# Patient Record
Sex: FEMALE | Race: OTHER | Employment: OTHER | URBAN - METROPOLITAN AREA
[De-identification: names, ages, dates, MRNs, and addresses within clinical notes are randomized per-mention and may not be internally consistent; named-entity substitution may affect disease eponyms.]

---

## 2018-02-23 PROBLEM — R19.00 PELVIC MASS: Status: ACTIVE | Noted: 2018-02-23

## 2018-02-23 PROBLEM — N93.9 ABNORMAL UTERINE BLEEDING: Status: ACTIVE | Noted: 2018-02-23

## 2018-03-05 PROBLEM — R19.00 PELVIC MASS IN FEMALE: Status: ACTIVE | Noted: 2018-03-05

## 2020-01-08 ENCOUNTER — APPOINTMENT (OUTPATIENT)
Dept: GENERAL RADIOLOGY | Age: 45
End: 2020-01-08
Payer: COMMERCIAL

## 2020-01-08 ENCOUNTER — HOSPITAL ENCOUNTER (EMERGENCY)
Age: 45
Discharge: HOME | End: 2020-01-08
Payer: COMMERCIAL

## 2020-01-08 VITALS
SYSTOLIC BLOOD PRESSURE: 135 MMHG | BODY MASS INDEX: 34.16 KG/M2 | RESPIRATION RATE: 16 BRPM | TEMPERATURE: 98.2 F | WEIGHT: 174 LBS | DIASTOLIC BLOOD PRESSURE: 75 MMHG | HEIGHT: 60 IN | OXYGEN SATURATION: 97 % | HEART RATE: 78 BPM

## 2020-01-08 PROCEDURE — 99283 EMERGENCY DEPT VISIT LOW MDM: CPT

## 2020-01-08 PROCEDURE — 73030 X-RAY EXAM OF SHOULDER: CPT

## 2020-01-08 PROCEDURE — 96372 THER/PROPH/DIAG INJ SC/IM: CPT

## 2020-01-08 PROCEDURE — 6360000002 HC RX W HCPCS: Performed by: PHYSICIAN ASSISTANT

## 2020-01-08 RX ORDER — IBUPROFEN 600 MG/1
600 TABLET ORAL EVERY 6 HOURS PRN
Qty: 60 TABLET | Refills: 0 | Status: SHIPPED | OUTPATIENT
Start: 2020-01-08 | End: 2020-02-06

## 2020-01-08 RX ORDER — HYDROCODONE BITARTRATE AND ACETAMINOPHEN 5; 325 MG/1; MG/1
1 TABLET ORAL EVERY 6 HOURS PRN
Qty: 12 TABLET | Refills: 0 | Status: SHIPPED | OUTPATIENT
Start: 2020-01-08 | End: 2020-01-11

## 2020-01-08 RX ORDER — KETOROLAC TROMETHAMINE 30 MG/ML
60 INJECTION, SOLUTION INTRAMUSCULAR; INTRAVENOUS ONCE
Status: COMPLETED | OUTPATIENT
Start: 2020-01-08 | End: 2020-01-08

## 2020-01-08 RX ORDER — DEXAMETHASONE SODIUM PHOSPHATE 10 MG/ML
10 INJECTION INTRAMUSCULAR; INTRAVENOUS ONCE
Status: COMPLETED | OUTPATIENT
Start: 2020-01-08 | End: 2020-01-08

## 2020-01-08 RX ADMIN — DEXAMETHASONE SODIUM PHOSPHATE 10 MG: 10 INJECTION INTRAMUSCULAR; INTRAVENOUS at 16:38

## 2020-01-08 RX ADMIN — KETOROLAC TROMETHAMINE 60 MG: 30 INJECTION, SOLUTION INTRAMUSCULAR at 16:38

## 2020-01-08 ASSESSMENT — PAIN SCALES - GENERAL
PAINLEVEL_OUTOF10: 10
PAINLEVEL_OUTOF10: 10

## 2020-01-08 ASSESSMENT — PAIN DESCRIPTION - PAIN TYPE: TYPE: ACUTE PAIN

## 2020-01-08 ASSESSMENT — PAIN DESCRIPTION - LOCATION: LOCATION: ARM

## 2020-01-08 ASSESSMENT — PAIN DESCRIPTION - ORIENTATION: ORIENTATION: RIGHT

## 2020-01-08 NOTE — ED NOTES
Name: Stephen Herbert  : 1975  MRN: [id number]    Date: 2020    Benefits of immediately proceeding with Radiology exam outweigh the risks and therefore the following is being waived:      [x] Pregnancy test    [] Protocol for Iodine allergy    [] MRI questionnaire    [] BUN/Creatinine        KIRSTEN Perry PA-C  [id number]

## 2020-01-10 ENCOUNTER — TELEPHONE (OUTPATIENT)
Dept: ADMINISTRATIVE | Age: 45
End: 2020-01-10

## 2020-02-05 ENCOUNTER — OFFICE VISIT (OUTPATIENT)
Dept: PHYSICAL MEDICINE AND REHAB | Age: 45
End: 2020-02-05
Payer: COMMERCIAL

## 2020-02-05 ENCOUNTER — HOSPITAL ENCOUNTER (EMERGENCY)
Age: 45
Discharge: HOME | End: 2020-02-06
Payer: COMMERCIAL

## 2020-02-05 ENCOUNTER — APPOINTMENT (OUTPATIENT)
Dept: CT IMAGING | Age: 45
End: 2020-02-05
Payer: COMMERCIAL

## 2020-02-05 VITALS
OXYGEN SATURATION: 98 % | SYSTOLIC BLOOD PRESSURE: 163 MMHG | BODY MASS INDEX: 33.18 KG/M2 | HEART RATE: 71 BPM | HEIGHT: 60 IN | WEIGHT: 169 LBS | DIASTOLIC BLOOD PRESSURE: 99 MMHG

## 2020-02-05 LAB
ANION GAP SERPL CALCULATED.3IONS-SCNC: 9 MMOL/L (ref 7–16)
BASOPHILS ABSOLUTE: 0.05 E9/L (ref 0–0.2)
BASOPHILS RELATIVE PERCENT: 0.4 % (ref 0–2)
BUN BLDV-MCNC: 11 MG/DL (ref 6–20)
CALCIUM SERPL-MCNC: 9.3 MG/DL (ref 8.6–10.2)
CHLORIDE BLD-SCNC: 98 MMOL/L (ref 98–107)
CO2: 27 MMOL/L (ref 22–29)
CREAT SERPL-MCNC: 0.8 MG/DL (ref 0.5–1)
EOSINOPHILS ABSOLUTE: 0.18 E9/L (ref 0.05–0.5)
EOSINOPHILS RELATIVE PERCENT: 1.5 % (ref 0–6)
GFR AFRICAN AMERICAN: >60
GFR NON-AFRICAN AMERICAN: >60 ML/MIN/1.73
GLUCOSE BLD-MCNC: 97 MG/DL (ref 74–99)
HCT VFR BLD CALC: 46.1 % (ref 34–48)
HEMOGLOBIN: 15.2 G/DL (ref 11.5–15.5)
IMMATURE GRANULOCYTES #: 0.08 E9/L
IMMATURE GRANULOCYTES %: 0.7 % (ref 0–5)
LYMPHOCYTES ABSOLUTE: 3 E9/L (ref 1.5–4)
LYMPHOCYTES RELATIVE PERCENT: 25.8 % (ref 20–42)
MCH RBC QN AUTO: 31.1 PG (ref 26–35)
MCHC RBC AUTO-ENTMCNC: 33 % (ref 32–34.5)
MCV RBC AUTO: 94.3 FL (ref 80–99.9)
MONOCYTES ABSOLUTE: 0.96 E9/L (ref 0.1–0.95)
MONOCYTES RELATIVE PERCENT: 8.2 % (ref 2–12)
NEUTROPHILS ABSOLUTE: 7.37 E9/L (ref 1.8–7.3)
NEUTROPHILS RELATIVE PERCENT: 63.4 % (ref 43–80)
PDW BLD-RTO: 12.8 FL (ref 11.5–15)
PLATELET # BLD: 203 E9/L (ref 130–450)
PMV BLD AUTO: 10 FL (ref 7–12)
POTASSIUM SERPL-SCNC: 4.2 MMOL/L (ref 3.5–5)
RBC # BLD: 4.89 E12/L (ref 3.5–5.5)
SODIUM BLD-SCNC: 134 MMOL/L (ref 132–146)
TROPONIN: <0.01 NG/ML (ref 0–0.03)
WBC # BLD: 11.6 E9/L (ref 4.5–11.5)

## 2020-02-05 PROCEDURE — 84484 ASSAY OF TROPONIN QUANT: CPT

## 2020-02-05 PROCEDURE — 20611 DRAIN/INJ JOINT/BURSA W/US: CPT | Performed by: PHYSICAL MEDICINE & REHABILITATION

## 2020-02-05 PROCEDURE — 93005 ELECTROCARDIOGRAM TRACING: CPT | Performed by: EMERGENCY MEDICINE

## 2020-02-05 PROCEDURE — 85025 COMPLETE CBC W/AUTO DIFF WBC: CPT

## 2020-02-05 PROCEDURE — 99285 EMERGENCY DEPT VISIT HI MDM: CPT

## 2020-02-05 PROCEDURE — 80048 BASIC METABOLIC PNL TOTAL CA: CPT

## 2020-02-05 PROCEDURE — 70450 CT HEAD/BRAIN W/O DYE: CPT

## 2020-02-05 PROCEDURE — 99203 OFFICE O/P NEW LOW 30 MIN: CPT | Performed by: PHYSICAL MEDICINE & REHABILITATION

## 2020-02-05 PROCEDURE — 6370000000 HC RX 637 (ALT 250 FOR IP): Performed by: NURSE PRACTITIONER

## 2020-02-05 RX ORDER — TRIAMCINOLONE ACETONIDE 40 MG/ML
20 INJECTION, SUSPENSION INTRA-ARTICULAR; INTRAMUSCULAR ONCE
Status: COMPLETED | OUTPATIENT
Start: 2020-02-05 | End: 2020-02-05

## 2020-02-05 RX ORDER — MELOXICAM 7.5 MG/1
7.5 TABLET ORAL DAILY
Qty: 30 TABLET | Refills: 0 | Status: SHIPPED
Start: 2020-02-05 | End: 2020-02-26 | Stop reason: ALTCHOICE

## 2020-02-05 RX ORDER — IBUPROFEN 800 MG/1
800 TABLET ORAL ONCE
Status: COMPLETED | OUTPATIENT
Start: 2020-02-05 | End: 2020-02-05

## 2020-02-05 RX ORDER — LIDOCAINE HYDROCHLORIDE 10 MG/ML
2.5 INJECTION, SOLUTION INFILTRATION; PERINEURAL ONCE
Status: COMPLETED | OUTPATIENT
Start: 2020-02-05 | End: 2020-02-05

## 2020-02-05 RX ADMIN — IBUPROFEN 800 MG: 800 TABLET, FILM COATED ORAL at 23:27

## 2020-02-05 RX ADMIN — LIDOCAINE HYDROCHLORIDE 2.5 ML: 10 INJECTION, SOLUTION INFILTRATION; PERINEURAL at 10:07

## 2020-02-05 RX ADMIN — TRIAMCINOLONE ACETONIDE 20 MG: 40 INJECTION, SUSPENSION INTRA-ARTICULAR; INTRAMUSCULAR at 10:07

## 2020-02-05 ASSESSMENT — PAIN SCALES - GENERAL: PAINLEVEL_OUTOF10: 0

## 2020-02-05 NOTE — PROGRESS NOTES
Allergies    Current Outpatient Medications   Medication Sig Dispense Refill    meloxicam (MOBIC) 7.5 MG tablet Take 1 tablet by mouth daily 30 tablet 0    ibuprofen (IBU) 600 MG tablet Take 1 tablet by mouth every 6 hours as needed for Pain 60 tablet 0    ranitidine (ZANTAC) 150 MG tablet Take 150 mg by mouth daily as needed for Heartburn      ferrous sulfate (KIT-BRUCE) 325 (65 Fe) MG tablet Take 1 tablet by mouth daily (Patient not taking: Reported on 2/5/2020) 30 tablet 3     No current facility-administered medications for this visit. Past Medical History:   Diagnosis Date    Cyst of ovary        Past Surgical History:   Procedure Laterality Date    BACK SURGERY      CHOLECYSTECTOMY      CYST REMOVAL      HYSTERECTOMY  03/05/2018    Robotic hysterectomy, bilateral salpingectomy, right oophorectomy DR. ARIANA RAYMOND [facility]        Family History   Problem Relation Age of Onset    Colon Cancer Neg Hx     Uterine Cancer Neg Hx     Ovarian Cancer Neg Hx     Breast Cancer Neg Hx        Social History     Tobacco Use    Smoking status: Current Every Day Smoker     Packs/day: 0.50     Years: 25.00     Pack years: 12.50     Types: Cigarettes    Smokeless tobacco: Never Used   Substance Use Topics    Alcohol use: No    Drug use: No        Functional Status: The patient is able to ambulate and perform activities of daily living without the use of an assistive device. ROS:    Constitutional: Denies fevers, chills, night sweats, unintentional weight loss     Respiratory: Denies SOB or cough     Cardiovascular: Denies CP, palpitations, edema      Neurologic: See HPI.     MSK: See HPI. Psychiatric: Denies sleep disturbance, anxiety, depression    Physical Exam:   Blood pressure (!) 163/99, pulse 71, height 5' (1.524 m), weight 169 lb (76.7 kg), last menstrual period 01/05/2018, SpO2 98 %.    PAIN: 0-10/10  GEN APPEARANCE: Pleasant, well developed, well nourished in no acute distress; Alert and Oriented; body habitus: Obese  PSYCH: Normal mood and affect   SKIN: No lesions grossly visible on right shoulder    MSK:    Shoulder Exam:    Inspection   Shoulders are symmetric without muscle atrophy. AC joints are without deformity    Palpation R side: There are no bony deformities noted to the clavicle, acromion, scapula, humerus, or sternum  There is tenderness at the [facility] joint  There is no tenderness at the Sternoclavicular joint  There is concordant tenderness to the bicipital tendon  There is no tenderness to the greater tuberosity  There is no levator scapula tenderness   There is no periscapular tenderness     AROM OF SHOULDER: RIGHT   LEFT  Flexion:     180   180  Abduction:    170   170  IR:      90   90  ER:     90   90    PROM of SHOULDER:  Full    Neurological Exam:  Strength:   R  L  Deltoid   5  5  Int rot   5 5  Ext rot   5 5  Biceps   5  5  Triceps  5  5  Wrist Ext  5  5  Finger Abd  5  5    Reflexes:   R  L  Biceps   (2) NT  Triceps  (2) NT  BR   (2) NT  Baer  (-) (-)    PROVOCATIVE TESTS R side:    Neer: neg  Radford: neg  Drop arm test: neg  Empty Can: neg  Apley's Scarf: +  Speed's: +  Yergason's: neg  Obriens's: neg    Impression:   Paul Swartz is a 38 y.o. female who presents with right shoulder pain secondary to acute biceps tendinitis. 1. Acute pain of right shoulder    2. Biceps tendinitis of right upper extremity        Recommendations:  1. Discussion: I have discussed the natural history of the above diagnoses with her in detail, with more than 1/2 of the visit spent counseling her on the pathophysiology and treatment options. Discussed various conservative treatment options with patient today including therapy, home exercise program, injections. Patient interested in ultrasound-guided steroid injection today. Risks and benefits discussed, patient provided written and verbal consent to proceed. Biceps tendinitis specific home exercise program provided. 2. Activity Modification: Patient to continue being as physically active as possible while avoiding complete inactivity. No current restrictions placed. 3. Medications: Mobic 7.5 mg daily with food x30 days. 4. Referrals: No surgical referral needed at this point. 5. Images: No additional imaging indicated at this time. 6. Labs: None today. 7. DME: None today. 8. Injection: Ultrasound-guided bicipital tendon sheath steroid injection, as documented below. 9. Follow-up: 1 week. At that time we will review progress with above interventions. The patient was educated about the diagnosis, prognosis, indications, risks and benefits of treatment. An opportunity to ask questions was given to the patient and questions were answered. The patient agreed to proceed with the recommended treatment as described above.      Musculoskeletal Ultrasound Performed at Today's Visit:  Purpose:  US Guided Intervention  Laterality:  right  Structure:  biceps tendon sheath injection  Settings / Approach:  Portable iQ Butterfly ultrasound probe  Interventions:     · Universal protocol documentation / Pre-Procedure Checklist:   ¨ ID verified by two sources (select any two from list): MRN and Name     ¨ Site: right biceps tendon sheath  ¨ Procedure: biceps tendon sheath injection using Kenalog   ¨ Site(s) marked: yes     ¨ Position: seated  ¨ Consent: available     ¨ History and Physical in chart  ¨ Other relevant documentation: available     ¨ Relevant images: available     ¨ Implants: not applicable     ¨ Heat source: No  · Antibiotic: No    ¨ Special Equipment: Musculoskeletal Ultrasound Guidance with image / CINE recording   ¨ Blood products matched: not applicable     ¨ Surgical/Procedure pause: yes     ¨ Other pre-procedural information: given     ¨ Patient concurs: yes      Team present and concurs:  Sid Gage DO; Joes Aparicio MA  · Procedure Note   ¨ Medications used:   0.5ml of 40mg/ml Kenalog and 0.5ml of 1% lidocaine without epinephrine along with 2 mL 1% lidocaine to anesthetize the skin. Description of procedure:   After having explained the potential risks  (patient informed of risk of tendon tear, pain, local bleeding, infection, hyperglycemia, hypertension, lack of benefit, cartilage decay) and benefits of the right bicep tendon sheath injection, and after reviewing the patient's medication and at least two pertinent identifiers, the patient gave verbal consent to proceed. Written consent was also completed and will be scanned into the chart. A preprocedural time-out was performed. The patient was then positioned and prepped in the usual sterile fashion with chloroprep, and target was identified with palpation and musculoskeletal ultrasound using settings described above. Using a 25 gauge, 1.5inch needle, 2cc of 1% Lidocaine was injected beneath the skin, and a wheal was raised. Subsequently, at this site,  a 25 gauge, 1.5inch needle was directed into the Bicep tendon sheath utilizing real-time ultrasound guidance. After non-bloody aspiration to confirm extravascular needle tip placement, the injection was completed under real-time ultrasound guidance, with 20 mg of Kenalog mixed with 0.5 cc of 1% Lidocaine. There was negligible blood loss and no complications. An adhesive bandage was placed over the injection site. The patient tolerated the procedure well, and remained stable throughout. Immediately after the procedure, patient noted improvement in pain. The patient left in baseline health status. Preprocedural pain 10/10  Post procedural pain 5/10    Patient was advised to watch for any signs of infection in the area of the injection (redness, streaky appearance of skin, etcetera) and call the office immediately for treatment if those symptoms develop. Patient is to follow-up with me next week to review progress after injection.   Instructed to not soak area in liquid (bath, pool, hot tub,

## 2020-02-06 VITALS
RESPIRATION RATE: 16 BRPM | HEIGHT: 60 IN | HEART RATE: 63 BPM | SYSTOLIC BLOOD PRESSURE: 150 MMHG | WEIGHT: 169 LBS | TEMPERATURE: 98.3 F | BODY MASS INDEX: 33.18 KG/M2 | DIASTOLIC BLOOD PRESSURE: 81 MMHG | OXYGEN SATURATION: 97 %

## 2020-02-06 LAB
EKG ATRIAL RATE: 72 BPM
EKG P AXIS: 57 DEGREES
EKG P-R INTERVAL: 144 MS
EKG Q-T INTERVAL: 368 MS
EKG QRS DURATION: 80 MS
EKG QTC CALCULATION (BAZETT): 402 MS
EKG R AXIS: 69 DEGREES
EKG T AXIS: 54 DEGREES
EKG VENTRICULAR RATE: 72 BPM

## 2020-02-06 PROCEDURE — 93010 ELECTROCARDIOGRAM REPORT: CPT | Performed by: INTERNAL MEDICINE

## 2020-02-06 RX ORDER — IBUPROFEN 800 MG/1
800 TABLET ORAL EVERY 6 HOURS PRN
Qty: 21 TABLET | Refills: 0 | Status: SHIPPED | OUTPATIENT
Start: 2020-02-06 | End: 2020-02-26 | Stop reason: ALTCHOICE

## 2020-02-06 NOTE — ED NOTES
Assumed care of patient. Pt lying in bed in no apparent distress. Mother at bedside.      Sondra Mccrary RN  02/05/20 9932

## 2020-02-11 ENCOUNTER — HOSPITAL ENCOUNTER (OUTPATIENT)
Age: 45
Discharge: HOME | End: 2020-02-13
Payer: COMMERCIAL

## 2020-02-11 ENCOUNTER — OFFICE VISIT (OUTPATIENT)
Dept: FAMILY MEDICINE CLINIC | Age: 45
End: 2020-02-11
Payer: COMMERCIAL

## 2020-02-11 VITALS
SYSTOLIC BLOOD PRESSURE: 140 MMHG | WEIGHT: 174.4 LBS | OXYGEN SATURATION: 99 % | HEART RATE: 81 BPM | DIASTOLIC BLOOD PRESSURE: 90 MMHG | BODY MASS INDEX: 32.93 KG/M2 | TEMPERATURE: 97.3 F | RESPIRATION RATE: 18 BRPM | HEIGHT: 61 IN

## 2020-02-11 LAB
ALBUMIN SERPL-MCNC: 4.7 G/DL (ref 3.5–5.2)
ALP BLD-CCNC: 72 U/L (ref 35–104)
ALT SERPL-CCNC: 18 U/L (ref 0–32)
ANION GAP SERPL CALCULATED.3IONS-SCNC: 14 MMOL/L (ref 7–16)
AST SERPL-CCNC: 15 U/L (ref 0–31)
BASOPHILS ABSOLUTE: 0.04 E9/L (ref 0–0.2)
BASOPHILS RELATIVE PERCENT: 0.4 % (ref 0–2)
BILIRUB SERPL-MCNC: 0.4 MG/DL (ref 0–1.2)
BUN BLDV-MCNC: 16 MG/DL (ref 6–20)
CALCIUM SERPL-MCNC: 9.1 MG/DL (ref 8.6–10.2)
CHLORIDE BLD-SCNC: 104 MMOL/L (ref 98–107)
CHOLESTEROL, TOTAL: 141 MG/DL (ref 0–199)
CO2: 20 MMOL/L (ref 22–29)
CREAT SERPL-MCNC: 0.7 MG/DL (ref 0.5–1)
EOSINOPHILS ABSOLUTE: 0.08 E9/L (ref 0.05–0.5)
EOSINOPHILS RELATIVE PERCENT: 0.8 % (ref 0–6)
GFR AFRICAN AMERICAN: >60
GFR NON-AFRICAN AMERICAN: >60 ML/MIN/1.73
GLUCOSE BLD-MCNC: 100 MG/DL (ref 74–99)
HCT VFR BLD CALC: 46.3 % (ref 34–48)
HDLC SERPL-MCNC: 43 MG/DL
HEMOGLOBIN: 14.4 G/DL (ref 11.5–15.5)
IMMATURE GRANULOCYTES #: 0.08 E9/L
IMMATURE GRANULOCYTES %: 0.8 % (ref 0–5)
LDL CHOLESTEROL CALCULATED: 87 MG/DL (ref 0–99)
LYMPHOCYTES ABSOLUTE: 2.25 E9/L (ref 1.5–4)
LYMPHOCYTES RELATIVE PERCENT: 21.8 % (ref 20–42)
MAGNESIUM: 2.2 MG/DL (ref 1.6–2.6)
MCH RBC QN AUTO: 30.3 PG (ref 26–35)
MCHC RBC AUTO-ENTMCNC: 31.1 % (ref 32–34.5)
MCV RBC AUTO: 97.3 FL (ref 80–99.9)
MONOCYTES ABSOLUTE: 0.69 E9/L (ref 0.1–0.95)
MONOCYTES RELATIVE PERCENT: 6.7 % (ref 2–12)
NEUTROPHILS ABSOLUTE: 7.2 E9/L (ref 1.8–7.3)
NEUTROPHILS RELATIVE PERCENT: 69.5 % (ref 43–80)
PDW BLD-RTO: 12.7 FL (ref 11.5–15)
PLATELET # BLD: 198 E9/L (ref 130–450)
PMV BLD AUTO: 10.8 FL (ref 7–12)
POTASSIUM SERPL-SCNC: 4.7 MMOL/L (ref 3.5–5)
RBC # BLD: 4.76 E12/L (ref 3.5–5.5)
SODIUM BLD-SCNC: 138 MMOL/L (ref 132–146)
T4 FREE: 1.12 NG/DL (ref 0.93–1.7)
TOTAL PROTEIN: 7.7 G/DL (ref 6.4–8.3)
TRIGL SERPL-MCNC: 57 MG/DL (ref 0–149)
TSH SERPL DL<=0.05 MIU/L-ACNC: 1.75 UIU/ML (ref 0.27–4.2)
VLDLC SERPL CALC-MCNC: 11 MG/DL
WBC # BLD: 10.3 E9/L (ref 4.5–11.5)

## 2020-02-11 PROCEDURE — 85025 COMPLETE CBC W/AUTO DIFF WBC: CPT

## 2020-02-11 PROCEDURE — G8427 DOCREV CUR MEDS BY ELIG CLIN: HCPCS | Performed by: PHYSICIAN ASSISTANT

## 2020-02-11 PROCEDURE — 83735 ASSAY OF MAGNESIUM: CPT

## 2020-02-11 PROCEDURE — 4004F PT TOBACCO SCREEN RCVD TLK: CPT | Performed by: PHYSICIAN ASSISTANT

## 2020-02-11 PROCEDURE — 84439 ASSAY OF FREE THYROXINE: CPT

## 2020-02-11 PROCEDURE — 80053 COMPREHEN METABOLIC PANEL: CPT

## 2020-02-11 PROCEDURE — 99204 OFFICE O/P NEW MOD 45 MIN: CPT | Performed by: PHYSICIAN ASSISTANT

## 2020-02-11 PROCEDURE — G8484 FLU IMMUNIZE NO ADMIN: HCPCS | Performed by: PHYSICIAN ASSISTANT

## 2020-02-11 PROCEDURE — G8417 CALC BMI ABV UP PARAM F/U: HCPCS | Performed by: PHYSICIAN ASSISTANT

## 2020-02-11 PROCEDURE — 80061 LIPID PANEL: CPT

## 2020-02-11 PROCEDURE — 84443 ASSAY THYROID STIM HORMONE: CPT

## 2020-02-11 RX ORDER — MEDICAL SUPPLY, MISCELLANEOUS
1 EACH MISCELLANEOUS 2 TIMES DAILY
Qty: 1 EACH | Refills: 0 | Status: SHIPPED | OUTPATIENT
Start: 2020-02-11 | End: 2020-02-26 | Stop reason: ALTCHOICE

## 2020-02-11 ASSESSMENT — PATIENT HEALTH QUESTIONNAIRE - PHQ9
1. LITTLE INTEREST OR PLEASURE IN DOING THINGS: 2
SUM OF ALL RESPONSES TO PHQ QUESTIONS 1-9: 2
2. FEELING DOWN, DEPRESSED OR HOPELESS: 0
SUM OF ALL RESPONSES TO PHQ QUESTIONS 1-9: 2
SUM OF ALL RESPONSES TO PHQ9 QUESTIONS 1 & 2: 2

## 2020-02-11 NOTE — PROGRESS NOTES
weakness  Neurological: negative for - bowel and bladder control changes, dizziness, or headaches   Dermatological: negative for - dry skin, rash, hair or nail symptoms    Physical Exam:   Vitals:    02/11/20 0951 02/11/20 1002   BP: (!) 148/94 (!) 140/90   Site: Left Upper Arm Left Upper Arm   Position: Sitting Sitting   Cuff Size: Large Adult Large Adult   Pulse: 71 81   Resp: 18    Temp: 97.3 °F (36.3 °C)    TempSrc: Temporal    SpO2: 99%    Weight: 174 lb 6.4 oz (79.1 kg)    Height: 5' 1\" (1.549 m)      Physical Exam  Constitutional:       General: She is not in acute distress. Appearance: She is well-developed. HENT:      Head: Normocephalic and atraumatic. Right Ear: External ear normal.      Left Ear: External ear normal.      Nose: Nose normal.   Eyes:      General: No scleral icterus. Conjunctiva/sclera: Conjunctivae normal.      Pupils: Pupils are equal, round, and reactive to light. Neck:      Musculoskeletal: Normal range of motion and neck supple. Thyroid: No thyromegaly. Vascular: No carotid bruit. Cardiovascular:      Rate and Rhythm: Normal rate and regular rhythm. Heart sounds: Normal heart sounds. No murmur. Pulmonary:      Effort: Pulmonary effort is normal. No accessory muscle usage or respiratory distress. Breath sounds: Normal breath sounds. No wheezing. Abdominal:      General: Bowel sounds are normal.      Palpations: Abdomen is soft. Musculoskeletal: Normal range of motion. Right lower leg: No edema. Left lower leg: No edema. Skin:     General: Skin is warm and dry. Findings: No rash. Neurological:      Mental Status: She is alert and oriented to person, place, and time. Motor: No weakness. Deep Tendon Reflexes: Reflexes are normal and symmetric.    Psychiatric:         Speech: Speech normal.         Behavior: Behavior normal.           Assessment/Plan:     Salina Garcia was seen today for establish care, fatigue, dizziness,

## 2020-02-12 ENCOUNTER — OFFICE VISIT (OUTPATIENT)
Dept: PHYSICAL MEDICINE AND REHAB | Age: 45
End: 2020-02-12
Payer: COMMERCIAL

## 2020-02-12 VITALS
OXYGEN SATURATION: 99 % | HEIGHT: 61 IN | WEIGHT: 174 LBS | BODY MASS INDEX: 32.85 KG/M2 | SYSTOLIC BLOOD PRESSURE: 148 MMHG | DIASTOLIC BLOOD PRESSURE: 88 MMHG | HEART RATE: 65 BPM

## 2020-02-12 PROCEDURE — G8417 CALC BMI ABV UP PARAM F/U: HCPCS | Performed by: PHYSICAL MEDICINE & REHABILITATION

## 2020-02-12 PROCEDURE — 99213 OFFICE O/P EST LOW 20 MIN: CPT | Performed by: PHYSICAL MEDICINE & REHABILITATION

## 2020-02-12 PROCEDURE — G8484 FLU IMMUNIZE NO ADMIN: HCPCS | Performed by: PHYSICAL MEDICINE & REHABILITATION

## 2020-02-12 PROCEDURE — G8427 DOCREV CUR MEDS BY ELIG CLIN: HCPCS | Performed by: PHYSICAL MEDICINE & REHABILITATION

## 2020-02-12 PROCEDURE — 4004F PT TOBACCO SCREEN RCVD TLK: CPT | Performed by: PHYSICAL MEDICINE & REHABILITATION

## 2020-02-12 NOTE — PROGRESS NOTES
Sid [facility] PM[facility]R at [facility] [place], [address]  Phone: [phone]  Fax: [phone]      Follow Up Evaluation for Yaneth Thompson  : 1975  MRN: [id number]  PCP: DANISH Arias  REF: No ref. provider found  Date of visit: 20  Last Visit: 19    As you know, this is a 38 y.o. female with no pertinent past medical history. Chief Complaint   Patient presents with    Shoulder Pain     right shoulder - injections helped        HPI:   Presents today in follow-up regarding right shoulder pain s/p bicipital groove steroid injection under ultrasound guidance. Patient states pain is significantly improved following injection. Pain is currently 0/10. Over the last week, she did experience some minor pain while working hard at her occupation and [employer]. Patient states that she had no skin issues to report following injection. She also states she has no interactions with medications or injections. With that said, there is documentation in the chart that she went to the ED for headache the day of injection and she has since followed up with her PCP. No other issues or complaints today. She is pleased with the results of the injection. Diagnostic Studies:  - XR right shoulder from [facility] dated 2020 (reviewed personally on 2020) demonstrates:   Impression       Multilevel degenerative changes at the acromioclavicular joint.    Otherwise, unremarkable radiographs of the right shoulder.        Current Outpatient Medications   Medication Sig Dispense Refill    Blood Pressure Monitoring (B-D ASSURE BPM/AUTO ARM CUFF) MISC 1 Device by Does not apply route 2 times daily Check BP daily, keep log  Dx: fluctuating BP 1 each 0    ibuprofen (ADVIL;MOTRIN) 800 MG tablet Take 1 tablet by mouth every 6 hours as needed for Pain 21 tablet 0    meloxicam (MOBIC) 7.5 MG tablet Take 1 tablet by mouth daily 30 tablet 0     No spent counseling her on the pathophysiology and treatment options. Patient to continue home exercise program for biceps tendinitis. Patient to continue following up with PCP regarding hypertension and headaches. 2. Activity Modification: Patient to continue being as physically active as possible while avoiding complete inactivity. No current restrictions placed. 3. Medications: Reviewed medications and no changes today. 4. Referrals: No surgical referral needed at this point. 5. Images: No additional imaging of right shoulder indicated at this time. 6. Labs: None today. 7. DME: None today. 8. Injection: None indicated today. Can consider ultrasound-guided right AC joint steroid injection at a later date depending on progress. 9. Follow-up: As needed    The patient was educated about the diagnosis, prognosis, indications, risks and benefits of treatment. An opportunity to ask questions was given to the patient and questions were answered. The patient agreed to proceed with the recommended treatment as described above. Sincerely,     Sid Campbell., DO  Physical Medicine and Rehabilitation     Please note that the above documentation was prepared using voice recognition software. Every attempt was made to ensure accuracy but there may be spelling, grammatical, and contextual errors.

## 2020-02-22 ENCOUNTER — HOSPITAL ENCOUNTER (OUTPATIENT)
Dept: MRI IMAGING | Age: 45
Discharge: HOME | End: 2020-02-24
Payer: COMMERCIAL

## 2020-02-22 ENCOUNTER — HOSPITAL ENCOUNTER (OUTPATIENT)
Dept: ULTRASOUND IMAGING | Age: 45
Discharge: HOME | End: 2020-02-24
Payer: COMMERCIAL

## 2020-02-22 PROCEDURE — 6360000004 HC RX CONTRAST MEDICATION: Performed by: RADIOLOGY

## 2020-02-22 PROCEDURE — 70553 MRI BRAIN STEM W/O & W/DYE: CPT

## 2020-02-22 PROCEDURE — 93880 EXTRACRANIAL BILAT STUDY: CPT

## 2020-02-22 PROCEDURE — A9579 GAD-BASE MR CONTRAST NOS,1ML: HCPCS | Performed by: RADIOLOGY

## 2020-02-22 RX ADMIN — GADOTERIDOL 16 ML: 279.3 INJECTION, SOLUTION INTRAVENOUS at 08:12

## 2020-02-25 ENCOUNTER — OFFICE VISIT (OUTPATIENT)
Dept: FAMILY MEDICINE CLINIC | Age: 45
End: 2020-02-25
Payer: COMMERCIAL

## 2020-02-25 VITALS
BODY MASS INDEX: 32.1 KG/M2 | HEART RATE: 82 BPM | WEIGHT: 170 LBS | OXYGEN SATURATION: 97 % | DIASTOLIC BLOOD PRESSURE: 78 MMHG | HEIGHT: 61 IN | TEMPERATURE: 98.1 F | SYSTOLIC BLOOD PRESSURE: 145 MMHG | RESPIRATION RATE: 18 BRPM

## 2020-02-25 PROCEDURE — G8427 DOCREV CUR MEDS BY ELIG CLIN: HCPCS | Performed by: PHYSICIAN ASSISTANT

## 2020-02-25 PROCEDURE — G8484 FLU IMMUNIZE NO ADMIN: HCPCS | Performed by: PHYSICIAN ASSISTANT

## 2020-02-25 PROCEDURE — G8417 CALC BMI ABV UP PARAM F/U: HCPCS | Performed by: PHYSICIAN ASSISTANT

## 2020-02-25 PROCEDURE — 99213 OFFICE O/P EST LOW 20 MIN: CPT | Performed by: PHYSICIAN ASSISTANT

## 2020-02-25 PROCEDURE — 4004F PT TOBACCO SCREEN RCVD TLK: CPT | Performed by: PHYSICIAN ASSISTANT

## 2020-02-25 RX ORDER — HYDROCHLOROTHIAZIDE 25 MG/1
25 TABLET ORAL EVERY MORNING
Qty: 30 TABLET | Refills: 1 | Status: SHIPPED
Start: 2020-02-25 | End: 2020-03-02

## 2020-02-26 ENCOUNTER — HOSPITAL ENCOUNTER (OUTPATIENT)
Age: 45
Setting detail: OBSERVATION
Discharge: HOME | End: 2020-02-28
Attending: EMERGENCY MEDICINE | Admitting: INTERNAL MEDICINE
Payer: COMMERCIAL

## 2020-02-26 PROBLEM — R53.1 GENERAL WEAKNESS: Status: ACTIVE | Noted: 2020-02-26

## 2020-02-26 LAB
ALBUMIN SERPL-MCNC: 4.8 G/DL (ref 3.5–5.2)
ALP BLD-CCNC: 78 U/L (ref 35–104)
ALT SERPL-CCNC: 15 U/L (ref 0–32)
ANION GAP SERPL CALCULATED.3IONS-SCNC: 14 MMOL/L (ref 7–16)
AST SERPL-CCNC: 15 U/L (ref 0–31)
BACTERIA: ABNORMAL /HPF
BASOPHILS ABSOLUTE: 0.04 E9/L (ref 0–0.2)
BASOPHILS RELATIVE PERCENT: 0.4 % (ref 0–2)
BILIRUB SERPL-MCNC: 0.5 MG/DL (ref 0–1.2)
BILIRUBIN URINE: NEGATIVE
BLOOD, URINE: ABNORMAL
BUN BLDV-MCNC: 8 MG/DL (ref 6–20)
CALCIUM SERPL-MCNC: 9.6 MG/DL (ref 8.6–10.2)
CHLORIDE BLD-SCNC: 103 MMOL/L (ref 98–107)
CLARITY: CLEAR
CO2: 23 MMOL/L (ref 22–29)
COLOR: YELLOW
CREAT SERPL-MCNC: 0.6 MG/DL (ref 0.5–1)
EKG ATRIAL RATE: 73 BPM
EKG P AXIS: 59 DEGREES
EKG P-R INTERVAL: 146 MS
EKG Q-T INTERVAL: 370 MS
EKG QRS DURATION: 84 MS
EKG QTC CALCULATION (BAZETT): 407 MS
EKG R AXIS: 71 DEGREES
EKG T AXIS: 47 DEGREES
EKG VENTRICULAR RATE: 73 BPM
EOSINOPHILS ABSOLUTE: 0.03 E9/L (ref 0.05–0.5)
EOSINOPHILS RELATIVE PERCENT: 0.3 % (ref 0–6)
EPITHELIAL CELLS, UA: ABNORMAL /HPF
GFR AFRICAN AMERICAN: >60
GFR NON-AFRICAN AMERICAN: >60 ML/MIN/1.73
GLUCOSE BLD-MCNC: 118 MG/DL (ref 74–99)
GLUCOSE URINE: NEGATIVE MG/DL
HCT VFR BLD CALC: 49.4 % (ref 34–48)
HEMOGLOBIN: 16.1 G/DL (ref 11.5–15.5)
IMMATURE GRANULOCYTES #: 0.06 E9/L
IMMATURE GRANULOCYTES %: 0.5 % (ref 0–5)
KETONES, URINE: NEGATIVE MG/DL
LEUKOCYTE ESTERASE, URINE: ABNORMAL
LYMPHOCYTES ABSOLUTE: 2.41 E9/L (ref 1.5–4)
LYMPHOCYTES RELATIVE PERCENT: 21.5 % (ref 20–42)
MCH RBC QN AUTO: 30.4 PG (ref 26–35)
MCHC RBC AUTO-ENTMCNC: 32.6 % (ref 32–34.5)
MCV RBC AUTO: 93.4 FL (ref 80–99.9)
MONOCYTES ABSOLUTE: 0.54 E9/L (ref 0.1–0.95)
MONOCYTES RELATIVE PERCENT: 4.8 % (ref 2–12)
NEUTROPHILS ABSOLUTE: 8.12 E9/L (ref 1.8–7.3)
NEUTROPHILS RELATIVE PERCENT: 72.5 % (ref 43–80)
NITRITE, URINE: NEGATIVE
PDW BLD-RTO: 12.5 FL (ref 11.5–15)
PH UA: 5.5 (ref 5–9)
PLATELET # BLD: 180 E9/L (ref 130–450)
PMV BLD AUTO: 10.8 FL (ref 7–12)
POTASSIUM SERPL-SCNC: 3.9 MMOL/L (ref 3.5–5)
PROTEIN UA: NEGATIVE MG/DL
RBC # BLD: 5.29 E12/L (ref 3.5–5.5)
RBC UA: ABNORMAL /HPF (ref 0–2)
SODIUM BLD-SCNC: 140 MMOL/L (ref 132–146)
SPECIFIC GRAVITY UA: 1.02 (ref 1–1.03)
TOTAL PROTEIN: 7.9 G/DL (ref 6.4–8.3)
TRICHOMONAS: PRESENT /HPF
TROPONIN: <0.01 NG/ML (ref 0–0.03)
UROBILINOGEN, URINE: 0.2 E.U./DL
WBC # BLD: 11.2 E9/L (ref 4.5–11.5)
WBC UA: ABNORMAL /HPF (ref 0–5)

## 2020-02-26 PROCEDURE — 36415 COLL VENOUS BLD VENIPUNCTURE: CPT

## 2020-02-26 PROCEDURE — 6360000002 HC RX W HCPCS: Performed by: INTERNAL MEDICINE

## 2020-02-26 PROCEDURE — 93010 ELECTROCARDIOGRAM REPORT: CPT | Performed by: INTERNAL MEDICINE

## 2020-02-26 PROCEDURE — G0378 HOSPITAL OBSERVATION PER HR: HCPCS

## 2020-02-26 PROCEDURE — 81001 URINALYSIS AUTO W/SCOPE: CPT

## 2020-02-26 PROCEDURE — 99285 EMERGENCY DEPT VISIT HI MDM: CPT

## 2020-02-26 PROCEDURE — 96374 THER/PROPH/DIAG INJ IV PUSH: CPT

## 2020-02-26 PROCEDURE — 80053 COMPREHEN METABOLIC PANEL: CPT

## 2020-02-26 PROCEDURE — 85025 COMPLETE CBC W/AUTO DIFF WBC: CPT

## 2020-02-26 PROCEDURE — 6370000000 HC RX 637 (ALT 250 FOR IP): Performed by: INTERNAL MEDICINE

## 2020-02-26 PROCEDURE — 84484 ASSAY OF TROPONIN QUANT: CPT

## 2020-02-26 PROCEDURE — 96372 THER/PROPH/DIAG INJ SC/IM: CPT

## 2020-02-26 PROCEDURE — 6370000000 HC RX 637 (ALT 250 FOR IP): Performed by: NURSE PRACTITIONER

## 2020-02-26 PROCEDURE — 2580000003 HC RX 258: Performed by: INTERNAL MEDICINE

## 2020-02-26 PROCEDURE — 93005 ELECTROCARDIOGRAM TRACING: CPT | Performed by: NURSE PRACTITIONER

## 2020-02-26 RX ORDER — ACETAMINOPHEN 650 MG/1
650 SUPPOSITORY RECTAL EVERY 6 HOURS PRN
Status: DISCONTINUED | OUTPATIENT
Start: 2020-02-26 | End: 2020-02-28 | Stop reason: HOSPADM

## 2020-02-26 RX ORDER — ONDANSETRON 2 MG/ML
4 INJECTION INTRAMUSCULAR; INTRAVENOUS EVERY 6 HOURS PRN
Status: DISCONTINUED | OUTPATIENT
Start: 2020-02-26 | End: 2020-02-28 | Stop reason: HOSPADM

## 2020-02-26 RX ORDER — HYDROCHLOROTHIAZIDE 25 MG/1
25 TABLET ORAL EVERY MORNING
Status: DISCONTINUED | OUTPATIENT
Start: 2020-02-27 | End: 2020-02-28 | Stop reason: HOSPADM

## 2020-02-26 RX ORDER — POLYETHYLENE GLYCOL 3350 17 G/17G
17 POWDER, FOR SOLUTION ORAL DAILY PRN
Status: DISCONTINUED | OUTPATIENT
Start: 2020-02-26 | End: 2020-02-28 | Stop reason: HOSPADM

## 2020-02-26 RX ORDER — KETOROLAC TROMETHAMINE 30 MG/ML
30 INJECTION, SOLUTION INTRAMUSCULAR; INTRAVENOUS EVERY 6 HOURS PRN
Status: DISCONTINUED | OUTPATIENT
Start: 2020-02-26 | End: 2020-02-28 | Stop reason: HOSPADM

## 2020-02-26 RX ORDER — PROMETHAZINE HYDROCHLORIDE 25 MG/1
12.5 TABLET ORAL EVERY 6 HOURS PRN
Status: DISCONTINUED | OUTPATIENT
Start: 2020-02-26 | End: 2020-02-28 | Stop reason: HOSPADM

## 2020-02-26 RX ORDER — NICOTINE 21 MG/24HR
1 PATCH, TRANSDERMAL 24 HOURS TRANSDERMAL DAILY
Status: DISCONTINUED | OUTPATIENT
Start: 2020-02-26 | End: 2020-02-28 | Stop reason: HOSPADM

## 2020-02-26 RX ORDER — ACETAMINOPHEN 500 MG
1000 TABLET ORAL ONCE
Status: COMPLETED | OUTPATIENT
Start: 2020-02-26 | End: 2020-02-26

## 2020-02-26 RX ORDER — SODIUM CHLORIDE 0.9 % (FLUSH) 0.9 %
10 SYRINGE (ML) INJECTION PRN
Status: DISCONTINUED | OUTPATIENT
Start: 2020-02-26 | End: 2020-02-28 | Stop reason: HOSPADM

## 2020-02-26 RX ORDER — ACETAMINOPHEN 325 MG/1
650 TABLET ORAL EVERY 6 HOURS PRN
Status: DISCONTINUED | OUTPATIENT
Start: 2020-02-26 | End: 2020-02-28 | Stop reason: HOSPADM

## 2020-02-26 RX ORDER — MELOXICAM 7.5 MG/1
7.5 TABLET ORAL DAILY PRN
COMMUNITY
End: 2020-07-09 | Stop reason: ALTCHOICE

## 2020-02-26 RX ORDER — SODIUM CHLORIDE 0.9 % (FLUSH) 0.9 %
10 SYRINGE (ML) INJECTION EVERY 12 HOURS SCHEDULED
Status: DISCONTINUED | OUTPATIENT
Start: 2020-02-26 | End: 2020-02-28 | Stop reason: HOSPADM

## 2020-02-26 RX ADMIN — ACETAMINOPHEN 650 MG: 325 TABLET ORAL at 17:51

## 2020-02-26 RX ADMIN — Medication 10 ML: at 21:29

## 2020-02-26 RX ADMIN — ENOXAPARIN SODIUM 40 MG: 40 INJECTION SUBCUTANEOUS at 17:50

## 2020-02-26 RX ADMIN — ACETAMINOPHEN 1000 MG: 500 TABLET ORAL at 15:53

## 2020-02-26 RX ADMIN — KETOROLAC TROMETHAMINE 30 MG: 30 INJECTION, SOLUTION INTRAMUSCULAR; INTRAVENOUS at 18:26

## 2020-02-26 RX ADMIN — Medication 10 ML: at 20:34

## 2020-02-26 ASSESSMENT — PAIN DESCRIPTION - DESCRIPTORS: DESCRIPTORS: HEADACHE;DISCOMFORT

## 2020-02-26 ASSESSMENT — PAIN DESCRIPTION - PAIN TYPE: TYPE: ACUTE PAIN

## 2020-02-26 ASSESSMENT — PAIN DESCRIPTION - LOCATION
LOCATION: HEAD
LOCATION: HEAD

## 2020-02-26 ASSESSMENT — PAIN DESCRIPTION - ORIENTATION: ORIENTATION: LEFT

## 2020-02-26 ASSESSMENT — PAIN DESCRIPTION - FREQUENCY: FREQUENCY: CONTINUOUS

## 2020-02-26 ASSESSMENT — PAIN SCALES - GENERAL
PAINLEVEL_OUTOF10: 9
PAINLEVEL_OUTOF10: 8
PAINLEVEL_OUTOF10: 9
PAINLEVEL_OUTOF10: 9
PAINLEVEL_OUTOF10: 10
PAINLEVEL_OUTOF10: 9

## 2020-02-26 ASSESSMENT — PAIN DESCRIPTION - ONSET: ONSET: ON-GOING

## 2020-02-26 NOTE — LETTER
[facility]  [address] [facility]  Phone: [phone]         February 28, 2020     Patient: Any Santo   YOB: 1975   Date of Visit: 2/26/2020       To Whom It May Concern:    Lucia Larry was seen and treated in our facility from 2/26/2020 - 2/28/2020.       Sincerely,            Halima Hayward RN

## 2020-02-26 NOTE — ED PROVIDER NOTES
MPV 10.8 7.0 - 12.0 fL    Neutrophils % 72.5 43.0 - 80.0 %    Immature Granulocytes % 0.5 0.0 - 5.0 %    Lymphocytes % 21.5 20.0 - 42.0 %    Monocytes % 4.8 2.0 - 12.0 %    Eosinophils % 0.3 0.0 - 6.0 %    Basophils % 0.4 0.0 - 2.0 %    Neutrophils Absolute 8.12 (H) 1.80 - 7.30 E9/L    Immature Granulocytes # 0.06 E9/L    Lymphocytes Absolute 2.41 1.50 - 4.00 E9/L    Monocytes Absolute 0.54 0.10 - 0.95 E9/L    Eosinophils Absolute 0.03 (L) 0.05 - 0.50 E9/L    Basophils Absolute 0.04 0.00 - 0.20 E9/L   Comprehensive metabolic panel   Result Value Ref Range    Sodium 140 132 - 146 mmol/L    Potassium 3.9 3.5 - 5.0 mmol/L    Chloride 103 98 - 107 mmol/L    CO2 23 22 - 29 mmol/L    Anion Gap 14 7 - 16 mmol/L    Glucose 118 (H) 74 - 99 mg/dL    BUN 8 6 - 20 mg/dL    CREATININE 0.6 0.5 - 1.0 mg/dL    GFR Non-African American >60 >=60 mL/min/1.73    GFR African American >60     Calcium 9.6 8.6 - 10.2 mg/dL    Total Protein 7.9 6.4 - 8.3 g/dL    Alb 4.8 3.5 - 5.2 g/dL    Total Bilirubin 0.5 0.0 - 1.2 mg/dL    Alkaline Phosphatase 78 35 - 104 U/L    ALT 15 0 - 32 U/L    AST 15 0 - 31 U/L   Troponin   Result Value Ref Range    Troponin <0.01 0.00 - 0.03 ng/mL   Urinalysis   Result Value Ref Range    Color, UA Yellow Straw/Yellow    Clarity, UA Clear Clear    Glucose, Ur Negative Negative mg/dL    Bilirubin Urine Negative Negative    Ketones, Urine Negative Negative mg/dL    Specific Gravity, UA 1.025 1.005 - 1.030    Blood, Urine SMALL (A) Negative    pH, UA 5.5 5.0 - 9.0    Protein, UA Negative Negative mg/dL    Urobilinogen, Urine 0.2 <2.0 E.U./dL    Nitrite, Urine Negative Negative    Leukocyte Esterase, Urine SMALL (A) Negative   Microscopic Urinalysis   Result Value Ref Range    WBC, UA 1-3 0 - 5 /HPF    RBC, UA 2-5 0 - 2 /HPF    Epi Cells MANY /HPF    Bacteria, UA MODERATE (A) None Seen /HPF    Trichomonas, UA Present (A) None Seen /HPF       RADIOLOGY:  Interpreted by Radiologist.  No orders to display agreeable with the plan.       --------------------------------- IMPRESSION AND DISPOSITION ---------------------------------    IMPRESSION  1. Paresthesia and pain of left extremity    2. Nonintractable headache, unspecified chronicity pattern, unspecified headache type        DISPOSITION  Disposition: Admit to med/surg floor  Patient condition is stable        NOTE: This report was transcribed using voice recognition software.  Every effort was made to ensure accuracy; however, inadvertent computerized transcription errors may be present          MARIXA Coughlin - ADIS  02/26/20 2711

## 2020-02-27 PROBLEM — R51.9 HEADACHE: Status: ACTIVE | Noted: 2020-02-27

## 2020-02-27 PROBLEM — R90.89 MRI OF BRAIN ABNORMAL: Status: ACTIVE | Noted: 2020-02-27

## 2020-02-27 PROBLEM — R20.2 PARESTHESIAS: Status: ACTIVE | Noted: 2020-02-27

## 2020-02-27 LAB
ANION GAP SERPL CALCULATED.3IONS-SCNC: 14 MMOL/L (ref 7–16)
BASOPHILS ABSOLUTE: 0.04 E9/L (ref 0–0.2)
BASOPHILS RELATIVE PERCENT: 0.5 % (ref 0–2)
BUN BLDV-MCNC: 21 MG/DL (ref 6–20)
CALCIUM SERPL-MCNC: 9.5 MG/DL (ref 8.6–10.2)
CHLORIDE BLD-SCNC: 101 MMOL/L (ref 98–107)
CO2: 23 MMOL/L (ref 22–29)
CREAT SERPL-MCNC: 0.8 MG/DL (ref 0.5–1)
EOSINOPHILS ABSOLUTE: 0.16 E9/L (ref 0.05–0.5)
EOSINOPHILS RELATIVE PERCENT: 2.2 % (ref 0–6)
GFR AFRICAN AMERICAN: >60
GFR NON-AFRICAN AMERICAN: >60 ML/MIN/1.73
GLUCOSE BLD-MCNC: 110 MG/DL (ref 74–99)
HCT VFR BLD CALC: 48.6 % (ref 34–48)
HEMOGLOBIN: 15.9 G/DL (ref 11.5–15.5)
IMMATURE GRANULOCYTES #: 0.03 E9/L
IMMATURE GRANULOCYTES %: 0.4 % (ref 0–5)
LYMPHOCYTES ABSOLUTE: 2.38 E9/L (ref 1.5–4)
LYMPHOCYTES RELATIVE PERCENT: 32.3 % (ref 20–42)
MCH RBC QN AUTO: 30.5 PG (ref 26–35)
MCHC RBC AUTO-ENTMCNC: 32.7 % (ref 32–34.5)
MCV RBC AUTO: 93.3 FL (ref 80–99.9)
MONOCYTES ABSOLUTE: 0.61 E9/L (ref 0.1–0.95)
MONOCYTES RELATIVE PERCENT: 8.3 % (ref 2–12)
NEUTROPHILS ABSOLUTE: 4.14 E9/L (ref 1.8–7.3)
NEUTROPHILS RELATIVE PERCENT: 56.3 % (ref 43–80)
PDW BLD-RTO: 12.6 FL (ref 11.5–15)
PLATELET # BLD: 195 E9/L (ref 130–450)
PMV BLD AUTO: 10 FL (ref 7–12)
POTASSIUM REFLEX MAGNESIUM: 4.2 MMOL/L (ref 3.5–5)
RBC # BLD: 5.21 E12/L (ref 3.5–5.5)
SODIUM BLD-SCNC: 138 MMOL/L (ref 132–146)
WBC # BLD: 7.4 E9/L (ref 4.5–11.5)

## 2020-02-27 PROCEDURE — 36415 COLL VENOUS BLD VENIPUNCTURE: CPT

## 2020-02-27 PROCEDURE — 2580000003 HC RX 258: Performed by: INTERNAL MEDICINE

## 2020-02-27 PROCEDURE — 80048 BASIC METABOLIC PNL TOTAL CA: CPT

## 2020-02-27 PROCEDURE — 6370000000 HC RX 637 (ALT 250 FOR IP): Performed by: INTERNAL MEDICINE

## 2020-02-27 PROCEDURE — 96376 TX/PRO/DX INJ SAME DRUG ADON: CPT

## 2020-02-27 PROCEDURE — 2580000003 HC RX 258: Performed by: PSYCHIATRY & NEUROLOGY

## 2020-02-27 PROCEDURE — 87529 HSV DNA AMP PROBE: CPT

## 2020-02-27 PROCEDURE — G0378 HOSPITAL OBSERVATION PER HR: HCPCS

## 2020-02-27 PROCEDURE — 96372 THER/PROPH/DIAG INJ SC/IM: CPT

## 2020-02-27 PROCEDURE — 85025 COMPLETE CBC W/AUTO DIFF WBC: CPT

## 2020-02-27 PROCEDURE — 6360000002 HC RX W HCPCS: Performed by: INTERNAL MEDICINE

## 2020-02-27 RX ORDER — SODIUM CHLORIDE 0.9 % (FLUSH) 0.9 %
10 SYRINGE (ML) INJECTION EVERY 12 HOURS SCHEDULED
Status: DISCONTINUED | OUTPATIENT
Start: 2020-02-27 | End: 2020-02-28 | Stop reason: HOSPADM

## 2020-02-27 RX ORDER — SODIUM CHLORIDE 0.9 % (FLUSH) 0.9 %
10 SYRINGE (ML) INJECTION PRN
Status: DISCONTINUED | OUTPATIENT
Start: 2020-02-27 | End: 2020-02-28 | Stop reason: HOSPADM

## 2020-02-27 RX ADMIN — KETOROLAC TROMETHAMINE 30 MG: 30 INJECTION, SOLUTION INTRAMUSCULAR; INTRAVENOUS at 21:16

## 2020-02-27 RX ADMIN — KETOROLAC TROMETHAMINE 30 MG: 30 INJECTION, SOLUTION INTRAMUSCULAR; INTRAVENOUS at 02:55

## 2020-02-27 RX ADMIN — Medication 10 ML: at 09:15

## 2020-02-27 RX ADMIN — ENOXAPARIN SODIUM 40 MG: 40 INJECTION SUBCUTANEOUS at 09:08

## 2020-02-27 RX ADMIN — SODIUM CHLORIDE, PRESERVATIVE FREE 10 ML: 5 INJECTION INTRAVENOUS at 21:16

## 2020-02-27 RX ADMIN — KETOROLAC TROMETHAMINE 30 MG: 30 INJECTION, SOLUTION INTRAMUSCULAR; INTRAVENOUS at 14:56

## 2020-02-27 RX ADMIN — HYDROCHLOROTHIAZIDE 25 MG: 25 TABLET ORAL at 09:09

## 2020-02-27 RX ADMIN — ACETAMINOPHEN 650 MG: 325 TABLET ORAL at 12:33

## 2020-02-27 RX ADMIN — KETOROLAC TROMETHAMINE 30 MG: 30 INJECTION, SOLUTION INTRAMUSCULAR; INTRAVENOUS at 09:09

## 2020-02-27 RX ADMIN — Medication 10 ML: at 21:18

## 2020-02-27 ASSESSMENT — PAIN SCALES - GENERAL
PAINLEVEL_OUTOF10: 6
PAINLEVEL_OUTOF10: 8
PAINLEVEL_OUTOF10: 8
PAINLEVEL_OUTOF10: 6
PAINLEVEL_OUTOF10: 8
PAINLEVEL_OUTOF10: 6
PAINLEVEL_OUTOF10: 8

## 2020-02-27 ASSESSMENT — PAIN DESCRIPTION - DESCRIPTORS: DESCRIPTORS: HEADACHE

## 2020-02-27 ASSESSMENT — PAIN DESCRIPTION - LOCATION: LOCATION: HEAD

## 2020-02-27 ASSESSMENT — PAIN DESCRIPTION - PAIN TYPE: TYPE: ACUTE PAIN

## 2020-02-27 NOTE — H&P
[address] [facility]  History and Physical      CHIEF COMPLAINT: Headache      HISTORY OF PRESENT ILLNESS:      The patient is a 38 y.o. female patient of  Miguel PENG who presents with daily headache for weeks. .  Patient has had a weeks of constant left foot numbness. She also notes headache which she states is been present for the last few weeks. She has been seen by her primary care physician who did order outpatient imaging including an MRI of the head and bilateral carotid ultrasounds. Saw PCP 2 days ago and was told the MRI was positive and follow-up with neurology on March 12. She states her symptoms are progressively worsening over the last few weeks. Patient denies any focal weakness, difficulty speech,or coordination problems. Specifically denies any vision changes. Reports intermittent episodes of dizziness. No exacerbation relief. Past Medical History:    Past Medical History:   Diagnosis Date    Cyst of ovary     Headache        Past Surgical History:    Past Surgical History:   Procedure Laterality Date    BACK SURGERY      CHOLECYSTECTOMY      CYST REMOVAL      HYSTERECTOMY  03/05/2018    Robotic hysterectomy, bilateral [facility]        Medications Prior to Admission:    Medications Prior to Admission: meloxicam (MOBIC) 7.5 MG tablet, Take 7.5 mg by mouth daily as needed for Pain  hydroCHLOROthiazide (HYDRODIURIL) 25 MG tablet, Take 1 tablet by mouth every morning    Allergies:    Patient has no known allergies. Social History:    reports that she has been smoking cigarettes. She has a 12.50 pack-year smoking history. She has never used smokeless tobacco. She reports current alcohol use. She reports that she does not use drugs. Family History:   family history includes Mult Sclerosis in her mother.     REVIEW OF SYSTEMS:  As above in the HPI, otherwise negative    PHYSICAL EXAM:    Vitals:  /70   Pulse 66   Temp 97.4 °F (36.3 °C) (Temporal)   Resp 16   Ht 5' 1\" (1.549 m)   Wt 170 lb (77.1 kg)   LMP 01/05/2018   SpO2 98%   BMI 32.12 kg/m²     General:  Awake, alert, oriented X 3. Well developed, well nourished, well groomed. No apparent distress. HEENT:  Normocephalic, atraumatic. Pupils equal, round, reactive to light. No scleral icterus. No conjunctival injection. Normal lips, teeth, and gums. No nasal discharge. Neck:  Supple  Heart:  RRR, no murmurs, gallops, rubs  Lungs:  CTA bilaterally, bilat symmetrical expansion, no wheeze, rales, or rhonchi  Abdomen:   Bowel sounds present, soft, nontender, no masses, no organomegaly, no peritoneal signs  Extremities:  No clubbing, cyanosis, or edema  Skin:  Warm and dry, no open lesions or rash  Neuro:  Cranial nerves 2-12 intact, no focal deficits  Breast: deferred  Rectal: deferred  Genitalia:  deferred    LABS:    CBC with Differential:    Lab Results   Component Value Date    WBC 7.4 02/27/2020    RBC 5.21 02/27/2020    HGB 15.9 02/27/2020    HCT 48.6 02/27/2020     02/27/2020    MCV 93.3 02/27/2020    MCH 30.5 02/27/2020    MCHC 32.7 02/27/2020    RDW 12.6 02/27/2020    LYMPHOPCT 32.3 02/27/2020    MONOPCT 8.3 02/27/2020    BASOPCT 0.5 02/27/2020    MONOSABS 0.61 02/27/2020    LYMPHSABS 2.38 02/27/2020    EOSABS 0.16 02/27/2020    BASOSABS 0.04 02/27/2020     CMP:    Lab Results   Component Value Date     02/27/2020    K 4.2 02/27/2020     02/27/2020    CO2 23 02/27/2020    BUN 21 02/27/2020    CREATININE 0.8 02/27/2020    GFRAA >60 02/27/2020    LABGLOM >60 02/27/2020    GLUCOSE 110 02/27/2020    PROT 7.9 02/26/2020    LABALBU 4.8 02/26/2020    CALCIUM 9.5 02/27/2020    BILITOT 0.5 02/26/2020    ALKPHOS 78 02/26/2020    AST 15 02/26/2020    ALT 15 02/26/2020     Magnesium:    Lab Results   Component Value Date    MG 2.2 02/11/2020     Phosphorus:  No results found for: PHOS  PT/INR:  No results found for: PROTIME, INR  Last 3 Troponin:    Lab Results   Component Value Date    TROPONINI <0.01 02/26/2020    TROPONINI <0.01 02/05/2020     U/A:    Lab Results   Component Value Date    COLORU Yellow 02/26/2020    PROTEINU Negative 02/26/2020    PHUR 5.5 02/26/2020    WBCUA 1-3 02/26/2020    RBCUA 2-5 02/26/2020    TRICHOMONAS Present 02/26/2020    BACTERIA MODERATE 02/26/2020    CLARITYU Clear 02/26/2020    SPECGRAV 1.025 02/26/2020    LEUKOCYTESUR SMALL 02/26/2020    UROBILINOGEN 0.2 02/26/2020    BILIRUBINUR Negative 02/26/2020    BLOODU SMALL 02/26/2020    GLUCOSEU Negative 02/26/2020     ABG:  No results found for: PH, PCO2, PO2, HCO3, BE, THGB, TCO2, O2SAT  HgBA1c:  No results found for: LABA1C  FLP:    Lab Results   Component Value Date    TRIG 57 02/11/2020    HDL 43 02/11/2020    LDLCALC 87 02/11/2020    LABVLDL 11 02/11/2020     TSH:    Lab Results   Component Value Date    TSH 1.750 02/11/2020       No orders to display       ASSESSMENT:      Active Problems:    General weakness    MRI of brain abnormal    Paresthesias    Headache  Resolved Problems:    * No resolved hospital problems.  *      PLAN:    Admit to medical  Symptomatic treatment  Neurology consult  Medications for other co morbidities cont as appropriate w dosage adjustments as necessary   PT/OT  DVT PPx  DC planning      Electronically signed by Johnson Vogel MD on 2/27/2020 at 9:52 AM

## 2020-02-27 NOTE — CONSULTS
Any Santo is a 38 y.o.   CC:lef sided weakness    Neurology was consulted for headache, paresthesias and abnormal MRI  History of Present Illness: The patient presented on 2/26 with headache, dizziness, left leg parasthesias  that started few weeks ago. Her symptoms have been worsening over the past few weeks. She was found to have paresthesia of the left extremity in the ED. She had previously presented in the ED for headache and palpitations on 2/05. CT head in the ED showed microishcemic changes. She saw her PCP on 2/11 for dizziness and fatigue with frequent headaches They ordered MRI then. MRI showed white matter lesions within the bilateral frontal and parietal lobes. US carotid artery shows no hemodynamically significant stenosis in the bilateral internal carotid arteries. She was sheduled to see neurology on March 12. She states that her mother has MS and she was diagnosed when she was in her 20. 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 She notes that she had bilateral tubes placed in her ears as a child due to chronic ear infections. She recently started meloxicam at the end of January for right shoulder pain. Past Medical History:     Past Medical History:   Diagnosis Date    Cyst of ovary     Headache        Past Surgical History:     Past Surgical History:   Procedure Laterality Date    BACK SURGERY      CHOLECYSTECTOMY      CYST REMOVAL      HYSTERECTOMY  03/05/2018    Robotic hysterectomy, bilateral salpingectomy, right oophorectomy DR. ARIANA LUGO        Allergies:     Patient has no known allergies. Medications:     Prior to Admission medications    Medication Sig Start Date End Date Taking?  Authorizing Provider   meloxicam (MOBIC) 7.5 MG tablet Take 7.5 mg by mouth daily as needed for Pain   Yes Historical Provider, MD   hydroCHLOROthiazide (HYDRODIURIL) 25 MG tablet Take 1 tablet by mouth every morning 2/25/20  Yes Duane Rai DANISH Dupont       Social History:     Denies ETOH use  Does smoke and rolls her own cigeretts    Review of Systems:     No chest pain or palpitations  No SOB  Lightheadedness associated with dizziness  No falls, tripping or stumbling  No incontinence of bowels or bladder  No itching or bruising appreciated  Left leg numbness and paresthesia    ROS is otherwise negative     Family History:     Family History   Problem Relation Age of Onset    Mult Sclerosis Mother     Colon Cancer Neg Hx     Uterine Cancer Neg Hx     Ovarian Cancer Neg Hx     Breast Cancer Neg Hx                 Objective:     /70   Pulse 66   Temp 97.4 °F (36.3 °C) (Temporal)   Resp 16   Ht 5' 1\" (1.549 m)   Wt 170 lb (77.1 kg)   LMP 01/05/2018   SpO2 98%   BMI 32.12 kg/m²     General appearance: alert, appears stated age, cooperative and no distress  Head: normocephalic, without obvious abnormality, atraumatic  Eyes: conjunctivae/corneas clear.  .  Neck: no adenopathy, no carotid bruit, supple, symmetrical, trachea midline and thyroid not enlarged, symmetric, no tenderness/mass/nodules  Lungs: clear to auscultation bilaterally  Heart: regular rate and rhythm, S1, S2 normal, no murmur, click, rub or gallop  Extremities: normal, atraumatic, no cyanosis or edema  Pulses: 2+ and symmetric  Skin: color, texture, turgor normal---no rashes or lesions    Mental Status: alert, oriented, thought content appropriate    Appropriate attention/concentration  Intact fundus of knowledge  Memories intact    Speech: no dysarthria  Language: no aphasias    Cranial Nerves:  I: smell    II: visual acuity     II: visual fields Full    II: pupils RADHA   III,VII: ptosis None   III,IV,VI: extraocular muscles  EOMI with nystagmus to the right side   V: mastication Normal   V: facial light touch sensation  Normal   V,VII: corneal reflex  Did not assess   VII: facial muscle function - upper  Normal   VII: facial muscle function - lower Normal   VIII: hearing Normal   IX: soft palate elevation  Normal   IX,X: gag reflex Did not assess   XI: trapezius strength  5/5   XI: sternocleidomastoid strength 5/5   XI: neck extension strength  5/5   XII: tongue strength  Normal     Motor:  Right   5/5              Left   5/5               Right Bicep  5/5           Left Bicep  5/5              Right Triceps   5/5       Left Triceps  5/5          Right Deltoid  5/5     Left Deltoid  5/5                     Right Quadriceps  5/5          Left Quadriceps    5/5           Right Gastrocnemius    5/5    Left Gastrocnemius   5/5  Right Ant Tibialis   5/5  Left Ant Tibialis   5/5   5/5 throughout  Normal bulk and tone   No drift  No abnormal movements    Sensory:  Normal sensation on face, arms and legs bilaterally    Coordination:   Normal finger to nose test bilaterally      DTR:   Right Brachioradialis reflex 2+  Left Brachioradialis reflex 2+  Right Biceps reflex 2+  Left Biceps reflex 2+  Right Triceps reflex 2+  Left Triceps reflex 2+  Right Quadriceps reflex 2+  Left Quadriceps reflex 2+  Right Achilles reflex 2+  Left Achilles reflex 2+    No Babinskis    Cross adducter reflex on the right side    Laboratory/Radiology:     CBC with Differential:    Lab Results   Component Value Date    WBC 7.4 02/27/2020    RBC 5.21 02/27/2020    HGB 15.9 02/27/2020    HCT 48.6 02/27/2020     02/27/2020    MCV 93.3 02/27/2020    MCH 30.5 02/27/2020    MCHC 32.7 02/27/2020    RDW 12.6 02/27/2020    LYMPHOPCT 32.3 02/27/2020    MONOPCT 8.3 02/27/2020    BASOPCT 0.5 02/27/2020    MONOSABS 0.61 02/27/2020    LYMPHSABS 2.38 02/27/2020    EOSABS 0.16 02/27/2020    BASOSABS 0.04 02/27/2020     CMP:    Lab Results   Component Value Date     02/27/2020    K 4.2 02/27/2020     02/27/2020    CO2 23 02/27/2020    BUN 21 02/27/2020    CREATININE 0.8 02/27/2020    GFRAA >60 02/27/2020    LABGLOM >60 02/27/2020    GLUCOSE 110 02/27/2020    PROT 7.9 02/26/2020    LABALBU 4.8 02/26/2020 CALCIUM 9.5 02/27/2020    BILITOT 0.5 02/26/2020    ALKPHOS 78 02/26/2020    AST 15 02/26/2020    ALT 15 02/26/2020       I independently reviewed the labs and imaging studies today    Assessment:   1. Headache and left leg paresthesia 2/2 multiple sclerosis vs infectious or metabolic cause  2. Dizziness 2/2 Orthostatic hypotension vs Multiple sclerosis  3. Hx HTN          Plan:   1. Patient presents with nonspecific findings of paresthesias and headaches. With family history and nonspecific MRI findings will investigate multiple sclerosis as a cause. Will order MRI of the cervical spine and lumbar puncture for Igg and oligoclonal bands.  Will check for HIV      Electronically signed by Bekah Mckeon DO on 2/27/2020 at 9:20 AM  9:20 AM  2/27/2020  Case discussed: Dr. Colleen Cervantes

## 2020-02-27 NOTE — CARE COORDINATION
Patient is here under observation for Paresthesia and pain of left extremity and Nonintractable headache, unspecified chronicity pattern, unspecified headache type. Neurology has been consulted. Await input and plan.   Gloria Smallwood RN CM

## 2020-02-27 NOTE — PROGRESS NOTES
[facility]  [address] [facility], [address] [place] [address] N   [address]  1975 2/27/20      HPI:  Patient presents for mri of the brain results. Imaging showed multiple non enhancing white matter lesions within the bilat frontal and parietal lobes that appear atypical for chronic microangiographic change. We discussed the differential diagnosis. Her mother has MS. She also has daily headache. We were attributing this to her uncontrolled bp. She was never diagnosed with migraine in the past. She is using Nsaids. Discussed carotid u/s. Past Medical History:   Diagnosis Date    Cyst of ovary     Headache         Past Surgical History:   Procedure Laterality Date    BACK SURGERY      CHOLECYSTECTOMY      CYST REMOVAL      HYSTERECTOMY  03/05/2018    Robotic hysterectomy, bilateral salpingectomy, right oophorectomy DR. ARIANA RAYMOND [facility]        No current facility-administered medications for this visit. No current outpatient medications on file.      Facility-Administered Medications Ordered in Other Visits   Medication Dose Route Frequency Provider Last Rate Last Dose    hydroCHLOROthiazide (HYDRODIURIL) tablet 25 mg  25 mg Oral QAM Serina Bronson MD   25 mg at 02/27/20 3100    sodium chloride flush 0.9 % injection 10 mL  10 mL Intravenous 2 times per day Serina Bronson MD   10 mL at 02/27/20 0915    sodium chloride flush 0.9 % injection 10 mL  10 mL Intravenous PRN Serina Bronson MD        acetaminophen (TYLENOL) tablet 650 mg  650 mg Oral Q6H PRN Serina Bronson MD   650 mg at 02/26/20 1751    acetaminophen (TYLENOL) suppository 650 mg  650 mg Rectal Q6H PRN Serina Bronson MD        polyethylene glycol (GLYCOLAX) packet 17 g  17 g Oral Daily PRN Serina Bronson MD        promethazine (PHENERGAN) tablet 12.5 mg  12.5 mg Oral Q6H PRN Serina Bronson MD        ondansetron [facility]) injection 4 mg  4 mg Intravenous Q6H PRN Serina Bronson MD        enoxaparin (LOVENOX) Social History Narrative    Not on file       Review of Systems :  Constitutional: negative for - chills, fever, weight loss, or fatigue  Psychological: negative for - anxiety, depression or suicidal ideation  HEENT: negative for - vision changes, nasal congestion, ear pain or pharyngitis   Respiratory: negative for -  Chest heaviness, cough, shortness of breath, or pleuritic pain  Cardiovascular: negative for - diaphoresis, chest pain, palpitations, or edema   Gastrointestinal: negative for -abdominal pain, change in bowel habits, constipation, diarrhea, or nausea/vomiting  Genito-Urinary: negative for - dysuria, frequency, or nocturia  Musculoskeletal: negative for - gait disturbance, joint pain, muscle pain or weakness  Neurological: negative for - bowel and bladder control changes, dizziness, +headaches   Dermatological: negative for - dry skin, rash, hair or nail symptoms    Physical Exam:   Vitals:    02/25/20 0944   BP: (!) 145/78   Site: Left Upper Arm   Position: Sitting   Cuff Size: Large Adult   Pulse: 82   Resp: 18   Temp: 98.1 °F (36.7 °C)   TempSrc: Oral   SpO2: 97%   Weight: 170 lb (77.1 kg)   Height: 5' 1\" (1.549 m)     Physical Exam  Constitutional:       General: She is not in acute distress. Appearance: She is well-developed. HENT:      Head: Normocephalic and atraumatic. Right Ear: External ear normal.      Left Ear: External ear normal.      Nose: Nose normal.   Eyes:      General: No scleral icterus. Conjunctiva/sclera: Conjunctivae normal.      Pupils: Pupils are equal, round, and reactive to light. Neck:      Musculoskeletal: Normal range of motion and neck supple. Thyroid: No thyromegaly. Cardiovascular:      Rate and Rhythm: Normal rate and regular rhythm. Heart sounds: Normal heart sounds. No murmur. Pulmonary:      Effort: Pulmonary effort is normal. No accessory muscle usage or respiratory distress. Breath sounds: Normal breath sounds. No wheezing.

## 2020-02-28 ENCOUNTER — APPOINTMENT (OUTPATIENT)
Dept: GENERAL RADIOLOGY | Age: 45
End: 2020-02-28
Payer: COMMERCIAL

## 2020-02-28 ENCOUNTER — APPOINTMENT (OUTPATIENT)
Dept: MRI IMAGING | Age: 45
End: 2020-02-28
Payer: COMMERCIAL

## 2020-02-28 VITALS
HEIGHT: 61 IN | BODY MASS INDEX: 32.1 KG/M2 | WEIGHT: 170 LBS | SYSTOLIC BLOOD PRESSURE: 130 MMHG | TEMPERATURE: 97.5 F | DIASTOLIC BLOOD PRESSURE: 73 MMHG | RESPIRATION RATE: 18 BRPM | HEART RATE: 76 BPM | OXYGEN SATURATION: 95 %

## 2020-02-28 PROBLEM — R90.82 WHITE MATTER ABNORMALITY ON MRI OF BRAIN: Status: ACTIVE | Noted: 2020-02-27

## 2020-02-28 LAB
APPEARANCE CSF: CLEAR
APPEARANCE CSF: CLEAR
COLOR CSF: COLORLESS
COLOR CSF: COLORLESS
GLUCOSE, CSF: 76 MG/DL (ref 40–70)
HIV-1 AND HIV-2 ANTIBODIES: NORMAL
INR BLD: 1
MONOCYTE, CSF: 100 % (ref 10–70)
MONOCYTE, CSF: 100 % (ref 10–70)
NEUTROPHILS, CSF: 0 % (ref 0–10)
NEUTROPHILS, CSF: 0 % (ref 0–10)
PROTEIN CSF: 26 MG/DL (ref 15–40)
PROTHROMBIN TIME: 10.7 SEC (ref 9.3–12.4)
RBC CSF: <2000 /UL
RBC CSF: <2000 /UL
TUBE NUMBER CSF: ABNORMAL
TUBE NUMBER CSF: ABNORMAL
WBC CSF: 3 /UL (ref 0–2)
WBC CSF: 3 /UL (ref 0–2)

## 2020-02-28 PROCEDURE — 82042 OTHER SOURCE ALBUMIN QUAN EA: CPT

## 2020-02-28 PROCEDURE — 6360000002 HC RX W HCPCS: Performed by: INTERNAL MEDICINE

## 2020-02-28 PROCEDURE — 86593 SYPHILIS TEST NON-TREP QUANT: CPT

## 2020-02-28 PROCEDURE — 82945 GLUCOSE OTHER FLUID: CPT

## 2020-02-28 PROCEDURE — A9576 INJ PROHANCE MULTIPACK: HCPCS | Performed by: RADIOLOGY

## 2020-02-28 PROCEDURE — 87205 SMEAR GRAM STAIN: CPT

## 2020-02-28 PROCEDURE — 99226 PR SBSQ OBSERVATION CARE/DAY 35 MINUTES: CPT | Performed by: NURSE PRACTITIONER

## 2020-02-28 PROCEDURE — 96376 TX/PRO/DX INJ SAME DRUG ADON: CPT

## 2020-02-28 PROCEDURE — G0378 HOSPITAL OBSERVATION PER HR: HCPCS

## 2020-02-28 PROCEDURE — 72156 MRI NECK SPINE W/O & W/DYE: CPT

## 2020-02-28 PROCEDURE — 83916 OLIGOCLONAL BANDS: CPT

## 2020-02-28 PROCEDURE — 86703 HIV-1/HIV-2 1 RESULT ANTBDY: CPT

## 2020-02-28 PROCEDURE — 6360000004 HC RX CONTRAST MEDICATION: Performed by: RADIOLOGY

## 2020-02-28 PROCEDURE — 83873 ASSAY OF CSF PROTEIN: CPT

## 2020-02-28 PROCEDURE — 6370000000 HC RX 637 (ALT 250 FOR IP): Performed by: INTERNAL MEDICINE

## 2020-02-28 PROCEDURE — 62328 DX LMBR SPI PNXR W/FLUOR/CT: CPT

## 2020-02-28 PROCEDURE — 88108 CYTOPATH CONCENTRATE TECH: CPT

## 2020-02-28 PROCEDURE — 2580000003 HC RX 258: Performed by: INTERNAL MEDICINE

## 2020-02-28 PROCEDURE — 87070 CULTURE OTHR SPECIMN AEROBIC: CPT

## 2020-02-28 PROCEDURE — 82784 ASSAY IGA/IGD/IGG/IGM EACH: CPT

## 2020-02-28 PROCEDURE — 86592 SYPHILIS TEST NON-TREP QUAL: CPT

## 2020-02-28 PROCEDURE — 82040 ASSAY OF SERUM ALBUMIN: CPT

## 2020-02-28 PROCEDURE — 85610 PROTHROMBIN TIME: CPT

## 2020-02-28 PROCEDURE — 2580000003 HC RX 258: Performed by: PSYCHIATRY & NEUROLOGY

## 2020-02-28 PROCEDURE — 36415 COLL VENOUS BLD VENIPUNCTURE: CPT

## 2020-02-28 PROCEDURE — 84157 ASSAY OF PROTEIN OTHER: CPT

## 2020-02-28 PROCEDURE — 89051 BODY FLUID CELL COUNT: CPT

## 2020-02-28 RX ORDER — NICOTINE 21-14-7MG
KIT TRANSDERMAL
Qty: 1 KIT | Refills: 0 | Status: SHIPPED | OUTPATIENT
Start: 2020-02-28 | End: 2020-09-18

## 2020-02-28 RX ADMIN — KETOROLAC TROMETHAMINE 30 MG: 30 INJECTION, SOLUTION INTRAMUSCULAR; INTRAVENOUS at 14:57

## 2020-02-28 RX ADMIN — SODIUM CHLORIDE, PRESERVATIVE FREE 10 ML: 5 INJECTION INTRAVENOUS at 08:57

## 2020-02-28 RX ADMIN — SODIUM CHLORIDE, PRESERVATIVE FREE 10 ML: 5 INJECTION INTRAVENOUS at 14:57

## 2020-02-28 RX ADMIN — Medication 10 ML: at 08:57

## 2020-02-28 RX ADMIN — HYDROCHLOROTHIAZIDE 25 MG: 25 TABLET ORAL at 09:02

## 2020-02-28 RX ADMIN — KETOROLAC TROMETHAMINE 30 MG: 30 INJECTION, SOLUTION INTRAMUSCULAR; INTRAVENOUS at 08:56

## 2020-02-28 RX ADMIN — ACETAMINOPHEN 650 MG: 325 TABLET ORAL at 14:34

## 2020-02-28 RX ADMIN — GADOTERIDOL 16 ML: 279.3 INJECTION, SOLUTION INTRAVENOUS at 07:48

## 2020-02-28 ASSESSMENT — PAIN SCALES - GENERAL
PAINLEVEL_OUTOF10: 6
PAINLEVEL_OUTOF10: 0
PAINLEVEL_OUTOF10: 0
PAINLEVEL_OUTOF10: 7
PAINLEVEL_OUTOF10: 10

## 2020-02-28 NOTE — PROGRESS NOTES
Sofie Hanley is a 38 y.o. right handed female     Neurology is following for abnormal MRI and headache    PMH: Headache, ovarian cyst, smoking    The patient presented with headache and paresthesias in her left foot beginning a few weeks prior. MRI of the brain showed white matter hyperintensities in her right medial frontal cortex and left parietal juxtacortical region, concerning for demyelinating process. Of note, the patient's mother has multiple sclerosis. MRI of the cervical spine was normal. Gross LP was unrevealing with oligoclonal bands pending. She has a minor post LP headache, but no further paresthesias in her left lower extremity. No previous history of clumsiness, vision changes, gait abnormalities, weakness, falls, or other abnormal neurologic symptoms. She does have a previous hx of migraines. There is no family present and she is medically stable.     No chest pain or palpitations  No SOB  No vertigo, lightheadedness or loss of consciousness  No falls, tripping or stumbling  No incontinence of bowels or bladder  No itching or bruising appreciated    ROS otherwise negative     Current Facility-Administered Medications   Medication Dose Route Frequency Provider Last Rate Last Dose    sodium chloride flush 0.9 % injection 10 mL  10 mL Intravenous 2 times per day Marco Renner MD   10 mL at 02/28/20 0857    sodium chloride flush 0.9 % injection 10 mL  10 mL Intravenous PRN Marco Renner MD        hydroCHLOROthiazide (HYDRODIURIL) tablet 25 mg  25 mg Oral QAM Serina Bronson MD   25 mg at 02/28/20 0902    sodium chloride flush 0.9 % injection 10 mL  10 mL Intravenous 2 times per day Serina Bronson MD   10 mL at 02/28/20 0857    sodium chloride flush 0.9 % injection 10 mL  10 mL Intravenous PRN Serina Bronson MD        acetaminophen (TYLENOL) tablet 650 mg  650 mg Oral Q6H PRN Serina Bronson MD   650 mg at 02/27/20 1233    acetaminophen (TYLENOL) suppository 650 mg  650 mg Rectal sternocleidomastoid strength 5/5   XI: neck extension strength  5/5   XII: tongue strength  Normal     Motor:  5/5 throughout  Normal bulk and tone  No drift or abnormal movements    Sensory:  LT and PP symmetric in all limbs  Vibration normal in all limbs    Coordination:   FTN, FFM, and HS symmetric throughout    Gait:  Normal    DTR:   2+ throughout    No Babinskis  No Baer's    No pathological reflexes    Laboratory/Radiology:     CBC with Differential:    Lab Results   Component Value Date    WBC 7.4 02/27/2020    RBC 5.21 02/27/2020    HGB 15.9 02/27/2020    HCT 48.6 02/27/2020     02/27/2020    MCV 93.3 02/27/2020    MCH 30.5 02/27/2020    MCHC 32.7 02/27/2020    RDW 12.6 02/27/2020    LYMPHOPCT 32.3 02/27/2020    MONOPCT 8.3 02/27/2020    BASOPCT 0.5 02/27/2020    MONOSABS 0.61 02/27/2020    LYMPHSABS 2.38 02/27/2020    EOSABS 0.16 02/27/2020    BASOSABS 0.04 02/27/2020     CMP:    Lab Results   Component Value Date     02/27/2020    K 4.2 02/27/2020     02/27/2020    CO2 23 02/27/2020    BUN 21 02/27/2020    CREATININE 0.8 02/27/2020    GFRAA >60 02/27/2020    LABGLOM >60 02/27/2020    GLUCOSE 110 02/27/2020    PROT 7.9 02/26/2020    LABALBU 4.8 02/26/2020    CALCIUM 9.5 02/27/2020    BILITOT 0.5 02/26/2020    ALKPHOS 78 02/26/2020    AST 15 02/26/2020    ALT 15 02/26/2020      Ref.  Range 2/28/2020 10:35 2/28/2020 10:35   Appearance, CSF Latest Ref Range: Clear  Clear Clear   Glucose, CSF Latest Ref Range: 40 - 70 mg/dL 76 (H)    OLIGOCLONAL BANDING Unknown Rpt    Protein, CSF Latest Ref Range: 15 - 40 mg/dL 26    RBC, CSF Latest Units: /uL <2000 <2000   WBC, CSF Latest Ref Range: 0 - 2 /uL 3 (H) 3 (H)   Neutrophils, CSF Latest Ref Range: 0 - 10 % 0 0   Monocytes, CSF Latest Ref Range: 10 - 70 % 100 (H) 100 (H)   Color, CSF Unknown Colorless Colorless   Tube Number + CELL CT + DIFF-CSF Unknown Tube 1 Tube 3     HIV neg    MRI brain WWO: 2 areas of increased signal noted right medial

## 2020-02-28 NOTE — CARE COORDINATION
Discharge order noted. Patient had a Fluoroscopic-guided lumbar puncture today. Plan from urology is, Follow-up LP results as OP and Consider VEPs/SSEPs as OP. Met with patient in room to explain role and discuss transition of care. She said she has no needs for discharge and her mother will be transporting her home today.   Martha Pisano RN CM

## 2020-02-28 NOTE — BRIEF OP NOTE
physician's orders. The stylet was reinserted and the needle was removed. There were no complications. A total of approximately 10 to 11 mL of clear cerebrospinal fluid was removed.     Electronically signed by Shawn Ross   DD: 2/28/20  10:24 AM

## 2020-03-01 LAB
MYELIN BASIC PROTEIN, CSF: 1.35 NG/ML (ref 0–5.5)
VDRL CSF SCREEN: NON REACTIVE

## 2020-03-02 ENCOUNTER — OFFICE VISIT (OUTPATIENT)
Dept: FAMILY MEDICINE CLINIC | Age: 45
End: 2020-03-02
Payer: COMMERCIAL

## 2020-03-02 VITALS
TEMPERATURE: 97.9 F | OXYGEN SATURATION: 95 % | WEIGHT: 168 LBS | HEART RATE: 97 BPM | BODY MASS INDEX: 32.98 KG/M2 | SYSTOLIC BLOOD PRESSURE: 129 MMHG | DIASTOLIC BLOOD PRESSURE: 92 MMHG | RESPIRATION RATE: 16 BRPM | HEIGHT: 60 IN

## 2020-03-02 LAB
ALBUMIN CSF: 13 MG/DL (ref 0–35)
ALBUMIN INDEX: 3.5 RATIO (ref 0–9)
ALBUMIN, SERUM BY NEPHELOMETRY: 3680 MG/DL (ref 3500–5200)
HERPES SIMPLEX VIRUS BY PCR: NOT DETECTED
HSV SOURCE: NORMAL
IGG CSF: 1.9 MG/DL (ref 0–6)
IGG INDEX: 0.62 RATIO (ref 0.28–0.66)
IGG SYNTHESIS RATE CSF: <0 MG/D
IGG/ALBUMIN CSF: 0.15 RATIO (ref 0.09–0.25)
IGG: 862 MG/DL (ref 768–1632)
MULTIPLE SCLEROSIS PANEL: ABNORMAL
OLIGOCLONAL BANDS CSF: POSITIVE
OLIGOCLONAL BANDS NUMBER: 5 BANDS (ref 0–1)

## 2020-03-02 PROCEDURE — G8484 FLU IMMUNIZE NO ADMIN: HCPCS | Performed by: PHYSICIAN ASSISTANT

## 2020-03-02 PROCEDURE — 99214 OFFICE O/P EST MOD 30 MIN: CPT | Performed by: PHYSICIAN ASSISTANT

## 2020-03-02 PROCEDURE — 4004F PT TOBACCO SCREEN RCVD TLK: CPT | Performed by: PHYSICIAN ASSISTANT

## 2020-03-02 PROCEDURE — G8417 CALC BMI ABV UP PARAM F/U: HCPCS | Performed by: PHYSICIAN ASSISTANT

## 2020-03-02 PROCEDURE — G8427 DOCREV CUR MEDS BY ELIG CLIN: HCPCS | Performed by: PHYSICIAN ASSISTANT

## 2020-03-02 RX ORDER — IBUPROFEN 800 MG/1
800 TABLET ORAL EVERY 6 HOURS PRN
Qty: 21 TABLET | Refills: 0 | Status: SHIPPED
Start: 2020-03-02 | End: 2020-03-23

## 2020-03-02 RX ORDER — LISINOPRIL AND HYDROCHLOROTHIAZIDE 12.5; 1 MG/1; MG/1
1 TABLET ORAL DAILY
Qty: 30 TABLET | Refills: 1 | Status: SHIPPED
Start: 2020-03-02 | End: 2020-05-06

## 2020-03-03 LAB
CSF CULTURE: NORMAL
GRAM STAIN RESULT: NORMAL

## 2020-03-03 NOTE — PROGRESS NOTES
[facility]  [address] [facility], [address] [place] [address] N   [address]  1975  3/3/20      HPI:  Patient presents for hospital follow up. She was admitted from 2/26-2/28/20 after presenting to the ED for headaches and paresthesias. She was monitoring her bp, which improved, but ha did not change. She saw neurology who reviewed our MRI and completes a lumbar puncture and infectious workup. Oligoclonal bands were pending on discharge, but do appear to be positive. She has a f/u with neurology next week. She continues to have dizziness. Senora Pummel are stable and nsaids do seem to help somewhat. She tells me that migraines were ruled out in the hospital.  She has no new complaints. Past Medical History:   Diagnosis Date    Cyst of ovary     Headache         Past Surgical History:   Procedure Laterality Date    BACK SURGERY      CHOLECYSTECTOMY      CYST REMOVAL      HYSTERECTOMY  03/05/2018    Robotic hysterectomy, bilateral salpingectomy, right oophorectomy DR. ARIANA MONIQUE [facility]        Current Outpatient Medications   Medication Sig Dispense Refill    lisinopril-hydroCHLOROthiazide (PRINZIDE;ZESTORETIC) 10-12.5 MG per tablet Take 1 tablet by mouth daily 30 tablet 1    ibuprofen (ADVIL;MOTRIN) 800 MG tablet Take 1 tablet by mouth every 6 hours as needed for Pain 21 tablet 0    Nicotine 21-14-7 MG/24HR KIT 21 mg patch applied daily for 2 weeks, then 14 mg patch applied daily for 2 weeks, then 7 mg patch daily for 2 weeks 1 kit 0    meloxicam (MOBIC) 7.5 MG tablet Take 7.5 mg by mouth daily as needed for Pain       No current facility-administered medications for this visit.         No Known Allergies    Family History   Problem Relation Age of Onset    Mult Sclerosis Mother     Colon Cancer Neg Hx     Uterine Cancer Neg Hx     Ovarian Cancer Neg Hx     Breast Cancer Neg Hx        Social History     Socioeconomic History    Marital status: Single     Spouse name: Not dysuria, frequency, or nocturia  Musculoskeletal: negative for - gait disturbance, joint pain, muscle pain or weakness  Neurological: negative for - bowel and bladder control changes, +dizziness+headaches   Dermatological: negative for - dry skin, rash, hair or nail symptoms    Physical Exam:   Vitals:    03/02/20 0932 03/02/20 0938 03/02/20 0940 03/02/20 0941   BP: (!) 140/90 (!) 145/82 (!) 138/94 (!) 129/92   Position:  Supine Sitting Standing   Pulse: 108 93 97    Resp: 16      Temp: 97.9 °F (36.6 °C)      TempSrc: Oral      SpO2: 95%      Weight: 168 lb (76.2 kg)      Height: 5' (1.524 m)        Physical Exam  Constitutional:       General: She is not in acute distress. Appearance: She is well-developed. HENT:      Head: Normocephalic and atraumatic. Right Ear: External ear normal.      Left Ear: External ear normal.      Nose: Nose normal.   Eyes:      General: No scleral icterus. Conjunctiva/sclera: Conjunctivae normal.      Pupils: Pupils are equal, round, and reactive to light. Neck:      Musculoskeletal: Normal range of motion and neck supple. Thyroid: No thyromegaly. Cardiovascular:      Rate and Rhythm: Normal rate and regular rhythm. Heart sounds: Normal heart sounds. No murmur. Pulmonary:      Effort: Pulmonary effort is normal. No accessory muscle usage or respiratory distress. Breath sounds: Normal breath sounds. No wheezing. Musculoskeletal: Normal range of motion. Skin:     General: Skin is warm and dry. Findings: No rash. Neurological:      Mental Status: She is alert and oriented to person, place, and time. Deep Tendon Reflexes: Reflexes are normal and symmetric. Psychiatric:         Speech: Speech normal.         Behavior: Behavior normal.           Assessment/Plan:     Fan Valerio was seen today for hypertension, headache and dizziness.     Diagnoses and all orders for this visit:    Hospital discharge follow-up    Essential hypertension  - lisinopril-hydroCHLOROthiazide (PRINZIDE;ZESTORETIC) 10-12.5 MG per tablet; Take 1 tablet by mouth daily    Dizzy    Chronic nonintractable headache, unspecified headache type  -     ibuprofen (ADVIL;MOTRIN) 800 MG tablet; Take 1 tablet by mouth every 6 hours as needed for Pain    CSF oligoclonal bands        Return in about 4 days (around 3/6/2020). all hospital records reviewed including consults, imaging and labs. Did discuss that MS is a possibility and that Neurology would investigate that further with her. She has an apt next week. Will try to further control her bp and dizziness. Ha are stable with nsaid.      DANISH Lombardo

## 2020-03-06 ENCOUNTER — OFFICE VISIT (OUTPATIENT)
Dept: FAMILY MEDICINE CLINIC | Age: 45
End: 2020-03-06
Payer: COMMERCIAL

## 2020-03-06 VITALS
OXYGEN SATURATION: 97 % | SYSTOLIC BLOOD PRESSURE: 132 MMHG | RESPIRATION RATE: 16 BRPM | WEIGHT: 171.2 LBS | BODY MASS INDEX: 33.61 KG/M2 | HEIGHT: 60 IN | DIASTOLIC BLOOD PRESSURE: 80 MMHG | TEMPERATURE: 98.4 F | HEART RATE: 92 BPM

## 2020-03-06 PROCEDURE — G8484 FLU IMMUNIZE NO ADMIN: HCPCS | Performed by: PHYSICIAN ASSISTANT

## 2020-03-06 PROCEDURE — 4004F PT TOBACCO SCREEN RCVD TLK: CPT | Performed by: PHYSICIAN ASSISTANT

## 2020-03-06 PROCEDURE — G8417 CALC BMI ABV UP PARAM F/U: HCPCS | Performed by: PHYSICIAN ASSISTANT

## 2020-03-06 PROCEDURE — G8427 DOCREV CUR MEDS BY ELIG CLIN: HCPCS | Performed by: PHYSICIAN ASSISTANT

## 2020-03-06 PROCEDURE — 99213 OFFICE O/P EST LOW 20 MIN: CPT | Performed by: PHYSICIAN ASSISTANT

## 2020-03-06 RX ORDER — NICOTINE 21 MG/24HR
PATCH, TRANSDERMAL 24 HOURS TRANSDERMAL
COMMUNITY
Start: 2020-03-02 | End: 2020-07-20

## 2020-03-06 NOTE — PROGRESS NOTES
[place]  [address] [facility], [address] [place] [address] N   [address]  1975  3/6/20      HPI:  Patient presents for f/u for elevated BP. The zestoretic has controlled her BP, without s/e. Her ha did not improve. She continues to have dull pain almost constantly with periods of stabbing pain. She has her apt with Neurology next week. No other complaints. Past Medical History:   Diagnosis Date    Cyst of ovary     Headache         Past Surgical History:   Procedure Laterality Date    BACK SURGERY      CHOLECYSTECTOMY      CYST REMOVAL      HYSTERECTOMY  03/05/2018    Robotic hysterectomy, bilateral salpingectomy, right oophorectomy DR. ARIANA MONIQUE [facility]        Current Outpatient Medications   Medication Sig Dispense Refill    nicotine (NICODERM CQ) 14 MG/24HR       lisinopril-hydroCHLOROthiazide (PRINZIDE;ZESTORETIC) 10-12.5 MG per tablet Take 1 tablet by mouth daily 30 tablet 1    ibuprofen (ADVIL;MOTRIN) 800 MG tablet Take 1 tablet by mouth every 6 hours as needed for Pain 21 tablet 0    Nicotine 21-14-7 MG/24HR KIT 21 mg patch applied daily for 2 weeks, then 14 mg patch applied daily for 2 weeks, then 7 mg patch daily for 2 weeks 1 kit 0    meloxicam (MOBIC) 7.5 MG tablet Take 7.5 mg by mouth daily as needed for Pain       No current facility-administered medications for this visit.         No Known Allergies    Family History   Problem Relation Age of Onset    Mult Sclerosis Mother     Colon Cancer Neg Hx     Uterine Cancer Neg Hx     Ovarian Cancer Neg Hx     Breast Cancer Neg Hx        Social History     Socioeconomic History    Marital status: Single     Spouse name: Not on file    Number of children: Not on file    Years of education: Not on file    Highest education level: Not on file   Occupational History    Not on file   Social Needs    Financial resource strain: Not on file    Food insecurity:     Worry: Not on file     Inability: Not on file    Transportation needs:     Medical: Not on file     Non-medical: Not on file   Tobacco Use    Smoking status: Current Every Day Smoker     Packs/day: 0.50     Years: 25.00     Pack years: 12.50     Types: Cigarettes    Smokeless tobacco: Never Used   Substance and Sexual Activity    Alcohol use: Yes     Comment: socially    Drug use: No    Sexual activity: Yes     Partners: Male   Lifestyle    Physical activity:     Days per week: Not on file     Minutes per session: Not on file    Stress: Not on file   Relationships    Social connections:     Talks on phone: Not on file     Gets together: Not on file     Attends Jehovah's witness service: Not on file     Active member of club or organization: Not on file     Attends meetings of clubs or organizations: Not on file     Relationship status: Not on file    Intimate partner violence:     Fear of current or ex partner: Not on file     Emotionally abused: Not on file     Physically abused: Not on file     Forced sexual activity: Not on file   Other Topics Concern    Not on file   Social History Narrative    Not on file       Review of Systems :  Constitutional: negative for - chills, fever, weight loss, or fatigue  Psychological: negative for - anxiety, depression or suicidal ideation  HEENT: negative for - vision changes, nasal congestion, ear pain or pharyngitis   Respiratory: negative for -  Chest heaviness, cough, shortness of breath, or pleuritic pain  Cardiovascular: negative for - diaphoresis, chest pain, palpitations, or edema   Gastrointestinal: negative for -abdominal pain, change in bowel habits, constipation, diarrhea, or nausea/vomiting  Genito-Urinary: negative for - dysuria, frequency, or nocturia  Musculoskeletal: negative for - gait disturbance, joint pain, muscle pain or weakness  Neurological: negative for - bowel and bladder control changes, dizziness, + headaches   Dermatological: negative for - dry skin, rash, hair or nail symptoms    Physical Exam:   Vitals:    03/06/20 0853 03/06/20 0859   BP: (!) 138/90 132/80   Pulse: 92    Resp: 16    Temp: 98.4 °F (36.9 °C)    TempSrc: Oral    SpO2: 97%    Weight: 171 lb 3.2 oz (77.7 kg)    Height: 5' (1.524 m)      Physical Exam  Constitutional:       General: She is not in acute distress. Appearance: She is well-developed. HENT:      Head: Normocephalic and atraumatic. Right Ear: External ear normal.      Left Ear: External ear normal.      Nose: Nose normal.   Eyes:      General: No scleral icterus. Conjunctiva/sclera: Conjunctivae normal.      Pupils: Pupils are equal, round, and reactive to light. Neck:      Musculoskeletal: Normal range of motion and neck supple. Thyroid: No thyromegaly. Cardiovascular:      Rate and Rhythm: Normal rate and regular rhythm. Heart sounds: Normal heart sounds. No murmur. Pulmonary:      Effort: Pulmonary effort is normal. No accessory muscle usage or respiratory distress. Breath sounds: Normal breath sounds. No wheezing. Musculoskeletal: Normal range of motion. Skin:     General: Skin is warm and dry. Findings: No rash. Neurological:      Mental Status: She is alert and oriented to person, place, and time. Deep Tendon Reflexes: Reflexes are normal and symmetric. Psychiatric:         Speech: Speech normal.         Behavior: Behavior normal.           Assessment/Plan:     Lori Glynn was seen today for hypertension. Diagnoses and all orders for this visit:    Essential hypertension    Chronic nonintractable headache, unspecified headache type        Return in about 3 months (around 6/6/2020) for htn. will await Neurology apt and plan.      DANISH Ball

## 2020-03-12 ENCOUNTER — OFFICE VISIT (OUTPATIENT)
Dept: NEUROLOGY | Age: 45
End: 2020-03-12
Payer: COMMERCIAL

## 2020-03-12 VITALS
DIASTOLIC BLOOD PRESSURE: 87 MMHG | HEIGHT: 60 IN | HEART RATE: 77 BPM | WEIGHT: 170 LBS | BODY MASS INDEX: 33.38 KG/M2 | TEMPERATURE: 98.2 F | OXYGEN SATURATION: 96 % | SYSTOLIC BLOOD PRESSURE: 130 MMHG

## 2020-03-12 PROCEDURE — 99245 OFF/OP CONSLTJ NEW/EST HI 55: CPT | Performed by: PSYCHIATRY & NEUROLOGY

## 2020-03-12 PROCEDURE — G8484 FLU IMMUNIZE NO ADMIN: HCPCS | Performed by: PSYCHIATRY & NEUROLOGY

## 2020-03-12 PROCEDURE — G8427 DOCREV CUR MEDS BY ELIG CLIN: HCPCS | Performed by: PSYCHIATRY & NEUROLOGY

## 2020-03-12 PROCEDURE — G8417 CALC BMI ABV UP PARAM F/U: HCPCS | Performed by: PSYCHIATRY & NEUROLOGY

## 2020-03-12 RX ORDER — BACLOFEN 10 MG/1
10 TABLET ORAL 2 TIMES DAILY
Qty: 60 TABLET | Refills: 5 | Status: SHIPPED | OUTPATIENT
Start: 2020-03-12 | End: 2020-07-08 | Stop reason: SDUPTHER

## 2020-03-23 RX ORDER — IBUPROFEN 800 MG/1
800 TABLET ORAL EVERY 6 HOURS PRN
Qty: 21 TABLET | Refills: 0 | Status: SHIPPED | OUTPATIENT
Start: 2020-03-23 | End: 2020-07-09 | Stop reason: ALTCHOICE

## 2020-03-25 RX ORDER — MELOXICAM 7.5 MG/1
TABLET ORAL
Qty: 30 TABLET | OUTPATIENT
Start: 2020-03-25

## 2020-04-23 RX ORDER — HYDROCHLOROTHIAZIDE 25 MG/1
25 TABLET ORAL EVERY MORNING
Qty: 30 TABLET | Refills: 1 | OUTPATIENT
Start: 2020-04-23

## 2020-04-23 NOTE — TELEPHONE ENCOUNTER
This medication was discontinued on 3/2/2020 and patient was placed on the combination drug which also includes the hydrochlorothiazide

## 2020-04-27 ENCOUNTER — TELEPHONE (OUTPATIENT)
Dept: PHYSICAL MEDICINE AND REHAB | Age: 45
End: 2020-04-27

## 2020-05-04 ENCOUNTER — TELEPHONE (OUTPATIENT)
Dept: PHYSICAL MEDICINE AND REHAB | Age: 45
End: 2020-05-04

## 2020-05-04 RX ORDER — ACETAMINOPHEN 500 MG
1000 TABLET ORAL 3 TIMES DAILY PRN
Qty: 180 TABLET | Refills: 2 | Status: SHIPPED | OUTPATIENT
Start: 2020-05-04 | End: 2020-10-01 | Stop reason: ALTCHOICE

## 2020-05-06 RX ORDER — LISINOPRIL AND HYDROCHLOROTHIAZIDE 12.5; 1 MG/1; MG/1
1 TABLET ORAL DAILY
Qty: 30 TABLET | Refills: 1 | Status: SHIPPED | OUTPATIENT
Start: 2020-05-06 | End: 2020-07-08 | Stop reason: SDUPTHER

## 2020-05-12 ENCOUNTER — HOSPITAL ENCOUNTER (OUTPATIENT)
Dept: AUDIOLOGY | Age: 45
Discharge: HOME | End: 2020-05-12
Payer: COMMERCIAL

## 2020-05-12 ENCOUNTER — OFFICE VISIT (OUTPATIENT)
Dept: FAMILY MEDICINE CLINIC | Age: 45
End: 2020-05-12
Payer: COMMERCIAL

## 2020-05-12 VITALS
RESPIRATION RATE: 18 BRPM | BODY MASS INDEX: 37.5 KG/M2 | HEIGHT: 60 IN | DIASTOLIC BLOOD PRESSURE: 78 MMHG | TEMPERATURE: 98.4 F | OXYGEN SATURATION: 97 % | SYSTOLIC BLOOD PRESSURE: 124 MMHG | HEART RATE: 96 BPM | WEIGHT: 191 LBS

## 2020-05-12 PROCEDURE — 4004F PT TOBACCO SCREEN RCVD TLK: CPT | Performed by: NURSE PRACTITIONER

## 2020-05-12 PROCEDURE — 99213 OFFICE O/P EST LOW 20 MIN: CPT | Performed by: NURSE PRACTITIONER

## 2020-05-12 PROCEDURE — 95938 SOMATOSENSORY TESTING: CPT | Performed by: AUDIOLOGIST

## 2020-05-12 PROCEDURE — G8427 DOCREV CUR MEDS BY ELIG CLIN: HCPCS | Performed by: NURSE PRACTITIONER

## 2020-05-12 PROCEDURE — G8417 CALC BMI ABV UP PARAM F/U: HCPCS | Performed by: NURSE PRACTITIONER

## 2020-05-12 PROCEDURE — 95930 VISUAL EP TEST CNS W/I&R: CPT | Performed by: AUDIOLOGIST

## 2020-05-12 RX ORDER — KETOROLAC TROMETHAMINE 30 MG/ML
30 INJECTION, SOLUTION INTRAMUSCULAR; INTRAVENOUS ONCE
Status: COMPLETED | OUTPATIENT
Start: 2020-05-12 | End: 2020-05-12

## 2020-05-12 RX ORDER — TRIAMCINOLONE ACETONIDE 40 MG/ML
40 INJECTION, SUSPENSION INTRA-ARTICULAR; INTRAMUSCULAR ONCE
Status: COMPLETED | OUTPATIENT
Start: 2020-05-12 | End: 2020-05-12

## 2020-05-12 RX ADMIN — KETOROLAC TROMETHAMINE 30 MG: 30 INJECTION, SOLUTION INTRAMUSCULAR; INTRAVENOUS at 15:41

## 2020-05-12 RX ADMIN — TRIAMCINOLONE ACETONIDE 40 MG: 40 INJECTION, SUSPENSION INTRA-ARTICULAR; INTRAMUSCULAR at 15:42

## 2020-05-12 NOTE — PROGRESS NOTES
SOMATOSENSORY EVOKED RESPONSE OF THE MEDIAN NERVE    This patient was referred for a Somatosensory Evoked Response test of the median nerves by Dr Magdalena Perry with diagnosis of M.S.        DESCRIPTION:    The SERM1 test was conducted using an electrode montage consisting of Cz, Erb's Point, CVII and the contralateral somatosensory cortex. To elicit the response, a mild pulsed current was administered to the median nerve of each arm. Waveform morphology was good. The absolute latencies, interwave latencies and amplitudes were recorded. IMPRESSION:    Somatosensory Evoked Responses of the right and left median nerves were within normal limits. Emmie Gil M.D., Interpreting Physician

## 2020-05-12 NOTE — PROGRESS NOTES
Section------------------------------------  Impression:  1. Brown's neuroma of left foot        Dipsoition:  Disposition: Discharge to home    New Prescriptions    No medications on file       Pt was advised to follow up with PCP for evaluation and continued care.    Administrations This Visit     ketorolac (TORADOL) injection 30 mg     Admin Date  05/12/2020 Action  Given Dose  30 mg Route  Intravenous Administered By  Romeo Sparks MA          triamcinolone acetonide (KENALOG-40) injection 40 mg     Admin Date  05/12/2020 Action  Given Dose  40 mg Route  Intramuscular Administered By  Romeo Sparks MA

## 2020-05-18 ENCOUNTER — OFFICE VISIT (OUTPATIENT)
Dept: PHYSICAL MEDICINE AND REHAB | Age: 45
End: 2020-05-18
Payer: COMMERCIAL

## 2020-05-18 VITALS
DIASTOLIC BLOOD PRESSURE: 78 MMHG | OXYGEN SATURATION: 98 % | BODY MASS INDEX: 37.5 KG/M2 | WEIGHT: 191 LBS | HEIGHT: 60 IN | SYSTOLIC BLOOD PRESSURE: 124 MMHG | TEMPERATURE: 98.4 F | HEART RATE: 69 BPM

## 2020-05-18 PROCEDURE — 76942 ECHO GUIDE FOR BIOPSY: CPT | Performed by: PHYSICAL MEDICINE & REHABILITATION

## 2020-05-18 PROCEDURE — 99214 OFFICE O/P EST MOD 30 MIN: CPT | Performed by: PHYSICAL MEDICINE & REHABILITATION

## 2020-05-18 PROCEDURE — G8427 DOCREV CUR MEDS BY ELIG CLIN: HCPCS | Performed by: PHYSICAL MEDICINE & REHABILITATION

## 2020-05-18 PROCEDURE — 4004F PT TOBACCO SCREEN RCVD TLK: CPT | Performed by: PHYSICAL MEDICINE & REHABILITATION

## 2020-05-18 PROCEDURE — 20550 NJX 1 TENDON SHEATH/LIGAMENT: CPT | Performed by: PHYSICAL MEDICINE & REHABILITATION

## 2020-05-18 PROCEDURE — G8417 CALC BMI ABV UP PARAM F/U: HCPCS | Performed by: PHYSICAL MEDICINE & REHABILITATION

## 2020-05-18 RX ORDER — LIDOCAINE HYDROCHLORIDE 10 MG/ML
3.5 INJECTION, SOLUTION INFILTRATION; PERINEURAL ONCE
Status: COMPLETED | OUTPATIENT
Start: 2020-05-18 | End: 2020-05-18

## 2020-05-18 RX ORDER — TRIAMCINOLONE ACETONIDE 40 MG/ML
20 INJECTION, SUSPENSION INTRA-ARTICULAR; INTRAMUSCULAR ONCE
Status: COMPLETED | OUTPATIENT
Start: 2020-05-18 | End: 2020-05-18

## 2020-05-18 RX ADMIN — LIDOCAINE HYDROCHLORIDE 3.5 ML: 10 INJECTION, SOLUTION INFILTRATION; PERINEURAL at 10:42

## 2020-05-18 RX ADMIN — TRIAMCINOLONE ACETONIDE 20 MG: 40 INJECTION, SUSPENSION INTRA-ARTICULAR; INTRAMUSCULAR at 10:42

## 2020-05-18 NOTE — PROGRESS NOTES
radiographs of the right shoulder.        No Known Allergies    Current Outpatient Medications   Medication Sig Dispense Refill    lisinopril-hydroCHLOROthiazide (PRINZIDE;ZESTORETIC) 10-12.5 MG per tablet TAKE 1 TABLET BY MOUTH DAILY 30 tablet 1    acetaminophen (TYLENOL) 500 MG tablet Take 2 tablets by mouth 3 times daily as needed for Pain 180 tablet 2    baclofen (LIORESAL) 10 MG tablet Take 1 tablet by mouth 2 times daily 60 tablet 5    ibuprofen (ADVIL;MOTRIN) 800 MG tablet TAKE 1 TABLET BY MOUTH EVERY 6 HOURS AS NEEDED FOR PAIN (Patient not taking: Reported on 5/18/2020) 21 tablet 0    nicotine (NICODERM CQ) 14 MG/24HR       Nicotine 21-14-7 MG/24HR KIT 21 mg patch applied daily for 2 weeks, then 14 mg patch applied daily for 2 weeks, then 7 mg patch daily for 2 weeks (Patient not taking: Reported on 5/12/2020) 1 kit 0    meloxicam (MOBIC) 7.5 MG tablet Take 7.5 mg by mouth daily as needed for Pain       No current facility-administered medications for this visit. Past Medical History:   Diagnosis Date    Cyst of ovary     Headache        Past Surgical History:   Procedure Laterality Date    BACK SURGERY      CHOLECYSTECTOMY      CYST REMOVAL      HYSTERECTOMY  03/05/2018    Robotic hysterectomy, bilateral salpingectomy, right oophorectomy DR. ARIANA RAYMOND [facility]        Social History     Tobacco Use    Smoking status: Current Every Day Smoker     Packs/day: 0.50     Years: 25.00     Pack years: 12.50     Types: Cigarettes    Smokeless tobacco: Never Used   Substance Use Topics    Alcohol use: Yes     Comment: socially    Drug use: No        Functional Status: The patient is able to ambulate and perform activities of daily living without the use of an assistive device. Occupation: The patient is currently employed in dry cleaning. ROS:    Constitutional: Denies fevers, chills, night sweats, unintentional weight loss     Neurologic: See HPI.     MSK: See HPI. appearance of skin, etcetera) and call the office immediately for treatment if those symptoms develop. Patient is to follow-up with me in 6 weeks to review progress after injection. Instructed to not soak area in liquid (bath, pool, hot tub, etc.) for 2 days - showers are OK. Patient is to continue basic ADLs today. No restrictions after today. Pre-injection:      Needle view:      Sincerely,     Sid Aldana., DO  Physical Medicine and Rehabilitation     Please note that the above documentation was prepared using voice recognition software. Every attempt was made to ensure accuracy but there may be spelling, grammatical, and contextual errors.

## 2020-05-21 ENCOUNTER — TELEPHONE (OUTPATIENT)
Dept: NEUROLOGY | Age: 45
End: 2020-05-21

## 2020-05-22 ENCOUNTER — HOSPITAL ENCOUNTER (OUTPATIENT)
Age: 45
Discharge: HOME | End: 2020-05-22
Payer: COMMERCIAL

## 2020-05-22 LAB — SEDIMENTATION RATE, ERYTHROCYTE: 5 MM/HR (ref 0–20)

## 2020-05-22 PROCEDURE — 85651 RBC SED RATE NONAUTOMATED: CPT

## 2020-05-22 PROCEDURE — 36415 COLL VENOUS BLD VENIPUNCTURE: CPT

## 2020-06-01 ENCOUNTER — EVALUATION (OUTPATIENT)
Dept: PHYSICAL THERAPY | Age: 45
End: 2020-06-01
Payer: COMMERCIAL

## 2020-06-01 PROCEDURE — 97162 PT EVAL MOD COMPLEX 30 MIN: CPT | Performed by: PHYSICAL THERAPIST

## 2020-06-01 PROCEDURE — 97110 THERAPEUTIC EXERCISES: CPT | Performed by: PHYSICAL THERAPIST

## 2020-06-01 NOTE — PROGRESS NOTES
[address] [facility]   Phone: [phone]   Fax: [phone]      Physical Therapy Daily Treatment Note    Date: 2020  Patient Name: Glenna Wright  : 1975   MRN: [id number]  DOInjury: 2020  DOSx: NA   Referring Provider: Mika Linares DO  [address] [facility], [address] [facility]     Medical Diagnosis:     M25.511 (ICD-10-CM) - Acute pain of right shoulder   M75.21 (ICD-10-CM) - Biceps tendinitis of right upper extremity       Outcome Measure: QuickDASH 55% limitation2    S: see eval  O: Pt given written HEP  Time 135-215     Visit  Repeat outcome measure at mid point and end.     Pain 4/10     Strength Right Shoulder: Flexion 4/5,  Abduction 4/5, ER 4/5, IR 4/5    Right Elbow:Flexion 4+/5,  Extension 4+/5     Left Shoulder: Flexion 4+/5,  Abduction 4+/5, ER 4+/5, IR 4+/5   Left Elbow:Flexion 4/5,  Extension 4/5     Palpation Tender to palpation AC joint, proximal bicep tendon      ROM Right Shoulder:  AROM: 145° Forward elevation,  140° abduction, 90° ER,  IR to 60  PROM: 170° Forward elevation, 170° abduction,  90° ER,  80° IR  Apley's IR: low thoracic     Left Shoulder:  AROM: 160° Forward elevation, 160° abduction,  90° ER,  IR to 90  PROM: 180° Forward elevation, 180° abduction,  90° ER , 90° IR  Apley's IR: high thoracic     Modalities            Manual                  Stretch      Table slides      Wall Flexion      Wall ER stretch      Towel IR stretch      IR reaching behind back      Exercise      Shrugs AROM      Pendulum Ex      UBE      Pulleys - flex      Pulleys-IR      Supine wand chest press      Supine wand flex      Supine wand ER/IR      Supine flexion      S-lying ABD      S-lying ER      Standing wand flex      Standing flexion      Standing ABD            ROWS: H Functional activities  To aid in ROM and strength needed for reaching , lifting ,pushing and pulling at home/work    ROWS: M 2x10 3s holds \" c description and set up TE   ROWS: H x10 3s holds \" c description and set up TE   ER  \"    IR  \"                A:  Tolerated well. Above added to written HEP.   P: Continue with rehab plan  Claudette Manual, PT DPT, PT [id number]    Treatment Charges: Mins Units   Initial Evaluation 30 1   Re-Evaluation     Ther Exercise         TE 10 1   Manual Therapy     MT     Ther Activities        TA     Gait Training          GT     Neuro Re-education NR     Modalities     Non-Billable Service Time     Other     Total Time/Units 40 2

## 2020-06-01 NOTE — PROGRESS NOTES
[address] and Rehabilitation   Phone: [phone]   Fax: [phone]           Date:  2020   Patient: Miguel Salinas  : 1975  MRN: [id number]  Referring Provider: Fracisco Quezada DO  [address] [facility], [address] [facility]     Medical Diagnosis:     M25.511 (ICD-10-CM) - Acute pain of right shoulder   M75.21 (ICD-10-CM) - Biceps tendinitis of right upper extremity        SUBJECTIVE:     Onset date: 2020    Onset[de-identified] Insidious onset    Mechanism of Injury / History: Pt reports that she has had shoulder pain since 2020 of insidious nature. 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 She reports pain and limitation c reaching, lifting, pulling/pushing, and laying on opposite shoulder incr her pain. She states that she received a steroid injection 20 and states it took the edge off the pain. She reports rest helps reduce pain. She reports that she is R handed. Previous PT: none    Medical Management for Current Problem: injections    Chief complaint: pain, decreased motion, decreased mobility, weakness, inability to use leg, limited ability to lift/carry/handle material, limited ability to complete ADLs and IADLs and limited ability to complete home/outdoor chores/tasks    Behavior: condition is getting worse    Pain: waxing and waning  Current: 4/10     Best: 2/10     Worst:6/10    Aggravated by: reaching overhead, reaching out, lifting/carrying/material handling, reaching across body,     Relieved by: rest    Symptom Type/Quality: sharp, aching  Location[de-identified] anterior , AC joint       Imaging results: No results found.     Past Medical History:  Past Medical History:   Diagnosis Date    Cyst of ovary     Headache      Past Surgical History:   Procedure Laterality Date    BACK SURGERY      CHOLECYSTECTOMY      CYST REMOVAL      HYSTERECTOMY  2018    Robotic hysterectomy, bilateral salpingectomy, right oophorectomy DR. ARIANA RAYMOND [facility]        Medications:   Current Outpatient Medications   Medication Sig Dispense Refill    lisinopril-hydroCHLOROthiazide (PRINZIDE;ZESTORETIC) 10-12.5 MG per tablet TAKE 1 TABLET BY MOUTH DAILY 30 tablet 1    acetaminophen (TYLENOL) 500 MG tablet Take 2 tablets by mouth 3 times daily as needed for Pain 180 tablet 2    ibuprofen (ADVIL;MOTRIN) 800 MG tablet TAKE 1 TABLET BY MOUTH EVERY 6 HOURS AS NEEDED FOR PAIN (Patient not taking: Reported on 5/18/2020) 21 tablet 0    baclofen (LIORESAL) 10 MG tablet Take 1 tablet by mouth 2 times daily 60 tablet 5    nicotine (NICODERM CQ) 14 MG/24HR       Nicotine 21-14-7 MG/24HR KIT 21 mg patch applied daily for 2 weeks, then 14 mg patch applied daily for 2 weeks, then 7 mg patch daily for 2 weeks (Patient not taking: Reported on 5/12/2020) 1 kit 0    meloxicam (MOBIC) 7.5 MG tablet Take 7.5 mg by mouth daily as needed for Pain       No current facility-administered medications for this visit. Occupation: . Physical demands include: lifting, carrying, pushing, pulling medium weighted items, lifting, carrying, pushing, pulling light weighted items, walking, standing, manipulating office tools. Status: full time.     Exercise regimen: none    Hobbies: cooking, cleaning    Patient Goals: pain relief, return to prior activity, full use of arm, get back to normal    Precautions/Contraindications: none    OBJECTIVE:     Observations: normal orthopedic exam, well nourished female, normal affect    Inspection: normal orthopedic exam.                   Palpation: Tender to palpation AC joint, proximal bicep tendon     Joint/Motion:  Right Shoulder:  AROM: 145° Forward elevation,  140° abduction, 90° ER,  IR to 60  PROM: 170° Forward elevation, 170° abduction,  90° ER,  80° IR  Apley's IR: low thoracic    Left Shoulder:  AROM: 160° Forward elevation, 160° abduction,  90° ER,  IR to 90  PROM: 180° Forward elevation, 180° abduction,  90° ER , 90° IR  Apley's IR: high thoracic    Strength:  Right Shoulder: Flexion 4/5,  Abduction 4/5, ER 4/5, IR 4/5    Right Elbow:Flexion 4+/5,  Extension 4+/5    Left Shoulder: Flexion 4+/5,  Abduction 4+/5, ER 4+/5, IR 4+/5   Left Elbow:Flexion 4/5,  Extension 4/5    Special Tests/Functional Screens:    [x] Eranest []+ / [x] -  [x] Thurman's [x]+ / [] -   [x] AC Sheer [x]+ / [] -    [x] GH drawer []+ / [x] -    [] Bicep Load []+ / [] -   [] Crank []+ / [] -  [x] Clunk []+ / [x] -  [] Mansi Dario []+ / [] -   [] Raven Latus []+ / [] -  [] Neer's []+ / [] -      [x] Speed's []+ / [x] -   [] Aubrey's []+ / [] -    [] Sulcus Sign []+ / [] -   [x] Apprehension []+ / [x] -   [x] Bicep Load II [x]+ / [] -   [] Elbow Valgus []+ / [] -     [] Elbow Varus []+ / [] -   [] Joce's relocation []+ / [] -   [x] Empty Can []+ / [x] -  [] Drop arm []+ / [] -  [] ER lag []+ / [] -  [x] Painful Arc [x]+ / [] -  [] German Palma []+ / [] -  [x] Belly Press/ lift off[x]+ / [] - (pain)  [x] active adduction: [x]+ / [] -       Special Tests:  AC joint degeneration and probable bicep tendonitis. ASSESSMENT     Outcome Measure:   QuickDASH (Disorders of the Arm, Shoulder, and Hand) 55% disability    Problems:    Pain reported 2-6/10   ROM decreased   Strength decreased   Decreased functional ability with work, ADLs, use of right upper extremity, reaching, lifting, carrying, driving    Reason for Skilled Care: Pt reports to outpt PT services c reports of R anterior shoulder pain consistent c bicep tendonitis and AC joint degeneration. Labrum and RTC appear to be in tact. She reports significant limitation c work and ADL activity d/t shoulder pain. She requires skilled care to improve global UE strength, stability, ROM, and overall fxn mobility needed for work, rec, and ADL activity.     [x] There are no barriers affecting plan of care or recovery    [] Barriers to this patient's plan of care or recovery Neuromuscular Re-Education , 45844 Therapeutic Activities , 86500 Manual Therapy , 03209 Group Therapy , 53313 Vasopneumatic Device ,  Electrical Stimulation      Suggested Professional Referral: [x] No  [] Yes:     Patient Education:  [x] Plans/Goals, Risks/Benefits discussed  [x] Home exercise program  Method of Education: [x] Verbal  [x] Demo  [x] Written  Comprehension of Education:  [x] Verbalizes understanding. [x] Demonstrates understanding. [] Needs Review. [] Demonstrates/verbalizes understanding of HEP/Ed previously given. Frequency:  1-2 days per week for 4-6 weeks    Patient understands diagnosis/prognosis and consents to treatment, plan and goals: [x] Yes    [] No     Thank you for the opportunity to work with your patient. If you have questions or comments, please contact me at numbers listed above. Electronically signed by: Gregoria Martino, PT PT , DPT [id number]    Medicare Patients Only     Please sign Physician's Certification and return to: [address] [place] [address] E  [facility] [address]  Dept: [phone]  Dept Fax: [phone] Certification / Comments     Frequency/Duration 1-2 days per week for 4-6 weeks. Certification period from 6/1/2020  to 7/17/20. I have reviewed the Plan of Care established for skilled therapy services and certify that the services are required and that they will be provided while the patient is under my care.     Physician's Comments/Revisions:               Physician's Printed Name:                                           [de-identified] Signature:                                                               Date:

## 2020-06-04 ENCOUNTER — TREATMENT (OUTPATIENT)
Dept: PHYSICAL THERAPY | Age: 45
End: 2020-06-04
Payer: COMMERCIAL

## 2020-06-04 PROCEDURE — 97110 THERAPEUTIC EXERCISES: CPT

## 2020-06-04 PROCEDURE — 97035 APP MDLTY 1+ULTRASOUND EA 15: CPT

## 2020-06-04 NOTE — PROGRESS NOTES
holds   TE               A:  Tolerated well. Performed US to proximal bicep this session c relief noted. She was progressed c bicep stretch that was added to I HEP. Was able to add in light strengthening c moderate fatigue and minor discomfort. Will continue c skilled PT services in order to reduce pain, improve ROM and return to normal daily ADL activity's without limitations.      P: Continue with rehab plan  Jordyn Boo PTA     Treatment Charges: Mins Units   Initial Evaluation     Re-Evaluation     Ther Exercise         TE 32 2   Manual Therapy     MT     Ther Activities        TA     Gait Training          GT     Neuro Re-education NR     Modalities 10 1   Non-Billable Service Time     Other     Total Time/Units 42 3

## 2020-06-11 ENCOUNTER — TREATMENT (OUTPATIENT)
Dept: PHYSICAL THERAPY | Age: 45
End: 2020-06-11
Payer: COMMERCIAL

## 2020-06-11 PROCEDURE — 97110 THERAPEUTIC EXERCISES: CPT

## 2020-06-11 PROCEDURE — 97530 THERAPEUTIC ACTIVITIES: CPT

## 2020-06-16 ENCOUNTER — TREATMENT (OUTPATIENT)
Dept: PHYSICAL THERAPY | Age: 45
End: 2020-06-16
Payer: COMMERCIAL

## 2020-06-16 PROCEDURE — 97530 THERAPEUTIC ACTIVITIES: CPT

## 2020-06-16 PROCEDURE — 97110 THERAPEUTIC EXERCISES: CPT

## 2020-06-18 ENCOUNTER — TREATMENT (OUTPATIENT)
Dept: PHYSICAL THERAPY | Age: 45
End: 2020-06-18
Payer: COMMERCIAL

## 2020-06-18 PROCEDURE — 97110 THERAPEUTIC EXERCISES: CPT

## 2020-06-18 PROCEDURE — 97530 THERAPEUTIC ACTIVITIES: CPT

## 2020-06-18 NOTE — PROGRESS NOTES
,pushing and pulling at home/work    ROWS: M 2 x 10 3s holds GTB TA   Shoulder Ext  2 x 10 3s holds GTB TA   ER 2 x 10 3s holds  TA   IR 2 x 10 3s holds   TA               A:  Tolerated well. Patient reports soreness upon arrival this day d/t over working herself at work yesterday. Patient continues to demonstrate progress objectively but subjectively states that she is not improving. Reports significant fatigue post treatment session this day. Continue to progress patient towards all rehab goals.      P: Continue with rehab plan  Jordyn Boo PTA     Treatment Charges: Mins Units   Initial Evaluation     Re-Evaluation     Ther Exercise         TE 25 2   Manual Therapy     MT     Ther Activities        TA 15 1   Gait Training          GT     Neuro Re-education NR     Modalities     Non-Billable Service Time     Other     Total Time/Units 40 3

## 2020-06-23 ENCOUNTER — TREATMENT (OUTPATIENT)
Dept: PHYSICAL THERAPY | Age: 45
End: 2020-06-23
Payer: COMMERCIAL

## 2020-06-23 PROCEDURE — 97035 APP MDLTY 1+ULTRASOUND EA 15: CPT

## 2020-06-23 PROCEDURE — 97110 THERAPEUTIC EXERCISES: CPT

## 2020-06-23 PROCEDURE — 97530 THERAPEUTIC ACTIVITIES: CPT

## 2020-06-25 ENCOUNTER — TREATMENT (OUTPATIENT)
Dept: PHYSICAL THERAPY | Age: 45
End: 2020-06-25
Payer: COMMERCIAL

## 2020-06-25 PROCEDURE — 97164 PT RE-EVAL EST PLAN CARE: CPT | Performed by: PHYSICAL THERAPIST

## 2020-06-25 PROCEDURE — 97530 THERAPEUTIC ACTIVITIES: CPT | Performed by: PHYSICAL THERAPIST

## 2020-06-25 PROCEDURE — 97110 THERAPEUTIC EXERCISES: CPT | Performed by: PHYSICAL THERAPIST

## 2020-06-25 NOTE — PROGRESS NOTES
[address] [facility]   Phone: [phone]   Fax: [phone]    Re-evaluation    Date:  2020   Patient: Shannan Boast                    : 1975                        MRN: [id number]  Referring Provider: Steff FridayDO  [address] [facility], [address] [facility]                             Medical Diagnosis:      M25.511 (ICD-10-CM) - Acute pain of right shoulder   M75.21 (ICD-10-CM) - Biceps tendinitis of right upper extremity         ATTENDANCE:  Patient has attended 6 of 8 scheduled treatments from 20  to 20. TREATMENTS RECEIVED:  Therapeutic activity, Therapeutic exercise, neuromuscular reeducation, HEP    INITIAL STATUS:  Observations: normal orthopedic exam, well nourished female, normal affect     Inspection: normal orthopedic exam.                                                      Palpation: Tender to palpation AC joint, proximal bicep tendon      Joint/Motion:  Right Shoulder:  AROM: 145° Forward elevation,  140° abduction, 90° ER,  IR to 60  PROM: 170° Forward elevation, 170° abduction,  90° ER,  80° IR  Apley's IR: low thoracic     Left Shoulder:  AROM: 160° Forward elevation, 160° abduction,  90° ER,  IR to 90  PROM: 180° Forward elevation, 180° abduction,  90° ER , 90° IR  Apley's IR: high thoracic     Strength:  Right Shoulder: Flexion 4/5,  Abduction 4/5, ER 4/5, IR 4/5    Right Elbow:Flexion 4+/5,  Extension 4+/5     Left Shoulder: Flexion 4+/5,  Abduction 4+/5, ER 4+/5, IR 4+/5   Left Elbow:Flexion 4/5,  Extension 4/5     Special Tests/Functional Screens:    [x]? Radford-Damien []?+ / [x]? -  [x]? Phelps's [x]?+ / []? -   [x]? AC Sheer [x]?+ / []? -    [x]? [address] drawer []?+ / [x]? -    []? Bicep Load []?+ / []? -   []? Crank []?+ / []? -  [x]? Clunk []?+ / [x]? -  []? Ulices Mutters []?+ / []? -   []? Polly Menjivar []?+ / []? -  []? Neer's []?+ / []? -      [x]? Speed's []?+ / [x]? -   []? Aubrey's []?+ / []? -    []? Sulcus Sign []?+ / []? -   [x]?  Apprehension -  []? Drop arm []?+ / []? -  []? ER lag []?+ / []? -  [x]? Painful Arc [x]?+ / []? -  []? Bear Hug []?+ / []? -  [x]? Belly Press/ lift off[x]? + / []? - (pain)  [x]? active adduction: []?+ / [x]? -                Special Tests:             continued AC joint OA and biceps tendonitis. OUTCOME MEASURE: QuickDASH 23%    GOALS:    Short Term goals (2 weeks) (goals met)  · Decrease reported pain to 3/10  · Increase ROM to 150° Forward elevation, 150° abduction,  90° ER,  IR to 70  · Increase Strength to 4+/5 globally   · Able to perform/complete the following functions/tasks: able to reach [place] 5x c good mechanics and minor limitation, able to press 5 lbs from chest to ceiling c minor pain and limitation, able to reach to mid thoracic spine s limitation or pain  · QuickDASH (Disorders of the Arm, Shoulder, and Hand) 35% disability     Long Term goals (4 weeks) (goals partially met)  · Decrease reported pain to 1/10  · Increase ROM to 160° Forward elevation, 160° abduction,  90° ER,  IR to 90 (partially met)  · Increase Strength to 5-/5 globally   · Able to perform/complete the following functions/tasks: able to reach OH 10x c good mechanics and s pain/limitation, able to press 10 lbs from chest to ceiling 10x s pain or limitation, able to reach to high thoracic spine s limitation or pain (partially met)  · QuickDASH 20% disability (not met)  · Independent with Home Exercise Programs    COMMENTS AND RECOMMENDATIONS:   Pt has made considerable objective gains throughout PT POC but continues to report pain and limitation c work activity. She states that she has less pain overall, but continues to feel limited c reaching and lifting activity. She has made considerable improvements globally in terms of strength, ROM, and special testing, showing less involvement in special testing.   She was educated on these improvements and was agreeable to 2 additional weeks of PT services to determine if more subjective improvements

## 2020-06-25 NOTE — PROGRESS NOTES
strength needed for reaching , lifting ,pushing and pulling at home/work    ROWS: M 3 x 10 3s holds GTB TA   Shoulder Ext  2 x 10 3s holds GTB TA    TA    TA               A:  Tolerated fair. She was reevaluated today and found to have made significant objective improvement, but continues to report pain and limitation. She will continue c PT services of 2x weekly for additional 2 weeks to determine if further gains can be made. She was progressed today c light bicep ecc activity and light fxn mobility to reduce overall limitation. Will continue to progress as tolerated and will refer back to referring provider if no subjective improvement has been made in 2 weeks.     P: Continue with rehab plan  Unknown Joan PT DPT [id number]    Treatment Charges: Mins Units   Initial Evaluation     Re-Evaluation 15 1   Ther Exercise         TE 15 1   Manual Therapy     MT     Ther Activities        TA 10 1   Gait Training          GT     Neuro Re-education NR     Modalities     Non-Billable Service Time     Other     Total Time/Units 40 3

## 2020-06-26 ENCOUNTER — APPOINTMENT (OUTPATIENT)
Dept: CT IMAGING | Age: 45
End: 2020-06-26
Payer: COMMERCIAL

## 2020-06-26 ENCOUNTER — HOSPITAL ENCOUNTER (EMERGENCY)
Age: 45
Discharge: HOME | End: 2020-06-26
Attending: EMERGENCY MEDICINE
Payer: COMMERCIAL

## 2020-06-26 ENCOUNTER — APPOINTMENT (OUTPATIENT)
Dept: GENERAL RADIOLOGY | Age: 45
End: 2020-06-26
Payer: COMMERCIAL

## 2020-06-26 VITALS
WEIGHT: 180 LBS | DIASTOLIC BLOOD PRESSURE: 69 MMHG | BODY MASS INDEX: 35.34 KG/M2 | RESPIRATION RATE: 18 BRPM | SYSTOLIC BLOOD PRESSURE: 108 MMHG | HEIGHT: 60 IN | HEART RATE: 68 BPM | TEMPERATURE: 98.1 F | OXYGEN SATURATION: 98 %

## 2020-06-26 PROCEDURE — 99283 EMERGENCY DEPT VISIT LOW MDM: CPT

## 2020-06-26 PROCEDURE — 70490 CT SOFT TISSUE NECK W/O DYE: CPT

## 2020-06-26 PROCEDURE — 71045 X-RAY EXAM CHEST 1 VIEW: CPT

## 2020-06-26 RX ORDER — PREDNISONE 10 MG/1
20 TABLET ORAL DAILY
Qty: 6 TABLET | Refills: 0 | Status: SHIPPED | OUTPATIENT
Start: 2020-06-26 | End: 2020-06-29

## 2020-06-26 RX ORDER — HYDROXYZINE PAMOATE 50 MG/1
50 CAPSULE ORAL 2 TIMES DAILY PRN
Qty: 6 CAPSULE | Refills: 0 | Status: SHIPPED | OUTPATIENT
Start: 2020-06-26 | End: 2020-07-09 | Stop reason: ALTCHOICE

## 2020-06-26 NOTE — ED PROVIDER NOTES
and vital signs as below have been reviewed. /69   Pulse 68   Temp 98.1 °F (36.7 °C) (Oral)   Resp 18   Ht 5' (1.524 m)   Wt 180 lb (81.6 kg)   LMP 01/05/2018   SpO2 98%   BMI 35.15 kg/m²   Oxygen Saturation Interpretation: Normal      ---------------------------------------------------PHYSICAL EXAM--------------------------------------    There is no stridor there is no drooling there is no pooling of secretions in the posterior oropharynx  Constitutional/General: Alert and oriented x3, well appearing, non toxic in NAD  Head: NC/AT  Eyes: PERRL, EOMI  Mouth: Oropharynx clear, handling secretions, no trismus; there is no swelling of the neck and the trachea is midline  Neck: Supple, full ROM, no meningeal signs  Pulmonary: Lungs clear to auscultation bilaterally, no wheezes, rales, or rhonchi. Not in respiratory distress  Cardiovascular:  Regular rate and rhythm, no murmurs, gallops, or rubs. 2+ distal pulses  Abdomen: Soft, non tender, non distended,   Extremities: Moves all extremities x 4. Warm and well perfused  Skin: warm and dry without rash  Neurologic: GCS 15,  Psych: Normal Affect      ------------------------------ ED COURSE/MEDICAL DECISION MAKING----------------------  Medications - No data to display      Medical Decision Making:    Chest x-ray was negative. CT soft tissue neck revealed no airway obstruction, radiology did note some secretions. Patient was reexamined she is in no distress and not having any problems at this time. Patient will be discharged home with a few days of steroids and antihistamines, Vistaril. She is advised if symptoms continue to follow-up with ear nose and throat for possible fiberoptic laryngoscopy    Counseling: The emergency provider has spoken with the patient and discussed todays results, in addition to providing specific details for the plan of care and counseling regarding the diagnosis and prognosis.   Questions are answered at this time and they

## 2020-06-30 ENCOUNTER — TREATMENT (OUTPATIENT)
Dept: PHYSICAL THERAPY | Age: 45
End: 2020-06-30
Payer: COMMERCIAL

## 2020-06-30 PROCEDURE — 97110 THERAPEUTIC EXERCISES: CPT

## 2020-06-30 PROCEDURE — 97530 THERAPEUTIC ACTIVITIES: CPT

## 2020-06-30 NOTE — PROGRESS NOTES
[address] [facility] [address] [facility]   Phone: [phone]   Fax: [phone]      Physical Therapy Daily Treatment Note    Date: 2020  Patient Name: Ulises Sotomayor  : 1975   MRN: [id number]  DOInjury: 2020  DOSx: NA   Referring Provider: Asuncion Fajardo DO  [address] [facility], [address] [facility]     Medical Diagnosis:     M25.511 (ICD-10-CM) - Acute pain of right shoulder   M75.21 (ICD-10-CM) - Biceps tendinitis of right upper extremity       Outcome Measure: QuickDASH 23% limitation    S:  Patient reports to therapy late this session. States she had difficulty c OH motion at work this day. O: Pt given written HEP  Time 248-564     Visit   Add 4 visits  Repeat outcome measure at mid point and end.     Pain 0/10- Without Movement   10/10 With OH motion     Strength Right Shoulder: Flexion 4/5,  Abduction 4/5, ER 4/5, IR 4/5    Right Elbow:Flexion 4+/5,  Extension 4+/5     Left Shoulder: Flexion 4+/5,  Abduction 4+/5, ER 4+/5, IR 4+/5   Left Elbow:Flexion 4/5,  Extension 4/5     Palpation Tender to palpation AC joint, proximal bicep tendon      ROM Right Shoulder:  AROM: 145° Forward elevation,  140° abduction, 90° ER,  IR to 60  PROM: 170° Forward elevation, 170° abduction,  90° ER,  80° IR  Apley's IR: low thoracic     Left Shoulder:  AROM: 160° Forward elevation, 160° abduction,  90° ER,  IR to 90  PROM: 180° Forward elevation, 180° abduction,  90° ER , 90° IR  Apley's IR: high thoracic     Modalities       US         Stretch      Bicep Stretch   2 x 10s x 10  holds  TE   IR Stretch 2 x 10 x 10s  holds    TE         Exercise      UBE 5 mins   TE         2# TE   2# TE   2# TE   Bicep Curls   -eccentric 3 x 10  2lb TE   2# TA   Wall ABC's 3x Thru  TA   1.5# TA   Wall Ball Push Ups  3 x 10   TA   2# TE   OTB TE         Bicep Eccentric OH to 90* Add as able                         Functional activities  To aid in ROM and strength needed for reaching , lifting ,pushing and pulling at home/work    ROWS: M 3 x 10 3s holds GTB TA   Shoulder Ext  2 x 10 3s holds GTB TA    TA    TA               A:  Tolerated fair. Continued c ecc bicep activity c moderate fatigue reported. Attempted to perform OH Bicep ecc activity but patient reports difficulty d/t discomfort. Moderate fatigue reported post treatment session. Will continue to progress as patient tolerates.      P: Continue with rehab plan  Mary Baum, YVAN [id number]    Treatment Charges: Mins Units   Initial Evaluation     Re-Evaluation     Ther Exercise         TE 15 1   Manual Therapy     MT     Ther Activities        TA 22 1   Gait Training          GT     Neuro Re-education NR     Modalities     Non-Billable Service Time     Other     Total Time/Units 37 2

## 2020-07-02 ENCOUNTER — OFFICE VISIT (OUTPATIENT)
Dept: ENT CLINIC | Age: 45
End: 2020-07-02
Payer: COMMERCIAL

## 2020-07-02 VITALS — BODY MASS INDEX: 35.91 KG/M2 | HEIGHT: 61 IN | WEIGHT: 190.2 LBS

## 2020-07-02 DIAGNOSIS — G47.33 OBSTRUCTIVE SLEEP APNEA (ADULT) (PEDIATRIC): Primary | ICD-10-CM

## 2020-07-02 PROCEDURE — 31575 DIAGNOSTIC LARYNGOSCOPY: CPT | Performed by: OTOLARYNGOLOGY

## 2020-07-02 PROCEDURE — 99204 OFFICE O/P NEW MOD 45 MIN: CPT | Performed by: OTOLARYNGOLOGY

## 2020-07-02 RX ORDER — OMEPRAZOLE 20 MG/1
20 CAPSULE, DELAYED RELEASE ORAL
Qty: 30 CAPSULE | Refills: 1 | Status: SHIPPED
Start: 2020-07-02 | End: 2020-09-23 | Stop reason: SDUPTHER

## 2020-07-02 NOTE — LETTER
Dear Dr Migdalia Flores, PA-C     We had the pleasure of seeing Melia Baer, 1975 here    on 7/2/2020  Please see below for review of care and plans. Chief complaint- No diagnosis found. History of Present Illness-  Pt with last Friday to ED for feeling of neck tightness and throat closing. Never happened before. 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 Good water, some caffeine. Had steroids in ed and helped a bit but not much better. Also with trouble getting food down at times in ititiating a swallow. Review of Systems- No drainage, discharge, or headache. Complete 10 system ROS completed and negative except as noted above. Physical Examination-   Vital Signs-Ht 5' 1\" (1.549 m)   Wt 190 lb 3.2 oz (86.3 kg)   LMP 01/05/2018   BMI 35.94 kg/m²     Ears- Tympanic membranes clear bilaterally. No middle ear effusion. No pre or post auricular tenderness. Nose- Nasal mucosa clear and dry. No significant septal deviation or inferior turbinate hypertrophy. Oral Cavity/Oropharynx- Floor of mouth and tongue are soft and nontender. No posterior pharyngeal erythema. + gag reflex  Neck- Soft and nontender. No masses, lesions, lymphadenopathy, or thyroid nodules appreciated. Cranial Nerve- Cranial nerves II to XII intact. Extraocular muscles intact. No gross motor visual deficits. No spontaneous nystagmus. Face- No facial skin tenderness to palpation. Heart- No cyanosis, regular  Lungs- No stridor, no intercostal accessory muscle use  General- The patient is in no acute distress. A&O x3    Scope  Procedure note- after pt verbally consented, was sprayed nasally with 1:1 neosynephrine and xylocaine. Scope was passed and found nasal cavity with no lesion. np clear, tonsil wnl, tongue mobile and no masses, vocal cords mobile bilaterally with full ab and ad duction. Hypopharynx clear, open and no masses.  enttbrefluxexam Pt with hyperemia and hypervascularity of post-cricoid mucosa that was waterfalling into posterior glottic area. Voice   g- 1.5, r- 1.5, a- 1.5, b- 1.5, s- 2   Reserve 60  Glide average  Projection good  Voice however has moments of strain that include breathiness, raspiness, and aphonia. Medical Decision Making and Treatment Plan.  enttbrefluxtreat   Plan at this time was to treat his reflux   1- ppi-    2- Increase hydration   3- decrease caffeine/chocolate   4- diet modification   5- quit smoking- 10 minutes spent on smoking cessation and counseling  6- speech therapy  7- pt being worked up for a brain issue- maybe MS  8- pt with voice abnormality- multiple etiologies at this time- MS, spasmodic , dysphonia, anxiety, etc  9- fu 6 wks or sooner is sxs worsen    Thank you for the opportunity to take part in the care of this very pleasant patient, Ariel Matilda  Sincerely,            Ryan Calderon.  Rocio Matos M.D., Ph.D., [facility]  Department of Otolaryngology-Head and Neck Surgery

## 2020-07-02 NOTE — PROGRESS NOTES
Dear Dr Ajith Mackey, KIRSTEN     We had the pleasure of seeing Errol Arana, 1975 here    on 7/2/2020  Please see below for review of care and plans. Chief complaint- No diagnosis found. History of Present Illness-  Pt with last Friday to ED for feeling of neck tightness and throat closing. Never happened before. Works in [employer] [place] with chemicals for 3 yrs and + smoker , trying to quit. Good water, some caffeine. Had steroids in ed and helped a bit but not much better. Also with trouble getting food down at times in ititiating a swallow. Review of Systems- No drainage, discharge, or headache. Complete 10 system ROS completed and negative except as noted above. Physical Examination-   Vital Signs-Ht 5' 1\" (1.549 m)   Wt 190 lb 3.2 oz (86.3 kg)   LMP 01/05/2018   BMI 35.94 kg/m²     Ears- Tympanic membranes clear bilaterally. No middle ear effusion. No pre or post auricular tenderness. Nose- Nasal mucosa clear and dry. No significant septal deviation or inferior turbinate hypertrophy. Oral Cavity/Oropharynx- Floor of mouth and tongue are soft and nontender. No posterior pharyngeal erythema. + gag reflex  Neck- Soft and nontender. No masses, lesions, lymphadenopathy, or thyroid nodules appreciated. Cranial Nerve- Cranial nerves II to XII intact. Extraocular muscles intact. No gross motor visual deficits. No spontaneous nystagmus. Face- No facial skin tenderness to palpation. Heart- No cyanosis, regular  Lungs- No stridor, no intercostal accessory muscle use  General- The patient is in no acute distress. A&O x3    Scope  Procedure note- after pt verbally consented, was sprayed nasally with 1:1 neosynephrine and xylocaine. Scope was passed and found nasal cavity with no lesion. np clear, tonsil wnl, tongue mobile and no masses, vocal cords mobile bilaterally with full ab and ad duction. Hypopharynx clear, open and no masses.  enttbrefluxexam   Pt with hyperemia and hypervascularity of post-cricoid mucosa that was waterfalling into posterior glottic area. Voice   g- 1.5, r- 1.5, a- 1.5, b- 1.5, s- 2   Reserve 60  Glide average  Projection good  Voice however has moments of strain that include breathiness, raspiness, and aphonia. Medical Decision Making and Treatment Plan.  enttbrefluxtreat   Plan at this time was to treat his reflux   1- ppi-    2- Increase hydration   3- decrease caffeine/chocolate   4- diet modification   5- quit smoking- 10 minutes spent on smoking cessation and counseling  6- speech therapy  7- pt being worked up for a brain issue- maybe MS  8- pt with voice abnormality- multiple etiologies at this time- MS, spasmodic , dysphonia, anxiety, etc  9- fu 6 wks or sooner is sxs worsen    Thank you for the opportunity to take part in the care of this very pleasant patient, Lorena Kwong  Sincerely,            Storm Worthy.  Shahram Pérez M.D., Ph.D., [facility]  Department of Otolaryngology-Head and Neck Surgery

## 2020-07-06 ENCOUNTER — OFFICE VISIT (OUTPATIENT)
Dept: PHYSICAL MEDICINE AND REHAB | Age: 45
End: 2020-07-06
Payer: COMMERCIAL

## 2020-07-06 VITALS
TEMPERATURE: 98 F | SYSTOLIC BLOOD PRESSURE: 116 MMHG | BODY MASS INDEX: 35.87 KG/M2 | HEART RATE: 83 BPM | WEIGHT: 190 LBS | OXYGEN SATURATION: 97 % | HEIGHT: 61 IN | DIASTOLIC BLOOD PRESSURE: 74 MMHG

## 2020-07-06 PROCEDURE — 4004F PT TOBACCO SCREEN RCVD TLK: CPT | Performed by: PHYSICAL MEDICINE & REHABILITATION

## 2020-07-06 PROCEDURE — 99214 OFFICE O/P EST MOD 30 MIN: CPT | Performed by: PHYSICAL MEDICINE & REHABILITATION

## 2020-07-06 PROCEDURE — G8417 CALC BMI ABV UP PARAM F/U: HCPCS | Performed by: PHYSICAL MEDICINE & REHABILITATION

## 2020-07-06 PROCEDURE — G8427 DOCREV CUR MEDS BY ELIG CLIN: HCPCS | Performed by: PHYSICAL MEDICINE & REHABILITATION

## 2020-07-06 NOTE — PROGRESS NOTES
[facility] Physical Medicine and Rehabilitation  [address]  Phone: [phone]  Fax: [phone]    Follow Up Evaluation for Amanda aCmpos  : 1975  MRN: [id number]  PCP: Edvin Bishop PA-C  REF: No ref. provider found  Date of visit: 20  Last Visit: 20    As you know, this is a 38 y.o. female with pertinent past medical history of no known past medical history but currently undergoing work-up for chronic microvascular disease and MS. Chief Complaint   Patient presents with    Shoulder Pain     right shoulder pain, she is PT it is helping with somethings but not with the motion she uses at work to lift and reach a certain way. HPI:   Patient presents today in follow-up regarding her now chronic anterior right shoulder pain. Recall, at last visit, an ultrasound-guided bicipital tendon sheath steroid injection was performed. Patient states this injection provided significant pain relief for about 2 months; however, her pain has since returned. Patient states physical therapy has been helpful in all ranges of motion in the right shoulder. 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 Currently, pain is 0/10 because she is not doing any bothersome activities. She cannot avoid these bothersome activities at her job. There is some radiation into the biceps muscle but none otherwise into the right upper extremity. There is no numbness or tingling. She is tried oral NSAIDs, PT, steroid injections. She is not tried topical medications. There is degenerative changes of the right acromioclavicular joint; however, her symptoms do not correlate with this region. She does experience achy pain at the [facility] joint after extended periods of upper extremity activities but this is not the pain she is presenting with.     Diagnostic Studies:  - XR right shoulder from [facility] dated 1/8/2020 (reviewed personally 7/6/2020) demonstrates:   Impression       Multilevel degenerative changes at the acromioclavicular joint. Otherwise, unremarkable radiographs of the right shoulder.          No Known Allergies    Current Outpatient Medications   Medication Sig Dispense Refill    diclofenac sodium (VOLTAREN) 1 % GEL Apply 2 g topically 4 times daily 2 Tube 1    omeprazole (PRILOSEC) 20 MG delayed release capsule Take 1 capsule by mouth every morning (before breakfast) 30 capsule 1    lisinopril-hydroCHLOROthiazide (PRINZIDE;ZESTORETIC) 10-12.5 MG per tablet TAKE 1 TABLET BY MOUTH DAILY 30 tablet 1    acetaminophen (TYLENOL) 500 MG tablet Take 2 tablets by mouth 3 times daily as needed for Pain 180 tablet 2    baclofen (LIORESAL) 10 MG tablet Take 1 tablet by mouth 2 times daily 60 tablet 5    hydrOXYzine (VISTARIL) 50 MG capsule Take 1 capsule by mouth 2 times daily as needed (If patient has the sensation that her throat is closing off or otherwise feels anxious) (Patient not taking: Reported on 7/2/2020) 6 capsule 0    ibuprofen (ADVIL;MOTRIN) 800 MG tablet TAKE 1 TABLET BY MOUTH EVERY 6 HOURS AS NEEDED FOR PAIN (Patient not taking: Reported on 5/18/2020) 21 tablet 0    nicotine (NICODERM CQ) 14 MG/24HR       Nicotine 21-14-7 MG/24HR KIT 21 mg patch applied daily for 2 weeks, then 14 mg patch applied daily for 2 weeks, then 7 mg patch daily for 2 weeks (Patient not taking: Reported on 5/12/2020) 1 kit 0    meloxicam (MOBIC) 7.5 MG tablet Take 7.5 mg by mouth daily as needed for Pain       No current facility-administered medications for this visit.         Past Medical History:   Diagnosis Date    Cyst of ovary     Headache     Migraine        Past Surgical History:   Procedure Laterality Date    BACK SURGERY      CHOLECYSTECTOMY      CYST REMOVAL      HYSTERECTOMY  03/05/2018    Robotic hysterectomy, bilateral salpingectomy, right oophorectomy DR. ARIANA MONIQUE [facility]        Social History     Tobacco Use    Smoking status: Current Every Day Smoker     Packs/day: 0.50     Years: 25.00     Pack years: 12.50     Types: Cigarettes    Smokeless tobacco: Never Used   Substance Use Topics    Alcohol use: Yes     Comment: socially    Drug use: No        Functional Status: The patient is able to ambulate and perform activities of daily living without the use of an assistive device. Occupation: The patient is currently employed in dry cleaning. ROS:    Constitutional: Denies fevers, chills, night sweats, unintentional weight loss     Neurologic: See HPI.     MSK: See HPI. Psychiatric: Denies sleep disturbance, anxiety, depression    Physical Exam:   Blood pressure 116/74, pulse 83, temperature 98 °F (36.7 °C), temperature source Oral, height 5' 1\" (1.549 m), weight 190 lb (86.2 kg), last menstrual period 01/05/2018, SpO2 97 %. PAIN: 0/10 currently  GEN APPEARANCE: Pleasant, well developed, well nourished in no acute distress; Alert and Oriented; body habitus is obese  PSYCH: Normal mood and affect   SKIN: No lesions grossly visible on anterior right shoulder  MSK: Inspection of shoulders are symmetrical without muscular atrophy. AC joints are symmetrical.  There are no bone deformity of bilateral shoulders. There is mild (not concordant) tenderness to the right AC joint. There is concordant pain to the right biceps tendon. Active range of motion is full. Strength of bilateral upper extremities is full, 5/5. DTRs at biceps, brachioradialis, and triceps tendon is 2+ in bilateral upper extremities. Neer and Rhiannon Boning is negative. Drop arm test is negative. Empty can is negative. Apley's scarf is negative. Delphina Skiff is negative. Impression:   Wendy Mendieta is a 38 y.o. female who presents with anterior right shoulder pain secondary to right bicipital tendinopathy with failure of conservative treatment options.      1. Biceps tendinitis of right upper extremity    2. Chronic right shoulder pain        Recommendations:  1. Discussion: I have discussed the natural history of the above diagnoses with her in detail, with more than 1/2 of the visit spent counseling her on the pathophysiology and treatment options. Discussed failure of conservative treatment options including oral medications, physical therapy, steroid injection to the biceps tendon sheath. At this point, we will need further advanced imaging of the right shoulder and likely referral to orthopedic hand. 2. Activity Modification: Patient to continue being as physically active as possible while avoiding complete inactivity. No current restrictions placed. 3. Medications: Continue gentle analgesics. We will add diclofenac gel to be massaged into the painful area. 4. Referrals: No surgical referral needed at this point. Will likely need orthopedic hand referral for input. 5. Images: MRI of right shoulder without contrast.  Patient has failed conservative treatment options to this point. 6. Labs: None today. 7. DME: None today. 8. Injection: None indicated today. Considered right AC joint steroid injection but symptoms do not correlate with this region. 9. Follow-up: Will call with MRI results. At that time we will review progress with above interventions. The patient was educated about the diagnosis, prognosis, indications, risks and benefits of treatment. An opportunity to ask questions was given to the patient and questions were answered. The patient agreed to proceed with the recommended treatment as described above. Sincerely,     Sid Faust., DO  Physical Medicine and Rehabilitation     Please note that the above documentation was prepared using voice recognition software. Every attempt was made to ensure accuracy but there may be spelling, grammatical, and contextual errors.

## 2020-07-07 ENCOUNTER — VIRTUAL VISIT (OUTPATIENT)
Dept: FAMILY MEDICINE CLINIC | Age: 45
End: 2020-07-07
Payer: COMMERCIAL

## 2020-07-07 ENCOUNTER — TELEPHONE (OUTPATIENT)
Dept: NEUROLOGY | Age: 45
End: 2020-07-07

## 2020-07-07 PROCEDURE — G8427 DOCREV CUR MEDS BY ELIG CLIN: HCPCS | Performed by: PHYSICIAN ASSISTANT

## 2020-07-07 PROCEDURE — 99214 OFFICE O/P EST MOD 30 MIN: CPT | Performed by: PHYSICIAN ASSISTANT

## 2020-07-07 NOTE — TELEPHONE ENCOUNTER
Patient called in today. She said that she was to the ER dept recently with breathing issues. She was referred to ENT and they sent her to therapy. They told her that her brain is stopping her vocal cords from functioning. She works at [employer] said very hot and hard to breath. She is ok when home. Has appt with you 7/20/20. She was offered one sooner in [place] [address].

## 2020-07-07 NOTE — PROGRESS NOTES
TeleMedicine Patient Consent    This visit was performed as a virtual video visit using a synchronous, two-way, audio-video telehealth technology platform. Patient identification was verified at the start of the visit, including the patient's telephone number and physical location. I discussed with the patient the nature of our telehealth visits, that:     1. Due to the nature of an audio- video modality, the only components of a physical exam that could be done are the elements supported by direct observation. 2. I would evaluate the patient and recommend diagnostics and treatments based on my assessment. 3. If it was felt that the patient should be evaluated in clinic or an emergency room setting, then they would be directed there. 4. Our sessions are not being recorded and that personal health information is protected. 5. Our team would provide follow up care in person if/when the patient needs it. Patient does agree to proceed with telemedicine consultation. Patient's location: [place] address [place]. Is there anyone else present for this visit: No  This visit was completed virtually using tribr. me    Physician Location:    [facility]    Time spent: Greater than Not billed by time    2020    TELEHEALTH EVALUATION -- Audio or Visual (During SNNDI-31 public health emergency)    HPI:    Jillian Sims (:  1975) has requested an audio/video evaluation for the following concern(s): pt presents for hospital follow up. She presented on  with laryngospasm. She was having hoarseness and difficulty speaking. She felt that her throat was closing and she was having difficulty breathing. Her headaches were unchanged. She was seen by ENT, who has her in speech therapy now. She was wondering if an inhaler would help her. She is due to see Neurology on . Unfortunately, testing was delayed bc of the covid 19 pandemic.  She is on baclofen, but believes her \"whole body spasms\" are worsening. I reviewed the note from March by Dr Arcadio Cesar. She continues to smoke, but is down to 5-6 cigarettes per day. ROS Unless otherwise specified  Review of Systems - General ROS: negative for - chills, fatigue, fever, night sweats, sleep disturbance, weight gain or weight loss  Psychological ROS: negative for - anxiety, behavioral disorder, depression, hallucinations, irritability, memory difficulties, mood swings, sleep disturbances or suicidal ideation  ENT ROS: negative for - epistaxis, headaches, hearing change, nasal congestion, nasal discharge, nasal polyps, sinus pain, tinnitus, vertigo or visual changes  Hematological and Lymphatic ROS: negative for - bleeding problems, blood clots, fatigue or swollen lymph nodes  Respiratory ROS: negative for - cough, orthopnea, shortness of breath, sputum changes, tachypnea or wheezing  Cardiovascular ROS: negative for - chest pain, dyspnea on exertion, irregular heartbeat, loss of consciousness, palpitations, paroxysmal nocturnal dyspnea or rapid heart rate  Gastrointestinal ROS: negative for - abdominal pain, blood in stools, change in bowel habits, constipation, diarrhea, gas/bloating, heartburn or nausea/vomiting  Musculoskeletal ROS: negative for - joint pain, joint stiffness, joint swelling or muscle, back pain, bowel or bladder incontinence  Neurological ROS: negative for - behavioral changes, confusion, dizziness, headaches, memory loss, numbness/tingling, seizures or speech problems, weakness  Dermatological ROS: negative for - dry skin, mole changes, nail changes, pruritus, rash or skin lesion changes    Prior to Visit Medications    Medication Sig Taking?  Authorizing Provider   diclofenac sodium (VOLTAREN) 1 % GEL Apply 2 g topically 4 times daily  Sid Gage DO   omeprazole (PRILOSEC) 20 MG delayed release capsule Take 1 capsule by mouth every morning (before breakfast)  Isac Hollins MD   hydrOXYzine (VISTARIL) 50 MG capsule Take 1 capsule by mouth 2 times daily as needed (If patient has the sensation that her throat is closing off or otherwise feels anxious)  Patient not taking: Reported on 7/2/2020  Dewey Campbell MD   lisinopril-hydroCHLOROthiazide (PRINZIDE;ZESTORETIC) 10-12.5 MG per tablet TAKE 1 TABLET BY MOUTH DAILY  Harry Daniels PA-C   acetaminophen (TYLENOL) 500 MG tablet Take 2 tablets by mouth 3 times daily as needed for Pain  Sid Gage DO   ibuprofen (ADVIL;MOTRIN) 800 MG tablet TAKE 1 TABLET BY MOUTH EVERY 6 HOURS AS NEEDED FOR PAIN  Patient not taking: Reported on 5/18/2020  Harry Daniels PA-C   baclofen (LIORESAL) 10 MG tablet Take 1 tablet by mouth 2 times daily  Dhaval Mahajan MD   nicotine (Brena Rast) 14 MG/24HR   Historical Provider, MD   Nicotine 21-14-7 MG/24HR KIT 21 mg patch applied daily for 2 weeks, then 14 mg patch applied daily for 2 weeks, then 7 mg patch daily for 2 weeks  Patient not taking: Reported on 5/12/2020  Nettie Cordero MD   meloxicam (MOBIC) 7.5 MG tablet Take 7.5 mg by mouth daily as needed for Pain  Historical Provider, MD       Social History     Tobacco Use    Smoking status: Current Every Day Smoker     Packs/day: 0.50     Years: 25.00     Pack years: 12.50     Types: Cigarettes    Smokeless tobacco: Never Used   Substance Use Topics    Alcohol use: Yes     Comment: socially    Drug use: No        No Known Allergies,   Past Medical History:   Diagnosis Date    Cyst of ovary     Headache     Migraine    ,   Past Surgical History:   Procedure Laterality Date    BACK SURGERY      CHOLECYSTECTOMY      CYST REMOVAL      HYSTERECTOMY  03/05/2018    Robotic hysterectomy, bilateral salpingectomy, right oophorectomy   [facility]    ,   Social History     Tobacco Use    Smoking status: Current Every Day Smoker     Packs/day: 0.50     Years: 25.00     Pack years: 12.50     Types: Cigarettes    Smokeless tobacco: Never Used   Substance Use Topics    Alcohol use: Yes     Comment: socially    Drug use: No   ,   Family History   Problem Relation Age of Onset    Mult Sclerosis Mother     Colon Cancer Neg Hx     Uterine Cancer Neg Hx     Ovarian Cancer Neg Hx     Breast Cancer Neg Hx    ,   There is no immunization history on file for this patient.,   Health Maintenance   Topic Date Due    Pneumococcal 0-64 years Vaccine (1 of 1 - PPSV23) 08/06/1980    DTaP/Tdap/Td vaccine (1 - Tdap) 08/06/1993    Diabetes screen  08/06/2014    Flu vaccine (1) 09/01/2020    Potassium monitoring  02/27/2021    Creatinine monitoring  02/27/2021    Cervical cancer screen  02/15/2023    Lipid screen  02/11/2025    HIV screen  Completed    Hepatitis A vaccine  Aged Out    Hepatitis B vaccine  Aged Out    Hib vaccine  Aged Out    Meningococcal (ACWY) vaccine  Aged Out       PHYSICAL EXAMINATION:  [ INSTRUCTIONS:  \"[x]\" Indicates a positive item  \"[]\" Indicates a negative item  -- DELETE ALL ITEMS NOT EXAMINED]  Vital Signs: (As obtained by patient/caregiver or practitioner observation)    Blood pressure-  Heart rate-    Respiratory rate-    Temperature-  Pulse oximetry-     Constitutional: [x] Appears well-developed and well-nourished [] No apparent distress      [] Abnormal-   Mental status  [x] Alert and awake  [x] Oriented to person/place/time [x]Able to follow commands      Eyes:  EOM    [x]  Normal  [] Abnormal-  Sclera  [x]  Normal  [] Abnormal -         Discharge [x]  None visible  [] Abnormal -    HENT:   [x] Normocephalic, atraumatic.   [] Abnormal   [x] Mouth/Throat: Mucous membranes are moist.     External Ears [x] Normal  [] Abnormal-     Neck: [x] No visualized mass     Pulmonary/Chest: [x] Respiratory effort normal.  [x] No visualized signs of difficulty breathing or respiratory distress        [] Abnormal-      Musculoskeletal:   [] Normal gait with no signs of ataxia         [x] Normal range of motion of neck        [] Abnormal- Neurological:        [x] No Facial Asymmetry (Cranial nerve 7 motor function) (limited exam to video visit)          [] No gaze palsy        [] Abnormal-         Skin:        [x] No significant exanthematous lesions or discoloration noted on facial skin         [] Abnormal-            Psychiatric:       [x] Normal Affect [x] No Hallucinations        [] Abnormal-       Other pertinent observable physical exam findings-     Due to this being a TeleHealth encounter, evaluation of the following organ systems is limited: Vitals/Constitutional/EENT/Resp/CV/GI//MS/Neuro/Skin/Heme-Lymph-Imm. ASSESSMENT/PLAN:  1. Laryngospasm      2. Smoker  -strongly advised to quit  Discussed methods of smoking cessation including Nicoderm patch, gum, and inhaler, Zyban and Chantix. All side effects explained. Pt chose nothing at this time. 3. White matter abnormality of brain, likely MS    f/u with neurology on 7/20    4. Hospital follow up   - records reviewed    Continue baclofen, labs and sleep study scheduled. Following with ENT. Speech therapy to be started. An  electronic signature was used to authenticate this note.    --Fariha Ramirez PA-C on 7/7/2020 at 11:57 }    Pursuant to the emergency declaration under the [facility] waiver authority and the [employer] and [employer] Act, this Virtual  Visit was conducted, with patient's consent, to reduce the patient's risk of exposure to COVID-19 and provide continuity of care for an established patient. Services were provided through a video synchronous discussion virtually to substitute for in-person clinic visit.

## 2020-07-07 NOTE — PROGRESS NOTES
Marichuy Arreaga was read the following message We want to confirm that, for purposes of billing, this is a virtual visit with your provider for which we will submit a claim for reimbursement with your insurance company. You will be responsible for any copays, coinsurance amounts or other amounts not covered by your insurance company. If you do not accept this, unfortunately we will not be able to schedule a virtual visit with the provider. Do you accept?  Tomas Ruiz responded YES

## 2020-07-09 RX ORDER — BACLOFEN 10 MG/1
10 TABLET ORAL 2 TIMES DAILY
Qty: 60 TABLET | Refills: 5 | Status: SHIPPED
Start: 2020-07-09 | End: 2020-08-04 | Stop reason: SDUPTHER

## 2020-07-09 NOTE — PROGRESS NOTES
if you are to spend the night in the hospital.     PARKING INSTRUCTIONS:   [x] Arrival Time:_1130____________  · [] Parking lot '\"I\"  is located on [facility] (the corner of [facility] and [facility]). To enter, press the button and the gate will lift. A free token will be provided to exit the lot. One car per patient is allowed to park in this lot. All other cars are to park on [address] [place] [address] either in the parking garage or the handicap lot. [] To reach the [facility] lobby from [address] [place] [address], upon entering the hospital, take elevator B to the 3rd floor. EDUCATION INSTRUCTIONS:      [] Knee or hip replacement booklet & exercise pamphlets given. [] Edwin 77 placed in chart. [] Pre-admission Testing educational folder given  [] Incentive Spirometry,coughing & deep breathing exercises reviewed. []Medication information sheet(s)   []Fluoroscopy-Xray used in surgery reviewed with patient. Educational pamphlet placed in chart. []Pain: Post-op pain is normal and to be expected. You will be asked to rate your pain from 0-10(a zero is not acceptable-education is needed). Your post-op pain goal is:  [] Ask your nurse for your pain medication. [] Joint camp offered. [] Joint replacement booklets given. [] Other:___________________________    MEDICATION INSTRUCTIONS:   [x]Bring a complete list of your medications, please write the last time you took the medicine, give this list to the nurse. [x] Take the following medications the morning of surgery with 1-2 ounces of water: SEE MED SHEET  [x] Stop herbal supplements and vitamins 5 days before your surgery. [] DO NOT take any diabetic medicine the morning of surgery. Follow instructions for insulin the day before surgery. [] If you are diabetic and your blood sugar is low or you feel symptomatic, you may drink 1-2 ounces of apple juice or take a glucose tablet.   The morning of your procedure, you may call the pre-op area if you have concerns about your blood sugar [phone]. [] Use your inhalers the morning of surgery. Bring your emergency inhaler with you day of surgery. [] Follow physician instructions regarding any blood thinners you may be taking. WHAT TO EXPECT:  [x] The day of surgery you will be greeted and checked in by the [facility] [employer] [facility].  In addition, you will be registered in the [facility] by a Patient Access Representative. Please bring your photo ID and insurance card. A nurse will greet you in accordance to the time you are needed in the pre-op area to prepare you for surgery. Please do not be discouraged if you are not greeted in the order you arrive as there are many variables that are involved in patient preparation. Your patience is greatly appreciated as you wait for your nurse. Please bring in items such as: books, magazines, newspapers, electronics, or any other items  to occupy your time in the waiting area. []  Delays may occur with surgery and staff will make a sincere effort to keep you informed of delays. If any delays occur with your procedure, we apologize ahead of time for your inconvenience as we recognize the value of your time.

## 2020-07-09 NOTE — PROGRESS NOTES
Patient agreed to covid test 7/13 at the [address] [place] [address]. Instructed to self quarantine till day of surgery.

## 2020-07-13 ENCOUNTER — OFFICE VISIT (OUTPATIENT)
Dept: FAMILY MEDICINE CLINIC | Age: 45
End: 2020-07-13
Payer: COMMERCIAL

## 2020-07-13 ENCOUNTER — HOSPITAL ENCOUNTER (OUTPATIENT)
Age: 45
Discharge: HOME | End: 2020-07-15
Payer: COMMERCIAL

## 2020-07-13 VITALS
HEART RATE: 84 BPM | WEIGHT: 195 LBS | BODY MASS INDEX: 36.82 KG/M2 | TEMPERATURE: 97.8 F | HEIGHT: 61 IN | SYSTOLIC BLOOD PRESSURE: 126 MMHG | RESPIRATION RATE: 18 BRPM | DIASTOLIC BLOOD PRESSURE: 74 MMHG | OXYGEN SATURATION: 98 %

## 2020-07-13 PROBLEM — N93.9 ABNORMAL UTERINE BLEEDING: Status: RESOLVED | Noted: 2018-02-23 | Resolved: 2020-07-13

## 2020-07-13 PROBLEM — R19.00 PELVIC MASS IN FEMALE: Status: RESOLVED | Noted: 2018-03-05 | Resolved: 2020-07-13

## 2020-07-13 PROBLEM — R19.00 PELVIC MASS: Status: RESOLVED | Noted: 2018-02-23 | Resolved: 2020-07-13

## 2020-07-13 PROCEDURE — 99214 OFFICE O/P EST MOD 30 MIN: CPT | Performed by: PHYSICIAN ASSISTANT

## 2020-07-13 PROCEDURE — 93000 ELECTROCARDIOGRAM COMPLETE: CPT | Performed by: PHYSICIAN ASSISTANT

## 2020-07-13 PROCEDURE — U0003 INFECTIOUS AGENT DETECTION BY NUCLEIC ACID (DNA OR RNA); SEVERE ACUTE RESPIRATORY SYNDROME CORONAVIRUS 2 (SARS-COV-2) (CORONAVIRUS DISEASE [COVID-19]), AMPLIFIED PROBE TECHNIQUE, MAKING USE OF HIGH THROUGHPUT TECHNOLOGIES AS DESCRIBED BY CMS-2020-01-R: HCPCS

## 2020-07-13 PROCEDURE — G8417 CALC BMI ABV UP PARAM F/U: HCPCS | Performed by: PHYSICIAN ASSISTANT

## 2020-07-13 PROCEDURE — 1036F TOBACCO NON-USER: CPT | Performed by: PHYSICIAN ASSISTANT

## 2020-07-13 PROCEDURE — G8427 DOCREV CUR MEDS BY ELIG CLIN: HCPCS | Performed by: PHYSICIAN ASSISTANT

## 2020-07-13 NOTE — PROGRESS NOTES
Chief Complaint   Patient presents with    Other     surgery on esophageous 7/17/2020       HPI:  Patient is here for pre-operative clearance for direct Laryngoscopy on 7/17/2020 with Dr Dorrine Dance. Patient complains of laryngospasm that is worsening. Her speech therapy is on hold until the procedure. She and her daughter are frustrated with being unable to have testing completed and see providers bc of the Covid pandemic. She is upset that \"all other specialists think I have MS except Neurology. \" she considers seeking a second opinion. She does not have dyspnea on exertion. She is only sob with spasms. No cp or pressure. No dizziness or palpitations. No history of obstructive heart disease. No previous cardiac workup including echo or stress test. She continues to smoke, but is cutting down. She is scheduled for a sleep study again, but it is not until aug. Reviewed labs from Feb and May 2020. Patient's past medical, surgical, social and/or family history reviewed, updated in chart, and are non-contributory (unless otherwise stated). Medications and allergies also reviewed and updated in chart.     Review of Systems:  Constitutional:  No fever, no fatigue, no chills, no headaches, no weight change  Dermatology:  No rash, no mole, no dry or sensitive skin  ENT:  No cough, no sore throat, no sinus pain, no runny nose, no ear pain  Cardiology:  No chest pain, no palpitations, no leg edema, no shortness of breath, no PND  Gastroenterology:  No dysphagia, no abdominal pain, no nausea, no vomiting, no constipation, no diarrhea, no heartburn  Musculoskeletal:  No joint pain, no leg cramps, no back pain, + muscle aches  Respiratory:  + shortness of breath, no orthopnea, no wheezing, no MEADE, no hemoptysis  Urology:  No blood in the urine, no urinary frequency, no urinary incontinence, no urinary urgency, no nocturia, no dysuria    Vitals:    07/13/20 1547   BP: 126/74   Site: Right Upper Arm   Position: Sitting   Cuff Size: Large Adult   Pulse: 84   Resp: 18   Temp: 97.8 °F (36.6 °C)   SpO2: 98%   Weight: 195 lb (88.5 kg)   Height: 5' 1\" (1.549 m)       Physical Exam  Constitutional:       General: She is not in acute distress. Appearance: She is well-developed. HENT:      Head: Normocephalic and atraumatic. Right Ear: External ear normal.      Left Ear: External ear normal.      Nose: Nose normal.   Eyes:      General: No scleral icterus. Conjunctiva/sclera: Conjunctivae normal.      Pupils: Pupils are equal, round, and reactive to light. Neck:      Musculoskeletal: Normal range of motion and neck supple. Thyroid: No thyromegaly. Cardiovascular:      Rate and Rhythm: Normal rate and regular rhythm. Heart sounds: Normal heart sounds. No murmur. Pulmonary:      Effort: Pulmonary effort is normal. No accessory muscle usage or respiratory distress. Breath sounds: Normal breath sounds. No wheezing. Abdominal:      General: Bowel sounds are normal. There is no distension. Palpations: Abdomen is soft. There is no hepatomegaly or splenomegaly. Tenderness: There is no abdominal tenderness. Musculoskeletal: Normal range of motion. Right lower leg: No edema. Left lower leg: No edema. Skin:     General: Skin is warm and dry. Findings: No rash. Neurological:      Mental Status: She is alert and oriented to person, place, and time. Deep Tendon Reflexes: Reflexes are normal and symmetric. Psychiatric:         Speech: Speech normal.         Behavior: Behavior normal.         Assessment/Plan:      Hipolito Seip was seen today for other. Diagnoses and all orders for this visit:    Pre-op testing  -     EKG 12 Lead; Future  -     EKG 12 Lead    White matter abnormality on MRI of brain    Laryngospasm    Smoker  Discussed methods of smoking cessation including Nicoderm patch, gum, and inhaler, Zyban and Chantix. All side effects explained.  Pt chose quitting on her own.     BMI 36.0-36.9,adult  Discussed reducing fatty foods, sugar intake, pop and juice consumption. Eliminate fast food. Increase fruits and vegetables. Limit red meat to two times per week. Encourage 5-6 small meals per day. Avoid high calorie snacks. pre-operative clearance for direct Laryngoscopy on 7/17/2020 with Dr Lyndsay Mcknight. Patient has no high risk clinical predictors such as obstructive heart disease, rhythm other than sinus on ekg or anginal sx. Will discuss with Dr Irish Laurent. As above. Call or go to ED immediately if symptoms worsen or persist.  F/u 2 weeks, or sooner if necessary. Educational materials and/or home exercises printed for patient's review and were included in patient instructions on his/her After Visit Summary and given to patient at the end of visit. Counseled regarding above diagnosis, including possible risks and complications,  especially if left uncontrolled. Counseled regarding the possible side effects, risks, benefits and alternatives to treatment; patient and/or guardian verbalizes understanding, agrees, feels comfortable with and wishes to proceed with above treatment plan. Advised patient to call with any new medication issues, and read all Rx info from pharmacy to assure aware of all possible risks and side effects of medication before taking. Reviewed age and gender appropriate health screening exams and vaccinations. Advised patient regarding importance of keeping up with recommended health maintenance and to schedule as soon as possible if overdue, as this is important in assessing for undiagnosed pathology, especially cancer, as well as protecting against potentially harmful/life threatening disease. Patient and/or guardian verbalizes understanding and agrees with above counseling, assessment and plan. All questions answered.     Ramirez Carbajal PA-C  7/13/2020    I have personally reviewed and updated the chief complaint, HPI, Past Medical, Family and Social History, as well as the above Review of Systems.

## 2020-07-13 NOTE — PROGRESS NOTES
Chief Complaint   Patient presents with    Other     surgery on esophageous 7/17/2020       HPI:  Patient is here for medical clearance patient is having surgery this Friday 7/17/2020 surgery is being done by Dr Anika Thompson . Patient has no complains. Patient's past medical, surgical, social and/or family history reviewed, updated in chart, and are non-contributory (unless otherwise stated). Medications and allergies also reviewed and updated in chart.     Review of Systems:  Constitutional:  No fever, no fatigue, no chills, no headaches, no weight change  Dermatology:  No rash, no mole, no dry or sensitive skin  ENT:  No cough, no sore throat, no sinus pain, no runny nose, no ear pain  Cardiology:  No chest pain, no palpitations, no leg edema, no shortness of breath, no PND  Gastroenterology:  No dysphagia, no abdominal pain, no nausea, no vomiting, no constipation, no diarrhea, no heartburn  Musculoskeletal:  No joint pain, no leg cramps, no back pain, no muscle aches  Respiratory:  No shortness of breath, no orthopnea, no wheezing, no MEADE, no hemoptysis  Urology:  No blood in the urine, no urinary frequency, no urinary incontinence, no urinary urgency, no nocturia, no dysuria

## 2020-07-14 ENCOUNTER — TREATMENT (OUTPATIENT)
Dept: PHYSICAL THERAPY | Age: 45
End: 2020-07-14

## 2020-07-14 RX ORDER — LISINOPRIL AND HYDROCHLOROTHIAZIDE 12.5; 1 MG/1; MG/1
1 TABLET ORAL DAILY
Qty: 30 TABLET | Refills: 1 | Status: SHIPPED
Start: 2020-07-14 | End: 2020-09-23

## 2020-07-14 NOTE — PROGRESS NOTES
Reviewed note from [facility]. Patient is undergoing work up for Luite Ryan 86 which has been pushed back due to Aldair Daly. She  Needs to have surgery as scheduled. She is low risk. EKG showed sinus rhythm.

## 2020-07-15 LAB
SARS-COV-2: NOT DETECTED
SOURCE: NORMAL

## 2020-07-16 ENCOUNTER — HOSPITAL ENCOUNTER (OUTPATIENT)
Dept: MRI IMAGING | Age: 45
Discharge: HOME | End: 2020-07-18
Payer: COMMERCIAL

## 2020-07-16 ENCOUNTER — ANESTHESIA EVENT (OUTPATIENT)
Dept: OPERATING ROOM | Age: 45
End: 2020-07-16
Payer: COMMERCIAL

## 2020-07-16 ENCOUNTER — TELEPHONE (OUTPATIENT)
Dept: SPEECH THERAPY | Age: 45
End: 2020-07-16

## 2020-07-16 PROCEDURE — 73221 MRI JOINT UPR EXTREM W/O DYE: CPT

## 2020-07-16 NOTE — PROGRESS NOTES
[address] [facility]   Phone: [phone]   Fax: [phone]    Discharge Summary        Date:  7/15/2020   Patient: Kendra Garay                    : 1975                        MRN: [id number]  Referring 1940 Phillip Khan DO  [address] [address] [facility]  Medical Diagnosis:      M25.511 (ICD-10-CM) - Acute pain of right shoulder   M75.21 (ICD-10-CM) - Biceps tendinitis of right upper extremity         REASON FOR DISCHARGE: Pt called and stated that she would be d/cing from PT services d/t having surgery for unrelated condition. She will be taken off of caseload at this time. She will call if further services are warranted post operation. PATIENT EDUCATION/INSTRUCTIONS: continue HEP when cleared to do so by surgeon    RECOMMENDATIONS: call if further services are warranted. Thank you for the opportunity to work with your patient. If you have questions or comments, please contact me at numbers listed above.       Armando Capellan 94 , DPT PT [id number]

## 2020-07-16 NOTE — TELEPHONE ENCOUNTER
Spoke with patient to schedule voice evaluation. Patient reports that the referral was also sent to an outside clinic ([facility]) and she is scheduled to see them for therapy. Will cancel the order for [facility] speech therapy. Abril Thomas.  Soham Hollins MA/FRANCISCA-SLP  [phone]

## 2020-07-16 NOTE — H&P
History of Present Illness-  Pt with last Friday to ED for feeling of neck tightness and throat closing. Never happened before. 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 Good water, some caffeine. Had steroids in ed and helped a bit but not much better. Also with trouble getting food down at times in ititiating a swallow. Review of Systems- No drainage, discharge, or headache. Complete 10 system ROS completed and negative except as noted above. Physical Examination-   Vital Signs-Ht 5' 1\" (1.549 m)   Wt 190 lb 3.2 oz (86.3 kg)   LMP 01/05/2018   BMI 35.94 kg/m²     Ears- Tympanic membranes clear bilaterally.  No middle ear effusion.  No pre or post auricular tenderness. Nose- Nasal mucosa clear and dry.  No significant septal deviation or inferior turbinate hypertrophy. Oral Cavity/Oropharynx- Floor of mouth and tongue are soft and nontender.  No posterior pharyngeal erythema. + gag reflex  Neck- Soft and nontender.  No masses, lesions, lymphadenopathy, or thyroid nodules appreciated. Cranial Nerve- Cranial nerves II to XII intact.  Extraocular muscles intact.  No gross motor visual deficits.  No spontaneous nystagmus.    Face- No facial skin tenderness to palpation. Heart- No cyanosis, regular  Lungs- No stridor, no intercostal accessory muscle use  General- The patient is in no acute distress. A&O x3     Scope  Procedure note- after pt verbally consented, was sprayed nasally with 1:1 neosynephrine and xylocaine. Scope was passed and found nasal cavity with no lesion. np clear, tonsil wnl, tongue mobile and no masses, vocal cords mobile bilaterally with full ab and ad duction. Hypopharynx clear, open and no masses.  enttbrefluxexam   Pt with hyperemia and hypervascularity of post-cricoid mucosa that was waterfalling into posterior glottic area.         Voice   g- 1.5, r- 1.5, a- 1.5, b- 1.5, s- 2   Reserve 60  Glide average  Projection good  Voice however has moments of strain that include breathiness, raspiness, and aphonia.        Medical Decision Making and Treatment Plan.  enttbrefluxtreat   Plan at this time was to treat his reflux   1- ppi-    2- Increase hydration   3- decrease caffeine/chocolate   4- diet modification   5- quit smoking- 10 minutes spent on smoking cessation and counseling  6- speech therapy  7- pt being worked up for a brain issue- maybe MS  8- pt with voice abnormality- multiple etiologies at this time- MS, spasmodic , dysphonia, anxiety, etc  9- fu 6 wks or sooner is sxs worsen    Thank you for the opportunity to take part in the care of this very pleasant patient, Christiane Martinez  Sincerely,             Tatianna Sandoval.  Alcides Ballard M.D., Ph.D., [facility]  Department of Otolaryngology-Head and Neck Surgery

## 2020-07-17 ENCOUNTER — ANESTHESIA (OUTPATIENT)
Dept: OPERATING ROOM | Age: 45
End: 2020-07-17
Payer: COMMERCIAL

## 2020-07-17 ENCOUNTER — HOSPITAL ENCOUNTER (OUTPATIENT)
Age: 45
Setting detail: OUTPATIENT SURGERY
Discharge: HOME HEALTH | End: 2020-07-17
Attending: OTOLARYNGOLOGY | Admitting: OTOLARYNGOLOGY
Payer: COMMERCIAL

## 2020-07-17 VITALS
OXYGEN SATURATION: 99 % | HEART RATE: 68 BPM | BODY MASS INDEX: 36.06 KG/M2 | SYSTOLIC BLOOD PRESSURE: 127 MMHG | HEIGHT: 61 IN | RESPIRATION RATE: 16 BRPM | DIASTOLIC BLOOD PRESSURE: 78 MMHG | TEMPERATURE: 97 F | WEIGHT: 191 LBS

## 2020-07-17 VITALS — SYSTOLIC BLOOD PRESSURE: 111 MMHG | TEMPERATURE: 97.5 F | DIASTOLIC BLOOD PRESSURE: 82 MMHG | OXYGEN SATURATION: 97 %

## 2020-07-17 PROBLEM — G89.18 POST-OP PAIN: Status: ACTIVE | Noted: 2020-07-17

## 2020-07-17 LAB
ANION GAP SERPL CALCULATED.3IONS-SCNC: 9 MMOL/L (ref 7–16)
BUN BLDV-MCNC: 7 MG/DL (ref 6–20)
CALCIUM SERPL-MCNC: 8.9 MG/DL (ref 8.6–10.2)
CHLORIDE BLD-SCNC: 104 MMOL/L (ref 98–107)
CO2: 27 MMOL/L (ref 22–29)
CREAT SERPL-MCNC: 0.6 MG/DL (ref 0.5–1)
GFR AFRICAN AMERICAN: >60
GFR NON-AFRICAN AMERICAN: >60 ML/MIN/1.73
GLUCOSE BLD-MCNC: 101 MG/DL (ref 74–99)
POTASSIUM SERPL-SCNC: 4.8 MMOL/L (ref 3.5–5)
SODIUM BLD-SCNC: 140 MMOL/L (ref 132–146)

## 2020-07-17 PROCEDURE — 7100000001 HC PACU RECOVERY - ADDTL 15 MIN: Performed by: OTOLARYNGOLOGY

## 2020-07-17 PROCEDURE — 6360000002 HC RX W HCPCS

## 2020-07-17 PROCEDURE — 6360000002 HC RX W HCPCS: Performed by: ANESTHESIOLOGY

## 2020-07-17 PROCEDURE — 6370000000 HC RX 637 (ALT 250 FOR IP): Performed by: STUDENT IN AN ORGANIZED HEALTH CARE EDUCATION/TRAINING PROGRAM

## 2020-07-17 PROCEDURE — 2580000003 HC RX 258: Performed by: STUDENT IN AN ORGANIZED HEALTH CARE EDUCATION/TRAINING PROGRAM

## 2020-07-17 PROCEDURE — 6370000000 HC RX 637 (ALT 250 FOR IP): Performed by: OTOLARYNGOLOGY

## 2020-07-17 PROCEDURE — 2500000003 HC RX 250 WO HCPCS

## 2020-07-17 PROCEDURE — 3700000001 HC ADD 15 MINUTES (ANESTHESIA): Performed by: OTOLARYNGOLOGY

## 2020-07-17 PROCEDURE — 7100000011 HC PHASE II RECOVERY - ADDTL 15 MIN: Performed by: OTOLARYNGOLOGY

## 2020-07-17 PROCEDURE — 88305 TISSUE EXAM BY PATHOLOGIST: CPT

## 2020-07-17 PROCEDURE — 3600000003 HC SURGERY LEVEL 3 BASE: Performed by: OTOLARYNGOLOGY

## 2020-07-17 PROCEDURE — 3700000000 HC ANESTHESIA ATTENDED CARE: Performed by: OTOLARYNGOLOGY

## 2020-07-17 PROCEDURE — 31420 EPIGLOTTIDECTOMY: CPT | Performed by: OTOLARYNGOLOGY

## 2020-07-17 PROCEDURE — 2720000010 HC SURG SUPPLY STERILE: Performed by: OTOLARYNGOLOGY

## 2020-07-17 PROCEDURE — 80048 BASIC METABOLIC PNL TOTAL CA: CPT

## 2020-07-17 PROCEDURE — 2580000003 HC RX 258

## 2020-07-17 PROCEDURE — 6360000002 HC RX W HCPCS: Performed by: STUDENT IN AN ORGANIZED HEALTH CARE EDUCATION/TRAINING PROGRAM

## 2020-07-17 PROCEDURE — 2709999900 HC NON-CHARGEABLE SUPPLY: Performed by: OTOLARYNGOLOGY

## 2020-07-17 PROCEDURE — 36415 COLL VENOUS BLD VENIPUNCTURE: CPT

## 2020-07-17 PROCEDURE — 3600000013 HC SURGERY LEVEL 3 ADDTL 15MIN: Performed by: OTOLARYNGOLOGY

## 2020-07-17 PROCEDURE — 7100000010 HC PHASE II RECOVERY - FIRST 15 MIN: Performed by: OTOLARYNGOLOGY

## 2020-07-17 PROCEDURE — 7100000000 HC PACU RECOVERY - FIRST 15 MIN: Performed by: OTOLARYNGOLOGY

## 2020-07-17 RX ORDER — LABETALOL HYDROCHLORIDE 5 MG/ML
INJECTION, SOLUTION INTRAVENOUS PRN
Status: DISCONTINUED | OUTPATIENT
Start: 2020-07-17 | End: 2020-07-17 | Stop reason: SDUPTHER

## 2020-07-17 RX ORDER — ONDANSETRON 2 MG/ML
INJECTION INTRAMUSCULAR; INTRAVENOUS PRN
Status: DISCONTINUED | OUTPATIENT
Start: 2020-07-17 | End: 2020-07-17 | Stop reason: SDUPTHER

## 2020-07-17 RX ORDER — NEOSTIGMINE METHYLSULFATE 1 MG/ML
INJECTION, SOLUTION INTRAVENOUS PRN
Status: DISCONTINUED | OUTPATIENT
Start: 2020-07-17 | End: 2020-07-17 | Stop reason: SDUPTHER

## 2020-07-17 RX ORDER — SUCCINYLCHOLINE/SOD CL,ISO/PF 200MG/10ML
SYRINGE (ML) INTRAVENOUS PRN
Status: DISCONTINUED | OUTPATIENT
Start: 2020-07-17 | End: 2020-07-17 | Stop reason: SDUPTHER

## 2020-07-17 RX ORDER — SODIUM CHLORIDE 0.9 % (FLUSH) 0.9 %
10 SYRINGE (ML) INJECTION EVERY 12 HOURS SCHEDULED
Status: DISCONTINUED | OUTPATIENT
Start: 2020-07-17 | End: 2020-07-17 | Stop reason: HOSPADM

## 2020-07-17 RX ORDER — PROPOFOL 10 MG/ML
INJECTION, EMULSION INTRAVENOUS PRN
Status: DISCONTINUED | OUTPATIENT
Start: 2020-07-17 | End: 2020-07-17 | Stop reason: SDUPTHER

## 2020-07-17 RX ORDER — SODIUM CHLORIDE 0.9 % (FLUSH) 0.9 %
10 SYRINGE (ML) INJECTION PRN
Status: DISCONTINUED | OUTPATIENT
Start: 2020-07-17 | End: 2020-07-17 | Stop reason: HOSPADM

## 2020-07-17 RX ORDER — SODIUM CHLORIDE 9 MG/ML
INJECTION, SOLUTION INTRAVENOUS CONTINUOUS PRN
Status: DISCONTINUED | OUTPATIENT
Start: 2020-07-17 | End: 2020-07-17 | Stop reason: SDUPTHER

## 2020-07-17 RX ORDER — GLYCOPYRROLATE 1 MG/5 ML
SYRINGE (ML) INTRAVENOUS PRN
Status: DISCONTINUED | OUTPATIENT
Start: 2020-07-17 | End: 2020-07-17 | Stop reason: SDUPTHER

## 2020-07-17 RX ORDER — DEXAMETHASONE SODIUM PHOSPHATE 10 MG/ML
INJECTION INTRAMUSCULAR; INTRAVENOUS PRN
Status: DISCONTINUED | OUTPATIENT
Start: 2020-07-17 | End: 2020-07-17 | Stop reason: SDUPTHER

## 2020-07-17 RX ORDER — ROCURONIUM BROMIDE 10 MG/ML
INJECTION, SOLUTION INTRAVENOUS PRN
Status: DISCONTINUED | OUTPATIENT
Start: 2020-07-17 | End: 2020-07-17 | Stop reason: SDUPTHER

## 2020-07-17 RX ORDER — MEPERIDINE HYDROCHLORIDE 25 MG/ML
12.5 INJECTION INTRAMUSCULAR; INTRAVENOUS; SUBCUTANEOUS EVERY 5 MIN PRN
Status: DISCONTINUED | OUTPATIENT
Start: 2020-07-17 | End: 2020-07-17 | Stop reason: HOSPADM

## 2020-07-17 RX ORDER — FENTANYL CITRATE 50 UG/ML
INJECTION, SOLUTION INTRAMUSCULAR; INTRAVENOUS PRN
Status: DISCONTINUED | OUTPATIENT
Start: 2020-07-17 | End: 2020-07-17 | Stop reason: SDUPTHER

## 2020-07-17 RX ORDER — LIDOCAINE HYDROCHLORIDE 20 MG/ML
INJECTION, SOLUTION INTRAVENOUS PRN
Status: DISCONTINUED | OUTPATIENT
Start: 2020-07-17 | End: 2020-07-17 | Stop reason: SDUPTHER

## 2020-07-17 RX ORDER — MIDAZOLAM HYDROCHLORIDE 1 MG/ML
INJECTION INTRAMUSCULAR; INTRAVENOUS PRN
Status: DISCONTINUED | OUTPATIENT
Start: 2020-07-17 | End: 2020-07-17 | Stop reason: SDUPTHER

## 2020-07-17 RX ADMIN — Medication 3 MG: at 13:43

## 2020-07-17 RX ADMIN — LABETALOL HYDROCHLORIDE 10 MG: 5 INJECTION INTRAVENOUS at 12:36

## 2020-07-17 RX ADMIN — FENTANYL CITRATE 50 MCG: 50 INJECTION, SOLUTION INTRAMUSCULAR; INTRAVENOUS at 12:56

## 2020-07-17 RX ADMIN — SODIUM CHLORIDE: 9 INJECTION, SOLUTION INTRAVENOUS at 12:23

## 2020-07-17 RX ADMIN — HYDROMORPHONE HYDROCHLORIDE 0.5 MG: 1 INJECTION, SOLUTION INTRAMUSCULAR; INTRAVENOUS; SUBCUTANEOUS at 14:45

## 2020-07-17 RX ADMIN — ROCURONIUM BROMIDE 10 MG: 10 INJECTION, SOLUTION INTRAVENOUS at 12:30

## 2020-07-17 RX ADMIN — MIDAZOLAM 2 MG: 1 INJECTION INTRAMUSCULAR; INTRAVENOUS at 12:23

## 2020-07-17 RX ADMIN — LABETALOL HYDROCHLORIDE 10 MG: 5 INJECTION INTRAVENOUS at 12:46

## 2020-07-17 RX ADMIN — PROPOFOL 150 MG: 10 INJECTION, EMULSION INTRAVENOUS at 12:30

## 2020-07-17 RX ADMIN — HYDROMORPHONE HYDROCHLORIDE 0.5 MG: 1 INJECTION, SOLUTION INTRAMUSCULAR; INTRAVENOUS; SUBCUTANEOUS at 14:40

## 2020-07-17 RX ADMIN — AMPICILLIN SODIUM AND SULBACTAM SODIUM 3 G: 2; 1 INJECTION, POWDER, FOR SOLUTION INTRAMUSCULAR; INTRAVENOUS at 12:23

## 2020-07-17 RX ADMIN — ONDANSETRON HYDROCHLORIDE 4 MG: 2 INJECTION, SOLUTION INTRAMUSCULAR; INTRAVENOUS at 13:31

## 2020-07-17 RX ADMIN — FENTANYL CITRATE 50 MCG: 50 INJECTION, SOLUTION INTRAMUSCULAR; INTRAVENOUS at 12:36

## 2020-07-17 RX ADMIN — Medication 0.4 MG: at 12:27

## 2020-07-17 RX ADMIN — FENTANYL CITRATE 50 MCG: 50 INJECTION, SOLUTION INTRAMUSCULAR; INTRAVENOUS at 13:16

## 2020-07-17 RX ADMIN — Medication 0.6 MG: at 13:43

## 2020-07-17 RX ADMIN — ROCURONIUM BROMIDE 20 MG: 10 INJECTION, SOLUTION INTRAVENOUS at 12:37

## 2020-07-17 RX ADMIN — DEXAMETHASONE SODIUM PHOSPHATE 10 MG: 10 INJECTION INTRAMUSCULAR; INTRAVENOUS at 12:30

## 2020-07-17 RX ADMIN — HYDROCODONE BITARTRATE AND ACETAMINOPHEN 15 ML: 7.5; 325 SOLUTION ORAL at 16:07

## 2020-07-17 RX ADMIN — LIDOCAINE HYDROCHLORIDE 60 MG: 20 INJECTION, SOLUTION INTRAVENOUS at 12:30

## 2020-07-17 RX ADMIN — LABETALOL HYDROCHLORIDE 5 MG: 5 INJECTION INTRAVENOUS at 13:17

## 2020-07-17 RX ADMIN — FENTANYL CITRATE 100 MCG: 50 INJECTION, SOLUTION INTRAMUSCULAR; INTRAVENOUS at 12:30

## 2020-07-17 RX ADMIN — Medication 140 MG: at 12:30

## 2020-07-17 ASSESSMENT — PULMONARY FUNCTION TESTS
PIF_VALUE: 35
PIF_VALUE: 35
PIF_VALUE: 2
PIF_VALUE: 3
PIF_VALUE: 35
PIF_VALUE: 35
PIF_VALUE: 0
PIF_VALUE: 35
PIF_VALUE: 35
PIF_VALUE: 33
PIF_VALUE: 35
PIF_VALUE: 35
PIF_VALUE: 3
PIF_VALUE: 35
PIF_VALUE: 33
PIF_VALUE: 33
PIF_VALUE: 35
PIF_VALUE: 31
PIF_VALUE: 35
PIF_VALUE: 3
PIF_VALUE: 35
PIF_VALUE: 35
PIF_VALUE: 33
PIF_VALUE: 31
PIF_VALUE: 35
PIF_VALUE: 32
PIF_VALUE: 35
PIF_VALUE: 33
PIF_VALUE: 35
PIF_VALUE: 35
PIF_VALUE: 41
PIF_VALUE: 35
PIF_VALUE: 3
PIF_VALUE: 2
PIF_VALUE: 33
PIF_VALUE: 35
PIF_VALUE: 35
PIF_VALUE: 34
PIF_VALUE: 35
PIF_VALUE: 35
PIF_VALUE: 19
PIF_VALUE: 33
PIF_VALUE: 35
PIF_VALUE: 2
PIF_VALUE: 33
PIF_VALUE: 35
PIF_VALUE: 10
PIF_VALUE: 17
PIF_VALUE: 3
PIF_VALUE: 35
PIF_VALUE: 35
PIF_VALUE: 21
PIF_VALUE: 35
PIF_VALUE: 33
PIF_VALUE: 35
PIF_VALUE: 33
PIF_VALUE: 15
PIF_VALUE: 35
PIF_VALUE: 41
PIF_VALUE: 35
PIF_VALUE: 35
PIF_VALUE: 20
PIF_VALUE: 21
PIF_VALUE: 35
PIF_VALUE: 35
PIF_VALUE: 1
PIF_VALUE: 35
PIF_VALUE: 35
PIF_VALUE: 1
PIF_VALUE: 32
PIF_VALUE: 33
PIF_VALUE: 14
PIF_VALUE: 35
PIF_VALUE: 32
PIF_VALUE: 35
PIF_VALUE: 35
PIF_VALUE: 1
PIF_VALUE: 33
PIF_VALUE: 33
PIF_VALUE: 35

## 2020-07-17 ASSESSMENT — PAIN SCALES - GENERAL
PAINLEVEL_OUTOF10: 7
PAINLEVEL_OUTOF10: 7
PAINLEVEL_OUTOF10: 4
PAINLEVEL_OUTOF10: 10
PAINLEVEL_OUTOF10: 4
PAINLEVEL_OUTOF10: 0

## 2020-07-17 ASSESSMENT — PAIN DESCRIPTION - DESCRIPTORS
DESCRIPTORS: ACHING;BURNING;DISCOMFORT;SORE
DESCRIPTORS: SORE
DESCRIPTORS: ACHING;BURNING;CONSTANT

## 2020-07-17 ASSESSMENT — PAIN DESCRIPTION - LOCATION
LOCATION: THROAT

## 2020-07-17 ASSESSMENT — PAIN DESCRIPTION - PAIN TYPE
TYPE: SURGICAL PAIN

## 2020-07-17 ASSESSMENT — PAIN DESCRIPTION - PROGRESSION: CLINICAL_PROGRESSION: GRADUALLY WORSENING

## 2020-07-17 ASSESSMENT — PAIN - FUNCTIONAL ASSESSMENT: PAIN_FUNCTIONAL_ASSESSMENT: 0-10

## 2020-07-17 NOTE — H&P
Kelli Brewer was seen and re-examined preoperatively today, July 17, 2020. There was no substantial change in her physical and medical status. Patient is fit for the proposed surgical procedure. All questions were appropriately addressed and had no further questions regarding the risks, benefits, and alternatives of the procedure. Kelli Brewer and family wished to proceed.     Faisal Castillo DO  Resident Physician  [facility])  Otolaryngology Residency  7/17/2020  11:54 AM

## 2020-07-17 NOTE — PROGRESS NOTES
COVID testing completed on: 7/13/2020  Results of the test: negative  The patient verbally confirms that they did adhere to the self-quarantine guidelines. No signs or symptoms expressed or observed.

## 2020-07-17 NOTE — ANESTHESIA PRE PROCEDURE
Nicotine 21-14-7 MG/24HR KIT 21 mg patch applied daily for 2 weeks, then 14 mg patch applied daily for 2 weeks, then 7 mg patch daily for 2 weeks (Patient not taking: Reported on 2020) 1 kit 0    lisinopril-hydroCHLOROthiazide (PRINZIDE;ZESTORETIC) 10-12.5 MG per tablet Take 1 tablet by mouth daily 30 tablet 1    nicotine (NICODERM CQ) 14 MG/24HR          Allergies:  No Known Allergies    Problem List:    Patient Active Problem List   Diagnosis Code    General weakness R53.1    White matter abnormality on MRI of brain R90.82    Paresthesias R20.2    Headache R51       Past Medical History:        Diagnosis Date    Cyst of ovary     Headache     Hypertension     Migraine        Past Surgical History:        Procedure Laterality Date    BACK SURGERY      CHOLECYSTECTOMY      CYST REMOVAL      HYSTERECTOMY  2018    Robotic hysterectomy, bilateral salpingectomy, right oophorectomy  [id number] [facility]        Social History:    Social History     Tobacco Use    Smoking status: Former Smoker     Packs/day: 0.50     Years: 25.00     Pack years: 12.50     Types: Cigarettes     Last attempt to quit: 7/3/2020     Years since quittin.0    Smokeless tobacco: Never Used   Substance Use Topics    Alcohol use: Yes     Comment: socially                                Counseling given: Not Answered      Vital Signs (Current): There were no vitals filed for this visit.                                            BP Readings from Last 3 Encounters:   20 126/74   20 116/74   20 108/69       NPO Status:                                                                                 BMI:   Wt Readings from Last 3 Encounters:   20 195 lb (88.5 kg)   20 190 lb (86.2 kg)   20 190 lb 3.2 oz (86.3 kg)     There is no height or weight on file to calculate BMI.    CBC:   Lab Results   Component Value Date    WBC 7.4 2020    RBC 5.21 2020    HGB 15.9 2020 intravenous. BIS    Anesthetic plan and risks discussed with patient. Plan discussed with CRNA.     Attending anesthesiologist reviewed and agrees with Iván Burnett MD   7/17/2020

## 2020-07-20 ENCOUNTER — APPOINTMENT (OUTPATIENT)
Dept: GENERAL RADIOLOGY | Age: 45
End: 2020-07-20
Payer: COMMERCIAL

## 2020-07-20 ENCOUNTER — OFFICE VISIT (OUTPATIENT)
Dept: NEUROLOGY | Age: 45
End: 2020-07-20
Payer: COMMERCIAL

## 2020-07-20 ENCOUNTER — HOSPITAL ENCOUNTER (EMERGENCY)
Age: 45
Discharge: HOME | End: 2020-07-21
Attending: EMERGENCY MEDICINE
Payer: COMMERCIAL

## 2020-07-20 VITALS
DIASTOLIC BLOOD PRESSURE: 81 MMHG | HEART RATE: 79 BPM | TEMPERATURE: 98.6 F | SYSTOLIC BLOOD PRESSURE: 134 MMHG | HEIGHT: 61 IN | WEIGHT: 190 LBS | OXYGEN SATURATION: 97 % | BODY MASS INDEX: 35.87 KG/M2

## 2020-07-20 PROBLEM — J38.3 VOCAL CORD DYSFUNCTION: Status: ACTIVE | Noted: 2020-07-20

## 2020-07-20 LAB
ALBUMIN SERPL-MCNC: 4.2 G/DL (ref 3.5–5.2)
ALP BLD-CCNC: 79 U/L (ref 35–104)
ALT SERPL-CCNC: 120 U/L (ref 0–32)
ANION GAP SERPL CALCULATED.3IONS-SCNC: 9 MMOL/L (ref 7–16)
AST SERPL-CCNC: 44 U/L (ref 0–31)
BASOPHILS ABSOLUTE: 0.04 E9/L (ref 0–0.2)
BASOPHILS RELATIVE PERCENT: 0.4 % (ref 0–2)
BILIRUB SERPL-MCNC: 0.4 MG/DL (ref 0–1.2)
BUN BLDV-MCNC: 17 MG/DL (ref 6–20)
CALCIUM SERPL-MCNC: 9.1 MG/DL (ref 8.6–10.2)
CHLORIDE BLD-SCNC: 97 MMOL/L (ref 98–107)
CO2: 30 MMOL/L (ref 22–29)
CREAT SERPL-MCNC: 0.7 MG/DL (ref 0.5–1)
EOSINOPHILS ABSOLUTE: 0.32 E9/L (ref 0.05–0.5)
EOSINOPHILS RELATIVE PERCENT: 2.8 % (ref 0–6)
GFR AFRICAN AMERICAN: >60
GFR NON-AFRICAN AMERICAN: >60 ML/MIN/1.73
GLUCOSE BLD-MCNC: 92 MG/DL (ref 74–99)
HCT VFR BLD CALC: 41.2 % (ref 34–48)
HEMOGLOBIN: 13.7 G/DL (ref 11.5–15.5)
IMMATURE GRANULOCYTES #: 0.15 E9/L
IMMATURE GRANULOCYTES %: 1.3 % (ref 0–5)
LYMPHOCYTES ABSOLUTE: 3.16 E9/L (ref 1.5–4)
LYMPHOCYTES RELATIVE PERCENT: 27.9 % (ref 20–42)
MCH RBC QN AUTO: 31.9 PG (ref 26–35)
MCHC RBC AUTO-ENTMCNC: 33.3 % (ref 32–34.5)
MCV RBC AUTO: 96 FL (ref 80–99.9)
MONOCYTES ABSOLUTE: 0.92 E9/L (ref 0.1–0.95)
MONOCYTES RELATIVE PERCENT: 8.1 % (ref 2–12)
NEUTROPHILS ABSOLUTE: 6.75 E9/L (ref 1.8–7.3)
NEUTROPHILS RELATIVE PERCENT: 59.5 % (ref 43–80)
PDW BLD-RTO: 12.7 FL (ref 11.5–15)
PLATELET # BLD: 205 E9/L (ref 130–450)
PMV BLD AUTO: 9.7 FL (ref 7–12)
POTASSIUM REFLEX MAGNESIUM: 4.6 MMOL/L (ref 3.5–5)
PRO-BNP: 214 PG/ML (ref 0–125)
RBC # BLD: 4.29 E12/L (ref 3.5–5.5)
SODIUM BLD-SCNC: 136 MMOL/L (ref 132–146)
TOTAL PROTEIN: 7.1 G/DL (ref 6.4–8.3)
TROPONIN: <0.01 NG/ML (ref 0–0.03)
WBC # BLD: 11.3 E9/L (ref 4.5–11.5)

## 2020-07-20 PROCEDURE — G8417 CALC BMI ABV UP PARAM F/U: HCPCS | Performed by: NURSE PRACTITIONER

## 2020-07-20 PROCEDURE — 93005 ELECTROCARDIOGRAM TRACING: CPT | Performed by: EMERGENCY MEDICINE

## 2020-07-20 PROCEDURE — 1036F TOBACCO NON-USER: CPT | Performed by: NURSE PRACTITIONER

## 2020-07-20 PROCEDURE — 80053 COMPREHEN METABOLIC PANEL: CPT

## 2020-07-20 PROCEDURE — 83880 ASSAY OF NATRIURETIC PEPTIDE: CPT

## 2020-07-20 PROCEDURE — 99214 OFFICE O/P EST MOD 30 MIN: CPT | Performed by: NURSE PRACTITIONER

## 2020-07-20 PROCEDURE — 71045 X-RAY EXAM CHEST 1 VIEW: CPT

## 2020-07-20 PROCEDURE — 99284 EMERGENCY DEPT VISIT MOD MDM: CPT

## 2020-07-20 PROCEDURE — 84484 ASSAY OF TROPONIN QUANT: CPT

## 2020-07-20 PROCEDURE — 85025 COMPLETE CBC W/AUTO DIFF WBC: CPT

## 2020-07-20 PROCEDURE — G8427 DOCREV CUR MEDS BY ELIG CLIN: HCPCS | Performed by: NURSE PRACTITIONER

## 2020-07-20 ASSESSMENT — PAIN DESCRIPTION - PROGRESSION: CLINICAL_PROGRESSION: NOT CHANGED

## 2020-07-20 ASSESSMENT — PAIN DESCRIPTION - FREQUENCY: FREQUENCY: CONTINUOUS

## 2020-07-20 ASSESSMENT — PAIN SCALES - GENERAL: PAINLEVEL_OUTOF10: 7

## 2020-07-20 ASSESSMENT — PAIN DESCRIPTION - ORIENTATION: ORIENTATION: RIGHT;LEFT

## 2020-07-20 ASSESSMENT — PAIN DESCRIPTION - LOCATION: LOCATION: LEG

## 2020-07-20 ASSESSMENT — PAIN DESCRIPTION - DESCRIPTORS: DESCRIPTORS: ACHING;CONSTANT;DISCOMFORT

## 2020-07-20 ASSESSMENT — PAIN DESCRIPTION - PAIN TYPE: TYPE: ACUTE PAIN

## 2020-07-20 ASSESSMENT — PAIN DESCRIPTION - ONSET: ONSET: ON-GOING

## 2020-07-20 NOTE — ANESTHESIA POSTPROCEDURE EVALUATION
Department of Anesthesiology  Postprocedure Note    Patient: Palmira Gan  MRN: [id number]  YOB: 1975  Date of evaluation: 7/20/2020  Time:  8:58 AM     Procedure Summary     Date:  07/17/20 Room / Location:  [facility] / [facility]    Anesthesia Start:  2530 Anesthesia Stop:  4711    Procedure:  DIRECT LARYNGOSCOPY--OMNI GUIDE LASER (N/A Throat) Diagnosis:  (VOCAL CORD POLYP)    Surgeon: Kevin Cox MD Responsible Provider:  Tico Perez MD    Anesthesia Type:  general ASA Status:  2          Anesthesia Type: general    Daniel Phase I: Daniel Score: 10    Daniel Phase II: Daniel Score: 10    Last vitals: Reviewed and per EMR flowsheets.        Anesthesia Post Evaluation    Patient location during evaluation: PACU  Patient participation: complete - patient participated  Level of consciousness: awake and alert  Airway patency: patent  Nausea & Vomiting: no nausea and no vomiting  Complications: no  Cardiovascular status: blood pressure returned to baseline  Respiratory status: acceptable  Hydration status: euvolemic

## 2020-07-20 NOTE — PROGRESS NOTES
[facility]. Shannon Calixto M.D., F.A.C.P. Iglesia Worley, OUMAR, APRN, CNS  Gretchen ChahalWagner Higgins, MSN, APRN-FNP-C  Kem Bryson MSN, APRN, FNP-C  Abdulkadir STOVALL PA-C  [address] MSN, APRN, FNP-C  [address] [place], [address] Ariel Ardon  Phone: [phone]  Fax: [phone]       Jessica Ansari is a 38 y.o. right handed woman    We are following her for abnormal MRI--possible MS    She presents with her daughter and both are good historians. MRI of the brain and C-spine in February of this year showed two distinct white matter lesions and a few smaller ones. Oligoclonal bands were positive, and LP but IgG index was normal. She was evaluated by Dr. Clair Doll in March and he did not feel that her white matter lesions were multiple sclerosis, rather suspected small vessel disease. She had evoked potentials in May which were also normal.      Since then she feels she has \"deteriorated\" and just underwent surgery for redundant vocal cord tissue and dysphagia. She was told that her vocal cord issues were due to to a \"problem with my brain\" she also feels weakness and neck pain--with occasional sharp jabs into her left trapezius and shoulder. She also feels spasms all over her body. No falls, clumsiness, weakness, or other paresthesias. No vision changes. She is angry, tearful, and frustrated and wants a diagnosis. Positive history of MS in her mother. No chest pain or palpitations  No SOB  No vertigo, lightheadedness or loss of consciousness  No falls, tripping or stumbling  No incontinence of bowels or bladder  No itching or bruising appreciated  No language issues    ROS otherwise negative     Current Outpatient Medications   Medication Sig Dispense Refill    HYDROcodone-acetaminophen 7.5-325 MG per 15ML solution Take 15 mLs by mouth 4 times daily as needed for Pain for up to 7 days.  420 mL 0    lisinopril-hydroCHLOROthiazide (PRINZIDE;ZESTORETIC) 10-12.5 MG per tablet Take 1 tablet by mouth daily 30 tablet 1    baclofen (LIORESAL) 10 MG tablet Take 1 tablet by mouth 2 times daily 60 tablet 5    diclofenac sodium (VOLTAREN) 1 % GEL Apply 2 g topically 4 times daily 2 Tube 1    omeprazole (PRILOSEC) 20 MG delayed release capsule Take 1 capsule by mouth every morning (before breakfast) 30 capsule 1    acetaminophen (TYLENOL) 500 MG tablet Take 2 tablets by mouth 3 times daily as needed for Pain 180 tablet 2    Nicotine 21-14-7 MG/24HR KIT 21 mg patch applied daily for 2 weeks, then 14 mg patch applied daily for 2 weeks, then 7 mg patch daily for 2 weeks 1 kit 0     No current facility-administered medications for this visit. Objective:     /81 (Site: Right Upper Arm)   Pulse 79   Temp 98.6 °F (37 °C)   Ht 5' 1\" (1.549 m)   Wt 190 lb (86.2 kg)   LMP 01/05/2018   SpO2 97%   BMI 35.90 kg/m²     General appearance: alert, appears stated age, cooperative and no distress; overweight  Head: normocephalic, without obvious abnormality, atraumatic--poor dentition  Eyes: conjunctivae/corneas clear.   Neck: no carotid bruit; full range of motion without cervicalgia  Lungs: clear to auscultation bilaterally  Heart: regular rate and rhythm  Extremities: slight dependant normal, atraumatic, no cyanosis or edema  Pulses: 2+ and symmetric  Skin: color, texture, turgor normal---no rashes or lesions      Mental Status: Alert, oriented x4    Appropriate attention/concentration  Intact fundus of knowledge  Intact memories    Speech: Vocal hoarseness  Language: no aphasias    Cranial Nerves:  I: smell NA   II: visual acuity  NA   II: visual fields Full to confrontation   II: pupils RADHA    No Berto or red desat   III,VII: ptosis None   III,IV,VI: extraocular muscles  Full ROM   V: mastication Normal   V: facial light touch sensation  Normal    V,VII: corneal reflex     VII: facial muscle function - upper  Normal   VII: facial muscle function - lower Normal   VIII: hearing Normal   IX: soft palate elevation  Normal   IX,X: gag reflex    XI: trapezius strength  5/5   XI: sternocleidomastoid strength 5/5   XI: neck extension strength  5/5   XII: tongue strength  Normal     Motor:  5/5 throughout  Normal bulk and tone  No drift or abnormal movements    Sensory:  LT and PP symmetric in all limbs  Vibration mildly impaired at both ankles    Coordination:   FTN, FFM, JERO, FNFand HS symmetric throughout    Gait:  Normal  Walks well on toes, heels, and tandem    DTR:   2+ throughout    No Babinskis  No Baer's    No pathological reflexes    Laboratory/Radiology:     Lab Results   Component Value Date     07/17/2020    K 4.8 07/17/2020     07/17/2020    CO2 27 07/17/2020    BUN 7 07/17/2020    CREATININE 0.6 07/17/2020    GLUCOSE 101 (H) 07/17/2020    CALCIUM 8.9 07/17/2020    LABGLOM >60 07/17/2020    GFRAA >60 07/17/2020      Ref.  Range 2/28/2020 10:35 2/28/2020 10:35   Albumin Index Latest Ref Range: 0.0 - 9.0 ratio 3.5    Albumin, CSF Latest Ref Range: 0 - 35 mg/dL 13    Appearance, CSF Latest Ref Range: Clear  Clear Clear   CSF Culture Unknown Growth not present    Glucose, CSF Latest Ref Range: 40 - 70 mg/dL 76 (H)    IgG index Latest Ref Range: 0.28 - 0.66 ratio 0.62    IgG Synthesis Rate, CSF Latest Ref Range: <=8.0 mg/d <0.0    IgG, CSF Latest Ref Range: 0.0 - 6.0 mg/dL 1.9    IgG/Albumin CSF Latest Ref Range: 0.09 - 0.25 ratio 0.15    Myelin Basic Protein, CSF Latest Ref Range: 0.00 - 5.50 ng/mL 1.35    OLIGOCLONAL BANDING Unknown Rpt (A)    Oligoclonal Bands Number Latest Ref Range: 0 - 1 Bands 5 (H)    Oligoclonal Bands, CSF Latest Ref Range: Negative  Positive (A)    Protein, CSF Latest Ref Range: 15 - 40 mg/dL 26    VDRL, CSF Screen Latest Ref Range: Non Reactive  Non Reactive    RBC, CSF Latest Units: /uL <2000 <2000   WBC, CSF Latest Ref Range: 0 - 2 /uL 3 (H) 3 (H)   Neutrophils, CSF Latest Ref Range: 0 - 10 % 0 0 Monocytes, CSF Latest Ref Range: 10 - 70 % 100 (H) 100 (H)   Color, CSF Unknown Colorless Colorless   Tube Number + CELL CT + DIFF-CSF Unknown Tube 1 Tube 3     MRI brain [facility] COGNITIVE DISORDERS Feb 2020: 2 areas of increased signal noted right medial frontal cortex and left parietal juxtacortical white matter regions with no contrast enhancements    MRI cervical spine: No demyelinating lesions    VEP's and SSEPs May 2020: normal    All labs and images personally reviewed today    Assessment:     Intracranial white matter lesions in a pt with vascular risk factors, migraines, and positive family history of MS. Work-up thus far not entirely convincing for demyelinating disease, though with current vocal cord dysfunction this is still possible--though none of these lesions are located in her brainstem or cervical spine. The left parietal lesion is larger than typical for chronic migraine and low-grade glioma must still remain on the differential, as these lesions do not always enhance. It is still possible that these lesions are from her chronic migraines atop small vascular disease from history of smoking and HTN. Aside from her postop vocal hoarseness her neurologic exam is unremarkable. We will repeat intracranial imaging to evaluate for progression or new lesions.     Plan:     Repeat MRI brain and cervical spine [facility] this week    Consider opinion at [facility] pending the above    Follow up TBD based on imaging    Oneal YADAV  7/20/2020

## 2020-07-21 ENCOUNTER — APPOINTMENT (OUTPATIENT)
Dept: ULTRASOUND IMAGING | Age: 45
End: 2020-07-21
Payer: COMMERCIAL

## 2020-07-21 ENCOUNTER — TELEPHONE (OUTPATIENT)
Dept: FAMILY MEDICINE CLINIC | Age: 45
End: 2020-07-21

## 2020-07-21 VITALS
WEIGHT: 190 LBS | RESPIRATION RATE: 16 BRPM | SYSTOLIC BLOOD PRESSURE: 154 MMHG | TEMPERATURE: 97.1 F | HEIGHT: 61 IN | BODY MASS INDEX: 35.87 KG/M2 | DIASTOLIC BLOOD PRESSURE: 81 MMHG | OXYGEN SATURATION: 98 % | HEART RATE: 71 BPM

## 2020-07-21 PROCEDURE — 93970 EXTREMITY STUDY: CPT

## 2020-07-21 RX ORDER — FUROSEMIDE 20 MG/1
20 TABLET ORAL DAILY
Qty: 3 TABLET | Refills: 0 | Status: SHIPPED | OUTPATIENT
Start: 2020-07-21 | End: 2020-10-01 | Stop reason: ALTCHOICE

## 2020-07-21 NOTE — TELEPHONE ENCOUNTER
Patient states that Lyla New was to talk to Dr. Niall Dudley about providing a referral for a neurologist in [facility] while she is out on vacation. Shamar Reardon states she has been seeing a neurologist with [facility] and having tests done but no diagnosis yet, it's been 5 months.

## 2020-07-21 NOTE — ED PROVIDER NOTES
HPI:  7/20/20,   Time: 10:42 PM EDT       Agnes Crabtree is a 38 y.o. female presenting to the ED for lower extremity swelling, beginning 1 day ago. The complaint has been persistent, mild in severity, and worsened by nothing. The patient states that starting earlier today she is noticed that her lower extremities have been swollen. She states she has been feeling a tingling sensation in her legs and feet since she noticed the swelling. She states that she did have a laryngoscope and a vocal cord mass removed 3 days ago with no complications. She states she has been on hydrocodone since then and was wondering if this could be causing her lower extremity swelling. Due to the patient's lower extremity swelling and tingling sensation she came to the ED for further evaluation. Review of Systems:   Pertinent positives and negatives are stated within HPI, all other systems reviewed and are negative.          --------------------------------------------- PAST HISTORY ---------------------------------------------  Past Medical History:  has a past medical history of Cyst of ovary, Headache, Hypertension, and Migraine. Past Surgical History:  has a past surgical history that includes back surgery; Cholecystectomy; cyst removal; Hysterectomy (03/05/2018); and laryngoscopy (N/A, 7/17/2020). Social History:  reports that she quit smoking about 2 weeks ago. Her smoking use included cigarettes. She has a 12.50 pack-year smoking history. She has never used smokeless tobacco. She reports current alcohol use. She reports that she does not use drugs. Family History: family history includes Mult Sclerosis in her mother. The patients home medications have been reviewed. Allergies: Patient has no known allergies.         ---------------------------------------------------PHYSICAL EXAM--------------------------------------    Constitutional/General: Alert and oriented x3, well appearing, non toxic in NAD  Head: Normocephalic and atraumatic  Eyes: PERRL, EOMI, conjunctive normal, sclera non icteric  Mouth: Oropharynx clear, handling secretions, no trismus, no asymmetry of the posterior oropharynx or uvular edema. Hoarse voice  Neck: Supple, full ROM, non tender to palpation in the midline, no stridor, no crepitus, no meningeal signs  Respiratory: Lungs clear to auscultation bilaterally, no wheezes, rales, or rhonchi. Not in respiratory distress  Cardiovascular:  Regular rate. Regular rhythm. No murmurs, gallops, or rubs. 2+ distal pulses  GI:  Abdomen Soft, Non tender, Non distended. +BS. No organomegaly, no palpable masses,  No rebound, guarding, or rigidity. Musculoskeletal: Moves all extremities x 4. Warm and well perfused, no clubbing, cyanosis. Bilateral lower extremity edema capillary refill <3 seconds  Integument: skin warm and dry. No rashes. Neurologic: GCS 15, no focal deficits, symmetric strength 5/5 in the upper and lower extremities bilaterally. Sensation is intact to bilateral lower extremities. Two-point discrimination intact. Psychiatric: Normal Affect    -------------------------------------------------- RESULTS -------------------------------------------------  I have personally reviewed all laboratory and imaging results for this patient. Results are listed below.      LABS:  Results for orders placed or performed during the hospital encounter of 07/20/20   CBC Auto Differential   Result Value Ref Range    WBC 11.3 4.5 - 11.5 E9/L    RBC 4.29 3.50 - 5.50 E12/L    Hemoglobin 13.7 11.5 - 15.5 g/dL    Hematocrit 41.2 34.0 - 48.0 %    MCV 96.0 80.0 - 99.9 fL    MCH 31.9 26.0 - 35.0 pg    MCHC 33.3 32.0 - 34.5 %    RDW 12.7 11.5 - 15.0 fL    Platelets 107 293 - 930 E9/L    MPV 9.7 7.0 - 12.0 fL    Neutrophils % 59.5 43.0 - 80.0 %    Immature Granulocytes % 1.3 0.0 - 5.0 %    Lymphocytes % 27.9 20.0 - 42.0 %    Monocytes % 8.1 2.0 - 12.0 %    Eosinophils % 2.8 0.0 - 6.0 %    Basophils % 0.4 0.0 - 2.0 % Neutrophils Absolute 6.75 1.80 - 7.30 E9/L    Immature Granulocytes # 0.15 E9/L    Lymphocytes Absolute 3.16 1.50 - 4.00 E9/L    Monocytes Absolute 0.92 0.10 - 0.95 E9/L    Eosinophils Absolute 0.32 0.05 - 0.50 E9/L    Basophils Absolute 0.04 0.00 - 0.20 E9/L   Comprehensive Metabolic Panel w/ Reflex to MG   Result Value Ref Range    Sodium 136 132 - 146 mmol/L    Potassium reflex Magnesium 4.6 3.5 - 5.0 mmol/L    Chloride 97 (L) 98 - 107 mmol/L    CO2 30 (H) 22 - 29 mmol/L    Anion Gap 9 7 - 16 mmol/L    Glucose 92 74 - 99 mg/dL    BUN 17 6 - 20 mg/dL    CREATININE 0.7 0.5 - 1.0 mg/dL    GFR Non-African American >60 >=60 mL/min/1.73    GFR African American >60     Calcium 9.1 8.6 - 10.2 mg/dL    Total Protein 7.1 6.4 - 8.3 g/dL    Alb 4.2 3.5 - 5.2 g/dL    Total Bilirubin 0.4 0.0 - 1.2 mg/dL    Alkaline Phosphatase 79 35 - 104 U/L     (H) 0 - 32 U/L    AST 44 (H) 0 - 31 U/L   Troponin   Result Value Ref Range    Troponin <0.01 0.00 - 0.03 ng/mL   Brain Natriuretic Peptide   Result Value Ref Range    Pro- (H) 0 - 125 pg/mL   EKG 12 Lead   Result Value Ref Range    Ventricular Rate 69 BPM    Atrial Rate 69 BPM    P-R Interval 176 ms    QRS Duration 82 ms    Q-T Interval 376 ms    QTc Calculation (Bazett) 402 ms    P Axis 66 degrees    R Axis 53 degrees    T Axis 37 degrees       RADIOLOGY:  Interpreted by Radiologist.  US DUP LOWER EXTREMITIES BILATERAL VENOUS   Final Result   Patent deep venous system both lower extremities, no   evidence for DVT. XR CHEST PORTABLE   Final Result   No acute cardiopulmonary process. EKG: This EKG is signed and interpreted by the EP. EKG shows normal sinus rhythm at 69 bpm.  Normal axis. Normal QRS. No ST elevations or depressions. No STEMI.    ------------------------- NURSING NOTES AND VITALS REVIEWED ---------------------------   The nursing notes within the ED encounter and vital signs as below have been reviewed by myself.   BP (!) 154/81 Pulse 71   Temp 97.1 °F (36.2 °C) (Oral)   Resp 16   Ht 5' 1\" (1.549 m)   Wt 190 lb (86.2 kg)   LMP 01/05/2018   SpO2 98%   BMI 35.90 kg/m²   Oxygen Saturation Interpretation: Normal    The patients available past medical records and past encounters were reviewed. ------------------------------ ED COURSE/MEDICAL DECISION MAKING----------------------  Medications - No data to display      ED COURSE:  ED Course as of Jul 21 0103   Mon Jul 20, 2020   2303 Comprehensive Metabolic Panel w/ Reflex to [facility] [CB]      ED Course User Index  [CB] Delma Cheng DO       Medical Decision Making: This is a 58-year-old female presented to the ED for lower extremity swelling. Patient underwent laboratory work-up which revealed a normal CBC. Normal chemistry except for mild elevations in ALT and AST which are nonspecific. Troponin negative. BNP nonspecific at 214. EKG showed no ischemic findings. No DVT on ultrasound. Chest x-ray unremarkable. Patient be treated conservatively with close PCP follow-up. Patient made aware of abnormal labs. She will follow-up with her PCP for these. Patient be placed on Lasix for 3 days. Return precautions given. Patient agrees with plan. I, Dr. Alex Bangura, am the primary provider for this encounter    This patient's ED course included: a personal history and physicial examination, re-evaluation prior to disposition and multiple bedside re-evaluations    This patient has remained hemodynamically stable during their ED course. Re-Evaluations:             Re-evaluation. Patients symptoms are improving      Counseling: The emergency provider has spoken with the patient and discussed todays results, in addition to providing specific details for the plan of care and counseling regarding the diagnosis and prognosis.   Questions are answered at this time and they are agreeable with the plan.       --------------------------------- IMPRESSION AND DISPOSITION ---------------------------------    IMPRESSION  1. Leg swelling    2. Paresthesia        DISPOSITION  Disposition: Discharge to home  Patient condition is stable    NOTE: This report was transcribed using voice recognition software.  Every effort was made to ensure accuracy; however, inadvertent computerized transcription errors may be present        Jia Daley DO  07/21/20 0104

## 2020-07-22 ENCOUNTER — TELEPHONE (OUTPATIENT)
Dept: NEUROLOGY | Age: 45
End: 2020-07-22

## 2020-07-22 ENCOUNTER — CARE COORDINATION (OUTPATIENT)
Dept: CARE COORDINATION | Age: 45
End: 2020-07-22

## 2020-07-22 LAB
EKG ATRIAL RATE: 69 BPM
EKG P AXIS: 66 DEGREES
EKG P-R INTERVAL: 176 MS
EKG Q-T INTERVAL: 376 MS
EKG QRS DURATION: 82 MS
EKG QTC CALCULATION (BAZETT): 402 MS
EKG R AXIS: 53 DEGREES
EKG T AXIS: 37 DEGREES
EKG VENTRICULAR RATE: 69 BPM

## 2020-07-22 PROCEDURE — 93010 ELECTROCARDIOGRAM REPORT: CPT | Performed by: INTERNAL MEDICINE

## 2020-07-22 NOTE — CARE COORDINATION
Patient contacted regarding recent discharge and COVID-19 risk. DX:  Leg Swelling   Discussed COVID-19 related testing which was not done at this time. Test results were N/A. Patient informed of results, if available? N/A     Care Transition Nurse/ Ambulatory Care Manager contacted the patient by telephone to perform post discharge assessment. Verified name and  with patient as identifiers. Patient has following risk factors of: HTN. CTN/ACM reviewed discharge instructions, medical action plan and red flags related to discharge diagnosis. Reviewed and educated them on any new and changed medications related to discharge diagnosis. Advised obtaining a 90-day supply of all daily and as-needed medications. Patient reports swelling in lower extremities is resolving. Patient reports she is taking Lasix 20 mg that was prescribed at discharge. Patient agrees to call today to schedule f/u appointment with Eddie Jolly. Bel Hidalgo PA-C. Education provided regarding infection prevention, and signs and symptoms of COVID-19 and when to seek medical attention with patient who verbalized understanding. Discussed exposure protocols and quarantine from [address] you at higher risk for severe illness  and given an opportunity for questions and concerns. The patient agrees to contact the COVID-19 hotline [phone] or PCP office for questions related to their healthcare. CTN/ACM provided contact information for future reference. From CDC: Are you at higher risk for severe illness?  Wash your hands often.  Avoid close contact (6 feet, which is about two arm lengths) with people who are sick.  Put distance between yourself and other people if COVID-19 is spreading in your community.  Clean and disinfect frequently touched surfaces.  Avoid all cruise travel and non-essential air travel.    Call your healthcare professional if you have concerns about COVID-19 and your underlying condition

## 2020-07-22 NOTE — TELEPHONE ENCOUNTER
Authorization approved for MRI brain, Scott Cox #[id number]. MRI cervical denied and provider aware. Pt scheduled for MRI brain at [address] [facility] on 7/24/20 at 7pm but is to arrive at 6:30pm. Pt to enter through the main entrance, bring a photo ID, insurance card, list of current medications, not to wear any jewelry and avoid clothing with metal fragments (like yoga pants), buttons or zippers. Pt verbalized understanding of all instructions and that MRI cervical was denied. Pt to also speak with Dr. Sharon Gonzalez and 7/23 appt to make sure she can have MRI brain after recent throat surgery.   Electronically signed by Bobbi Yoon on 7/22/20 at 1:52 PM EDT

## 2020-07-23 ENCOUNTER — OFFICE VISIT (OUTPATIENT)
Dept: ENT CLINIC | Age: 45
End: 2020-07-23
Payer: COMMERCIAL

## 2020-07-23 VITALS
BODY MASS INDEX: 36.44 KG/M2 | SYSTOLIC BLOOD PRESSURE: 135 MMHG | WEIGHT: 193 LBS | HEART RATE: 80 BPM | HEIGHT: 61 IN | DIASTOLIC BLOOD PRESSURE: 88 MMHG

## 2020-07-23 PROCEDURE — 99024 POSTOP FOLLOW-UP VISIT: CPT | Performed by: OTOLARYNGOLOGY

## 2020-07-23 PROCEDURE — 31575 DIAGNOSTIC LARYNGOSCOPY: CPT | Performed by: OTOLARYNGOLOGY

## 2020-07-23 NOTE — PROGRESS NOTES
Dear Dr Jin Hwang PA-C     We had the pleasure of seeing German Gabriel, 1975 here    on 7/23/2020  Please see below for review of care and plans. Chief complaint- No diagnosis found. History of Present Illness-  Pt with last Friday to ED for feeling of neck tightness and throat closing. Never happened before. 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 Good water, some caffeine. Had steroids in ed and helped a bit but not much better. Also with trouble getting food down at times in ititiating a swallow. 7/23/20 pt breathing much better after supraglottoplasty and removal of redundant tissue. pain still there but only taking tylenol. Review of Systems- No drainage, discharge, or headache. Complete 10 system ROS completed and negative except as noted above. Physical Examination-   Vital Signs-/88   Pulse 80   Ht 5' 1\" (1.549 m)   Wt 193 lb (87.5 kg)   LMP 01/05/2018   BMI 36.47 kg/m²     Ears- Tympanic membranes clear bilaterally. No middle ear effusion. No pre or post auricular tenderness. Nose- Nasal mucosa clear and dry. No significant septal deviation or inferior turbinate hypertrophy. Oral Cavity/Oropharynx- Floor of mouth and tongue are soft and nontender. No posterior pharyngeal erythema. + gag reflex  Neck- Soft and nontender. No masses, lesions, lymphadenopathy, or thyroid nodules appreciated. Cranial Nerve- Cranial nerves II to XII intact. Extraocular muscles intact. No gross motor visual deficits. No spontaneous nystagmus. Face- No facial skin tenderness to palpation. Heart- No cyanosis, regular  Lungs- No stridor, no intercostal accessory muscle use  General- The patient is in no acute distress. A&O x3    Scope  Procedure note- after pt verbally consented, was sprayed nasally with 1:1 neosynephrine and xylocaine. Scope was passed and found nasal cavity with no lesion.  np clear, tonsil wnl, tongue mobile and no masses, vocal cords mobile bilaterally with full ab and ad duction. Hypopharynx clear, open and no masses. enttbrefluxexam   Pt with hyperemia and hypervascularity of post-cricoid mucosa that was waterfalling into posterior glottic area. Well healed area with patent airway and no obstruction, tight epiglottis, swallowing well with no aspiration or pooling of secretions. Voice   g- 1.5, r- 1.5, a- 1.5, b- 1.5, s- 2   Reserve 60  Glide average  Projection good  Voice however has moments of strain that include breathiness, raspiness, and aphonia. Medical Decision Making and Treatment Plan.  enttbrefluxtreat   Plan at this time was to treat his reflux   1- ppi-    2- Increase hydration   3- decrease caffeine/chocolate   4- diet modification   5- quit smoking-   6- speech therapy- continue  7- pt being worked up for a brain issue- maybe MS  8- pt with voice abnormality- multiple etiologies at this time- MS, spasmodic , dysphonia, anxiety, etc  9-s/p supraglottoplasty, doing well, fu 2 mo for eval and scope    Thank you for the opportunity to take part in the care of this very pleasant patient, [address]  Sincerely,            Allison Arreola.  Santa Schmitt M.D., Ph.D., [facility]  Department of Otolaryngology-Head and Neck Surgery

## 2020-07-30 ENCOUNTER — HOSPITAL ENCOUNTER (OUTPATIENT)
Dept: MRI IMAGING | Age: 45
Discharge: HOME | End: 2020-08-01
Payer: COMMERCIAL

## 2020-07-30 PROCEDURE — A9579 GAD-BASE MR CONTRAST NOS,1ML: HCPCS | Performed by: RADIOLOGY

## 2020-07-30 PROCEDURE — 6360000004 HC RX CONTRAST MEDICATION: Performed by: RADIOLOGY

## 2020-07-30 PROCEDURE — 70553 MRI BRAIN STEM W/O & W/DYE: CPT

## 2020-07-30 RX ADMIN — GADOTERIDOL 18 ML: 279.3 INJECTION, SOLUTION INTRAVENOUS at 10:21

## 2020-08-03 ENCOUNTER — TELEPHONE (OUTPATIENT)
Dept: NEUROLOGY | Age: 45
End: 2020-08-03

## 2020-08-03 NOTE — TELEPHONE ENCOUNTER
----- Message from MARIXA Romero CNP sent at 7/31/2020  1:47 PM EDT -----  Regarding: MRI brain  MRI brain reviewed and no obvious new lesions. I reviewed these scans with Dr. Tyrone Calderon, who felt that MS was the most likely diagnoses, so I would like to start her on DMT. We can discuss options via phone next week if she is agreeable, however, if she would like to wait for second [facility] opinion, that is understandable. With this new information, we should re-try to get the repeat MRI of the cervical spine that insurance denied, since MS is suspected.  Let me know what she wants to do.    ----- Message -----  From: Manjeet Sheets Incoming Radiant Results From [facility]  Sent: 7/30/2020   9:01 PM EDT  To: MARIXA Romero CNP

## 2020-08-03 NOTE — TELEPHONE ENCOUNTER
MA informed pt of MRI brain results and recommendations per Ansley Magallanes. Pt understood results and dx and is agreeable with plan. Pt does not feel second opinion at [facility] is necessary at this time. Phone visit scheduled for 8/4 to discuss DMT options. MRI cervical can be reordered at that time. Forwarding to Ansley Magallanes for informational purposes.   Electronically signed by Alex Tavarez on 8/3/20 at 10:29 AM EDT

## 2020-08-04 ENCOUNTER — VIRTUAL VISIT (OUTPATIENT)
Dept: NEUROLOGY | Age: 45
End: 2020-08-04
Payer: COMMERCIAL

## 2020-08-04 PROBLEM — R90.82 WHITE MATTER ABNORMALITY ON MRI OF BRAIN: Status: RESOLVED | Noted: 2020-02-27 | Resolved: 2020-08-04

## 2020-08-04 PROBLEM — R20.2 PARESTHESIAS: Status: RESOLVED | Noted: 2020-02-27 | Resolved: 2020-08-04

## 2020-08-04 PROBLEM — G35 MULTIPLE SCLEROSIS (HCC): Status: ACTIVE | Noted: 2020-08-04

## 2020-08-04 PROBLEM — R53.1 GENERAL WEAKNESS: Status: RESOLVED | Noted: 2020-02-26 | Resolved: 2020-08-04

## 2020-08-04 PROCEDURE — 99442 PR PHYS/QHP TELEPHONE EVALUATION 11-20 MIN: CPT | Performed by: NURSE PRACTITIONER

## 2020-08-04 RX ORDER — BACLOFEN 10 MG/1
20 TABLET ORAL 2 TIMES DAILY
Qty: 120 TABLET | Refills: 5 | Status: SHIPPED
Start: 2020-08-04 | End: 2020-08-25 | Stop reason: DRUGHIGH

## 2020-08-04 NOTE — PATIENT INSTRUCTIONS
Patient Education        ocrelizumab  Pronunciation:  OK re NIKO cavazos  Santosh Arriola  What is the most important information I should know about ocrelizumab? Ocrelizumab may cause unpleasant side effects while the medicine is injected, or up to 24 hours later. Tell your caregivers if you have unpleasant side effects such as dizziness, nausea, skin rash, chest tightness, or trouble breathing within 24 hours after your injection. Ocrelizumab affects your immune system. You may get infections more easily, even serious or fatal infections. Call your doctor if you have a fever, chills, cough, mouth sores, skin sores or blisters, itching, tingling, burning pain, or problems with speech, thought, vision, or muscle movement. If you've ever had hepatitis B, it may become active or get worse while you are using or after you stop using ocrelizumab. You may need frequent liver function tests for several months. What is ocrelizumab? Ocrelizumab is used to treat multiple sclerosis (relapsing or progressive forms) in adults. Ocrelizumab may also be used for purposes not listed in this medication guide. What should I discuss with my healthcare provider before receiving ocrelizumab? You should not be treated with ocrelizumab if you are allergic to it, or if you have:  · active infection with hepatitis B. Your doctor may perform tests to make sure you do not have hepatitis B or other infections. You should not receive any \"live\" or \"live-attenuated\" vaccine within the 4 weeks before you start treatment with ocrelizumab. If you need a \"non-live\" vaccine, you should receive it at least 2 weeks before you start treatment with ocrelizumab. Also tell your doctor if:  · you have any type of active infection;  · you are a carrier of hepatitis B; or  · you have ever used medicine that can weaken your immune system. Using ocrelizumab may increase your risk of developing certain types of cancer, such as breast cancer.  Ask your doctor about your specific risk. It is not known whether this medicine will harm an unborn baby. Tell your doctor if you are pregnant or plan to become pregnant. Use effective birth control to prevent pregnancy while you are using this medicine and for at least 6 months after your last dose. If you are pregnant,  you will need to tell your baby's doctor if you used ocrelizumab during pregnancy, especially before the baby receives any childhood vaccines. It may not be safe to breastfeed while using this medicine. Ask your doctor about any risk. Senthil [place] is not approved for use by anyone younger than 25years old. How is ocrelizumab given? Ocrelizumab is given as an infusion into a vein. A healthcare provider will give you this injection. Your first dose of ocrelizumab will be split into 2 separate infusions given 2 weeks apart. The following doses will be given once every 6 months. This medicine must be given slowly, and the infusion can take from 2.5 to 3.5 hours to complete. You may be given other medications to help prevent serious side effects of ocrelizumab. You will be watched closely for at least 1 hour after receiving ocrelizumab, to make sure you do not have an allergic reaction to the medication. Ocrelizumab affects your immune system. You may get infections more easily, even serious or fatal infections. Your doctor will need to examine you on a regular basis. If you've ever had hepatitis B, this virus may become active or get worse during treatment with ocrelizumab or in the months after you stop using this medicine. You may need frequent liver function tests while using this medicine and for several months after your last dose. What happens if I miss a dose? Call your doctor for instructions if you miss an appointment for your ocrelizumab injection. What happens if I overdose? Seek emergency medical attention or call the Poison Help line at [phone].   What should I avoid while receiving ocrelizumab? Do not receive a \"live\" vaccine while using ocrelizumab or within 4 weeks before you start using this medicine. You could develop a serious infection. Live vaccines include measles, mumps, rubella (MMR), polio, rotavirus, typhoid, yellow fever, varicella (chickenpox), zoster (shingles), and nasal flu (influenza) vaccine. Ask your doctor before getting a yearly flu shot while you are being treated with ocrelizumab. What are the possible side effects of ocrelizumab? Get emergency medical help if you have signs of an allergic reaction: hives; difficult breathing; swelling of your face, lips, tongue, or throat. Some side effects may occur during the injection or up to 24 hours later. Tell your caregiver right away if you feel dizzy, sleepy, nauseated, light-headed, feverish, sweaty, itchy, or have a red skin rash, headache, fast heartbeats, chest tightness, trouble breathing, or swelling and irritation in your throat. Call your doctor at once if you have:  · fever, chills, cough with yellow or green mucus;  · stabbing chest pain, wheezing, feeling short of breath;  · skin warmth, redness, itching, or swelling;  · skin sores, blisters, pus, or oozing;  · cold sores or fever blisters on or around your lips;  · nerve pain (tingling, burning pain, \"pins and needles\" feeling);  · mood or behavior changes, confusion, memory problems;  · weakness on one side of your body; or  · problems with speech, vision, or muscle movement. Your ocrelizumab treatments may be delayed or permanently discontinued if you have certain side effects. Common side effects may include:  · skin infections;  · reactions to an injection; or  · cold symptoms such as stuffy nose, sneezing, sore throat. This is not a complete list of side effects and others may occur. Call your doctor for medical advice about side effects. You may report side effects to FDA at 1-046-FDA-7188. What other drugs will affect ocrelizumab?   Tell your doctor about all other medicines you have recently used to treat multiple sclerosis. Other drugs may affect ocrelizumab, including prescription and over-the-counter medicines, vitamins, and herbal products. Tell your doctor about all your current medicines and any medicine you start or stop using. Where can I get more information? Your pharmacist can provide more information about ocrelizumab. Remember, keep this and all other medicines out of the reach of children, never share your medicines with others, and use this medication only for the indication prescribed. Every effort has been made to ensure that the information provided by [address] is accurate, up-to-date, and complete, but no guarantee is made to that effect. Drug information contained herein may be time sensitive. [facility] information has been compiled for use by healthcare practitioners and consumers in the [facility] and therefore [facility] does not warrant that uses outside of the [facility] are appropriate, unless specifically indicated otherwise. [facility]'s drug information does not endorse drugs, diagnose patients or recommend therapy. [facility]'s drug information is an informational resource designed to assist licensed healthcare practitioners in caring for their patients and/or to serve consumers viewing this service as a supplement to, and not a substitute for, the expertise, skill, knowledge and judgment of healthcare practitioners. The absence of a warning for a given drug or drug combination in no way should be construed to indicate that the drug or drug combination is safe, effective or appropriate for any given patient. [facility] does not assume any responsibility for any aspect of healthcare administered with the aid of information [facility] provides.  The information contained herein is not intended to cover all possible uses, directions, precautions, warnings, drug interactions, allergic reactions, or adverse Depression can be treated with medicine and counseling. Diet and exercise  · Eat a balanced diet. · If you have problems swallowing, change how and what you eat:  ? Try thick drinks, such as milk shakes. They are easier to swallow than other fluids. ? Do not eat foods that crumble easily. These can cause choking. ? Use a  to prepare food. Soft foods need less chewing. ? Eat small meals often so that you do not get tired from eating larger meals. · Get exercise on most days. Work with your doctor to set up a program of walking, swimming, or other exercise that you are able to do. A physical therapist can teach you exercises if you cannot walk but can move your limbs and trunk. Or you can do exercises to help with coordination and balance. You can help improve muscle stiffness by doing exercises while lying in certain positions. When should you call for help? Call your doctor now or seek immediate medical care if:  · You have a change in symptoms. · You fall or have another injury. · You have symptoms of a urinary infection. For example:  ? You have blood or pus in your urine. ? You have pain in your back just below your rib cage. This is called flank pain. ? You have a fever, chills, or body aches. ? It hurts to urinate. ? You have groin or belly pain. Watch closely for changes in your health, and be sure to contact your doctor if:  · You want more information about MS or medicines. · You have questions about alternative treatments. Do not use any other treatments without talking to your doctor first.  Where can you learn more? Go to [email or web address].[email or web address] org and sign in to your [employer] account. Enter L460 in the [facility] box to learn more about \"Multiple Sclerosis (MS): Care Instructions. \"     If you do not have an account, please click on the \"Sign Up Now\" link.   Current as of: November 20, 2019               Content Version: 12.5  © 0195-2827 Healthwise,

## 2020-08-04 NOTE — PROGRESS NOTES
Gerry Barnes was read the following message We want to confirm that, for purposes of billing, this is a virtual visit with your provider for which we will submit a claim for reimbursement with your insurance company. You will be responsible for any copays, coinsurance amounts or other amounts not covered by your insurance company. If you do not accept this, unfortunately we will not be able to schedule a virtual visit with the provider. Do you accept? Adele Stubbs responded Yes .

## 2020-08-04 NOTE — PROGRESS NOTES
[address] [facility]. Gianna King M.D., F.A.C.P. [place], DNP, APRN, CNS  Connie Rich. Eleni Aguialr, MSN, APRN-FNP-C  Jose Peña MSN, APRN, FNP-C  Chantel STOVALL, PA-C  [address] MSN, APRN, FNP-C  [address], [facility]  [facility], [address] [address]  Phone: [phone]  Fax: [phone]             Jenae Cross is a 38 y.o. female evaluated via telephone on 8/4/2020. The patient and/or health care decision maker is aware that that he/she may receive a bill for this telephone service, depending on his/her insurance coverage, and has provided verbal consent to proceed: Yes    I affirm this is a Patient Initiated Episode with an Established Patient who has not had a related appointment within my department in the past 7 days or scheduled within the next 24 hours. The patient's identity was verified at the start of the call. Others involved in call: no one    Total Time: minutes: 11-20 minutes    Note: not billable if this call serves to triage the patient into an appointment for the relevant concern    HPI:     We are following her for abnormal MRI--possible MS    Repeat MRI of the brain with gadolinium showed the previously seen large white matter lesions and a few scattered other ones. No brainstem lesions. Insurance would not approve MRI of the cervical spine. MRI was reviewed with Dr. Isabel Vera who felt that MS was the most likely etiology.   + Oligoclonal banding; IgG index normal   Evoked potentials normal   Positive family history MS in mother    Voice is much better s/p redundant vocal cord tissue resection. No dysphagia and she will be attending ST. Baclofen 10 mg twice daily is somewhat helpful for her tension headaches and spasms, but she does not feel like it helps completely and she has issues at work with dysesthesias and muscle spasms.   She has been having more frequent headaches and neck pain and she wonders if the chemicals at work are causing them. No falls, clumsiness, weakness, or other paresthesias. No vision changes. No chest pain or palpitations  No SOB  No vertigo, lightheadedness or loss of consciousness  No falls, tripping or stumbling  No incontinence of bowels or bladder  No itching or bruising   No language issues    ROS otherwise negative      Medications:     Prior to Admission medications    Medication Sig Start Date End Date Taking? Authorizing Provider   furosemide (LASIX) 20 MG tablet Take 1 tablet by mouth daily for 3 days 7/21/20 7/24/20  Sumaya Kern DO   lisinopril-hydroCHLOROthiazide (PRINZIDE;ZESTORETIC) 10-12.5 MG per tablet Take 1 tablet by mouth daily 7/14/20   Amy Arteaga PA-C   baclofen (LIORESAL) 10 MG tablet Take 1 tablet by mouth 2 times daily 7/9/20   Ye Laughlin MD   diclofenac sodium (VOLTAREN) 1 % GEL Apply 2 g topically 4 times daily 7/6/20   Sid Gage DO   omeprazole (PRILOSEC) 20 MG delayed release capsule Take 1 capsule by mouth every morning (before breakfast) 7/2/20   Isac Hollins MD   acetaminophen (TYLENOL) 500 MG tablet Take 2 tablets by mouth 3 times daily as needed for Pain 5/4/20 8/2/20  Sid Gage DO   Nicotine 21-14-7 MG/24HR KIT 21 mg patch applied daily for 2 weeks, then 14 mg patch applied daily for 2 weeks, then 7 mg patch daily for 2 weeks 2/28/20   Nunu Green MD     Objective:     Mental Status Exam: sounds alert, oriented x4; thought processes appropriate    Speech sounds: Greatly improved since prior    Breathing Effort sounds normal    Laboratory/Radiology:     Repeat MRI of the brain with gadolinium July 2020 personal review[de-identified] Large white matter lesion in left parietal white matter area as well as several other white matter lesions are unchanged from February imaging and no evidence of active lesions.   Report:  No significant interval change as of 2/22/2020.    2. Few white matter lesions in the cerebrum, which the dominant one in

## 2020-08-10 ENCOUNTER — OFFICE VISIT (OUTPATIENT)
Dept: PHYSICAL MEDICINE AND REHAB | Age: 45
End: 2020-08-10
Payer: COMMERCIAL

## 2020-08-10 ENCOUNTER — PATIENT MESSAGE (OUTPATIENT)
Dept: NEUROLOGY | Age: 45
End: 2020-08-10

## 2020-08-10 ENCOUNTER — HOSPITAL ENCOUNTER (OUTPATIENT)
Age: 45
Discharge: HOME | End: 2020-08-12
Payer: COMMERCIAL

## 2020-08-10 VITALS
OXYGEN SATURATION: 98 % | BODY MASS INDEX: 36.44 KG/M2 | HEIGHT: 61 IN | HEART RATE: 70 BPM | TEMPERATURE: 97.9 F | WEIGHT: 193 LBS | DIASTOLIC BLOOD PRESSURE: 80 MMHG | SYSTOLIC BLOOD PRESSURE: 138 MMHG

## 2020-08-10 PROCEDURE — G8427 DOCREV CUR MEDS BY ELIG CLIN: HCPCS | Performed by: PHYSICAL MEDICINE & REHABILITATION

## 2020-08-10 PROCEDURE — U0003 INFECTIOUS AGENT DETECTION BY NUCLEIC ACID (DNA OR RNA); SEVERE ACUTE RESPIRATORY SYNDROME CORONAVIRUS 2 (SARS-COV-2) (CORONAVIRUS DISEASE [COVID-19]), AMPLIFIED PROBE TECHNIQUE, MAKING USE OF HIGH THROUGHPUT TECHNOLOGIES AS DESCRIBED BY CMS-2020-01-R: HCPCS

## 2020-08-10 PROCEDURE — 99213 OFFICE O/P EST LOW 20 MIN: CPT | Performed by: PHYSICAL MEDICINE & REHABILITATION

## 2020-08-10 PROCEDURE — G8417 CALC BMI ABV UP PARAM F/U: HCPCS | Performed by: PHYSICAL MEDICINE & REHABILITATION

## 2020-08-10 PROCEDURE — 1036F TOBACCO NON-USER: CPT | Performed by: PHYSICAL MEDICINE & REHABILITATION

## 2020-08-10 NOTE — PROGRESS NOTES
[facility]  [address] [facility], [address] [facility]  Phone: [phone]  Fax: [phone]      Follow Up Evaluation for Mich Buck  : 1975  MRN: [id number]  PCP: Uziel Rhodes PA-C  REF: No ref. provider found  Date of visit: 8/10/20  Last Visit: 20    As you know, this is a 54 y.o. female with pertinent past medical history of no known past medical history but recently diagnosed with MS    Chief Complaint   Patient presents with    Shoulder Pain     Right shoulder pain  - was only when she lifted her arm now she is having pain all the time and loss of ROM       HPI:   Patient presents today in follow-up regarding right shoulder pain. Recall, she is failed numerous conservative treatment options including meds (oral and topical), PT, HEP, SAB steroid injection, and bicipital tendon sheath steroid injection. She continues to experience pain. Today her pain level is a 3/10 and intermittent. The pain is alleviated by rest and aggravated by forward and overhead activities. She does have a painful arc. There is occasional radiation within the biceps muscle but no radicular radiation of pain. She has no weakness of her hands. No new N/T, weakness. No new B/B changes. The prior workup has included: Xray, MRI. Diagnostic Studies:  - MRI right shoulder without contrast from [facility] dated 2020 (reviewed personally on 8/10/2020) demonstrates:   Findings: The supraspinatus tendon demonstrates mild signal increase suspicious    for mild tendinitis. The subscapularis tendon appears to be intact    There appears to be a cyst in the subacromial bursa. This measures    approximately 1.5 cm.     The glenoid labrum appears to be intact    The biceps tendon appears to be intact         The humeral ligaments appears to be intact         Signal within the bone marrow of the proximal humerus appears to be    intact    Signal within the bone marrow of the distal clavicle appears to be    abnormal, there are findings to suggest degenerative changes    Signal within the bone marrow of the glenoid appears to be intact        Current Outpatient Medications   Medication Sig Dispense Refill    baclofen (LIORESAL) 10 MG tablet Take 2 tablets by mouth 2 times daily 120 tablet 5    lisinopril-hydroCHLOROthiazide (PRINZIDE;ZESTORETIC) 10-12.5 MG per tablet Take 1 tablet by mouth daily 30 tablet 1    diclofenac sodium (VOLTAREN) 1 % GEL Apply 2 g topically 4 times daily 2 Tube 1    omeprazole (PRILOSEC) 20 MG delayed release capsule Take 1 capsule by mouth every morning (before breakfast) 30 capsule 1    acetaminophen (TYLENOL) 500 MG tablet Take 2 tablets by mouth 3 times daily as needed for Pain 180 tablet 2    furosemide (LASIX) 20 MG tablet Take 1 tablet by mouth daily for 3 days (Patient not taking: Reported on 8/10/2020) 3 tablet 0    Nicotine 21-14-7 MG/24HR KIT 21 mg patch applied daily for 2 weeks, then 14 mg patch applied daily for 2 weeks, then 7 mg patch daily for 2 weeks (Patient not taking: Reported on 8/10/2020) 1 kit 0     No current facility-administered medications for this visit. Past Medical History:   Diagnosis Date    Cyst of ovary     Headache     Hypertension     Migraine        Past Surgical History:   Procedure Laterality Date    BACK SURGERY      CHOLECYSTECTOMY      CYST REMOVAL      HYSTERECTOMY  03/05/2018    Robotic hysterectomy, bilateral salpingectomy, right oophorectomy DR. [facility] RAYMOND [facility]     LARYNGOSCOPY N/A 7/17/2020    DIRECT LARYNGOSCOPY--OMNI GUIDE LASER performed by Carlos Eduardo Mijares MD at [facility] OR       Functional Status: The patient is able to ambulate and perform activities of daily living without the use of an assistive device.          ROS:    Constitutional: Denies fevers, chills, night sweats, unintentional weight loss     Physical Exam:   Blood pressure 138/80, pulse 70, temperature 97.9 °F (36.6 °C), temperature source Infrared, height 5' 1\" (1.549 m), weight 193 lb (87.5 kg), last menstrual period 01/05/2018, SpO2 98 %. PAIN: 3/10  GEN APPEARANCE: Pleasant, well developed, well nourished in no acute distress; Alert and Oriented; body habitus is obese  PSYCH: Normal mood and affect   SKIN: No lesions grossly visible on bilateral shoulders    MSK:    Shoulder Exam:    Inspection   Shoulders are symmetric without muscle atrophy. AC joints are without deformity    Palpation right side: There are no bony deformities noted to the clavicle, acromion, scapula, humerus, or sternum  There is minimal tenderness at the [facility] joint (not concordant)  There is no tenderness at the Sternoclavicular joint  There is concordant tenderness to the bicipital tendon  There is no tenderness to the greater tuberosity  There is no levator scapula tenderness   There is no periscapular tenderness     AROM OF SHOULDER: RIGHT   LEFT  Flexion:     90   180  Abduction:    100   170  IR:      90   90  ER:     90   90    PROM of SHOULDER:  Full in all planes above    Neurological Exam:  Strength:   R  L  Deltoid   5  5  Biceps   5  5  Triceps  5  5  Wrist Ext  5  5  Finger Abd  5  5    Sensory:  intact LT in BUEs    Reflexes:   R  L  Biceps   (2) (2)  Triceps  (2) (2)  BR   (2) (2)  Baer  (-) (-)    PROVOCATIVE TESTS right side:    Neer: +  Radford: +  Painful arc: +  Drop arm test: neg  Speed's: neg    Spurling's Maneuver was negative  Bakody Test was negative    Impression:   Johnny Holden is a 54 y.o. female who presents with chronic right shoulder pain secondary to biceps tendinopathy and rotator cuff tendinopathy. She failed conservative treatment options. 1. Chronic right shoulder pain    2. Biceps tendinitis of right upper extremity        Recommendations:  1.  Discussion: I have discussed the natural history of the above diagnoses with her in detail, with more than 1/2 of the visit spent

## 2020-08-10 NOTE — LETTER
[facility]  [address] [facility] [address]  Phone: [phone]  Fax: [phone]    MARIXA Burns CNP        August 11, 2020      To Whom it May Concern,    Radha Barker is under care in my office for newly diagnosed multiple sclerosis. She needs to be off work until further notice due to exacerbation of symptoms. She may be able to return to work after her first infusion of MS medication, once it is scheduled, but this will need to be determined at that time.     If you have any questions please do not hesitate to call      Sincerely,        MARIXA Burns CNP

## 2020-08-10 NOTE — TELEPHONE ENCOUNTER
From: Adrian Hamlin  To: MARIXA Bundy - CNP  Sent: 8/10/2020 2:37 PM EDT  Subject: Non-Urgent Medical Question    I had a question regarding my medical I was told that chemicals can progress ms symptoms and I work at a . [address] using and smelling hard chemicals all day I noticed when I work my symptoms are non stop since I was out of work due to my throat surgery my symptoms are still there but not as bad if it's possible would I be able to take off work for medical reasons at least till I get on MS medication

## 2020-08-11 ENCOUNTER — PATIENT MESSAGE (OUTPATIENT)
Dept: NEUROLOGY | Age: 45
End: 2020-08-11

## 2020-08-11 LAB
SARS-COV-2: NOT DETECTED
SOURCE: NORMAL

## 2020-08-11 NOTE — TELEPHONE ENCOUNTER
From: Adrian Hamlin  To: MARIXA Bundy CNP  Sent: 8/11/2020 11:52 AM EDT  Subject: Non-Urgent Medical Question    [id number] Jenna Randolph thank you and I just received my forms and test I will get this done ASAP       ----- Message -----   From:YOLI Goodson   Sent:8/11/2020 11:51 AM EDT   To:Kendra Garay   Subject:RE: Non-Urgent Medical Question    HiMariola. I just faxed the letter to your employer and received a confirmation that it went through. Renee Rincon MA      ----- Message -----   From:Kendra Garay   Sent:8/11/2020 8:35 AM EDT   To:MARIXA Morrissey - CNP   Subject:RE: Non-Urgent Medical Question    [id number] Jenna Randolph thank you so much would you be able to fax the letter to my job [phone] that I will be off work until further notice I appreciate you       ----- Message -----   MARIXA Harris CNP   Sent:8/11/2020 8:27 AM EDT   To:Kendra Garay   Subject:RE: Non-Urgent Medical Question    Abhay Quezada,    I'm fine with you being off until after your infusion. We need to get the specific dates when you begin Ocrevus and I can write you a letter to be off until after the first infusion.  Any other leave or FMLA papers you need can be filled out by your PCP    Thanks  Sandra Pineda      ----- Message -----   From:Kendra Garay   Sent:8/10/2020 2:37 PM EDT   To:MARIXA Morrissey - CNP   Subject:Non-Urgent Medical Question    I had a question regarding my medical I was told that chemicals can progress ms symptoms and I work at a [employer] [address] using and smelling hard chemicals all day I noticed when I work my symptoms are non stop since I was out of work due to my throat surgery my symptoms are still there but not as bad if it's possible would I be able to take off work for medical reasons at least till I get on MS medication

## 2020-08-13 ENCOUNTER — HOSPITAL ENCOUNTER (OUTPATIENT)
Age: 45
Discharge: HOME | End: 2020-08-13
Payer: COMMERCIAL

## 2020-08-13 LAB — VITAMIN D 25-HYDROXY: 27 NG/ML (ref 30–100)

## 2020-08-13 PROCEDURE — 80074 ACUTE HEPATITIS PANEL: CPT

## 2020-08-13 PROCEDURE — 36415 COLL VENOUS BLD VENIPUNCTURE: CPT

## 2020-08-13 PROCEDURE — 87798 DETECT AGENT NOS DNA AMP: CPT

## 2020-08-13 PROCEDURE — 82306 VITAMIN D 25 HYDROXY: CPT

## 2020-08-14 ENCOUNTER — TELEPHONE (OUTPATIENT)
Dept: NEUROLOGY | Age: 45
End: 2020-08-14

## 2020-08-14 ENCOUNTER — HOSPITAL ENCOUNTER (OUTPATIENT)
Dept: SLEEP CENTER | Age: 45
Discharge: HOME | End: 2020-08-14
Payer: COMMERCIAL

## 2020-08-14 LAB
HAV IGM SER IA-ACNC: NORMAL
HEPATITIS B CORE IGM ANTIBODY: NORMAL
HEPATITIS B SURFACE ANTIGEN INTERPRETATION: NORMAL
HEPATITIS C ANTIBODY INTERPRETATION: NORMAL

## 2020-08-14 PROCEDURE — 95811 POLYSOM 6/>YRS CPAP 4/> PARM: CPT

## 2020-08-14 RX ORDER — ERGOCALCIFEROL 1.25 MG/1
50000 CAPSULE ORAL WEEKLY
Qty: 12 CAPSULE | Refills: 3 | Status: SHIPPED | OUTPATIENT
Start: 2020-08-14 | End: 2020-10-02 | Stop reason: SDUPTHER

## 2020-08-14 NOTE — TELEPHONE ENCOUNTER
MA informed pt of vitamin D results and recommendations per Albino Rios and pt understood.   Electronically signed by Celso Jackson on 8/14/20 at 8:52 AM EDT

## 2020-08-14 NOTE — TELEPHONE ENCOUNTER
MRI cervical denial overturned. Approval valid from 8/6/20-9/5/20, auth #[id number]. Pt scheduled for MRI cervical at [address] [place] [address] on 8/17 at 6:30pm but is to arrive at 6pm. Pt to enter through the main entrance, bring a photo ID, insurance card, list of current medications, not to wear any jewelry and avoid clothing with metal fragments (like yoga pants), buttons or zippers. Pt verbalized understanding of all instructions.   Electronically signed by Esteban Adan on 8/14/20 at 12:15 PM EDT

## 2020-08-15 VITALS
HEIGHT: 60 IN | OXYGEN SATURATION: 98 % | HEART RATE: 63 BPM | BODY MASS INDEX: 36.91 KG/M2 | TEMPERATURE: 97.2 F | SYSTOLIC BLOOD PRESSURE: 139 MMHG | WEIGHT: 188 LBS | DIASTOLIC BLOOD PRESSURE: 100 MMHG

## 2020-08-15 ASSESSMENT — SLEEP AND FATIGUE QUESTIONNAIRES
HOW LIKELY ARE YOU TO NOD OFF OR FALL ASLEEP WHILE SITTING QUIETLY AFTER LUNCH WITHOUT ALCOHOL: 0
HOW LIKELY ARE YOU TO NOD OFF OR FALL ASLEEP WHILE SITTING INACTIVE IN A PUBLIC PLACE: 0
HOW LIKELY ARE YOU TO NOD OFF OR FALL ASLEEP WHEN YOU ARE A PASSENGER IN A CAR FOR AN HOUR WITHOUT A BREAK: 1
HOW LIKELY ARE YOU TO NOD OFF OR FALL ASLEEP WHILE LYING DOWN TO REST IN THE AFTERNOON WHEN CIRCUMSTANCES PERMIT: 0
HOW LIKELY ARE YOU TO NOD OFF OR FALL ASLEEP WHILE SITTING AND READING: 0
HOW LIKELY ARE YOU TO NOD OFF OR FALL ASLEEP WHILE WATCHING TV: 2
ESS TOTAL SCORE: 3
HOW LIKELY ARE YOU TO NOD OFF OR FALL ASLEEP IN A CAR, WHILE STOPPED FOR A FEW MINUTES IN TRAFFIC: 0
HOW LIKELY ARE YOU TO NOD OFF OR FALL ASLEEP WHILE SITTING AND TALKING TO SOMEONE: 0

## 2020-08-17 ENCOUNTER — HOSPITAL ENCOUNTER (OUTPATIENT)
Dept: MRI IMAGING | Age: 45
Discharge: HOME | End: 2020-08-19
Payer: COMMERCIAL

## 2020-08-17 PROCEDURE — A9579 GAD-BASE MR CONTRAST NOS,1ML: HCPCS | Performed by: RADIOLOGY

## 2020-08-17 PROCEDURE — 72156 MRI NECK SPINE W/O & W/DYE: CPT

## 2020-08-17 PROCEDURE — 6360000004 HC RX CONTRAST MEDICATION: Performed by: RADIOLOGY

## 2020-08-17 RX ADMIN — GADOTERIDOL 17 ML: 279.3 INJECTION, SOLUTION INTRAVENOUS at 19:24

## 2020-08-17 NOTE — PROGRESS NOTES
[id number] [address]                               SLEEP STUDY REPORT    PATIENT NAME: Meg Hidalgo                   :        1975  MED REC NO:   [id number]                            ROOM:  ACCOUNT NO:   [de-identified]                           ADMIT DATE: 2020  PROVIDER:     Vanessa Boyer MD    DATE OF STUDY:  2020    NAME OF STUDY:  Polysomnography. Patient of Dr. Kailee Sorensen. INDICATION FOR POLYSOMNOGRAPHY:  Snoring, morning headaches, trouble  with memory/concentration, bruxism, frequent waking to urinate. CURRENT MEDICATIONS:  Baclofen, lisinopril, diclofenac, omeprazole. INTERPRETATION:    SLEEP ARCHITECTURE:  During the diagnostic portion of the study, this  patient had a total time in bed of 188 minutes. Total sleep time was  142 minutes. Sleep efficiency was 75%. Sleep latency was 42 minutes. REM latency was not observed during this portion of the study. SLEEP STAGING:  The patient was awake 25% of the time in bed. Stage N1  was 5% and N2 was 86% of the total sleep time. Slow wave sleep was 9%  of the sleep time and stage REM sleep was absent. RESPIRATION SUMMARY:    APNEA:  There was 1 apneic event, which was obstructive, occurred during  non-REM stage sleep and lasted 14 seconds. HYPOPNEA:  There were 88 hypopneic events, all occurred during non-REM  stage sleep and maximum duration was 66 seconds. APNEA/HYPOPNEA INDEX:  The apnea/hypopnea index was 38. Of the 89  respiratory events, all occurred in the supine position. TITRATION OF POSITIVE AIRWAY PRESSURE:  At the midpoint of the study, it  was noted that the apnea/hypopnea index was in the severe range and  therefore, CPAP was initiated at 4 cm water pressure and gradually  increased to 12. At that level, the patient slept for 8 minutes in REM  stage sleep and 64 minutes in non-REM stage sleep.   The apnea/hypopnea  index was 4. AROUSAL ANALYSIS:  There were 18 arousals/awakenings, the sleep  disruption index is normal.    LIMB MOVEMENT SUMMARY:  There were 2 limb movements, the limb movement  index is normal.    OXYGEN SATURATION:  Average oxygen saturation while awake was 96%. Lowest saturation was 87%. The patient spent 1% of the time with  saturation less than 90%. HEART RATE SUMMARY:  Average heart rate while awake was 68 beats per  minute. Maximum heart rate during sleep was 91 beats per minute and  minimum heart rate was 53 beats per minute. There were no ectopic  beats. MISCELLANEOUS:  [place] Sleepiness Scale score is 3/24. Snoring was  moderate. This was graded as 4 on a 1 to 10 scale. There was no  apparent bruxism. IMPRESSION:  1. Severe obstructive sleep apnea. 2.  Optimal titration with CPAP. 3.  Moderate snoring, eliminated with CPAP. 4.  Good oxygen saturation. 5.  No cardiac dysrhythmia. DISCUSSION:  Prior to the titration of CPAP, this patient had an  apnea/hypopnea index of 38. Normal is less than 5 and mild is 5 to 15. Greater than 30 is considered severe, leaving this patient in the severe  category. Along with this, there was moderate snoring, but good oxygen  saturation and no cardiac dysrhythmia. Based on the results of this  study, treatment is indicated. PLAN:  1. CPAP at 12 cm water pressure. 2.  ResMed AirFit F30 full facemask, size small, with heated  humidification. 3.  The patient to be seen in 8 to 12 weeks.         Valarie Davila MD  Diplomat of Sleep Medicine    D: 08/17/2020 17:58:57       T: 08/17/2020 18:02:15     [email or web address]  Job#: [id number]     Doc#: [id number]    CC:

## 2020-08-18 ENCOUNTER — TELEPHONE (OUTPATIENT)
Dept: NEUROLOGY | Age: 45
End: 2020-08-18

## 2020-08-18 RX ORDER — SODIUM CHLORIDE 9 MG/ML
INJECTION, SOLUTION INTRAVENOUS CONTINUOUS
Status: CANCELLED | OUTPATIENT
Start: 2020-08-18

## 2020-08-18 RX ORDER — DIPHENHYDRAMINE HYDROCHLORIDE 50 MG/ML
50 INJECTION INTRAMUSCULAR; INTRAVENOUS ONCE
Status: CANCELLED | OUTPATIENT
Start: 2020-08-18

## 2020-08-18 RX ORDER — METHYLPREDNISOLONE SODIUM SUCCINATE 125 MG/2ML
100 INJECTION, POWDER, LYOPHILIZED, FOR SOLUTION INTRAMUSCULAR; INTRAVENOUS ONCE
Status: CANCELLED | OUTPATIENT
Start: 2020-08-18

## 2020-08-18 RX ORDER — EPINEPHRINE 1 MG/ML
0.3 INJECTION, SOLUTION, CONCENTRATE INTRAVENOUS PRN
Status: CANCELLED | OUTPATIENT
Start: 2020-08-18

## 2020-08-18 RX ORDER — ACETAMINOPHEN 325 MG/1
650 TABLET ORAL ONCE
Status: CANCELLED | OUTPATIENT
Start: 2020-08-18

## 2020-08-18 RX ORDER — METHYLPREDNISOLONE SODIUM SUCCINATE 125 MG/2ML
50 INJECTION, POWDER, LYOPHILIZED, FOR SOLUTION INTRAMUSCULAR; INTRAVENOUS ONCE
Status: CANCELLED
Start: 2020-08-18

## 2020-08-18 RX ORDER — DIPHENHYDRAMINE HYDROCHLORIDE 50 MG/ML
25 INJECTION INTRAMUSCULAR; INTRAVENOUS ONCE
Status: CANCELLED | OUTPATIENT
Start: 2020-08-18

## 2020-08-18 RX ORDER — DIPHENHYDRAMINE HYDROCHLORIDE 50 MG/ML
25 INJECTION INTRAMUSCULAR; INTRAVENOUS ONCE
Status: CANCELLED
Start: 2020-08-18

## 2020-08-18 RX ORDER — METHYLPREDNISOLONE SODIUM SUCCINATE 125 MG/2ML
125 INJECTION, POWDER, LYOPHILIZED, FOR SOLUTION INTRAMUSCULAR; INTRAVENOUS ONCE
Status: CANCELLED | OUTPATIENT
Start: 2020-08-18

## 2020-08-18 RX ORDER — SODIUM CHLORIDE 0.9 % (FLUSH) 0.9 %
10 SYRINGE (ML) INJECTION PRN
Status: CANCELLED | OUTPATIENT
Start: 2020-08-18

## 2020-08-18 NOTE — TELEPHONE ENCOUNTER
MA informed pt of MRI cervical results and recommendations per Jenniffer Kinsey and pt understood. Pt would like to know results of her sleep study last week. Forwarding to Jenniffer Kinsey for advisement.   Electronically signed by Emeterio Olivares on 8/18/20 at 2:30 PM EDT

## 2020-08-18 NOTE — TELEPHONE ENCOUNTER
----- Message from MARIXA Zepeda CNP sent at 8/18/2020  8:50 AM EDT -----  Regarding: MRI cervical spine  MRI cervical spine shows no evidence of MS or new developments since Feb. We will proceed with auth for Pascual Tristan for her brain lesions  ----- Message -----  From: Manjeet Sheets Incoming Radiant Results From [facility]  Sent: 8/17/2020   8:27 PM EDT  To: MARIXA Zepeda CNP

## 2020-08-18 NOTE — TELEPHONE ENCOUNTER
According to Dr. Mendel Patron report, she has severe obstructive sleep apnea and will need a mask.   She should call his office for follow-up and additional recommendations

## 2020-08-18 NOTE — TELEPHONE ENCOUNTER
MA informed pt of Geronimo's recommendations and pt understood.   Electronically signed by Marianela Martinez on 8/18/20 at 3:40 PM EDT

## 2020-08-19 LAB
Lab: NORMAL
REPORT: NORMAL
THIS TEST SENT TO: NORMAL

## 2020-08-25 ENCOUNTER — PATIENT MESSAGE (OUTPATIENT)
Dept: NEUROLOGY | Age: 45
End: 2020-08-25

## 2020-08-25 RX ORDER — BACLOFEN 10 MG/1
20 TABLET ORAL 3 TIMES DAILY
Qty: 120 TABLET | Refills: 5 | Status: SHIPPED
Start: 2020-08-25 | End: 2020-10-02 | Stop reason: SDUPTHER

## 2020-08-25 NOTE — TELEPHONE ENCOUNTER
From: Bert Lion  To: MARIXA Voss - CNP  Sent: 8/25/2020 10:56 AM EDT  Subject: Non-Urgent Medical Question    Good morning sorry to bother you yesterday early evening and today I started with non stop pains running in my head having non stop spasms up under my rib cage and my legs and today both legs keep tightening and cramping my chin bone pops out and my feet and toes keep curling up on me then goes away after a couple minute I can walk again I also been having bad spasms and kinds cramping in my throat again it's continuance and for a few seconds I go to take a breath and I can't or it's to hard I don't know if it due to the spasms in my throat what should I do just lay and rest

## 2020-09-04 ENCOUNTER — HOSPITAL ENCOUNTER (OUTPATIENT)
Dept: INFUSION THERAPY | Age: 45
Setting detail: INFUSION SERIES
Discharge: HOME | End: 2020-09-04
Payer: COMMERCIAL

## 2020-09-04 VITALS
TEMPERATURE: 98 F | DIASTOLIC BLOOD PRESSURE: 76 MMHG | HEART RATE: 79 BPM | OXYGEN SATURATION: 99 % | RESPIRATION RATE: 18 BRPM | SYSTOLIC BLOOD PRESSURE: 128 MMHG

## 2020-09-04 DIAGNOSIS — G35 MULTIPLE SCLEROSIS (HCC): Primary | ICD-10-CM

## 2020-09-04 PROCEDURE — 2580000003 HC RX 258: Performed by: NURSE PRACTITIONER

## 2020-09-04 PROCEDURE — 96375 TX/PRO/DX INJ NEW DRUG ADDON: CPT

## 2020-09-04 PROCEDURE — 96366 THER/PROPH/DIAG IV INF ADDON: CPT

## 2020-09-04 PROCEDURE — 6370000000 HC RX 637 (ALT 250 FOR IP): Performed by: NURSE PRACTITIONER

## 2020-09-04 PROCEDURE — 96365 THER/PROPH/DIAG IV INF INIT: CPT

## 2020-09-04 PROCEDURE — 2580000003 HC RX 258

## 2020-09-04 PROCEDURE — 6360000002 HC RX W HCPCS: Performed by: NURSE PRACTITIONER

## 2020-09-04 RX ORDER — SODIUM CHLORIDE 9 MG/ML
INJECTION, SOLUTION INTRAVENOUS CONTINUOUS
Status: CANCELLED | OUTPATIENT
Start: 2020-09-18

## 2020-09-04 RX ORDER — SODIUM CHLORIDE 0.9 % (FLUSH) 0.9 %
10 SYRINGE (ML) INJECTION PRN
Status: DISCONTINUED | OUTPATIENT
Start: 2020-09-04 | End: 2020-09-05 | Stop reason: HOSPADM

## 2020-09-04 RX ORDER — EPINEPHRINE 1 MG/ML
0.3 INJECTION, SOLUTION, CONCENTRATE INTRAVENOUS PRN
Status: CANCELLED | OUTPATIENT
Start: 2020-09-18

## 2020-09-04 RX ORDER — DIPHENHYDRAMINE HYDROCHLORIDE 50 MG/ML
50 INJECTION INTRAMUSCULAR; INTRAVENOUS ONCE
Status: CANCELLED | OUTPATIENT
Start: 2020-09-18

## 2020-09-04 RX ORDER — METHYLPREDNISOLONE SODIUM SUCCINATE 125 MG/2ML
125 INJECTION, POWDER, LYOPHILIZED, FOR SOLUTION INTRAMUSCULAR; INTRAVENOUS ONCE
Status: CANCELLED | OUTPATIENT
Start: 2020-09-18

## 2020-09-04 RX ORDER — ACETAMINOPHEN 325 MG/1
650 TABLET ORAL ONCE
Status: CANCELLED | OUTPATIENT
Start: 2020-09-18

## 2020-09-04 RX ORDER — SODIUM CHLORIDE 0.9 % (FLUSH) 0.9 %
SYRINGE (ML) INJECTION
Status: COMPLETED
Start: 2020-09-04 | End: 2020-09-04

## 2020-09-04 RX ORDER — DIPHENHYDRAMINE HYDROCHLORIDE 50 MG/ML
25 INJECTION INTRAMUSCULAR; INTRAVENOUS ONCE
Status: CANCELLED | OUTPATIENT
Start: 2020-09-18

## 2020-09-04 RX ORDER — METHYLPREDNISOLONE SODIUM SUCCINATE 125 MG/2ML
100 INJECTION, POWDER, LYOPHILIZED, FOR SOLUTION INTRAMUSCULAR; INTRAVENOUS ONCE
Status: CANCELLED | OUTPATIENT
Start: 2020-09-18

## 2020-09-04 RX ORDER — SODIUM CHLORIDE 0.9 % (FLUSH) 0.9 %
10 SYRINGE (ML) INJECTION PRN
Status: CANCELLED | OUTPATIENT
Start: 2020-09-18

## 2020-09-04 RX ORDER — SODIUM CHLORIDE 9 MG/ML
INJECTION, SOLUTION INTRAVENOUS CONTINUOUS
Status: ACTIVE | OUTPATIENT
Start: 2020-09-04 | End: 2020-09-04

## 2020-09-04 RX ORDER — METHYLPREDNISOLONE SODIUM SUCCINATE 125 MG/2ML
100 INJECTION, POWDER, LYOPHILIZED, FOR SOLUTION INTRAMUSCULAR; INTRAVENOUS ONCE
Status: COMPLETED | OUTPATIENT
Start: 2020-09-04 | End: 2020-09-04

## 2020-09-04 RX ORDER — DIPHENHYDRAMINE HYDROCHLORIDE 50 MG/ML
25 INJECTION INTRAMUSCULAR; INTRAVENOUS ONCE
Status: COMPLETED | OUTPATIENT
Start: 2020-09-04 | End: 2020-09-04

## 2020-09-04 RX ORDER — DIPHENHYDRAMINE HYDROCHLORIDE 50 MG/ML
25 INJECTION INTRAMUSCULAR; INTRAVENOUS ONCE
Status: CANCELLED
Start: 2020-09-18

## 2020-09-04 RX ORDER — ACETAMINOPHEN 325 MG/1
650 TABLET ORAL ONCE
Status: COMPLETED | OUTPATIENT
Start: 2020-09-04 | End: 2020-09-04

## 2020-09-04 RX ORDER — METHYLPREDNISOLONE SODIUM SUCCINATE 125 MG/2ML
50 INJECTION, POWDER, LYOPHILIZED, FOR SOLUTION INTRAMUSCULAR; INTRAVENOUS ONCE
Status: CANCELLED
Start: 2020-09-18

## 2020-09-04 RX ADMIN — ACETAMINOPHEN 650 MG: 325 TABLET ORAL at 08:37

## 2020-09-04 RX ADMIN — METHYLPREDNISOLONE SODIUM SUCCINATE 100 MG: 125 INJECTION, POWDER, FOR SOLUTION INTRAMUSCULAR; INTRAVENOUS at 08:35

## 2020-09-04 RX ADMIN — OCRELIZUMAB 300 MG: 300 INJECTION INTRAVENOUS at 09:14

## 2020-09-04 RX ADMIN — DIPHENHYDRAMINE HYDROCHLORIDE 25 MG: 50 INJECTION, SOLUTION INTRAMUSCULAR; INTRAVENOUS at 08:38

## 2020-09-04 RX ADMIN — Medication 10 ML: at 08:30

## 2020-09-04 RX ADMIN — SODIUM CHLORIDE, PRESERVATIVE FREE 10 ML: 5 INJECTION INTRAVENOUS at 08:30

## 2020-09-04 RX ADMIN — SODIUM CHLORIDE: 9 INJECTION, SOLUTION INTRAVENOUS at 08:31

## 2020-09-04 ASSESSMENT — PAIN SCALES - GENERAL: PAINLEVEL_OUTOF10: 0

## 2020-09-18 ENCOUNTER — TELEPHONE (OUTPATIENT)
Dept: ORTHOPEDIC SURGERY | Age: 45
End: 2020-09-18

## 2020-09-18 ENCOUNTER — HOSPITAL ENCOUNTER (OUTPATIENT)
Dept: INFUSION THERAPY | Age: 45
Setting detail: INFUSION SERIES
Discharge: HOME | End: 2020-09-18
Payer: COMMERCIAL

## 2020-09-18 VITALS
HEART RATE: 80 BPM | TEMPERATURE: 97.4 F | DIASTOLIC BLOOD PRESSURE: 66 MMHG | RESPIRATION RATE: 18 BRPM | SYSTOLIC BLOOD PRESSURE: 107 MMHG

## 2020-09-18 DIAGNOSIS — G35 MULTIPLE SCLEROSIS (HCC): Primary | ICD-10-CM

## 2020-09-18 PROCEDURE — 6370000000 HC RX 637 (ALT 250 FOR IP): Performed by: NURSE PRACTITIONER

## 2020-09-18 PROCEDURE — 2580000003 HC RX 258

## 2020-09-18 PROCEDURE — 96374 THER/PROPH/DIAG INJ IV PUSH: CPT

## 2020-09-18 PROCEDURE — 96365 THER/PROPH/DIAG IV INF INIT: CPT

## 2020-09-18 PROCEDURE — 6360000002 HC RX W HCPCS: Performed by: NURSE PRACTITIONER

## 2020-09-18 PROCEDURE — 2580000003 HC RX 258: Performed by: NURSE PRACTITIONER

## 2020-09-18 PROCEDURE — 96375 TX/PRO/DX INJ NEW DRUG ADDON: CPT

## 2020-09-18 PROCEDURE — 96366 THER/PROPH/DIAG IV INF ADDON: CPT

## 2020-09-18 RX ORDER — SODIUM CHLORIDE 0.9 % (FLUSH) 0.9 %
10 SYRINGE (ML) INJECTION PRN
Status: DISCONTINUED | OUTPATIENT
Start: 2020-09-18 | End: 2020-09-19 | Stop reason: HOSPADM

## 2020-09-18 RX ORDER — METHYLPREDNISOLONE SODIUM SUCCINATE 125 MG/2ML
125 INJECTION, POWDER, LYOPHILIZED, FOR SOLUTION INTRAMUSCULAR; INTRAVENOUS ONCE
Status: CANCELLED | OUTPATIENT
Start: 2021-03-05

## 2020-09-18 RX ORDER — METHYLPREDNISOLONE SODIUM SUCCINATE 125 MG/2ML
100 INJECTION, POWDER, LYOPHILIZED, FOR SOLUTION INTRAMUSCULAR; INTRAVENOUS ONCE
Status: COMPLETED | OUTPATIENT
Start: 2020-09-18 | End: 2020-09-18

## 2020-09-18 RX ORDER — DIPHENHYDRAMINE HYDROCHLORIDE 50 MG/ML
50 INJECTION INTRAMUSCULAR; INTRAVENOUS ONCE
Status: CANCELLED | OUTPATIENT
Start: 2021-03-05

## 2020-09-18 RX ORDER — SODIUM CHLORIDE 0.9 % (FLUSH) 0.9 %
10 SYRINGE (ML) INJECTION PRN
Status: CANCELLED | OUTPATIENT
Start: 2021-03-05

## 2020-09-18 RX ORDER — METHYLPREDNISOLONE SODIUM SUCCINATE 125 MG/2ML
100 INJECTION, POWDER, LYOPHILIZED, FOR SOLUTION INTRAMUSCULAR; INTRAVENOUS ONCE
Status: CANCELLED | OUTPATIENT
Start: 2021-03-05

## 2020-09-18 RX ORDER — METHYLPREDNISOLONE SODIUM SUCCINATE 125 MG/2ML
50 INJECTION, POWDER, LYOPHILIZED, FOR SOLUTION INTRAMUSCULAR; INTRAVENOUS ONCE
Status: CANCELLED
Start: 2021-03-05

## 2020-09-18 RX ORDER — SODIUM CHLORIDE 0.9 % (FLUSH) 0.9 %
SYRINGE (ML) INJECTION
Status: COMPLETED
Start: 2020-09-18 | End: 2020-09-18

## 2020-09-18 RX ORDER — DIPHENHYDRAMINE HYDROCHLORIDE 50 MG/ML
25 INJECTION INTRAMUSCULAR; INTRAVENOUS ONCE
Status: CANCELLED | OUTPATIENT
Start: 2021-03-05

## 2020-09-18 RX ORDER — ACETAMINOPHEN 325 MG/1
650 TABLET ORAL ONCE
Status: CANCELLED | OUTPATIENT
Start: 2021-03-05

## 2020-09-18 RX ORDER — DIPHENHYDRAMINE HYDROCHLORIDE 50 MG/ML
25 INJECTION INTRAMUSCULAR; INTRAVENOUS ONCE
Status: COMPLETED | OUTPATIENT
Start: 2020-09-18 | End: 2020-09-18

## 2020-09-18 RX ORDER — EPINEPHRINE 1 MG/ML
0.3 INJECTION, SOLUTION, CONCENTRATE INTRAVENOUS PRN
Status: CANCELLED | OUTPATIENT
Start: 2021-03-05

## 2020-09-18 RX ORDER — SODIUM CHLORIDE 9 MG/ML
INJECTION, SOLUTION INTRAVENOUS CONTINUOUS
Status: ACTIVE | OUTPATIENT
Start: 2020-09-18 | End: 2020-09-18

## 2020-09-18 RX ORDER — SODIUM CHLORIDE 9 MG/ML
INJECTION, SOLUTION INTRAVENOUS CONTINUOUS
Status: CANCELLED | OUTPATIENT
Start: 2021-03-05

## 2020-09-18 RX ORDER — DIPHENHYDRAMINE HYDROCHLORIDE 50 MG/ML
25 INJECTION INTRAMUSCULAR; INTRAVENOUS ONCE
Status: CANCELLED
Start: 2021-03-05

## 2020-09-18 RX ORDER — ACETAMINOPHEN 325 MG/1
650 TABLET ORAL ONCE
Status: COMPLETED | OUTPATIENT
Start: 2020-09-18 | End: 2020-09-18

## 2020-09-18 RX ADMIN — SODIUM CHLORIDE, PRESERVATIVE FREE 10 ML: 5 INJECTION INTRAVENOUS at 08:36

## 2020-09-18 RX ADMIN — SODIUM CHLORIDE: 9 INJECTION, SOLUTION INTRAVENOUS at 08:30

## 2020-09-18 RX ADMIN — METHYLPREDNISOLONE SODIUM SUCCINATE 100 MG: 125 INJECTION, POWDER, FOR SOLUTION INTRAMUSCULAR; INTRAVENOUS at 08:35

## 2020-09-18 RX ADMIN — DIPHENHYDRAMINE HYDROCHLORIDE 25 MG: 50 INJECTION, SOLUTION INTRAMUSCULAR; INTRAVENOUS at 08:35

## 2020-09-18 RX ADMIN — SODIUM CHLORIDE, PRESERVATIVE FREE 10 ML: 5 INJECTION INTRAVENOUS at 08:39

## 2020-09-18 RX ADMIN — OCRELIZUMAB 300 MG: 300 INJECTION INTRAVENOUS at 09:00

## 2020-09-18 RX ADMIN — ACETAMINOPHEN 650 MG: 325 TABLET, FILM COATED ORAL at 08:35

## 2020-09-18 ASSESSMENT — PAIN SCALES - GENERAL: PAINLEVEL_OUTOF10: 0

## 2020-09-22 ENCOUNTER — TELEPHONE (OUTPATIENT)
Dept: ENT CLINIC | Age: 45
End: 2020-09-22

## 2020-09-22 RX ORDER — OMEPRAZOLE 20 MG/1
20 CAPSULE, DELAYED RELEASE ORAL
Qty: 30 CAPSULE | Refills: 1 | Status: CANCELLED | OUTPATIENT
Start: 2020-09-22

## 2020-09-23 RX ORDER — LISINOPRIL AND HYDROCHLOROTHIAZIDE 12.5; 1 MG/1; MG/1
TABLET ORAL
Qty: 30 TABLET | Refills: 1 | Status: SHIPPED
Start: 2020-09-23 | End: 2020-10-02 | Stop reason: SDUPTHER

## 2020-09-23 RX ORDER — OMEPRAZOLE 20 MG/1
20 CAPSULE, DELAYED RELEASE ORAL
Qty: 30 CAPSULE | Refills: 3 | Status: SHIPPED
Start: 2020-09-23 | End: 2021-07-01 | Stop reason: ALTCHOICE

## 2020-09-23 NOTE — TELEPHONE ENCOUNTER
Last Appointment:  7/13/2020  Future Appointments   Date Time Provider Sandra Birch   9/24/2020 11:00 AM Jennifer Berger MD [facility]   10/1/2020 11:00 AM Tawanda Whyte MD [place] [facility]   10/8/2020  2:00 PM Cortney Elliott MD [facility]   3/4/2021  8:00 AM [facility] CHAIR [facility] [place]

## 2020-09-24 ENCOUNTER — OFFICE VISIT (OUTPATIENT)
Dept: ORTHOPEDIC SURGERY | Age: 45
End: 2020-09-24
Payer: COMMERCIAL

## 2020-09-24 VITALS — WEIGHT: 190 LBS | BODY MASS INDEX: 37.3 KG/M2 | TEMPERATURE: 96.4 F | RESPIRATION RATE: 18 BRPM | HEIGHT: 60 IN

## 2020-09-24 PROCEDURE — G8417 CALC BMI ABV UP PARAM F/U: HCPCS | Performed by: ORTHOPAEDIC SURGERY

## 2020-09-24 PROCEDURE — 1036F TOBACCO NON-USER: CPT | Performed by: ORTHOPAEDIC SURGERY

## 2020-09-24 PROCEDURE — 99204 OFFICE O/P NEW MOD 45 MIN: CPT | Performed by: ORTHOPAEDIC SURGERY

## 2020-09-24 PROCEDURE — G8427 DOCREV CUR MEDS BY ELIG CLIN: HCPCS | Performed by: ORTHOPAEDIC SURGERY

## 2020-09-24 NOTE — PROGRESS NOTES
Department of Orthopedic Surgery  History and Physical      CHIEF COMPLAINT:  Right shoulder pain    HISTORY OF PRESENT ILLNESS:                The patient is a RHD 54 y.o. female who presents with right shoulder pain. Patient states she started to have right shoulder pain at beginning of the year. She followed up with an orthopedic who provided her a cortisone injection to her right shoulder in February and May. Patient states this provided her relief for about 2 months. She works at a dry . She states any overhead activity bothers her pain. Her pain is mainly located in the anterior aspect of her shoulder. She also has completed a course of physical therapy with no relief of her symptoms. She denies any injury. Patient does have a history of MS. Past Medical History:        Diagnosis Date    Cyst of ovary     Headache     Hypertension     Migraine      Past Surgical History:        Procedure Laterality Date    BACK SURGERY      CHOLECYSTECTOMY      CYST REMOVAL      HYSTERECTOMY  03/05/2018    Robotic hysterectomy, bilateral salpingectomy, right oophorectomy DR. [facility]D [facility]     LARYNGOSCOPY N/A 7/17/2020    DIRECT LARYNGOSCOPY--OMNI GUIDE LASER performed by Salas Baez MD at [address] [place] [address]     Current Medications:   No current facility-administered medications for this visit. Allergies:  Patient has no known allergies. Social History:   TOBACCO:   reports that she quit smoking about 2 months ago. Her smoking use included cigarettes. She has a 12.50 pack-year smoking history. She has never used smokeless tobacco.  ETOH:   reports current alcohol use. DRUGS:   reports no history of drug use.   ACTIVITIES OF DAILY LIVING:    OCCUPATION:    Family History:       Problem Relation Age of Onset    Mult Sclerosis Mother     Colon Cancer Neg Hx     Uterine Cancer Neg Hx     Ovarian Cancer Neg Hx     Breast Cancer Neg Hx        REVIEW OF SYSTEMS:  CONSTITUTIONAL:  negative  EYES: negative  HEENT:  negative  RESPIRATORY:  negative  CARDIOVASCULAR:  negative  GASTROINTESTINAL:  negative  GENITOURINARY:  negative  INTEGUMENT/BREAST:  negative  HEMATOLOGIC/LYMPHATIC:  negative  ALLERGIC/IMMUNOLOGIC:  negative  ENDOCRINE:  negative  MUSCULOSKELETAL:  pain  NEUROLOGICAL:  negative  BEHAVIOR/PSYCH:  negative    PHYSICAL EXAM:    VITALS:  Temp 96.4 °F (35.8 °C) (Infrared)   Resp 18   Ht 5' (1.524 m)   Wt 190 lb (86.2 kg)   LMP 01/05/2018   BMI 37.11 kg/m²   CONSTITUTIONAL:  awake, alert, cooperative, no apparent distress, and appears stated age  EYES:  Lids and lashes normal, pupils equal, round and reactive to light, extra ocular muscles intact, sclera clear, conjunctiva normal  ENT:  Normocephalic, without obvious abnormality, atraumatic, sinuses nontender on palpation, external ears without lesions, oral pharynx with moist mucus membranes, tonsils without erythema or exudates, gums normal and good dentition. NECK:  Supple, symmetrical, trachea midline, no adenopathy, thyroid symmetric, not enlarged and no tenderness, skin normal  LUNGS:  CTA  CARDIOVASCULAR:  2+ radial pulses, extremities warm and well perfused  ABDOMEN:  obese, NTTP  CHEST:  Atraumatic   GENITAL/URINARY:  deferred  NEUROLOGIC:  Awake, alert, oriented to name, place and time. Cranial nerves II-XII are grossly intact. Motor is 5 out of 5 bilaterally. Sensory is intact.  gait is normal.  MUSCULOSKELETAL:    Right upper extremity: + tenderness over the biceps tendon. - tenderness over the anteriolateral aspect of the shoulder. + impingement. Active  degrees, abduction 90 degrees, ER 60 degrees, IR to lateral buttocks. 5/5 strength of the supraspinatus, subscapularis, teres minor. 4/5 strength of the infraspinatus. Good range of motion the elbow wrist and fingers. Median, ulnar, radial nerves intact light touch. Brisk capillary refill.     DATA:    CBC:   Lab Results   Component Value Date    WBC 11.3 07/20/2020 decompression, possible rotator cuff repair, possible biceps tenotomy versus tenodesis. Postoperative course explained to the patient. Patient like to proceed. All questions answered. I explained the risks, benefits, alternatives and complications of surgery with the patient including but not limited to the risks of infection, possible damage to nerves, vessels, or tendons, stiffness, loss of range of motion, scar sensitivity, wound healing complications, worsening symptoms, possible need for therapy, as well as the possible need further surgery and unanticipated complications. The patient voiced understanding and all questions were answered. The patient elected to proceed with surgical intervention. I have seen and evaluated the patient and agree with the above assessment and plan on today's visit. I have performed the key components of the history and physical examination with significant findings of right shoulder tendinitis. Patient has had a prolonged course of therapy and injections without improvement in her symptomatology. Review of the MRI is consistent with tendinosis and impingement syndrome. Plan for diagnostic shoulder arthroscopy with debridement and repairs as needed including decompression and rotator cuff and biceps. I concur with the findings and plan as documented.     Kitty Ryan MD  9/24/2020

## 2020-10-01 ENCOUNTER — OFFICE VISIT (OUTPATIENT)
Dept: ENT CLINIC | Age: 45
End: 2020-10-01
Payer: COMMERCIAL

## 2020-10-01 VITALS — TEMPERATURE: 97.8 F | BODY MASS INDEX: 40.95 KG/M2 | HEIGHT: 60 IN | WEIGHT: 208.6 LBS

## 2020-10-01 PROCEDURE — 99204 OFFICE O/P NEW MOD 45 MIN: CPT | Performed by: OTOLARYNGOLOGY

## 2020-10-01 PROCEDURE — 31575 DIAGNOSTIC LARYNGOSCOPY: CPT | Performed by: OTOLARYNGOLOGY

## 2020-10-01 RX ORDER — NORTRIPTYLINE HYDROCHLORIDE 10 MG/1
10 CAPSULE ORAL NIGHTLY
COMMUNITY
Start: 2020-09-29 | End: 2021-03-16 | Stop reason: SDUPTHER

## 2020-10-01 RX ORDER — OCRELIZUMAB 300 MG/10ML
300 INJECTION INTRAVENOUS ONCE
COMMUNITY
End: 2021-07-19 | Stop reason: SDUPTHER

## 2020-10-01 NOTE — LETTER
Dear Dr Calixto Mena PA-C     We had the pleasure of seeing Carolina Sports, 1975 here    on 10/1/2020  Please see below for review of care and plans. Chief complaint- No diagnosis found. History of Present Illness-  Pt with last Friday to ED for feeling of neck tightness and throat closing. Never happened before. 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 Good water, some caffeine. Had steroids in ed and helped a bit but not much better. Also with trouble getting food down at times in ititiating a swallow. 7/23/20 pt breathing much better after supraglottoplasty and removal of redundant tissue. pain still there but only taking tylenol. 10/1/20 pt continues to breathe well. Still smoking. Still with sensation of throat closing off at times, especially when she swallows. Still hoarse. Review of Systems- No drainage, discharge, or headache. Complete 10 system ROS completed and negative except as noted above. Physical Examination-   Vital Signs-Temp 97.8 °F (36.6 °C)   Ht 5' (1.524 m)   Wt 208 lb 9.6 oz (94.6 kg)   LMP 01/05/2018   BMI 40.74 kg/m²     Ears- Tympanic membranes clear bilaterally. No middle ear effusion. No pre or post auricular tenderness. Nose- Nasal mucosa clear and dry. No significant septal deviation or inferior turbinate hypertrophy. Oral Cavity/Oropharynx- Floor of mouth and tongue are soft and nontender. No posterior pharyngeal erythema. + gag reflex  Neck- Soft and nontender. No masses, lesions, lymphadenopathy, or thyroid nodules appreciated. Cranial Nerve- Cranial nerves II to XII intact. Extraocular muscles intact. No gross motor visual deficits. No spontaneous nystagmus. Face- No facial skin tenderness to palpation. Heart- No cyanosis, regular  Lungs- No stridor, no intercostal accessory muscle use  General- The patient is in no acute distress.  A&O x3    Scope able to be improve, consider, bot reduction which may allow epiglottis to move more anteriorly and thereby not get trapped by the posterio rpharyngeal wall. 7- pt being worked up for a brain issue- maybe MS  8- pt with voice abnormality- multiple etiologies at this time- MS, spasmodic , dysphonia, anxiety, etc- improved, continue speech, don't think she needs or would benefit with botox at this time  9-s/p supraglottoplasty, doing well, fu 3-4 mo for eval and scope of smokers polyp  10. Had sleep study with + response to cpap    Thank you for the opportunity to take part in the care of this very pleasant patient, Mann Nash  Sincerely,            Shanta Smith.  Keaton Rice M.D., Ph.D., formerly [facility]  Department of Otolaryngology-Head and Neck Surgery

## 2020-10-01 NOTE — PROGRESS NOTES
Dear Dr Derek Yadav PA-C     We had the pleasure of seeing Ana Espino, 1975 here    on 10/1/2020  Please see below for review of care and plans. Chief complaint- No diagnosis found. History of Present Illness-  Pt with last Friday to ED for feeling of neck tightness and throat closing. Never happened before. 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 Good water, some caffeine. Had steroids in ed and helped a bit but not much better. Also with trouble getting food down at times in ititiating a swallow. 7/23/20 pt breathing much better after supraglottoplasty and removal of redundant tissue. pain still there but only taking tylenol. 10/1/20 pt continues to breathe well. Still smoking. Still with sensation of throat closing off at times, especially when she swallows. Still hoarse. Review of Systems- No drainage, discharge, or headache. Complete 10 system ROS completed and negative except as noted above. Physical Examination-   Vital Signs-Temp 97.8 °F (36.6 °C)   Ht 5' (1.524 m)   Wt 208 lb 9.6 oz (94.6 kg)   LMP 01/05/2018   BMI 40.74 kg/m²     Ears- Tympanic membranes clear bilaterally. No middle ear effusion. No pre or post auricular tenderness. Nose- Nasal mucosa clear and dry. No significant septal deviation or inferior turbinate hypertrophy. Oral Cavity/Oropharynx- Floor of mouth and tongue are soft and nontender. No posterior pharyngeal erythema. + gag reflex  Neck- Soft and nontender. No masses, lesions, lymphadenopathy, or thyroid nodules appreciated. Cranial Nerve- Cranial nerves II to XII intact. Extraocular muscles intact. No gross motor visual deficits. No spontaneous nystagmus. Face- No facial skin tenderness to palpation. Heart- No cyanosis, regular  Lungs- No stridor, no intercostal accessory muscle use  General- The patient is in no acute distress.  A&O x3    Scope  Procedure note- after pt verbally consented, was sprayed nasally with 1:1 neosynephrine and xylocaine. Scope was passed and found nasal cavity with no lesion. np clear, tonsil wnl, tongue mobile and no masses, vocal cords mobile bilaterally with full ab and ad duction. Hypopharynx clear, open and no masses. enttbrefluxexam   Pt with hyperemia and hypervascularity of post-cricoid mucosa that was waterfalling into posterior glottic area. Well healed area with patent airway and no obstruction, tight epiglottis, swallowing well with no aspiration or pooling of secretions. Noted today while swallowing that epiglottis is getting caught/pinched by the posterior pharyngeal wall after a swallow and preventing the epiglottis from reverting back to the normal position and thereby the glottis is being covered by the epiglottis. The protuberant tongue base is pushing the epiglottis posterior and causing it to get trapped by the posterio pharyngeal wall. This I feel is the reason she feels tight and has times of her throat being closed off because the epiglottis is still hovering over the glottic aperture. We had her swallow with a chin tuck and head up with a chin tuck seeming to offer some benefit as the epiglottis seemed to not get trapped. Pt with hyperemia and hypervascularity of post-cricoid mucosa that was waterfalling into posterior glottic area. Voice   g- 1.5, r- 1.5, a- 1.5, b- 1.5, s- 2   Reserve 60  Glide average  Projection good  Voice however has moments of strain that include breathiness, raspiness, and aphonia. Medical Decision Making and Treatment Plan.  enttbrefluxtreat   Plan at this time was to treat his reflux   1- ppi-  start  2- Increase hydration   3- decrease caffeine/chocolate   4- diet modification   5- quit smoking- smoking cessation  6- speech therapy- continue- work with speech to eval head positions to see if that helps with epiglottis returning to normal position.  If not able to be improve, consider, bot reduction which may allow epiglottis to move more anteriorly and thereby not get trapped by the posterio rpharyngeal wall. 7- pt being worked up for a brain issue- maybe MS  8- pt with voice abnormality- multiple etiologies at this time- MS, spasmodic , dysphonia, anxiety, etc- improved, continue speech, don't think she needs or would benefit with botox at this time  9-s/p supraglottoplasty, doing well, fu 3-4 mo for eval and scope of smokers polyp  10. Had sleep study with + response to cpap    Thank you for the opportunity to take part in the care of this very pleasant patient, Minus Jubilee  Sincerely,            Dick Du.  Lacie Gonzales M.D., Ph.D., [facility]  Department of Otolaryngology-Head and Neck Surgery

## 2020-10-02 ENCOUNTER — TELEPHONE (OUTPATIENT)
Dept: ENT CLINIC | Age: 45
End: 2020-10-02

## 2020-10-02 RX ORDER — BACLOFEN 10 MG/1
20 TABLET ORAL 3 TIMES DAILY
Qty: 120 TABLET | Refills: 5 | Status: SHIPPED
Start: 2020-10-02 | End: 2020-10-07 | Stop reason: SDUPTHER

## 2020-10-02 RX ORDER — ERGOCALCIFEROL 1.25 MG/1
50000 CAPSULE ORAL WEEKLY
Qty: 12 CAPSULE | Refills: 3 | Status: SHIPPED
Start: 2020-10-02 | End: 2020-12-21 | Stop reason: SDUPTHER

## 2020-10-02 RX ORDER — LISINOPRIL AND HYDROCHLOROTHIAZIDE 12.5; 1 MG/1; MG/1
TABLET ORAL
Qty: 30 TABLET | Refills: 3 | Status: SHIPPED
Start: 2020-10-02 | End: 2021-02-09

## 2020-10-02 NOTE — TELEPHONE ENCOUNTER
Jocelynn from [employer] called for a refill on omeprazole 20mg please send refill to fax number [phone].  Phone number here is [phone]

## 2020-10-05 ENCOUNTER — VIRTUAL VISIT (OUTPATIENT)
Dept: FAMILY MEDICINE CLINIC | Age: 45
End: 2020-10-05
Payer: COMMERCIAL

## 2020-10-05 ENCOUNTER — TELEPHONE (OUTPATIENT)
Dept: PHYSICAL MEDICINE AND REHAB | Age: 45
End: 2020-10-05

## 2020-10-05 PROBLEM — E66.01 MORBIDLY OBESE (HCC): Status: ACTIVE | Noted: 2020-10-05

## 2020-10-05 PROCEDURE — 99213 OFFICE O/P EST LOW 20 MIN: CPT | Performed by: PHYSICIAN ASSISTANT

## 2020-10-05 PROCEDURE — G8427 DOCREV CUR MEDS BY ELIG CLIN: HCPCS | Performed by: PHYSICIAN ASSISTANT

## 2020-10-05 NOTE — PROGRESS NOTES
Irina Bear was read the following message We want to confirm that, for purposes of billing, this is a virtual visit with your provider for which we will submit a claim for reimbursement with your insurance company. You will be responsible for any copays, coinsurance amounts or other amounts not covered by your insurance company. If you do not accept this, unfortunately we will not be able to schedule a virtual visit with the provider. Do you accept?  Emma Sagastume responded YES

## 2020-10-05 NOTE — PROGRESS NOTES
TeleMedicine Patient Consent    This visit was performed as a virtual video visit using a synchronous, two-way, audio-video telehealth technology platform. Patient identification was verified at the start of the visit, including the patient's telephone number and physical location. I discussed with the patient the nature of our telehealth visits, that:     1. Due to the nature of an audio- video modality, the only components of a physical exam that could be done are the elements supported by direct observation. 2. I would evaluate the patient and recommend diagnostics and treatments based on my assessment. 3. If it was felt that the patient should be evaluated in clinic or an emergency room setting, then they would be directed there. 4. Our sessions are not being recorded and that personal health information is protected. 5. Our team would provide follow up care in person if/when the patient needs it. Patient does agree to proceed with telemedicine consultation. Patient's location: home address in [place]. Is there anyone else present for this visit: No  This visit was completed virtually using HapBoo. me    Physician Location:    [facility] [address]    Time spent: Greater than Not billed by time    10/5/2020    TELEHEALTH EVALUATION -- Audio or Visual (During GVKHN-26 public health emergency)    HPI:    Meliza Villafana (:  1975) has requested an audio/video evaluation for the following concern(s): patients daughter was concerned that she Shereen Valadez gets out of bed. \" she lives alone and her daughter helps her as much as possible, but states that she needs help with ADLs. She continues to have difficulty swallowing. She has weakness and spasm in her legs.  She has an apt in [facility] to discuss tx of MS.         ROS Unless otherwise specified  Review of Systems - General ROS: negative for - chills, fatigue, fever, night sweats, sleep disturbance, weight gain or weight loss  Psychological ROS: negative for - anxiety, behavioral disorder, depression, hallucinations, irritability, memory difficulties, mood swings, sleep disturbances or suicidal ideation  ENT ROS: negative for - epistaxis, headaches, hearing change, nasal congestion, nasal discharge, nasal polyps, sinus pain, tinnitus, vertigo or visual changes  Hematological and Lymphatic ROS: negative for - bleeding problems, blood clots, fatigue or swollen lymph nodes  Respiratory ROS: negative for - cough, orthopnea, shortness of breath, sputum changes, tachypnea or wheezing  Cardiovascular ROS: negative for - chest pain, dyspnea on exertion, irregular heartbeat, loss of consciousness, palpitations, paroxysmal nocturnal dyspnea or rapid heart rate  Gastrointestinal ROS: negative for - abdominal pain, blood in stools, change in bowel habits, constipation, diarrhea, gas/bloating, heartburn or nausea/vomiting  Neurological ROS: negative for - behavioral changes, confusion, dizziness, +headaches, memory loss, numbness/tingling, seizures or speech problems, +weakness  Dermatological ROS: negative for - dry skin, mole changes, nail changes, pruritus, rash or skin lesion changes    Prior to Visit Medications    Medication Sig Taking?  Authorizing Provider   lisinopril-hydroCHLOROthiazide (PRINZIDE;ZESTORETIC) 10-12.5 MG per tablet take 1 tablet by mouth once daily Yes Hamilton Loco PA-C   baclofen (LIORESAL) 10 MG tablet Take 2 tablets by mouth 3 times daily Yes MARIXA Mendoza CNP   vitamin D (ERGOCALCIFEROL) 1.25 MG (45395 UT) CAPS capsule Take 1 capsule by mouth once a week Yes MARIXA Mendoza CNP   nortriptyline (PAMELOR) 10 MG capsule Take 10 mg by mouth nightly Yes Historical Provider, MD   ocrelizumab (OCREVUS) 300 MG/10ML SOLN injection Infuse 300 mg intravenously once 2 weeks every 6 months Yes Historical Provider, MD   omeprazole (PRILOSEC) 20 MG delayed release capsule Take 1 capsule by mouth every morning (before breakfast) Yes Martha Caruso MD   diclofenac sodium (VOLTAREN) 1 % GEL apply 2 grams to affected area four times a day Yes Sid Gage,        Social History     Tobacco Use    Smoking status: Current Every Day Smoker     Packs/day: 0.50     Years: 25.00     Pack years: 12.50     Types: Cigarettes     Last attempt to quit: 7/3/2020     Years since quittin.2    Smokeless tobacco: Never Used   Substance Use Topics    Alcohol use: Yes     Comment: socially    Drug use: No        No Known Allergies,   Past Medical History:   Diagnosis Date    Cyst of ovary     Headache     Hypertension     Migraine     Morbidly obese ([facility] [address] 10/5/2020   ,   Past Surgical History:   Procedure Laterality Date    BACK SURGERY      CHOLECYSTECTOMY      CYST REMOVAL      HYSTERECTOMY  2018    Robotic hysterectomy, bilateral salpingectomy, right oophorectomy   [facility]     LARYNGOSCOPY N/A 2020    DIRECT LARYNGOSCOPY--OMNI GUIDE LASER performed by Martha Caruso MD at [address] [place] [address]   ,   Social History     Tobacco Use    Smoking status: Current Every Day Smoker     Packs/day: 0.50     Years: 25.00     Pack years: 12.50     Types: Cigarettes     Last attempt to quit: 7/3/2020     Years since quittin.2    Smokeless tobacco: Never Used   Substance Use Topics    Alcohol use: Yes     Comment: socially    Drug use: No   ,   Family History   Problem Relation Age of Onset    Mult Sclerosis Mother     Colon Cancer Neg Hx     Uterine Cancer Neg Hx     Ovarian Cancer Neg Hx     Breast Cancer Neg Hx    ,   There is no immunization history on file for this patient.,   Health Maintenance   Topic Date Due    Pneumococcal 0-64 years Vaccine (1 of 1 - PPSV23) 1980    DTaP/Tdap/Td vaccine (1 - Tdap) 1993    Flu vaccine (1) 2020    Potassium monitoring  2021    Creatinine monitoring  2021    Cervical cancer screen  02/15/2023    Lipid screen  2025    visit:    Multiple sclerosis [facility])  -     [address] [address]    Morbidly obese [facility])        Return in about 3 months (around 1/5/2021). An  electronic signature was used to authenticate this note.    --Connie Small PA-C on 10/5/2020 at 4:20 }    Pursuant to the emergency declaration under the [address] [place] [address], [facility] waiver authority and the [employer] and [employer] Act, this Virtual  Visit was conducted, with patient's consent, to reduce the patient's risk of exposure to COVID-19 and provide continuity of care for an established patient. Services were provided through a video synchronous discussion virtually to substitute for in-person clinic visit.

## 2020-10-07 ENCOUNTER — TELEPHONE (OUTPATIENT)
Dept: FAMILY MEDICINE CLINIC | Age: 45
End: 2020-10-07

## 2020-10-07 RX ORDER — BACLOFEN 10 MG/1
20 TABLET ORAL 3 TIMES DAILY
Qty: 180 TABLET | Refills: 5 | Status: SHIPPED | OUTPATIENT
Start: 2020-10-07 | End: 2021-04-05

## 2020-10-07 NOTE — TELEPHONE ENCOUNTER
[facility] called to let us know that they have no availability can you please put in a new referral to another agency.

## 2020-10-07 NOTE — PROGRESS NOTES
[facility]. Ruben Flynn M.D., F.A.C.P. Kayy Barron, OUMAR, APRN, CNS  Phil Ferrer. Aracelis Travis, MSN, APRN-FNP-C  Sierra Mina MSN, APRN, FNP-C  Joan STOVALL, KIRSTEN Ardon MSN, APRN, FNP-C  [address], [facility]  [place], [address] Ariel Ardon  Phone: [phone]  Fax: [phone]           TeleMedicine Video Visit    This visit was performed as a virtual video visit using a synchronous, two-way, audio-video telehealth technology platform. Patient identification was verified at the start of the visit, including the patient's telephone number and physical location. I discussed with the patient the nature of our telehealth visits, that:     1. Due to the nature of an audio- video modality, the only components of a physical exam that could be done are the elements supported by direct observation. 2. I would evaluate the patient and recommend diagnostics and treatments based on my assessment. 3. If it was felt that the patient should be evaluated in clinic or an emergency room setting, then they would be directed there. 4. Our sessions are not being recorded and that personal health information is protected. 5. Our team would provide follow up care in person if/when the patient needs it. Patient does agree to proceed with telemedicine consultation. Patient's location: home address in [facility]  Physician  location other address in [facility]: [facility] office. other people involved in call: no one    Time spent: Greater than Not billed by time    This visit was completed virtually using My Chart/Haiku/Vitaly    HPI:     We are following her MS    She is interviewed via video chat and remains an excellent historian. Repeat MRI of the brain in July 2020 showed unchanged large white matter lesions with no brainstem involvement. Evidence of cervical lesions. She had her first Ocrevus infusion and tolerated it very well.   She continues to have some symptoms but states that her muscle spasms, dysesthesias are much better since her infusions. She continues to have vocal cord dysfunction, and will follow-up with ENT for possible repeat intervention in the spring. No dysphagia. No falls, clumsiness, weakness, or vision changes. Baclofen 20 mg 3 times daily is helpful for her spasms. Headaches have lessened    She is now supplemented with 50,000 units vitamin D due to low level. No other new medical issues. Mood, sleep, and memory are stable. No chest pain or palpitations  No SOB  No vertigo, lightheadedness or loss of consciousness  No falls, tripping or stumbling  No incontinence of bowels or bladder  No itching or bruising   No language issues    ROS otherwise negative      Medications:     Prior to Admission medications    Medication Sig Start Date End Date Taking?  Authorizing Provider   baclofen (LIORESAL) 10 MG tablet Take 2 tablets by mouth 3 times daily 10/7/20 4/5/21  MARIXA Kennedy CNP   lisinopril-hydroCHLOROthiazide (PRINZIDE;ZESTORETIC) 10-12.5 MG per tablet take 1 tablet by mouth once daily 10/2/20   Ashton Essex, PA-C   vitamin D (ERGOCALCIFEROL) 1.25 MG (56689 UT) CAPS capsule Take 1 capsule by mouth once a week 10/2/20   MARIXA Kennedy CNP   nortriptyline (PAMELOR) 10 MG capsule Take 10 mg by mouth nightly 9/29/20   Historical Provider, MD   ocrelizumab (OCREVUS) 300 MG/10ML SOLN injection Infuse 300 mg intravenously once 2 weeks every 6 months    Historical Provider, MD   omeprazole (PRILOSEC) 20 MG delayed release capsule Take 1 capsule by mouth every morning (before breakfast) 9/23/20   Rosemary Vuong MD   diclofenac sodium (VOLTAREN) 1 % GEL apply 2 grams to affected area four times a day 9/4/20   Sid Gage DO     Objective:     General appearance: alert, appears stated age, cooperative and no distress  Head: appears normal  Eyes: appears normal  Neck: full active ROM  Breathing effort appears normal and nonlabored  Extremities: UEs appears normal and without cyanosis or edema  Skin: no obvious lesions on face or UEs    Mental Status: alert, oriented, thought content appropriate    Appropriate attention/concentration  Intact fundus of knowledge  Memories intact    Speech: no dysarthria  Language: no aphasias    Cranial Nerves:  I: smell    II: visual acuity     II: visual fields    II: pupils Appear symmetric   III,VII: ptosis None   III,IV,VI: extraocular muscles  EOMI without nystagmus    V: mastication    V: facial light touch sensation     V,VII: corneal reflex     VII: facial muscle function - upper  Normal   VII: facial muscle function - lower Normal   VIII: hearing Normal   IX: soft palate elevation     IX,X: gag reflex    XI: trapezius strength     XI: sternocleidomastoid strength    XI: neck extension strength     XII: tongue strength  Normal     Motor: Movements appear symmetric  Normal bulk   No drift  No abnormal movements    Coordination:   FN, FFM normal    Laboratory/Radiology:     JCV Aug 2020: neg    Vit D Aug 2020: 27    Hep panel neg    MRI of the brain with gadolinium July 2020 personal review: Large white matter lesion in left parietal white matter area as well as several other white matter lesions are unchanged from February imaging and no evidence of active lesions. MRI cervical spine WWO:  August 2020--no demyelinating lesions    All labs and images personally reviewed today    Assessment:     Multiple sclerosis--with moderate burden of periventricular and juxtacortical white matter lesions and no cervical involvement. Her limited video neuro exam is unchanged and EDSS remains 1.0. Vocal cord dysfunction could indicate occult brainstem or high cervical involvement, though this has not yet been proven. She is now on Ocrevus for disease modifying therapy. Spasticity, pain, and headaches are controlled on antispasmodic therapy.     Plan:     Continue Ocrevus--repeat MRIs after next infusion    Continue vitamin D 50,000 units weekly--check level next visit    Continue baclofen 20 mg 3 times daily    Follow-up with ENT    Return to office in March 2021, after next Ocrevus infusion    MARIXA Oakley--CNP  4:24 PM  10/7/2020

## 2020-10-08 ENCOUNTER — TELEMEDICINE (OUTPATIENT)
Dept: NEUROLOGY | Age: 45
End: 2020-10-08
Payer: COMMERCIAL

## 2020-10-08 PROBLEM — R51.9 HEADACHE: Status: RESOLVED | Noted: 2020-02-27 | Resolved: 2020-10-08

## 2020-10-08 PROBLEM — G89.18 POST-OP PAIN: Status: RESOLVED | Noted: 2020-07-17 | Resolved: 2020-10-08

## 2020-10-08 PROCEDURE — G8427 DOCREV CUR MEDS BY ELIG CLIN: HCPCS | Performed by: NURSE PRACTITIONER

## 2020-10-08 PROCEDURE — 99214 OFFICE O/P EST MOD 30 MIN: CPT | Performed by: NURSE PRACTITIONER

## 2020-10-08 NOTE — PROGRESS NOTES
Sushant Alvarado was read the following message We want to confirm that, for purposes of billing, this is a virtual visit with your provider for which we will submit a claim for reimbursement with your insurance company. You will be responsible for any copays, coinsurance amounts or other amounts not covered by your insurance company. If you do not accept this, unfortunately we will not be able to schedule a virtual visit with the provider. Do you accept? Vikki Mccloud responded Yes .

## 2020-10-08 NOTE — PATIENT INSTRUCTIONS
Patient Education        Multiple Sclerosis (MS): Care Instructions  Your Care Instructions  Multiple sclerosis, also called MS, is a disease that can affect the brain, spinal cord, and nerves to the eyes. MS can cause problems with muscle control and strength, vision, balance, feeling, and thinking. Whatever your symptoms are, taking medicine correctly and following your doctor's advice for home care can help you maintain your quality of life. Follow-up care is a key part of your treatment and safety. Be sure to make and go to all appointments, and call your doctor if you are having problems. It's also a good idea to know your test results and keep a list of the medicines you take. How can you care for yourself at home? General care  · Take your medicines exactly as prescribed. Call your doctor if you think you are having a problem with your medicine. · Use a cane, walker, or scooter if your doctor suggests it. · Keep doing your normal activities as much as you can. · If you have problems urinating, press or tap your bladder area to help start urine flow. If you have trouble controlling your urine, plan your fluid intake and activities so that a toilet will be available when you need it. · Spend time with family and friends. Join a support group for people with MS if you want extra help. · Depression is common with this condition. Tell your doctor if you have trouble sleeping, are eating too much or are not hungry, or feel sad or tearful all the time. Depression can be treated with medicine and counseling. Diet and exercise  · Eat a balanced diet. · If you have problems swallowing, change how and what you eat:  ? Try thick drinks, such as milk shakes. They are easier to swallow than other fluids. ? Do not eat foods that crumble easily. These can cause choking. ? Use a  to prepare food. Soft foods need less chewing.   ? Eat small meals often so that you do not get tired from eating larger meals.  · Get exercise on most days. Work with your doctor to set up a program of walking, swimming, or other exercise that you are able to do. A physical therapist can teach you exercises if you cannot walk but can move your limbs and trunk. Or you can do exercises to help with coordination and balance. You can help improve muscle stiffness by doing exercises while lying in certain positions. When should you call for help? Call your doctor now or seek immediate medical care if:    · You have a change in symptoms.     · You fall or have another injury.     · You have symptoms of a urinary infection. For example:  ? You have blood or pus in your urine. ? You have pain in your back just below your rib cage. This is called flank pain. ? You have a fever, chills, or body aches. ? It hurts to urinate. ? You have groin or belly pain. Watch closely for changes in your health, and be sure to contact your doctor if:    · You want more information about MS or medicines.     · You have questions about alternative treatments. Do not use any other treatments without talking to your doctor first.   Where can you learn more? Go to [email or web address]-[email or web address] org and sign in to your [email or web address] account. Enter O911 in the [place] [facility] box to learn more about \"Multiple Sclerosis (MS): Care Instructions. \"     If you do not have an account, please click on the \"Sign Up Now\" link. Current as of: November 20, 2019               Content Version: 12.6  © 6705-4966 [employer]. Care instructions adapted under license by [facility] If you have questions about a medical condition or this instruction, always ask your healthcare professional. [address] [address] any warranty or liability for your use of this information.

## 2020-10-23 ENCOUNTER — HOSPITAL ENCOUNTER (OUTPATIENT)
Dept: GENERAL RADIOLOGY | Age: 45
Discharge: HOME | End: 2020-10-25
Payer: COMMERCIAL

## 2020-10-23 ENCOUNTER — HOSPITAL ENCOUNTER (OUTPATIENT)
Age: 45
Discharge: HOME | End: 2020-10-25
Payer: COMMERCIAL

## 2020-10-23 PROCEDURE — 73630 X-RAY EXAM OF FOOT: CPT

## 2020-11-05 ENCOUNTER — PREP FOR PROCEDURE (OUTPATIENT)
Dept: ORTHOPEDIC SURGERY | Age: 45
End: 2020-11-05

## 2020-11-05 RX ORDER — SODIUM CHLORIDE 0.9 % (FLUSH) 0.9 %
10 SYRINGE (ML) INJECTION EVERY 12 HOURS SCHEDULED
Status: CANCELLED | OUTPATIENT
Start: 2020-11-05

## 2020-11-05 RX ORDER — SODIUM CHLORIDE 0.9 % (FLUSH) 0.9 %
10 SYRINGE (ML) INJECTION PRN
Status: CANCELLED | OUTPATIENT
Start: 2020-11-05

## 2020-11-05 RX ORDER — SODIUM CHLORIDE 9 MG/ML
INJECTION, SOLUTION INTRAVENOUS CONTINUOUS
Status: CANCELLED | OUTPATIENT
Start: 2020-11-05

## 2020-11-06 ENCOUNTER — HOSPITAL ENCOUNTER (OUTPATIENT)
Age: 45
Discharge: HOME | End: 2020-11-08
Payer: COMMERCIAL

## 2020-11-06 PROCEDURE — U0003 INFECTIOUS AGENT DETECTION BY NUCLEIC ACID (DNA OR RNA); SEVERE ACUTE RESPIRATORY SYNDROME CORONAVIRUS 2 (SARS-COV-2) (CORONAVIRUS DISEASE [COVID-19]), AMPLIFIED PROBE TECHNIQUE, MAKING USE OF HIGH THROUGHPUT TECHNOLOGIES AS DESCRIBED BY CMS-2020-01-R: HCPCS

## 2020-11-08 LAB
SARS-COV-2: NOT DETECTED
SOURCE: NORMAL

## 2020-11-09 NOTE — PROGRESS NOTES
Have you been tested for COVID  Yes     preop test completed 11/06/2020     Have you been told you were positive for COVID No  Have you had any known exposure to someone that is positive for COVID No  Do you have a cough                   No              Do you have shortness of breath No                 Do you have a sore throat            No                Are you having chills                    No                Are you having muscle aches. No                    Please come to the hospital wearing a mask and have your significant other wear a mask as well. Both of you should check your temperature before leaving to come here,  if it is 100 or higher please call [phone] for instruction.

## 2020-11-09 NOTE — PROGRESS NOTES
[facility] PRE-ADMISSION TESTING INSTRUCTIONS    The Preadmission Testing patient is instructed accordingly using the following criteria (check applicable):    ARRIVAL INSTRUCTIONS:  [x] Parking the day of Surgery is located in the Main Entrance lot. Upon entering the door, make an immediate right to the surgery reception desk    [x] Bring photo ID and insurance card    [] Bring in a copy of Living will or Durable Power of  papers. [x] Please be sure to arrange for responsible adult to provide transportation to and from the hospital    [x] Please arrange for responsible adult to be with you for the 24 hour period post procedure due to having anesthesia      GENERAL INSTRUCTIONS:    [x] Nothing by mouth after midnight, including gum, candy, mints or water    [x] You may brush your teeth, but do not swallow any water    [x] Take medications as instructed with 1-2 oz of water    [x] Stop herbal supplements and vitamins 5 days prior to procedure    [x] Follow preop dosing of blood thinners per physician instructions    [] Take 1/2 dose of evening insulin, but no insulin after midnight    [] No oral diabetic medications after midnight    [] If diabetic and have low blood sugar or feel symptomatic, take 1-2oz apple juice only    [] Bring inhalers day of surgery    [] Bring C-PAP/ Bi-Pap day of surgery    [] Bring urine specimen day of surgery    [x] Shower or bath with soap, lather and rinse well, AM of Surgery, no lotion, powders or creams to surgical site    [] Follow bowel prep as instructed per surgeon    [x] No tobacco products within 24 hours of surgery     [x] No alcohol or illegal drug use within 24 hours of surgery.     [x] Jewelry, body piercing's, eyeglasses, contact lenses and dentures are not permitted into surgery (bring cases)      [x] Please do not wear any nail polish, make up or hair products on the day of surgery    [x] You can expect a call the business day prior to procedure to notify you if your arrival time changes    [x] If you receive a survey after surgery we would greatly appreciate your comments    [] Parent/guardian of a minor must accompany their child and remain on the premises  the entire time they are under our care     [] Pediatric patients may bring favorite toy, blanket or comfort item with them    [] A caregiver or family member must remain with the patient during their stay if they are mentally handicapped, have dementia, disoriented or unable to use a call light or would be a safety concern if left unattended    [x] Please notify surgeon if you develop any illness between now and time of surgery (cold, cough, sore throat, fever, nausea, vomiting) or any signs of infections  including skin, wounds, and dental.    [x]  The Outpatient Pharmacy is available to fill your prescription here on your day of surgery, ask your preop nurse for details    [] Other instructions    EDUCATIONAL MATERIALS PROVIDED:    [] PAT Preoperative Education Packet/Booklet     [] Medication List    [] Transfusion bracelet applied with instructions    [] Shower with soap, lather and rinse well, and use CHG wipes provided the evening before surgery as instructed    [] Incentive spirometer with instructions

## 2020-11-10 ENCOUNTER — ANESTHESIA EVENT (OUTPATIENT)
Dept: OPERATING ROOM | Age: 45
End: 2020-11-10
Payer: COMMERCIAL

## 2020-11-11 ENCOUNTER — HOSPITAL ENCOUNTER (OUTPATIENT)
Age: 45
Setting detail: OUTPATIENT SURGERY
Discharge: HOME | End: 2020-11-11
Attending: ORTHOPAEDIC SURGERY | Admitting: ORTHOPAEDIC SURGERY
Payer: COMMERCIAL

## 2020-11-11 ENCOUNTER — ANESTHESIA (OUTPATIENT)
Dept: OPERATING ROOM | Age: 45
End: 2020-11-11
Payer: COMMERCIAL

## 2020-11-11 VITALS
BODY MASS INDEX: 39.27 KG/M2 | OXYGEN SATURATION: 98 % | DIASTOLIC BLOOD PRESSURE: 77 MMHG | RESPIRATION RATE: 18 BRPM | WEIGHT: 208 LBS | TEMPERATURE: 97.1 F | HEART RATE: 77 BPM | SYSTOLIC BLOOD PRESSURE: 128 MMHG | HEIGHT: 61 IN

## 2020-11-11 VITALS
RESPIRATION RATE: 3 BRPM | SYSTOLIC BLOOD PRESSURE: 100 MMHG | OXYGEN SATURATION: 100 % | TEMPERATURE: 96.3 F | DIASTOLIC BLOOD PRESSURE: 82 MMHG

## 2020-11-11 LAB
ANION GAP SERPL CALCULATED.3IONS-SCNC: 10 MMOL/L (ref 7–16)
BUN BLDV-MCNC: 7 MG/DL (ref 6–20)
CALCIUM SERPL-MCNC: 8.9 MG/DL (ref 8.6–10.2)
CHLORIDE BLD-SCNC: 102 MMOL/L (ref 98–107)
CO2: 26 MMOL/L (ref 22–29)
CREAT SERPL-MCNC: 0.7 MG/DL (ref 0.5–1)
GFR AFRICAN AMERICAN: >60
GFR NON-AFRICAN AMERICAN: >60 ML/MIN/1.73
GLUCOSE BLD-MCNC: 105 MG/DL (ref 74–99)
POTASSIUM SERPL-SCNC: 3.9 MMOL/L (ref 3.5–5)
SODIUM BLD-SCNC: 138 MMOL/L (ref 132–146)

## 2020-11-11 PROCEDURE — L3650 SO 8 ABD RESTRAINT PRE OTS: HCPCS | Performed by: ORTHOPAEDIC SURGERY

## 2020-11-11 PROCEDURE — 6360000002 HC RX W HCPCS

## 2020-11-11 PROCEDURE — 3600000014 HC SURGERY LEVEL 4 ADDTL 15MIN: Performed by: ORTHOPAEDIC SURGERY

## 2020-11-11 PROCEDURE — 29826 SHO ARTHRS SRG DECOMPRESSION: CPT | Performed by: ORTHOPAEDIC SURGERY

## 2020-11-11 PROCEDURE — 6360000002 HC RX W HCPCS: Performed by: ANESTHESIOLOGY

## 2020-11-11 PROCEDURE — 7100000000 HC PACU RECOVERY - FIRST 15 MIN: Performed by: ORTHOPAEDIC SURGERY

## 2020-11-11 PROCEDURE — C1713 ANCHOR/SCREW BN/BN,TIS/BN: HCPCS | Performed by: ORTHOPAEDIC SURGERY

## 2020-11-11 PROCEDURE — 36415 COLL VENOUS BLD VENIPUNCTURE: CPT

## 2020-11-11 PROCEDURE — 6360000002 HC RX W HCPCS: Performed by: PHYSICIAN ASSISTANT

## 2020-11-11 PROCEDURE — 2720000010 HC SURG SUPPLY STERILE: Performed by: ORTHOPAEDIC SURGERY

## 2020-11-11 PROCEDURE — 3700000000 HC ANESTHESIA ATTENDED CARE: Performed by: ORTHOPAEDIC SURGERY

## 2020-11-11 PROCEDURE — 3700000001 HC ADD 15 MINUTES (ANESTHESIA): Performed by: ORTHOPAEDIC SURGERY

## 2020-11-11 PROCEDURE — 2500000003 HC RX 250 WO HCPCS

## 2020-11-11 PROCEDURE — 29827 SHO ARTHRS SRG RT8TR CUF RPR: CPT | Performed by: ORTHOPAEDIC SURGERY

## 2020-11-11 PROCEDURE — 2500000003 HC RX 250 WO HCPCS: Performed by: ORTHOPAEDIC SURGERY

## 2020-11-11 PROCEDURE — 2709999900 HC NON-CHARGEABLE SUPPLY: Performed by: ORTHOPAEDIC SURGERY

## 2020-11-11 PROCEDURE — 3600000004 HC SURGERY LEVEL 4 BASE: Performed by: ORTHOPAEDIC SURGERY

## 2020-11-11 PROCEDURE — 80048 BASIC METABOLIC PNL TOTAL CA: CPT

## 2020-11-11 PROCEDURE — 64418 NJX AA&/STRD SPRSCAP NRV: CPT

## 2020-11-11 PROCEDURE — 7100000011 HC PHASE II RECOVERY - ADDTL 15 MIN: Performed by: ORTHOPAEDIC SURGERY

## 2020-11-11 PROCEDURE — 7100000001 HC PACU RECOVERY - ADDTL 15 MIN: Performed by: ORTHOPAEDIC SURGERY

## 2020-11-11 PROCEDURE — 2580000003 HC RX 258

## 2020-11-11 PROCEDURE — 2500000003 HC RX 250 WO HCPCS: Performed by: ANESTHESIOLOGY

## 2020-11-11 PROCEDURE — 29828 SHO ARTHRS SRG BICP TENODSIS: CPT | Performed by: ORTHOPAEDIC SURGERY

## 2020-11-11 PROCEDURE — 7100000010 HC PHASE II RECOVERY - FIRST 15 MIN: Performed by: ORTHOPAEDIC SURGERY

## 2020-11-11 PROCEDURE — 29823 SHO ARTHRS SRG XTNSV DBRDMT: CPT | Performed by: ORTHOPAEDIC SURGERY

## 2020-11-11 PROCEDURE — 76942 ECHO GUIDE FOR BIOPSY: CPT | Performed by: ANESTHESIOLOGY

## 2020-11-11 PROCEDURE — 6360000002 HC RX W HCPCS: Performed by: ORTHOPAEDIC SURGERY

## 2020-11-11 DEVICE — ANCHOR SUTURE 4.75X19.1 MM TIG TAPE LOOP WHT BLK PUSHLOCK: Type: IMPLANTABLE DEVICE | Site: SHOULDER | Status: FUNCTIONAL

## 2020-11-11 DEVICE — ANCHOR SUT L19.1MM DIA4.75MM BIOCOMPOSITE FULL THRD: Type: IMPLANTABLE DEVICE | Site: SHOULDER | Status: FUNCTIONAL

## 2020-11-11 RX ORDER — LIDOCAINE HYDROCHLORIDE 20 MG/ML
INJECTION, SOLUTION EPIDURAL; INFILTRATION; INTRACAUDAL; PERINEURAL PRN
Status: DISCONTINUED | OUTPATIENT
Start: 2020-11-11 | End: 2020-11-11 | Stop reason: SDUPTHER

## 2020-11-11 RX ORDER — SODIUM CHLORIDE 9 MG/ML
INJECTION, SOLUTION INTRAVENOUS CONTINUOUS
Status: DISCONTINUED | OUTPATIENT
Start: 2020-11-11 | End: 2020-11-11 | Stop reason: HOSPADM

## 2020-11-11 RX ORDER — MIDAZOLAM HYDROCHLORIDE 1 MG/ML
INJECTION INTRAMUSCULAR; INTRAVENOUS PRN
Status: DISCONTINUED | OUTPATIENT
Start: 2020-11-11 | End: 2020-11-11 | Stop reason: SDUPTHER

## 2020-11-11 RX ORDER — SODIUM CHLORIDE 0.9 % (FLUSH) 0.9 %
10 SYRINGE (ML) INJECTION PRN
Status: DISCONTINUED | OUTPATIENT
Start: 2020-11-11 | End: 2020-11-11 | Stop reason: HOSPADM

## 2020-11-11 RX ORDER — FENTANYL CITRATE 50 UG/ML
INJECTION, SOLUTION INTRAMUSCULAR; INTRAVENOUS PRN
Status: DISCONTINUED | OUTPATIENT
Start: 2020-11-11 | End: 2020-11-11 | Stop reason: SDUPTHER

## 2020-11-11 RX ORDER — OXYCODONE HYDROCHLORIDE AND ACETAMINOPHEN 5; 325 MG/1; MG/1
1 TABLET ORAL PRN
Status: DISCONTINUED | OUTPATIENT
Start: 2020-11-11 | End: 2020-11-11 | Stop reason: HOSPADM

## 2020-11-11 RX ORDER — ONDANSETRON 2 MG/ML
INJECTION INTRAMUSCULAR; INTRAVENOUS PRN
Status: DISCONTINUED | OUTPATIENT
Start: 2020-11-11 | End: 2020-11-11 | Stop reason: SDUPTHER

## 2020-11-11 RX ORDER — SODIUM CHLORIDE 0.9 % (FLUSH) 0.9 %
10 SYRINGE (ML) INJECTION EVERY 12 HOURS SCHEDULED
Status: DISCONTINUED | OUTPATIENT
Start: 2020-11-11 | End: 2020-11-11 | Stop reason: HOSPADM

## 2020-11-11 RX ORDER — DEXAMETHASONE SODIUM PHOSPHATE 10 MG/ML
4 INJECTION, SOLUTION INTRAMUSCULAR; INTRAVENOUS ONCE
Status: COMPLETED | OUTPATIENT
Start: 2020-11-11 | End: 2020-11-11

## 2020-11-11 RX ORDER — FENTANYL CITRATE 50 UG/ML
25 INJECTION, SOLUTION INTRAMUSCULAR; INTRAVENOUS EVERY 5 MIN PRN
Status: DISCONTINUED | OUTPATIENT
Start: 2020-11-11 | End: 2020-11-11 | Stop reason: HOSPADM

## 2020-11-11 RX ORDER — ROPIVACAINE HYDROCHLORIDE 5 MG/ML
INJECTION, SOLUTION EPIDURAL; INFILTRATION; PERINEURAL
Status: COMPLETED | OUTPATIENT
Start: 2020-11-11 | End: 2020-11-11

## 2020-11-11 RX ORDER — GLYCOPYRROLATE 1 MG/5 ML
SYRINGE (ML) INTRAVENOUS PRN
Status: DISCONTINUED | OUTPATIENT
Start: 2020-11-11 | End: 2020-11-11 | Stop reason: SDUPTHER

## 2020-11-11 RX ORDER — MEPERIDINE HYDROCHLORIDE 25 MG/ML
12.5 INJECTION INTRAMUSCULAR; INTRAVENOUS; SUBCUTANEOUS EVERY 10 MIN PRN
Status: DISCONTINUED | OUTPATIENT
Start: 2020-11-11 | End: 2020-11-11 | Stop reason: HOSPADM

## 2020-11-11 RX ORDER — DIPHENHYDRAMINE HYDROCHLORIDE 50 MG/ML
12.5 INJECTION INTRAMUSCULAR; INTRAVENOUS
Status: DISCONTINUED | OUTPATIENT
Start: 2020-11-11 | End: 2020-11-11 | Stop reason: HOSPADM

## 2020-11-11 RX ORDER — ROPIVACAINE HYDROCHLORIDE 5 MG/ML
30 INJECTION, SOLUTION EPIDURAL; INFILTRATION; PERINEURAL
Status: COMPLETED | OUTPATIENT
Start: 2020-11-11 | End: 2020-11-11

## 2020-11-11 RX ORDER — EPINEPHRINE 1 MG/ML
INJECTION, SOLUTION, CONCENTRATE INTRAVENOUS PRN
Status: DISCONTINUED | OUTPATIENT
Start: 2020-11-11 | End: 2020-11-11 | Stop reason: ALTCHOICE

## 2020-11-11 RX ORDER — PROPOFOL 10 MG/ML
INJECTION, EMULSION INTRAVENOUS PRN
Status: DISCONTINUED | OUTPATIENT
Start: 2020-11-11 | End: 2020-11-11 | Stop reason: SDUPTHER

## 2020-11-11 RX ORDER — ROCURONIUM BROMIDE 10 MG/ML
INJECTION, SOLUTION INTRAVENOUS PRN
Status: DISCONTINUED | OUTPATIENT
Start: 2020-11-11 | End: 2020-11-11 | Stop reason: SDUPTHER

## 2020-11-11 RX ORDER — MIDAZOLAM HYDROCHLORIDE 1 MG/ML
0.5 INJECTION INTRAMUSCULAR; INTRAVENOUS PRN
Status: DISCONTINUED | OUTPATIENT
Start: 2020-11-11 | End: 2020-11-11 | Stop reason: HOSPADM

## 2020-11-11 RX ORDER — DEXAMETHASONE SODIUM PHOSPHATE 4 MG/ML
INJECTION, SOLUTION INTRA-ARTICULAR; INTRALESIONAL; INTRAMUSCULAR; INTRAVENOUS; SOFT TISSUE PRN
Status: DISCONTINUED | OUTPATIENT
Start: 2020-11-11 | End: 2020-11-11 | Stop reason: SDUPTHER

## 2020-11-11 RX ORDER — OXYCODONE HYDROCHLORIDE AND ACETAMINOPHEN 5; 325 MG/1; MG/1
1 TABLET ORAL EVERY 6 HOURS PRN
Qty: 28 TABLET | Refills: 0 | Status: SHIPPED | OUTPATIENT
Start: 2020-11-11 | End: 2020-11-16 | Stop reason: SDUPTHER

## 2020-11-11 RX ORDER — SODIUM CHLORIDE 9 MG/ML
INJECTION, SOLUTION INTRAVENOUS CONTINUOUS PRN
Status: DISCONTINUED | OUTPATIENT
Start: 2020-11-11 | End: 2020-11-11 | Stop reason: SDUPTHER

## 2020-11-11 RX ORDER — NEOSTIGMINE METHYLSULFATE 1 MG/ML
INJECTION, SOLUTION INTRAVENOUS PRN
Status: DISCONTINUED | OUTPATIENT
Start: 2020-11-11 | End: 2020-11-11 | Stop reason: SDUPTHER

## 2020-11-11 RX ORDER — BUPIVACAINE HYDROCHLORIDE AND EPINEPHRINE 5; 5 MG/ML; UG/ML
INJECTION, SOLUTION EPIDURAL; INTRACAUDAL; PERINEURAL PRN
Status: DISCONTINUED | OUTPATIENT
Start: 2020-11-11 | End: 2020-11-11 | Stop reason: ALTCHOICE

## 2020-11-11 RX ORDER — LIDOCAINE HYDROCHLORIDE 10 MG/ML
10 INJECTION, SOLUTION INFILTRATION; PERINEURAL
Status: COMPLETED | OUTPATIENT
Start: 2020-11-11 | End: 2020-11-11

## 2020-11-11 RX ORDER — PROMETHAZINE HYDROCHLORIDE 25 MG/ML
6.25 INJECTION, SOLUTION INTRAMUSCULAR; INTRAVENOUS PRN
Status: DISCONTINUED | OUTPATIENT
Start: 2020-11-11 | End: 2020-11-11 | Stop reason: HOSPADM

## 2020-11-11 RX ORDER — FENTANYL CITRATE 50 UG/ML
25 INJECTION, SOLUTION INTRAMUSCULAR; INTRAVENOUS PRN
Status: DISCONTINUED | OUTPATIENT
Start: 2020-11-11 | End: 2020-11-11 | Stop reason: HOSPADM

## 2020-11-11 RX ADMIN — PROPOFOL 200 MG: 10 INJECTION, EMULSION INTRAVENOUS at 07:54

## 2020-11-11 RX ADMIN — MEPERIDINE HYDROCHLORIDE 12.5 MG: 25 INJECTION, SOLUTION INTRAMUSCULAR; INTRAVENOUS; SUBCUTANEOUS at 10:07

## 2020-11-11 RX ADMIN — MIDAZOLAM 1 MG: 1 INJECTION INTRAMUSCULAR; INTRAVENOUS at 07:45

## 2020-11-11 RX ADMIN — FENTANYL CITRATE 50 MCG: 50 INJECTION, SOLUTION INTRAMUSCULAR; INTRAVENOUS at 07:58

## 2020-11-11 RX ADMIN — SODIUM CHLORIDE: 9 INJECTION, SOLUTION INTRAVENOUS at 07:45

## 2020-11-11 RX ADMIN — Medication 0.1 MG: at 07:56

## 2020-11-11 RX ADMIN — FENTANYL CITRATE 50 MCG: 50 INJECTION, SOLUTION INTRAMUSCULAR; INTRAVENOUS at 07:35

## 2020-11-11 RX ADMIN — DEXAMETHASONE SODIUM PHOSPHATE 4 MG: 10 INJECTION, SOLUTION INTRAMUSCULAR; INTRAVENOUS at 07:36

## 2020-11-11 RX ADMIN — SODIUM CHLORIDE: 9 INJECTION, SOLUTION INTRAVENOUS at 08:53

## 2020-11-11 RX ADMIN — DEXAMETHASONE SODIUM PHOSPHATE 10 MG: 4 INJECTION, SOLUTION INTRAMUSCULAR; INTRAVENOUS at 07:58

## 2020-11-11 RX ADMIN — ROCURONIUM BROMIDE 40 MG: 10 INJECTION, SOLUTION INTRAVENOUS at 07:54

## 2020-11-11 RX ADMIN — ONDANSETRON 4 MG: 2 INJECTION INTRAMUSCULAR; INTRAVENOUS at 09:30

## 2020-11-11 RX ADMIN — LIDOCAINE HYDROCHLORIDE 10 ML: 10 INJECTION, SOLUTION INFILTRATION; PERINEURAL at 07:35

## 2020-11-11 RX ADMIN — MIDAZOLAM HYDROCHLORIDE 1 MG: 1 INJECTION, SOLUTION INTRAMUSCULAR; INTRAVENOUS at 07:35

## 2020-11-11 RX ADMIN — ROPIVACAINE HYDROCHLORIDE 30 ML: 5 INJECTION, SOLUTION EPIDURAL; INFILTRATION; PERINEURAL at 07:36

## 2020-11-11 RX ADMIN — ROPIVACAINE HYDROCHLORIDE 30 ML: 5 INJECTION, SOLUTION EPIDURAL; INFILTRATION; PERINEURAL at 07:43

## 2020-11-11 RX ADMIN — LIDOCAINE HYDROCHLORIDE 60 MG: 20 INJECTION, SOLUTION EPIDURAL; INFILTRATION; INTRACAUDAL; PERINEURAL at 07:54

## 2020-11-11 RX ADMIN — Medication 3 MG: at 09:32

## 2020-11-11 RX ADMIN — Medication 2 G: at 07:48

## 2020-11-11 RX ADMIN — Medication 0.6 MG: at 09:32

## 2020-11-11 RX ADMIN — FENTANYL CITRATE 50 MCG: 50 INJECTION, SOLUTION INTRAMUSCULAR; INTRAVENOUS at 07:54

## 2020-11-11 ASSESSMENT — PULMONARY FUNCTION TESTS
PIF_VALUE: 25
PIF_VALUE: 28
PIF_VALUE: 23
PIF_VALUE: 25
PIF_VALUE: 23
PIF_VALUE: 29
PIF_VALUE: 4
PIF_VALUE: 23
PIF_VALUE: 2
PIF_VALUE: 27
PIF_VALUE: 23
PIF_VALUE: 7
PIF_VALUE: 7
PIF_VALUE: 26
PIF_VALUE: 25
PIF_VALUE: 24
PIF_VALUE: 23
PIF_VALUE: 23
PIF_VALUE: 7
PIF_VALUE: 23
PIF_VALUE: 24
PIF_VALUE: 23
PIF_VALUE: 1
PIF_VALUE: 23
PIF_VALUE: 1
PIF_VALUE: 27
PIF_VALUE: 24
PIF_VALUE: 23
PIF_VALUE: 24
PIF_VALUE: 24
PIF_VALUE: 44
PIF_VALUE: 23
PIF_VALUE: 24
PIF_VALUE: 25
PIF_VALUE: 24
PIF_VALUE: 23
PIF_VALUE: 24
PIF_VALUE: 23
PIF_VALUE: 23
PIF_VALUE: 24
PIF_VALUE: 27
PIF_VALUE: 27
PIF_VALUE: 24
PIF_VALUE: 2
PIF_VALUE: 6
PIF_VALUE: 23
PIF_VALUE: 23
PIF_VALUE: 25
PIF_VALUE: 24
PIF_VALUE: 1
PIF_VALUE: 1
PIF_VALUE: 25
PIF_VALUE: 21
PIF_VALUE: 24
PIF_VALUE: 27
PIF_VALUE: 23
PIF_VALUE: 22
PIF_VALUE: 23
PIF_VALUE: 3
PIF_VALUE: 23
PIF_VALUE: 4
PIF_VALUE: 23
PIF_VALUE: 4
PIF_VALUE: 24
PIF_VALUE: 22
PIF_VALUE: 23
PIF_VALUE: 0
PIF_VALUE: 23
PIF_VALUE: 23
PIF_VALUE: 0
PIF_VALUE: 7
PIF_VALUE: 19
PIF_VALUE: 24
PIF_VALUE: 1
PIF_VALUE: 27
PIF_VALUE: 24
PIF_VALUE: 23
PIF_VALUE: 0
PIF_VALUE: 23
PIF_VALUE: 23
PIF_VALUE: 24
PIF_VALUE: 23
PIF_VALUE: 24
PIF_VALUE: 23
PIF_VALUE: 24
PIF_VALUE: 23
PIF_VALUE: 21
PIF_VALUE: 22
PIF_VALUE: 23
PIF_VALUE: 22
PIF_VALUE: 27
PIF_VALUE: 24
PIF_VALUE: 23
PIF_VALUE: 2
PIF_VALUE: 25
PIF_VALUE: 23
PIF_VALUE: 23
PIF_VALUE: 27
PIF_VALUE: 24
PIF_VALUE: 24
PIF_VALUE: 23
PIF_VALUE: 24
PIF_VALUE: 24
PIF_VALUE: 1

## 2020-11-11 ASSESSMENT — PAIN DESCRIPTION - LOCATION: LOCATION: SHOULDER

## 2020-11-11 ASSESSMENT — PAIN DESCRIPTION - PAIN TYPE
TYPE: SURGICAL PAIN
TYPE: SURGICAL PAIN

## 2020-11-11 ASSESSMENT — PAIN SCALES - GENERAL
PAINLEVEL_OUTOF10: 0

## 2020-11-11 ASSESSMENT — PAIN DESCRIPTION - ORIENTATION: ORIENTATION: RIGHT

## 2020-11-11 ASSESSMENT — PAIN - FUNCTIONAL ASSESSMENT: PAIN_FUNCTIONAL_ASSESSMENT: 0-10

## 2020-11-11 ASSESSMENT — LIFESTYLE VARIABLES: SMOKING_STATUS: 1

## 2020-11-11 NOTE — ANESTHESIA PROCEDURE NOTES
Peripheral Block    Patient location during procedure: procedure area  Start time: 11/11/2020 7:25 AM  End time: 11/11/2020 7:38 AM  Staffing  Anesthesiologist: Ghazal Muhammad MD  Performed: anesthesiologist   Preanesthetic Checklist  Completed: patient identified, site marked, surgical consent, pre-op evaluation, timeout performed, IV checked, risks and benefits discussed, monitors and equipment checked, anesthesia consent given, oxygen available and patient being monitored  Peripheral Block  Patient position: supine  Prep: ChloraPrep  Patient monitoring: cardiac monitor, continuous pulse ox, frequent blood pressure checks and IV access  Block type: Suprascapular  Laterality: right  Injection technique: single-shot  Procedures: ultrasound guided and nerve stimulator  Local infiltration: lidocaine  Infiltration strength: 1 %  Dose: 3 mL  Provider prep: mask and sterile gloves  Local infiltration: lidocaine  Needle  Needle type: combined needle/nerve stimulator   Needle gauge: 22 G  Needle length: 5 cm  Needle localization: ultrasound guidance and nerve stimulator  Assessment  Injection assessment: negative aspiration for heme, no paresthesia on injection and local visualized surrounding nerve on ultrasound  Paresthesia pain: none  Slow fractionated injection: yes  Hemodynamics: stable  Additional Notes  test  Medications Administered  Ropivacaine (NAROPIN) injection 0.5%, 30 mL  Reason for block: post-op pain management and at surgeon's request

## 2020-11-11 NOTE — BRIEF OP NOTE
Brief Postoperative Note      Patient: Burnis All  YOB: 1975  MRN: [id number]    Date of Procedure: 11/11/2020    Pre-Op Diagnosis: RIGHT SHOULDER TENDONITIS    Post-Op Diagnosis: Same       Procedure(s):  RIGHT SHOULDER DIAGNOSTIC ARTHROSCOPY WITH DECOMPRESSION ROTATOR CUFF REPAIR  BICEPS TENODESIS    Surgeon(s):  Karene Lundborg, MD    Assistant:  Physician Assistant: DANISH Matsers  Resident: Chepe Stockton DO    Anesthesia: General    Estimated Blood Loss (mL): Minimal    Complications: None    Specimens:   * No specimens in log *    Implants:  Implant Name Type Inv. Item Serial No.  Lot No. LRB No. Used Action   ANCHOR SUT L19.1MM DIA4. 75MM BIOCOMPOSITE FULL THRD  ANCHOR SUT L19.1MM DIA4. 75MM BIOCOMPOSITE FULL THRD  [employer] [id number] Right 1 Implanted   SUTURE ANCHOR, BIOCOMPOSITE SWIVELOCK, DOUBLE LOADED [id number]    [employer]WD [id number] Right 2 Implanted   ANCHOR SUTURE 4.75X19.1 MM TIG TAPE LOOP WHT BLK PUSHLOCK  ANCHOR SUTURE 4.75X19.1 MM TIG TAPE LOOP WHT BLK PUSHLOCK  [employer]WD [id number] Right 1 Implanted         Drains: * No LDAs found *    Findings: see op note    Electronically signed by DANISH Masters on 11/11/2020 at 9:48 AM

## 2020-11-11 NOTE — OP NOTE
Operative Note      Patient: Lynnette Soler  YOB: 1975  MRN: [id number]    Date of Procedure: 11/11/2020    Pre-Op Diagnosis: RIGHT SHOULDER TENDONITIS    Post-Op Diagnosis: Same       Procedure(s):  RIGHT SHOULDER DIAGNOSTIC ARTHROSCOPY WITH DECOMPRESSION ROTATOR CUFF REPAIR  BICEPS TENODESIS   Right rotator cuff repair    Surgeon(s):  Pankaj Franco MD    Assistant:   Physician Assistant: DANISH Wheat  Resident: Chepe Stockton DO    Anesthesia: General    Estimated Blood Loss (mL): Minimal    Complications: None    Specimens:   * No specimens in log *    Implants:  Implant Name Type Inv. Item Serial No.  Lot No. LRB No. Used Action   ANCHOR SUT L19.1MM DIA4. 75MM BIOCOMPOSITE FULL THRD  ANCHOR SUT L19.1MM DIA4. 75MM BIOCOMPOSITE FULL THRD  [employer]WD [id number] Right 1 Implanted   SUTURE ANCHOR, BIOCOMPOSITE SWIVELOCK, DOUBLE LOADED [id number]    [employer] [id number] Right 2 Implanted   ANCHOR SUTURE 4.75X19.1 MM TIG TAPE LOOP WHT BLK PUSHLOCK  ANCHOR SUTURE 4.75X19.1 MM TIG TAPE LOOP WHT BLK PUSHLOCK  [employer] [id number] Right 1 Implanted         Drains: * No LDAs found *    Findings: see details    Detailed Description of Procedure:   1/22/2019      PREOPERATIVE DIAGNOSES:  1. Right shoulder impingement syndrome. 2.   Rightshoulder biceps tendinitis. 3.   Right shoulder rotator cuff tear     POSTOPERATIVE DIAGNOSES:  1. Right shoulder impingement syndrome. 2.  Right shoulder biceps tendinitis. 3.  Right shoulder rotator cuff tear     PROCEDURES PERFORMED:  1. Right shoulder arthroscopy with debridement of glenohumeral joint  synovitis and degenerative labral tearing. 2.   Right arthroscopic biceps tenodesis  3. Right shoulder arthroscopic subacromial decompression. 4.   Right shoulder arthroscopic rotator cuff using the [employer] SpeedBridge  System.       ANESTHESIA:  1.   General.  2.  Local anesthetic by surgeon consisting approximately 10 mL of 0.25%  Marcaine with epinephrine.     ASSISTANTS:  1. Zoë Leach, physician assistant certified. She was present throughout  the procedure. Her assistance was used for preoperative positioning,  intraoperative retraction, closure, and dressing application. Her  assistance expedited the case and decreased the surgical time. 2.  Dr Kandi Rushing, orthopedic surgery resident.     FINDINGS:  1. Shoulder arthroscopy revealed synovitis of the anterior aspect of the  shoulder joint with degenerative labral tearing and biceps tendinitis. 2.  There was 80% undersurface tear to the rotator cuff,  which measured 2.5cm. This was completed and repaired using the [address]:  None.     DISPOSITION:  The patient was stable throughout the procedure.     OPERATIVE INDICATIONS:  The patient presents  with persistent recalcitrant left shoulder pain. After failing  conservative management, they wished to proceed with surgical intervention. Risks, benefits, alternatives, and complications were fully outlined and  explained to her including, but not limited to the risk of infection,  damage to nerves, vessels or tendons, failure to improve symptoms or  worsening symptoms, shoulder stiffness, need for compliance, postoperative  recommendations and skilled therapy, possible need for further surgery. they understood and wished to proceed.     SURGERY IN DETAIL:  The patient was identified in the holding area. The  Right shoulder was identified as the surgical site. The areas of maximal  tenderness were outlined, and rotator cuff weakness was confirmed. With  this achieved, she was seen by Anesthesia, taken to the operating room,  placed supine on the table, and underwent general anesthesia per the  Anesthesia Department. She was then placed on well-padded right side down,  left side up lateral decubitus position.   All bony prominences were well  padded, and the left shoulder was prepared and draped in the standard  sterile fashion. Arm was placed in approximately 10 to 15 pounds of  traction. Posterior portal was localized with a spinal needle, nick  incision, and insertion of a standard arthroscopic trocar. Glenohumeral  joint was inspected. There was synovitis, particularly at the anterior  aspect of the shoulder joint involving the anterior labrum from anterior  inferior to superior as well as degenerative changes along the long head of  the biceps tendon at its insertion in the superior aspect of the glenoid. An anterior portal was then localized with a spinal needle, nick incision,  disposable cannula inserted. Arthroscopic shaver was then used to debride  the glenohumeral joint synovitis along the anterior capsule from anterior  inferior to superior and from anterior to posterior. The biceps was tagged with a fiberwire suture and  tenotomy was completed with a tenotomy scissors followed by debridement of  the labrum from anterior to posterior to stable margins. The anterior  surface of the rotator cuff was then inspected. There was an articular-sided partial tear present. The tear thickness was estimated at 80% using a spinal needle to probe the tear. This was then debrided on the undersurface gently with a arthroscopic shaver and a 2-0 PDS suture was used to tag the tear. The scope was then placed in the subacromial space. Large amount of subacromial synovitis was present. The  lateral portal was localized with spinal needle, nick incision, and  insertion of a disposable trocar. Arthroscopic shaver was then inserted to  complete a subacromial bursectomy. The rotator cuff was inspected at the tagged location with the PDS suture. Gentle debridement was performed with arthroscopic shaver to identify the tear which was completed easily and debrided to stable margins. The extent of the tear was estimated at 2.5 cm in diameter and was amenable to repair.       Arthroscopic ArthroCare wand was then inserted and used to expose the  subacromial space further and a subacromial decompression completed with a  4.0 barrel bur from anterolateral to anteromedial and from anteroposterior,  removing approximately 4 mm of bone, decompressing the space nicely.     Arthroscopic rotator cuff repair was then achieved using the [employer]  SpeedBridge System. A medial anterior anchor was first inserted. This was performed using a small nick incision localized with a spinal needle. A punch was used. Prior to anchor insertion the previously tagged biceps was identified anteriorly and mobilized out of its bicipital groove. The suture for the biceps was then placed in the anterior medial anchor and used to tenodesis the biceps into the anterior medial anchor. The anchor was well seated. Suture tape was retrieved and cut. A scorpion was then used to pass the fiber tapes into the anterior medial cuff. Similarly the posterior anchor was placed in the humeral head at the articular margin in the posterior aspect of the tear using a spinal needle to localize followed by punch an anchor. Suture tapes were cut and retrieved laterally. Scorpion was used to pass the posterior sutures in the posterior medial cuff. The rotator cuff repair was then completed with 2 lateral swivel locks retrieving the suture tapes laterally. One from each anchor and inserted anterior laterally. Similarly the remaining suture tapes were retrieved and inserted posterior laterally using the swivel lock anchors. During anchor insertion good tension was achieved reapproximating the rotator cuff down nicely. The shoulder was brought through range of motion and found to have a  stable rotator cuff repair. The shoulder was then evacuated off fluid. Portal sites  were then closed. Local anesthetic infiltrated. Bulky sterile dressing  and shoulder immobilizer applied.   The patient tolerated the procedure well  and was

## 2020-11-11 NOTE — H&P
Department of Orthopedic Surgery  History and Physical        CHIEF COMPLAINT:  Right shoulder pain     HISTORY OF PRESENT ILLNESS:                 The patient is a RHD 54 y.o. female who presents with right shoulder pain. Patient states she started to have right shoulder pain at beginning of the year. She followed up with an orthopedic who provided her a cortisone injection to her right shoulder in February and May. Patient states this provided her relief for about 2 months. She works at a dry . She states any overhead activity bothers her pain. Her pain is mainly located in the anterior aspect of her shoulder. She also has completed a course of physical therapy with no relief of her symptoms. She denies any injury. Patient does have a history of MS.     Past Medical History:    Past Medical History             Diagnosis Date    Cyst of ovary      Headache      Hypertension      Migraine          Past Surgical History:    Past Surgical History             Procedure Laterality Date    BACK SURGERY        CHOLECYSTECTOMY        CYST REMOVAL        HYSTERECTOMY   03/05/2018     Robotic hysterectomy, bilateral salpingectomy, right oophorectomy  [id number] [facility] [place] 7/17/2020     DIRECT LARYNGOSCOPY--OMNI GUIDE LASER performed by Vikki Ramirez MD at [address] [place] [address]        Current Medications:   Current Hospital Medications   No current facility-administered medications for this visit. Allergies:  Patient has no known allergies.     Social History:   TOBACCO:   reports that she quit smoking about 2 months ago. Her smoking use included cigarettes. She has a 12.50 pack-year smoking history. She has never used smokeless tobacco.  ETOH:   reports current alcohol use. DRUGS:   reports no history of drug use.   ACTIVITIES OF DAILY LIVING:    OCCUPATION:    Family History:   Family History             Problem Relation Age of Onset    Mult Sclerosis Mother      Colon Cancer Neg Hx      Uterine Cancer Neg Hx      Ovarian Cancer Neg Hx      Breast Cancer Neg Hx             REVIEW OF SYSTEMS:  CONSTITUTIONAL:  negative  EYES:  negative  HEENT:  negative  RESPIRATORY:  negative  CARDIOVASCULAR:  negative  GASTROINTESTINAL:  negative  GENITOURINARY:  negative  INTEGUMENT/BREAST:  negative  HEMATOLOGIC/LYMPHATIC:  negative  ALLERGIC/IMMUNOLOGIC:  negative  ENDOCRINE:  negative  MUSCULOSKELETAL:  pain  NEUROLOGICAL:  negative  BEHAVIOR/PSYCH:  negative     PHYSICAL EXAM:    VITALS:  Temp 96.4 °F (35.8 °C) (Infrared)   Resp 18   Ht 5' (1.524 m)   Wt 190 lb (86.2 kg)   LMP 01/05/2018   BMI 37.11 kg/m²   CONSTITUTIONAL:  awake, alert, cooperative, no apparent distress, and appears stated age  EYES:  Lids and lashes normal, pupils equal, round and reactive to light, extra ocular muscles intact, sclera clear, conjunctiva normal  ENT:  Normocephalic, without obvious abnormality, atraumatic, sinuses nontender on palpation, external ears without lesions, oral pharynx with moist mucus membranes, tonsils without erythema or exudates, gums normal and good dentition. NECK:  Supple, symmetrical, trachea midline, no adenopathy, thyroid symmetric, not enlarged and no tenderness, skin normal  LUNGS:  CTA  CARDIOVASCULAR:  2+ radial pulses, extremities warm and well perfused  ABDOMEN:  obese, NTTP  CHEST:  Atraumatic   GENITAL/URINARY:  deferred  NEUROLOGIC:  Awake, alert, oriented to name, place and time. Cranial nerves II-XII are grossly intact. Motor is 5 out of 5 bilaterally. Sensory is intact.  gait is normal.  MUSCULOSKELETAL:     Right upper extremity: + tenderness over the biceps tendon. - tenderness over the anteriolateral aspect of the shoulder. + impingement. Active  degrees, abduction 90 degrees, ER 60 degrees, IR to lateral buttocks. 5/5 strength of the supraspinatus, subscapularis, teres minor. 4/5 strength of the infraspinatus. Good range of motion the elbow wrist and fingers. Median, ulnar, radial nerves intact light touch. Brisk capillary refill.     DATA:    CBC:         Lab Results   Component Value Date     WBC 11.3 07/20/2020     RBC 4.29 07/20/2020     HGB 13.7 07/20/2020     HCT 41.2 07/20/2020     MCV 96.0 07/20/2020     MCH 31.9 07/20/2020     MCHC 33.3 07/20/2020     RDW 12.7 07/20/2020      07/20/2020     MPV 9.7 07/20/2020      PT/INR:          Lab Results   Component Value Date     PROTIME 10.7 02/28/2020     INR 1.0 02/28/2020         Radiology Review: MRI of the right shoulder was reviewed and coronal, sagittal, axial planes. This was dated July 16 of 2020. Findings: The supraspinatus tendon demonstrates mild signal increase suspicious    for mild tendinitis. The subscapularis tendon appears to be intact    There appears to be a cyst in the subacromial bursa. This measures    approximately 1.5 cm. The glenoid labrum appears to be intact    The biceps tendon appears to be intact         The humeral ligaments appears to be intact         Signal within the bone marrow of the proximal humerus appears to be    intact    Signal within the bone marrow of the distal clavicle appears to be    abnormal, there are findings to suggest degenerative changes    Signal within the bone marrow of the glenoid appears to be intact            Impression    Findings compatible with degenerative changes. Probable tendinitis of the supraspinatus tendon, no convincing tear is    identified       X-rays of the right shoulder were obtained today in the office and reviewed with patient. 3 views: AP, scapular Y view, axial demonstrate no acute fractures or dislocations. Impression: No acute fracture dislocations        IMPRESSION:  · Right shoulder biceps tendinitis     PLAN:  Discussed findings with patient. Discussed conservative and surgical management with the patient. Patient is failed conservative management with cortisone injections and therapy.   Briefly discussed surgical options in the form of a right shoulder diagnostic arthroscopy. Ultimately the patient decided she would like to proceed with a right shoulder diagnostic arthroscopy with decompression, possible rotator cuff repair, possible biceps tenotomy versus tenodesis. Postoperative course explained to the patient. Patient like to proceed. All questions answered.     I explained the risks, benefits, alternatives and complications of surgery with the patient including but not limited to the risks of infection, possible damage to nerves, vessels, or tendons, stiffness, loss of range of motion, scar sensitivity, wound healing complications, worsening symptoms, possible need for therapy, as well as the possible need further surgery and unanticipated complications. The patient voiced understanding and all questions were answered. The patient elected to proceed with surgical intervention.      I have seen and evaluated the patient and agree with the above assessment and plan on today's visit. I have performed the key components of the history and physical examination with significant findings of right shoulder tendinitis. Patient has had a prolonged course of therapy and injections without improvement in her symptomatology. Review of the MRI is consistent with tendinosis and impingement syndrome. Plan for diagnostic shoulder arthroscopy with debridement and repairs as needed including decompression and rotator cuff and biceps. I concur with the findings and plan as documented. The patient was counseled at length about the risks of shital Covid-19 during their perioperative period and any recovery window from their procedure. The patient was made aware that shital Covid-19  may worsen their prognosis for recovering from their procedure  and lend to a higher morbidity and/or mortality risk. All material risks, benefits, and reasonable alternatives including postponing the procedure were discussed.  The

## 2020-11-11 NOTE — PROGRESS NOTES
Discharge instructions and post anesthesia instructions given to pt and daughter Tristan Reagan. Verbalized agreement. Pt stated daughter will drive her home and stay with her until boyfriend comes home and he will stay with her.

## 2020-11-12 NOTE — PROGRESS NOTES
CLINICAL PHARMACY NOTE: MEDS TO [address] Select Patient?: Yes  Total # of Prescriptions Filled: 1   The following medications were delivered to the patient:  · Percocet 5/325 mg     Total # of Interventions Completed: 2  Time Spent (min): 45    Additional Documentation:

## 2020-11-14 NOTE — ANESTHESIA POSTPROCEDURE EVALUATION
Department of Anesthesiology  Postprocedure Note    Patient: Vanessa Martinez  MRN: [id number]  YOB: 1975  Date of evaluation: 11/13/2020  Time:  9:43 PM     Procedure Summary     Date:  11/11/20 Room / Location:  [address]    Anesthesia Start:  0745 Anesthesia Stop:  8927    Procedures:       RIGHT SHOULDER DIAGNOSTIC ARTHROSCOPY WITH DECOMPRESSION ROTATOR CUFF REPAIR (Right Shoulder)      BICEPS TENODESIS (Right Arm Upper) Diagnosis:  (RIGHT SHOULDER TENDONITIS)    Surgeon:  Gabi Marvin MD Responsible Provider:  Tosha Fuentes MD    Anesthesia Type:  general ASA Status:  3          Anesthesia Type: general    Daniel Phase I: Daniel Score: 8    Daniel Phase II: Daniel Score: 10    Last vitals: Reviewed and per EMR flowsheets.        Anesthesia Post Evaluation    Patient location during evaluation: PACU  Patient participation: complete - patient participated  Level of consciousness: awake and alert  Airway patency: patent  Nausea & Vomiting: no vomiting and no nausea  Complications: no  Cardiovascular status: blood pressure returned to baseline  Respiratory status: acceptable  Hydration status: euvolemic

## 2020-11-16 RX ORDER — OXYCODONE HYDROCHLORIDE AND ACETAMINOPHEN 5; 325 MG/1; MG/1
1 TABLET ORAL EVERY 6 HOURS PRN
Qty: 28 TABLET | Refills: 0 | Status: SHIPPED
Start: 2020-11-16 | End: 2020-11-19 | Stop reason: CLARIF

## 2020-11-19 ENCOUNTER — OFFICE VISIT (OUTPATIENT)
Dept: ORTHOPEDIC SURGERY | Age: 45
End: 2020-11-19

## 2020-11-19 VITALS — HEIGHT: 61 IN | RESPIRATION RATE: 18 BRPM | WEIGHT: 208 LBS | BODY MASS INDEX: 39.27 KG/M2 | TEMPERATURE: 97.8 F

## 2020-11-19 PROCEDURE — 99024 POSTOP FOLLOW-UP VISIT: CPT | Performed by: ORTHOPAEDIC SURGERY

## 2020-11-19 RX ORDER — OXYCODONE HYDROCHLORIDE AND ACETAMINOPHEN 5; 325 MG/1; MG/1
1 TABLET ORAL EVERY 6 HOURS PRN
Qty: 28 TABLET | Refills: 0 | Status: SHIPPED | OUTPATIENT
Start: 2020-11-19 | End: 2020-11-26

## 2020-11-19 NOTE — PROGRESS NOTES
8 days out status post right shoulder arthroscopy with decompression biceps tenodesis with rotator cuff repair. Overall she reports her shoulder is doing well. Physical exam: Shoulder incisions healing nicely. No signs of infection. Remains neurovascular intact. Assessment: 8 days postop right shoulder arthroscopy decompression biceps tenodesis with repair of the rotator cuff doing well    Plan    Patient is educated on some simple home exercises including pendulum exercises and local wound care. She will follow-up in 3 to 4 weeks prior to beginning formal therapy. Informed consent was obtained for continued opioid treatment. Patient agrees to continue the current treatment and agrees the benefits of opioid treatment ( decreased pain, improved function) continue to outweigh the potential risks ( confusion, change in thinking ability, depression, dry mouth, nausea, constipation, vomiting, impaired coordination/balance, sleepiness, damage liver or kidneys, opioid-induced hyperalgesia, physical dependence, addiction, withdrawal, respiratory depression and even death).

## 2020-11-29 ENCOUNTER — PATIENT MESSAGE (OUTPATIENT)
Dept: NEUROLOGY | Age: 45
End: 2020-11-29

## 2020-11-30 RX ORDER — GABAPENTIN 100 MG/1
300 CAPSULE ORAL 2 TIMES DAILY
Qty: 270 CAPSULE | Refills: 1 | Status: SHIPPED
Start: 2020-11-30 | End: 2020-12-24

## 2020-12-07 RX ORDER — HYDROCODONE BITARTRATE AND ACETAMINOPHEN 5; 325 MG/1; MG/1
1 TABLET ORAL EVERY 6 HOURS PRN
Qty: 28 TABLET | Refills: 0 | Status: SHIPPED
Start: 2020-12-07 | End: 2020-12-21 | Stop reason: SDUPTHER

## 2020-12-07 NOTE — TELEPHONE ENCOUNTER
Patient requesting refill, OARRS complete. She is 4 weeks out right shoulder arthroscopic rotator cuff repair. She was last seen on 11/19. Patients next scheduled appointment is 12/21. Percocet 5 mg every 6 hours was given on 11/19.

## 2020-12-19 ENCOUNTER — PATIENT MESSAGE (OUTPATIENT)
Dept: NEUROLOGY | Age: 45
End: 2020-12-19

## 2020-12-21 ENCOUNTER — OFFICE VISIT (OUTPATIENT)
Dept: ORTHOPEDIC SURGERY | Age: 45
End: 2020-12-21

## 2020-12-21 VITALS — WEIGHT: 208 LBS | TEMPERATURE: 97.7 F | BODY MASS INDEX: 39.27 KG/M2 | HEIGHT: 61 IN

## 2020-12-21 PROCEDURE — 99024 POSTOP FOLLOW-UP VISIT: CPT | Performed by: ORTHOPAEDIC SURGERY

## 2020-12-21 RX ORDER — HYDROCODONE BITARTRATE AND ACETAMINOPHEN 5; 325 MG/1; MG/1
1 TABLET ORAL EVERY 6 HOURS PRN
Qty: 28 TABLET | Refills: 0 | Status: SHIPPED
Start: 2020-12-21 | End: 2021-01-12 | Stop reason: SDUPTHER

## 2020-12-21 RX ORDER — IBUPROFEN 600 MG/1
600 TABLET ORAL 4 TIMES DAILY PRN
Qty: 360 TABLET | Refills: 1 | Status: SHIPPED
Start: 2020-12-21 | End: 2021-04-27 | Stop reason: ALTCHOICE

## 2020-12-21 NOTE — PROGRESS NOTES
6 weeks out status post right shoulder arthroscopy with decompression biceps tenodesis with rotator cuff repair. Overall she reports her shoulder is doing well. Physical exam: Shoulder incisions healing nicely. No signs of infection. Good ROM to elbow, wrist, and fingers. Stiffness with passive ROM to the shoulder. Remains neurovascular intact. Assessment: 6 weeks postop right shoulder arthroscopy decompression biceps tenodesis with repair of the rotator cuff doing well    Plan    Patient referred to therapy. Okay for passive and active ROM exercises. Okay to d/c pillow on sling. Follow up in 1 month before advancing in therapy. I have seen and evaluated the patient and agree with the above assessment and plan on today's visit. I have performed the key components of the history and physical examination with significant findings of doing well postop. I concur with the findings and plan as documented.     Chantale Jimenez MD  12/21/2020

## 2020-12-21 NOTE — TELEPHONE ENCOUNTER
From: Frank Craig  To: MARIXA Cortes - CNP  Sent: 12/19/2020 8:22 PM EST  Subject: Non-Urgent Medical Question    I just wanted to let you know that the gabapentin is not working my thighs are still numb and burning hurts to touch or wear pants they are still very painful and lately I been having trouble walking my feet are very painful and painful to walk and take steps they hurt so bad and I noticed the spasms in my legs are getting bad

## 2020-12-24 RX ORDER — GABAPENTIN 300 MG/1
300 CAPSULE ORAL 3 TIMES DAILY
Qty: 90 CAPSULE | Refills: 2 | Status: SHIPPED
Start: 2020-12-24 | End: 2021-03-10

## 2021-01-06 DIAGNOSIS — R13.10 DYSPHAGIA, UNSPECIFIED TYPE: Primary | ICD-10-CM

## 2021-01-06 DIAGNOSIS — J38.5 LARYNGOSPASM: ICD-10-CM

## 2021-01-12 DIAGNOSIS — G89.18 POST-OPERATIVE PAIN: ICD-10-CM

## 2021-01-12 RX ORDER — HYDROCODONE BITARTRATE AND ACETAMINOPHEN 5; 325 MG/1; MG/1
1 TABLET ORAL EVERY 6 HOURS PRN
Qty: 28 TABLET | Refills: 0 | Status: SHIPPED | OUTPATIENT
Start: 2021-01-12 | End: 2021-01-19

## 2021-01-12 NOTE — TELEPHONE ENCOUNTER
Patient is requesting a medication refill, OARRS complete. She is 9 weeks out from right shoulder arthroscopy with biceps tenodesis and arthroscopic rotator cuff repair.  She was last seen 12/21/ her next appointment is scheduled 2/1/21

## 2021-01-15 ENCOUNTER — HOSPITAL ENCOUNTER (OUTPATIENT)
Dept: GENERAL RADIOLOGY | Age: 46
Discharge: HOME | End: 2021-01-17
Payer: COMMERCIAL

## 2021-01-15 DIAGNOSIS — R13.10 DYSPHAGIA, UNSPECIFIED TYPE: ICD-10-CM

## 2021-01-15 DIAGNOSIS — J38.5 LARYNGOSPASM: ICD-10-CM

## 2021-01-15 PROCEDURE — 92526 ORAL FUNCTION THERAPY: CPT | Performed by: SPEECH-LANGUAGE PATHOLOGIST

## 2021-01-15 PROCEDURE — A4641 RADIOPHARM DX AGENT NOC: HCPCS | Performed by: RADIOLOGY

## 2021-01-15 PROCEDURE — 2500000003 HC RX 250 WO HCPCS: Performed by: RADIOLOGY

## 2021-01-15 PROCEDURE — 92611 MOTION FLUOROSCOPY/SWALLOW: CPT | Performed by: SPEECH-LANGUAGE PATHOLOGIST

## 2021-01-15 PROCEDURE — 74230 X-RAY XM SWLNG FUNCJ C+: CPT

## 2021-01-15 PROCEDURE — 6360000004 HC RX CONTRAST MEDICATION: Performed by: RADIOLOGY

## 2021-01-15 RX ADMIN — BARIUM SULFATE 120 ML: 400 SUSPENSION ORAL at 12:14

## 2021-01-15 RX ADMIN — BARIUM SULFATE 70 G: 0.81 POWDER, FOR SUSPENSION ORAL at 12:14

## 2021-01-15 RX ADMIN — BARIUM SULFATE 1 TABLET: 700 TABLET ORAL at 12:13

## 2021-01-15 RX ADMIN — BARIUM SULFATE 10 ML: 400 PASTE ORAL at 12:13

## 2021-01-15 NOTE — PROGRESS NOTES
SPEECH/LANGUAGE PATHOLOGY  VIDEOFLUOROSCOPIC STUDY OF SWALLOWING (MBS)      PATIENT NAME:  Sofia Abdi      :  1975      TODAY'S DATE:  1/15/2021   ROOM:  Room/bed info not found    SUMMARY OF EVALUATION    Chart reviewed and swallowing hx discussed with pt. Pt with recent diagnosis of Multiple Sclerosis. Also per chart pt had supraglottoplasty on 20. Pt currently receiving Speech Therapy for swallowing. DYSPHAGIA DIAGNOSIS:  Oropharyngeal swallow was normal at time of evaluation    Pt was educated on compensatory strategies for safe swallow to use as needed due to reported symptoms. DIET RECOMMENDATIONS:  Regular consistency solids with thin liquids as tolerated with use of compensatory strategies as trained. Pt to choose soft items when she notices oral muscle fatigue during mastication as reported.       FEEDING RECOMMENDATIONS:       Assistance level:  No assistance needed      Compensatory strategies recommended to be used as needed: Double swallow, Small bites/sips and Alternate solids and liquids    THERAPY RECOMMENDATIONS:      Therapy at the discretion of facility/treating Speech Pathologist      Recommend repeat video swallow eval if dysphagia symptoms worsen               PROCEDURE     Consistencies Administered During the Evaluation   Liquids: thin liquid and nectar thick liquid   Solids:  pureed foods and solid foods, barium tablet      Method of Intake:   cup, straw, spoon  Self fed, Fed by clinician      Position:   Seated, upright, Lateral plane    Current Respiratory Status   room air                RESULTS     ORAL STAGE       The oral stage of swallowing was within functional limits        PHARYNGEAL STAGE           ONSET TIME     Onset time of the pharyngeal swallow was adequate       PHARYNGEAL RESIDUALS          Vallecula/Pharyngeal Wall           No significant residuals were noted in the vallecula      Pyriform Sinuses      No significant residuals were noted in the pyriform sinuses    LARYNGEAL PENETRATION     Laryngeal penetration was not present during this evaluation                 ASPIRATION    Aspiration was not present during this evaluation         COMPENSATORY STRATEGIES       Compensatory strategies were not attempted      STRUCTURAL/FUNCTIONAL ANOMALIES       No structural/functional anomalies were noted      CERVICAL ESOPHAGEAL STAGE :        The cervical esophagus appeared adequate                            The Speech Language Pathologist (SLP) completed education with the patient regarding results of evaluation. INTERVENTION/EDUCATION    Pt educated on above results and plan of care with visuals. Pt trained on compensatory strategies for safe swallow due to reported symptoms with good outcome. Pt encouraged to engage in question/answer session. All questions answered and pt verbalized understanding of above. CPT code:  83060  dysphagia study, 67107 dysphagia therapy 15 mins        [x]The admitting diagnosis and active problem list, as listed below have been reviewed prior to initiation of this evaluation. ADMITTING DIAGNOSIS: Dysphagia, unspecified type [R13.10]  Laryngospasm [J38.5]     ACTIVE PROBLEM LIST:   Patient Active Problem List   Diagnosis    Vocal cord dysfunction    Multiple sclerosis ([address]    Morbidly obese ([address]       Fani ALEXANDER. CCC/SLP [id number]  Speech-Language Pathologist

## 2021-02-02 DIAGNOSIS — G89.18 POST-OPERATIVE PAIN: Primary | ICD-10-CM

## 2021-02-02 RX ORDER — ACETAMINOPHEN AND CODEINE PHOSPHATE 300; 30 MG/1; MG/1
1 TABLET ORAL EVERY 4 HOURS PRN
Qty: 42 TABLET | Refills: 0 | Status: SHIPPED | OUTPATIENT
Start: 2021-02-02 | End: 2021-02-09

## 2021-02-02 NOTE — TELEPHONE ENCOUNTER
Patient is requesting a medication refill, OARRS complete. Patient is 3 months out from right shoulder arthroscopic RCR with biceps tenodesis. Patient was last seen on 12/21 and patients next appointment is 2/15. Patient was last given norco 5 mg every 6 hours on 1/12.

## 2021-02-15 ENCOUNTER — OFFICE VISIT (OUTPATIENT)
Dept: ORTHOPEDIC SURGERY | Age: 46
End: 2021-02-15
Payer: COMMERCIAL

## 2021-02-15 VITALS — BODY MASS INDEX: 37.73 KG/M2 | TEMPERATURE: 97.1 F | RESPIRATION RATE: 18 BRPM | HEIGHT: 62 IN | WEIGHT: 205 LBS

## 2021-02-15 DIAGNOSIS — M25.511 RIGHT SHOULDER PAIN, UNSPECIFIED CHRONICITY: Primary | ICD-10-CM

## 2021-02-15 PROCEDURE — G8417 CALC BMI ABV UP PARAM F/U: HCPCS | Performed by: ORTHOPAEDIC SURGERY

## 2021-02-15 PROCEDURE — G8427 DOCREV CUR MEDS BY ELIG CLIN: HCPCS | Performed by: ORTHOPAEDIC SURGERY

## 2021-02-15 PROCEDURE — 4004F PT TOBACCO SCREEN RCVD TLK: CPT | Performed by: ORTHOPAEDIC SURGERY

## 2021-02-15 PROCEDURE — G8484 FLU IMMUNIZE NO ADMIN: HCPCS | Performed by: ORTHOPAEDIC SURGERY

## 2021-02-15 PROCEDURE — 99212 OFFICE O/P EST SF 10 MIN: CPT | Performed by: ORTHOPAEDIC SURGERY

## 2021-02-15 RX ORDER — ACETAMINOPHEN AND CODEINE PHOSPHATE 300; 30 MG/1; MG/1
1 TABLET ORAL EVERY 4 HOURS PRN
Qty: 42 TABLET | Refills: 0 | Status: SHIPPED | OUTPATIENT
Start: 2021-02-15 | End: 2021-02-22

## 2021-02-15 NOTE — PROGRESS NOTES
Chief Complaint   Patient presents with    Post-Op Check     13.5 weeks out, RIGHT SHOULDER DIAGNOSTIC ARTHROSCOPY WITH DECOMPRESSION ROTATOR CUFF REPAIR         Whitley Coughlin is a 54y.o. year old  who presents Just under 3 months out status post right shoulder arthroscopy with decompression biceps tenodesis with rotator cuff repair. Overall she reports her shoulder is doing well. Has been in therapy since her last visit and has progressed nicely. She does state that today it hurts worse than normal.  Otherwise she is doing well. Past Medical History:   Diagnosis Date    Acid reflux disease     Cyst of ovary     Headache     Hypertension     Migraine     Morbidly obese ([address] 10/5/2020    Multiple sclerosis [address]     denies limitations at present 11/2020    VEENA on CPAP     severe VEENA per pt    Right shoulder pain 11/2020     Past Surgical History:   Procedure Laterality Date    BACK SURGERY      BICEPS TENDON REPAIR Right 11/11/2020    BICEPS TENODESIS performed by Verito Patiño MD at [address] [facility]  03/05/2018    Robotic hysterectomy, bilateral salpingectomy, right oophorectomy  [id number] [facility] N/A 7/17/2020    DIRECT LARYNGOSCOPY--OMNI GUIDE LASER performed by Leobardo Barroso MD at [address] ARTHROSCOPY Right 11/11/2020    RIGHT SHOULDER DIAGNOSTIC ARTHROSCOPY WITH DECOMPRESSION ROTATOR CUFF REPAIR performed by Verito Patiño MD at [address]       Current Outpatient Medications:     acetaminophen-codeine (TYLENOL/CODEINE #3) 300-30 MG per tablet, Take 1 tablet by mouth every 4 hours as needed for Pain for up to 7 days. Intended supply: 7 days.  Take lowest dose possible to manage pain, Disp: 42 tablet, Rfl: 0    lisinopril-hydroCHLOROthiazide (PRINZIDE;ZESTORETIC) 10-12.5 MG per tablet, TAKE (1) TABLET BY MOUTH ONCE DAILY, Disp: 30 tablet, Rfl: 0   gabapentin (NEURONTIN) 300 MG capsule, Take 1 capsule by mouth 3 times daily for 90 days. , Disp: 90 capsule, Rfl: 2    vitamin D (CHOLECALCIFEROL) 63367 UNIT CAPS, Take 50,000 Units by mouth once a week, Disp: , Rfl:     CPAP Machine MISC, Heated tube and chin strap. Dispense as written.  LIFETIME SUPPLIES., Disp: , Rfl:     ibuprofen (ADVIL;MOTRIN) 600 MG tablet, Take 1 tablet by mouth 4 times daily as needed for Pain, Disp: 360 tablet, Rfl: 1    baclofen (LIORESAL) 10 MG tablet, Take 2 tablets by mouth 3 times daily, Disp: 180 tablet, Rfl: 5    nortriptyline (PAMELOR) 10 MG capsule, Take 10 mg by mouth nightly headaches, Disp: , Rfl:     ocrelizumab (OCREVUS) 300 MG/10ML SOLN injection, Infuse 300 mg intravenously once 2 weeks every 6 months, Disp: , Rfl:     omeprazole (PRILOSEC) 20 MG delayed release capsule, Take 1 capsule by mouth every morning (before breakfast), Disp: 30 capsule, Rfl: 3    diclofenac sodium (VOLTAREN) 1 % GEL, apply 2 grams to affected area four times a day, Disp: 200 g, Rfl: 0  No Known Allergies  Social History     Socioeconomic History    Marital status: Single     Spouse name: Not on file    Number of children: Not on file    Years of education: Not on file    Highest education level: Not on file   Occupational History    Not on file   Social Needs    Financial resource strain: Not on file    Food insecurity     Worry: Not on file     Inability: Not on file    Transportation needs     Medical: Not on file     Non-medical: Not on file   Tobacco Use    Smoking status: Current Every Day Smoker     Packs/day: 0.50     Years: 25.00     Pack years: 12.50     Types: Cigarettes    Smokeless tobacco: Never Used   Substance and Sexual Activity    Alcohol use: Yes     Comment: socially    Drug use: No    Sexual activity: Yes     Partners: Male   Lifestyle    Physical activity     Days per week: Not on file     Minutes per session: Not on file    Stress: Not on file Relationships    Social connections     Talks on phone: Not on file     Gets together: Not on file     Attends Buddhist service: Not on file     Active member of club or organization: Not on file     Attends meetings of clubs or organizations: Not on file     Relationship status: Not on file    Intimate partner violence     Fear of current or ex partner: Not on file     Emotionally abused: Not on file     Physically abused: Not on file     Forced sexual activity: Not on file   Other Topics Concern    Not on file   Social History Narrative    Not on file     Family History   Problem Relation Age of Onset    Mult Sclerosis Mother     Colon Cancer Neg Hx     Uterine Cancer Neg Hx     Ovarian Cancer Neg Hx     Breast Cancer Neg Hx        Skin: (-) rash,(-) psoriasis,(-) eczema, (-)skin cancer. Musculoskeletal: Pain right shoulder  Neurologic: (-) epilepsy, (-)seizures,(-) brain tumor,(-) TIA, (-)stroke, (-)headaches, (-)Parkinson disease,(-) memory loss, (-) LOC. Cardiovascular: (-) Chest pain, (-) swelling in legs/feet, (-) SOB, (-) cramping in legs/feet with walking. SUBJECTIVE:      Constitutional:    The patient is alert and oriented x 3, appears to be stated age and in no distress. Physical exam: Shoulder incisions healing nicely. No signs of infection. Good ROM to elbow, wrist, and fingers. Stiffness with passive ROM to the shoulder. Remains neurovascular intact. Roughly 90 degrees of abduction and 150 degrees flexion actively, internal and external rotation to 15 degrees. Radial, ulnar, median and axillary nerve sensation intact light touch. Motor testing 5/5 grossly to the upper extremity. 2+ radial pulse. Otherwise neurovascular intact.     Assessment: 3 months postop right shoulder arthroscopy decompression biceps tenodesis with repair of the rotator cuff doing well  Multiple sclerosis  Hypertension  Obesity    Plan Patient to continue therapy as able. More prescription for Tylenol 3 today in office. She will continue strengthening for another 6 weeks in therapy. Provide her with a letter allowing her to return her would like to a using her left upper extremity and on her leg. .  We will have her follow-up as needed. Continue strengthening. I have seen and evaluated the patient and agree with the above assessment and plan on today's visit. I have performed the key components of the history and physical examination with significant findings of 3 months postop right rotator cuff repair doing well. Advance in therapy as tolerated. . I concur with the findings and plan as documented.     Jai Deal MD  2/15/2021

## 2021-02-18 ENCOUNTER — OFFICE VISIT (OUTPATIENT)
Dept: ENT CLINIC | Age: 46
End: 2021-02-18
Payer: COMMERCIAL

## 2021-02-18 VITALS — HEIGHT: 62 IN | WEIGHT: 218 LBS | RESPIRATION RATE: 20 BRPM | BODY MASS INDEX: 40.12 KG/M2

## 2021-02-18 DIAGNOSIS — R49.0 HOARSENESS: ICD-10-CM

## 2021-02-18 DIAGNOSIS — F17.200 SMOKER: ICD-10-CM

## 2021-02-18 DIAGNOSIS — R13.10 DYSPHAGIA, UNSPECIFIED TYPE: Primary | ICD-10-CM

## 2021-02-18 DIAGNOSIS — K21.9 GASTROESOPHAGEAL REFLUX DISEASE, UNSPECIFIED WHETHER ESOPHAGITIS PRESENT: ICD-10-CM

## 2021-02-18 DIAGNOSIS — J38.3 VOCAL CORD DYSFUNCTION: ICD-10-CM

## 2021-02-18 DIAGNOSIS — J38.1 REINKE'S EDEMA OF VOCAL FOLDS: ICD-10-CM

## 2021-02-18 DIAGNOSIS — J38.5 LARYNGOSPASM: ICD-10-CM

## 2021-02-18 DIAGNOSIS — K21.9 LARYNGOPHARYNGEAL REFLUX (LPR): ICD-10-CM

## 2021-02-18 PROCEDURE — 4004F PT TOBACCO SCREEN RCVD TLK: CPT | Performed by: OTOLARYNGOLOGY

## 2021-02-18 PROCEDURE — 99214 OFFICE O/P EST MOD 30 MIN: CPT | Performed by: OTOLARYNGOLOGY

## 2021-02-18 PROCEDURE — G8484 FLU IMMUNIZE NO ADMIN: HCPCS | Performed by: OTOLARYNGOLOGY

## 2021-02-18 PROCEDURE — G8417 CALC BMI ABV UP PARAM F/U: HCPCS | Performed by: OTOLARYNGOLOGY

## 2021-02-18 PROCEDURE — G8427 DOCREV CUR MEDS BY ELIG CLIN: HCPCS | Performed by: OTOLARYNGOLOGY

## 2021-02-18 PROCEDURE — 31575 DIAGNOSTIC LARYNGOSCOPY: CPT | Performed by: OTOLARYNGOLOGY

## 2021-02-18 NOTE — PROGRESS NOTES
Dear Dr Reese Barba PA-C     We had the pleasure of seeing Ravi Horton, 1975 here    on 2/25/2021  Please see below for review of care and plans. Chief complaint-     ICD-10-CM    1. Dysphagia, unspecified type  R13.10 Joel Ahn MD, Gastroenterology, [facility] ([facility])   2. Smoker  F17.200    3. Hoarseness  R49.0    4. 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 Laryngopharyngeal reflux (LPR)  K21.9 Joel Ahn MD, Gastroenterology, [facility] ([facility])   6. Laryngospasm  J38.5    7. Gastroesophageal reflux disease, unspecified whether esophagitis present  K21.9 Joel Ahn MD, Gastroenterology, [facility] ([facility])   8. Vocal cord dysfunction  J38.3          History of Present Illness-  Pt with last Friday to ED for feeling of neck tightness and throat closing. Never happened before. 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 Good water, some caffeine. Had steroids in ed and helped a bit but not much better. Also with trouble getting food down at times in ititiating a swallow. 7/23/20 pt breathing much better after supraglottoplasty and removal of redundant tissue. pain still there but only taking tylenol. 10/1/20 pt continues to breathe well. Still smoking. Still with sensation of throat closing off at times, especially when she swallows. Still hoarse. 2/18/2021: still notes some hoarseness and dysphagia, both improving but slowly. Describes hoarseness as intermittent and feeling of needing to cough. States she can swallow but transit of bolus is slow. Denies weight loss  Continues to smoke    Review of Systems- No drainage, discharge, or headache. Complete 10 system ROS completed and negative except as noted above. Physical Examination-   Vital Signs-Resp 20   Ht 5' 2\" (1.575 m)   Wt 218 lb (98.9 kg)   LMP 01/05/2018   BMI 39.87 kg/m²     Ears- Tympanic membranes clear bilaterally. No middle ear effusion. No pre or post auricular tenderness.   Nose- Nasal mucosa clear and dry. No significant septal deviation or inferior turbinate hypertrophy. Oral Cavity/Oropharynx- Floor of mouth and tongue are soft and nontender. No posterior pharyngeal erythema. + gag reflex  Neck- Soft and nontender. No masses, lesions, lymphadenopathy, or thyroid nodules appreciated. Cranial Nerve- Cranial nerves II to XII intact. Extraocular muscles intact. No gross motor visual deficits. No spontaneous nystagmus. Face- No facial skin tenderness to palpation. Heart- No cyanosis, regular  Lungs- No stridor, no intercostal accessory muscle use  General- The patient is in no acute distress. A&O x3    Scope  Procedure note- after pt verbally consented, was sprayed nasally with 1:1 neosynephrine and xylocaine. Scope was passed and found nasal cavity with no lesion. np clear, tonsil wnl, tongue mobile and no masses, vocal cords mobile bilaterally with full ab and ad duction. Hypopharynx clear, open and no masses. enttbrefluxexam   Pt with hyperemia and hypervascularity of post-cricoid mucosa that was waterfalling into posterior glottic area. Well healed area with patent airway and no obstruction, tight epiglottis, swallowing well with no aspiration or pooling of secretions. Noted today while swallowing that epiglottis is getting caught/pinched by the posterior pharyngeal wall after a swallow and preventing the epiglottis from reverting back to the normal position and thereby the glottis is being covered by the epiglottis. The protuberant tongue base is pushing the epiglottis posterior and causing it to get trapped by the posterio pharyngeal wall. This I feel is the reason she feels tight and has times of her throat being closed off because the epiglottis is still hovering over the glottic aperture.   We had her swallow with a chin tuck and head up with a chin tuck seeming to offer some benefit as the epiglottis seemed to not get trapped- improved   2/18/2021: Pt with hyperemia and hypervascularity of post-cricoid mucosa, consistent with irritation. Epiglottis mobile, does not touch pharyngeal wall , mild reinkes edema, no evidence of ball-valving, thick mucosal stranding. Voice   g- 1.5, r- 1.5, a- 1.5, b- 1.5, s- 2   Reserve 60  Glide average  Projection good  Voice however has moments of strain that include breathiness, raspiness, and aphonia- improved     2/18/21: g-0 r-0 a-0 b-0 s-0      Medical Decision Making and Treatment Plan.  [email or web address]   Plan at this time was to treat his reflux   1- ppi-  Continue and will refer to GI for further evaluation   2- Increase hydration, discussed in depth   3- decrease caffeine/chocolate   4- diet modification   5- quit smoking- smoking cessation, discussed in depth  6- speech therapy- continue- work with speech to eval head positions to see if that helps with epiglottis returning to normal position. If not able to be improve, consider, bot reduction which may allow epiglottis to move more anteriorly and thereby not get trapped by the posterio rpharyngeal wall. - Patient is improving since last visit, continue with speech therapy, ppi and smoking cessation at this time  7- pt being worked up for a brain issue- maybe MS  8- pt with voice abnormality- multiple etiologies at this time- MS, spasmodic , dysphonia, anxiety, etc- improved, continue speech, don't think she needs or would benefit with botox at this time  9 -s/p supraglottoplasty, doing well  10. Had sleep study with + response to cpap  11- discussed nasal saline irrigation   12- follow up in 3-4 months with scope    13. Quit smoking enforced    Thank you for the opportunity to take part in the care of this very pleasant patient, Justin Bowman  Sincerely,            Rae Lopez.  Jose R Us M.D., Ph.D., [facility]  Department of Otolaryngology-Head and Neck Surgery

## 2021-02-19 ENCOUNTER — PROCEDURE VISIT (OUTPATIENT)
Dept: PHYSICAL MEDICINE AND REHAB | Age: 46
End: 2021-02-19

## 2021-02-19 VITALS
WEIGHT: 220 LBS | BODY MASS INDEX: 40.48 KG/M2 | HEART RATE: 89 BPM | HEIGHT: 62 IN | SYSTOLIC BLOOD PRESSURE: 124 MMHG | DIASTOLIC BLOOD PRESSURE: 75 MMHG

## 2021-02-19 DIAGNOSIS — Z02.71 DISABILITY EXAMINATION: Primary | ICD-10-CM

## 2021-02-19 PROCEDURE — MISCBDD BUREAU OF DISABILITY DETERMINATION: Performed by: PHYSICAL MEDICINE & REHABILITATION

## 2021-02-19 NOTE — PROGRESS NOTES
Katya Retana D.O. [facility]   [address] [facility] [address]  Phone: [phone]  Fax: [phone]      2021     Carmella Díaz  :  1975    Carmella Díaz was seen in my office today for a Disability Determination Examination. The history was obtained from the claimant who was felt to be reliable. The claimant was identified by photo identification. I explained to the claimant that this examination was for evaluation purposes only and that no physician-patient relationship exists. The claimant expressed understanding and agreement. A release of information was signed and placed in the chart. I advised the claimant not to cause bodily harm or significant pain with any of the requested activities    Carmella Díaz is a right hand dominant woman who reports to be disabled due to pain in her bilateral feet. She states she was diagnosed with multiple sclerosis in 2020 by Veena Licona. She is treated with Ocrevus. The onset of the disabling condition was with a sudden onset after no injury about 2 months ago. The work up has included: MRI brain, spine, lumbar puncture. Symptoms have been getting worse since onset. Currently, the pain is located in the bilateral feet and does radiate to the knees. The pain is described as burning and rated as Pain Score:   9. The pain is constant and occurs daily. The pain is worse with clothing touching her and walking and better with rest.  Associated symptoms include paresthesias in the bilateral anterior thighs. Prior treatment: Effective medications have included gabapentin. Ineffective medications have included none.      Records Reviewed   Progress Notes:  Paola Alvarez MD; 20, 20, Kurtis Jeffery MD; 10-01-20    Treatment YES/NO Effective/Ineffective   Therapy No    Surgery No    Injection No    Electric stimulation No    Massage No    Ultrasound No    Heat Yes chills, fatigue, fever, malaise, night sweats, weight gain,  weight loss. Psychological: anxiety, depression, suicidal ideation, sleep disturbances, behavioral disorder, difficulty concentrating, disorientation, hallucinations, mood swings, obsessive thoughts, physical abuse,  sexual abuse. Ophthalmic: blurry vision, decreased vision, double vision, loss of vision, photophobia, use of corrective device. Ear Nose Throat: hearing loss, tinnitus, phonophobia, sensitivity to smells, vertigo, or vocal changes. Allergy/Immunology: seasonal allergies, watery eyes, itchy eyes, frequent infections. Hematological and Lymphatic: bleeding problems, blood clots, bruising,  yellowing of the skin, swollen lymph nodes. Endocrine:  polydypsia, polyuria, temperature intolerance. Respiratory: cough, shortness of breath, wheezing. Cardiovascular: syncope, chest pain, dyspnea on exertion, edema, irregular heartbeat,  palpitations. Gastrointestinal: abdominal pain, constipation, diarrhea,  decreased appetite, heartburn, hematemesis, melena, nausea, vomiting, stool incontinence, abnormal swallowing. Genito-Urinary: dysuria, hematuria, incontinence, frequency, urgency. Musculoskeletal: joint pain, stiffness, swelling, muscle pain, muscle  tenderness. Neurological: confusion, memory loss, dizziness, gait disturbance, headaches, impaired coordination, decreased balance, numbness/tingling, seizures, speech problems, tremors,weakness. Dermatological:  hair changes, nail changes, pruritus, rash. PHYSICAL EXAMINATION: Blood pressure 124/75, pulse 89, height 5' 2\" (1.575 m), weight 220 lb (99.8 kg), last menstrual period 01/05/2018. The claimant was cooperative with the examination. Please see the neuro-musculoskeletal data sheet for more details of the physical examination. General: No apparent distress. Appears of average intellect. Behavior was appropriate. Able to relate. Personal appearance was well groomed.  Psychosocial: Mood and affect were appropriate. HEENT: Snellen eye chart 20/30 left, 20/50 on right. No palpable cervical lymph nodes. Anicteric. No conjunctival injection. Mucosa moist and pink. Cardiovascular: Regular Rate and Rhythm. No peripheral edema. Peripheral pulses 2+ at dorsalis pedis and radial arteries. Pulmonary: Unlabored and Regular Abdomen: Soft, non-tender. No abdominal bruit or pulsatile mass. Skin: Normal turgor without wound or rash. Musculoskeletal: Spurling negative bilaterally. Straight leg raise negative bilaterally. Otherwise, there was no crepitus, pain with ROM maneuvers, warmth, edema, effusion, erythema, tenderness to palpation, synovial thickening,  deformity including contracture, bony enlargement,varus/valgus deformity, ulnar deviation or joint instability or ligamentous laxity. Neurologic:  Awake, alert, and oriented to person place and time. Speech is fluent. Hearing is intact for conversation. Sensation was diminished bilateral anterior thigh to light touch, otherwise, intact for temperature, light touch, vibration, proprioception. Coordination was intact in the upper extremities for finger to nose and in the lower extremities for heel to shin. Rapid alternating movements were intact in the upper and lower extremities. Muscle tendon reflexes were 2+ and symmetric at the biceps, triceps, brachioradialis, right patella and 1+ left quadriceps and absent bilateral achilles. Romberg was negative. Heel and toe walking were intact. Gait was antalgic. There was a visible limp. It is safe for the claimant to walk without a hand-held assistive device. The claimant is able to walk both short and long distances on level and on uneven surfaces. Functional status: The claimant was able to sit/stand/walk without an assistive device independently. Lacy Bansal  was able to dress independently and reach overhead. The claimant was able to write, shake hands and perform fine motor movements. Impression: This is a 54y.o. year old claimant with   1. Multiple sclerosis  2. Neuropathic pain  3. Obesity  4. Hypertension  5. History of lumbar surgery reports fully recovered  6. History of right rotator cuff repair reports fully recovered    There are significant medically determinable impairments. The claimant can hear, speak, remember, understand, concentrate, interact, and adapt without limitation. The claimaint can lift/carry up to 25 pounds at the waist level. Vester Sharps can handle objects without limitation. The claimant can sit for a maximum of 120  minute intervals for a maximum of 8 hours per day;  can stand for a maximum of 15 minute intervals for a total of 2 hours per day; can be in the upright position either standing or walking for a total of 2 hours a day. Vester Sharps can walk independently household distances without assistive device. The claimant is a falls risk and should avoid unprotected heights. Vester Sharps  shoud avoid climbing stairs, ramps, stooping, kneeling, crouching, crawling, prolonged sitting/standing/walking. The claimant should have the ability to sit or stand at will. If awarded benefits the claimant would be able to manage them. The claimant does report psychiatric conditions as a cause of disability. A psychiatric consultative examination is  recommended to further evaluate. The above analysis is based upon the available information at the time of this examination including the history given by the claimant,  the medical records and tests reviewed and the physical findings. It is assumed that the material provided is correct. Any medical recommendations offered are provided as guidance and not as medical orders. I have not provided care for this claimaint. I have seen the claimaint one time on 2/19/2021 , for the purpose of a disability determination.   The opinions expressed are based solely on the information provided to me and on

## 2021-02-22 RX ORDER — GABAPENTIN 100 MG/1
CAPSULE ORAL
Qty: 270 CAPSULE | Refills: 1 | OUTPATIENT
Start: 2021-02-22

## 2021-03-01 DIAGNOSIS — K21.9 LARYNGOPHARYNGEAL REFLUX (LPR): ICD-10-CM

## 2021-03-01 DIAGNOSIS — R13.10 DYSPHAGIA, UNSPECIFIED TYPE: Primary | ICD-10-CM

## 2021-03-04 ENCOUNTER — HOSPITAL ENCOUNTER (OUTPATIENT)
Dept: INFUSION THERAPY | Age: 46
Setting detail: INFUSION SERIES
Discharge: HOME | End: 2021-03-04
Payer: COMMERCIAL

## 2021-03-04 VITALS
DIASTOLIC BLOOD PRESSURE: 62 MMHG | HEART RATE: 74 BPM | TEMPERATURE: 97.2 F | SYSTOLIC BLOOD PRESSURE: 131 MMHG | RESPIRATION RATE: 16 BRPM

## 2021-03-04 DIAGNOSIS — G35 MULTIPLE SCLEROSIS (HCC): Primary | ICD-10-CM

## 2021-03-04 PROCEDURE — 2580000003 HC RX 258: Performed by: NURSE PRACTITIONER

## 2021-03-04 PROCEDURE — 6360000002 HC RX W HCPCS: Performed by: NURSE PRACTITIONER

## 2021-03-04 PROCEDURE — 96375 TX/PRO/DX INJ NEW DRUG ADDON: CPT

## 2021-03-04 PROCEDURE — 6370000000 HC RX 637 (ALT 250 FOR IP): Performed by: NURSE PRACTITIONER

## 2021-03-04 PROCEDURE — 96365 THER/PROPH/DIAG IV INF INIT: CPT

## 2021-03-04 PROCEDURE — 96366 THER/PROPH/DIAG IV INF ADDON: CPT

## 2021-03-04 RX ORDER — SODIUM CHLORIDE 0.9 % (FLUSH) 0.9 %
10 SYRINGE (ML) INJECTION PRN
Status: DISCONTINUED | OUTPATIENT
Start: 2021-03-04 | End: 2021-03-05 | Stop reason: HOSPADM

## 2021-03-04 RX ORDER — METHYLPREDNISOLONE SODIUM SUCCINATE 125 MG/2ML
50 INJECTION, POWDER, LYOPHILIZED, FOR SOLUTION INTRAMUSCULAR; INTRAVENOUS ONCE
Status: CANCELLED
Start: 2021-03-05 | End: 2021-03-05

## 2021-03-04 RX ORDER — METHYLPREDNISOLONE SODIUM SUCCINATE 125 MG/2ML
100 INJECTION, POWDER, LYOPHILIZED, FOR SOLUTION INTRAMUSCULAR; INTRAVENOUS ONCE
Status: COMPLETED | OUTPATIENT
Start: 2021-03-04 | End: 2021-03-04

## 2021-03-04 RX ORDER — METHYLPREDNISOLONE SODIUM SUCCINATE 125 MG/2ML
100 INJECTION, POWDER, LYOPHILIZED, FOR SOLUTION INTRAMUSCULAR; INTRAVENOUS ONCE
Status: CANCELLED | OUTPATIENT
Start: 2021-03-05

## 2021-03-04 RX ORDER — DIPHENHYDRAMINE HYDROCHLORIDE 50 MG/ML
25 INJECTION INTRAMUSCULAR; INTRAVENOUS ONCE
Status: CANCELLED
Start: 2021-03-05 | End: 2021-03-05

## 2021-03-04 RX ORDER — DIPHENHYDRAMINE HYDROCHLORIDE 50 MG/ML
25 INJECTION INTRAMUSCULAR; INTRAVENOUS ONCE
Status: COMPLETED | OUTPATIENT
Start: 2021-03-04 | End: 2021-03-04

## 2021-03-04 RX ORDER — SODIUM CHLORIDE 9 MG/ML
INJECTION, SOLUTION INTRAVENOUS CONTINUOUS
Status: ACTIVE | OUTPATIENT
Start: 2021-03-04 | End: 2021-03-04

## 2021-03-04 RX ORDER — ACETAMINOPHEN 325 MG/1
650 TABLET ORAL ONCE
Status: COMPLETED | OUTPATIENT
Start: 2021-03-04 | End: 2021-03-04

## 2021-03-04 RX ORDER — SODIUM CHLORIDE 0.9 % (FLUSH) 0.9 %
10 SYRINGE (ML) INJECTION PRN
Status: CANCELLED | OUTPATIENT
Start: 2021-03-05

## 2021-03-04 RX ORDER — SODIUM CHLORIDE 9 MG/ML
INJECTION, SOLUTION INTRAVENOUS CONTINUOUS
Status: CANCELLED | OUTPATIENT
Start: 2021-03-05

## 2021-03-04 RX ORDER — DIPHENHYDRAMINE HYDROCHLORIDE 50 MG/ML
25 INJECTION INTRAMUSCULAR; INTRAVENOUS ONCE
Status: CANCELLED | OUTPATIENT
Start: 2021-03-05

## 2021-03-04 RX ORDER — ACETAMINOPHEN 325 MG/1
650 TABLET ORAL ONCE
Status: CANCELLED | OUTPATIENT
Start: 2021-03-05

## 2021-03-04 RX ORDER — EPINEPHRINE 1 MG/ML
0.3 INJECTION, SOLUTION, CONCENTRATE INTRAVENOUS PRN
Status: CANCELLED | OUTPATIENT
Start: 2021-03-05

## 2021-03-04 RX ORDER — METHYLPREDNISOLONE SODIUM SUCCINATE 125 MG/2ML
125 INJECTION, POWDER, LYOPHILIZED, FOR SOLUTION INTRAMUSCULAR; INTRAVENOUS ONCE
Status: CANCELLED | OUTPATIENT
Start: 2021-03-05 | End: 2021-03-05

## 2021-03-04 RX ORDER — DIPHENHYDRAMINE HYDROCHLORIDE 50 MG/ML
50 INJECTION INTRAMUSCULAR; INTRAVENOUS ONCE
Status: CANCELLED | OUTPATIENT
Start: 2021-03-05 | End: 2021-03-05

## 2021-03-04 RX ADMIN — DIPHENHYDRAMINE HYDROCHLORIDE 25 MG: 50 INJECTION, SOLUTION INTRAMUSCULAR; INTRAVENOUS at 08:25

## 2021-03-04 RX ADMIN — SODIUM CHLORIDE, PRESERVATIVE FREE 10 ML: 5 INJECTION INTRAVENOUS at 08:25

## 2021-03-04 RX ADMIN — SODIUM CHLORIDE: 9 INJECTION, SOLUTION INTRAVENOUS at 08:30

## 2021-03-04 RX ADMIN — METHYLPREDNISOLONE SODIUM SUCCINATE 100 MG: 125 INJECTION, POWDER, FOR SOLUTION INTRAMUSCULAR; INTRAVENOUS at 08:22

## 2021-03-04 RX ADMIN — SODIUM CHLORIDE, PRESERVATIVE FREE 10 ML: 5 INJECTION INTRAVENOUS at 08:20

## 2021-03-04 RX ADMIN — OCRELIZUMAB 600 MG: 300 INJECTION INTRAVENOUS at 09:14

## 2021-03-04 RX ADMIN — ACETAMINOPHEN 650 MG: 325 TABLET ORAL at 08:22

## 2021-03-04 ASSESSMENT — PAIN DESCRIPTION - DESCRIPTORS: DESCRIPTORS: ACHING;CONSTANT;BURNING

## 2021-03-04 ASSESSMENT — PAIN DESCRIPTION - ORIENTATION: ORIENTATION: RIGHT

## 2021-03-04 ASSESSMENT — PAIN DESCRIPTION - PAIN TYPE: TYPE: ACUTE PAIN

## 2021-03-04 ASSESSMENT — PAIN DESCRIPTION - PROGRESSION: CLINICAL_PROGRESSION: NOT CHANGED

## 2021-03-04 ASSESSMENT — PAIN - FUNCTIONAL ASSESSMENT: PAIN_FUNCTIONAL_ASSESSMENT: PREVENTS OR INTERFERES SOME ACTIVE ACTIVITIES AND ADLS

## 2021-03-04 ASSESSMENT — PAIN SCALES - GENERAL
PAINLEVEL_OUTOF10: 7
PAINLEVEL_OUTOF10: 7

## 2021-03-04 ASSESSMENT — PAIN DESCRIPTION - FREQUENCY: FREQUENCY: INTERMITTENT

## 2021-03-04 ASSESSMENT — PAIN DESCRIPTION - ONSET: ONSET: ON-GOING

## 2021-03-04 ASSESSMENT — PAIN DESCRIPTION - LOCATION: LOCATION: SHOULDER

## 2021-03-10 RX ORDER — GABAPENTIN 300 MG/1
CAPSULE ORAL
Qty: 90 CAPSULE | Refills: 5 | Status: SHIPPED
Start: 2021-03-10 | End: 2021-09-13

## 2021-03-16 RX ORDER — NORTRIPTYLINE HYDROCHLORIDE 10 MG/1
10 CAPSULE ORAL NIGHTLY
Qty: 30 CAPSULE | Refills: 3 | Status: SHIPPED
Start: 2021-03-16 | End: 2021-07-02

## 2021-03-29 ENCOUNTER — PATIENT MESSAGE (OUTPATIENT)
Dept: NEUROLOGY | Age: 46
End: 2021-03-29

## 2021-03-31 NOTE — TELEPHONE ENCOUNTER
From: Genesis Lopez  Sent: 3/31/2021 4:45 AM EDT  To: [facility] Clinical Staff  Subject: RE: Non-Urgent Medical Question    [id number] Jenna Randolph thank you my next infusion is the beginning of September so I'll wait and schedule an appointment in August for my vaccination     ----- Message -----  From: Shawn Montauk: 3/30/21, 7:58 AM  To: Genesis Lopez  Subject: RE: Non-Urgent Medical Question    Mandy Esqueda. Our providers are recommending multiple sclerosis patients get the vaccine but it must be 4 weeks before your next Ocrevus infusion.     Atif Gregorio      ----- Message -----   From:Kendra Garay   Sent:3/29/2021 4:55 PM EDT   To:MARIXA Gutiérrez CNP   Subject:Non-Urgent Medical Question    Good evening I was wondering if I am on ocrevus infusion is it safe for me to get a Covid-19 vaccination before I sign up for it

## 2021-04-05 ENCOUNTER — HOSPITAL ENCOUNTER (OUTPATIENT)
Dept: PSYCHIATRY | Age: 46
Discharge: HOME | End: 2021-04-05
Payer: COMMERCIAL

## 2021-04-05 NOTE — FLOWSHEET NOTE
Assessment complete. Client provided verbal consent to participate in the tobacco program via telehealth.      Electronically signed by Tawnya Nava on 4/5/2021 at 1:23 PM

## 2021-04-12 ENCOUNTER — HOSPITAL ENCOUNTER (OUTPATIENT)
Dept: PSYCHIATRY | Age: 46
Discharge: HOME | End: 2021-04-12
Payer: COMMERCIAL

## 2021-04-12 PROCEDURE — 99412 PREVENTIVE COUNSELING GROUP: CPT | Performed by: COUNSELOR

## 2021-04-19 NOTE — PROGRESS NOTES
[address] [facility]   Progress Note - 5 Week Program     Client Name: Shaunna Packer    Treatment Site:   [x][facility]      [] [facility]                      CO Level:  ppm    Length of Service: 60 minutes       Session Type:  [] In Person [x] Virtually - Provider Location [x][facility]      [] [facility]                    Patient Location    [x]Patient's Home    [] Other:       Session Provided: [] Individual   [x]Group             Client/Staff Ratio: 2/1                                Clients target quit date: 2021       Last date of tobacco use: 21    Client's actual quit date:       [x]  Client still smoking     Pharmacotherapy provided this session:  []  NRT: Patch  []21mg . / []14mg.  / []7mg. []  NRT:  Gum []2mg. / []4mg.   x (  )boxes  [x]  NRT: Lozenge [x]2mg.  / []4mg.  x ( 1 ) tubes      [x]  Varenicline [x]0.5 mg.  [] 1 mg. Wks. ( 1,2 )  []  Bupropion    Wks.  (  )  []  Other                                                                Treatment Objectives addressed during the session:       [] Coping Skils [] Secondhand Smoke Risks   [] Relapse Prevention [x] Promote Self Efficacy   [x] Addiction [] Enhance Self-Image   [] Stress Management [] Use Self Affirmation   [] Health and Wellness [] Problem Solving Techniques   []  [] Conflict Resolution/Anger Management      Clients Response to counseling:  [x] Active participant                        [] Passive listener        [] No behavior change, still participating                   [] Inappropriate:   [] Implemented other strategy/behavior changes:   [x] Discussed Quit Plan that will be implemented  [] Re-set Quit Date:     Clients Response to Pharmacotherapy:  [] Decreased cravings  [] Decrease in tobacco use:   [] Maintaining abstinence  [] No change experienced by client  [x]        Risk/Benefit Meds education provided to client  []        Adverse reaction:   []       Other:     Plan of Action:  [] Maintain abstinence  [] Continue treatment;     [] Change pharmacotherapy:   [] Attend Nicotine Anonymous meeting   [x]        Assignments/Homework: complete addiction section of crash course. []        Triggers / Concerns to be addressed:   [] Graduated / received aftercare plan    Comments: Note late. Didn't have enough time to complete note after group. JOSE MANUEL: Signature, Date, Time: Electronically signed by Parker Gill on 4/19/2021 at 4:21 PM  This note is for service date 4/12/2021. CO Level:  No reading.

## 2021-05-04 ENCOUNTER — CARE COORDINATION (OUTPATIENT)
Dept: CARE COORDINATION | Age: 46
End: 2021-05-04

## 2021-05-04 SDOH — SOCIAL STABILITY: SOCIAL NETWORK: HOW OFTEN DO YOU ATTEND CHURCH OR RELIGIOUS SERVICES?: NEVER

## 2021-05-04 SDOH — SOCIAL STABILITY: SOCIAL NETWORK: HOW OFTEN DO YOU ATTENT MEETINGS OF THE CLUB OR ORGANIZATION YOU BELONG TO?: NEVER

## 2021-05-04 SDOH — SOCIAL STABILITY: SOCIAL NETWORK: IN A TYPICAL WEEK, HOW MANY TIMES DO YOU TALK ON THE PHONE WITH FAMILY, FRIENDS, OR NEIGHBORS?: THREE TIMES A WEEK

## 2021-05-04 SDOH — ECONOMIC STABILITY: TRANSPORTATION INSECURITY
IN THE PAST 12 MONTHS, HAS THE LACK OF TRANSPORTATION KEPT YOU FROM MEDICAL APPOINTMENTS OR FROM GETTING MEDICATIONS?: NO

## 2021-05-04 SDOH — HEALTH STABILITY: PHYSICAL HEALTH: ON AVERAGE, HOW MANY DAYS PER WEEK DO YOU ENGAGE IN MODERATE TO STRENUOUS EXERCISE (LIKE A BRISK WALK)?: 3 DAYS

## 2021-05-04 SDOH — ECONOMIC STABILITY: FOOD INSECURITY: WITHIN THE PAST 12 MONTHS, THE FOOD YOU BOUGHT JUST DIDN'T LAST AND YOU DIDN'T HAVE MONEY TO GET MORE.: NEVER TRUE

## 2021-05-04 SDOH — ECONOMIC STABILITY: FOOD INSECURITY: WITHIN THE PAST 12 MONTHS, YOU WORRIED THAT YOUR FOOD WOULD RUN OUT BEFORE YOU GOT MONEY TO BUY MORE.: SOMETIMES TRUE

## 2021-05-04 SDOH — SOCIAL STABILITY: SOCIAL NETWORK: HOW OFTEN DO YOU GET TOGETHER WITH FRIENDS OR RELATIVES?: TWICE A WEEK

## 2021-05-04 SDOH — HEALTH STABILITY: MENTAL HEALTH
STRESS IS WHEN SOMEONE FEELS TENSE, NERVOUS, ANXIOUS, OR CAN'T SLEEP AT NIGHT BECAUSE THEIR MIND IS TROUBLED. HOW STRESSED ARE YOU?: NOT AT ALL

## 2021-05-04 SDOH — HEALTH STABILITY: MENTAL HEALTH: HOW MANY STANDARD DRINKS CONTAINING ALCOHOL DO YOU HAVE ON A TYPICAL DAY?: 1 OR 2

## 2021-05-04 SDOH — HEALTH STABILITY: MENTAL HEALTH: HOW OFTEN DO YOU HAVE A DRINK CONTAINING ALCOHOL?: MONTHLY OR LESS

## 2021-05-04 NOTE — LETTER
5/4/2021    [address] [place] [id number]    Dear Enrique Suggs,    I enjoyed speaking with you and wanted to send some additional information. Josiane Cevallos PA-C and I will work together to ensure your needs are met and help you achieve your health goals. We are committed to walk with you on this journey and look forward to working with you. Please feel free to contact me with any questions or concerns. I am available by phone. You can reach me at [phone].     In good health,     Yenifer Levi RN

## 2021-05-04 NOTE — CARE COORDINATION
Ambulatory Care Coordination Note  5/4/2021  CM Risk Score: 4  Charlson 10 Year Mortality Risk Score: 2%     ACC: Lion Wright, RN    Summary Note:   [facility] contacted patient to educate her on the Care Coordination program and to offer enrollment. Patient did agree to enroll. Medications and the enrollment process was completed on this outreach. Patient lives with her boyfriend in a 2 story home with 5 steps to enter. Restroom is on the main floor. There are 12 stairs to the bedroom and 10 stairs to the basement where the laundry room is located. Patient is independent with ADLS. She utilizes no assistive devices at this time for ambulation. Patient does require assistance for transportation (she does use her insurance benefit for transportation when needed). Patient's PCP is Mely Mack. AC did contact pre-service with the patient on the line to assist in scheduling an appointment with PCP. Patient agreed on the scheduled date and time of the appointment. Healthcare decision makers were reviewed and all information is current. Patient is active on My Chart and she is aware how to use this resource. GENERAL - MS  -Patient states she was diagnosed with MS in August, 2020.   -Patient is attending PT 3x/week at [facility].   -Patient does receive her OCREVU infusion at the [facility]. SMOKING  -Patient states she smokes approximately 1/2 pack of cigarettes per day. She does state she is ready to stop.   -Patient is going to discuss a prescription for Chantix at her scheduled PCP appointment. VEENA  -Patient does follow with Dr. Jolanda Libman.   -Patient currently does not use her C-PAP machine. She is scheduled to perform an at home sleep study. The results of the sleep study will determine if patient needs to continue to wear her C-PAP machine. PLAN  -Continue Care Coordination  -F/U on status of obtaining Chantix for smoking cessation.    -F/U on progress in PT.   -Monitor status of VEENA and use or non-use of C-PAP machine. Ambulatory Care Coordination Assessment    Care Coordination Protocol  Program Enrollment: Complex Care  Referral from Primary Care Provider: No  Week 1 - Initial Assessment     Do you have all of your prescriptions and are they filled?: Yes  Are you able to afford your medications?: Yes  How often do you have trouble taking your medications the way you have been told to take them?: I always take them as prescribed. Do you have Home O2 Therapy?: No      Ability to seek help/take action for Emergent Urgent situations i.e. fire, crime, inclement weather or health crisis. : Independent  Ability to ambulate to restroom: Independent  Ability handle personal hygeine needs (bathing/dressing/grooming): Independent  Ability to manage Medications: Independent  Ability to prepare Food Preparation: Independent  Ability to maintain home (clean home, laundry): Independent  Ability to drive and/or has transportation: Dependent  Ability to do shopping: Needs Assistance  Ability to manage finances: Independent  Is patient able to live independently?: Yes     Current Housing: Private Residence        Per the Fall Risk Screening, did the patient have 2 or more falls or 1 fall with injury in the past year?: No     Frequent urination at night?: No  Do you use rails/bars?: No  Do you have a non-slip tub mat?: Yes     Are you experiencing loss of meaning?: No  Are you experiencing loss of hope and peace?: No     Suggested Interventions and Community Resources                  Prior to Admission medications    Medication Sig Start Date End Date Taking?  Authorizing Provider   baclofen (LIORESAL) 10 MG tablet Take 20 mg by mouth 3 times daily  4/21/21  Yes Historical Provider, MD   famotidine (PEPCID) 40 MG tablet take 1 tablet by mouth every evening 4/6/21  Yes Historical Provider, MD   nortriptyline (PAMELOR) 10 MG capsule Take 1 capsule by mouth nightly headaches 3/16/21  Yes Nivia Ryan PA-C gabapentin (NEURONTIN) 300 MG capsule TAKE 1 CAPSULE BY MOUTH THREE TIMES DAILY 3/10/21 6/8/21 Yes DANISH Piedra   lisinopril-hydroCHLOROthiazide (PRINZIDE;ZESTORETIC) 10-12.5 MG per tablet TAKE 1 TABLET BY MOUTH ONCE DAILY 3/10/21  Yes Yarely Dallas DO   vitamin D (CHOLECALCIFEROL) 21766 UNIT CAPS Take 50,000 Units by mouth once a week 9/29/20  Yes Historical Provider, MD   ocrelizumab (OCREVUS) 300 MG/10ML SOLN injection Infuse 300 mg intravenously once 2 weeks every 6 months   Yes Historical Provider, MD   omeprazole (PRILOSEC) 20 MG delayed release capsule Take 1 capsule by mouth every morning (before breakfast) 9/23/20  Yes Bashir Jaramillo MD   diclofenac sodium (VOLTAREN) 1 % GEL apply 2 grams to affected area four times a day 9/4/20  Yes Sid Gage,    CPAP Machine MISC Heated tube and chin strap. Dispense as written. LIFETIME SUPPLIES.  11/6/20   Historical Provider, MD       Future Appointments   Date Time Provider Sandra Birch   5/10/2021  4:00 PM SCHEDULE, [facility] TOBACCO GROUP [facility] TOB TRT [facility]   5/11/2021  9:45 AM KIRSTEN Abrams [facility]   5/12/2021 11:00 AM SEB SLEEP LAB BEDROOM 5 [facility] SLEEP [facility]   6/22/2021  1:00 PM Bashir Jaramillo MD [facility]   9/2/2021  8:00 AM SEY INFUSION SVCS CHAIR 1 [facility] INF SER Breonna Fill

## 2021-05-10 ENCOUNTER — CARE COORDINATION (OUTPATIENT)
Dept: CARE COORDINATION | Age: 46
End: 2021-05-10

## 2021-05-10 NOTE — CARE COORDINATION
contacted patient for CC follow up to remind of PCP appointment tomorrow, to  sleep study supplies and check to see of PT is going. Patient confirmed she plans to attend her PCP appointment tomorrow 5/11/21 and has transportation set up to take her. Patient states she already spoke to her transportation for 5/12/21 and she plans to  her sleep study supplies on her way home from PT. Patient believes PT is going good and feels like it is helping. Continues to go to PT 3x/week. Patient states she forgot to tell DANIEL Bowling last week during their conversation about her gastroenterologist that is outside of [facility] and not on [employer] [address]. Patient sees Dr. Krystal Conteh at the [facility] location. Patient wanted to make sure DANIEL was aware that she has Palafox's Esophagus and had a procedure done at the end of March (3/30/21). Her next follow up appointment with Dr. Noe Dominguez is at the end of May (did not get exact day).

## 2021-05-11 ENCOUNTER — OFFICE VISIT (OUTPATIENT)
Dept: FAMILY MEDICINE CLINIC | Age: 46
End: 2021-05-11
Payer: COMMERCIAL

## 2021-05-11 VITALS
HEIGHT: 61 IN | BODY MASS INDEX: 41.95 KG/M2 | TEMPERATURE: 96.2 F | HEART RATE: 95 BPM | SYSTOLIC BLOOD PRESSURE: 112 MMHG | WEIGHT: 222.2 LBS | RESPIRATION RATE: 16 BRPM | OXYGEN SATURATION: 97 % | DIASTOLIC BLOOD PRESSURE: 82 MMHG

## 2021-05-11 DIAGNOSIS — I10 ESSENTIAL HYPERTENSION: ICD-10-CM

## 2021-05-11 DIAGNOSIS — F17.200 SMOKER: ICD-10-CM

## 2021-05-11 DIAGNOSIS — K22.719 BARRETT'S ESOPHAGUS WITH DYSPLASIA: ICD-10-CM

## 2021-05-11 DIAGNOSIS — G47.33 OSA (OBSTRUCTIVE SLEEP APNEA): ICD-10-CM

## 2021-05-11 DIAGNOSIS — E55.9 VITAMIN D DEFICIENCY: ICD-10-CM

## 2021-05-11 DIAGNOSIS — G35 MULTIPLE SCLEROSIS (HCC): Primary | ICD-10-CM

## 2021-05-11 DIAGNOSIS — R73.9 HYPERGLYCEMIA: ICD-10-CM

## 2021-05-11 DIAGNOSIS — E66.01 MORBID OBESITY (HCC): ICD-10-CM

## 2021-05-11 LAB
ALBUMIN SERPL-MCNC: 4.5 G/DL (ref 3.5–5.2)
ALP BLD-CCNC: 106 U/L (ref 35–104)
ALT SERPL-CCNC: 64 U/L (ref 0–32)
ANION GAP SERPL CALCULATED.3IONS-SCNC: 20 MMOL/L (ref 7–16)
AST SERPL-CCNC: 30 U/L (ref 0–31)
BASOPHILS ABSOLUTE: 0.06 E9/L (ref 0–0.2)
BASOPHILS RELATIVE PERCENT: 0.5 % (ref 0–2)
BILIRUB SERPL-MCNC: 0.5 MG/DL (ref 0–1.2)
BUN BLDV-MCNC: 6 MG/DL (ref 6–20)
CALCIUM SERPL-MCNC: 9.7 MG/DL (ref 8.6–10.2)
CHLORIDE BLD-SCNC: 100 MMOL/L (ref 98–107)
CHOLESTEROL, TOTAL: 225 MG/DL (ref 0–199)
CO2: 22 MMOL/L (ref 22–29)
CREAT SERPL-MCNC: 0.8 MG/DL (ref 0.5–1)
EOSINOPHILS ABSOLUTE: 0.26 E9/L (ref 0.05–0.5)
EOSINOPHILS RELATIVE PERCENT: 2.3 % (ref 0–6)
GFR AFRICAN AMERICAN: >60
GFR NON-AFRICAN AMERICAN: >60 ML/MIN/1.73
GLUCOSE BLD-MCNC: 82 MG/DL (ref 74–99)
HBA1C MFR BLD: 5.4 % (ref 4–5.6)
HCT VFR BLD CALC: 50.3 % (ref 34–48)
HDLC SERPL-MCNC: 44 MG/DL
HEMOGLOBIN: 16 G/DL (ref 11.5–15.5)
IMMATURE GRANULOCYTES #: 0.19 E9/L
IMMATURE GRANULOCYTES %: 1.7 % (ref 0–5)
LDL CHOLESTEROL CALCULATED: 144 MG/DL (ref 0–99)
LYMPHOCYTES ABSOLUTE: 2.55 E9/L (ref 1.5–4)
LYMPHOCYTES RELATIVE PERCENT: 22.6 % (ref 20–42)
MCH RBC QN AUTO: 30.6 PG (ref 26–35)
MCHC RBC AUTO-ENTMCNC: 31.8 % (ref 32–34.5)
MCV RBC AUTO: 96.2 FL (ref 80–99.9)
MONOCYTES ABSOLUTE: 1 E9/L (ref 0.1–0.95)
MONOCYTES RELATIVE PERCENT: 8.8 % (ref 2–12)
NEUTROPHILS ABSOLUTE: 7.24 E9/L (ref 1.8–7.3)
NEUTROPHILS RELATIVE PERCENT: 64.1 % (ref 43–80)
PDW BLD-RTO: 12.7 FL (ref 11.5–15)
PLATELET # BLD: 282 E9/L (ref 130–450)
PMV BLD AUTO: 10.3 FL (ref 7–12)
POTASSIUM SERPL-SCNC: 4.8 MMOL/L (ref 3.5–5)
RBC # BLD: 5.23 E12/L (ref 3.5–5.5)
SODIUM BLD-SCNC: 142 MMOL/L (ref 132–146)
TOTAL PROTEIN: 8.4 G/DL (ref 6.4–8.3)
TRIGL SERPL-MCNC: 184 MG/DL (ref 0–149)
VITAMIN D 25-HYDROXY: 38 NG/ML (ref 30–100)
VLDLC SERPL CALC-MCNC: 37 MG/DL
WBC # BLD: 11.3 E9/L (ref 4.5–11.5)

## 2021-05-11 PROCEDURE — 99214 OFFICE O/P EST MOD 30 MIN: CPT | Performed by: PHYSICIAN ASSISTANT

## 2021-05-11 PROCEDURE — G8427 DOCREV CUR MEDS BY ELIG CLIN: HCPCS | Performed by: PHYSICIAN ASSISTANT

## 2021-05-11 PROCEDURE — G8417 CALC BMI ABV UP PARAM F/U: HCPCS | Performed by: PHYSICIAN ASSISTANT

## 2021-05-11 PROCEDURE — 4004F PT TOBACCO SCREEN RCVD TLK: CPT | Performed by: PHYSICIAN ASSISTANT

## 2021-05-11 RX ORDER — VARENICLINE TARTRATE
KIT
Qty: 1 BOX | Refills: 0 | Status: SHIPPED
Start: 2021-05-11 | End: 2021-08-13

## 2021-05-11 ASSESSMENT — PATIENT HEALTH QUESTIONNAIRE - PHQ9
SUM OF ALL RESPONSES TO PHQ9 QUESTIONS 1 & 2: 0
SUM OF ALL RESPONSES TO PHQ QUESTIONS 1-9: 0

## 2021-05-11 NOTE — PROGRESS NOTES
Hypertension:  Patient is here for follow up chronic hypertension. This is  generally controlled on current medication regimen. Takes meds as directed and tolerates them well. Most recent labs reviewed with patient and are not remarkable. No symptoms from htn standpoint per ROS. Patient is  compliant with lifestyle modifications. Patient does smoke. Comorbid conditions include obesity. Would like Chantix to quit smoking. Since started Ocrevus, sx have calmed down. Still having spasms. Having new sleep study since 2 throat procedures. Patient's past medical, surgical, social and/or family history reviewed, updated in chart, and are non-contributory (unless otherwise stated). Medications and allergies also reviewed and updated in chart. Review of Systems:  Constitutional:  No fever, no fatigue, no chills, no headaches, no weight change  Dermatology:  No rash, no mole, no dry or sensitive skin  ENT:  No cough, no sore throat, no sinus pain, no runny nose, no ear pain  Cardiology:  No chest pain, no palpitations, no leg edema, no shortness of breath, no PND  Gastroenterology:  No dysphagia, no abdominal pain, no nausea, no vomiting, no constipation, no diarrhea, no heartburn  Musculoskeletal:  No joint pain, no leg cramps, no back pain, + muscle aches  Respiratory:  No shortness of breath, no orthopnea, no wheezing, no MEADE, no hemoptysis  Urology:  No blood in the urine, no urinary frequency, no urinary incontinence, no urinary urgency, no nocturia, no dysuria    Vitals:    05/11/21 0935   BP: 112/82   Pulse: 95   Resp: 16   Temp: 96.2 °F (35.7 °C)   SpO2: 97%   Weight: 222 lb 3.2 oz (100.8 kg)   Height: 5' 1\" (1.549 m)       Physical Exam  Constitutional:       General: She is not in acute distress. Appearance: She is well-developed. She is obese. HENT:      Head: Normocephalic and atraumatic.       Right Ear: External ear normal.      Left Ear: External ear normal.      Nose: Nose normal.   Eyes:      General: No scleral icterus. Conjunctiva/sclera: Conjunctivae normal.      Pupils: Pupils are equal, round, and reactive to light. Neck:      Musculoskeletal: Normal range of motion and neck supple. Thyroid: No thyromegaly. Cardiovascular:      Rate and Rhythm: Normal rate and regular rhythm. Heart sounds: Normal heart sounds. No murmur. Pulmonary:      Effort: Pulmonary effort is normal. No accessory muscle usage or respiratory distress. Breath sounds: Normal breath sounds. No wheezing. Musculoskeletal: Normal range of motion. Skin:     General: Skin is warm and dry. Findings: No rash. Neurological:      Mental Status: She is alert and oriented to person, place, and time. Deep Tendon Reflexes: Reflexes are normal and symmetric. Psychiatric:         Speech: Speech normal.         Behavior: Behavior normal.         Assessment/Plan:      Kanika Potts was seen today for hypertension and nicotine dependence. Diagnoses and all orders for this visit:    Multiple sclerosis ([facility] [address]  -doing better on ocrevus  -neurology note reviewed    Palafox's esophagus with dysplasia    VEENA (obstructive sleep apnea)  -new sleep study ordered    Morbid obesity ([facility] [address]    Smoker  -     varenicline (CHANTIX STARTING MONTH URSULA) 0.5 MG X 11 & 1 MG X 42 tablet; Take by mouth. Vitamin D deficiency  -     Vitamin D 25 Hydroxy; Future    Hyperglycemia  -     HEMOGLOBIN A1C; Future    Essential hypertension  -     COMPREHENSIVE METABOLIC PANEL; Future  -     CBC Auto Differential; Future  -     LIPID PANEL; Future      As above. Call or go to ED immediately if symptoms worsen or persist.  Return in about 6 months (around 11/11/2021). , or sooner if necessary. Educational materials and/or home exercises printed for patient's review and were included in patient instructions on his/her After Visit Summary and given to patient at the end of visit.       Counseled regarding above diagnosis, including possible risks and complications,  especially if left uncontrolled. Counseled regarding the possible side effects, risks, benefits and alternatives to treatment; patient and/or guardian verbalizes understanding, agrees, feels comfortable with and wishes to proceed with above treatment plan. Advised patient to call with any new medication issues, and read all Rx info from pharmacy to assure aware of all possible risks and side effects of medication before taking. Reviewed age and gender appropriate health screening exams and vaccinations. Advised patient regarding importance of keeping up with recommended health maintenance and to schedule as soon as possible if overdue, as this is important in assessing for undiagnosed pathology, especially cancer, as well as protecting against potentially harmful/life threatening disease. Patient and/or guardian verbalizes understanding and agrees with above counseling, assessment and plan. All questions answered. Dana Lala PA-C  5/11/2021    I have personally reviewed and updated the chief complaint, HPI, Past Medical, Family and Social History, as well as the above Review of Systems.

## 2021-05-12 ENCOUNTER — HOSPITAL ENCOUNTER (OUTPATIENT)
Dept: SLEEP CENTER | Age: 46
Discharge: HOME | End: 2021-05-12
Payer: COMMERCIAL

## 2021-05-12 DIAGNOSIS — G47.33 OSA (OBSTRUCTIVE SLEEP APNEA): ICD-10-CM

## 2021-05-12 DIAGNOSIS — E66.01 MORBIDLY OBESE (HCC): ICD-10-CM

## 2021-05-12 DIAGNOSIS — G47.30 SLEEP APNEA, UNSPECIFIED TYPE: ICD-10-CM

## 2021-05-12 PROCEDURE — G0399 HOME SLEEP TEST/TYPE 3 PORTA: HCPCS

## 2021-05-16 NOTE — PROGRESS NOTES
[address] [address]                               SLEEP STUDY REPORT    PATIENT NAME: Randall Calvin                   :        1975  MED REC NO:   [id number]                            ROOM:  ACCOUNT NO:   [de-identified]                           ADMIT DATE: 2021  PROVIDER:     Eva Artis MD    DATE OF STUDY:  2021    REFERRING PROVIDER:  Eva Artis M.D.    STUDY PERFORMED:  Sleep study. INDICATION FOR STUDY:  Witnessed apnea, snore, restless sleep, trouble  with memory/concentration, bruxism, and frequent waking to urinate. CURRENT MEDICATIONS:  Baclofen, famotidine, nortriptyline, gabapentin,  lisinopril/hydrochlorothiazide, vitamin D, Ocrevus, omeprazole, and  diclofenac. INTERPRETATION:  This sleep study was performed as a home sleep apnea  test.  An [employer] NightOne monitoring device was utilized for the study. Total recording time was 460 minutes. RESPIRATION SUMMARY:  APNEA:  There were 16 apneic events, which were all obstructive and  occurred in the supine position. Maximum duration was 16 seconds. HYPOPNEA:  There were 54 hypopneic events, 52 occurred in the supine  position and maximum duration was 27 seconds. RESPIRATORY EVENT INDEX:  The respiratory event index was 10. OXYGEN SATURATION:  Average oxygen saturation was 93%. Lowest  saturation was 80%. The patient spent 1% of the time with saturation  less than 90%. HEART RATE SUMMARY:  Average heart rate was 81 beats per minute during  sleep. Maximum heart rate during sleep was 107 beats per minute and  minimum heart rate during sleep was 66 beats per minute. SNORE SUMMARY:  There were 20 snore events. Snoring was minimal.    MISCELLANEOUS:  [place] Sleepiness Scale score is 4/24. BMI is 41.98  pounds per inch squared. Neck circumference is 15 inches. IMPRESSION:  1. Mild obstructive sleep apnea.   2. Good oxygen saturation. 3.  Minimal snoring. PLAN:  1. No treatment at this time. 2.  The patient to be seen to discuss the results of study  and determine if further evaluation/treatment is indicated.         Jessica Villareal MD  Diplomat of Sleep Medicine    D: 05/15/2021 14:05:52       T: 05/15/2021 14:16:57     [id number]  Job#: [id number]     Doc#: [id number]    CC:

## 2021-05-19 ENCOUNTER — CARE COORDINATION (OUTPATIENT)
Dept: CARE COORDINATION | Age: 46
End: 2021-05-19

## 2021-05-19 NOTE — CARE COORDINATION
Voice message left on patient's phone with contact information requesting a return call so that ACM can follow up on her status. PLAN  -If no return call, continue to attempt to contact patient.

## 2021-05-28 ENCOUNTER — CARE COORDINATION (OUTPATIENT)
Dept: CARE COORDINATION | Age: 46
End: 2021-05-28

## 2021-05-28 NOTE — CARE COORDINATION
contacted patient for CC follow up on Smoking cessation, RD, follow up on appts. Patient states she received a call from Dr. Kenney Pall office in regards to the sleep study and no additional follow up is needed. Patient was told that from the results of the sleep study it seems both procedures she had done have worked and no further follow up need to be done. Patient states she is still smoking the same amount but did  her prescription for Chantix. Patient is ready to stop smoking and  plans to to start taking Chantix in next few days.  offered patient dietician again and patient declined. Reminded patient that her PCP wants to see her around November and asked if she would like assistance schedeling and patient accepted.  will schedule appointment for patient and follow up with details. Patient went to follow up appointment with Dr. Sue Manning (GI) who ordered patient to have an endoscopy done on 6/22/21.

## 2021-06-11 ENCOUNTER — CARE COORDINATION (OUTPATIENT)
Dept: CARE COORDINATION | Age: 46
End: 2021-06-11

## 2021-06-11 NOTE — CARE COORDINATION
Ambulatory Care Coordination Note  6/11/2021  CM Risk Score: 4  Charlson 10 Year Mortality Risk Score: 2%     ACC: Mary Anne Johnston RN    Summary Note:   ACM contacted patient to follow up on her status. Patient states she is doing well. Patient states she was released from [facility] outpatient therapy d/t insurance would no longer cover. She states she continues with pain in her feet, however, she does continue to attempt to remain active. Patient is aware that she is scheduled for an endoscopy on 06/22/21 and she will follow up with Dr. Eliza Uriarte on 07/01/21. Patient does plan on attending this appointment. Patient is aware that she is scheduled for her MS infusion  On 09/02/21. Patient states she is to obtain the COVID vaccine approximately one month before getting her infusion per neurology recommendation. Patient does not have a f/u scheduled with PCP at this time. She will wait until after her scope to schedule this appointment. Patient is aware that [facility] can assist with scheduling if needs. SMOKING  -Patient is currently using Chantix to assist with smoking cessation. She states she is down to approximately 3 cigarettes per day. ACM praised patient for her progress and encouraged her to continue until she stops completely Patient states she and her daughter are stopping together, and they support each other. Advance Directive  Patient has NOT completed an advance directive  and Discussed the importance of establishing and updating an advance directive. Patient has questions at this time  Discussed with: Patient   Patient accepted a referral to the ACP specialist.   [facility] reviewed patient's healthcare decision makers and information is current. [facility] verified patient's email: [email or web address]    PLAN  -Continue Care Coordination  -F/U to determine status of smoking cessation  -F/U to review/discuss results of endoscopy. -Assist with scheduling PCP appointment and COVID vaccine if requested.    -F/U to medication. Barriers: overwhelmed by complexity of regimen and stress  Plan for overcoming my barriers: Care Coordination, ACM to discuss/review patient's triggers and provide education on benefits of smoking cessation. Confidence: 7/10  Anticipated Goal Completion Date: 08/04/21            Prior to Admission medications    Medication Sig Start Date End Date Taking? Authorizing Provider   varenicline (CHANTIX STARTING MONTH PAK) 0.5 MG X 11 & 1 MG X 42 tablet Take by mouth. 5/11/21   Barrie Villarreal PA-C   baclofen (LIORESAL) 10 MG tablet Take 20 mg by mouth 3 times daily  4/21/21   Historical Provider, MD   famotidine (PEPCID) 40 MG tablet take 1 tablet by mouth every evening 4/6/21   Historical Provider, MD   nortriptyline (PAMELOR) 10 MG capsule Take 1 capsule by mouth nightly headaches 3/16/21   Barrie Villarreal PA-C   gabapentin (NEURONTIN) 300 MG capsule TAKE 1 CAPSULE BY MOUTH THREE TIMES DAILY 3/10/21 6/8/21  DANISH Hayes   lisinopril-hydroCHLOROthiazide (PRINZIDE;ZESTORETIC) 10-12.5 MG per tablet TAKE 1 TABLET BY MOUTH ONCE DAILY 3/10/21   Dyan Olivares DO   vitamin D (CHOLECALCIFEROL) 63118 UNIT CAPS Take 50,000 Units by mouth once a week 9/29/20   Historical Provider, MD   CPAP Machine MISC Heated tube and chin strap. Dispense as written. LIFETIME SUPPLIES.  11/6/20   Historical Provider, MD   ocrelizumab (OCREVUS) 300 MG/10ML SOLN injection Infuse 300 mg intravenously once 2 weeks every 6 months    Historical Provider, MD   omeprazole (PRILOSEC) 20 MG delayed release capsule Take 1 capsule by mouth every morning (before breakfast) 9/23/20   Aleja Luke MD   diclofenac sodium (VOLTAREN) 1 % GEL apply 2 grams to affected area four times a day 9/4/20   Jaime Ryan DO       Future Appointments   Date Time Provider Sandra Birch   7/1/2021 10:40 AM Aleja Luke MD [facility]   9/2/2021  8:00 AM [facility] CHAIR 1 [facility] SER Cory Gary

## 2021-06-14 ENCOUNTER — TELEPHONE (OUTPATIENT)
Dept: CARE COORDINATION | Age: 46
End: 2021-06-14

## 2021-06-14 NOTE — TELEPHONE ENCOUNTER
This ACP Specialist received [facility]  Referral was for AD's and continued conversation on goals of care. ACP Specialist spoke with  [facility] and she is aware and agreeable to this referral.        ACP Specialist reviewed HCD fields and ensured it was up to date in [facility] ACP Specialist reviewed AD's with [facility], information sent out via e-mail to [facility].   This ACP Specialist will follow up with [facility] on June 23rd at 12 noon to complete AD's

## 2021-06-23 ENCOUNTER — TELEPHONE (OUTPATIENT)
Dept: CARE COORDINATION | Age: 46
End: 2021-06-23

## 2021-06-23 NOTE — ACP (ADVANCE CARE PLANNING)
Advance Care Planning     Advance Care Planning Clinical Specialist  Conversation Note      Date of ACP Conversation: 6/23/2021    Conversation Conducted with: Patient with Decision Making Capacity    ACP Clinical Specialist: Sammy Cross, IDRIS, CASSANDRAW    Healthcare Decision Maker:     Current Designated Healthcare Decision Maker:     Primary Decision MakerLenka Quintero - [phone]      Care Preferences    Hospitalization: \"If your health worsens and it becomes clear that your chance of recovery is unlikely, what would your preference be regarding hospitalization? \"    Choice:  [] The patient wants hospitalization. [x] The patient prefers comfort-focused treatment without hospitalization. Krystal Nash prefers comfort care at home AFTER a physician has seen her and declares chance of recovery is unlikely    Ventilation: \"If you were in your present state of health and suddenly became very ill and were unable to breathe on your own, what would your preference be about the use of a ventilator (breathing machine) if it were available to you? \"      If the patient would desire the use of ventilator (breathing machine), answer \"yes\". If not, \"no\": yes    \"If your health worsens and it becomes clear that your chance of recovery is unlikely, what would your preference be about the use of a ventilator (breathing machine) if it were available to you? \"     Would the patient desire the use of ventilator (breathing machine)?: No      Resuscitation  \"CPR works best to restart the heart when there is a sudden event, like a heart attack, in someone who is otherwise healthy. Unfortunately, CPR does not typically restart the heart for people who have serious health conditions or who are very sick. \"    \"In the event your heart stopped as a result of an underlying serious health condition, would you want attempts to be made to restart your heart (answer \"yes\" for attempt to resuscitate) or would you prefer a natural death (answer \"no\" for do not attempt to resuscitate)? \" yes       [x] Yes   [] No   Educated Patient / Danielle Ka regarding differences between Advance Directives and portable DNR orders. Length of ACP Conversation in minutes:  [de-identified]    Conversation Outcomes:  [x] ACP discussion completed  [] Existing advance directive reviewed with patient; no changes to patient's previously recorded wishes  [x] New Advance Directive completed  [] Portable Do Not Rescitate prepared for Provider review and signature  [] POLST/POST/MOLST/MOST prepared for Provider review and signature      Follow-up plan:    [] Schedule follow-up conversation to continue planning  [x] Referred individual to Provider for additional questions/concerns   [x] Advised patient/agent/surrogate to review completed ACP document and update if needed with changes in condition, patient preferences or care setting    [x] This note routed to one or more involved healthcare providers    This ACP Specialist spoke with Brown Nissen today and reviewed ACP documents and all above information. Time was spent answering and explaining terms and definitions for the AD's. Processed current medical history and wishes for continued goals of care. Brown Nissen has selected to remain a FULL code at this time. She has selected a primary agent for both her POA and LW. Both of those documents were reviewed and completed today using docusign. [facility] field is up to date. The completed AD's have been routed to Brent Mcgee [place] ACP lead coordinator for upload into the medical record/Epic. A copy of this note has been routed to the PCP for update as well.

## 2021-07-01 ENCOUNTER — OFFICE VISIT (OUTPATIENT)
Dept: ENT CLINIC | Age: 46
End: 2021-07-01
Payer: COMMERCIAL

## 2021-07-01 VITALS
SYSTOLIC BLOOD PRESSURE: 166 MMHG | BODY MASS INDEX: 41.72 KG/M2 | HEART RATE: 72 BPM | DIASTOLIC BLOOD PRESSURE: 91 MMHG | HEIGHT: 61 IN | WEIGHT: 221 LBS

## 2021-07-01 DIAGNOSIS — K22.719 BARRETT'S ESOPHAGUS WITH DYSPLASIA: ICD-10-CM

## 2021-07-01 DIAGNOSIS — J38.3 VOCAL CORD DYSFUNCTION: ICD-10-CM

## 2021-07-01 DIAGNOSIS — R13.10 DYSPHAGIA, UNSPECIFIED TYPE: ICD-10-CM

## 2021-07-01 DIAGNOSIS — R49.0 HOARSENESS: Primary | ICD-10-CM

## 2021-07-01 PROCEDURE — 31575 DIAGNOSTIC LARYNGOSCOPY: CPT | Performed by: OTOLARYNGOLOGY

## 2021-07-01 PROCEDURE — 99214 OFFICE O/P EST MOD 30 MIN: CPT | Performed by: OTOLARYNGOLOGY

## 2021-07-01 PROCEDURE — G8417 CALC BMI ABV UP PARAM F/U: HCPCS | Performed by: OTOLARYNGOLOGY

## 2021-07-01 PROCEDURE — G8427 DOCREV CUR MEDS BY ELIG CLIN: HCPCS | Performed by: OTOLARYNGOLOGY

## 2021-07-01 PROCEDURE — 4004F PT TOBACCO SCREEN RCVD TLK: CPT | Performed by: OTOLARYNGOLOGY

## 2021-07-01 NOTE — PROGRESS NOTES
Dear Dr Nahun Albarran, PAGaloC     We had the pleasure of seeing Marla Underwood, 1975 here    on 7/1/2021  Please see below for review of care and plans. Chief complaint-     ICD-10-CM    1. Hoarseness  R49.0    2. Vocal cord dysfunction  J38.3    3. Palafox's esophagus with dysplasia  K22.719    4. Dysphagia, unspecified type  R13.10          History of Present Illness-  Pt with last Friday to ED for feeling of neck tightness and throat closing. Never happened before. 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 Good water, some caffeine. Had steroids in ed and helped a bit but not much better. Also with trouble getting food down at times in ititiating a swallow. 7/23/20 pt breathing much better after supraglottoplasty and removal of redundant tissue. pain still there but only taking tylenol. 10/1/20 pt continues to breathe well. Still smoking. Still with sensation of throat closing off at times, especially when she swallows. Still hoarse. 2/18/2021: still notes some hoarseness and dysphagia, both improving but slowly. Describes hoarseness as intermittent and feeling of needing to cough. States she can swallow but transit of bolus is slow. Denies weight loss  Continues to smoke    7/1/21: Still notes intermittent hoarseness, but denies dysphagia at this time. Had esophageal dilation in April and has been able to swallow better since that time. She continues to smoke has not started using Chantix. Voice much improved, no choking or air hunger    Review of Systems- No drainage, discharge, or headache. Complete 10 system ROS completed and negative except as noted above. Physical Examination-   Vital Signs-BP (!) 166/91 (Site: Right Upper Arm, Position: Sitting, Cuff Size: Large Adult)   Pulse 72   Ht 5' 1\" (1.549 m)   Wt 221 lb (100.2 kg)   LMP 01/05/2018   BMI 41.76 kg/m²     Ears- Tympanic membranes clear bilaterally. No middle ear effusion.   No pre or post auricular tenderness. Nose- Nasal mucosa clear and dry. No significant septal deviation or inferior turbinate hypertrophy. Oral Cavity/Oropharynx- Floor of mouth and tongue are soft and nontender. No posterior pharyngeal erythema. + gag reflex  Neck- Soft and nontender. No masses, lesions, lymphadenopathy, or thyroid nodules appreciated. Cranial Nerve- Cranial nerves II to XII intact. Extraocular muscles intact. No gross motor visual deficits. No spontaneous nystagmus. Face- No facial skin tenderness to palpation. Heart- No cyanosis, regular  Lungs- No stridor, no intercostal accessory muscle use  General- The patient is in no acute distress. A&O x3    Scope  Procedure note- after pt verbally consented, was sprayed nasally with 1:1 neosynephrine and xylocaine. Scope was passed and found nasal cavity with no lesion. np clear, tonsil wnl, tongue mobile and no masses, vocal cords mobile bilaterally with full ab and ad duction. Hypopharynx clear, open and no masses. enttbrefluxexam   Pt with hyperemia and hypervascularity of post-cricoid mucosa that was waterfalling into posterior glottic area. Well healed area with patent airway and no obstruction, tight epiglottis, swallowing well with no aspiration or pooling of secretions. Previously noted that while swallowing that epiglottis is getting caught/pinched by the posterior pharyngeal wall after a swallow and preventing the epiglottis from reverting back to the normal position and thereby the glottis is being covered by the epiglottis and is no longer present on exam. The protuberant tongue base is pushing the epiglottis posterior and causing it to get trapped by the posterio pharyngeal wall is still there but the epiglottis has healed well and no longer getting trapped. This I feel is the reason she feels tight and has times of her throat being closed off because the epiglottis is still hovering over the glottic aperture.   We had her swallow with a

## 2021-07-01 NOTE — LETTER
Dear Dr Keerthi Grant, KIRSTEN      We had the pleasure of seeing Deshaun Pa, 1975 here    on 7/1/2021  Please see below for review of care and plans.      Chief complaint-       ICD-10-CM     1. Hoarseness  R49.0     2. Vocal cord dysfunction  J38.3     3. Palafox's esophagus with dysplasia  K22.719     4. Dysphagia, unspecified type  R13.10              History of Present Illness-  Pt with last Friday to ED for feeling of neck tightness and throat closing. Never happened before. 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 Good water, some caffeine. Had steroids in ed and helped a bit but not much better. Also with trouble getting food down at times in ititiating a swallow.     7/23/20 pt breathing much better after supraglottoplasty and removal of redundant tissue. pain still there but only taking tylenol.     10/1/20 pt continues to breathe well. Still smoking. Still with sensation of throat closing off at times, especially when she swallows. Still hoarse.     2/18/2021: still notes some hoarseness and dysphagia, both improving but slowly. Describes hoarseness as intermittent and feeling of needing to cough. States she can swallow but transit of bolus is slow. Denies weight loss  Continues to smoke     7/1/21: Still notes intermittent hoarseness, but denies dysphagia at this time. Had esophageal dilation in April and has been able to swallow better since that time. She continues to smoke has not started using Chantix. Voice much improved, no choking or air hunger    Review of Systems- No drainage, discharge, or headache. Complete 10 system ROS completed and negative except as noted above.      Physical Examination-   Vital Signs-BP (!) 166/91 (Site: Right Upper Arm, Position: Sitting, Cuff Size: Large Adult)   Pulse 72   Ht 5' 1\" (1.549 m)   Wt 221 lb (100.2 kg)   LMP 01/05/2018   BMI 41.76 kg/m²     Ears- Tympanic membranes clear bilaterally.  No middle ear effusion.  No pre or post auricular tenderness. Nose- Nasal mucosa clear and dry.  No significant septal deviation or inferior turbinate hypertrophy. Oral Cavity/Oropharynx- Floor of mouth and tongue are soft and nontender.  No posterior pharyngeal erythema. + gag reflex  Neck- Soft and nontender.  No masses, lesions, lymphadenopathy, or thyroid nodules appreciated. Cranial Nerve- Cranial nerves II to XII intact.  Extraocular muscles intact.  No gross motor visual deficits.  No spontaneous nystagmus.    Face- No facial skin tenderness to palpation. Heart- No cyanosis, regular  Lungs- No stridor, no intercostal accessory muscle use  General- The patient is in no acute distress. A&O x3     Scope  Procedure note- after pt verbally consented, was sprayed nasally with 1:1 neosynephrine and xylocaine. Scope was passed and found nasal cavity with no lesion. np clear, tonsil wnl, tongue mobile and no masses, vocal cords mobile bilaterally with full ab and ad duction. Hypopharynx clear, open and no masses. enttbrefluxexam   Pt with hyperemia and hypervascularity of post-cricoid mucosa that was waterfalling into posterior glottic area. Well healed area with patent airway and no obstruction, tight epiglottis, swallowing well with no aspiration or pooling of secretions. Previously noted that while swallowing that epiglottis is getting caught/pinched by the posterior pharyngeal wall after a swallow and preventing the epiglottis from reverting back to the normal position and thereby the glottis is being covered by the epiglottis and is no longer present on exam. The protuberant tongue base is pushing the epiglottis posterior and causing it to get trapped by the posterio pharyngeal wall is still there but the epiglottis has healed well and no longer getting trapped. This I feel is the reason she feels tight and has times of her throat being closed off because the epiglottis is still hovering over the glottic aperture.   We had

## 2021-07-02 RX ORDER — NORTRIPTYLINE HYDROCHLORIDE 10 MG/1
CAPSULE ORAL
Qty: 30 CAPSULE | Refills: 3 | Status: SHIPPED
Start: 2021-07-02 | End: 2021-11-05

## 2021-07-02 RX ORDER — BACLOFEN 10 MG/1
TABLET ORAL
Qty: 180 TABLET | Refills: 5 | Status: SHIPPED
Start: 2021-07-02 | End: 2021-08-13 | Stop reason: SDUPTHER

## 2021-07-06 ENCOUNTER — CARE COORDINATION (OUTPATIENT)
Dept: CARE COORDINATION | Age: 46
End: 2021-07-06

## 2021-07-06 NOTE — CARE COORDINATION
Ambulatory Care Coordination Note  7/6/2021  CM Risk Score: 0  Charlson 10 Year Mortality Risk Score: 2%     ACC: Kem Gibbs RN    Summary Note:   [facility] contacted patient to follow up on her status. ACP documentation has been completed and is in patient's chart. Patient states she did complete a colonoscopy recently. She states Dr. Lety Faust will call her if he needs to discuss biopsy results from  polyps that were removed. She does have a follow up appointment in August, 2021. Patient has reviewed her lab work from 05/11/21 in My Chart and did note some abnormalities on her Lipid Panel. [facility] offered to schedule an appointment with Pierre Newell to review these results. Patient denied need for assistance, stating she can make this call. [facility] did provide the number for [facility] to call to schedule her COVID Vaccine. Patient states she needs to schedule one month before her next scheduled infusion (09/02/21). Patient states she continues to smoke approximately 3 cigarettes per day. She states she does try to stay busy to keep her mind off of smoking. Patient states she did not start Chantix. PLAN  -Continue Care Coordination  -F/U to review smoking cessation. -F/U to determine if patient did schedule appointment with PCP to review recent lab results. Care Coordination Interventions    Program Enrollment: Complex Care  Referral from Primary Care Provider: No  Suggested Interventions and Community Resources  Medi Set or Pill Pack: Completed (Comment: 05/04/21: Patient receives routine medications from [facility])  Physical Therapy: Completed (Comment: 05/04/21: Patient attend [facility] on an outpatient basis; 06/11/231: Completed beginning of June)  Registered Dietician: Declined  Smoking Cessation:  In Process (Comment: 05/04/21: Patient to discuss Chantix with PCP)  Social Work: Declined  Transportation Support: Completed         Goals Addressed                 This Visit's Progress     Conditions and 50,000 Units by mouth once a week 9/29/20   Historical Provider, MD   CPAP Machine MISC Heated tube and chin strap. Dispense as written. LIFETIME SUPPLIES.  11/6/20   Historical Provider, MD   ocrelizumab (OCREVUS) 300 MG/10ML SOLN injection Infuse 300 mg intravenously once 2 weeks every 6 months    Historical Provider, MD   diclofenac sodium (VOLTAREN) 1 % GEL apply 2 grams to affected area four times a day 9/4/20   Lupe Pham, DO       Future Appointments   Date Time Provider Sandra Birch   9/2/2021  8:00 AM [facility] CHAIR 1 Y[facility] INF SER [employer]

## 2021-07-06 NOTE — TELEPHONE ENCOUNTER
Forwarding to provider as MARIXA Worthington, is out of office.   Electronically signed by Keke Merchant on 3/10/21 at 1:40 PM EST Home. No skilled PT needed./yes

## 2021-07-19 ENCOUNTER — TELEPHONE (OUTPATIENT)
Dept: NEUROLOGY | Age: 46
End: 2021-07-19

## 2021-07-19 RX ORDER — SODIUM CHLORIDE 9 MG/ML
INJECTION, SOLUTION INTRAVENOUS CONTINUOUS
Status: CANCELLED | OUTPATIENT
Start: 2021-08-15

## 2021-07-19 RX ORDER — OCRELIZUMAB 300 MG/10ML
600 INJECTION INTRAVENOUS
Qty: 20 ML | Refills: 1 | Status: SHIPPED | OUTPATIENT
Start: 2021-07-19 | End: 2022-08-10 | Stop reason: SDUPTHER

## 2021-07-19 NOTE — TELEPHONE ENCOUNTER
Per Carli Peterson at [facility], Kendra's Ocrevus orders  21 she needs new orders sent over so they can get them authorized. Vikki Mccloud is scheduled for  21. Please advise.     Electronically signed by Joyce Myers MA on 2021 at 11:55 AM

## 2021-07-20 RX ORDER — METHYLPREDNISOLONE SODIUM SUCCINATE 125 MG/2ML
50 INJECTION, POWDER, LYOPHILIZED, FOR SOLUTION INTRAMUSCULAR; INTRAVENOUS ONCE
Status: CANCELLED
Start: 2021-08-15 | End: 2021-08-15

## 2021-07-20 RX ORDER — ACETAMINOPHEN 325 MG/1
650 TABLET ORAL ONCE
Status: CANCELLED | OUTPATIENT
Start: 2021-08-15

## 2021-07-20 RX ORDER — DIPHENHYDRAMINE HYDROCHLORIDE 50 MG/ML
25 INJECTION INTRAMUSCULAR; INTRAVENOUS ONCE
Status: CANCELLED | OUTPATIENT
Start: 2021-08-15

## 2021-07-20 RX ORDER — METHYLPREDNISOLONE SODIUM SUCCINATE 125 MG/2ML
100 INJECTION, POWDER, LYOPHILIZED, FOR SOLUTION INTRAMUSCULAR; INTRAVENOUS ONCE
Status: CANCELLED | OUTPATIENT
Start: 2021-08-15

## 2021-07-20 RX ORDER — SODIUM CHLORIDE 9 MG/ML
INJECTION, SOLUTION INTRAVENOUS CONTINUOUS
Status: CANCELLED | OUTPATIENT
Start: 2021-08-15

## 2021-07-20 RX ORDER — DIPHENHYDRAMINE HYDROCHLORIDE 50 MG/ML
25 INJECTION INTRAMUSCULAR; INTRAVENOUS ONCE
Status: CANCELLED
Start: 2021-08-15 | End: 2021-08-15

## 2021-07-27 ENCOUNTER — CARE COORDINATION (OUTPATIENT)
Dept: CARE COORDINATION | Age: 46
End: 2021-07-27

## 2021-07-27 NOTE — CARE COORDINATION
Ambulatory Care Coordination Note  7/27/2021  CM Risk Score: 0  Charlson 10 Year Mortality Risk Score: 2%     ACC: Cecilia Berkowitz RN    Summary Note:   [facility] contacted patient to follow up on her status. Patient states she will schedule a COVID Vaccine on 08/03/21, d/t her next infusion is scheduled for 09/02/21 and it was recommended that she receive the vaccine approximately one month before her infusion. Patient does have an appointment with [facility] on 07/28/21 to discuss eligibility to receive an air conditioner d/t difficulty breathing r/t her medical status. Patient states [facility] is asking for a letter from her PCP verifying her breathing status. [facility] did assist patient with scheduling an appointment with PCP (Per Hamilton Strange with pre-service). Appointment is after HEAP appointment. Patient states she will inform [facility] representative that she does have this appointment scheduled. Patient states she is only smoking an average of 2 cigarettes per day. She is aware of the detrimental effects smoking has on her overall health status and is in the process of stopping all together. [facility] provided encouragement and acknowledged her success in decreasing her daily intake of cigarettes. Patient states that she has had increased muscle spasms and two episodes of confusion in the past week. Patient states these symptoms usually occur when she close to receiving an infusion. [facility] did remind patient of her appointment with Keke Calderon on 08/13/21. Patient does plan on attending this appointment. Patient also states that she feels her reflux is getting worse. She states she does have an appointment with Dr. Maura Zamora the end of August. ACM suggested that she attempt to secure an earlier appointment d/t increasing symptoms. Patient agreed to this recommendation. PLAN  -Continue Care Coordination  -F/U on status of smoking cessation.   -F/U on status of appointment with HEAP and determination of eligibility of receiving an air conditioner. -F/U to determine if patient did secure an earlier appointment with GI provider, Dr. Gwen Silverman. Care Coordination Interventions    Program Enrollment: Complex Care  Referral from Primary Care Provider: No  Suggested Interventions and Community Resources  Medi Set or Pill Pack: Completed (Comment: 05/04/21: Patient receives routine medications from [facility])  Physical Therapy: Completed (Comment: 05/04/21: Patient attend [facility] on an outpatient basis; 06/11/231: Completed beginning of June)  Registered Dietician: Declined  Smoking Cessation: In Process (Comment: 05/04/21: Patient to discuss Chantix with PCP)  Social Work: Declined  Transportation Support: Completed         Goals Addressed                 This Visit's Progress     Conditions and Symptoms   On track     I will schedule office visits, as directed by my provider. I will keep my appointment or reschedule if I have to cancel. I will notify my provider of any symptoms that indicate a worsening of my condition. Barriers: overwhelmed by complexity of regimen and time constraints  Plan for overcoming my barriers: Care Coordination, ACM to remind patient of scheduled appointments (date and time)  Confidence: 8/10  Anticipated Goal Completion Date: 08/04/21, 08/27/21         Wellness Goal   On track     Patient Self-Management Goal for Health Maintenance  Goal: I will chose a goal related to tobacco cessation:    I will think about my triggers for smoking,    I will think about reasons why I should quit smoking, and    I will try a nicotine replacement product or medication. Barriers: overwhelmed by complexity of regimen and stress  Plan for overcoming my barriers: Care Coordination, ACM to discuss/review patient's triggers and provide education on benefits of smoking cessation.    Confidence: 7/10  Anticipated Goal Completion Date: 08/04/21, 08/27/21            Prior to Admission medications    Medication Sig Start Date End Date Taking? Authorizing Provider   ocrelizumab (OCREVUS) 300 MG/10ML SOLN injection Infuse 20 mLs intravenously every 6 months 7/19/21   MARIXA Mayorga CNP   baclofen (LIORESAL) 10 MG tablet TAKE 2 TABLETS BY MOUTH THREE TIMES A DAY 7/2/21   MARIXA Mayorga CNP   nortriptyline (PAMELOR) 10 MG capsule TAKE 1 CAPSULE BY MOUTH ONCE NIGHTLY FOR HEADACHES 7/2/21   Gilmar Carlos PA-C   varenicline (CHANTIX STARTING MONTH PAK) 0.5 MG X 11 & 1 MG X 42 tablet Take by mouth. 5/11/21   Gilmar Carlos PA-C   famotidine (PEPCID) 40 MG tablet take 1 tablet by mouth every evening 4/6/21   Historical Provider, MD   gabapentin (NEURONTIN) 300 MG capsule TAKE 1 CAPSULE BY MOUTH THREE TIMES DAILY 3/10/21 6/8/21  DANISH Summers   lisinopril-hydroCHLOROthiazide (PRINZIDE;ZESTORETIC) 10-12.5 MG per tablet TAKE 1 TABLET BY MOUTH ONCE DAILY 3/10/21   Fredis Foss DO   vitamin D (CHOLECALCIFEROL) 92090 UNIT CAPS Take 50,000 Units by mouth once a week 9/29/20   Historical Provider, MD   CPAP Machine MISC Heated tube and chin strap. Dispense as written. LIFETIME SUPPLIES.  11/6/20   Historical Provider, MD   diclofenac sodium (VOLTAREN) 1 % GEL apply 2 grams to affected area four times a day 9/4/20   Ginny Reese, DO       Future Appointments   Date Time Provider Sandra Birch   8/13/2021 11:00 AM MARIXA Mayorga CNP BDM Neuro [facility]   9/2/2021  8:00 AM [facility] CHAIR 1 SE[facility] INF SER Claudean Kindler

## 2021-08-12 ENCOUNTER — CARE COORDINATION (OUTPATIENT)
Dept: CARE COORDINATION | Age: 46
End: 2021-08-12

## 2021-08-12 NOTE — PROGRESS NOTES
Bernardo. Mei Shepherd M.D., F.A.C.P. Rivas Guzman, OUMAR, APRN, CNS  Connor Pérez. Kehinde Shaw, MSN, APRN-FNP-C  Emma Miranda MSN, APRN, FNP-C  DANISH Loza-HAKEEM  [address] MSN, APRN, FNP-C  [address], [address]  [place], [address] [address]  Phone: [phone]  Fax: [phone]         Isabel Langston is a 51 y.o. right handed woman    We are following her MS    She alone and remains a good historian    Last MRIs in July/August 2020 showed unchanged, large white matter lesions with no brainstem or cervical lesions. She continues on Ocrevus--- with next infusion in September--- and feels better after her treatments. She denies any new weakness, but continues to feel numbness in both thighs, as well as cramping around her torso and in her calves. Gabapentin has been helpful for her overall pains, but she still feels the numbness. Baclofen 20 mg 3 times daily has been helpful. She also describes crawling, creeping, gnawing sensations in both legs when she is sitting around watching TV. These are bothersome to her. No bladder issues, falls, vision changes, clumsiness, walking issues. Vitamin D level improved in May with supplementation but not yet at goal.    She continues to follow with ENT for her Palafox's esophagus and vocal cord dysfunction, stating her voice goes \"in and out\". Unfortunately, she was recently diagnosed with colon cancer and will likely have to undergo surgery. Decisions about chemo and radiation are pending. There were also nodules found on her lungs. She is trying to quit smoking and is down to one cigarette/day. She is not exercising. She is due for her second Covid vaccination on 8/31.     Medications:     Current Outpatient Medications   Medication Sig Dispense Refill    dexlansoprazole (DEXILANT) 60 MG CPDR delayed release capsule Take 60 mg by mouth daily      ocrelizumab (OCREVUS) 300 MG/10ML SOLN injection Infuse 20 mLs intravenously every 6 months 20 mL 1    baclofen (LIORESAL) 10 MG tablet TAKE 2 TABLETS BY MOUTH THREE TIMES A  tablet 5    nortriptyline (PAMELOR) 10 MG capsule TAKE 1 CAPSULE BY MOUTH ONCE NIGHTLY FOR HEADACHES 30 capsule 3    famotidine (PEPCID) 40 MG tablet take 1 tablet by mouth every evening      gabapentin (NEURONTIN) 300 MG capsule TAKE 1 CAPSULE BY MOUTH THREE TIMES DAILY 90 capsule 5    lisinopril-hydroCHLOROthiazide (PRINZIDE;ZESTORETIC) 10-12.5 MG per tablet TAKE 1 TABLET BY MOUTH ONCE DAILY 30 tablet 5    vitamin D (CHOLECALCIFEROL) 19004 UNIT CAPS Take 50,000 Units by mouth once a week      CPAP Machine MISC Heated tube and chin strap. Dispense as written. LIFETIME SUPPLIES. No current facility-administered medications for this visit.      Objective:     /89 (Site: Right Upper Arm)   Pulse 82   Temp 98 °F (36.7 °C)   Ht 5' 1\" (1.549 m)   Wt 220 lb (99.8 kg)   LMP 01/05/2018   SpO2 97%   BMI 41.57 kg/m²     General appearance: alert, appears stated age, cooperative and no distress  Head: Normocephalic, atraumatic, poor dentition  Eyes: Sclera clear  Neck: full ROM  Lungs: Clear throughout  Heart: Regular rate and rhythm  Extremities: no cyanosis or edema  Pulses: 2+ throughout  Skin: no obvious lesions on face or UEs    Mental Status: alert, oriented x4    Appropriate attention/concentration  Intact fundus of knowledge  Memories intact    Speech: no dysarthria  Language: no aphasias    Cranial Nerves:  I: smell    II: visual acuity     II: visual fields Full   II: pupils ERRLA    No cecilia or red desat   III,VII: ptosis None   III,IV,VI: extraocular muscles  EOMI without nystagmus    V: mastication normal   V: facial light touch sensation     V,VII: corneal reflex     VII: facial muscle function - upper  Normal   VII: facial muscle function - lower Normal   VIII: hearing Normal   IX: soft palate elevation     IX,X: gag reflex    XI: trapezius strength  5/5   XI: sternocleidomastoid strength 5/5   XI: neck extension strength  5/5   XII: tongue strength  Normal     Motor:  5/5 throughout  Overweight  bulk and normal tone  No drift or abnormal movements    Sensory:  LT decreased L arm  PP decreased L thigh    Coordination:   FN, FFM, JERO, HS normal    Gait:  Normal    DTR:  2+ throughout  No Baer's    Laboratory/Radiology:     Lab Results   Component Value Date     05/11/2021    K 4.8 05/11/2021     05/11/2021    CO2 22 05/11/2021    BUN 6 05/11/2021    CREATININE 0.8 05/11/2021    GLUCOSE 82 05/11/2021    CALCIUM 9.7 05/11/2021    PROT 8.4 (H) 05/11/2021    LABALBU 4.5 05/11/2021    BILITOT 0.5 05/11/2021    ALKPHOS 106 (H) 05/11/2021    AST 30 05/11/2021    ALT 64 (H) 05/11/2021    LABGLOM >60 05/11/2021    GFRAA >60 05/11/2021       Lab Results   Component Value Date    WBC 11.3 05/11/2021    HGB 16.0 (H) 05/11/2021    HCT 50.3 (H) 05/11/2021    MCV 96.2 05/11/2021     05/11/2021      Ref. Range 5/11/2021 12:00   Cholesterol, Total Latest Ref Range: 0 - 199 mg/dL 225 (H)   HDL Cholesterol Latest Ref Range: >40 mg/dL 44   LDL Calculated Latest Ref Range: 0 - 99 mg/dL 144 (H)   Triglycerides Latest Ref Range: 0 - 149 mg/dL 184 (H)   VLDL Cholesterol Calculated Latest Units: mg/dL 37   Albumin Latest Ref Range: 3.5 - 5.2 g/dL 4.5   Alk Phos Latest Ref Range: 35 - 104 U/L 106 (H)   ALT Latest Ref Range: 0 - 32 U/L 64 (H)   AST Latest Ref Range: 0 - 31 U/L 30   Bilirubin Latest Ref Range: 0.0 - 1.2 mg/dL 0.5   Total Protein Latest Ref Range: 6.4 - 8.3 g/dL 8.4 (H)   Glucose Latest Ref Range: 74 - 99 mg/dL 82   Hemoglobin A1C Latest Ref Range: 4.0 - 5.6 % 5.4     Vit D March 2021: 38    MRI of the brain with gadolinium July 2020 personal review: Large white matter lesion in left parietal white matter area as well as several other white matter lesions are unchanged from February imaging and no evidence of active lesions.     MRI cervical spine WWO:  August

## 2021-08-12 NOTE — CARE COORDINATION
Ambulatory Care Coordination Note  8/12/2021  CM Risk Score: 0  Charlson 10 Year Mortality Risk Score: 2%     ACC: Toyin Savage RN    Summary Note:   AC contacted patient to follow up on her status. Patient states she cancelled PCP appointment d/t she did not qualify for an air conditioner d/t the utility bills are not in her name, therefore, she did not require a letter from PCP. Patient states she received her first COVID vaccine on 08/03/21 and she will receive her second vaccine on 08/31/21. Patient is aware she has an appointment with neurology on 08/13/21, and her infusion is scheduled for 09/02/21. She does plan on attending both of these appointments. Patient states she needs to have her esophagus \"stretched\" again. Procedure is scheduled for 08/17/21 per Dr. Tigist Bonilla. SMOKING  -Patient states she is aware she needs to stop smoking all together. She states she smoked 1/2 cigarette on Tuesday, 1 1/2 on Wednesday and 1/2 so far today.   -Patient states she is wearing the patch. She states she is not taking Chantix d/t s/e and potentially can cause cancer. -[facility] did provide number for [facility] Smoking Cessation program. AC strongly encouraged patient to contact this resource for assistance to completely stop smoking.   -Patient is aware of the detrimental effects smoking can have on her overall health. She knows smoking can exacerbate symptoms of her chronic disease conditions. GENERAL - NEW ONSET COLON CANCER  -Patient became established with the [facility] d/t new onset of colon cancer found per colonoscopy on 06/22/21.   -Patient has an appointment for surgical consult with Dr. Venecia Waterman on 08/19/21.   -Patient does have a good attitude regarding her medical status. [facility] educated patient on resources available to her (Palliative Care, counseling) to assist her with management of her disease processes.  Patient states she will follow up with her providers and if she needs referrals for triggers and provide education on benefits of smoking cessation. Confidence: 7/10  Anticipated Goal Completion Date: 08/04/21, 08/27/21, 09/12/21            Prior to Admission medications    Medication Sig Start Date End Date Taking? Authorizing Provider   ocrelizumab (OCREVUS) 300 MG/10ML SOLN injection Infuse 20 mLs intravenously every 6 months 7/19/21   MARIXA Peace CNP   baclofen (LIORESAL) 10 MG tablet TAKE 2 TABLETS BY MOUTH THREE TIMES A DAY 7/2/21   MARIXA Peace CNP   nortriptyline (PAMELOR) 10 MG capsule TAKE 1 CAPSULE BY MOUTH ONCE NIGHTLY FOR HEADACHES 7/2/21   Madelyn Valiente PA-C   varenicline (CHANTIX STARTING MONTH PAK) 0.5 MG X 11 & 1 MG X 42 tablet Take by mouth. 5/11/21   Madelyn Valiente PA-C   famotidine (PEPCID) 40 MG tablet take 1 tablet by mouth every evening 4/6/21   Historical Provider, MD   gabapentin (NEURONTIN) 300 MG capsule TAKE 1 CAPSULE BY MOUTH THREE TIMES DAILY 3/10/21 6/8/21  DANISH Alarcon   lisinopril-hydroCHLOROthiazide (PRINZIDE;ZESTORETIC) 10-12.5 MG per tablet TAKE 1 TABLET BY MOUTH ONCE DAILY 3/10/21   Ambar Hoyt,    vitamin D (CHOLECALCIFEROL) 47634 UNIT CAPS Take 50,000 Units by mouth once a week 9/29/20   Historical Provider, MD   CPAP Machine MISC Heated tube and chin strap. Dispense as written. LIFETIME SUPPLIES.  11/6/20   Historical Provider, MD   diclofenac sodium (VOLTAREN) 1 % GEL apply 2 grams to affected area four times a day 9/4/20   Lissette Mariano, DO       Future Appointments   Date Time Provider Sandra Birch   8/13/2021 11:00 AM MARIXA Peace CNP BDBO Neuro [facility]   8/19/2021  3:00 PM MD CANDIS Tomlin GEN SURG [facility]   9/2/2021  8:00 AM [facility] CHAIR 1 SEYZ INF SER [facility]      and   General Assessment    Do you have any symptoms that are causing concern?: Yes  Progression since Onset: Unchanged  Reported Symptoms: Other

## 2021-08-13 ENCOUNTER — OFFICE VISIT (OUTPATIENT)
Dept: NEUROLOGY | Age: 46
End: 2021-08-13
Payer: COMMERCIAL

## 2021-08-13 VITALS
HEART RATE: 82 BPM | SYSTOLIC BLOOD PRESSURE: 124 MMHG | WEIGHT: 220 LBS | BODY MASS INDEX: 41.54 KG/M2 | TEMPERATURE: 98 F | DIASTOLIC BLOOD PRESSURE: 89 MMHG | HEIGHT: 61 IN | OXYGEN SATURATION: 97 %

## 2021-08-13 DIAGNOSIS — G25.81 SECONDARY RESTLESS LEGS SYNDROME: ICD-10-CM

## 2021-08-13 DIAGNOSIS — G35 MULTIPLE SCLEROSIS (HCC): Primary | ICD-10-CM

## 2021-08-13 PROCEDURE — 99214 OFFICE O/P EST MOD 30 MIN: CPT | Performed by: NURSE PRACTITIONER

## 2021-08-13 PROCEDURE — 4004F PT TOBACCO SCREEN RCVD TLK: CPT | Performed by: NURSE PRACTITIONER

## 2021-08-13 PROCEDURE — G8417 CALC BMI ABV UP PARAM F/U: HCPCS | Performed by: NURSE PRACTITIONER

## 2021-08-13 PROCEDURE — G8427 DOCREV CUR MEDS BY ELIG CLIN: HCPCS | Performed by: NURSE PRACTITIONER

## 2021-08-13 RX ORDER — ROPINIROLE 0.5 MG/1
0.5 TABLET, FILM COATED ORAL 2 TIMES DAILY PRN
Qty: 60 TABLET | Refills: 11 | Status: SHIPPED
Start: 2021-08-13 | End: 2022-06-28

## 2021-08-13 RX ORDER — DEXLANSOPRAZOLE 60 MG/1
60 CAPSULE, DELAYED RELEASE ORAL DAILY
Status: ON HOLD | COMMUNITY
End: 2022-04-04 | Stop reason: HOSPADM

## 2021-08-13 RX ORDER — BACLOFEN 20 MG/1
20 TABLET ORAL 4 TIMES DAILY PRN
Qty: 360 TABLET | Refills: 3 | Status: SHIPPED
Start: 2021-08-13 | End: 2022-06-28 | Stop reason: SDUPTHER

## 2021-08-13 NOTE — PATIENT INSTRUCTIONS
Patient Education        ropinirole (oral)  Pronunciation:  armani LÓPEZ i role  Brand:  Requip, Requip XL  What is the most important information I should know about ropinirole? Follow all directions on your medicine label and package. Tell each of your healthcare providers about all your medical conditions, allergies, and all medicines you use. What is ropinirole? Ropinirole has some of the same effects as a chemical called dopamine, which occurs naturally in your body. Low levels of dopamine in the brain are associated with Parkinson's disease. Ropinirole is used to treat symptoms of Parkinson's disease (stiffness, tremors, muscle spasms, and poor muscle control). Ropinirole is also used to treat restless legs syndrome (RLS). Only immediate-release ropinirole (Requip) is approved to treat either Parkinson symptoms or RLS. Extended-release ropinirole (Requip XL) is approved only to treat Parkinson symptoms. Parkinson's and RLS are two separate disorders. Having one of these conditions will not cause you to have the other condition. Ropinirole may also be used for purposes not listed in this medication guide. What should I discuss with my healthcare provider before taking ropinirole? You should not use ropinirole if you are allergic to it. To make sure ropinirole is safe for you, tell your doctor if you have:  · high or low blood pressure;  · kidney disease (or if you are on dialysis);  · heart disease, heart rhythm problems;  · a sleep disorder such as narcolepsy, or other conditions that may cause daytime sleepiness; or  · if you smoke. People with Parkinson's disease may have a higher risk of skin cancer (melanoma). Talk to your doctor about this risk and what skin symptoms to watch for. It is not known whether this medicine will harm an unborn baby. Tell your doctor if you are pregnant or plan to become pregnant.   It is not known whether ropinirole passes into breast milk or if it could affect the nursing baby. Ropinirole may slow breast milk production. Tell your doctor if you are breast-feeding. Ropinirole is not approved for use by anyone younger than 25years old. How should I take ropinirole? Follow all directions on your prescription label. Your doctor may occasionally change your dose. Do not take this medicine in larger or smaller amounts or for longer than recommended. If you are taking immediate-release ropinirole (Requip) you should not take extended-release ropinirole (Requip XL) at the same time. The dose and timing of ropinirole in treating Parkinson's disease is different from the dose and timing in treating RLS. Follow the directions on your prescription label. Ask your pharmacist if you have any questions about the kind of ropinirole you receive at the pharmacy. Ropinirole can be taken with or without food. Take the medicine at the same time each day. Do not crush, chew, or break an extended-release tablet (Requip XL). Swallow it whole. Call your doctor if you see part of the ropinirole tablet in your stool. This is a sign that your body may not have absorbed all of the medicine. If you are taking this medicine for RLS, tell your doctor if your symptoms get worse, if they occur in the morning or earlier than usual in the evening, or if you feel restless symptoms in your hands or arms. It may take up to several weeks before your symptoms improve. Keep using the medication as directed and tell your doctor if your symptoms do not improve. Do not stop using ropinirole suddenly, or you could have unpleasant withdrawal symptoms. Follow your doctor's instructions about tapering your dose. Store at room temperature away from moisture, heat, and light. Keep the bottle tightly closed when not in use. What happens if I miss a dose? Take the missed dose as soon as you remember. Skip the missed dose if it is almost time for your next scheduled dose.  Do not  take extra medicine to make up the missed dose. What happens if I overdose? Seek emergency medical attention or call the Poison Help line at 1-886.822.3548. What should I avoid while taking ropinirole? Avoid getting up too fast from a sitting or lying position, or you may feel dizzy. Get up slowly and steady yourself to prevent a fall. Dizziness may impair your thinking or reactions. Avoid driving or operating machinery until you know how this medicine will affect you. Drinking alcohol can increase certain side effects of ropinirole. What are the possible side effects of ropinirole? Get emergency medical help if you have signs of an allergic reaction: hives; difficulty breathing; swelling of your face, lips, tongue, or throat. Some people taking ropinirole have fallen asleep during normal daytime activities such as working, talking, eating, or driving. Tell your doctor if you have any problems with daytime sleepiness or drowsiness. You may have increased sexual urges, unusual urges to swift, or other intense urges while taking this medicine. Talk with your doctor if this occurs. Call your doctor at once if you have:  · extreme drowsiness, falling asleep suddenly (even after feeling alert);  · worsening or no improvement in your symptoms;  · a light-headed feeling, like you might pass out;  · unusual changes in mood or behavior;  · tremors, twitching uncontrollable muscle movements; or  · hallucinations (seeing or hearing things that are not real). Side effects such as confusion or hallucinations may be more likely in older adults. Common side effects may include:  · drowsiness, dizziness, weakness;  · headache, confusion, hallucinations;  · increased blood pressure (severe headache, pounding in your neck or ears, nosebleed, irregular heartbeats);  · nausea, vomiting, upset stomach, constipation;  · flu symptoms (fever, chills, body aches);  · sudden muscle movements;  · increased sweating; or  · swelling in your legs or feet.   This is not a complete list of side effects and others may occur. Call your doctor for medical advice about side effects. You may report side effects to FDA at 2-716-UIO-2069. What other drugs will affect ropinirole? Taking ropinirole with other drugs that make you sleepy can worsen this effect. Ask your doctor before taking a sleeping pill, narcotic medication, muscle relaxer, or medicine for anxiety, depression, or seizures. Other drugs may interact with ropinirole, including prescription and over-the-counter medicines, vitamins, and herbal products. Tell each of your health care providers about all medicines you use now and any medicine you start or stop using. Where can I get more information? Your pharmacist can provide more information about ropinirole. Remember, keep this and all other medicines out of the reach of children, never share your medicines with others, and use this medication only for the indication prescribed. Every effort has been made to ensure that the information provided by [address] is accurate, up-to-date, and complete, but no guarantee is made to that effect. Drug information contained herein may be time sensitive. [employer] information has been compiled for use by healthcare practitioners and consumers in the [place] and therefore [employer] does not warrant that uses outside of the [place] are appropriate, unless specifically indicated otherwise. [facility]'s drug information does not endorse drugs, diagnose patients or recommend therapy. [facility] drug information is an informational resource designed to assist licensed healthcare practitioners in caring for their patients and/or to serve consumers viewing this service as a supplement to, and not a substitute for, the expertise, skill, knowledge and judgment of healthcare practitioners.  The absence of a warning for a given drug or drug combination in no way should be construed to indicate that the drug or drug combination is safe, effective or appropriate for any given patient. [facility] does not assume any responsibility for any aspect of healthcare administered with the aid of information [facility] provides. The information contained herein is not intended to cover all possible uses, directions, precautions, warnings, drug interactions, allergic reactions, or adverse effects. If you have questions about the drugs you are taking, check with your doctor, nurse or pharmacist.  Copyright 0549-6989 [address]: 14.01. Revision date: 3/27/2017. Care instructions adapted under license by [facility] ([facility]). If you have questions about a medical condition or this instruction, always ask your healthcare professional. [place] [address] any warranty or liability for your use of this information. Patient Education        Multiple Sclerosis (MS): Care Instructions  Your Care Instructions  Multiple sclerosis, also called MS, is a disease that can affect the brain, spinal cord, and nerves to the eyes. MS can cause problems with muscle control and strength, vision, balance, feeling, and thinking. Whatever your symptoms are, taking medicine correctly and following your doctor's advice for home care can help you maintain your quality of life. Follow-up care is a key part of your treatment and safety. Be sure to make and go to all appointments, and call your doctor if you are having problems. It's also a good idea to know your test results and keep a list of the medicines you take. How can you care for yourself at home? General care  · Take your medicines exactly as prescribed. Call your doctor if you think you are having a problem with your medicine. · Use a cane, walker, or scooter if your doctor suggests it. · Keep doing your normal activities as much as you can. · If you have problems urinating, press or tap your bladder area to help start urine flow.  If you have trouble controlling your urine, plan your fluid intake and activities so that a toilet will be available when you need it. · Spend time with family and friends. Join a support group for people with MS if you want extra help. · Depression is common with this condition. Tell your doctor if you have trouble sleeping, are eating too much or are not hungry, or feel sad or tearful all the time. Depression can be treated with medicine and counseling. Diet and exercise  · Eat a balanced diet. · If you have problems swallowing, change how and what you eat:  ? Try thick drinks, such as milk shakes. They are easier to swallow than other fluids. ? Do not eat foods that crumble easily. These can cause choking. ? Use a  to prepare food. Soft foods need less chewing. ? Eat small meals often so that you do not get tired from eating larger meals. · Get exercise on most days. Work with your doctor to set up a program of walking, swimming, or other exercise that you are able to do. A physical therapist can teach you exercises if you cannot walk but can move your limbs and trunk. Or you can do exercises to help with coordination and balance. You can help improve muscle stiffness by doing exercises while lying in certain positions. When should you call for help? Call your doctor now or seek immediate medical care if:    · You have a change in symptoms.     · You fall or have another injury.     · You have symptoms of a urinary infection. For example:  ? You have blood or pus in your urine. ? You have pain in your back just below your rib cage. This is called flank pain. ? You have a fever, chills, or body aches. ? It hurts to urinate. ? You have groin or belly pain. Watch closely for changes in your health, and be sure to contact your doctor if:    · You want more information about MS or medicines.     · You have questions about alternative treatments.  Do not use any other treatments without talking to your doctor first.   Where can you learn more?  Go to [email or web address] and sign in to your [employer] account. Enter T055 in the [facility] box to learn more about \"Multiple Sclerosis (MS): Care Instructions. \"     If you do not have an account, please click on the \"Sign Up Now\" link. Current as of: August 4, 2020               Content Version: 12.9  © 2006-2021 [facility]. Care instructions adapted under license by [facility] ([facility]). If you have questions about a medical condition or this instruction, always ask your healthcare professional. [address] any warranty or liability for your use of this information.

## 2021-08-18 ENCOUNTER — HOSPITAL ENCOUNTER (OUTPATIENT)
Age: 46
Discharge: HOME | End: 2021-08-18
Payer: COMMERCIAL

## 2021-08-18 DIAGNOSIS — G35 MULTIPLE SCLEROSIS (HCC): ICD-10-CM

## 2021-08-18 LAB — VITAMIN D 25-HYDROXY: 32 NG/ML (ref 30–100)

## 2021-08-18 PROCEDURE — 36415 COLL VENOUS BLD VENIPUNCTURE: CPT

## 2021-08-18 PROCEDURE — 82306 VITAMIN D 25 HYDROXY: CPT

## 2021-08-19 ENCOUNTER — OFFICE VISIT (OUTPATIENT)
Dept: SURGERY | Age: 46
End: 2021-08-19
Payer: COMMERCIAL

## 2021-08-19 ENCOUNTER — TELEPHONE (OUTPATIENT)
Dept: SURGERY | Age: 46
End: 2021-08-19

## 2021-08-19 VITALS
OXYGEN SATURATION: 98 % | RESPIRATION RATE: 18 BRPM | SYSTOLIC BLOOD PRESSURE: 123 MMHG | HEIGHT: 61 IN | DIASTOLIC BLOOD PRESSURE: 82 MMHG | BODY MASS INDEX: 39.65 KG/M2 | WEIGHT: 210 LBS | HEART RATE: 75 BPM | TEMPERATURE: 97.6 F

## 2021-08-19 DIAGNOSIS — C18.6 MALIGNANT NEOPLASM OF DESCENDING COLON (HCC): Primary | ICD-10-CM

## 2021-08-19 PROCEDURE — G8417 CALC BMI ABV UP PARAM F/U: HCPCS | Performed by: SURGERY

## 2021-08-19 PROCEDURE — 99204 OFFICE O/P NEW MOD 45 MIN: CPT | Performed by: SURGERY

## 2021-08-19 PROCEDURE — G8427 DOCREV CUR MEDS BY ELIG CLIN: HCPCS | Performed by: SURGERY

## 2021-08-19 PROCEDURE — 4004F PT TOBACCO SCREEN RCVD TLK: CPT | Performed by: SURGERY

## 2021-08-19 RX ORDER — ERYTHROMYCIN 500 MG/1
500 TABLET, COATED ORAL SEE ADMIN INSTRUCTIONS
Qty: 3 TABLET | Refills: 0 | Status: SHIPPED
Start: 2021-08-19 | End: 2021-08-27 | Stop reason: SDUPTHER

## 2021-08-19 RX ORDER — NEOMYCIN SULFATE 500 MG/1
1000 TABLET ORAL SEE ADMIN INSTRUCTIONS
Qty: 6 TABLET | Refills: 0 | Status: SHIPPED | OUTPATIENT
Start: 2021-08-19 | End: 2021-08-20

## 2021-08-19 NOTE — PROGRESS NOTES
General Surgery History and Physical    Patient's Name/Date of Birth: Ricki Moreno / 1975    Date: August 19, 2021     Surgeon: Pillo Humphries M.D.    PCP: Bernie Cueto PA-C     Chief Complaint: colon cancer    HPI:   Ricki Moreno is a 51 y.o. female who presents for evaluation of colon cancer that was found on recent colonoscopy and is located at descending colon. Does not have other symptoms. Past Medical History:   Diagnosis Date    Acid reflux disease     Cyst of ovary     Fracture of nasal bone     Headache     Hearing loss     Hypertension     Migraine     Morbidly obese ([address] 10/5/2020    Multiple sclerosis ([facility]     denies limitations at present 11/2020    VEENA on CPAP     severe VEENA per pt    Right shoulder pain 11/2020    Tinnitus     TMJ dysfunction        Past Surgical History:   Procedure Laterality Date    APPENDECTOMY      BACK SURGERY      BICEPS TENDON REPAIR Right 11/11/2020    BICEPS TENODESIS performed by Gucci Hinkle MD at [address] [place] [address]  03/05/2018    Robotic hysterectomy, bilateral salpingectomy, right oophorectomy DR. ARIANA MONIQUE [facility]     HYSTERECTOMY, TOTAL ABDOMINAL      LARYNGOSCOPY N/A 7/17/2020    DIRECT LARYNGOSCOPY--OMNI GUIDE LASER performed by Warden Garry MD at [facility] ARTHROSCOPY Right 11/11/2020    RIGHT SHOULDER DIAGNOSTIC ARTHROSCOPY WITH DECOMPRESSION ROTATOR CUFF REPAIR performed by Gucci Hinkle MD at [place] [address]         Current Outpatient Medications   Medication Sig Dispense Refill    dexlansoprazole (DEXILANT) 60 MG CPDR delayed release capsule Take 60 mg by mouth daily      vitamin D (CHOLECALCIFEROL) 71265 UNIT CAPS Take 1 capsule by mouth once a week 12 capsule 3    baclofen (LIORESAL) 20 MG tablet Take 1 tablet by mouth 4 times daily as needed (muscle spasms; pain) 360 tablet 3    rOPINIRole (REQUIP) 0.5 MG tablet Take 1 tablet by mouth 2 times daily as needed (restless legs) 60 tablet 11    ocrelizumab (OCREVUS) 300 MG/10ML SOLN injection Infuse 20 mLs intravenously every 6 months 20 mL 1    nortriptyline (PAMELOR) 10 MG capsule TAKE 1 CAPSULE BY MOUTH ONCE NIGHTLY FOR HEADACHES 30 capsule 3    famotidine (PEPCID) 40 MG tablet take 1 tablet by mouth every evening      lisinopril-hydroCHLOROthiazide (PRINZIDE;ZESTORETIC) 10-12.5 MG per tablet TAKE 1 TABLET BY MOUTH ONCE DAILY 30 tablet 5    CPAP Machine MISC Heated tube and chin strap. Dispense as written. LIFETIME SUPPLIES.  gabapentin (NEURONTIN) 300 MG capsule TAKE 1 CAPSULE BY MOUTH THREE TIMES DAILY 90 capsule 5     No current facility-administered medications for this visit. No Known Allergies    The patient has a family history that is negative for severe cardiovascular or respiratory issues, negative for reaction to anesthesia. Social History     Socioeconomic History    Marital status: Single     Spouse name: Not on file    Number of children: 3    Years of education: 6    Highest education level: 11th grade   Occupational History    Not on file   Tobacco Use    Smoking status: Current Every Day Smoker     Packs/day: 0.50     Years: 25.00     Pack years: 12.50     Types: Cigarettes    Smokeless tobacco: Never Used    Tobacco comment: Ready to stop, requesting prescription for Chantix   Vaping Use    Vaping Use: Never used   Substance and Sexual Activity    Alcohol use:  Yes     Alcohol/week: 1.0 standard drinks     Types: 1 Glasses of wine per week     Comment: socially    Drug use: No    Sexual activity: Yes     Partners: Male   Other Topics Concern    Not on file   Social History Narrative    Uses [employer] for transportation    [facility]     Social Determinants of Health     Financial Resource Strain: Medium Risk    Difficulty of Paying Living Expenses: Somewhat hard   Food Upper Arm, Position: Sitting, Cuff Size: Large Adult)   Pulse 75   Temp 97.6 °F (36.4 °C) (Temporal)   Resp 18   Ht 5' 1\" (1.549 m)   Wt 210 lb (95.3 kg)   LMP 01/05/2018   SpO2 98%   BMI 39.68 kg/m²   General appearance:  NAD  Pyscho/social: negative for tremors and hallucinations  Head: NCAT, PERRLA, EOMI, red conjunctiva  Neck: supple, no masses  Lungs: CTAB, equal chest rise bilateral  Heart: Reg rate  Abdomen: soft, nontender, nondistended  Skin; no lesions  Gu: no cva tenderness  Extremities: extremities normal, atraumatic, no cyanosis or edema      Radiology:  CT abdomen/pelvis: no mets, small lung nodule    Assessment:  51 y.o. female with colon cancer at descending colon    Plan: Will plan for lap resection possible open possible ostomy  Discussed the risk, benefits and alternatives of surgery including wound infections, bleeding, scar and hernia formation and the risks of general anesthetic including MI, CVA, sudden death or reactions to anesthetic medications. The patient understands the risks and alternatives and the possibility of converting to an open procedure. All questions were answered to the patient's satisfaction and they freely signed the consent.       Dario Coelho MD  3:49 PM  8/19/2021

## 2021-08-19 NOTE — TELEPHONE ENCOUNTER
Prior Authorization Form:      DEMOGRAPHICS:                     Patient Name:  Antonette Joyner  Patient :  1975            Insurance:  Payor: [id number]  [address] / Plan: [facility] [address] / Product Type: *No Product type* /   Insurance ID Number:    Payor/Plan Subscr  Sex Relation Sub.  Ins. ID Effective Group Num   1. JAROD VELÁZQUEZ* PAVEL MARISCAL 1975 Female Self [id number]                                    [address]         DIAGNOSIS & PROCEDURE:                       Procedure/Operation: lap poss open left hemicolectomy poss ostomy           CPT Code: 57161    Diagnosis:  Colon cancer    ICD10 Code: C18.6    Location:  [address] [place] [address]    Surgeon:  Diane Terrell INFORMATION:                          Date: 2021    Time:               Anesthesia:                                                         Status:  Inpatient        Special Comments:           Electronically signed by Jesus Singh MA on 2021 at 3:54 PM

## 2021-08-21 ENCOUNTER — PREP FOR PROCEDURE (OUTPATIENT)
Dept: SURGERY | Age: 46
End: 2021-08-21

## 2021-08-21 RX ORDER — SODIUM CHLORIDE 9 MG/ML
25 INJECTION, SOLUTION INTRAVENOUS PRN
Status: CANCELLED | OUTPATIENT
Start: 2021-08-21

## 2021-08-21 RX ORDER — SODIUM CHLORIDE 0.9 % (FLUSH) 0.9 %
10 SYRINGE (ML) INJECTION PRN
Status: CANCELLED | OUTPATIENT
Start: 2021-08-21

## 2021-08-21 RX ORDER — SODIUM CHLORIDE 0.9 % (FLUSH) 0.9 %
10 SYRINGE (ML) INJECTION EVERY 12 HOURS SCHEDULED
Status: CANCELLED | OUTPATIENT
Start: 2021-08-21

## 2021-08-21 RX ORDER — SODIUM CHLORIDE, SODIUM LACTATE, POTASSIUM CHLORIDE, CALCIUM CHLORIDE 600; 310; 30; 20 MG/100ML; MG/100ML; MG/100ML; MG/100ML
INJECTION, SOLUTION INTRAVENOUS CONTINUOUS
Status: CANCELLED | OUTPATIENT
Start: 2021-08-21

## 2021-08-23 ENCOUNTER — TELEPHONE (OUTPATIENT)
Dept: NEUROLOGY | Age: 46
End: 2021-08-23

## 2021-08-23 NOTE — TELEPHONE ENCOUNTER
Sreekanth Herring had approved MRI brain and Cervical spine with dates 8-13-21 - 9-12-21. Cynthia Johnston was scheduled for 9-14-21 and unable to have scans done any earlier. I called insurance to get an extension. Per Homar Santos I had to discontinue original approval and start a new prior auth for both the MRI of brain and MRI of cervical spine. I will contact Cynthia Johnston with response from Homar Santos.       Electronically signed by Senthil Francisco MA on 8/23/2021 at 1:26 PM

## 2021-08-25 ENCOUNTER — HOSPITAL ENCOUNTER (OUTPATIENT)
Age: 46
Discharge: HOME | End: 2021-08-27
Payer: COMMERCIAL

## 2021-08-25 ENCOUNTER — HOSPITAL ENCOUNTER (OUTPATIENT)
Dept: PREADMISSION TESTING | Age: 46
Discharge: HOME | End: 2021-08-25
Payer: COMMERCIAL

## 2021-08-25 VITALS
SYSTOLIC BLOOD PRESSURE: 153 MMHG | HEART RATE: 82 BPM | HEIGHT: 61 IN | OXYGEN SATURATION: 98 % | BODY MASS INDEX: 40.78 KG/M2 | WEIGHT: 216 LBS | RESPIRATION RATE: 16 BRPM | TEMPERATURE: 97 F | DIASTOLIC BLOOD PRESSURE: 80 MMHG

## 2021-08-25 DIAGNOSIS — Z01.818 PREOP TESTING: Primary | ICD-10-CM

## 2021-08-25 LAB
ANION GAP SERPL CALCULATED.3IONS-SCNC: 8 MMOL/L (ref 7–16)
BUN BLDV-MCNC: 8 MG/DL (ref 6–20)
CALCIUM SERPL-MCNC: 9.7 MG/DL (ref 8.6–10.2)
CHLORIDE BLD-SCNC: 100 MMOL/L (ref 98–107)
CO2: 28 MMOL/L (ref 22–29)
CREAT SERPL-MCNC: 0.8 MG/DL (ref 0.5–1)
EKG ATRIAL RATE: 76 BPM
EKG P AXIS: 46 DEGREES
EKG P-R INTERVAL: 146 MS
EKG Q-T INTERVAL: 380 MS
EKG QRS DURATION: 72 MS
EKG QTC CALCULATION (BAZETT): 427 MS
EKG R AXIS: 52 DEGREES
EKG T AXIS: 49 DEGREES
EKG VENTRICULAR RATE: 76 BPM
GFR AFRICAN AMERICAN: >60
GFR NON-AFRICAN AMERICAN: >60 ML/MIN/1.73
GLUCOSE BLD-MCNC: 109 MG/DL (ref 74–99)
HCT VFR BLD CALC: 46.8 % (ref 34–48)
HEMOGLOBIN: 15.8 G/DL (ref 11.5–15.5)
MCH RBC QN AUTO: 31.3 PG (ref 26–35)
MCHC RBC AUTO-ENTMCNC: 33.8 % (ref 32–34.5)
MCV RBC AUTO: 92.9 FL (ref 80–99.9)
PDW BLD-RTO: 12.7 FL (ref 11.5–15)
PLATELET # BLD: 250 E9/L (ref 130–450)
PMV BLD AUTO: 10.1 FL (ref 7–12)
POTASSIUM REFLEX MAGNESIUM: 4.9 MMOL/L (ref 3.5–5)
RBC # BLD: 5.04 E12/L (ref 3.5–5.5)
SODIUM BLD-SCNC: 136 MMOL/L (ref 132–146)
WBC # BLD: 10.2 E9/L (ref 4.5–11.5)

## 2021-08-25 PROCEDURE — U0003 INFECTIOUS AGENT DETECTION BY NUCLEIC ACID (DNA OR RNA); SEVERE ACUTE RESPIRATORY SYNDROME CORONAVIRUS 2 (SARS-COV-2) (CORONAVIRUS DISEASE [COVID-19]), AMPLIFIED PROBE TECHNIQUE, MAKING USE OF HIGH THROUGHPUT TECHNOLOGIES AS DESCRIBED BY CMS-2020-01-R: HCPCS

## 2021-08-25 PROCEDURE — 93005 ELECTROCARDIOGRAM TRACING: CPT | Performed by: ANESTHESIOLOGY

## 2021-08-25 PROCEDURE — 80048 BASIC METABOLIC PNL TOTAL CA: CPT

## 2021-08-25 PROCEDURE — 85027 COMPLETE CBC AUTOMATED: CPT

## 2021-08-25 PROCEDURE — 36415 COLL VENOUS BLD VENIPUNCTURE: CPT

## 2021-08-25 PROCEDURE — U0005 INFEC AGEN DETEC AMPLI PROBE: HCPCS

## 2021-08-25 PROCEDURE — 87081 CULTURE SCREEN ONLY: CPT

## 2021-08-25 NOTE — PROGRESS NOTES
makeup) or nail polish on your fingers or toes. 11. DO NOT wear any jewelry or piercings on day of surgery. All body piercing jewelry must be removed. 12. Shower the night before surgery with ___Antibacterial soap /ELISE WIPES__x______        13. If you have a Living Will and Durable Power of  for Healthcare, please bring in a copy. 15. Notify your Surgeon if you develop any illness between now and surgery time, cough, cold, fever, sore throat, nausea, vomiting, etc.  Please notify your surgeon if you experience dizziness, shortness of breath or blurred vision between now & the time of your surgery. 15. If you have ___dentures, they will be removed before going to the OR; we will provide you a container. If you wear ___contact lenses or ___glasses, they will be removed; please bring a case for them. 16. To provide excellent care visitors will be limited to 1 in the room at any given time. 17. Sleep apnea patients need to know CPAP SETTINGS                                                                                          18. During flu season no children under the age of 15 are permitted in the hospital for the safety of all patients. 19. Other                  Please call AMBULATORY CARE if you have any further questions.    [facility]     [address]

## 2021-08-26 DIAGNOSIS — Z01.818 PREOP TESTING: ICD-10-CM

## 2021-08-27 ENCOUNTER — CARE COORDINATION (OUTPATIENT)
Dept: CARE COORDINATION | Age: 46
End: 2021-08-27

## 2021-08-27 ENCOUNTER — TELEPHONE (OUTPATIENT)
Dept: NEUROLOGY | Age: 46
End: 2021-08-27

## 2021-08-27 LAB
MRSA CULTURE ONLY: NORMAL
SARS-COV-2: NOT DETECTED
SOURCE: NORMAL

## 2021-08-27 RX ORDER — ERYTHROMYCIN 500 MG/1
500 TABLET, COATED ORAL SEE ADMIN INSTRUCTIONS
Qty: 3 TABLET | Refills: 0 | Status: ON HOLD | OUTPATIENT
Start: 2021-08-27 | End: 2021-11-01 | Stop reason: HOSPADM

## 2021-08-27 NOTE — CARE COORDINATION
Ambulatory Care Coordination Note  8/27/2021  CM Risk Score: 0  Charlson 10 Year Mortality Risk Score: 2%     ACC: Toyin Savage RN    Summary Note:   ACM contacted patient to follow up on her status and discuss upcoming surgery. Patient states she is nervous about the outcome of her upcoming colon surgery. ACM listened to patient's concerns and provided support and encouragement. Patient states she will be inpatient in the hospital for at least 5-7 days. ACM discussed date patient would like a follow up call and patient stated ACM can call her on 09/09/21. Patient states if she needs assistance in scheduling any follow up appointments, she will ask for assistance on this follow up call. Patient states she did have her esophagus dilated by Dr. Tigist Bonilla on 08/17/21. She states her throat continues to feel \"tight\". She does have a follow up with this provider in October. Patient will receive her infusion for her MS on 08/30/21 d/t her surgery date of 08/31/21. Patient is also aware that she will not be able to receive her 2nd COVID vaccine until at least 2 weeks after the infusion. Patient states she is down to one cigarette per day. She is hoping she will stop all together d/t her upcoming hospital stay and the inability to smoke while in the hospital.     PLAN  -Continue Care Coordination  -F/U with patient on requested follow up date to discuss status of surgery and treatment plan. -F/U on status of smoking. Care Coordination Interventions    Program Enrollment: Complex Care  Referral from Primary Care Provider: No  Suggested Interventions and Community Resources  Medi Set or Pill Pack: Completed (Comment: 05/04/21: Patient receives routine medications from [facility])  Physical Therapy: Completed (Comment: 05/04/21: Patient attend [facility] on an outpatient basis; 06/11/231: Completed beginning of June)  Registered Dietician: Declined  Smoking Cessation:  In Process (Comment: 05/04/21: tablet Take 1 tablet by mouth 4 times daily as needed (muscle spasms; pain) 8/13/21   Ambreen Filter, APRN - CNP   rOPINIRole (REQUIP) 0.5 MG tablet Take 1 tablet by mouth 2 times daily as needed (restless legs) 8/13/21   [place] Filter, APRN - CNP   ocrelizumab (OCREVUS) 300 MG/10ML SOLN injection Infuse 20 mLs intravenously every 6 months 7/19/21   [place] Filter, APRN - CNP   nortriptyline (PAMELOR) 10 MG capsule TAKE 1 CAPSULE BY MOUTH ONCE NIGHTLY FOR HEADACHES 7/2/21   Clara Arana PA-C   famotidine (PEPCID) 40 MG tablet take 1 tablet by mouth every evening 4/6/21   Historical Provider, MD   gabapentin (NEURONTIN) 300 MG capsule TAKE 1 CAPSULE BY MOUTH THREE TIMES DAILY 3/10/21 8/13/21  DANISH Garcia   lisinopril-hydroCHLOROthiazide (PRINZIDE;ZESTORETIC) 10-12.5 MG per tablet TAKE 1 TABLET BY MOUTH ONCE DAILY 3/10/21   Lurdes Jon,    CPAP Machine MISC Heated tube and chin strap. Dispense as written. LIFETIME SUPPLIES.  11/6/20   Historical Provider, MD       Future Appointments   Date Time Provider Sandra Birch   8/30/2021  8:00 AM [facility] CHAIR 1 [facility] INF SER [facility] Tigist   9/14/2021 12:00 PM [facility] MRI RM 1 SE[facility]Z MRI [facility] Radiolo   9/14/2021  1:00 PM SE MRI RM 1 SEYZ MRI [facility] Radiolo   9/16/2021  2:30 PM Balbir Ascencio MD [facility] SURG [facility]   2/2/2022 10:00 AM [place] Filter, APRN - CNP [facility] Neuro [facility]      and   General Assessment    Do you have any symptoms that are causing concern?: Yes  Progression since Onset: Unchanged  Reported Symptoms: Other (Comment: 08/27/21: Patient will be having surgery for Colon CA)

## 2021-08-27 NOTE — TELEPHONE ENCOUNTER
Received a call from Fahad Ley at [facility]. Johana Cee has an appointment on Monday for Ocrevus infusion. The order for pre-meds states to give oral Benadryl and they administer Benadryl by IV. I called Zoe Mccarthy and asked if it would be ok to change to order from oral to IV and she gave permission to change the order. I called Fahad Ley back and informed her of the change.   Electronically signed by Yaakov Lindsay MA on 8/27/2021 at 3:02 PM

## 2021-08-30 ENCOUNTER — HOSPITAL ENCOUNTER (OUTPATIENT)
Dept: INFUSION THERAPY | Age: 46
Setting detail: INFUSION SERIES
Discharge: HOME | End: 2021-08-30
Payer: COMMERCIAL

## 2021-08-30 ENCOUNTER — ANESTHESIA EVENT (OUTPATIENT)
Dept: OPERATING ROOM | Age: 46
DRG: 231 | End: 2021-08-30
Payer: COMMERCIAL

## 2021-08-30 VITALS
OXYGEN SATURATION: 99 % | HEART RATE: 63 BPM | TEMPERATURE: 97.8 F | SYSTOLIC BLOOD PRESSURE: 123 MMHG | DIASTOLIC BLOOD PRESSURE: 79 MMHG | RESPIRATION RATE: 18 BRPM

## 2021-08-30 DIAGNOSIS — G35 MULTIPLE SCLEROSIS (HCC): Primary | ICD-10-CM

## 2021-08-30 PROCEDURE — 6370000000 HC RX 637 (ALT 250 FOR IP): Performed by: NURSE PRACTITIONER

## 2021-08-30 PROCEDURE — 96375 TX/PRO/DX INJ NEW DRUG ADDON: CPT

## 2021-08-30 PROCEDURE — 6360000002 HC RX W HCPCS: Performed by: NURSE PRACTITIONER

## 2021-08-30 PROCEDURE — 2580000003 HC RX 258: Performed by: NURSE PRACTITIONER

## 2021-08-30 PROCEDURE — 96365 THER/PROPH/DIAG IV INF INIT: CPT

## 2021-08-30 PROCEDURE — 96366 THER/PROPH/DIAG IV INF ADDON: CPT

## 2021-08-30 RX ORDER — SODIUM CHLORIDE 0.9 % (FLUSH) 0.9 %
10 SYRINGE (ML) INJECTION PRN
Status: CANCELLED | OUTPATIENT
Start: 2022-01-31

## 2021-08-30 RX ORDER — DIPHENHYDRAMINE HYDROCHLORIDE 50 MG/ML
25 INJECTION INTRAMUSCULAR; INTRAVENOUS ONCE
Status: CANCELLED
Start: 2022-01-31 | End: 2022-01-31

## 2021-08-30 RX ORDER — DIPHENHYDRAMINE HYDROCHLORIDE 50 MG/ML
50 INJECTION INTRAMUSCULAR; INTRAVENOUS ONCE
Status: CANCELLED | OUTPATIENT
Start: 2022-01-31 | End: 2022-01-31

## 2021-08-30 RX ORDER — DIPHENHYDRAMINE HYDROCHLORIDE 50 MG/ML
25 INJECTION INTRAMUSCULAR; INTRAVENOUS ONCE
Status: CANCELLED | OUTPATIENT
Start: 2022-01-31

## 2021-08-30 RX ORDER — SODIUM CHLORIDE 9 MG/ML
INJECTION, SOLUTION INTRAVENOUS CONTINUOUS
Status: CANCELLED | OUTPATIENT
Start: 2022-01-31

## 2021-08-30 RX ORDER — SODIUM CHLORIDE 9 MG/ML
INJECTION, SOLUTION INTRAVENOUS CONTINUOUS
Status: ACTIVE | OUTPATIENT
Start: 2021-08-30 | End: 2021-08-30

## 2021-08-30 RX ORDER — ACETAMINOPHEN 325 MG/1
650 TABLET ORAL ONCE
Status: COMPLETED | OUTPATIENT
Start: 2021-08-30 | End: 2021-08-30

## 2021-08-30 RX ORDER — ACETAMINOPHEN 325 MG/1
650 TABLET ORAL ONCE
Status: CANCELLED | OUTPATIENT
Start: 2022-01-31

## 2021-08-30 RX ORDER — SODIUM CHLORIDE 0.9 % (FLUSH) 0.9 %
10 SYRINGE (ML) INJECTION PRN
Status: DISCONTINUED | OUTPATIENT
Start: 2021-08-30 | End: 2021-08-31 | Stop reason: HOSPADM

## 2021-08-30 RX ORDER — EPINEPHRINE 1 MG/ML
0.3 INJECTION, SOLUTION, CONCENTRATE INTRAVENOUS PRN
Status: CANCELLED | OUTPATIENT
Start: 2022-01-31

## 2021-08-30 RX ORDER — METHYLPREDNISOLONE SODIUM SUCCINATE 125 MG/2ML
100 INJECTION, POWDER, LYOPHILIZED, FOR SOLUTION INTRAMUSCULAR; INTRAVENOUS ONCE
Status: COMPLETED | OUTPATIENT
Start: 2021-08-30 | End: 2021-08-30

## 2021-08-30 RX ORDER — METHYLPREDNISOLONE SODIUM SUCCINATE 125 MG/2ML
125 INJECTION, POWDER, LYOPHILIZED, FOR SOLUTION INTRAMUSCULAR; INTRAVENOUS ONCE
Status: CANCELLED | OUTPATIENT
Start: 2022-01-31 | End: 2022-01-31

## 2021-08-30 RX ORDER — METHYLPREDNISOLONE SODIUM SUCCINATE 125 MG/2ML
100 INJECTION, POWDER, LYOPHILIZED, FOR SOLUTION INTRAMUSCULAR; INTRAVENOUS ONCE
Status: CANCELLED | OUTPATIENT
Start: 2022-01-31

## 2021-08-30 RX ORDER — METHYLPREDNISOLONE SODIUM SUCCINATE 125 MG/2ML
50 INJECTION, POWDER, LYOPHILIZED, FOR SOLUTION INTRAMUSCULAR; INTRAVENOUS ONCE
Status: CANCELLED
Start: 2022-01-31 | End: 2022-01-31

## 2021-08-30 RX ORDER — DIPHENHYDRAMINE HYDROCHLORIDE 50 MG/ML
25 INJECTION INTRAMUSCULAR; INTRAVENOUS ONCE
Status: COMPLETED | OUTPATIENT
Start: 2021-08-30 | End: 2021-08-30

## 2021-08-30 RX ADMIN — SODIUM CHLORIDE, PRESERVATIVE FREE 10 ML: 5 INJECTION INTRAVENOUS at 08:18

## 2021-08-30 RX ADMIN — SODIUM CHLORIDE, PRESERVATIVE FREE 10 ML: 5 INJECTION INTRAVENOUS at 08:17

## 2021-08-30 RX ADMIN — METHYLPREDNISOLONE SODIUM SUCCINATE 100 MG: 125 INJECTION, POWDER, FOR SOLUTION INTRAMUSCULAR; INTRAVENOUS at 08:16

## 2021-08-30 RX ADMIN — ACETAMINOPHEN 650 MG: 325 TABLET ORAL at 08:08

## 2021-08-30 RX ADMIN — OCRELIZUMAB 600 MG: 300 INJECTION INTRAVENOUS at 09:08

## 2021-08-30 RX ADMIN — SODIUM CHLORIDE, PRESERVATIVE FREE 10 ML: 5 INJECTION INTRAVENOUS at 08:16

## 2021-08-30 RX ADMIN — SODIUM CHLORIDE: 9 INJECTION, SOLUTION INTRAVENOUS at 08:17

## 2021-08-30 RX ADMIN — DIPHENHYDRAMINE HYDROCHLORIDE 25 MG: 50 INJECTION, SOLUTION INTRAMUSCULAR; INTRAVENOUS at 08:16

## 2021-08-30 ASSESSMENT — PAIN SCALES - GENERAL: PAINLEVEL_OUTOF10: 0

## 2021-08-30 NOTE — PROGRESS NOTES
notified that patient is receiving Fritzi Catarino today and is having surgery tomorrow. Surgeon aware and neurology aware and good to proceed today with infusion.

## 2021-08-30 NOTE — PROGRESS NOTES
Infusion center called patient scheduled for ocrevus infusion today day before surgery questioning if should be given dr Keyona Parikh okd if ok with dr Maikel atkins served dr Maikel Cruz in surgery infusion notified it would be awhile to get back to them they had spoken to neurology who said to proceed infusion takes 6-7 hours also wilbert served dr Maikel Cruz with this  Infusion is going to call dr Maikel Cruz office back to inform them   Dr Maikel Cruz texted back ok for infusion infusion center notified

## 2021-08-31 ENCOUNTER — HOSPITAL ENCOUNTER (INPATIENT)
Age: 46
LOS: 4 days | Discharge: HOME | DRG: 231 | End: 2021-09-04
Attending: SURGERY | Admitting: SURGERY
Payer: COMMERCIAL

## 2021-08-31 ENCOUNTER — ANESTHESIA (OUTPATIENT)
Dept: OPERATING ROOM | Age: 46
DRG: 231 | End: 2021-08-31
Payer: COMMERCIAL

## 2021-08-31 VITALS
SYSTOLIC BLOOD PRESSURE: 133 MMHG | DIASTOLIC BLOOD PRESSURE: 84 MMHG | RESPIRATION RATE: 2 BRPM | OXYGEN SATURATION: 100 % | TEMPERATURE: 98.6 F

## 2021-08-31 DIAGNOSIS — G89.18 POST-OP PAIN: ICD-10-CM

## 2021-08-31 DIAGNOSIS — Z01.818 PREOP TESTING: Primary | ICD-10-CM

## 2021-08-31 PROBLEM — Z90.49 S/P COLECTOMY: Status: ACTIVE | Noted: 2021-08-31

## 2021-08-31 PROCEDURE — 7100000000 HC PACU RECOVERY - FIRST 15 MIN: Performed by: SURGERY

## 2021-08-31 PROCEDURE — 6360000002 HC RX W HCPCS

## 2021-08-31 PROCEDURE — 2500000003 HC RX 250 WO HCPCS: Performed by: SURGERY

## 2021-08-31 PROCEDURE — 88309 TISSUE EXAM BY PATHOLOGIST: CPT

## 2021-08-31 PROCEDURE — 2500000003 HC RX 250 WO HCPCS: Performed by: NURSE ANESTHETIST, CERTIFIED REGISTERED

## 2021-08-31 PROCEDURE — 2720000010 HC SURG SUPPLY STERILE: Performed by: SURGERY

## 2021-08-31 PROCEDURE — 6360000002 HC RX W HCPCS: Performed by: ANESTHESIOLOGY

## 2021-08-31 PROCEDURE — 2500000003 HC RX 250 WO HCPCS

## 2021-08-31 PROCEDURE — 2709999900 HC NON-CHARGEABLE SUPPLY: Performed by: SURGERY

## 2021-08-31 PROCEDURE — 2580000003 HC RX 258

## 2021-08-31 PROCEDURE — 3600000014 HC SURGERY LEVEL 4 ADDTL 15MIN: Performed by: SURGERY

## 2021-08-31 PROCEDURE — 44207 L COLECTOMY/COLOPROCTOSTOMY: CPT | Performed by: SURGERY

## 2021-08-31 PROCEDURE — 6370000000 HC RX 637 (ALT 250 FOR IP): Performed by: SURGERY

## 2021-08-31 PROCEDURE — 0DTM4ZZ RESECTION OF DESCENDING COLON, PERCUTANEOUS ENDOSCOPIC APPROACH: ICD-10-PCS | Performed by: SURGERY

## 2021-08-31 PROCEDURE — 99024 POSTOP FOLLOW-UP VISIT: CPT | Performed by: SURGERY

## 2021-08-31 PROCEDURE — 6360000002 HC RX W HCPCS: Performed by: NURSE ANESTHETIST, CERTIFIED REGISTERED

## 2021-08-31 PROCEDURE — 6370000000 HC RX 637 (ALT 250 FOR IP): Performed by: INTERNAL MEDICINE

## 2021-08-31 PROCEDURE — 44213 LAP MOBIL SPLENIC FL ADD-ON: CPT | Performed by: SURGERY

## 2021-08-31 PROCEDURE — 3600000004 HC SURGERY LEVEL 4 BASE: Performed by: SURGERY

## 2021-08-31 PROCEDURE — 7100000001 HC PACU RECOVERY - ADDTL 15 MIN: Performed by: SURGERY

## 2021-08-31 PROCEDURE — 2580000003 HC RX 258: Performed by: SURGERY

## 2021-08-31 PROCEDURE — 6360000002 HC RX W HCPCS: Performed by: SURGERY

## 2021-08-31 PROCEDURE — 1200000000 HC SEMI PRIVATE

## 2021-08-31 PROCEDURE — 3700000000 HC ANESTHESIA ATTENDED CARE: Performed by: SURGERY

## 2021-08-31 PROCEDURE — 44310 ILEOSTOMY/JEJUNOSTOMY: CPT | Performed by: SURGERY

## 2021-08-31 PROCEDURE — 3700000001 HC ADD 15 MINUTES (ANESTHESIA): Performed by: SURGERY

## 2021-08-31 RX ORDER — SODIUM CHLORIDE, SODIUM LACTATE, POTASSIUM CHLORIDE, CALCIUM CHLORIDE 600; 310; 30; 20 MG/100ML; MG/100ML; MG/100ML; MG/100ML
INJECTION, SOLUTION INTRAVENOUS CONTINUOUS
Status: DISCONTINUED | OUTPATIENT
Start: 2021-08-31 | End: 2021-08-31

## 2021-08-31 RX ORDER — LABETALOL HYDROCHLORIDE 5 MG/ML
INJECTION, SOLUTION INTRAVENOUS
Status: COMPLETED
Start: 2021-08-31 | End: 2021-08-31

## 2021-08-31 RX ORDER — ONDANSETRON 2 MG/ML
INJECTION INTRAMUSCULAR; INTRAVENOUS PRN
Status: DISCONTINUED | OUTPATIENT
Start: 2021-08-31 | End: 2021-08-31 | Stop reason: SDUPTHER

## 2021-08-31 RX ORDER — CIPROFLOXACIN 2 MG/ML
400 INJECTION, SOLUTION INTRAVENOUS EVERY 12 HOURS
Status: COMPLETED | OUTPATIENT
Start: 2021-09-01 | End: 2021-09-01

## 2021-08-31 RX ORDER — LABETALOL HYDROCHLORIDE 5 MG/ML
INJECTION, SOLUTION INTRAVENOUS PRN
Status: DISCONTINUED | OUTPATIENT
Start: 2021-08-31 | End: 2021-08-31 | Stop reason: SDUPTHER

## 2021-08-31 RX ORDER — OXYCODONE HYDROCHLORIDE 5 MG/1
5 TABLET ORAL EVERY 4 HOURS PRN
Status: DISCONTINUED | OUTPATIENT
Start: 2021-08-31 | End: 2021-09-04 | Stop reason: HOSPADM

## 2021-08-31 RX ORDER — FENTANYL CITRATE 50 UG/ML
INJECTION, SOLUTION INTRAMUSCULAR; INTRAVENOUS PRN
Status: DISCONTINUED | OUTPATIENT
Start: 2021-08-31 | End: 2021-08-31 | Stop reason: SDUPTHER

## 2021-08-31 RX ORDER — PANTOPRAZOLE SODIUM 40 MG/1
40 TABLET, DELAYED RELEASE ORAL
Status: DISCONTINUED | OUTPATIENT
Start: 2021-09-01 | End: 2021-09-04 | Stop reason: HOSPADM

## 2021-08-31 RX ORDER — BUPIVACAINE HYDROCHLORIDE AND EPINEPHRINE 2.5; 5 MG/ML; UG/ML
INJECTION, SOLUTION EPIDURAL; INFILTRATION; INTRACAUDAL; PERINEURAL PRN
Status: DISCONTINUED | OUTPATIENT
Start: 2021-08-31 | End: 2021-08-31 | Stop reason: HOSPADM

## 2021-08-31 RX ORDER — SODIUM CHLORIDE, SODIUM LACTATE, POTASSIUM CHLORIDE, CALCIUM CHLORIDE 600; 310; 30; 20 MG/100ML; MG/100ML; MG/100ML; MG/100ML
INJECTION, SOLUTION INTRAVENOUS CONTINUOUS
Status: DISCONTINUED | OUTPATIENT
Start: 2021-08-31 | End: 2021-09-03

## 2021-08-31 RX ORDER — BACLOFEN 10 MG/1
10 TABLET ORAL 4 TIMES DAILY PRN
Status: DISCONTINUED | OUTPATIENT
Start: 2021-08-31 | End: 2021-09-04 | Stop reason: HOSPADM

## 2021-08-31 RX ORDER — SODIUM CHLORIDE 0.9 % (FLUSH) 0.9 %
10 SYRINGE (ML) INJECTION PRN
Status: DISCONTINUED | OUTPATIENT
Start: 2021-08-31 | End: 2021-08-31 | Stop reason: HOSPADM

## 2021-08-31 RX ORDER — SODIUM CHLORIDE 0.9 % (FLUSH) 0.9 %
10 SYRINGE (ML) INJECTION EVERY 12 HOURS SCHEDULED
Status: DISCONTINUED | OUTPATIENT
Start: 2021-08-31 | End: 2021-08-31 | Stop reason: HOSPADM

## 2021-08-31 RX ORDER — PROMETHAZINE HYDROCHLORIDE 25 MG/ML
12.5 INJECTION, SOLUTION INTRAMUSCULAR; INTRAVENOUS
Status: DISCONTINUED | OUTPATIENT
Start: 2021-08-31 | End: 2021-08-31 | Stop reason: HOSPADM

## 2021-08-31 RX ORDER — SODIUM CHLORIDE, SODIUM LACTATE, POTASSIUM CHLORIDE, CALCIUM CHLORIDE 600; 310; 30; 20 MG/100ML; MG/100ML; MG/100ML; MG/100ML
INJECTION, SOLUTION INTRAVENOUS CONTINUOUS PRN
Status: DISCONTINUED | OUTPATIENT
Start: 2021-08-31 | End: 2021-08-31 | Stop reason: SDUPTHER

## 2021-08-31 RX ORDER — ONDANSETRON 4 MG/1
4 TABLET, ORALLY DISINTEGRATING ORAL EVERY 8 HOURS PRN
Status: DISCONTINUED | OUTPATIENT
Start: 2021-08-31 | End: 2021-09-04 | Stop reason: HOSPADM

## 2021-08-31 RX ORDER — OXYCODONE HYDROCHLORIDE 5 MG/1
10 TABLET ORAL EVERY 4 HOURS PRN
Status: DISCONTINUED | OUTPATIENT
Start: 2021-08-31 | End: 2021-09-04 | Stop reason: HOSPADM

## 2021-08-31 RX ORDER — HYDROCODONE BITARTRATE AND ACETAMINOPHEN 5; 325 MG/1; MG/1
2 TABLET ORAL PRN
Status: DISCONTINUED | OUTPATIENT
Start: 2021-08-31 | End: 2021-08-31 | Stop reason: HOSPADM

## 2021-08-31 RX ORDER — FENTANYL CITRATE 50 UG/ML
INJECTION, SOLUTION INTRAMUSCULAR; INTRAVENOUS
Status: COMPLETED
Start: 2021-08-31 | End: 2021-08-31

## 2021-08-31 RX ORDER — MEPERIDINE HYDROCHLORIDE 25 MG/ML
25 INJECTION INTRAMUSCULAR; INTRAVENOUS; SUBCUTANEOUS ONCE
Status: COMPLETED | OUTPATIENT
Start: 2021-08-31 | End: 2021-08-31

## 2021-08-31 RX ORDER — GABAPENTIN 300 MG/1
300 CAPSULE ORAL 3 TIMES DAILY
Status: DISCONTINUED | OUTPATIENT
Start: 2021-08-31 | End: 2021-09-04 | Stop reason: HOSPADM

## 2021-08-31 RX ORDER — MEPERIDINE HYDROCHLORIDE 25 MG/ML
INJECTION INTRAMUSCULAR; INTRAVENOUS; SUBCUTANEOUS
Status: DISPENSED
Start: 2021-08-31 | End: 2021-09-01

## 2021-08-31 RX ORDER — SODIUM CHLORIDE 0.9 % (FLUSH) 0.9 %
5-40 SYRINGE (ML) INJECTION PRN
Status: DISCONTINUED | OUTPATIENT
Start: 2021-08-31 | End: 2021-09-04 | Stop reason: HOSPADM

## 2021-08-31 RX ORDER — GLYCOPYRROLATE 1 MG/5 ML
SYRINGE (ML) INTRAVENOUS PRN
Status: DISCONTINUED | OUTPATIENT
Start: 2021-08-31 | End: 2021-08-31 | Stop reason: SDUPTHER

## 2021-08-31 RX ORDER — CIPROFLOXACIN 2 MG/ML
400 INJECTION, SOLUTION INTRAVENOUS ONCE
Status: COMPLETED | OUTPATIENT
Start: 2021-08-31 | End: 2021-08-31

## 2021-08-31 RX ORDER — MEPERIDINE HYDROCHLORIDE 25 MG/ML
INJECTION INTRAMUSCULAR; INTRAVENOUS; SUBCUTANEOUS
Status: COMPLETED
Start: 2021-08-31 | End: 2021-08-31

## 2021-08-31 RX ORDER — PROPOFOL 10 MG/ML
INJECTION, EMULSION INTRAVENOUS PRN
Status: DISCONTINUED | OUTPATIENT
Start: 2021-08-31 | End: 2021-08-31 | Stop reason: SDUPTHER

## 2021-08-31 RX ORDER — LABETALOL HYDROCHLORIDE 5 MG/ML
5 INJECTION, SOLUTION INTRAVENOUS EVERY 5 MIN PRN
Status: DISCONTINUED | OUTPATIENT
Start: 2021-08-31 | End: 2021-09-04 | Stop reason: HOSPADM

## 2021-08-31 RX ORDER — NEOSTIGMINE METHYLSULFATE 1 MG/ML
INJECTION, SOLUTION INTRAVENOUS PRN
Status: DISCONTINUED | OUTPATIENT
Start: 2021-08-31 | End: 2021-08-31 | Stop reason: SDUPTHER

## 2021-08-31 RX ORDER — SODIUM CHLORIDE 9 MG/ML
25 INJECTION, SOLUTION INTRAVENOUS PRN
Status: DISCONTINUED | OUTPATIENT
Start: 2021-08-31 | End: 2021-09-04 | Stop reason: HOSPADM

## 2021-08-31 RX ORDER — HYDROCODONE BITARTRATE AND ACETAMINOPHEN 5; 325 MG/1; MG/1
1 TABLET ORAL PRN
Status: DISCONTINUED | OUTPATIENT
Start: 2021-08-31 | End: 2021-08-31 | Stop reason: HOSPADM

## 2021-08-31 RX ORDER — MEPERIDINE HYDROCHLORIDE 25 MG/ML
25 INJECTION INTRAMUSCULAR; INTRAVENOUS; SUBCUTANEOUS EVERY 5 MIN PRN
Status: DISCONTINUED | OUTPATIENT
Start: 2021-08-31 | End: 2021-08-31 | Stop reason: HOSPADM

## 2021-08-31 RX ORDER — MORPHINE SULFATE 4 MG/ML
4 INJECTION, SOLUTION INTRAMUSCULAR; INTRAVENOUS
Status: DISCONTINUED | OUTPATIENT
Start: 2021-08-31 | End: 2021-09-03

## 2021-08-31 RX ORDER — FENTANYL CITRATE 50 UG/ML
25 INJECTION, SOLUTION INTRAMUSCULAR; INTRAVENOUS EVERY 5 MIN PRN
Status: DISCONTINUED | OUTPATIENT
Start: 2021-08-31 | End: 2021-08-31 | Stop reason: HOSPADM

## 2021-08-31 RX ORDER — SODIUM CHLORIDE 0.9 % (FLUSH) 0.9 %
5-40 SYRINGE (ML) INJECTION EVERY 12 HOURS SCHEDULED
Status: DISCONTINUED | OUTPATIENT
Start: 2021-08-31 | End: 2021-09-04 | Stop reason: HOSPADM

## 2021-08-31 RX ORDER — ROCURONIUM BROMIDE 10 MG/ML
INJECTION, SOLUTION INTRAVENOUS PRN
Status: DISCONTINUED | OUTPATIENT
Start: 2021-08-31 | End: 2021-08-31 | Stop reason: SDUPTHER

## 2021-08-31 RX ORDER — MIDAZOLAM HYDROCHLORIDE 1 MG/ML
INJECTION INTRAMUSCULAR; INTRAVENOUS PRN
Status: DISCONTINUED | OUTPATIENT
Start: 2021-08-31 | End: 2021-08-31 | Stop reason: SDUPTHER

## 2021-08-31 RX ORDER — LIDOCAINE HYDROCHLORIDE 20 MG/ML
INJECTION, SOLUTION INTRAVENOUS PRN
Status: DISCONTINUED | OUTPATIENT
Start: 2021-08-31 | End: 2021-08-31 | Stop reason: SDUPTHER

## 2021-08-31 RX ORDER — ROPINIROLE 0.5 MG/1
0.5 TABLET, FILM COATED ORAL 2 TIMES DAILY PRN
Status: DISCONTINUED | OUTPATIENT
Start: 2021-08-31 | End: 2021-09-04 | Stop reason: HOSPADM

## 2021-08-31 RX ORDER — MORPHINE SULFATE 2 MG/ML
2 INJECTION, SOLUTION INTRAMUSCULAR; INTRAVENOUS
Status: DISCONTINUED | OUTPATIENT
Start: 2021-08-31 | End: 2021-09-03

## 2021-08-31 RX ORDER — ONDANSETRON 2 MG/ML
4 INJECTION INTRAMUSCULAR; INTRAVENOUS EVERY 6 HOURS PRN
Status: DISCONTINUED | OUTPATIENT
Start: 2021-08-31 | End: 2021-09-04 | Stop reason: HOSPADM

## 2021-08-31 RX ORDER — SODIUM CHLORIDE 9 MG/ML
25 INJECTION, SOLUTION INTRAVENOUS PRN
Status: DISCONTINUED | OUTPATIENT
Start: 2021-08-31 | End: 2021-08-31 | Stop reason: HOSPADM

## 2021-08-31 RX ORDER — ONDANSETRON 2 MG/ML
4 INJECTION INTRAMUSCULAR; INTRAVENOUS
Status: DISCONTINUED | OUTPATIENT
Start: 2021-08-31 | End: 2021-08-31 | Stop reason: HOSPADM

## 2021-08-31 RX ORDER — FENTANYL CITRATE 50 UG/ML
INJECTION, SOLUTION INTRAMUSCULAR; INTRAVENOUS
Status: DISPENSED
Start: 2021-08-31 | End: 2021-09-01

## 2021-08-31 RX ORDER — FENTANYL CITRATE 50 UG/ML
50 INJECTION, SOLUTION INTRAMUSCULAR; INTRAVENOUS EVERY 5 MIN PRN
Status: COMPLETED | OUTPATIENT
Start: 2021-08-31 | End: 2021-08-31

## 2021-08-31 RX ORDER — DEXAMETHASONE SODIUM PHOSPHATE 10 MG/ML
INJECTION, SOLUTION INTRAMUSCULAR; INTRAVENOUS PRN
Status: DISCONTINUED | OUTPATIENT
Start: 2021-08-31 | End: 2021-08-31 | Stop reason: SDUPTHER

## 2021-08-31 RX ADMIN — FENTANYL CITRATE 50 MCG: 50 INJECTION, SOLUTION INTRAMUSCULAR; INTRAVENOUS at 16:20

## 2021-08-31 RX ADMIN — FENTANYL CITRATE 50 MCG: 50 INJECTION, SOLUTION INTRAMUSCULAR; INTRAVENOUS at 16:15

## 2021-08-31 RX ADMIN — MIDAZOLAM 2 MG: 1 INJECTION INTRAMUSCULAR; INTRAVENOUS at 12:26

## 2021-08-31 RX ADMIN — LIDOCAINE HYDROCHLORIDE 100 MG: 20 INJECTION, SOLUTION INTRAVENOUS at 12:36

## 2021-08-31 RX ADMIN — FENTANYL CITRATE 50 MCG: 50 INJECTION, SOLUTION INTRAMUSCULAR; INTRAVENOUS at 14:50

## 2021-08-31 RX ADMIN — HYDROMORPHONE HYDROCHLORIDE 0.5 MG: 1 INJECTION, SOLUTION INTRAMUSCULAR; INTRAVENOUS; SUBCUTANEOUS at 15:40

## 2021-08-31 RX ADMIN — OXYCODONE 10 MG: 5 TABLET ORAL at 22:25

## 2021-08-31 RX ADMIN — FENTANYL CITRATE 50 MCG: 50 INJECTION, SOLUTION INTRAMUSCULAR; INTRAVENOUS at 12:53

## 2021-08-31 RX ADMIN — LABETALOL HYDROCHLORIDE 5 MG: 5 INJECTION INTRAVENOUS at 15:50

## 2021-08-31 RX ADMIN — FENTANYL CITRATE 50 MCG: 50 INJECTION, SOLUTION INTRAMUSCULAR; INTRAVENOUS at 13:20

## 2021-08-31 RX ADMIN — CIPROFLOXACIN 400 MG: 2 INJECTION INTRAVENOUS at 12:37

## 2021-08-31 RX ADMIN — FENTANYL CITRATE 50 MCG: 50 INJECTION, SOLUTION INTRAMUSCULAR; INTRAVENOUS at 14:55

## 2021-08-31 RX ADMIN — METRONIDAZOLE 500 MG: 500 INJECTION, SOLUTION INTRAVENOUS at 12:32

## 2021-08-31 RX ADMIN — HYDROMORPHONE HYDROCHLORIDE 0.5 MG: 1 INJECTION, SOLUTION INTRAMUSCULAR; INTRAVENOUS; SUBCUTANEOUS at 15:35

## 2021-08-31 RX ADMIN — GABAPENTIN 300 MG: 300 CAPSULE ORAL at 19:52

## 2021-08-31 RX ADMIN — LABETALOL HYDROCHLORIDE 5 MG: 5 INJECTION, SOLUTION INTRAVENOUS at 15:50

## 2021-08-31 RX ADMIN — Medication 10 ML: at 19:57

## 2021-08-31 RX ADMIN — LABETALOL HYDROCHLORIDE 5 MG: 5 INJECTION INTRAVENOUS at 13:02

## 2021-08-31 RX ADMIN — Medication 0.6 MG: at 14:27

## 2021-08-31 RX ADMIN — Medication 3 MG: at 14:27

## 2021-08-31 RX ADMIN — ROCURONIUM BROMIDE 10 MG: 10 SOLUTION INTRAVENOUS at 13:43

## 2021-08-31 RX ADMIN — ROCURONIUM BROMIDE 40 MG: 10 SOLUTION INTRAVENOUS at 12:36

## 2021-08-31 RX ADMIN — PROPOFOL 200 MG: 10 INJECTION, EMULSION INTRAVENOUS at 12:36

## 2021-08-31 RX ADMIN — SODIUM CHLORIDE, POTASSIUM CHLORIDE, SODIUM LACTATE AND CALCIUM CHLORIDE: 600; 310; 30; 20 INJECTION, SOLUTION INTRAVENOUS at 13:18

## 2021-08-31 RX ADMIN — FENTANYL CITRATE 50 MCG: 50 INJECTION, SOLUTION INTRAMUSCULAR; INTRAVENOUS at 12:26

## 2021-08-31 RX ADMIN — SODIUM CHLORIDE, POTASSIUM CHLORIDE, SODIUM LACTATE AND CALCIUM CHLORIDE: 600; 310; 30; 20 INJECTION, SOLUTION INTRAVENOUS at 18:10

## 2021-08-31 RX ADMIN — HYDROMORPHONE HYDROCHLORIDE 0.5 MG: 1 INJECTION, SOLUTION INTRAMUSCULAR; INTRAVENOUS; SUBCUTANEOUS at 15:00

## 2021-08-31 RX ADMIN — FENTANYL CITRATE 50 MCG: 50 INJECTION, SOLUTION INTRAMUSCULAR; INTRAVENOUS at 13:18

## 2021-08-31 RX ADMIN — MEPERIDINE HYDROCHLORIDE 25 MG: 25 INJECTION INTRAMUSCULAR; INTRAVENOUS; SUBCUTANEOUS at 15:06

## 2021-08-31 RX ADMIN — HYDROMORPHONE HYDROCHLORIDE 0.5 MG: 1 INJECTION, SOLUTION INTRAMUSCULAR; INTRAVENOUS; SUBCUTANEOUS at 15:45

## 2021-08-31 RX ADMIN — FENTANYL CITRATE 50 MCG: 50 INJECTION, SOLUTION INTRAMUSCULAR; INTRAVENOUS at 12:47

## 2021-08-31 RX ADMIN — FENTANYL CITRATE 100 MCG: 50 INJECTION, SOLUTION INTRAMUSCULAR; INTRAVENOUS at 12:36

## 2021-08-31 RX ADMIN — SODIUM CHLORIDE, POTASSIUM CHLORIDE, SODIUM LACTATE AND CALCIUM CHLORIDE: 600; 310; 30; 20 INJECTION, SOLUTION INTRAVENOUS at 10:00

## 2021-08-31 RX ADMIN — DEXAMETHASONE SODIUM PHOSPHATE 10 MG: 10 INJECTION, SOLUTION INTRAMUSCULAR; INTRAVENOUS at 12:52

## 2021-08-31 RX ADMIN — MEPERIDINE HYDROCHLORIDE 25 MG: 25 INJECTION INTRAMUSCULAR; INTRAVENOUS; SUBCUTANEOUS at 16:50

## 2021-08-31 RX ADMIN — METRONIDAZOLE 500 MG: 500 INJECTION, SOLUTION INTRAVENOUS at 19:53

## 2021-08-31 RX ADMIN — MORPHINE SULFATE 4 MG: 4 INJECTION, SOLUTION INTRAMUSCULAR; INTRAVENOUS at 19:50

## 2021-08-31 RX ADMIN — ONDANSETRON 4 MG: 2 INJECTION INTRAMUSCULAR; INTRAVENOUS at 12:52

## 2021-08-31 RX ADMIN — SODIUM CHLORIDE, POTASSIUM CHLORIDE, SODIUM LACTATE AND CALCIUM CHLORIDE: 600; 310; 30; 20 INJECTION, SOLUTION INTRAVENOUS at 12:33

## 2021-08-31 ASSESSMENT — PULMONARY FUNCTION TESTS
PIF_VALUE: 29
PIF_VALUE: 29
PIF_VALUE: 36
PIF_VALUE: 3
PIF_VALUE: 35
PIF_VALUE: 27
PIF_VALUE: 3
PIF_VALUE: 35
PIF_VALUE: 29
PIF_VALUE: 28
PIF_VALUE: 36
PIF_VALUE: 30
PIF_VALUE: 32
PIF_VALUE: 35
PIF_VALUE: 36
PIF_VALUE: 35
PIF_VALUE: 35
PIF_VALUE: 36
PIF_VALUE: 28
PIF_VALUE: 26
PIF_VALUE: 27
PIF_VALUE: 30
PIF_VALUE: 30
PIF_VALUE: 35
PIF_VALUE: 26
PIF_VALUE: 35
PIF_VALUE: 35
PIF_VALUE: 26
PIF_VALUE: 29
PIF_VALUE: 33
PIF_VALUE: 5
PIF_VALUE: 36
PIF_VALUE: 36
PIF_VALUE: 35
PIF_VALUE: 35
PIF_VALUE: 33
PIF_VALUE: 35
PIF_VALUE: 30
PIF_VALUE: 26
PIF_VALUE: 27
PIF_VALUE: 28
PIF_VALUE: 28
PIF_VALUE: 2
PIF_VALUE: 27
PIF_VALUE: 31
PIF_VALUE: 30
PIF_VALUE: 35
PIF_VALUE: 35
PIF_VALUE: 26
PIF_VALUE: 36
PIF_VALUE: 1
PIF_VALUE: 33
PIF_VALUE: 35
PIF_VALUE: 24
PIF_VALUE: 35
PIF_VALUE: 29
PIF_VALUE: 34
PIF_VALUE: 35
PIF_VALUE: 36
PIF_VALUE: 30
PIF_VALUE: 36
PIF_VALUE: 30
PIF_VALUE: 29
PIF_VALUE: 36
PIF_VALUE: 35
PIF_VALUE: 35
PIF_VALUE: 36
PIF_VALUE: 30
PIF_VALUE: 29
PIF_VALUE: 25
PIF_VALUE: 36
PIF_VALUE: 26
PIF_VALUE: 1
PIF_VALUE: 27
PIF_VALUE: 35
PIF_VALUE: 29
PIF_VALUE: 35
PIF_VALUE: 35
PIF_VALUE: 4
PIF_VALUE: 27
PIF_VALUE: 29
PIF_VALUE: 24
PIF_VALUE: 35
PIF_VALUE: 28
PIF_VALUE: 28
PIF_VALUE: 29
PIF_VALUE: 36
PIF_VALUE: 28
PIF_VALUE: 30
PIF_VALUE: 0
PIF_VALUE: 0
PIF_VALUE: 25
PIF_VALUE: 29
PIF_VALUE: 29
PIF_VALUE: 27
PIF_VALUE: 36
PIF_VALUE: 35
PIF_VALUE: 18
PIF_VALUE: 26
PIF_VALUE: 36
PIF_VALUE: 35
PIF_VALUE: 35
PIF_VALUE: 30
PIF_VALUE: 0
PIF_VALUE: 36
PIF_VALUE: 32
PIF_VALUE: 28
PIF_VALUE: 36
PIF_VALUE: 28
PIF_VALUE: 32
PIF_VALUE: 29
PIF_VALUE: 30
PIF_VALUE: 35
PIF_VALUE: 26
PIF_VALUE: 33
PIF_VALUE: 42

## 2021-08-31 ASSESSMENT — PAIN SCALES - GENERAL
PAINLEVEL_OUTOF10: 6
PAINLEVEL_OUTOF10: 10
PAINLEVEL_OUTOF10: 6
PAINLEVEL_OUTOF10: 9
PAINLEVEL_OUTOF10: 8
PAINLEVEL_OUTOF10: 10
PAINLEVEL_OUTOF10: 9
PAINLEVEL_OUTOF10: 0
PAINLEVEL_OUTOF10: 10
PAINLEVEL_OUTOF10: 9
PAINLEVEL_OUTOF10: 4
PAINLEVEL_OUTOF10: 8
PAINLEVEL_OUTOF10: 8
PAINLEVEL_OUTOF10: 9
PAINLEVEL_OUTOF10: 4
PAINLEVEL_OUTOF10: 10
PAINLEVEL_OUTOF10: 5

## 2021-08-31 ASSESSMENT — PAIN DESCRIPTION - DESCRIPTORS: DESCRIPTORS: CONSTANT;DISCOMFORT;SORE;TENDER

## 2021-08-31 ASSESSMENT — PAIN DESCRIPTION - FREQUENCY: FREQUENCY: CONTINUOUS

## 2021-08-31 ASSESSMENT — PAIN DESCRIPTION - ORIENTATION: ORIENTATION: MID;RIGHT

## 2021-08-31 ASSESSMENT — PAIN DESCRIPTION - ONSET: ONSET: ON-GOING

## 2021-08-31 ASSESSMENT — PAIN - FUNCTIONAL ASSESSMENT: PAIN_FUNCTIONAL_ASSESSMENT: PREVENTS OR INTERFERES SOME ACTIVE ACTIVITIES AND ADLS

## 2021-08-31 ASSESSMENT — PAIN DESCRIPTION - PROGRESSION: CLINICAL_PROGRESSION: NOT CHANGED

## 2021-08-31 ASSESSMENT — PAIN DESCRIPTION - PAIN TYPE: TYPE: SURGICAL PAIN

## 2021-08-31 ASSESSMENT — PAIN DESCRIPTION - LOCATION: LOCATION: ABDOMEN

## 2021-08-31 ASSESSMENT — LIFESTYLE VARIABLES: SMOKING_STATUS: 1

## 2021-08-31 NOTE — CONSULTS
Department of Internal Medicine  Internal Medicine Consultation Note    Primary Care Physician: Ariana Wright PA-C   Admitting Physician:  Saima Bauman MD  Admission date and time: 8/31/2021  9:35 AM    Room:  [address] [place] [address]  Admitting diagnosis: S/P colectomy [Z90.49]  Date of Service: 8/31/2021    Patient Name: Meet Doyle  MRN: [id number]    Date of Service: 8/31/2021     Reason for consultation: Medical management    History of present illness:    Patient is a 79-year-old female who is status post laparoscopic descending colon resection with end-to-end stapled anastomosis and diverting loop ileostomy. Patient tolerated procedure well. Patient is stable. Abdominal pain is controlled. Patient denies any lightheadedness or dizziness. Denies any chest pain or shortness of breath. Denies any fever or chills. PAST MEDICAL Hx:  Past Medical History:   Diagnosis Date    Acid reflux disease     Cyst of ovary     Fracture of nasal bone     Headache     Hearing loss     Hypertension     Migraine     Morbidly obese ([address] 10/5/2020    Multiple sclerosis ([address]     denies limitations at present 11/2020    VEENA on CPAP     severe VEENA per pt    Right shoulder pain 11/2020    Tinnitus     TMJ dysfunction        PAST SURGICAL Hx:   Past Surgical History:   Procedure Laterality Date    APPENDECTOMY      BACK SURGERY      BICEPS TENDON REPAIR Right 11/11/2020    BICEPS TENODESIS performed by Leana Espinal MD at [address] [place] [address]  03/05/2018    Robotic hysterectomy, bilateral salpingectomy, right oophorectomy DR. ARIANA MONIQUE [facility]     HYSTERECTOMY, TOTAL ABDOMINAL      LARYNGOSCOPY N/A 7/17/2020    DIRECT LARYNGOSCOPY--OMNI GUIDE LASER performed by Dilcia Peterson MD at [facility] 60 ARTHROSCOPY Right 11/11/2020    RIGHT SHOULDER DIAGNOSTIC ARTHROSCOPY WITH DECOMPRESSION ROTATOR CUFF REPAIR performed by Jenniffer Quintero MD at [address] [place] [address] Hx:  Family History   Problem Relation Age of Onset    Mult Sclerosis Mother     Colon Cancer Neg Hx     Uterine Cancer Neg Hx     Ovarian Cancer Neg Hx     Breast Cancer Neg Hx        HOME MEDICATIONS:  Prior to Admission medications    Medication Sig Start Date End Date Taking? Authorizing Provider   dexlansoprazole (DEXILANT) 60 MG CPDR delayed release capsule Take 60 mg by mouth daily   Yes Historical Provider, MD   erythromycin base (E-MYCIN) 500 MG tablet Take 1 tablet by mouth See Admin Instructions for 3 doses Take at 1300, 1400 and midnight the day before surgery 8/27/21   Mars Martinez MD   vitamin D (CHOLECALCIFEROL) 43556 UNIT CAPS Take 1 capsule by mouth once a week 8/13/21   MARIXA Wynn CNP   baclofen (LIORESAL) 20 MG tablet Take 1 tablet by mouth 4 times daily as needed (muscle spasms; pain) 8/13/21   MARIXA Wynn CNP   rOPINIRole (REQUIP) 0.5 MG tablet Take 1 tablet by mouth 2 times daily as needed (restless legs) 8/13/21   MARIXA Wynn CNP   ocrelizumab (OCREVUS) 300 MG/10ML SOLN injection Infuse 20 mLs intravenously every 6 months 7/19/21   MARIXA Wynn CNP   nortriptyline (PAMELOR) 10 MG capsule TAKE 1 CAPSULE BY MOUTH ONCE NIGHTLY FOR HEADACHES 7/2/21   Jessica Hickey PA-C   famotidine (PEPCID) 40 MG tablet take 1 tablet by mouth every evening 4/6/21   Historical Provider, MD   gabapentin (NEURONTIN) 300 MG capsule TAKE 1 CAPSULE BY MOUTH THREE TIMES DAILY 3/10/21 8/13/21  DANISH Rodríguez   lisinopril-hydroCHLOROthiazide (PRINZIDE;ZESTORETIC) 10-12.5 MG per tablet TAKE 1 TABLET BY MOUTH ONCE DAILY 3/10/21   Inocencio Levi DO   CPAP Machine MISC Heated tube and chin strap. Dispense as written. LIFETIME SUPPLIES. 11/6/20   Historical Provider, MD       ALLERGIES:  Patient has no known allergies.     SOCIAL Hx:  Social History     Socioeconomic History    Marital status: Single     Spouse name: Not on file    Number of children: 3    Years of education: 6    Highest education level: 11th grade   Occupational History    Not on file   Tobacco Use    Smoking status: Current Every Day Smoker     Packs/day: 0.50     Years: 25.00     Pack years: 12.50     Types: Cigarettes    Smokeless tobacco: Never Used    Tobacco comment: Ready to stop, requesting prescription for Chantix   Vaping Use    Vaping Use: Never used   Substance and Sexual Activity    Alcohol use: Yes     Alcohol/week: 1.0 standard drinks     Types: 1 Glasses of wine per week     Comment: socially    Drug use: No    Sexual activity: Yes     Partners: Male   Other Topics Concern    Not on file   Social History Narrative    Uses [employer] for transportation    [facility]     Social Determinants of Health     Financial Resource Strain: Medium Risk    Difficulty of Paying Living Expenses: Somewhat hard   Food Insecurity: Food Insecurity Present    Worried About Running Out of Food in the Last Year: Sometimes true    [place] of Food in the Last Year: Never true   Transportation Needs: No Transportation Needs    Lack of Transportation (Medical): No    Lack of Transportation (Non-Medical): No   Physical Activity: Sufficiently Active    Days of Exercise per Week: 3 days    Minutes of Exercise per Session: 60 min   Stress: No Stress Concern Present    Feeling of Stress : Not at all   Social Connections: Socially Isolated    Frequency of Communication with Friends and Family:  Three times a week    Frequency of Social Gatherings with Friends and Family: Twice a week    Attends Rastafarian Services: Never    Active Member of Clubs or Organizations: No    Attends Club or Organization Meetings: Never    Marital Status: Never    Intimate Partner Violence:     Fear of Current or Ex-Partner:     Emotionally Abused:     Physically Abused:     Sexually Abused:        ROS: Positive in bold  General:   Denies chills, fatigue, fever, malaise, night sweats or weight loss    Psychological:   Denies anxiety, disorientation or hallucinations    ENT:    Denies epistaxis, headaches, vertigo or visual changes    Cardiovascular:   Denies any chest pain, irregular heartbeats, or palpitations. No paroxysmal nocturnal dyspnea. Respiratory:   Denies shortness of breath, coughing, sputum production, hemoptysis, or wheezing. No orthopnea. Gastrointestinal:   Denies nausea, vomiting, diarrhea, or constipation. Denies any abdominal pain. Denies change in bowel habits or stools. Genito-Urinary:    Denies any urgency, frequency, hematuria. Voiding without difficulty. Musculoskeletal:   Denies joint pain, joint stiffness, joint swelling or muscle pain    Neurology:    Denies any headache or focal neurological deficits. No weakness or paresthesia. Derm:    Denies any rashes, ulcers, or excoriations. Denies bruising. Extremities:   Denies any lower extremity swelling or edema. PHYSICAL EXAM: Abnormal findings noted  VITALS:  Vitals:    08/31/21 1739   BP: (!) 112/58   Pulse: 65   Resp: 16   Temp: 97.4 °F (36.3 °C)   SpO2: 97%         CONSTITUTIONAL:    Awake, alert, cooperative, no apparent distress, and appears stated age    EYES:     EOMI, sclera clear without icterus, conjunctiva normal    ENT:    Normocephalic, atraumatic,  External ears without lesions. NECK:    Supple, symmetrical, trachea midline,  no JVD    HEMATOLOGIC/LYMPHATICS:    No cervical lymphadenopathy and no supraclavicular lymphadenopathy    LUNGS:    Symmetric.  No increased work of breathing, good air exchange, clear to auscultation bilaterally, no wheezes, rhonchi, or rales,     CARDIOVASCULAR:    Normal apical impulse, regular rate and rhythm, normal S1 and S2, no S3 or S4, and no murmur noted    ABDOMEN:    Patient has abdominal tenderness  Ileostomy in place    MUSCULOSKELETAL:    There is no redness, warmth, or swelling of the joints. NEUROLOGIC:    Awake, alert, oriented to name, place and time. SKIN:    No bruising or bleeding. No redness, warmth, or swelling    EXTREMITIES:    Peripheral pulses present. No edema, cyanosis, or swelling. LINES/CATHETERS     LABORATORY DATA:  CBC with Differential:    Lab Results   Component Value Date    WBC 10.2 08/25/2021    RBC 5.04 08/25/2021    HGB 15.8 08/25/2021    HCT 46.8 08/25/2021     08/25/2021    MCV 92.9 08/25/2021    MCH 31.3 08/25/2021    MCHC 33.8 08/25/2021    RDW 12.7 08/25/2021    LYMPHOPCT 22.6 05/11/2021    MONOPCT 8.8 05/11/2021    BASOPCT 0.5 05/11/2021    MONOSABS 1.00 05/11/2021    LYMPHSABS 2.55 05/11/2021    EOSABS 0.26 05/11/2021    BASOSABS 0.06 05/11/2021     CMP:    Lab Results   Component Value Date     08/25/2021    K 4.9 08/25/2021     08/25/2021    CO2 28 08/25/2021    BUN 8 08/25/2021    CREATININE 0.8 08/25/2021    GFRAA >60 08/25/2021    LABGLOM >60 08/25/2021    GLUCOSE 109 08/25/2021    PROT 8.4 05/11/2021    LABALBU 4.5 05/11/2021    CALCIUM 9.7 08/25/2021    BILITOT 0.5 05/11/2021    ALKPHOS 106 05/11/2021    AST 30 05/11/2021    ALT 64 05/11/2021       ASSESSMENT/PLAN:  1. Colon cancer of descending colon status post laparoscopic descending colon resection with end-to-end stapled anastomosis and diverting loop ileostomy  2. Multiple sclerosis  3. Hypertension  4. Prior diagnosis of obstructive sleep apnea without current use of CPAP  5. GERD  6. History of migraine headache  7. Morbid obesity  8. Current tobacco abuse    Patient is status post surgical intervention. Further pain management, diet, activity per general surgery. Home medications reordered as needed. Routine blood work ordered. Patient follows with [facility] for colon cancer.     Verito Andres  5:58 PM  8/31/2021    Electronically signed by Henrry Elliott DO on 8/31/21 at 5:58 PM EDT

## 2021-08-31 NOTE — ANESTHESIA PRE PROCEDURE
Department of Anesthesiology  Preprocedure Note       Name:  Vanessa Martinez   Age:  51 y.o.  :  1975                                          MRN:  [id number]         Date:  2021      Surgeon: Elvia Garrido):  Chace Davila MD    Procedure: Procedure(s):  LAPAROSCOPIC, POSSIBLE OPEN, LEFT HEMICOLECTOMY POSSIBLE OSTOMY    Medications prior to admission:   Prior to Admission medications    Medication Sig Start Date End Date Taking? Authorizing Provider   dexlansoprazole (DEXILANT) 60 MG CPDR delayed release capsule Take 60 mg by mouth daily   Yes Historical Provider, MD   erythromycin base (E-MYCIN) 500 MG tablet Take 1 tablet by mouth See Admin Instructions for 3 doses Take at 1300, 1400 and midnight the day before surgery 21   Chace Davila MD   vitamin D (CHOLECALCIFEROL) 95240 UNIT CAPS Take 1 capsule by mouth once a week 21   MARIXA Burkett CNP   baclofen (LIORESAL) 20 MG tablet Take 1 tablet by mouth 4 times daily as needed (muscle spasms; pain) 21   MARIXA Burkett CNP   rOPINIRole (REQUIP) 0.5 MG tablet Take 1 tablet by mouth 2 times daily as needed (restless legs) 21   MARIXA Burkett CNP   ocrelizumab (OCREVUS) 300 MG/10ML SOLN injection Infuse 20 mLs intravenously every 6 months 21   MARIXA Burkett CNP   nortriptyline (PAMELOR) 10 MG capsule TAKE 1 CAPSULE BY MOUTH ONCE NIGHTLY FOR HEADACHES 21   Antonino Torres PA-C   famotidine (PEPCID) 40 MG tablet take 1 tablet by mouth every evening 21   Historical Provider, MD   gabapentin (NEURONTIN) 300 MG capsule TAKE 1 CAPSULE BY MOUTH THREE TIMES DAILY 3/10/21 8/13/21  DANISH Ng   lisinopril-hydroCHLOROthiazide (PRINZIDE;ZESTORETIC) 10-12.5 MG per tablet TAKE 1 TABLET BY MOUTH ONCE DAILY 3/10/21   Cade Gee DO   CPAP Machine MISC Heated tube and chin strap. Dispense as written. LIFETIME SUPPLIES.  20   Historical Provider, MD       Current medications:    Current Facility-Administered Medications   Medication Dose Route Frequency Provider Last Rate Last Admin    0.9 % sodium chloride infusion  25 mL IntraVENous PRN Chace Davila MD        lactated ringers infusion   IntraVENous Continuous Chace Davila MD 75 mL/hr at 08/31/21 1000 New Bag at 08/31/21 1000    sodium chloride flush 0.9 % injection 10 mL  10 mL IntraVENous 2 times per day Chace Davila MD        sodium chloride flush 0.9 % injection 10 mL  10 mL IntraVENous PRN Chace Davila MD           Allergies:  No Known Allergies    Problem List:    Patient Active Problem List   Diagnosis Code    Vocal cord dysfunction J38.3    Multiple sclerosis ([address] [place] Morbidly obese ([address] E66.01    Palafox's esophagus with dysplasia K22.719    Secondary restless legs syndrome 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       Past Medical History:        Diagnosis Date    Acid reflux disease     Cyst of ovary     Fracture of nasal bone     Headache     Hearing loss     Hypertension     Migraine     Morbidly obese ([address] 10/5/2020    Multiple sclerosis ([address]     denies limitations at present 11/2020    VEENA on CPAP     severe VEENA per pt    Right shoulder pain 11/2020    Tinnitus     TMJ dysfunction        Past Surgical History:        Procedure Laterality Date    APPENDECTOMY      BACK SURGERY      BICEPS TENDON REPAIR Right 11/11/2020    BICEPS TENODESIS performed by Gabi Marvin MD at [address] [place] [address]  03/05/2018    Robotic hysterectomy, bilateral salpingectomy, right oophorectomy DR. ARIANA MONIQUE [facility]     HYSTERECTOMY, TOTAL ABDOMINAL      LARYNGOSCOPY N/A 7/17/2020    DIRECT LARYNGOSCOPY--OMNI GUIDE LASER performed by Vikki Ramirez MD at [place] [address] ARTHROSCOPY Right 11/11/2020    RIGHT SHOULDER DIAGNOSTIC ARTHROSCOPY WITH DECOMPRESSION ROTATOR CUFF REPAIR performed by Gabi Marvin MD at [facility] TONSILLECTOMY         Social History:    Social History     Tobacco Use    Smoking status: Current Every Day Smoker     Packs/day: 0.50     Years: 25.00     Pack years: 12.50     Types: Cigarettes    Smokeless tobacco: Never Used    Tobacco comment: Ready to stop, requesting prescription for Chantix   Substance Use Topics    Alcohol use: Yes     Alcohol/week: 1.0 standard drinks     Types: 1 Glasses of wine per week     Comment: socially                                Ready to quit: Not Answered  Counseling given: Not Answered  Comment: Ready to stop, requesting prescription for Chantix      Vital Signs (Current):   Vitals:    08/31/21 0942   BP: 128/87   Pulse: 72   Resp: 16   Temp: 97.5 °F (36.4 °C)   TempSrc: Infrared   SpO2: 98%   Weight: 216 lb (98 kg)   Height: 5' 1\" (1.549 m)                                              BP Readings from Last 3 Encounters:   08/31/21 128/87   08/30/21 123/79   08/25/21 (!) 153/80       NPO Status: Time of last liquid consumption: 2300                        Time of last solid consumption: 1646                        Date of last liquid consumption: 08/30/21                        Date of last solid food consumption: 08/28/21    BMI:   Wt Readings from Last 3 Encounters:   08/31/21 216 lb (98 kg)   08/25/21 216 lb (98 kg)   08/19/21 210 lb (95.3 kg)     Body mass index is 40.81 kg/m².     CBC:   Lab Results   Component Value Date    WBC 10.2 08/25/2021    RBC 5.04 08/25/2021    HGB 15.8 08/25/2021    HCT 46.8 08/25/2021    MCV 92.9 08/25/2021    RDW 12.7 08/25/2021     08/25/2021       CMP:   Lab Results   Component Value Date     08/25/2021    K 4.9 08/25/2021     08/25/2021    CO2 28 08/25/2021    BUN 8 08/25/2021    CREATININE 0.8 08/25/2021    GFRAA >60 08/25/2021    LABGLOM >60 08/25/2021    GLUCOSE 109 08/25/2021    PROT 8.4 05/11/2021    CALCIUM 9.7 08/25/2021    BILITOT 0.5 05/11/2021    ALKPHOS 106 05/11/2021    AST 30 05/11/2021    ALT 64 05/11/2021       POC Tests: No results for input(s): POCGLU, POCNA, POCK, POCCL, POCBUN, POCHEMO, POCHCT in the last 72 hours. Coags:   Lab Results   Component Value Date    PROTIME 10.7 02/28/2020    INR 1.0 02/28/2020       HCG (If Applicable):   Lab Results   Component Value Date    PREGTESTUR neg 02/04/2018        ABGs: No results found for: PHART, PO2ART, DBO1TFR, DPK9UXY, BEART, F7YSZAOO     Type & Screen (If Applicable):  Lab Results   Component Value Date    LABABO A 03/05/2018       Drug/Infectious Status (If Applicable):  No results found for: HIV, HEPCAB    COVID-19 Screening (If Applicable):   Lab Results   Component Value Date    COVID19 Not Detected 08/25/2021           Anesthesia Evaluation  Patient summary reviewed  Airway: Mallampati: III  TM distance: >3 FB   Neck ROM: full  Mouth opening: > = 3 FB Dental:          Pulmonary: breath sounds clear to auscultation  (+) sleep apnea: on noncompliant,  current smoker          Patient did not smoke on day of surgery. Cardiovascular:    (+) hypertension:,         Rhythm: regular  Rate: normal           Beta Blocker:  Not on Beta Blocker         Neuro/Psych:   (+) neuromuscular disease: multiple sclerosis, headaches:,             GI/Hepatic/Renal:   (+) GERD: poorly controlled, morbid obesity          Endo/Other:    (+) malignancy/cancer. Abdominal:   (+) obese,     Abdomen: soft. Vascular: Other Findings:           Anesthesia Plan      general     ASA 3       Induction: intravenous. Anesthetic plan and risks discussed with patient. Plan discussed with CRNA.     Attending anesthesiologist reviewed and agrees with Preprocedure content              Tavo Allred MD   8/31/2021

## 2021-08-31 NOTE — H&P
General Surgery History and Physical     Patient's Name/Date of Birth: Alberto Lind / 1975     Date: 8/31/2021       Surgeon: Dixie De Jesus M.D.     PCP: Melly Narayan PA-C     Chief Complaint: colon cancer     HPI:   Alberto Lind is a 51 y.o. female who presents for evaluation of colon cancer that was found on recent colonoscopy and is located at descending colon. Does not have other symptoms.         Past Medical History        Past Medical History:   Diagnosis Date    Acid reflux disease      Cyst of ovary      Fracture of nasal bone      Headache      Hearing loss      Hypertension      Migraine      Morbidly obese ([address] 10/5/2020    Multiple sclerosis [address] [address]       denies limitations at present 11/2020    VEENA on CPAP       severe VEENA per pt    Right shoulder pain 11/2020    Tinnitus      TMJ dysfunction              Past Surgical History         Past Surgical History:   Procedure Laterality Date    APPENDECTOMY        BACK SURGERY        BICEPS TENDON REPAIR Right 11/11/2020     BICEPS TENODESIS performed by Joy Georges MD at [address] [place] [address] CYST REMOVAL        ESOPHAGUS SURGERY        HYSTERECTOMY   03/05/2018     Robotic hysterectomy, bilateral salpingectomy, right oophorectomy DR. ARIANA MONIQUE [facility]     HYSTERECTOMY, TOTAL ABDOMINAL        LARYNGOSCOPY N/A 7/17/2020     DIRECT LARYNGOSCOPY--OMNI GUIDE LASER performed by Soco Leach MD at [address] [facility] ARTHROSCOPY Right 11/11/2020     RIGHT SHOULDER DIAGNOSTIC ARTHROSCOPY WITH DECOMPRESSION ROTATOR CUFF REPAIR performed by Joy Georges MD at [place] [address]                Current Facility-Administered Medications          Current Outpatient Medications   Medication Sig Dispense Refill    dexlansoprazole (DEXILANT) 60 MG CPDR delayed release capsule Take 60 mg by mouth daily        vitamin D (CHOLECALCIFEROL) 55586 UNIT CAPS Take 1 capsule by mouth once a week 12 capsule 3    baclofen (LIORESAL) 20 MG tablet Take 1 tablet by mouth 4 times daily as needed (muscle spasms; pain) 360 tablet 3    rOPINIRole (REQUIP) 0.5 MG tablet Take 1 tablet by mouth 2 times daily as needed (restless legs) 60 tablet 11    ocrelizumab (OCREVUS) 300 MG/10ML SOLN injection Infuse 20 mLs intravenously every 6 months 20 mL 1    nortriptyline (PAMELOR) 10 MG capsule TAKE 1 CAPSULE BY MOUTH ONCE NIGHTLY FOR HEADACHES 30 capsule 3    famotidine (PEPCID) 40 MG tablet take 1 tablet by mouth every evening        lisinopril-hydroCHLOROthiazide (PRINZIDE;ZESTORETIC) 10-12.5 MG per tablet TAKE 1 TABLET BY MOUTH ONCE DAILY 30 tablet 5    CPAP Machine MISC Heated tube and chin strap. Dispense as written. LIFETIME SUPPLIES.        gabapentin (NEURONTIN) 300 MG capsule TAKE 1 CAPSULE BY MOUTH THREE TIMES DAILY 90 capsule 5      No current facility-administered medications for this visit.            No Known Allergies     The patient has a family history that is negative for severe cardiovascular or respiratory issues, negative for reaction to anesthesia.     Social History               Socioeconomic History    Marital status: Single       Spouse name: Not on file    Number of children: 3    Years of education: 6    Highest education level: 11th grade   Occupational History    Not on file   Tobacco Use    Smoking status: Current Every Day Smoker       Packs/day: 0.50       Years: 25.00       Pack years: 12.50       Types: Cigarettes    Smokeless tobacco: Never Used    Tobacco comment: Ready to stop, requesting prescription for Chantix   Vaping Use    Vaping Use: Never used   Substance and Sexual Activity    Alcohol use:  Yes       Alcohol/week: 1.0 standard drinks       Types: 1 Glasses of wine per week       Comment: socially    Drug use: No    Sexual activity: Yes       Partners: Male   Other Topics Concern    Not on file   Social History Narrative     Uses [employer] for transportation     Receives food stamps      Social Determinants of Health          Financial Resource Strain: Medium Risk    Difficulty of Paying Living Expenses: Somewhat hard   Food Insecurity: Food Insecurity Present    Worried About Running Out of Food in the Last Year: Sometimes true    [place] of Food in the Last Year: Never true   Transportation Needs: No Transportation Needs    Lack of Transportation (Medical): No    Lack of Transportation (Non-Medical): No   Physical Activity: Sufficiently Active    Days of Exercise per Week: 3 days    Minutes of Exercise per Session: 60 min   Stress: No Stress Concern Present    Feeling of Stress : Not at all   Social Connections: Socially Isolated    Frequency of Communication with Friends and Family:  Three times a week    Frequency of Social Gatherings with Friends and Family: Twice a week    Attends Judaism Services: Never    Active Member of Clubs or Organizations: No    Attends Club or Organization Meetings: Never    Marital Status: Never    Intimate Partner Violence:     Fear of Current or Ex-Partner:     Emotionally Abused:     Physically Abused:     Sexually Abused:                   Review of Systems  Review of Systems -  General ROS: negative for - chills, fatigue or malaise  ENT ROS: negative for - hearing change, nasal congestion or nasal discharge  Allergy and Immunology ROS: negative for - hives, itchy/watery eyes or nasal congestion  Hematological and Lymphatic ROS: negative for - blood clots, blood transfusions, bruising or fatigue  Endocrine ROS: negative for - malaise/lethargy, mood swings, palpitations or polydipsia/polyuria  Respiratory ROS: negative for - sputum changes, stridor, tachypnea or wheezing  Cardiovascular ROS: negative for - irregular heartbeat, loss of consciousness, murmur or orthopnea  Gastrointestinal ROS: negative for - constipation, diarrhea, gas/bloating, heartburn or hematemesis  Genito-Urinary ROS: negative for -  genital discharge, genital ulcers or hematuria  Musculoskeletal ROS: negative for - gait disturbance, muscle pain or muscular weakness     Physical exam:   /87   Pulse 72   Temp 97.5 °F (36.4 °C) (Infrared)   Resp 16   Ht 5' 1\" (1.549 m)   Wt 216 lb (98 kg)   LMP 01/05/2018   SpO2 98%   BMI 40.81 kg/m²      General appearance:  NAD  Pyscho/social: negative for tremors and hallucinations  Head: NCAT, PERRLA, EOMI, red conjunctiva  Neck: supple, no masses  Lungs: CTAB, equal chest rise bilateral  Heart: Reg rate  Abdomen: soft, nontender, nondistended  Skin; no lesions  Gu: no cva tenderness  Extremities: extremities normal, atraumatic, no cyanosis or edema        Radiology:  CT abdomen/pelvis: no mets, small lung nodule     Assessment:  51 y.o. female with colon cancer at descending colon     Plan: Will plan for lap resection possible open possible ostomy  Discussed the risk, benefits and alternatives of surgery including wound infections, bleeding, scar and hernia formation and the risks of general anesthetic including MI, CVA, sudden death or reactions to anesthetic medications. The patient understands the risks and alternatives and the possibility of converting to an open procedure.  All questions were answered to the patient's satisfaction and they freely signed the consent.        Satya Alston MD

## 2021-08-31 NOTE — PROGRESS NOTES
Mediated frequently for pain with no relief IV Left ACF intact denies pain no blood return Forearm appears firm Dr. Brant Fagan notified IV restarted Left hand with good blood return an infusing well. Medicated again with some relief. Medicated for elevated BP with improvement 1700 Moderate amount sang.  Drainage noted from right side of colostomy Reinforced and tegaderm appleid 1718 Report called to RN 3rd floor Family updated

## 2021-08-31 NOTE — ANESTHESIA POSTPROCEDURE EVALUATION
Department of Anesthesiology  Postprocedure Note    Patient: Marla Underwood  MRN: [id number]  YOB: 1975  Date of evaluation: 8/31/2021  Time:  3:17 PM     Procedure Summary     Date: 08/31/21 Room / Location: [address] [place] [address] / [address] [place] [address]    Anesthesia Start: 1226 Anesthesia Stop: 1440    Procedure: LAPAROSCOPIC LEFT HEMICOLECTOMY WITH LOOP OSTOMY (Left Abdomen) Diagnosis: (COLON CANCER)    Surgeons: Sarah Joy MD Responsible Provider: Chemo Gomez MD    Anesthesia Type: general ASA Status: 3          Anesthesia Type: general    Daniel Phase I: Daniel Score: 10    Daniel Phase II:      Last vitals: Reviewed and per EMR flowsheets.        Anesthesia Post Evaluation    Patient location during evaluation: PACU  Patient participation: complete - patient participated  Level of consciousness: awake  Pain score: 3  Airway patency: patent  Nausea & Vomiting: no nausea  Complications: no  Cardiovascular status: blood pressure returned to baseline  Respiratory status: acceptable  Hydration status: euvolemic

## 2021-08-31 NOTE — OP NOTE
DATE OF PROCEDURE: 8/31/2021    SURGEON: LATOSHA Shipman Pulse: LATOSHA bates PREOPERATIVE DIAGNOSIS: colon cancer, descending colon    POSTOPERATIVE DIAGNOSIS: same    OPERATION: Laparoscopic descending colon resection with end-to-end stapled  Anastomosis. mobilization of splenic flexure  Diverting loop ileostomy    ANESTHESIA: General.    ESTIMATED BLOOD LOSS: 100 mL. COMPLICATIONS: None. FLUIDS: Crystalloid. DISPOSITION: She will be admitted to the floor. SPECIMENS: colon and donuts from anastomosis. INDICATIONS: This is a 26-year-old female who presented in with the  aforementioned diagnosis. I explained the risks, benefits, potential  outcomes, and alternative treatment to the aforementioned procedure and she  agreed to proceed, understanding those risks and potential outcomes. DESCRIPTION OF PROCEDURE: The patient was brought in the operative suite,  was placed under general anesthesia, and was given preoperative antibiotics. She had bilateral PCDs placed. She was then placed in the lithotomy position  and prepped and draped. Once this was done, a 5-mm incision was made supraumbilically after the local  anesthetic was infiltrated into the abdominal wall. A Veress needle was  passed into the peritoneum. CO2 was then used to insufflate the abdomen with  pressure to 15 mmHg. At this time, 2 additional trocars were placed, one  5-mm trocar and one 10-mm trocar in the right lateral abdomen. We then used  these to dissect free the sigmoid colon from the left pelvic wall. It was  densely adhered there. The left colon was mobilized all the way up to and around the  splenic flexure and the entire rectum was mobilized down to the peritonea  reflection. An [place] [address] stapler was taken across the  midrectum above the peritoneal reflection and the mesorectum.  Once this was  completed, the mesocolon was taken up, dividing the MARCO ANTONIO with the LigaSure  device to an area past the area of disease. At this time, Willena Romberg was  placed after an incision was made right lower quadrant. GelPOINT was introduced into the abdomen. The specimen was delivered out through it. A pursestring device  was placed around the proximal colon on the healthy area. Once this was  done, curved Amador scissors were used to cut the colon at that area. Electrocautery was used to gain hemostasis. The area was opened, dilated,  and then the 29 EEA anvil was introduced into this and the pursestring device  was tied down around it. Once this was completed, the  GelPOINT was placed back on after the specimen had been delivered and the  anvil and proximal colon were dropped back into the abdomen. Once this was  completed, the EEA stapler was introduced into the rectal stump after being  dilated. It was opened and the anvil was attached and the stapler was  properly fired. Two good donuts were obtained. Colonoscopic examination of  the anastomosis showed no problems, no stricture, a healthy anastomosis but there was some tension. It  was placed underwater intraperitoneally and there was no air leak  appreciated. The color looked dark pink so we decided to do a loop ileostomy to divert and allow healing. this was done by counting 30 cm from ileocecal valve and making an aperture in the right upper quadrant and maturing the small bowel to the skin with vicryl, digital exam showed patency of bomb limbs through fascia. in the Once this was completed, the irrigant was suctioned clean. Once this was completed, all trocars were removed and the GelPOINT was  removed and the pneumoperitoneum was evacuated. The fascia defect at the  GelPOINT incision was closed with interrupted figure-of-eight #1 PDS  sutures and the skin was approximated with 3-0 Vicryl and 4-0 Monocryl  sutures and then Dermabond was placed. The patient tolerated the procedure  well and was woken up in stable condition and taken to PACU.

## 2021-09-01 LAB
ALBUMIN SERPL-MCNC: 3.6 G/DL (ref 3.5–5.2)
ALP BLD-CCNC: 72 U/L (ref 35–104)
ALT SERPL-CCNC: 34 U/L (ref 0–32)
ANION GAP SERPL CALCULATED.3IONS-SCNC: 8 MMOL/L (ref 7–16)
AST SERPL-CCNC: 16 U/L (ref 0–31)
BILIRUB SERPL-MCNC: 0.4 MG/DL (ref 0–1.2)
BUN BLDV-MCNC: 6 MG/DL (ref 6–20)
CALCIUM SERPL-MCNC: 8.9 MG/DL (ref 8.6–10.2)
CHLORIDE BLD-SCNC: 101 MMOL/L (ref 98–107)
CO2: 26 MMOL/L (ref 22–29)
CREAT SERPL-MCNC: 0.7 MG/DL (ref 0.5–1)
GFR AFRICAN AMERICAN: >60
GFR NON-AFRICAN AMERICAN: >60 ML/MIN/1.73
GLUCOSE BLD-MCNC: 138 MG/DL (ref 74–99)
HCT VFR BLD CALC: 39.3 % (ref 34–48)
HEMOGLOBIN: 13 G/DL (ref 11.5–15.5)
MAGNESIUM: 2.2 MG/DL (ref 1.6–2.6)
MCH RBC QN AUTO: 31.1 PG (ref 26–35)
MCHC RBC AUTO-ENTMCNC: 33.1 % (ref 32–34.5)
MCV RBC AUTO: 94 FL (ref 80–99.9)
METER GLUCOSE: 129 MG/DL (ref 74–99)
PDW BLD-RTO: 13 FL (ref 11.5–15)
PHOSPHORUS: 3.2 MG/DL (ref 2.5–4.5)
PLATELET # BLD: 242 E9/L (ref 130–450)
PMV BLD AUTO: 10.2 FL (ref 7–12)
POTASSIUM REFLEX MAGNESIUM: 4.6 MMOL/L (ref 3.5–5)
RBC # BLD: 4.18 E12/L (ref 3.5–5.5)
SODIUM BLD-SCNC: 135 MMOL/L (ref 132–146)
T4 FREE: 1.17 NG/DL (ref 0.93–1.7)
TOTAL PROTEIN: 6.8 G/DL (ref 6.4–8.3)
TSH SERPL DL<=0.05 MIU/L-ACNC: 0.29 UIU/ML (ref 0.27–4.2)
WBC # BLD: 22.5 E9/L (ref 4.5–11.5)

## 2021-09-01 PROCEDURE — 6360000002 HC RX W HCPCS: Performed by: SURGERY

## 2021-09-01 PROCEDURE — 85027 COMPLETE CBC AUTOMATED: CPT

## 2021-09-01 PROCEDURE — 84443 ASSAY THYROID STIM HORMONE: CPT

## 2021-09-01 PROCEDURE — 2500000003 HC RX 250 WO HCPCS: Performed by: SURGERY

## 2021-09-01 PROCEDURE — 84439 ASSAY OF FREE THYROXINE: CPT

## 2021-09-01 PROCEDURE — 82962 GLUCOSE BLOOD TEST: CPT

## 2021-09-01 PROCEDURE — 1200000000 HC SEMI PRIVATE

## 2021-09-01 PROCEDURE — 6370000000 HC RX 637 (ALT 250 FOR IP): Performed by: SURGERY

## 2021-09-01 PROCEDURE — 6360000002 HC RX W HCPCS: Performed by: STUDENT IN AN ORGANIZED HEALTH CARE EDUCATION/TRAINING PROGRAM

## 2021-09-01 PROCEDURE — 6370000000 HC RX 637 (ALT 250 FOR IP): Performed by: INTERNAL MEDICINE

## 2021-09-01 PROCEDURE — 80053 COMPREHEN METABOLIC PANEL: CPT

## 2021-09-01 PROCEDURE — 2580000003 HC RX 258: Performed by: SURGERY

## 2021-09-01 PROCEDURE — 84100 ASSAY OF PHOSPHORUS: CPT

## 2021-09-01 PROCEDURE — 36415 COLL VENOUS BLD VENIPUNCTURE: CPT

## 2021-09-01 PROCEDURE — 83735 ASSAY OF MAGNESIUM: CPT

## 2021-09-01 RX ORDER — IBUPROFEN 800 MG/1
800 TABLET ORAL EVERY 6 HOURS PRN
Qty: 20 TABLET | Refills: 0 | Status: SHIPPED | OUTPATIENT
Start: 2021-09-01 | End: 2022-01-24

## 2021-09-01 RX ORDER — LISINOPRIL 10 MG/1
10 TABLET ORAL DAILY
Status: DISCONTINUED | OUTPATIENT
Start: 2021-09-01 | End: 2021-09-04 | Stop reason: HOSPADM

## 2021-09-01 RX ORDER — HYDROCHLOROTHIAZIDE 12.5 MG/1
12.5 TABLET ORAL DAILY
Status: DISCONTINUED | OUTPATIENT
Start: 2021-09-01 | End: 2021-09-04 | Stop reason: HOSPADM

## 2021-09-01 RX ORDER — NORTRIPTYLINE HYDROCHLORIDE 10 MG/1
10 CAPSULE ORAL NIGHTLY
Status: DISCONTINUED | OUTPATIENT
Start: 2021-09-01 | End: 2021-09-04 | Stop reason: HOSPADM

## 2021-09-01 RX ORDER — KETOROLAC TROMETHAMINE 30 MG/ML
30 INJECTION, SOLUTION INTRAMUSCULAR; INTRAVENOUS EVERY 6 HOURS
Status: DISCONTINUED | OUTPATIENT
Start: 2021-09-01 | End: 2021-09-04 | Stop reason: HOSPADM

## 2021-09-01 RX ORDER — LISINOPRIL AND HYDROCHLOROTHIAZIDE 12.5; 1 MG/1; MG/1
1 TABLET ORAL DAILY
Status: DISCONTINUED | OUTPATIENT
Start: 2021-09-01 | End: 2021-09-01 | Stop reason: CLARIF

## 2021-09-01 RX ORDER — TRAMADOL HYDROCHLORIDE 50 MG/1
50 TABLET ORAL EVERY 4 HOURS PRN
Qty: 18 TABLET | Refills: 0 | Status: SHIPPED | OUTPATIENT
Start: 2021-09-01 | End: 2021-09-04

## 2021-09-01 RX ADMIN — GABAPENTIN 300 MG: 300 CAPSULE ORAL at 20:40

## 2021-09-01 RX ADMIN — OXYCODONE 10 MG: 5 TABLET ORAL at 03:06

## 2021-09-01 RX ADMIN — CIPROFLOXACIN 400 MG: 2 INJECTION, SOLUTION INTRAVENOUS at 00:35

## 2021-09-01 RX ADMIN — GABAPENTIN 300 MG: 300 CAPSULE ORAL at 09:30

## 2021-09-01 RX ADMIN — PANTOPRAZOLE SODIUM 40 MG: 40 TABLET, DELAYED RELEASE ORAL at 06:01

## 2021-09-01 RX ADMIN — OXYCODONE 10 MG: 5 TABLET ORAL at 16:07

## 2021-09-01 RX ADMIN — METRONIDAZOLE 500 MG: 500 INJECTION, SOLUTION INTRAVENOUS at 06:00

## 2021-09-01 RX ADMIN — KETOROLAC TROMETHAMINE 30 MG: 30 INJECTION, SOLUTION INTRAMUSCULAR; INTRAVENOUS at 20:40

## 2021-09-01 RX ADMIN — SODIUM CHLORIDE, POTASSIUM CHLORIDE, SODIUM LACTATE AND CALCIUM CHLORIDE: 600; 310; 30; 20 INJECTION, SOLUTION INTRAVENOUS at 09:20

## 2021-09-01 RX ADMIN — Medication 10 ML: at 20:51

## 2021-09-01 RX ADMIN — HYDROCHLOROTHIAZIDE 12.5 MG: 12.5 TABLET ORAL at 09:30

## 2021-09-01 RX ADMIN — MORPHINE SULFATE 4 MG: 4 INJECTION, SOLUTION INTRAMUSCULAR; INTRAVENOUS at 00:27

## 2021-09-01 RX ADMIN — SODIUM CHLORIDE, POTASSIUM CHLORIDE, SODIUM LACTATE AND CALCIUM CHLORIDE: 600; 310; 30; 20 INJECTION, SOLUTION INTRAVENOUS at 17:39

## 2021-09-01 RX ADMIN — ENOXAPARIN SODIUM 40 MG: 40 INJECTION SUBCUTANEOUS at 09:25

## 2021-09-01 RX ADMIN — KETOROLAC TROMETHAMINE 30 MG: 30 INJECTION, SOLUTION INTRAMUSCULAR; INTRAVENOUS at 09:21

## 2021-09-01 RX ADMIN — LISINOPRIL 10 MG: 10 TABLET ORAL at 09:25

## 2021-09-01 RX ADMIN — KETOROLAC TROMETHAMINE 30 MG: 30 INJECTION, SOLUTION INTRAMUSCULAR; INTRAVENOUS at 14:05

## 2021-09-01 RX ADMIN — GABAPENTIN 300 MG: 300 CAPSULE ORAL at 14:05

## 2021-09-01 RX ADMIN — MORPHINE SULFATE 4 MG: 4 INJECTION, SOLUTION INTRAMUSCULAR; INTRAVENOUS at 05:59

## 2021-09-01 ASSESSMENT — PAIN DESCRIPTION - FREQUENCY: FREQUENCY: INTERMITTENT

## 2021-09-01 ASSESSMENT — PAIN SCALES - GENERAL
PAINLEVEL_OUTOF10: 5
PAINLEVEL_OUTOF10: 4
PAINLEVEL_OUTOF10: 6
PAINLEVEL_OUTOF10: 8
PAINLEVEL_OUTOF10: 5
PAINLEVEL_OUTOF10: 9
PAINLEVEL_OUTOF10: 4
PAINLEVEL_OUTOF10: 6
PAINLEVEL_OUTOF10: 6
PAINLEVEL_OUTOF10: 9
PAINLEVEL_OUTOF10: 5
PAINLEVEL_OUTOF10: 5
PAINLEVEL_OUTOF10: 7

## 2021-09-01 ASSESSMENT — PAIN DESCRIPTION - PAIN TYPE: TYPE: ACUTE PAIN;SURGICAL PAIN

## 2021-09-01 ASSESSMENT — PAIN DESCRIPTION - PROGRESSION: CLINICAL_PROGRESSION: GRADUALLY IMPROVING

## 2021-09-01 ASSESSMENT — PAIN DESCRIPTION - ORIENTATION: ORIENTATION: RIGHT;UPPER

## 2021-09-01 ASSESSMENT — PAIN DESCRIPTION - ONSET: ONSET: GRADUAL

## 2021-09-01 ASSESSMENT — PAIN DESCRIPTION - DESCRIPTORS: DESCRIPTORS: ACHING;DISCOMFORT;SORE

## 2021-09-01 ASSESSMENT — PAIN DESCRIPTION - LOCATION: LOCATION: ABDOMEN

## 2021-09-01 ASSESSMENT — PAIN - FUNCTIONAL ASSESSMENT: PAIN_FUNCTIONAL_ASSESSMENT: ACTIVITIES ARE NOT PREVENTED

## 2021-09-01 NOTE — PROGRESS NOTES
GENERAL SURGERY  DAILY PROGRESS NOTE  9/1/2021    No chief complaint on file. Subjective:  Having pain that's somewhat controlled with pain medications. Have not ambulated yet, as she is nervous. She denies any nausea or vomiting and had some liquids yesterday. Objective:  /86   Pulse 75   Temp 98.5 °F (36.9 °C) (Oral)   Resp 16   Ht 5' 1\" (1.549 m)   Wt 216 lb (98 kg)   LMP 01/05/2018   SpO2 93%   BMI 40.81 kg/m²     GENERAL:  Laying in bed, awake, alert, cooperative, no apparent distress  HEAD: Normocephalic, atraumatic  EYES: No sclera icterus, pupils equal  LUNGS:  No increased work of breathing on RA  CARDIOVASCULAR:  RR  ABDOMEN:  Soft, appropriately tender, non-distended, incisions c/d/i covered with glue, RUQ ileostomy with 70 cc/24 hours  EXTREMITIES: No edema or swelling  SKIN: Warm and dry    Assessment/Plan:  51 y.o. female s/p descending colon resection with End to end stapled loop anastomosis and diverting loop ileostomy     Continue fulls today  Ambulate  IVF  Pain and nausea control-add Toradol  Remove anaya  Stoma nurse to assess  SCDs/ Lovenox  Had long discussion with patient about rationalization for loop ileostomy and for the reversal timing depending on the pathology results and resultant need for chemotherapy. Also discussed pathology takes 5-7 business days for final pathology. She expressed understanding of all the above.        Electronically signed by Marisel Alcantara MD on 9/1/2021 at 7:08 AM      As above, if tolerates diet ok for d/c later today

## 2021-09-01 NOTE — PLAN OF CARE
Problem: Pain:  Goal: Pain level will decrease  9/1/2021 1205 by Zully Ghosh RN  Outcome: Met This Shift     Problem: Pain:  Goal: Control of acute pain  9/1/2021 1205 by Zully Ghosh RN  Outcome: Met This Shift     Problem: Pain:  Goal: Control of chronic pain  9/1/2021 1205 by Zully Ghosh RN  Outcome: Met This Shift     Problem: Falls - Risk of:  Goal: Will remain free from falls  9/1/2021 1205 by Zully Ghosh RN  Outcome: Met This Shift     Problem: Falls - Risk of:  Goal: Absence of physical injury  9/1/2021 1205 by Zully Ghosh RN  Outcome: Met This Shift     Problem:  Bowel/Gastric:  Goal: Control of bowel function will improve  Outcome: Met This Shift

## 2021-09-01 NOTE — CARE COORDINATION
CM note: met with patient for transition of care planning. Pt states that she would like home care, does not have a preference of provider. [facility] is unable to see before Tuesday of next week. [place] unable to accept d/t staffing. Liaison for [facility] is checking to see if they accept insurance provider. Will need [facility] orders. Boyfriend will be caregiver and is to learn with ostomy nurse tomorrow.

## 2021-09-01 NOTE — PROGRESS NOTES
ET Nurse  Admit Date: 8/31/2021  9:35 AM    Reason for consult:  New ileostomy    Significant history:    Past Medical History:   Diagnosis Date    Acid reflux disease     Cyst of ovary     Fracture of nasal bone     Headache     Hearing loss     Hypertension     Migraine     Morbidly obese ([address] 10/5/2020    Multiple sclerosis ([facility]     denies limitations at present 11/2020    VEENA on CPAP     severe VEENA per pt    Right shoulder pain 11/2020    Tinnitus     TMJ dysfunction        Stoma assessment:  Stoma good red color functioning for liquid stool  Peristomal skin is clera    Plan:    Lesson today in ileostomy care  Pt very receptiv  Pt wants home care  Has a boyfriend that can assist her    Lidia Grover RN 9/1/2021 3:51 PM

## 2021-09-01 NOTE — CARE COORDINATION
CM note; [facility] is not in network with Medicaid plans only. Referral given to [facility] as they are the only home care provider that accepts patient's insurance, however they are unable to start service until 9/7.

## 2021-09-01 NOTE — PROGRESS NOTES
Department of Internal Medicine  Internal Medicine Progress Note    Primary Care Physician: Alia Espinosa PA-C   Admitting Physician:  Kaylen Mcgarry MD  Admission date and time: 8/31/2021  9:35 AM    Room:  [address] [place], [place] [address]-  Admitting diagnosis: S/P colectomy [Z90.49]  Date of Service: 8/31/2021    Patient Name: Alexander Galloway  MRN: [id number]    Date of Service: 9/1/2021     Reason for consultation: Medical management    History of present illness:    Patient is a 69-year-old female who is status post laparoscopic descending colon resection with end-to-end stapled anastomosis and diverting loop ileostomy. Patient tolerated procedure well. Patient is stable. Abdominal pain is controlled. Patient denies any lightheadedness or dizziness. Denies any chest pain or shortness of breath. Denies any fever or chills. 9/1/2021  Patient seen examined on general medical/surgical floor. Patient has expected postop discomfort. Patient denies any problem chest pain, nausea/vomiting, dizziness or unusual shortness of breath. BUN/creatinine 6/0.7, fasting blood sugar 138, WBCs 22.5 hemoglobin 13. Temperature 98.5 heart rate 75 blood pressure 138/86. O2 sat 93% room air at rest.  We will repeat the CBC and check glucoscans twice a day.     PAST MEDICAL Hx:  Past Medical History:   Diagnosis Date    Acid reflux disease     Cyst of ovary     Fracture of nasal bone     Headache     Hearing loss     Hypertension     Migraine     Morbidly obese ([address] 10/5/2020    Multiple sclerosis ([address]     denies limitations at present 11/2020    VEENA on CPAP     severe VEENA per pt    Right shoulder pain 11/2020    Tinnitus     TMJ dysfunction        PAST SURGICAL Hx:   Past Surgical History:   Procedure Laterality Date    APPENDECTOMY      BACK SURGERY      BICEPS TENDON REPAIR Right 11/11/2020    BICEPS TENODESIS performed by Devonte Phillips MD at [place] [address] Left 8/31/2021    LAPAROSCOPIC LEFT HEMICOLECTOMY WITH LOOP OSTOMY performed by Noa Cao MD at [facility]  03/05/2018    Robotic hysterectomy, bilateral salpingectomy, right oophorectomy DR. ARIANA RAYMOND [facility]     HYSTERECTOMY, TOTAL ABDOMINAL      LARYNGOSCOPY N/A 7/17/2020    DIRECT LARYNGOSCOPY--OMNI GUIDE LASER performed by Westley Barba MD at [address] ARTHROSCOPY Right 11/11/2020    RIGHT SHOULDER DIAGNOSTIC ARTHROSCOPY WITH DECOMPRESSION ROTATOR CUFF REPAIR performed by Richie Sutherland MD at [facility] Hx:  Family History   Problem Relation Age of Onset    Mult Sclerosis Mother     Colon Cancer Neg Hx     Uterine Cancer Neg Hx     Ovarian Cancer Neg Hx     Breast Cancer Neg Hx        HOME MEDICATIONS:  Prior to Admission medications    Medication Sig Start Date End Date Taking? Authorizing Provider   traMADol (ULTRAM) 50 MG tablet Take 1 tablet by mouth every 4 hours as needed for Pain for up to 3 days. Intended supply: 3 days.  Take lowest dose possible to manage pain 9/1/21 9/4/21 Yes Noa Cao MD   ibuprofen (ADVIL;MOTRIN) 800 MG tablet Take 1 tablet by mouth every 6 hours as needed for Pain 9/1/21  Yes Noa Cao MD   dexlansoprazole (DEXILANT) 60 MG CPDR delayed release capsule Take 60 mg by mouth daily   Yes Historical Provider, MD   erythromycin base (E-MYCIN) 500 MG tablet Take 1 tablet by mouth See Admin Instructions for 3 doses Take at 1300, 1400 and midnight the day before surgery 8/27/21   Noa Cao MD   vitamin D (CHOLECALCIFEROL) 75304 UNIT CAPS Take 1 capsule by mouth once a week 8/13/21   MARIXA Garza CNP   baclofen (LIORESAL) 20 MG tablet Take 1 tablet by mouth 4 times daily as needed (muscle spasms; pain) 8/13/21   MARIXA Garza CNP   rOPINIRole (REQUIP) 0.5 MG tablet Take 1 tablet by mouth 2 times daily as needed (restless legs) 8/13/21 MARIXA Kessler CNP   ocrelizumab (OCREVUS) 300 MG/10ML SOLN injection Infuse 20 mLs intravenously every 6 months 7/19/21   MARIXA Kessler CNP   nortriptyline (PAMELOR) 10 MG capsule TAKE 1 CAPSULE BY MOUTH ONCE NIGHTLY FOR HEADACHES 7/2/21   Phill Rivera PA-C   famotidine (PEPCID) 40 MG tablet take 1 tablet by mouth every evening 4/6/21   Historical Provider, MD   gabapentin (NEURONTIN) 300 MG capsule TAKE 1 CAPSULE BY MOUTH THREE TIMES DAILY 3/10/21 8/13/21  DANISH Covarrubias   lisinopril-hydroCHLOROthiazide (PRINZIDE;ZESTORETIC) 10-12.5 MG per tablet TAKE 1 TABLET BY MOUTH ONCE DAILY 3/10/21   Rafael Guillen DO   CPAP Machine MISC Heated tube and chin strap. Dispense as written. LIFETIME SUPPLIES. 11/6/20   Historical Provider, MD       ALLERGIES:  Patient has no known allergies. SOCIAL Hx:  Social History     Socioeconomic History    Marital status: Single     Spouse name: Not on file    Number of children: 3    Years of education: 6    Highest education level: 11th grade   Occupational History    Not on file   Tobacco Use    Smoking status: Current Every Day Smoker     Packs/day: 0.50     Years: 25.00     Pack years: 12.50     Types: Cigarettes    Smokeless tobacco: Never Used    Tobacco comment: Ready to stop, requesting prescription for Chantix   Vaping Use    Vaping Use: Never used   Substance and Sexual Activity    Alcohol use:  Yes     Alcohol/week: 1.0 standard drinks     Types: 1 Glasses of wine per week     Comment: socially    Drug use: No    Sexual activity: Yes     Partners: Male   Other Topics Concern    Not on file   Social History Narrative    Uses [employer] insurance for transportation    [employer]     Social Determinants of Health     Financial Resource Strain: Medium Risk    Difficulty of Paying Living Expenses: Somewhat hard   Food Insecurity: Food Insecurity Present    Worried About Running Out of Food in the Last Year: Sometimes true   [employer] of Food in the Last Year: Never true   Transportation Needs: No Transportation Needs    Lack of Transportation (Medical): No    Lack of Transportation (Non-Medical): No   Physical Activity: Sufficiently Active    Days of Exercise per Week: 3 days    Minutes of Exercise per Session: 60 min   Stress: No Stress Concern Present    Feeling of Stress : Not at all   Social Connections: Socially Isolated    Frequency of Communication with Friends and Family: Three times a week    Frequency of Social Gatherings with Friends and Family: Twice a week    Attends Nondenominational Services: Never    Active Member of Clubs or Organizations: No    Attends Club or Organization Meetings: Never    Marital Status: Never    Intimate Partner Violence:     Fear of Current or Ex-Partner:     Emotionally Abused:     Physically Abused:     Sexually Abused:        ROS: Positive in bold  General:   Denies chills, fatigue, fever, malaise, night sweats or weight loss    Psychological:   Denies anxiety, disorientation or hallucinations    ENT:    Denies epistaxis, headaches, vertigo or visual changes    Cardiovascular:   Denies any chest pain, irregular heartbeats, or palpitations. No paroxysmal nocturnal dyspnea. Respiratory:   Denies shortness of breath, coughing, sputum production, hemoptysis, or wheezing. No orthopnea. Gastrointestinal:   Denies nausea, vomiting, diarrhea, or constipation. Denies any abdominal pain. Denies change in bowel habits or stools. Genito-Urinary:    Denies any urgency, frequency, hematuria. Voiding without difficulty. Musculoskeletal:   Denies joint pain, joint stiffness, joint swelling or muscle pain    Neurology:    Denies any headache or focal neurological deficits. No weakness or paresthesia. Derm:    Denies any rashes, ulcers, or excoriations. Denies bruising. Extremities:   Denies any lower extremity swelling or edema.       PHYSICAL EXAM: Abnormal findings noted  VITALS:  Vitals:    09/01/21 0600   BP: 138/86   Pulse: 75   Resp: 16   Temp: 98.5 °F (36.9 °C)   SpO2: 93%         CONSTITUTIONAL:    Awake, alert, cooperative, no apparent distress, and appears stated age    EYES:     EOMI, sclera clear without icterus, conjunctiva normal    ENT:    Normocephalic, atraumatic,  External ears without lesions. NECK:    Supple, symmetrical, trachea midline,  no JVD    HEMATOLOGIC/LYMPHATICS:    No cervical lymphadenopathy and no supraclavicular lymphadenopathy    LUNGS:    Symmetric. No increased work of breathing, good air exchange, clear to auscultation bilaterally, no wheezes, rhonchi, or rales,     CARDIOVASCULAR:    Normal apical impulse, regular rate and rhythm, normal S1 and S2, no S3 or S4, and no murmur noted    ABDOMEN:    Patient has abdominal tenderness  Ileostomy in place    MUSCULOSKELETAL:    There is no redness, warmth, or swelling of the joints. NEUROLOGIC:    Awake, alert, oriented to name, place and time. SKIN:    No bruising or bleeding. No redness, warmth, or swelling    EXTREMITIES:    Peripheral pulses present. No edema, cyanosis, or swelling.     LINES/CATHETERS     LABORATORY DATA:  CBC with Differential:    Lab Results   Component Value Date    WBC 22.5 09/01/2021    RBC 4.18 09/01/2021    HGB 13.0 09/01/2021    HCT 39.3 09/01/2021     09/01/2021    MCV 94.0 09/01/2021    MCH 31.1 09/01/2021    MCHC 33.1 09/01/2021    RDW 13.0 09/01/2021    LYMPHOPCT 22.6 05/11/2021    MONOPCT 8.8 05/11/2021    BASOPCT 0.5 05/11/2021    MONOSABS 1.00 05/11/2021    LYMPHSABS 2.55 05/11/2021    EOSABS 0.26 05/11/2021    BASOSABS 0.06 05/11/2021     CMP:    Lab Results   Component Value Date     09/01/2021    K 4.6 09/01/2021     09/01/2021    CO2 26 09/01/2021    BUN 6 09/01/2021    CREATININE 0.7 09/01/2021    GFRAA >60 09/01/2021    LABGLOM >60 09/01/2021    GLUCOSE 138 09/01/2021    PROT 6.8 09/01/2021    LABALBU 3.6 09/01/2021 CALCIUM 8.9 09/01/2021    BILITOT 0.4 09/01/2021    ALKPHOS 72 09/01/2021    AST 16 09/01/2021    ALT 34 09/01/2021       ASSESSMENT/PLAN:  1. Colon cancer of descending colon status post laparoscopic descending colon resection with end-to-end stapled anastomosis and diverting loop ileostomy  2. Multiple sclerosis  3. Hypertension  4. Prior diagnosis of obstructive sleep apnea without current use of CPAP  5. GERD  6. History of migraine headache  7. Morbid obesity  8. Current tobacco abuse  9. Hyperglycemia fasting blood sugar 138  10. Leukocytosis-probably reactive    Patient is status post surgical intervention. Further pain management, diet, activity per general surgery. Home medications reordered as needed. Routine blood work ordered. Patient follows with [facility] for colon cancer. Home meds reviewed  Monitor heart rate, blood pressure, O2 saturation  CBC in a.m.   Blood sugars twice daily    Aurelia Britt DO  9:10 AM  9/1/2021

## 2021-09-02 LAB
BASOPHILS ABSOLUTE: 0.02 E9/L (ref 0–0.2)
BASOPHILS RELATIVE PERCENT: 0.2 % (ref 0–2)
EOSINOPHILS ABSOLUTE: 0.06 E9/L (ref 0.05–0.5)
EOSINOPHILS RELATIVE PERCENT: 0.5 % (ref 0–6)
HCT VFR BLD CALC: 35 % (ref 34–48)
HEMOGLOBIN: 11.4 G/DL (ref 11.5–15.5)
IMMATURE GRANULOCYTES #: 0.1 E9/L
IMMATURE GRANULOCYTES %: 0.9 % (ref 0–5)
LYMPHOCYTES ABSOLUTE: 1.86 E9/L (ref 1.5–4)
LYMPHOCYTES RELATIVE PERCENT: 16.3 % (ref 20–42)
MCH RBC QN AUTO: 31.1 PG (ref 26–35)
MCHC RBC AUTO-ENTMCNC: 32.6 % (ref 32–34.5)
MCV RBC AUTO: 95.4 FL (ref 80–99.9)
METER GLUCOSE: 116 MG/DL (ref 74–99)
METER GLUCOSE: 122 MG/DL (ref 74–99)
MONOCYTES ABSOLUTE: 1.12 E9/L (ref 0.1–0.95)
MONOCYTES RELATIVE PERCENT: 9.8 % (ref 2–12)
NEUTROPHILS ABSOLUTE: 8.25 E9/L (ref 1.8–7.3)
NEUTROPHILS RELATIVE PERCENT: 72.3 % (ref 43–80)
PDW BLD-RTO: 12.9 FL (ref 11.5–15)
PLATELET # BLD: 184 E9/L (ref 130–450)
PMV BLD AUTO: 10.5 FL (ref 7–12)
RBC # BLD: 3.67 E12/L (ref 3.5–5.5)
WBC # BLD: 11.4 E9/L (ref 4.5–11.5)

## 2021-09-02 PROCEDURE — 6370000000 HC RX 637 (ALT 250 FOR IP): Performed by: INTERNAL MEDICINE

## 2021-09-02 PROCEDURE — 6360000002 HC RX W HCPCS: Performed by: SURGERY

## 2021-09-02 PROCEDURE — 99024 POSTOP FOLLOW-UP VISIT: CPT | Performed by: SURGERY

## 2021-09-02 PROCEDURE — 2580000003 HC RX 258: Performed by: SURGERY

## 2021-09-02 PROCEDURE — 6370000000 HC RX 637 (ALT 250 FOR IP): Performed by: SURGERY

## 2021-09-02 PROCEDURE — 6360000002 HC RX W HCPCS: Performed by: STUDENT IN AN ORGANIZED HEALTH CARE EDUCATION/TRAINING PROGRAM

## 2021-09-02 PROCEDURE — 1200000000 HC SEMI PRIVATE

## 2021-09-02 PROCEDURE — 85025 COMPLETE CBC W/AUTO DIFF WBC: CPT

## 2021-09-02 PROCEDURE — 82962 GLUCOSE BLOOD TEST: CPT

## 2021-09-02 PROCEDURE — 36415 COLL VENOUS BLD VENIPUNCTURE: CPT

## 2021-09-02 RX ADMIN — GABAPENTIN 300 MG: 300 CAPSULE ORAL at 09:39

## 2021-09-02 RX ADMIN — KETOROLAC TROMETHAMINE 30 MG: 30 INJECTION, SOLUTION INTRAMUSCULAR; INTRAVENOUS at 22:34

## 2021-09-02 RX ADMIN — SODIUM CHLORIDE, POTASSIUM CHLORIDE, SODIUM LACTATE AND CALCIUM CHLORIDE: 600; 310; 30; 20 INJECTION, SOLUTION INTRAVENOUS at 22:40

## 2021-09-02 RX ADMIN — OXYCODONE 10 MG: 5 TABLET ORAL at 22:35

## 2021-09-02 RX ADMIN — KETOROLAC TROMETHAMINE 30 MG: 30 INJECTION, SOLUTION INTRAMUSCULAR; INTRAVENOUS at 09:30

## 2021-09-02 RX ADMIN — KETOROLAC TROMETHAMINE 30 MG: 30 INJECTION, SOLUTION INTRAMUSCULAR; INTRAVENOUS at 02:55

## 2021-09-02 RX ADMIN — OXYCODONE 10 MG: 5 TABLET ORAL at 00:03

## 2021-09-02 RX ADMIN — ENOXAPARIN SODIUM 40 MG: 40 INJECTION SUBCUTANEOUS at 09:38

## 2021-09-02 RX ADMIN — SODIUM CHLORIDE, POTASSIUM CHLORIDE, SODIUM LACTATE AND CALCIUM CHLORIDE: 600; 310; 30; 20 INJECTION, SOLUTION INTRAVENOUS at 05:28

## 2021-09-02 RX ADMIN — OXYCODONE 10 MG: 5 TABLET ORAL at 13:05

## 2021-09-02 RX ADMIN — OXYCODONE 10 MG: 5 TABLET ORAL at 05:31

## 2021-09-02 RX ADMIN — HYDROCHLOROTHIAZIDE 12.5 MG: 12.5 TABLET ORAL at 09:43

## 2021-09-02 RX ADMIN — ONDANSETRON 4 MG: 2 INJECTION INTRAMUSCULAR; INTRAVENOUS at 14:55

## 2021-09-02 RX ADMIN — GABAPENTIN 300 MG: 300 CAPSULE ORAL at 15:37

## 2021-09-02 RX ADMIN — KETOROLAC TROMETHAMINE 30 MG: 30 INJECTION, SOLUTION INTRAMUSCULAR; INTRAVENOUS at 15:37

## 2021-09-02 RX ADMIN — PANTOPRAZOLE SODIUM 40 MG: 40 TABLET, DELAYED RELEASE ORAL at 06:07

## 2021-09-02 RX ADMIN — GABAPENTIN 300 MG: 300 CAPSULE ORAL at 22:35

## 2021-09-02 RX ADMIN — NORTRIPTYLINE HYDROCHLORIDE 10 MG: 10 CAPSULE ORAL at 22:34

## 2021-09-02 ASSESSMENT — PAIN DESCRIPTION - PAIN TYPE
TYPE: ACUTE PAIN

## 2021-09-02 ASSESSMENT — PAIN SCALES - GENERAL
PAINLEVEL_OUTOF10: 8
PAINLEVEL_OUTOF10: 7
PAINLEVEL_OUTOF10: 5
PAINLEVEL_OUTOF10: 9
PAINLEVEL_OUTOF10: 5
PAINLEVEL_OUTOF10: 8
PAINLEVEL_OUTOF10: 9
PAINLEVEL_OUTOF10: 7
PAINLEVEL_OUTOF10: 9
PAINLEVEL_OUTOF10: 6

## 2021-09-02 ASSESSMENT — PAIN DESCRIPTION - DESCRIPTORS
DESCRIPTORS: CRAMPING;SHARP
DESCRIPTORS: ACHING;CRAMPING;DISCOMFORT
DESCRIPTORS: ACHING;CRAMPING;DISCOMFORT

## 2021-09-02 ASSESSMENT — PAIN DESCRIPTION - LOCATION
LOCATION: ABDOMEN

## 2021-09-02 ASSESSMENT — PAIN DESCRIPTION - ONSET
ONSET: GRADUAL
ONSET: GRADUAL

## 2021-09-02 ASSESSMENT — PAIN DESCRIPTION - FREQUENCY
FREQUENCY: INTERMITTENT
FREQUENCY: CONTINUOUS
FREQUENCY: CONTINUOUS

## 2021-09-02 ASSESSMENT — PAIN - FUNCTIONAL ASSESSMENT: PAIN_FUNCTIONAL_ASSESSMENT: PREVENTS OR INTERFERES SOME ACTIVE ACTIVITIES AND ADLS

## 2021-09-02 ASSESSMENT — PAIN DESCRIPTION - PROGRESSION: CLINICAL_PROGRESSION: GRADUALLY IMPROVING

## 2021-09-02 NOTE — PROGRESS NOTES
Department of Internal Medicine  Internal Medicine Progress Note    Primary Care Physician: Ban Esposito PA-C   Admitting Physician:  Frances Valentine MD  Admission date and time: 8/31/2021  9:35 AM    Room:  [address] [place] [address]  Admitting diagnosis: S/P colectomy [Z90.49]  Date of Service: 8/31/2021    Patient Name: Asa Cunningham  MRN: [id number]    Date of Service: 9/2/2021     Reason for consultation: Medical management    History of present illness:    Patient is a 19-year-old female who is status post laparoscopic descending colon resection with end-to-end stapled anastomosis and diverting loop ileostomy. Patient tolerated procedure well. Patient is stable. Abdominal pain is controlled. Patient denies any lightheadedness or dizziness. Denies any chest pain or shortness of breath. Denies any fever or chills. 9/1/2021  Patient seen examined on general medical/surgical floor. Patient has expected postop discomfort. Patient denies any problem chest pain, nausea/vomiting, dizziness or unusual shortness of breath. BUN/creatinine 6/0.7, fasting blood sugar 138, WBCs 22.5 hemoglobin 13. Temperature 98.5 heart rate 75 blood pressure 138/86. O2 sat 93% room air at rest.  We will repeat the CBC and check glucoscans twice a day. 9/2/2021  Patient seen examined on general medical surgical floor. Patient has expected postop discomfort. Patient does have some mild intermittent lightheadedness when ambulating. Patient denies any problem with chest pain, nausea/vomiting or unusual shortness of breath. Patient had a random blood sugar 129 yesterday and fasting 116. WBC is 11.4 with hemoglobin 11.4. Temperature 97.8 with heart rate 85, blood pressure ranges 98//56. O2 sat 95% room air at rest.  Urine output is good. We will hold her lisinopril/hydrochlorothiazide with low blood pressure and patient lightheaded with ambulating.   Patient apparently is having some problems with her ostomy is getting a good seal and wound nurse is following up with her. PAST MEDICAL Hx:  Past Medical History:   Diagnosis Date    Acid reflux disease     Cyst of ovary     Fracture of nasal bone     Headache     Hearing loss     Hypertension     Migraine     Morbidly obese ([address] 10/5/2020    Multiple sclerosis ([facility]     denies limitations at present 11/2020    VEENA on CPAP     severe VEENA per pt    Right shoulder pain 11/2020    Tinnitus     TMJ dysfunction        PAST SURGICAL Hx:   Past Surgical History:   Procedure Laterality Date    APPENDECTOMY      BACK SURGERY      BICEPS TENDON REPAIR Right 11/11/2020    BICEPS TENODESIS performed by Alyssa Ramirez MD at [place] [address] Left 8/31/2021    LAPAROSCOPIC LEFT HEMICOLECTOMY WITH LOOP OSTOMY performed by Remigio Cruz MD at [address] [place] [address]  03/05/2018    Robotic hysterectomy, bilateral salpingectomy, right oophorectomy DR. ARIANA MONIQUE [facility]     HYSTERECTOMY, TOTAL ABDOMINAL      LARYNGOSCOPY N/A 7/17/2020    DIRECT LARYNGOSCOPY--OMNI GUIDE LASER performed by Spike Awad MD at [facility] ARTHROSCOPY Right 11/11/2020    RIGHT SHOULDER DIAGNOSTIC ARTHROSCOPY WITH DECOMPRESSION ROTATOR CUFF REPAIR performed by Alyssa Ramirez MD at [address] [place] [address] Hx:  Family History   Problem Relation Age of Onset    Mult Sclerosis Mother     Colon Cancer Neg Hx     Uterine Cancer Neg Hx     Ovarian Cancer Neg Hx     Breast Cancer Neg Hx        HOME MEDICATIONS:  Prior to Admission medications    Medication Sig Start Date End Date Taking? Authorizing Provider   traMADol (ULTRAM) 50 MG tablet Take 1 tablet by mouth every 4 hours as needed for Pain for up to 3 days. Intended supply: 3 days.  Take lowest dose possible to manage pain 9/1/21 9/4/21 Yes Remigio Cruz MD   ibuprofen (ADVIL;MOTRIN) 800 MG tablet Take 1 tablet by mouth every 6 hours as needed for Pain 9/1/21  Yes Ernesto Portillo MD   dexlansoprazole (DEXILANT) 60 MG CPDR delayed release capsule Take 60 mg by mouth daily   Yes Historical Provider, MD   erythromycin base (E-MYCIN) 500 MG tablet Take 1 tablet by mouth See Admin Instructions for 3 doses Take at 1300, 1400 and midnight the day before surgery 8/27/21   Ernesto Portillo MD   vitamin D (CHOLECALCIFEROL) 06124 UNIT CAPS Take 1 capsule by mouth once a week 8/13/21   Aubery MangXOCHILT mahanN - CNP   baclofen (LIORESAL) 20 MG tablet Take 1 tablet by mouth 4 times daily as needed (muscle spasms; pain) 8/13/21   Aubery MangleMARIXA - CNP   rOPINIRole (REQUIP) 0.5 MG tablet Take 1 tablet by mouth 2 times daily as needed (restless legs) 8/13/21   Aubery Mangle APRN - CNP   ocrelizumab (OCREVUS) 300 MG/10ML SOLN injection Infuse 20 mLs intravenously every 6 months 7/19/21   Aubery MangleMARIXA - CNP   nortriptyline (PAMELOR) 10 MG capsule TAKE 1 CAPSULE BY MOUTH ONCE NIGHTLY FOR HEADACHES 7/2/21   India Echeverria PA-C   famotidine (PEPCID) 40 MG tablet take 1 tablet by mouth every evening 4/6/21   Historical Provider, MD   gabapentin (NEURONTIN) 300 MG capsule TAKE 1 CAPSULE BY MOUTH THREE TIMES DAILY 3/10/21 8/13/21  DANISH Jefferson   lisinopril-hydroCHLOROthiazide (PRINZIDE;ZESTORETIC) 10-12.5 MG per tablet TAKE 1 TABLET BY MOUTH ONCE DAILY 3/10/21   Irina Fleix DO   CPAP Machine MISC Heated tube and chin strap. Dispense as written. LIFETIME SUPPLIES. 11/6/20   Historical Provider, MD       ALLERGIES:  Patient has no known allergies.     SOCIAL Hx:  Social History     Socioeconomic History    Marital status: Single     Spouse name: Not on file    Number of children: 3    Years of education: 6    Highest education level: 11th grade   Occupational History    Not on file   Tobacco Use    Smoking status: Current Every Day Smoker     Packs/day: 0.50     Years: 25.00     Pack years: 12.50     Types: Cigarettes    Smokeless tobacco: Never Used    Tobacco comment: Ready to stop, requesting prescription for Chantix   Vaping Use    Vaping Use: Never used   Substance and Sexual Activity    Alcohol use: Yes     Alcohol/week: 1.0 standard drinks     Types: 1 Glasses of wine per week     Comment: socially    Drug use: No    Sexual activity: Yes     Partners: Male   Other Topics Concern    Not on file   Social History Narrative    Uses [employer] insurance for transportation    [employer]     Social Determinants of Health     Financial Resource Strain: Medium Risk    Difficulty of Paying Living Expenses: Somewhat hard   Food Insecurity: Food Insecurity Present    Worried About Running Out of Food in the Last Year: Sometimes true    [address] [facility] [address] of Food in the Last Year: Never true   Transportation Needs: No Transportation Needs    Lack of Transportation (Medical): No    Lack of Transportation (Non-Medical): No   Physical Activity: Sufficiently Active    Days of Exercise per Week: 3 days    Minutes of Exercise per Session: 60 min   Stress: No Stress Concern Present    Feeling of Stress : Not at all   Social Connections: Socially Isolated    Frequency of Communication with Friends and Family: Three times a week    Frequency of Social Gatherings with Friends and Family: Twice a week    Attends Syn[employer] Services: Never    Active Member of Clubs or Organizations: No    Attends Club or Organization Meetings: Never    Marital Status: Never    Intimate Partner Violence:     Fear of Current or Ex-Partner:     Emotionally Abused:     Physically Abused:     Sexually Abused:        ROS: Positive in bold  General:   Denies chills, fatigue, fever, malaise, night sweats or weight loss    Psychological:   Denies anxiety, disorientation or hallucinations    ENT:    Denies epistaxis, headaches, vertigo or visual changes    Cardiovascular:   Denies any chest pain, irregular heartbeats, or palpitations.  No paroxysmal nocturnal dyspnea. Respiratory:   Denies shortness of breath, coughing, sputum production, hemoptysis, or wheezing. No orthopnea. Gastrointestinal:   Denies nausea, vomiting, diarrhea, or constipation. Denies any abdominal pain. Denies change in bowel habits or stools. Genito-Urinary:    Denies any urgency, frequency, hematuria. Voiding without difficulty. Musculoskeletal:   Denies joint pain, joint stiffness, joint swelling or muscle pain    Neurology:    Denies any headache or focal neurological deficits. No weakness or paresthesia. Derm:    Denies any rashes, ulcers, or excoriations. Denies bruising. Extremities:   Denies any lower extremity swelling or edema. PHYSICAL EXAM: Abnormal findings noted  VITALS:  Vitals:    09/02/21 0930   BP: (!) 104/56   Pulse: 85   Resp: 16   Temp: 97.8 °F (36.6 °C)   SpO2:          CONSTITUTIONAL:    Awake, alert, cooperative, no apparent distress, and appears stated age    EYES:     EOMI, sclera clear without icterus, conjunctiva normal    ENT:    Normocephalic, atraumatic,  External ears without lesions. NECK:    Supple, symmetrical, trachea midline,  no JVD    HEMATOLOGIC/LYMPHATICS:    No cervical lymphadenopathy and no supraclavicular lymphadenopathy    LUNGS:    Symmetric. No increased work of breathing, good air exchange, clear to auscultation bilaterally, no wheezes, rhonchi, or rales,     CARDIOVASCULAR:    Normal apical impulse, regular rate and rhythm, normal S1 and S2, no S3 or S4, and no murmur noted    ABDOMEN:    Patient has abdominal tenderness  Ileostomy in place    MUSCULOSKELETAL:    There is no redness, warmth, or swelling of the joints. NEUROLOGIC:    Awake, alert, oriented to name, place and time. SKIN:    No bruising or bleeding. No redness, warmth, or swelling    EXTREMITIES:    Peripheral pulses present. No edema, cyanosis, or swelling.     LINES/CATHETERS     LABORATORY DATA:  CBC with Differential:    Lab Results   Component Value Date    WBC 11.4 09/02/2021    RBC 3.67 09/02/2021    HGB 11.4 09/02/2021    HCT 35.0 09/02/2021     09/02/2021    MCV 95.4 09/02/2021    MCH 31.1 09/02/2021    MCHC 32.6 09/02/2021    RDW 12.9 09/02/2021    LYMPHOPCT 16.3 09/02/2021    MONOPCT 9.8 09/02/2021    BASOPCT 0.2 09/02/2021    MONOSABS 1.12 09/02/2021    LYMPHSABS 1.86 09/02/2021    EOSABS 0.06 09/02/2021    BASOSABS 0.02 09/02/2021     CMP:    Lab Results   Component Value Date     09/01/2021    K 4.6 09/01/2021     09/01/2021    CO2 26 09/01/2021    BUN 6 09/01/2021    CREATININE 0.7 09/01/2021    GFRAA >60 09/01/2021    LABGLOM >60 09/01/2021    GLUCOSE 138 09/01/2021    PROT 6.8 09/01/2021    LABALBU 3.6 09/01/2021    CALCIUM 8.9 09/01/2021    BILITOT 0.4 09/01/2021    ALKPHOS 72 09/01/2021    AST 16 09/01/2021    ALT 34 09/01/2021       ASSESSMENT/PLAN:  1. Colon cancer of descending colon status post laparoscopic descending colon resection with end-to-end stapled anastomosis and diverting loop ileostomy  2. Multiple sclerosis  3. Hypertension  4. Prior diagnosis of obstructive sleep apnea without current use of CPAP  5. GERD  6. History of migraine headache  7. Morbid obesity  8. Current tobacco abuse  9. Hyperglycemia fasting blood sugar 138  10. Leukocytosis-probably reactive    Patient is status post surgical intervention. Further pain management, diet, activity per general surgery. Home medications reordered as needed. Routine blood work ordered. Patient follows with [facility] for colon cancer. Monitor heart rate, blood pressure, O2 saturation  Patient blood pressure low or low normal and will hold lisinopril, hydrochlorothiazide at this time.     Discharge home when okay with general surgery  Continue home medications patient told to hold her lisinopril, hydrochlorothiazide until seen by primary care physician  We will continue to monitor blood pressure and labs while patient is in the hospitals.    Asia Lozoya DO  10:13 AM  9/2/2021

## 2021-09-02 NOTE — PLAN OF CARE
Problem: Pain:  Goal: Pain level will decrease  Outcome: Met This Shift  Note: Pain is less than 3 after being medicated      Problem: Pain:  Goal: Control of acute pain  Outcome: Met This Shift     Problem: Pain:  Goal: Control of chronic pain  Outcome: Met This Shift     Problem: Falls - Risk of:  Goal: Will remain free from falls  Outcome: Met This Shift  Note: Patient remained free of falls for entirety of shift      Problem: Falls - Risk of:  Goal: Absence of physical injury  Outcome: Met This Shift     Problem:  Bowel/Gastric:  Goal: Control of bowel function will improve  Outcome: Met This Shift  Note: Patient's control of their bowel function improved over the course of the shift

## 2021-09-02 NOTE — CARE COORDINATION
Cm note: [facility] unable to start service until next Tuesday. CM manager has requested additional support from [facility] but staffing is not available to service prior to this date. Checking with [facility] out of [address]. [facility] does not [employer] nor do they take [place] only, [address] does not have staffing availability until late next week. [facility] does not accept [employer], [place] does not have staffing availability. Cm note: Spoke with patient re: the issues we are having securing home health care. She is aware that [facility] is unable to see until Tues 9/7 and that  continues to investigate any home health care agencies that are in network with her insurance provider. At this time patient does not feel comfortable \"at all\" to perform ostomy care. She states that when the bag is changed the nurses have to do it so fast d/t oozing stool that she doesn't understand what they are doing. Nursing and ostomy nurse to continue working with patient on care. Per surgery note, \"ok for home when home health set up or patient comfortable doing changes herself\".

## 2021-09-02 NOTE — PROGRESS NOTES
ET Nurse  Admit Date: 8/31/2021  9:35 AM    Reason for consult:  New ileostomy    Significant history:    Past Medical History:   Diagnosis Date    Acid reflux disease     Cyst of ovary     Fracture of nasal bone     Headache     Hearing loss     Hypertension     Migraine     Morbidly obese ([address] 10/5/2020    Multiple sclerosis [address] [address]     denies limitations at present 11/2020    VEENA on CPAP     severe VEENA per pt    Right shoulder pain 11/2020    Tinnitus     TMJ dysfunction        Stoma assessment:  Stoma good red color functionin for stool  Peristomal ski is sl red      Plan:    inst re ostomy care  inst re emptying  inst re follow up    Regina Sutton RN 9/2/2021 2:44 PM

## 2021-09-03 LAB — METER GLUCOSE: 108 MG/DL (ref 74–99)

## 2021-09-03 PROCEDURE — 1200000000 HC SEMI PRIVATE

## 2021-09-03 PROCEDURE — 6360000002 HC RX W HCPCS: Performed by: SURGERY

## 2021-09-03 PROCEDURE — 6370000000 HC RX 637 (ALT 250 FOR IP): Performed by: SURGERY

## 2021-09-03 PROCEDURE — 6370000000 HC RX 637 (ALT 250 FOR IP): Performed by: INTERNAL MEDICINE

## 2021-09-03 PROCEDURE — 6360000002 HC RX W HCPCS: Performed by: STUDENT IN AN ORGANIZED HEALTH CARE EDUCATION/TRAINING PROGRAM

## 2021-09-03 PROCEDURE — 2580000003 HC RX 258: Performed by: SURGERY

## 2021-09-03 PROCEDURE — 82962 GLUCOSE BLOOD TEST: CPT

## 2021-09-03 RX ADMIN — KETOROLAC TROMETHAMINE 30 MG: 30 INJECTION, SOLUTION INTRAMUSCULAR; INTRAVENOUS at 21:14

## 2021-09-03 RX ADMIN — GABAPENTIN 300 MG: 300 CAPSULE ORAL at 15:37

## 2021-09-03 RX ADMIN — KETOROLAC TROMETHAMINE 30 MG: 30 INJECTION, SOLUTION INTRAMUSCULAR; INTRAVENOUS at 15:37

## 2021-09-03 RX ADMIN — GABAPENTIN 300 MG: 300 CAPSULE ORAL at 08:15

## 2021-09-03 RX ADMIN — KETOROLAC TROMETHAMINE 30 MG: 30 INJECTION, SOLUTION INTRAMUSCULAR; INTRAVENOUS at 10:52

## 2021-09-03 RX ADMIN — OXYCODONE 10 MG: 5 TABLET ORAL at 08:15

## 2021-09-03 RX ADMIN — NORTRIPTYLINE HYDROCHLORIDE 10 MG: 10 CAPSULE ORAL at 21:14

## 2021-09-03 RX ADMIN — KETOROLAC TROMETHAMINE 30 MG: 30 INJECTION, SOLUTION INTRAMUSCULAR; INTRAVENOUS at 04:31

## 2021-09-03 RX ADMIN — ENOXAPARIN SODIUM 40 MG: 40 INJECTION SUBCUTANEOUS at 08:15

## 2021-09-03 RX ADMIN — PANTOPRAZOLE SODIUM 40 MG: 40 TABLET, DELAYED RELEASE ORAL at 05:43

## 2021-09-03 RX ADMIN — GABAPENTIN 300 MG: 300 CAPSULE ORAL at 21:14

## 2021-09-03 RX ADMIN — Medication 10 ML: at 21:17

## 2021-09-03 RX ADMIN — OXYCODONE 10 MG: 5 TABLET ORAL at 22:36

## 2021-09-03 RX ADMIN — ONDANSETRON 4 MG: 2 INJECTION INTRAMUSCULAR; INTRAVENOUS at 18:41

## 2021-09-03 RX ADMIN — HYDROCHLOROTHIAZIDE 12.5 MG: 12.5 TABLET ORAL at 08:15

## 2021-09-03 ASSESSMENT — PAIN DESCRIPTION - LOCATION
LOCATION: ABDOMEN

## 2021-09-03 ASSESSMENT — PAIN SCALES - GENERAL
PAINLEVEL_OUTOF10: 6
PAINLEVEL_OUTOF10: 6
PAINLEVEL_OUTOF10: 5
PAINLEVEL_OUTOF10: 6
PAINLEVEL_OUTOF10: 7
PAINLEVEL_OUTOF10: 4
PAINLEVEL_OUTOF10: 7

## 2021-09-03 ASSESSMENT — PAIN DESCRIPTION - DESCRIPTORS
DESCRIPTORS: ACHING;DISCOMFORT;TIGHTNESS
DESCRIPTORS: ACHING;TIGHTNESS
DESCRIPTORS: ACHING;DISCOMFORT;TIGHTNESS

## 2021-09-03 ASSESSMENT — PAIN - FUNCTIONAL ASSESSMENT
PAIN_FUNCTIONAL_ASSESSMENT: ACTIVITIES ARE NOT PREVENTED

## 2021-09-03 ASSESSMENT — PAIN DESCRIPTION - FREQUENCY
FREQUENCY: CONTINUOUS

## 2021-09-03 ASSESSMENT — PAIN DESCRIPTION - ONSET
ONSET: GRADUAL

## 2021-09-03 ASSESSMENT — PAIN DESCRIPTION - PAIN TYPE
TYPE: ACUTE PAIN

## 2021-09-03 ASSESSMENT — PAIN DESCRIPTION - ORIENTATION
ORIENTATION: LEFT
ORIENTATION: LEFT
ORIENTATION: LEFT;OUTER

## 2021-09-03 ASSESSMENT — PAIN DESCRIPTION - PROGRESSION
CLINICAL_PROGRESSION: NOT CHANGED

## 2021-09-03 NOTE — PROGRESS NOTES
Department of Internal Medicine  Internal Medicine Progress Note    Primary Care Physician: Lazarus Gleason, PA-C   Admitting Physician:  Ernesto Portillo MD  Admission date and time: 8/31/2021  9:35 AM    Room:  [id number]-  Admitting diagnosis: S/P colectomy [Z90.49]  Date of Service: 8/31/2021    Patient Name: Linda Higgins  MRN: [id number]    Date of Service: 9/3/2021     Reason for consultation: Medical management    History of present illness:    Patient is a 57-year-old female who is status post laparoscopic descending colon resection with end-to-end stapled anastomosis and diverting loop ileostomy. Patient tolerated procedure well. Patient is stable. Abdominal pain is controlled. Patient denies any lightheadedness or dizziness. Denies any chest pain or shortness of breath. Denies any fever or chills. 9/1/2021  Patient seen examined on general medical/surgical floor. Patient has expected postop discomfort. Patient denies any problem chest pain, nausea/vomiting, dizziness or unusual shortness of breath. BUN/creatinine 6/0.7, fasting blood sugar 138, WBCs 22.5 hemoglobin 13. Temperature 98.5 heart rate 75 blood pressure 138/86. O2 sat 93% room air at rest.  We will repeat the CBC and check glucoscans twice a day. 9/2/2021  Patient seen examined on general medical surgical floor. Patient has expected postop discomfort. Patient does have some mild intermittent lightheadedness when ambulating. Patient denies any problem with chest pain, nausea/vomiting or unusual shortness of breath. Patient had a random blood sugar 129 yesterday and fasting 116. WBC is 11.4 with hemoglobin 11.4. Temperature 97.8 with heart rate 85, blood pressure ranges 98//56. O2 sat 95% room air at rest.  Urine output is good. We will hold her lisinopril/hydrochlorothiazide with low blood pressure and patient lightheaded with ambulating.   Patient apparently is having some problems with her ostomy is getting a good seal and wound nurse is following up with her. 9/3/2021  Patient seen examined on medical/surgical floor. Patient denies any problem with chest pain, dizziness, nausea or vomiting or unusual shortness of breath. Patient has expected postop discomfort. Patient also states that she still having trouble with her ostomy which is  Leaking. Temperatures ranging 97.8-nine 9.7. Heart rate currently 93 with blood pressure ranges 116//74. .  O2 sat 94% room air at rest.  Urine output is fairly good. PAST MEDICAL Hx:  Past Medical History:   Diagnosis Date    Acid reflux disease     Cyst of ovary     Fracture of nasal bone     Headache     Hearing loss     Hypertension     Migraine     Morbidly obese ([address] 10/5/2020    Multiple sclerosis ([facility] [address]     denies limitations at present 11/2020    VEENA on CPAP     severe VEENA per pt    Right shoulder pain 11/2020    Tinnitus     TMJ dysfunction        PAST SURGICAL Hx:   Past Surgical History:   Procedure Laterality Date    APPENDECTOMY      BACK SURGERY      BICEPS TENDON REPAIR Right 11/11/2020    BICEPS TENODESIS performed by Anna Robles MD at [place] [id number] Left 8/31/2021    LAPAROSCOPIC LEFT HEMICOLECTOMY WITH LOOP OSTOMY performed by Song Taylor MD at [address]  03/05/2018    Robotic hysterectomy, bilateral salpingectomy, right oophorectomy DR. ARIANA MONIQUE [facility]     HYSTERECTOMY, TOTAL ABDOMINAL      LARYNGOSCOPY N/A 7/17/2020    DIRECT LARYNGOSCOPY--OMNI GUIDE LASER performed by Manolo Gutierrez MD at [facility] ARTHROSCOPY Right 11/11/2020    RIGHT SHOULDER DIAGNOSTIC ARTHROSCOPY WITH DECOMPRESSION ROTATOR CUFF REPAIR performed by Anna Robles MD at [address] [place] [address] Hx:  Family History   Problem Relation Age of Onset    Mult Sclerosis Mother     Colon Cancer Neg Hx     Uterine Cancer Neg Hx     Ovarian Cancer Neg Hx     Breast Cancer Neg Hx        HOME MEDICATIONS:  Prior to Admission medications    Medication Sig Start Date End Date Taking? Authorizing Provider   traMADol (ULTRAM) 50 MG tablet Take 1 tablet by mouth every 4 hours as needed for Pain for up to 3 days. Intended supply: 3 days. Take lowest dose possible to manage pain 9/1/21 9/4/21 Yes Zain Bauman MD   ibuprofen (ADVIL;MOTRIN) 800 MG tablet Take 1 tablet by mouth every 6 hours as needed for Pain 9/1/21  Yes Zain Bauman MD   dexlansoprazole (DEXILANT) 60 MG CPDR delayed release capsule Take 60 mg by mouth daily   Yes Historical Provider, MD   erythromycin base (E-MYCIN) 500 MG tablet Take 1 tablet by mouth See Admin Instructions for 3 doses Take at 1300, 1400 and midnight the day before surgery 8/27/21   Zain Bauman MD   vitamin D (CHOLECALCIFEROL) 19408 UNIT CAPS Take 1 capsule by mouth once a week 8/13/21   MARIXA Santana - CNP   baclofen (LIORESAL) 20 MG tablet Take 1 tablet by mouth 4 times daily as needed (muscle spasms; pain) 8/13/21   MARIXA Santana - CNP   rOPINIRole (REQUIP) 0.5 MG tablet Take 1 tablet by mouth 2 times daily as needed (restless legs) 8/13/21   MARIXA Santana - CNP   ocrelizumab (OCREVUS) 300 MG/10ML SOLN injection Infuse 20 mLs intravenously every 6 months 7/19/21   MARIXA Santana CNP   nortriptyline (PAMELOR) 10 MG capsule TAKE 1 CAPSULE BY MOUTH ONCE NIGHTLY FOR HEADACHES 7/2/21   Nesha Grimaldo PA-C   famotidine (PEPCID) 40 MG tablet take 1 tablet by mouth every evening 4/6/21   Historical Provider, MD   gabapentin (NEURONTIN) 300 MG capsule TAKE 1 CAPSULE BY MOUTH THREE TIMES DAILY 3/10/21 8/13/21  DANISH Ching   lisinopril-hydroCHLOROthiazide (PRINZIDE;ZESTORETIC) 10-12.5 MG per tablet TAKE 1 TABLET BY MOUTH ONCE DAILY 3/10/21   Shelbie Jackson DO   CPAP Machine MISC Heated tube and chin strap. Dispense as written. LIFETIME SUPPLIES.  11/6/20 Historical Provider, MD       ALLERGIES:  Patient has no known allergies. SOCIAL Hx:  Social History     Socioeconomic History    Marital status: Single     Spouse name: Not on file    Number of children: 3    Years of education: 6    Highest education level: 11th grade   Occupational History    Not on file   Tobacco Use    Smoking status: Current Every Day Smoker     Packs/day: 0.50     Years: 25.00     Pack years: 12.50     Types: Cigarettes    Smokeless tobacco: Never Used    Tobacco comment: Ready to stop, requesting prescription for Chantix   Vaping Use    Vaping Use: Never used   Substance and Sexual Activity    Alcohol use: Yes     Alcohol/week: 1.0 standard drinks     Types: 1 Glasses of wine per week     Comment: socially    Drug use: No    Sexual activity: Yes     Partners: Male   Other Topics Concern    Not on file   Social History Narrative    Uses [employer] for transportation    [employer]     Social Determinants of Health     Financial Resource Strain: Medium Risk    Difficulty of Paying Living Expenses: Somewhat hard   Food Insecurity: Food Insecurity Present    Worried About Running Out of Food in the Last Year: Sometimes true    [place] of Food in the Last Year: Never true   Transportation Needs: No Transportation Needs    Lack of Transportation (Medical): No    Lack of Transportation (Non-Medical): No   Physical Activity: Sufficiently Active    Days of Exercise per Week: 3 days    Minutes of Exercise per Session: 60 min   Stress: No Stress Concern Present    Feeling of Stress : Not at all   Social Connections: Socially Isolated    Frequency of Communication with Friends and Family:  Three times a week    Frequency of Social Gatherings with Friends and Family: Twice a week    Attends Jehovah's witness Services: Never    Active Member of Clubs or Organizations: No    Attends Club or Organization Meetings: Never    Marital Status: Never    Intimate Partner Violence:     Fear of Current or Ex-Partner:     Emotionally Abused:     Physically Abused:     Sexually Abused:        ROS: Positive in bold  General:   Denies chills, fatigue, fever, malaise, night sweats or weight loss    Psychological:   Denies anxiety, disorientation or hallucinations    ENT:    Denies epistaxis, headaches, vertigo or visual changes    Cardiovascular:   Denies any chest pain, irregular heartbeats, or palpitations. No paroxysmal nocturnal dyspnea. Respiratory:   Denies shortness of breath, coughing, sputum production, hemoptysis, or wheezing. No orthopnea. Gastrointestinal:   Denies nausea, vomiting, diarrhea, or constipation. Denies any abdominal pain. Denies change in bowel habits or stools. Genito-Urinary:    Denies any urgency, frequency, hematuria. Voiding without difficulty. Musculoskeletal:   Denies joint pain, joint stiffness, joint swelling or muscle pain    Neurology:    Denies any headache or focal neurological deficits. No weakness or paresthesia. Derm:    Denies any rashes, ulcers, or excoriations. Denies bruising. Extremities:   Denies any lower extremity swelling or edema. PHYSICAL EXAM: Abnormal findings noted  VITALS:  Vitals:    09/03/21 0500   BP: (!) 116/56   Pulse: 93   Resp: 16   Temp: 99.7 °F (37.6 °C)   SpO2: 94%         CONSTITUTIONAL:    Awake, alert, cooperative, no apparent distress, and appears stated age    EYES:     EOMI, sclera clear without icterus, conjunctiva normal    ENT:    Normocephalic, atraumatic,  External ears without lesions. NECK:    Supple, symmetrical, trachea midline,  no JVD    HEMATOLOGIC/LYMPHATICS:    No cervical lymphadenopathy and no supraclavicular lymphadenopathy    LUNGS:    Symmetric.  No increased work of breathing, good air exchange, clear to auscultation bilaterally, no wheezes, rhonchi, or rales,     CARDIOVASCULAR:    Normal apical impulse, regular rate and rhythm, normal S1 and S2, no S3 or S4, and no murmur noted    ABDOMEN:    Patient has abdominal tenderness  Ileostomy in place    MUSCULOSKELETAL:    There is no redness, warmth, or swelling of the joints. NEUROLOGIC:    Awake, alert, oriented to name, place and time. SKIN:    No bruising or bleeding. No redness, warmth, or swelling    EXTREMITIES:    Peripheral pulses present. No edema, cyanosis, or swelling. LINES/CATHETERS     LABORATORY DATA:  CBC with Differential:    Lab Results   Component Value Date    WBC 11.4 09/02/2021    RBC 3.67 09/02/2021    HGB 11.4 09/02/2021    HCT 35.0 09/02/2021     09/02/2021    MCV 95.4 09/02/2021    MCH 31.1 09/02/2021    MCHC 32.6 09/02/2021    RDW 12.9 09/02/2021    LYMPHOPCT 16.3 09/02/2021    MONOPCT 9.8 09/02/2021    BASOPCT 0.2 09/02/2021    MONOSABS 1.12 09/02/2021    LYMPHSABS 1.86 09/02/2021    EOSABS 0.06 09/02/2021    BASOSABS 0.02 09/02/2021     CMP:    Lab Results   Component Value Date     09/01/2021    K 4.6 09/01/2021     09/01/2021    CO2 26 09/01/2021    BUN 6 09/01/2021    CREATININE 0.7 09/01/2021    GFRAA >60 09/01/2021    LABGLOM >60 09/01/2021    GLUCOSE 138 09/01/2021    PROT 6.8 09/01/2021    LABALBU 3.6 09/01/2021    CALCIUM 8.9 09/01/2021    BILITOT 0.4 09/01/2021    ALKPHOS 72 09/01/2021    AST 16 09/01/2021    ALT 34 09/01/2021       ASSESSMENT/PLAN:  1. Colon cancer of descending colon status post laparoscopic descending colon resection with end-to-end stapled anastomosis and diverting loop ileostomy  2. Multiple sclerosis  3. Hypertension  4. Prior diagnosis of obstructive sleep apnea without current use of CPAP  5. GERD  6. History of migraine headache  7. Morbid obesity  8. Current tobacco abuse  9. Hyperglycemia fasting blood sugar 138  10. Leukocytosis-probably reactive    Patient is status post surgical intervention. Further pain management, diet, activity per general surgery. Home medications reordered as needed.   Routine blood work ordered. Patient follows with [facility] for colon cancer. Monitor heart rate, blood pressure, O2 saturation  Patient blood pressure low or low normal and will hold lisinopril, hydrochlorothiazide at this time.     Discharge home when okay with general surgery  Continue home medications patient told to hold her lisinopril, hydrochlorothiazide until seen by primary care physician  We will continue to monitor blood pressure and labs while patient is in the hospital.    Meliza Sheets DO  8:30 AM  9/3/2021

## 2021-09-03 NOTE — PROGRESS NOTES
GENERAL SURGERY  DAILY PROGRESS NOTE  9/3/2021    No chief complaint on file. Subjective:  Doing well having some pressure but improving. Tolerating diet. Ready to go home but wants to feel comfortable with ostomy changes prior to doing that as home health care is unable to come out to her house until 9/7. Objective:  BP (!) 116/56   Pulse 93   Temp 99.7 °F (37.6 °C) (Oral)   Resp 16   Ht 5' 1\" (1.549 m)   Wt 216 lb (98 kg)   LMP 01/05/2018   SpO2 94%   BMI 40.81 kg/m²     GENERAL:  Laying in bed, awake, alert, cooperative, no apparent distress  HEAD: Normocephalic, atraumatic  EYES: No sclera icterus, pupils equal  LUNGS:  No increased work of breathing on RA  CARDIOVASCULAR:  RR  ABDOMEN:  Soft, appropriately tender, non-distended, incisions c/d/i covered with glue, RUQ ileostomy pink with 800 cc/24 hours  EXTREMITIES: No edema or swelling  SKIN: Warm and dry    Assessment/Plan:  51 y.o. female s/p descending colon resection with End to end stapled loop anastomosis and diverting loop ileostomy     Continue Diet  Ambulate  Stop IVF  Pain and nausea control  Remove anaya  Stoma nurse for teaching  SCDs/ Lovenox  Monitor ostomy output keep less than 1000 to 1500 mL/day  Okay for discharge from general surgery once patient is comfortable with ostomy changes. Home health care unable to come to her house until 9/7/2021.       Electronically signed by Carolyn Morrison MD on 9/3/2021 at 9:55 AM   no changes

## 2021-09-03 NOTE — PROGRESS NOTES
ET Nurse  Admit Date: 8/31/2021  9:35 AM    Reason for consult:  New ileostomy    Significant history:    Past Medical History:   Diagnosis Date    Acid reflux disease     Cyst of ovary     Fracture of nasal bone     Headache     Hearing loss     Hypertension     Migraine     Morbidly obese ([address] 10/5/2020    Multiple sclerosis ([facility] [address]     denies limitations at present 11/2020    VEENA on CPAP     severe VEENA per pt    Right shoulder pain 11/2020    Tinnitus     TMJ dysfunction        Stoma assessment:  Stoma good red color functioning for soft liquid stool  Peristomal skin is sl red right of stoma  When she sits up stoma lies in a creas  Stoma is flush with the skin    Pouch was leaking today again    Treated sore skin with ostomy powder and skin protector      Plan:     Today changed equipment  Applied coloplast 2 piece system sensura reema convex  Used a filler piece on each side of stoma  Spoke with her daughter Yina Sanabria [phone]  She is not available for a lesson today  She is off work tomorrow   She can come in tomorrow for a lesson with the staff  I inst the pt today  She is very receptive    John Witt RN 9/3/2021 10:43 AM

## 2021-09-03 NOTE — PROGRESS NOTES
CLINICAL PHARMACY NOTE: MEDS TO BEDS    Total # of Prescriptions Filled: 2   The following medications were delivered to the patient:  · Tramadol 50 mg  · Ibuprofen 800 mg    Additional Documentation:

## 2021-09-03 NOTE — CARE COORDINATION
CM note: spoke with ostomy nurse, states patient's daughter coming tomorrow for lessons. Pt is still leaking stool. [facility] home care has been arranged but unable to start care until Tues 9/7. This is the only home care provider in network with patient's insurance that can accept, all others have no staffing availability.   NEED [facility] orders

## 2021-09-04 VITALS
SYSTOLIC BLOOD PRESSURE: 145 MMHG | TEMPERATURE: 99 F | WEIGHT: 216 LBS | HEART RATE: 111 BPM | RESPIRATION RATE: 20 BRPM | BODY MASS INDEX: 40.78 KG/M2 | OXYGEN SATURATION: 97 % | DIASTOLIC BLOOD PRESSURE: 67 MMHG | HEIGHT: 61 IN

## 2021-09-04 LAB — METER GLUCOSE: 118 MG/DL (ref 74–99)

## 2021-09-04 PROCEDURE — 6370000000 HC RX 637 (ALT 250 FOR IP): Performed by: SURGERY

## 2021-09-04 PROCEDURE — 6370000000 HC RX 637 (ALT 250 FOR IP): Performed by: INTERNAL MEDICINE

## 2021-09-04 PROCEDURE — 82962 GLUCOSE BLOOD TEST: CPT

## 2021-09-04 PROCEDURE — 6360000002 HC RX W HCPCS: Performed by: STUDENT IN AN ORGANIZED HEALTH CARE EDUCATION/TRAINING PROGRAM

## 2021-09-04 PROCEDURE — 99024 POSTOP FOLLOW-UP VISIT: CPT | Performed by: SURGERY

## 2021-09-04 PROCEDURE — 2580000003 HC RX 258: Performed by: SURGERY

## 2021-09-04 PROCEDURE — 6360000002 HC RX W HCPCS: Performed by: SURGERY

## 2021-09-04 RX ORDER — IBUPROFEN 800 MG/1
800 TABLET ORAL EVERY 6 HOURS PRN
Qty: 20 TABLET | Refills: 0 | Status: ON HOLD | OUTPATIENT
Start: 2021-09-04 | End: 2021-11-01 | Stop reason: HOSPADM

## 2021-09-04 RX ORDER — TRAMADOL HYDROCHLORIDE 50 MG/1
50 TABLET ORAL EVERY 6 HOURS PRN
Qty: 12 TABLET | Refills: 0 | Status: ON HOLD | OUTPATIENT
Start: 2021-09-04 | End: 2021-09-10 | Stop reason: HOSPADM

## 2021-09-04 RX ADMIN — KETOROLAC TROMETHAMINE 30 MG: 30 INJECTION, SOLUTION INTRAMUSCULAR; INTRAVENOUS at 03:00

## 2021-09-04 RX ADMIN — ONDANSETRON 4 MG: 2 INJECTION INTRAMUSCULAR; INTRAVENOUS at 16:43

## 2021-09-04 RX ADMIN — KETOROLAC TROMETHAMINE 30 MG: 30 INJECTION, SOLUTION INTRAMUSCULAR; INTRAVENOUS at 09:29

## 2021-09-04 RX ADMIN — HYDROCHLOROTHIAZIDE 12.5 MG: 12.5 TABLET ORAL at 09:24

## 2021-09-04 RX ADMIN — SODIUM CHLORIDE, PRESERVATIVE FREE 10 ML: 5 INJECTION INTRAVENOUS at 16:43

## 2021-09-04 RX ADMIN — OXYCODONE 10 MG: 5 TABLET ORAL at 14:29

## 2021-09-04 RX ADMIN — ENOXAPARIN SODIUM 40 MG: 40 INJECTION SUBCUTANEOUS at 09:23

## 2021-09-04 RX ADMIN — ONDANSETRON 4 MG: 2 INJECTION INTRAMUSCULAR; INTRAVENOUS at 06:30

## 2021-09-04 RX ADMIN — OXYCODONE 10 MG: 5 TABLET ORAL at 06:30

## 2021-09-04 RX ADMIN — GABAPENTIN 300 MG: 300 CAPSULE ORAL at 14:27

## 2021-09-04 RX ADMIN — GABAPENTIN 300 MG: 300 CAPSULE ORAL at 09:23

## 2021-09-04 RX ADMIN — PANTOPRAZOLE SODIUM 40 MG: 40 TABLET, DELAYED RELEASE ORAL at 06:30

## 2021-09-04 ASSESSMENT — PAIN DESCRIPTION - DESCRIPTORS
DESCRIPTORS: ACHING;DISCOMFORT;SORE;TIGHTNESS
DESCRIPTORS: ACHING;DISCOMFORT;TIGHTNESS
DESCRIPTORS: ACHING;DISCOMFORT;SORE;TENDER;PRESSURE
DESCRIPTORS: ACHING;DISCOMFORT;TIGHTNESS

## 2021-09-04 ASSESSMENT — PAIN DESCRIPTION - FREQUENCY
FREQUENCY: CONTINUOUS

## 2021-09-04 ASSESSMENT — PAIN SCALES - GENERAL
PAINLEVEL_OUTOF10: 6
PAINLEVEL_OUTOF10: 3
PAINLEVEL_OUTOF10: 7
PAINLEVEL_OUTOF10: 5
PAINLEVEL_OUTOF10: 3
PAINLEVEL_OUTOF10: 5
PAINLEVEL_OUTOF10: 5

## 2021-09-04 ASSESSMENT — PAIN DESCRIPTION - ORIENTATION
ORIENTATION: LEFT
ORIENTATION: LEFT

## 2021-09-04 ASSESSMENT — PAIN DESCRIPTION - ONSET
ONSET: ON-GOING

## 2021-09-04 ASSESSMENT — PAIN DESCRIPTION - LOCATION
LOCATION: ABDOMEN

## 2021-09-04 ASSESSMENT — PAIN - FUNCTIONAL ASSESSMENT
PAIN_FUNCTIONAL_ASSESSMENT: ACTIVITIES ARE NOT PREVENTED

## 2021-09-04 ASSESSMENT — PAIN DESCRIPTION - PROGRESSION
CLINICAL_PROGRESSION: NOT CHANGED

## 2021-09-04 ASSESSMENT — PAIN DESCRIPTION - PAIN TYPE
TYPE: ACUTE PAIN;SURGICAL PAIN
TYPE: ACUTE PAIN
TYPE: ACUTE PAIN;SURGICAL PAIN
TYPE: ACUTE PAIN

## 2021-09-04 NOTE — PLAN OF CARE
Problem: Pain:  Goal: Pain level will decrease  Description: Pain level will decrease  9/4/2021 1008 by Tanja Rodriguez RN  Outcome: Met This Shift     Problem: Pain:  Goal: Control of acute pain  Description: Control of acute pain  9/4/2021 1008 by Tanja Rodriguez RN  Outcome: Met This Shift     Problem: Falls - Risk of:  Goal: Will remain free from falls  Description: Will remain free from falls  9/4/2021 1008 by Tanja Rodriguez RN  Outcome: Met This Shift     Problem: Falls - Risk of:  Goal: Absence of physical injury  Description: Absence of physical injury  9/4/2021 1008 by Tanja Rodriguez RN  Outcome: Met This Shift     Problem: Bowel/Gastric:  Goal: Control of bowel function will improve  Description: Control of bowel function will improve  9/4/2021 1008 by Tanja Rodriguez RN  Outcome: Met This Shift     Problem: SAFETY  Goal: Free from accidental physical injury  Outcome: Met This Shift     Problem: DAILY CARE  Goal: Daily care needs are met  Outcome: Met This Shift     Problem: PAIN  Goal: Patient's pain/discomfort is manageable  Outcome: Met This Shift     Problem: SKIN INTEGRITY  Goal: Skin integrity is maintained or improved  Outcome: Met This Shift     Problem: KNOWLEDGE DEFICIT  Goal: Patient/S.O. demonstrates understanding of disease process, treatment plan, medications, and discharge instructions.   Outcome: Met This Shift

## 2021-09-04 NOTE — SIGNIFICANT EVENT
Education provided to patient and daughter regarding how to change coloplast system. 2 piece coloplast system applied with glue around inner rim. Filler piece placed on each side of stoma. Patient and daughter comfortable with changing coloplast system.

## 2021-09-04 NOTE — PLAN OF CARE
Problem: Pain:  Goal: Pain level will decrease  Description: Pain level will decrease  Outcome: Met This Shift  Goal: Control of acute pain  Description: Control of acute pain  Outcome: Met This Shift  Goal: Control of chronic pain  Description: Control of chronic pain  Outcome: Met This Shift     Problem: Falls - Risk of:  Goal: Will remain free from falls  Description: Will remain free from falls  Outcome: Met This Shift  Goal: Absence of physical injury  Description: Absence of physical injury  Outcome: Met This Shift     Problem:  Bowel/Gastric:  Goal: Control of bowel function will improve  Description: Control of bowel function will improve  Outcome: Met This Shift

## 2021-09-04 NOTE — PROGRESS NOTES
Department of Internal Medicine  Internal Medicine Progress Note    Primary Care Physician: Uziel Sampson PA-C   Admitting Physician:  Katrina Mena MD  Admission date and time: 8/31/2021  9:35 AM    Room:  [facility]-  Admitting diagnosis: S/P colectomy [Z90.49]  Date of Service: 8/31/2021    Patient Name: Flex Nelson  MRN: [id number]    Date of Service: 9/4/2021     Reason for consultation: Medical management    History of present illness:    Patient is a 50-year-old female who is status post laparoscopic descending colon resection with end-to-end stapled anastomosis and diverting loop ileostomy. Patient tolerated procedure well. Patient is stable. Abdominal pain is controlled. Patient denies any lightheadedness or dizziness. Denies any chest pain or shortness of breath. Denies any fever or chills. 9/1/2021  Patient seen examined on general medical/surgical floor. Patient has expected postop discomfort. Patient denies any problem chest pain, nausea/vomiting, dizziness or unusual shortness of breath. BUN/creatinine 6/0.7, fasting blood sugar 138, WBCs 22.5 hemoglobin 13. Temperature 98.5 heart rate 75 blood pressure 138/86. O2 sat 93% room air at rest.  We will repeat the CBC and check glucoscans twice a day. 9/2/2021  Patient seen examined on general medical surgical floor. Patient has expected postop discomfort. Patient does have some mild intermittent lightheadedness when ambulating. Patient denies any problem with chest pain, nausea/vomiting or unusual shortness of breath. Patient had a random blood sugar 129 yesterday and fasting 116. WBC is 11.4 with hemoglobin 11.4. Temperature 97.8 with heart rate 85, blood pressure ranges 98//56. O2 sat 95% room air at rest.  Urine output is good. We will hold her lisinopril/hydrochlorothiazide with low blood pressure and patient lightheaded with ambulating.   Patient apparently is having some problems with her ostomy is getting a good seal and wound nurse is following up with her. 9/3/2021  Patient seen examined on medical/surgical floor. Patient denies any problem with chest pain, dizziness, nausea or vomiting or unusual shortness of breath. Patient has expected postop discomfort. Patient also states that she still having trouble with her ostomy which is  Leaking. Temperatures ranging 97.8-nine 9.7. Heart rate currently 93 with blood pressure ranges 116//74. .  O2 sat 94% room air at rest.  Urine output is fairly good. 9/4/2021  Patient seen and examined on medical surgical floor. Patient apparently still having trouble with her ostomy bag but she is doing better. She is waiting for her sister to come in today and help her. Home health care agency cannot come out and see her until Tuesday. Temperature is 90.5 with heart rate in 90 and blood pressure 109/72. O2 sat 91-97% on room air at rest.  Urine output is adequate. Patient states that if she is able to manage the ostomy bag with assistance of her sister today she is comfortable with going home and waiting till Tuesday for home health to assist her.       PAST MEDICAL Hx:  Past Medical History:   Diagnosis Date    Acid reflux disease     Cyst of ovary     Fracture of nasal bone     Headache     Hearing loss     Hypertension     Migraine     Morbidly obese ([address] 10/5/2020    Multiple sclerosis ([address]     denies limitations at present 11/2020    VEENA on CPAP     severe VEENA per pt    Right shoulder pain 11/2020    Tinnitus     TMJ dysfunction        PAST SURGICAL Hx:   Past Surgical History:   Procedure Laterality Date    APPENDECTOMY      BACK SURGERY      BICEPS TENDON REPAIR Right 11/11/2020    BICEPS TENODESIS performed by Virginia Kuhn MD at [place] [address] Left 8/31/2021    LAPAROSCOPIC LEFT HEMICOLECTOMY WITH LOOP OSTOMY performed by La Rizvi MD at [address] [place] [address] 03/05/2018    Robotic hysterectomy, bilateral salpingectomy, right oophorectomy DR. ARIANA MONIQUE [facility]     HYSTERECTOMY, TOTAL ABDOMINAL      LARYNGOSCOPY N/A 7/17/2020    DIRECT LARYNGOSCOPY--OMNI GUIDE LASER performed by Jim Baez MD at [address] ARTHROSCOPY Right 11/11/2020    RIGHT SHOULDER DIAGNOSTIC ARTHROSCOPY WITH DECOMPRESSION ROTATOR CUFF REPAIR performed by Dante Ball MD at [address] [place] [address] Hx:  Family History   Problem Relation Age of Onset    Mult Sclerosis Mother     Colon Cancer Neg Hx     Uterine Cancer Neg Hx     Ovarian Cancer Neg Hx     Breast Cancer Neg Hx        HOME MEDICATIONS:  Prior to Admission medications    Medication Sig Start Date End Date Taking? Authorizing Provider   traMADol (ULTRAM) 50 MG tablet Take 1 tablet by mouth every 4 hours as needed for Pain for up to 3 days. Intended supply: 3 days.  Take lowest dose possible to manage pain 9/1/21 9/4/21 Yes Willam Tom MD   ibuprofen (ADVIL;MOTRIN) 800 MG tablet Take 1 tablet by mouth every 6 hours as needed for Pain 9/1/21  Yes Willam Tom MD   dexlansoprazole (DEXILANT) 60 MG CPDR delayed release capsule Take 60 mg by mouth daily   Yes Historical Provider, MD   erythromycin base (E-MYCIN) 500 MG tablet Take 1 tablet by mouth See Admin Instructions for 3 doses Take at 1300, 1400 and midnight the day before surgery 8/27/21   Willam Tom MD   vitamin D (CHOLECALCIFEROL) 01979 UNIT CAPS Take 1 capsule by mouth once a week 8/13/21   Mitcheal Bullion, APRN - CNP   baclofen (LIORESAL) 20 MG tablet Take 1 tablet by mouth 4 times daily as needed (muscle spasms; pain) 8/13/21   Mitcheal Bullion, APRN - CNP   rOPINIRole (REQUIP) 0.5 MG tablet Take 1 tablet by mouth 2 times daily as needed (restless legs) 8/13/21   Mitcheal Bullion, APRN - CNP   ocrelizumab (OCREVUS) 300 MG/10ML SOLN injection Infuse 20 mLs intravenously every 6 months 7/19/21   Lester Salvador Transportation (Non-Medical): No   Physical Activity: Sufficiently Active    Days of Exercise per Week: 3 days    Minutes of Exercise per Session: 60 min   Stress: No Stress Concern Present    Feeling of Stress : Not at all   Social Connections: Socially Isolated    Frequency of Communication with Friends and Family: Three times a week    Frequency of Social Gatherings with Friends and Family: Twice a week    Attends Congregational Services: Never    Active Member of Clubs or Organizations: No    Attends Club or Organization Meetings: Never    Marital Status: Never    Intimate Partner Violence:     Fear of Current or Ex-Partner:     Emotionally Abused:     Physically Abused:     Sexually Abused:        ROS: Positive in bold  General:   Denies chills, fatigue, fever, malaise, night sweats or weight loss    Psychological:   Denies anxiety, disorientation or hallucinations    ENT:    Denies epistaxis, headaches, vertigo or visual changes    Cardiovascular:   Denies any chest pain, irregular heartbeats, or palpitations. No paroxysmal nocturnal dyspnea. Respiratory:   Denies shortness of breath, coughing, sputum production, hemoptysis, or wheezing. No orthopnea. Gastrointestinal:   Denies nausea, vomiting, diarrhea, or constipation. Denies any abdominal pain. Denies change in bowel habits or stools. Genito-Urinary:    Denies any urgency, frequency, hematuria. Voiding without difficulty. Musculoskeletal:   Denies joint pain, joint stiffness, joint swelling or muscle pain    Neurology:    Denies any headache or focal neurological deficits. No weakness or paresthesia. Derm:    Denies any rashes, ulcers, or excoriations. Denies bruising. Extremities:   Denies any lower extremity swelling or edema.       PHYSICAL EXAM: Abnormal findings noted  VITALS:  Vitals:    09/04/21 0700   BP:    Pulse:    Resp:    Temp:    SpO2: 91%         CONSTITUTIONAL:    Awake, alert, cooperative, no apparent distress, and appears stated age    EYES:     EOMI, sclera clear without icterus, conjunctiva normal    ENT:    Normocephalic, atraumatic,  External ears without lesions. NECK:    Supple, symmetrical, trachea midline,  no JVD    HEMATOLOGIC/LYMPHATICS:    No cervical lymphadenopathy and no supraclavicular lymphadenopathy    LUNGS:    Symmetric. No increased work of breathing, good air exchange, clear to auscultation bilaterally, no wheezes, rhonchi, or rales,     CARDIOVASCULAR:    Normal apical impulse, regular rate and rhythm, normal S1 and S2, no S3 or S4, and no murmur noted    ABDOMEN:    Patient has abdominal tenderness  Ileostomy in place    MUSCULOSKELETAL:    There is no redness, warmth, or swelling of the joints. NEUROLOGIC:    Awake, alert, oriented to name, place and time. SKIN:    No bruising or bleeding. No redness, warmth, or swelling    EXTREMITIES:    Peripheral pulses present. No edema, cyanosis, or swelling. LINES/CATHETERS     LABORATORY DATA:  CBC with Differential:    Lab Results   Component Value Date    WBC 11.4 09/02/2021    RBC 3.67 09/02/2021    HGB 11.4 09/02/2021    HCT 35.0 09/02/2021     09/02/2021    MCV 95.4 09/02/2021    MCH 31.1 09/02/2021    MCHC 32.6 09/02/2021    RDW 12.9 09/02/2021    LYMPHOPCT 16.3 09/02/2021    MONOPCT 9.8 09/02/2021    BASOPCT 0.2 09/02/2021    MONOSABS 1.12 09/02/2021    LYMPHSABS 1.86 09/02/2021    EOSABS 0.06 09/02/2021    BASOSABS 0.02 09/02/2021     CMP:    Lab Results   Component Value Date     09/01/2021    K 4.6 09/01/2021     09/01/2021    CO2 26 09/01/2021    BUN 6 09/01/2021    CREATININE 0.7 09/01/2021    GFRAA >60 09/01/2021    LABGLOM >60 09/01/2021    GLUCOSE 138 09/01/2021    PROT 6.8 09/01/2021    LABALBU 3.6 09/01/2021    CALCIUM 8.9 09/01/2021    BILITOT 0.4 09/01/2021    ALKPHOS 72 09/01/2021    AST 16 09/01/2021    ALT 34 09/01/2021       ASSESSMENT/PLAN:  1.  Colon cancer of descending colon status post laparoscopic descending colon resection with end-to-end stapled anastomosis and diverting loop ileostomy  2. Multiple sclerosis  3. Hypertension  4. Prior diagnosis of obstructive sleep apnea without current use of CPAP  5. GERD  6. History of migraine headache  7. Morbid obesity  8. Current tobacco abuse  9. Hyperglycemia fasting blood sugar 138  10. Leukocytosis-probably reactive    Patient is status post surgical intervention. Further pain management, diet, activity per general surgery. Home medications reordered as needed. Routine blood work ordered. Patient [facility] with [facility] for [facility] cancer. Monitor heart rate, blood pressure, O2 saturation  Patient blood pressure low or low normal and will hold lisinopril, hydrochlorothiazide at this time.     Discharge home when okay with general surgery  Continue home medications patient told to hold her lisinopril, hydrochlorothiazide until seen by primary care physician  We will continue to monitor blood pressure and labs while patient is in the hospital.    Perla Gonzalez DO  11:05 AM  9/4/2021

## 2021-09-05 ENCOUNTER — APPOINTMENT (OUTPATIENT)
Dept: CT IMAGING | Age: 46
DRG: 231 | End: 2021-09-05
Payer: COMMERCIAL

## 2021-09-05 ENCOUNTER — HOSPITAL ENCOUNTER (INPATIENT)
Age: 46
LOS: 5 days | Discharge: HOME | DRG: 231 | End: 2021-09-10
Attending: STUDENT IN AN ORGANIZED HEALTH CARE EDUCATION/TRAINING PROGRAM | Admitting: SURGERY
Payer: COMMERCIAL

## 2021-09-05 DIAGNOSIS — K56.7 ILEUS, POSTOPERATIVE (HCC): Primary | ICD-10-CM

## 2021-09-05 DIAGNOSIS — K91.89 ILEUS, POSTOPERATIVE (HCC): Primary | ICD-10-CM

## 2021-09-05 DIAGNOSIS — G89.18 POST-OP PAIN: ICD-10-CM

## 2021-09-05 DIAGNOSIS — R11.2 NON-INTRACTABLE VOMITING WITH NAUSEA, UNSPECIFIED VOMITING TYPE: ICD-10-CM

## 2021-09-05 LAB
ALBUMIN SERPL-MCNC: 3.4 G/DL (ref 3.5–5.2)
ALP BLD-CCNC: 126 U/L (ref 35–104)
ALT SERPL-CCNC: 33 U/L (ref 0–32)
ANION GAP SERPL CALCULATED.3IONS-SCNC: 10 MMOL/L (ref 7–16)
AST SERPL-CCNC: 19 U/L (ref 0–31)
BACTERIA: ABNORMAL /HPF
BASOPHILS ABSOLUTE: 0.07 E9/L (ref 0–0.2)
BASOPHILS RELATIVE PERCENT: 0.5 % (ref 0–2)
BILIRUB SERPL-MCNC: 1.2 MG/DL (ref 0–1.2)
BILIRUBIN URINE: ABNORMAL
BLOOD, URINE: ABNORMAL
BUN BLDV-MCNC: 8 MG/DL (ref 6–20)
CALCIUM SERPL-MCNC: 9.7 MG/DL (ref 8.6–10.2)
CHLORIDE BLD-SCNC: 98 MMOL/L (ref 98–107)
CLARITY: CLEAR
CO2: 28 MMOL/L (ref 22–29)
COLOR: YELLOW
CREAT SERPL-MCNC: 0.8 MG/DL (ref 0.5–1)
EOSINOPHILS ABSOLUTE: 0.16 E9/L (ref 0.05–0.5)
EOSINOPHILS RELATIVE PERCENT: 1.1 % (ref 0–6)
EPITHELIAL CELLS, UA: ABNORMAL /HPF
GFR AFRICAN AMERICAN: >60
GFR NON-AFRICAN AMERICAN: >60 ML/MIN/1.73
GLUCOSE BLD-MCNC: 131 MG/DL (ref 74–99)
GLUCOSE URINE: NEGATIVE MG/DL
HCT VFR BLD CALC: 42.6 % (ref 34–48)
HEMOGLOBIN: 14.1 G/DL (ref 11.5–15.5)
IMMATURE GRANULOCYTES #: 0.35 E9/L
IMMATURE GRANULOCYTES %: 2.3 % (ref 0–5)
KETONES, URINE: ABNORMAL MG/DL
LACTIC ACID: 1.2 MMOL/L (ref 0.5–2.2)
LEUKOCYTE ESTERASE, URINE: ABNORMAL
LIPASE: 10 U/L (ref 13–60)
LYMPHOCYTES ABSOLUTE: 1.26 E9/L (ref 1.5–4)
LYMPHOCYTES RELATIVE PERCENT: 8.4 % (ref 20–42)
MCH RBC QN AUTO: 30.7 PG (ref 26–35)
MCHC RBC AUTO-ENTMCNC: 33.1 % (ref 32–34.5)
MCV RBC AUTO: 92.8 FL (ref 80–99.9)
MONOCYTES ABSOLUTE: 1.33 E9/L (ref 0.1–0.95)
MONOCYTES RELATIVE PERCENT: 8.9 % (ref 2–12)
NEUTROPHILS ABSOLUTE: 11.76 E9/L (ref 1.8–7.3)
NEUTROPHILS RELATIVE PERCENT: 78.8 % (ref 43–80)
NITRITE, URINE: NEGATIVE
PDW BLD-RTO: 12.9 FL (ref 11.5–15)
PH UA: 6.5 (ref 5–9)
PLATELET # BLD: 282 E9/L (ref 130–450)
PMV BLD AUTO: 10.2 FL (ref 7–12)
POTASSIUM REFLEX MAGNESIUM: 5.4 MMOL/L (ref 3.5–5)
PROTEIN UA: ABNORMAL MG/DL
RBC # BLD: 4.59 E12/L (ref 3.5–5.5)
RBC UA: ABNORMAL /HPF (ref 0–2)
SODIUM BLD-SCNC: 136 MMOL/L (ref 132–146)
SPECIFIC GRAVITY UA: 1.01 (ref 1–1.03)
TOTAL PROTEIN: 7.8 G/DL (ref 6.4–8.3)
UROBILINOGEN, URINE: 0.2 E.U./DL
WBC # BLD: 14.9 E9/L (ref 4.5–11.5)
WBC UA: ABNORMAL /HPF (ref 0–5)

## 2021-09-05 PROCEDURE — 1200000000 HC SEMI PRIVATE

## 2021-09-05 PROCEDURE — 81001 URINALYSIS AUTO W/SCOPE: CPT

## 2021-09-05 PROCEDURE — 80053 COMPREHEN METABOLIC PANEL: CPT

## 2021-09-05 PROCEDURE — 6360000004 HC RX CONTRAST MEDICATION: Performed by: RADIOLOGY

## 2021-09-05 PROCEDURE — 2580000003 HC RX 258: Performed by: STUDENT IN AN ORGANIZED HEALTH CARE EDUCATION/TRAINING PROGRAM

## 2021-09-05 PROCEDURE — 85025 COMPLETE CBC W/AUTO DIFF WBC: CPT

## 2021-09-05 PROCEDURE — 96374 THER/PROPH/DIAG INJ IV PUSH: CPT

## 2021-09-05 PROCEDURE — 74177 CT ABD & PELVIS W/CONTRAST: CPT

## 2021-09-05 PROCEDURE — 6370000000 HC RX 637 (ALT 250 FOR IP): Performed by: PHYSICIAN ASSISTANT

## 2021-09-05 PROCEDURE — 96375 TX/PRO/DX INJ NEW DRUG ADDON: CPT

## 2021-09-05 PROCEDURE — 83605 ASSAY OF LACTIC ACID: CPT

## 2021-09-05 PROCEDURE — 36415 COLL VENOUS BLD VENIPUNCTURE: CPT

## 2021-09-05 PROCEDURE — 99283 EMERGENCY DEPT VISIT LOW MDM: CPT

## 2021-09-05 PROCEDURE — 6360000002 HC RX W HCPCS: Performed by: STUDENT IN AN ORGANIZED HEALTH CARE EDUCATION/TRAINING PROGRAM

## 2021-09-05 PROCEDURE — 83690 ASSAY OF LIPASE: CPT

## 2021-09-05 RX ORDER — 0.9 % SODIUM CHLORIDE 0.9 %
1000 INTRAVENOUS SOLUTION INTRAVENOUS ONCE
Status: COMPLETED | OUTPATIENT
Start: 2021-09-05 | End: 2021-09-06

## 2021-09-05 RX ORDER — SODIUM CHLORIDE 0.9 % (FLUSH) 0.9 %
5-40 SYRINGE (ML) INJECTION PRN
Status: DISCONTINUED | OUTPATIENT
Start: 2021-09-05 | End: 2021-09-10 | Stop reason: HOSPADM

## 2021-09-05 RX ORDER — MORPHINE SULFATE 2 MG/ML
2 INJECTION, SOLUTION INTRAMUSCULAR; INTRAVENOUS
Status: DISCONTINUED | OUTPATIENT
Start: 2021-09-05 | End: 2021-09-09

## 2021-09-05 RX ORDER — ONDANSETRON 4 MG/1
4 TABLET, ORALLY DISINTEGRATING ORAL ONCE
Status: COMPLETED | OUTPATIENT
Start: 2021-09-05 | End: 2021-09-05

## 2021-09-05 RX ORDER — ONDANSETRON 2 MG/ML
4 INJECTION INTRAMUSCULAR; INTRAVENOUS EVERY 6 HOURS PRN
Status: DISCONTINUED | OUTPATIENT
Start: 2021-09-05 | End: 2021-09-10 | Stop reason: HOSPADM

## 2021-09-05 RX ORDER — SODIUM CHLORIDE 9 MG/ML
25 INJECTION, SOLUTION INTRAVENOUS PRN
Status: DISCONTINUED | OUTPATIENT
Start: 2021-09-05 | End: 2021-09-10 | Stop reason: HOSPADM

## 2021-09-05 RX ORDER — ONDANSETRON 4 MG/1
4 TABLET, ORALLY DISINTEGRATING ORAL EVERY 8 HOURS PRN
Status: DISCONTINUED | OUTPATIENT
Start: 2021-09-05 | End: 2021-09-10 | Stop reason: HOSPADM

## 2021-09-05 RX ORDER — MORPHINE SULFATE 4 MG/ML
4 INJECTION, SOLUTION INTRAMUSCULAR; INTRAVENOUS
Status: DISCONTINUED | OUTPATIENT
Start: 2021-09-05 | End: 2021-09-09

## 2021-09-05 RX ORDER — SODIUM CHLORIDE 0.9 % (FLUSH) 0.9 %
5-40 SYRINGE (ML) INJECTION EVERY 12 HOURS SCHEDULED
Status: DISCONTINUED | OUTPATIENT
Start: 2021-09-05 | End: 2021-09-10 | Stop reason: HOSPADM

## 2021-09-05 RX ORDER — HYDROMORPHONE HYDROCHLORIDE 1 MG/ML
0.5 INJECTION, SOLUTION INTRAMUSCULAR; INTRAVENOUS; SUBCUTANEOUS ONCE
Status: COMPLETED | OUTPATIENT
Start: 2021-09-05 | End: 2021-09-05

## 2021-09-05 RX ORDER — FENTANYL CITRATE 50 UG/ML
50 INJECTION, SOLUTION INTRAMUSCULAR; INTRAVENOUS ONCE
Status: COMPLETED | OUTPATIENT
Start: 2021-09-05 | End: 2021-09-05

## 2021-09-05 RX ORDER — SODIUM CHLORIDE, SODIUM LACTATE, POTASSIUM CHLORIDE, CALCIUM CHLORIDE 600; 310; 30; 20 MG/100ML; MG/100ML; MG/100ML; MG/100ML
INJECTION, SOLUTION INTRAVENOUS CONTINUOUS
Status: DISCONTINUED | OUTPATIENT
Start: 2021-09-05 | End: 2021-09-09

## 2021-09-05 RX ORDER — ONDANSETRON 2 MG/ML
4 INJECTION INTRAMUSCULAR; INTRAVENOUS ONCE
Status: COMPLETED | OUTPATIENT
Start: 2021-09-05 | End: 2021-09-05

## 2021-09-05 RX ADMIN — ONDANSETRON 4 MG: 2 INJECTION INTRAMUSCULAR; INTRAVENOUS at 20:28

## 2021-09-05 RX ADMIN — IOPAMIDOL 75 ML: 755 INJECTION, SOLUTION INTRAVENOUS at 19:56

## 2021-09-05 RX ADMIN — HYDROMORPHONE HYDROCHLORIDE 0.5 MG: 1 INJECTION, SOLUTION INTRAMUSCULAR; INTRAVENOUS; SUBCUTANEOUS at 22:27

## 2021-09-05 RX ADMIN — FENTANYL CITRATE 50 MCG: 0.05 INJECTION, SOLUTION INTRAMUSCULAR; INTRAVENOUS at 21:03

## 2021-09-05 RX ADMIN — ONDANSETRON 4 MG: 4 TABLET, ORALLY DISINTEGRATING ORAL at 18:54

## 2021-09-05 RX ADMIN — SODIUM CHLORIDE 1000 ML: 9 INJECTION, SOLUTION INTRAVENOUS at 22:31

## 2021-09-05 ASSESSMENT — ENCOUNTER SYMPTOMS
WHEEZING: 0
SINUS PRESSURE: 0
EYE REDNESS: 0
DIARRHEA: 0
NAUSEA: 1
ABDOMINAL DISTENTION: 0
BACK PAIN: 0
SORE THROAT: 0
SHORTNESS OF BREATH: 0
COUGH: 0
VOMITING: 1
COLOR CHANGE: 0
EYE DISCHARGE: 0
EYE PAIN: 0

## 2021-09-05 ASSESSMENT — PAIN SCALES - GENERAL: PAINLEVEL_OUTOF10: 10

## 2021-09-05 NOTE — ED TRIAGE NOTES
FIRST PROVIDER CONTACT ASSESSMENT NOTE      Department of Emergency Medicine   Admit Date: No admission date for patient encounter. Chief Complaint: Emesis (Emesis. Colon CA. Ileostomy placed tue. Sent in by Sayda Humphries. )      History of Present Illness:    Bill Lora is a 51 y.o. female who presents to the ED for nausea and vomiting. Patient is 4 days post ileostomy. Just discharged yesterday. States she could not even eat dinner here last night. Anytime she tries to eat or drink anything at home she vomits it right back up. Multiple episodes of vomiting. Cannot even keep pain medication down. Does have liquid stool in the ileostomy. Hospital Course:  Had uncomplicated lap sigmoid resection with diverting loop ileostomy and uncomplicated post operative course and was discharged tolerating a general diet, having good bowel function, ambulating independently and with adequate analgesia.        -----------------END OF FIRST PROVIDER CONTACT ASSESSMENT NOTE--------------  Electronically signed by DANISH Bergman   DD: 9/5/21

## 2021-09-05 NOTE — DISCHARGE SUMMARY
Physician Discharge Summary     Lety Stephens  [id number]    Admit date: 8/31/2021    Discharge date and time: 9/4/2021  5:38 PM     Admitting Physician: Willam Tom MD     Admission Diagnoses: S/P colectomy [Z90.49]    Discharge diagnosis: same    Condition at discharge: stable     Liset Matter   Home Medication Instructions [id number]    Printed on:09/04/21 2128   Medication Information                      baclofen (LIORESAL) 20 MG tablet  Take 1 tablet by mouth 4 times daily as needed (muscle spasms; pain)             CPAP Machine MISC  Heated tube and chin strap. Dispense as written. LIFETIME SUPPLIES. dexlansoprazole (DEXILANT) 60 MG CPDR delayed release capsule  Take 60 mg by mouth daily             erythromycin base (E-MYCIN) 500 MG tablet  Take 1 tablet by mouth See Admin Instructions for 3 doses Take at 1300, 1400 and midnight the day before surgery             famotidine (PEPCID) 40 MG tablet  take 1 tablet by mouth every evening             gabapentin (NEURONTIN) 300 MG capsule  TAKE 1 CAPSULE BY MOUTH THREE TIMES DAILY             ibuprofen (ADVIL;MOTRIN) 800 MG tablet  Take 1 tablet by mouth every 6 hours as needed for Pain             ibuprofen (ADVIL;MOTRIN) 800 MG tablet  Take 1 tablet by mouth every 6 hours as needed for Pain             lisinopril-hydroCHLOROthiazide (PRINZIDE;ZESTORETIC) 10-12.5 MG per tablet  TAKE 1 TABLET BY MOUTH ONCE DAILY             nortriptyline (PAMELOR) 10 MG capsule  TAKE 1 CAPSULE BY MOUTH ONCE NIGHTLY FOR HEADACHES             ocrelizumab (OCREVUS) 300 MG/10ML SOLN injection  Infuse 20 mLs intravenously every 6 months             rOPINIRole (REQUIP) 0.5 MG tablet  Take 1 tablet by mouth 2 times daily as needed (restless legs)             traMADol (ULTRAM) 50 MG tablet  Take 1 tablet by mouth every 4 hours as needed for Pain for up to 3 days. Intended supply: 3 days.  Take lowest dose possible to manage pain             traMADol (ULTRAM) 50 MG tablet  Take 1 tablet by mouth every 6 hours as needed for Pain for up to 3 days. Intended supply: 3 days. Take lowest dose possible to manage pain             vitamin D (CHOLECALCIFEROL) 87324 UNIT CAPS  Take 1 capsule by mouth once a week                   Hospital Course: Had uncomplicated lap sigmoid resection with diverting loop ileostomy and uncomplicated post operative course and was discharged tolerating a general diet, having good bowel function, ambulating independently and with adequate analgesia. Discharge Exam: BP (!) 145/67   Pulse 111   Temp 99 °F (37.2 °C) (Oral)   Resp 20   Ht 5' 1\" (1.549 m)   Wt 216 lb (98 kg)   LMP 01/05/2018   SpO2 97%   BMI 40.81 kg/m²     General appearance: alert, appears stated age and cooperative  Head: Normocephalic, without obvious abnormality, atraumatic  Neck: no adenopathy, no carotid bruit, no JVD, supple, symmetrical, trachea midline and thyroid not enlarged, symmetric, no tenderness/mass/nodules  Lungs: clear to auscultation bilaterally  Heart: regular rate and rhythm, S1, S2 normal, no murmur, click, rub or gallop  Abdomen: soft, non-tender; bowel sounds normal; no masses,  no organomegaly and incisions clean and dry  Extremities: extremities normal, atraumatic, no cyanosis or edema      Disposition: home    Patient Instructions: Activity: activity as tolerated  Diet: regular diet  Wound Care: keep wound clean and dry    Follow-up with Dr. Theodore Blanchard in 2 weeks.     Signed:  Zain Bauman MD  9/4/2021  9:28 PM

## 2021-09-06 ENCOUNTER — ANESTHESIA EVENT (OUTPATIENT)
Dept: OPERATING ROOM | Age: 46
DRG: 231 | End: 2021-09-06
Payer: COMMERCIAL

## 2021-09-06 ENCOUNTER — ANESTHESIA (OUTPATIENT)
Dept: OPERATING ROOM | Age: 46
DRG: 231 | End: 2021-09-06
Payer: COMMERCIAL

## 2021-09-06 VITALS
RESPIRATION RATE: 1 BRPM | DIASTOLIC BLOOD PRESSURE: 76 MMHG | TEMPERATURE: 97.3 F | SYSTOLIC BLOOD PRESSURE: 109 MMHG | OXYGEN SATURATION: 100 %

## 2021-09-06 LAB
ALBUMIN SERPL-MCNC: 2.8 G/DL (ref 3.5–5.2)
ALP BLD-CCNC: 117 U/L (ref 35–104)
ALT SERPL-CCNC: 27 U/L (ref 0–32)
ANION GAP SERPL CALCULATED.3IONS-SCNC: 10 MMOL/L (ref 7–16)
AST SERPL-CCNC: 15 U/L (ref 0–31)
BILIRUB SERPL-MCNC: 0.7 MG/DL (ref 0–1.2)
BUN BLDV-MCNC: 9 MG/DL (ref 6–20)
CALCIUM SERPL-MCNC: 8.9 MG/DL (ref 8.6–10.2)
CHLORIDE BLD-SCNC: 103 MMOL/L (ref 98–107)
CO2: 29 MMOL/L (ref 22–29)
CREAT SERPL-MCNC: 0.8 MG/DL (ref 0.5–1)
GFR AFRICAN AMERICAN: >60
GFR NON-AFRICAN AMERICAN: >60 ML/MIN/1.73
GLUCOSE BLD-MCNC: 112 MG/DL (ref 74–99)
HCT VFR BLD CALC: 39 % (ref 34–48)
HEMOGLOBIN: 12.4 G/DL (ref 11.5–15.5)
MCH RBC QN AUTO: 30.2 PG (ref 26–35)
MCHC RBC AUTO-ENTMCNC: 31.8 % (ref 32–34.5)
MCV RBC AUTO: 95.1 FL (ref 80–99.9)
METER GLUCOSE: 126 MG/DL (ref 74–99)
METER GLUCOSE: 227 MG/DL (ref 74–99)
PDW BLD-RTO: 13.2 FL (ref 11.5–15)
PLATELET # BLD: 262 E9/L (ref 130–450)
PMV BLD AUTO: 10 FL (ref 7–12)
POTASSIUM REFLEX MAGNESIUM: 5 MMOL/L (ref 3.5–5)
RBC # BLD: 4.1 E12/L (ref 3.5–5.5)
SODIUM BLD-SCNC: 142 MMOL/L (ref 132–146)
TOTAL PROTEIN: 6.6 G/DL (ref 6.4–8.3)
WBC # BLD: 13.4 E9/L (ref 4.5–11.5)

## 2021-09-06 PROCEDURE — 6360000002 HC RX W HCPCS: Performed by: SURGERY

## 2021-09-06 PROCEDURE — 99223 1ST HOSP IP/OBS HIGH 75: CPT | Performed by: SURGERY

## 2021-09-06 PROCEDURE — 2500000003 HC RX 250 WO HCPCS: Performed by: SURGERY

## 2021-09-06 PROCEDURE — 2500000003 HC RX 250 WO HCPCS

## 2021-09-06 PROCEDURE — 2580000003 HC RX 258

## 2021-09-06 PROCEDURE — 2709999900 HC NON-CHARGEABLE SUPPLY: Performed by: SURGERY

## 2021-09-06 PROCEDURE — 6370000000 HC RX 637 (ALT 250 FOR IP): Performed by: SURGERY

## 2021-09-06 PROCEDURE — 6360000002 HC RX W HCPCS

## 2021-09-06 PROCEDURE — 2580000003 HC RX 258: Performed by: NURSE ANESTHETIST, CERTIFIED REGISTERED

## 2021-09-06 PROCEDURE — 2580000003 HC RX 258: Performed by: SURGERY

## 2021-09-06 PROCEDURE — 80053 COMPREHEN METABOLIC PANEL: CPT

## 2021-09-06 PROCEDURE — C9113 INJ PANTOPRAZOLE SODIUM, VIA: HCPCS | Performed by: SURGERY

## 2021-09-06 PROCEDURE — 7100000001 HC PACU RECOVERY - ADDTL 15 MIN: Performed by: SURGERY

## 2021-09-06 PROCEDURE — 1200000000 HC SEMI PRIVATE

## 2021-09-06 PROCEDURE — 7100000000 HC PACU RECOVERY - FIRST 15 MIN: Performed by: SURGERY

## 2021-09-06 PROCEDURE — 85027 COMPLETE CBC AUTOMATED: CPT

## 2021-09-06 PROCEDURE — 6360000002 HC RX W HCPCS: Performed by: NURSE ANESTHETIST, CERTIFIED REGISTERED

## 2021-09-06 PROCEDURE — 3700000000 HC ANESTHESIA ATTENDED CARE: Performed by: SURGERY

## 2021-09-06 PROCEDURE — 82962 GLUCOSE BLOOD TEST: CPT

## 2021-09-06 PROCEDURE — 49320 DIAG LAPARO SEPARATE PROC: CPT | Performed by: SURGERY

## 2021-09-06 PROCEDURE — 2720000010 HC SURG SUPPLY STERILE: Performed by: SURGERY

## 2021-09-06 PROCEDURE — 3700000001 HC ADD 15 MINUTES (ANESTHESIA): Performed by: SURGERY

## 2021-09-06 PROCEDURE — 3600000004 HC SURGERY LEVEL 4 BASE: Performed by: SURGERY

## 2021-09-06 PROCEDURE — 3600000014 HC SURGERY LEVEL 4 ADDTL 15MIN: Performed by: SURGERY

## 2021-09-06 PROCEDURE — 2500000003 HC RX 250 WO HCPCS: Performed by: NURSE ANESTHETIST, CERTIFIED REGISTERED

## 2021-09-06 PROCEDURE — 6360000002 HC RX W HCPCS: Performed by: ANESTHESIOLOGY

## 2021-09-06 PROCEDURE — 2700000000 HC OXYGEN THERAPY PER DAY

## 2021-09-06 RX ORDER — MORPHINE SULFATE 10 MG/ML
INJECTION, SOLUTION INTRAMUSCULAR; INTRAVENOUS PRN
Status: DISCONTINUED | OUTPATIENT
Start: 2021-09-06 | End: 2021-09-06 | Stop reason: SDUPTHER

## 2021-09-06 RX ORDER — KETOROLAC TROMETHAMINE 30 MG/ML
INJECTION, SOLUTION INTRAMUSCULAR; INTRAVENOUS PRN
Status: DISCONTINUED | OUTPATIENT
Start: 2021-09-06 | End: 2021-09-06 | Stop reason: SDUPTHER

## 2021-09-06 RX ORDER — MIDAZOLAM HYDROCHLORIDE 1 MG/ML
INJECTION INTRAMUSCULAR; INTRAVENOUS PRN
Status: DISCONTINUED | OUTPATIENT
Start: 2021-09-06 | End: 2021-09-06 | Stop reason: SDUPTHER

## 2021-09-06 RX ORDER — FENTANYL CITRATE 50 UG/ML
INJECTION, SOLUTION INTRAMUSCULAR; INTRAVENOUS PRN
Status: DISCONTINUED | OUTPATIENT
Start: 2021-09-06 | End: 2021-09-06 | Stop reason: SDUPTHER

## 2021-09-06 RX ORDER — METOCLOPRAMIDE HYDROCHLORIDE 5 MG/ML
10 INJECTION INTRAMUSCULAR; INTRAVENOUS ONCE
Status: COMPLETED | OUTPATIENT
Start: 2021-09-06 | End: 2021-09-06

## 2021-09-06 RX ORDER — OXYCODONE HYDROCHLORIDE 5 MG/1
5 TABLET ORAL EVERY 4 HOURS PRN
Status: DISCONTINUED | OUTPATIENT
Start: 2021-09-06 | End: 2021-09-10 | Stop reason: HOSPADM

## 2021-09-06 RX ORDER — LIDOCAINE HYDROCHLORIDE 20 MG/ML
INJECTION, SOLUTION INTRAVENOUS PRN
Status: DISCONTINUED | OUTPATIENT
Start: 2021-09-06 | End: 2021-09-06 | Stop reason: SDUPTHER

## 2021-09-06 RX ORDER — KETAMINE HYDROCHLORIDE 50 MG/ML
INJECTION, SOLUTION, CONCENTRATE INTRAMUSCULAR; INTRAVENOUS PRN
Status: DISCONTINUED | OUTPATIENT
Start: 2021-09-06 | End: 2021-09-06 | Stop reason: SDUPTHER

## 2021-09-06 RX ORDER — BUPIVACAINE HYDROCHLORIDE AND EPINEPHRINE 2.5; 5 MG/ML; UG/ML
INJECTION, SOLUTION EPIDURAL; INFILTRATION; INTRACAUDAL; PERINEURAL PRN
Status: DISCONTINUED | OUTPATIENT
Start: 2021-09-06 | End: 2021-09-06 | Stop reason: ALTCHOICE

## 2021-09-06 RX ORDER — LABETALOL HYDROCHLORIDE 5 MG/ML
INJECTION, SOLUTION INTRAVENOUS PRN
Status: DISCONTINUED | OUTPATIENT
Start: 2021-09-06 | End: 2021-09-06 | Stop reason: SDUPTHER

## 2021-09-06 RX ORDER — NEOSTIGMINE METHYLSULFATE 1 MG/ML
INJECTION, SOLUTION INTRAVENOUS PRN
Status: DISCONTINUED | OUTPATIENT
Start: 2021-09-06 | End: 2021-09-06 | Stop reason: SDUPTHER

## 2021-09-06 RX ORDER — ONDANSETRON 2 MG/ML
INJECTION INTRAMUSCULAR; INTRAVENOUS PRN
Status: DISCONTINUED | OUTPATIENT
Start: 2021-09-06 | End: 2021-09-06 | Stop reason: SDUPTHER

## 2021-09-06 RX ORDER — PROPOFOL 10 MG/ML
INJECTION, EMULSION INTRAVENOUS PRN
Status: DISCONTINUED | OUTPATIENT
Start: 2021-09-06 | End: 2021-09-06 | Stop reason: SDUPTHER

## 2021-09-06 RX ORDER — BACLOFEN 10 MG/1
20 TABLET ORAL 4 TIMES DAILY PRN
Status: DISCONTINUED | OUTPATIENT
Start: 2021-09-06 | End: 2021-09-10 | Stop reason: HOSPADM

## 2021-09-06 RX ORDER — SODIUM CHLORIDE 9 MG/ML
INJECTION, SOLUTION INTRAVENOUS CONTINUOUS PRN
Status: DISCONTINUED | OUTPATIENT
Start: 2021-09-06 | End: 2021-09-06 | Stop reason: SDUPTHER

## 2021-09-06 RX ORDER — METOCLOPRAMIDE HYDROCHLORIDE 5 MG/ML
INJECTION INTRAMUSCULAR; INTRAVENOUS
Status: COMPLETED
Start: 2021-09-06 | End: 2021-09-06

## 2021-09-06 RX ORDER — FENTANYL CITRATE 50 UG/ML
25 INJECTION, SOLUTION INTRAMUSCULAR; INTRAVENOUS EVERY 5 MIN PRN
Status: DISCONTINUED | OUTPATIENT
Start: 2021-09-06 | End: 2021-09-06 | Stop reason: HOSPADM

## 2021-09-06 RX ORDER — CEFTRIAXONE 1 G/1
INJECTION, POWDER, FOR SOLUTION INTRAMUSCULAR; INTRAVENOUS
Status: DISPENSED
Start: 2021-09-06 | End: 2021-09-06

## 2021-09-06 RX ORDER — OXYCODONE HYDROCHLORIDE 5 MG/1
10 TABLET ORAL EVERY 4 HOURS PRN
Status: DISCONTINUED | OUTPATIENT
Start: 2021-09-06 | End: 2021-09-10 | Stop reason: HOSPADM

## 2021-09-06 RX ORDER — ROCURONIUM BROMIDE 10 MG/ML
INJECTION, SOLUTION INTRAVENOUS PRN
Status: DISCONTINUED | OUTPATIENT
Start: 2021-09-06 | End: 2021-09-06 | Stop reason: SDUPTHER

## 2021-09-06 RX ORDER — WATER 1000 ML/1000ML
INJECTION, SOLUTION INTRAVENOUS
Status: DISPENSED
Start: 2021-09-06 | End: 2021-09-06

## 2021-09-06 RX ORDER — HYDROCODONE BITARTRATE AND ACETAMINOPHEN 5; 325 MG/1; MG/1
1 TABLET ORAL
Status: DISCONTINUED | OUTPATIENT
Start: 2021-09-06 | End: 2021-09-06 | Stop reason: HOSPADM

## 2021-09-06 RX ORDER — SODIUM CHLORIDE, SODIUM LACTATE, POTASSIUM CHLORIDE, CALCIUM CHLORIDE 600; 310; 30; 20 MG/100ML; MG/100ML; MG/100ML; MG/100ML
INJECTION, SOLUTION INTRAVENOUS
Status: COMPLETED
Start: 2021-09-06 | End: 2021-09-06

## 2021-09-06 RX ORDER — MEPERIDINE HYDROCHLORIDE 25 MG/ML
12.5 INJECTION INTRAMUSCULAR; INTRAVENOUS; SUBCUTANEOUS EVERY 5 MIN PRN
Status: DISCONTINUED | OUTPATIENT
Start: 2021-09-06 | End: 2021-09-06 | Stop reason: HOSPADM

## 2021-09-06 RX ORDER — PANTOPRAZOLE SODIUM 40 MG/10ML
40 INJECTION, POWDER, LYOPHILIZED, FOR SOLUTION INTRAVENOUS DAILY
Status: DISCONTINUED | OUTPATIENT
Start: 2021-09-06 | End: 2021-09-07

## 2021-09-06 RX ORDER — GLYCOPYRROLATE 1 MG/5 ML
SYRINGE (ML) INTRAVENOUS PRN
Status: DISCONTINUED | OUTPATIENT
Start: 2021-09-06 | End: 2021-09-06 | Stop reason: SDUPTHER

## 2021-09-06 RX ORDER — DIPHENHYDRAMINE HYDROCHLORIDE 50 MG/ML
12.5 INJECTION INTRAMUSCULAR; INTRAVENOUS
Status: DISCONTINUED | OUTPATIENT
Start: 2021-09-06 | End: 2021-09-06 | Stop reason: HOSPADM

## 2021-09-06 RX ORDER — DEXAMETHASONE SODIUM PHOSPHATE 10 MG/ML
INJECTION, SOLUTION INTRAMUSCULAR; INTRAVENOUS PRN
Status: DISCONTINUED | OUTPATIENT
Start: 2021-09-06 | End: 2021-09-06 | Stop reason: SDUPTHER

## 2021-09-06 RX ORDER — PROCHLORPERAZINE EDISYLATE 5 MG/ML
5 INJECTION INTRAMUSCULAR; INTRAVENOUS
Status: DISCONTINUED | OUTPATIENT
Start: 2021-09-06 | End: 2021-09-06 | Stop reason: HOSPADM

## 2021-09-06 RX ADMIN — HYDROMORPHONE HYDROCHLORIDE 0.5 MG: 1 INJECTION, SOLUTION INTRAMUSCULAR; INTRAVENOUS; SUBCUTANEOUS at 10:48

## 2021-09-06 RX ADMIN — FENTANYL CITRATE 100 MCG: 50 INJECTION, SOLUTION INTRAMUSCULAR; INTRAVENOUS at 09:34

## 2021-09-06 RX ADMIN — OXYCODONE 10 MG: 5 TABLET ORAL at 19:18

## 2021-09-06 RX ADMIN — MIDAZOLAM 2 MG: 1 INJECTION INTRAMUSCULAR; INTRAVENOUS at 09:28

## 2021-09-06 RX ADMIN — METRONIDAZOLE 500 MG: 500 INJECTION, SOLUTION INTRAVENOUS at 07:16

## 2021-09-06 RX ADMIN — LIDOCAINE HYDROCHLORIDE 100 MG: 20 INJECTION, SOLUTION INTRAVENOUS at 09:34

## 2021-09-06 RX ADMIN — ROCURONIUM BROMIDE 40 MG: 10 SOLUTION INTRAVENOUS at 09:34

## 2021-09-06 RX ADMIN — FENTANYL CITRATE 100 MCG: 50 INJECTION, SOLUTION INTRAMUSCULAR; INTRAVENOUS at 09:44

## 2021-09-06 RX ADMIN — MORPHINE SULFATE 4 MG: 10 INJECTION INTRAMUSCULAR; INTRAVENOUS; SUBCUTANEOUS at 10:09

## 2021-09-06 RX ADMIN — MORPHINE SULFATE 4 MG: 4 INJECTION, SOLUTION INTRAMUSCULAR; INTRAVENOUS at 01:23

## 2021-09-06 RX ADMIN — METOCLOPRAMIDE HYDROCHLORIDE 10 MG: 5 INJECTION INTRAMUSCULAR; INTRAVENOUS at 09:20

## 2021-09-06 RX ADMIN — SODIUM CHLORIDE, POTASSIUM CHLORIDE, SODIUM LACTATE AND CALCIUM CHLORIDE: 600; 310; 30; 20 INJECTION, SOLUTION INTRAVENOUS at 20:49

## 2021-09-06 RX ADMIN — FAMOTIDINE 20 MG: 10 INJECTION, SOLUTION INTRAVENOUS at 09:20

## 2021-09-06 RX ADMIN — ONDANSETRON 4 MG: 2 INJECTION INTRAMUSCULAR; INTRAVENOUS at 07:15

## 2021-09-06 RX ADMIN — MORPHINE SULFATE 6 MG: 10 INJECTION INTRAMUSCULAR; INTRAVENOUS; SUBCUTANEOUS at 10:16

## 2021-09-06 RX ADMIN — CEFTRIAXONE SODIUM 1000 MG: 1 INJECTION, POWDER, FOR SOLUTION INTRAMUSCULAR; INTRAVENOUS at 09:38

## 2021-09-06 RX ADMIN — SODIUM CHLORIDE, POTASSIUM CHLORIDE, SODIUM LACTATE AND CALCIUM CHLORIDE: 600; 310; 30; 20 INJECTION, SOLUTION INTRAVENOUS at 01:24

## 2021-09-06 RX ADMIN — KETAMINE HYDROCHLORIDE 20 MG: 50 INJECTION INTRAMUSCULAR; INTRAVENOUS at 09:34

## 2021-09-06 RX ADMIN — Medication 0.6 MG: at 09:55

## 2021-09-06 RX ADMIN — SODIUM CHLORIDE: 9 INJECTION, SOLUTION INTRAVENOUS at 09:28

## 2021-09-06 RX ADMIN — DEXAMETHASONE SODIUM PHOSPHATE 10 MG: 10 INJECTION, SOLUTION INTRAMUSCULAR; INTRAVENOUS at 09:44

## 2021-09-06 RX ADMIN — METRONIDAZOLE 500 MG: 500 INJECTION, SOLUTION INTRAVENOUS at 15:29

## 2021-09-06 RX ADMIN — MORPHINE SULFATE 4 MG: 4 INJECTION, SOLUTION INTRAMUSCULAR; INTRAVENOUS at 07:15

## 2021-09-06 RX ADMIN — KETOROLAC TROMETHAMINE 30 MG: 30 INJECTION, SOLUTION INTRAMUSCULAR at 10:08

## 2021-09-06 RX ADMIN — METRONIDAZOLE 500 MG: 500 INJECTION, SOLUTION INTRAVENOUS at 01:33

## 2021-09-06 RX ADMIN — ONDANSETRON 4 MG: 2 INJECTION INTRAMUSCULAR; INTRAVENOUS at 09:54

## 2021-09-06 RX ADMIN — METOCLOPRAMIDE 10 MG: 5 INJECTION, SOLUTION INTRAMUSCULAR; INTRAVENOUS at 09:20

## 2021-09-06 RX ADMIN — HYDROMORPHONE HYDROCHLORIDE 0.5 MG: 1 INJECTION, SOLUTION INTRAMUSCULAR; INTRAVENOUS; SUBCUTANEOUS at 10:43

## 2021-09-06 RX ADMIN — PROPOFOL 130 MG: 10 INJECTION, EMULSION INTRAVENOUS at 09:34

## 2021-09-06 RX ADMIN — LABETALOL HYDROCHLORIDE 5 MG: 5 INJECTION INTRAVENOUS at 09:50

## 2021-09-06 RX ADMIN — PANTOPRAZOLE SODIUM 40 MG: 40 INJECTION, POWDER, FOR SOLUTION INTRAVENOUS at 20:49

## 2021-09-06 RX ADMIN — Medication 3 MG: at 09:55

## 2021-09-06 RX ADMIN — SODIUM CHLORIDE: 9 INJECTION, SOLUTION INTRAVENOUS at 09:59

## 2021-09-06 RX ADMIN — MORPHINE SULFATE 4 MG: 4 INJECTION, SOLUTION INTRAMUSCULAR; INTRAVENOUS at 20:49

## 2021-09-06 RX ADMIN — SODIUM CHLORIDE, POTASSIUM CHLORIDE, SODIUM LACTATE AND CALCIUM CHLORIDE: 600; 310; 30; 20 INJECTION, SOLUTION INTRAVENOUS at 12:05

## 2021-09-06 ASSESSMENT — PULMONARY FUNCTION TESTS
PIF_VALUE: 3
PIF_VALUE: 35
PIF_VALUE: 3
PIF_VALUE: 6
PIF_VALUE: 0
PIF_VALUE: 30
PIF_VALUE: 3
PIF_VALUE: 4
PIF_VALUE: 4
PIF_VALUE: 2
PIF_VALUE: 35
PIF_VALUE: 30
PIF_VALUE: 35
PIF_VALUE: 0
PIF_VALUE: 32
PIF_VALUE: 3
PIF_VALUE: 3
PIF_VALUE: 35
PIF_VALUE: 1
PIF_VALUE: 28
PIF_VALUE: 14
PIF_VALUE: 2
PIF_VALUE: 25
PIF_VALUE: 1
PIF_VALUE: 4
PIF_VALUE: 35
PIF_VALUE: 0
PIF_VALUE: 4
PIF_VALUE: 34
PIF_VALUE: 3
PIF_VALUE: 26
PIF_VALUE: 35
PIF_VALUE: 28
PIF_VALUE: 36
PIF_VALUE: 30
PIF_VALUE: 31
PIF_VALUE: 4
PIF_VALUE: 27
PIF_VALUE: 3
PIF_VALUE: 35
PIF_VALUE: 1
PIF_VALUE: 30
PIF_VALUE: 2
PIF_VALUE: 22
PIF_VALUE: 2
PIF_VALUE: 30
PIF_VALUE: 0
PIF_VALUE: 1

## 2021-09-06 ASSESSMENT — PAIN SCALES - GENERAL
PAINLEVEL_OUTOF10: 0
PAINLEVEL_OUTOF10: 9
PAINLEVEL_OUTOF10: 0
PAINLEVEL_OUTOF10: 8
PAINLEVEL_OUTOF10: 9
PAINLEVEL_OUTOF10: 10
PAINLEVEL_OUTOF10: 8

## 2021-09-06 ASSESSMENT — PAIN DESCRIPTION - ORIENTATION: ORIENTATION: RIGHT;LEFT;LOWER

## 2021-09-06 ASSESSMENT — PAIN DESCRIPTION - PAIN TYPE
TYPE: SURGICAL PAIN
TYPE: ACUTE PAIN;SURGICAL PAIN

## 2021-09-06 ASSESSMENT — PAIN DESCRIPTION - FREQUENCY: FREQUENCY: CONTINUOUS

## 2021-09-06 ASSESSMENT — PAIN DESCRIPTION - PROGRESSION: CLINICAL_PROGRESSION: OTHER (COMMENT)

## 2021-09-06 ASSESSMENT — PAIN DESCRIPTION - DESCRIPTORS: DESCRIPTORS: THROBBING;DISCOMFORT

## 2021-09-06 ASSESSMENT — LIFESTYLE VARIABLES: SMOKING_STATUS: 1

## 2021-09-06 ASSESSMENT — PAIN - FUNCTIONAL ASSESSMENT: PAIN_FUNCTIONAL_ASSESSMENT: ACTIVITIES ARE NOT PREVENTED

## 2021-09-06 ASSESSMENT — PAIN DESCRIPTION - LOCATION
LOCATION: ABDOMEN
LOCATION: ABDOMEN

## 2021-09-06 NOTE — OP NOTE
[address]                                OPERATIVE REPORT    PATIENT NAME: Charles Rizzo                   :        1975  MED REC NO:   [id number]                            ROOM:  ACCOUNT NO:   [de-identified]                           ADMIT DATE: 2021  PROVIDER:     Sony Sotomayor MD    DATE OF PROCEDURE:  2021    PREOPERATIVE DIAGNOSIS:  Ischemic bowel, possible anastomotic leak with  bowel obstruction. POSTOPERATIVE DIAGNOSIS:  Normal intra-abdominal postoperative findings  with no complications or issues. PROCEDURE:  Diagnostic laparoscopy with washout of postoperative fluids  and drain placement. SURGEON:  Sony Sotomayor MD    ASSISTANT:  None. ANESTHESIA:  General.    COMPLICATIONS:  None. FLUIDS:  Crystalloid. BLOOD LOSS:  Minimal.    DISPOSITION:  To be admitted to the floor for routine postoperative  care. INDICATION:  This is a 48-year-old female 1 week out from laparoscopic  descending colon resection with diverting loop ileostomy who presented  with increased abdominal pain and some air and fluid around her  anastomosis, which made me think that she was having an anastomotic leak  in the pelvis. She also came in with a white count and was tachycardic  with early sepsis. I explained the risks, benefits, potential outcomes,  alternate treatment to the aforementioned procedure and she agreed to  proceed understanding those risks and potential outcomes. OPERATIVE PROCEDURE:  The patient was brought to the operative suite,  placed under general anesthesia, was given preoperative antibiotics  within 30 minutes of incision, and was then prepped and draped in normal  sterile condition. Once this was done, local anesthetic was infiltrated  into the left upper quadrant of the abdomen. A 5-mm incision was made. The Veress needle was passed in the peritoneum. CO2 was used to  insufflate the abdomen pressure 15 mmHg. At this time, the Veress  needle was removed and 5 mm trocar was put in its place. Additional two  5-mm trocars were placed, one in the midline and one in the left lower  quadrant. We were then visualize inside the abdomen and take down some  of the very loose adhesions from the previous surgery. There was some  serosanguineous fluid consistent with postoperative state in the  abdomen. This was suctioned out. We then dissected as best as we could  around the anastomosis and the pelvis exposing the anterior portion of  the staple line without any signs of succus, stool or abnormal  postoperative changes. The anastomosis looked viable without any signs  of hole or issue with it. It was decided at this time to place a  19-[place] Darryl drain down around the anastomosis and in the pelvis and  up the left gutter, so this was done through one of the 5-mm trocar  sites. Once this done, all trocars were removed. Pneumoperitoneum was  evacuated and the trocar sites were closed with 4-0 Monocryl and  surgical glue. The drain was matured to the abdominal wall with 2-0  nylon suture and the patient was then woken up in stable condition.         Dina Spencer MD    D: 09/06/2021 10:01:47       T: 09/06/2021 10:04:17     [email or web address]  Job#: [id number]     Doc#: [id number]    CC:

## 2021-09-06 NOTE — CONSULTS
dysfunction        PAST SURGICAL Hx:   Past Surgical History:   Procedure Laterality Date    APPENDECTOMY      BACK SURGERY      BICEPS TENDON REPAIR Right 11/11/2020    BICEPS TENODESIS performed by Yara Sutherland MD at [place] [address] Left 8/31/2021    LAPAROSCOPIC LEFT HEMICOLECTOMY WITH LOOP OSTOMY performed by Edward Leal MD at [facility]  03/05/2018    Robotic hysterectomy, bilateral salpingectomy, right oophorectomy DR. ARIANA MONIQUE [facility]     HYSTERECTOMY, TOTAL ABDOMINAL      LARYNGOSCOPY N/A 7/17/2020    DIRECT LARYNGOSCOPY--OMNI GUIDE LASER performed by Consuelo Craft MD at [facility] ARTHROSCOPY Right 11/11/2020    RIGHT SHOULDER DIAGNOSTIC ARTHROSCOPY WITH DECOMPRESSION ROTATOR CUFF REPAIR performed by Yara Sutherland MD at [address] [place] [address] Hx:  Family History   Problem Relation Age of Onset    Mult Sclerosis Mother     Colon Cancer Neg Hx     Uterine Cancer Neg Hx     Ovarian Cancer Neg Hx     Breast Cancer Neg Hx        HOME MEDICATIONS:  Prior to Admission medications    Medication Sig Start Date End Date Taking? Authorizing Provider   traMADol (ULTRAM) 50 MG tablet Take 1 tablet by mouth every 6 hours as needed for Pain for up to 3 days. Intended supply: 3 days.  Take lowest dose possible to manage pain 9/4/21 9/7/21  Edward Leal MD   ibuprofen (ADVIL;MOTRIN) 800 MG tablet Take 1 tablet by mouth every 6 hours as needed for Pain 9/4/21   Edward Leal MD   ibuprofen (ADVIL;MOTRIN) 800 MG tablet Take 1 tablet by mouth every 6 hours as needed for Pain 9/1/21   Edward Leal MD   erythromycin base (E-MYCIN) 500 MG tablet Take 1 tablet by mouth See Admin Instructions for 3 doses Take at 1300, 1400 and midnight the day before surgery 8/27/21   Edward Leal MD   dexlansoprazole (DEXILANT) 60 MG CPDR delayed release capsule Take 60 mg by mouth daily    Historical Provider, MD   vitamin D (CHOLECALCIFEROL) 64786 UNIT CAPS Take 1 capsule by mouth once a week 8/13/21   MARIXA Davila CNP   baclofen (LIORESAL) 20 MG tablet Take 1 tablet by mouth 4 times daily as needed (muscle spasms; pain) 8/13/21   MARIXA Davila CNP   rOPINIRole (REQUIP) 0.5 MG tablet Take 1 tablet by mouth 2 times daily as needed (restless legs) 8/13/21   MARIXA Davila CNP   ocrelizumab (OCREVUS) 300 MG/10ML SOLN injection Infuse 20 mLs intravenously every 6 months 7/19/21   MARIXA Davila CNP   nortriptyline (PAMELOR) 10 MG capsule TAKE 1 CAPSULE BY MOUTH ONCE NIGHTLY FOR HEADACHES 7/2/21   Fred Ross PA-C   famotidine (PEPCID) 40 MG tablet take 1 tablet by mouth every evening 4/6/21   Historical Provider, MD   gabapentin (NEURONTIN) 300 MG capsule TAKE 1 CAPSULE BY MOUTH THREE TIMES DAILY 3/10/21 8/13/21  DANISH Whalen   lisinopril-hydroCHLOROthiazide (PRINZIDE;ZESTORETIC) 10-12.5 MG per tablet TAKE 1 TABLET BY MOUTH ONCE DAILY 3/10/21   Pawel Hays DO   CPAP Machine MISC Heated tube and chin strap. Dispense as written. LIFETIME SUPPLIES. 11/6/20   Historical Provider, MD       ALLERGIES:  Patient has no known allergies. SOCIAL Hx:  Social History     Socioeconomic History    Marital status: Single     Spouse name: Not on file    Number of children: 3    Years of education: 6    Highest education level: 11th grade   Occupational History    Not on file   Tobacco Use    Smoking status: Current Every Day Smoker     Packs/day: 0.50     Years: 25.00     Pack years: 12.50     Types: Cigarettes    Smokeless tobacco: Never Used    Tobacco comment: Ready to stop, requesting prescription for Chantix   Vaping Use    Vaping Use: Never used   Substance and Sexual Activity    Alcohol use:  Yes     Alcohol/week: 1.0 standard drinks     Types: 1 Glasses of wine per week     Comment: socially    Drug use: No    Sexual activity: Yes     Partners: Male   Other Topics Concern    Not on file   Social History Narrative    Uses [employer] for transportation    [facility]     Social Determinants of Health     Financial Resource Strain: Medium Risk    Difficulty of Paying Living Expenses: Somewhat hard   Food Insecurity: Food Insecurity Present    Worried About Running Out of Food in the Last Year: Sometimes true    [place] of Food in the Last Year: Never true   Transportation Needs: No Transportation Needs    Lack of Transportation (Medical): No    Lack of Transportation (Non-Medical): No   Physical Activity: Sufficiently Active    Days of Exercise per Week: 3 days    Minutes of Exercise per Session: 60 min   Stress: No Stress Concern Present    Feeling of Stress : Not at all   Social Connections: Socially Isolated    Frequency of Communication with Friends and Family: Three times a week    Frequency of Social Gatherings with Friends and Family: Twice a week    Attends Religion Services: Never    Active Member of Clubs or Organizations: No    Attends Club or Organization Meetings: Never    Marital Status: Never    Intimate Partner Violence:     Fear of Current or Ex-Partner:     Emotionally Abused:     Physically Abused:     Sexually Abused:        ROS: Positive in bold  General:   Denies chills, fatigue, fever, malaise, night sweats or weight loss    Psychological:   Denies anxiety, disorientation or hallucinations    ENT:    Denies epistaxis, headaches, vertigo or visual changes    Cardiovascular:   Denies any chest pain, irregular heartbeats, or palpitations. No paroxysmal nocturnal dyspnea. Respiratory:   Denies shortness of breath, coughing, sputum production, hemoptysis, or wheezing. No orthopnea. Gastrointestinal:   + nausea, vomiting, no diarrhea, or constipation. Denies any abdominal pain. Denies change in bowel habits or stools.       Genito-Urinary:    Denies any urgency, frequency, hematuria. Voiding without difficulty. Musculoskeletal:   Denies joint pain, joint stiffness, joint swelling or muscle pain    Neurology:    Denies any headache or focal neurological deficits. No weakness or paresthesia. Derm:    Denies any rashes, ulcers, or excoriations. Denies bruising. Extremities:   Denies any lower extremity swelling or edema. PHYSICAL EXAM: Abnormal findings noted  VITALS:  Vitals:    09/06/21 0851   BP: 129/84   Pulse: 91   Resp: 18   Temp: 99.5 °F (37.5 °C)   SpO2: 95%         CONSTITUTIONAL:    Awake, alert, cooperative, moderate abdominal distress, and appears stated age    EYES:     EOMI, sclera clear without icterus, conjunctiva normal    ENT:    Normocephalic, atraumatic,  External ears without lesions. NECK:    Supple, symmetrical, trachea midline,  no JVD    HEMATOLOGIC/LYMPHATICS:    No cervical lymphadenopathy and no supraclavicular lymphadenopathy    LUNGS:    Symmetric. No increased work of breathing, good air exchange, clear to auscultation bilaterally, no wheezes, rhonchi, or rales,     CARDIOVASCULAR:    Normal apical impulse, regular rate and rhythm, normal S1 and S2, no S3 or S4, and no murmur noted    ABDOMEN:    Patient has abdominal tenderness, + abdominal distention,  Ileostomy in place    MUSCULOSKELETAL:    There is no redness, warmth, or swelling of the joints. NEUROLOGIC:    Awake, alert, oriented to name, place and time. SKIN:    No bruising or bleeding. No redness, warmth, or swelling    EXTREMITIES:    Peripheral pulses present. No edema, cyanosis, or swelling.     LINES/CATHETERS     LABORATORY DATA:  CBC with Differential:    Lab Results   Component Value Date    WBC 13.4 09/06/2021    RBC 4.10 09/06/2021    HGB 12.4 09/06/2021    HCT 39.0 09/06/2021     09/06/2021    MCV 95.1 09/06/2021    MCH 30.2 09/06/2021    MCHC 31.8 09/06/2021    RDW 13.2 09/06/2021    LYMPHOPCT 8.4 09/05/2021    MONOPCT 8.9 09/05/2021    BASOPCT 0.5 09/05/2021    MONOSABS 1.33 09/05/2021    LYMPHSABS 1.26 09/05/2021    EOSABS 0.16 09/05/2021    BASOSABS 0.07 09/05/2021     CMP:    Lab Results   Component Value Date     09/06/2021    K 5.0 09/06/2021     09/06/2021    CO2 29 09/06/2021    BUN 9 09/06/2021    CREATININE 0.8 09/06/2021    GFRAA >60 09/06/2021    LABGLOM >60 09/06/2021    GLUCOSE 112 09/06/2021    PROT 6.6 09/06/2021    LABALBU 2.8 09/06/2021    CALCIUM 8.9 09/06/2021    BILITOT 0.7 09/06/2021    ALKPHOS 117 09/06/2021    AST 15 09/06/2021    ALT 27 09/06/2021       ASSESSMENT/PLAN:  1. Colon cancer of descending colon status post laparoscopic descending colon resection with end-to-end stapled anastomosis and diverting loop ileostomy 8/31/2021  2. Probable anastomotic leak  3. Multiple sclerosis  4. Hypertension  5. Prior diagnosis of obstructive sleep apnea without current use of CPAP  6. GERD  7. History of migraine headache  8. Morbid obesity  9. Current tobacco abuse     Plan:  Home medications reviewed   Monitor heart rate, blood pressure, O2 saturation  CPAP at bedtime-patient's family to: Settings  Protonix 40 milligrams IV daily  Routine labs in a.m.   Pain meds per general surgery  Diet per general surgery  Lovenox 40 mg subcu daily  Hold lisinopril, hydrochlorothiazide until blood pressure improves  IV Rocephin/Flagyl      Seema Boss DO  9:32 AM  9/6/2021

## 2021-09-06 NOTE — ANESTHESIA PRE PROCEDURE
Department of Anesthesiology  Preprocedure Note       Name:  Rosy Mobley   Age:  51 y.o.  :  1975                                          MRN:  [id number]         Date:  2021      Surgeon: Yanci Davis):  Mary Jo Lopez MD    Procedure: Procedure(s):  DIAGNOSTIC LAPAROSCOPY POSSIBLE BOWEL RESECTION POSSILBE OSTOMY    Medications prior to admission:   Prior to Admission medications    Medication Sig Start Date End Date Taking? Authorizing Provider   traMADol (ULTRAM) 50 MG tablet Take 1 tablet by mouth every 6 hours as needed for Pain for up to 3 days. Intended supply: 3 days.  Take lowest dose possible to manage pain 21  Mary Jo Lopez MD   ibuprofen (ADVIL;MOTRIN) 800 MG tablet Take 1 tablet by mouth every 6 hours as needed for Pain 21   Mary Jo Lopez MD   ibuprofen (ADVIL;MOTRIN) 800 MG tablet Take 1 tablet by mouth every 6 hours as needed for Pain 21   Mary Jo Lopez MD   erythromycin base (E-MYCIN) 500 MG tablet Take 1 tablet by mouth See Admin Instructions for 3 doses Take at 1300, 1400 and midnight the day before surgery 21   Mary Jo Lopez MD   dexlansoprazole (DEXILANT) 60 MG CPDR delayed release capsule Take 60 mg by mouth daily    Historical Provider, MD   vitamin D (CHOLECALCIFEROL) 33078 UNIT CAPS Take 1 capsule by mouth once a week 21   MARIXA Segura CNP   baclofen (LIORESAL) 20 MG tablet Take 1 tablet by mouth 4 times daily as needed (muscle spasms; pain) 21   MARIXA Segura CNP   rOPINIRole (REQUIP) 0.5 MG tablet Take 1 tablet by mouth 2 times daily as needed (restless legs) 21   MARIXA Segura CNP   ocrelizumab (OCREVUS) 300 MG/10ML SOLN injection Infuse 20 mLs intravenously every 6 months 21   MARIXA Segura CNP   nortriptyline (PAMELOR) 10 MG capsule TAKE 1 CAPSULE BY MOUTH ONCE NIGHTLY FOR HEADACHES 21   Chetan Cherry PA-C   famotidine (PEPCID) 40 MG tablet take 1 tablet by mouth every evening 4/6/21   Historical Provider, MD   gabapentin (NEURONTIN) 300 MG capsule TAKE 1 CAPSULE BY MOUTH THREE TIMES DAILY 3/10/21 8/13/21  DANISH Cortez   lisinopril-hydroCHLOROthiazide (PRINZIDE;ZESTORETIC) 10-12.5 MG per tablet TAKE 1 TABLET BY MOUTH ONCE DAILY 3/10/21   [place], DO   CPAP Machine MISC Heated tube and chin strap. Dispense as written. LIFETIME SUPPLIES. 11/6/20   Historical Provider, MD       Current medications:    No current facility-administered medications for this visit. Current Outpatient Medications   Medication Sig Dispense Refill    traMADol (ULTRAM) 50 MG tablet Take 1 tablet by mouth every 6 hours as needed for Pain for up to 3 days. Intended supply: 3 days.  Take lowest dose possible to manage pain 12 tablet 0    ibuprofen (ADVIL;MOTRIN) 800 MG tablet Take 1 tablet by mouth every 6 hours as needed for Pain 20 tablet 0    ibuprofen (ADVIL;MOTRIN) 800 MG tablet Take 1 tablet by mouth every 6 hours as needed for Pain 20 tablet 0    erythromycin base (E-MYCIN) 500 MG tablet Take 1 tablet by mouth See Admin Instructions for 3 doses Take at 1300, 1400 and midnight the day before surgery 3 tablet 0    dexlansoprazole (DEXILANT) 60 MG CPDR delayed release capsule Take 60 mg by mouth daily      vitamin D (CHOLECALCIFEROL) 69871 UNIT CAPS Take 1 capsule by mouth once a week 12 capsule 3    baclofen (LIORESAL) 20 MG tablet Take 1 tablet by mouth 4 times daily as needed (muscle spasms; pain) 360 tablet 3    rOPINIRole (REQUIP) 0.5 MG tablet Take 1 tablet by mouth 2 times daily as needed (restless legs) 60 tablet 11    ocrelizumab (OCREVUS) 300 MG/10ML SOLN injection Infuse 20 mLs intravenously every 6 months 20 mL 1    nortriptyline (PAMELOR) 10 MG capsule TAKE 1 CAPSULE BY MOUTH ONCE NIGHTLY FOR HEADACHES 30 capsule 3    famotidine (PEPCID) 40 MG tablet take 1 tablet by mouth every evening      gabapentin (NEURONTIN) 300 MG capsule TAKE 1 CAPSULE BY MOUTH THREE TIMES DAILY 90 capsule 5    lisinopril-hydroCHLOROthiazide (PRINZIDE;ZESTORETIC) 10-12.5 MG per tablet TAKE 1 TABLET BY MOUTH ONCE DAILY 30 tablet 5    CPAP Machine MISC Heated tube and chin strap. Dispense as written. LIFETIME SUPPLIES.        Facility-Administered Medications Ordered in Other Visits   Medication Dose Route Frequency Provider Last Rate Last Admin    sodium chloride flush 0.9 % injection 5-40 mL  5-40 mL IntraVENous 2 times per day Parul Napoles MD        sodium chloride flush 0.9 % injection 5-40 mL  5-40 mL IntraVENous PRN Parul Napoles MD        0.9 % sodium chloride infusion  25 mL IntraVENous PRN Parul Napoles MD        ondansetron (ZOFRAN-ODT) disintegrating tablet 4 mg  4 mg Oral Q8H PRN Parul Napoles MD        Or    ondansetron [facility]) injection 4 mg  4 mg IntraVENous Q6H PRN Parul Napoles MD   4 mg at 09/06/21 0715    enoxaparin (LOVENOX) injection 40 mg  40 mg SubCUTAneous Daily Parul Napoles MD        lactated ringers infusion   IntraVENous Continuous Parul Napoles  mL/hr at 09/06/21 0124 New Bag at 09/06/21 0124    morphine (PF) injection 2 mg  2 mg IntraVENous Q2H PRN Parul Napoles MD        Or    morphine injection 4 mg  4 mg IntraVENous Q2H PRN Parul Napoles MD   4 mg at 09/06/21 0715    metronidazole (FLAGYL) 500 mg in NaCl 100 mL IVPB premix  500 mg IntraVENous Q8H Parul Napoles  mL/hr at 09/06/21 0716 500 mg at 09/06/21 0716    cefTRIAXone (ROCEPHIN) 1,000 mg in sodium chloride (PF) 10 mL IV syringe  1,000 mg IntraVENous Daily Parul Napoles MD           Allergies:  No Known Allergies    Problem List:    Patient Active Problem List   Diagnosis Code    Vocal cord dysfunction J38.3    Multiple sclerosis ([facility] G35    Morbidly obese ([facility] E66.01    Palafox's esophagus with dysplasia K22.719    Secondary restless legs syndrome G25.81    S/P colectomy Z90.49    Malignant neoplasm of descending colon ([facility] C18.6    Ileus, postoperative ([facility] K91.89, K56.7       Past Medical History:        Diagnosis Date    Acid reflux disease     Cyst of ovary     Fracture of nasal bone     Headache     Hearing loss     Hypertension     Migraine     Morbidly obese ([facility] 10/5/2020    Multiple sclerosis ([facility]     denies limitations at present 11/2020    VEENA on CPAP     severe VEENA per pt    Right shoulder pain 11/2020    Tinnitus     TMJ dysfunction        Past Surgical History:        Procedure Laterality Date    APPENDECTOMY      BACK SURGERY      BICEPS TENDON REPAIR Right 11/11/2020    BICEPS TENODESIS performed by Corinne Hoyt MD at [place] [address] Left 8/31/2021    LAPAROSCOPIC LEFT HEMICOLECTOMY WITH LOOP OSTOMY performed by Parul Napoles MD at [address] [place] [address]  03/05/2018    Robotic hysterectomy, bilateral salpingectomy, right oophorectomy DR. ARIANA MONIQUE [facility]     HYSTERECTOMY, TOTAL ABDOMINAL      LARYNGOSCOPY N/A 7/17/2020    DIRECT LARYNGOSCOPY--OMNI GUIDE LASER performed by Radha Childress MD at [facility] 60 ARTHROSCOPY Right 11/11/2020    RIGHT SHOULDER DIAGNOSTIC ARTHROSCOPY WITH DECOMPRESSION ROTATOR CUFF REPAIR performed by Corinne Hoyt MD at [facility]         Social History:    Social History     Tobacco Use    Smoking status: Current Every Day Smoker     Packs/day: 0.50     Years: 25.00     Pack years: 12.50     Types: Cigarettes    Smokeless tobacco: Never Used    Tobacco comment: Ready to stop, requesting prescription for Chantix   Substance Use Topics    Alcohol use: Yes     Alcohol/week: 1.0 standard drinks     Types: 1 Glasses of wine per week     Comment: socially                                Ready to quit: Not Answered  Counseling given: Not Answered  Comment: Ready to stop, requesting prescription for Chantix      Vital Signs (Current):    There were no vitals filed for this visit.                                           BP Readings from Last 3 Encounters:   09/06/21 112/79   09/04/21 (!) 145/67   08/31/21 133/84       NPO Status:                                                                                 BMI:   Wt Readings from Last 3 Encounters:   08/31/21 216 lb (98 kg)   08/25/21 216 lb (98 kg)   08/19/21 210 lb (95.3 kg)     There is no height or weight on file to calculate BMI.    CBC:   Lab Results   Component Value Date    WBC 13.4 09/06/2021    RBC 4.10 09/06/2021    HGB 12.4 09/06/2021    HCT 39.0 09/06/2021    MCV 95.1 09/06/2021    RDW 13.2 09/06/2021     09/06/2021       CMP:   Lab Results   Component Value Date     09/06/2021    K 5.0 09/06/2021     09/06/2021    CO2 29 09/06/2021    BUN 9 09/06/2021    CREATININE 0.8 09/06/2021    GFRAA >60 09/06/2021    LABGLOM >60 09/06/2021    GLUCOSE 112 09/06/2021    PROT 6.6 09/06/2021    CALCIUM 8.9 09/06/2021    BILITOT 0.7 09/06/2021    ALKPHOS 117 09/06/2021    AST 15 09/06/2021    ALT 27 09/06/2021       POC Tests: No results for input(s): POCGLU, POCNA, POCK, POCCL, POCBUN, POCHEMO, POCHCT in the last 72 hours.     Coags:   Lab Results   Component Value Date    PROTIME 10.7 02/28/2020    INR 1.0 02/28/2020       HCG (If Applicable):   Lab Results   Component Value Date    PREGTESTUR neg 02/04/2018        ABGs: No results found for: PHART, PO2ART, XDJ9IHC, TGF9HJS, BEART, B8MXZFEA     Type & Screen (If Applicable):  Lab Results   Component Value Date    LABABO A 03/05/2018       Drug/Infectious Status (If Applicable):  No results found for: HIV, HEPCAB    COVID-19 Screening (If Applicable):   Lab Results   Component Value Date    COVID19 Not Detected 08/25/2021     Impression   Dilatation of small bowel segments in the left lower quadrant and left side   of the pelvis consistent with small bowel obstruction with transition point   likely in the left side of the pelvis.  Thickening of segments of small bowel   in the mid pelvis consistent with enteritis.       Small locules of free intraperitoneal air in the upper and mid abdomen,   likely postsurgical.  Larger locules of free intraperitoneal air adjacent to   anastomotic surgical clips in the mid lower pelvis which may be postsurgical.   Infection cannot be excluded. .       Moderate amount of fluid in the pelvis and extending into the pelvic   cul-de-sac with mild peripheral enhancement, best seen in the pelvic   cul-de-sac which may manifest infection and developing abscess.  Clinical   correlation recommended.               Anesthesia Evaluation  Patient summary reviewed and Nursing notes reviewed no history of anesthetic complications:   Airway: Mallampati: II  TM distance: >3 FB   Neck ROM: full  Mouth opening: > = 3 FB Dental:          Pulmonary: breath sounds clear to auscultation  (+) sleep apnea: on noncompliant,  current smoker          Patient did not smoke on day of surgery. ROS comment: Vocal cord dysfunction   Cardiovascular:  Exercise tolerance: good (>4 METS),   (+) hypertension: mild, murmur (Grade I),       ECG reviewed  Rhythm: regular  Rate: normal           Beta Blocker:  Not on Beta Blocker      ROS comment: Normal sinus rhythm  Normal ECG  No previous ECGs available     Neuro/Psych:   (+) neuromuscular disease (Pt. states MS in remission. Denies specific symptoms.): multiple sclerosis, headaches: migraine headaches,              ROS comment: Hearing loss with tinnitus    TMJ dysfunction GI/Hepatic/Renal:   (+) GERD: poorly controlled, morbid obesity (BMI 40.8 kg/m2)         ROS comment: Palafox's with dysplasia. Endo/Other:    (+) malignancy/cancer (Malignant neoplasm of the descending colon). Pt had no PAT visit        ROS comment: RLS Abdominal:   (+) obese,     Abdomen: tender. Vascular: negative vascular ROS.          Other Findings:             Anesthesia Plan      general     ASA 3 - emergent     (Note copied from 8/31/21 laparoscopic hemicolectomy with the appropriate addendums/additions  Modified RSI with HOB at 30 degrees RT  Ketamine  PONV prophylaxis  Advised that having surgery and anesthesia can be associated with exacerbation of MS symptoms - pt. accepts risks and chooses to proceed.)  Induction: intravenous. MIPS: Postoperative opioids intended and Prophylactic antiemetics administered. Anesthetic plan and risks discussed with patient. Plan discussed with CRNA.               Antonieta Delatorre DO   9/6/2021

## 2021-09-06 NOTE — ED PROVIDER NOTES
[address]      Pt Name: Chandrika Berry  MRN: [id number]  [place] 1975  Date of evaluation: 9/5/2021      CHIEF COMPLAINT       Chief Complaint   Patient presents with    Emesis     Emesis. Colon CA. Ileostomy placed tue. Sent in by Ame Fall. HPI  Chandrika Berry is a 51 y.o. female with a past medical history of colon cancer, had a descending colectomy, presents with complaints of nausea, vomiting for the last 24 hours. Patient was discharged yesterday from the hospital.  Patient states that she is has left lower quadrant abdominal pain that is moderate in severity, that is not progressively worsening. No alleviating or exacerbating factors. Has not taken any medications or measures alleviate her symptoms. States that she is attempted to take antiemetic medication at home but has not been able to eat or drink anything since discharge from the hospital yesterday. Admits to fever and chills at home. Denies chest pain, shortness of breath, dysuria, hematuria, diarrhea, constipation, new rashes or sores. Except as noted above the remainder of the review of systems was reviewed and negative. Review of Systems   Constitutional: Negative for chills and fever. HENT: Negative for ear pain, sinus pressure and sore throat. Eyes: Negative for pain, discharge and redness. Respiratory: Negative for cough, shortness of breath and wheezing. Cardiovascular: Negative for chest pain and leg swelling. Gastrointestinal: Positive for nausea and vomiting. Negative for abdominal distention and diarrhea. Genitourinary: Negative for dysuria and frequency. Musculoskeletal: Negative for arthralgias and back pain. Skin: Negative for color change, pallor, rash and wound. Neurological: Negative for weakness and headaches. Hematological: Negative for adenopathy. Psychiatric/Behavioral: Negative for agitation. All other systems reviewed and are negative. Physical Exam  Vitals and nursing note reviewed. Constitutional:       General: She is not in acute distress. Appearance: Normal appearance. She is well-developed. She is obese. She is not ill-appearing or diaphoretic. HENT:      Head: Normocephalic and atraumatic. Right Ear: External ear normal.      Left Ear: External ear normal.   Eyes:      Extraocular Movements: Extraocular movements intact. Pupils: Pupils are equal, round, and reactive to light. Cardiovascular:      Rate and Rhythm: Normal rate and regular rhythm. Pulmonary:      Effort: Pulmonary effort is normal. No respiratory distress. Breath sounds: Normal breath sounds. No wheezing or rales. Abdominal:      General: Bowel sounds are normal.      Palpations: Abdomen is soft. Tenderness: There is no abdominal tenderness. There is no guarding or rebound. Comments: Left lower quadrant abdominal tenderness palpation. No rebound or guarding. Diminished bowel sounds. Patient has a ileostomy in the right upper quadrant. Has watery stool output. Not tympanic. Musculoskeletal:         General: Normal range of motion. Cervical back: Normal range of motion. Right lower leg: No edema. Left lower leg: No edema. Skin:     General: Skin is warm and dry. Capillary Refill: Capillary refill takes less than 2 seconds. Neurological:      General: No focal deficit present. Mental Status: She is alert and oriented to person, place, and time. Cranial Nerves: No cranial nerve deficit.       Coordination: Coordination normal.   Psychiatric:         Mood and Affect: Mood normal.          Procedures     MDM  Number of Diagnoses or Management Options  Diagnosis management comments: 41-year-old female with medical history of colon cancer, recently discharged from the hospital yesterday with ileostomy after colonic resection presents with complaints of nausea, vomiting, and abdominal pain for 1 day. Vitals significant for tachycardia with a heart rate of 103. On physical exam patient is in no acute distress, speaking full sentences, alert and oriented x3. She has mild tenderness palpation of left lower quadrant her abdomen. Is not hepatic. No rebound or guarding. Minimal bowel sounds. Patient's colostomy has watery, loose stool. No gas. Diagnostic labs and imaging interpreted and reviewed. Hypokalemia, small bowel obstruction on imaging versus enteritis. Patient given analgesic medication, antiemetics, and the department with moderately for symptoms. Patient is not actively vomiting at this time. NG tube not placed. Spoke with general surgery will admit patient for SBO. ED Course as of Sep 05 2221   Val Hernandez Sep 05, 2021   2012 ATTENDING PROVIDER ATTESTATION:     I have personally performed and/or participated in the history, exam, medical decision making, and procedures and agree with all pertinent clinical information unless otherwise noted. I have also reviewed and agree with the past medical, family and social history unless otherwise noted. I have discussed this patient in detail with the resident, and provided the instruction and education regarding the patient. My findings/plan:  51year old female with recent diagnosis of colon CA with colectomy 5 days prior to arrival.  She notes she was just discharged yesterday. Since being discharged home she is unable to keep anything down. Currently feels nauseated, has not had anything to eat or drink since discharge. Patient notes some fatigue. Notes generalized abdominal discomfort. Surgical incision appears well. Abdomen soft, generalized tenderness, bruising to left flank. Lungs clear to auscultation throughout, heart regular rate and rhythm. [BB]   2208 Spoke with Dr. Torrey Smith who will admit patient for small bowel obstruction.     [JV]      ED Course User Index  [BB] Lamar Solano Yajaira Sharma MD           --------------------------------------------- PAST HISTORY ---------------------------------------------  Past Medical History:  has a past medical history of Acid reflux disease, Cyst of ovary, Fracture of nasal bone, Headache, Hearing loss, Hypertension, Migraine, Morbidly obese (HCC), Multiple sclerosis ([facility] [address] VEENA on CPAP, Right shoulder pain, Tinnitus, and TMJ dysfunction. Past Surgical History:  has a past surgical history that includes back surgery; Cholecystectomy; cyst removal; Hysterectomy (03/05/2018); laryngoscopy (N/A, 7/17/2020); Shoulder arthroscopy (Right, 11/11/2020); Biceps tendon repair (Right, 11/11/2020); Appendectomy; Esophagus surgery; partial hysterectomy (cervix not removed); Hysterectomy, total abdominal; Tonsillectomy; and colectomy (Left, 8/31/2021). Social History:  reports that she has been smoking cigarettes. She has a 12.50 pack-year smoking history. She has never used smokeless tobacco. She reports current alcohol use of about 1.0 standard drinks of alcohol per week. She reports that she does not use drugs. Family History: family history includes Mult Sclerosis in her mother. The patients home medications have been reviewed. Allergies: Patient has no known allergies.     -------------------------------------------------- RESULTS -------------------------------------------------    LABS:  Results for orders placed or performed during the hospital encounter of 09/05/21   CBC Auto Differential   Result Value Ref Range    WBC 14.9 (H) 4.5 - 11.5 E9/L    RBC 4.59 3.50 - 5.50 E12/L    Hemoglobin 14.1 11.5 - 15.5 g/dL    Hematocrit 42.6 34.0 - 48.0 %    MCV 92.8 80.0 - 99.9 fL    MCH 30.7 26.0 - 35.0 pg    MCHC 33.1 32.0 - 34.5 %    RDW 12.9 11.5 - 15.0 fL    Platelets 438 562 - 140 E9/L    MPV 10.2 7.0 - 12.0 fL    Neutrophils % 78.8 43.0 - 80.0 %    Immature Granulocytes % 2.3 0.0 - 5.0 %    Lymphocytes % 8.4 (L) 20.0 - 42.0 %    Monocytes % 8.9 2.0 - 12.0 %    Eosinophils % 1.1 0.0 - 6.0 %    Basophils % 0.5 0.0 - 2.0 %    Neutrophils Absolute 11.76 (H) 1.80 - 7.30 E9/L    Immature Granulocytes # 0.35 E9/L    Lymphocytes Absolute 1.26 (L) 1.50 - 4.00 E9/L    Monocytes Absolute 1.33 (H) 0.10 - 0.95 E9/L    Eosinophils Absolute 0.16 0.05 - 0.50 E9/L    Basophils Absolute 0.07 0.00 - 0.20 E9/L   Comprehensive Metabolic Panel w/ Reflex to MG   Result Value Ref Range    Sodium 136 132 - 146 mmol/L    Potassium reflex Magnesium 5.4 (H) 3.5 - 5.0 mmol/L    Chloride 98 98 - 107 mmol/L    CO2 28 22 - 29 mmol/L    Anion Gap 10 7 - 16 mmol/L    Glucose 131 (H) 74 - 99 mg/dL    BUN 8 6 - 20 mg/dL    CREATININE 0.8 0.5 - 1.0 mg/dL    GFR Non-African American >60 >=60 mL/min/1.73    GFR African American >60     Calcium 9.7 8.6 - 10.2 mg/dL    Total Protein 7.8 6.4 - 8.3 g/dL    Albumin 3.4 (L) 3.5 - 5.2 g/dL    Total Bilirubin 1.2 0.0 - 1.2 mg/dL    Alkaline Phosphatase 126 (H) 35 - 104 U/L    ALT 33 (H) 0 - 32 U/L    AST 19 0 - 31 U/L   Lipase   Result Value Ref Range    Lipase 10 (L) 13 - 60 U/L   Urinalysis, reflex to microscopic   Result Value Ref Range    Color, UA Yellow Straw/Yellow    Clarity, UA Clear Clear    Glucose, Ur Negative Negative mg/dL    Bilirubin Urine SMALL (A) Negative    Ketones, Urine TRACE (A) Negative mg/dL    Specific Gravity, UA 1.015 1.005 - 1.030    Blood, Urine SMALL (A) Negative    pH, UA 6.5 5.0 - 9.0    Protein, UA TRACE Negative mg/dL    Urobilinogen, Urine 0.2 <2.0 E.U./dL    Nitrite, Urine Negative Negative    Leukocyte Esterase, Urine TRACE (A) Negative   Lactic Acid, Plasma   Result Value Ref Range    Lactic Acid 1.2 0.5 - 2.2 mmol/L   Microscopic Urinalysis   Result Value Ref Range    WBC, UA 1-3 0 - 5 /HPF    RBC, UA 1-3 0 - 2 /HPF    Epithelial Cells, UA MODERATE /HPF    Bacteria, UA MODERATE (A) None Seen /HPF       RADIOLOGY:  CT ABDOMEN PELVIS W IV CONTRAST Additional Contrast? None   Final Result Dilatation of small bowel segments in the left lower quadrant and left side   of the pelvis consistent with small bowel obstruction with transition point   likely in the left side of the pelvis. Thickening of segments of small bowel   in the mid pelvis consistent with enteritis. Small locules of free intraperitoneal air in the upper and mid abdomen,   likely postsurgical.  Larger locules of free intraperitoneal air adjacent to   anastomotic surgical clips in the mid lower pelvis which may be postsurgical.   Infection cannot be excluded. .      Moderate amount of fluid in the pelvis and extending into the pelvic   cul-de-sac with mild peripheral enhancement, best seen in the pelvic   cul-de-sac which may manifest infection and developing abscess. Clinical   correlation recommended. EKG:  This EKG is signed and interpreted by me.          ------------------------- NURSING NOTES AND VITALS REVIEWED ---------------------------  Date / Time Roomed:  9/5/2021  7:25 PM  ED Bed Assignment:  [id number]    The nursing notes within the ED encounter and vital signs as below have been reviewed. Patient Vitals for the past 24 hrs:   BP Temp Pulse Resp SpO2   09/05/21 2152 127/66 -- 90 18 96 %   09/05/21 2146 -- -- 80 -- --   09/05/21 1845 126/71 -- 103 20 97 %   09/05/21 1610 -- 97.9 °F (36.6 °C) 109 22 98 %       Oxygen Saturation Interpretation: Normal    ------------------------------------------ PROGRESS NOTES ------------------------------------------    Counseling:  I have spoken with the patient and discussed todays results, in addition to providing specific details for the plan of care and counseling regarding the diagnosis and prognosis. Their questions are answered at this time and they are agreeable with the plan of admission.    --------------------------------- ADDITIONAL PROVIDER NOTES ---------------------------------  Consultations:  Spoke with Dr. Kaila Garcia. Discussed case.   They will admit the patient. This patient's ED course included: a personal history and physicial examination, re-evaluation prior to disposition, multiple bedside re-evaluations, IV medications, cardiac monitoring and continuous pulse oximetry    This patient has remained hemodynamically stable during their ED course. Diagnosis:  1. Ileus, postoperative ([address]    2. Non-intractable vomiting with nausea, unspecified vomiting type        Disposition:  Patient's disposition: Admit to med/surg floor  Patient's condition is fair.         Kristina Garcia MD  Resident  09/05/21 1008

## 2021-09-06 NOTE — ANESTHESIA POSTPROCEDURE EVALUATION
Department of Anesthesiology  Postprocedure Note    Patient: Irina Bear  MRN: [id number]  YOB: 1975  Date of evaluation: 9/6/2021  Time:  10:49 AM     Procedure Summary     Date: 09/06/21 Room / Location: [address] [place] [address] [facility]    Anesthesia Start: 1199 Anesthesia Stop: 6256    Procedure: DIAGNOSTIC LAPAROSCOPY POSSIBLE BOWEL RESECTION POSSILBE OSTOMY (N/A ) Diagnosis: (POSSIBLE BOWEL OBSTRUCTION)    Surgeons: Genia Villanueva MD Responsible Provider: Tiara Guallpa DO    Anesthesia Type: general ASA Status: 3 - Emergent          Anesthesia Type: general    Daniel Phase I: Daniel Score: 8    Daniel Phase II:      Last vitals: Reviewed and per EMR flowsheets.        Anesthesia Post Evaluation    Patient location during evaluation: bedside  Patient participation: complete - patient participated  Level of consciousness: awake  Pain score: 4  Airway patency: patent  Nausea & Vomiting: no vomiting and no nausea  Complications: no  Cardiovascular status: hemodynamically stable  Respiratory status: acceptable  Hydration status: stable

## 2021-09-06 NOTE — ED NOTES
Bed: 16  Expected date:   Expected time:   Means of arrival:   Comments:  Pt in [facility] [address], RN  09/06/21 0021

## 2021-09-06 NOTE — ED NOTES
Report called and given to [facility] DR MARY PHAM.      Rita Pepper RN  09/06/21 1467

## 2021-09-06 NOTE — PLAN OF CARE
Problem: Pain:  Goal: Pain level will decrease  Outcome: Met This Shift     Problem: Pain:  Goal: Control of acute pain  Outcome: Met This Shift     Problem: Pain:  Goal: Control of chronic pain  Outcome: Met This Shift     Problem: Falls - Risk of:  Goal: Will remain free from falls  Outcome: Met This Shift     Problem: Falls - Risk of:  Goal: Absence of physical injury  Outcome: Met This Shift     Problem: Sensory:  Goal: General experience of comfort will improve  Outcome: Met This Shift     Problem: Urinary Elimination:  Goal: Complications related to the disease process, condition or treatment will be avoided or minimized  Outcome: Met This Shift     Problem: Fluid Volume:  Goal: Ability to achieve a balanced intake and output will improve  Outcome: Met This Shift     Problem:  Bowel/Gastric:  Goal: Ability to achieve a regular elimination pattern will improve  Outcome: Ongoing     Problem: Urinary Elimination:  Goal: Signs and symptoms of infection will decrease  Outcome: Not Met This Shift     Problem: Physical Regulation:  Goal: Ability to maintain clinical measurements within normal limits will improve  Outcome: Not Met This Shift     Problem: Physical Regulation:  Goal: Will show no signs and symptoms of electrolyte imbalance  Outcome: Not Met This Shift

## 2021-09-06 NOTE — H&P
General Surgery History and Physical    Patient's Name/Date of Birth: Zuleyma Yanez / 1975    Date: September 6, 2021     Surgeon: Lesli Villalobos M.D.    PCP: Hans Halsted, PA-C     Chief Complaint: abdominal pain    HPI:   Zuleyma Yanez is a 51 y.o. female who presents for evaluation of abdominal pain and nausea that has been increasing largely each day since Friday after having a lap colectomy with diverting loop ileostomy 8/31, she came to ED for this reason and CT showed a possible leak at anastomotic site and she is very uncomfortable. Timing is constant, radiation to left pelvis, alleviated by nothing and started friday and severity is 2-9/10      Past Medical History:   Diagnosis Date    Acid reflux disease     Cyst of ovary     Fracture of nasal bone     Headache     Hearing loss     Hypertension     Migraine     Morbidly obese ([address] 10/5/2020    Multiple sclerosis ([address]     denies limitations at present 11/2020    VEENA on CPAP     severe VEENA per pt    Right shoulder pain 11/2020    Tinnitus     TMJ dysfunction        Past Surgical History:   Procedure Laterality Date    APPENDECTOMY      BACK SURGERY      BICEPS TENDON REPAIR Right 11/11/2020    BICEPS TENODESIS performed by Radha Talbot MD at [place] [id number] Left 8/31/2021    LAPAROSCOPIC LEFT HEMICOLECTOMY WITH LOOP OSTOMY performed by Tommy Carvajal MD at [address] [place] [address]  03/05/2018    Robotic hysterectomy, bilateral salpingectomy, right oophorectomy DR. ARIANA MONIQUE [facility]     HYSTERECTOMY, TOTAL ABDOMINAL      LARYNGOSCOPY N/A 7/17/2020    DIRECT LARYNGOSCOPY--OMNI GUIDE LASER performed by Jahaira Aguilar MD at [facility] ARTHROSCOPY Right 11/11/2020    RIGHT SHOULDER DIAGNOSTIC ARTHROSCOPY WITH DECOMPRESSION ROTATOR CUFF REPAIR performed by Radha Talbot MD at [place] [address] Current Facility-Administered Medications   Medication Dose Route Frequency Provider Last Rate Last Admin    sodium chloride flush 0.9 % injection 5-40 mL  5-40 mL IntraVENous 2 times per day Jovanni Nguyen MD        sodium chloride flush 0.9 % injection 5-40 mL  5-40 mL IntraVENous PRN Jovanni Nguyen MD        0.9 % sodium chloride infusion  25 mL IntraVENous PRN Jovanni Nguyen MD        ondansetron (ZOFRAN-ODT) disintegrating tablet 4 mg  4 mg Oral Q8H PRN Jovanni Nguyen MD        Or    ondansetron [facility]) injection 4 mg  4 mg IntraVENous Q6H PRN Jovanni Nguyen MD   4 mg at 09/06/21 0715    enoxaparin (LOVENOX) injection 40 mg  40 mg SubCUTAneous Daily Jovanni Nguyen MD        lactated ringers infusion   IntraVENous Continuous Jovanni Nguyen  mL/hr at 09/06/21 0124 New Bag at 09/06/21 0124    morphine (PF) injection 2 mg  2 mg IntraVENous Q2H PRN Jovanni Nguyen MD        Or    morphine injection 4 mg  4 mg IntraVENous Q2H PRN Jovanni Nguyen MD   4 mg at 09/06/21 0715    metronidazole (FLAGYL) 500 mg in NaCl 100 mL IVPB premix  500 mg IntraVENous Kodi Mora MD   Stopped at 09/06/21 9964    cefTRIAXone (ROCEPHIN) 1,000 mg in sodium chloride (PF) 10 mL IV syringe  1,000 mg IntraVENous Daily Jovanni Nguyen MD           No Known Allergies    The patient has a family history that is negative for severe cardiovascular or respiratory issues, negative for reaction to anesthesia.     Social History     Socioeconomic History    Marital status: Single     Spouse name: Not on file    Number of children: 3    Years of education: 6    Highest education level: 11th grade   Occupational History    Not on file   Tobacco Use    Smoking status: Current Every Day Smoker     Packs/day: 0.50     Years: 25.00     Pack years: 12.50     Types: Cigarettes    Smokeless tobacco: Never Used    Tobacco comment: Ready to stop, requesting prescription for Chantix   Vaping Use    Vaping Use: Never used   Substance and Sexual Activity    Alcohol use: Yes     Alcohol/week: 1.0 standard drinks     Types: 1 Glasses of wine per week     Comment: socially    Drug use: No    Sexual activity: Yes     Partners: Male   Other Topics Concern    Not on file   Social History Narrative    Uses [employer] for transportation    [facility]     Social Determinants of Health     Financial Resource Strain: Medium Risk    Difficulty of Paying Living Expenses: Somewhat hard   Food Insecurity: Food Insecurity Present    Worried About Running Out of Food in the Last Year: Sometimes true    [place] of Food in the Last Year: Never true   Transportation Needs: No Transportation Needs    Lack of Transportation (Medical): No    Lack of Transportation (Non-Medical): No   Physical Activity: Sufficiently Active    Days of Exercise per Week: 3 days    Minutes of Exercise per Session: 60 min   Stress: No Stress Concern Present    Feeling of Stress : Not at all   Social Connections: Socially Isolated    Frequency of Communication with Friends and Family:  Three times a week    Frequency of Social Gatherings with Friends and Family: Twice a week    Attends Shinto Services: Never    Active Member of Clubs or Organizations: No    Attends Club or Organization Meetings: Never    Marital Status: Never    Intimate Partner Violence:     Fear of Current or Ex-Partner:     Emotionally Abused:     Physically Abused:     Sexually Abused:            Review of Systems  Review of Systems -  General ROS: negative for - chills, fatigue or malaise  ENT ROS: negative for - hearing change, nasal congestion or nasal discharge  Allergy and Immunology ROS: negative for - hives, itchy/watery eyes or nasal congestion  Hematological and Lymphatic ROS: negative for - blood clots, blood transfusions, bruising or fatigue  Endocrine ROS: negative for - malaise/lethargy, mood swings, palpitations or polydipsia/polyuria  Respiratory ROS: negative for - sputum changes, stridor, tachypnea or wheezing  Cardiovascular ROS: negative for - irregular heartbeat, loss of consciousness, murmur or orthopnea  Gastrointestinal ROS: negative for - constipation, diarrhea, gas/bloating, heartburn or hematemesis  Genito-Urinary ROS: negative for -  genital discharge, genital ulcers or hematuria  Musculoskeletal ROS: negative for - gait disturbance, muscle pain or muscular weakness    Physical exam:   /84   Pulse 91   Temp 99.5 °F (37.5 °C) (Oral)   Resp 18   Ht 5' 2\" (1.575 m)   Wt 215 lb (97.5 kg)   LMP 01/05/2018   SpO2 95%   BMI 39.32 kg/m²   General appearance:  NAD  Head: NCAT, PERRLA, EOMI, red conjunctiva  Neck: supple, no masses  Lungs: CTAB, equal chest rise bilateral  Heart: Reg rate  Abdomen: soft, moderately tender, nondistended  Skin; no lesions  Gu: no cva tenderness  Extremities: extremities normal, atraumatic, no cyanosis or edema      Radiology:  CT abdomen/pelvis:   Dilatation of small bowel segments in the left lower quadrant and left side   of the pelvis consistent with small bowel obstruction with transition point   likely in the left side of the pelvis.  Thickening of segments of small bowel   in the mid pelvis consistent with enteritis.       Small locules of free intraperitoneal air in the upper and mid abdomen,   likely postsurgical.  Larger locules of free intraperitoneal air adjacent to   anastomotic surgical clips in the mid lower pelvis which may be postsurgical.   Infection cannot be excluded. .       Moderate amount of fluid in the pelvis and extending into the pelvic   cul-de-sac with mild peripheral enhancement, best seen in the pelvic   cul-de-sac which may manifest infection and developing abscess.  Clinical   correlation recommended. Assessment:  51 y.o. female with likely anastomotic leak     Plan:   Will take to OR for diagnostic lap possible open, possible bowel resection possible ostomy  Discussed the risk, benefits and alternatives of surgery including wound infections, bleeding, scar and hernia formation and the risks of general anesthetic including MI, CVA, sudden death or reactions to anesthetic medications. The patient understands the risks and alternatives and the possibility of converting to an open procedure. All questions were answered to the patient's satisfaction and they freely signed the consent.       Qamar Zuñiga MD  9:02 AM  9/6/2021

## 2021-09-07 LAB
ANION GAP SERPL CALCULATED.3IONS-SCNC: 10 MMOL/L (ref 7–16)
BASOPHILS ABSOLUTE: 0.05 E9/L (ref 0–0.2)
BASOPHILS RELATIVE PERCENT: 0.4 % (ref 0–2)
BUN BLDV-MCNC: 6 MG/DL (ref 6–20)
CALCIUM SERPL-MCNC: 8.8 MG/DL (ref 8.6–10.2)
CHLORIDE BLD-SCNC: 103 MMOL/L (ref 98–107)
CO2: 27 MMOL/L (ref 22–29)
CREAT SERPL-MCNC: 0.7 MG/DL (ref 0.5–1)
EOSINOPHILS ABSOLUTE: 0.08 E9/L (ref 0.05–0.5)
EOSINOPHILS RELATIVE PERCENT: 0.6 % (ref 0–6)
GFR AFRICAN AMERICAN: >60
GFR NON-AFRICAN AMERICAN: >60 ML/MIN/1.73
GLUCOSE BLD-MCNC: 126 MG/DL (ref 74–99)
HCT VFR BLD CALC: 36.3 % (ref 34–48)
HEMOGLOBIN: 11.4 G/DL (ref 11.5–15.5)
IMMATURE GRANULOCYTES #: 0.45 E9/L
IMMATURE GRANULOCYTES %: 3.3 % (ref 0–5)
LYMPHOCYTES ABSOLUTE: 2.42 E9/L (ref 1.5–4)
LYMPHOCYTES RELATIVE PERCENT: 17.8 % (ref 20–42)
MCH RBC QN AUTO: 30.3 PG (ref 26–35)
MCHC RBC AUTO-ENTMCNC: 31.4 % (ref 32–34.5)
MCV RBC AUTO: 96.5 FL (ref 80–99.9)
MONOCYTES ABSOLUTE: 1.17 E9/L (ref 0.1–0.95)
MONOCYTES RELATIVE PERCENT: 8.6 % (ref 2–12)
NEUTROPHILS ABSOLUTE: 9.43 E9/L (ref 1.8–7.3)
NEUTROPHILS RELATIVE PERCENT: 69.3 % (ref 43–80)
PDW BLD-RTO: 13.3 FL (ref 11.5–15)
PLATELET # BLD: 279 E9/L (ref 130–450)
PMV BLD AUTO: 10.2 FL (ref 7–12)
POTASSIUM SERPL-SCNC: 3.9 MMOL/L (ref 3.5–5)
RBC # BLD: 3.76 E12/L (ref 3.5–5.5)
SODIUM BLD-SCNC: 140 MMOL/L (ref 132–146)
WBC # BLD: 13.6 E9/L (ref 4.5–11.5)

## 2021-09-07 PROCEDURE — 6370000000 HC RX 637 (ALT 250 FOR IP): Performed by: SURGERY

## 2021-09-07 PROCEDURE — 2500000003 HC RX 250 WO HCPCS: Performed by: SURGERY

## 2021-09-07 PROCEDURE — 6360000002 HC RX W HCPCS: Performed by: SURGERY

## 2021-09-07 PROCEDURE — 6370000000 HC RX 637 (ALT 250 FOR IP): Performed by: STUDENT IN AN ORGANIZED HEALTH CARE EDUCATION/TRAINING PROGRAM

## 2021-09-07 PROCEDURE — 85025 COMPLETE CBC W/AUTO DIFF WBC: CPT

## 2021-09-07 PROCEDURE — 1200000000 HC SEMI PRIVATE

## 2021-09-07 PROCEDURE — 80048 BASIC METABOLIC PNL TOTAL CA: CPT

## 2021-09-07 PROCEDURE — 2580000003 HC RX 258: Performed by: SURGERY

## 2021-09-07 PROCEDURE — 36415 COLL VENOUS BLD VENIPUNCTURE: CPT

## 2021-09-07 RX ORDER — PANTOPRAZOLE SODIUM 40 MG/1
40 TABLET, DELAYED RELEASE ORAL
Status: DISCONTINUED | OUTPATIENT
Start: 2021-09-07 | End: 2021-09-10 | Stop reason: HOSPADM

## 2021-09-07 RX ORDER — SCOLOPAMINE TRANSDERMAL SYSTEM 1 MG/1
1 PATCH, EXTENDED RELEASE TRANSDERMAL
Status: DISCONTINUED | OUTPATIENT
Start: 2021-09-07 | End: 2021-09-10 | Stop reason: HOSPADM

## 2021-09-07 RX ADMIN — MORPHINE SULFATE 4 MG: 4 INJECTION, SOLUTION INTRAMUSCULAR; INTRAVENOUS at 10:39

## 2021-09-07 RX ADMIN — METRONIDAZOLE 500 MG: 500 INJECTION, SOLUTION INTRAVENOUS at 23:33

## 2021-09-07 RX ADMIN — PANTOPRAZOLE SODIUM 40 MG: 40 TABLET, DELAYED RELEASE ORAL at 10:38

## 2021-09-07 RX ADMIN — MORPHINE SULFATE 2 MG: 2 INJECTION, SOLUTION INTRAMUSCULAR; INTRAVENOUS at 02:54

## 2021-09-07 RX ADMIN — CEFTRIAXONE SODIUM 1000 MG: 1 INJECTION, POWDER, FOR SOLUTION INTRAMUSCULAR; INTRAVENOUS at 10:27

## 2021-09-07 RX ADMIN — METRONIDAZOLE 500 MG: 500 INJECTION, SOLUTION INTRAVENOUS at 15:49

## 2021-09-07 RX ADMIN — METRONIDAZOLE 500 MG: 500 INJECTION, SOLUTION INTRAVENOUS at 00:25

## 2021-09-07 RX ADMIN — ONDANSETRON 4 MG: 2 INJECTION INTRAMUSCULAR; INTRAVENOUS at 08:05

## 2021-09-07 RX ADMIN — OXYCODONE 10 MG: 5 TABLET ORAL at 23:34

## 2021-09-07 RX ADMIN — OXYCODONE 10 MG: 5 TABLET ORAL at 15:45

## 2021-09-07 RX ADMIN — SODIUM CHLORIDE, POTASSIUM CHLORIDE, SODIUM LACTATE AND CALCIUM CHLORIDE: 600; 310; 30; 20 INJECTION, SOLUTION INTRAVENOUS at 05:19

## 2021-09-07 RX ADMIN — METRONIDAZOLE 500 MG: 500 INJECTION, SOLUTION INTRAVENOUS at 07:51

## 2021-09-07 RX ADMIN — PANTOPRAZOLE SODIUM 40 MG: 40 TABLET, DELAYED RELEASE ORAL at 15:54

## 2021-09-07 RX ADMIN — OXYCODONE 10 MG: 5 TABLET ORAL at 00:25

## 2021-09-07 RX ADMIN — MORPHINE SULFATE 4 MG: 4 INJECTION, SOLUTION INTRAMUSCULAR; INTRAVENOUS at 14:09

## 2021-09-07 RX ADMIN — ENOXAPARIN SODIUM 40 MG: 40 INJECTION SUBCUTANEOUS at 10:17

## 2021-09-07 RX ADMIN — SODIUM CHLORIDE, POTASSIUM CHLORIDE, SODIUM LACTATE AND CALCIUM CHLORIDE: 600; 310; 30; 20 INJECTION, SOLUTION INTRAVENOUS at 19:55

## 2021-09-07 RX ADMIN — MORPHINE SULFATE 4 MG: 4 INJECTION, SOLUTION INTRAMUSCULAR; INTRAVENOUS at 19:54

## 2021-09-07 ASSESSMENT — PAIN DESCRIPTION - DESCRIPTORS
DESCRIPTORS: CRAMPING;PRESSURE
DESCRIPTORS: ACHING;DISCOMFORT;DULL

## 2021-09-07 ASSESSMENT — PAIN - FUNCTIONAL ASSESSMENT
PAIN_FUNCTIONAL_ASSESSMENT: PREVENTS OR INTERFERES SOME ACTIVE ACTIVITIES AND ADLS
PAIN_FUNCTIONAL_ASSESSMENT: PREVENTS OR INTERFERES SOME ACTIVE ACTIVITIES AND ADLS

## 2021-09-07 ASSESSMENT — PAIN DESCRIPTION - PAIN TYPE
TYPE: SURGICAL PAIN
TYPE: SURGICAL PAIN

## 2021-09-07 ASSESSMENT — PAIN DESCRIPTION - ORIENTATION
ORIENTATION: RIGHT;LEFT
ORIENTATION: RIGHT;LEFT

## 2021-09-07 ASSESSMENT — PAIN DESCRIPTION - ONSET
ONSET: ON-GOING
ONSET: ON-GOING

## 2021-09-07 ASSESSMENT — PAIN DESCRIPTION - PROGRESSION
CLINICAL_PROGRESSION: GRADUALLY WORSENING
CLINICAL_PROGRESSION: GRADUALLY WORSENING

## 2021-09-07 ASSESSMENT — PAIN SCALES - GENERAL
PAINLEVEL_OUTOF10: 8
PAINLEVEL_OUTOF10: 5
PAINLEVEL_OUTOF10: 7
PAINLEVEL_OUTOF10: 2
PAINLEVEL_OUTOF10: 10
PAINLEVEL_OUTOF10: 7
PAINLEVEL_OUTOF10: 9

## 2021-09-07 ASSESSMENT — PAIN DESCRIPTION - LOCATION
LOCATION: ABDOMEN
LOCATION: ABDOMEN

## 2021-09-07 ASSESSMENT — PAIN DESCRIPTION - FREQUENCY
FREQUENCY: CONTINUOUS
FREQUENCY: CONTINUOUS

## 2021-09-07 NOTE — PROGRESS NOTES
Department of Internal Medicine  Internal Medicine Note Note    Primary Care Physician: Chung Hart PA-C   Admitting Physician:  Jovanni Nguyen MD  Admission date and time: 9/5/2021  7:25 PM    Room:  0321/0321-01  Admitting diagnosis: Ileus, postoperative ([facility] [address] [K91.89, K56.7]  Non-intractable vomiting with nausea, unspecified vomiting type [R11.2]      Patient Name: Saritha Bronson  MRN: [id number]    Date of Service: 9/7/2021     Reason for consultation: Medical management    History of present illness:    Patient was discharged 9/5 by general surgery with patient having history colon cancer descending colon status post laparoscopic descending colon resection with end-to-end stapled anastomosis and diverting loop ileostomy. Patient started having more nausea vomiting abdominal pain after discharge. Patient came back to ED with temperature 97.9 with heart rate in 90 and blood pressure 127/68. O2 sat was 96% on room air at rest.  CT the abdomen and pelvis showed dilation of small bowel loops in the left lower quadrant consistent with small bowel obstruction with large locules of free intraperitoneal air adjacent to anastomotic surgical clips in the mid lower pelvis along with moderate amount of fluid in the pelvis. WBC is 14.9 with hemoglobin 14.1.  BUN/creatinine is 8/0.8 with potassium 5.4. Urinalysis showed trace leuks esterase and moderate bacteria. Patient is seen postop. Patient had a benign diagnostic laparoscopy with washout of postoperative fluids and drain placement. Patient with postop discomfort. 9/7/2021  Patient seen examined on medical surgical floor. Patient still complains of nausea and decreased appetite. Patient has expected postop discomfort. Patient denies any chest pain or unusual shortness of breath. General surgery note reviewed. Patient had random blood sugar yesterday afternoon at 227. Temperature 98.1 with heart rate 82 blood pressure 109/66.   O2 sat 95% on room air at rest.  Diet was increased today to solids. PAST MEDICAL Hx:  Past Medical History:   Diagnosis Date    Acid reflux disease     Cyst of ovary     Fracture of nasal bone     Headache     Hearing loss     Hypertension     Migraine     Morbidly obese ([address] 10/5/2020    Multiple sclerosis ([facility] [address]     denies limitations at present 2020    VEENA on CPAP     severe VEENA per pt    Right shoulder pain 2020    Tinnitus     TMJ dysfunction        PAST SURGICAL Hx:   Past Surgical History:   Procedure Laterality Date    APPENDECTOMY      BACK SURGERY      BICEPS TENDON REPAIR Right 2020    BICEPS TENODESIS performed by Patricia Choi MD at [place] [address] Left 2021    LAPAROSCOPIC LEFT HEMICOLECTOMY WITH LOOP OSTOMY performed by Monda Apgar, MD at [address] 2021    DIAGNOSTIC LAPAROSCOPY POSSIBLE BOWEL RESECTION POSSILBE OSTOMY performed by Monda Apgar, MD at [address] [facility]  2018    Robotic hysterectomy, bilateral salpingectomy, right oophorectomy DR. ARIANA MONIQUE [facility]     HYSTERECTOMY, TOTAL ABDOMINAL      LARYNGOSCOPY N/A 2020    DIRECT LARYNGOSCOPY--OMNI GUIDE LASER performed by Swapnil Tony MD at [facility] ARTHROSCOPY Right 2020    RIGHT SHOULDER DIAGNOSTIC ARTHROSCOPY WITH DECOMPRESSION ROTATOR CUFF REPAIR performed by Patricia Choi MD at [address] [place] [address] Hx:  Family History   Problem Relation Age of Onset    Mult Sclerosis Mother     Colon Cancer Neg Hx     Uterine Cancer Neg Hx     Ovarian Cancer Neg Hx     Breast Cancer Neg Hx        HOME MEDICATIONS:  Prior to Admission medications    Medication Sig Start Date End Date Taking? Authorizing Provider   traMADol (ULTRAM) 50 MG tablet Take 1 tablet by mouth every 6 hours as needed for Pain for up to 3 days. Intended supply: 3 days.  Take lowest dose possible to manage pain 9/4/21 9/7/21  Tommy Carvajal MD   ibuprofen (ADVIL;MOTRIN) 800 MG tablet Take 1 tablet by mouth every 6 hours as needed for Pain 9/4/21   Tommy Carvajal MD   ibuprofen (ADVIL;MOTRIN) 800 MG tablet Take 1 tablet by mouth every 6 hours as needed for Pain 9/1/21   Tommy Carvajal MD   erythromycin base (E-MYCIN) 500 MG tablet Take 1 tablet by mouth See Admin Instructions for 3 doses Take at 1300, 1400 and midnight the day before surgery 8/27/21   Tommy Carvajal MD   dexlansoprazole (DEXILANT) 60 MG CPDR delayed release capsule Take 60 mg by mouth daily    Historical Provider, MD   vitamin D (CHOLECALCIFEROL) 49490 UNIT CAPS Take 1 capsule by mouth once a week 8/13/21   MARIXA Ortiz CNP   baclofen (LIORESAL) 20 MG tablet Take 1 tablet by mouth 4 times daily as needed (muscle spasms; pain) 8/13/21   MARIXA Ortiz CNP   rOPINIRole (REQUIP) 0.5 MG tablet Take 1 tablet by mouth 2 times daily as needed (restless legs) 8/13/21   MARIXA Ortiz CNP   ocrelizumab (OCREVUS) 300 MG/10ML SOLN injection Infuse 20 mLs intravenously every 6 months 7/19/21   MARIXA Ortiz CNP   nortriptyline (PAMELOR) 10 MG capsule TAKE 1 CAPSULE BY MOUTH ONCE NIGHTLY FOR HEADACHES 7/2/21   Maxine Santos PA-C   famotidine (PEPCID) 40 MG tablet take 1 tablet by mouth every evening 4/6/21   Historical Provider, MD   gabapentin (NEURONTIN) 300 MG capsule TAKE 1 CAPSULE BY MOUTH THREE TIMES DAILY 3/10/21 8/13/21  DANISH Martinez   lisinopril-hydroCHLOROthiazide (PRINZIDE;ZESTORETIC) 10-12.5 MG per tablet TAKE 1 TABLET BY MOUTH ONCE DAILY 3/10/21   Riky Gonzalez DO   CPAP Machine MISC Heated tube and chin strap. Dispense as written. LIFETIME SUPPLIES. 11/6/20   Historical Provider, MD       ALLERGIES:  Patient has no known allergies.     SOCIAL Hx:  Social History     Socioeconomic History    Marital status: Single     Spouse name: Not on file    Number of children: 3  Years of education: 6    Highest education level: 11th grade   Occupational History    Not on file   Tobacco Use    Smoking status: Current Every Day Smoker     Packs/day: 0.50     Years: 25.00     Pack years: 12.50     Types: Cigarettes    Smokeless tobacco: Never Used    Tobacco comment: Ready to stop, requesting prescription for Chantix   Vaping Use    Vaping Use: Never used   Substance and Sexual Activity    Alcohol use: Yes     Alcohol/week: 1.0 standard drinks     Types: 1 Glasses of wine per week     Comment: socially    Drug use: No    Sexual activity: Yes     Partners: Male   Other Topics Concern    Not on file   Social History Narrative    Uses [employer] for transportation    [facility]     Social Determinants of Health     Financial Resource Strain: Medium Risk    Difficulty of Paying Living Expenses: Somewhat hard   Food Insecurity: Food Insecurity Present    Worried About Running Out of Food in the Last Year: Sometimes true    [address] [place] [address] Place of Food in the Last Year: Never true   Transportation Needs: No Transportation Needs    Lack of Transportation (Medical): No    Lack of Transportation (Non-Medical): No   Physical Activity: Sufficiently Active    Days of Exercise per Week: 3 days    Minutes of Exercise per Session: 60 min   Stress: No Stress Concern Present    Feeling of Stress : Not at all   Social Connections: Socially Isolated    Frequency of Communication with Friends and Family:  Three times a week    Frequency of Social Gatherings with Friends and Family: Twice a week    Attends Jehov[employer]'s witness Services: Never    Active Member of Clubs or Organizations: No    Attends Club or Organization Meetings: Never    Marital Status: Never    Intimate Partner Violence:     Fear of Current or Ex-Partner:     Emotionally Abused:     Physically Abused:     Sexually Abused:        ROS: Positive in bold  General:   Denies chills, fatigue, fever, malaise, night sweats or weight loss    Psychological:   Denies anxiety, disorientation or hallucinations    ENT:    Denies epistaxis, headaches, vertigo or visual changes    Cardiovascular:   Denies any chest pain, irregular heartbeats, or palpitations. No paroxysmal nocturnal dyspnea. Respiratory:   Denies shortness of breath, coughing, sputum production, hemoptysis, or wheezing. No orthopnea. Gastrointestinal:   + nausea, vomiting, no diarrhea, or constipation. Denies any abdominal pain. Denies change in bowel habits or stools. Genito-Urinary:    Denies any urgency, frequency, hematuria. Voiding without difficulty. Musculoskeletal:   Denies joint pain, joint stiffness, joint swelling or muscle pain    Neurology:    Denies any headache or focal neurological deficits. No weakness or paresthesia. Derm:    Denies any rashes, ulcers, or excoriations. Denies bruising. Extremities:   Denies any lower extremity swelling or edema. PHYSICAL EXAM: Abnormal findings noted  VITALS:  Vitals:    09/07/21 1015   BP: 109/66   Pulse: 82   Resp: 16   Temp:    SpO2: 95%         CONSTITUTIONAL:    Awake, alert, cooperative, moderate abdominal distress, and appears stated age    EYES:     EOMI, sclera clear without icterus, conjunctiva normal    ENT:    Normocephalic, atraumatic,  External ears without lesions. NECK:    Supple, symmetrical, trachea midline,  no JVD    HEMATOLOGIC/LYMPHATICS:    No cervical lymphadenopathy and no supraclavicular lymphadenopathy    LUNGS:    Symmetric. No increased work of breathing, good air exchange, clear to auscultation bilaterally, no wheezes, rhonchi, or rales,     CARDIOVASCULAR:    Normal apical impulse, regular rate and rhythm, normal S1 and S2, no S3 or S4, and no murmur noted    ABDOMEN:    Patient has abdominal tenderness, + abdominal distention,  Ileostomy in place    MUSCULOSKELETAL:    There is no redness, warmth, or swelling of the joints.       NEUROLOGIC:    Awake, alert, oriented to name, place and time. SKIN:    No bruising or bleeding. No redness, warmth, or swelling    EXTREMITIES:    Peripheral pulses present. No edema, cyanosis, or swelling. LINES/CATHETERS     LABORATORY DATA:  CBC with Differential:    Lab Results   Component Value Date    WBC 13.4 09/06/2021    RBC 4.10 09/06/2021    HGB 12.4 09/06/2021    HCT 39.0 09/06/2021     09/06/2021    MCV 95.1 09/06/2021    MCH 30.2 09/06/2021    MCHC 31.8 09/06/2021    RDW 13.2 09/06/2021    LYMPHOPCT 8.4 09/05/2021    MONOPCT 8.9 09/05/2021    BASOPCT 0.5 09/05/2021    MONOSABS 1.33 09/05/2021    LYMPHSABS 1.26 09/05/2021    EOSABS 0.16 09/05/2021    BASOSABS 0.07 09/05/2021     CMP:    Lab Results   Component Value Date     09/06/2021    K 5.0 09/06/2021     09/06/2021    CO2 29 09/06/2021    BUN 9 09/06/2021    CREATININE 0.8 09/06/2021    GFRAA >60 09/06/2021    LABGLOM >60 09/06/2021    GLUCOSE 112 09/06/2021    PROT 6.6 09/06/2021    LABALBU 2.8 09/06/2021    CALCIUM 8.9 09/06/2021    BILITOT 0.7 09/06/2021    ALKPHOS 117 09/06/2021    AST 15 09/06/2021    ALT 27 09/06/2021       ASSESSMENT/PLAN:  1. Colon cancer of descending colon status post laparoscopic descending colon resection with end-to-end stapled anastomosis and diverting loop ileostomy 8/31/2021  2. Probable anastomotic leak  3. Multiple sclerosis  4. Hypertension  5. Prior diagnosis of obstructive sleep apnea without current use of CPAP  6. GERD  7. History of migraine headache  8. Morbid obesity  9. Current tobacco abuse     Plan:  Home medications reviewed   Monitor heart rate, blood pressure, O2 saturation  CPAP at bedtime-patient's family to: Settings  Protonix 40 milligrams IV daily  Routine labs in a.m.   Pain meds per general surgery  Diet per general surgery  Lovenox 40 mg subcu daily  Hold lisinopril, hydrochlorothiazide until blood pressure improves  IV Rocephin/Flagyl    Discharge home when okay with general surgery  KARMA, CBC      Madison Loera DO  12:10 PM  9/7/2021

## 2021-09-07 NOTE — HOME CARE
[facility] following for DC plans. Patient was pending admission to home care prior to this hospitalization. Will need new [facility] orders if applicable.  Nory Dailey LPN [facility]

## 2021-09-07 NOTE — PROGRESS NOTES
GENERAL SURGERY  DAILY PROGRESS NOTE  9/7/2021    Chief Complaint   Patient presents with    Emesis     Emesis. Colon CA. Ileostomy placed tue. Sent in by [facility]. Subjective:  Pain is controlled this morning, no vomiting. Her nausea is improved but states it is constantly there but she is able to tolerate PO and is hungry. She has not had any flatus or bowel function.     Objective:  /69   Pulse 67   Temp 98.1 °F (36.7 °C) (Oral)   Resp 16   Ht 5' 2\" (1.575 m)   Wt 215 lb (97.5 kg)   LMP 01/05/2018   SpO2 97%   BMI 39.32 kg/m²     GENERAL:  Laying in bed, awake, alert, cooperative, no apparent distress  HEAD: Normocephalic, atraumatic  EYES: No sclera icterus, pupils equal  LUNGS:  No increased work of breathing  CARDIOVASCULAR:  RR  ABDOMEN:  Soft, appropriately tender, non-distended, incisions c/d/i covered with glue, RUQ ileostomy pink with 970 cc/24 hours, LLQ JAIMEE with 130 cc/24 hrs and serosanguinous output   EXTREMITIES: No edema or swelling  SKIN: Warm and dry    Assessment/Plan:  51 y.o. female with abdominal pain nausea and vomiting s/p lap colectomy with diverting loop ileostomy concern for anastomotic leak, however normal diagnostic lap with drain placement 9/6     Continue full liquids  Cont IVF  Cont Abx  Montior ileostomy output   Nausea control will add Scopolamine     Electronically signed by Rosales Caraballo MD on 9/7/2021 at 6:35 AM      As above

## 2021-09-07 NOTE — PLAN OF CARE
Problem: Pain:  Goal: Pain level will decrease  9/7/2021 1153 by Thompson Hopper RN  Outcome: Met This Shift  Note: Pain is less than 3 after being medicated     Problem: Pain:  Goal: Control of acute pain  Outcome: Met This Shift     Problem: Pain:  Goal: Control of chronic pain  Outcome: Met This Shift     Problem: Falls - Risk of:  Goal: Will remain free from falls  Outcome: Met This Shift  Note: No falls     Problem: Falls - Risk of:  Goal: Absence of physical injury  Outcome: Met This Shift     Problem: Sensory:  Goal: General experience of comfort will improve  Outcome: Met This Shift     Problem: Urinary Elimination:  Goal: Signs and symptoms of infection will decrease  Outcome: Met This Shift     Problem: Urinary Elimination:  Goal: Ability to reestablish a normal urinary elimination pattern will improve - after catheter removal  Outcome: Met This Shift     Problem: Urinary Elimination:  Goal: Ability to reestablish a normal urinary elimination pattern will improve - after catheter removal  Outcome: Met This Shift     Problem: Urinary Elimination:  Goal: Complications related to the disease process, condition or treatment will be avoided or minimized  Outcome: Met This Shift     Problem:  Bowel/Gastric:  Goal: Ability to achieve a regular elimination pattern will improve  Outcome: Met This Shift     Problem: Fluid Volume:  Goal: Ability to achieve a balanced intake and output will improve  Outcome: Met This Shift     Problem: Fluid Volume:  Goal: Ability to achieve a balanced intake and output will improve  Outcome: Met This Shift     Problem: Physical Regulation:  Goal: Ability to maintain clinical measurements within normal limits will improve  Outcome: Met This Shift     Problem: Physical Regulation:  Goal: Will show no signs and symptoms of electrolyte imbalance  Outcome: Met This Shift

## 2021-09-07 NOTE — CARE COORDINATION
CM note: Pt was recently discharged from hospital s/p ileostomy. [facility] was arranged to start care today, however patient presented back to hospital after experiencing, nausea, vomiting and abdominal pain at home. Pt underwent diag lap and had a drain placed. [facility] aware of patient's admission and is following. Will NEED NEW [facility] orders as she is not currently under their services. Plan is to return home when medically stable.

## 2021-09-08 LAB
ANION GAP SERPL CALCULATED.3IONS-SCNC: 8 MMOL/L (ref 7–16)
BASOPHILS ABSOLUTE: 0.07 E9/L (ref 0–0.2)
BASOPHILS RELATIVE PERCENT: 0.7 % (ref 0–2)
BUN BLDV-MCNC: 6 MG/DL (ref 6–20)
CALCIUM SERPL-MCNC: 8.2 MG/DL (ref 8.6–10.2)
CHLORIDE BLD-SCNC: 104 MMOL/L (ref 98–107)
CO2: 25 MMOL/L (ref 22–29)
CREAT SERPL-MCNC: 0.8 MG/DL (ref 0.5–1)
EOSINOPHILS ABSOLUTE: 0.18 E9/L (ref 0.05–0.5)
EOSINOPHILS RELATIVE PERCENT: 1.9 % (ref 0–6)
GFR AFRICAN AMERICAN: >60
GFR NON-AFRICAN AMERICAN: >60 ML/MIN/1.73
GLUCOSE BLD-MCNC: 98 MG/DL (ref 74–99)
HCT VFR BLD CALC: 33.2 % (ref 34–48)
HEMOGLOBIN: 10.8 G/DL (ref 11.5–15.5)
IMMATURE GRANULOCYTES #: 0.43 E9/L
IMMATURE GRANULOCYTES %: 4.5 % (ref 0–5)
LYMPHOCYTES ABSOLUTE: 2.56 E9/L (ref 1.5–4)
LYMPHOCYTES RELATIVE PERCENT: 26.9 % (ref 20–42)
MCH RBC QN AUTO: 30.5 PG (ref 26–35)
MCHC RBC AUTO-ENTMCNC: 32.5 % (ref 32–34.5)
MCV RBC AUTO: 93.8 FL (ref 80–99.9)
MONOCYTES ABSOLUTE: 0.91 E9/L (ref 0.1–0.95)
MONOCYTES RELATIVE PERCENT: 9.6 % (ref 2–12)
NEUTROPHILS ABSOLUTE: 5.36 E9/L (ref 1.8–7.3)
NEUTROPHILS RELATIVE PERCENT: 56.4 % (ref 43–80)
PDW BLD-RTO: 13.4 FL (ref 11.5–15)
PLATELET # BLD: 270 E9/L (ref 130–450)
PMV BLD AUTO: 10.2 FL (ref 7–12)
POTASSIUM SERPL-SCNC: 4 MMOL/L (ref 3.5–5)
RBC # BLD: 3.54 E12/L (ref 3.5–5.5)
SODIUM BLD-SCNC: 137 MMOL/L (ref 132–146)
WBC # BLD: 9.5 E9/L (ref 4.5–11.5)

## 2021-09-08 PROCEDURE — 36415 COLL VENOUS BLD VENIPUNCTURE: CPT

## 2021-09-08 PROCEDURE — 6360000002 HC RX W HCPCS: Performed by: SURGERY

## 2021-09-08 PROCEDURE — 85025 COMPLETE CBC W/AUTO DIFF WBC: CPT

## 2021-09-08 PROCEDURE — 6370000000 HC RX 637 (ALT 250 FOR IP): Performed by: SURGERY

## 2021-09-08 PROCEDURE — 2500000003 HC RX 250 WO HCPCS: Performed by: SURGERY

## 2021-09-08 PROCEDURE — 80048 BASIC METABOLIC PNL TOTAL CA: CPT

## 2021-09-08 PROCEDURE — 2580000003 HC RX 258: Performed by: SURGERY

## 2021-09-08 PROCEDURE — 1200000000 HC SEMI PRIVATE

## 2021-09-08 RX ADMIN — MORPHINE SULFATE 4 MG: 4 INJECTION, SOLUTION INTRAMUSCULAR; INTRAVENOUS at 22:03

## 2021-09-08 RX ADMIN — CEFTRIAXONE SODIUM 1000 MG: 1 INJECTION, POWDER, FOR SOLUTION INTRAMUSCULAR; INTRAVENOUS at 09:23

## 2021-09-08 RX ADMIN — PANTOPRAZOLE SODIUM 40 MG: 40 TABLET, DELAYED RELEASE ORAL at 06:11

## 2021-09-08 RX ADMIN — ONDANSETRON 4 MG: 2 INJECTION INTRAMUSCULAR; INTRAVENOUS at 22:03

## 2021-09-08 RX ADMIN — PANTOPRAZOLE SODIUM 40 MG: 40 TABLET, DELAYED RELEASE ORAL at 16:36

## 2021-09-08 RX ADMIN — ONDANSETRON 4 MG: 2 INJECTION INTRAMUSCULAR; INTRAVENOUS at 06:06

## 2021-09-08 RX ADMIN — METRONIDAZOLE 500 MG: 500 INJECTION, SOLUTION INTRAVENOUS at 09:23

## 2021-09-08 RX ADMIN — METRONIDAZOLE 500 MG: 500 INJECTION, SOLUTION INTRAVENOUS at 16:36

## 2021-09-08 RX ADMIN — ENOXAPARIN SODIUM 40 MG: 40 INJECTION SUBCUTANEOUS at 09:23

## 2021-09-08 RX ADMIN — MORPHINE SULFATE 4 MG: 4 INJECTION, SOLUTION INTRAMUSCULAR; INTRAVENOUS at 09:28

## 2021-09-08 ASSESSMENT — PAIN SCALES - GENERAL
PAINLEVEL_OUTOF10: 2
PAINLEVEL_OUTOF10: 7
PAINLEVEL_OUTOF10: 10

## 2021-09-08 ASSESSMENT — PAIN DESCRIPTION - PROGRESSION: CLINICAL_PROGRESSION: NOT CHANGED

## 2021-09-08 ASSESSMENT — PAIN DESCRIPTION - DESCRIPTORS: DESCRIPTORS: ACHING;DISCOMFORT

## 2021-09-08 ASSESSMENT — PAIN DESCRIPTION - LOCATION: LOCATION: ABDOMEN

## 2021-09-08 ASSESSMENT — PAIN DESCRIPTION - ONSET: ONSET: GRADUAL

## 2021-09-08 ASSESSMENT — PAIN DESCRIPTION - PAIN TYPE: TYPE: SURGICAL PAIN

## 2021-09-08 ASSESSMENT — PAIN - FUNCTIONAL ASSESSMENT: PAIN_FUNCTIONAL_ASSESSMENT: ACTIVITIES ARE NOT PREVENTED

## 2021-09-08 ASSESSMENT — PAIN DESCRIPTION - FREQUENCY: FREQUENCY: INTERMITTENT

## 2021-09-08 NOTE — PROGRESS NOTES
Department of Internal Medicine  Internal Medicine Note Note    Primary Care Physician: Roel Rudolph PA-C   Admitting Physician:  Audie Vidal MD  Admission date and time: 9/5/2021  7:25 PM    Room:  [facility]-  Admitting diagnosis: Ileus, postoperative ([address] [K91.89, K56.7]  Non-intractable vomiting with nausea, unspecified vomiting type [R11.2]      Patient Name: Carlos Moeller  MRN: [id number]    Date of Service: 9/8/2021     Reason for consultation: Medical management    History of present illness:    Patient was discharged 9/5 by general surgery with patient having history colon cancer descending colon status post laparoscopic descending colon resection with end-to-end stapled anastomosis and diverting loop ileostomy. Patient started having more nausea vomiting abdominal pain after discharge. Patient came back to ED with temperature 97.9 with heart rate in 90 and blood pressure 127/68. O2 sat was 96% on room air at rest.  CT the abdomen and pelvis showed dilation of small bowel loops in the left lower quadrant consistent with small bowel obstruction with large locules of free intraperitoneal air adjacent to anastomotic surgical clips in the mid lower pelvis along with moderate amount of fluid in the pelvis. WBC is 14.9 with hemoglobin 14.1.  BUN/creatinine is 8/0.8 with potassium 5.4. Urinalysis showed trace leuks esterase and moderate bacteria. Patient is seen postop. Patient had a benign diagnostic laparoscopy with washout of postoperative fluids and drain placement. Patient with postop discomfort. 9/7/2021  Patient seen examined on medical surgical floor. Patient still complains of nausea and decreased appetite. Patient has expected postop discomfort. Patient denies any chest pain or unusual shortness of breath. General surgery note reviewed. Patient had random blood sugar yesterday afternoon at 227. Temperature 98.1 with heart rate 82 blood pressure 109/66.   O2 sat 95% on room air at rest.  Diet was increased today to solids. 9/8/2021  Patient seen examined on medical surgical floor. Patient has expected postop discomfort. Patient has some mild nausea off and on still. Patient has poor appetite. Patient denies any problem with any chest pain, dizziness or unusual shortness of breath. BUN/creatinine 6/0.8 with WBC 9.5 hemoglobin 10.8. Temperature is 90.8 with heart rate 74 and blood pressure 117/77. O2 sat 95% on room air at rest.  Patient states she is ambulating in the room with minimal problems. Patient diet was increased but patient has poor appetite. PAST MEDICAL Hx:  Past Medical History:   Diagnosis Date    Acid reflux disease     Cyst of ovary     Fracture of nasal bone     Headache     Hearing loss     Hypertension     Migraine     Morbidly obese ([address] 10/5/2020    Multiple sclerosis ([address]     denies limitations at present 11/2020    VEENA on CPAP     severe VEENA per pt    Right shoulder pain 11/2020    Tinnitus     TMJ dysfunction        PAST SURGICAL Hx:   Past Surgical History:   Procedure Laterality Date    APPENDECTOMY      BACK SURGERY      BICEPS TENDON REPAIR Right 11/11/2020    BICEPS TENODESIS performed by Sury Salguero MD at [place] [id number] Left 8/31/2021    LAPAROSCOPIC LEFT HEMICOLECTOMY WITH LOOP OSTOMY performed by Shaheed Antunez MD at [address] [facility] 9/6/2021    DIAGNOSTIC LAPAROSCOPY POSSIBLE BOWEL RESECTION POSSILBE OSTOMY performed by Shaheed Antunez MD at [address]  03/05/2018    Robotic hysterectomy, bilateral salpingectomy, right oophorectomy DR. ARIANA MONIQUE [facility]     HYSTERECTOMY, TOTAL ABDOMINAL      LARYNGOSCOPY N/A 7/17/2020    DIRECT LARYNGOSCOPY--OMNI GUIDE LASER performed by Soco Villegas MD at [facility] ARTHROSCOPY Right 11/11/2020    RIGHT SHOULDER DIAGNOSTIC ARTHROSCOPY WITH DECOMPRESSION ROTATOR CUFF REPAIR performed by Luna Reyes MD at [address] [place] [address] Hx:  Family History   Problem Relation Age of Onset    Mult Sclerosis Mother     Colon Cancer Neg Hx     Uterine Cancer Neg Hx     Ovarian Cancer Neg Hx     Breast Cancer Neg Hx        HOME MEDICATIONS:  Prior to Admission medications    Medication Sig Start Date End Date Taking?  Authorizing Provider   ibuprofen (ADVIL;MOTRIN) 800 MG tablet Take 1 tablet by mouth every 6 hours as needed for Pain 9/4/21   Qamar Zuñiga MD   ibuprofen (ADVIL;MOTRIN) 800 MG tablet Take 1 tablet by mouth every 6 hours as needed for Pain 9/1/21   Qamar Zuñiga MD   erythromycin base (E-MYCIN) 500 MG tablet Take 1 tablet by mouth See Admin Instructions for 3 doses Take at 1300, 1400 and midnight the day before surgery 8/27/21   Qamar Zuñiga MD   dexlansoprazole (DEXILANT) 60 MG CPDR delayed release capsule Take 60 mg by mouth daily    Historical Provider, MD   vitamin D (CHOLECALCIFEROL) 55364 UNIT CAPS Take 1 capsule by mouth once a week 8/13/21   MARIXA Cazares CNP   baclofen (LIORESAL) 20 MG tablet Take 1 tablet by mouth 4 times daily as needed (muscle spasms; pain) 8/13/21   MARIXA Cazares CNP   rOPINIRole (REQUIP) 0.5 MG tablet Take 1 tablet by mouth 2 times daily as needed (restless legs) 8/13/21   MARIXA Cazares CNP   ocrelizumab (OCREVUS) 300 MG/10ML SOLN injection Infuse 20 mLs intravenously every 6 months 7/19/21   MARIXA Cazares CNP   nortriptyline (PAMELOR) 10 MG capsule TAKE 1 CAPSULE BY MOUTH ONCE NIGHTLY FOR HEADACHES 7/2/21   Inell KIRSTEN Kat   famotidine (PEPCID) 40 MG tablet take 1 tablet by mouth every evening 4/6/21   Historical Provider, MD   gabapentin (NEURONTIN) 300 MG capsule TAKE 1 CAPSULE BY MOUTH THREE TIMES DAILY 3/10/21 8/13/21  DANISH Strong   lisinopril-hydroCHLOROthiazide (PRINZIDE;ZESTORETIC) 10-12.5 MG per tablet TAKE 1 TABLET BY MOUTH ONCE DAILY 3/10/21   Liz Kingston, DO   CPAP Machine MISC Heated tube and chin strap. Dispense as written. LIFETIME SUPPLIES. 11/6/20   Historical Provider, MD       ALLERGIES:  Patient has no known allergies. SOCIAL Hx:  Social History     Socioeconomic History    Marital status: Single     Spouse name: Not on file    Number of children: 3    Years of education: 6    Highest education level: 11th grade   Occupational History    Not on file   Tobacco Use    Smoking status: Current Every Day Smoker     Packs/day: 0.50     Years: 25.00     Pack years: 12.50     Types: Cigarettes    Smokeless tobacco: Never Used    Tobacco comment: Ready to stop, requesting prescription for Chantix   Vaping Use    Vaping Use: Never used   Substance and Sexual Activity    Alcohol use: Yes     Alcohol/week: 1.0 standard drinks     Types: 1 Glasses of wine per week     Comment: socially    Drug use: No    Sexual activity: Yes     Partners: Male   Other Topics Concern    Not on file   Social History Narrative    Uses [employer] for transportation    [employer]     Social Determinants of Health     Financial Resource Strain: Medium Risk    Difficulty of Paying Living Expenses: Somewhat hard   Food Insecurity: Food Insecurity Present    Worried About Running Out of Food in the Last Year: Sometimes true    [place] of Food in the Last Year: Never true   Transportation Needs: No Transportation Needs    Lack of Transportation (Medical): No    Lack of Transportation (Non-Medical): No   Physical Activity: Sufficiently Active    Days of Exercise per Week: 3 days    Minutes of Exercise per Session: 60 min   Stress: No Stress Concern Present    Feeling of Stress : Not at all   Social Connections: Socially Isolated    Frequency of Communication with Friends and Family:  Three times a week    Frequency of Social Gatherings with Friends and Family: Twice a week    Attends Protestant Services: Never    Active Member of Clubs or Organizations: No    Attends Club or Organization Meetings: Never    Marital Status: Never    Intimate Partner Violence:     Fear of Current or Ex-Partner:     Emotionally Abused:     Physically Abused:     Sexually Abused:        ROS: Positive in bold  General:   Denies chills, fatigue, fever, malaise, night sweats or weight loss    Psychological:   Denies anxiety, disorientation or hallucinations    ENT:    Denies epistaxis, headaches, vertigo or visual changes    Cardiovascular:   Denies any chest pain, irregular heartbeats, or palpitations. No paroxysmal nocturnal dyspnea. Respiratory:   Denies shortness of breath, coughing, sputum production, hemoptysis, or wheezing. No orthopnea. Gastrointestinal:   + nausea, vomiting, no diarrhea, or constipation. Denies any abdominal pain. Denies change in bowel habits or stools. Genito-Urinary:    Denies any urgency, frequency, hematuria. Voiding without difficulty. Musculoskeletal:   Denies joint pain, joint stiffness, joint swelling or muscle pain    Neurology:    Denies any headache or focal neurological deficits. No weakness or paresthesia. Derm:    Denies any rashes, ulcers, or excoriations. Denies bruising. Extremities:   Denies any lower extremity swelling or edema. PHYSICAL EXAM: Abnormal findings noted  VITALS:  Vitals:    09/08/21 0515   BP: 117/77   Pulse: 74   Resp: 16   Temp: 98.8 °F (37.1 °C)   SpO2: 95%         CONSTITUTIONAL:    Awake, alert, cooperative, moderate abdominal distress, and appears stated age    EYES:     EOMI, sclera clear without icterus, conjunctiva normal    ENT:    Normocephalic, atraumatic,  External ears without lesions. NECK:    Supple, symmetrical, trachea midline,  no JVD    HEMATOLOGIC/LYMPHATICS:    No cervical lymphadenopathy and no supraclavicular lymphadenopathy    LUNGS:    Symmetric.  No increased work of breathing, good air exchange, clear to auscultation bilaterally, no wheezes, rhonchi, or rales,     CARDIOVASCULAR:    Normal apical impulse, regular rate and rhythm, normal S1 and S2, no S3 or S4, and no murmur noted    ABDOMEN:    Patient has abdominal tenderness, + abdominal distention,  Ileostomy in place    MUSCULOSKELETAL:    There is no redness, warmth, or swelling of the joints. NEUROLOGIC:    Awake, alert, oriented to name, place and time. SKIN:    No bruising or bleeding. No redness, warmth, or swelling    EXTREMITIES:    Peripheral pulses present. No edema, cyanosis, or swelling. LINES/CATHETERS     LABORATORY DATA:  CBC with Differential:    Lab Results   Component Value Date    WBC 9.5 09/08/2021    RBC 3.54 09/08/2021    HGB 10.8 09/08/2021    HCT 33.2 09/08/2021     09/08/2021    MCV 93.8 09/08/2021    MCH 30.5 09/08/2021    MCHC 32.5 09/08/2021    RDW 13.4 09/08/2021    LYMPHOPCT 26.9 09/08/2021    MONOPCT 9.6 09/08/2021    BASOPCT 0.7 09/08/2021    MONOSABS 0.91 09/08/2021    LYMPHSABS 2.56 09/08/2021    EOSABS 0.18 09/08/2021    BASOSABS 0.07 09/08/2021     CMP:    Lab Results   Component Value Date     09/08/2021    K 4.0 09/08/2021    K 5.0 09/06/2021     09/08/2021    CO2 25 09/08/2021    BUN 6 09/08/2021    CREATININE 0.8 09/08/2021    GFRAA >60 09/08/2021    LABGLOM >60 09/08/2021    GLUCOSE 98 09/08/2021    PROT 6.6 09/06/2021    LABALBU 2.8 09/06/2021    CALCIUM 8.2 09/08/2021    BILITOT 0.7 09/06/2021    ALKPHOS 117 09/06/2021    AST 15 09/06/2021    ALT 27 09/06/2021       ASSESSMENT/PLAN:  1. Colon cancer of descending colon status post laparoscopic descending colon resection with end-to-end stapled anastomosis and diverting loop ileostomy 8/31/2021  2. Probable anastomotic leak  3. Multiple sclerosis  4. Hypertension  5. Prior diagnosis of obstructive sleep apnea without current use of CPAP  6. GERD  7. History of migraine headache  8. Morbid obesity  9.  Current tobacco abuse     Plan:  Home medications reviewed   Monitor heart rate, blood pressure, O2 saturation  CPAP at bedtime-patient's family to: Settings  Protonix 40 milligrams IV daily  Routine labs in a.m.   Pain meds per general surgery  Diet per general surgery  Lovenox 40 mg subcu daily  Hold lisinopril, hydrochlorothiazide until blood pressure improves  IV Rocephin/Flagyl    Discharge home when okay with general surgery      Manning Harada, DO  8:20 AM  9/8/2021

## 2021-09-08 NOTE — PROGRESS NOTES
GENERAL SURGERY  DAILY PROGRESS NOTE  9/8/2021    Chief Complaint   Patient presents with    Emesis     Emesis. Colon CA. Ileostomy placed tue. Sent in by Baron Burgos. Subjective:  C.o crampy abdominal pain. Having stool output from ostomy. Nausea a bit improved and was able to eat some yesterday.       Objective:  /77   Pulse 74   Temp 98.8 °F (37.1 °C) (Oral)   Resp 16   Ht 5' 2\" (1.575 m)   Wt 215 lb (97.5 kg)   LMP 01/05/2018   SpO2 95%   BMI 39.32 kg/m²     GENERAL:  Laying in bed, awake, alert, cooperative, no apparent distress  HEAD: Normocephalic, atraumatic  EYES: No sclera icterus, pupils equal  LUNGS:  No increased work of breathing  CARDIOVASCULAR:  RR  ABDOMEN:  Soft, appropriately tender, mildly distended, incisions c/d/i covered with glue, RUQ ileostomy pink with soft brown stool, LLQ JAIMEE with 50 cc/24 hrs and serous output    EXTREMITIES: No edema or swelling  SKIN: Warm and dry    Assessment/Plan:  51 y.o. female with abdominal pain nausea and vomiting s/p lap colectomy with diverting loop ileostomy concern for anastomotic leak, however normal diagnostic lap with drain placement 9/6     Diet as tolerated  Cont IVF  Cont Abx  Montior ileostomy output   Nausea improved with scopolamine patch     Electronically signed by Osbaldo Colin DO on 9/8/2021 at 8:19 AM    As above

## 2021-09-09 PROCEDURE — 6370000000 HC RX 637 (ALT 250 FOR IP): Performed by: SURGERY

## 2021-09-09 PROCEDURE — 2500000003 HC RX 250 WO HCPCS: Performed by: SURGERY

## 2021-09-09 PROCEDURE — 6370000000 HC RX 637 (ALT 250 FOR IP): Performed by: STUDENT IN AN ORGANIZED HEALTH CARE EDUCATION/TRAINING PROGRAM

## 2021-09-09 PROCEDURE — 6360000002 HC RX W HCPCS: Performed by: SURGERY

## 2021-09-09 PROCEDURE — 2580000003 HC RX 258: Performed by: SURGERY

## 2021-09-09 PROCEDURE — 6370000000 HC RX 637 (ALT 250 FOR IP): Performed by: INTERNAL MEDICINE

## 2021-09-09 PROCEDURE — 1200000000 HC SEMI PRIVATE

## 2021-09-09 PROCEDURE — 99024 POSTOP FOLLOW-UP VISIT: CPT | Performed by: SURGERY

## 2021-09-09 RX ORDER — PROMETHAZINE HYDROCHLORIDE 25 MG/1
12.5 TABLET ORAL EVERY 6 HOURS PRN
Status: DISCONTINUED | OUTPATIENT
Start: 2021-09-09 | End: 2021-09-10 | Stop reason: HOSPADM

## 2021-09-09 RX ORDER — LISINOPRIL 10 MG/1
10 TABLET ORAL DAILY
Status: DISCONTINUED | OUTPATIENT
Start: 2021-09-09 | End: 2021-09-10 | Stop reason: HOSPADM

## 2021-09-09 RX ADMIN — PROMETHAZINE HYDROCHLORIDE 12.5 MG: 25 TABLET ORAL at 15:39

## 2021-09-09 RX ADMIN — ENOXAPARIN SODIUM 40 MG: 40 INJECTION SUBCUTANEOUS at 08:06

## 2021-09-09 RX ADMIN — LISINOPRIL 10 MG: 10 TABLET ORAL at 10:07

## 2021-09-09 RX ADMIN — PANTOPRAZOLE SODIUM 40 MG: 40 TABLET, DELAYED RELEASE ORAL at 15:40

## 2021-09-09 RX ADMIN — OXYCODONE 10 MG: 5 TABLET ORAL at 15:40

## 2021-09-09 RX ADMIN — Medication 10 ML: at 22:18

## 2021-09-09 RX ADMIN — OXYCODONE 5 MG: 5 TABLET ORAL at 08:06

## 2021-09-09 RX ADMIN — PROMETHAZINE HYDROCHLORIDE 12.5 MG: 25 TABLET ORAL at 08:06

## 2021-09-09 RX ADMIN — ONDANSETRON 4 MG: 2 INJECTION INTRAMUSCULAR; INTRAVENOUS at 05:47

## 2021-09-09 RX ADMIN — PANTOPRAZOLE SODIUM 40 MG: 40 TABLET, DELAYED RELEASE ORAL at 05:50

## 2021-09-09 RX ADMIN — METRONIDAZOLE 500 MG: 500 INJECTION, SOLUTION INTRAVENOUS at 00:46

## 2021-09-09 RX ADMIN — SODIUM CHLORIDE, POTASSIUM CHLORIDE, SODIUM LACTATE AND CALCIUM CHLORIDE: 600; 310; 30; 20 INJECTION, SOLUTION INTRAVENOUS at 00:47

## 2021-09-09 ASSESSMENT — PAIN DESCRIPTION - DESCRIPTORS: DESCRIPTORS: ACHING;DISCOMFORT

## 2021-09-09 ASSESSMENT — PAIN DESCRIPTION - FREQUENCY: FREQUENCY: INTERMITTENT

## 2021-09-09 ASSESSMENT — PAIN - FUNCTIONAL ASSESSMENT: PAIN_FUNCTIONAL_ASSESSMENT: ACTIVITIES ARE NOT PREVENTED

## 2021-09-09 ASSESSMENT — PAIN SCALES - GENERAL
PAINLEVEL_OUTOF10: 5
PAINLEVEL_OUTOF10: 6
PAINLEVEL_OUTOF10: 7

## 2021-09-09 ASSESSMENT — PAIN DESCRIPTION - PROGRESSION: CLINICAL_PROGRESSION: NOT CHANGED

## 2021-09-09 ASSESSMENT — PAIN DESCRIPTION - ONSET: ONSET: GRADUAL

## 2021-09-09 ASSESSMENT — PAIN DESCRIPTION - ORIENTATION: ORIENTATION: RIGHT;LEFT

## 2021-09-09 ASSESSMENT — PAIN DESCRIPTION - PAIN TYPE: TYPE: SURGICAL PAIN

## 2021-09-09 ASSESSMENT — PAIN DESCRIPTION - LOCATION: LOCATION: ABDOMEN

## 2021-09-09 NOTE — PROGRESS NOTES
GENERAL SURGERY  DAILY PROGRESS NOTE  9/9/2021    Chief Complaint   Patient presents with    Emesis     Emesis. Colon CA. Ileostomy placed tue. Sent in by Angelika Easton. Subjective:  Feeling a bit better, still with intermittent nausea. Able to eat some. crampy pain improved. Objective:  BP (!) 146/73   Pulse 72   Temp 98.3 °F (36.8 °C) (Oral)   Resp 16   Ht 5' 2\" (1.575 m)   Wt 215 lb (97.5 kg)   LMP 01/05/2018   SpO2 95%   BMI 39.32 kg/m²     GENERAL:  Laying in bed, awake, alert, cooperative, no apparent distress  HEAD: Normocephalic, atraumatic  EYES: No sclera icterus, pupils equal  LUNGS:  No increased work of breathing  CARDIOVASCULAR:  RR  ABDOMEN:  Soft, appropriately tender, mildly distended, incisions c/d/i covered with glue, RUQ ileostomy pink with soft brown stool, LLQ JAIMEE with 50 cc/24 hrs and serous output    EXTREMITIES: No edema or swelling  SKIN: Warm and dry    Assessment/Plan:  51 y.o. female with abdominal pain nausea and vomiting s/p lap colectomy with diverting loop ileostomy concern for anastomotic leak, however normal diagnostic lap with drain placement 9/6     Diet as tolerated  Stop IV antibiotics, may be contributing to nausea  Stop IVF  Stop IV narcotics  Add phenergan  Hopefully home soon.     Electronically signed by Jerome West DO on 9/9/2021 at 1:12 PM

## 2021-09-09 NOTE — PLAN OF CARE
Problem: Pain:  Goal: Pain level will decrease  Description: Pain level will decrease  Outcome: Met This Shift  Goal: Control of acute pain  Description: Control of acute pain  Outcome: Met This Shift  Goal: Control of chronic pain  Description: Control of chronic pain  Outcome: Met This Shift     Problem: Falls - Risk of:  Goal: Will remain free from falls  Description: Will remain free from falls  Outcome: Met This Shift  Goal: Absence of physical injury  Description: Absence of physical injury  Outcome: Met This Shift     Problem: Sensory:  Goal: General experience of comfort will improve  Description: General experience of comfort will improve  Outcome: Met This Shift     Problem: Urinary Elimination:  Goal: Signs and symptoms of infection will decrease  Description: Signs and symptoms of infection will decrease  Outcome: Met This Shift  Goal: Ability to reestablish a normal urinary elimination pattern will improve - after catheter removal  Description: Ability to reestablish a normal urinary elimination pattern will improve  Outcome: Met This Shift  Goal: Complications related to the disease process, condition or treatment will be avoided or minimized  Description: Complications related to the disease process, condition or treatment will be avoided or minimized  Outcome: Met This Shift     Problem:  Bowel/Gastric:  Goal: Ability to achieve a regular elimination pattern will improve  Description: Ability to achieve a regular elimination pattern will improve  Outcome: Met This Shift     Problem: Fluid Volume:  Goal: Ability to achieve a balanced intake and output will improve  Description: Ability to achieve a balanced intake and output will improve  Outcome: Met This Shift     Problem: Physical Regulation:  Goal: Ability to maintain clinical measurements within normal limits will improve  Description: Ability to maintain clinical measurements within normal limits will improve  Outcome: Met This Shift  Goal: Will show no signs and symptoms of electrolyte imbalance  Description: Will show no signs and symptoms of electrolyte imbalance  Outcome: Met This Shift

## 2021-09-09 NOTE — PROGRESS NOTES
Department of Internal Medicine  Internal Medicine Note Note    Primary Care Physician: Alia Espinosa PA-C   Admitting Physician:  Kaylen Mcgarry MD  Admission date and time: 9/5/2021  7:25 PM    Room:  [facility]0333-  Admitting diagnosis: Ileus, postoperative ([facility] [address] [K91.89, K56.7]  Non-intractable vomiting with nausea, unspecified vomiting type [R11.2]      Patient Name: Alexander Galloway  MRN: [id number]    Date of Service: 9/9/2021     Reason for consultation: Medical management    History of present illness:    Patient was discharged 9/5 by general surgery with patient having history colon cancer descending colon status post laparoscopic descending colon resection with end-to-end stapled anastomosis and diverting loop ileostomy. Patient started having more nausea vomiting abdominal pain after discharge. Patient came back to ED with temperature 97.9 with heart rate in 90 and blood pressure 127/68. O2 sat was 96% on room air at rest.  CT the abdomen and pelvis showed dilation of small bowel loops in the left lower quadrant consistent with small bowel obstruction with large locules of free intraperitoneal air adjacent to anastomotic surgical clips in the mid lower pelvis along with moderate amount of fluid in the pelvis. WBC is 14.9 with hemoglobin 14.1.  BUN/creatinine is 8/0.8 with potassium 5.4. Urinalysis showed trace leuks esterase and moderate bacteria. Patient is seen postop. Patient had a benign diagnostic laparoscopy with washout of postoperative fluids and drain placement. Patient with postop discomfort. 9/7/2021  Patient seen examined on medical surgical floor. Patient still complains of nausea and decreased appetite. Patient has expected postop discomfort. Patient denies any chest pain or unusual shortness of breath. General surgery note reviewed. Patient had random blood sugar yesterday afternoon at 227. Temperature 98.1 with heart rate 82 blood pressure 109/66.   O2 sat 95% on room air at rest.  Diet was increased today to solids. 9/8/2021  Patient seen examined on medical surgical floor. Patient has expected postop discomfort. Patient has some mild nausea off and on still. Patient has poor appetite. Patient denies any problem with any chest pain, dizziness or unusual shortness of breath. BUN/creatinine 6/0.8 with WBC 9.5 hemoglobin 10.8. Temperature is 90.8 with heart rate 74 and blood pressure 117/77. O2 sat 95% on room air at rest.  Patient states she is ambulating in the room with minimal problems. Patient diet was increased but patient has poor appetite. 9/9/2021  Patient seen examined on medical surgical floor. Patient still complains of nausea but no emesis. Patient's abdominal pain is improving. Patient denies any chest pain, dizziness or unusual shortness of breath. Temperature is 98.3 with heart rate of 72 blood pressure 146/73. O2 sat 95% room air at rest.  Urine output is fairly good. General surgery note reviewed. Patient was switched to oral Phenergan for the nausea.       PAST MEDICAL Hx:  Past Medical History:   Diagnosis Date    Acid reflux disease     Cyst of ovary     Fracture of nasal bone     Headache     Hearing loss     Hypertension     Migraine     Morbidly obese ([address] 10/5/2020    Multiple sclerosis ([address]     denies limitations at present 11/2020    VEENA on CPAP     severe VEENA per pt    Right shoulder pain 11/2020    Tinnitus     TMJ dysfunction        PAST SURGICAL Hx:   Past Surgical History:   Procedure Laterality Date    APPENDECTOMY      BACK SURGERY      BICEPS TENDON REPAIR Right 11/11/2020    BICEPS TENODESIS performed by Nallely Hernandez MD at [facility] Left 8/31/2021    LAPAROSCOPIC LEFT HEMICOLECTOMY WITH LOOP OSTOMY performed by Dimitri Ivory MD at [address] 9/6/2021    DIAGNOSTIC LAPAROSCOPY POSSIBLE BOWEL RESECTION POSSILBE OSTOMY performed by Dimitri Ivory MD at [address]  CYST REMOVAL      ESOPHAGUS SURGERY      HYSTERECTOMY  03/05/2018    Robotic hysterectomy, bilateral salpingectomy, right oophorectomy DR. ARIANA MONIQUE [facility]     HYSTERECTOMY, TOTAL ABDOMINAL      LARYNGOSCOPY N/A 7/17/2020    DIRECT LARYNGOSCOPY--OMNI GUIDE LASER performed by Vergil Fothergill, MD at [address] ARTHROSCOPY Right 11/11/2020    RIGHT SHOULDER DIAGNOSTIC ARTHROSCOPY WITH DECOMPRESSION ROTATOR CUFF REPAIR performed by Coretta Paget, MD at [address] [place] [address] Hx:  Family History   Problem Relation Age of Onset    Mult Sclerosis Mother     Colon Cancer Neg Hx     Uterine Cancer Neg Hx     Ovarian Cancer Neg Hx     Breast Cancer Neg Hx        HOME MEDICATIONS:  Prior to Admission medications    Medication Sig Start Date End Date Taking?  Authorizing Provider   ibuprofen (ADVIL;MOTRIN) 800 MG tablet Take 1 tablet by mouth every 6 hours as needed for Pain 9/4/21   Lluvia Grijalva MD   ibuprofen (ADVIL;MOTRIN) 800 MG tablet Take 1 tablet by mouth every 6 hours as needed for Pain 9/1/21   Lluvia Grijalva MD   erythromycin base (E-MYCIN) 500 MG tablet Take 1 tablet by mouth See Admin Instructions for 3 doses Take at 1300, 1400 and midnight the day before surgery 8/27/21   Lluvia Grijalva MD   dexlansoprazole (DEXILANT) 60 MG CPDR delayed release capsule Take 60 mg by mouth daily    Historical Provider, MD   vitamin D (CHOLECALCIFEROL) 15168 UNIT CAPS Take 1 capsule by mouth once a week 8/13/21   MARIXA William CNP   baclofen (LIORESAL) 20 MG tablet Take 1 tablet by mouth 4 times daily as needed (muscle spasms; pain) 8/13/21   MARIXA William CNP   rOPINIRole (REQUIP) 0.5 MG tablet Take 1 tablet by mouth 2 times daily as needed (restless legs) 8/13/21   MARIXA William CNP   ocrelizumab (OCREVUS) 300 MG/10ML SOLN injection Infuse 20 mLs intravenously every 6 months 7/19/21   MARIXA William CNP nortriptyline (PAMELOR) 10 MG capsule TAKE 1 CAPSULE BY MOUTH ONCE NIGHTLY FOR HEADACHES 7/2/21   Laverne Wall PA-C   famotidine (PEPCID) 40 MG tablet take 1 tablet by mouth every evening 4/6/21   Historical Provider, MD   gabapentin (NEURONTIN) 300 MG capsule TAKE 1 CAPSULE BY MOUTH THREE TIMES DAILY 3/10/21 8/13/21  DANISH Aggarwal   lisinopril-hydroCHLOROthiazide (PRINZIDE;ZESTORETIC) 10-12.5 MG per tablet TAKE 1 TABLET BY MOUTH ONCE DAILY 3/10/21   Aura Salines, DO   CPAP Machine MISC Heated tube and chin strap. Dispense as written. LIFETIME SUPPLIES. 11/6/20   Historical Provider, MD       ALLERGIES:  Patient has no known allergies. SOCIAL Hx:  Social History     Socioeconomic History    Marital status: Single     Spouse name: Not on file    Number of children: 3    Years of education: 6    Highest education level: 11th grade   Occupational History    Not on file   Tobacco Use    Smoking status: Current Every Day Smoker     Packs/day: 0.50     Years: 25.00     Pack years: 12.50     Types: Cigarettes    Smokeless tobacco: Never Used    Tobacco comment: Ready to stop, requesting prescription for Chantix   Vaping Use    Vaping Use: Never used   Substance and Sexual Activity    Alcohol use: Yes     Alcohol/week: 1.0 standard drinks     Types: 1 Glasses of wine per week     Comment: socially    Drug use: No    Sexual activity: Yes     Partners: Male   Other Topics Concern    Not on file   Social History Narrative    Uses [employer] insurance for transportation    [employer]     Social Determinants of Health     Financial Resource Strain: Medium Risk    Difficulty of Paying Living Expenses: Somewhat hard   Food Insecurity: Food Insecurity Present    Worried About Running Out of Food in the Last Year: Sometimes true    [place] of Food in the Last Year: Never true   Transportation Needs: No Transportation Needs    Lack of Transportation (Medical):  No    Lack of Transportation (Non-Medical): No   Physical Activity: Sufficiently Active    Days of Exercise per Week: 3 days    Minutes of Exercise per Session: 60 min   Stress: No Stress Concern Present    Feeling of Stress : Not at all   Social Connections: Socially Isolated    Frequency of Communication with Friends and Family: Three times a week    Frequency of Social Gatherings with Friends and Family: Twice a week    Attends [employer] Services: Never    Active Member of Clubs or Organizations: No    Attends Club or Organization Meetings: Never    Marital Status: Never    Intimate Partner Violence:     Fear of Current or Ex-Partner:     Emotionally Abused:     Physically Abused:     Sexually Abused:        ROS: Positive in bold  General:   Denies chills, fatigue, fever, malaise, night sweats or weight loss    Psychological:   Denies anxiety, disorientation or hallucinations    ENT:    Denies epistaxis, headaches, vertigo or visual changes    Cardiovascular:   Denies any chest pain, irregular heartbeats, or palpitations. No paroxysmal nocturnal dyspnea. Respiratory:   Denies shortness of breath, coughing, sputum production, hemoptysis, or wheezing. No orthopnea. Gastrointestinal:   + nausea, vomiting, no diarrhea, or constipation. Denies any abdominal pain. Denies change in bowel habits or stools. Genito-Urinary:    Denies any urgency, frequency, hematuria. Voiding without difficulty. Musculoskeletal:   Denies joint pain, joint stiffness, joint swelling or muscle pain    Neurology:    Denies any headache or focal neurological deficits. No weakness or paresthesia. Derm:    Denies any rashes, ulcers, or excoriations. Denies bruising. Extremities:   Denies any lower extremity swelling or edema.       PHYSICAL EXAM: Abnormal findings noted  VITALS:  Vitals:    09/09/21 0514   BP: (!) 146/73   Pulse: 72   Resp: 16   Temp: 98.3 °F (36.8 °C)   SpO2: 95%         CONSTITUTIONAL:    Awake, alert, cooperative, moderate abdominal distress, and appears stated age    EYES:     EOMI, sclera clear without icterus, conjunctiva normal    ENT:    Normocephalic, atraumatic,  External ears without lesions. NECK:    Supple, symmetrical, trachea midline,  no JVD    HEMATOLOGIC/LYMPHATICS:    No cervical lymphadenopathy and no supraclavicular lymphadenopathy    LUNGS:    Symmetric. No increased work of breathing, good air exchange, clear to auscultation bilaterally, no wheezes, rhonchi, or rales,     CARDIOVASCULAR:    Normal apical impulse, regular rate and rhythm, normal S1 and S2, no S3 or S4, and no murmur noted    ABDOMEN:    Patient has abdominal tenderness, + abdominal distention,  Ileostomy in place    MUSCULOSKELETAL:    There is no redness, warmth, or swelling of the joints. NEUROLOGIC:    Awake, alert, oriented to name, place and time. SKIN:    No bruising or bleeding. No redness, warmth, or swelling    EXTREMITIES:    Peripheral pulses present. No edema, cyanosis, or swelling.     LINES/CATHETERS     LABORATORY DATA:  CBC with Differential:    Lab Results   Component Value Date    WBC 9.5 09/08/2021    RBC 3.54 09/08/2021    HGB 10.8 09/08/2021    HCT 33.2 09/08/2021     09/08/2021    MCV 93.8 09/08/2021    MCH 30.5 09/08/2021    MCHC 32.5 09/08/2021    RDW 13.4 09/08/2021    LYMPHOPCT 26.9 09/08/2021    MONOPCT 9.6 09/08/2021    BASOPCT 0.7 09/08/2021    MONOSABS 0.91 09/08/2021    LYMPHSABS 2.56 09/08/2021    EOSABS 0.18 09/08/2021    BASOSABS 0.07 09/08/2021     CMP:    Lab Results   Component Value Date     09/08/2021    K 4.0 09/08/2021    K 5.0 09/06/2021     09/08/2021    CO2 25 09/08/2021    BUN 6 09/08/2021    CREATININE 0.8 09/08/2021    GFRAA >60 09/08/2021    LABGLOM >60 09/08/2021    GLUCOSE 98 09/08/2021    PROT 6.6 09/06/2021    LABALBU 2.8 09/06/2021    CALCIUM 8.2 09/08/2021    BILITOT 0.7 09/06/2021    ALKPHOS 117 09/06/2021    AST 15 09/06/2021    ALT 27 09/06/2021       ASSESSMENT/PLAN:  1. Colon cancer of descending colon status post laparoscopic descending colon resection with end-to-end stapled anastomosis and diverting loop ileostomy 8/31/2021  2. Probable anastomotic leak  3. Multiple sclerosis  4. Hypertension  5. Prior diagnosis of obstructive sleep apnea without current use of CPAP  6. GERD  7. History of migraine headache  8. Morbid obesity  9. Current tobacco abuse     Plan:  Home medications reviewed   Monitor heart rate, blood pressure, O2 saturation  CPAP at bedtime-patient's family to: Settings  Protonix 40 milligrams IV daily  Routine labs in a.m. Pain meds per general surgery  Diet per general surgery  Lovenox 40 mg subcu daily  IV Rocephin/Flagyl-stopped by general surgery    Restart lisinopril 10 mg daily with blood pressure improving  CBC, BMP in a.m.      Discharge home when okay with general surgery      Ulysses Bars, DO  9:52 AM  9/9/2021

## 2021-09-10 VITALS
TEMPERATURE: 99.6 F | DIASTOLIC BLOOD PRESSURE: 84 MMHG | HEIGHT: 62 IN | BODY MASS INDEX: 39.56 KG/M2 | OXYGEN SATURATION: 96 % | SYSTOLIC BLOOD PRESSURE: 120 MMHG | RESPIRATION RATE: 16 BRPM | WEIGHT: 215 LBS | HEART RATE: 74 BPM

## 2021-09-10 DIAGNOSIS — I10 ESSENTIAL HYPERTENSION: ICD-10-CM

## 2021-09-10 PROCEDURE — 6370000000 HC RX 637 (ALT 250 FOR IP): Performed by: INTERNAL MEDICINE

## 2021-09-10 PROCEDURE — 6360000002 HC RX W HCPCS: Performed by: SURGERY

## 2021-09-10 PROCEDURE — 6370000000 HC RX 637 (ALT 250 FOR IP): Performed by: SURGERY

## 2021-09-10 PROCEDURE — 6370000000 HC RX 637 (ALT 250 FOR IP): Performed by: STUDENT IN AN ORGANIZED HEALTH CARE EDUCATION/TRAINING PROGRAM

## 2021-09-10 RX ORDER — LISINOPRIL AND HYDROCHLOROTHIAZIDE 12.5; 1 MG/1; MG/1
TABLET ORAL
Qty: 30 TABLET | Refills: 5 | Status: ON HOLD | OUTPATIENT
Start: 2021-09-10 | End: 2021-11-01 | Stop reason: HOSPADM

## 2021-09-10 RX ORDER — SCOLOPAMINE TRANSDERMAL SYSTEM 1 MG/1
1 PATCH, EXTENDED RELEASE TRANSDERMAL
Qty: 10 PATCH | Refills: 0 | Status: ON HOLD | OUTPATIENT
Start: 2021-09-10 | End: 2021-11-01 | Stop reason: HOSPADM

## 2021-09-10 RX ORDER — PROMETHAZINE HYDROCHLORIDE 12.5 MG/1
12.5 TABLET ORAL EVERY 6 HOURS PRN
Qty: 28 TABLET | Refills: 0 | Status: SHIPPED | OUTPATIENT
Start: 2021-09-10 | End: 2021-09-17

## 2021-09-10 RX ADMIN — LISINOPRIL 10 MG: 10 TABLET ORAL at 09:49

## 2021-09-10 RX ADMIN — PROMETHAZINE HYDROCHLORIDE 12.5 MG: 25 TABLET ORAL at 07:03

## 2021-09-10 RX ADMIN — ENOXAPARIN SODIUM 40 MG: 40 INJECTION SUBCUTANEOUS at 09:50

## 2021-09-10 RX ADMIN — PANTOPRAZOLE SODIUM 40 MG: 40 TABLET, DELAYED RELEASE ORAL at 06:27

## 2021-09-10 ASSESSMENT — PAIN SCALES - GENERAL
PAINLEVEL_OUTOF10: 5
PAINLEVEL_OUTOF10: 0

## 2021-09-10 ASSESSMENT — PAIN DESCRIPTION - PROGRESSION: CLINICAL_PROGRESSION: NOT CHANGED

## 2021-09-10 ASSESSMENT — PAIN DESCRIPTION - PAIN TYPE: TYPE: SURGICAL PAIN

## 2021-09-10 ASSESSMENT — PAIN DESCRIPTION - FREQUENCY: FREQUENCY: INTERMITTENT

## 2021-09-10 ASSESSMENT — PAIN DESCRIPTION - DESCRIPTORS: DESCRIPTORS: ACHING;DISCOMFORT

## 2021-09-10 ASSESSMENT — PAIN DESCRIPTION - ORIENTATION: ORIENTATION: RIGHT;LEFT

## 2021-09-10 ASSESSMENT — PAIN DESCRIPTION - ONSET: ONSET: GRADUAL

## 2021-09-10 ASSESSMENT — PAIN - FUNCTIONAL ASSESSMENT: PAIN_FUNCTIONAL_ASSESSMENT: ACTIVITIES ARE NOT PREVENTED

## 2021-09-10 ASSESSMENT — PAIN DESCRIPTION - LOCATION: LOCATION: ABDOMEN

## 2021-09-10 NOTE — DISCHARGE SUMMARY
Physician Discharge Summary     Patient ID:  Ryley Knowles  [id number]  34 y.o.  1975    Admit date: 9/5/2021    Discharge date and time: No discharge date for patient encounter. Admitting Physician: Noa Cao MD     Admission Diagnoses: Ileus, postoperative ([address] [K91.89, K56.7]  Non-intractable vomiting with nausea, unspecified vomiting type [R11.2]    Discharge Diagnoses: Active Problems:    Ileus, postoperative (HCC)    Pelvic abscess in female  Resolved Problems:    * No resolved hospital problems. *      Admission Condition: stable    Discharged Condition: stable      Hospital Course:  Ryley Knowles is a 51 y.o. female who presented with abdominal pain and nausea several days after lap colectomy w diverting loop ileostomy on 8/31. CT showed possible anastomotic leak. She was taken for Dx lap, washout and drain placement and treated with antibiotics. She progressed slowly and continued to have nausea that slowly improved. The patient's course was otherwise uneventful. She was tolerating a regular diet with no nausea or vomiting, and was in a suitable condition for discharge to home in stable condition. Consults:   IP CONSULT TO GENERAL SURGERY  IP CONSULT TO HOSPITALIST    Significant Diagnostic Studies:   CT ABDOMEN PELVIS W IV CONTRAST Additional Contrast? None    Result Date: 9/5/2021  EXAMINATION: CT OF THE ABDOMEN AND PELVIS WITH CONTRAST 9/5/2021 6:46 pm TECHNIQUE: CT of the abdomen and pelvis was performed with the administration of intravenous contrast. Multiplanar reformatted images are provided for review. Dose modulation, iterative reconstruction, and/or weight based adjustment of the mA/kV was utilized to reduce the radiation dose to as low as reasonably achievable.  COMPARISON: January 2018 HISTORY: ORDERING SYSTEM PROVIDED HISTORY: post op vomiting TECHNOLOGIST PROVIDED HISTORY: Additional Contrast?->None Reason for exam:->post op vomiting Decision Support Exception - unselect if not a suspected or confirmed emergency medical condition->Emergency Medical Condition (MA) FINDINGS: Lower Chest: There are atelectatic changes in the left lung base with no evidence of airspace consolidation or pleural effusions. Organs: There is mild diffuse fatty infiltration of the liver. The spleen, adrenals, pancreas are within normal limits. Status post cholecystectomy. Small right renal cysts, no obstructive uropathy. GI/Bowel: Right mid abdominal ileostomy. . Dilatation of small bowel segments in the left lower quadrant and left side of the pelvis consistent with small bowel obstruction with transition point likely in the left side of the pelvis. Small locules of free intraperitoneal air in the upper and mid abdomen likely postsurgical. Locules of free intraperitoneal air adjacent to surgical clips in the mid lower pelvis, which may be postsurgical. Thickening of adjacent segments of small bowel consistent with enteritis. Pelvis: Moderate amount of fluid in the pelvis and extending into the pelvic cul-de-sac. There is mild peripheral enhancement of the fluid best seen in the pelvic cul-de-sac which may manifest infection and abscess formation. Clinical correlation. Peritoneum/Retroperitoneum: No abdominal aortic aneurysm. Nonspecific retroperitoneal nodes. Bones/Soft Tissues: No acute bony pathology. Dilatation of small bowel segments in the left lower quadrant and left side of the pelvis consistent with small bowel obstruction with transition point likely in the left side of the pelvis. Thickening of segments of small bowel in the mid pelvis consistent with enteritis. Small locules of free intraperitoneal air in the upper and mid abdomen, likely postsurgical.  Larger locules of free intraperitoneal air adjacent to anastomotic surgical clips in the mid lower pelvis which may be postsurgical. Infection cannot be excluded. . Moderate amount of fluid in the pelvis and extending into the pelvic cul-de-sac with mild peripheral enhancement, best seen in the pelvic cul-de-sac which may manifest infection and developing abscess. Clinical correlation recommended. Discharge Exam:  /73   Pulse 67   Temp 98.6 °F (37 °C) (Oral)   Resp 16   Ht 5' 2\" (1.575 m)   Wt 215 lb (97.5 kg)   LMP 01/05/2018   SpO2 96%   BMI 39.32 kg/m²      GENERAL:  Laying in bed, awake, alert, cooperative, no apparent distress  HEAD: Normocephalic, atraumatic  EYES: No sclera icterus, pupils equal  LUNGS:  No increased work of breathing  CARDIOVASCULAR:  RR  ABDOMEN:  Soft, minimally tender, incisions c/d/i covered with glue, RUQ ileostomy pink with soft brown stool, LLQ JAIMEE with 7 cc/24 hrs and serous output    EXTREMITIES: No edema or swelling  SKIN: Warm and dry    Disposition: home    In process/preliminary results:  Outstanding Order Results     No orders found from 8/7/2021 to 9/6/2021.           Patient Instructions:   Current Discharge Medication List      START taking these medications    Details   scopolamine (TRANSDERM-SCOP) transdermal patch Place 1 patch onto the skin every 72 hours  Qty: 10 patch, Refills: 0      promethazine (PHENERGAN) 12.5 MG tablet Take 1 tablet by mouth every 6 hours as needed for Nausea  Qty: 28 tablet, Refills: 0         CONTINUE these medications which have NOT CHANGED    Details   !! ibuprofen (ADVIL;MOTRIN) 800 MG tablet Take 1 tablet by mouth every 6 hours as needed for Pain  Qty: 20 tablet, Refills: 0      !! ibuprofen (ADVIL;MOTRIN) 800 MG tablet Take 1 tablet by mouth every 6 hours as needed for Pain  Qty: 20 tablet, Refills: 0      erythromycin base (E-MYCIN) 500 MG tablet Take 1 tablet by mouth See Admin Instructions for 3 doses Take at 1300, 1400 and midnight the day before surgery  Qty: 3 tablet, Refills: 0      dexlansoprazole (DEXILANT) 60 MG CPDR delayed release capsule Take 60 mg by mouth daily      vitamin D (CHOLECALCIFEROL) 64471 UNIT CAPS Take 1 capsule by mouth once a week  Qty: 12 capsule, Refills: 3      baclofen (LIORESAL) 20 MG tablet Take 1 tablet by mouth 4 times daily as needed (muscle spasms; pain)  Qty: 360 tablet, Refills: 3      rOPINIRole (REQUIP) 0.5 MG tablet Take 1 tablet by mouth 2 times daily as needed (restless legs)  Qty: 60 tablet, Refills: 11      ocrelizumab (OCREVUS) 300 MG/10ML SOLN injection Infuse 20 mLs intravenously every 6 months  Qty: 20 mL, Refills: 1      nortriptyline (PAMELOR) 10 MG capsule TAKE 1 CAPSULE BY MOUTH ONCE NIGHTLY FOR HEADACHES  Qty: 30 capsule, Refills: 3      famotidine (PEPCID) 40 MG tablet take 1 tablet by mouth every evening      gabapentin (NEURONTIN) 300 MG capsule TAKE 1 CAPSULE BY MOUTH THREE TIMES DAILY  Qty: 90 capsule, Refills: 5      lisinopril-hydroCHLOROthiazide (PRINZIDE;ZESTORETIC) 10-12.5 MG per tablet TAKE 1 TABLET BY MOUTH ONCE DAILY  Qty: 30 tablet, Refills: 5    Associated Diagnoses: Essential hypertension      CPAP Machine MISC Heated tube and chin strap. Dispense as written. LIFETIME SUPPLIES. !! - Potential duplicate medications found. Please discuss with provider. STOP taking these medications       traMADol (ULTRAM) 50 MG tablet Comments:   Reason for Stopping:                    Follow up:   [address] [place] [address], [address] [place] [address] [phone]    In 1 week  For follow up       Signed:  Joann Valero DO  9/10/2021  7:10 AM

## 2021-09-10 NOTE — CARE COORDINATION
Cm note: home care orders written, [facility] home care liaison aware of discharge and they will start care tomorrow.

## 2021-09-10 NOTE — PROGRESS NOTES
Department of Internal Medicine  Internal Medicine Note Note    Primary Care Physician: Lane Leal PA-C   Admitting Physician:  Alda Bonilla MD  Admission date and time: 9/5/2021  7:25 PM    Room:  [address] [place], [place] [address]  Admitting diagnosis: Ileus, postoperative ([facility] [K91.89, K56.7]  Non-intractable vomiting with nausea, unspecified vomiting type [R11.2]      Patient Name: Eloy Cruz  MRN: [id number]    Date of Service: 9/10/2021     Reason for consultation: Medical management    History of present illness:    Patient was discharged 9/5 by general surgery with patient having history colon cancer descending colon status post laparoscopic descending colon resection with end-to-end stapled anastomosis and diverting loop ileostomy. Patient started having more nausea vomiting abdominal pain after discharge. Patient came back to ED with temperature 97.9 with heart rate in 90 and blood pressure 127/68. O2 sat was 96% on room air at rest.  CT the abdomen and pelvis showed dilation of small bowel loops in the left lower quadrant consistent with small bowel obstruction with large locules of free intraperitoneal air adjacent to anastomotic surgical clips in the mid lower pelvis along with moderate amount of fluid in the pelvis. WBC is 14.9 with hemoglobin 14.1.  BUN/creatinine is 8/0.8 with potassium 5.4. Urinalysis showed trace leuks esterase and moderate bacteria. Patient is seen postop. Patient had a benign diagnostic laparoscopy with washout of postoperative fluids and drain placement. Patient with postop discomfort. 9/7/2021  Patient seen examined on medical surgical floor. Patient still complains of nausea and decreased appetite. Patient has expected postop discomfort. Patient denies any chest pain or unusual shortness of breath. General surgery note reviewed. Patient had random blood sugar yesterday afternoon at 227. Temperature 98.1 with heart rate 82 blood pressure 109/66.   O2 sat 95% on room air at rest.  Diet was increased today to solids. 9/8/2021  Patient seen examined on medical surgical floor. Patient has expected postop discomfort. Patient has some mild nausea off and on still. Patient has poor appetite. Patient denies any problem with any chest pain, dizziness or unusual shortness of breath. BUN/creatinine 6/0.8 with WBC 9.5 hemoglobin 10.8. Temperature is 90.8 with heart rate 74 and blood pressure 117/77. O2 sat 95% on room air at rest.  Patient states she is ambulating in the room with minimal problems. Patient diet was increased but patient has poor appetite. 9/9/2021  Patient seen examined on medical surgical floor. Patient still complains of nausea but no emesis. Patient's abdominal pain is improving. Patient denies any chest pain, dizziness or unusual shortness of breath. Temperature is 98.3 with heart rate of 72 blood pressure 146/73. O2 sat 95% room air at rest.  Urine output is fairly good. General surgery note reviewed. Patient was switched to oral Phenergan for the nausea. 9/10/2021  Patient seen examined on medical surgical floor. Patient feels much better today. Patient nausea is fairly well controlled. Patient has expected postop discomfort. She denies any chest pain or unusual shortness of breath. Temperature yesterday afternoon was 98.6 and this morning 99.6. Heart rate 74 blood pressure 120/84 with O2 saturation 96% on room air. . Patient will be discharged home today. Patient follow-up with primary care physician in 1 week or as directed and with general surgery.       PAST MEDICAL Hx:  Past Medical History:   Diagnosis Date    Acid reflux disease     Cyst of ovary     Fracture of nasal bone     Headache     Hearing loss     Hypertension     Migraine     Morbidly obese ([address] 10/5/2020    Multiple sclerosis ([address]     denies limitations at present 11/2020    VEENA on CPAP     severe VENEA per pt    Right shoulder pain 11/2020    Tinnitus     TMJ tablet by mouth See Admin Instructions for 3 doses Take at 1300, 1400 and midnight the day before surgery 8/27/21   Song Taylor MD   dexlansoprazole (DEXILANT) 60 MG CPDR delayed release capsule Take 60 mg by mouth daily    Historical Provider, MD   vitamin D (CHOLECALCIFEROL) 54604 UNIT CAPS Take 1 capsule by mouth once a week 8/13/21   MARIXA Hendrix CNP   baclofen (LIORESAL) 20 MG tablet Take 1 tablet by mouth 4 times daily as needed (muscle spasms; pain) 8/13/21   MARIXA Hendrix CNP   rOPINIRole (REQUIP) 0.5 MG tablet Take 1 tablet by mouth 2 times daily as needed (restless legs) 8/13/21   MARIXA Hendrix CNP   ocrelizumab (OCREVUS) 300 MG/10ML SOLN injection Infuse 20 mLs intravenously every 6 months 7/19/21   AMRIXA Hendrix CNP   nortriptyline (PAMELOR) 10 MG capsule TAKE 1 CAPSULE BY MOUTH ONCE NIGHTLY FOR HEADACHES 7/2/21   Victoriano Abreu PA-C   famotidine (PEPCID) 40 MG tablet take 1 tablet by mouth every evening 4/6/21   Historical Provider, MD   gabapentin (NEURONTIN) 300 MG capsule TAKE 1 CAPSULE BY MOUTH THREE TIMES DAILY 3/10/21 8/13/21  DANISH Crook   lisinopril-hydroCHLOROthiazide (PRINZIDE;ZESTORETIC) 10-12.5 MG per tablet TAKE 1 TABLET BY MOUTH ONCE DAILY 3/10/21   Rashawn Treadwell DO   CPAP Machine MISC Heated tube and chin strap. Dispense as written. LIFETIME SUPPLIES. 11/6/20   Historical Provider, MD       ALLERGIES:  Patient has no known allergies.     SOCIAL Hx:  Social History     Socioeconomic History    Marital status: Single     Spouse name: Not on file    Number of children: 3    Years of education: 6    Highest education level: 11th grade   Occupational History    Not on file   Tobacco Use    Smoking status: Current Every Day Smoker     Packs/day: 0.50     Years: 25.00     Pack years: 12.50     Types: Cigarettes    Smokeless tobacco: Never Used    Tobacco comment: Ready to stop, requesting prescription for Chantix   Vaping Use    Vaping Use: Never used   Substance and Sexual Activity    Alcohol use: Yes     Alcohol/week: 1.0 standard drinks     Types: 1 Glasses of wine per week     Comment: socially    Drug use: No    Sexual activity: Yes     Partners: Male   Other Topics Concern    Not on file   Social History Narrative    Uses [employer] for transportation    [facility]     Social Determinants of Health     Financial Resource Strain: Medium Risk    Difficulty of Paying Living Expenses: Somewhat hard   Food Insecurity: Food Insecurity Present    Worried About Running Out of Food in the Last Year: Sometimes true    [place] of Food in the Last Year: Never true   Transportation Needs: No Transportation Needs    Lack of Transportation (Medical): No    Lack of Transportation (Non-Medical): No   Physical Activity: Sufficiently Active    Days of Exercise per Week: 3 days    Minutes of Exercise per Session: 60 min   Stress: No Stress Concern Present    Feeling of Stress : Not at all   Social Connections: Socially Isolated    Frequency of Communication with Friends and Family: Three times a week    Frequency of Social Gatherings with Friends and Family: Twice a week    Attends Mu-ism Services: Never    Active Member of Clubs or Organizations: No    Attends Club or Organization Meetings: Never    Marital Status: Never    Intimate Partner Violence:     Fear of Current or Ex-Partner:     Emotionally Abused:     Physically Abused:     Sexually Abused:        ROS: Positive in bold  General:   Denies chills, fatigue, fever, malaise, night sweats or weight loss    Psychological:   Denies anxiety, disorientation or hallucinations    ENT:    Denies epistaxis, headaches, vertigo or visual changes    Cardiovascular:   Denies any chest pain, irregular heartbeats, or palpitations. No paroxysmal nocturnal dyspnea. Respiratory:   Denies shortness of breath, coughing, sputum production, hemoptysis, or wheezing.   No orthopnea. Gastrointestinal:   + nausea, vomiting, no diarrhea, or constipation. Denies any abdominal pain. Denies change in bowel habits or stools. Genito-Urinary:    Denies any urgency, frequency, hematuria. Voiding without difficulty. Musculoskeletal:   Denies joint pain, joint stiffness, joint swelling or muscle pain    Neurology:    Denies any headache or focal neurological deficits. No weakness or paresthesia. Derm:    Denies any rashes, ulcers, or excoriations. Denies bruising. Extremities:   Denies any lower extremity swelling or edema. PHYSICAL EXAM: Abnormal findings noted  VITALS:  Vitals:    09/10/21 0701   BP: 120/84   Pulse: 74   Resp: 16   Temp: 99.6 °F (37.6 °C)   SpO2: 96%         CONSTITUTIONAL:    Awake, alert, cooperative, moderate abdominal distress, and appears stated age    EYES:     EOMI, sclera clear without icterus, conjunctiva normal    ENT:    Normocephalic, atraumatic,  External ears without lesions. NECK:    Supple, symmetrical, trachea midline,  no JVD    HEMATOLOGIC/LYMPHATICS:    No cervical lymphadenopathy and no supraclavicular lymphadenopathy    LUNGS:    Symmetric. No increased work of breathing, good air exchange, clear to auscultation bilaterally, no wheezes, rhonchi, or rales,     CARDIOVASCULAR:    Normal apical impulse, regular rate and rhythm, normal S1 and S2, no S3 or S4, and no murmur noted    ABDOMEN:    Patient has abdominal tenderness, + abdominal distention,  Ileostomy in place    MUSCULOSKELETAL:    There is no redness, warmth, or swelling of the joints. NEUROLOGIC:    Awake, alert, oriented to name, place and time. SKIN:    No bruising or bleeding. No redness, warmth, or swelling    EXTREMITIES:    Peripheral pulses present. No edema, cyanosis, or swelling.     LINES/CATHETERS     LABORATORY DATA:  CBC with Differential:    Lab Results   Component Value Date    WBC 9.5 09/08/2021    RBC 3.54 09/08/2021    HGB 10.8 09/08/2021    HCT 33.2 09/08/2021     09/08/2021    MCV 93.8 09/08/2021    MCH 30.5 09/08/2021    MCHC 32.5 09/08/2021    RDW 13.4 09/08/2021    LYMPHOPCT 26.9 09/08/2021    MONOPCT 9.6 09/08/2021    BASOPCT 0.7 09/08/2021    MONOSABS 0.91 09/08/2021    LYMPHSABS 2.56 09/08/2021    EOSABS 0.18 09/08/2021    BASOSABS 0.07 09/08/2021     CMP:    Lab Results   Component Value Date     09/08/2021    K 4.0 09/08/2021    K 5.0 09/06/2021     09/08/2021    CO2 25 09/08/2021    BUN 6 09/08/2021    CREATININE 0.8 09/08/2021    GFRAA >60 09/08/2021    LABGLOM >60 09/08/2021    GLUCOSE 98 09/08/2021    PROT 6.6 09/06/2021    LABALBU 2.8 09/06/2021    CALCIUM 8.2 09/08/2021    BILITOT 0.7 09/06/2021    ALKPHOS 117 09/06/2021    AST 15 09/06/2021    ALT 27 09/06/2021       ASSESSMENT/PLAN:  1. Colon cancer of descending colon status post laparoscopic descending colon resection with end-to-end stapled anastomosis and diverting loop ileostomy 8/31/2021  2. Probable anastomotic leak  3. Multiple sclerosis  4. Hypertension  5. Prior diagnosis of obstructive sleep apnea without current use of CPAP  6. GERD  7. History of migraine headache  8. Morbid obesity  9. Current tobacco abuse     Plan:  Home medications reviewed   Monitor heart rate, blood pressure, O2 saturation  CPAP at bedtime-patient's family to: Settings  Protonix 40 milligrams IV daily  Routine labs in a.m.   Pain meds per general surgery  Diet per general surgery  Lovenox 40 mg subcu daily  IV Rocephin/Flagyl-stopped by general surgery      Discharge home when okay with general surgery  Follow-up primary care physician in 1 week or as directed  Resume home meds      Aurelia Britt DO  7:23 AM  9/10/2021

## 2021-09-10 NOTE — PLAN OF CARE
Problem: Pain:  Goal: Pain level will decrease  Description: Pain level will decrease  9/10/2021 1541 by Iam Maurer RN  Outcome: Met This Shift  9/10/2021 0336 by Bart Leahy RN  Outcome: Met This Shift  Goal: Control of acute pain  Description: Control of acute pain  9/10/2021 1541 by Iam Maurer RN  Outcome: Met This Shift  9/10/2021 0336 by Bart Leahy RN  Outcome: Met This Shift  Goal: Control of chronic pain  Description: Control of chronic pain  9/10/2021 0336 by Bart Leahy RN  Outcome: Met This Shift     Problem: Falls - Risk of:  Goal: Will remain free from falls  Description: Will remain free from falls  9/10/2021 1541 by Iam Maurer RN  Outcome: Met This Shift  9/10/2021 0336 by Bart Leahy RN  Outcome: Met This Shift  Goal: Absence of physical injury  Description: Absence of physical injury  9/10/2021 1541 by Iam Maurer RN  Outcome: Met This Shift  9/10/2021 0336 by Bart Leahy RN  Outcome: Met This Shift     Problem: Sensory:  Goal: General experience of comfort will improve  Description: General experience of comfort will improve  9/10/2021 1541 by Iam Maurer RN  Outcome: Met This Shift  9/10/2021 0336 by Bart Leahy RN  Outcome: Met This Shift

## 2021-09-10 NOTE — PROGRESS NOTES
GENERAL SURGERY  DAILY PROGRESS NOTE  9/10/2021    Chief Complaint   Patient presents with    Emesis     Emesis. Colon CA. Ileostomy placed tue. Sent in by Wm Mckoy. Subjective:  Nausea better controlled. Was able to eat more for dinner last night. Objective:  /73   Pulse 67   Temp 98.6 °F (37 °C) (Oral)   Resp 16   Ht 5' 2\" (1.575 m)   Wt 215 lb (97.5 kg)   LMP 01/05/2018   SpO2 96%   BMI 39.32 kg/m²     GENERAL:  Laying in bed, awake, alert, cooperative, no apparent distress  HEAD: Normocephalic, atraumatic  EYES: No sclera icterus, pupils equal  LUNGS:  No increased work of breathing  CARDIOVASCULAR:  RR  ABDOMEN:  Soft, minimally tender, incisions c/d/i covered with glue, RUQ ileostomy pink with soft brown stool, LLQ JAIMEE with 7 cc/24 hrs and serous output    EXTREMITIES: No edema or swelling  SKIN: Warm and dry    Assessment/Plan:  51 y.o. female with abdominal pain nausea and vomiting s/p lap colectomy with diverting loop ileostomy concern for anastomotic leak, however normal diagnostic lap with drain placement 9/6     Diet as tolerated  Nausea improved, continue phenergan, and scopolamine patch  Discussed pathology results with patient  Home today, keep JAIMEE drain until follows up outpatient.     Electronically signed by Von Shah DO on 9/10/2021 at 6:50 AM

## 2021-09-13 ENCOUNTER — CARE COORDINATION (OUTPATIENT)
Dept: CARE COORDINATION | Age: 46
End: 2021-09-13

## 2021-09-13 NOTE — CARE COORDINATION
Ambulatory Care Coordination Note  9/13/2021  CM Risk Score: 0  Charlson 10 Year Mortality Risk Score: 10%     ACC: Wright Favre, RN    Summary Note:   [facility] contacted patient to follow up on her status after her hospital discharge. Medications were reviewed on this outreach. Patient is not taking her blood pressure medication d/t she states she was told her blood pressure was low during her hospitalization and was advised to follow up with PCP for further recommendations. Patient states she will call PCP to schedule this appointment. She does not require this [facility]'s assistance in making this call. [facility] did remind patient that she does have an MRI scheduled on 09/14/21. Patient was appreciative of the reminder d/t she did not remember that she has this appointment. GENERAL  -Patient did have surgery d/t new finding of colon cancer. Patient did need to have an ileostomy. Patient was discharged from the hospital on 09/04/21 and was r/a on 09/05/21 d/t increased abdominal pain and n/v. Patient did undergo a diagnostic lap (normal results) and had a drain placement on 09/06/21.   -Patient is active with [facility] for nursing. Patient states she is able to empty her bag, however, she requires ongoing education on how to change the bag.  -Patient is aware she has a follow up appointment with Dr. Nathaly Russo on 09/16/21. SMOKING  -Patient states she has not smoked since 08/30/21. She states she has had no triggers at this time. She admits that she is not sure if she will start smoking again if she is triggered. PLAN  -Continue Care Coordination  -F/U to ensure patient is comfortable with new ileostomy care. -F/U to determine if patient did schedule F/U appointment with PCP d/t change in blood pressure. Care Coordination Interventions    Program Enrollment: Complex Care  Referral from Primary Care Provider: No  Suggested Interventions and [address] [place] [address] Hwy:  In Process (Comment: 09/13/21: Patient currently active w/[facility])  Medi Set or Pill Pack: Completed (Comment: 05/04/21: Patient receives routine medications from [address] [place] [address])  Physical Therapy: Completed (Comment: 05/04/21: Patient attend [facility] on an outpatient basis; 06/11/231: Completed beginning of June)  Registered Dietician: Declined  Smoking Cessation: In Process (Comment: 05/04/21: Patient to discuss Chantix with PCP)  Social Work: Declined  Transportation Support: Completed  Other Services or Interventions: Patient active with the [employer] d/t new DX: Colon Cancer         Goals Addressed                 This Visit's Progress     Conditions and Symptoms   On track     I will schedule office visits, as directed by my provider. I will keep my appointment or reschedule if I have to cancel. I will notify my provider of any symptoms that indicate a worsening of my condition. Barriers: overwhelmed by complexity of regimen and time constraints  Plan for overcoming my barriers: Care Coordination, ACM to remind patient of scheduled appointments (date and time)  Confidence: 8/10  Anticipated Goal Completion Date: 08/04/21, 08/27/21, 09/12/21, 10/12/21         Wellness Goal   On track     Patient Self-Management Goal for Health Maintenance  Goal: I will chose a goal related to tobacco cessation:    I will think about my triggers for smoking,    I will think about reasons why I should quit smoking, and    I will try a nicotine replacement product or medication. Barriers: overwhelmed by complexity of regimen and stress  Plan for overcoming my barriers: Care Coordination, ACM to discuss/review patient's triggers and provide education on benefits of smoking cessation. Confidence: 7/10  Anticipated Goal Completion Date: 08/04/21, 08/27/21, 09/12/21, 10/12/21            Prior to Admission medications    Medication Sig Start Date End Date Taking?  Authorizing Provider   gabapentin (NEURONTIN) 300 MG capsule TAKE 1 CAPSULE BY MOUTH THREE TIMES DAILY 9/13/21 12/12/21 Yes MARIXA Thornton CNP   scopolamine (TRANSDERM-SCOP) transdermal patch Place 1 patch onto the skin every 72 hours 9/10/21  Yes Rita Ervin DO   promethazine (PHENERGAN) 12.5 MG tablet Take 1 tablet by mouth every 6 hours as needed for Nausea 9/10/21 9/17/21 Yes Rita Ervin DO   ibuprofen (ADVIL;MOTRIN) 800 MG tablet Take 1 tablet by mouth every 6 hours as needed for Pain 9/4/21  Yes Gregory Storey MD   dexlansoprazole (DEXILANT) 60 MG CPDR delayed release capsule Take 60 mg by mouth daily   Yes Historical Provider, MD   vitamin D (CHOLECALCIFEROL) 09650 UNIT CAPS Take 1 capsule by mouth once a week 8/13/21  Yes MARIXA Thornton CNP   baclofen (LIORESAL) 20 MG tablet Take 1 tablet by mouth 4 times daily as needed (muscle spasms; pain) 8/13/21  Yes MARIXA Thornton CNP   rOPINIRole (REQUIP) 0.5 MG tablet Take 1 tablet by mouth 2 times daily as needed (restless legs) 8/13/21  Yes MARIXA Thornton CNP   ocrelizumab (OCREVUS) 300 MG/10ML SOLN injection Infuse 20 mLs intravenously every 6 months 7/19/21  Yes MARIXA Thornton CNP   nortriptyline (PAMELOR) 10 MG capsule TAKE 1 CAPSULE BY MOUTH ONCE NIGHTLY FOR HEADACHES 7/2/21  Yes Chandrika Gonzalez PA-C   famotidine (PEPCID) 40 MG tablet take 1 tablet by mouth every evening 4/6/21  Yes Historical Provider, MD   lisinopril-hydroCHLOROthiazide (PRINZIDE;ZESTORETIC) 10-12.5 MG per tablet TAKE 1 TABLET BY MOUTH ONCE DAILY  Patient not taking: Reported on 9/13/2021 9/10/21   Chandrika Gonzalez PA-C   ibuprofen (ADVIL;MOTRIN) 800 MG tablet Take 1 tablet by mouth every 6 hours as needed for Pain 9/1/21   Gregory Storey MD   erythromycin base (E-MYCIN) 500 MG tablet Take 1 tablet by mouth See Admin Instructions for 3 doses Take at 1300, 1400 and midnight the day before surgery  Patient not taking: Reported on 9/13/2021 8/27/21   Gregory Storey MD   CPAP Machine MISC Heated tube and chin strap. Dispense as written. LIFETIME SUPPLIES.   Patient not taking: Reported on 9/13/2021 11/6/20   Historical Provider, MD       Future Appointments   Date Time Provider Sandra Birch   9/14/2021 12:00 PM [facility] MRI RM 1 SEYZ [facility]   9/14/2021  1:00 PM [facility] RM 1 SEYZ [facility]   9/16/2021  2:30 PM Alhaji Baker MD [facility] GEN SURG [facility]   2/2/2022 10:00 AM MARIXA Tenorio - CNP [facility]   2/28/2022  8:00 AM [facility] CHAIR 1 SEYZ INF SER Farheen Stuart

## 2021-09-16 ENCOUNTER — OFFICE VISIT (OUTPATIENT)
Dept: SURGERY | Age: 46
End: 2021-09-16

## 2021-09-16 ENCOUNTER — TELEPHONE (OUTPATIENT)
Dept: SURGERY | Age: 46
End: 2021-09-16

## 2021-09-16 VITALS
HEIGHT: 62 IN | DIASTOLIC BLOOD PRESSURE: 74 MMHG | WEIGHT: 215 LBS | BODY MASS INDEX: 39.56 KG/M2 | SYSTOLIC BLOOD PRESSURE: 111 MMHG | HEART RATE: 81 BPM | OXYGEN SATURATION: 96 % | TEMPERATURE: 97.3 F

## 2021-09-16 DIAGNOSIS — Z90.49 S/P COLECTOMY: Primary | ICD-10-CM

## 2021-09-16 PROCEDURE — 99024 POSTOP FOLLOW-UP VISIT: CPT | Performed by: SURGERY

## 2021-09-16 NOTE — TELEPHONE ENCOUNTER
Prior Authorization Form:      DEMOGRAPHICS:                     Patient Name:  Parvez Ziegler  Patient :  1975            Insurance:  Payor: [facility]  [address] / Plan: [facility] [address] / Product Type: *No Product type* /   Insurance ID Number:    Payor/Plan Subscr  Sex Relation Sub.  Ins. ID Effective Group Num   1. JAROD VELÁZQUEZ* PAVEL MARISCAL 1975 Female Self [id number] 20                                    [address]         DIAGNOSIS & PROCEDURE:                       Procedure/Operation: Laparoscopic possible open ileostomy reversal           CPT Code: 46556    Diagnosis:  Ileostomy in place    ICD10 Code: Z93.2    Location:  [address] [place] [address]    Surgeon:  Maribel Smith    [facility] INFORMATION:                          Date: 10.12.2021    Time: TBD              Anesthesia:  General                                                       Status:  Observation        Special Comments:         Electronically signed by Elzbieta Chowdhury on 2021 at 3:15 PM

## 2021-09-16 NOTE — PROGRESS NOTES
Surgery Progress Note            Chief complaint:   Patient Active Problem List   Diagnosis    Vocal cord dysfunction    Multiple sclerosis ([address]    Morbidly obese ([address]    Palafox's esophagus with dysplasia    Secondary restless legs syndrome    S/P colectomy    Malignant neoplasm of descending colon ([address]    Ileus, postoperative ([address]    Pelvic abscess in female       S: doing well    O:   Vitals:    09/16/21 1324   BP: 111/74   Pulse: 81   Temp: 97.3 °F (36.3 °C)   SpO2: 96%     No intake or output data in the 24 hours ending 09/16/21 1346        Labs:  No results for input(s): WBC, HGB, HCT in the last 72 hours. Invalid input(s): PLR  Lab Results   Component Value Date    CREATININE 0.8 09/08/2021    BUN 6 09/08/2021     09/08/2021    K 4.0 09/08/2021     09/08/2021    CO2 25 09/08/2021     No results for input(s): LIPASE, AMYLASE in the last 72 hours.     Physical exam:   /74 (Site: Right Upper Arm, Position: Sitting, Cuff Size: Medium Adult)   Pulse 81   Temp 97.3 °F (36.3 °C) (Temporal)   Ht 5' 2\" (1.575 m)   Wt 215 lb (97.5 kg)   LMP 01/05/2018   SpO2 96%   BMI 39.32 kg/m²   General appearance: NAD  Head: NCAT  Neck: supple, no masses  Lungs: equal chest rise bilateral  Heart: S1S2 present  Abdomen: soft, nontender, nondistended  Skin; no new lesions, incisions clean and intact  Gu: no cva tenderness  Extremities: extremities normal, atraumatic, no cyanosis or edema    A:  Post op lap colectomy with ileostomy     P: follow up for barium enema at [address] then ileostomy reversal POW 6    Ulysses Silva, MD, MD  9/16/2021

## 2021-09-16 NOTE — TELEPHONE ENCOUNTER
Per the order of Dr. Kelsy Sheridan, patient has been scheduled for laparoscopic possible open ileostomy reversal on 10.12.2021. Patient provided with procedure information during office visit and scheduled for post op follow up appointment. Patient instructed to please contact our office with any questions. Patient will also need to have a barium enema done prior to surgery. Patient scheduled for October 4, 2021. Patient provided with instructions for testing. Surgery scheduling form faxed to [facility] surgery scheduling and fax confirmation received. Dr. Tierney Signs to enter orders.     Electronically signed by Hosea Jain on 9/16/21 at 3:15 PM EDT

## 2021-09-20 ENCOUNTER — PREP FOR PROCEDURE (OUTPATIENT)
Dept: SURGERY | Age: 46
End: 2021-09-20

## 2021-09-20 RX ORDER — SODIUM CHLORIDE 0.9 % (FLUSH) 0.9 %
10 SYRINGE (ML) INJECTION PRN
Status: CANCELLED | OUTPATIENT
Start: 2021-09-20

## 2021-09-20 RX ORDER — SODIUM CHLORIDE, SODIUM LACTATE, POTASSIUM CHLORIDE, CALCIUM CHLORIDE 600; 310; 30; 20 MG/100ML; MG/100ML; MG/100ML; MG/100ML
INJECTION, SOLUTION INTRAVENOUS CONTINUOUS
Status: CANCELLED | OUTPATIENT
Start: 2021-09-20

## 2021-09-20 RX ORDER — SODIUM CHLORIDE 9 MG/ML
25 INJECTION, SOLUTION INTRAVENOUS PRN
Status: CANCELLED | OUTPATIENT
Start: 2021-09-20

## 2021-09-20 RX ORDER — SODIUM CHLORIDE 0.9 % (FLUSH) 0.9 %
10 SYRINGE (ML) INJECTION EVERY 12 HOURS SCHEDULED
Status: CANCELLED | OUTPATIENT
Start: 2021-09-20

## 2021-09-27 ENCOUNTER — CARE COORDINATION (OUTPATIENT)
Dept: CARE COORDINATION | Age: 46
End: 2021-09-27

## 2021-10-05 ENCOUNTER — CARE COORDINATION (OUTPATIENT)
Dept: CARE COORDINATION | Age: 46
End: 2021-10-05

## 2021-10-05 NOTE — CARE COORDINATION
Ambulatory Care Coordination Note  10/5/2021  CM Risk Score: 4  Charlson 10 Year Mortality Risk Score: 10%     ACC: Sid Villalobos, RN    Summary Note:   ACM contacted patient to follow up on her status. Patient verified that she has totally stopped smoking. This goal has been marked as met. Patient is pleased that she does not have to have chemo therapy. Patient has a follow up scheduled with gastro (Dr. Jonatan Biggs) on 10/06/21. She states she is having difficulty eating d/t nausea. She does have a hiatal hernia. She has lost approximately 15 pounds since her recent surgery. She is going to discuss possible recommendation for hernia surgery at this appointment. Patient also has Barretts Esophagus. Patient is aware of and plans on attending all scheduled appointments. She states she will schedule an appointment with PCP after her next surgery (ileostony reversal) scheduled for 10/12/21. GENERAL  -Patient continues with [facility]) for education on bag changes.  -Patient states she is able to change the bag, She does state the site around the bag is red and sore to touch. She states the home health nurse is aware of this issue. -ACM educated patient to contact home health if any open areas develop or drainage is noted from the area. Patient states understanding.   -Patient is scheduled to have a barium enema on 10/07/21, and a reversal on 10/12/21 if all goes well with the enema. Patient would like ACM to continue to follow her until after the reversal on 10/12/21. Patient states she will be in the hospital for a few day. ACM and patient determined a date when ACM can follow up on her status. PLAN  -Continue Care Coordination   -F/U on status on date agreed on by patient. -F/U on status of redness around ileostomy site. -F/U to determine if gastro feels hernia surgery would be appropriate. -F/U to review status of weight loss.            Care Coordination Interventions    Program Enrollment: Complex Care  Referral from Primary Care Provider: No  Suggested Interventions and [address]: In Process (Comment: 09/13/21: Patient currently active w/[facility] [address] [facility])  Medi Set or Pill Pack: Completed (Comment: 05/04/21: Patient receives routine medications from [facility])  Physical Therapy: Completed (Comment: 05/04/21: Patient attend [facility] on an outpatient basis; 06/11/231: Completed beginning of June)  Registered Dietician: Declined  Smoking Cessation: Completed (Comment: 05/04/21: Patient to discuss Chantix with PCP; 10/05/21 Patient has stopped smoking)  Social Work: Declined  Transportation Support: Completed  Other Services or Interventions: Patient active with the [employer] d/t new DX: Colon Cancer         Goals Addressed                 This Visit's Progress     Conditions and Symptoms        I will schedule office visits, as directed by my provider. I will keep my appointment or reschedule if I have to cancel. I will notify my provider of any symptoms that indicate a worsening of my condition. Barriers: overwhelmed by complexity of regimen and time constraints  Plan for overcoming my barriers: Care Coordination, ACM to remind patient of scheduled appointments (date and time)  Confidence: 8/10  Anticipated Goal Completion Date: 08/04/21, 08/27/21, 09/12/21, 10/12/21, 11/12/21         COMPLETED: Wellness Goal   On track     Patient Self-Management Goal for Health Maintenance  Goal: I will chose a goal related to tobacco cessation:    I will think about my triggers for smoking,    I will think about reasons why I should quit smoking, and    I will try a nicotine replacement product or medication. Barriers: overwhelmed by complexity of regimen and stress  Plan for overcoming my barriers: Care Coordination, ACM to discuss/review patient's triggers and provide education on benefits of smoking cessation.    Confidence: 7/10  Anticipated Goal Completion Date: 08/04/21, 08/27/21, 09/12/21, 10/12/21            Prior to Admission medications    Medication Sig Start Date End Date Taking? Authorizing Provider   gabapentin (NEURONTIN) 300 MG capsule TAKE 1 CAPSULE BY MOUTH THREE TIMES DAILY 9/13/21 12/12/21  MARIXA Brown CNP   scopolamine (TRANSDERM-SCOP) transdermal patch Place 1 patch onto the skin every 72 hours 9/10/21   Nathaly Flynn DO   lisinopril-hydroCHLOROthiazide (PRINZIDE;ZESTORETIC) 10-12.5 MG per tablet TAKE 1 TABLET BY MOUTH ONCE DAILY 9/10/21   Jaison Lau PA-C   ibuprofen (ADVIL;MOTRIN) 800 MG tablet Take 1 tablet by mouth every 6 hours as needed for Pain 9/4/21   Jaqui Monte MD   ibuprofen (ADVIL;MOTRIN) 800 MG tablet Take 1 tablet by mouth every 6 hours as needed for Pain 9/1/21   Jaqui Monte MD   erythromycin base (E-MYCIN) 500 MG tablet Take 1 tablet by mouth See Admin Instructions for 3 doses Take at 1300, 1400 and midnight the day before surgery 8/27/21   Jaqui Monte MD   dexlansoprazole (DEXILANT) 60 MG CPDR delayed release capsule Take 60 mg by mouth daily    Historical Provider, MD   vitamin D (CHOLECALCIFEROL) 26068 UNIT CAPS Take 1 capsule by mouth once a week 8/13/21   MARIXA Brown CNP   baclofen (LIORESAL) 20 MG tablet Take 1 tablet by mouth 4 times daily as needed (muscle spasms; pain) 8/13/21   MARIXA Brown CNP   rOPINIRole (REQUIP) 0.5 MG tablet Take 1 tablet by mouth 2 times daily as needed (restless legs) 8/13/21   MARIXA Brown CNP   ocrelizumab (OCREVUS) 300 MG/10ML SOLN injection Infuse 20 mLs intravenously every 6 months 7/19/21   MARIXA Brown CNP   nortriptyline (PAMELOR) 10 MG capsule TAKE 1 CAPSULE BY MOUTH ONCE NIGHTLY FOR HEADACHES 7/2/21   Jaison Lau PA-C   famotidine (PEPCID) 40 MG tablet take 1 tablet by mouth every evening 4/6/21   Historical Provider, MD   CPAP Machine MISC Heated tube and chin strap. Dispense as written.  LIFETIME

## 2021-10-07 ENCOUNTER — HOSPITAL ENCOUNTER (OUTPATIENT)
Dept: PREADMISSION TESTING | Age: 46
Discharge: HOME | End: 2021-10-07
Payer: COMMERCIAL

## 2021-10-07 ENCOUNTER — HOSPITAL ENCOUNTER (OUTPATIENT)
Dept: GENERAL RADIOLOGY | Age: 46
Discharge: HOME | End: 2021-10-09
Payer: COMMERCIAL

## 2021-10-07 VITALS
RESPIRATION RATE: 16 BRPM | TEMPERATURE: 97.4 F | BODY MASS INDEX: 36.47 KG/M2 | OXYGEN SATURATION: 97 % | HEART RATE: 81 BPM | HEIGHT: 62 IN | WEIGHT: 198.2 LBS | SYSTOLIC BLOOD PRESSURE: 132 MMHG | DIASTOLIC BLOOD PRESSURE: 74 MMHG

## 2021-10-07 DIAGNOSIS — Z01.818 PRE-OP TESTING: Primary | ICD-10-CM

## 2021-10-07 DIAGNOSIS — Z90.49 S/P COLECTOMY: ICD-10-CM

## 2021-10-07 LAB
ANION GAP SERPL CALCULATED.3IONS-SCNC: 10 MMOL/L (ref 7–16)
BUN BLDV-MCNC: 8 MG/DL (ref 6–20)
CALCIUM SERPL-MCNC: 9.7 MG/DL (ref 8.6–10.2)
CHLORIDE BLD-SCNC: 103 MMOL/L (ref 98–107)
CO2: 27 MMOL/L (ref 22–29)
CREAT SERPL-MCNC: 0.8 MG/DL (ref 0.5–1)
GFR AFRICAN AMERICAN: >60
GFR NON-AFRICAN AMERICAN: >60 ML/MIN/1.73
GLUCOSE BLD-MCNC: 98 MG/DL (ref 74–99)
HCT VFR BLD CALC: 43.9 % (ref 34–48)
HEMOGLOBIN: 14.2 G/DL (ref 11.5–15.5)
MCH RBC QN AUTO: 30.7 PG (ref 26–35)
MCHC RBC AUTO-ENTMCNC: 32.3 % (ref 32–34.5)
MCV RBC AUTO: 94.8 FL (ref 80–99.9)
PDW BLD-RTO: 13.4 FL (ref 11.5–15)
PLATELET # BLD: 262 E9/L (ref 130–450)
PMV BLD AUTO: 10.4 FL (ref 7–12)
POTASSIUM REFLEX MAGNESIUM: 4.5 MMOL/L (ref 3.5–5)
RBC # BLD: 4.63 E12/L (ref 3.5–5.5)
SODIUM BLD-SCNC: 140 MMOL/L (ref 132–146)
WBC # BLD: 8.3 E9/L (ref 4.5–11.5)

## 2021-10-07 PROCEDURE — A4641 RADIOPHARM DX AGENT NOC: HCPCS | Performed by: RADIOLOGY

## 2021-10-07 PROCEDURE — 74270 X-RAY XM COLON 1CNTRST STD: CPT

## 2021-10-07 PROCEDURE — 6360000004 HC RX CONTRAST MEDICATION: Performed by: RADIOLOGY

## 2021-10-07 PROCEDURE — 36415 COLL VENOUS BLD VENIPUNCTURE: CPT

## 2021-10-07 PROCEDURE — 80048 BASIC METABOLIC PNL TOTAL CA: CPT

## 2021-10-07 PROCEDURE — 85027 COMPLETE CBC AUTOMATED: CPT

## 2021-10-07 PROCEDURE — 87081 CULTURE SCREEN ONLY: CPT

## 2021-10-07 RX ADMIN — BARIUM SULFATE 1000 ML: 1.05 SUSPENSION ORAL; RECTAL at 12:33

## 2021-10-07 ASSESSMENT — PAIN SCALES - GENERAL: PAINLEVEL_OUTOF10: 4

## 2021-10-07 ASSESSMENT — PAIN DESCRIPTION - PAIN TYPE: TYPE: ACUTE PAIN

## 2021-10-07 ASSESSMENT — PAIN DESCRIPTION - LOCATION: LOCATION: ABDOMEN

## 2021-10-07 NOTE — PROGRESS NOTES
[facility]                                                                                                                    PRE OP INSTRUCTIONS FOR  Dorie Aviles        Date: 10/7/2021    Date of surgery: 10/12/21   Arrival Time: Hospital will call you between 5pm and 7pm the evening before with your final arrival time for surgery    1. Do not eat or drink anything after midnight prior to surgery. This includes no water, chewing gum, mints or ice chips. 2. Take the following medications with a small sip of water on the morning of Surgery: gabapentin if needed baclofen and scopolomine patch. 3. Diabetics may take evening dose of insulin but none after midnight. If you feel symptomatic or low blood sugar morning of surgery drink 1-2 ounces of apple juice only. 4. Aspirin, Ibuprofen, Advil, Naproxen, Vitamin E and other Anti-inflammatory products should be stopped  before surgery  as directed by your physician. Take Tylenol only unless instructed otherwise by your surgeon. 5. Check with your Doctor regarding stopping Plavix, Coumadin, Lovenox, Eliquis, Effient, or other blood thinners. 6. Do not smoke,use illicit drugs and do not drink any alcoholic beverages 24 hours prior to surgery. 7. You may brush your teeth the morning of surgery. DO NOT SWALLOW WATER    8. You MUST make arrangements for a responsible adult to take you home after your surgery. You will not be allowed to leave alone or drive yourself home. It is strongly suggested someone stay with you the first 24 hrs. Your surgery will be cancelled if you do not have a ride home. 9. PEDIATRIC PATIENTS ONLY:  A parent/legal guardian must accompany a child scheduled for surgery and plan to stay at the hospital until the child is discharged. Please do not bring other children with you.     10. Please wear simple, loose fitting clothing to the hospital.  Do not bring valuables (money, credit cards, checkbooks, etc.) Do not wear any makeup (including no eye makeup) or nail polish on your fingers or toes. 11. DO NOT wear any jewelry or piercings on day of surgery. All body piercing jewelry must be removed. 12. Shower the night before surgery with _x__Antibacterial soap /ELISE WIPES_____x___    13. TOTAL JOINT REPLACEMENT/HYSTERECTOMY PATIENTS ONLY---Remember to bring Blood Bank bracelet to the hospital on the day of surgery. 14. If you have a Living Will and Durable Power of  for Healthcare, please bring in a copy. 15. If appropriate bring crutches, inspirex, WALKER, CANE etc... 12. Notify your Surgeon if you develop any illness between now and surgery time, cough, cold, fever, sore throat, nausea, vomiting, etc.  Please notify your surgeon if you experience dizziness, shortness of breath or blurred vision between now & the time of your surgery. 17. If you have ___dentures, they will be removed before going to the OR; we will provide you a container. If you wear ___contact lenses or ___glasses, they will be removed; please bring a case for them. 18. To provide excellent care visitors will be limited to 1 in the room at any given time. 19. Please bring picture ID and insurance card. 20. Sleep apnea patients need to bring CPAP AND SETTINGS to hospital on day of surgery. 21. During flu season no children under the age of 15 are permitted in the hospital for the safety of all patients. 22. Other please check in at the information desk/main lobby. Please wear a mask. Please call AMBULATORY CARE if you have any further questions.    [facility]     [address]

## 2021-10-08 ENCOUNTER — HOSPITAL ENCOUNTER (OUTPATIENT)
Age: 46
Discharge: HOME | End: 2021-10-10
Payer: COMMERCIAL

## 2021-10-08 ENCOUNTER — PREP FOR PROCEDURE (OUTPATIENT)
Dept: SURGERY | Age: 46
End: 2021-10-08

## 2021-10-08 DIAGNOSIS — Z01.818 PREOP TESTING: ICD-10-CM

## 2021-10-08 PROCEDURE — U0005 INFEC AGEN DETEC AMPLI PROBE: HCPCS

## 2021-10-08 PROCEDURE — U0003 INFECTIOUS AGENT DETECTION BY NUCLEIC ACID (DNA OR RNA); SEVERE ACUTE RESPIRATORY SYNDROME CORONAVIRUS 2 (SARS-COV-2) (CORONAVIRUS DISEASE [COVID-19]), AMPLIFIED PROBE TECHNIQUE, MAKING USE OF HIGH THROUGHPUT TECHNOLOGIES AS DESCRIBED BY CMS-2020-01-R: HCPCS

## 2021-10-09 LAB
MRSA CULTURE ONLY: NORMAL
SARS-COV-2, PCR: NOT DETECTED

## 2021-10-12 ENCOUNTER — ANESTHESIA EVENT (OUTPATIENT)
Dept: ENDOSCOPY | Age: 46
End: 2021-10-12
Payer: COMMERCIAL

## 2021-10-12 ENCOUNTER — ANESTHESIA (OUTPATIENT)
Dept: ENDOSCOPY | Age: 46
End: 2021-10-12
Payer: COMMERCIAL

## 2021-10-12 ENCOUNTER — HOSPITAL ENCOUNTER (OUTPATIENT)
Age: 46
Setting detail: OUTPATIENT SURGERY
Discharge: HOME | End: 2021-10-12
Attending: SURGERY | Admitting: SURGERY
Payer: COMMERCIAL

## 2021-10-12 VITALS
HEART RATE: 60 BPM | OXYGEN SATURATION: 98 % | TEMPERATURE: 97 F | SYSTOLIC BLOOD PRESSURE: 124 MMHG | RESPIRATION RATE: 18 BRPM | DIASTOLIC BLOOD PRESSURE: 76 MMHG

## 2021-10-12 VITALS — OXYGEN SATURATION: 99 % | SYSTOLIC BLOOD PRESSURE: 113 MMHG | DIASTOLIC BLOOD PRESSURE: 70 MMHG

## 2021-10-12 DIAGNOSIS — Z01.818 PREOP TESTING: Primary | ICD-10-CM

## 2021-10-12 PROCEDURE — 2709999900 HC NON-CHARGEABLE SUPPLY: Performed by: SURGERY

## 2021-10-12 PROCEDURE — 7100000011 HC PHASE II RECOVERY - ADDTL 15 MIN: Performed by: SURGERY

## 2021-10-12 PROCEDURE — 6360000002 HC RX W HCPCS: Performed by: NURSE ANESTHETIST, CERTIFIED REGISTERED

## 2021-10-12 PROCEDURE — 45330 DIAGNOSTIC SIGMOIDOSCOPY: CPT | Performed by: SURGERY

## 2021-10-12 PROCEDURE — 7100000010 HC PHASE II RECOVERY - FIRST 15 MIN: Performed by: SURGERY

## 2021-10-12 PROCEDURE — 3700000001 HC ADD 15 MINUTES (ANESTHESIA): Performed by: SURGERY

## 2021-10-12 PROCEDURE — 2580000003 HC RX 258: Performed by: SURGERY

## 2021-10-12 PROCEDURE — 2580000003 HC RX 258: Performed by: NURSE ANESTHETIST, CERTIFIED REGISTERED

## 2021-10-12 PROCEDURE — 3609156700 HC PROCTOSIGMOIDOSCOPY DX: Performed by: SURGERY

## 2021-10-12 PROCEDURE — 99024 POSTOP FOLLOW-UP VISIT: CPT | Performed by: SURGERY

## 2021-10-12 PROCEDURE — 3700000000 HC ANESTHESIA ATTENDED CARE: Performed by: SURGERY

## 2021-10-12 RX ORDER — PROPOFOL 10 MG/ML
INJECTION, EMULSION INTRAVENOUS PRN
Status: DISCONTINUED | OUTPATIENT
Start: 2021-10-12 | End: 2021-10-12 | Stop reason: SDUPTHER

## 2021-10-12 RX ORDER — SODIUM CHLORIDE, SODIUM LACTATE, POTASSIUM CHLORIDE, CALCIUM CHLORIDE 600; 310; 30; 20 MG/100ML; MG/100ML; MG/100ML; MG/100ML
INJECTION, SOLUTION INTRAVENOUS CONTINUOUS
Status: DISCONTINUED | OUTPATIENT
Start: 2021-10-12 | End: 2021-10-12 | Stop reason: HOSPADM

## 2021-10-12 RX ORDER — SODIUM CHLORIDE 0.9 % (FLUSH) 0.9 %
10 SYRINGE (ML) INJECTION EVERY 12 HOURS SCHEDULED
Status: DISCONTINUED | OUTPATIENT
Start: 2021-10-12 | End: 2021-10-12 | Stop reason: HOSPADM

## 2021-10-12 RX ORDER — LIDOCAINE HYDROCHLORIDE 20 MG/ML
INJECTION, SOLUTION INTRAVENOUS PRN
Status: DISCONTINUED | OUTPATIENT
Start: 2021-10-12 | End: 2021-10-12 | Stop reason: SDUPTHER

## 2021-10-12 RX ORDER — SODIUM CHLORIDE, SODIUM LACTATE, POTASSIUM CHLORIDE, CALCIUM CHLORIDE 600; 310; 30; 20 MG/100ML; MG/100ML; MG/100ML; MG/100ML
INJECTION, SOLUTION INTRAVENOUS CONTINUOUS PRN
Status: DISCONTINUED | OUTPATIENT
Start: 2021-10-12 | End: 2021-10-12 | Stop reason: SDUPTHER

## 2021-10-12 RX ORDER — SODIUM CHLORIDE 0.9 % (FLUSH) 0.9 %
10 SYRINGE (ML) INJECTION PRN
Status: DISCONTINUED | OUTPATIENT
Start: 2021-10-12 | End: 2021-10-12 | Stop reason: HOSPADM

## 2021-10-12 RX ORDER — SODIUM CHLORIDE 9 MG/ML
25 INJECTION, SOLUTION INTRAVENOUS PRN
Status: DISCONTINUED | OUTPATIENT
Start: 2021-10-12 | End: 2021-10-12 | Stop reason: HOSPADM

## 2021-10-12 RX ADMIN — SODIUM CHLORIDE, POTASSIUM CHLORIDE, SODIUM LACTATE AND CALCIUM CHLORIDE: 600; 310; 30; 20 INJECTION, SOLUTION INTRAVENOUS at 07:30

## 2021-10-12 RX ADMIN — SODIUM CHLORIDE, POTASSIUM CHLORIDE, SODIUM LACTATE AND CALCIUM CHLORIDE: 600; 310; 30; 20 INJECTION, SOLUTION INTRAVENOUS at 08:08

## 2021-10-12 RX ADMIN — LIDOCAINE HYDROCHLORIDE 20 MG: 20 INJECTION, SOLUTION INTRAVENOUS at 08:13

## 2021-10-12 RX ADMIN — PROPOFOL 300 MG: 10 INJECTION, EMULSION INTRAVENOUS at 08:13

## 2021-10-12 ASSESSMENT — PAIN - FUNCTIONAL ASSESSMENT
PAIN_FUNCTIONAL_ASSESSMENT: 0-10
PAIN_FUNCTIONAL_ASSESSMENT: ACTIVITIES ARE NOT PREVENTED

## 2021-10-12 ASSESSMENT — PAIN DESCRIPTION - DESCRIPTORS: DESCRIPTORS: DISCOMFORT;TENDER

## 2021-10-12 ASSESSMENT — LIFESTYLE VARIABLES: SMOKING_STATUS: 1

## 2021-10-12 ASSESSMENT — PAIN SCALES - GENERAL: PAINLEVEL_OUTOF10: 0

## 2021-10-12 NOTE — H&P
General Surgery History and Physical    Patient's Name/Date of Birth: Herbert Chahal / 1975    Date: October 12, 2021     Surgeon: Maurizio Cavazos M.D.    PCP: Monalisa Lee PA-C     Chief Complaint: abnormal test    HPI:   Herbert Chahal is a 51 y.o. female who presents for evaluation of abnormal barium enema. Past Medical History:   Diagnosis Date    Acid reflux disease     Cyst of ovary     Fracture of nasal bone     Headache     Hearing loss     Hypertension     Migraine     Morbidly obese ([address] 10/5/2020    Multiple sclerosis ([address]     denies limitations at present 11/2020    VEENA on CPAP     severe VEENA per pt    Right shoulder pain 11/2020    Tinnitus     TMJ dysfunction        Past Surgical History:   Procedure Laterality Date    APPENDECTOMY      BACK SURGERY      BICEPS TENDON REPAIR Right 11/11/2020    BICEPS TENODESIS performed by Sneha Singleton MD at [place] [id number] Left 8/31/2021    LAPAROSCOPIC LEFT HEMICOLECTOMY WITH LOOP OSTOMY performed by Yesika Mccormick MD at [address] [place] [address] 9/6/2021    DIAGNOSTIC LAPAROSCOPY POSSIBLE BOWEL RESECTION POSSILBE OSTOMY performed by Yesika Mccormick MD at [address] [place] [address]  03/05/2018    Robotic hysterectomy, bilateral salpingectomy, right oophorectomy DR. ARIANA MONIQUE [facility]     HYSTERECTOMY, TOTAL ABDOMINAL      LARYNGOSCOPY N/A 7/17/2020    DIRECT LARYNGOSCOPY--OMNI GUIDE LASER performed by Jabier Rodriguez MD at [place] [address] ARTHROSCOPY Right 11/11/2020    RIGHT SHOULDER DIAGNOSTIC ARTHROSCOPY WITH DECOMPRESSION ROTATOR CUFF REPAIR [facility]         Current Facility-Administered Medications   Medication Dose Route Frequency Provider Last Rate Last Admin    0.9 % sodium chloride infusion  25 mL IntraVENous PRN Yesika Mccormick MD        lactated ringers infusion   IntraVENous Continuous Jennifer Decker MD        sodium chloride flush 0.9 % injection 10 mL  10 mL IntraVENous 2 times per day Jennifer Decker MD        sodium chloride flush 0.9 % injection 10 mL  10 mL IntraVENous PRN Jennifer Decker MD           No Known Allergies    The patient has a family history that is negative for severe cardiovascular or respiratory issues, negative for reaction to anesthesia. Social History     Socioeconomic History    Marital status: Single     Spouse name: Not on file    Number of children: 3    Years of education: 6    Highest education level: 11th grade   Occupational History    Not on file   Tobacco Use    Smoking status: Current Every Day Smoker     Packs/day: 0.50     Years: 25.00     Pack years: 12.50     Types: Cigarettes    Smokeless tobacco: Never Used    Tobacco comment: Ready to stop, requesting prescription for Chantix   Vaping Use    Vaping Use: Never used   Substance and Sexual Activity    Alcohol use: Yes     Alcohol/week: 1.0 standard drinks     Types: 1 Glasses of wine per week     Comment: socially    Drug use: No    Sexual activity: Yes     Partners: Male   Other Topics Concern    Not on file   Social History Narrative    Uses "[employer] for transportation    [employer]     Social Determinants of Health     Financial Resource Strain: Medium Risk    Difficulty of Paying Living Expenses: Somewhat hard   Food Insecurity: Food Insecurity Present    Worried About Running Out of Food in the Last Year: Sometimes true    [place] of Food in the Last Year: Never true   Transportation Needs: No Transportation Needs    Lack of Transportation (Medical): No    Lack of Transportation (Non-Medical):  No   Physical Activity: Sufficiently Active    Days of Exercise per Week: 3 days    Minutes of Exercise per Session: 60 min   Stress: No Stress Concern Present    Feeling of Stress : Not at all   Social Connections: Socially AM  10/12/2021

## 2021-10-12 NOTE — ANESTHESIA PRE PROCEDURE
Department of Anesthesiology  Preprocedure Note       Name:  Param Aguirre   Age:  51 y.o.  :  1975                                          MRN:  [id number]         Date:  10/12/2021      Surgeon: Jessie Mohs):  Amarilis Saavedra MD    Procedure: Procedure(s):  ANAL PROCTO SIGMOIDOSCOPY FLEXIBLE    Medications prior to admission:   Prior to Admission medications    Medication Sig Start Date End Date Taking?  Authorizing Provider   gabapentin (NEURONTIN) 300 MG capsule TAKE 1 CAPSULE BY MOUTH THREE TIMES DAILY 21  Carry MARIXA Horton CNP   scopolamine (TRANSDERM-SCOP) transdermal patch Place 1 patch onto the skin every 72 hours 9/10/21   Nathaly E Herrera Belling, DO   lisinopril-hydroCHLOROthiazide (PRINZIDE;ZESTORETIC) 10-12.5 MG per tablet TAKE 1 TABLET BY MOUTH ONCE DAILY 9/10/21   Katy Reyes PA-C   ibuprofen (ADVIL;MOTRIN) 800 MG tablet Take 1 tablet by mouth every 6 hours as needed for Pain 21   Amarilis Saavedra MD   ibuprofen (ADVIL;MOTRIN) 800 MG tablet Take 1 tablet by mouth every 6 hours as needed for Pain 21   Amarilis Saavedra MD   erythromycin base (E-MYCIN) 500 MG tablet Take 1 tablet by mouth See Admin Instructions for 3 doses Take at 1300, 1400 and midnight the day before surgery 21   Amarilis Saavedra MD   dexlansoprazole (DEXILANT) 60 MG CPDR delayed release capsule Take 60 mg by mouth daily    Historical Provider, MD   vitamin D (CHOLECALCIFEROL) 82601 UNIT CAPS Take 1 capsule by mouth once a week 21   MARIXA Abrams CNP   baclofen (LIORESAL) 20 MG tablet Take 1 tablet by mouth 4 times daily as needed (muscle spasms; pain) 21   MARIXA Abrams CNP   rOPINIRole (REQUIP) 0.5 MG tablet Take 1 tablet by mouth 2 times daily as needed (restless legs) 21   MARIXA Abrams CNP   ocrelizumab (OCREVUS) 300 MG/10ML SOLN injection Infuse 20 mLs intravenously every 6 months 21   Carry MARIXA Horton CNP   nortriptyline (PAMELOR) 10 MG capsule TAKE 1 CAPSULE BY MOUTH ONCE NIGHTLY FOR HEADACHES 7/2/21   Katheryn Berrios PA-C   famotidine (PEPCID) 40 MG tablet take 1 tablet by mouth every evening 4/6/21   Historical Provider, MD   CPAP Machine MISC Heated tube and chin strap. Dispense as written. LIFETIME SUPPLIES. 11/6/20   Historical Provider, MD       Current medications:    No current facility-administered medications for this visit. No current outpatient medications on file.      Facility-Administered Medications Ordered in Other Visits   Medication Dose Route Frequency Provider Last Rate Last Admin    0.9 % sodium chloride infusion  25 mL IntraVENous PRN Alexx Sagastume MD        lactated ringers infusion   IntraVENous Continuous Alexx Sagastume MD 75 mL/hr at 10/12/21 0730 New Bag at 10/12/21 0730    sodium chloride flush 0.9 % injection 10 mL  10 mL IntraVENous 2 times per day Alexx Sagastume MD        sodium chloride flush 0.9 % injection 10 mL  10 mL IntraVENous PRN Alexx Sagastume MD           Allergies:  No Known Allergies    Problem List:    Patient Active Problem List   Diagnosis Code    Vocal cord dysfunction J38.3    Multiple sclerosis ([address] Riya Harrison Morbidly obese ([address] E66.01    Palafox's esophagus with dysplasia K22.719    Secondary restless legs syndrome G25.81    S/P colectomy Z90.49    Malignant neoplasm of descending colon ([address] C18.6    Ileus, postoperative ([address] K91.89, K56.7    Pelvic abscess in female N73.9       Past Medical History:        Diagnosis Date    Acid reflux disease     Cyst of ovary     Fracture of nasal bone     Headache     Hearing loss     Hypertension     Migraine     Morbidly obese ([address] 10/5/2020    Multiple sclerosis ([address]     denies limitations at present 11/2020    VEENA on CPAP     severe VEENA per pt    Right shoulder pain 11/2020    Tinnitus     TMJ dysfunction        Past Surgical History:        Procedure Laterality Date    APPENDECTOMY      BACK SURGERY      BICEPS TENDON REPAIR Right 11/11/2020    BICEPS TENODESIS performed by Shae Soliman MD at [place] [address] Left 8/31/2021    LAPAROSCOPIC LEFT HEMICOLECTOMY WITH LOOP OSTOMY performed by Cornelius Moran MD at [address] [place] [address],[address] N/A 9/6/2021    DIAGNOSTIC LAPAROSCOPY POSSIBLE BOWEL RESECTION POSSILBE OSTOMY performed by Cornelius Moran MD at [facility]  03/05/2018    Robotic hysterectomy, bilateral salpingectomy, right oophorectomy DR. ARIANA MONIQUE [facility]     HYSTERECTOMY, TOTAL ABDOMINAL      LARYNGOSCOPY N/A 7/17/2020    DIRECT LARYNGOSCOPY--OMNI GUIDE LASER performed by Fariha Bonilla MD at [address] ARTHROSCOPY Right 11/11/2020    RIGHT SHOULDER DIAGNOSTIC ARTHROSCOPY WITH DECOMPRESSION ROTATOR CUFF REPAIR performed by Shae Soliman MD at [facility]         Social History:    Social History     Tobacco Use    Smoking status: Current Every Day Smoker     Packs/day: 0.50     Years: 25.00     Pack years: 12.50     Types: Cigarettes    Smokeless tobacco: Never Used    Tobacco comment: Ready to stop, requesting prescription for Chantix   Substance Use Topics    Alcohol use: Yes     Alcohol/week: 1.0 standard drinks     Types: 1 Glasses of wine per week     Comment: socially                                Ready to quit: Not Answered  Counseling given: Not Answered  Comment: Ready to stop, requesting prescription for Chantix      Vital Signs (Current): There were no vitals filed for this visit.                                            BP Readings from Last 3 Encounters:   10/12/21 127/74   10/07/21 132/74   09/16/21 111/74       NPO Status:                                                                                 BMI:   Wt Readings from Last 3 Encounters:   10/07/21 198 lb 3.2 oz (89.9 kg)   09/16/21 215 lb (97.5 kg)   09/06/21 215 lb (97.5 kg)     There is no height or weight on file to calculate BMI.    CBC:   Lab Results   Component Value Date    WBC 8.3 10/07/2021    RBC 4.63 10/07/2021    HGB 14.2 10/07/2021    HCT 43.9 10/07/2021    MCV 94.8 10/07/2021    RDW 13.4 10/07/2021     10/07/2021       CMP:   Lab Results   Component Value Date     10/07/2021    K 4.5 10/07/2021     10/07/2021    CO2 27 10/07/2021    BUN 8 10/07/2021    CREATININE 0.8 10/07/2021    GFRAA >60 10/07/2021    LABGLOM >60 10/07/2021    GLUCOSE 98 10/07/2021    PROT 6.6 09/06/2021    CALCIUM 9.7 10/07/2021    BILITOT 0.7 09/06/2021    ALKPHOS 117 09/06/2021    AST 15 09/06/2021    ALT 27 09/06/2021       POC Tests: No results for input(s): POCGLU, POCNA, POCK, POCCL, POCBUN, POCHEMO, POCHCT in the last 72 hours. Coags:   Lab Results   Component Value Date    PROTIME 10.7 02/28/2020    INR 1.0 02/28/2020       HCG (If Applicable):   Lab Results   Component Value Date    PREGTESTUR neg 02/04/2018        ABGs: No results found for: PHART, PO2ART, DUZ5FXT, ANR7MYG, BEART, M9VIBDZS     Type & Screen (If Applicable):  Lab Results   Component Value Date    LABABO A 03/05/2018       Drug/Infectious Status (If Applicable):  No results found for: HIV, HEPCAB    COVID-19 Screening (If Applicable):   Lab Results   Component Value Date    COVID19 Not Detected 10/07/2021     Impression   Dilatation of small bowel segments in the left lower quadrant and left side   of the pelvis consistent with small bowel obstruction with transition point   likely in the left side of the pelvis.  Thickening of segments of small bowel   in the mid pelvis consistent with enteritis.       Small locules of free intraperitoneal air in the upper and mid abdomen,   likely postsurgical.  Larger locules of free intraperitoneal air adjacent to   anastomotic surgical clips in the mid lower pelvis which may be postsurgical.   Infection cannot be excluded. .       Moderate amount of fluid in the pelvis and extending into the pelvic   cul-de-sac with mild peripheral enhancement, best seen in the pelvic   cul-de-sac which may manifest infection and developing abscess.  Clinical   correlation recommended.               Anesthesia Evaluation  Patient summary reviewed and Nursing notes reviewed no history of anesthetic complications:   Airway: Mallampati: II  TM distance: >3 FB   Neck ROM: full  Mouth opening: > = 3 FB Dental:          Pulmonary: breath sounds clear to auscultation  (+) sleep apnea: on noncompliant,  current smoker          Patient did not smoke on day of surgery. ROS comment: Vocal cord dysfunction   Cardiovascular:  Exercise tolerance: good (>4 METS),   (+) hypertension: mild, murmur (Grade I),       ECG reviewed  Rhythm: regular  Rate: normal           Beta Blocker:  Not on Beta Blocker      ROS comment: Normal sinus rhythm  Normal ECG  No previous ECGs available     Neuro/Psych:   (+) neuromuscular disease (Pt. states MS in remission. Denies specific symptoms.): multiple sclerosis, headaches: migraine headaches,              ROS comment: Hearing loss with tinnitus    TMJ dysfunction    Restless legs syndrome GI/Hepatic/Renal:   (+) GERD: poorly controlled, morbid obesity (BMI 40.8 kg/m2)         ROS comment: Palafox's with dysplasia. Endo/Other:    (+) malignancy/cancer (Malignant neoplasm of the descending colon). Pt had no PAT visit        ROS comment: RLS Abdominal:   (+) obese,     Abdomen: tender. Vascular: negative vascular ROS. Other Findings:               Anesthesia Plan      MAC     ASA 3       Induction: intravenous. Anesthetic plan and risks discussed with patient.                       Catherine Cardenas MD   10/12/2021

## 2021-10-12 NOTE — OP NOTE
[address]                                OPERATIVE REPORT    PATIENT NAME: Mychal Welch                   :        1975  MED REC NO:   [id number]                            ROOM:  ACCOUNT NO:   [de-identified]                           ADMIT DATE: 10/12/2021  PROVIDER:     Cornelius Moran MD    DATE OF PROCEDURE:  10/12/2021    PREOPERATIVE DIAGNOSIS:  Ileostomy in place with sigmoid stricture. POSTOPERATIVE DIAGNOSIS:  Ileostomy in place with sigmoid stricture at  20 cm. PROCEDURE:  Anal proctosigmoidoscopy, flexible. SURGEON:  Cornelius Moran MD    ASSISTANT:  None. ANESTHESIA:  LMAC. COMPLICATIONS:  None. FLUIDS:  Crystalloid. BLOOD LOSS:  Minimal.    DISPOSITION:  To be discharged home. SPECIMENS:  None. INDICATION:  This is a 54-year-old female with the aforementioned  diagnosis. I explained the risks, benefits, potential outcomes,  alternative treatment to the aforementioned procedure and she agreed to  proceed understanding those risks and potential outcomes. OPERATIVE PROCEDURE:  The patient was brought to the operative suite,  was then placed under [facility] anesthesia, and then a lubricated endoscope  was placed through the anus into the rectum which appeared normal and  then up to approximately 20 cm where there was an obvious staple line  and stricture of the colon. At that point, there appeared to be good  viable tissue, but it did appear tight, I could not pass the endoscope  through this, although it did appear to be a 1 cm opening with a long  tunnel at that way. Once we could not pass, it was decided that we  would terminate the procedure. The endoscope was then removed and the  patient was woken up.         Toyin Summers MD    D: 10/12/2021 9:25:07       T: 10/12/2021 9:27:18     [email or web address]  Job#: [id number]     Doc#: [id number]    CC:

## 2021-10-12 NOTE — ANESTHESIA POSTPROCEDURE EVALUATION
Department of Anesthesiology  Postprocedure Note    Patient: Enrico Vasquez  MRN: [id number]  YOB: 1975  Date of evaluation: 10/12/2021  Time:  8:57 AM     Procedure Summary     Date: 10/12/21 Room / Location: [address] [place] [address] 01 / [address] [place] [address]    Anesthesia Start: 2841 Anesthesia Stop: 8171    Procedure: ANAL PROCTO SIGMOIDOSCOPY FLEXIBLE (N/A ) Diagnosis: (ILEOSTOMY IN PLACE)    Surgeons: Rena Terrell MD Responsible Provider: Brodie Garza MD    Anesthesia Type: MAC ASA Status: 3          Anesthesia Type: MAC    Daniel Phase I: Daniel Score: 10    Daniel Phase II: Daniel Score: 10    Last vitals: Reviewed and per EMR flowsheets.        Anesthesia Post Evaluation    Patient location during evaluation: PACU  Patient participation: complete - patient participated  Level of consciousness: awake and alert  Airway patency: patent  Nausea & Vomiting: no nausea and no vomiting  Complications: no  Cardiovascular status: hemodynamically stable  Respiratory status: acceptable  Hydration status: euvolemic

## 2021-10-12 NOTE — OP NOTE
Operative Note      Patient: Kristi Muir  YOB: 1975  MRN: [id number]    Date of Procedure: 10/12/2021    Pre-Op Diagnosis: ILEOSTOMY IN PLACE    Post-Op Diagnosis: sigmoid stricture at 20cm       Procedure(s):  ANAL PROCTO SIGMOIDOSCOPY FLEXIBLE    Surgeon(s):  Walker Patel MD    Assistant:   Surgical Assistant: Barry Stevens RN    Anesthesia: Monitor Anesthesia Care    Estimated Blood Loss (mL): Minimal    Complications: None    Specimens:   * No specimens in log *    Implants:  * No implants in log *      Drains:   Closed/Suction Drain LLQ (Active)       Colostomy RUQ Loop (Active)       Findings: as dictated    Detailed Description of Procedure:   [id number]    Electronically signed by Walker Patel MD on 10/12/2021 at 9:23 AM

## 2021-10-14 ENCOUNTER — CARE COORDINATION (OUTPATIENT)
Dept: CARE COORDINATION | Age: 46
End: 2021-10-14

## 2021-10-14 ENCOUNTER — VIRTUAL VISIT (OUTPATIENT)
Dept: SURGERY | Age: 46
End: 2021-10-14

## 2021-10-14 DIAGNOSIS — K56.699 SIGMOID STRICTURE (HCC): Primary | ICD-10-CM

## 2021-10-14 PROCEDURE — 99024 POSTOP FOLLOW-UP VISIT: CPT | Performed by: SURGERY

## 2021-10-14 NOTE — PROGRESS NOTES
Surgery Progress Note            Chief complaint:   Patient Active Problem List   Diagnosis    Vocal cord dysfunction    Multiple sclerosis ([address]    Morbidly obese ([address]    Palafox's esophagus with dysplasia    Secondary restless legs syndrome    S/P colectomy    Malignant neoplasm of descending colon ([address]    Ileus, postoperative ([address]    Pelvic abscess in female    Abnormal barium enema       S: doing well    O: There were no vitals filed for this visit. No intake or output data in the 24 hours ending 10/14/21 0831        Labs:  No results for input(s): WBC, HGB, HCT in the last 72 hours. Invalid input(s): PLR  Lab Results   Component Value Date    CREATININE 0.8 10/07/2021    BUN 8 10/07/2021     10/07/2021    K 4.5 10/07/2021     10/07/2021    CO2 27 10/07/2021     No results for input(s): LIPASE, AMYLASE in the last 72 hours. Physical exam:   Not done due to phone visit    A:  Post op sigmoidoscopy showing stricture    P: follow up in three months to discuss revisional surgery    Hector Arreola MD, MD  10/14/2021        Consent:  He and/or health care decision maker is aware that that he may receive a bill for this telephone service, depending on his insurance coverage, and has provided verbal consent to proceed: Yes      Documentation:  I communicated with the patient and/or health care decision maker about results. Details of this discussion including any medical advice provided: plan for future surgery      I affirm his is a Patient Initiated Episode with a Patient who has not had a related appointment within my department in the past 7 days or scheduled within the next 24 hours.         Patient's location: home address  Physician  location office address in [facility]  Other people involved in call none          Total Time: minutes: <5 minutes (not billable)

## 2021-10-14 NOTE — CARE COORDINATION
Ambulatory Care Coordination Note  10/14/2021  CM Risk Score: 4  Charlson 10 Year Mortality Risk Score: 10%     ACC: Bridget Garcia RN    Summary Note:   Patient contacted this AC to inform ACM that she was not able to complete ileostomy reversal as planned. She states Dr. Jose Brown will re-assess in approximately 3 months to determine status at that time. Patient is aware of and plans to attend all of her scheduled appointments. ACM did recommend that patient schedule a f/u appointment with PCP to review her recent surgery on ongoing treatment plans. Patient agrees to schedule an appointment and does not require this ACM's assistance in scheduling this appointment. Patient states the area around the ileostomy continues to be sore. There are no s/s of infection. She states that d/t she is able to change her bag herself, home health signed off today. Patient states she is going to contact facility where she received her initial COVID vaccine to determine if she needs to start over with 2 vaccines or if she can complete the 2nd dose. ACM and patient discussed graduation from Care Coordination. Patient agrees that she is able to manage her healthcare needs without further intervention from this ACM. She does have ACM's contact information and will call if she feels she needs additional assistance in the future. PLAN  -Inform PCP patient has met her goals and feels ready to graduate from Care Coordination.          Care Coordination Interventions    Program Enrollment: Complex Care  Referral from Primary Care Provider: No  Suggested Interventions and [address] [place] [address] Hwy: Completed (Comment: 09/13/21: Patient currently active [facility]10/14/21: Home Health signed off 10/14/21)  Medi Set or Pill Pack: Completed (Comment: 05/04/21: Patient receives routine medications from [address] [place] [address])  Physical Therapy: Completed (Comment: 05/04/21: Patient attend [facility] on an outpatient basis; 06/11/231: Completed beginning of June)  Registered Dietician: Declined  Smoking Cessation: Completed (Comment: 05/04/21: Patient to discuss Chantix with PCP; 10/05/21 Patient has stopped smoking)  Social Work: Declined  Transportation Support: Completed  Other Services or Interventions: Patient active with the [employer] d/t new DX: Colon Cancer         Goals Addressed                 This Visit's Progress     COMPLETED: Conditions and Symptoms        I will schedule office visits, as directed by my provider. I will keep my appointment or reschedule if I have to cancel. I will notify my provider of any symptoms that indicate a worsening of my condition. Barriers: overwhelmed by complexity of regimen and time constraints  Plan for overcoming my barriers: Care Coordination, ACM to remind patient of scheduled appointments (date and time)  Confidence: 8/10  Anticipated Goal Completion Date: 08/04/21, 08/27/21, 09/12/21, 10/12/21, 11/12/21              Prior to Admission medications    Medication Sig Start Date End Date Taking?  Authorizing Provider   gabapentin (NEURONTIN) 300 MG capsule TAKE 1 CAPSULE BY MOUTH THREE TIMES DAILY 9/13/21 12/12/21  MARIXA Barriga - CNP   scopolamine (TRANSDERM-SCOP) transdermal patch Place 1 patch onto the skin every 72 hours 9/10/21   Nathaly Bueno,    lisinopril-hydroCHLOROthiazide (PRINZIDE;ZESTORETIC) 10-12.5 MG per tablet TAKE 1 TABLET BY MOUTH ONCE DAILY 9/10/21   Belgica Weiss PA-C   ibuprofen (ADVIL;MOTRIN) 800 MG tablet Take 1 tablet by mouth every 6 hours as needed for Pain 9/4/21   Abiel Davenport MD   ibuprofen (ADVIL;MOTRIN) 800 MG tablet Take 1 tablet by mouth every 6 hours as needed for Pain 9/1/21   Abiel Davenport MD   erythromycin base (E-MYCIN) 500 MG tablet Take 1 tablet by mouth See Admin Instructions for 3 doses Take at 1300, 1400 and midnight the day before surgery 8/27/21   Abiel Davenport MD   dexlansoprazole ([address] [place] [address]) 60 MG CPDR delayed release capsule Take 60 mg by mouth daily    Historical Provider, MD   vitamin D (CHOLECALCIFEROL) 63258 UNIT CAPS Take 1 capsule by mouth once a week 8/13/21   Jennell Aase, APRN - CNP   baclofen (LIORESAL) 20 MG tablet Take 1 tablet by mouth 4 times daily as needed (muscle spasms; pain) 8/13/21   Jennell Aase, APRN - CNP   rOPINIRole (REQUIP) 0.5 MG tablet Take 1 tablet by mouth 2 times daily as needed (restless legs) 8/13/21   Jennell Aase, APRN - CNP   ocrelizumab (OCREVUS) 300 MG/10ML SOLN injection Infuse 20 mLs intravenously every 6 months 7/19/21   Jennell Aase, APRN - CNP   nortriptyline (PAMELOR) 10 MG capsule TAKE 1 CAPSULE BY MOUTH ONCE NIGHTLY FOR HEADACHES 7/2/21   Halle Noonan PA-C   famotidine (PEPCID) 40 MG tablet take 1 tablet by mouth every evening 4/6/21   Historical Provider, MD   CPAP Machine MISC Heated tube and chin strap. Dispense as written. LIFETIME SUPPLIES.  11/6/20   Historical Provider, MD       Future Appointments   Date Time Provider Sandra Birch   1/20/2022  2:00 PM Katia White MD [facility] GEN SURG [facility]   2/2/2022 10:00 AM Jennell Aase, APRN - CNP [facility] Neuro [facility]   2/28/2022  8:00 AM [facility] CHAIR [facility] Floresita Barboza

## 2021-10-30 ENCOUNTER — HOSPITAL ENCOUNTER (INPATIENT)
Age: 46
LOS: 2 days | Discharge: HOME | DRG: 137 | End: 2021-11-01
Attending: EMERGENCY MEDICINE | Admitting: INTERNAL MEDICINE
Payer: COMMERCIAL

## 2021-10-30 ENCOUNTER — APPOINTMENT (OUTPATIENT)
Dept: CT IMAGING | Age: 46
DRG: 137 | End: 2021-10-30
Payer: COMMERCIAL

## 2021-10-30 ENCOUNTER — APPOINTMENT (OUTPATIENT)
Dept: GENERAL RADIOLOGY | Age: 46
DRG: 137 | End: 2021-10-30
Payer: COMMERCIAL

## 2021-10-30 DIAGNOSIS — U07.1 ACUTE RESPIRATORY DISEASE DUE TO COVID-19 VIRUS: Primary | ICD-10-CM

## 2021-10-30 DIAGNOSIS — R42 DIZZINESS: ICD-10-CM

## 2021-10-30 DIAGNOSIS — J06.9 ACUTE RESPIRATORY DISEASE DUE TO COVID-19 VIRUS: Primary | ICD-10-CM

## 2021-10-30 LAB
ALBUMIN SERPL-MCNC: 4.6 G/DL (ref 3.5–5.2)
ALP BLD-CCNC: 186 U/L (ref 35–104)
ALT SERPL-CCNC: 132 U/L (ref 0–32)
ANION GAP SERPL CALCULATED.3IONS-SCNC: 14 MMOL/L (ref 7–16)
AST SERPL-CCNC: 85 U/L (ref 0–31)
BASOPHILS ABSOLUTE: 0.03 E9/L (ref 0–0.2)
BASOPHILS RELATIVE PERCENT: 0.8 % (ref 0–2)
BILIRUB SERPL-MCNC: 0.6 MG/DL (ref 0–1.2)
BILIRUBIN DIRECT: 0.3 MG/DL (ref 0–0.3)
BILIRUBIN, INDIRECT: 0.3 MG/DL (ref 0–1)
BUN BLDV-MCNC: 10 MG/DL (ref 6–20)
C-REACTIVE PROTEIN: 1.3 MG/DL (ref 0–0.4)
C-REACTIVE PROTEIN: 1.5 MG/DL (ref 0–0.4)
CALCIUM SERPL-MCNC: 9.6 MG/DL (ref 8.6–10.2)
CHLORIDE BLD-SCNC: 98 MMOL/L (ref 98–107)
CO2: 24 MMOL/L (ref 22–29)
CREAT SERPL-MCNC: 0.8 MG/DL (ref 0.5–1)
D DIMER: 1811 NG/ML DDU
D DIMER: 900 NG/ML DDU
EKG ATRIAL RATE: 82 BPM
EKG P AXIS: 59 DEGREES
EKG P-R INTERVAL: 138 MS
EKG Q-T INTERVAL: 362 MS
EKG QRS DURATION: 78 MS
EKG QTC CALCULATION (BAZETT): 422 MS
EKG R AXIS: 53 DEGREES
EKG T AXIS: 54 DEGREES
EKG VENTRICULAR RATE: 82 BPM
EOSINOPHILS ABSOLUTE: 0.03 E9/L (ref 0.05–0.5)
EOSINOPHILS RELATIVE PERCENT: 0.8 % (ref 0–6)
FERRITIN: 5779 NG/ML
FIBRINOGEN: 476 MG/DL (ref 225–540)
GFR AFRICAN AMERICAN: >60
GFR NON-AFRICAN AMERICAN: >60 ML/MIN/1.73
GLUCOSE BLD-MCNC: 111 MG/DL (ref 74–99)
HCT VFR BLD CALC: 46.5 % (ref 34–48)
HEMOGLOBIN: 15.5 G/DL (ref 11.5–15.5)
IMMATURE GRANULOCYTES #: 0.03 E9/L
IMMATURE GRANULOCYTES %: 0.8 % (ref 0–5)
LACTATE DEHYDROGENASE: 283 U/L (ref 135–214)
LACTIC ACID, SEPSIS: 1.2 MMOL/L (ref 0.5–1.9)
LYMPHOCYTES ABSOLUTE: 1.31 E9/L (ref 1.5–4)
LYMPHOCYTES RELATIVE PERCENT: 36.5 % (ref 20–42)
MCH RBC QN AUTO: 30.6 PG (ref 26–35)
MCHC RBC AUTO-ENTMCNC: 33.3 % (ref 32–34.5)
MCV RBC AUTO: 91.7 FL (ref 80–99.9)
MONOCYTES ABSOLUTE: 0.51 E9/L (ref 0.1–0.95)
MONOCYTES RELATIVE PERCENT: 14.2 % (ref 2–12)
NEUTROPHILS ABSOLUTE: 1.68 E9/L (ref 1.8–7.3)
NEUTROPHILS RELATIVE PERCENT: 46.9 % (ref 43–80)
PDW BLD-RTO: 13.2 FL (ref 11.5–15)
PLATELET # BLD: 161 E9/L (ref 130–450)
PMV BLD AUTO: 10.5 FL (ref 7–12)
POTASSIUM REFLEX MAGNESIUM: 4.5 MMOL/L (ref 3.5–5)
PRO-BNP: 18 PG/ML (ref 0–125)
PROCALCITONIN: 0.08 NG/ML (ref 0–0.08)
PROCALCITONIN: 0.09 NG/ML (ref 0–0.08)
RBC # BLD: 5.07 E12/L (ref 3.5–5.5)
SARS-COV-2, NAAT: DETECTED
SEDIMENTATION RATE, ERYTHROCYTE: 3 MM/HR (ref 0–20)
SODIUM BLD-SCNC: 136 MMOL/L (ref 132–146)
TOTAL PROTEIN: 8 G/DL (ref 6.4–8.3)
TROPONIN, HIGH SENSITIVITY: <6 NG/L (ref 0–9)
WBC # BLD: 3.6 E9/L (ref 4.5–11.5)

## 2021-10-30 PROCEDURE — 6370000000 HC RX 637 (ALT 250 FOR IP): Performed by: INTERNAL MEDICINE

## 2021-10-30 PROCEDURE — XW0DXM6 INTRODUCTION OF BARICITINIB INTO MOUTH AND PHARYNX, EXTERNAL APPROACH, NEW TECHNOLOGY GROUP 6: ICD-10-PCS | Performed by: FAMILY MEDICINE

## 2021-10-30 PROCEDURE — 86140 C-REACTIVE PROTEIN: CPT

## 2021-10-30 PROCEDURE — 36415 COLL VENOUS BLD VENIPUNCTURE: CPT

## 2021-10-30 PROCEDURE — 96374 THER/PROPH/DIAG INJ IV PUSH: CPT

## 2021-10-30 PROCEDURE — 93005 ELECTROCARDIOGRAM TRACING: CPT | Performed by: EMERGENCY MEDICINE

## 2021-10-30 PROCEDURE — 85384 FIBRINOGEN ACTIVITY: CPT

## 2021-10-30 PROCEDURE — 2700000000 HC OXYGEN THERAPY PER DAY

## 2021-10-30 PROCEDURE — 85378 FIBRIN DEGRADE SEMIQUANT: CPT

## 2021-10-30 PROCEDURE — 83615 LACTATE (LD) (LDH) ENZYME: CPT

## 2021-10-30 PROCEDURE — 71275 CT ANGIOGRAPHY CHEST: CPT

## 2021-10-30 PROCEDURE — 84145 PROCALCITONIN (PCT): CPT

## 2021-10-30 PROCEDURE — 85651 RBC SED RATE NONAUTOMATED: CPT

## 2021-10-30 PROCEDURE — 83880 ASSAY OF NATRIURETIC PEPTIDE: CPT

## 2021-10-30 PROCEDURE — 6360000002 HC RX W HCPCS: Performed by: EMERGENCY MEDICINE

## 2021-10-30 PROCEDURE — 99284 EMERGENCY DEPT VISIT MOD MDM: CPT

## 2021-10-30 PROCEDURE — 82728 ASSAY OF FERRITIN: CPT

## 2021-10-30 PROCEDURE — 99222 1ST HOSP IP/OBS MODERATE 55: CPT | Performed by: INTERNAL MEDICINE

## 2021-10-30 PROCEDURE — 85025 COMPLETE CBC W/AUTO DIFF WBC: CPT

## 2021-10-30 PROCEDURE — 2580000003 HC RX 258: Performed by: EMERGENCY MEDICINE

## 2021-10-30 PROCEDURE — 71045 X-RAY EXAM CHEST 1 VIEW: CPT

## 2021-10-30 PROCEDURE — 84484 ASSAY OF TROPONIN QUANT: CPT

## 2021-10-30 PROCEDURE — 87635 SARS-COV-2 COVID-19 AMP PRB: CPT

## 2021-10-30 PROCEDURE — 2580000003 HC RX 258: Performed by: INTERNAL MEDICINE

## 2021-10-30 PROCEDURE — 6360000002 HC RX W HCPCS: Performed by: INTERNAL MEDICINE

## 2021-10-30 PROCEDURE — 6360000004 HC RX CONTRAST MEDICATION: Performed by: RADIOLOGY

## 2021-10-30 PROCEDURE — 2060000000 HC ICU INTERMEDIATE R&B

## 2021-10-30 PROCEDURE — 80048 BASIC METABOLIC PNL TOTAL CA: CPT

## 2021-10-30 PROCEDURE — 80076 HEPATIC FUNCTION PANEL: CPT

## 2021-10-30 PROCEDURE — 83605 ASSAY OF LACTIC ACID: CPT

## 2021-10-30 PROCEDURE — 93010 ELECTROCARDIOGRAM REPORT: CPT | Performed by: INTERNAL MEDICINE

## 2021-10-30 RX ORDER — ONDANSETRON 4 MG/1
4 TABLET, ORALLY DISINTEGRATING ORAL EVERY 8 HOURS PRN
Status: DISCONTINUED | OUTPATIENT
Start: 2021-10-30 | End: 2021-11-01 | Stop reason: HOSPADM

## 2021-10-30 RX ORDER — 0.9 % SODIUM CHLORIDE 0.9 %
1000 INTRAVENOUS SOLUTION INTRAVENOUS ONCE
Status: COMPLETED | OUTPATIENT
Start: 2021-10-30 | End: 2021-10-30

## 2021-10-30 RX ORDER — BACLOFEN 10 MG/1
20 TABLET ORAL 4 TIMES DAILY PRN
Status: DISCONTINUED | OUTPATIENT
Start: 2021-10-30 | End: 2021-11-01 | Stop reason: HOSPADM

## 2021-10-30 RX ORDER — HYDROCHLOROTHIAZIDE 12.5 MG/1
12.5 TABLET ORAL DAILY
Status: DISCONTINUED | OUTPATIENT
Start: 2021-10-30 | End: 2021-11-01 | Stop reason: HOSPADM

## 2021-10-30 RX ORDER — ONDANSETRON 2 MG/ML
4 INJECTION INTRAMUSCULAR; INTRAVENOUS EVERY 6 HOURS PRN
Status: DISCONTINUED | OUTPATIENT
Start: 2021-10-30 | End: 2021-11-01 | Stop reason: HOSPADM

## 2021-10-30 RX ORDER — LISINOPRIL 10 MG/1
10 TABLET ORAL DAILY
Status: DISCONTINUED | OUTPATIENT
Start: 2021-10-30 | End: 2021-11-01 | Stop reason: HOSPADM

## 2021-10-30 RX ORDER — DEXAMETHASONE SODIUM PHOSPHATE 10 MG/ML
6 INJECTION INTRAMUSCULAR; INTRAVENOUS EVERY 24 HOURS
Status: DISCONTINUED | OUTPATIENT
Start: 2021-10-31 | End: 2021-11-01 | Stop reason: HOSPADM

## 2021-10-30 RX ORDER — ACETAMINOPHEN 650 MG/1
650 SUPPOSITORY RECTAL EVERY 6 HOURS PRN
Status: DISCONTINUED | OUTPATIENT
Start: 2021-10-30 | End: 2021-11-01 | Stop reason: HOSPADM

## 2021-10-30 RX ORDER — LISINOPRIL AND HYDROCHLOROTHIAZIDE 12.5; 1 MG/1; MG/1
1 TABLET ORAL DAILY
Status: DISCONTINUED | OUTPATIENT
Start: 2021-10-30 | End: 2021-10-30 | Stop reason: CLARIF

## 2021-10-30 RX ORDER — ACETAMINOPHEN 325 MG/1
650 TABLET ORAL EVERY 6 HOURS PRN
Status: DISCONTINUED | OUTPATIENT
Start: 2021-10-30 | End: 2021-11-01 | Stop reason: HOSPADM

## 2021-10-30 RX ORDER — SODIUM CHLORIDE 0.9 % (FLUSH) 0.9 %
10 SYRINGE (ML) INJECTION PRN
Status: DISCONTINUED | OUTPATIENT
Start: 2021-10-30 | End: 2021-11-01 | Stop reason: HOSPADM

## 2021-10-30 RX ORDER — IBUPROFEN 800 MG/1
800 TABLET ORAL EVERY 6 HOURS PRN
Status: DISCONTINUED | OUTPATIENT
Start: 2021-10-30 | End: 2021-11-01 | Stop reason: HOSPADM

## 2021-10-30 RX ORDER — SODIUM CHLORIDE 9 MG/ML
25 INJECTION, SOLUTION INTRAVENOUS PRN
Status: DISCONTINUED | OUTPATIENT
Start: 2021-10-30 | End: 2021-11-01 | Stop reason: HOSPADM

## 2021-10-30 RX ORDER — PANTOPRAZOLE SODIUM 40 MG/1
40 TABLET, DELAYED RELEASE ORAL
Status: DISCONTINUED | OUTPATIENT
Start: 2021-10-30 | End: 2021-11-01 | Stop reason: HOSPADM

## 2021-10-30 RX ORDER — VITAMIN B COMPLEX
2000 TABLET ORAL DAILY
Status: DISCONTINUED | OUTPATIENT
Start: 2021-10-30 | End: 2021-11-01 | Stop reason: HOSPADM

## 2021-10-30 RX ORDER — GABAPENTIN 300 MG/1
300 CAPSULE ORAL 3 TIMES DAILY
Status: DISCONTINUED | OUTPATIENT
Start: 2021-10-30 | End: 2021-11-01 | Stop reason: HOSPADM

## 2021-10-30 RX ORDER — ROPINIROLE 0.5 MG/1
0.5 TABLET, FILM COATED ORAL 2 TIMES DAILY PRN
Status: DISCONTINUED | OUTPATIENT
Start: 2021-10-30 | End: 2021-11-01 | Stop reason: HOSPADM

## 2021-10-30 RX ORDER — POLYETHYLENE GLYCOL 3350 17 G/17G
17 POWDER, FOR SOLUTION ORAL DAILY PRN
Status: DISCONTINUED | OUTPATIENT
Start: 2021-10-30 | End: 2021-11-01 | Stop reason: HOSPADM

## 2021-10-30 RX ORDER — SODIUM CHLORIDE 0.9 % (FLUSH) 0.9 %
5-40 SYRINGE (ML) INJECTION PRN
Status: DISCONTINUED | OUTPATIENT
Start: 2021-10-30 | End: 2021-11-01 | Stop reason: HOSPADM

## 2021-10-30 RX ORDER — ASCORBIC ACID 500 MG
500 TABLET ORAL DAILY
Status: DISCONTINUED | OUTPATIENT
Start: 2021-10-30 | End: 2021-11-01 | Stop reason: HOSPADM

## 2021-10-30 RX ORDER — ZINC SULFATE 50(220)MG
50 CAPSULE ORAL DAILY
Status: DISCONTINUED | OUTPATIENT
Start: 2021-10-30 | End: 2021-11-01 | Stop reason: HOSPADM

## 2021-10-30 RX ORDER — DEXAMETHASONE SODIUM PHOSPHATE 10 MG/ML
10 INJECTION INTRAMUSCULAR; INTRAVENOUS ONCE
Status: COMPLETED | OUTPATIENT
Start: 2021-10-30 | End: 2021-10-30

## 2021-10-30 RX ORDER — SODIUM CHLORIDE 0.9 % (FLUSH) 0.9 %
5-40 SYRINGE (ML) INJECTION EVERY 12 HOURS SCHEDULED
Status: DISCONTINUED | OUTPATIENT
Start: 2021-10-30 | End: 2021-11-01 | Stop reason: HOSPADM

## 2021-10-30 RX ADMIN — SODIUM CHLORIDE, PRESERVATIVE FREE 10 ML: 5 INJECTION INTRAVENOUS at 12:30

## 2021-10-30 RX ADMIN — DEXAMETHASONE SODIUM PHOSPHATE 10 MG: 10 INJECTION INTRAMUSCULAR; INTRAVENOUS at 12:30

## 2021-10-30 RX ADMIN — ENOXAPARIN SODIUM 40 MG: 40 INJECTION SUBCUTANEOUS at 21:21

## 2021-10-30 RX ADMIN — Medication 2000 UNITS: at 18:18

## 2021-10-30 RX ADMIN — IBUPROFEN 800 MG: 800 TABLET, FILM COATED ORAL at 21:22

## 2021-10-30 RX ADMIN — IOPAMIDOL 75 ML: 755 INJECTION, SOLUTION INTRAVENOUS at 14:50

## 2021-10-30 RX ADMIN — PANTOPRAZOLE SODIUM 40 MG: 40 TABLET, DELAYED RELEASE ORAL at 18:20

## 2021-10-30 RX ADMIN — OXYCODONE HYDROCHLORIDE AND ACETAMINOPHEN 500 MG: 500 TABLET ORAL at 18:20

## 2021-10-30 RX ADMIN — BARICITINIB 4 MG: 2 TABLET, FILM COATED ORAL at 21:46

## 2021-10-30 RX ADMIN — ZINC SULFATE 220 MG (50 MG) CAPSULE 50 MG: CAPSULE at 18:20

## 2021-10-30 RX ADMIN — GABAPENTIN 300 MG: 300 CAPSULE ORAL at 21:21

## 2021-10-30 RX ADMIN — SODIUM CHLORIDE 1000 ML: 9 INJECTION, SOLUTION INTRAVENOUS at 12:29

## 2021-10-30 RX ADMIN — Medication 10 ML: at 22:06

## 2021-10-30 ASSESSMENT — ENCOUNTER SYMPTOMS
SINUS PRESSURE: 0
EYE REDNESS: 0
VOMITING: 0
DIARRHEA: 0
EYE DISCHARGE: 0
BLOOD IN STOOL: 0
SORE THROAT: 0
SHORTNESS OF BREATH: 0
WHEEZING: 0
BACK PAIN: 0
EYE PAIN: 0
NAUSEA: 0
ABDOMINAL DISTENTION: 0
COUGH: 0

## 2021-10-30 ASSESSMENT — PAIN DESCRIPTION - DESCRIPTORS: DESCRIPTORS: ACHING

## 2021-10-30 ASSESSMENT — PAIN SCALES - GENERAL
PAINLEVEL_OUTOF10: 4
PAINLEVEL_OUTOF10: 0
PAINLEVEL_OUTOF10: 0

## 2021-10-30 ASSESSMENT — PAIN DESCRIPTION - ORIENTATION: ORIENTATION: LEFT;RIGHT

## 2021-10-30 ASSESSMENT — PAIN DESCRIPTION - LOCATION: LOCATION: LEG

## 2021-10-30 ASSESSMENT — PAIN DESCRIPTION - PAIN TYPE: TYPE: CHRONIC PAIN

## 2021-10-30 NOTE — ED NOTES
Faxed SBAR to [phone]. Confirmed receipt of report with Sreedhar Mack. ED to transport to floor.      Feroz Lorenzana RN  10/30/21 9251

## 2021-10-30 NOTE — H&P
[facility] History and Physical      CHIEF COMPLAINT: Shortness of breath    History of Present Illness:    Patient is 43-year-old female past medical history si VEENA not on gnificant for colon cancer s/p hemicolectomy as of 8/21 with ileostomy in place, vocal cord malfunction, s/p supraglottoplasty,, VEENA not on CPAP, multiple sclerosis. Patient presented to the ED today with complaints of worsening shortness of breath, dizziness, dry cough that has been going on for the past 4 days, symptoms started as fever of T-max 100, chills as well as dry cough. She got tested at [facility] immediately after her symptoms started and her test came back positive yesterday. She denied any abdominal pain, chest pain, nausea, vomiting, increased ileostomy output, any urinary symptoms.    -In the ED, patient was hemodynamically stable  -Labs were significant for transaminitis, leukopenia. Rapid COVID-19 test reported positive. -CTA chest reported no evidence of PE but showed mild bilateral groundglass opacities.     Informant(s) for H&P: Patient    REVIEW OF SYSTEMS:  A comprehensive review of systems was negative except for: what is in the HPI      PMH:  Past Medical History:   Diagnosis Date    Acid reflux disease     Cyst of ovary     Fracture of nasal bone     Headache     Hearing loss     Hypertension     Migraine     Morbidly obese ([address] 10/5/2020    Multiple sclerosis ([address]     denies limitations at present 11/2020    VEENA on CPAP     severe VEENA per pt    Right shoulder pain 11/2020    Tinnitus     TMJ dysfunction        Surgical History:  Past Surgical History:   Procedure Laterality Date    APPENDECTOMY      BACK SURGERY      BICEPS TENDON REPAIR Right 11/11/2020    BICEPS TENODESIS performed by Sneha Singleton MD at [place] [address] Left 8/31/2021    LAPAROSCOPIC LEFT HEMICOLECTOMY WITH LOOP OSTOMY performed by Yesika Mccormick MD at [address] [place] [address] COLECTOMY N/A 9/6/2021    DIAGNOSTIC LAPAROSCOPY POSSIBLE BOWEL RESECTION POSSILBE OSTOMY performed by Delmy Oliver MD at [address] [facility]  03/05/2018    Robotic hysterectomy, bilateral salpingectomy, right oophorectomy DR. ARIANA SAUL [facility]     HYSTERECTOMY, TOTAL ABDOMINAL      LARYNGOSCOPY N/A 7/17/2020    DIRECT LARYNGOSCOPY--OMNI GUIDE LASER performed by Jake Randle MD at [facility] PARTIAL HYSTERECTOMY      PROCTOSIGMOIDOSCOPY N/A 10/12/2021    ANAL PROCTO SIGMOIDOSCOPY FLEXIBLE performed by Delmy Oliver MD at [facility] ARTHROSCOPY Right 11/11/2020    RIGHT SHOULDER DIAGNOSTIC ARTHROSCOPY WITH DECOMPRESSION ROTATOR CUFF REPAIR performed by Berna Bauman MD at [facility]         Medications Prior to Admission:    Prior to Admission medications    Medication Sig Start Date End Date Taking?  Authorizing Provider   gabapentin (NEURONTIN) 300 MG capsule TAKE 1 CAPSULE BY MOUTH THREE TIMES DAILY 9/13/21 12/12/21  MARIXA Sandhu - CNP   scopolamine (TRANSDERM-SCOP) transdermal patch Place 1 patch onto the skin every 72 hours 9/10/21   Nathaly Bueno,    lisinopril-hydroCHLOROthiazide (PRINZIDE;ZESTORETIC) 10-12.5 MG per tablet TAKE 1 TABLET BY MOUTH ONCE DAILY 9/10/21   Radha Pate PA-C   ibuprofen (ADVIL;MOTRIN) 800 MG tablet Take 1 tablet by mouth every 6 hours as needed for Pain 9/4/21   Delmy Oliver MD   ibuprofen (ADVIL;MOTRIN) 800 MG tablet Take 1 tablet by mouth every 6 hours as needed for Pain 9/1/21   Delmy Oliver MD   erythromycin base (E-MYCIN) 500 MG tablet Take 1 tablet by mouth See Admin Instructions for 3 doses Take at 1300, 1400 and midnight the day before surgery 8/27/21   Delmy Oliver MD   dexlansoprazole (DEXILANT) 60 MG CPDR delayed release capsule Take 60 mg by mouth daily    Historical Provider, MD   vitamin D (CHOLECALCIFEROL) 94883 UNIT CAPS Take 1 capsule by mouth once a week 8/13/21   MARIXA Sullivan CNP   baclofen (LIORESAL) 20 MG tablet Take 1 tablet by mouth 4 times daily as needed (muscle spasms; pain) 8/13/21   MARIXA Sullivan CNP   rOPINIRole (REQUIP) 0.5 MG tablet Take 1 tablet by mouth 2 times daily as needed (restless legs) 8/13/21   MARIXA Sullivan CNP   ocrelizumab (OCREVUS) 300 MG/10ML SOLN injection Infuse 20 mLs intravenously every 6 months 7/19/21   MARIXA Sullivan CNP   nortriptyline (PAMELOR) 10 MG capsule TAKE 1 CAPSULE BY MOUTH ONCE NIGHTLY FOR HEADACHES 7/2/21   Dafne Rhodes PA-C   famotidine (PEPCID) 40 MG tablet take 1 tablet by mouth every evening 4/6/21   Historical Provider, MD   CPAP Machine MISC Heated tube and chin strap. Dispense as written. LIFETIME SUPPLIES. 11/6/20   Historical Provider, MD       Allergies:    Patient has no known allergies. Social History:    reports that she has been smoking cigarettes. She has a 12.50 pack-year smoking history. She has never used smokeless tobacco. She reports current alcohol use of about 1.0 standard drinks of alcohol per week. She reports that she does not use drugs. Family History:   family history includes Mult Sclerosis in her mother.      PHYSICAL EXAM:  Vitals:  /78   Pulse 65   Temp 97.3 °F (36.3 °C) (Oral)   Resp 18   Ht 5' 1\" (1.549 m)   Wt 184 lb 9.6 oz (83.7 kg)   LMP 01/05/2018   SpO2 100%   BMI 34.88 kg/m²     General Appearance: alert and oriented to person, place and time and in no acute distress  Skin: warm and dry  Head: normocephalic and atraumatic  Eyes: pupils equal, round, and reactive to light, extraocular eye movements intact, conjunctivae normal  Neck: neck supple and non tender without mass   Pulmonary/Chest: clear to auscultation bilaterally- no wheezes, rales or rhonchi, normal air movement, no respiratory distress  Cardiovascular: normal rate, normal S1 and S2 and no carotid bruits  Abdomen: soft, non-tender, non-distended, normal bowel sounds, no masses or organomegaly. Ileostomy bag in place. Extremities: no cyanosis, no clubbing and no edema  Neurologic: no cranial nerve deficit and speech normal        LABS:  Recent Labs     10/30/21  1217      K 4.5   CL 98   CO2 24   BUN 10   CREATININE 0.8   GLUCOSE 111*   CALCIUM 9.6       Recent Labs     10/30/21  1217   WBC 3.6*   RBC 5.07   HGB 15.5   HCT 46.5   MCV 91.7   MCH 30.6   MCHC 33.3   RDW 13.2      MPV 10.5       No results for input(s): POCGLU in the last 72 hours. CBC with Differential:    Lab Results   Component Value Date    WBC 3.6 10/30/2021    RBC 5.07 10/30/2021    HGB 15.5 10/30/2021    HCT 46.5 10/30/2021     10/30/2021    MCV 91.7 10/30/2021    MCH 30.6 10/30/2021    MCHC 33.3 10/30/2021    RDW 13.2 10/30/2021    LYMPHOPCT 36.5 10/30/2021    MONOPCT 14.2 10/30/2021    BASOPCT 0.8 10/30/2021    MONOSABS 0.51 10/30/2021    LYMPHSABS 1.31 10/30/2021    EOSABS 0.03 10/30/2021    BASOSABS 0.03 10/30/2021     CMP:    Lab Results   Component Value Date     10/30/2021    K 4.5 10/30/2021    CL 98 10/30/2021    CO2 24 10/30/2021    BUN 10 10/30/2021    CREATININE 0.8 10/30/2021    GFRAA >60 10/30/2021    LABGLOM >60 10/30/2021    GLUCOSE 111 10/30/2021    PROT 8.0 10/30/2021    LABALBU 4.6 10/30/2021    CALCIUM 9.6 10/30/2021    BILITOT 0.6 10/30/2021    ALKPHOS 186 10/30/2021    AST 85 10/30/2021     10/30/2021       Radiology:   CTA PULMONARY W CONTRAST   Final Result   No CT evidence of pulmonary embolism. Mild ground-glass opacities in the   right lung compatible with history of COVID-19 pneumonia. XR CHEST PORTABLE   Final Result   No acute process. ASSESSMENT:      Active Problems:    Acute respiratory disease due to COVID-19 virus  Resolved Problems:    * No resolved hospital problems.  *    Assessment:  -Acute hypoxic respiratory failure, 2/2 COVID-19 pneumonia (SPO2 reported in the 80s)  -COVID-19 pneumonia  -Transaminitis, in the setting of the above  -Hx colon cancer, s/p hemicolectomy with no need of chemotherapy  -Hx of vocal cord malfunction, s/p supraglottoplasty  -Hx VEENA-not on CPAP  -Hx Palafox's esophagus    PLAN:  -Start on Decadron daily, supportive care and vitamin cocktail  -Pharmacy consult for baricitinib  -Wean oxygen as able for SPO2 above 88% and will need ambulatory pulse ox prior to discharge  -Monitor inflammatory markers, CRP. Start on Lovenox twice daily  -Watch respiratory status given the prior history of vocal cord malfunction.  -On Protonix      Code Status: Full  DVT prophylaxis: Lovenox twice daily      NOTE: This report was transcribed using voice recognition software. Every effort was made to ensure accuracy; however, inadvertent computerized transcription errors may be present.   Electronically signed by William Scott MD on 10/30/2021 at 5:27 PM

## 2021-10-30 NOTE — ED NOTES
Name: Jerome Ma  : 1975  MRN: [id number]    Date: 10/30/2021    Benefits of immediately proceeding with Radiology exam outweigh the risks and therefore the following is being waived:      [x] Pregnancy test    [] Protocol for Iodine allergy    [] MRI questionnaire    [x] BUN/Creatinine        Alexandrialuis Giron,              [facility],   10/30/21 [address] [facility],   10/30/21 6554

## 2021-10-30 NOTE — ED PROVIDER NOTES
48-year-old female history of colon cancer that resulted in ileostomy bag formation history of previous throat widening surgeries presents to the emergency department with upper respiratory infection symptoms and was recently diagnosed with Covid. Nursing reporting that patient was 89% on room air at desk. Patient states she also felt some dizziness this morning. She states no fevers no nausea vomiting diarrhea or abdominal pain or leg swelling. The history is provided by the patient. Dizziness  Quality:  Lightheadedness  Severity:  Mild  Onset quality:  Gradual  Duration:  3 days  Timing:  Intermittent  Progression:  Waxing and waning  Chronicity:  New  Relieved by:  Nothing  Worsened by:  Nothing  Ineffective treatments:  None tried  Associated symptoms: no blood in stool, no chest pain, no diarrhea, no headaches, no nausea, no shortness of breath, no vomiting and no weakness         Review of Systems   Constitutional: Negative for chills and fever. HENT: Negative for ear pain, sinus pressure and sore throat. Eyes: Negative for pain, discharge and redness. Respiratory: Negative for cough, shortness of breath and wheezing. Cardiovascular: Negative for chest pain. Gastrointestinal: Negative for abdominal distention, blood in stool, diarrhea, nausea and vomiting. Genitourinary: Negative for dysuria and frequency. Musculoskeletal: Negative for arthralgias and back pain. Skin: Negative for rash and wound. Neurological: Positive for dizziness. Negative for weakness and headaches. Hematological: Negative for adenopathy. All other systems reviewed and are negative. Physical Exam  Constitutional:       Appearance: Normal appearance. She is obese. HENT:      Head: Normocephalic and atraumatic. Mouth/Throat:      Mouth: Mucous membranes are moist.   Eyes:      Extraocular Movements: Extraocular movements intact. Pupils: Pupils are equal, round, and reactive to light. Cardiovascular:      Rate and Rhythm: Normal rate and regular rhythm. Pulses: Normal pulses. Heart sounds: Normal heart sounds. Pulmonary:      Effort: Pulmonary effort is normal.      Breath sounds: Normal breath sounds. Abdominal:      General: Abdomen is flat. Bowel sounds are normal. There is no distension. Palpations: Abdomen is soft. Tenderness: There is no abdominal tenderness. Musculoskeletal:         General: Normal range of motion. Right lower leg: No edema. Left lower leg: No edema. Skin:     General: Skin is warm. Capillary Refill: Capillary refill takes less than 2 seconds. Neurological:      General: No focal deficit present. Mental Status: She is alert and oriented to person, place, and time. Procedures     MDM  Number of Diagnoses or Management Options  Diagnosis management comments: Patient seen and examined. Labs and imaging were ordered. Patient hypoxic at  at 89%. Also noted to be Covid positive. Labs and imaging were ordered. Work-up revealed no other clear concerning findings including a CTA revealed no PE. Given her hypoxia with COVID-19 the patient was admitted to the hospital for further evaluation. Steroids were given here in the emergency department and patient was admitted to the hospitalist service.            --------------------------------------------- PAST HISTORY ---------------------------------------------  Past Medical History:  has a past medical history of Acid reflux disease, Cyst of ovary, Fracture of nasal bone, Headache, Hearing loss, Hypertension, Migraine, Morbidly obese (HCC), Multiple sclerosis ([facility] [address] VEENA on CPAP, Right shoulder pain, Tinnitus, and TMJ dysfunction. Past Surgical History:  has a past surgical history that includes back surgery; Cholecystectomy; cyst removal; Hysterectomy (03/05/2018); laryngoscopy (N/A, 7/17/2020); Shoulder arthroscopy (Right, 11/11/2020);  Biceps tendon repair (Right, 11/11/2020); Appendectomy; Esophagus surgery; partial hysterectomy (cervix not removed); Hysterectomy, total abdominal; Tonsillectomy; colectomy (Left, 8/31/2021); colectomy (N/A, 9/6/2021); and proctosigmoidoscopy (N/A, 10/12/2021). Social History:  reports that she has been smoking cigarettes. She has a 12.50 pack-year smoking history. She has never used smokeless tobacco. She reports current alcohol use of about 1.0 standard drinks of alcohol per week. She reports that she does not use drugs. Family History: family history includes Mult Sclerosis in her mother. The patients home medications have been reviewed. Allergies: Patient has no known allergies.     -------------------------------------------------- RESULTS -------------------------------------------------    LABS:  Results for orders placed or performed during the hospital encounter of 10/30/21   COVID-19, Rapid    Specimen: Nasopharyngeal Swab   Result Value Ref Range    SARS-CoV-2, NAAT DETECTED (A) Not Detected   CBC Auto Differential   Result Value Ref Range    WBC 3.6 (L) 4.5 - 11.5 E9/L    RBC 5.07 3.50 - 5.50 E12/L    Hemoglobin 15.5 11.5 - 15.5 g/dL    Hematocrit 46.5 34.0 - 48.0 %    MCV 91.7 80.0 - 99.9 fL    MCH 30.6 26.0 - 35.0 pg    MCHC 33.3 32.0 - 34.5 %    RDW 13.2 11.5 - 15.0 fL    Platelets 494 066 - 328 E9/L    MPV 10.5 7.0 - 12.0 fL    Neutrophils % 46.9 43.0 - 80.0 %    Immature Granulocytes % 0.8 0.0 - 5.0 %    Lymphocytes % 36.5 20.0 - 42.0 %    Monocytes % 14.2 (H) 2.0 - 12.0 %    Eosinophils % 0.8 0.0 - 6.0 %    Basophils % 0.8 0.0 - 2.0 %    Neutrophils Absolute 1.68 (L) 1.80 - 7.30 E9/L    Immature Granulocytes # 0.03 E9/L    Lymphocytes Absolute 1.31 (L) 1.50 - 4.00 E9/L    Monocytes Absolute 0.51 0.10 - 0.95 E9/L    Eosinophils Absolute 0.03 (L) 0.05 - 0.50 E9/L    Basophils Absolute 0.03 0.00 - 0.20 Q3/B   Basic Metabolic Panel w/ Reflex to MG   Result Value Ref Range    Sodium 136 132 - 146 mmol/L Potassium reflex Magnesium 4.5 3.5 - 5.0 mmol/L    Chloride 98 98 - 107 mmol/L    CO2 24 22 - 29 mmol/L    Anion Gap 14 7 - 16 mmol/L    Glucose 111 (H) 74 - 99 mg/dL    BUN 10 6 - 20 mg/dL    CREATININE 0.8 0.5 - 1.0 mg/dL    GFR Non-African American >60 >=60 mL/min/1.73    GFR African American >60     Calcium 9.6 8.6 - 10.2 mg/dL   Hepatic Function Panel   Result Value Ref Range    Total Protein 8.0 6.4 - 8.3 g/dL    Albumin 4.6 3.5 - 5.2 g/dL    Alkaline Phosphatase 186 (H) 35 - 104 U/L     (H) 0 - 32 U/L    AST 85 (H) 0 - 31 U/L    Total Bilirubin 0.6 0.0 - 1.2 mg/dL    Bilirubin, Direct 0.3 0.0 - 0.3 mg/dL    Bilirubin, Indirect 0.3 0.0 - 1.0 mg/dL   Troponin   Result Value Ref Range    Troponin, High Sensitivity <6 0 - 9 ng/L   Brain Natriuretic Peptide   Result Value Ref Range    Pro-BNP 18 0 - 125 pg/mL   D-Dimer, Quantitative   Result Value Ref Range    D-Dimer, Quant 1811 ng/mL DDU   Sedimentation Rate   Result Value Ref Range    Sed Rate 3 0 - 20 mm/Hr   C-Reactive Protein   Result Value Ref Range    CRP 1.5 (H) 0.0 - 0.4 mg/dL   Lactate, Sepsis   Result Value Ref Range    Lactic Acid, Sepsis 1.2 0.5 - 1.9 mmol/L   FERRITIN   Result Value Ref Range    Ferritin 5,779 ng/mL   Procalcitonin   Result Value Ref Range    Procalcitonin 0.09 (H) 0.00 - 0.08 ng/mL   Fibrinogen   Result Value Ref Range    Fibrinogen 476 225 - 540 mg/dL   Procalcitonin   Result Value Ref Range    Procalcitonin 0.08 0.00 - 0.08 ng/mL   Lactate Dehydrogenase   Result Value Ref Range     (H) 135 - 214 U/L   C-Reactive Protein   Result Value Ref Range    CRP 1.3 (H) 0.0 - 0.4 mg/dL   Ferritin   Result Value Ref Range    Ferritin 4,232 ng/mL   D-Dimer, Quantitative   Result Value Ref Range    D-Dimer, Quant 900 ng/mL DDU   CBC Auto Differential   Result Value Ref Range    WBC 2.9 (L) 4.5 - 11.5 E9/L    RBC 4.49 3.50 - 5.50 E12/L    Hemoglobin 13.7 11.5 - 15.5 g/dL    Hematocrit 41.2 34.0 - 48.0 %    MCV 91.8 80.0 - 99.9 fL    MCH 30.5 26.0 - 35.0 pg    MCHC 33.3 32.0 - 34.5 %    RDW 13.1 11.5 - 15.0 fL    Platelets 077 139 - 035 E9/L    MPV 10.7 7.0 - 12.0 fL   EKG 12 Lead   Result Value Ref Range    Ventricular Rate 82 BPM    Atrial Rate 82 BPM    P-R Interval 138 ms    QRS Duration 78 ms    Q-T Interval 362 ms    QTc Calculation (Bazett) 422 ms    P Axis 59 degrees    R Axis 53 degrees    T Axis 54 degrees       RADIOLOGY:  FL BARIUM ENEMA    Result Date: 10/7/2021  EXAMINATION: SINGLE CONTRAST BARIUM ENEMA  10/7/2021 TECHNIQUE: Barium enema was performed with thin barium contrast. FLUOROSCOPY DOSE AND TYPE OR TIME AND EXPOSURES: Images: 8 Fluoroscopic time: 2 minutes Total dose: 138.14 mGy COMPARISON: CT abdomen and pelvis 09/05/2021 the or the a cm Sandy on the list may be is a U or is a wooden node to the wall of the UB and measure the designed median is not seen mild list tumor is oral HISTORY: ORDERING SYSTEM PROVIDED HISTORY: S/P colectomy FINDINGS: The colon was filled with barium under fluoroscopic visualization. There is filling of the entire colon including barium filling of the appendix. Prior left colectomy and with colorectal surgical anastomosis. The surgical anastomosis level shows persistent smooth narrowing extending over an approximately 4 cm length. The narrowing may reflect residual soft tissue edema or possibly post inflammatory scar and stricture. No mucosal irregularity is seen to suggest residual recurrent tumor. Separate from the anastomotic site narrowing, the anastomosis level shows barium filling of a tubular blind-ending structure measuring approximately 4 cm in length and currently not clear if this is postsurgical related to and this side surgical anastomosis versus a focal contained leak. No evidence of free contrast spillage. Separate from the surgical anastomosis, the colon is normal in caliber and shows no suspicious or fixed filling defect.      1.  The colorectal surgical anastomosis shows persistent smooth narrowing over an approximately 4 cm length. Details above. 2.  The surgical anastomosis, in addition, shows a blind-ending tubular structure and currently not clear if this is related to end to side surgical anastomosis versus a focal contained anastomotic leak.     ------------------------- NURSING NOTES AND VITALS REVIEWED ---------------------------  Date / Time Roomed:  10/30/2021 11:49 AM  ED Bed Assignment:  [id number]    The nursing notes within the ED encounter and vital signs as below have been reviewed. Patient Vitals for the past 24 hrs:   BP Temp Temp src Pulse Resp SpO2 Height Weight   10/31/21 0445 110/72 98.2 °F (36.8 °C) Oral 60 18 98 % -- --   10/30/21 2300 106/77 98.1 °F (36.7 °C) Oral 63 18 100 % -- --   10/30/21 2115 111/76 98.2 °F (36.8 °C) Oral 74 18 98 % -- --   10/30/21 1721 -- -- -- -- -- 98 % -- --   10/30/21 1716 127/78 97.3 °F (36.3 °C) Oral 65 18 100 % 5' 1\" (1.549 m) 184 lb 9.6 oz (83.7 kg)   10/30/21 1637 (!) 103/56 98.6 °F (37 °C) -- 67 16 95 % -- --   10/30/21 1511 (!) 97/49 -- -- 76 16 99 % -- --   10/30/21 1203 -- -- -- -- -- 94 % -- --   10/30/21 1202 113/70 -- -- 81 14 (!) 89 % -- --   10/30/21 1150 (!) 153/78 98.3 °F (36.8 °C) Oral 90 14 99 % 5' 1\" (1.549 m) 200 lb (90.7 kg)       Oxygen Saturation Interpretation: Improved after treatment    ------------------------------------------ PROGRESS NOTES ------------------------------------------  Counseling:  I have spoken with the patient and discussed todays results, in addition to providing specific details for the plan of care and counseling regarding the diagnosis and prognosis.   Their questions are answered at this time and they are agreeable with the plan of admission.    --------------------------------- ADDITIONAL PROVIDER NOTES ---------------------------------  This patient's ED course included: a personal history and physicial examination, re-evaluation prior to disposition, multiple bedside re-evaluations, IV medications, cardiac monitoring and continuous pulse oximetry    This patient has remained hemodynamically stable during their ED course. Diagnosis:  1. Acute respiratory disease due to COVID-19 virus    2. Dizziness        Disposition:  Patient's disposition: Admit to telemetry  Patient's condition is fair.          Judith Randolph DO  10/31/21 [id number]

## 2021-10-31 LAB
ALBUMIN SERPL-MCNC: 4.1 G/DL (ref 3.5–5.2)
ALP BLD-CCNC: 164 U/L (ref 35–104)
ALT SERPL-CCNC: 113 U/L (ref 0–32)
ANION GAP SERPL CALCULATED.3IONS-SCNC: 13 MMOL/L (ref 7–16)
AST SERPL-CCNC: 54 U/L (ref 0–31)
ATYPICAL LYMPHOCYTE RELATIVE PERCENT: 2 % (ref 0–4)
BASOPHILS ABSOLUTE: 0 E9/L (ref 0–0.2)
BASOPHILS RELATIVE PERCENT: 0 % (ref 0–2)
BILIRUB SERPL-MCNC: 0.3 MG/DL (ref 0–1.2)
BUN BLDV-MCNC: 17 MG/DL (ref 6–20)
C-REACTIVE PROTEIN: 0.9 MG/DL (ref 0–0.4)
CALCIUM SERPL-MCNC: 8.7 MG/DL (ref 8.6–10.2)
CHLORIDE BLD-SCNC: 102 MMOL/L (ref 98–107)
CO2: 18 MMOL/L (ref 22–29)
CREAT SERPL-MCNC: 0.7 MG/DL (ref 0.5–1)
D DIMER: 1238 NG/ML DDU
EOSINOPHILS ABSOLUTE: 0 E9/L (ref 0.05–0.5)
EOSINOPHILS RELATIVE PERCENT: 0 % (ref 0–6)
FERRITIN: 4232 NG/ML
GFR AFRICAN AMERICAN: >60
GFR NON-AFRICAN AMERICAN: >60 ML/MIN/1.73
GLUCOSE BLD-MCNC: 128 MG/DL (ref 74–99)
HCT VFR BLD CALC: 41.2 % (ref 34–48)
HEMOGLOBIN: 13.7 G/DL (ref 11.5–15.5)
LYMPHOCYTES ABSOLUTE: 1.86 E9/L (ref 1.5–4)
LYMPHOCYTES RELATIVE PERCENT: 62 % (ref 20–42)
MCH RBC QN AUTO: 30.5 PG (ref 26–35)
MCHC RBC AUTO-ENTMCNC: 33.3 % (ref 32–34.5)
MCV RBC AUTO: 91.8 FL (ref 80–99.9)
MONOCYTES ABSOLUTE: 0.49 E9/L (ref 0.1–0.95)
MONOCYTES RELATIVE PERCENT: 17 % (ref 2–12)
NEUTROPHILS ABSOLUTE: 0.55 E9/L (ref 1.8–7.3)
NEUTROPHILS RELATIVE PERCENT: 19 % (ref 43–80)
PDW BLD-RTO: 13.1 FL (ref 11.5–15)
PLATELET # BLD: 169 E9/L (ref 130–450)
PMV BLD AUTO: 10.7 FL (ref 7–12)
POTASSIUM REFLEX MAGNESIUM: 4.2 MMOL/L (ref 3.5–5)
RBC # BLD: 4.49 E12/L (ref 3.5–5.5)
RBC # BLD: NORMAL 10*6/UL
SODIUM BLD-SCNC: 133 MMOL/L (ref 132–146)
TOTAL PROTEIN: 7.1 G/DL (ref 6.4–8.3)
WBC # BLD: 2.9 E9/L (ref 4.5–11.5)

## 2021-10-31 PROCEDURE — 85025 COMPLETE CBC W/AUTO DIFF WBC: CPT

## 2021-10-31 PROCEDURE — 2060000000 HC ICU INTERMEDIATE R&B

## 2021-10-31 PROCEDURE — 2580000003 HC RX 258: Performed by: INTERNAL MEDICINE

## 2021-10-31 PROCEDURE — 99232 SBSQ HOSP IP/OBS MODERATE 35: CPT | Performed by: STUDENT IN AN ORGANIZED HEALTH CARE EDUCATION/TRAINING PROGRAM

## 2021-10-31 PROCEDURE — 86140 C-REACTIVE PROTEIN: CPT

## 2021-10-31 PROCEDURE — 6370000000 HC RX 637 (ALT 250 FOR IP): Performed by: INTERNAL MEDICINE

## 2021-10-31 PROCEDURE — 85378 FIBRIN DEGRADE SEMIQUANT: CPT

## 2021-10-31 PROCEDURE — 36415 COLL VENOUS BLD VENIPUNCTURE: CPT

## 2021-10-31 PROCEDURE — 6360000002 HC RX W HCPCS: Performed by: INTERNAL MEDICINE

## 2021-10-31 PROCEDURE — 80053 COMPREHEN METABOLIC PANEL: CPT

## 2021-10-31 RX ADMIN — PANTOPRAZOLE SODIUM 40 MG: 40 TABLET, DELAYED RELEASE ORAL at 05:44

## 2021-10-31 RX ADMIN — GABAPENTIN 300 MG: 300 CAPSULE ORAL at 14:10

## 2021-10-31 RX ADMIN — ENOXAPARIN SODIUM 40 MG: 40 INJECTION SUBCUTANEOUS at 09:16

## 2021-10-31 RX ADMIN — BARICITINIB 4 MG: 2 TABLET, FILM COATED ORAL at 18:11

## 2021-10-31 RX ADMIN — GABAPENTIN 300 MG: 300 CAPSULE ORAL at 09:16

## 2021-10-31 RX ADMIN — ZINC SULFATE 220 MG (50 MG) CAPSULE 50 MG: CAPSULE at 09:16

## 2021-10-31 RX ADMIN — Medication 10 ML: at 09:16

## 2021-10-31 RX ADMIN — OXYCODONE HYDROCHLORIDE AND ACETAMINOPHEN 500 MG: 500 TABLET ORAL at 09:15

## 2021-10-31 RX ADMIN — GABAPENTIN 300 MG: 300 CAPSULE ORAL at 20:28

## 2021-10-31 RX ADMIN — Medication 10 ML: at 20:28

## 2021-10-31 RX ADMIN — ENOXAPARIN SODIUM 40 MG: 40 INJECTION SUBCUTANEOUS at 20:28

## 2021-10-31 RX ADMIN — DEXAMETHASONE SODIUM PHOSPHATE 6 MG: 10 INJECTION INTRAMUSCULAR; INTRAVENOUS at 09:16

## 2021-10-31 RX ADMIN — Medication 2000 UNITS: at 09:16

## 2021-10-31 RX ADMIN — IBUPROFEN 800 MG: 800 TABLET, FILM COATED ORAL at 14:13

## 2021-10-31 ASSESSMENT — PAIN DESCRIPTION - LOCATION: LOCATION: ABDOMEN

## 2021-10-31 ASSESSMENT — PAIN DESCRIPTION - PAIN TYPE: TYPE: ACUTE PAIN

## 2021-10-31 ASSESSMENT — PAIN DESCRIPTION - DESCRIPTORS: DESCRIPTORS: BURNING

## 2021-10-31 ASSESSMENT — PAIN SCALES - GENERAL
PAINLEVEL_OUTOF10: 8
PAINLEVEL_OUTOF10: 0
PAINLEVEL_OUTOF10: 0

## 2021-10-31 NOTE — PROGRESS NOTES
[facility] Progress Note    Admitting Date and Time: 10/30/2021 11:49 AM  Admit Dx: Acute respiratory disease due to COVID-19 virus [U07.1, J06.9]    Subjective:  Patient is being followed for Acute respiratory disease due to COVID-19 virus [U07.1, J06.9]   Pt feels better, on room air. Per RN: Weaned down to room air. ROS: denies fever, chills, cp, sob, n/v, HA unless stated above.       pantoprazole  40 mg Oral QAM AC    gabapentin  300 mg Oral TID    sodium chloride flush  5-40 mL IntraVENous 2 times per day    enoxaparin  40 mg SubCUTAneous BID    Vitamin D  2,000 Units Oral Daily    dexamethasone  6 mg IntraVENous Q24H    zinc sulfate  50 mg Oral Daily    ascorbic acid  500 mg Oral Daily    [Held by provider] lisinopril  10 mg Oral Daily    And    [Held by provider] hydroCHLOROthiazide  12.5 mg Oral Daily    baricitinib  4 mg Oral Daily     sodium chloride flush, 10 mL, PRN  ibuprofen, 800 mg, Q6H PRN  baclofen, 20 mg, 4x Daily PRN  rOPINIRole, 0.5 mg, BID PRN  sodium chloride flush, 5-40 mL, PRN  sodium chloride, 25 mL, PRN  ondansetron, 4 mg, Q8H PRN   Or  ondansetron, 4 mg, Q6H PRN  polyethylene glycol, 17 g, Daily PRN  acetaminophen, 650 mg, Q6H PRN   Or  acetaminophen, 650 mg, Q6H PRN         Objective:    /72   Pulse 60   Temp 98.2 °F (36.8 °C) (Oral)   Resp 18   Ht 5' 1\" (1.549 m)   Wt 184 lb 9.6 oz (83.7 kg)   LMP 01/05/2018   SpO2 98%   BMI 34.88 kg/m²      General Appearance: alert and oriented to person, place and time and in no acute distress  Skin: warm and dry  Head: normocephalic and atraumatic  Eyes: pupils equal, round, and reactive to light, extraocular eye movements intact, conjunctivae normal  Neck: neck supple and non tender without mass   Pulmonary/Chest: clear to auscultation bilaterally- no wheezes, rales or rhonchi, normal air movement, no respiratory distress  Cardiovascular: normal rate, normal S1 and S2 and no carotid bruits  Abdomen: soft, non-tender, non-distended, normal bowel sounds, no masses or organomegaly  Extremities: no cyanosis, no clubbing and no edema  Neurologic: no cranial nerve deficit and speech normal        Recent Labs     10/30/21  1217 10/31/21  0535    133   K 4.5 4.2   CL 98 102   CO2 24 18*   BUN 10 17   CREATININE 0.8 0.7   GLUCOSE 111* 128*   CALCIUM 9.6 8.7       Recent Labs     10/30/21  1217 10/31/21  0535   WBC 3.6* 2.9*   RBC 5.07 4.49   HGB 15.5 13.7   HCT 46.5 41.2   MCV 91.7 91.8   MCH 30.6 30.5   MCHC 33.3 33.3   RDW 13.2 13.1    169   MPV 10.5 10.7       Micro:  No components found for: [facility])    Radiology:   XR CHEST PORTABLE    Result Date: 10/30/2021  EXAMINATION: ONE XRAY VIEW OF THE CHEST 10/30/2021 12:15 pm COMPARISON: None. HISTORY: ORDERING SYSTEM PROVIDED HISTORY: eval for pneumonia TECHNOLOGIST PROVIDED HISTORY: Reason for exam:->eval for pneumonia FINDINGS: The lungs are without acute focal process. There is no effusion or pneumothorax. The cardiomediastinal silhouette is without acute process. The osseous structures are without acute process. No acute process. CTA PULMONARY W CONTRAST    Result Date: 10/30/2021  EXAMINATION: CTA OF THE CHEST 10/30/2021 2:31 pm TECHNIQUE: CTA of the chest was performed after the administration of intravenous contrast.  Multiplanar reformatted images are provided for review. MIP images are provided for review. Dose modulation, iterative reconstruction, and/or weight based adjustment of the mA/kV was utilized to reduce the radiation dose to as low as reasonably achievable. COMPARISON: None. HISTORY: ORDERING SYSTEM PROVIDED HISTORY: eval for PE, COVID+ TECHNOLOGIST PROVIDED HISTORY: Reason for exam:->eval for PE, COVID+ Decision Support Exception - unselect if not a suspected or confirmed emergency medical condition->Emergency Medical Condition (MA) FINDINGS: Pulmonary Arteries: Pulmonary arteries are adequately opacified for evaluation.   No evidence of intraluminal filling defect to suggest pulmonary embolism. Main pulmonary artery is normal in caliber. Mediastinum: No evidence of mediastinal lymphadenopathy. The heart and pericardium demonstrate no acute abnormality. There is no acute abnormality of the thoracic aorta. Lungs/pleura: Patchy ground-glass opacities are noted in the peripheral right upper lobe. Minimal opacity in the right lower lobe. Areas of mosaic attenuation in the lower lobes may be the result of small airway disease. No pleural fluid. Upper Abdomen: Limited images of the upper abdomen are unremarkable. Soft Tissues/Bones: No acute bone or soft tissue abnormality. No CT evidence of pulmonary embolism. Mild ground-glass opacities in the right lung compatible with history of COVID-19 pneumonia. Assessment:    Active Problems:    Acute respiratory disease due to COVID-19 virus  Resolved Problems:    * No resolved hospital problems. *      Plan:  1. Acute hypoxic respiratory failure, 2/2 COVID-19 pneumonia (SPO2 reported in the 80s on admission)  - p.o. Decadron, pharmacy consulted for baricitinib day2 , CTA suggestive of COVID pneumonia. Procalcitonin 0.08 trend inflammatory markers  2. Hypertension - currently soft blood pressure, holding lisinopril and hydrochlorothiazide  3. Transaminitis, in the setting of the above  4. Hx colon cancer, s/p hemicolectomy with no need of chemotherapy  5. Hx of vocal cord malfunction, s/p supraglottoplasty  6. Hx VEENA - not on CPAP  7. Hx Palafox's esophagus  8. GERD - On Protonix        Code Status: Full  DVT prophylaxis: Lovenox twice daily  Disposition - patient on room air, D-dimer up trended from 900-1238, will monitor another 24 hours to see the trend of D-dimer, if patient desaturates might need Doppler lower extremities ordered repeat CTA. NOTE: This report was transcribed using voice recognition software.  Every effort was made to ensure accuracy; however, inadvertent computerized transcription errors may be present.   Electronically signed by Inez Sorensen MD on 10/31/2021 at 8:34 AM

## 2021-11-01 VITALS
SYSTOLIC BLOOD PRESSURE: 111 MMHG | BODY MASS INDEX: 38.34 KG/M2 | HEIGHT: 61 IN | OXYGEN SATURATION: 97 % | TEMPERATURE: 97.8 F | DIASTOLIC BLOOD PRESSURE: 69 MMHG | WEIGHT: 203.1 LBS | RESPIRATION RATE: 18 BRPM | HEART RATE: 60 BPM

## 2021-11-01 LAB
ANION GAP SERPL CALCULATED.3IONS-SCNC: 12 MMOL/L (ref 7–16)
BUN BLDV-MCNC: 14 MG/DL (ref 6–20)
C-REACTIVE PROTEIN: 0.5 MG/DL (ref 0–0.4)
CALCIUM SERPL-MCNC: 9 MG/DL (ref 8.6–10.2)
CHLORIDE BLD-SCNC: 102 MMOL/L (ref 98–107)
CO2: 20 MMOL/L (ref 22–29)
CREAT SERPL-MCNC: 0.6 MG/DL (ref 0.5–1)
D DIMER: 246 NG/ML DDU
GFR AFRICAN AMERICAN: >60
GFR NON-AFRICAN AMERICAN: >60 ML/MIN/1.73
GLUCOSE BLD-MCNC: 106 MG/DL (ref 74–99)
HCT VFR BLD CALC: 40.7 % (ref 34–48)
HEMOGLOBIN: 13.4 G/DL (ref 11.5–15.5)
MCH RBC QN AUTO: 30.5 PG (ref 26–35)
MCHC RBC AUTO-ENTMCNC: 32.9 % (ref 32–34.5)
MCV RBC AUTO: 92.7 FL (ref 80–99.9)
PDW BLD-RTO: 12.9 FL (ref 11.5–15)
PLATELET # BLD: 172 E9/L (ref 130–450)
PMV BLD AUTO: 10.6 FL (ref 7–12)
POTASSIUM SERPL-SCNC: 4.3 MMOL/L (ref 3.5–5)
PROCALCITONIN: 0.04 NG/ML (ref 0–0.08)
RBC # BLD: 4.39 E12/L (ref 3.5–5.5)
SODIUM BLD-SCNC: 134 MMOL/L (ref 132–146)
WBC # BLD: 4.8 E9/L (ref 4.5–11.5)

## 2021-11-01 PROCEDURE — 36415 COLL VENOUS BLD VENIPUNCTURE: CPT

## 2021-11-01 PROCEDURE — 2580000003 HC RX 258: Performed by: INTERNAL MEDICINE

## 2021-11-01 PROCEDURE — 85027 COMPLETE CBC AUTOMATED: CPT

## 2021-11-01 PROCEDURE — 85378 FIBRIN DEGRADE SEMIQUANT: CPT

## 2021-11-01 PROCEDURE — 80048 BASIC METABOLIC PNL TOTAL CA: CPT

## 2021-11-01 PROCEDURE — 86140 C-REACTIVE PROTEIN: CPT

## 2021-11-01 PROCEDURE — 99239 HOSP IP/OBS DSCHRG MGMT >30: CPT | Performed by: FAMILY MEDICINE

## 2021-11-01 PROCEDURE — 84145 PROCALCITONIN (PCT): CPT

## 2021-11-01 PROCEDURE — 6360000002 HC RX W HCPCS: Performed by: INTERNAL MEDICINE

## 2021-11-01 PROCEDURE — 6370000000 HC RX 637 (ALT 250 FOR IP): Performed by: INTERNAL MEDICINE

## 2021-11-01 RX ORDER — ZINC SULFATE 50(220)MG
50 CAPSULE ORAL DAILY
Qty: 30 CAPSULE | Refills: 0 | COMMUNITY
Start: 2021-11-01 | End: 2022-01-24

## 2021-11-01 RX ORDER — CHOLECALCIFEROL (VITAMIN D3) 50 MCG
2000 TABLET ORAL DAILY
Qty: 60 TABLET | Refills: 0 | COMMUNITY
Start: 2021-11-01 | End: 2021-11-30 | Stop reason: DRUGHIGH

## 2021-11-01 RX ORDER — ASCORBIC ACID 500 MG
500 TABLET ORAL DAILY
Qty: 30 TABLET | Refills: 0 | COMMUNITY
Start: 2021-11-01 | End: 2022-01-24

## 2021-11-01 RX ORDER — DEXAMETHASONE 6 MG/1
TABLET ORAL
Qty: 9 TABLET | Refills: 0 | Status: SHIPPED | OUTPATIENT
Start: 2021-11-01 | End: 2021-11-10

## 2021-11-01 RX ADMIN — ONDANSETRON 4 MG: 2 INJECTION INTRAMUSCULAR; INTRAVENOUS at 00:47

## 2021-11-01 RX ADMIN — Medication 10 ML: at 11:43

## 2021-11-01 RX ADMIN — ENOXAPARIN SODIUM 40 MG: 40 INJECTION SUBCUTANEOUS at 11:51

## 2021-11-01 RX ADMIN — OXYCODONE HYDROCHLORIDE AND ACETAMINOPHEN 500 MG: 500 TABLET ORAL at 11:42

## 2021-11-01 RX ADMIN — GABAPENTIN 300 MG: 300 CAPSULE ORAL at 11:42

## 2021-11-01 RX ADMIN — ZINC SULFATE 220 MG (50 MG) CAPSULE 50 MG: CAPSULE at 11:42

## 2021-11-01 RX ADMIN — DEXAMETHASONE SODIUM PHOSPHATE 6 MG: 10 INJECTION INTRAMUSCULAR; INTRAVENOUS at 11:43

## 2021-11-01 RX ADMIN — PANTOPRAZOLE SODIUM 40 MG: 40 TABLET, DELAYED RELEASE ORAL at 05:35

## 2021-11-01 RX ADMIN — Medication 2000 UNITS: at 11:42

## 2021-11-01 ASSESSMENT — PAIN SCALES - GENERAL
PAINLEVEL_OUTOF10: 0
PAINLEVEL_OUTOF10: 0

## 2021-11-01 NOTE — DISCHARGE SUMMARY
[facility] Physician Discharge Summary       No follow-up provider specified. Activity level: As tolerated     Dispo:  Home      Condition on discharge: Stable     Patient ID:  Herbert Chahal  [id number]  59 y.o.  1975    Admit date: 10/30/2021    Discharge date and time:  11/1/2021  11:27 AM    Admission Diagnoses: Active Problems:    Acute respiratory disease due to COVID-19 virus  Resolved Problems:    * No resolved hospital problems. *      Discharge Diagnoses: Active Problems:    Acute respiratory disease due to COVID-19 virus  Resolved Problems:    * No resolved hospital problems. *      Consults:  IP CONSULT TO PHARMACY    Procedures:   n/a    Hospital Course:   Patient Herbert Chahal is a 51 y.o. presented with Acute respiratory disease due to COVID-19 virus [U07.1, J06.9]. Patient tested positive on 10/27. She worsened and was admitted to the hospital on 10/30. She was given Decadron and Baricitinib. She had improvement of her Oxygen needs. She required no oxygen on discharge. She will taper off the Decadron. She is to continue to take Vitamin D, Vitamin C and Zinc.  Eligible for COVID 19 2nd Moderna shot in 90 days. Is due to have surgery at the end of January 2022. Defer to her PCP if should get before then. Continue to quarantine until 11/10 provided symptoms gone. Valentino Rosser out one month of Vitamin C, D and Zinc.  Maintain good handwashing, safe distance of 6+ feet and frequent handwashing. F/U with PCP after 11/10 when quarantine done.       Discharge Exam:    General Appearance: alert and oriented to person, place and time and in no acute distress  Skin: warm and dry  Head: normocephalic and atraumatic  Eyes: pupils equal, round, and reactive to light, extraocular eye movements intact, conjunctivae normal  Neck: neck supple and non tender without mass   Pulmonary/Chest: clear to auscultation bilaterally- no wheezes, rales or rhonchi, normal air movement, no respiratory distress  Cardiovascular: normal rate, normal S1 and S2 and no carotid bruits  Abdomen: soft, non-tender, non-distended, normal bowel sounds, no masses or organomegaly  Extremities: no cyanosis, no clubbing and no edema  Neurologic: no cranial nerve deficit and speech normal    I/O last 3 completed shifts: In: 120 [P.O.:120]  Out: -   No intake/output data recorded. LABS:  Recent Labs     10/30/21  1217 10/31/21  0535 11/01/21  0315    133 134   K 4.5 4.2 4.3   CL 98 102 102   CO2 24 18* 20*   BUN 10 17 14   CREATININE 0.8 0.7 0.6   GLUCOSE 111* 128* 106*   CALCIUM 9.6 8.7 9.0       Recent Labs     10/30/21  1217 10/31/21  0535 11/01/21  0315   WBC 3.6* 2.9* 4.8   RBC 5.07 4.49 4.39   HGB 15.5 13.7 13.4   HCT 46.5 41.2 40.7   MCV 91.7 91.8 92.7   MCH 30.6 30.5 30.5   MCHC 33.3 33.3 32.9   RDW 13.2 13.1 12.9    169 172   MPV 10.5 10.7 10.6     Imaging:  XR CHEST PORTABLE    Result Date: 10/30/2021  EXAMINATION: ONE XRAY VIEW OF THE CHEST 10/30/2021 12:15 pm COMPARISON: None. HISTORY: ORDERING SYSTEM PROVIDED HISTORY: eval for pneumonia TECHNOLOGIST PROVIDED HISTORY: Reason for exam:->eval for pneumonia FINDINGS: The lungs are without acute focal process. There is no effusion or pneumothorax. The cardiomediastinal silhouette is without acute process. The osseous structures are without acute process. No acute process. CTA PULMONARY W CONTRAST    Result Date: 10/30/2021  EXAMINATION: CTA OF THE CHEST 10/30/2021 2:31 pm TECHNIQUE: CTA of the chest was performed after the administration of intravenous contrast.  Multiplanar reformatted images are provided for review. MIP images are provided for review. Dose modulation, iterative reconstruction, and/or weight based adjustment of the mA/kV was utilized to reduce the radiation dose to as low as reasonably achievable. COMPARISON: None.  HISTORY: ORDERING SYSTEM PROVIDED HISTORY: eval for PE, COVID+ TECHNOLOGIST PROVIDED HISTORY: Reason for exam:->eval for PE, COVID+ Decision Support Exception - unselect if not a suspected or confirmed emergency medical condition->Emergency Medical Condition (MA) FINDINGS: Pulmonary Arteries: Pulmonary arteries are adequately opacified for evaluation. No evidence of intraluminal filling defect to suggest pulmonary embolism. Main pulmonary artery is normal in caliber. Mediastinum: No evidence of mediastinal lymphadenopathy. The heart and pericardium demonstrate no acute abnormality. There is no acute abnormality of the thoracic aorta. Lungs/pleura: Patchy ground-glass opacities are noted in the peripheral right upper lobe. Minimal opacity in the right lower lobe. Areas of mosaic attenuation in the lower lobes may be the result of small airway disease. No pleural fluid. Upper Abdomen: Limited images of the upper abdomen are unremarkable. Soft Tissues/Bones: No acute bone or soft tissue abnormality. No CT evidence of pulmonary embolism. Mild ground-glass opacities in the right lung compatible with history of COVID-19 pneumonia. Patient Instructions:      Medication List      START taking these medications    ascorbic acid 500 MG tablet  Commonly known as: VITAMIN C  Take 1 tablet by mouth daily     dexamethasone 6 MG tablet  Commonly known as: DECADRON  Take 1 tablet by mouth daily (with breakfast) for 3 days, THEN 0.5 tablets daily (with breakfast) for 3 days, THEN 0.5 tablets every other day for 3 days.   Start taking on: November 1, 2021     vitamin D 50 MCG (2000 UT) Tabs tablet  Commonly known as: CHOLECALCIFEROL  Take 1 tablet by mouth daily  Replaces: vitamin D 02115 UNIT Caps     zinc sulfate 220 (50 Zn) MG capsule  Commonly known as: ZINCATE  Take 1 capsule by mouth daily        CHANGE how you take these medications    ibuprofen 800 MG tablet  Commonly known as: ADVIL;MOTRIN  Take 1 tablet by mouth every 6 hours as needed for Pain  What changed: Another medication with the same name was removed. Continue taking this medication, and follow the directions you see here.         CONTINUE taking these medications    baclofen 20 MG tablet  Commonly known as: LIORESAL  Take 1 tablet by mouth 4 times daily as needed (muscle spasms; pain)     CPAP Machine Misc     Dexilant 60 MG Cpdr delayed release capsule  Generic drug: dexlansoprazole     gabapentin 300 MG capsule  Commonly known as: NEURONTIN  TAKE 1 CAPSULE BY MOUTH THREE TIMES DAILY     nortriptyline 10 MG capsule  Commonly known as: PAMELOR  TAKE 1 CAPSULE BY MOUTH ONCE NIGHTLY FOR HEADACHES     Ocrevus 300 MG/10ML Soln injection  Generic drug: ocrelizumab  Infuse 20 mLs intravenously every 6 months     rOPINIRole 0.5 MG tablet  Commonly known as: Requip  Take 1 tablet by mouth 2 times daily as needed (restless legs)        STOP taking these medications    erythromycin base 500 MG tablet  Commonly known as: E-MYCIN     famotidine 40 MG tablet  Commonly known as: PEPCID     lisinopril-hydroCHLOROthiazide 10-12.5 MG per tablet  Commonly known as: PRINZIDE;ZESTORETIC     scopolamine transdermal patch  Commonly known as: TRANSDERM-SCOP     vitamin D 34008 UNIT Caps  Commonly known as: CHOLECALCIFEROL  Replaced by: vitamin D 50 MCG (2000 UT) Tabs tablet           Where to Get Your Medications      These medications were sent to [address] [place] [address] [phone]  [facility]    Phone: [phone]   · dexamethasone 6 MG tablet     You can get these medications from any pharmacy    You don't need a prescription for these medications  · ascorbic acid 500 MG tablet  · vitamin D 50 MCG (2000 UT) Tabs tablet  · zinc sulfate 220 (50 Zn) MG capsule           Note that more than 35 minutes was spent in preparing discharge papers, discussing discharge with patient, medication review, etc.    Signed:  Electronically signed by Mike Cole MD on 11/1/2021 at 11:27 AM

## 2021-11-01 NOTE — CARE COORDINATION
11/1/2021 Social Work Discharge Planning:COVID POS 10/30. SUDARSHAN dicussed discharge planning with Pt. Pt and her boyfriend reside together. Pt is on room air, no DME and independent at home. Anticipating no needs at discharge. Electronically signed by ROBIN Granda on 11/1/2021 at 11:07 AM

## 2021-11-01 NOTE — PROGRESS NOTES
CLINICAL PHARMACY NOTE: MEDS TO BEDS    Total # of Prescriptions Filled: 1   The following medications were delivered to the patient:  · Dexamethasone 6 mg       Additional Documentation:

## 2021-11-02 ENCOUNTER — CARE COORDINATION (OUTPATIENT)
Dept: CASE MANAGEMENT | Age: 46
End: 2021-11-02

## 2021-11-02 DIAGNOSIS — J06.9 ACUTE RESPIRATORY DISEASE DUE TO COVID-19 VIRUS: Primary | ICD-10-CM

## 2021-11-02 DIAGNOSIS — U07.1 ACUTE RESPIRATORY DISEASE DUE TO COVID-19 VIRUS: Primary | ICD-10-CM

## 2021-11-02 NOTE — CARE COORDINATION
Patient contacted regarding COVID-19 diagnosis. Discussed COVID-19 related testing which was available at this time. Test results were positive. Patient informed of results, if available? Patient is aware of positive results. Care Transition Nurse contacted the patient by telephone to perform post discharge assessment. Call within 2 business days of discharge: Yes. Verified name and  with patient as identifiers. Provided introduction to self, and explanation of the CTN role, and reason for call due to risk factors for infection and/or exposure to COVID-19. Symptoms reviewed with patient who verbalized the following symptoms: loss of sense of taste. -denies any C/O dizziness   -utilizing Incentive Spirometer      Due to no new or worsening symptoms encounter was not routed to provider for escalation. Discussed follow-up appointments. If no appointment was previously scheduled, appointment scheduling offered: Patient agrees to call PCP and schedule follow up appointment as advised. [facility] follow up appointment(s):   Future Appointments   Date Time Provider Sandra Birch   2022  2:00 PM Mayuri Mcleod MD [facility] GEN SURG [facility]   2022 10:00 AM MARIXA Alba - CNP [facility] Neuro [facility]   2022  8:00 AM DAMON [facility] CHAIR 1 YZ INF SER [facility]         Non-face-to-face services provided:  Obtained and reviewed discharge summary and/or continuity of care documents     Advance Care Planning:   Does patient have an Advance Directive:  decision maker updated. Educated patient about risk for severe COVID-19 due to risk factors according to CDC guidelines. CTN reviewed discharge instructions, medical action plan and red flag symptoms with the patient who verbalized understanding. Discussed COVID vaccination status: Patient reports she received first dose of vaccine. Education provided on COVID-19 vaccination as appropriate.  Discussed exposure protocols and quarantine with CDC Guidelines. Patient was given an opportunity to verbalize any questions and concerns and agrees to contact CTN or health care provider for questions related to their healthcare. Reviewed and educated patient on any new and changed medications related to discharge diagnosis   1111f entered    Was patient discharged with a pulse oximeter? No   Discussed discharge instructions and when to notify provider or seek emergency care. CTN provided contact information. Plan for follow-up call in 5-7 days based on severity of symptoms and risk factors.

## 2021-11-05 RX ORDER — NORTRIPTYLINE HYDROCHLORIDE 10 MG/1
CAPSULE ORAL
Qty: 30 CAPSULE | Refills: 3 | Status: SHIPPED
Start: 2021-11-05 | End: 2022-03-01 | Stop reason: SDUPTHER

## 2021-11-08 ENCOUNTER — CARE COORDINATION (OUTPATIENT)
Dept: CASE MANAGEMENT | Age: 46
End: 2021-11-08

## 2021-11-08 NOTE — CARE COORDINATION
Patient contacted regarding [phone]; Subsequent Call    Care Transition Nurse contacted the patient by telephone to perform follow-up assessment. Verified name and  with patient as identifiers. Patient has following risk factors of: MS.      Symptoms reviewed with patient who verbalized the following symptoms: loss of sense of taste. Educated patient about risk for severe COVID-19 due to risk factors according to CDC guidelines. CTN reviewed discharge instructions, medical action plan and red flag symptoms with the patient who verbalized understanding. Discussed COVID vaccination status: No. Education provided on COVID-19 vaccination as appropriate. Discussed exposure protocols and quarantine with CDC Guidelines. Patient was given an opportunity to verbalize any questions and concerns and agrees to contact CTN or health care provider for questions related to their healthcare. Patient reports she has a follow up appointment with PCP on 2021. CTN provided contact information. Plan for follow-up call in 5-7 days based on severity of symptoms and risk factors.

## 2021-11-16 ENCOUNTER — CARE COORDINATION (OUTPATIENT)
Dept: CASE MANAGEMENT | Age: 46
End: 2021-11-16

## 2021-11-16 NOTE — CARE COORDINATION
Patient resolved from the Care Transitions episode on 11/16/2021      Patient/family has been provided the following resources and education related to COVID-19:                         Signs, symptoms and red flags related to COVID-19            CDC exposure and quarantine guidelines            Contact for their local Department of Health                 Patient currently reports that the following symptoms have improved:  sense of taste     No further outreach scheduled with this CTN. Episode of Care resolved. Patient has this CTN contact information if future needs arise.

## 2021-11-23 ENCOUNTER — TELEPHONE (OUTPATIENT)
Dept: SURGERY | Age: 46
End: 2021-11-23

## 2021-11-23 VITALS
TEMPERATURE: 98 F | RESPIRATION RATE: 18 BRPM | HEIGHT: 61 IN | BODY MASS INDEX: 38.33 KG/M2 | WEIGHT: 203 LBS | OXYGEN SATURATION: 98 % | HEART RATE: 104 BPM

## 2021-11-23 LAB
AMORPHOUS: ABNORMAL
ANION GAP SERPL CALCULATED.3IONS-SCNC: 12 MMOL/L (ref 7–16)
ATYPICAL LYMPHOCYTE RELATIVE PERCENT: 3.6 % (ref 0–4)
BACTERIA: ABNORMAL /HPF
BASOPHILS ABSOLUTE: 0 E9/L (ref 0–0.2)
BASOPHILS RELATIVE PERCENT: 0.5 % (ref 0–2)
BILIRUBIN URINE: ABNORMAL
BLOOD, URINE: NEGATIVE
BUN BLDV-MCNC: 8 MG/DL (ref 6–20)
CALCIUM SERPL-MCNC: 9.2 MG/DL (ref 8.6–10.2)
CHLORIDE BLD-SCNC: 106 MMOL/L (ref 98–107)
CLARITY: ABNORMAL
CO2: 20 MMOL/L (ref 22–29)
COLOR: YELLOW
CREAT SERPL-MCNC: 0.7 MG/DL (ref 0.5–1)
EOSINOPHILS ABSOLUTE: 0.3 E9/L (ref 0.05–0.5)
EOSINOPHILS RELATIVE PERCENT: 5.4 % (ref 0–6)
EPITHELIAL CELLS, UA: ABNORMAL /HPF
GFR AFRICAN AMERICAN: >60
GFR NON-AFRICAN AMERICAN: >60 ML/MIN/1.73
GLUCOSE BLD-MCNC: 119 MG/DL (ref 74–99)
GLUCOSE URINE: NEGATIVE MG/DL
HCT VFR BLD CALC: 38 % (ref 34–48)
HEMOGLOBIN: 12.9 G/DL (ref 11.5–15.5)
KETONES, URINE: NEGATIVE MG/DL
LACTIC ACID: 1.5 MMOL/L (ref 0.5–2.2)
LEUKOCYTE ESTERASE, URINE: NEGATIVE
LYMPHOCYTES ABSOLUTE: 1.93 E9/L (ref 1.5–4)
LYMPHOCYTES RELATIVE PERCENT: 31.2 % (ref 20–42)
MCH RBC QN AUTO: 30.6 PG (ref 26–35)
MCHC RBC AUTO-ENTMCNC: 33.9 % (ref 32–34.5)
MCV RBC AUTO: 90.3 FL (ref 80–99.9)
METAMYELOCYTES RELATIVE PERCENT: 0.9 % (ref 0–1)
MONOCYTES ABSOLUTE: 0.6 E9/L (ref 0.1–0.95)
MONOCYTES RELATIVE PERCENT: 10.7 % (ref 2–12)
NEUTROPHILS ABSOLUTE: 2.7 E9/L (ref 1.8–7.3)
NEUTROPHILS RELATIVE PERCENT: 48.2 % (ref 43–80)
NITRITE, URINE: NEGATIVE
OVALOCYTES: NORMAL
PDW BLD-RTO: 13.5 FL (ref 11.5–15)
PH UA: 5 (ref 5–9)
PLATELET # BLD: 209 E9/L (ref 130–450)
PMV BLD AUTO: 10.1 FL (ref 7–12)
POIKILOCYTES: NORMAL
POTASSIUM SERPL-SCNC: 3.6 MMOL/L (ref 3.5–5)
PROTEIN UA: ABNORMAL MG/DL
RBC # BLD: 4.21 E12/L (ref 3.5–5.5)
RBC UA: ABNORMAL /HPF (ref 0–2)
SODIUM BLD-SCNC: 138 MMOL/L (ref 132–146)
SPECIFIC GRAVITY UA: >=1.03 (ref 1–1.03)
UROBILINOGEN, URINE: 0.2 E.U./DL
WBC # BLD: 5.5 E9/L (ref 4.5–11.5)
WBC UA: ABNORMAL /HPF (ref 0–5)

## 2021-11-23 PROCEDURE — 83605 ASSAY OF LACTIC ACID: CPT

## 2021-11-23 PROCEDURE — 80048 BASIC METABOLIC PNL TOTAL CA: CPT

## 2021-11-23 PROCEDURE — 85025 COMPLETE CBC W/AUTO DIFF WBC: CPT

## 2021-11-23 PROCEDURE — 99283 EMERGENCY DEPT VISIT LOW MDM: CPT

## 2021-11-23 PROCEDURE — 81001 URINALYSIS AUTO W/SCOPE: CPT

## 2021-11-23 PROCEDURE — 4500000002 HC ER NO CHARGE

## 2021-11-23 RX ORDER — SODIUM CHLORIDE 0.9 % (FLUSH) 0.9 %
10 SYRINGE (ML) INJECTION PRN
Status: DISCONTINUED | OUTPATIENT
Start: 2021-11-23 | End: 2021-11-24 | Stop reason: HOSPADM

## 2021-11-23 ASSESSMENT — PAIN DESCRIPTION - ORIENTATION: ORIENTATION: RIGHT;LEFT;UPPER

## 2021-11-23 ASSESSMENT — PAIN DESCRIPTION - LOCATION: LOCATION: ABDOMEN

## 2021-11-23 ASSESSMENT — PAIN DESCRIPTION - PAIN TYPE: TYPE: ACUTE PAIN

## 2021-11-23 ASSESSMENT — PAIN DESCRIPTION - DESCRIPTORS: DESCRIPTORS: CONSTANT;PRESSURE;DISCOMFORT

## 2021-11-23 ASSESSMENT — PAIN SCALES - GENERAL: PAINLEVEL_OUTOF10: 6

## 2021-11-23 NOTE — TELEPHONE ENCOUNTER
Pt called in stating that she believes there is something wrong with her ostomy/stoma. She states that when she went to change it it looked like there was something 'hanging/pushing out' of the stoma. She also states that it is very painful every time she coughs. I advised that there are not any Physicians in the office the rest of the week due to the Reggy Heater. I advised patient to go to ED to be evaluated for concerns. She gave verbal understanding.

## 2021-11-23 NOTE — ED NOTES
Department of Emergency Medicine  FIRST PROVIDER TRIAGE NOTE             Independent MLP         11/23/21  11:09 AM EST    Date of Encounter: 11/23/21   MRN: [id number]      HPI: Bonita Day is a 51 y.o. female who presents to the ED for Abdominal Pain (pt reports abd pain around colostomy site, hx hernia)   diffuse lower abd pains. Awaiting reversal of colostomy in january    ROS: Negative for cp or sob. PE: Gen Appearance/Constitutional: alert  GI: colostomy RLQ     Initial Plan of Care: All treatment areas with department are currently occupied. Plan to order/Initiate the following while awaiting opening in ED: labs and imaging studies.     Initial Plan of Care: Initiate Treatment-Testing, Proceed toTreatment Area When Bed Available for ED Attending/MLP to Continue Care    Electronically signed by DANISH Leyva   DD: 11/23/21         DANISH Maxwell  11/23/21 [id number]

## 2021-11-23 NOTE — ED NOTES
Unable to andrew pt ready for ct as pt is in the waiting room and unable to have an IV placed     Shalonda Correa RN  11/23/21 [id number]

## 2021-11-24 ENCOUNTER — HOSPITAL ENCOUNTER (EMERGENCY)
Age: 46
Discharge: LEFT BEFORE DISCHARGE | End: 2021-11-24
Payer: COMMERCIAL

## 2021-11-24 DIAGNOSIS — Z53.21 ELOPED FROM EMERGENCY DEPARTMENT: Primary | ICD-10-CM

## 2021-11-24 NOTE — ED NOTES
Pt called again for 2nd time, no answer charge nurse aware pt not in waiting room.      Alex Pierce LPN  [phone]/63/[phone]

## 2022-01-20 ENCOUNTER — TELEPHONE (OUTPATIENT)
Dept: SURGERY | Age: 47
End: 2022-01-20

## 2022-01-20 ENCOUNTER — OFFICE VISIT (OUTPATIENT)
Dept: SURGERY | Age: 47
End: 2022-01-20
Payer: COMMERCIAL

## 2022-01-20 VITALS
HEIGHT: 62 IN | RESPIRATION RATE: 18 BRPM | WEIGHT: 185 LBS | BODY MASS INDEX: 34.04 KG/M2 | TEMPERATURE: 97 F | SYSTOLIC BLOOD PRESSURE: 131 MMHG | DIASTOLIC BLOOD PRESSURE: 85 MMHG | HEART RATE: 77 BPM

## 2022-01-20 DIAGNOSIS — K56.699 SIGMOID STRICTURE (HCC): Primary | ICD-10-CM

## 2022-01-20 PROCEDURE — G8427 DOCREV CUR MEDS BY ELIG CLIN: HCPCS | Performed by: SURGERY

## 2022-01-20 PROCEDURE — G8484 FLU IMMUNIZE NO ADMIN: HCPCS | Performed by: SURGERY

## 2022-01-20 PROCEDURE — G8417 CALC BMI ABV UP PARAM F/U: HCPCS | Performed by: SURGERY

## 2022-01-20 PROCEDURE — 4004F PT TOBACCO SCREEN RCVD TLK: CPT | Performed by: SURGERY

## 2022-01-20 PROCEDURE — 99213 OFFICE O/P EST LOW 20 MIN: CPT | Performed by: SURGERY

## 2022-01-20 RX ORDER — DICYCLOMINE HYDROCHLORIDE 10 MG/1
10 CAPSULE ORAL
Status: ON HOLD | COMMUNITY
End: 2022-04-04 | Stop reason: HOSPADM

## 2022-01-20 NOTE — PROGRESS NOTES
General Surgery History and Physical    Patient's Name/Date of Birth: Anita Gallardo / 1975    Date: January 20, 2022     Surgeon: Karrie Greer M.D.    PCP: Harrison Mcdonnell PA-C     Chief Complaint: sigmoid stricture    HPI:   Anita Gallardo is a 51 y.o. female who presents for evaluation of sigmoid stricture after having a lap sigmoid colectomy for cancer and diverting loop ileostomy which she still has, follow up before reversal showed stricture at approximately 20cm. It was decided to wait 6 months, reassess and possible resect. Past Medical History:   Diagnosis Date    Acid reflux disease     Cyst of ovary     Fracture of nasal bone     Headache     Hearing loss     Hypertension     Migraine     Morbidly obese ([address] 10/5/2020    Multiple sclerosis ([address]     denies limitations at present 11/2020    VEENA on CPAP     severe VEENA per pt    Right shoulder pain 11/2020    Tinnitus     TMJ dysfunction        Past Surgical History:   Procedure Laterality Date    APPENDECTOMY      BACK SURGERY      BICEPS TENDON REPAIR Right 11/11/2020    BICEPS TENODESIS performed by Mary Tuttle MD at [place] [id number] Left 8/31/2021    LAPAROSCOPIC LEFT HEMICOLECTOMY WITH LOOP OSTOMY performed by Telma Lauren MD at [facility] 9/6/2021    DIAGNOSTIC LAPAROSCOPY POSSIBLE BOWEL RESECTION POSSILBE OSTOMY performed by Telma Lauren MD at [address] [facility]  03/05/2018    Robotic hysterectomy, bilateral salpingectomy, right oophorectomy DR. ARIANA MONIQUE [facility]     HYSTERECTOMY, TOTAL ABDOMINAL      LARYNGOSCOPY N/A 7/17/2020    DIRECT LARYNGOSCOPY--OMNI GUIDE LASER performed by Ruby Gallo MD at [address] [facility] [address] PROCTOSIGMOIDOSCOPY N/A 10/12/2021    ANAL PROCTO SIGMOIDOSCOPY FLEXIBLE performed by Telma Lauren MD at [facility] ARTHROSCOPY Right 11/11/2020    RIGHT SHOULDER DIAGNOSTIC ARTHROSCOPY WITH DECOMPRESSION ROTATOR CUFF REPAIR [facility]         Current Outpatient Medications   Medication Sig Dispense Refill    vitamin D (D3-50) 69843 UNIT CAPS Take 1 capsule by mouth once a week 4 capsule 5    nortriptyline (PAMELOR) 10 MG capsule TAKE 1 CAPSULE BY MOUTH NIGHTLY FOR HEADACHES 30 capsule 3    zinc sulfate (ZINCATE) 220 (50 Zn) MG capsule Take 1 capsule by mouth daily 30 capsule 0    ascorbic acid (VITAMIN C) 500 MG tablet Take 1 tablet by mouth daily 30 tablet 0    gabapentin (NEURONTIN) 300 MG capsule TAKE 1 CAPSULE BY MOUTH THREE TIMES DAILY 90 capsule 11    ibuprofen (ADVIL;MOTRIN) 800 MG tablet Take 1 tablet by mouth every 6 hours as needed for Pain 20 tablet 0    dexlansoprazole (DEXILANT) 60 MG CPDR delayed release capsule Take 60 mg by mouth daily      baclofen (LIORESAL) 20 MG tablet Take 1 tablet by mouth 4 times daily as needed (muscle spasms; pain) 360 tablet 3    rOPINIRole (REQUIP) 0.5 MG tablet Take 1 tablet by mouth 2 times daily as needed (restless legs) 60 tablet 11    ocrelizumab (OCREVUS) 300 MG/10ML SOLN injection Infuse 20 mLs intravenously every 6 months 20 mL 1    CPAP Machine MISC Heated tube and chin strap. Dispense as written. LIFETIME SUPPLIES. (Patient not taking: Reported on 11/2/2021)       No current facility-administered medications for this visit. No Known Allergies    The patient has a family history that is negative for severe cardiovascular or respiratory issues, negative for reaction to anesthesia.     Social History     Socioeconomic History    Marital status: Single     Spouse name: Not on file    Number of children: 3    Years of education: 6    Highest education level: 11th grade   Occupational History    Not on file   Tobacco Use    Smoking status: Current Every Day Smoker     Packs/day: 0.50     Years: 25.00     Pack years: 12.50     Types: Cigarettes  Smokeless tobacco: Never Used    Tobacco comment: Ready to stop, requesting prescription for Chantix   Vaping Use    Vaping Use: Never used   Substance and Sexual Activity    Alcohol use: Yes     Alcohol/week: 1.0 standard drink     Types: 1 Glasses of wine per week     Comment: socially    Drug use: No    Sexual activity: Yes     Partners: Male   Other Topics Concern    Not on file   Social History Narrative    Uses [employer] for transportation    [employer]     Social Determinants of Health     Financial Resource Strain: Medium Risk    Difficulty of Paying Living Expenses: Somewhat hard   Food Insecurity: Food Insecurity Present    Worried About Running Out of Food in the Last Year: Sometimes true    [place] of Food in the Last Year: Never true   Transportation Needs: No Transportation Needs    Lack of Transportation (Medical): No    Lack of Transportation (Non-Medical): No   Physical Activity: Sufficiently Active    Days of Exercise per Week: 3 days    Minutes of Exercise per Session: 60 min   Stress: No Stress Concern Present    Feeling of Stress : Not at all   Social Connections: Socially Isolated    Frequency of Communication with Friends and Family:  Three times a week    Frequency of Social Gatherings with Friends and Family: Twice a week    Attends Latter day Services: Never    Active Member of Clubs or Organizations: No    Attends Club or Organization Meetings: Never    Marital Status: Never    Intimate Partner Violence:     Fear of Current or Ex-Partner: Not on file    Emotionally Abused: Not on file    Physically Abused: Not on file    Sexually Abused: Not on file   Housing Stability:     Unable to Pay for Housing in the Last Year: Not on file    Number of [place] in the Last Year: Not on file    Unstable Housing in the Last Year: Not on file           Review of Systems  Review of Systems -  General ROS: negative for - chills, fatigue or

## 2022-01-20 NOTE — TELEPHONE ENCOUNTER
Patient provided with surgery instructions during office visit. Patient scheduled for follow up visit with Dr. Carlos Crews via phone on 01/31/2022 in [place]. Surgery scheduling form faxed and confirmation received.     Electronically signed by Obie Sidhu MA on 1/20/2022 at 2:38 PM

## 2022-01-20 NOTE — TELEPHONE ENCOUNTER
Prior Authorization Form:      DEMOGRAPHICS:                     Patient Name:  Sherri Pena  Patient :  1975            Insurance:  Payor: [facility]  [address] / Plan: [facility] [facility] [address] / Product Type: *No Product type* /   Insurance ID Number:    Payor/Plan Subscr  Sex Relation Sub.  Ins. ID Effective Group Num   1. JAROD VELÁZQUEZ* PAVEL MARISCAL 1975 Female Self [id number]                                    [address]         DIAGNOSIS & PROCEDURE:                       Procedure/Operation: sigmoidoscopy           CPT Code: 97066    Diagnosis:  Sigmoid stricture    ICD10 Code: Z85.949    Location:  [address] [place] [address]    Surgeon:  Alban Campos INFORMATION:                          Date: 2022    Time:               Anesthesia:                                                         Status:  Outpatient        Special Comments:           Electronically signed by Jared Lin MA on 2022 at 2:37 PM

## 2022-01-21 ENCOUNTER — PREP FOR PROCEDURE (OUTPATIENT)
Dept: SURGERY | Age: 47
End: 2022-01-21

## 2022-01-21 ENCOUNTER — HOSPITAL ENCOUNTER (OUTPATIENT)
Age: 47
Discharge: HOME | End: 2022-01-23
Payer: COMMERCIAL

## 2022-01-21 DIAGNOSIS — Z01.818 PREOP TESTING: ICD-10-CM

## 2022-01-21 PROCEDURE — U0005 INFEC AGEN DETEC AMPLI PROBE: HCPCS

## 2022-01-21 PROCEDURE — U0003 INFECTIOUS AGENT DETECTION BY NUCLEIC ACID (DNA OR RNA); SEVERE ACUTE RESPIRATORY SYNDROME CORONAVIRUS 2 (SARS-COV-2) (CORONAVIRUS DISEASE [COVID-19]), AMPLIFIED PROBE TECHNIQUE, MAKING USE OF HIGH THROUGHPUT TECHNOLOGIES AS DESCRIBED BY CMS-2020-01-R: HCPCS

## 2022-01-21 RX ORDER — SODIUM CHLORIDE 9 MG/ML
25 INJECTION, SOLUTION INTRAVENOUS PRN
Status: CANCELLED | OUTPATIENT
Start: 2022-01-21

## 2022-01-21 RX ORDER — SODIUM CHLORIDE 0.9 % (FLUSH) 0.9 %
5-40 SYRINGE (ML) INJECTION PRN
Status: CANCELLED | OUTPATIENT
Start: 2022-01-21

## 2022-01-21 RX ORDER — SODIUM CHLORIDE, SODIUM LACTATE, POTASSIUM CHLORIDE, CALCIUM CHLORIDE 600; 310; 30; 20 MG/100ML; MG/100ML; MG/100ML; MG/100ML
INJECTION, SOLUTION INTRAVENOUS CONTINUOUS
Status: CANCELLED | OUTPATIENT
Start: 2022-01-21

## 2022-01-21 RX ORDER — SODIUM CHLORIDE 0.9 % (FLUSH) 0.9 %
5-40 SYRINGE (ML) INJECTION EVERY 12 HOURS SCHEDULED
Status: CANCELLED | OUTPATIENT
Start: 2022-01-21

## 2022-01-22 LAB — SARS-COV-2, PCR: NOT DETECTED

## 2022-01-24 ENCOUNTER — ANESTHESIA EVENT (OUTPATIENT)
Dept: ENDOSCOPY | Age: 47
End: 2022-01-24
Payer: COMMERCIAL

## 2022-01-24 ASSESSMENT — LIFESTYLE VARIABLES: SMOKING_STATUS: 1

## 2022-01-24 NOTE — PROGRESS NOTES
[facility]                                                                                                                    PRE OP INSTRUCTIONS FOR  Phuong Vuong        Date: 1/24/2022    Date of surgery:  1/25/22   Arrival Time: Hospital will call you between 5pm and 7pm with your final arrival time for surgery    1. Do not eat or drink anything after midnight prior to surgery. This includes no water, chewing gum, mints or ice chips. 2. Take the following medications with a small sip of water on the morning of Surgery: Bentyl and Baclofen prn     3. Diabetics may take evening dose of insulin but none after midnight. If you feel symptomatic or low blood sugar morning of surgery drink 1-2 ounces of apple juice only. 4. Aspirin, Ibuprofen, Advil, Naproxen, Vitamin E and other Anti-inflammatory products should be stopped  before surgery  as directed by your physician. Take Tylenol only unless instructed otherwise by your surgeon. 5. Check with your Doctor regarding stopping Plavix, Coumadin, Lovenox, Eliquis, Effient, or other blood thinners. 6. Do not smoke,use illicit drugs and do not drink any alcoholic beverages 24 hours prior to surgery. 7. You may brush your teeth the morning of surgery. DO NOT SWALLOW WATER    8. You MUST make arrangements for a responsible adult to take you home after your surgery. You will not be allowed to leave alone or drive yourself home. It is strongly suggested someone stay with you the first 24 hrs. Your surgery will be cancelled if you do not have a ride home. 9. PEDIATRIC PATIENTS ONLY:  A parent/legal guardian must accompany a child scheduled for surgery and plan to stay at the hospital until the child is discharged. Please do not bring other children with you.     10. Please wear simple, loose fitting clothing to the hospital.  Amanda Thomas not bring valuables (money, credit cards, checkbooks, etc.) Do not wear any makeup (including no eye makeup) or nail polish on your fingers or toes. 11. DO NOT wear any jewelry or piercings on day of surgery. All body piercing jewelry must be removed. 12. Shower the night before surgery with _x__Antibacterial soap /ELISE WIPES________    13. TOTAL JOINT REPLACEMENT/HYSTERECTOMY PATIENTS ONLY---Remember to bring Blood Bank bracelet to the hospital on the day of surgery. 14. If you have a Living Will and Durable Power of  for Healthcare, please bring in a copy. 15. If appropriate bring crutches, inspirex, WALKER, CANE etc... 12. Notify your Surgeon if you develop any illness between now and surgery time, cough, cold, fever, sore throat, nausea, vomiting, etc.  Please notify your surgeon if you experience dizziness, shortness of breath or blurred vision between now & the time of your surgery. 17. If you have ___dentures, they will be removed before going to the OR; we will provide you a container. If you wear ___contact lenses or _x__glasses, they will be removed; please bring a case for them. 18. To provide excellent care visitors will be limited to 1 in the room at any given time. 19. Please bring picture ID and insurance card. 20. Sleep apnea patients need to bring CPAP AND SETTINGS to hospital on day of surgery. 21. During flu season no children under the age of 15 are permitted in the hospital for the safety of all patients. 22. Other                  Please call AMBULATORY CARE if you have any further questions.    [facility]     [address]

## 2022-01-25 ENCOUNTER — ANESTHESIA (OUTPATIENT)
Dept: ENDOSCOPY | Age: 47
End: 2022-01-25
Payer: COMMERCIAL

## 2022-01-25 ENCOUNTER — HOSPITAL ENCOUNTER (OUTPATIENT)
Age: 47
Setting detail: OUTPATIENT SURGERY
Discharge: HOME | End: 2022-01-25
Attending: SURGERY | Admitting: SURGERY
Payer: COMMERCIAL

## 2022-01-25 VITALS
BODY MASS INDEX: 34.41 KG/M2 | OXYGEN SATURATION: 99 % | SYSTOLIC BLOOD PRESSURE: 137 MMHG | DIASTOLIC BLOOD PRESSURE: 78 MMHG | TEMPERATURE: 97.8 F | WEIGHT: 187 LBS | RESPIRATION RATE: 16 BRPM | HEART RATE: 63 BPM | HEIGHT: 62 IN

## 2022-01-25 VITALS
OXYGEN SATURATION: 100 % | SYSTOLIC BLOOD PRESSURE: 166 MMHG | RESPIRATION RATE: 27 BRPM | DIASTOLIC BLOOD PRESSURE: 102 MMHG

## 2022-01-25 DIAGNOSIS — Z01.818 PREOP TESTING: Primary | ICD-10-CM

## 2022-01-25 PROCEDURE — 6360000002 HC RX W HCPCS: Performed by: NURSE ANESTHETIST, CERTIFIED REGISTERED

## 2022-01-25 PROCEDURE — 99024 POSTOP FOLLOW-UP VISIT: CPT | Performed by: SURGERY

## 2022-01-25 PROCEDURE — 2709999900 HC NON-CHARGEABLE SUPPLY: Performed by: SURGERY

## 2022-01-25 PROCEDURE — 3700000001 HC ADD 15 MINUTES (ANESTHESIA): Performed by: SURGERY

## 2022-01-25 PROCEDURE — 3609156700 HC PROCTOSIGMOIDOSCOPY DX: Performed by: SURGERY

## 2022-01-25 PROCEDURE — 2580000003 HC RX 258: Performed by: SURGERY

## 2022-01-25 PROCEDURE — 7100000010 HC PHASE II RECOVERY - FIRST 15 MIN: Performed by: SURGERY

## 2022-01-25 PROCEDURE — 7100000011 HC PHASE II RECOVERY - ADDTL 15 MIN: Performed by: SURGERY

## 2022-01-25 PROCEDURE — 45330 DIAGNOSTIC SIGMOIDOSCOPY: CPT | Performed by: SURGERY

## 2022-01-25 PROCEDURE — 3700000000 HC ANESTHESIA ATTENDED CARE: Performed by: SURGERY

## 2022-01-25 RX ORDER — PROPOFOL 10 MG/ML
INJECTION, EMULSION INTRAVENOUS CONTINUOUS PRN
Status: DISCONTINUED | OUTPATIENT
Start: 2022-01-25 | End: 2022-01-25 | Stop reason: SDUPTHER

## 2022-01-25 RX ORDER — SODIUM CHLORIDE 0.9 % (FLUSH) 0.9 %
5-40 SYRINGE (ML) INJECTION PRN
Status: DISCONTINUED | OUTPATIENT
Start: 2022-01-25 | End: 2022-01-25 | Stop reason: HOSPADM

## 2022-01-25 RX ORDER — SODIUM CHLORIDE 0.9 % (FLUSH) 0.9 %
5-40 SYRINGE (ML) INJECTION EVERY 12 HOURS SCHEDULED
Status: DISCONTINUED | OUTPATIENT
Start: 2022-01-25 | End: 2022-01-25 | Stop reason: HOSPADM

## 2022-01-25 RX ORDER — SODIUM CHLORIDE 9 MG/ML
25 INJECTION, SOLUTION INTRAVENOUS PRN
Status: DISCONTINUED | OUTPATIENT
Start: 2022-01-25 | End: 2022-01-25 | Stop reason: HOSPADM

## 2022-01-25 RX ORDER — SODIUM CHLORIDE, SODIUM LACTATE, POTASSIUM CHLORIDE, CALCIUM CHLORIDE 600; 310; 30; 20 MG/100ML; MG/100ML; MG/100ML; MG/100ML
INJECTION, SOLUTION INTRAVENOUS CONTINUOUS
Status: DISCONTINUED | OUTPATIENT
Start: 2022-01-25 | End: 2022-01-25 | Stop reason: HOSPADM

## 2022-01-25 RX ADMIN — SODIUM CHLORIDE: 9 INJECTION, SOLUTION INTRAVENOUS at 07:47

## 2022-01-25 RX ADMIN — PROPOFOL 150 MCG/KG/MIN: 10 INJECTION, EMULSION INTRAVENOUS at 07:55

## 2022-01-25 ASSESSMENT — PAIN SCALES - GENERAL
PAINLEVEL_OUTOF10: 0

## 2022-01-25 NOTE — ANESTHESIA POSTPROCEDURE EVALUATION
Department of Anesthesiology  Postprocedure Note    Patient: Hany Jernigan  MRN: [id number]  YOB: 1975  Date of evaluation: 1/25/2022  Time:  10:03 AM     Procedure Summary     Date: 01/25/22 Room / Location: [address] [place] [address] 01 / [facility]    Anesthesia Start: 0692 Anesthesia Stop: 7769    Procedure: ANAL [address] (N/A ) Diagnosis: (SIGMOID STRICTURE)    Surgeons: Ana Ramirez MD Responsible Provider: Corinne Eke, MD    Anesthesia Type: MAC ASA Status: 3          Anesthesia Type: MAC    Daniel Phase I: Daniel Score: 10    Daniel Phase II:      Last vitals: Reviewed and per EMR flowsheets.        Anesthesia Post Evaluation    Patient location during evaluation: PACU  Patient participation: complete - patient participated  Level of consciousness: awake  Airway patency: patent  Nausea & Vomiting: no nausea and no vomiting  Complications: no  Cardiovascular status: hemodynamically stable  Respiratory status: acceptable  Hydration status: euvolemic

## 2022-01-25 NOTE — ANESTHESIA PRE PROCEDURE
Department of Anesthesiology  Preprocedure Note       Name:  Daniel Garcia   Age:  51 y.o.  :  1975                                          MRN:  [id number]         Date:  2022      Surgeon: Maxine Mata):  Mercy Ram MD    Procedure: Procedure(s):  ANAL PROCTO SIGMOIDOSCOPY FLEXIBLE    Medications prior to admission:   Prior to Admission medications    Medication Sig Start Date End Date Taking?  Authorizing Provider   dicyclomine (BENTYL) 10 MG capsule Take 10 mg by mouth 4 times daily (before meals and nightly)    Historical Provider, MD   vitamin D (D3-50) 01163 UNIT CAPS Take 1 capsule by mouth once a week 21   MARIXA Love CNP   nortriptyline (PAMELOR) 10 MG capsule TAKE 1 CAPSULE BY MOUTH NIGHTLY FOR HEADACHES 21   Jeny Royal PA-C   gabapentin (NEURONTIN) 300 MG capsule TAKE 1 CAPSULE BY MOUTH THREE TIMES DAILY 21  MARIXA Love CNP   dexlansoprazole (DEXILANT) 60 MG CPDR delayed release capsule Take 60 mg by mouth daily    Historical Provider, MD   baclofen (LIORESAL) 20 MG tablet Take 1 tablet by mouth 4 times daily as needed (muscle spasms; pain) 21   MARIXA Love CNP   rOPINIRole (REQUIP) 0.5 MG tablet Take 1 tablet by mouth 2 times daily as needed (restless legs) 21   MARIXA Love CNP   ocrelizumab (OCREVUS) 300 MG/10ML SOLN injection Infuse 20 mLs intravenously every 6 months 21   MARIXA Love CNP       Current medications:    Current Outpatient Medications   Medication Sig Dispense Refill    dicyclomine (BENTYL) 10 MG capsule Take 10 mg by mouth 4 times daily (before meals and nightly)      vitamin D (D3-50) 34063 UNIT CAPS Take 1 capsule by mouth once a week 4 capsule 5    nortriptyline (PAMELOR) 10 MG capsule TAKE 1 CAPSULE BY MOUTH NIGHTLY FOR HEADACHES 30 capsule 3    gabapentin (NEURONTIN) 300 MG capsule TAKE 1 CAPSULE BY MOUTH THREE TIMES DAILY 90 capsule 11    dexlansoprazole (DEXILANT) 60 MG CPDR delayed release capsule Take 60 mg by mouth daily      baclofen (LIORESAL) 20 MG tablet Take 1 tablet by mouth 4 times daily as needed (muscle spasms; pain) 360 tablet 3    rOPINIRole (REQUIP) 0.5 MG tablet Take 1 tablet by mouth 2 times daily as needed (restless legs) 60 tablet 11    ocrelizumab (OCREVUS) 300 MG/10ML SOLN injection Infuse 20 mLs intravenously every 6 months 20 mL 1     No current facility-administered medications for this visit.        Allergies:  No Known Allergies    Problem List:    Patient Active Problem List   Diagnosis Code    Vocal cord dysfunction J38.3    Multiple sclerosis ([address] G35    Morbidly obese ([address] E66.01    Palafox's esophagus with dysplasia K22.719    Secondary restless legs syndrome G25.81    S/P colectomy Z90.49    Malignant neoplasm of descending colon ([address] C18.6    Ileus, postoperative ([facility] K91.89, K56.7    Pelvic abscess in female N73.9    Abnormal barium enema R93.3    Acute respiratory disease due to COVID-19 virus U07.1, J06.9       Past Medical History:        Diagnosis Date    Acid reflux disease     Cyst of ovary     Fracture of nasal bone     Headache     Hearing loss     Hypertension     Migraine     Morbidly obese ([address] 10/05/2020    Multiple sclerosis ([address]     denies limitations at present 11/2020    VEENA no CPAP     severe VEENA per pt    Right shoulder pain 11/2020    Tinnitus     TMJ dysfunction        Past Surgical History:        Procedure Laterality Date    APPENDECTOMY      BACK SURGERY      lumbar    BICEPS TENDON REPAIR Right 11/11/2020    BICEPS TENODESIS performed by Braydon Melissa MD at [place] [address] Left 8/31/2021    LAPAROSCOPIC LEFT HEMICOLECTOMY WITH LOOP OSTOMY performed by Alvina Mcginnis MD at [address] [facility] 9/6/2021    DIAGNOSTIC LAPAROSCOPY POSSIBLE BOWEL RESECTION POSSILBE OSTOMY performed by Alvina Mcginnis MD at [facility] SURGERY      HYSTERECTOMY  03/05/2018    Robotic hysterectomy, bilateral salpingectomy, right oophorectomy DR[facility] ARIANA MONIQUE [facility]     HYSTERECTOMY, TOTAL ABDOMINAL      LARYNGOSCOPY N/A 7/17/2020    DIRECT LARYNGOSCOPY--OMNI GUIDE LASER performed by Judith Gerard MD at [facility] PARTIAL HYSTERECTOMY      PROCTOSIGMOIDOSCOPY N/A 10/12/2021    ANAL PROCTO SIGMOIDOSCOPY FLEXIBLE performed by Xochitl Stoll MD at [facility] ARTHROSCOPY Right 11/11/2020    RIGHT SHOULDER DIAGNOSTIC ARTHROSCOPY WITH DECOMPRESSION ROTATOR CUFF REPAIR performed by Peter Altman MD at [place] [address]         Social History:    Social History     Tobacco Use    Smoking status: Current Every Day Smoker     Packs/day: 0.50     Years: 25.00     Pack years: 12.50     Types: Cigarettes    Smokeless tobacco: Never Used    Tobacco comment: Ready to stop, requesting prescription for Chantix   Substance Use Topics    Alcohol use: Yes     Alcohol/week: 1.0 standard drink     Types: 1 Glasses of wine per week     Comment: socially                                Ready to quit: Not Answered  Counseling given: Not Answered  Comment: Ready to stop, requesting prescription for Chantix      Vital Signs (Current): There were no vitals filed for this visit.                                            BP Readings from Last 3 Encounters:   01/20/22 131/85   11/01/21 111/69   10/12/21 124/76       NPO Status:                                                                                 BMI:   Wt Readings from Last 3 Encounters:   01/20/22 185 lb (83.9 kg)   11/23/21 203 lb (92.1 kg)   11/01/21 203 lb 1.6 oz (92.1 kg)     There is no height or weight on file to calculate BMI.    CBC:   Lab Results   Component Value Date    WBC 5.5 11/23/2021    RBC 4.21 11/23/2021    HGB 12.9 11/23/2021    HCT 38.0 11/23/2021    MCV 90.3 11/23/2021    RDW 13.5 11/23/2021     11/23/2021       CMP:   Lab Results   Component Value Date     11/23/2021    K 3.6 11/23/2021    K 4.2 10/31/2021     11/23/2021    CO2 20 11/23/2021    BUN 8 11/23/2021    CREATININE 0.7 11/23/2021    GFRAA >60 11/23/2021    LABGLOM >60 11/23/2021    GLUCOSE 119 11/23/2021    PROT 7.1 10/31/2021    CALCIUM 9.2 11/23/2021    BILITOT 0.3 10/31/2021    ALKPHOS 164 10/31/2021    AST 54 10/31/2021     10/31/2021       POC Tests: No results for input(s): POCGLU, POCNA, POCK, POCCL, POCBUN, POCHEMO, POCHCT in the last 72 hours. Coags:   Lab Results   Component Value Date    PROTIME 10.7 02/28/2020    INR 1.0 02/28/2020       HCG (If Applicable):   Lab Results   Component Value Date    PREGTESTUR neg 02/04/2018        ABGs: No results found for: PHART, PO2ART, WSG9AIW, GSS2BMF, BEART, X9RSZJXB     Type & Screen (If Applicable):  Lab Results   Component Value Date    LABABO A 03/05/2018       Drug/Infectious Status (If Applicable):  No results found for: HIV, HEPCAB    COVID-19 Screening (If Applicable):   Lab Results   Component Value Date    COVID19 Not Detected 01/21/2022     Impression   Dilatation of small bowel segments in the left lower quadrant and left side   of the pelvis consistent with small bowel obstruction with transition point   likely in the left side of the pelvis.  Thickening of segments of small bowel   in the mid pelvis consistent with enteritis.       Small locules of free intraperitoneal air in the upper and mid abdomen,   likely postsurgical.  Larger locules of free intraperitoneal air adjacent to   anastomotic surgical clips in the mid lower pelvis which may be postsurgical.   Infection cannot be excluded. .       Moderate amount of fluid in the pelvis and extending into the pelvic   cul-de-sac with mild peripheral enhancement, best seen in the pelvic   cul-de-sac which may manifest infection and developing abscess.  Clinical   correlation recommended.               Anesthesia Evaluation  Patient summary reviewed and Nursing notes reviewed no history of anesthetic complications:   Airway: Mallampati: II  TM distance: >3 FB   Neck ROM: full  Mouth opening: > = 3 FB Dental:          Pulmonary: breath sounds clear to auscultation  (+) sleep apnea: on noncompliant,  current smoker                          ROS comment: Vocal cord dysfunction   Cardiovascular:  Exercise tolerance: good (>4 METS),   (+) hypertension: mild, murmur (Grade I),       ECG reviewed  Rhythm: regular  Rate: normal           Beta Blocker:  Not on Beta Blocker      ROS comment: Normal sinus rhythm  Normal ECG  No previous ECGs available     Neuro/Psych:   (+) neuromuscular disease (Pt. states MS in remission. Denies specific symptoms.): multiple sclerosis, headaches: migraine headaches,              ROS comment: Hearing loss with tinnitus    TMJ dysfunction    Restless legs syndrome GI/Hepatic/Renal:   (+) GERD: poorly controlled,      (-) no morbid obesity      ROS comment: Palafox's with dysplasia. Endo/Other:    (+) malignancy/cancer (Malignant neoplasm of the descending colon). Pt had no PAT visit        ROS comment: RLS Abdominal:   (+) obese,           Vascular: negative vascular ROS. Other Findings:             Anesthesia Plan      MAC     ASA 3       Induction: intravenous. Anesthetic plan and risks discussed with patient. Plan discussed with CRNA. PAT Chart Review:  Chart reviewed per routine by Rima Grier MD.  Above represents information available via shared medical record including previous anesthesia history, drug and allergy history. Confirmation of above and final plan per Day of Surgery (DOS) anesthesiologist.    DOS STAFF ADDENDUM:    Pt seen and examined, chart reviewed (including anesthesia, drug and allergy history). Anesthetic plan, risks, benefits, alternatives, and personnel involved discussed with patient. Patient verbalized an understanding and agrees to proceed.   Plan discussed with care team members and agreed upon.     Corinne Eke, MD  Staff Anesthesiologist  7:50 AM    Corinne Eke, MD   1/24/2022

## 2022-01-25 NOTE — H&P
General Surgery History and Physical     Patient's Name/Date of Birth: Yadiel Glez / 1975     Date: 1/25/2022       Surgeon: Sergei Hernández M.D.     PCP: Trent Forte PA-C     Chief Complaint: sigmoid stricture     HPI:   Yadiel Glez is a 51 y.o. female who presents for evaluation of sigmoid stricture after having a lap sigmoid colectomy for cancer and diverting loop ileostomy which she still has, follow up before reversal showed stricture at approximately 20cm. It was decided to wait 6 months, reassess and possible resect.         Past Medical History        Past Medical History:   Diagnosis Date    Acid reflux disease      Cyst of ovary      Fracture of nasal bone      Headache      Hearing loss      Hypertension      Migraine      Morbidly obese ([address] 10/5/2020    Multiple sclerosis ([address]       denies limitations at present 11/2020    VEENA on CPAP       severe VEENA per pt    Right shoulder pain 11/2020    Tinnitus      TMJ dysfunction              Past Surgical History         Past Surgical History:   Procedure Laterality Date    APPENDECTOMY        BACK SURGERY        BICEPS TENDON REPAIR Right 11/11/2020     BICEPS TENODESIS performed by Ina Hollingsworth MD at [address] [address] Left 8/31/2021     LAPAROSCOPIC LEFT HEMICOLECTOMY WITH LOOP OSTOMY performed by India Messer MD at [address] N/A 9/6/2021     DIAGNOSTIC LAPAROSCOPY POSSIBLE BOWEL RESECTION POSSILBE OSTOMY performed by India Messer MD at [facility]        HYSTERECTOMY   03/05/2018     Robotic hysterectomy, bilateral salpingectomy, right oophorectomy DR[facility] ARI[facility] MON[facility]QUE [facility]     HYSTERECTOMY, TOTAL ABDOMINAL        LARYNGOSCOPY N/A 7/17/2020     DIRECT LARYNGOSCOPY--OMNI GUIDE LASER performed by [facility], MD at [address] [facility]        PROCTOSIGMOIDOSCOPY N/A 10/12/2021     ANAL PROCTO SIGMOIDOSCOPY FLEXIBLE performed by Rashawn Brownlee MD at [facility] ARTHROSCOPY Right 11/11/2020     RIGHT SHOULDER DIAGNOSTIC ARTHROSCOPY WITH DECOMPRESSION ROTATOR CUFF REPAIR performed by Kimberly Kumari MD at [place] [address]                Current Facility-Administered Medications          Current Outpatient Medications   Medication Sig Dispense Refill    vitamin D (D3-50) 18104 UNIT CAPS Take 1 capsule by mouth once a week 4 capsule 5    nortriptyline (PAMELOR) 10 MG capsule TAKE 1 CAPSULE BY MOUTH NIGHTLY FOR HEADACHES 30 capsule 3    zinc sulfate (ZINCATE) 220 (50 Zn) MG capsule Take 1 capsule by mouth daily 30 capsule 0    ascorbic acid (VITAMIN C) 500 MG tablet Take 1 tablet by mouth daily 30 tablet 0    gabapentin (NEURONTIN) 300 MG capsule TAKE 1 CAPSULE BY MOUTH THREE TIMES DAILY 90 capsule 11    ibuprofen (ADVIL;MOTRIN) 800 MG tablet Take 1 tablet by mouth every 6 hours as needed for Pain 20 tablet 0    dexlansoprazole (DEXILANT) 60 MG CPDR delayed release capsule Take 60 mg by mouth daily        baclofen (LIORESAL) 20 MG tablet Take 1 tablet by mouth 4 times daily as needed (muscle spasms; pain) 360 tablet 3    rOPINIRole (REQUIP) 0.5 MG tablet Take 1 tablet by mouth 2 times daily as needed (restless legs) 60 tablet 11    ocrelizumab (OCREVUS) 300 MG/10ML SOLN injection Infuse 20 mLs intravenously every 6 months 20 mL 1    CPAP Machine MISC Heated tube and chin strap. Dispense as written. LIFETIME SUPPLIES.  (Patient not taking: Reported on 11/2/2021)          No current facility-administered medications for this visit.            No Known Allergies     The patient has a family history that is negative for severe cardiovascular or respiratory issues, negative for reaction to anesthesia.     Social History               Socioeconomic History    Marital status: Single       Spouse name: Not on file    Number of children: 3    Years of education: 6    Highest education level: 11th grade   Occupational History    Not on file   Tobacco Use    Smoking status: Current Every Day Smoker       Packs/day: 0.50       Years: 25.00       Pack years: 12.50       Types: Cigarettes    Smokeless tobacco: Never Used    Tobacco comment: Ready to stop, requesting prescription for Chantix   Vaping Use    Vaping Use: Never used   Substance and Sexual Activity    Alcohol use: Yes       Alcohol/week: 1.0 standard drink       Types: 1 Glasses of wine per week       Comment: socially    Drug use: No    Sexual activity: Yes       Partners: Male   Other Topics Concern    Not on file   Social History Narrative     Uses [employer] for transportation     Receives food stamps      Social Determinants of Health          Financial Resource Strain: Medium Risk    Difficulty of Paying Living Expenses: Somewhat hard   Food Insecurity: Food Insecurity Present    Worried About Running Out of Food in the Last Year: Sometimes true    [place] of Food in the Last Year: Never true   Transportation Needs: No Transportation Needs    Lack of Transportation (Medical): No    Lack of Transportation (Non-Medical): No   Physical Activity: Sufficiently Active    Days of Exercise per Week: 3 days    Minutes of Exercise per Session: 60 min   Stress: No Stress Concern Present    Feeling of Stress : Not at all   Social Connections: Socially Isolated    Frequency of Communication with Friends and Family:  Three times a week    Frequency of Social Gatherings with Friends and Family: Twice a week    Attends Holiness Services: Never    Active Member of Clubs or Organizations: No    Attends Club or Organization Meetings: Never    Marital Status: Never    Intimate Partner Violence:     Fear of Current or Ex-Partner: Not on file    Emotionally Abused: Not on file    Physically Abused: Not on file    Sexually Abused: Not on file   Housing Stability:     Unable to Pay for Housing in the Last Year: Not on file    Number of Places Lived in the Last Year: Not on file    Unstable Housing in the Last Year: Not on file                  Review of Systems  Review of Systems -  General ROS: negative for - chills, fatigue or malaise  ENT ROS: negative for - hearing change, nasal congestion or nasal discharge  Allergy and Immunology ROS: negative for - hives, itchy/watery eyes or nasal congestion  Hematological and Lymphatic ROS: negative for - blood clots, blood transfusions, bruising or fatigue  Endocrine ROS: negative for - malaise/lethargy, mood swings, palpitations or polydipsia/polyuria  Respiratory ROS: negative for - sputum changes, stridor, tachypnea or wheezing  Cardiovascular ROS: negative for - irregular heartbeat, loss of consciousness, murmur or orthopnea  Gastrointestinal ROS: negative for - constipation, diarrhea, gas/bloating, heartburn or hematemesis  Genito-Urinary ROS: negative for -  genital discharge, genital ulcers or hematuria  Musculoskeletal ROS: negative for - gait disturbance, muscle pain or muscular weakness     Physical exam:   [facility] 01/05/2018   General appearance:  NAD  Head: NCAT, PERRLA, EOMI, red conjunctiva  Neck: supple, no masses  Lungs: CTAB, equal chest rise bilateral  Heart: Reg rate  Abdomen: soft, nontender, nondistended, ileostomy in place  Skin; no lesions  Gu: no cva tenderness  Extremities: extremities normal, atraumatic, no cyanosis or edema        Radiology:  Barium enema showing stricture     Assessment:  51 y.o. female with sigmoid stricture     Plan: Will do a sigmoidoscopy to assess possibly dilate. If still tight will do resection and reanastomosis. Discussed the risk, benefits and alternatives of surgery including wound infections, bleeding, scar  and the risks of anesthetic including MI, CVA, sudden death or reactions to anesthetic medications. The patient understands the risks and alternatives.  All questions were answered to the patient's satisfaction and they freely signed the consent.     India Messer MD

## 2022-01-25 NOTE — OP NOTE
Operative Note      Patient: Miranda Wadsworth  YOB: 1975  MRN: [id number]    Date of Procedure: 1/25/2022    Pre-Op Diagnosis: SIGMOID STRICTURE    Post-Op Diagnosis: Same       Procedure(s):  ANAL PROCTO SIGMOIDOSCOPY FLEXIBLE    Surgeon(s):  Wanda Ha MD    Assistant:   Surgical Assistant: Jae Amaya RN    Anesthesia: Monitor Anesthesia Care    Estimated Blood Loss (mL): Minimal    Complications: None    Specimens:   * No specimens in log *    Implants:  * No implants in log *      Drains:   Colostomy RUQ Loop (Active)       Findings: under lmac scope placed into rectum and up to 15 cm where a complete closure of rectocolon junction without any opening was seen.  Scope was then removed    Detailed Description of Procedure:   Severe stricture    Electronically signed by Wanda Ha MD on 1/25/2022 at 8:09 AM

## 2022-01-31 ENCOUNTER — TELEPHONE (OUTPATIENT)
Dept: SURGERY | Age: 47
End: 2022-01-31

## 2022-01-31 ENCOUNTER — VIRTUAL VISIT (OUTPATIENT)
Dept: SURGERY | Age: 47
End: 2022-01-31

## 2022-01-31 DIAGNOSIS — K56.699 SIGMOID STRICTURE (HCC): Primary | ICD-10-CM

## 2022-01-31 NOTE — PROGRESS NOTES
General Surgery History and Physical    Patient's Name/Date of Birth: Cee Bar / 1975    Date: January 31, 2022     Surgeon: Awa Medrano M.D.    PCP: Ophelia Downs PA-C     Chief Complaint: sigmoid stricture    HPI:   Cee Bar is a 51 y.o. female who presents for evaluation of sigmoid stricture from previous cancer surgery done in past by me. Past Medical History:   Diagnosis Date    Acid reflux disease     Cyst of ovary     Fracture of nasal bone     Headache     Hearing loss     Hypertension     Migraine     Morbidly obese ([address] 10/05/2020    Multiple sclerosis ([address]     denies limitations at present 11/2020    VEENA no CPAP     severe VEENA per pt    Right shoulder pain 11/2020    Tinnitus     TMJ dysfunction        Past Surgical History:   Procedure Laterality Date    APPENDECTOMY      BACK SURGERY      lumbar    BICEPS TENDON REPAIR Right 11/11/2020    BICEPS TENODESIS performed by Keith Mayfield MD at [place] [address] Left 8/31/2021    LAPAROSCOPIC LEFT HEMICOLECTOMY WITH LOOP OSTOMY performed by David Claros MD at [facility] 9/6/2021    DIAGNOSTIC LAPAROSCOPY POSSIBLE BOWEL RESECTION POSSILBE OSTOMY performed by David Claros MD at [facility]  03/05/2018    Robotic hysterectomy, bilateral salpingectomy, right oophorectomy DR. ARIANA MONIQUE [place]     HYSTERECTOMY, TOTAL ABDOMINAL      LARYNGOSCOPY N/A 7/17/2020    DIRECT LARYNGOSCOPY--OMNI GUIDE LASER performed by Evette Mederos MD at [address]      PROCTOSIGMOIDOSCOPY N/A 10/12/2021    ANAL PROCTO SIGMOIDOSCOPY FLEXIBLE performed by David Claros MD at [address] [place] [address] N/A 1/25/2022    ANAL PROCTO SIGMOIDOSCOPY FLEXIBLE performed by David Claros MD at [facility] ARTHROSCOPY Right 11/11/2020    RIGHT SHOULDER DIAGNOSTIC ARTHROSCOPY WITH DECOMPRESSION ROTATOR CUFF REPAIR [facility] [place]         Current Outpatient Medications   Medication Sig Dispense Refill    dicyclomine (BENTYL) 10 MG capsule Take 10 mg by mouth 4 times daily (before meals and nightly)      vitamin D (D3-50) 91248 UNIT CAPS Take 1 capsule by mouth once a week 4 capsule 5    nortriptyline (PAMELOR) 10 MG capsule TAKE 1 CAPSULE BY MOUTH NIGHTLY FOR HEADACHES 30 capsule 3    gabapentin (NEURONTIN) 300 MG capsule TAKE 1 CAPSULE BY MOUTH THREE TIMES DAILY 90 capsule 11    dexlansoprazole (DEXILANT) 60 MG CPDR delayed release capsule Take 60 mg by mouth daily      baclofen (LIORESAL) 20 MG tablet Take 1 tablet by mouth 4 times daily as needed (muscle spasms; pain) 360 tablet 3    rOPINIRole (REQUIP) 0.5 MG tablet Take 1 tablet by mouth 2 times daily as needed (restless legs) 60 tablet 11    ocrelizumab (OCREVUS) 300 MG/10ML SOLN injection Infuse 20 mLs intravenously every 6 months 20 mL 1     No current facility-administered medications for this visit. No Known Allergies    The patient has a family history that is negative for severe cardiovascular or respiratory issues, negative for reaction to anesthesia. Social History     Socioeconomic History    Marital status: Single     Spouse name: Not on file    Number of children: 3    Years of education: 6    Highest education level: 11th grade   Occupational History    Not on file   Tobacco Use    Smoking status: Current Every Day Smoker     Packs/day: 0.50     Years: 25.00     Pack years: 12.50     Types: Cigarettes    Smokeless tobacco: Never Used    Tobacco comment: Ready to stop, requesting prescription for Chantix   Vaping Use    Vaping Use: Never used   Substance and Sexual Activity    Alcohol use:  Yes     Alcohol/week: 1.0 standard drink     Types: 1 Glasses of wine per week     Comment: socially    Drug use: No    Sexual activity: Yes     Partners: Male Other Topics Concern    Not on file   Social History Narrative    Uses [employer] for transportation    [employer]     Social Determinants of Health     Financial Resource Strain: Medium Risk    Difficulty of Paying Living Expenses: Somewhat hard   Food Insecurity: Food Insecurity Present    Worried About Running Out of Food in the Last Year: Sometimes true    [place] of Food in the Last Year: Never true   Transportation Needs: No Transportation Needs    Lack of Transportation (Medical): No    Lack of Transportation (Non-Medical): No   Physical Activity: Sufficiently Active    Days of Exercise per Week: 3 days    Minutes of Exercise per Session: 60 min   Stress: No Stress Concern Present    Feeling of Stress : Not at all   Social Connections: Socially Isolated    Frequency of Communication with Friends and Family:  Three times a week    Frequency of Social Gatherings with Friends and Family: Twice a week    Attends Buddhism Services: Never    Active Member of Clubs or Organizations: No    Attends Club or Organization Meetings: Never    Marital Status: Never    Intimate Partner Violence:     Fear of Current or Ex-Partner: Not on file    Emotionally Abused: Not on file    Physically Abused: Not on file    Sexually Abused: Not on file   Housing Stability:     Unable to Pay for Housing in the Last Year: Not on file    Number of [place] in the Last Year: Not on file    Unstable Housing in the Last Year: Not on file           Review of Systems  Review of Systems -  General ROS: negative for - chills, fatigue or malaise  ENT ROS: negative for - hearing change, nasal congestion or nasal discharge  Allergy and Immunology ROS: negative for - hives, itchy/watery eyes or nasal congestion  Hematological and Lymphatic ROS: negative for - blood clots, blood transfusions, bruising or fatigue  Endocrine ROS: negative for - malaise/lethargy, mood swings, palpitations or polydipsia/polyuria  Respiratory ROS: negative for - sputum changes, stridor, tachypnea or wheezing  Cardiovascular ROS: negative for - irregular heartbeat, loss of consciousness, murmur or orthopnea  Gastrointestinal ROS: negative for - constipation, diarrhea, gas/bloating, heartburn or hematemesis  Genito-Urinary ROS: negative for -  genital discharge, genital ulcers or hematuria  Musculoskeletal ROS: negative for - gait disturbance, muscle pain or muscular weakness    Physical exam:   [facility] 01/05/2018   General appearance:  NAD  Head: NCAT, PERRLA, EOMI, red conjunctiva  Neck: supple, no masses  Lungs: CTAB, equal chest rise bilateral  Heart: Reg rate  Abdomen: soft, nontender, nondistended, ileostomy in place  Skin; no lesions  Gu: no cva tenderness  Extremities: extremities normal, atraumatic, no cyanosis or edema    Assessment:  51 y.o. female with sigmoid stricture    Plan: Will do lap possible open resection possible ostomy  Discussed the risk, benefits and alternatives of surgery including wound infections, bleeding, scar and hernia formation and the risks of general anesthetic including MI, CVA, sudden death or reactions to anesthetic medications. The patient understands the risks and alternatives and the possibility of converting to an open procedure. All questions were answered to the patient's satisfaction and they freely signed the consent.       Anish Ritter MD  2:44 PM  1/31/2022

## 2022-01-31 NOTE — TELEPHONE ENCOUNTER
Prior Authorization Form:      DEMOGRAPHICS:                     Patient Name:  Awa Minaya  Patient :  1975            Insurance:  Payor: [id number]  [address] / Plan: [facility] [address] / Product Type: *No Product type* /   Insurance ID Number:    Payor/Plan Subscr  Sex Relation Sub.  Ins. ID Effective Group Num   1. JAROD VELÁZQUEZ* PAVEL MARISCAL BENJAMIN 1975 Female Self [id number]                                    [address]         DIAGNOSIS & PROCEDURE:                       Procedure/Operation: lap poss open sigmoid colon resection poss ostomy          CPT Code: 36069    Diagnosis:  Sigmoid stricture    ICD10 Code: R33.999    Location:  [address] [place] [address]    Surgeon:  Gray Anderson INFORMATION:                          Date: 2022    Time:               Anesthesia:                                                         Status:  Inpatient        Special Comments:         Electronically signed by Uriel Hobbs on 2022 at 4:08 PM

## 2022-01-31 NOTE — TELEPHONE ENCOUNTER
Patient provided with surgery instructions during via phone and email. Patient scheduled for follow up visit with Dr. Brandt Allred in [facility] on 02/24/2022. Surgery scheduling form faxed and confirmation received.     Electronically signed by Dwayne Bowling on 1/31/2022 at 4:09 PM

## 2022-02-01 ENCOUNTER — PREP FOR PROCEDURE (OUTPATIENT)
Dept: SURGERY | Age: 47
End: 2022-02-01

## 2022-02-01 RX ORDER — SODIUM CHLORIDE, SODIUM LACTATE, POTASSIUM CHLORIDE, CALCIUM CHLORIDE 600; 310; 30; 20 MG/100ML; MG/100ML; MG/100ML; MG/100ML
INJECTION, SOLUTION INTRAVENOUS CONTINUOUS
Status: CANCELLED | OUTPATIENT
Start: 2022-02-01

## 2022-02-01 RX ORDER — SODIUM CHLORIDE 0.9 % (FLUSH) 0.9 %
10 SYRINGE (ML) INJECTION PRN
Status: CANCELLED | OUTPATIENT
Start: 2022-02-01

## 2022-02-01 RX ORDER — SODIUM CHLORIDE 0.9 % (FLUSH) 0.9 %
10 SYRINGE (ML) INJECTION EVERY 12 HOURS SCHEDULED
Status: CANCELLED | OUTPATIENT
Start: 2022-02-01

## 2022-02-01 RX ORDER — SODIUM CHLORIDE 9 MG/ML
25 INJECTION, SOLUTION INTRAVENOUS PRN
Status: CANCELLED | OUTPATIENT
Start: 2022-02-01

## 2022-02-02 ENCOUNTER — HOSPITAL ENCOUNTER (OUTPATIENT)
Age: 47
Discharge: HOME | End: 2022-02-04

## 2022-02-02 ENCOUNTER — HOSPITAL ENCOUNTER (OUTPATIENT)
Dept: PREADMISSION TESTING | Age: 47
Discharge: HOME | End: 2022-02-02
Payer: COMMERCIAL

## 2022-02-02 VITALS
BODY MASS INDEX: 34.41 KG/M2 | TEMPERATURE: 97 F | WEIGHT: 187 LBS | DIASTOLIC BLOOD PRESSURE: 72 MMHG | SYSTOLIC BLOOD PRESSURE: 121 MMHG | RESPIRATION RATE: 16 BRPM | OXYGEN SATURATION: 96 % | HEIGHT: 62 IN | HEART RATE: 83 BPM

## 2022-02-02 DIAGNOSIS — K56.699 SIGMOID STRICTURE (HCC): Primary | ICD-10-CM

## 2022-02-02 LAB
ANION GAP SERPL CALCULATED.3IONS-SCNC: 9 MMOL/L (ref 7–16)
BASOPHILS ABSOLUTE: 0.04 E9/L (ref 0–0.2)
BASOPHILS RELATIVE PERCENT: 0.4 % (ref 0–2)
BUN BLDV-MCNC: 8 MG/DL (ref 6–20)
CALCIUM SERPL-MCNC: 8.9 MG/DL (ref 8.6–10.2)
CHLORIDE BLD-SCNC: 105 MMOL/L (ref 98–107)
CO2: 26 MMOL/L (ref 22–29)
CREAT SERPL-MCNC: 0.6 MG/DL (ref 0.5–1)
EOSINOPHILS ABSOLUTE: 0.2 E9/L (ref 0.05–0.5)
EOSINOPHILS RELATIVE PERCENT: 2.2 % (ref 0–6)
GFR AFRICAN AMERICAN: >60
GFR NON-AFRICAN AMERICAN: >60 ML/MIN/1.73
GLUCOSE BLD-MCNC: 113 MG/DL (ref 74–99)
HCT VFR BLD CALC: 44.6 % (ref 34–48)
HEMOGLOBIN: 14.7 G/DL (ref 11.5–15.5)
IMMATURE GRANULOCYTES #: 0.07 E9/L
IMMATURE GRANULOCYTES %: 0.8 % (ref 0–5)
LYMPHOCYTES ABSOLUTE: 2.13 E9/L (ref 1.5–4)
LYMPHOCYTES RELATIVE PERCENT: 23.2 % (ref 20–42)
MCH RBC QN AUTO: 31 PG (ref 26–35)
MCHC RBC AUTO-ENTMCNC: 33 % (ref 32–34.5)
MCV RBC AUTO: 94.1 FL (ref 80–99.9)
MONOCYTES ABSOLUTE: 0.62 E9/L (ref 0.1–0.95)
MONOCYTES RELATIVE PERCENT: 6.7 % (ref 2–12)
NEUTROPHILS ABSOLUTE: 6.14 E9/L (ref 1.8–7.3)
NEUTROPHILS RELATIVE PERCENT: 66.7 % (ref 43–80)
PDW BLD-RTO: 12.5 FL (ref 11.5–15)
PLATELET # BLD: 252 E9/L (ref 130–450)
PMV BLD AUTO: 9.8 FL (ref 7–12)
POTASSIUM REFLEX MAGNESIUM: 4.2 MMOL/L (ref 3.5–5)
RBC # BLD: 4.74 E12/L (ref 3.5–5.5)
SODIUM BLD-SCNC: 140 MMOL/L (ref 132–146)
WBC # BLD: 9.2 E9/L (ref 4.5–11.5)

## 2022-02-02 PROCEDURE — 80048 BASIC METABOLIC PNL TOTAL CA: CPT

## 2022-02-02 PROCEDURE — 36415 COLL VENOUS BLD VENIPUNCTURE: CPT

## 2022-02-02 PROCEDURE — 85025 COMPLETE CBC W/AUTO DIFF WBC: CPT

## 2022-02-02 PROCEDURE — U0003 INFECTIOUS AGENT DETECTION BY NUCLEIC ACID (DNA OR RNA); SEVERE ACUTE RESPIRATORY SYNDROME CORONAVIRUS 2 (SARS-COV-2) (CORONAVIRUS DISEASE [COVID-19]), AMPLIFIED PROBE TECHNIQUE, MAKING USE OF HIGH THROUGHPUT TECHNOLOGIES AS DESCRIBED BY CMS-2020-01-R: HCPCS

## 2022-02-02 PROCEDURE — 87081 CULTURE SCREEN ONLY: CPT

## 2022-02-02 PROCEDURE — U0005 INFEC AGEN DETEC AMPLI PROBE: HCPCS

## 2022-02-02 ASSESSMENT — PAIN DESCRIPTION - DESCRIPTORS: DESCRIPTORS: BURNING

## 2022-02-02 ASSESSMENT — PAIN DESCRIPTION - ONSET: ONSET: ON-GOING

## 2022-02-02 ASSESSMENT — PAIN DESCRIPTION - LOCATION: LOCATION: ABDOMEN

## 2022-02-02 ASSESSMENT — PAIN DESCRIPTION - PAIN TYPE: TYPE: CHRONIC PAIN

## 2022-02-02 ASSESSMENT — PAIN SCALES - GENERAL: PAINLEVEL_OUTOF10: 8

## 2022-02-02 ASSESSMENT — PAIN DESCRIPTION - ORIENTATION: ORIENTATION: MID

## 2022-02-02 NOTE — PROGRESS NOTES
[address]                                                                                                                    PRE OP INSTRUCTIONS FOR  Gerry Cartagena        Date: 2/2/2022    Date of surgery: 2/8/22   Arrival Time: Hospital will call you between 5pm and 7pm with your final arrival time for surgery    1. Do not eat or drink anything after midnight prior to surgery. This includes no water, chewing gum, mints or ice chips. 2. Take the following medications with a small sip of water on the morning of Surgery: none     3. Diabetics may take evening dose of insulin but none after midnight. If you feel symptomatic or low blood sugar morning of surgery drink 1-2 ounces of apple juice only. 4. Aspirin, Ibuprofen, Advil, Naproxen, Vitamin E and other Anti-inflammatory products should be stopped  before surgery  as directed by your physician. Take Tylenol only unless instructed otherwise by your surgeon. 5. Check with your Doctor regarding stopping Plavix, Coumadin, Lovenox, Eliquis, Effient, or other blood thinners. 6. Do not smoke,use illicit drugs and do not drink any alcoholic beverages 24 hours prior to surgery. 7. You may brush your teeth the morning of surgery. DO NOT SWALLOW WATER    8. You MUST make arrangements for a responsible adult to take you home after your surgery. You will not be allowed to leave alone or drive yourself home. It is strongly suggested someone stay with you the first 24 hrs. Your surgery will be cancelled if you do not have a ride home. 9. Please wear simple, loose fitting clothing to the hospital.  Larrie Matters not bring valuables (money, credit cards, checkbooks, etc.) Do not wear any makeup (including no eye makeup) or nail polish on your fingers or toes. 10. DO NOT wear any jewelry or piercings on day of surgery. All body piercing jewelry must be removed. 11. Shower the night before surgery with ___Antibacterial soap     12.  If you have a Living Will and Durable Power of  for Healthcare, please bring in a copy. 15. Notify your Surgeon if you develop any illness between now and surgery time, cough, cold, fever, sore throat, nausea, vomiting, etc.  Please notify your surgeon if you experience dizziness, shortness of breath or blurred vision between now & the time of your surgery. 14. If you have ___dentures, they will be removed before going to the OR; we will provide you a container. If you wear ___contact lenses or ___glasses, they will be removed; please bring a case for them. 15. To provide excellent care visitors will be limited to 1 in the room at any given time. 16. Sleep apnea patients need to bring CPAP AND SETTINGS to hospital on day of surgery. 16. During flu season no children under the age of 15 are permitted in the hospital for the safety of all patients. 18. Other                  Please call AMBULATORY CARE if you have any further questions.    [address] [facility]     [address]

## 2022-02-03 LAB — SARS-COV-2, PCR: NOT DETECTED

## 2022-02-04 LAB — MRSA CULTURE ONLY: NORMAL

## 2022-02-07 ENCOUNTER — TELEMEDICINE (OUTPATIENT)
Dept: PRIMARY CARE CLINIC | Age: 47
End: 2022-02-07
Payer: COMMERCIAL

## 2022-02-07 ENCOUNTER — ANESTHESIA EVENT (OUTPATIENT)
Dept: OPERATING ROOM | Age: 47
DRG: 231 | End: 2022-02-07
Payer: COMMERCIAL

## 2022-02-07 DIAGNOSIS — Z90.49 S/P COLECTOMY: ICD-10-CM

## 2022-02-07 DIAGNOSIS — K22.719 BARRETT'S ESOPHAGUS WITH DYSPLASIA: ICD-10-CM

## 2022-02-07 DIAGNOSIS — G35 MULTIPLE SCLEROSIS (HCC): Primary | ICD-10-CM

## 2022-02-07 DIAGNOSIS — C18.6 MALIGNANT NEOPLASM OF DESCENDING COLON (HCC): ICD-10-CM

## 2022-02-07 PROBLEM — J06.9 ACUTE RESPIRATORY DISEASE DUE TO COVID-19 VIRUS: Status: RESOLVED | Noted: 2021-10-30 | Resolved: 2022-02-07

## 2022-02-07 PROBLEM — U07.1 ACUTE RESPIRATORY DISEASE DUE TO COVID-19 VIRUS: Status: RESOLVED | Noted: 2021-10-30 | Resolved: 2022-02-07

## 2022-02-07 PROCEDURE — 99214 OFFICE O/P EST MOD 30 MIN: CPT | Performed by: PHYSICIAN ASSISTANT

## 2022-02-07 PROCEDURE — 4004F PT TOBACCO SCREEN RCVD TLK: CPT | Performed by: PHYSICIAN ASSISTANT

## 2022-02-07 PROCEDURE — G8484 FLU IMMUNIZE NO ADMIN: HCPCS | Performed by: PHYSICIAN ASSISTANT

## 2022-02-07 PROCEDURE — G8417 CALC BMI ABV UP PARAM F/U: HCPCS | Performed by: PHYSICIAN ASSISTANT

## 2022-02-07 PROCEDURE — G8427 DOCREV CUR MEDS BY ELIG CLIN: HCPCS | Performed by: PHYSICIAN ASSISTANT

## 2022-02-07 NOTE — PROGRESS NOTES
TeleMedicine Patient Consent    This visit was performed as a virtual video visit using a synchronous, two-way, audio-video telehealth technology platform. Patient identification was verified at the start of the visit, including the patient's telephone number and physical location. I discussed with the patient the nature of our telehealth visits, that:     1. Due to the nature of an audio- video modality, the only components of a physical exam that could be done are the elements supported by direct observation. 2. I would evaluate the patient and recommend diagnostics and treatments based on my assessment. 3. If it was felt that the patient should be evaluated in clinic or an emergency room setting, then they would be directed there. 4. Our sessions are not being recorded and that personal health information is protected. 5. Our team would provide follow up care in person if/when the patient needs it. Patient does agree to proceed with telemedicine consultation. Patient's location: home [facility] in [facility]. Is there anyone else present for this visit: No  This visit was completed virtually using my chart    Physician Location:    [facility] [address]    Time spent: Greater than Not billed by time    2022    TELEHEALTH EVALUATION -- Audio or Visual (During [id number] public health emergency)    HPI:    Prisca Dale (:  1975) has requested an audio/video evaluation for the following concern(s): discussion on recent medical changes. Since I last saw her, she was diagnosed with harjinder's esophagus, and had dilation. She is doing much better from that standpoint. During this evaluation, she had a Colonoscopy and biopsy that showed malignant neoplasm in the colon. Dr Melissa Cueto did the resection and she continues to have colostomy. She is having surgery tomorrow for sigmoid stricture. She continues her Ocrevus, MS is stable. We reviewed meds. We reviewed recent labs.  She has no delayed release capsule Take 60 mg by mouth daily Yes Historical Provider, MD   baclofen (LIORESAL) 20 MG tablet Take 1 tablet by mouth 4 times daily as needed (muscle spasms; pain) Yes MARIXA Dominguez CNP   rOPINIRole (REQUIP) 0.5 MG tablet Take 1 tablet by mouth 2 times daily as needed (restless legs) Yes MARIXA Dominguez CNP   ocrelizumab (OCREVUS) 300 MG/10ML SOLN injection Infuse 20 mLs intravenously every 6 months Yes MARIXA Dominguez CNP   gabapentin (NEURONTIN) 300 MG capsule TAKE 1 CAPSULE BY MOUTH THREE TIMES DAILY  MARIXA Dominguez CNP       Social History     Tobacco Use    Smoking status: Current Every Day Smoker     Packs/day: 0.50     Years: 25.00     Pack years: 12.50     Types: Cigarettes    Smokeless tobacco: Never Used    Tobacco comment: Ready to stop, requesting prescription for Chantix   Vaping Use    Vaping Use: Never used   Substance Use Topics    Alcohol use:  Yes     Alcohol/week: 1.0 standard drink     Types: 1 Glasses of wine per week     Comment: socially    Drug use: No        No Known Allergies,   Past Medical History:   Diagnosis Date    Acid reflux disease     Cyst of ovary     Fracture of nasal bone     Headache     Hearing loss     Hypertension     Migraine     Morbidly obese ([address] 10/05/2020    Multiple sclerosis ([address]     denies limitations at present 11/2020    VEENA no CPAP     severe VEENA per pt    Right shoulder pain 11/2020    Tinnitus     TMJ dysfunction    ,   Past Surgical History:   Procedure Laterality Date    APPENDECTOMY      BACK SURGERY      lumbar    BICEPS TENDON REPAIR Right 11/11/2020    BICEPS TENODESIS performed by Lam Mayberry MD at [place] [address] Left 8/31/2021    LAPAROSCOPIC LEFT HEMICOLECTOMY WITH LOOP OSTOMY performed by Lelia Puente MD at [address] [place] [address],[address] N/A 9/6/2021    DIAGNOSTIC LAPAROSCOPY POSSIBLE BOWEL RESECTION POSSILBE OSTOMY performed by Lelia Puente MD at [facility]    CYST REMOVAL      ESOPHAGUS SURGERY      HYSTERECTOMY  03/05/2018    Robotic hysterectomy, bilateral salpingectomy, right oophorectomy DR. ARIANA MONIQUE [facility]     HYSTERECTOMY, TOTAL ABDOMINAL      LARYNGOSCOPY N/A 7/17/2020    DIRECT LARYNGOSCOPY--OMNI GUIDE LASER performed by Carson Boateng MD at [facility] PARTIAL HYSTERECTOMY      PROCTOSIGMOIDOSCOPY N/A 10/12/2021    ANAL PROCTO SIGMOIDOSCOPY FLEXIBLE performed by Jasmin Castle MD at [address] [place] [address] [place] N/A 1/25/2022    ANAL PROCTO SIGMOIDOSCOPY FLEXIBLE performed by Jasmin Castle MD at [facility] ARTHROSCOPY Right 11/11/2020    RIGHT SHOULDER DIAGNOSTIC ARTHROSCOPY WITH DECOMPRESSION ROTATOR CUFF REPAIR performed by Ridge Wells MD at [address] [place] [address]     ,   Social History     Tobacco Use    Smoking status: Current Every Day Smoker     Packs/day: 0.50     Years: 25.00     Pack years: 12.50     Types: Cigarettes    Smokeless tobacco: Never Used    Tobacco comment: Ready to stop, requesting prescription for Chantix   Vaping Use    Vaping Use: Never used   Substance Use Topics    Alcohol use:  Yes     Alcohol/week: 1.0 standard drink     Types: 1 Glasses of wine per week     Comment: socially    Drug use: No   ,   Family History   Problem Relation Age of Onset    Mult Sclerosis Mother     Colon Cancer Neg Hx     Uterine Cancer Neg Hx     Ovarian Cancer Neg Hx     Breast Cancer Neg Hx    ,   Immunization History   Administered Date(s) Administered    Influenza Virus Vaccine 03/09/2020   ,   Health Maintenance   Topic Date Due    COVID-19 Vaccine (1) Never done    Pneumococcal 0-64 years Vaccine (1 of 2 - PPSV23) Never done    DTaP/Tdap/Td vaccine (1 - Tdap) Never done    Flu vaccine (1) 09/01/2021    Depression Screen  05/11/2022    Diabetes screen  05/11/2024    Lipid screen  05/11/2026    Colon cancer screen colonoscopy  06/22/2031    Hepatitis C screen  Completed  HIV screen  Completed    Hepatitis A vaccine  Aged Out    Hepatitis B vaccine  Aged Out    Hib vaccine  Aged Out    Meningococcal (ACWY) vaccine  Aged Out       PHYSICAL EXAMINATION:  [ INSTRUCTIONS:  \"[x]\" Indicates a positive item  \"[]\" Indicates a negative item  -- DELETE ALL ITEMS NOT EXAMINED]  Vital Signs: (As obtained by patient/caregiver or practitioner observation)    Blood pressure-  Heart rate-    Respiratory rate-    Temperature-  Pulse oximetry-     Constitutional: [x] Appears well-developed and well-nourished [] No apparent distress      [] Abnormal-   Mental status  [x] Alert and awake  [x] Oriented to person/place/time [x]Able to follow commands      Eyes:  EOM    [x]  Normal  [] Abnormal-  Sclera  [x]  Normal  [] Abnormal -         Discharge [x]  None visible  [] Abnormal -    HENT:   [x] Normocephalic, atraumatic. [] Abnormal   [x] Mouth/Throat: Mucous membranes are moist.     External Ears [x] Normal  [] Abnormal-     Neck: [x] No visualized mass     Pulmonary/Chest: [x] Respiratory effort normal.  [x] No visualized signs of difficulty breathing or respiratory distress        [] Abnormal-      Musculoskeletal:   [] Normal gait with no signs of ataxia         [x] Normal range of motion of neck        [] Abnormal-       Neurological:        [x] No Facial Asymmetry (Cranial nerve 7 motor function) (limited exam to video visit)          [] No gaze palsy        [] Abnormal-         Skin:        [x] No significant exanthematous lesions or discoloration noted on facial skin         [] Abnormal-            Psychiatric:       [x] Normal Affect [x] No Hallucinations        [] Abnormal-       Other pertinent observable physical exam findings-     Due to this being a TeleHealth encounter, evaluation of the following organ systems is limited: Vitals/Constitutional/EENT/Resp/CV/GI//MS/Neuro/Skin/Heme-Lymph-Imm. ASSESSMENT/PLAN:    [employer] was seen today for multiple sclerosis.     Diagnoses and all orders for this visit:    Multiple sclerosis ([facility]  -stable per Neurology    Malignant neoplasm of descending colon ([facility]    S/P colectomy  - lap surgery tomorrow for stricture, notes reviewed    Palafox's esophagus with dysplasia  -sx improved      Return in about 3 months (around 5/7/2022). An  electronic signature was used to authenticate this note.    --Susy Steen PA-C on 2/7/2022 at 12:47 }    Pursuant to the emergency declaration under the [facility] waiver authority and the [employer] and [employer] Act, this Virtual  Visit was conducted, with patient's consent, to reduce the patient's risk of exposure to COVID-19 and provide continuity of care for an established patient. Services were provided through a video synchronous discussion virtually to substitute for in-person clinic visit.

## 2022-02-08 ENCOUNTER — ANESTHESIA (OUTPATIENT)
Dept: OPERATING ROOM | Age: 47
DRG: 231 | End: 2022-02-08
Payer: COMMERCIAL

## 2022-02-08 ENCOUNTER — HOSPITAL ENCOUNTER (INPATIENT)
Age: 47
LOS: 16 days | Discharge: HOME HEALTH | DRG: 231 | End: 2022-02-24
Attending: SURGERY | Admitting: SURGERY
Payer: COMMERCIAL

## 2022-02-08 VITALS
RESPIRATION RATE: 14 BRPM | DIASTOLIC BLOOD PRESSURE: 65 MMHG | SYSTOLIC BLOOD PRESSURE: 116 MMHG | OXYGEN SATURATION: 97 %

## 2022-02-08 DIAGNOSIS — Z90.49 S/P COLECTOMY: ICD-10-CM

## 2022-02-08 DIAGNOSIS — K56.699 SIGMOID STRICTURE (HCC): ICD-10-CM

## 2022-02-08 DIAGNOSIS — Z01.818 PREOP TESTING: Primary | ICD-10-CM

## 2022-02-08 DIAGNOSIS — N73.9 PELVIC ABSCESS IN FEMALE: ICD-10-CM

## 2022-02-08 PROCEDURE — 6360000002 HC RX W HCPCS

## 2022-02-08 PROCEDURE — 2709999900 HC NON-CHARGEABLE SUPPLY: Performed by: SURGERY

## 2022-02-08 PROCEDURE — 2500000003 HC RX 250 WO HCPCS

## 2022-02-08 PROCEDURE — 2580000003 HC RX 258: Performed by: SURGERY

## 2022-02-08 PROCEDURE — 0DTN4ZZ RESECTION OF SIGMOID COLON, PERCUTANEOUS ENDOSCOPIC APPROACH: ICD-10-PCS | Performed by: SURGERY

## 2022-02-08 PROCEDURE — 3600000014 HC SURGERY LEVEL 4 ADDTL 15MIN: Performed by: SURGERY

## 2022-02-08 PROCEDURE — 6370000000 HC RX 637 (ALT 250 FOR IP): Performed by: INTERNAL MEDICINE

## 2022-02-08 PROCEDURE — 2500000003 HC RX 250 WO HCPCS: Performed by: SURGERY

## 2022-02-08 PROCEDURE — 2720000010 HC SURG SUPPLY STERILE: Performed by: SURGERY

## 2022-02-08 PROCEDURE — 44213 LAP MOBIL SPLENIC FL ADD-ON: CPT | Performed by: SURGERY

## 2022-02-08 PROCEDURE — 7100000000 HC PACU RECOVERY - FIRST 15 MIN: Performed by: SURGERY

## 2022-02-08 PROCEDURE — 3600000004 HC SURGERY LEVEL 4 BASE: Performed by: SURGERY

## 2022-02-08 PROCEDURE — 0DBP4ZZ EXCISION OF RECTUM, PERCUTANEOUS ENDOSCOPIC APPROACH: ICD-10-PCS | Performed by: SURGERY

## 2022-02-08 PROCEDURE — 44207 L COLECTOMY/COLOPROCTOSTOMY: CPT | Performed by: SURGERY

## 2022-02-08 PROCEDURE — 1200000000 HC SEMI PRIVATE

## 2022-02-08 PROCEDURE — 6370000000 HC RX 637 (ALT 250 FOR IP): Performed by: SURGERY

## 2022-02-08 PROCEDURE — 6360000002 HC RX W HCPCS: Performed by: ANESTHESIOLOGY

## 2022-02-08 PROCEDURE — 88307 TISSUE EXAM BY PATHOLOGIST: CPT

## 2022-02-08 PROCEDURE — 6360000002 HC RX W HCPCS: Performed by: SURGERY

## 2022-02-08 PROCEDURE — 7100000001 HC PACU RECOVERY - ADDTL 15 MIN: Performed by: SURGERY

## 2022-02-08 PROCEDURE — 3700000001 HC ADD 15 MINUTES (ANESTHESIA): Performed by: SURGERY

## 2022-02-08 PROCEDURE — 2700000000 HC OXYGEN THERAPY PER DAY

## 2022-02-08 PROCEDURE — 3700000000 HC ANESTHESIA ATTENDED CARE: Performed by: SURGERY

## 2022-02-08 RX ORDER — ONDANSETRON 4 MG/1
4 TABLET, ORALLY DISINTEGRATING ORAL EVERY 8 HOURS PRN
Status: DISCONTINUED | OUTPATIENT
Start: 2022-02-08 | End: 2022-02-24 | Stop reason: HOSPADM

## 2022-02-08 RX ORDER — ONDANSETRON 2 MG/ML
INJECTION INTRAMUSCULAR; INTRAVENOUS PRN
Status: DISCONTINUED | OUTPATIENT
Start: 2022-02-08 | End: 2022-02-08 | Stop reason: SDUPTHER

## 2022-02-08 RX ORDER — LIDOCAINE HYDROCHLORIDE 20 MG/ML
INJECTION, SOLUTION INTRAVENOUS PRN
Status: DISCONTINUED | OUTPATIENT
Start: 2022-02-08 | End: 2022-02-08 | Stop reason: SDUPTHER

## 2022-02-08 RX ORDER — PROPOFOL 10 MG/ML
INJECTION, EMULSION INTRAVENOUS PRN
Status: DISCONTINUED | OUTPATIENT
Start: 2022-02-08 | End: 2022-02-08 | Stop reason: SDUPTHER

## 2022-02-08 RX ORDER — SODIUM CHLORIDE 9 MG/ML
25 INJECTION, SOLUTION INTRAVENOUS PRN
Status: DISCONTINUED | OUTPATIENT
Start: 2022-02-08 | End: 2022-02-08 | Stop reason: HOSPADM

## 2022-02-08 RX ORDER — ONDANSETRON 2 MG/ML
4 INJECTION INTRAMUSCULAR; INTRAVENOUS EVERY 6 HOURS PRN
Status: DISCONTINUED | OUTPATIENT
Start: 2022-02-08 | End: 2022-02-24 | Stop reason: HOSPADM

## 2022-02-08 RX ORDER — SODIUM CHLORIDE 0.9 % (FLUSH) 0.9 %
10 SYRINGE (ML) INJECTION PRN
Status: DISCONTINUED | OUTPATIENT
Start: 2022-02-08 | End: 2022-02-08 | Stop reason: HOSPADM

## 2022-02-08 RX ORDER — DEXAMETHASONE SODIUM PHOSPHATE 10 MG/ML
INJECTION, SOLUTION INTRAMUSCULAR; INTRAVENOUS PRN
Status: DISCONTINUED | OUTPATIENT
Start: 2022-02-08 | End: 2022-02-08 | Stop reason: SDUPTHER

## 2022-02-08 RX ORDER — ALBUTEROL SULFATE 90 UG/1
2 AEROSOL, METERED RESPIRATORY (INHALATION) EVERY 4 HOURS PRN
Status: DISCONTINUED | OUTPATIENT
Start: 2022-02-08 | End: 2022-02-08

## 2022-02-08 RX ORDER — ROCURONIUM BROMIDE 10 MG/ML
INJECTION, SOLUTION INTRAVENOUS PRN
Status: DISCONTINUED | OUTPATIENT
Start: 2022-02-08 | End: 2022-02-08 | Stop reason: SDUPTHER

## 2022-02-08 RX ORDER — BUPIVACAINE HYDROCHLORIDE AND EPINEPHRINE 2.5; 5 MG/ML; UG/ML
INJECTION, SOLUTION EPIDURAL; INFILTRATION; INTRACAUDAL; PERINEURAL PRN
Status: DISCONTINUED | OUTPATIENT
Start: 2022-02-08 | End: 2022-02-08 | Stop reason: ALTCHOICE

## 2022-02-08 RX ORDER — TRAMADOL HYDROCHLORIDE 50 MG/1
50 TABLET ORAL EVERY 6 HOURS PRN
Status: DISCONTINUED | OUTPATIENT
Start: 2022-02-08 | End: 2022-02-24 | Stop reason: HOSPADM

## 2022-02-08 RX ORDER — SODIUM CHLORIDE 0.9 % (FLUSH) 0.9 %
5-40 SYRINGE (ML) INJECTION PRN
Status: DISCONTINUED | OUTPATIENT
Start: 2022-02-08 | End: 2022-02-24 | Stop reason: HOSPADM

## 2022-02-08 RX ORDER — NEOSTIGMINE METHYLSULFATE 1 MG/ML
INJECTION, SOLUTION INTRAVENOUS PRN
Status: DISCONTINUED | OUTPATIENT
Start: 2022-02-08 | End: 2022-02-08 | Stop reason: SDUPTHER

## 2022-02-08 RX ORDER — FENTANYL CITRATE 50 UG/ML
INJECTION, SOLUTION INTRAMUSCULAR; INTRAVENOUS PRN
Status: DISCONTINUED | OUTPATIENT
Start: 2022-02-08 | End: 2022-02-08 | Stop reason: SDUPTHER

## 2022-02-08 RX ORDER — MEPERIDINE HYDROCHLORIDE 25 MG/ML
INJECTION INTRAMUSCULAR; INTRAVENOUS; SUBCUTANEOUS
Status: COMPLETED
Start: 2022-02-08 | End: 2022-02-08

## 2022-02-08 RX ORDER — SODIUM CHLORIDE 9 MG/ML
25 INJECTION, SOLUTION INTRAVENOUS PRN
Status: DISCONTINUED | OUTPATIENT
Start: 2022-02-08 | End: 2022-02-24 | Stop reason: HOSPADM

## 2022-02-08 RX ORDER — MORPHINE SULFATE 4 MG/ML
4 INJECTION, SOLUTION INTRAMUSCULAR; INTRAVENOUS
Status: DISCONTINUED | OUTPATIENT
Start: 2022-02-08 | End: 2022-02-24

## 2022-02-08 RX ORDER — MORPHINE SULFATE 2 MG/ML
2 INJECTION, SOLUTION INTRAMUSCULAR; INTRAVENOUS
Status: DISCONTINUED | OUTPATIENT
Start: 2022-02-08 | End: 2022-02-24

## 2022-02-08 RX ORDER — PANTOPRAZOLE SODIUM 40 MG/1
40 TABLET, DELAYED RELEASE ORAL
Status: DISCONTINUED | OUTPATIENT
Start: 2022-02-09 | End: 2022-02-10

## 2022-02-08 RX ORDER — ALBUTEROL SULFATE 2.5 MG/3ML
2.5 SOLUTION RESPIRATORY (INHALATION) EVERY 4 HOURS PRN
Status: DISCONTINUED | OUTPATIENT
Start: 2022-02-08 | End: 2022-02-24 | Stop reason: HOSPADM

## 2022-02-08 RX ORDER — SODIUM CHLORIDE, SODIUM LACTATE, POTASSIUM CHLORIDE, CALCIUM CHLORIDE 600; 310; 30; 20 MG/100ML; MG/100ML; MG/100ML; MG/100ML
INJECTION, SOLUTION INTRAVENOUS CONTINUOUS
Status: DISCONTINUED | OUTPATIENT
Start: 2022-02-08 | End: 2022-02-09

## 2022-02-08 RX ORDER — NORTRIPTYLINE HYDROCHLORIDE 10 MG/1
10 CAPSULE ORAL NIGHTLY PRN
Status: DISCONTINUED | OUTPATIENT
Start: 2022-02-08 | End: 2022-02-24 | Stop reason: HOSPADM

## 2022-02-08 RX ORDER — MIDAZOLAM HYDROCHLORIDE 1 MG/ML
INJECTION INTRAMUSCULAR; INTRAVENOUS PRN
Status: DISCONTINUED | OUTPATIENT
Start: 2022-02-08 | End: 2022-02-08 | Stop reason: SDUPTHER

## 2022-02-08 RX ORDER — ROPINIROLE 0.5 MG/1
0.5 TABLET, FILM COATED ORAL 2 TIMES DAILY PRN
Status: DISCONTINUED | OUTPATIENT
Start: 2022-02-08 | End: 2022-02-24 | Stop reason: HOSPADM

## 2022-02-08 RX ORDER — TRAMADOL HYDROCHLORIDE 50 MG/1
25 TABLET ORAL EVERY 6 HOURS PRN
Status: DISCONTINUED | OUTPATIENT
Start: 2022-02-08 | End: 2022-02-24 | Stop reason: HOSPADM

## 2022-02-08 RX ORDER — CIPROFLOXACIN 2 MG/ML
400 INJECTION, SOLUTION INTRAVENOUS ONCE
Status: COMPLETED | OUTPATIENT
Start: 2022-02-08 | End: 2022-02-08

## 2022-02-08 RX ORDER — SODIUM CHLORIDE 0.9 % (FLUSH) 0.9 %
5-40 SYRINGE (ML) INJECTION EVERY 12 HOURS SCHEDULED
Status: DISCONTINUED | OUTPATIENT
Start: 2022-02-08 | End: 2022-02-24 | Stop reason: HOSPADM

## 2022-02-08 RX ORDER — GLYCOPYRROLATE 1 MG/5 ML
SYRINGE (ML) INTRAVENOUS PRN
Status: DISCONTINUED | OUTPATIENT
Start: 2022-02-08 | End: 2022-02-08 | Stop reason: SDUPTHER

## 2022-02-08 RX ORDER — MEPERIDINE HYDROCHLORIDE 25 MG/ML
12.5 INJECTION INTRAMUSCULAR; INTRAVENOUS; SUBCUTANEOUS EVERY 5 MIN PRN
Status: DISCONTINUED | OUTPATIENT
Start: 2022-02-08 | End: 2022-02-08 | Stop reason: HOSPADM

## 2022-02-08 RX ORDER — METHOCARBAMOL 500 MG/1
500 TABLET, FILM COATED ORAL 4 TIMES DAILY
Status: DISCONTINUED | OUTPATIENT
Start: 2022-02-08 | End: 2022-02-24 | Stop reason: HOSPADM

## 2022-02-08 RX ORDER — SODIUM CHLORIDE 9 MG/ML
INJECTION, SOLUTION INTRAVENOUS CONTINUOUS PRN
Status: DISCONTINUED | OUTPATIENT
Start: 2022-02-08 | End: 2022-02-08

## 2022-02-08 RX ORDER — SODIUM CHLORIDE 0.9 % (FLUSH) 0.9 %
10 SYRINGE (ML) INJECTION EVERY 12 HOURS SCHEDULED
Status: DISCONTINUED | OUTPATIENT
Start: 2022-02-08 | End: 2022-02-08 | Stop reason: HOSPADM

## 2022-02-08 RX ORDER — SODIUM CHLORIDE, SODIUM LACTATE, POTASSIUM CHLORIDE, CALCIUM CHLORIDE 600; 310; 30; 20 MG/100ML; MG/100ML; MG/100ML; MG/100ML
INJECTION, SOLUTION INTRAVENOUS CONTINUOUS
Status: DISCONTINUED | OUTPATIENT
Start: 2022-02-08 | End: 2022-02-08

## 2022-02-08 RX ADMIN — MEPERIDINE HYDROCHLORIDE 12.5 MG: 25 INJECTION, SOLUTION INTRAMUSCULAR; INTRAVENOUS; SUBCUTANEOUS at 13:08

## 2022-02-08 RX ADMIN — Medication 3 MG: at 12:21

## 2022-02-08 RX ADMIN — METHOCARBAMOL 500 MG: 500 TABLET ORAL at 20:18

## 2022-02-08 RX ADMIN — FENTANYL CITRATE 50 MCG: 50 INJECTION, SOLUTION INTRAMUSCULAR; INTRAVENOUS at 11:23

## 2022-02-08 RX ADMIN — ONDANSETRON 4 MG: 2 INJECTION INTRAMUSCULAR; INTRAVENOUS at 11:49

## 2022-02-08 RX ADMIN — HYDROMORPHONE HYDROCHLORIDE 0.5 MG: 1 INJECTION, SOLUTION INTRAMUSCULAR; INTRAVENOUS; SUBCUTANEOUS at 13:27

## 2022-02-08 RX ADMIN — PROPOFOL 150 MG: 10 INJECTION, EMULSION INTRAVENOUS at 10:47

## 2022-02-08 RX ADMIN — HYDROMORPHONE HYDROCHLORIDE 0.5 MG: 1 INJECTION, SOLUTION INTRAMUSCULAR; INTRAVENOUS; SUBCUTANEOUS at 13:04

## 2022-02-08 RX ADMIN — MORPHINE SULFATE 4 MG: 4 INJECTION, SOLUTION INTRAMUSCULAR; INTRAVENOUS at 23:31

## 2022-02-08 RX ADMIN — DEXAMETHASONE SODIUM PHOSPHATE 10 MG: 10 INJECTION, SOLUTION INTRAMUSCULAR; INTRAVENOUS at 10:54

## 2022-02-08 RX ADMIN — MIDAZOLAM 2 MG: 1 INJECTION INTRAMUSCULAR; INTRAVENOUS at 10:40

## 2022-02-08 RX ADMIN — CIPROFLOXACIN 400 MG: 2 INJECTION INTRAVENOUS at 10:42

## 2022-02-08 RX ADMIN — HYDROMORPHONE HYDROCHLORIDE 0.5 MG: 1 INJECTION, SOLUTION INTRAMUSCULAR; INTRAVENOUS; SUBCUTANEOUS at 12:52

## 2022-02-08 RX ADMIN — Medication 0.6 MG: at 12:21

## 2022-02-08 RX ADMIN — SODIUM CHLORIDE, POTASSIUM CHLORIDE, SODIUM LACTATE AND CALCIUM CHLORIDE: 600; 310; 30; 20 INJECTION, SOLUTION INTRAVENOUS at 15:03

## 2022-02-08 RX ADMIN — MEPERIDINE HYDROCHLORIDE 12.5 MG: 25 INJECTION, SOLUTION INTRAMUSCULAR; INTRAVENOUS; SUBCUTANEOUS at 13:13

## 2022-02-08 RX ADMIN — ROCURONIUM BROMIDE 10 MG: 10 INJECTION INTRAVENOUS at 10:54

## 2022-02-08 RX ADMIN — ROCURONIUM BROMIDE 10 MG: 10 INJECTION INTRAVENOUS at 11:32

## 2022-02-08 RX ADMIN — ROCURONIUM BROMIDE 30 MG: 10 INJECTION INTRAVENOUS at 10:48

## 2022-02-08 RX ADMIN — LIDOCAINE HYDROCHLORIDE 60 MG: 20 INJECTION, SOLUTION INTRAVENOUS at 10:46

## 2022-02-08 RX ADMIN — FENTANYL CITRATE 50 MCG: 50 INJECTION, SOLUTION INTRAMUSCULAR; INTRAVENOUS at 11:05

## 2022-02-08 RX ADMIN — MORPHINE SULFATE 4 MG: 4 INJECTION, SOLUTION INTRAMUSCULAR; INTRAVENOUS at 20:18

## 2022-02-08 RX ADMIN — METHOCARBAMOL 500 MG: 500 TABLET ORAL at 16:26

## 2022-02-08 RX ADMIN — MORPHINE SULFATE 4 MG: 4 INJECTION, SOLUTION INTRAMUSCULAR; INTRAVENOUS at 18:01

## 2022-02-08 RX ADMIN — TRAMADOL HYDROCHLORIDE 50 MG: 50 TABLET, COATED ORAL at 22:32

## 2022-02-08 RX ADMIN — ROPINIROLE HYDROCHLORIDE 0.5 MG: 0.5 TABLET, FILM COATED ORAL at 20:18

## 2022-02-08 RX ADMIN — ROCURONIUM BROMIDE 15 MG: 10 INJECTION INTRAVENOUS at 12:01

## 2022-02-08 RX ADMIN — METRONIDAZOLE 500 MG: 500 INJECTION, SOLUTION INTRAVENOUS at 10:54

## 2022-02-08 RX ADMIN — SODIUM CHLORIDE, POTASSIUM CHLORIDE, SODIUM LACTATE AND CALCIUM CHLORIDE: 600; 310; 30; 20 INJECTION, SOLUTION INTRAVENOUS at 08:51

## 2022-02-08 RX ADMIN — TRAMADOL HYDROCHLORIDE 50 MG: 50 TABLET, COATED ORAL at 16:26

## 2022-02-08 RX ADMIN — FENTANYL CITRATE 100 MCG: 50 INJECTION, SOLUTION INTRAMUSCULAR; INTRAVENOUS at 12:24

## 2022-02-08 RX ADMIN — MORPHINE SULFATE 4 MG: 4 INJECTION, SOLUTION INTRAMUSCULAR; INTRAVENOUS at 15:01

## 2022-02-08 RX ADMIN — SODIUM CHLORIDE, POTASSIUM CHLORIDE, SODIUM LACTATE AND CALCIUM CHLORIDE: 600; 310; 30; 20 INJECTION, SOLUTION INTRAVENOUS at 11:28

## 2022-02-08 RX ADMIN — FENTANYL CITRATE 100 MCG: 50 INJECTION, SOLUTION INTRAMUSCULAR; INTRAVENOUS at 10:46

## 2022-02-08 ASSESSMENT — PULMONARY FUNCTION TESTS
PIF_VALUE: 36
PIF_VALUE: 36
PIF_VALUE: 3
PIF_VALUE: 3
PIF_VALUE: 36
PIF_VALUE: 29
PIF_VALUE: 35
PIF_VALUE: 31
PIF_VALUE: 36
PIF_VALUE: 16
PIF_VALUE: 17
PIF_VALUE: 1
PIF_VALUE: 34
PIF_VALUE: 33
PIF_VALUE: 33
PIF_VALUE: 35
PIF_VALUE: 35
PIF_VALUE: 23
PIF_VALUE: 32
PIF_VALUE: 35
PIF_VALUE: 36
PIF_VALUE: 25
PIF_VALUE: 2
PIF_VALUE: 32
PIF_VALUE: 35
PIF_VALUE: 35
PIF_VALUE: 1
PIF_VALUE: 16
PIF_VALUE: 4
PIF_VALUE: 36
PIF_VALUE: 35
PIF_VALUE: 35
PIF_VALUE: 36
PIF_VALUE: 35
PIF_VALUE: 36
PIF_VALUE: 25
PIF_VALUE: 25
PIF_VALUE: 35
PIF_VALUE: 24
PIF_VALUE: 36
PIF_VALUE: 36
PIF_VALUE: 26
PIF_VALUE: 15
PIF_VALUE: 25
PIF_VALUE: 35
PIF_VALUE: 36
PIF_VALUE: 35
PIF_VALUE: 28
PIF_VALUE: 17
PIF_VALUE: 35
PIF_VALUE: 36
PIF_VALUE: 24
PIF_VALUE: 35
PIF_VALUE: 23
PIF_VALUE: 36
PIF_VALUE: 35
PIF_VALUE: 4
PIF_VALUE: 35
PIF_VALUE: 23
PIF_VALUE: 35
PIF_VALUE: 0
PIF_VALUE: 35
PIF_VALUE: 27
PIF_VALUE: 23
PIF_VALUE: 35
PIF_VALUE: 32
PIF_VALUE: 35
PIF_VALUE: 25
PIF_VALUE: 35
PIF_VALUE: 3
PIF_VALUE: 35
PIF_VALUE: 12
PIF_VALUE: 35
PIF_VALUE: 35
PIF_VALUE: 3
PIF_VALUE: 25
PIF_VALUE: 32
PIF_VALUE: 26
PIF_VALUE: 12
PIF_VALUE: 23
PIF_VALUE: 25
PIF_VALUE: 35
PIF_VALUE: 25
PIF_VALUE: 16
PIF_VALUE: 3
PIF_VALUE: 32
PIF_VALUE: 1

## 2022-02-08 ASSESSMENT — PAIN SCALES - GENERAL
PAINLEVEL_OUTOF10: 8
PAINLEVEL_OUTOF10: 5
PAINLEVEL_OUTOF10: 8
PAINLEVEL_OUTOF10: 7
PAINLEVEL_OUTOF10: 10
PAINLEVEL_OUTOF10: 7
PAINLEVEL_OUTOF10: 6
PAINLEVEL_OUTOF10: 9
PAINLEVEL_OUTOF10: 10
PAINLEVEL_OUTOF10: 0
PAINLEVEL_OUTOF10: 7
PAINLEVEL_OUTOF10: 7
PAINLEVEL_OUTOF10: 10

## 2022-02-08 ASSESSMENT — PAIN DESCRIPTION - LOCATION: LOCATION: ABDOMEN

## 2022-02-08 ASSESSMENT — LIFESTYLE VARIABLES: SMOKING_STATUS: 1

## 2022-02-08 ASSESSMENT — PAIN DESCRIPTION - PAIN TYPE: TYPE: SURGICAL PAIN

## 2022-02-08 ASSESSMENT — PAIN - FUNCTIONAL ASSESSMENT: PAIN_FUNCTIONAL_ASSESSMENT: 0-10

## 2022-02-08 NOTE — ANESTHESIA POSTPROCEDURE EVALUATION
Department of Anesthesiology  Postprocedure Note    Patient: Haley Kunz  MRN: [id number]  YOB: 1975  Date of evaluation: 2/8/2022  Time:  2:38 PM     Procedure Summary     Date: 02/08/22 Room / Location: [address] [place] [address] / [facility]    Anesthesia Start: 7780 Anesthesia Stop: 5307    Procedure: LAPAROSCOPIC SIGMOID COLON RESECTION (N/A Abdomen) Diagnosis: (SIGMOID STRICTURE)    Surgeons: Danielle Medina MD Responsible Provider: Valeria Quezada MD    Anesthesia Type: general ASA Status: 3          Anesthesia Type: general    Daniel Phase I: Daniel Score: 10    Daniel Phase II:      Last vitals: Reviewed and per EMR flowsheets.        Anesthesia Post Evaluation    Patient location during evaluation: PACU  Patient participation: complete - patient participated  Level of consciousness: awake  Pain score: 0  Airway patency: patent  Nausea & Vomiting: no nausea  Complications: no  Cardiovascular status: blood pressure returned to baseline  Respiratory status: acceptable  Hydration status: euvolemic

## 2022-02-08 NOTE — ANESTHESIA PRE PROCEDURE
Department of Anesthesiology  Preprocedure Note       Name:  Shaggy Cedeno   Age:  51 y.o.  :  1975                                          MRN:  [id number]         Date:  2022      Surgeon: Linnette Burroughs):  Scott Lyn MD    Procedure: Procedure(s):  LAPAROSCOPIC POSSIBLE OPEN SIGMOID COLON RESECTION, POSSIBLE OSTOMY    Medications prior to admission:   Prior to Admission medications    Medication Sig Start Date End Date Taking? Authorizing Provider   dicyclomine (BENTYL) 10 MG capsule Take 10 mg by mouth 4 times daily (before meals and nightly)    Historical Provider, MD   vitamin D (D3-50) 73982 UNIT CAPS Take 1 capsule by mouth once a week 21   MARIXA Melchor CNP   nortriptyline (PAMELOR) 10 MG capsule TAKE 1 CAPSULE BY MOUTH NIGHTLY FOR HEADACHES 21   Yuan Goddard PA-C   gabapentin (NEURONTIN) 300 MG capsule TAKE 1 CAPSULE BY MOUTH THREE TIMES DAILY 21  MARIXA Melchor CNP   dexlansoprazole (DEXILANT) 60 MG CPDR delayed release capsule Take 60 mg by mouth daily    Historical Provider, MD   baclofen (LIORESAL) 20 MG tablet Take 1 tablet by mouth 4 times daily as needed (muscle spasms; pain) 21   MARIXA Melchor CNP   rOPINIRole (REQUIP) 0.5 MG tablet Take 1 tablet by mouth 2 times daily as needed (restless legs) 21   MARIXA Melchor CNP   ocrelizumab (OCREVUS) 300 MG/10ML SOLN injection Infuse 20 mLs intravenously every 6 months 21   MARIXA Melchor CNP       Current medications:    No current facility-administered medications for this visit. No current outpatient medications on file.      Facility-Administered Medications Ordered in Other Visits   Medication Dose Route Frequency Provider Last Rate Last Admin    ciprofloxacin (CIPRO) IVPB 400 mg  400 mg IntraVENous Once Scott Lyn MD        metronidazole (FLAGYL) 500 mg in NaCl 100 mL IVPB premix  500 mg IntraVENous Once Scott Lyn MD        0.9 % sodium chloride infusion  25 mL IntraVENous PRN Janet Mcdaniel MD        lactated ringers infusion   IntraVENous Continuous aJnet Mcdaniel MD 75 mL/hr at 02/08/22 0851 New Bag at 02/08/22 0851    sodium chloride flush 0.9 % injection 10 mL  10 mL IntraVENous 2 times per day Janet Mcdaniel MD        sodium chloride flush 0.9 % injection 10 mL  10 mL IntraVENous PRN Janet Mcdaniel MD           Allergies:  No Known Allergies    Problem List:    Patient Active Problem List   Diagnosis Code    Vocal cord dysfunction J38.3    Multiple sclerosis ([address] G35    Morbidly obese ([address] E66.01    Palafox's esophagus with dysplasia K22.719    Secondary restless legs syndrome G25.81    S/P colectomy Z90.49    Malignant neoplasm of descending colon ([address] C18.6    Ileus, postoperative ([address] K91.89, K56.7    Pelvic abscess in female N73.9    Sigmoid stricture ([address] K56.699       Past Medical History:        Diagnosis Date    Acid reflux disease     Cyst of ovary     Fracture of nasal bone     Headache     Hearing loss     Hypertension     Migraine     Morbidly obese ([address] 10/05/2020    Multiple sclerosis ([address]     denies limitations at present 11/2020    VEENA no CPAP     severe VEENA per pt    Right shoulder pain 11/2020    Tinnitus     TMJ dysfunction        Past Surgical History:        Procedure Laterality Date    APPENDECTOMY      BACK SURGERY      lumbar    BICEPS TENDON REPAIR Right 11/11/2020    BICEPS TENODESIS performed by Martinez Champion MD at [place] [address] Left 8/31/2021    LAPAROSCOPIC LEFT HEMICOLECTOMY WITH LOOP OSTOMY performed by Janet Mcdaniel MD at [address] [place] [address],[address] N/A 9/6/2021    DIAGNOSTIC LAPAROSCOPY POSSIBLE BOWEL RESECTION POSSILBE OSTOMY performed by Janet Mcdaniel MD at [address] [place] [address]  03/05/2018    Robotic hysterectomy, bilateral salpingectomy, right oophorectomy   [address] [place] [address]  HYSTERECTOMY, TOTAL ABDOMINAL      LARYNGOSCOPY N/A 7/17/2020    DIRECT LARYNGOSCOPY--OMNI GUIDE LASER performed by Melo Chun MD at [facility] PARTIAL HYSTERECTOMY      PROCTOSIGMOIDOSCOPY N/A 10/12/2021    ANAL PROCTO SIGMOIDOSCOPY FLEXIBLE performed by Leana Reveles MD at [address] [place] [address] N/A 1/25/2022    ANAL PROCTO SIGMOIDOSCOPY FLEXIBLE performed by Leana Reveles MD at [facility] ARTHROSCOPY Right 11/11/2020    RIGHT SHOULDER DIAGNOSTIC ARTHROSCOPY WITH DECOMPRESSION ROTATOR CUFF REPAIR performed by Varsha Hernadez MD at [facility]         Social History:    Social History     Tobacco Use    Smoking status: Current Every Day Smoker     Packs/day: 0.50     Years: 25.00     Pack years: 12.50     Types: Cigarettes    Smokeless tobacco: Never Used    Tobacco comment: Ready to stop, requesting prescription for Chantix   Substance Use Topics    Alcohol use: Yes     Alcohol/week: 1.0 standard drink     Types: 1 Glasses of wine per week     Comment: socially                                Ready to quit: Not Answered  Counseling given: Not Answered  Comment: Ready to stop, requesting prescription for Chantix      Vital Signs (Current): There were no vitals filed for this visit.                                            BP Readings from Last 3 Encounters:   02/08/22 122/81   02/02/22 121/72   01/25/22 (!) 166/102       NPO Status:                                                                                 BMI:   Wt Readings from Last 3 Encounters:   02/08/22 187 lb (84.8 kg)   02/02/22 187 lb (84.8 kg)   01/25/22 187 lb (84.8 kg)     There is no height or weight on file to calculate BMI.    CBC:   Lab Results   Component Value Date    WBC 9.2 02/02/2022    RBC 4.74 02/02/2022    HGB 14.7 02/02/2022    HCT 44.6 02/02/2022    MCV 94.1 02/02/2022    RDW 12.5 02/02/2022     02/02/2022       CMP:   Lab Results   Component Value Date  02/02/2022    K 4.2 02/02/2022     02/02/2022    CO2 26 02/02/2022    BUN 8 02/02/2022    CREATININE 0.6 02/02/2022    GFRAA >60 02/02/2022    LABGLOM >60 02/02/2022    GLUCOSE 113 02/02/2022    PROT 7.1 10/31/2021    CALCIUM 8.9 02/02/2022    BILITOT 0.3 10/31/2021    ALKPHOS 164 10/31/2021    AST 54 10/31/2021     10/31/2021       POC Tests: No results for input(s): POCGLU, POCNA, POCK, POCCL, POCBUN, POCHEMO, POCHCT in the last 72 hours. Coags:   Lab Results   Component Value Date    PROTIME 10.7 02/28/2020    INR 1.0 02/28/2020       HCG (If Applicable):   Lab Results   Component Value Date    PREGTESTUR neg 02/04/2018        ABGs: No results found for: PHART, PO2ART, POZ6LVP, QFX4DZB, BEART, O6XHADPX     Type & Screen (If Applicable):  Lab Results   Component Value Date    LABABO A 03/05/2018       Drug/Infectious Status (If Applicable):  No results found for: HIV, HEPCAB    COVID-19 Screening (If Applicable):   Lab Results   Component Value Date    COVID19 Not Detected 02/02/2022     EKG:10/2021   NSR    U/S CAROTID : 2020      Impression   No hemodynamically significant stenosis (less than 50%) in the   bilateral internal carotid arteries. CXR: 2021  Impression   No acute process.         CTA 2021     Impression   No CT evidence of pulmonary embolism.  Mild ground-glass opacities in the   right lung compatible with history of COVID-19 pneumonia. Anesthesia Evaluation  Patient summary reviewed no history of anesthetic complications:   Airway: Mallampati: II  TM distance: >3 FB   Neck ROM: full  Mouth opening: > = 3 FB Dental:          Pulmonary: breath sounds clear to auscultation  (+) sleep apnea: on noncompliant,  current smoker          Patient smoked on day of surgery.                  Cardiovascular:  Exercise tolerance: good (>4 METS),       (-) hypertension (pt states used to have high blood pressure )    ECG reviewed  Rhythm: regular  Rate: normal           Beta Blocker:  Not on Beta Blocker      ROS comment: EKG: Normal Sinus Rhythm 92. Neuro/Psych:   (+) headaches: migraine headaches,              ROS comment: Multiple sclerosis   TMJ dysfunction    GI/Hepatic/Renal:   (+) GERD:,           Endo/Other:    (+) malignancy/cancer (colon ca dx august 2021 no chem no radiation ). Pt had no PAT visit        ROS comment: Palafox's esophagus with dysplasia      Sigmoid stricture  Malignant neoplasm of descending colon   Ileus, postoperative   S/P colectomy Abdominal:             Vascular: negative vascular ROS. Other Findings:               Anesthesia Plan      general     ASA 3     (#20 RAC)  Induction: intravenous. BIS  MIPS: Postoperative opioids intended, Prophylactic antiemetics administered and Postoperative trial extubation. Anesthetic plan and risks discussed with patient. Use of blood products discussed with patient whom consented to blood products. Plan discussed with CRNA and attending.     Attending anesthesiologist reviewed and agrees with Falguni Bellamy MD   2/8/2022

## 2022-02-08 NOTE — ANESTHESIA PRE PROCEDURE
Department of Anesthesiology  Preprocedure Note       Name:  Gage Mcclelland   Age:  51 y.o.  :  1975                                          MRN:  [id number]         Date:  2022      Surgeon: Uday Cifuentes):  Janet Mcdaniel MD    Procedure: Procedure(s):  LAPAROSCOPIC POSSIBLE OPEN SIGMOID COLON RESECTION, POSSIBLE OSTOMY    Medications prior to admission:   Prior to Admission medications    Medication Sig Start Date End Date Taking?  Authorizing Provider   dicyclomine (BENTYL) 10 MG capsule Take 10 mg by mouth 4 times daily (before meals and nightly)    Historical Provider, MD   vitamin D (D3-50) 07523 UNIT CAPS Take 1 capsule by mouth once a week 21   MARIXA Cha CNP   nortriptyline (PAMELOR) 10 MG capsule TAKE 1 CAPSULE BY MOUTH NIGHTLY FOR HEADACHES 21   Barrie Villarreal PA-C   gabapentin (NEURONTIN) 300 MG capsule TAKE 1 CAPSULE BY MOUTH THREE TIMES DAILY 21  MARIXA Cha CNP   dexlansoprazole (DEXILANT) 60 MG CPDR delayed release capsule Take 60 mg by mouth daily    Historical Provider, MD   baclofen (LIORESAL) 20 MG tablet Take 1 tablet by mouth 4 times daily as needed (muscle spasms; pain) 21   MARIXA Cha CNP   rOPINIRole (REQUIP) 0.5 MG tablet Take 1 tablet by mouth 2 times daily as needed (restless legs) 21   MARIXA Cha CNP   ocrelizumab (OCREVUS) 300 MG/10ML SOLN injection Infuse 20 mLs intravenously every 6 months 21   MARIXA Cha CNP       Current medications:    Current Facility-Administered Medications   Medication Dose Route Frequency Provider Last Rate Last Admin    ciprofloxacin (CIPRO) IVPB 400 mg  400 mg IntraVENous Once Janet Mcdaniel MD        metronidazole (FLAGYL) 500 mg in NaCl 100 mL IVPB premix  500 mg IntraVENous Once Janet Mcdaniel MD        0.9 % sodium chloride infusion  25 mL IntraVENous PRN Janet Mcdaniel MD        lactated ringers infusion   IntraVENous Continuous Lugenia Sleek Nolan Latif MD        sodium chloride flush 0.9 % injection 10 mL  10 mL IntraVENous 2 times per day Shellie Cristobal MD        sodium chloride flush 0.9 % injection 10 mL  10 mL IntraVENous PRN Shellie Cristobal MD           Allergies:  No Known Allergies    Problem List:    Patient Active Problem List   Diagnosis Code    Vocal cord dysfunction J38.3    Multiple sclerosis ([address] G35    Morbidly obese ([address] E66.01    Palafox's esophagus with dysplasia K22.719    Secondary restless legs syndrome G25.81    S/P colectomy Z90.49    Malignant neoplasm of descending colon ([address] C18.6    Ileus, postoperative ([address] K91.89, K56.7    Pelvic abscess in female N73.9    Sigmoid stricture ([address] K56.699       Past Medical History:        Diagnosis Date    Acid reflux disease     Cyst of ovary     Fracture of nasal bone     Headache     Hearing loss     Hypertension     Migraine     Morbidly obese ([address] 10/05/2020    Multiple sclerosis ([address]     denies limitations at present 11/2020    VEENA no CPAP     severe VEENA per pt    Right shoulder pain 11/2020    Tinnitus     TMJ dysfunction        Past Surgical History:        Procedure Laterality Date    APPENDECTOMY      BACK SURGERY      lumbar    BICEPS TENDON REPAIR Right 11/11/2020    BICEPS TENODESIS performed by Hao Eagle MD at [place] [address] Left 8/31/2021    LAPAROSCOPIC LEFT HEMICOLECTOMY WITH LOOP OSTOMY performed by Shellie Cristobal MD at [address] [place] [address],[address] N/A 9/6/2021    DIAGNOSTIC LAPAROSCOPY POSSIBLE BOWEL RESECTION POSSILBE OSTOMY performed by Shellie Cristobal MD at [address] [place] [address]  03/05/2018    Robotic hysterectomy, bilateral salpingectomy, right oophorectomy DR. ARIANA RAYMOND [facility]     HYSTERECTOMY, TOTAL ABDOMINAL      LARYNGOSCOPY N/A 7/17/2020    DIRECT LARYNGOSCOPY--OMNI GUIDE LASER performed by Ebonie Harris MD at [facility]  PROCTOSIGMOIDOSCOPY N/A 10/12/2021    ANAL PROCTO SIGMOIDOSCOPY FLEXIBLE performed by Ana Ramirez MD at [address] [place] [address] N/A 1/25/2022    ANAL PROCTO SIGMOIDOSCOPY FLEXIBLE performed by Ana Ramirez MD at [facility] ARTHROSCOPY Right 11/11/2020    RIGHT SHOULDER DIAGNOSTIC ARTHROSCOPY WITH DECOMPRESSION ROTATOR CUFF REPAIR performed by Kelli Blizzard, MD at [place] [address]         Social History:    Social History     Tobacco Use    Smoking status: Current Every Day Smoker     Packs/day: 0.50     Years: 25.00     Pack years: 12.50     Types: Cigarettes    Smokeless tobacco: Never Used    Tobacco comment: Ready to stop, requesting prescription for Chantix   Substance Use Topics    Alcohol use: Yes     Alcohol/week: 1.0 standard drink     Types: 1 Glasses of wine per week     Comment: socially                                Ready to quit: Not Answered  Counseling given: Not Answered  Comment: Ready to stop, requesting prescription for Chantix      Vital Signs (Current):   Vitals:    02/08/22 0809   BP: 122/81   Pulse: 68   Resp: 18   Temp: 36.7 °C (98.1 °F)   TempSrc: Infrared   SpO2: 99%   Weight: 187 lb (84.8 kg)   Height: 5' 2\" (1.575 m)                                              BP Readings from Last 3 Encounters:   02/08/22 122/81   02/02/22 121/72   01/25/22 (!) 166/102       NPO Status: Time of last liquid consumption: 2315                        Time of last solid consumption: 1800                        Date of last liquid consumption: 02/07/22                        Date of last solid food consumption: 02/07/22    BMI:   Wt Readings from Last 3 Encounters:   02/08/22 187 lb (84.8 kg)   02/02/22 187 lb (84.8 kg)   01/25/22 187 lb (84.8 kg)     Body mass index is 34.2 kg/m².     CBC:   Lab Results   Component Value Date    WBC 9.2 02/02/2022    RBC 4.74 02/02/2022    HGB 14.7 02/02/2022    HCT 44.6 02/02/2022    MCV 94.1 02/02/2022    RDW 12.5 02/02/2022     02/02/2022       CMP:   Lab Results   Component Value Date     02/02/2022    K 4.2 02/02/2022     02/02/2022    CO2 26 02/02/2022    BUN 8 02/02/2022    CREATININE 0.6 02/02/2022    GFRAA >60 02/02/2022    LABGLOM >60 02/02/2022    GLUCOSE 113 02/02/2022    PROT 7.1 10/31/2021    CALCIUM 8.9 02/02/2022    BILITOT 0.3 10/31/2021    ALKPHOS 164 10/31/2021    AST 54 10/31/2021     10/31/2021       POC Tests: No results for input(s): POCGLU, POCNA, POCK, POCCL, POCBUN, POCHEMO, POCHCT in the last 72 hours. Coags:   Lab Results   Component Value Date    PROTIME 10.7 02/28/2020    INR 1.0 02/28/2020       HCG (If Applicable):   Lab Results   Component Value Date    PREGTESTUR neg 02/04/2018        ABGs: No results found for: PHART, PO2ART, IJU5UMK, CYR4EDC, BEART, Z8IMEOLP     Type & Screen (If Applicable):  Lab Results   Component Value Date    LABABO A 03/05/2018       Drug/Infectious Status (If Applicable):  No results found for: HIV, HEPCAB    COVID-19 Screening (If Applicable):   Lab Results   Component Value Date    COVID19 Not Detected 02/02/2022     EKG:10/2021   NSR    U/S CAROTID : 2020      Impression   No hemodynamically significant stenosis (less than 50%) in the   bilateral internal carotid arteries. CXR: 2021  Impression   No acute process.         CTA 2021     Impression   No CT evidence of pulmonary embolism.  Mild ground-glass opacities in the   right lung compatible with history of COVID-19 pneumonia. Anesthesia Evaluation  Patient summary reviewed no history of anesthetic complications:   Airway: Mallampati: II  TM distance: >3 FB   Neck ROM: full  Mouth opening: > = 3 FB Dental:          Pulmonary: breath sounds clear to auscultation  (+) sleep apnea: on noncompliant,  current smoker          Patient smoked on day of surgery.                  Cardiovascular:  Exercise tolerance: good (>4 METS),       (-) hypertension (pt states used to have high blood pressure )      Rhythm: regular  Rate: normal           Beta Blocker:  Not on Beta Blocker         Neuro/Psych:   (+) headaches: migraine headaches,              ROS comment: Multiple sclerosis   TMJ dysfunction    GI/Hepatic/Renal:   (+) GERD:,           Endo/Other:    (+) malignancy/cancer (colon ca dx august 2021 no chem no radiation ). Pt had no PAT visit        ROS comment: Palafox's esophagus with dysplasia      Sigmoid stricture  Malignant neoplasm of descending colon   Ileus, postoperative   S/P colectomy Abdominal:             Vascular: negative vascular ROS. Other Findings:             Anesthesia Plan      general     ASA 3     (#20 RAC)  Induction: intravenous. MIPS: Postoperative opioids intended, Prophylactic antiemetics administered and Postoperative trial extubation. Anesthetic plan and risks discussed with patient. Use of blood products discussed with patient whom consented to blood products. Plan discussed with CRNA and attending.                   Cathi Vega RN   2/8/2022

## 2022-02-08 NOTE — PROGRESS NOTES
Sleeping quietly arouses easily medicated for c/o pain with some relief Report called to RN 3rd floor

## 2022-02-08 NOTE — OP NOTE
Operative Note      Patient: Miranda Wadsworth  YOB: 1975  MRN: [id number]    Date of Procedure: 2/8/2022    Pre-Op Diagnosis: SIGMOID STRICTURE    Post-Op Diagnosis: Same       Procedure(s):  LAPAROSCOPIC SIGMOID COLON RESECTION    Surgeon(s):  Wanda Ha MD    Assistant:   First Assistant: Geoff Galeana    Anesthesia: General    Estimated Blood Loss (mL): Minimal    Complications: None    Specimens:   ID Type Source Tests Collected by Time Destination   A : SIGMOID COLON AND RECTAL STUMP Tissue Colon SURGICAL PATHOLOGY Wanda Ha MD 2/8/2022 1221        Implants:  * No implants in log *      Drains:   Colostomy RUQ Loop (Active)       Urethral Catheter Non-latex 16 fr (Active)       Findings: as dictated    Detailed Description of Procedure:   [id number]    Electronically signed by Wanda Ha MD on 2/8/2022 at 12:30 PM

## 2022-02-09 LAB
ALBUMIN SERPL-MCNC: 3.3 G/DL (ref 3.5–5.2)
ALP BLD-CCNC: 87 U/L (ref 35–104)
ALT SERPL-CCNC: 69 U/L (ref 0–32)
ANION GAP SERPL CALCULATED.3IONS-SCNC: 8 MMOL/L (ref 7–16)
AST SERPL-CCNC: 35 U/L (ref 0–31)
BILIRUB SERPL-MCNC: 0.8 MG/DL (ref 0–1.2)
BUN BLDV-MCNC: 5 MG/DL (ref 6–20)
CALCIUM SERPL-MCNC: 8.4 MG/DL (ref 8.6–10.2)
CHLORIDE BLD-SCNC: 103 MMOL/L (ref 98–107)
CO2: 25 MMOL/L (ref 22–29)
CREAT SERPL-MCNC: 0.6 MG/DL (ref 0.5–1)
GFR AFRICAN AMERICAN: >60
GFR NON-AFRICAN AMERICAN: >60 ML/MIN/1.73
GLUCOSE BLD-MCNC: 126 MG/DL (ref 74–99)
HCT VFR BLD CALC: 38 % (ref 34–48)
HEMOGLOBIN: 12.6 G/DL (ref 11.5–15.5)
MCH RBC QN AUTO: 30.9 PG (ref 26–35)
MCHC RBC AUTO-ENTMCNC: 33.2 % (ref 32–34.5)
MCV RBC AUTO: 93.1 FL (ref 80–99.9)
PDW BLD-RTO: 12.5 FL (ref 11.5–15)
PLATELET # BLD: 203 E9/L (ref 130–450)
PMV BLD AUTO: 10.2 FL (ref 7–12)
POTASSIUM REFLEX MAGNESIUM: 3.7 MMOL/L (ref 3.5–5)
RBC # BLD: 4.08 E12/L (ref 3.5–5.5)
SODIUM BLD-SCNC: 136 MMOL/L (ref 132–146)
TOTAL PROTEIN: 5.6 G/DL (ref 6.4–8.3)
WBC # BLD: 21.5 E9/L (ref 4.5–11.5)

## 2022-02-09 PROCEDURE — 80053 COMPREHEN METABOLIC PANEL: CPT

## 2022-02-09 PROCEDURE — 85027 COMPLETE CBC AUTOMATED: CPT

## 2022-02-09 PROCEDURE — 6360000002 HC RX W HCPCS: Performed by: SURGERY

## 2022-02-09 PROCEDURE — 6370000000 HC RX 637 (ALT 250 FOR IP): Performed by: INTERNAL MEDICINE

## 2022-02-09 PROCEDURE — 99024 POSTOP FOLLOW-UP VISIT: CPT | Performed by: SURGERY

## 2022-02-09 PROCEDURE — 2580000003 HC RX 258: Performed by: SURGERY

## 2022-02-09 PROCEDURE — 36415 COLL VENOUS BLD VENIPUNCTURE: CPT

## 2022-02-09 PROCEDURE — 6370000000 HC RX 637 (ALT 250 FOR IP): Performed by: SURGERY

## 2022-02-09 PROCEDURE — 1200000000 HC SEMI PRIVATE

## 2022-02-09 RX ADMIN — MORPHINE SULFATE 4 MG: 4 INJECTION, SOLUTION INTRAMUSCULAR; INTRAVENOUS at 05:10

## 2022-02-09 RX ADMIN — METHOCARBAMOL 500 MG: 500 TABLET ORAL at 08:02

## 2022-02-09 RX ADMIN — MORPHINE SULFATE 2 MG: 2 INJECTION, SOLUTION INTRAMUSCULAR; INTRAVENOUS at 19:31

## 2022-02-09 RX ADMIN — ONDANSETRON 4 MG: 4 TABLET, ORALLY DISINTEGRATING ORAL at 01:31

## 2022-02-09 RX ADMIN — Medication 10 ML: at 20:06

## 2022-02-09 RX ADMIN — MORPHINE SULFATE 2 MG: 2 INJECTION, SOLUTION INTRAMUSCULAR; INTRAVENOUS at 16:49

## 2022-02-09 RX ADMIN — PANTOPRAZOLE SODIUM 40 MG: 40 TABLET, DELAYED RELEASE ORAL at 05:10

## 2022-02-09 RX ADMIN — TRAMADOL HYDROCHLORIDE 50 MG: 50 TABLET, COATED ORAL at 08:12

## 2022-02-09 RX ADMIN — MORPHINE SULFATE 4 MG: 4 INJECTION, SOLUTION INTRAMUSCULAR; INTRAVENOUS at 14:20

## 2022-02-09 RX ADMIN — MORPHINE SULFATE 4 MG: 4 INJECTION, SOLUTION INTRAMUSCULAR; INTRAVENOUS at 01:30

## 2022-02-09 RX ADMIN — METHOCARBAMOL 500 MG: 500 TABLET ORAL at 12:53

## 2022-02-09 RX ADMIN — MORPHINE SULFATE 4 MG: 4 INJECTION, SOLUTION INTRAMUSCULAR; INTRAVENOUS at 12:17

## 2022-02-09 RX ADMIN — MORPHINE SULFATE 4 MG: 4 INJECTION, SOLUTION INTRAMUSCULAR; INTRAVENOUS at 21:32

## 2022-02-09 RX ADMIN — MORPHINE SULFATE 4 MG: 4 INJECTION, SOLUTION INTRAMUSCULAR; INTRAVENOUS at 09:42

## 2022-02-09 RX ADMIN — METHOCARBAMOL 500 MG: 500 TABLET ORAL at 20:05

## 2022-02-09 RX ADMIN — METHOCARBAMOL 500 MG: 500 TABLET ORAL at 16:47

## 2022-02-09 RX ADMIN — ENOXAPARIN SODIUM 40 MG: 100 INJECTION SUBCUTANEOUS at 08:01

## 2022-02-09 ASSESSMENT — PAIN SCALES - GENERAL
PAINLEVEL_OUTOF10: 7
PAINLEVEL_OUTOF10: 7
PAINLEVEL_OUTOF10: 10
PAINLEVEL_OUTOF10: 8
PAINLEVEL_OUTOF10: 7
PAINLEVEL_OUTOF10: 7
PAINLEVEL_OUTOF10: 8
PAINLEVEL_OUTOF10: 6
PAINLEVEL_OUTOF10: 7

## 2022-02-09 ASSESSMENT — PAIN DESCRIPTION - LOCATION: LOCATION: ABDOMEN

## 2022-02-09 ASSESSMENT — PAIN DESCRIPTION - PAIN TYPE: TYPE: SURGICAL PAIN

## 2022-02-09 NOTE — OP NOTE
[address]                                OPERATIVE REPORT    PATIENT NAME: Pallavi Wall                   :        1975  MED REC NO:   [id number]                            ROOM:       7511  ACCOUNT NO:   [de-identified]                           ADMIT DATE: 2022  PROVIDER:     David Claros MD    DATE OF PROCEDURE:  2022    PREOPERATIVE DIAGNOSIS:  Sigmoid stricture. POSTOPERATIVE DIAGNOSIS:  Sigmoid stricture. PROCEDURE PERFORMED:  Laparoscopic sigmoid and rectal resection with  stapled end-to-end anastomosis and mobilization of splenic flexure. SURGEON:  David Claros MD    ASSISTANT:  None. ANESTHESIA:  General.    COMPLICATIONS:  None. FLUIDS:  Crystalloid. BLOOD LOSS:  100 mL. DISPOSITION:  To be admitted to floor. SPECIMENS:  Sigmoid colon, rectal stump, and distal sigmoid colon. INDICATION:  This is a 54-year-old female with the aforementioned  diagnosis. I explained the risks, benefits, potential outcomes,  alternate treatment to the aforementioned procedure and she agreed to  proceed understanding those risks and potential outcomes. OPERATIVE PROCEDURE:  The patient was brought into the operative suite,  placed under general anesthesia, had bilateral PCDs placed, was given  preoperative antibiotics within 30 minutes of incision, and was then  prepped and draped in normal sterile condition. Once this was done,  local anesthetic was infiltrated in the supraumbilical area, 5 mm  incision was made. A Veress needle was passed in the peritoneum. CO2  was used to insufflate the abdomen at a pressure of 15 mmHg. At this  time, a Veress needle was removed and a 5-mm trocar was put in its  place. Additional 5 and a 10-mm trocar were placed in the right lateral  abdomen.   Once this was done, we were able to see the area of stricture  and scarring at the sigmoid-rectal junction from previous resection. We  were able to mobilize this completely, stapled across the rectal stump  with the Lyndon 60 powered stapler and then divided the mesorectum with  the LigaSure. The rectosigmoid previous anastomosis had completely been  . There was no connection between the rectal and sigmoid area,  so that rectal stump was then delivered and sent for specimen. The  sigmoid was then found to have a small stricture approximately 4 cm and  it was resected in total of approximately 6-8 cm of sigmoid colon. It  was then resected in a similar fashion with the Lyndon 60 stapler. We  then sent this off. We mobilized around the splenic flexure with  electrocautery and blunt dissection in order to get enough length to  reach down to our new rectal stump. Once there was adequate area for  mobilization, we opened the 10-mm trocar site to a 3.5 cm. We dissected  down and opened up the fascia at that point. We introduced a wound  protector and delivered out the descending colon through this. We put a  pursestring around this and cut off the sutured end. We then introduced  EEA 29 anvil from the 29 stapler into the distal descending colon. It  was tied in place with the pursestring suture. We then introduced the  EEA 29 stapler into the rectal stump and came out in an adequate  position. We then connected these and fired an end-to-end stapled  anastomosis and had two good donuts. Once we were done, we were able to  do a colonoscopic leak test by immersing the anastomosis in sterile  water and then did an endoscopic evaluation with no air leak. The  staple line had some very minimal oozing of bright red blood and looked  pink on both sides. No signs of ischemia. The scope was then removed. The trocars were all kept in place, so we closed the 3.5-cm fascial  defect at the right lower quadrant with interrupted figure-of-eight #1  PDS sutures.   We then looked back in and noticed that there was no bowel  trapped in our suturing in that area. Everything else appeared okay. There looked to be no ongoing bleeding. We then removed all trocars and  pneumoperitoneum and closed the trocar sites with 4-0 Monocryl and  surgical glue.   This larger 3.5-cm incision was closed with 3-0 Vicryl  and surgical glue, and the patient was woken up in stable condition and  taken to Trenton Schuler MD    D: 02/08/2022 12:35:25       T: 02/08/2022 12:39:28     [email or web address]  Job#: [id number]     Doc#: [id number]    CC:

## 2022-02-09 NOTE — CARE COORDINATION
2-9-Cm note: ( no covid testing this stay, neg on 2-2) met with pt for transition of care needs, pt lives alone, her boyfriend stays with her often, pt has no DME, denies any needs for dc home. Pt's BF will provide transportation home .  Electronically signed by Aury Pimentel RN on 2/9/2022 at 11:40 AM

## 2022-02-09 NOTE — PROGRESS NOTES
GENERAL SURGERY  DAILY PROGRESS NOTE  2/9/2022    No chief complaint on file. Subjective:  Feeling good this morning, just with some abdominal tenderness. No nausea, tolerated diet, having ostomy output.      Objective:  /69   Pulse 84   Temp 98 °F (36.7 °C) (Oral)   Resp 16   Ht 5' 2\" (1.575 m)   Wt 187 lb (84.8 kg)   LMP 01/05/2018   SpO2 96%   BMI 34.20 kg/m²     GENERAL:  Laying in bed, awake, alert, cooperative, no apparent distress  HEAD: Normocephalic, atraumatic  EYES: No sclera icterus, pupils equal  LUNGS:  No increased work of breathing  CARDIOVASCULAR:  RR  ABDOMEN:  Soft, non- distended, appropriately tender   EXTREMITIES: No edema or swelling  SKIN: Warm and dry    Assessment/Plan:  51 y.o. female with sigmoid stricture s/p laparoscopic sigmoid resection with anastomosis 2/8    - remove anaya  - advance diet  - ambulate  - pain and nausea control    Electronically signed by Suellen Loomis MD on 2/9/2022 at 7:09 AM    As above

## 2022-02-09 NOTE — H&P
General Surgery History and Physical     Patient's Name/Date of Birth: Teena Gonzales / 1975     Date: 2/8/2022      Surgeon: Sunday Ambriz M.D.     PCP: Han Shah PA-C     Chief Complaint: sigmoid stricture     HPI:   Teena Gonzales is a 51 y.o. female who presents for evaluation of sigmoid stricture from previous cancer surgery done in past by me.         Past Medical History        Past Medical History:   Diagnosis Date    Acid reflux disease      Cyst of ovary      Fracture of nasal bone      Headache      Hearing loss      Hypertension      Migraine      Morbidly obese ([address] 10/05/2020    Multiple sclerosis ([facility] [address]       denies limitations at present 11/2020    VEENA no CPAP       severe VEENA per pt    Right shoulder pain 11/2020    Tinnitus      TMJ dysfunction              Past Surgical History         Past Surgical History:   Procedure Laterality Date    APPENDECTOMY        BACK SURGERY         lumbar    BICEPS TENDON REPAIR Right 11/11/2020     BICEPS TENODESIS performed by Maude Petty MD at [address] [address] Left 8/31/2021     LAPAROSCOPIC LEFT HEMICOLECTOMY WITH LOOP OSTOMY performed by Arsenio Cao MD at [address] [place] [address] 9/6/2021     DIAGNOSTIC LAPAROSCOPY POSSIBLE BOWEL RESECTION POSSILBE OSTOMY performed by Arsenio Cao MD at [place] [address]   03/05/2018     Robotic hysterectomy, bilateral salpingectomy, right oophorectomy DR. ARIANA MONIQUE [facility]     HYSTERECTOMY, TOTAL ABDOMINAL        LARYNGOSCOPY N/A 7/17/2020     DIRECT LARYNGOSCOPY--OMNI GUIDE LASER performed by Lisa Leal MD at [address]        PROCTOSIGMOIDOSCOPY N/A 10/12/2021     ANAL PROCTO SIGMOIDOSCOPY FLEXIBLE performed by Arsenio Cao MD at [address] [place] [address] N/A 1/25/2022     ANAL PROCTO SIGMOIDOSCOPY FLEXIBLE performed by Arsenio Cao MD at [facility] ENDOSCOPY    SHOULDER ARTHROSCOPY Right 11/11/2020     RIGHT SHOULDER DIAGNOSTIC ARTHROSCOPY WITH DECOMPRESSION ROTATOR CUFF REPAIR performed by Lazaro Babinski, MD at [address] [place] [address]                Current Facility-Administered Medications          Current Outpatient Medications   Medication Sig Dispense Refill    dicyclomine (BENTYL) 10 MG capsule Take 10 mg by mouth 4 times daily (before meals and nightly)        vitamin D (D3-50) 32607 UNIT CAPS Take 1 capsule by mouth once a week 4 capsule 5    nortriptyline (PAMELOR) 10 MG capsule TAKE 1 CAPSULE BY MOUTH NIGHTLY FOR HEADACHES 30 capsule 3    gabapentin (NEURONTIN) 300 MG capsule TAKE 1 CAPSULE BY MOUTH THREE TIMES DAILY 90 capsule 11    dexlansoprazole (DEXILANT) 60 MG CPDR delayed release capsule Take 60 mg by mouth daily        baclofen (LIORESAL) 20 MG tablet Take 1 tablet by mouth 4 times daily as needed (muscle spasms; pain) 360 tablet 3    rOPINIRole (REQUIP) 0.5 MG tablet Take 1 tablet by mouth 2 times daily as needed (restless legs) 60 tablet 11    ocrelizumab (OCREVUS) 300 MG/10ML SOLN injection Infuse 20 mLs intravenously every 6 months 20 mL 1      No current facility-administered medications for this visit.            No Known Allergies     The patient has a family history that is negative for severe cardiovascular or respiratory issues, negative for reaction to anesthesia.     Social History               Socioeconomic History    Marital status: Single       Spouse name: Not on file    Number of children: 3    Years of education: 6    Highest education level: 11th grade   Occupational History    Not on file   Tobacco Use    Smoking status: Current Every Day Smoker       Packs/day: 0.50       Years: 25.00       Pack years: 12.50       Types: Cigarettes    Smokeless tobacco: Never Used    Tobacco comment: Ready to stop, requesting prescription for Chantix   Vaping Use    Vaping Use: Never used   Substance and Sexual Activity    Alcohol use: Yes       Alcohol/week: 1.0 standard drink       Types: 1 Glasses of wine per week       Comment: socially    Drug use: No    Sexual activity: Yes       Partners: Male   Other Topics Concern    Not on file   Social History Narrative     Uses [employer] insurance for transportation     Receives food stamps      Social Determinants of Health          Financial Resource Strain: Medium Risk    Difficulty of Paying Living Expenses: Somewhat hard   Food Insecurity: Food Insecurity Present    Worried About Running Out of Food in the Last Year: Sometimes true    [place] of Food in the Last Year: Never true   Transportation Needs: No Transportation Needs    Lack of Transportation (Medical): No    Lack of Transportation (Non-Medical): No   Physical Activity: Sufficiently Active    Days of Exercise per Week: 3 days    Minutes of Exercise per Session: 60 min   Stress: No Stress Concern Present    Feeling of Stress : Not at all   Social Connections: Socially Isolated    Frequency of Communication with Friends and Family:  Three times a week    Frequency of Social Gatherings with Friends and Family: Twice a week    Attends Voodoo Services: Never    Active Member of Clubs or Organizations: No    Attends Club or Organization Meetings: Never    Marital Status: Never    Intimate Partner Violence:     Fear of Current or Ex-Partner: Not on file    Emotionally Abused: Not on file    Physically Abused: Not on file    Sexually Abused: Not on file   Housing Stability:     Unable to Pay for Housing in the Last Year: Not on file    Number of [place] in the Last Year: Not on file    Unstable Housing in the Last Year: Not on file                  Review of Systems  Review of Systems -  General ROS: negative for - chills, fatigue or malaise  ENT ROS: negative for - hearing change, nasal congestion or nasal discharge  Allergy and Immunology ROS: negative for - hives, itchy/watery eyes or nasal congestion  Hematological and Lymphatic ROS: negative for - blood clots, blood transfusions, bruising or fatigue  Endocrine ROS: negative for - malaise/lethargy, mood swings, palpitations or polydipsia/polyuria  Respiratory ROS: negative for - sputum changes, stridor, tachypnea or wheezing  Cardiovascular ROS: negative for - irregular heartbeat, loss of consciousness, murmur or orthopnea  Gastrointestinal ROS: negative for - constipation, diarrhea, gas/bloating, heartburn or hematemesis  Genito-Urinary ROS: negative for -  genital discharge, genital ulcers or hematuria  Musculoskeletal ROS: negative for - gait disturbance, muscle pain or muscular weakness     Physical exam:   [facility] 01/05/2018   General appearance:  NAD  Head: NCAT, PERRLA, EOMI, red conjunctiva  Neck: supple, no masses  Lungs: CTAB, equal chest rise bilateral  Heart: Reg rate  Abdomen: soft, nontender, nondistended, ileostomy in place  Skin; no lesions  Gu: no cva tenderness  Extremities: extremities normal, atraumatic, no cyanosis or edema     Assessment:  51 y.o. female with sigmoid stricture     Plan: Will do lap possible open resection possible ostomy  Discussed the risk, benefits and alternatives of surgery including wound infections, bleeding, scar and hernia formation and the risks of general anesthetic including MI, CVA, sudden death or reactions to anesthetic medications. The patient understands the risks and alternatives and the possibility of converting to an open procedure.  All questions were answered to the patient's satisfaction and they freely signed the consent.        Jeana Allen MD

## 2022-02-09 NOTE — PLAN OF CARE
Problem: Falls - Risk of:  Goal: Will remain free from falls  Description: Will remain free from falls  2/9/2022 0005 by Steffanie Dodd RN  Outcome: Met This Shift  2/8/2022 1455 by Margarita Falcon RN  Outcome: Met This Shift  Goal: Absence of physical injury  Description: Absence of physical injury  2/9/2022 0005 by Steffanie Dodd RN  Outcome: Met This Shift  2/8/2022 1455 by Margarita Falcon RN  Outcome: Met This Shift     Problem: Infection - Surgical Site:  Goal: Will show no infection signs and symptoms  Description: Will show no infection signs and symptoms  2/9/2022 0005 by Steffanie Dodd RN  Outcome: Met This Shift  2/8/2022 1455 by Margarita Falcon RN  Outcome: Met This Shift

## 2022-02-09 NOTE — PLAN OF CARE
Problem: Falls - Risk of:  Goal: Will remain free from falls  Description: Will remain free from falls  2/9/2022 1224 by Jai Gutierrez RN  Outcome: Met This Shift     Problem: Falls - Risk of:  Goal: Absence of physical injury  Description: Absence of physical injury  2/9/2022 1224 by Jai Gutierrez RN  Outcome: Met This Shift     Problem: Infection - Surgical Site:  Goal: Will show no infection signs and symptoms  Description: Will show no infection signs and symptoms  2/9/2022 1224 by Jai Gutierrez RN  Outcome: Met This Shift     Problem: Pain:  Goal: Pain level will decrease  Description: Pain level will decrease  Outcome: Met This Shift  Goal: Control of acute pain  Description: Control of acute pain  Outcome: Met This Shift  Goal: Control of chronic pain  Description: Control of chronic pain  Outcome: Met This Shift     Problem:  Bowel/Gastric:  Goal: Control of bowel function will improve  Description: Control of bowel function will improve  Outcome: Met This Shift  Goal: Ability to achieve a regular elimination pattern will improve  Description: Ability to achieve a regular elimination pattern will improve  Outcome: Met This Shift

## 2022-02-09 NOTE — CONSULTS
Department of Internal Medicine        HISTORY OF PRESENT ILLNESS:      The patient is a 51 y.o. female who presents with having a elective laparoscopic sigmoid colon resection. Patient has a known history of sigmoid stricture. Patient had a anal proctosigmoidoscopy performed 1/25 with a scope with past 15 cm work there was complete closure of the rectosigmoid junction without an opening. Patient is seen postop. Patient has expected postop discomfort. Temperature is 97.8 with heart rate 72 blood pressure 140/65. O2 sat 90% on 2 L nasal cannula. Past Medical History:    Past Medical History:   Diagnosis Date    Acid reflux disease     Cyst of ovary     Fracture of nasal bone     Headache     Hearing loss     Hypertension     Migraine     Morbidly obese ([address] 10/05/2020    Multiple sclerosis ([address]     denies limitations at present 11/2020    VEENA no CPAP     severe VEENA per pt    Right shoulder pain 11/2020    Tinnitus     TMJ dysfunction      Past Surgical History:    Past Surgical History:   Procedure Laterality Date    APPENDECTOMY      BACK SURGERY      lumbar    BICEPS TENDON REPAIR Right 11/11/2020    BICEPS TENODESIS performed by Hao Eagle MD at [place] [address] Left 8/31/2021    LAPAROSCOPIC LEFT HEMICOLECTOMY WITH LOOP OSTOMY performed by Shellie Cristobal MD at [address] [place] [address] 9/6/2021    DIAGNOSTIC LAPAROSCOPY POSSIBLE BOWEL RESECTION POSSILBE OSTOMY performed by Shellie Cristobal MD at [address] [facility]  03/05/2018    Robotic hysterectomy, bilateral salpingectomy, right oophorectomy DR. ARIANA MONIQUE [facility]     HYSTERECTOMY, TOTAL ABDOMINAL      LARYNGOSCOPY N/A 7/17/2020    DIRECT LARYNGOSCOPY--OMNI GUIDE LASER performed by Ebonie Harris MD at [facility] PARTIAL HYSTERECTOMY      PROCTOSIGMOIDOSCOPY N/A 10/12/2021    ANAL PROCTO SIGMOIDOSCOPY FLEXIBLE performed by Shellie Cristobal MD at [address] N/A 1/25/2022    ANAL PROCTO SIGMOIDOSCOPY FLEXIBLE performed by Shellie Cristobal MD at [facility] ARTHROSCOPY Right 11/11/2020    RIGHT SHOULDER DIAGNOSTIC ARTHROSCOPY WITH DECOMPRESSION ROTATOR CUFF REPAIR performed by Hao Eagle MD at [place] [address]         Medications Prior to Admission:    @  Prior to Admission medications    Medication Sig Start Date End Date Taking? Authorizing Provider   dicyclomine (BENTYL) 10 MG capsule Take 10 mg by mouth 4 times daily (before meals and nightly)    Historical Provider, MD   vitamin D (D3-50) 32409 UNIT CAPS Take 1 capsule by mouth once a week 11/30/21   MARIXA Douglas CNP   nortriptyline (PAMELOR) 10 MG capsule TAKE 1 CAPSULE BY MOUTH NIGHTLY FOR HEADACHES 11/5/21   Sasha Ha PA-C   gabapentin (NEURONTIN) 300 MG capsule TAKE 1 CAPSULE BY MOUTH THREE TIMES DAILY 9/13/21 1/31/22  MARIXA Douglas CNP   dexlansoprazole (DEXILANT) 60 MG CPDR delayed release capsule Take 60 mg by mouth daily    Historical Provider, MD   baclofen (LIORESAL) 20 MG tablet Take 1 tablet by mouth 4 times daily as needed (muscle spasms; pain) 8/13/21   MARIXA Douglas CNP   rOPINIRole (REQUIP) 0.5 MG tablet Take 1 tablet by mouth 2 times daily as needed (restless legs) 8/13/21   MARIXA Douglas CNP   ocrelizumab (OCREVUS) 300 MG/10ML SOLN injection Infuse 20 mLs intravenously every 6 months 7/19/21   MARIXA Douglas CNP       Allergies:  Patient has no known allergies.     Social History:   Social History     Socioeconomic History    Marital status: Single     Spouse name: Not on file    Number of children: 3    Years of education: 6    Highest education level: 11th grade   Occupational History    Not on file   Tobacco Use    Smoking status: Current Every Day Smoker     Packs/day: 0.50     Years: 25.00     Pack years: 12.50     Types: Cigarettes    Smokeless tobacco: Never Used  Tobacco comment: Ready to stop, requesting prescription for Chantix   Vaping Use    Vaping Use: Never used   Substance and Sexual Activity    Alcohol use: Yes     Alcohol/week: 1.0 standard drink     Types: 1 Glasses of wine per week     Comment: socially    Drug use: No    Sexual activity: Yes     Partners: Male   Other Topics Concern    Not on file   Social History Narrative    Uses [employer] insurance for transportation    [facility]     Social Determinants of Health     Financial Resource Strain: Medium Risk    Difficulty of Paying Living Expenses: Somewhat hard   Food Insecurity: Food Insecurity Present    Worried About Running Out of Food in the Last Year: Sometimes true    [place] of Food in the Last Year: Never true   Transportation Needs: No Transportation Needs    Lack of Transportation (Medical): No    Lack of Transportation (Non-Medical): No   Physical Activity: Sufficiently Active    Days of Exercise per Week: 3 days    Minutes of Exercise per Session: 60 min   Stress: No Stress Concern Present    Feeling of Stress : Not at all   Social Connections: Socially Isolated    Frequency of Communication with Friends and Family:  Three times a week    Frequency of Social Gatherings with Friends and Family: Twice a week    Attends Anabaptism Services: Never    Active Member of Clubs or Organizations: No    Attends Club or Organization Meetings: Never    Marital Status: Never    Intimate Partner Violence:     Fear of Current or Ex-Partner: Not on file    Emotionally Abused: Not on file    Physically Abused: Not on file    Sexually Abused: Not on file   Housing Stability:     Unable to Pay for Housing in the Last Year: Not on file    Number of [place] in the Last Year: Not on file    Unstable Housing in the Last Year: Not on file       Family History:   Family History   Problem Relation Age of Onset    Mult Sclerosis Mother     Colon Cancer Neg Hx     Uterine Cancer Neg Hx     Ovarian Cancer Neg Hx     Breast Cancer Neg Hx        REVIEW OF SYSTEMS:    Gen: Patient denies any lightheadedness or dizziness. No LOC or syncope. No fevers or chills. HEENT: No earache, sore throat or nasal congestion. Resp: Denies cough, hemoptysis or sputum production. Cardiac: Denies chest pain, SOB, diaphoresis or palpitations. GI:+ Abdominal distention. Lower abdominal cramping. No nausea, vomiting, diarrhea or constipation. No melena or hematochezia. : No urinary complaints, dysuria, hematuria or frequency. MSK: No extremity weakness, paralysis or paresthesias. PHYSICAL EXAM:    Vitals:  /65   Pulse 72   Temp 97.8 °F (36.6 °C) (Oral)   Resp 18   Ht 5' 2\" (1.575 m)   Wt 187 lb (84.8 kg)   LMP 01/05/2018   SpO2 98%   BMI 34.20 kg/m²     General:  This is a 51 y.o. yo female who is alert and oriented in postop distress  HEENT:  Head is normocephalic and atraumatic, PERRLA, EOMI, mucus membranes moist with no pharyngeal erythema or exudate. Neck:  Supple with no carotid bruits, JVD or thyromegaly.   No cervical adenopathy  CV:  Regular rate and rhythm, no murmurs  Lungs: Mildly coarse breath sounds to auscultation bilaterally with no wheezes, rales or rhonchi  Abdomen:  + Expected postop abdomen, + ileostomy,+ abdominal fat, bowel sounds  hypoactive  Extremities:  No edema, peripheral pulses intact bilaterally  Neuro:  Cranial nerves II-XII grossly intact; motor and sensory function intact with no focal deficits  Skin:  No rashes, lesions or wounds      DATA:  CBC with Differential:    Lab Results   Component Value Date    WBC 9.2 02/02/2022    RBC 4.74 02/02/2022    HGB 14.7 02/02/2022    HCT 44.6 02/02/2022     02/02/2022    MCV 94.1 02/02/2022    MCH 31.0 02/02/2022    MCHC 33.0 02/02/2022    RDW 12.5 02/02/2022    METASPCT 0.9 11/23/2021    LYMPHOPCT 23.2 02/02/2022    MONOPCT 6.7 02/02/2022    BASOPCT 0.4 02/02/2022    MONOSABS 0.62 02/02/2022    LYMPHSABS 2.13 02/02/2022    EOSABS 0.20 02/02/2022    BASOSABS 0.04 02/02/2022     CMP:    Lab Results   Component Value Date     02/02/2022    K 4.2 02/02/2022     02/02/2022    CO2 26 02/02/2022    BUN 8 02/02/2022    CREATININE 0.6 02/02/2022    GFRAA >60 02/02/2022    LABGLOM >60 02/02/2022    GLUCOSE 113 02/02/2022    PROT 7.1 10/31/2021    LABALBU 4.1 10/31/2021    CALCIUM 8.9 02/02/2022    BILITOT 0.3 10/31/2021    ALKPHOS 164 10/31/2021    AST 54 10/31/2021     10/31/2021     Magnesium:    Lab Results   Component Value Date    MG 2.2 09/01/2021     Phosphorus:    Lab Results   Component Value Date    PHOS 3.2 09/01/2021     PT/INR:    Lab Results   Component Value Date    PROTIME 10.7 02/28/2020    INR 1.0 02/28/2020     Troponin:    Lab Results   Component Value Date    TROPONINI <0.01 07/20/2020     U/A:    Lab Results   Component Value Date    COLORU Yellow 11/23/2021    PROTEINU TRACE 11/23/2021    PHUR 5.0 11/23/2021    WBCUA 0-1 11/23/2021    RBCUA NONE 11/23/2021    TRICHOMONAS Present 02/26/2020    BACTERIA MANY 11/23/2021    CLARITYU SL CLOUDY 11/23/2021    SPECGRAV >=1.030 11/23/2021    LEUKOCYTESUR Negative 11/23/2021    UROBILINOGEN 0.2 11/23/2021    BILIRUBINUR SMALL 11/23/2021    BLOODU Negative 11/23/2021    GLUCOSEU Negative 11/23/2021    AMORPHOUS FEW 11/23/2021     ABG:  No results found for: PH, PCO2, PO2, HCO3, BE, THGB, TCO2, O2SAT  HgBA1c:    Lab Results   Component Value Date    LABA1C 5.4 05/11/2021     FLP:    Lab Results   Component Value Date    TRIG 184 05/11/2021    HDL 44 05/11/2021    LDLCALC 144 05/11/2021    LABVLDL 37 05/11/2021     TSH:    Lab Results   Component Value Date    TSH 0.293 09/01/2021     IRON:  No results found for: IRON  LIPASE:    Lab Results   Component Value Date    LIPASE 10 09/05/2021       ASSESSMENT AND PLAN:      Patient Active Problem List    Diagnosis Date Noted    [facility]igmoid stricture [facility]     Pelvic abscess in female     Ileus, postoperative ([facility] 09/05/2021    S/P colectomy 08/31/2021    Malignant neoplasm of descending colon ([facility]     Secondary restless legs syndrome 08/13/2021    Palafox's esophagus with dysplasia 05/11/2021    Morbidly obese ([facility] 10/05/2020    Multiple sclerosis ([facility] 08/04/2020    Vocal cord dysfunction 07/20/2020     Impression:  1. Sigmoid stricturelaparoscopic sigmoid resection 2/8/2022  2. History of VEENA  3.  Obesity  4. Hypertension   5. History of current tobacco use  6. History of restless leg syndrome    Plan:  Patient admitted to medical surgical floor  Home medications reviewed  Monitor heart rate, blood pressure, O2 saturation  Diet and abdominal pain meds per general surgery  Lovenox 40 mg subcu daily  IV fluids lactated Ringer's 125 cc an hour    Albuterol inhaler 2 puffs every 4 hours as needed for shortness of breath    CMP, CBC in a.m.     Neelima Mccord DO, D.OWagner  2/8/2022  7:21 PM

## 2022-02-09 NOTE — PROGRESS NOTES
Department of Internal Medicine        HISTORY OF PRESENT ILLNESS:      The patient is a 51 y.o. female who presents with having a elective laparoscopic sigmoid colon resection. Patient has a known history of sigmoid stricture. Patient had a anal proctosigmoidoscopy performed 1/25 with a scope with past 15 cm work there was complete closure of the rectosigmoid junction without an opening. Patient is seen postop. Patient has expected postop discomfort. Temperature is 97.8 with heart rate 72 blood pressure 140/65. O2 sat 98% on 2 L nasal cannula. 2/9/2022  Patient seen and examined on medical surgical floor. Patient complains of postop discomfort. She denies any problem with chest pain, nausea/vomiting or unusual shortness of breath. BUN/creatinine 5/0.6 with fasting blood sugar 126. Mild elevation of transaminase with a ALT of 69 AST is 35. Review of records have shown patient has intermittent elevation transaminase over the past 18 months. WBC is 21.5 with hemoglobin 12.6. Temperature 98.3 with heart rate 84 blood pressure 107/69. O2 sat 96% on room air at rest.  Urine output is very good. General surgery note reviewed.     Past Medical History:    Past Medical History:   Diagnosis Date    Acid reflux disease     Cyst of ovary     Fracture of nasal bone     Headache     Hearing loss     Hypertension     Migraine     Morbidly obese ([address] 10/05/2020    Multiple sclerosis ([address]     denies limitations at present 11/2020    VEENA no CPAP     severe VEENA per pt    Right shoulder pain 11/2020    Tinnitus     TMJ dysfunction      Past Surgical History:    Past Surgical History:   Procedure Laterality Date    APPENDECTOMY      BACK SURGERY      lumbar    BICEPS TENDON REPAIR Right 11/11/2020    BICEPS TENODESIS performed by Celine Lockwood MD at [place] [id number] Left 8/31/2021    LAPAROSCOPIC LEFT HEMICOLECTOMY WITH LOOP OSTOMY performed by Scott Lyn MD at [facility] OR    COLECTOMY N/A 9/6/2021    DIAGNOSTIC LAPAROSCOPY POSSIBLE BOWEL RESECTION POSSILBE OSTOMY performed by Rashawn Brownlee MD at [address] N/A 2/8/2022    LAPAROSCOPIC SIGMOID COLON RESECTION performed by Rashawn Brownlee MD at [address] [facility]  03/05/2018    Robotic hysterectomy, bilateral salpingectomy, right oophorectomy DR[facility] ARIANA MONIQUE [facility]     HYSTERECTOMY, TOTAL ABDOMINAL      LARYNGOSCOPY N/A 7/17/2020    DIRECT LARYNGOSCOPY--OMNI GUIDE LASER performed by Waqar Yarbrough MD at [facility] PARTIAL HYSTERECTOMY      PROCTOSIGMOIDOSCOPY N/A 10/12/2021    ANAL PROCTO SIGMOIDOSCOPY FLEXIBLE performed by Rashawn Brownlee MD at [address] [place] [address] N/A 1/25/2022    ANAL PROCTO [address] performed by Rashawn Brownlee MD at [facility] ARTHROSCOPY Right 11/11/2020    RIGHT SHOULDER DIAGNOSTIC ARTHROSCOPY WITH DECOMPRESSION ROTATOR CUFF REPAIR performed by Kimberly Kumari MD at [address] [facility]         Medications Prior to Admission:    @  Prior to Admission medications    Medication Sig Start Date End Date Taking?  Authorizing Provider   dicyclomine (BENTYL) 10 MG capsule Take 10 mg by mouth 4 times daily (before meals and nightly)    Historical Provider, MD   vitamin D (D3-50) 59033 UNIT CAPS Take 1 capsule by mouth once a week 11/30/21   MARIXA Ramirez - CNP   nortriptyline (PAMELOR) 10 MG capsule TAKE 1 CAPSULE BY MOUTH NIGHTLY FOR HEADACHES 11/5/21   Jen Montes De Oca PA-C   gabapentin (NEURONTIN) 300 MG capsule TAKE 1 CAPSULE BY MOUTH THREE TIMES DAILY 9/13/21 1/31/22  Deb Collazo APRN - CNP   dexlansoprazole (DEXILANT) 60 MG CPDR delayed release capsule Take 60 mg by mouth daily    Historical Provider, MD   baclofen (LIORESAL) 20 MG tablet Take 1 tablet by mouth 4 times daily as needed (muscle spasms; pain) 8/13/21   MARIXA Ramirez - CNP   rOPINIRole (REQUIP) 0.5 MG tablet Take 1 tablet by mouth 2 times daily as needed (restless legs) 8/13/21   MARIXA Cisneros CNP   ocrelizumab (OCREVUS) 300 MG/10ML SOLN injection Infuse 20 mLs intravenously every 6 months 7/19/21   MARIXA Cisneros CNP       Allergies:  Patient has no known allergies. Social History:   Social History     Socioeconomic History    Marital status: Single     Spouse name: Not on file    Number of children: 3    Years of education: 6    Highest education level: 11th grade   Occupational History    Not on file   Tobacco Use    Smoking status: Current Every Day Smoker     Packs/day: 0.50     Years: 25.00     Pack years: 12.50     Types: Cigarettes    Smokeless tobacco: Never Used    Tobacco comment: Ready to stop, requesting prescription for Chantix   Vaping Use    Vaping Use: Never used   Substance and Sexual Activity    Alcohol use: Yes     Alcohol/week: 1.0 standard drink     Types: 1 Glasses of wine per week     Comment: socially    Drug use: No    Sexual activity: Yes     Partners: Male   Other Topics Concern    Not on file   Social History Narrative    Uses [employer] for transportation    [employer]     Social Determinants of Health     Financial Resource Strain: Medium Risk    Difficulty of Paying Living Expenses: Somewhat hard   Food Insecurity: Food Insecurity Present    Worried About Running Out of Food in the Last Year: Sometimes true    [place] of Food in the Last Year: Never true   Transportation Needs: No Transportation Needs    Lack of Transportation (Medical): No    Lack of Transportation (Non-Medical): No   Physical Activity: Sufficiently Active    Days of Exercise per Week: 3 days    Minutes of Exercise per Session: 60 min   Stress: No Stress Concern Present    Feeling of Stress : Not at all   Social Connections: Socially Isolated    Frequency of Communication with Friends and Family:  Three times a week    Frequency of Social Gatherings with Friends and Family: Twice a week    Attends [employer] Services: Never    Active Member of Clubs or Organizations: No    Attends Club or Organization Meetings: Never    Marital Status: Never    Intimate Partner Violence:     Fear of Current or Ex-Partner: Not on file    Emotionally Abused: Not on file    Physically Abused: Not on file    Sexually Abused: Not on file   Housing Stability:     Unable to Pay for Housing in the Last Year: Not on file    Number of [place] in the Last Year: Not on file    Unstable Housing in the Last Year: Not on file       Family History:   Family History   Problem Relation Age of Onset    Mult Sclerosis Mother     Colon Cancer Neg Hx     Uterine Cancer Neg Hx     Ovarian Cancer Neg Hx     Breast Cancer Neg Hx        REVIEW OF SYSTEMS:    Gen: Patient denies any lightheadedness or dizziness. No LOC or syncope. No fevers or chills. HEENT: No earache, sore throat or nasal congestion. Resp: Denies cough, hemoptysis or sputum production. Cardiac: Denies chest pain, SOB, diaphoresis or palpitations. GI:+ Abdominal distention. Lower abdominal cramping. No nausea, vomiting, diarrhea or constipation. No melena or hematochezia. : No urinary complaints, dysuria, hematuria or frequency. MSK: No extremity weakness, paralysis or paresthesias. PHYSICAL EXAM:    Vitals:  /69   Pulse 84   Temp 98 °F (36.7 °C) (Oral)   Resp 16   Ht 5' 2\" (1.575 m)   Wt 187 lb (84.8 kg)   LMP 01/05/2018   SpO2 96%   BMI 34.20 kg/m²     General:  This is a 51 y.o. yo female who is alert and oriented in postop distress  HEENT:  Head is normocephalic and atraumatic, PERRLA, EOMI, mucus membranes moist with no pharyngeal erythema or exudate. Neck:  Supple with no carotid bruits, JVD or thyromegaly.   No cervical adenopathy  CV:  Regular rate and rhythm, no murmurs  Lungs: Mildly coarse breath sounds to auscultation bilaterally with no wheezes, rales or rhonchi  Abdomen:  + Expected postop abdomen, + ileostomy,+ abdominal fat, bowel sounds  hypoactive  Extremities:  No edema, peripheral pulses intact bilaterally  Neuro:  Cranial nerves II-XII grossly intact; motor and sensory function intact with no focal deficits  Skin:  No rashes, lesions or wounds      DATA:  CBC with Differential:    Lab Results   Component Value Date    WBC 21.5 02/09/2022    RBC 4.08 02/09/2022    HGB 12.6 02/09/2022    HCT 38.0 02/09/2022     02/09/2022    MCV 93.1 02/09/2022    MCH 30.9 02/09/2022    MCHC 33.2 02/09/2022    RDW 12.5 02/09/2022    METASPCT 0.9 11/23/2021    LYMPHOPCT 23.2 02/02/2022    MONOPCT 6.7 02/02/2022    BASOPCT 0.4 02/02/2022    MONOSABS 0.62 02/02/2022    LYMPHSABS 2.13 02/02/2022    EOSABS 0.20 02/02/2022    BASOSABS 0.04 02/02/2022     CMP:    Lab Results   Component Value Date     02/09/2022    K 3.7 02/09/2022     02/09/2022    CO2 25 02/09/2022    BUN 5 02/09/2022    CREATININE 0.6 02/09/2022    GFRAA >60 02/09/2022    LABGLOM >60 02/09/2022    GLUCOSE 126 02/09/2022    PROT 5.6 02/09/2022    LABALBU 3.3 02/09/2022    CALCIUM 8.4 02/09/2022    BILITOT 0.8 02/09/2022    ALKPHOS 87 02/09/2022    AST 35 02/09/2022    ALT 69 02/09/2022     Magnesium:    Lab Results   Component Value Date    MG 2.2 09/01/2021     Phosphorus:    Lab Results   Component Value Date    PHOS 3.2 09/01/2021     PT/INR:    Lab Results   Component Value Date    PROTIME 10.7 02/28/2020    INR 1.0 02/28/2020     Troponin:    Lab Results   Component Value Date    TROPONINI <0.01 07/20/2020     U/A:    Lab Results   Component Value Date    COLORU Yellow 11/23/2021    PROTEINU TRACE 11/23/2021    PHUR 5.0 11/23/2021    WBCUA 0-1 11/23/2021    RBCUA NONE 11/23/2021    TRICHOMONAS Present 02/26/2020    BACTERIA MANY 11/23/2021    CLARITYU SL CLOUDY 11/23/2021    SPECGRAV >=1.030 11/23/2021    LEUKOCYTESUR Negative 11/23/2021    UROBILINOGEN 0.2 11/23/2021    BILIRUBINUR SMALL 11/23/2021    BLOODU Negative 11/23/2021    GLUCOSEU Negative 11/23/2021    AMORPHOUS FEW 11/23/2021     ABG:  No results found for: PH, PCO2, PO2, HCO3, BE, THGB, TCO2, O2SAT  HgBA1c:    Lab Results   Component Value Date    LABA1C 5.4 05/11/2021     FLP:    Lab Results   Component Value Date    TRIG 184 05/11/2021    HDL 44 05/11/2021    LDLCALC 144 05/11/2021    LABVLDL 37 05/11/2021     TSH:    Lab Results   Component Value Date    TSH 0.293 09/01/2021     IRON:  No results found for: IRON  LIPASE:    Lab Results   Component Value Date    LIPASE 10 09/05/2021       ASSESSMENT AND PLAN:      Patient Active Problem List    Diagnosis Date Noted    Sigmoid stricture ([address]     Pelvic abscess in female     Ileus, postoperative ([address] 09/05/2021    S/P colectomy 08/31/2021    Malignant neoplasm of descending colon ([address]     Secondary restless legs syndrome 08/13/2021    Palafox's esophagus with dysplasia 05/11/2021    Morbidly obese ([address] 10/05/2020    Multiple sclerosis ([address] 08/04/2020    Vocal cord dysfunction 07/20/2020     Impression:  1. Sigmoid stricturelaparoscopic sigmoid resection 2/8/2022  2. History of VEENA-patient apparently been tested and is negative for CPAP use at this time  3. Obesity  4. Hypertension   5. History of current tobacco use  6. History of restless leg syndrome  7. Leukocytosis postop  8. Mild elevation transaminase postop    Plan:  Patient admitted to medical surgical floor  Home medications reviewed  Monitor heart rate, blood pressure, O2 saturation  Diet and abdominal pain meds per general surgery  Lovenox 40 mg subcu daily  IV fluids lactated Ringer's 125 cc an hour    Albuterol inhaler 2 puffs every 4 hours as needed for shortness of breath    CMP, CBC in a.m.     Arleen Buchanan DO, D.OWagner  2/9/2022  9:06 AM

## 2022-02-10 LAB
ANION GAP SERPL CALCULATED.3IONS-SCNC: 10 MMOL/L (ref 7–16)
BACTERIA: ABNORMAL /HPF
BILIRUBIN URINE: ABNORMAL
BLOOD, URINE: ABNORMAL
BUN BLDV-MCNC: 5 MG/DL (ref 6–20)
CALCIUM SERPL-MCNC: 8.3 MG/DL (ref 8.6–10.2)
CHLORIDE BLD-SCNC: 97 MMOL/L (ref 98–107)
CLARITY: CLEAR
CO2: 23 MMOL/L (ref 22–29)
COLOR: ABNORMAL
CREAT SERPL-MCNC: 0.7 MG/DL (ref 0.5–1)
EPITHELIAL CELLS, UA: ABNORMAL /HPF
GFR AFRICAN AMERICAN: >60
GFR NON-AFRICAN AMERICAN: >60 ML/MIN/1.73
GLUCOSE BLD-MCNC: 111 MG/DL (ref 74–99)
GLUCOSE URINE: NEGATIVE MG/DL
HCT VFR BLD CALC: 42.6 % (ref 34–48)
HEMOGLOBIN: 14.3 G/DL (ref 11.5–15.5)
KETONES, URINE: NEGATIVE MG/DL
LEUKOCYTE ESTERASE, URINE: NEGATIVE
MCH RBC QN AUTO: 31.4 PG (ref 26–35)
MCHC RBC AUTO-ENTMCNC: 33.6 % (ref 32–34.5)
MCV RBC AUTO: 93.6 FL (ref 80–99.9)
NITRITE, URINE: NEGATIVE
PDW BLD-RTO: 12.8 FL (ref 11.5–15)
PH UA: 5.5 (ref 5–9)
PLATELET # BLD: 222 E9/L (ref 130–450)
PMV BLD AUTO: 10.2 FL (ref 7–12)
POTASSIUM SERPL-SCNC: 3.5 MMOL/L (ref 3.5–5)
PROTEIN UA: ABNORMAL MG/DL
RBC # BLD: 4.55 E12/L (ref 3.5–5.5)
RBC UA: ABNORMAL /HPF (ref 0–2)
SODIUM BLD-SCNC: 130 MMOL/L (ref 132–146)
SPECIFIC GRAVITY UA: 1.02 (ref 1–1.03)
UROBILINOGEN, URINE: 0.2 E.U./DL
WBC # BLD: 23.7 E9/L (ref 4.5–11.5)
WBC UA: ABNORMAL /HPF (ref 0–5)

## 2022-02-10 PROCEDURE — 6370000000 HC RX 637 (ALT 250 FOR IP): Performed by: INTERNAL MEDICINE

## 2022-02-10 PROCEDURE — 1200000000 HC SEMI PRIVATE

## 2022-02-10 PROCEDURE — 81001 URINALYSIS AUTO W/SCOPE: CPT

## 2022-02-10 PROCEDURE — 36415 COLL VENOUS BLD VENIPUNCTURE: CPT

## 2022-02-10 PROCEDURE — 2580000003 HC RX 258: Performed by: SURGERY

## 2022-02-10 PROCEDURE — 6360000002 HC RX W HCPCS: Performed by: SURGERY

## 2022-02-10 PROCEDURE — 85027 COMPLETE CBC AUTOMATED: CPT

## 2022-02-10 PROCEDURE — 6370000000 HC RX 637 (ALT 250 FOR IP): Performed by: SURGERY

## 2022-02-10 PROCEDURE — 80048 BASIC METABOLIC PNL TOTAL CA: CPT

## 2022-02-10 PROCEDURE — 99024 POSTOP FOLLOW-UP VISIT: CPT | Performed by: SURGERY

## 2022-02-10 PROCEDURE — 2700000000 HC OXYGEN THERAPY PER DAY

## 2022-02-10 PROCEDURE — 6360000002 HC RX W HCPCS: Performed by: INTERNAL MEDICINE

## 2022-02-10 RX ORDER — PANTOPRAZOLE SODIUM 40 MG/1
40 TABLET, DELAYED RELEASE ORAL
Status: DISCONTINUED | OUTPATIENT
Start: 2022-02-10 | End: 2022-02-24 | Stop reason: HOSPADM

## 2022-02-10 RX ORDER — SODIUM CHLORIDE, SODIUM LACTATE, POTASSIUM CHLORIDE, CALCIUM CHLORIDE 600; 310; 30; 20 MG/100ML; MG/100ML; MG/100ML; MG/100ML
INJECTION, SOLUTION INTRAVENOUS CONTINUOUS
Status: DISCONTINUED | OUTPATIENT
Start: 2022-02-10 | End: 2022-02-17

## 2022-02-10 RX ORDER — ONDANSETRON 2 MG/ML
4 INJECTION INTRAMUSCULAR; INTRAVENOUS ONCE
Status: COMPLETED | OUTPATIENT
Start: 2022-02-10 | End: 2022-02-10

## 2022-02-10 RX ORDER — CALCIUM CARBONATE 200(500)MG
500 TABLET,CHEWABLE ORAL 3 TIMES DAILY PRN
Status: DISCONTINUED | OUTPATIENT
Start: 2022-02-10 | End: 2022-02-24 | Stop reason: HOSPADM

## 2022-02-10 RX ADMIN — Medication 10 ML: at 07:37

## 2022-02-10 RX ADMIN — METHOCARBAMOL 500 MG: 500 TABLET ORAL at 12:06

## 2022-02-10 RX ADMIN — ONDANSETRON 4 MG: 2 INJECTION INTRAMUSCULAR; INTRAVENOUS at 21:20

## 2022-02-10 RX ADMIN — ONDANSETRON 4 MG: 4 TABLET, ORALLY DISINTEGRATING ORAL at 15:05

## 2022-02-10 RX ADMIN — METHOCARBAMOL 500 MG: 500 TABLET ORAL at 17:29

## 2022-02-10 RX ADMIN — TRAMADOL HYDROCHLORIDE 50 MG: 50 TABLET, COATED ORAL at 15:05

## 2022-02-10 RX ADMIN — MORPHINE SULFATE 2 MG: 2 INJECTION, SOLUTION INTRAMUSCULAR; INTRAVENOUS at 17:09

## 2022-02-10 RX ADMIN — Medication 10 ML: at 23:40

## 2022-02-10 RX ADMIN — CALCIUM CARBONATE (ANTACID) CHEW TAB 500 MG 500 MG: 500 CHEW TAB at 20:29

## 2022-02-10 RX ADMIN — TRAMADOL HYDROCHLORIDE 50 MG: 50 TABLET, COATED ORAL at 07:45

## 2022-02-10 RX ADMIN — ENOXAPARIN SODIUM 40 MG: 100 INJECTION SUBCUTANEOUS at 07:36

## 2022-02-10 RX ADMIN — PANTOPRAZOLE SODIUM 40 MG: 40 TABLET, DELAYED RELEASE ORAL at 05:28

## 2022-02-10 RX ADMIN — MORPHINE SULFATE 4 MG: 4 INJECTION, SOLUTION INTRAMUSCULAR; INTRAVENOUS at 10:30

## 2022-02-10 RX ADMIN — PANTOPRAZOLE SODIUM 40 MG: 40 TABLET, DELAYED RELEASE ORAL at 17:29

## 2022-02-10 RX ADMIN — MORPHINE SULFATE 4 MG: 4 INJECTION, SOLUTION INTRAMUSCULAR; INTRAVENOUS at 04:19

## 2022-02-10 RX ADMIN — SODIUM CHLORIDE, POTASSIUM CHLORIDE, SODIUM LACTATE AND CALCIUM CHLORIDE: 600; 310; 30; 20 INJECTION, SOLUTION INTRAVENOUS at 07:36

## 2022-02-10 RX ADMIN — MORPHINE SULFATE 4 MG: 4 INJECTION, SOLUTION INTRAMUSCULAR; INTRAVENOUS at 00:14

## 2022-02-10 RX ADMIN — METHOCARBAMOL 500 MG: 500 TABLET ORAL at 07:36

## 2022-02-10 RX ADMIN — METHOCARBAMOL 500 MG: 500 TABLET ORAL at 20:29

## 2022-02-10 ASSESSMENT — PAIN DESCRIPTION - PROGRESSION
CLINICAL_PROGRESSION: GRADUALLY IMPROVING
CLINICAL_PROGRESSION: GRADUALLY IMPROVING

## 2022-02-10 ASSESSMENT — PAIN SCALES - GENERAL
PAINLEVEL_OUTOF10: 8
PAINLEVEL_OUTOF10: 10
PAINLEVEL_OUTOF10: 0
PAINLEVEL_OUTOF10: 0
PAINLEVEL_OUTOF10: 9
PAINLEVEL_OUTOF10: 10
PAINLEVEL_OUTOF10: 10
PAINLEVEL_OUTOF10: 7
PAINLEVEL_OUTOF10: 4

## 2022-02-10 ASSESSMENT — PAIN DESCRIPTION - FREQUENCY: FREQUENCY: INTERMITTENT

## 2022-02-10 ASSESSMENT — PAIN DESCRIPTION - PAIN TYPE
TYPE: SURGICAL PAIN
TYPE: SURGICAL PAIN

## 2022-02-10 ASSESSMENT — PAIN DESCRIPTION - DESCRIPTORS
DESCRIPTORS: ACHING
DESCRIPTORS: DISCOMFORT

## 2022-02-10 ASSESSMENT — PAIN DESCRIPTION - LOCATION
LOCATION: ABDOMEN
LOCATION: ABDOMEN

## 2022-02-10 NOTE — PROGRESS NOTES
Department of Internal Medicine        HISTORY OF PRESENT ILLNESS:      The patient is a 51 y.o. female who presents with having a elective laparoscopic sigmoid colon resection. Patient has a known history of sigmoid stricture. Patient had a anal proctosigmoidoscopy performed 1/25 with a scope with past 15 cm work there was complete closure of the rectosigmoid junction without an opening. Patient is seen postop. Patient has expected postop discomfort. Temperature is 97.8 with heart rate 72 blood pressure 140/65. O2 sat 98% on 2 L nasal cannula. 2/9/2022  Patient seen and examined on medical surgical floor. Patient complains of postop discomfort. She denies any problem with chest pain, nausea/vomiting or unusual shortness of breath. BUN/creatinine 5/0.6 with fasting blood sugar 126. Mild elevation of transaminase with a ALT of 69 AST is 35. Review of records have shown patient has intermittent elevation transaminase over the past 18 months. WBC is 21.5 with hemoglobin 12.6. Temperature 98.3 with heart rate 84 blood pressure 107/69. O2 sat 96% on room air at rest.  Urine output is very good. General surgery note reviewed. 2/10/2022  Patient seen and examined on medical surgical floor. Patient complains of postop discomfort. Patient denies any problem with chest pain, dizziness, nausea, fever/chills, dysuria, cough or unusual shortness of breath. Patient is passing some gas but no bowel movements. Increase activity today. BUN/creatinine 5/0.7 with serum sodium 130. WBC is still elevated 23.7 with hemoglobin 14.3. Temperature is 98.8 with heart rate 76 blood pressure 110/78. O2 sat 94% on room air at rest.  Urine output is adequate.     Past Medical History:    Past Medical History:   Diagnosis Date    Acid reflux disease     Cyst of ovary     Fracture of nasal bone     Headache     Hearing loss     Hypertension     Migraine     Morbidly obese ([address] 10/05/2020    Multiple sclerosis [facility])     denies limitations at present 11/2020    VEENA no CPAP     severe VEENA per pt    Right shoulder pain 11/2020    Tinnitus     TMJ dysfunction      Past Surgical History:    Past Surgical History:   Procedure Laterality Date    APPENDECTOMY      BACK SURGERY      lumbar    BICEPS TENDON REPAIR Right 11/11/2020    BICEPS TENODESIS performed by John Escoto MD at [place] [address] Left 8/31/2021    LAPAROSCOPIC LEFT HEMICOLECTOMY WITH LOOP OSTOMY performed by Jac Salguero MD at [address] 9/6/2021    DIAGNOSTIC LAPAROSCOPY POSSIBLE BOWEL RESECTION POSSILBE OSTOMY performed by Jac Salguero MD at [address] 2/8/2022    LAPAROSCOPIC SIGMOID COLON RESECTION performed by Jac Salguero MD at [address] [facility]  03/05/2018    Robotic hysterectomy, bilateral salpingectomy, right oophorectomy [facility] MON[facility]QUE [facility]     HYSTERECTOMY, TOTAL ABDOMINAL      LARYNGOSCOPY N/A 7/17/2020    DIRECT LARYNGOSCOPY--OMNI GUIDE LASER performed by Oscar Gómez MD at [place] [address] PARTIAL HYSTERECTOMY      PROCTOSIGMOIDOSCOPY N/A 10/12/2021    ANAL PROCTO SIGMOIDOSCOPY FLEXIBLE performed by Jac Salguero MD at [address] [place] [address] N/A 1/25/2022    ANAL PROCTO [address] performed by Jac Salguero MD at [facility] ARTHROSCOPY Right 11/11/2020    RIGHT SHOULDER DIAGNOSTIC ARTHROSCOPY WITH DECOMPRESSION ROTATOR CUFF REPAIR performed by John Escoto MD at [address] [place] [address]         Medications Prior to Admission:    @  Prior to Admission medications    Medication Sig Start Date End Date Taking?  Authorizing Provider   dicyclomine (BENTYL) 10 MG capsule Take 10 mg by mouth 4 times daily (before meals and nightly)    Historical Provider, MD   vitamin D (D3-50) 56017 UNIT CAPS Take 1 capsule by mouth once a week 11/30/21   MARIXA Mendez - CNP nortriptyline (PAMELOR) 10 MG capsule TAKE 1 CAPSULE BY MOUTH NIGHTLY FOR HEADACHES 11/5/21   Di Myers PA-C   gabapentin (NEURONTIN) 300 MG capsule TAKE 1 CAPSULE BY MOUTH THREE TIMES DAILY 9/13/21 1/31/22  MARIXA Romero CNP   dexlansoprazole (DEXILANT) 60 MG CPDR delayed release capsule Take 60 mg by mouth daily    Historical Provider, MD   baclofen (LIORESAL) 20 MG tablet Take 1 tablet by mouth 4 times daily as needed (muscle spasms; pain) 8/13/21   MARIXA Romero CNP   rOPINIRole (REQUIP) 0.5 MG tablet Take 1 tablet by mouth 2 times daily as needed (restless legs) 8/13/21   MARIXA Romero CNP   ocrelizumab (OCREVUS) 300 MG/10ML SOLN injection Infuse 20 mLs intravenously every 6 months 7/19/21   MARIXA Romero CNP       Allergies:  Patient has no known allergies. Social History:   Social History     Socioeconomic History    Marital status: Single     Spouse name: Not on file    Number of children: 3    Years of education: 6    Highest education level: 11th grade   Occupational History    Not on file   Tobacco Use    Smoking status: Current Every Day Smoker     Packs/day: 0.50     Years: 25.00     Pack years: 12.50     Types: Cigarettes    Smokeless tobacco: Never Used    Tobacco comment: Ready to stop, requesting prescription for Chantix   Vaping Use    Vaping Use: Never used   Substance and Sexual Activity    Alcohol use:  Yes     Alcohol/week: 1.0 standard drink     Types: 1 Glasses of wine per week     Comment: socially    Drug use: No    Sexual activity: Yes     Partners: Male   Other Topics Concern    Not on file   Social History Narrative    Uses [employer] for transportation    [employer]     Social Determinants of Health     Financial Resource Strain: Medium Risk    Difficulty of Paying Living Expenses: Somewhat hard   Food Insecurity: Food Insecurity Present    Worried About Running Out of Food in the Last Year: Sometimes true   [facility] Ran Out of Food in the Last Year: Never true   Transportation Needs: No Transportation Needs    Lack of Transportation (Medical): No    Lack of Transportation (Non-Medical): No   Physical Activity: Sufficiently Active    Days of Exercise per Week: 3 days    Minutes of Exercise per Session: 60 min   Stress: No Stress Concern Present    Feeling of Stress : Not at all   Social Connections: Socially Isolated    Frequency of Communication with Friends and Family: Three times a week    Frequency of Social Gatherings with Friends and Family: Twice a week    Attends [employer] Services: Never    Active Member of Clubs or Organizations: No    Attends Club or Organization Meetings: Never    Marital Status: Never    Intimate Partner Violence:     Fear of Current or Ex-Partner: Not on file    Emotionally Abused: Not on file    Physically Abused: Not on file    Sexually Abused: Not on file   Housing Stability:     Unable to Pay for Housing in the Last Year: Not on file    Number of [place] in the Last Year: Not on file    Unstable Housing in the Last Year: Not on file       Family History:   Family History   Problem Relation Age of Onset    Mult Sclerosis Mother     Colon Cancer Neg Hx     Uterine Cancer Neg Hx     Ovarian Cancer Neg Hx     Breast Cancer Neg Hx        REVIEW OF SYSTEMS:    Gen: Patient denies any lightheadedness or dizziness. No LOC or syncope. No fevers or chills. HEENT: No earache, sore throat or nasal congestion. Resp: Denies cough, hemoptysis or sputum production. Cardiac: Denies chest pain, SOB, diaphoresis or palpitations. GI:+ Abdominal distention. Lower abdominal cramping. No nausea, vomiting, diarrhea or constipation. No melena or hematochezia. : No urinary complaints, dysuria, hematuria or frequency. MSK: No extremity weakness, paralysis or paresthesias.      PHYSICAL EXAM:    Vitals:  /78   Pulse 76   Temp 98.8 °F (37.1 °C) (Oral) Resp 18   Ht 5' 2\" (1.575 m)   Wt 187 lb (84.8 kg)   LMP 01/05/2018   SpO2 94%   BMI 34.20 kg/m²     General:  This is a 51 y.o. yo female who is alert and oriented in postop distress  HEENT:  Head is normocephalic and atraumatic, PERRLA, EOMI, mucus membranes moist with no pharyngeal erythema or exudate. Neck:  Supple with no carotid bruits, JVD or thyromegaly.   No cervical adenopathy  CV:  Regular rate and rhythm, no murmurs  Lungs: Mildly coarse breath sounds to auscultation bilaterally with no wheezes, rales or rhonchi  Abdomen:  + Expected postop abdomen, + ileostomy,+ abdominal fat, bowel sounds  hypoactive  Extremities:  No edema, peripheral pulses intact bilaterally  Neuro:  Cranial nerves II-XII grossly intact; motor and sensory function intact with no focal deficits  Skin:  No rashes, lesions or wounds      DATA:  CBC with Differential:    Lab Results   Component Value Date    WBC 23.7 02/10/2022    RBC 4.55 02/10/2022    HGB 14.3 02/10/2022    HCT 42.6 02/10/2022     02/10/2022    MCV 93.6 02/10/2022    MCH 31.4 02/10/2022    MCHC 33.6 02/10/2022    RDW 12.8 02/10/2022    METASPCT 0.9 11/23/2021    LYMPHOPCT 23.2 02/02/2022    MONOPCT 6.7 02/02/2022    BASOPCT 0.4 02/02/2022    MONOSABS 0.62 02/02/2022    LYMPHSABS 2.13 02/02/2022    EOSABS 0.20 02/02/2022    BASOSABS 0.04 02/02/2022     CMP:    Lab Results   Component Value Date     02/10/2022    K 3.5 02/10/2022    K 3.7 02/09/2022    CL 97 02/10/2022    CO2 23 02/10/2022    BUN 5 02/10/2022    CREATININE 0.7 02/10/2022    GFRAA >60 02/10/2022    LABGLOM >60 02/10/2022    GLUCOSE 111 02/10/2022    PROT 5.6 02/09/2022    LABALBU 3.3 02/09/2022    CALCIUM 8.3 02/10/2022    BILITOT 0.8 02/09/2022    ALKPHOS 87 02/09/2022    AST 35 02/09/2022    ALT 69 02/09/2022     Magnesium:    Lab Results   Component Value Date    MG 2.2 09/01/2021     Phosphorus:    Lab Results   Component Value Date    PHOS 3.2 09/01/2021     PT/INR:    Lab Results Component Value Date    PROTIME 10.7 02/28/2020    INR 1.0 02/28/2020     Troponin:    Lab Results   Component Value Date    TROPONINI <0.01 07/20/2020     U/A:    Lab Results   Component Value Date    COLORU Yellow 11/23/2021    PROTEINU TRACE 11/23/2021    PHUR 5.0 11/23/2021    WBCUA 0-1 11/23/2021    RBCUA NONE 11/23/2021    TRICHOMONAS Present 02/26/2020    BACTERIA MANY 11/23/2021    CLARITYU SL CLOUDY 11/23/2021    SPECGRAV >=1.030 11/23/2021    LEUKOCYTESUR Negative 11/23/2021    UROBILINOGEN 0.2 11/23/2021    BILIRUBINUR SMALL 11/23/2021    BLOODU Negative 11/23/2021    GLUCOSEU Negative 11/23/2021    AMORPHOUS FEW 11/23/2021     ABG:  No results found for: PH, PCO2, PO2, HCO3, BE, THGB, TCO2, O2SAT  HgBA1c:    Lab Results   Component Value Date    LABA1C 5.4 05/11/2021     FLP:    Lab Results   Component Value Date    TRIG 184 05/11/2021    HDL 44 05/11/2021    LDLCALC 144 05/11/2021    LABVLDL 37 05/11/2021     TSH:    Lab Results   Component Value Date    TSH 0.293 09/01/2021     IRON:  No results found for: IRON  LIPASE:    Lab Results   Component Value Date    LIPASE 10 09/05/2021       ASSESSMENT AND PLAN:      Patient Active Problem List    Diagnosis Date Noted    Sigmoid stricture ([address]     Pelvic abscess in female     Ileus, postoperative ([address] 09/05/2021    S/P colectomy 08/31/2021    Malignant neoplasm of descending colon ([address]     Secondary restless legs syndrome 08/13/2021    Palafox's esophagus with dysplasia 05/11/2021    Morbidly obese ([address] 10/05/2020    Multiple sclerosis ([address] 08/04/2020    Vocal cord dysfunction 07/20/2020     Impression:  1. Sigmoid stricturelaparoscopic sigmoid resection 2/8/2022  2. History of VEENA-patient apparently been tested and is negative for CPAP use at this time  3. Obesity  4. Hypertension   5. History of current tobacco use  6. History of restless leg syndrome  7. Leukocytosis postop  8.   Mild elevation transaminase postop    Plan:  Patient admitted to medical surgical floor  Home medications reviewed  Monitor heart rate, blood pressure, O2 saturation  Diet and abdominal pain meds per general surgery  Lovenox 40 mg subcu daily  IV fluids lactated Ringer's 100 cc an hour    Albuterol aerosol 2.5 mg every 4 hours as needed for shortness of breath    UA today    CMP, CBC in a.m.     Francisco Michelle DO, D.OWagner  2/10/2022  9:31 AM

## 2022-02-10 NOTE — PROGRESS NOTES
Surgery Progress Note            Chief complaint:   No chief complaint on file.      Patient Active Problem List   Diagnosis    Vocal cord dysfunction    [facility] sclerosis ([facility]    Morbidly obese ([facility]    Palafox's esophagus with dysplasia    Secondary restless legs syndrome    S/P colectomy    Malignant neoplasm of descending colon ([facility]    Ileus, postoperative ([facility]    Pelvic abscess in female    Sigmoid stricture ([facility]       S: increased pain and distention    O:   Vitals:    02/10/22 0555   BP: 110/78   Pulse: 76   Resp: 18   Temp: 98.8 °F (37.1 °C)   SpO2:        Intake/Output Summary (Last 24 hours) at 2/10/2022 0725  Last data filed at 2/10/2022 0503  Gross per 24 hour   Intake 1070 ml   Output 725 ml   Net 345 ml           Labs:  Lab Results   Component Value Date    WBC 23.7 02/10/2022    WBC 21.5 02/09/2022    WBC 9.2 02/02/2022    HGB 14.3 02/10/2022    HGB 12.6 02/09/2022    HGB 14.7 02/02/2022    HCT 42.6 02/10/2022    HCT 38.0 02/09/2022    HCT 44.6 02/02/2022     Lab Results   Component Value Date    CREATININE 0.7 02/10/2022    BUN 5 (L) 02/10/2022     (L) 02/10/2022    K 3.5 02/10/2022    CL 97 (L) 02/10/2022    CO2 23 02/10/2022     Lab Results   Component Value Date    LIPASE 10 09/05/2021    LIPASE 13 02/04/2018    LIPASE 11 01/15/2018         Physical exam:   /78   Pulse 76   Temp 98.8 °F (37.1 °C) (Oral)   Resp 18   Ht 5' 2\" (1.575 m)   Wt 187 lb (84.8 kg)   LMP 01/05/2018   SpO2 (!) 86% Comment: 2L oxygen applied  BMI 34.20 kg/m²   General appearance: NAD  Head: NCAT  Neck: supple, no masses  Lungs: equal chest rise bilateral  Heart: S1S2 present  Abdomen: soft, minimally tender, moderately distended,  Incisions clean and intact, and stool in ostomy bag  Skin; no lesions  Gu: no cva tenderness  Extremities: extremities normal, atraumatic, no cyanosis or edema    A:  POD # 2 lap sigmoid resection for stricture    P: no changes today, will await resolution of ileus    India Messer MD, MD  2/10/2022

## 2022-02-10 NOTE — PLAN OF CARE
Problem: Infection - Surgical Site:  Goal: Will show no infection signs and symptoms  Description: Will show no infection signs and symptoms  Outcome: Met This Shift     Problem: Pain:  Goal: Pain level will decrease  Description: Pain level will decrease  Outcome: Met This Shift  Goal: Control of acute pain  Description: Control of acute pain  Outcome: Met This Shift  Goal: Control of chronic pain  Description: Control of chronic pain  Outcome: Completed

## 2022-02-11 ENCOUNTER — APPOINTMENT (OUTPATIENT)
Dept: CT IMAGING | Age: 47
DRG: 231 | End: 2022-02-11
Attending: SURGERY
Payer: COMMERCIAL

## 2022-02-11 LAB
ALBUMIN SERPL-MCNC: 2.5 G/DL (ref 3.5–5.2)
ALP BLD-CCNC: 97 U/L (ref 35–104)
ALT SERPL-CCNC: 25 U/L (ref 0–32)
ANION GAP SERPL CALCULATED.3IONS-SCNC: 11 MMOL/L (ref 7–16)
AST SERPL-CCNC: 8 U/L (ref 0–31)
BILIRUB SERPL-MCNC: 1.1 MG/DL (ref 0–1.2)
BUN BLDV-MCNC: 6 MG/DL (ref 6–20)
CALCIUM SERPL-MCNC: 8.6 MG/DL (ref 8.6–10.2)
CHLORIDE BLD-SCNC: 92 MMOL/L (ref 98–107)
CO2: 24 MMOL/L (ref 22–29)
CREAT SERPL-MCNC: 0.6 MG/DL (ref 0.5–1)
GFR AFRICAN AMERICAN: >60
GFR NON-AFRICAN AMERICAN: >60 ML/MIN/1.73
GLUCOSE BLD-MCNC: 115 MG/DL (ref 74–99)
HCT VFR BLD CALC: 40.2 % (ref 34–48)
HEMOGLOBIN: 13.7 G/DL (ref 11.5–15.5)
MCH RBC QN AUTO: 31.9 PG (ref 26–35)
MCHC RBC AUTO-ENTMCNC: 34.1 % (ref 32–34.5)
MCV RBC AUTO: 93.5 FL (ref 80–99.9)
OSMOLALITY: 274 MOSM/KG (ref 285–310)
PDW BLD-RTO: 12.4 FL (ref 11.5–15)
PLATELET # BLD: 218 E9/L (ref 130–450)
PMV BLD AUTO: 10.3 FL (ref 7–12)
POTASSIUM SERPL-SCNC: 3.4 MMOL/L (ref 3.5–5)
RBC # BLD: 4.3 E12/L (ref 3.5–5.5)
SODIUM BLD-SCNC: 127 MMOL/L (ref 132–146)
TOTAL PROTEIN: 5.8 G/DL (ref 6.4–8.3)
WBC # BLD: 24.6 E9/L (ref 4.5–11.5)

## 2022-02-11 PROCEDURE — 85027 COMPLETE CBC AUTOMATED: CPT

## 2022-02-11 PROCEDURE — 36415 COLL VENOUS BLD VENIPUNCTURE: CPT

## 2022-02-11 PROCEDURE — 6360000002 HC RX W HCPCS: Performed by: SURGERY

## 2022-02-11 PROCEDURE — 1200000000 HC SEMI PRIVATE

## 2022-02-11 PROCEDURE — 2580000003 HC RX 258: Performed by: SURGERY

## 2022-02-11 PROCEDURE — 74150 CT ABDOMEN W/O CONTRAST: CPT

## 2022-02-11 PROCEDURE — 6370000000 HC RX 637 (ALT 250 FOR IP): Performed by: INTERNAL MEDICINE

## 2022-02-11 PROCEDURE — 80053 COMPREHEN METABOLIC PANEL: CPT

## 2022-02-11 PROCEDURE — 83930 ASSAY OF BLOOD OSMOLALITY: CPT

## 2022-02-11 PROCEDURE — 6370000000 HC RX 637 (ALT 250 FOR IP): Performed by: SURGERY

## 2022-02-11 RX ADMIN — ENOXAPARIN SODIUM 40 MG: 100 INJECTION SUBCUTANEOUS at 08:15

## 2022-02-11 RX ADMIN — METHOCARBAMOL 500 MG: 500 TABLET ORAL at 17:03

## 2022-02-11 RX ADMIN — METHOCARBAMOL 500 MG: 500 TABLET ORAL at 20:25

## 2022-02-11 RX ADMIN — MORPHINE SULFATE 4 MG: 4 INJECTION, SOLUTION INTRAMUSCULAR; INTRAVENOUS at 08:15

## 2022-02-11 RX ADMIN — SODIUM CHLORIDE, POTASSIUM CHLORIDE, SODIUM LACTATE AND CALCIUM CHLORIDE: 600; 310; 30; 20 INJECTION, SOLUTION INTRAVENOUS at 19:19

## 2022-02-11 RX ADMIN — METHOCARBAMOL 500 MG: 500 TABLET ORAL at 08:17

## 2022-02-11 RX ADMIN — PANTOPRAZOLE SODIUM 40 MG: 40 TABLET, DELAYED RELEASE ORAL at 06:32

## 2022-02-11 RX ADMIN — TRAMADOL HYDROCHLORIDE 50 MG: 50 TABLET, COATED ORAL at 19:19

## 2022-02-11 RX ADMIN — TRAMADOL HYDROCHLORIDE 50 MG: 50 TABLET, COATED ORAL at 12:46

## 2022-02-11 RX ADMIN — MORPHINE SULFATE 4 MG: 4 INJECTION, SOLUTION INTRAMUSCULAR; INTRAVENOUS at 15:56

## 2022-02-11 RX ADMIN — METHOCARBAMOL 500 MG: 500 TABLET ORAL at 12:44

## 2022-02-11 RX ADMIN — CALCIUM CARBONATE (ANTACID) CHEW TAB 500 MG 500 MG: 500 CHEW TAB at 12:43

## 2022-02-11 RX ADMIN — ONDANSETRON 4 MG: 2 INJECTION INTRAMUSCULAR; INTRAVENOUS at 12:44

## 2022-02-11 RX ADMIN — MORPHINE SULFATE 4 MG: 4 INJECTION, SOLUTION INTRAMUSCULAR; INTRAVENOUS at 01:02

## 2022-02-11 RX ADMIN — PANTOPRAZOLE SODIUM 40 MG: 40 TABLET, DELAYED RELEASE ORAL at 15:56

## 2022-02-11 ASSESSMENT — PAIN SCALES - GENERAL
PAINLEVEL_OUTOF10: 8
PAINLEVEL_OUTOF10: 3
PAINLEVEL_OUTOF10: 6
PAINLEVEL_OUTOF10: 8
PAINLEVEL_OUTOF10: 8
PAINLEVEL_OUTOF10: 7
PAINLEVEL_OUTOF10: 9

## 2022-02-11 ASSESSMENT — PAIN DESCRIPTION - PAIN TYPE: TYPE: SURGICAL PAIN

## 2022-02-11 ASSESSMENT — PAIN DESCRIPTION - LOCATION: LOCATION: ABDOMEN

## 2022-02-11 ASSESSMENT — PAIN DESCRIPTION - PROGRESSION
CLINICAL_PROGRESSION: GRADUALLY IMPROVING
CLINICAL_PROGRESSION: GRADUALLY IMPROVING

## 2022-02-11 NOTE — PROGRESS NOTES
Department of Internal Medicine        HISTORY OF PRESENT ILLNESS:      The patient is a 51 y.o. female who presents with having a elective laparoscopic sigmoid colon resection. Patient has a known history of sigmoid stricture. Patient had a anal proctosigmoidoscopy performed 1/25 with a scope with past 15 cm work there was complete closure of the rectosigmoid junction without an opening. Patient is seen postop. Patient has expected postop discomfort. Temperature is 97.8 with heart rate 72 blood pressure 140/65. O2 sat 98% on 2 L nasal cannula. 2/9/2022  Patient seen and examined on medical surgical floor. Patient complains of postop discomfort. She denies any problem with chest pain, nausea/vomiting or unusual shortness of breath. BUN/creatinine 5/0.6 with fasting blood sugar 126. Mild elevation of transaminase with a ALT of 69 AST is 35. Review of records have shown patient has intermittent elevation transaminase over the past 18 months. WBC is 21.5 with hemoglobin 12.6. Temperature 98.3 with heart rate 84 blood pressure 107/69. O2 sat 96% on room air at rest.  Urine output is very good. General surgery note reviewed. 2/10/2022  Patient seen and examined on medical surgical floor. Patient complains of postop discomfort. Patient denies any problem with chest pain, dizziness, nausea, fever/chills, dysuria, cough or unusual shortness of breath. Patient is passing some gas but no bowel movements. Increase activity today. BUN/creatinine 5/0.7 with serum sodium 130. WBC is still elevated 23.7 with hemoglobin 14.3. Temperature is 98.8 with heart rate 76 blood pressure 110/78. O2 sat 94% on room air at rest.  Urine output is adequate. 2/11/2022  Patient seen examined on medical surgical floor. Patient complains of persistent postop discomfort along with some nausea and poor appetite. Patient denies passing had any bowel movements or gas recently.   BUN/creatinine 6/0.6 with serum sodium 124 7. Potassium 3.4 with WBC 24.6 and hemoglobin 13.7. Temperature is 98.6nine 9.3 with heart rate of 99. O2 sat 98% on room air at rest.  General surgery note reviewed. CT of the abdomen pelvis was ordered for today. Past Medical History:    Past Medical History:   Diagnosis Date    Acid reflux disease     Cyst of ovary     Fracture of nasal bone     Headache     Hearing loss     Hypertension     Migraine     Morbidly obese ([facility] 10/05/2020    Multiple sclerosis ([facility] [facility]     denies limitations at present 11/2020    VEENA no CPAP     severe VEENA per pt    Right shoulder pain 11/2020    Tinnitus     TMJ dysfunction      Past Surgical History:    Past Surgical History:   Procedure Laterality Date    APPENDECTOMY      BACK SURGERY      lumbar    BICEPS TENDON REPAIR Right 11/11/2020    BICEPS TENODESIS performed by Braydon Melissa MD at [place] [id number] Left 8/31/2021    LAPAROSCOPIC LEFT HEMICOLECTOMY WITH LOOP OSTOMY performed by Alvina Mcginnis MD at [address] 9/6/2021    DIAGNOSTIC LAPAROSCOPY POSSIBLE BOWEL RESECTION POSSILBE OSTOMY performed by Alvina Mcginnis MD at [address] 2/8/2022    LAPAROSCOPIC SIGMOID COLON RESECTION performed by Alvina Mcginnis MD at [address] [facility]  03/05/2018    Robotic hysterectomy, bilateral salpingectomy, right oophorectomy DR. ARINAA MONIQUE [facility]     HYSTERECTOMY, TOTAL ABDOMINAL      LARYNGOSCOPY N/A 7/17/2020    DIRECT LARYNGOSCOPY--OMNI GUIDE LASER performed by Lien Roberto MD at [address] PROCTOSIGMOIDOSCOPY N/A 10/12/2021    ANAL PROCTO SIGMOIDOSCOPY FLEXIBLE performed by Alvina Mcginnis MD at [address] [place] [address] N/A 1/25/2022    ANAL PROCTO SIGMOIDOSCOPY FLEXIBLE performed by Alvina Mcginnis MD at [facility] ARTHROSCOPY Right 11/11/2020    RIGHT SHOULDER DIAGNOSTIC ARTHROSCOPY WITH DECOMPRESSION ROTATOR CUFF REPAIR performed by [facility] Hernadez [facility] at [facility]         Medications Prior to Admission:    @  Prior to Admission medications    Medication Sig Start Date End Date Taking? Authorizing Provider   dicyclomine (BENTYL) 10 MG capsule Take 10 mg by mouth 4 times daily (before meals and nightly)    Historical Provider, MD   vitamin D (D3-50) [address]2706 UNIT CAPS Take 1 capsule by mouth once a week 11/30/21   MARIXA Martines CNP   nortriptyline (PAMELOR) 10 MG capsule TAKE 1 CAPSULE BY MOUTH NIGHTLY FOR HEADACHES 11/5/21   Nany Garcia PA-C   gabapentin (NEURONTIN) 300 MG capsule TAKE 1 CAPSULE BY MOUTH THREE TIMES DAILY 9/13/21 1/31/22  MARIXA Martines CNP   dexlansoprazole (DEXILANT) 60 MG CPDR delayed release capsule Take 60 mg by mouth daily    Historical Provider, MD   baclofen (LIORESAL) 20 MG tablet Take 1 tablet by mouth 4 times daily as needed (muscle spasms; pain) 8/13/21   MARIXA Martines CNP   rOPINIRole (REQUIP) 0.5 MG tablet Take 1 tablet by mouth 2 times daily as needed (restless legs) 8/13/21   MARIXA Martines CNP   ocrelizumab (OCREVUS) 300 MG/10ML SOLN injection Infuse 20 mLs intravenously every 6 months 7/19/21   MARIXA Martines CNP       Allergies:  Patient has no known allergies. Social History:   Social History     Socioeconomic History    Marital status: Single     Spouse name: Not on file    Number of children: 3    Years of education: 6    Highest education level: 11th grade   Occupational History    Not on file   Tobacco Use    Smoking status: Current Every Day Smoker     Packs/day: 0.50     Years: 25.00     Pack years: 12.50     Types: Cigarettes    Smokeless tobacco: Never Used    Tobacco comment: Ready to stop, requesting prescription for Chantix   Vaping Use    Vaping Use: Never used   Substance and Sexual Activity    Alcohol use:  Yes     Alcohol/week: 1.0 standard drink     Types: 1 throat or nasal congestion. Resp: Denies cough, hemoptysis or sputum production. Cardiac: Denies chest pain, SOB, diaphoresis or palpitations. GI:+ Abdominal distention. Lower abdominal cramping. No nausea, vomiting, diarrhea or constipation. No melena or hematochezia. : No urinary complaints, dysuria, hematuria or frequency. MSK: No extremity weakness, paralysis or paresthesias. PHYSICAL EXAM:    Vitals:  /72   Pulse 99   Temp 99 °F (37.2 °C) (Oral)   Resp 16   Ht 5' 2\" (1.575 m)   Wt 187 lb (84.8 kg)   LMP 01/05/2018   SpO2 98%   BMI 34.20 kg/m²     General:  This is a 51 y.o. yo female who is alert and oriented in postop distress  HEENT:  Head is normocephalic and atraumatic, PERRLA, EOMI, mucus membranes moist with no pharyngeal erythema or exudate. Neck:  Supple with no carotid bruits, JVD or thyromegaly.   No cervical adenopathy  CV:  Regular rate and rhythm, no murmurs  Lungs: Mildly coarse breath sounds to auscultation bilaterally with no wheezes, rales or rhonchi  Abdomen:  + Expected postop abdomen, + ileostomy,+ abdominal fat, bowel sounds  hypoactive  Extremities:  No edema, peripheral pulses intact bilaterally  Neuro:  Cranial nerves II-XII grossly intact; motor and sensory function intact with no focal deficits  Skin:  No rashes, lesions or wounds      DATA:  CBC with Differential:    Lab Results   Component Value Date    WBC 24.6 02/11/2022    RBC 4.30 02/11/2022    HGB 13.7 02/11/2022    HCT 40.2 02/11/2022     02/11/2022    MCV 93.5 02/11/2022    MCH 31.9 02/11/2022    MCHC 34.1 02/11/2022    RDW 12.4 02/11/2022    METASPCT 0.9 11/23/2021    LYMPHOPCT 23.2 02/02/2022    MONOPCT 6.7 02/02/2022    BASOPCT 0.4 02/02/2022    MONOSABS 0.62 02/02/2022    LYMPHSABS 2.13 02/02/2022    EOSABS 0.20 02/02/2022    BASOSABS 0.04 02/02/2022     CMP:    Lab Results   Component Value Date     02/11/2022    K 3.4 02/11/2022    K 3.7 02/09/2022    CL 92 02/11/2022 CO2 24 02/11/2022    BUN 6 02/11/2022    CREATININE 0.6 02/11/2022    GFRAA >60 02/11/2022    LABGLOM >60 02/11/2022    GLUCOSE 115 02/11/2022    PROT 5.8 02/11/2022    LABALBU 2.5 02/11/2022    CALCIUM 8.6 02/11/2022    BILITOT 1.1 02/11/2022    ALKPHOS 97 02/11/2022    AST 8 02/11/2022    ALT 25 02/11/2022     Magnesium:    Lab Results   Component Value Date    MG 2.2 09/01/2021     Phosphorus:    Lab Results   Component Value Date    PHOS 3.2 09/01/2021     PT/INR:    Lab Results   Component Value Date    PROTIME 10.7 02/28/2020    INR 1.0 02/28/2020     Troponin:    Lab Results   Component Value Date    TROPONINI <0.01 07/20/2020     U/A:    Lab Results   Component Value Date    COLORU DKYELLOW 02/10/2022    PROTEINU TRACE 02/10/2022    PHUR 5.5 02/10/2022    WBCUA 1-3 02/10/2022    RBCUA 0-1 02/10/2022    TRICHOMONAS Present 02/26/2020    BACTERIA MODERATE 02/10/2022    CLARITYU Clear 02/10/2022    SPECGRAV 1.025 02/10/2022    LEUKOCYTESUR Negative 02/10/2022    UROBILINOGEN 0.2 02/10/2022    BILIRUBINUR SMALL 02/10/2022    BLOODU SMALL 02/10/2022    GLUCOSEU Negative 02/10/2022    AMORPHOUS FEW 11/23/2021     ABG:  No results found for: PH, PCO2, PO2, HCO3, BE, THGB, TCO2, O2SAT  HgBA1c:    Lab Results   Component Value Date    LABA1C 5.4 05/11/2021     FLP:    Lab Results   Component Value Date    TRIG 184 05/11/2021    HDL 44 05/11/2021    LDLCALC 144 05/11/2021    LABVLDL 37 05/11/2021     TSH:    Lab Results   Component Value Date    TSH 0.293 09/01/2021     IRON:  No results found for: IRON  LIPASE:    Lab Results   Component Value Date    LIPASE 10 09/05/2021       ASSESSMENT AND PLAN:      Patient Active Problem List    Diagnosis Date Noted    Sigmoid stricture ([address]     Pelvic abscess in female     Ileus, postoperative ([facility] 09/05/2021    S/P colectomy 08/31/2021    Malignant neoplasm of descending colon ([facility]     Secondary restless legs syndrome 08/13/2021    Palafox's esophagus with dysplasia 05/11/2021    Morbidly obese ([facility] 10/05/2020    [facility] sclerosis ([facility] 08/04/2020    Vocal cord dysfunction 07/20/2020     Impression:  1. Sigmoid stricturelaparoscopic sigmoid resection 2/8/2022  2. History of VEENA-patient apparently been tested and is negative for CPAP use at this time  3. Obesity  4. Hypertension   5. History of current tobacco use  6. History of restless leg syndrome  7. Leukocytosis postop  8. Mild elevation transaminase postop  9. Hyponatremia postop    Plan:  Patient admitted to medical surgical floor  Home medications reviewed  Monitor heart rate, blood pressure, O2 saturation  Diet and abdominal pain meds per general surgery  Lovenox 40 mg subcu daily  IV fluids lactated Ringer's 100 cc an hour    Albuterol aerosol 2.5 mg every 4 hours as needed for shortness of breath    CT of the abdomen pelvis 2/11  Serum and urine osmolality now    BMP, CBC in a.m.     Antoni Shultz DO, D.OWagner  2/11/2022  10:15 AM

## 2022-02-11 NOTE — PROGRESS NOTES
GENERAL SURGERY  DAILY PROGRESS NOTE  2/11/2022    No chief complaint on file. Subjective:  Patient had increasing parastomal abdominal pain and multiple episodes of emesis overnight. She refused NG tube placement. Still having ostomy output but is more distended. Objective:  /72   Pulse 99   Temp 99 °F (37.2 °C) (Oral)   Resp 16   Ht 5' 2\" (1.575 m)   Wt 187 lb (84.8 kg)   LMP 01/05/2018   SpO2 98%   BMI 34.20 kg/m²     GENERAL:  Laying in bed, awake, alert, cooperative, uncomfortable   HEAD: Normocephalic, atraumatic  EYES: No sclera icterus, pupils equal  LUNGS:  No increased work of breathing  CARDIOVASCULAR:  RR  ABDOMEN:  Soft, mildly distended, tender around ostomy.  Dark liquid stool in bag   EXTREMITIES: No edema or swelling  SKIN: Warm and dry    Assessment/Plan:  51 y.o. female with sigmoid stricture s/p laparoscopic sigmoid resection with anastomosis 2/8    - follow up CT abdomen ordered  - encourage NG tube if nausea continues  - monitor abdominal exam  - pain control     Electronically signed by Devaughn Harper MD on 2/11/2022 at 7:20 AM      As above, await CT results

## 2022-02-11 NOTE — CARE COORDINATION
2-11-Cm note: ( no covid testing) pt feeling nauseated today, plan remains home with no needs , boyfriend will provide transport home .  Electronically signed by Heather Caceres RN on 2/11/2022 at 11:50 AM

## 2022-02-11 NOTE — PROGRESS NOTES
Pt reports new onset RUQ bulging over the past several hours. Soft ball size firm and tender. Ostomy continues to discharge fecal matter. Contacted oncall surgeon. New orders written.

## 2022-02-12 LAB
ANION GAP SERPL CALCULATED.3IONS-SCNC: 12 MMOL/L (ref 7–16)
BUN BLDV-MCNC: 7 MG/DL (ref 6–20)
CALCIUM SERPL-MCNC: 8.1 MG/DL (ref 8.6–10.2)
CHLORIDE BLD-SCNC: 99 MMOL/L (ref 98–107)
CO2: 23 MMOL/L (ref 22–29)
CREAT SERPL-MCNC: 0.6 MG/DL (ref 0.5–1)
GFR AFRICAN AMERICAN: >60
GFR NON-AFRICAN AMERICAN: >60 ML/MIN/1.73
GLUCOSE BLD-MCNC: 87 MG/DL (ref 74–99)
HCT VFR BLD CALC: 35 % (ref 34–48)
HEMOGLOBIN: 11.8 G/DL (ref 11.5–15.5)
MCH RBC QN AUTO: 31.6 PG (ref 26–35)
MCHC RBC AUTO-ENTMCNC: 33.7 % (ref 32–34.5)
MCV RBC AUTO: 93.6 FL (ref 80–99.9)
PDW BLD-RTO: 12.7 FL (ref 11.5–15)
PLATELET # BLD: 140 E9/L (ref 130–450)
PMV BLD AUTO: 11.2 FL (ref 7–12)
POTASSIUM SERPL-SCNC: 3.7 MMOL/L (ref 3.5–5)
RBC # BLD: 3.74 E12/L (ref 3.5–5.5)
SODIUM BLD-SCNC: 134 MMOL/L (ref 132–146)
WBC # BLD: 14.8 E9/L (ref 4.5–11.5)

## 2022-02-12 PROCEDURE — 2580000003 HC RX 258: Performed by: SURGERY

## 2022-02-12 PROCEDURE — 85027 COMPLETE CBC AUTOMATED: CPT

## 2022-02-12 PROCEDURE — 36415 COLL VENOUS BLD VENIPUNCTURE: CPT

## 2022-02-12 PROCEDURE — 6360000002 HC RX W HCPCS: Performed by: SURGERY

## 2022-02-12 PROCEDURE — 6370000000 HC RX 637 (ALT 250 FOR IP): Performed by: SURGERY

## 2022-02-12 PROCEDURE — 6370000000 HC RX 637 (ALT 250 FOR IP): Performed by: INTERNAL MEDICINE

## 2022-02-12 PROCEDURE — 80048 BASIC METABOLIC PNL TOTAL CA: CPT

## 2022-02-12 PROCEDURE — 99024 POSTOP FOLLOW-UP VISIT: CPT | Performed by: SURGERY

## 2022-02-12 PROCEDURE — 1200000000 HC SEMI PRIVATE

## 2022-02-12 RX ADMIN — METHOCARBAMOL 500 MG: 500 TABLET ORAL at 16:41

## 2022-02-12 RX ADMIN — SODIUM CHLORIDE, POTASSIUM CHLORIDE, SODIUM LACTATE AND CALCIUM CHLORIDE: 600; 310; 30; 20 INJECTION, SOLUTION INTRAVENOUS at 15:41

## 2022-02-12 RX ADMIN — METHOCARBAMOL 500 MG: 500 TABLET ORAL at 20:41

## 2022-02-12 RX ADMIN — TRAMADOL HYDROCHLORIDE 50 MG: 50 TABLET, COATED ORAL at 12:19

## 2022-02-12 RX ADMIN — ENOXAPARIN SODIUM 40 MG: 100 INJECTION SUBCUTANEOUS at 08:14

## 2022-02-12 RX ADMIN — METHOCARBAMOL 500 MG: 500 TABLET ORAL at 08:12

## 2022-02-12 RX ADMIN — PANTOPRAZOLE SODIUM 40 MG: 40 TABLET, DELAYED RELEASE ORAL at 15:41

## 2022-02-12 RX ADMIN — ONDANSETRON 4 MG: 2 INJECTION INTRAMUSCULAR; INTRAVENOUS at 20:43

## 2022-02-12 RX ADMIN — MORPHINE SULFATE 4 MG: 4 INJECTION, SOLUTION INTRAMUSCULAR; INTRAVENOUS at 00:29

## 2022-02-12 RX ADMIN — METHOCARBAMOL 500 MG: 500 TABLET ORAL at 12:17

## 2022-02-12 RX ADMIN — TRAMADOL HYDROCHLORIDE 50 MG: 50 TABLET, COATED ORAL at 05:34

## 2022-02-12 RX ADMIN — Medication 10 ML: at 21:12

## 2022-02-12 RX ADMIN — MORPHINE SULFATE 4 MG: 4 INJECTION, SOLUTION INTRAMUSCULAR; INTRAVENOUS at 08:13

## 2022-02-12 RX ADMIN — MORPHINE SULFATE 4 MG: 4 INJECTION, SOLUTION INTRAMUSCULAR; INTRAVENOUS at 22:22

## 2022-02-12 RX ADMIN — PANTOPRAZOLE SODIUM 40 MG: 40 TABLET, DELAYED RELEASE ORAL at 05:34

## 2022-02-12 RX ADMIN — MORPHINE SULFATE 4 MG: 4 INJECTION, SOLUTION INTRAMUSCULAR; INTRAVENOUS at 15:41

## 2022-02-12 ASSESSMENT — PAIN SCALES - GENERAL
PAINLEVEL_OUTOF10: 8
PAINLEVEL_OUTOF10: 4
PAINLEVEL_OUTOF10: 7
PAINLEVEL_OUTOF10: 8
PAINLEVEL_OUTOF10: 10
PAINLEVEL_OUTOF10: 8
PAINLEVEL_OUTOF10: 8

## 2022-02-12 ASSESSMENT — PAIN DESCRIPTION - ORIENTATION: ORIENTATION: LEFT;RIGHT

## 2022-02-12 ASSESSMENT — PAIN DESCRIPTION - LOCATION: LOCATION: ABDOMEN

## 2022-02-12 ASSESSMENT — PAIN DESCRIPTION - DESCRIPTORS: DESCRIPTORS: ACHING;SHARP

## 2022-02-12 NOTE — PROGRESS NOTES
Department of Internal Medicine        HISTORY OF PRESENT ILLNESS:      The patient is a 51 y.o. female who presents with having a elective laparoscopic sigmoid colon resection. Patient has a known history of sigmoid stricture. Patient had a anal proctosigmoidoscopy performed 1/25 with a scope with past 15 cm work there was complete closure of the rectosigmoid junction without an opening. Patient is seen postop. Patient has expected postop discomfort. Temperature is 97.8 with heart rate 72 blood pressure 140/65. O2 sat 98% on 2 L nasal cannula. 2/9/2022  Patient seen and examined on medical surgical floor. Patient complains of postop discomfort. She denies any problem with chest pain, nausea/vomiting or unusual shortness of breath. BUN/creatinine 5/0.6 with fasting blood sugar 126. Mild elevation of transaminase with a ALT of 69 AST is 35. Review of records have shown patient has intermittent elevation transaminase over the past 18 months. WBC is 21.5 with hemoglobin 12.6. Temperature 98.3 with heart rate 84 blood pressure 107/69. O2 sat 96% on room air at rest.  Urine output is very good. General surgery note reviewed. 2/10/2022  Patient seen and examined on medical surgical floor. Patient complains of postop discomfort. Patient denies any problem with chest pain, dizziness, nausea, fever/chills, dysuria, cough or unusual shortness of breath. Patient is passing some gas but no bowel movements. Increase activity today. BUN/creatinine 5/0.7 with serum sodium 130. WBC is still elevated 23.7 with hemoglobin 14.3. Temperature is 98.8 with heart rate 76 blood pressure 110/78. O2 sat 94% on room air at rest.  Urine output is adequate. 2/11/2022  Patient seen examined on medical surgical floor. Patient complains of persistent postop discomfort along with some nausea and poor appetite. Patient denies passing had any bowel movements or gas recently.   BUN/creatinine 6/0.6 with serum sodium 124 7. Potassium 3.4 with WBC 24.6 and hemoglobin 13.7. Temperature is 98.6nine 9.3 with heart rate of 99. O2 sat 98% on room air at rest.  General surgery note reviewed. CT of the abdomen pelvis was ordered for today. 2/12/2022  Patient seen examined on medical surgical floor. Patient states the abdominal distention seems to be improved. Patient still having postop discomfort and is mildly improved. Patient denies any chest pain, dizziness or unusual shortness of breath. Patient did have some nausea last night with supper. Patient denies any bowel movements at this time. CT of the abdomen pelvis from yesterday suggested ileus but cannot rule out obstruction. Temperature 98 with heart rate of 97 with blood pressure 124/67 O2 sat 93% on room air at rest.  WBC is 14.8 with hemoglobin 11.8. BUN/creatinine 7/0.6. Serum sodium increased to 134 from 127 yesterday.     Past Medical History:    Past Medical History:   Diagnosis Date    Acid reflux disease     Cyst of ovary     Fracture of nasal bone     Headache     Hearing loss     Hypertension     Migraine     Morbidly obese ([address] 10/05/2020    Multiple sclerosis ([address]     denies limitations at present 11/2020    VEENA no CPAP     severe VEENA per pt    Right shoulder pain 11/2020    Tinnitus     TMJ dysfunction      Past Surgical History:    Past Surgical History:   Procedure Laterality Date    APPENDECTOMY      BACK SURGERY      lumbar    BICEPS TENDON REPAIR Right 11/11/2020    BICEPS TENODESIS performed by Zay Hawkins MD at [place] [id number] Left 8/31/2021    LAPAROSCOPIC LEFT HEMICOLECTOMY WITH LOOP OSTOMY performed by Klever Mack MD at [address] 9/6/2021    DIAGNOSTIC LAPAROSCOPY POSSIBLE BOWEL RESECTION POSSILBE OSTOMY performed by Klever Mack MD at [address] 2/8/2022    LAPAROSCOPIC SIGMOID COLON RESECTION performed by Klever Mack MD at [address] [place] [address]  ESOPHAGUS SURGERY      HYSTERECTOMY  03/05/2018    Robotic hysterectomy, bilateral salpingectomy, right oophorectomy DR. ARIANA MONIQUE [facility]     HYSTERECTOMY, TOTAL ABDOMINAL      LARYNGOSCOPY N/A 7/17/2020    DIRECT LARYNGOSCOPY--OMNI GUIDE LASER performed by Evette Mederos MD at [facility] PARTIAL HYSTERECTOMY      PROCTOSIGMOIDOSCOPY N/A 10/12/2021    ANAL PROCTO SIGMOIDOSCOPY FLEXIBLE performed by David Claros MD at [address] [place] [address] N/A 1/25/2022    ANAL PROCTO [address] performed by David Claros MD at [facility] ARTHROSCOPY Right 11/11/2020    RIGHT SHOULDER DIAGNOSTIC ARTHROSCOPY WITH DECOMPRESSION ROTATOR CUFF REPAIR performed by Keith Mayfield MD at [address] [place] [address]         Medications Prior to Admission:    @  Prior to Admission medications    Medication Sig Start Date End Date Taking?  Authorizing Provider   dicyclomine (BENTYL) 10 MG capsule Take 10 mg by mouth 4 times daily (before meals and nightly)    Historical Provider, MD   vitamin D (D3-50) 81716 UNIT CAPS Take 1 capsule by mouth once a week 11/30/21   MARIXA Godinez CNP   nortriptyline (PAMELOR) 10 MG capsule TAKE 1 CAPSULE BY MOUTH NIGHTLY FOR HEADACHES 11/5/21   Heath Sharpe PA-C   gabapentin (NEURONTIN) 300 MG capsule TAKE 1 CAPSULE BY MOUTH THREE TIMES DAILY 9/13/21 1/31/22  MARIXA Godinez CNP   dexlansoprazole (DEXILANT) 60 MG CPDR delayed release capsule Take 60 mg by mouth daily    Historical Provider, MD   baclofen (LIORESAL) 20 MG tablet Take 1 tablet by mouth 4 times daily as needed (muscle spasms; pain) 8/13/21   MARIXA Godinez CNP   rOPINIRole (REQUIP) 0.5 MG tablet Take 1 tablet by mouth 2 times daily as needed (restless legs) 8/13/21   MARIXA Godinez CNP   ocrelizumab (OCREVUS) 300 MG/10ML SOLN injection Infuse 20 mLs intravenously every 6 months 7/19/21   MARIXA Godinez CNP       Allergies:  Patient has no known allergies. Social History:   Social History     Socioeconomic History    Marital status: Single     Spouse name: Not on file    Number of children: 3    Years of education: 6    Highest education level: 11th grade   Occupational History    Not on file   Tobacco Use    Smoking status: Current Every Day Smoker     Packs/day: 0.50     Years: 25.00     Pack years: 12.50     Types: Cigarettes    Smokeless tobacco: Never Used    Tobacco comment: Ready to stop, requesting prescription for Chantix   Vaping Use    Vaping Use: Never used   Substance and Sexual Activity    Alcohol use: Yes     Alcohol/week: 1.0 standard drink     Types: 1 Glasses of wine per week     Comment: socially    Drug use: No    Sexual activity: Yes     Partners: Male   Other Topics Concern    Not on file   Social History Narrative    Uses [employer] for transportation    [employer]     Social Determinants of Health     Financial Resource Strain: Medium Risk    Difficulty of Paying Living Expenses: Somewhat hard   Food Insecurity: Food Insecurity Present    Worried About Running Out of Food in the Last Year: Sometimes true    [place] of Food in the Last Year: Never true   Transportation Needs: No Transportation Needs    Lack of Transportation (Medical): No    Lack of Transportation (Non-Medical): No   Physical Activity: Sufficiently Active    Days of Exercise per Week: 3 days    Minutes of Exercise per Session: 60 min   Stress: No Stress Concern Present    Feeling of Stress : Not at all   Social Connections: Socially Isolated    Frequency of Communication with Friends and Family:  Three times a week    Frequency of Social Gatherings with Friends and Family: Twice a week    Attends [employer] Services: Never    Active Member of Clubs or Organizations: No    Attends Club or Organization Meetings: Never    Marital Status: Never    Intimate Partner Violence:     Fear of Current or Ex-Partner: Not on file    Emotionally Abused: Not on file    Physically Abused: Not on file    Sexually Abused: Not on file   Housing Stability:     Unable to Pay for Housing in the Last Year: Not on file    Number of Places Lived in the Last Year: Not on file    Unstable Housing in the Last Year: Not on file       Family History:   Family History   Problem Relation Age of Onset    Mult Sclerosis Mother     Colon Cancer Neg Hx     Uterine Cancer Neg Hx     Ovarian Cancer Neg Hx     Breast Cancer Neg Hx        REVIEW OF SYSTEMS:    Gen: Patient denies any lightheadedness or dizziness. No LOC or syncope. No fevers or chills. HEENT: No earache, sore throat or nasal congestion. Resp: Denies cough, hemoptysis or sputum production. Cardiac: Denies chest pain, SOB, diaphoresis or palpitations. GI:+ Abdominal distention. Lower abdominal cramping. No nausea, vomiting, diarrhea or constipation. No melena or hematochezia. : No urinary complaints, dysuria, hematuria or frequency. MSK: No extremity weakness, paralysis or paresthesias. PHYSICAL EXAM:    Vitals:  /67   Pulse 97   Temp 97.9 °F (36.6 °C) (Oral)   Resp 18   Ht 5' 2\" (1.575 m)   Wt 187 lb (84.8 kg)   LMP 01/05/2018   SpO2 93%   BMI 34.20 kg/m²     General:  This is a 51 y.o. yo female who is alert and oriented in postop distress  HEENT:  Head is normocephalic and atraumatic, PERRLA, EOMI, mucus membranes moist with no pharyngeal erythema or exudate. Neck:  Supple with no carotid bruits, JVD or thyromegaly.   No cervical adenopathy  CV:  Regular rate and rhythm, no murmurs  Lungs: Mildly coarse breath sounds to auscultation bilaterally with no wheezes, rales or rhonchi  Abdomen:  + Expected postop abdomen, + ileostomy,+ abdominal fat, bowel sounds  hypoactive  Extremities:  No edema, peripheral pulses intact bilaterally  Neuro:  Cranial nerves II-XII grossly intact; motor and sensory function intact with no focal deficits  Skin:  No rashes, lesions or wounds      DATA:  CBC with Differential:    Lab Results   Component Value Date    WBC 14.8 02/12/2022    RBC 3.74 02/12/2022    HGB 11.8 02/12/2022    HCT 35.0 02/12/2022     02/12/2022    MCV 93.6 02/12/2022    MCH 31.6 02/12/2022    MCHC 33.7 02/12/2022    RDW 12.7 02/12/2022    METASPCT 0.9 11/23/2021    LYMPHOPCT 23.2 02/02/2022    MONOPCT 6.7 02/02/2022    BASOPCT 0.4 02/02/2022    MONOSABS 0.62 02/02/2022    LYMPHSABS 2.13 02/02/2022    EOSABS 0.20 02/02/2022    BASOSABS 0.04 02/02/2022     CMP:    Lab Results   Component Value Date     02/12/2022    K 3.7 02/12/2022    K 3.7 02/09/2022    CL 99 02/12/2022    CO2 23 02/12/2022    BUN 7 02/12/2022    CREATININE 0.6 02/12/2022    GFRAA >60 02/12/2022    LABGLOM >60 02/12/2022    GLUCOSE 87 02/12/2022    PROT 5.8 02/11/2022    LABALBU 2.5 02/11/2022    CALCIUM 8.1 02/12/2022    BILITOT 1.1 02/11/2022    ALKPHOS 97 02/11/2022    AST 8 02/11/2022    ALT 25 02/11/2022     Magnesium:    Lab Results   Component Value Date    MG 2.2 09/01/2021     Phosphorus:    Lab Results   Component Value Date    PHOS 3.2 09/01/2021     PT/INR:    Lab Results   Component Value Date    PROTIME 10.7 02/28/2020    INR 1.0 02/28/2020     Troponin:    Lab Results   Component Value Date    TROPONINI <0.01 07/20/2020     U/A:    Lab Results   Component Value Date    COLORU DKYELLOW 02/10/2022    PROTEINU TRACE 02/10/2022    PHUR 5.5 02/10/2022    WBCUA 1-3 02/10/2022    RBCUA 0-1 02/10/2022    TRICHOMONAS Present 02/26/2020    BACTERIA MODERATE 02/10/2022    CLARITYU Clear 02/10/2022    SPECGRAV 1.025 02/10/2022    LEUKOCYTESUR Negative 02/10/2022    UROBILINOGEN 0.2 02/10/2022    BILIRUBINUR SMALL 02/10/2022    BLOODU SMALL 02/10/2022    GLUCOSEU Negative 02/10/2022    AMORPHOUS FEW 11/23/2021     ABG:  No results found for: PH, PCO2, PO2, HCO3, BE, THGB, TCO2, O2SAT  HgBA1c:    Lab Results   Component Value Date    LABA1C 5.4 05/11/2021     FLP:    Lab Results   Component Value Date    TRIG 184 05/11/2021    HDL 44 05/11/2021    LDLCALC 144 05/11/2021    LABVLDL 37 05/11/2021     TSH:    Lab Results   Component Value Date    TSH 0.293 09/01/2021     IRON:  No results found for: IRON  LIPASE:    Lab Results   Component Value Date    LIPASE 10 09/05/2021       ASSESSMENT AND PLAN:      Patient Active Problem List    Diagnosis Date Noted    Sigmoid stricture ([address]     Pelvic abscess in female     Ileus, postoperative ([address] 09/05/2021    S/P colectomy 08/31/2021    Malignant neoplasm of descending colon ([address]     Secondary restless legs syndrome 08/13/2021    Palafox's esophagus with dysplasia 05/11/2021    Morbidly obese ([address] 10/05/2020    Multiple sclerosis ([address] 08/04/2020    Vocal cord dysfunction 07/20/2020     Impression:  1. Sigmoid stricturelaparoscopic sigmoid resection 2/8/2022  2. History of VEENA-patient apparently been tested and is negative for CPAP use at this time  3. Obesity  4. Hypertension   5. History of current tobacco use  6. History of restless leg syndrome  7. Leukocytosis postop  8. Mild elevation transaminase postop  9. Hyponatremia postop-resolved    Plan:  Patient admitted to medical surgical floor  Home medications reviewed  Monitor heart rate, blood pressure, O2 saturation  Diet and abdominal pain meds per general surgery  Lovenox 40 mg subcu daily  IV fluids lactated Ringer's 100 cc an hour    Albuterol aerosol 2.5 mg every 4 hours as needed for shortness of breath    CT of the abdomen pelvis 2/11  Serum and urine osmolality     BMP, CBC in a.m.     Jerry Valdez DO, D.O.  2/12/2022  10:30 AM

## 2022-02-12 NOTE — PLAN OF CARE
Problem: Infection - Surgical Site:  Goal: Will show no infection signs and symptoms  Description: Will show no infection signs and symptoms  Outcome: Met This Shift     Problem: Pain:  Goal: Pain level will decrease  Description: Pain level will decrease  2/12/2022 1442 by Francy Parker RN  Outcome: Not Met This Shift  2/12/2022 1441 by Francy Parker RN  Outcome: Not Met This Shift  Goal: Control of acute pain  Description: Control of acute pain  2/12/2022 1442 by Francy Parker RN  Outcome: Not Met This Shift  2/12/2022 1441 by Francy Parker RN  Outcome: Not Met This Shift

## 2022-02-12 NOTE — PROGRESS NOTES
Surgery Progress Note            Chief complaint:   No chief complaint on file. Patient Active Problem List   Diagnosis    Vocal cord dysfunction    Multiple [facility]    Morbidly obese ([address]    Palaofx's esophagus with dysplasia    Secondary restless legs syndrome    S/P colectomy    Malignant neoplasm of descending colon ([address]    Ileus, postoperative ([address]    Pelvic abscess in female    Sigmoid stricture ([address]       S: doing better today, less distended and no emesis    O:   Vitals:    02/12/22 0534   BP: 124/67   Pulse: 97   Resp: 18   Temp: 97.9 °F (36.6 °C)   SpO2: 93%       Intake/Output Summary (Last 24 hours) at 2/12/2022 0858  Last data filed at 2/11/2022 1601  Gross per 24 hour   Intake 700 ml   Output 450 ml   Net 250 ml           Labs:  Lab Results   Component Value Date    WBC 14.8 02/12/2022    WBC 24.6 02/11/2022    WBC 23.7 02/10/2022    HGB 11.8 02/12/2022    HGB 13.7 02/11/2022    HGB 14.3 02/10/2022    HCT 35.0 02/12/2022    HCT 40.2 02/11/2022    HCT 42.6 02/10/2022     Lab Results   Component Value Date    CREATININE 0.6 02/12/2022    BUN 7 02/12/2022     02/12/2022    K 3.7 02/12/2022    CL 99 02/12/2022    CO2 23 02/12/2022     Lab Results   Component Value Date    LIPASE 10 09/05/2021    LIPASE 13 02/04/2018    LIPASE 11 01/15/2018         Physical exam:   /67   Pulse 97   Temp 97.9 °F (36.6 °C) (Oral)   Resp 18   Ht 5' 2\" (1.575 m)   Wt 187 lb (84.8 kg)   LMP 01/05/2018   SpO2 93%   BMI 34.20 kg/m²   General appearance: NAD  Head: NCAT  Neck: supple, no masses  Lungs: equal chest rise bilateral  Heart: S1S2 present  Abdomen: soft, minimally tender, minimally distended,  Incisions clean, ostomy pink and patent with good bowel function  Skin; no lesions  Gu: no cva tenderness  Extremities: extremities normal, atraumatic, no cyanosis or edema    A:  POD # 4 lap sigmoid/lar with diverting loop in place post op ileus    P: await full diet tolerance.  Home [address]    Past Medical History: Medical history form was reviewed today & scanned into the media tab  Past Medical History:   Diagnosis Date    Adenomatous colon polyp 04/17/09    re do 2014    Agitation     Erectile dysfunction     Family history of early CAD 04/2010    Coronary Ca+ Score = \" 10\"    Hx of blood clots     calf post op     Hyperlipidemia     Kidney stone 03/2005    Lesion of ulnar nerve 4/2/2015    Osteoarthritis of both knees 1/13/2020    PONV (postoperative nausea and vomiting)     Shingles       Past Surgical History:     Past Surgical History:   Procedure Laterality Date    ACHILLES TENDON SURGERY      rupture/repair    COLONOSCOPY  04/07/09    Tubular Adenoma    COLONOSCOPY  1/12/15    ELBOW SURGERY Right     EXCISION OF AURAL MASS      KIDNEY STONE SURGERY      KNEE ARTHROSCOPY      rt    TENOLYSIS      VASECTOMY      VASECTOMY  1992     Current Medications:     Current Outpatient Medications:     methocarbamol (ROBAXIN) 500 MG tablet, Take 1 tablet by mouth 4 times daily as needed (muscle spasms), Disp: 20 tablet, Rfl: 0    tamsulosin (FLOMAX) 0.4 MG capsule, Take 1 capsule by mouth daily, Disp: 10 capsule, Rfl: 0    diclofenac (VOLTAREN) 75 MG EC tablet, Take 1 tablet by mouth 2 times daily (with meals), Disp: 60 tablet, Rfl: 1    pravastatin (PRAVACHOL) 80 MG tablet, TAKE ONE TABLET BY MOUTH EVERY EVENING FOR HIGH CHOLESTEROL, Disp: 30 tablet, Rfl: 9    clobetasol (OLUX) 0.05 % foam, , Disp: , Rfl:     econazole nitrate 1 % cream, , Disp: , Rfl:     multivitamin (THERAGRAN) per tablet, Take 1 tablet by mouth daily. , Disp: , Rfl:   Allergies:  Penicillins  Social History:    reports that he has never smoked. He uses smokeless tobacco. He reports current alcohol use of about 6.0 standard drinks of alcohol per week. He reports that he does not use drugs.   Family History:   Family History   Problem Relation Age of Onset    Cancer Father         Stomach when that happens    Vanetta Severe, MD, MD  2/12/2022 [place]    Heart Disease Father 54        MI x 2       REVIEW OF SYSTEMS: Full ROS noted & scanned   CONSTITUTIONAL: Denies unexplained weight loss, fevers, chills or fatigue  NEUROLOGICAL: Denies unsteady gait or progressive weakness    PHYSICAL EXAM:    Vitals: Height 5' 10\" (1.778 m), weight 214 lb 1.1 oz (97.1 kg). GENERAL EXAM:  · General Apparence: Patient is adequately groomed with no evidence of malnutrition. · Orientation: The patient is oriented to time, place and person. · Mood & Affect:The patient's mood and affect are appropriate   · Lymphatic: The lymphatic examination bilaterally reveals all areas to be without enlargement or induration  · Sensation: Sensation is intact without deficit  · Coordination/Balance: Good coordination     LUMBAR/SACRAL EXAMINATION:  · Inspection: Local inspection shows no step-off or bruising. Lumbar alignment is normal.  Sagittal and Coronal balance is neutral.      · Palpation: He is mildly tender over the right L5 junction and the paraspinal  · Range of Motion: Moderate loss of flexion, mild loss extension  · strength:   Strength testing is 5/5 in all muscle groups tested. · Special Tests:   Straight leg raise and crossed SLR negative. Leg length and pelvis level.  0 out of 5 Brendan's signs. · Skin: There are no rashes, ulcerations or lesions. · Reflexes: Reflexes are symmetrically 2+ at the patellar and ankle tendons. Clonus absent bilaterally at the feet. · Gait & station: Normal gait  · Additional Examinations:   · RIGHT LOWER EXTREMITY: Inspection/examination of the right lower extremity does not show any tenderness, deformity or injury. Range of motion is full. There is no gross instability. There are no rashes, ulcerations or lesions. Strength and tone are normal.  LEFT LOWER EXTREMITY:  Inspection/examination of the left lower extremity does not show any tenderness, deformity or injury. Range of motion is full. There is no gross instability.  There are no rashes, ulcerations or lesions. Strength and tone are normal.    Diagnostic Testing:    His new lumbar MRI shows diffuse discogenic spondylosis with superimposed disc bulging with foraminal narrowing and Modic changes    2 views lumbar spine 7/20/2020 show a moderate to advanced diffuse DDD spondylosis with Facet arthropathy      Impression:  1) Subacute on chronic worsening right mechanical back pain  2) Discogenic spondylosis right L3-4 mild right radiculitis  3) ORT=0      Plan:   1) Tramadol 50mg I po BID PRN; OARRs reviewed, ORT=0. We discussed not to take the medication when driving.     2) Pending FABI     3) F/u after above         [facility]

## 2022-02-13 LAB
ANION GAP SERPL CALCULATED.3IONS-SCNC: 12 MMOL/L (ref 7–16)
BUN BLDV-MCNC: 4 MG/DL (ref 6–20)
CALCIUM SERPL-MCNC: 8.1 MG/DL (ref 8.6–10.2)
CHLORIDE BLD-SCNC: 92 MMOL/L (ref 98–107)
CO2: 25 MMOL/L (ref 22–29)
CREAT SERPL-MCNC: 0.5 MG/DL (ref 0.5–1)
GFR AFRICAN AMERICAN: >60
GFR NON-AFRICAN AMERICAN: >60 ML/MIN/1.73
GLUCOSE BLD-MCNC: 106 MG/DL (ref 74–99)
HCT VFR BLD CALC: 33.8 % (ref 34–48)
HEMOGLOBIN: 11.5 G/DL (ref 11.5–15.5)
MCH RBC QN AUTO: 31.6 PG (ref 26–35)
MCHC RBC AUTO-ENTMCNC: 34 % (ref 32–34.5)
MCV RBC AUTO: 92.9 FL (ref 80–99.9)
PDW BLD-RTO: 12.5 FL (ref 11.5–15)
PLATELET # BLD: 178 E9/L (ref 130–450)
PMV BLD AUTO: 10.6 FL (ref 7–12)
POTASSIUM SERPL-SCNC: 3.4 MMOL/L (ref 3.5–5)
RBC # BLD: 3.64 E12/L (ref 3.5–5.5)
SODIUM BLD-SCNC: 129 MMOL/L (ref 132–146)
WBC # BLD: 12.9 E9/L (ref 4.5–11.5)

## 2022-02-13 PROCEDURE — 1200000000 HC SEMI PRIVATE

## 2022-02-13 PROCEDURE — 36415 COLL VENOUS BLD VENIPUNCTURE: CPT

## 2022-02-13 PROCEDURE — 80048 BASIC METABOLIC PNL TOTAL CA: CPT

## 2022-02-13 PROCEDURE — 6370000000 HC RX 637 (ALT 250 FOR IP): Performed by: SURGERY

## 2022-02-13 PROCEDURE — 85027 COMPLETE CBC AUTOMATED: CPT

## 2022-02-13 PROCEDURE — 6360000002 HC RX W HCPCS: Performed by: SURGERY

## 2022-02-13 PROCEDURE — 2580000003 HC RX 258: Performed by: SURGERY

## 2022-02-13 PROCEDURE — 6370000000 HC RX 637 (ALT 250 FOR IP): Performed by: INTERNAL MEDICINE

## 2022-02-13 PROCEDURE — 99024 POSTOP FOLLOW-UP VISIT: CPT | Performed by: SURGERY

## 2022-02-13 RX ORDER — POTASSIUM CHLORIDE 20 MEQ/1
20 TABLET, EXTENDED RELEASE ORAL 2 TIMES DAILY WITH MEALS
Status: COMPLETED | OUTPATIENT
Start: 2022-02-13 | End: 2022-02-13

## 2022-02-13 RX ADMIN — METHOCARBAMOL 500 MG: 500 TABLET ORAL at 16:27

## 2022-02-13 RX ADMIN — Medication 10 ML: at 20:12

## 2022-02-13 RX ADMIN — MORPHINE SULFATE 4 MG: 4 INJECTION, SOLUTION INTRAMUSCULAR; INTRAVENOUS at 08:29

## 2022-02-13 RX ADMIN — POTASSIUM CHLORIDE 20 MEQ: 1500 TABLET, EXTENDED RELEASE ORAL at 16:27

## 2022-02-13 RX ADMIN — MORPHINE SULFATE 4 MG: 4 INJECTION, SOLUTION INTRAMUSCULAR; INTRAVENOUS at 14:05

## 2022-02-13 RX ADMIN — METHOCARBAMOL 500 MG: 500 TABLET ORAL at 08:29

## 2022-02-13 RX ADMIN — MORPHINE SULFATE 4 MG: 4 INJECTION, SOLUTION INTRAMUSCULAR; INTRAVENOUS at 02:41

## 2022-02-13 RX ADMIN — PANTOPRAZOLE SODIUM 40 MG: 40 TABLET, DELAYED RELEASE ORAL at 16:27

## 2022-02-13 RX ADMIN — TRAMADOL HYDROCHLORIDE 50 MG: 50 TABLET, COATED ORAL at 12:32

## 2022-02-13 RX ADMIN — POTASSIUM CHLORIDE 20 MEQ: 1500 TABLET, EXTENDED RELEASE ORAL at 12:29

## 2022-02-13 RX ADMIN — METHOCARBAMOL 500 MG: 500 TABLET ORAL at 12:29

## 2022-02-13 RX ADMIN — MORPHINE SULFATE 4 MG: 4 INJECTION, SOLUTION INTRAMUSCULAR; INTRAVENOUS at 21:32

## 2022-02-13 RX ADMIN — MORPHINE SULFATE 4 MG: 4 INJECTION, SOLUTION INTRAMUSCULAR; INTRAVENOUS at 18:41

## 2022-02-13 RX ADMIN — PANTOPRAZOLE SODIUM 40 MG: 40 TABLET, DELAYED RELEASE ORAL at 06:01

## 2022-02-13 RX ADMIN — ENOXAPARIN SODIUM 40 MG: 100 INJECTION SUBCUTANEOUS at 08:30

## 2022-02-13 RX ADMIN — METHOCARBAMOL 500 MG: 500 TABLET ORAL at 20:12

## 2022-02-13 ASSESSMENT — PAIN DESCRIPTION - PAIN TYPE: TYPE: SURGICAL PAIN

## 2022-02-13 ASSESSMENT — PAIN DESCRIPTION - LOCATION: LOCATION: ABDOMEN

## 2022-02-13 ASSESSMENT — PAIN SCALES - GENERAL
PAINLEVEL_OUTOF10: 7
PAINLEVEL_OUTOF10: 7
PAINLEVEL_OUTOF10: 8
PAINLEVEL_OUTOF10: 7
PAINLEVEL_OUTOF10: 10
PAINLEVEL_OUTOF10: 2
PAINLEVEL_OUTOF10: 10

## 2022-02-13 ASSESSMENT — PAIN DESCRIPTION - ORIENTATION: ORIENTATION: LEFT;RIGHT

## 2022-02-13 ASSESSMENT — PAIN DESCRIPTION - DESCRIPTORS: DESCRIPTORS: ACHING;SHARP

## 2022-02-13 NOTE — PROGRESS NOTES
Department of Internal Medicine        HISTORY OF PRESENT ILLNESS:      The patient is a 51 y.o. female who presents with having a elective laparoscopic sigmoid colon resection. Patient has a known history of sigmoid stricture. Patient had a anal proctosigmoidoscopy performed 1/25 with a scope with past 15 cm work there was complete closure of the rectosigmoid junction without an opening. Patient is seen postop. Patient has expected postop discomfort. Temperature is 97.8 with heart rate 72 blood pressure 140/65. O2 sat 98% on 2 L nasal cannula. 2/9/2022  Patient seen and examined on medical surgical floor. Patient complains of postop discomfort. She denies any problem with chest pain, nausea/vomiting or unusual shortness of breath. BUN/creatinine 5/0.6 with fasting blood sugar 126. Mild elevation of transaminase with a ALT of 69 AST is 35. Review of records have shown patient has intermittent elevation transaminase over the past 18 months. WBC is 21.5 with hemoglobin 12.6. Temperature 98.3 with heart rate 84 blood pressure 107/69. O2 sat 96% on room air at rest.  Urine output is very good. General surgery note reviewed. 2/10/2022  Patient seen and examined on medical surgical floor. Patient complains of postop discomfort. Patient denies any problem with chest pain, dizziness, nausea, fever/chills, dysuria, cough or unusual shortness of breath. Patient is passing some gas but no bowel movements. Increase activity today. BUN/creatinine 5/0.7 with serum sodium 130. WBC is still elevated 23.7 with hemoglobin 14.3. Temperature is 98.8 with heart rate 76 blood pressure 110/78. O2 sat 94% on room air at rest.  Urine output is adequate. 2/11/2022  Patient seen examined on medical surgical floor. Patient complains of persistent postop discomfort along with some nausea and poor appetite. Patient denies passing had any bowel movements or gas recently.   BUN/creatinine 6/0.6 with serum sodium 124 7. Potassium 3.4 with WBC 24.6 and hemoglobin 13.7. Temperature is 98.6nine 9.3 with heart rate of 99. O2 sat 98% on room air at rest.  General surgery note reviewed. CT of the abdomen pelvis was ordered for today. 2/12/2022  Patient seen examined on medical surgical floor. Patient states the abdominal distention seems to be improved. Patient still having postop discomfort and is mildly improved. Patient denies any chest pain, dizziness or unusual shortness of breath. Patient did have some nausea last night with supper. Patient denies any bowel movements at this time. CT of the abdomen pelvis from yesterday suggested ileus but cannot rule out obstruction. Temperature 98 with heart rate of 97 with blood pressure 124/67 O2 sat 93% on room air at rest.  WBC is 14.8 with hemoglobin 11.8. BUN/creatinine 7/0.6. Serum sodium increased to 134 from 127 yesterday. 2/13/2022  Patient seen examined on surgical medical floor. Patient still smokes postop discomfort. It seems to be slowly improving. Patient still has some abdominal distention intermittently. Patient says she is drinking fairly good but is not eating very much. She denies any chest pain or unusual shortness of breath at rest.  Serum sodium is 129 with BUN/creatinine 4/0.5. Serum potassium 3.4. WBC 12.9 hemoglobin 11.5. Temperature is 99.1 with a heart rate of 100 and blood pressure 140/77. O2 sat 92% room air at rest.  Check O2 saturation with activity on room air.     Past Medical History:    Past Medical History:   Diagnosis Date    Acid reflux disease     Cyst of ovary     Fracture of nasal bone     Headache     Hearing loss     Hypertension     Migraine     Morbidly obese ([address] 10/05/2020    Multiple sclerosis ([address]     denies limitations at present 11/2020    VEENA no CPAP     severe VEENA per pt    Right shoulder pain 11/2020    Tinnitus     TMJ dysfunction      Past Surgical History:    Past Surgical History:   Procedure Laterality Date    APPENDECTOMY      BACK SURGERY      lumbar    BICEPS TENDON REPAIR Right 11/11/2020    BICEPS TENODESIS performed by Ina Hollingsworth MD at [place] [address] Left 8/31/2021    LAPAROSCOPIC LEFT HEMICOLECTOMY WITH LOOP OSTOMY performed by India Messer MD at [address] [place] [address] 9/6/2021    DIAGNOSTIC LAPAROSCOPY POSSIBLE BOWEL RESECTION POSSILBE OSTOMY performed by India Messer MD at [address] N/A 2/8/2022    LAPAROSCOPIC SIGMOID COLON RESECTION performed by India Messer MD at [address]  03/05/2018    Robotic hysterectomy, bilateral salpingectomy, right oophorectomy DR. ARIANA MONIQUE [facility]     HYSTERECTOMY, TOTAL ABDOMINAL      LARYNGOSCOPY N/A 7/17/2020    DIRECT LARYNGOSCOPY--OMNI GUIDE LASER performed by [facility], MD at [place] [address] PARTIAL HYSTERECTOMY      PROCTOSIGMOIDOSCOPY N/A 10/12/2021    ANAL PROCTO SIGMOIDOSCOPY FLEXIBLE performed by India Messer MD at [address] [place] [address] N/A 1/25/2022    ANAL PROCTO [address] performed by India Messer MD at [facility] ARTHROSCOPY Right 11/11/2020    RIGHT SHOULDER DIAGNOSTIC ARTHROSCOPY WITH DECOMPRESSION ROTATOR CUFF REPAIR performed by Ina Hollingsworth MD at [place] [address]         Medications Prior to Admission:    @  Prior to Admission medications    Medication Sig Start Date End Date Taking?  Authorizing Provider   dicyclomine (BENTYL) 10 MG capsule Take 10 mg by mouth 4 times daily (before meals and nightly)    Historical Provider, MD   vitamin D (D3-50) 75977 UNIT CAPS Take 1 capsule by mouth once a week 11/30/21   Rocio Ho APRN - CNP   nortriptyline (PAMELOR) 10 MG capsule TAKE 1 CAPSULE BY MOUTH NIGHTLY FOR HEADACHES 11/5/21   Cristina Spencer PA-C   gabapentin (NEURONTIN) 300 MG capsule TAKE 1 CAPSULE BY MOUTH THREE TIMES DAILY 9/13/21 1/31/22 MARIXA Mendez CNP   dexlansoprazole (DEXILANT) 60 MG CPDR delayed release capsule Take 60 mg by mouth daily    Historical Provider, MD   baclofen (LIORESAL) 20 MG tablet Take 1 tablet by mouth 4 times daily as needed (muscle spasms; pain) 8/13/21   MARIXA Mendez CNP   rOPINIRole (REQUIP) 0.5 MG tablet Take 1 tablet by mouth 2 times daily as needed (restless legs) 8/13/21   MARIXA Mendez CNP   ocrelizumab (OCREVUS) 300 MG/10ML SOLN injection Infuse 20 mLs intravenously every 6 months 7/19/21   MARIXA Mendez CNP       Allergies:  Patient has no known allergies. Social History:   Social History     Socioeconomic History    Marital status: Single     Spouse name: Not on file    Number of children: 3    Years of education: 6    Highest education level: 11th grade   Occupational History    Not on file   Tobacco Use    Smoking status: Current Every Day Smoker     Packs/day: 0.50     Years: 25.00     Pack years: 12.50     Types: Cigarettes    Smokeless tobacco: Never Used    Tobacco comment: Ready to stop, requesting prescription for Chantix   Vaping Use    Vaping Use: Never used   Substance and Sexual Activity    Alcohol use: Yes     Alcohol/week: 1.0 standard drink     Types: 1 Glasses of wine per week     Comment: socially    Drug use: No    Sexual activity: Yes     Partners: Male   Other Topics Concern    Not on file   Social History Narrative    Uses [employer] for transportation    [employer]     Social Determinants of Health     Financial Resource Strain: Medium Risk    Difficulty of Paying Living Expenses: Somewhat hard   Food Insecurity: Food Insecurity Present    Worried About Running Out of Food in the Last Year: Sometimes true    [place] of Food in the Last Year: Never true   Transportation Needs: No Transportation Needs    Lack of Transportation (Medical): No    Lack of Transportation (Non-Medical):  No   Physical Activity: Sufficiently Active    Days of Exercise per Week: 3 days    Minutes of Exercise per Session: 60 min   Stress: No Stress Concern Present    Feeling of Stress : Not at all   Social Connections: Socially Isolated    Frequency of Communication with Friends and Family: Three times a week    Frequency of Social Gatherings with Friends and Family: Twice a week    Attends Bahai Services: Never    Active Member of Clubs or Organizations: No    Attends Club or Organization Meetings: Never    Marital Status: Never    Intimate Partner Violence:     Fear of Current or Ex-Partner: Not on file    Emotionally Abused: Not on file    Physically Abused: Not on file    Sexually Abused: Not on file   Housing Stability:     Unable to Pay for Housing in the Last Year: Not on file    Number of [place] in the Last Year: Not on file    Unstable Housing in the Last Year: Not on file       Family History:   Family History   Problem Relation Age of Onset    Mult Sclerosis Mother     Colon Cancer Neg Hx     Uterine Cancer Neg Hx     Ovarian Cancer Neg Hx     Breast Cancer Neg Hx        REVIEW OF SYSTEMS:    Gen: Patient denies any lightheadedness or dizziness. No LOC or syncope. No fevers or chills. HEENT: No earache, sore throat or nasal congestion. Resp: Denies cough, hemoptysis or sputum production. Cardiac: Denies chest pain, SOB, diaphoresis or palpitations. GI:+ Abdominal distention. Lower abdominal cramping. No nausea, vomiting, diarrhea or constipation. No melena or hematochezia. : No urinary complaints, dysuria, hematuria or frequency. MSK: No extremity weakness, paralysis or paresthesias.      PHYSICAL EXAM:    Vitals:  BP (!) 140/77   Pulse 100   Temp 99.1 °F (37.3 °C) (Oral)   Resp 16   Ht 5' 2\" (1.575 m)   Wt 187 lb (84.8 kg)   LMP 01/05/2018   SpO2 92%   BMI 34.20 kg/m²     General:  This is a 51 y.o. yo female who is alert and oriented in postop distress  HEENT:  Head is normocephalic and atraumatic, PERRLA, EOMI, mucus membranes moist with no pharyngeal erythema or exudate. Neck:  Supple with no carotid bruits, JVD or thyromegaly.   No cervical adenopathy  CV:  Regular rate and rhythm, no murmurs  Lungs: Mildly coarse breath sounds to auscultation bilaterally with no wheezes, rales or rhonchi  Abdomen:  + Expected postop abdomen, + ileostomy,+ abdominal fat, bowel sounds  hypoactive  Extremities:  No edema, peripheral pulses intact bilaterally  Neuro:  Cranial nerves II-XII grossly intact; motor and sensory function intact with no focal deficits  Skin:  No rashes, lesions or wounds      DATA:  CBC with Differential:    Lab Results   Component Value Date    WBC 12.9 02/13/2022    RBC 3.64 02/13/2022    HGB 11.5 02/13/2022    HCT 33.8 02/13/2022     02/13/2022    MCV 92.9 02/13/2022    MCH 31.6 02/13/2022    MCHC 34.0 02/13/2022    RDW 12.5 02/13/2022    METASPCT 0.9 11/23/2021    LYMPHOPCT 23.2 02/02/2022    MONOPCT 6.7 02/02/2022    BASOPCT 0.4 02/02/2022    MONOSABS 0.62 02/02/2022    LYMPHSABS 2.13 02/02/2022    EOSABS 0.20 02/02/2022    BASOSABS 0.04 02/02/2022     CMP:    Lab Results   Component Value Date     02/13/2022    K 3.4 02/13/2022    K 3.7 02/09/2022    CL 92 02/13/2022    CO2 25 02/13/2022    BUN 4 02/13/2022    CREATININE 0.5 02/13/2022    GFRAA >60 02/13/2022    LABGLOM >60 02/13/2022    GLUCOSE 106 02/13/2022    PROT 5.8 02/11/2022    LABALBU 2.5 02/11/2022    CALCIUM 8.1 02/13/2022    BILITOT 1.1 02/11/2022    ALKPHOS 97 02/11/2022    AST 8 02/11/2022    ALT 25 02/11/2022     Magnesium:    Lab Results   Component Value Date    MG 2.2 09/01/2021     Phosphorus:    Lab Results   Component Value Date    PHOS 3.2 09/01/2021     PT/INR:    Lab Results   Component Value Date    PROTIME 10.7 02/28/2020    INR 1.0 02/28/2020     Troponin:    Lab Results   Component Value Date    TROPONINI <0.01 07/20/2020     U/A:    Lab Results   Component Value Date Joe Bunde 02/10/2022    PROTEINU TRACE 02/10/2022    PHUR 5.5 02/10/2022    WBCUA 1-3 02/10/2022    RBCUA 0-1 02/10/2022    TRICHOMONAS Present 02/26/2020    BACTERIA MODERATE 02/10/2022    CLARITYU Clear 02/10/2022    SPECGRAV 1.025 02/10/2022    LEUKOCYTESUR Negative 02/10/2022    UROBILINOGEN 0.2 02/10/2022    BILIRUBINUR SMALL 02/10/2022    BLOODU SMALL 02/10/2022    GLUCOSEU Negative 02/10/2022    AMORPHOUS FEW 11/23/2021     ABG:  No results found for: PH, PCO2, PO2, HCO3, BE, THGB, TCO2, O2SAT  HgBA1c:    Lab Results   Component Value Date    LABA1C 5.4 05/11/2021     FLP:    Lab Results   Component Value Date    TRIG 184 05/11/2021    HDL 44 05/11/2021    LDLCALC 144 05/11/2021    LABVLDL 37 05/11/2021     TSH:    Lab Results   Component Value Date    TSH 0.293 09/01/2021     IRON:  No results found for: IRON  LIPASE:    Lab Results   Component Value Date    LIPASE 10 09/05/2021       ASSESSMENT AND PLAN:      Patient Active Problem List    Diagnosis Date Noted    Sigmoid stricture ([address]     Pelvic abscess in female     Ileus, postoperative ([address] 09/05/2021    S/P colectomy 08/31/2021    Malignant neoplasm of descending colon ([address]     Secondary restless legs syndrome 08/13/2021    Palafox's esophagus with dysplasia 05/11/2021    Morbidly obese ([address] 10/05/2020    Multiple sclerosis ([address] 08/04/2020    Vocal cord dysfunction 07/20/2020     Impression:  1. Sigmoid stricturelaparoscopic sigmoid resection 2/8/2022  2. History of VEENA-patient apparently been tested and is negative for CPAP use at this time  3. Obesity  4. Hypertension   5. History of current tobacco use  6. History of restless leg syndrome  7. Leukocytosis postop  8. Mild elevation transaminase postop  9.  Hyponatremia postop    Plan:  Patient admitted to medical surgical floor  Home medications reviewed  Monitor heart rate, blood pressure, O2 saturation  Diet and abdominal pain meds per general surgery  Lovenox 40 mg subcu daily  IV fluids lactated Ringer's 100 cc an hour    Albuterol aerosol 2.5 mg every 4 hours as needed for shortness of breath    CT of the abdomen pelvis 2/11  Serum osmolality274    O2 saturation on room air with activity  KCl 20 mEq twice daily x2 doses  Decrease IV fluids lactated Ringer's at 50 cc an hour and stop when oral intake improves. BMP, CBC in a.m.     Ora DO Fransisco, D.O.  2/13/2022  9:58 AM

## 2022-02-13 NOTE — PROGRESS NOTES
Surgery Progress Note            Chief complaint:   No chief complaint on file.      Patient Active Problem List   Diagnosis    Vocal cord dysfunction    [facility] sclerosis [facility]    Morbidly obese ([address]    Palafox's esophagus with dysplasia    Secondary restless legs syndrome    S/P colectomy    Malignant neoplasm of descending colon ([address]    Ileus, postoperative ([address]    Pelvic abscess in female    Sigmoid stricture ([address]       S: doing better today, less distended and no emesis    O:   Vitals:    02/13/22 0515   BP: (!) 140/77   Pulse: 100   Resp: 16   Temp: 99.1 °F (37.3 °C)   SpO2: 92%       Intake/Output Summary (Last 24 hours) at 2/13/2022 0826  Last data filed at 2/13/2022 0558  Gross per 24 hour   Intake 180 ml   Output 500 ml   Net -320 ml           Labs:  Lab Results   Component Value Date    WBC 12.9 02/13/2022    WBC 14.8 02/12/2022    WBC 24.6 02/11/2022    HGB 11.5 02/13/2022    HGB 11.8 02/12/2022    HGB 13.7 02/11/2022    HCT 33.8 02/13/2022    HCT 35.0 02/12/2022    HCT 40.2 02/11/2022     Lab Results   Component Value Date    CREATININE 0.5 02/13/2022    BUN 4 (L) 02/13/2022     (L) 02/13/2022    K 3.4 (L) 02/13/2022    CL 92 (L) 02/13/2022    CO2 25 02/13/2022     Lab Results   Component Value Date    LIPASE 10 09/05/2021    LIPASE 13 02/04/2018    LIPASE 11 01/15/2018         Physical exam:   BP (!) 140/77   Pulse 100   Temp 99.1 °F (37.3 °C) (Oral)   Resp 16   Ht 5' 2\" (1.575 m)   Wt 187 lb (84.8 kg)   LMP 01/05/2018   SpO2 92%   BMI 34.20 kg/m²   General appearance: NAD  Head: NCAT  Neck: supple, no masses  Lungs: equal chest rise bilateral  Heart: S1S2 present  Abdomen: soft, minimally tender, minimally distended,  Incisions clean, ostomy pink and patent with good bowel function  Skin; no lesions  Gu: no cva tenderness  Extremities: extremities normal, atraumatic, no cyanosis or edema    A:  POD # 5 lap sigmoid/lar with diverting loop in place post op ileus    P: likely d/c tomorrow.     Ana Ramirez MD, MD  2/13/2022

## 2022-02-14 ENCOUNTER — APPOINTMENT (OUTPATIENT)
Dept: GENERAL RADIOLOGY | Age: 47
DRG: 231 | End: 2022-02-14
Attending: SURGERY
Payer: COMMERCIAL

## 2022-02-14 LAB
ANION GAP SERPL CALCULATED.3IONS-SCNC: 11 MMOL/L (ref 7–16)
BACTERIA: ABNORMAL /HPF
BILIRUBIN URINE: ABNORMAL
BLOOD, URINE: ABNORMAL
BUN BLDV-MCNC: 4 MG/DL (ref 6–20)
CALCIUM SERPL-MCNC: 8.1 MG/DL (ref 8.6–10.2)
CHLORIDE BLD-SCNC: 91 MMOL/L (ref 98–107)
CLARITY: CLEAR
CO2: 26 MMOL/L (ref 22–29)
COLOR: ABNORMAL
CREAT SERPL-MCNC: 0.6 MG/DL (ref 0.5–1)
EPITHELIAL CELLS, UA: ABNORMAL /HPF
GFR AFRICAN AMERICAN: >60
GFR NON-AFRICAN AMERICAN: >60 ML/MIN/1.73
GLUCOSE BLD-MCNC: 118 MG/DL (ref 74–99)
GLUCOSE URINE: 100 MG/DL
HCT VFR BLD CALC: 35.2 % (ref 34–48)
HEMOGLOBIN: 11.7 G/DL (ref 11.5–15.5)
KETONES, URINE: >=80 MG/DL
LEUKOCYTE ESTERASE, URINE: ABNORMAL
MCH RBC QN AUTO: 30.9 PG (ref 26–35)
MCHC RBC AUTO-ENTMCNC: 33.2 % (ref 32–34.5)
MCV RBC AUTO: 92.9 FL (ref 80–99.9)
NITRITE, URINE: NEGATIVE
PDW BLD-RTO: 12.8 FL (ref 11.5–15)
PH UA: 6 (ref 5–9)
PLATELET # BLD: 245 E9/L (ref 130–450)
PMV BLD AUTO: 9.5 FL (ref 7–12)
POTASSIUM SERPL-SCNC: 3.5 MMOL/L (ref 3.5–5)
PROTEIN UA: 30 MG/DL
RBC # BLD: 3.79 E12/L (ref 3.5–5.5)
RBC UA: ABNORMAL /HPF (ref 0–2)
SODIUM BLD-SCNC: 128 MMOL/L (ref 132–146)
SPECIFIC GRAVITY UA: 1.02 (ref 1–1.03)
UROBILINOGEN, URINE: 0.2 E.U./DL
WBC # BLD: 15.4 E9/L (ref 4.5–11.5)
WBC UA: ABNORMAL /HPF (ref 0–5)

## 2022-02-14 PROCEDURE — 80048 BASIC METABOLIC PNL TOTAL CA: CPT

## 2022-02-14 PROCEDURE — 81001 URINALYSIS AUTO W/SCOPE: CPT

## 2022-02-14 PROCEDURE — 36415 COLL VENOUS BLD VENIPUNCTURE: CPT

## 2022-02-14 PROCEDURE — 1200000000 HC SEMI PRIVATE

## 2022-02-14 PROCEDURE — 6370000000 HC RX 637 (ALT 250 FOR IP): Performed by: SURGERY

## 2022-02-14 PROCEDURE — 71046 X-RAY EXAM CHEST 2 VIEWS: CPT

## 2022-02-14 PROCEDURE — 6370000000 HC RX 637 (ALT 250 FOR IP): Performed by: INTERNAL MEDICINE

## 2022-02-14 PROCEDURE — 6370000000 HC RX 637 (ALT 250 FOR IP): Performed by: STUDENT IN AN ORGANIZED HEALTH CARE EDUCATION/TRAINING PROGRAM

## 2022-02-14 PROCEDURE — 2500000003 HC RX 250 WO HCPCS: Performed by: SURGERY

## 2022-02-14 PROCEDURE — 2580000003 HC RX 258: Performed by: INTERNAL MEDICINE

## 2022-02-14 PROCEDURE — 6360000002 HC RX W HCPCS: Performed by: SURGERY

## 2022-02-14 PROCEDURE — 85027 COMPLETE CBC AUTOMATED: CPT

## 2022-02-14 RX ORDER — CALCIUM POLYCARBOPHIL 625 MG 625 MG/1
625 TABLET ORAL DAILY
Status: DISCONTINUED | OUTPATIENT
Start: 2022-02-14 | End: 2022-02-17

## 2022-02-14 RX ORDER — CIPROFLOXACIN 2 MG/ML
400 INJECTION, SOLUTION INTRAVENOUS EVERY 12 HOURS
Status: DISCONTINUED | OUTPATIENT
Start: 2022-02-14 | End: 2022-02-19

## 2022-02-14 RX ORDER — IBUPROFEN 400 MG/1
400 TABLET ORAL EVERY 6 HOURS PRN
Status: DISCONTINUED | OUTPATIENT
Start: 2022-02-14 | End: 2022-02-24 | Stop reason: HOSPADM

## 2022-02-14 RX ORDER — ACETAMINOPHEN 325 MG/1
650 TABLET ORAL EVERY 6 HOURS PRN
Status: DISCONTINUED | OUTPATIENT
Start: 2022-02-14 | End: 2022-02-24 | Stop reason: HOSPADM

## 2022-02-14 RX ADMIN — SODIUM CHLORIDE, POTASSIUM CHLORIDE, SODIUM LACTATE AND CALCIUM CHLORIDE: 600; 310; 30; 20 INJECTION, SOLUTION INTRAVENOUS at 08:03

## 2022-02-14 RX ADMIN — PSYLLIUM HUSK 1 PACKET: 3.4 GRANULE ORAL at 08:03

## 2022-02-14 RX ADMIN — MORPHINE SULFATE 4 MG: 4 INJECTION, SOLUTION INTRAMUSCULAR; INTRAVENOUS at 22:54

## 2022-02-14 RX ADMIN — METHOCARBAMOL 500 MG: 500 TABLET ORAL at 14:47

## 2022-02-14 RX ADMIN — PANTOPRAZOLE SODIUM 40 MG: 40 TABLET, DELAYED RELEASE ORAL at 16:33

## 2022-02-14 RX ADMIN — ENOXAPARIN SODIUM 40 MG: 100 INJECTION SUBCUTANEOUS at 08:03

## 2022-02-14 RX ADMIN — ONDANSETRON 4 MG: 2 INJECTION INTRAMUSCULAR; INTRAVENOUS at 11:18

## 2022-02-14 RX ADMIN — CIPROFLOXACIN 400 MG: 2 INJECTION, SOLUTION INTRAVENOUS at 17:45

## 2022-02-14 RX ADMIN — IBUPROFEN 400 MG: 400 TABLET, FILM COATED ORAL at 21:26

## 2022-02-14 RX ADMIN — METHOCARBAMOL 500 MG: 500 TABLET ORAL at 18:51

## 2022-02-14 RX ADMIN — METHOCARBAMOL 500 MG: 500 TABLET ORAL at 22:54

## 2022-02-14 RX ADMIN — ACETAMINOPHEN 650 MG: 325 TABLET ORAL at 17:37

## 2022-02-14 RX ADMIN — CALCIUM POLYCARBOPHIL 625 MG 625 MG: 625 TABLET ORAL at 14:47

## 2022-02-14 RX ADMIN — METRONIDAZOLE 500 MG: 500 INJECTION, SOLUTION INTRAVENOUS at 17:45

## 2022-02-14 RX ADMIN — MORPHINE SULFATE 4 MG: 4 INJECTION, SOLUTION INTRAMUSCULAR; INTRAVENOUS at 06:10

## 2022-02-14 RX ADMIN — MORPHINE SULFATE 4 MG: 4 INJECTION, SOLUTION INTRAMUSCULAR; INTRAVENOUS at 13:38

## 2022-02-14 RX ADMIN — PANTOPRAZOLE SODIUM 40 MG: 40 TABLET, DELAYED RELEASE ORAL at 06:10

## 2022-02-14 RX ADMIN — TRAMADOL HYDROCHLORIDE 50 MG: 50 TABLET, COATED ORAL at 09:20

## 2022-02-14 RX ADMIN — METHOCARBAMOL 500 MG: 500 TABLET ORAL at 08:03

## 2022-02-14 RX ADMIN — MORPHINE SULFATE 4 MG: 4 INJECTION, SOLUTION INTRAMUSCULAR; INTRAVENOUS at 02:52

## 2022-02-14 ASSESSMENT — PAIN SCALES - GENERAL
PAINLEVEL_OUTOF10: 0
PAINLEVEL_OUTOF10: 8
PAINLEVEL_OUTOF10: 0
PAINLEVEL_OUTOF10: 7

## 2022-02-14 NOTE — CARE COORDINATION
CM note: Pt is POD6 sigmoid resection. Having high output from ostomy. Hyponatremic at 128, elevated WBC at 15.4. Physician checking urine and chest x-ray. Plan remains for patient to return home with no needs at discharge.

## 2022-02-14 NOTE — PROGRESS NOTES
Department of Internal Medicine        HISTORY OF PRESENT ILLNESS:      The patient is a 51 y.o. female who presents with having a elective laparoscopic sigmoid colon resection. Patient has a known history of sigmoid stricture. Patient had a anal proctosigmoidoscopy performed 1/25 with a scope with past 15 cm work there was complete closure of the rectosigmoid junction without an opening. Patient is seen postop. Patient has expected postop discomfort. Temperature is 97.8 with heart rate 72 blood pressure 140/65. O2 sat 98% on 2 L nasal cannula. 2/9/2022  Patient seen and examined on medical surgical floor. Patient complains of postop discomfort. She denies any problem with chest pain, nausea/vomiting or unusual shortness of breath. BUN/creatinine 5/0.6 with fasting blood sugar 126. Mild elevation of transaminase with a ALT of 69 AST is 35. Review of records have shown patient has intermittent elevation transaminase over the past 18 months. WBC is 21.5 with hemoglobin 12.6. Temperature 98.3 with heart rate 84 blood pressure 107/69. O2 sat 96% on room air at rest.  Urine output is very good. General surgery note reviewed. 2/10/2022  Patient seen and examined on medical surgical floor. Patient complains of postop discomfort. Patient denies any problem with chest pain, dizziness, nausea, fever/chills, dysuria, cough or unusual shortness of breath. Patient is passing some gas but no bowel movements. Increase activity today. BUN/creatinine 5/0.7 with serum sodium 130. WBC is still elevated 23.7 with hemoglobin 14.3. Temperature is 98.8 with heart rate 76 blood pressure 110/78. O2 sat 94% on room air at rest.  Urine output is adequate. 2/11/2022  Patient seen examined on medical surgical floor. Patient complains of persistent postop discomfort along with some nausea and poor appetite. Patient denies passing had any bowel movements or gas recently.   BUN/creatinine 6/0.6 with serum sodium 124 7. Potassium 3.4 with WBC 24.6 and hemoglobin 13.7. Temperature is 98.6nine 9.3 with heart rate of 99. O2 sat 98% on room air at rest.  General surgery note reviewed. CT of the abdomen pelvis was ordered for today. 2/12/2022  Patient seen examined on medical surgical floor. Patient states the abdominal distention seems to be improved. Patient still having postop discomfort and is mildly improved. Patient denies any chest pain, dizziness or unusual shortness of breath. Patient did have some nausea last night with supper. Patient denies any bowel movements at this time. CT of the abdomen pelvis from yesterday suggested ileus but cannot rule out obstruction. Temperature 98 with heart rate of 97 with blood pressure 124/67 O2 sat 93% on room air at rest.  WBC is 14.8 with hemoglobin 11.8. BUN/creatinine 7/0.6. Serum sodium increased to 134 from 127 yesterday. 2/13/2022  Patient seen examined on surgical medical floor. Patient still smokes postop discomfort. It seems to be slowly improving. Patient still has some abdominal distention intermittently. Patient says she is drinking fairly good but is not eating very much. She denies any chest pain or unusual shortness of breath at rest.  Serum sodium is 129 with BUN/creatinine 4/0.5. Serum potassium 3.4. WBC 12.9 hemoglobin 11.5. Temperature is 99.1 with a heart rate of 100 and blood pressure 140/77. O2 sat 92% room air at rest.  Check O2 saturation with activity on room air. 2/14/2022  Patient seen and examined on medical surgical floor. Patient still complain of some lower abdominal cramping. She denies any chest pain, nausea/vomiting, shortness of breath. Patient denies any dysuria or cough. BUN/creatinine 4/0.6 with serum sodium 128. WBC 15.4 today with a hemoglobin 1.7. Temperature 100.3 with heart rate of 100 and blood pressure 156/72. O2 sat 93% on room air at rest.  Patient had out about 1850 cc from ostomy yesterday afternoon. General surgery note reviewed. With patient persistent tach and leukocytosis will check UA and chest x-ray. Past Medical History:    Past Medical History:   Diagnosis Date    Acid reflux disease     Cyst of ovary     Fracture of nasal bone     Headache     Hearing loss     Hypertension     Migraine     Morbidly obese ([address] 10/05/2020    Multiple sclerosis ([address]     denies limitations at present 11/2020    VEENA no CPAP     severe VEENA per pt    Right shoulder pain 11/2020    Tinnitus     TMJ dysfunction      Past Surgical History:    Past Surgical History:   Procedure Laterality Date    APPENDECTOMY      BACK SURGERY      lumbar    BICEPS TENDON REPAIR Right 11/11/2020    BICEPS TENODESIS performed by Peter Altman MD at [place] [id number] Left 8/31/2021    LAPAROSCOPIC LEFT HEMICOLECTOMY WITH LOOP OSTOMY performed by Xochitl Stoll MD at [address] 9/6/2021    DIAGNOSTIC LAPAROSCOPY POSSIBLE BOWEL RESECTION POSSILBE OSTOMY performed by Xochitl Stoll MD at [address] 2/8/2022    LAPAROSCOPIC SIGMOID COLON RESECTION performed by Xochitl Stoll MD at [facility]  03/05/2018    Robotic hysterectomy, bilateral salpingectomy, right oophorectomy DR. ARIANA MONIQUE [facility]     HYSTERECTOMY, TOTAL ABDOMINAL      LARYNGOSCOPY N/A 7/17/2020    DIRECT LARYNGOSCOPY--OMNI GUIDE LASER performed by Judith Gerard MD at [facility] PARTIAL HYSTERECTOMY      PROCTOSIGMOIDOSCOPY N/A 10/12/2021    ANAL PROCTO SIGMOIDOSCOPY FLEXIBLE performed by Xochitl Stoll MD at [address] [place] [address] N/A 1/25/2022    ANAL PROCTO SIGMOIDOSCOPY FLEXIBLE performed by Xochitl Stoll MD at [facility] ARTHROSCOPY Right 11/11/2020    RIGHT SHOULDER DIAGNOSTIC ARTHROSCOPY WITH DECOMPRESSION ROTATOR CUFF REPAIR performed by Peter Altman MD at [facility] Medications Prior to Admission:    @  Prior to Admission medications    Medication Sig Start Date End Date Taking? Authorizing Provider   dicyclomine (BENTYL) 10 MG capsule Take 10 mg by mouth 4 times daily (before meals and nightly)    Historical Provider, MD   vitamin D (D3-50) [address]608 UNIT CAPS Take 1 capsule by mouth once a week 11/30/21   MARIXA Brown CNP   nortriptyline (PAMELOR) 10 MG capsule TAKE 1 CAPSULE BY MOUTH NIGHTLY FOR HEADACHES 11/5/21   Nathalia Barry PA-C   gabapentin (NEURONTIN) 300 MG capsule TAKE 1 CAPSULE BY MOUTH THREE TIMES DAILY 9/13/21 1/31/22  MARIXA Brown CNP   dexlansoprazole (DEXILANT) 60 MG CPDR delayed release capsule Take 60 mg by mouth daily    Historical Provider, MD   baclofen (LIORESAL) 20 MG tablet Take 1 tablet by mouth 4 times daily as needed (muscle spasms; pain) 8/13/21   MARIXA Brown CNP   rOPINIRole (REQUIP) 0.5 MG tablet Take 1 tablet by mouth 2 times daily as needed (restless legs) 8/13/21   MARIXA Brown CNP   ocrelizumab (OCREVUS) 300 MG/10ML SOLN injection Infuse 20 mLs intravenously every 6 months 7/19/21   MARIXA Brown CNP       Allergies:  Patient has no known allergies. Social History:   Social History     Socioeconomic History    Marital status: Single     Spouse name: Not on file    Number of children: 3    Years of education: 6    Highest education level: 11th grade   Occupational History    Not on file   Tobacco Use    Smoking status: Current Every Day Smoker     Packs/day: 0.50     Years: 25.00     Pack years: 12.50     Types: Cigarettes    Smokeless tobacco: Never Used    Tobacco comment: Ready to stop, requesting prescription for Chantix   Vaping Use    Vaping Use: Never used   Substance and Sexual Activity    Alcohol use:  Yes     Alcohol/week: 1.0 standard drink     Types: 1 Glasses of wine per week     Comment: socially    Drug use: No    Sexual activity: Yes     Partners: Male   Other Topics Concern    Not on file   Social History Narrative    Uses [employer] for transportation    [facility]     Social Determinants of Health     Financial Resource Strain: Medium Risk    Difficulty of Paying Living Expenses: Somewhat hard   Food Insecurity: Food Insecurity Present    Worried About Running Out of Food in the Last Year: Sometimes true    [place] of Food in the Last Year: Never true   Transportation Needs: No Transportation Needs    Lack of Transportation (Medical): No    Lack of Transportation (Non-Medical): No   Physical Activity: Sufficiently Active    Days of Exercise per Week: 3 days    Minutes of Exercise per Session: 60 min   Stress: No Stress Concern Present    Feeling of Stress : Not at all   Social Connections: Socially Isolated    Frequency of Communication with Friends and Family: Three times a week    Frequency of Social Gatherings with Friends and Family: Twice a week    Attends Zoroastrian Services: Never    Active Member of Clubs or Organizations: No    Attends Club or Organization Meetings: Never    Marital Status: Never    Intimate Partner Violence:     Fear of Current or Ex-Partner: Not on file    Emotionally Abused: Not on file    Physically Abused: Not on file    Sexually Abused: Not on file   Housing Stability:     Unable to Pay for Housing in the Last Year: Not on file    Number of [place] in the Last Year: Not on file    Unstable Housing in the Last Year: Not on file       Family History:   Family History   Problem Relation Age of Onset    Mult Sclerosis Mother     Colon Cancer Neg Hx     Uterine Cancer Neg Hx     Ovarian Cancer Neg Hx     Breast Cancer Neg Hx        REVIEW OF SYSTEMS:    Gen: Patient denies any lightheadedness or dizziness. No LOC or syncope. No fevers or chills. HEENT: No earache, sore throat or nasal congestion. Resp: Denies cough, hemoptysis or sputum production.     Cardiac: Denies chest pain, SOB, diaphoresis or palpitations. GI:+ Abdominal distention. Lower abdominal cramping. No nausea, vomiting, diarrhea or constipation. No melena or hematochezia. : No urinary complaints, dysuria, hematuria or frequency. MSK: No extremity weakness, paralysis or paresthesias. PHYSICAL EXAM:    Vitals:  BP (!) 156/72   Pulse 100   Temp 100.3 °F (37.9 °C) (Oral)   Resp 18   Ht 5' 2\" (1.575 m)   Wt 187 lb (84.8 kg)   LMP 01/05/2018   SpO2 93%   BMI 34.20 kg/m²     General:  This is a 51 y.o. yo female who is alert and oriented in postop distress  HEENT:  Head is normocephalic and atraumatic, PERRLA, EOMI, mucus membranes moist with no pharyngeal erythema or exudate. Neck:  Supple with no carotid bruits, JVD or thyromegaly.   No cervical adenopathy  CV:  Regular rate and rhythm, no murmurs  Lungs: Mildly coarse breath sounds to auscultation bilaterally with no wheezes, rales or rhonchi  Abdomen:  + Expected postop abdomen, + ileostomy,+ abdominal fat, bowel sounds  hypoactive  Extremities:  No edema, peripheral pulses intact bilaterally  Neuro:  Cranial nerves II-XII grossly intact; motor and sensory function intact with no focal deficits  Skin:  No rashes, lesions or wounds      DATA:  CBC with Differential:    Lab Results   Component Value Date    WBC 15.4 02/14/2022    RBC 3.79 02/14/2022    HGB 11.7 02/14/2022    HCT 35.2 02/14/2022     02/14/2022    MCV 92.9 02/14/2022    MCH 30.9 02/14/2022    MCHC 33.2 02/14/2022    RDW 12.8 02/14/2022    METASPCT 0.9 11/23/2021    LYMPHOPCT 23.2 02/02/2022    MONOPCT 6.7 02/02/2022    BASOPCT 0.4 02/02/2022    MONOSABS 0.62 02/02/2022    LYMPHSABS 2.13 02/02/2022    EOSABS 0.20 02/02/2022    BASOSABS 0.04 02/02/2022     CMP:    Lab Results   Component Value Date     02/14/2022    K 3.5 02/14/2022    K 3.7 02/09/2022    CL 91 02/14/2022    CO2 26 02/14/2022    BUN 4 02/14/2022    CREATININE 0.6 02/14/2022    GFRAA >60 02/14/2022    LABGLOM >60 02/14/2022    GLUCOSE 118 02/14/2022    PROT 5.8 02/11/2022    LABALBU 2.5 02/11/2022    CALCIUM 8.1 02/14/2022    BILITOT 1.1 02/11/2022    ALKPHOS 97 02/11/2022    AST 8 02/11/2022    ALT 25 02/11/2022     Magnesium:    Lab Results   Component Value Date    MG 2.2 09/01/2021     Phosphorus:    Lab Results   Component Value Date    PHOS 3.2 09/01/2021     PT/INR:    Lab Results   Component Value Date    PROTIME 10.7 02/28/2020    INR 1.0 02/28/2020     Troponin:    Lab Results   Component Value Date    TROPONINI <0.01 07/20/2020     U/A:    Lab Results   Component Value Date    COLORU DKYELLOW 02/10/2022    PROTEINU TRACE 02/10/2022    PHUR 5.5 02/10/2022    WBCUA 1-3 02/10/2022    RBCUA 0-1 02/10/2022    TRICHOMONAS Present 02/26/2020    BACTERIA MODERATE 02/10/2022    CLARITYU Clear 02/10/2022    SPECGRAV 1.025 02/10/2022    LEUKOCYTESUR Negative 02/10/2022    UROBILINOGEN 0.2 02/10/2022    BILIRUBINUR SMALL 02/10/2022    BLOODU SMALL 02/10/2022    GLUCOSEU Negative 02/10/2022    AMORPHOUS FEW 11/23/2021     ABG:  No results found for: PH, PCO2, PO2, HCO3, BE, THGB, TCO2, O2SAT  HgBA1c:    Lab Results   Component Value Date    LABA1C 5.4 05/11/2021     FLP:    Lab Results   Component Value Date    TRIG 184 05/11/2021    HDL 44 05/11/2021    LDLCALC 144 05/11/2021    LABVLDL 37 05/11/2021     TSH:    Lab Results   Component Value Date    TSH 0.293 09/01/2021     IRON:  No results found for: IRON  LIPASE:    Lab Results   Component Value Date    LIPASE 10 09/05/2021       ASSESSMENT AND PLAN:      Patient Active Problem List    Diagnosis Date Noted    [facility] strict[facility]     Pelvic abscess in female     Ileus, postoperative ([facility] 09/05/2021    S/P colectomy 08/31/2021    [facility] of [facility]     Secondary restless legs syndrome 08/13/2021    Palafox's esophagus with dysplasia 05/11/2021    Morbidly obese ([facility] 10/05/2020    Multiple sclerosis ([facility] 08/04/2020    Vocal cord dysfunction 07/20/2020     Impression:  1. Sigmoid stricturelaparoscopic sigmoid resection 2/8/2022  2. History of VEENA-patient apparently been tested and is negative for CPAP use at this time  3. Obesity  4. Hypertension   5. History of current tobacco use  6. History of restless leg syndrome  7. Leukocytosis postop  8. Mild elevation transaminase postop  9. Hyponatremia postop    Plan:  Patient admitted to medical surgical floor  Home medications reviewed  Monitor heart rate, blood pressure, O2 saturation  Diet and abdominal pain meds per general surgery  Lovenox 40 mg subcu daily  IV fluids lactated Ringer's 100 cc an hour    Albuterol aerosol 2.5 mg every 4 hours as needed for shortness of breath    CT of the abdomen pelvis 2/11  Serum osmolality274    O2 saturation on room air with activity  KCl 20 mEq twice daily x2 doses  Decrease IV fluids lactated Ringer's at 50 cc an hour and stop when oral intake improves. UA, chest x-ray now    BMP, CBC in a.m.     Carola Becerra DO, D.OWagner  2/14/2022  10:39 AM

## 2022-02-14 NOTE — PLAN OF CARE
Problem: Infection - Surgical Site:  Goal: Will show no infection signs and symptoms  Description: Will show no infection signs and symptoms  2/14/2022 1307 by Elena Arce RN  Outcome: Met This Shift  2/14/2022 0246 by Silvia Palomino RN  Outcome: Met This Shift     Problem: Pain:  Goal: Pain level will decrease  Description: Pain level will decrease  2/14/2022 1307 by Elena Arce RN  Outcome: Met This Shift  2/14/2022 0246 by Silvia Palomino RN  Outcome: Met This Shift  Goal: Control of acute pain  Description: Control of acute pain  2/14/2022 1307 by Elena Arce RN  Outcome: Met This Shift  2/14/2022 0246 by Silvia Palomino RN  Outcome: Met This Shift     Problem:  Bowel/Gastric:  Goal: Control of bowel function will improve  Description: Control of bowel function will improve  Outcome: Met This Shift  Goal: Ability to achieve a regular elimination pattern will improve  Description: Ability to achieve a regular elimination pattern will improve  Outcome: Met This Shift

## 2022-02-14 NOTE — PROGRESS NOTES
GENERAL SURGERY  DAILY PROGRESS NOTE  2/14/2022    No chief complaint on file. Subjective:  Patient still with some abdominal pain and mild nausea this morning. Tolerated more liquids overnight. Having high ostomy output. Objective:  /72   Pulse 102   Temp 99.3 °F (37.4 °C) (Oral)   Resp 18   Ht 5' 2\" (1.575 m)   Wt 187 lb (84.8 kg)   LMP 01/05/2018   SpO2 93%   BMI 34.20 kg/m²     GENERAL:  Laying in bed, awake, alert, cooperative, uncomfortable   HEAD: Normocephalic, atraumatic  EYES: No sclera icterus, pupils equal  LUNGS:  No increased work of breathing  CARDIOVASCULAR:  RR  ABDOMEN:  Soft, mildly distended, tender around ostomy.  Dark liquid stool in bag   EXTREMITIES: No edema or swelling  SKIN: Warm and dry    Assessment/Plan:  51 y.o. female with sigmoid stricture s/p laparoscopic sigmoid resection with anastomosis 2/8    - increasing ostomy output, add fiber to prevent dehydration  - monitor abdominal exam  - pain control, nausea control  - ambulate    Electronically signed by Oni Michael MD on 2/14/2022 at 6:15 AM    As above

## 2022-02-14 NOTE — ADT AUTH CERT
O2 sat 98% on 2 L nasal cannula.       2/9/2022   Patient seen and examined on medical surgical floor.  Patient complains of postop discomfort.  She denies any problem with chest pain, nausea/vomiting or unusual shortness of breath.  BUN/creatinine 5/0.6 with fasting blood sugar 126.  Mild elevation of transaminase with a ALT of 69 AST is 35.  Review of records have shown patient has intermittent elevation transaminase over the past 18 months.  WBC is 21.5 with hemoglobin 12.6.  Temperature 98.3 with heart rate 84 blood pressure 107/69.  O2 sat 96% on room air at rest.  Urine output is very good.  General surgery note reviewed.       2/10/2022   Patient seen and examined on medical surgical floor.  Patient complains of postop discomfort.  Patient denies any problem with chest pain, dizziness, nausea, fever/chills, dysuria, cough or unusual shortness of breath.  Patient is passing some gas but no bowel movements.  Increase activity today.  BUN/creatinine 5/0.7 with serum sodium 130.  WBC is still elevated 23.7 with hemoglobin 14. 3.  Temperature is 98.8 with heart rate 76 blood pressure 110/78.  O2 sat 94% on room air at rest.  Urine output is adequate.       2/11/2022   Patient seen examined on medical surgical floor.  Patient complains of persistent postop discomfort along with some nausea and poor appetite.  Patient denies passing had any bowel movements or gas recently.  BUN/creatinine 6/0.6 with serum sodium 124 7. Potassium 3.4 with WBC 24.6 and hemoglobin 13.7. Temperature is 98.6-nine 9.3 with heart rate of 99.  O2 sat 98% on room air at rest.  General surgery note reviewed.  CT of the abdomen pelvis was ordered for today.       2/12/2022   Patient seen examined on medical surgical floor. Rodney Buitrago states the abdominal distention seems to be improved.  Patient still having postop discomfort and is mildly improved.  Patient denies any chest pain, dizziness or unusual shortness of breath.  Patient did have some nausea last night with supper.  Patient denies any bowel movements at this time.  CT of the abdomen pelvis from yesterday suggested ileus but cannot rule out obstruction.  Temperature 98 with heart rate of 97 with blood pressure 124/67 O2 sat 93% on room air at rest. [facility] is 14.8 with hemoglobin 11.8.  BUN/creatinine 7/0.6.  Serum sodium increased to 134 from 127 yesterday.       2/13/2022   Patient seen examined on surgical medical floor.  Patient still smokes postop discomfort.  It seems to be slowly improving.  Patient still has some abdominal distention intermittently.  Patient says she is drinking fairly good but is not eating very much.  She denies any chest pain or unusual shortness of breath at rest.  Serum sodium is 129 with BUN/creatinine 4/0.5.  Serum potassium 3.4.  WBC 12.9 hemoglobin 11.5.  Temperature is 99.1 with a heart rate of 100 and blood pressure 140/77.  O2 sat 92% room air at rest.  Check O2 saturation with activity on room air.          Vitals:   BP (!) 140/77   Pulse 100   Temp 99.1 °F (37.3 °C) (Oral)   Resp 16   Ht 5' 2\" (1.575 m)   Wt 187 lb (84.8 kg)   LMP 01/05/2018   SpO2 92%   BMI 34.20 kg/m²       Abnl/Pertinent Labs/Radiology/Diagnostic Studies:   CBC with Differential:     Lab Results   Component Value Date     WBC 12.9 02/13/2022     RBC 3.64 02/13/2022     HGB 11.5 02/13/2022     HCT 33.8 02/13/2022      02/13/2022     MCV 92.9 02/13/2022     MCH 31.6 02/13/2022     MCHC 34.0 02/13/2022     RDW 12.5 02/13/2022     METASPCT 0.9 11/23/2021     LYMPHOPCT 23.2 02/02/2022     MONOPCT 6.7 02/02/2022     BASOPCT 0.4 02/02/2022     MONOSABS 0.62 02/02/2022     LYMPHSABS 2.13 02/02/2022     EOSABS 0.20 02/02/2022     BASOSABS 0.04 02/02/2022       CMP:     Lab Results   Component Value Date      02/13/2022     K 3.4 02/13/2022     K 3.7 02/09/2022     CL 92 02/13/2022     CO2 25 02/13/2022     BUN 4 02/13/2022     CREATININE 0.5 02/13/2022     GFRAA >60 02/13/2022     LABGLOM >60 02/13/2022     GLUCOSE 106 02/13/2022     PROT 5.8 02/11/2022     LABALBU 2.5 02/11/2022     CALCIUM 8.1 02/13/2022     BILITOT 1.1 02/11/2022     ALKPHOS 97 02/11/2022     AST 8 02/11/2022     ALT 25 02/11/2022       Magnesium:     Lab Results   Component Value Date     MG 2.2 09/01/2021       Phosphorus:     Lab Results   Component Value Date     PHOS 3.2 09/01/2021       PT/INR:     Lab Results   Component Value Date     PROTIME 10.7 02/28/2020     INR 1.0 02/28/2020       Troponin:     Lab Results   Component Value Date     TROPONINI <0.01 07/20/2020       U/A:     Lab Results   Component Value Date     COLORU DKYELLOW 02/10/2022     PROTEINU TRACE 02/10/2022     PHUR 5.5 02/10/2022     WBCUA 1-3 02/10/2022     RBCUA 0-1 02/10/2022     TRICHOMONAS Present 02/26/2020     BACTERIA MODERATE 02/10/2022     CLARITYU Clear 02/10/2022     SPECGRAV 1.025 02/10/2022     LEUKOCYTESUR Negative 02/10/2022     UROBILINOGEN 0.2 02/10/2022     BILIRUBINUR SMALL 02/10/2022     BLOODU SMALL 02/10/2022     GLUCOSEU Negative 02/10/2022     AMORPHOUS FEW 11/23/2021       ABG:  No results found for: PH, PCO2, PO2, HCO3, BE, THGB, TCO2, O2SAT   HgBA1c:     Lab Results   Component Value Date     LABA1C 5.4 05/11/2021       FLP:     Lab Results   Component Value Date     TRIG 184 05/11/2021     HDL 44 05/11/2021     LDLCALC 144 05/11/2021     LABVLDL 37 05/11/2021       TSH:     Lab Results   Component Value Date     TSH 0.293 09/01/2021       IRON:  No results found for: IRON   LIPASE:     Lab Results   Component Value Date     LIPASE 10 09/05/2021             Physical Exam:   General:  This is a 51 y.o. yo female who is alert and oriented in postop distress   HEENT:  Head is normocephalic and atraumatic, PERRLA, EOMI, mucus membranes moist with no pharyngeal erythema or exudate.    Neck:  Supple with no carotid bruits, JVD or thyromegaly.  No cervical adenopathy   CV:  Regular rate and rhythm, no murmurs   Lungs: Mildly coarse breath sounds to auscultation bilaterally with no wheezes, rales or rhonchi   Abdomen:  + Expected postop abdomen, + ileostomy,+ abdominal fat, bowel sounds  hypoactive   Extremities:  No edema, peripheral pulses intact bilaterally   Neuro:  Cranial nerves II-XII grossly intact; motor and sensory function intact with no focal deficits   Skin:  No rashes, lesions or wounds          MD Consults/Assessments & Plans:   Patient Active Problem List     Diagnosis Date Noted   · Sigmoid stricture (HCC)     · Pelvic abscess in female     · Ileus, postoperative ([facility] [address] 09/05/2021   · S/P colectomy 08/31/2021   · Malignant neoplasm of descending colon (HCC)     · Secondary restless legs syndrome 08/13/2021   · Palafox's esophagus with dysplasia 05/11/2021   · Morbidly obese ([address] 10/05/2020   · Multiple sclerosis ([facility] [place] [address] 08/04/2020   · Vocal cord dysfunction 07/20/2020       Impression:   1.  Sigmoid stricture-laparoscopic sigmoid resection 2/8/2022   2.  History of VEENA-patient apparently been tested and is negative for CPAP use at this time   3.  Obesity   4.  Hypertension    5.  History of current tobacco use   6.  History of restless leg syndrome   7.  Leukocytosis postop   8.  Mild elevation transaminase postop   9.  Hyponatremia postop       Plan:   Patient admitted to medical surgical floor   Home medications reviewed   Monitor heart rate, blood pressure, O2 saturation   Diet and abdominal pain meds per general surgery   Lovenox 40 mg subcu daily   IV fluids lactated Ringer's 100 cc an hour       Albuterol aerosol 2.5 mg every 4 hours as needed for shortness of breath       CT of the abdomen pelvis 2/11   Serum osmolality-274       O2 saturation on room air with activity   KCl 20 mEq twice daily x2 doses   Decrease IV fluids lactated Ringer's at 50 cc an hour and stop when oral intake improves.       BMP, CBC in a.m.       ===========================================   GENERAL SURGERY   2/13/22:      Patient Active

## 2022-02-15 LAB
ANION GAP SERPL CALCULATED.3IONS-SCNC: 8 MMOL/L (ref 7–16)
BUN BLDV-MCNC: 6 MG/DL (ref 6–20)
CALCIUM SERPL-MCNC: 8.3 MG/DL (ref 8.6–10.2)
CHLORIDE BLD-SCNC: 91 MMOL/L (ref 98–107)
CO2: 29 MMOL/L (ref 22–29)
CREAT SERPL-MCNC: 0.6 MG/DL (ref 0.5–1)
GFR AFRICAN AMERICAN: >60
GFR NON-AFRICAN AMERICAN: >60 ML/MIN/1.73
GLUCOSE BLD-MCNC: 132 MG/DL (ref 74–99)
HCT VFR BLD CALC: 32 % (ref 34–48)
HEMOGLOBIN: 10.5 G/DL (ref 11.5–15.5)
MCH RBC QN AUTO: 30.8 PG (ref 26–35)
MCHC RBC AUTO-ENTMCNC: 32.8 % (ref 32–34.5)
MCV RBC AUTO: 93.8 FL (ref 80–99.9)
PDW BLD-RTO: 13 FL (ref 11.5–15)
PLATELET # BLD: 206 E9/L (ref 130–450)
PMV BLD AUTO: 9.7 FL (ref 7–12)
POTASSIUM SERPL-SCNC: 3.3 MMOL/L (ref 3.5–5)
RBC # BLD: 3.41 E12/L (ref 3.5–5.5)
SODIUM BLD-SCNC: 128 MMOL/L (ref 132–146)
WBC # BLD: 10.1 E9/L (ref 4.5–11.5)

## 2022-02-15 PROCEDURE — 36415 COLL VENOUS BLD VENIPUNCTURE: CPT

## 2022-02-15 PROCEDURE — 6370000000 HC RX 637 (ALT 250 FOR IP): Performed by: SURGERY

## 2022-02-15 PROCEDURE — 6360000002 HC RX W HCPCS: Performed by: SURGERY

## 2022-02-15 PROCEDURE — 6370000000 HC RX 637 (ALT 250 FOR IP): Performed by: STUDENT IN AN ORGANIZED HEALTH CARE EDUCATION/TRAINING PROGRAM

## 2022-02-15 PROCEDURE — 6370000000 HC RX 637 (ALT 250 FOR IP): Performed by: INTERNAL MEDICINE

## 2022-02-15 PROCEDURE — 2500000003 HC RX 250 WO HCPCS: Performed by: SURGERY

## 2022-02-15 PROCEDURE — 2580000003 HC RX 258: Performed by: SURGERY

## 2022-02-15 PROCEDURE — 85027 COMPLETE CBC AUTOMATED: CPT

## 2022-02-15 PROCEDURE — 87088 URINE BACTERIA CULTURE: CPT

## 2022-02-15 PROCEDURE — 80048 BASIC METABOLIC PNL TOTAL CA: CPT

## 2022-02-15 PROCEDURE — 1200000000 HC SEMI PRIVATE

## 2022-02-15 RX ORDER — POTASSIUM CHLORIDE 20 MEQ/1
20 TABLET, EXTENDED RELEASE ORAL
Status: COMPLETED | OUTPATIENT
Start: 2022-02-15 | End: 2022-02-17

## 2022-02-15 RX ADMIN — METRONIDAZOLE 500 MG: 500 INJECTION, SOLUTION INTRAVENOUS at 09:49

## 2022-02-15 RX ADMIN — METHOCARBAMOL 500 MG: 500 TABLET ORAL at 21:07

## 2022-02-15 RX ADMIN — MORPHINE SULFATE 4 MG: 4 INJECTION, SOLUTION INTRAMUSCULAR; INTRAVENOUS at 22:30

## 2022-02-15 RX ADMIN — MORPHINE SULFATE 4 MG: 4 INJECTION, SOLUTION INTRAMUSCULAR; INTRAVENOUS at 10:00

## 2022-02-15 RX ADMIN — PSYLLIUM HUSK 1 PACKET: 3.4 GRANULE ORAL at 21:07

## 2022-02-15 RX ADMIN — CIPROFLOXACIN 400 MG: 2 INJECTION, SOLUTION INTRAVENOUS at 06:00

## 2022-02-15 RX ADMIN — PANTOPRAZOLE SODIUM 40 MG: 40 TABLET, DELAYED RELEASE ORAL at 06:42

## 2022-02-15 RX ADMIN — POTASSIUM CHLORIDE 20 MEQ: 1500 TABLET, EXTENDED RELEASE ORAL at 16:39

## 2022-02-15 RX ADMIN — MORPHINE SULFATE 4 MG: 4 INJECTION, SOLUTION INTRAMUSCULAR; INTRAVENOUS at 17:13

## 2022-02-15 RX ADMIN — TRAMADOL HYDROCHLORIDE 50 MG: 50 TABLET, COATED ORAL at 07:19

## 2022-02-15 RX ADMIN — ONDANSETRON 4 MG: 2 INJECTION INTRAMUSCULAR; INTRAVENOUS at 12:18

## 2022-02-15 RX ADMIN — Medication 10 ML: at 21:17

## 2022-02-15 RX ADMIN — METHOCARBAMOL 500 MG: 500 TABLET ORAL at 07:20

## 2022-02-15 RX ADMIN — ENOXAPARIN SODIUM 40 MG: 100 INJECTION SUBCUTANEOUS at 07:19

## 2022-02-15 RX ADMIN — METRONIDAZOLE 500 MG: 500 INJECTION, SOLUTION INTRAVENOUS at 18:26

## 2022-02-15 RX ADMIN — TRAMADOL HYDROCHLORIDE 50 MG: 50 TABLET, COATED ORAL at 15:51

## 2022-02-15 RX ADMIN — POTASSIUM CHLORIDE 20 MEQ: 1500 TABLET, EXTENDED RELEASE ORAL at 11:54

## 2022-02-15 RX ADMIN — ACETAMINOPHEN 650 MG: 325 TABLET ORAL at 21:07

## 2022-02-15 RX ADMIN — PSYLLIUM HUSK 1 PACKET: 3.4 GRANULE ORAL at 07:20

## 2022-02-15 RX ADMIN — PANTOPRAZOLE SODIUM 40 MG: 40 TABLET, DELAYED RELEASE ORAL at 15:48

## 2022-02-15 RX ADMIN — CIPROFLOXACIN 400 MG: 2 INJECTION, SOLUTION INTRAVENOUS at 17:13

## 2022-02-15 RX ADMIN — METHOCARBAMOL 500 MG: 500 TABLET ORAL at 11:54

## 2022-02-15 RX ADMIN — METHOCARBAMOL 500 MG: 500 TABLET ORAL at 16:39

## 2022-02-15 RX ADMIN — METRONIDAZOLE 500 MG: 500 INJECTION, SOLUTION INTRAVENOUS at 02:10

## 2022-02-15 RX ADMIN — CALCIUM POLYCARBOPHIL 625 MG 625 MG: 625 TABLET ORAL at 10:00

## 2022-02-15 ASSESSMENT — PAIN SCALES - GENERAL
PAINLEVEL_OUTOF10: 0
PAINLEVEL_OUTOF10: 7
PAINLEVEL_OUTOF10: 4
PAINLEVEL_OUTOF10: 5
PAINLEVEL_OUTOF10: 9
PAINLEVEL_OUTOF10: 7
PAINLEVEL_OUTOF10: 4
PAINLEVEL_OUTOF10: 8
PAINLEVEL_OUTOF10: 8

## 2022-02-15 ASSESSMENT — PAIN DESCRIPTION - PAIN TYPE: TYPE: SURGICAL PAIN

## 2022-02-15 ASSESSMENT — PAIN DESCRIPTION - LOCATION: LOCATION: ABDOMEN

## 2022-02-15 ASSESSMENT — PAIN DESCRIPTION - DESCRIPTORS: DESCRIPTORS: TENDER

## 2022-02-15 NOTE — PROGRESS NOTES
GENERAL SURGERY  DAILY PROGRESS NOTE  2/15/2022    No chief complaint on file. Subjective:  Febrile yesterday to 101. Feeling better this morning, much less pain. Still not much of an appetite but she is drinking plenty of fluids. Objective:  BP (!) 143/78   Pulse 98   Temp 98.7 °F (37.1 °C) (Oral)   Resp 18   Ht 5' 2\" (1.575 m)   Wt 187 lb (84.8 kg)   LMP 01/05/2018   SpO2 98%   BMI 34.20 kg/m²     GENERAL:  Laying in bed, awake, alert, cooperative, no acute distress  HEAD: Normocephalic, atraumatic  EYES: No sclera icterus, pupils equal  LUNGS:  No increased work of breathing  CARDIOVASCULAR:  RR  ABDOMEN:  Soft, non- distended, mildly tender around ostomy.  Dark liquid stool in bag    EXTREMITIES: No edema or swelling  SKIN: Warm and dry    Assessment/Plan:  51 y.o. female with sigmoid stricture s/p laparoscopic sigmoid resection with anastomosis 2/8    - empiric cipro/flagyl started yesterday  - daily labs  - monitor abdominal exam  - pain control, nausea control  - ambulate  - encourage oral intake     Electronically signed by Mallory Orona MD on 2/15/2022 at 6:06 AM      As above, walking appears well today

## 2022-02-15 NOTE — PLAN OF CARE
Problem: Infection - Surgical Site:  Goal: Will show no infection signs and symptoms  Description: Will show no infection signs and symptoms  2/14/2022 2342 by Jackelyn Bahena RN  Outcome: Ongoing  2/14/2022 1307 by Sameer Betancourt RN  Outcome: Met This Shift     Problem: Pain:  Goal: Pain level will decrease  Description: Pain level will decrease  2/14/2022 2342 by Jackelyn Bahena RN  Outcome: Met This Shift  2/14/2022 1307 by Sameer Betancourt RN  Outcome: Met This Shift  Goal: Control of acute pain  Description: Control of acute pain  2/14/2022 2342 by Jackelyn Bahena RN  Outcome: Met This Shift  2/14/2022 1307 by Sameer Betancourt RN  Outcome: Met This Shift     Problem:  Bowel/Gastric:  Goal: Control of bowel function will improve  Description: Control of bowel function will improve  2/14/2022 2342 by Jackelyn Bahena RN  Outcome: Met This Shift  2/14/2022 1307 by Sameer Betancourt RN  Outcome: Met This Shift  Goal: Ability to achieve a regular elimination pattern will improve  Description: Ability to achieve a regular elimination pattern will improve  2/14/2022 2342 by Jackelyn Bahena RN  Outcome: Met This Shift  2/14/2022 1307 by Sameer Betancourt RN  Outcome: Met This Shift

## 2022-02-15 NOTE — PLAN OF CARE
Problem: Infection - Surgical Site:  Goal: Will show no infection signs and symptoms  Description: Will show no infection signs and symptoms  Outcome: Met This Shift     Problem: Pain:  Description: Pain management should include both nonpharmacologic and pharmacologic interventions. Goal: Pain level will decrease  Description: Pain level will decrease  Outcome: Met This Shift     Problem:  Bowel/Gastric:  Goal: Control of bowel function will improve  Description: Control of bowel function will improve  Outcome: Met This Shift     Problem: Nutritional:  Goal: Ability to follow a diet with enough fiber (20 to 30 grams) for normal bowel function will improve  Description: Ability to follow a diet with enough fiber (20 to 30 grams) for normal bowel function will improve  Outcome: Met This Shift     P

## 2022-02-15 NOTE — PROGRESS NOTES
124 7. Potassium 3.4 with WBC 24.6 and hemoglobin 13.7. Temperature is 98.6nine 9.3 with heart rate of 99. O2 sat 98% on room air at rest.  General surgery note reviewed. CT of the abdomen pelvis was ordered for today. 2/12/2022  Patient seen examined on medical surgical floor. Patient states the abdominal distention seems to be improved. Patient still having postop discomfort and is mildly improved. Patient denies any chest pain, dizziness or unusual shortness of breath. Patient did have some nausea last night with supper. Patient denies any bowel movements at this time. CT of the abdomen pelvis from yesterday suggested ileus but cannot rule out obstruction. Temperature 98 with heart rate of 97 with blood pressure 124/67 O2 sat 93% on room air at rest.  WBC is 14.8 with hemoglobin 11.8. BUN/creatinine 7/0.6. Serum sodium increased to 134 from 127 yesterday. 2/13/2022  Patient seen examined on surgical medical floor. Patient still smokes postop discomfort. It seems to be slowly improving. Patient still has some abdominal distention intermittently. Patient says she is drinking fairly good but is not eating very much. She denies any chest pain or unusual shortness of breath at rest.  Serum sodium is 129 with BUN/creatinine 4/0.5. Serum potassium 3.4. WBC 12.9 hemoglobin 11.5. Temperature is 99.1 with a heart rate of 100 and blood pressure 140/77. O2 sat 92% room air at rest.  Check O2 saturation with activity on room air. 2/14/2022  Patient seen and examined on medical surgical floor. Patient still complain of some lower abdominal cramping. She denies any chest pain, nausea/vomiting, shortness of breath. Patient denies any dysuria or cough. BUN/creatinine 4/0.6 with serum sodium 128. WBC 15.4 today with a hemoglobin 1.7. Temperature 100.3 with heart rate of 100 and blood pressure 156/72. O2 sat 93% on room air at rest.  Patient had out about 1850 cc from ostomy yesterday afternoon. General surgery note reviewed. With patient persistent tach and leukocytosis will check UA and chest x-ray. 2/15/2022  Patient seen examined on medical surgical floor. Patient states she is slowly improving. Patient denies any chest pain, dizziness or unusual shortness of breath. Patient abdominal pain is slowly improving. Ostomy output ranges 250-1000 cc a shift. Temperature ranges 98.7100 with heart rate 98 blood pressure 143/78. O2 sat 95% on room air at rest.  BUN/creatinine was 6/0.6 with serum sodium 128. Potassium 3.3 with WBCs 10.1 hemoglobin 10.5. Urinalysis yesterday shows greater than 80 ketones with trace of leukocyte esterase with 510 WBCs and few bacteria. Chest x-ray yesterday showed bibasilar pleural and parenchymal changes with bilateral pleural effusion. Patient still with poor appetite. Abdomen shows moderate distention with hypoactive bowel sounds.       Past Medical History:    Past Medical History:   Diagnosis Date    Acid reflux disease     Cyst of ovary     Fracture of nasal bone     Headache     Hearing loss     Hypertension     Migraine     Morbidly obese ([address] 10/05/2020    Multiple sclerosis ([address]     denies limitations at present 11/2020    VEENA no CPAP     severe VEENA per pt    Right shoulder pain 11/2020    Tinnitus     TMJ dysfunction      Past Surgical History:    Past Surgical History:   Procedure Laterality Date    APPENDECTOMY      BACK SURGERY      lumbar    BICEPS TENDON REPAIR Right 11/11/2020    BICEPS TENODESIS performed by Callie Chanel MD at [facility] Left 8/31/2021    LAPAROSCOPIC LEFT HEMICOLECTOMY WITH LOOP OSTOMY performed by Fide Lang MD at [address] 9/6/2021    DIAGNOSTIC LAPAROSCOPY POSSIBLE BOWEL RESECTION POSSILBE OSTOMY performed by Fide Lang MD at [address] 2/8/2022    LAPAROSCOPIC SIGMOID COLON RESECTION performed by Fide Lang MD at [address]  CYST REMOVAL      ESOPHAGUS SURGERY      HYSTERECTOMY  03/05/2018    Robotic hysterectomy, bilateral salpingectomy, right oophorectomy DR. ARIANA MONIQUE [facility]     HYSTERECTOMY, TOTAL ABDOMINAL      LARYNGOSCOPY N/A 7/17/2020    DIRECT LARYNGOSCOPY--OMNI GUIDE LASER performed by Olesya Zamarripa MD at [facility] PARTIAL HYSTERECTOMY      PROCTOSIGMOIDOSCOPY N/A 10/12/2021    ANAL PROCTO SIGMOIDOSCOPY FLEXIBLE performed by Manpreet Bishop MD at [address] [place] [address] N/A 1/25/2022    ANAL PROCTO [address] performed by Manpreet Bishop MD at [facility] ARTHROSCOPY Right 11/11/2020    RIGHT SHOULDER DIAGNOSTIC ARTHROSCOPY WITH DECOMPRESSION ROTATOR CUFF REPAIR performed by Flores Singh MD at [facility]         Medications Prior to Admission:    @  Prior to Admission medications    Medication Sig Start Date End Date Taking?  Authorizing Provider   dicyclomine (BENTYL) 10 MG capsule Take 10 mg by mouth 4 times daily (before meals and nightly)    Historical Provider, MD   vitamin D (D3-50) 63306 UNIT CAPS Take 1 capsule by mouth once a week 11/30/21   MARIXA Cuadra CNP   nortriptyline (PAMELOR) 10 MG capsule TAKE 1 CAPSULE BY MOUTH NIGHTLY FOR HEADACHES 11/5/21   Fauzia Klein PA-C   gabapentin (NEURONTIN) 300 MG capsule TAKE 1 CAPSULE BY MOUTH THREE TIMES DAILY 9/13/21 1/31/22  MARIXA Cuadra CNP   dexlansoprazole (DEXILANT) 60 MG CPDR delayed release capsule Take 60 mg by mouth daily    Historical Provider, MD   baclofen (LIORESAL) 20 MG tablet Take 1 tablet by mouth 4 times daily as needed (muscle spasms; pain) 8/13/21   MARIXA Cuadra CNP   rOPINIRole (REQUIP) 0.5 MG tablet Take 1 tablet by mouth 2 times daily as needed (restless legs) 8/13/21   MARIXA Cuadra CNP   ocrelizumab (OCREVUS) 300 MG/10ML SOLN injection Infuse 20 mLs intravenously every 6 months 7/19/21   MARIXA Cuadra CNP       Allergies: Patient has no known allergies. Social History:   Social History     Socioeconomic History    Marital status: Single     Spouse name: Not on file    Number of children: 3    Years of education: 6    Highest education level: 11th grade   Occupational History    Not on file   Tobacco Use    Smoking status: Current Every Day Smoker     Packs/day: 0.50     Years: 25.00     Pack years: 12.50     Types: Cigarettes    Smokeless tobacco: Never Used    Tobacco comment: Ready to stop, requesting prescription for Chantix   Vaping Use    Vaping Use: Never used   Substance and Sexual Activity    Alcohol use: Yes     Alcohol/week: 1.0 standard drink     Types: 1 Glasses of wine per week     Comment: socially    Drug use: No    Sexual activity: Yes     Partners: Male   Other Topics Concern    Not on file   Social History Narrative    Uses [employer] for transportation    [facility]     Social Determinants of Health     Financial Resource Strain: Medium Risk    Difficulty of Paying Living Expenses: Somewhat hard   Food Insecurity: Food Insecurity Present    Worried About Running Out of Food in the Last Year: Sometimes true    [place] of Food in the Last Year: Never true   Transportation Needs: No Transportation Needs    Lack of Transportation (Medical): No    Lack of Transportation (Non-Medical): No   Physical Activity: Sufficiently Active    Days of Exercise per Week: 3 days    Minutes of Exercise per Session: 60 min   Stress: No Stress Concern Present    Feeling of Stress : Not at all   Social Connections: Socially Isolated    Frequency of Communication with Friends and Family:  Three times a week    Frequency of Social Gatherings with Friends and Family: Twice a week    Attends [employer] Services: Never    Active Member of Clubs or Organizations: No    Attends Club or Organization Meetings: Never    Marital Status: Never    Intimate Partner Violence:     Fear of Current or Ex-Partner: Not on file    Emotionally Abused: Not on file    Physically Abused: Not on file    Sexually Abused: Not on file   Housing Stability:     Unable to Pay for Housing in the Last Year: Not on file    Number of Places Lived in the Last Year: Not on file    Unstable Housing in the Last Year: Not on file       Family History:   Family History   Problem Relation Age of Onset    Mult Sclerosis Mother     Colon Cancer Neg Hx     Uterine Cancer Neg Hx     Ovarian Cancer Neg Hx     Breast Cancer Neg Hx        REVIEW OF SYSTEMS:    Gen: Patient denies any lightheadedness or dizziness. No LOC or syncope. No fevers or chills. HEENT: No earache, sore throat or nasal congestion. Resp: Denies cough, hemoptysis or sputum production. Cardiac: Denies chest pain, SOB, diaphoresis or palpitations. GI:+ Abdominal distention. Lower abdominal cramping. No nausea, vomiting, diarrhea or constipation. No melena or hematochezia. : No urinary complaints, dysuria, hematuria or frequency. MSK: No extremity weakness, paralysis or paresthesias. PHYSICAL EXAM:    Vitals:  BP (!) 143/78   Pulse 98   Temp 98.7 °F (37.1 °C) (Oral)   Resp 18   Ht 5' 2\" (1.575 m)   Wt 187 lb (84.8 kg)   LMP 01/05/2018   SpO2 95%   BMI 34.20 kg/m²     General:  This is a 51 y.o. yo female who is alert and oriented in postop distress  HEENT:  Head is normocephalic and atraumatic, PERRLA, EOMI, mucus membranes moist with no pharyngeal erythema or exudate. Neck:  Supple with no carotid bruits, JVD or thyromegaly.   No cervical adenopathy  CV:  Regular rate and rhythm, no murmurs  Lungs: Mildly coarse breath sounds to auscultation bilaterally with no wheezes, rales or rhonchi  Abdomen:  + Expected postop abdomen, + ileostomy,+ abdominal fat, bowel sounds  hypoactive  Extremities:  No edema, peripheral pulses intact bilaterally  Neuro:  Cranial nerves II-XII grossly intact; motor and sensory function intact with no focal deficits  Skin:  No rashes, lesions or wounds      DATA:  CBC with Differential:    Lab Results   Component Value Date    WBC 10.1 02/15/2022    RBC 3.41 02/15/2022    HGB 10.5 02/15/2022    HCT 32.0 02/15/2022     02/15/2022    MCV 93.8 02/15/2022    MCH 30.8 02/15/2022    MCHC 32.8 02/15/2022    RDW 13.0 02/15/2022    METASPCT 0.9 11/23/2021    LYMPHOPCT 23.2 02/02/2022    MONOPCT 6.7 02/02/2022    BASOPCT 0.4 02/02/2022    MONOSABS 0.62 02/02/2022    LYMPHSABS 2.13 02/02/2022    EOSABS 0.20 02/02/2022    BASOSABS 0.04 02/02/2022     CMP:    Lab Results   Component Value Date     02/15/2022    K 3.3 02/15/2022    K 3.7 02/09/2022    CL 91 02/15/2022    CO2 29 02/15/2022    BUN 6 02/15/2022    CREATININE 0.6 02/15/2022    GFRAA >60 02/15/2022    LABGLOM >60 02/15/2022    GLUCOSE 132 02/15/2022    PROT 5.8 02/11/2022    LABALBU 2.5 02/11/2022    CALCIUM 8.3 02/15/2022    BILITOT 1.1 02/11/2022    ALKPHOS 97 02/11/2022    AST 8 02/11/2022    ALT 25 02/11/2022     Magnesium:    Lab Results   Component Value Date    MG 2.2 09/01/2021     Phosphorus:    Lab Results   Component Value Date    PHOS 3.2 09/01/2021     PT/INR:    Lab Results   Component Value Date    PROTIME 10.7 02/28/2020    INR 1.0 02/28/2020     Troponin:    Lab Results   Component Value Date    TROPONINI <0.01 07/20/2020     U/A:    Lab Results   Component Value Date    COLORU DKYELLOW 02/14/2022    PROTEINU 30 02/14/2022    PHUR 6.0 02/14/2022    WBCUA 5-10 02/14/2022    RBCUA 5-10 02/14/2022    TRICHOMONAS Present 02/26/2020    BACTERIA FEW 02/14/2022    CLARITYU Clear 02/14/2022    SPECGRAV 1.025 02/14/2022    LEUKOCYTESUR TRACE 02/14/2022    UROBILINOGEN 0.2 02/14/2022    BILIRUBINUR MODERATE 02/14/2022    BLOODU MODERATE 02/14/2022    GLUCOSEU 100 02/14/2022    AMORPHOUS FEW 11/23/2021     ABG:  No results found for: PH, PCO2, PO2, HCO3, BE, THGB, TCO2, O2SAT  HgBA1c:    Lab Results   Component Value Date    LABA1C 5.4 05/11/2021 FLP:    Lab Results   Component Value Date    TRIG 184 05/11/2021    HDL 44 05/11/2021    LDLCALC 144 05/11/2021    LABVLDL 37 05/11/2021     TSH:    Lab Results   Component Value Date    TSH 0.293 09/01/2021     IRON:  No results found for: IRON  LIPASE:    Lab Results   Component Value Date    LIPASE 10 09/05/2021       ASSESSMENT AND PLAN:      Patient Active Problem List    Diagnosis Date Noted    Sigmoid stricture ([address]     Pelvic abscess in female     Ileus, postoperative ([address] 09/05/2021    S/P colectomy 08/31/2021    Malignant neoplasm of descending colon ([address]     Secondary restless legs syndrome 08/13/2021    Palafox's esophagus with dysplasia 05/11/2021    Morbidly obese ([address] 10/05/2020    Multiple sclerosis ([address] 08/04/2020    Vocal cord dysfunction 07/20/2020     Impression:  1. Sigmoid stricturelaparoscopic sigmoid resection 2/8/2022  2. History of VEENA-patient apparently been tested and is negative for CPAP use at this time  3. Obesity  4. Hypertension   5. History of current tobacco use  6. History of restless leg syndrome  7. Leukocytosis postop  8. Mild elevation transaminase postop  9. Hyponatremia postop    Plan:  Patient admitted to medical surgical floor  Home medications reviewed  Monitor heart rate, blood pressure, O2 saturation  Diet and abdominal pain meds per general surgery  Lovenox 40 mg subcu daily  IV fluids lactated Ringer's 100 cc an hour    Albuterol aerosol 2.5 mg every 4 hours as needed for shortness of breath    CT of the abdomen pelvis 2/11  Serum osmolality274    O2 saturation on room air with activity  Decrease IV fluids lactated Ringer's at 50 cc an hour and stop when oral intake improves. Urine   KCl at 1 mEq 1 3 times daily    CMP, CBC in a.m.     Ruthie Perez DO, D.O.  2/15/2022  10:59 AM

## 2022-02-15 NOTE — PROGRESS NOTES
Comprehensive Nutrition Assessment    Type and Reason for Visit:  Initial,RD Nutrition Re-Screen/LOS    Nutrition Recommendations/Plan: Start ONS BID [employer] HP    Nutrition Assessment:  Pt admits w/ abdominal pain. H/o GERD/morbidly obese. PO intake 25-50%, add ONS BID. Malnutrition Assessment:  Malnutrition Status: At risk for malnutrition (Comment)    Context:  Acute Illness     Findings of the 6 clinical characteristics of malnutrition:  Energy Intake:  7 - 50% or less of estimated energy requirements for 5 or more days  Weight Loss:  No significant weight loss     Body Fat Loss:  No significant body fat loss     Muscle Mass Loss:  No significant muscle mass loss    Fluid Accumulation:  No significant fluid accumulation     Strength:  Not Performed     Estimated Daily Nutrient Needs:  Energy (kcal):  ; Weight Used for Energy Requirements:  Admission     Protein (g):  65-75 (1.3-1. .5gm/kg); Weight Used for Protein Requirements:  Ideal        Fluid (ml/day):  ; Method Used for Fluid Requirements:  1 ml/kcal      Nutrition Related Findings:  hypoactive BS,ostomy tube, -I&O -1L      Wounds:  None       Current Nutrition Therapies:    ADULT DIET; Regular  ADULT ORAL NUTRITION SUPPLEMENT; Lunch, Dinner; Low Calorie/High Protein Oral Supplement    Anthropometric Measures:  · Height: 5' 2\" (157.5 cm)  · Current Body Weight: 187 lb (84.8 kg) (actual; standing scale)     · Ideal Body Weight: 110 lbs; % Ideal Body Weight 170 %   · BMI: 34.2  · Adjusted Body Weight:  ; No Adjustment   · BMI Categories: Obese Class 1 (BMI 30.0-34. 9)       Nutrition Diagnosis:   · Inadequate oral intake related to altered GI function as evidenced by intake 26-50%,intake 0-25%,nausea,GI abnormality      Nutrition Interventions:   Food and/or Nutrient Delivery:  Continue Current Diet,Start Oral Nutrition Supplement  Nutrition Education/Counseling:  Education initiated   Coordination of Nutrition Care:  Continue to monitor while inpatient    Goals:  Pt will consume >50-75% of meals/ONS       Nutrition Monitoring and Evaluation:   Behavioral-Environmental Outcomes:  None Identified   Food/Nutrient Intake Outcomes:  Food and Nutrient Intake,Supplement Intake  Physical Signs/Symptoms Outcomes:  Biochemical Data,GI Status,Nausea or Vomiting,Fluid Status or Edema,Weight,Skin,Nutrition Focused Physical Findings     Discharge Planning:     Too soon to determine     Electronically signed by Lissa Galdamez RD, LD on 2/15/22 at 1:07 PM EST    Contact: [phone]

## 2022-02-16 LAB
ALBUMIN SERPL-MCNC: 2.8 G/DL (ref 3.5–5.2)
ALP BLD-CCNC: 127 U/L (ref 35–104)
ALT SERPL-CCNC: 29 U/L (ref 0–32)
ANION GAP SERPL CALCULATED.3IONS-SCNC: 10 MMOL/L (ref 7–16)
AST SERPL-CCNC: 34 U/L (ref 0–31)
BILIRUB SERPL-MCNC: 1.9 MG/DL (ref 0–1.2)
BILIRUBIN DIRECT: 1.1 MG/DL (ref 0–0.3)
BILIRUBIN, INDIRECT: 0.8 MG/DL (ref 0–1)
BUN BLDV-MCNC: 7 MG/DL (ref 6–20)
CALCIUM SERPL-MCNC: 8.3 MG/DL (ref 8.6–10.2)
CHLORIDE BLD-SCNC: 90 MMOL/L (ref 98–107)
CO2: 30 MMOL/L (ref 22–29)
CREAT SERPL-MCNC: 0.6 MG/DL (ref 0.5–1)
GFR AFRICAN AMERICAN: >60
GFR NON-AFRICAN AMERICAN: >60 ML/MIN/1.73
GLUCOSE BLD-MCNC: 109 MG/DL (ref 74–99)
HCT VFR BLD CALC: 32.4 % (ref 34–48)
HEMOGLOBIN: 10.7 G/DL (ref 11.5–15.5)
MCH RBC QN AUTO: 31.2 PG (ref 26–35)
MCHC RBC AUTO-ENTMCNC: 33 % (ref 32–34.5)
MCV RBC AUTO: 94.5 FL (ref 80–99.9)
PDW BLD-RTO: 13.2 FL (ref 11.5–15)
PLATELET # BLD: 231 E9/L (ref 130–450)
PMV BLD AUTO: 10.1 FL (ref 7–12)
POTASSIUM SERPL-SCNC: 3.2 MMOL/L (ref 3.5–5)
RBC # BLD: 3.43 E12/L (ref 3.5–5.5)
SODIUM BLD-SCNC: 130 MMOL/L (ref 132–146)
TOTAL PROTEIN: 5.8 G/DL (ref 6.4–8.3)
WBC # BLD: 11 E9/L (ref 4.5–11.5)

## 2022-02-16 PROCEDURE — 36415 COLL VENOUS BLD VENIPUNCTURE: CPT

## 2022-02-16 PROCEDURE — 2500000003 HC RX 250 WO HCPCS: Performed by: SURGERY

## 2022-02-16 PROCEDURE — 80076 HEPATIC FUNCTION PANEL: CPT

## 2022-02-16 PROCEDURE — 6370000000 HC RX 637 (ALT 250 FOR IP): Performed by: SURGERY

## 2022-02-16 PROCEDURE — 2580000003 HC RX 258: Performed by: SURGERY

## 2022-02-16 PROCEDURE — 1200000000 HC SEMI PRIVATE

## 2022-02-16 PROCEDURE — 6370000000 HC RX 637 (ALT 250 FOR IP): Performed by: STUDENT IN AN ORGANIZED HEALTH CARE EDUCATION/TRAINING PROGRAM

## 2022-02-16 PROCEDURE — 85027 COMPLETE CBC AUTOMATED: CPT

## 2022-02-16 PROCEDURE — 80048 BASIC METABOLIC PNL TOTAL CA: CPT

## 2022-02-16 PROCEDURE — 6360000002 HC RX W HCPCS: Performed by: SURGERY

## 2022-02-16 PROCEDURE — 6370000000 HC RX 637 (ALT 250 FOR IP): Performed by: INTERNAL MEDICINE

## 2022-02-16 RX ADMIN — PANTOPRAZOLE SODIUM 40 MG: 40 TABLET, DELAYED RELEASE ORAL at 06:21

## 2022-02-16 RX ADMIN — POTASSIUM CHLORIDE 20 MEQ: 1500 TABLET, EXTENDED RELEASE ORAL at 08:13

## 2022-02-16 RX ADMIN — METHOCARBAMOL 500 MG: 500 TABLET ORAL at 20:33

## 2022-02-16 RX ADMIN — METHOCARBAMOL 500 MG: 500 TABLET ORAL at 15:55

## 2022-02-16 RX ADMIN — PANTOPRAZOLE SODIUM 40 MG: 40 TABLET, DELAYED RELEASE ORAL at 15:55

## 2022-02-16 RX ADMIN — CIPROFLOXACIN 400 MG: 2 INJECTION, SOLUTION INTRAVENOUS at 18:57

## 2022-02-16 RX ADMIN — ENOXAPARIN SODIUM 40 MG: 100 INJECTION SUBCUTANEOUS at 08:12

## 2022-02-16 RX ADMIN — CALCIUM POLYCARBOPHIL 625 MG 625 MG: 625 TABLET ORAL at 08:17

## 2022-02-16 RX ADMIN — MORPHINE SULFATE 4 MG: 4 INJECTION, SOLUTION INTRAMUSCULAR; INTRAVENOUS at 15:07

## 2022-02-16 RX ADMIN — POTASSIUM CHLORIDE 20 MEQ: 1500 TABLET, EXTENDED RELEASE ORAL at 15:55

## 2022-02-16 RX ADMIN — METHOCARBAMOL 500 MG: 500 TABLET ORAL at 08:12

## 2022-02-16 RX ADMIN — POTASSIUM CHLORIDE 20 MEQ: 1500 TABLET, EXTENDED RELEASE ORAL at 11:27

## 2022-02-16 RX ADMIN — MORPHINE SULFATE 4 MG: 4 INJECTION, SOLUTION INTRAMUSCULAR; INTRAVENOUS at 05:42

## 2022-02-16 RX ADMIN — PSYLLIUM HUSK 1 PACKET: 3.4 GRANULE ORAL at 08:15

## 2022-02-16 RX ADMIN — METRONIDAZOLE 500 MG: 500 INJECTION, SOLUTION INTRAVENOUS at 09:35

## 2022-02-16 RX ADMIN — MORPHINE SULFATE 4 MG: 4 INJECTION, SOLUTION INTRAMUSCULAR; INTRAVENOUS at 08:46

## 2022-02-16 RX ADMIN — SODIUM CHLORIDE 25 ML: 9 INJECTION, SOLUTION INTRAVENOUS at 21:49

## 2022-02-16 RX ADMIN — METRONIDAZOLE 500 MG: 500 INJECTION, SOLUTION INTRAVENOUS at 20:38

## 2022-02-16 RX ADMIN — METRONIDAZOLE 500 MG: 500 INJECTION, SOLUTION INTRAVENOUS at 02:00

## 2022-02-16 RX ADMIN — SODIUM CHLORIDE 25 ML: 9 INJECTION, SOLUTION INTRAVENOUS at 20:33

## 2022-02-16 RX ADMIN — MORPHINE SULFATE 4 MG: 4 INJECTION, SOLUTION INTRAMUSCULAR; INTRAVENOUS at 20:41

## 2022-02-16 RX ADMIN — METHOCARBAMOL 500 MG: 500 TABLET ORAL at 13:30

## 2022-02-16 RX ADMIN — CIPROFLOXACIN 400 MG: 2 INJECTION, SOLUTION INTRAVENOUS at 05:42

## 2022-02-16 RX ADMIN — PSYLLIUM HUSK 1 PACKET: 3.4 GRANULE ORAL at 20:33

## 2022-02-16 ASSESSMENT — PAIN DESCRIPTION - FREQUENCY: FREQUENCY: INTERMITTENT

## 2022-02-16 ASSESSMENT — PAIN SCALES - GENERAL
PAINLEVEL_OUTOF10: 1
PAINLEVEL_OUTOF10: 8
PAINLEVEL_OUTOF10: 7
PAINLEVEL_OUTOF10: 4
PAINLEVEL_OUTOF10: 8
PAINLEVEL_OUTOF10: 7
PAINLEVEL_OUTOF10: 8
PAINLEVEL_OUTOF10: 8

## 2022-02-16 ASSESSMENT — PAIN DESCRIPTION - LOCATION
LOCATION: ABDOMEN
LOCATION: ABDOMEN

## 2022-02-16 ASSESSMENT — PAIN DESCRIPTION - PAIN TYPE
TYPE: SURGICAL PAIN
TYPE: SURGICAL PAIN

## 2022-02-16 ASSESSMENT — PAIN DESCRIPTION - ONSET: ONSET: ON-GOING

## 2022-02-16 ASSESSMENT — PAIN DESCRIPTION - DESCRIPTORS: DESCRIPTORS: TENDER

## 2022-02-16 ASSESSMENT — PAIN DESCRIPTION - PROGRESSION: CLINICAL_PROGRESSION: GRADUALLY IMPROVING

## 2022-02-16 ASSESSMENT — PAIN DESCRIPTION - ORIENTATION: ORIENTATION: RIGHT;LEFT

## 2022-02-16 NOTE — PROGRESS NOTES
Department of Internal Medicine        HISTORY OF PRESENT ILLNESS:      The patient is a 51 y.o. female who presents with having a elective laparoscopic sigmoid colon resection. Patient has a known history of sigmoid stricture. Patient had a anal proctosigmoidoscopy performed 1/25 with a scope with past 15 cm work there was complete closure of the rectosigmoid junction without an opening. Patient is seen postop. Patient has expected postop discomfort. Temperature is 97.8 with heart rate 72 blood pressure 140/65. O2 sat 98% on 2 L nasal cannula. 2/9/2022  Patient seen and examined on medical surgical floor. Patient complains of postop discomfort. She denies any problem with chest pain, nausea/vomiting or unusual shortness of breath. BUN/creatinine 5/0.6 with fasting blood sugar 126. Mild elevation of transaminase with a ALT of 69 AST is 35. Review of records have shown patient has intermittent elevation transaminase over the past 18 months. WBC is 21.5 with hemoglobin 12.6. Temperature 98.3 with heart rate 84 blood pressure 107/69. O2 sat 96% on room air at rest.  Urine output is very good. General surgery note reviewed. 2/10/2022  Patient seen and examined on medical surgical floor. Patient complains of postop discomfort. Patient denies any problem with chest pain, dizziness, nausea, fever/chills, dysuria, cough or unusual shortness of breath. Patient is passing some gas but no bowel movements. Increase activity today. BUN/creatinine 5/0.7 with serum sodium 130. WBC is still elevated 23.7 with hemoglobin 14.3. Temperature is 98.8 with heart rate 76 blood pressure 110/78. O2 sat 94% on room air at rest.  Urine output is adequate. 2/11/2022  Patient seen examined on medical surgical floor. Patient complains of persistent postop discomfort along with some nausea and poor appetite. Patient denies passing had any bowel movements or gas recently.   BUN/creatinine 6/0.6 with serum sodium 124 7. Potassium 3.4 with WBC 24.6 and hemoglobin 13.7. Temperature is 98.6nine 9.3 with heart rate of 99. O2 sat 98% on room air at rest.  General surgery note reviewed. CT of the abdomen pelvis was ordered for today. 2/12/2022  Patient seen examined on medical surgical floor. Patient states the abdominal distention seems to be improved. Patient still having postop discomfort and is mildly improved. Patient denies any chest pain, dizziness or unusual shortness of breath. Patient did have some nausea last night with supper. Patient denies any bowel movements at this time. CT of the abdomen pelvis from yesterday suggested ileus but cannot rule out obstruction. Temperature 98 with heart rate of 97 with blood pressure 124/67 O2 sat 93% on room air at rest.  WBC is 14.8 with hemoglobin 11.8. BUN/creatinine 7/0.6. Serum sodium increased to 134 from 127 yesterday. 2/13/2022  Patient seen examined on surgical medical floor. Patient still smokes postop discomfort. It seems to be slowly improving. Patient still has some abdominal distention intermittently. Patient says she is drinking fairly good but is not eating very much. She denies any chest pain or unusual shortness of breath at rest.  Serum sodium is 129 with BUN/creatinine 4/0.5. Serum potassium 3.4. WBC 12.9 hemoglobin 11.5. Temperature is 99.1 with a heart rate of 100 and blood pressure 140/77. O2 sat 92% room air at rest.  Check O2 saturation with activity on room air. 2/14/2022  Patient seen and examined on medical surgical floor. Patient still complain of some lower abdominal cramping. She denies any chest pain, nausea/vomiting, shortness of breath. Patient denies any dysuria or cough. BUN/creatinine 4/0.6 with serum sodium 128. WBC 15.4 today with a hemoglobin 1.7. Temperature 100.3 with heart rate of 100 and blood pressure 156/72. O2 sat 93% on room air at rest.  Patient had out about 1850 cc from ostomy yesterday afternoon. General surgery note reviewed. With patient persistent tach and leukocytosis will check UA and chest x-ray. 2/15/2022  Patient seen examined on medical surgical floor. Patient states she is slowly improving. Patient denies any chest pain, dizziness or unusual shortness of breath. Patient abdominal pain is slowly improving. Ostomy output ranges 250-1000 cc a shift. Temperature ranges 98.7100 with heart rate 98 blood pressure 143/78. O2 sat 95% on room air at rest.  BUN/creatinine was 6/0.6 with serum sodium 128. Potassium 3.3 with WBCs 10.1 hemoglobin 10.5. Urinalysis yesterday shows greater than 80 ketones with trace of leukocyte esterase with 510 WBCs and few bacteria. Chest x-ray yesterday showed bibasilar pleural and parenchymal changes with bilateral pleural effusion. Patient still with poor appetite. Abdomen shows moderate distention with hypoactive bowel sounds. 2/16/2022  Patient seen examined on medical surgical floor. Patient states she is feeling better with expected postop discomfort. Patient denies any chest pain, dizziness or unusual shortness of breath. BUN/creatinine 7/0.6. Serum sodium is 130. Total bili is increased to 1.9 today with direct bilirubin slightly increased 1.1. WBC is 11 with a hemoglobin 10.7. Temperature still 100 degrees with heart rate 91 and blood pressure 114/63. O2 sat 91-96% on room air. Patient still with increased output from ostomy range of 300-670 cc a shift.       Past Medical History:    Past Medical History:   Diagnosis Date    Acid reflux disease     Cyst of ovary     Fracture of nasal bone     Headache     Hearing loss     Hypertension     Migraine     Morbidly obese ([address] 10/05/2020    Multiple sclerosis ([address]     denies limitations at present 11/2020    VEENA no CPAP     severe VEENA per pt    Right shoulder pain 11/2020    Tinnitus     TMJ dysfunction      Past Surgical History:    Past Surgical History:   Procedure Laterality Date    APPENDECTOMY      BACK SURGERY      lumbar    BICEPS TENDON REPAIR Right 11/11/2020    BICEPS TENODESIS performed by Colin Rees MD at [place] [id number] Left 8/31/2021    LAPAROSCOPIC LEFT HEMICOLECTOMY WITH LOOP OSTOMY performed by Yamile Apodaca MD at [facility] 9/6/2021    DIAGNOSTIC LAPAROSCOPY POSSIBLE BOWEL RESECTION POSSILBE OSTOMY performed by Yamile Apodaca MD at [address] N/A 2/8/2022    LAPAROSCOPIC SIGMOID COLON RESECTION performed by Yamile Apodaca MD at [address] [facility]  03/05/2018    Robotic hysterectomy, bilateral salpingectomy, right oophorectomy DR. ARIANA MONIQUE [facility]     HYSTERECTOMY, TOTAL ABDOMINAL      LARYNGOSCOPY N/A 7/17/2020    DIRECT LARYNGOSCOPY--OMNI GUIDE LASER performed by Deangelo Puentes MD at [place] [address] PARTIAL HYSTERECTOMY      PROCTOSIGMOIDOSCOPY N/A 10/12/2021    ANAL PROCTO SIGMOIDOSCOPY FLEXIBLE performed by Yamile Apodaca MD at [address] [place] [address] N/A 1/25/2022    ANAL PROCTO [address] performed by Yamile Apodaca MD at [facility] ARTHROSCOPY Right 11/11/2020    RIGHT SHOULDER DIAGNOSTIC ARTHROSCOPY WITH DECOMPRESSION ROTATOR CUFF REPAIR performed by Colin eRes MD at [facility]         Medications Prior to Admission:    @  Prior to Admission medications    Medication Sig Start Date End Date Taking?  Authorizing Provider   dicyclomine (BENTYL) 10 MG capsule Take 10 mg by mouth 4 times daily (before meals and nightly)    Historical Provider, MD   vitamin D (D3-50) 03639 UNIT CAPS Take 1 capsule by mouth once a week 11/30/21   Marcell Victoria APRN - CNP   nortriptyline (PAMELOR) 10 MG capsule TAKE 1 CAPSULE BY MOUTH NIGHTLY FOR HEADACHES 11/5/21   Lori Prajapati PA-C   gabapentin (NEURONTIN) 300 MG capsule TAKE 1 CAPSULE BY MOUTH THREE TIMES DAILY 9/13/21 1/31/22  Marcell Victoria APRN - CNP   dexlansoprazole (DEXILANT) 60 MG CPDR delayed release capsule Take 60 mg by mouth daily    Historical Provider, MD   baclofen (LIORESAL) 20 MG tablet Take 1 tablet by mouth 4 times daily as needed (muscle spasms; pain) 8/13/21   MARIXA Lai CNP   rOPINIRole (REQUIP) 0.5 MG tablet Take 1 tablet by mouth 2 times daily as needed (restless legs) 8/13/21   MARIXA Lai CNP   ocrelizumab (OCREVUS) 300 MG/10ML SOLN injection Infuse 20 mLs intravenously every 6 months 7/19/21   MARIXA Lai CNP       Allergies:  Patient has no known allergies. Social History:   Social History     Socioeconomic History    Marital status: Single     Spouse name: Not on file    Number of children: 3    Years of education: 6    Highest education level: 11th grade   Occupational History    Not on file   Tobacco Use    Smoking status: Current Every Day Smoker     Packs/day: 0.50     Years: 25.00     Pack years: 12.50     Types: Cigarettes    Smokeless tobacco: Never Used    Tobacco comment: Ready to stop, requesting prescription for Chantix   Vaping Use    Vaping Use: Never used   Substance and Sexual Activity    Alcohol use: Yes     Alcohol/week: 1.0 standard drink     Types: 1 Glasses of wine per week     Comment: socially    Drug use: No    Sexual activity: Yes     Partners: Male   Other Topics Concern    Not on file   Social History Narrative    Uses [employer] for transportation    [employer]     Social Determinants of Health     Financial Resource Strain: Medium Risk    Difficulty of Paying Living Expenses: Somewhat hard   Food Insecurity: Food Insecurity Present    Worried About Running Out of Food in the Last Year: Sometimes true    [place] of Food in the Last Year: Never true   Transportation Needs: No Transportation Needs    Lack of Transportation (Medical): No    Lack of Transportation (Non-Medical):  No   Physical Activity: Sufficiently Active    Days of Exercise per Week: 3 days    Minutes of Exercise per Session: 60 min   Stress: No Stress Concern Present    Feeling of Stress : Not at all   Social Connections: Socially Isolated    Frequency of Communication with Friends and Family: Three times a week    Frequency of Social Gatherings with Friends and Family: Twice a week    Attends Restorationism Services: Never    Active Member of Clubs or Organizations: No    Attends Club or Organization Meetings: Never    Marital Status: Never    Intimate Partner Violence:     Fear of Current or Ex-Partner: Not on file    Emotionally Abused: Not on file    Physically Abused: Not on file    Sexually Abused: Not on file   Housing Stability:     Unable to Pay for Housing in the Last Year: Not on file    Number of [place] in the Last Year: Not on file    Unstable Housing in the Last Year: Not on file       Family History:   Family History   Problem Relation Age of Onset    Mult Sclerosis Mother     Colon Cancer Neg Hx     Uterine Cancer Neg Hx     Ovarian Cancer Neg Hx     Breast Cancer Neg Hx        REVIEW OF SYSTEMS:    Gen: Patient denies any lightheadedness or dizziness. No LOC or syncope. No fevers or chills. HEENT: No earache, sore throat or nasal congestion. Resp: Denies cough, hemoptysis or sputum production. Cardiac: Denies chest pain, SOB, diaphoresis or palpitations. GI:+ Abdominal distention. Lower abdominal cramping. No nausea, vomiting, diarrhea or constipation. No melena or hematochezia. : No urinary complaints, dysuria, hematuria or frequency. MSK: No extremity weakness, paralysis or paresthesias.      PHYSICAL EXAM:    Vitals:  /63   Pulse 91   Temp 100.9 °F (38.3 °C) (Oral)   Resp 18   Ht 5' 2\" (1.575 m)   Wt 187 lb (84.8 kg)   LMP 01/05/2018   SpO2 96%   BMI 34.20 kg/m²     General:  This is a 51 y.o. yo female who is alert and oriented in postop distress  HEENT:  Head is normocephalic and atraumatic, PERRLA, EOMI, mucus membranes moist with no pharyngeal erythema or exudate. Neck:  Supple with no carotid bruits, JVD or thyromegaly.   No cervical adenopathy  CV:  Regular rate and rhythm, no murmurs  Lungs: Mildly coarse breath sounds to auscultation bilaterally with no wheezes, rales or rhonchi  Abdomen:  + Expected postop abdomen, + ileostomy,+ abdominal fat, bowel sounds  hypoactive  Extremities:  No edema, peripheral pulses intact bilaterally  Neuro:  Cranial nerves II-XII grossly intact; motor and sensory function intact with no focal deficits  Skin:  No rashes, lesions or wounds      DATA:  CBC with Differential:    Lab Results   Component Value Date    WBC 11.0 02/16/2022    RBC 3.43 02/16/2022    HGB 10.7 02/16/2022    HCT 32.4 02/16/2022     02/16/2022    MCV 94.5 02/16/2022    MCH 31.2 02/16/2022    MCHC 33.0 02/16/2022    RDW 13.2 02/16/2022    METASPCT 0.9 11/23/2021    LYMPHOPCT 23.2 02/02/2022    MONOPCT 6.7 02/02/2022    BASOPCT 0.4 02/02/2022    MONOSABS 0.62 02/02/2022    LYMPHSABS 2.13 02/02/2022    EOSABS 0.20 02/02/2022    BASOSABS 0.04 02/02/2022     CMP:    Lab Results   Component Value Date     02/16/2022    K 3.2 02/16/2022    K 3.7 02/09/2022    CL 90 02/16/2022    CO2 30 02/16/2022    BUN 7 02/16/2022    CREATININE 0.6 02/16/2022    GFRAA >60 02/16/2022    LABGLOM >60 02/16/2022    GLUCOSE 109 02/16/2022    PROT 5.8 02/16/2022    LABALBU 2.8 02/16/2022    CALCIUM 8.3 02/16/2022    BILITOT 1.9 02/16/2022    ALKPHOS 127 02/16/2022    AST 34 02/16/2022    ALT 29 02/16/2022     Magnesium:    Lab Results   Component Value Date    MG 2.2 09/01/2021     Phosphorus:    Lab Results   Component Value Date    PHOS 3.2 09/01/2021     PT/INR:    Lab Results   Component Value Date    PROTIME 10.7 02/28/2020    INR 1.0 02/28/2020     Troponin:    Lab Results   Component Value Date    TROPONINI <0.01 07/20/2020     U/A:    Lab Results   Component Value Date    Rory  02/14/2022    PROTEINU 30 02/14/2022    PHUR 6.0 02/14/2022    WBCUA 5-10 02/14/2022    RBCUA 5-10 02/14/2022    TRICHOMONAS Present 02/26/2020    BACTERIA FEW 02/14/2022    CLARITYU Clear 02/14/2022    SPECGRAV 1.025 02/14/2022    LEUKOCYTESUR TRACE 02/14/2022    UROBILINOGEN 0.2 02/14/2022    BILIRUBINUR MODERATE 02/14/2022    BLOODU MODERATE 02/14/2022    GLUCOSEU 100 02/14/2022    AMORPHOUS FEW 11/23/2021     ABG:  No results found for: PH, PCO2, PO2, HCO3, BE, THGB, TCO2, O2SAT  HgBA1c:    Lab Results   Component Value Date    LABA1C 5.4 05/11/2021     FLP:    Lab Results   Component Value Date    TRIG 184 05/11/2021    HDL 44 05/11/2021    LDLCALC 144 05/11/2021    LABVLDL 37 05/11/2021     TSH:    Lab Results   Component Value Date    TSH 0.293 09/01/2021     IRON:  No results found for: IRON  LIPASE:    Lab Results   Component Value Date    LIPASE 10 09/05/2021       ASSESSMENT AND PLAN:      Patient Active Problem List    Diagnosis Date Noted    Sigmoid stricture ([address]     Pelvic abscess in female     Ileus, postoperative ([address] 09/05/2021    S/P colectomy 08/31/2021    Malignant neoplasm of descending colon ([address]     Secondary restless legs syndrome 08/13/2021    Palafox's esophagus with dysplasia 05/11/2021    Morbidly obese ([address] 10/05/2020    Multiple sclerosis ([address] 08/04/2020    Vocal cord dysfunction 07/20/2020     Impression:  1. Sigmoid stricturelaparoscopic sigmoid resection 2/8/2022  2. History of VEENA-patient apparently been tested and is negative for CPAP use at this time  3. Obesity  4. Hypertension   5. History of current tobacco use  6. History of restless leg syndrome  7. Leukocytosis postop  8. Mild elevation transaminase postop  9.  Hyponatremia postop    Plan:  Patient admitted to medical surgical floor  Home medications reviewed  Monitor heart rate, blood pressure, O2 saturation  Diet and abdominal pain meds per general surgery  Lovenox 40 mg subcu daily  IV fluids lactated Ringer's 100 cc an hour    Albuterol aerosol 2.5 mg every 4 hours as needed for shortness of breath    CT of the abdomen pelvis 2/11  Serum osmolality274    Decrease IV fluids lactated Ringer's at 50 cc an hour and stop when oral intake improves. KCl 10 mEq- 3 times daily  UA in a.m.    CMP, CBC in a.m.     Felipe Ridley DO, D.GABRIEL  2/16/2022  11:17 AM

## 2022-02-16 NOTE — PLAN OF CARE
Problem: Infection - Surgical Site:  Goal: Will show no infection signs and symptoms  Description: Will show no infection signs and symptoms  Outcome: Met This Shift     Problem: Pain:  Description: Pain management should include both nonpharmacologic and pharmacologic interventions.   Goal: Pain level will decrease  Description: Pain level will decrease  Outcome: Met This Shift     Problem: Nutritional:  Goal: Ability to follow a diet with enough fiber (20 to 30 grams) for normal bowel function will improve  Description: Ability to follow a diet with enough fiber (20 to 30 grams) for normal bowel function will improve  Outcome: Met This Shift

## 2022-02-16 NOTE — CARE COORDINATION
CM note: POD8 lap sigmoid resection. Pt spiked fevers last two days, was placed on IV Cipro/Flagyl. IVF for hyponatremia, Na+ 30 today. Plan remains to return home when medically ready, no discharge needs.

## 2022-02-16 NOTE — PROGRESS NOTES
GENERAL SURGERY  DAILY PROGRESS NOTE  2/16/2022    No chief complaint on file. Subjective:  Fevers improving. Feeling better. Still not tolerating complete diet. Having bowel function. Objective:  /63   Pulse 93   Temp 100.9 °F (38.3 °C) (Oral)   Resp 18   Ht 5' 2\" (1.575 m)   Wt 187 lb (84.8 kg)   LMP 01/05/2018   SpO2 91%   BMI 34.20 kg/m²     GENERAL:  Laying in bed, awake, alert, cooperative, no acute distress  HEAD: Normocephalic, atraumatic  EYES: No sclera icterus, pupils equal  LUNGS:  No increased work of breathing  CARDIOVASCULAR:  RR  ABDOMEN:  Soft, non- distended, mildly tender around ostomy.   liquid stool in bag    EXTREMITIES: No edema or swelling  SKIN: Warm and dry    Assessment/Plan:  51 y.o. female with sigmoid stricture s/p laparoscopic sigmoid resection with anastomosis 2/8    - continue antibiotics  - daily labs  - pain control, nausea control  - ambulate  - encourage oral intake     Electronically signed by Hiren Ness MD on 2/16/2022 at 6:37 AM    As above, home soon

## 2022-02-16 NOTE — PROGRESS NOTES
2/16/22  Patient chart reviewed for opportunity to convert IV therapies to PO per [facility] Clinical guidelines. Exclusion criteria identified preventing conversion of metronidazole and ciprofloxacin to PO route: -? Febrile in the last 24 hours (antibiotics only)? Pharmacy will continue to monitor for administration route conversion opportunities.   Melissa Ignacio [facility]  2/16/2022, 3:19 PM

## 2022-02-17 LAB
ALBUMIN SERPL-MCNC: 2.6 G/DL (ref 3.5–5.2)
ALP BLD-CCNC: 134 U/L (ref 35–104)
ALT SERPL-CCNC: 20 U/L (ref 0–32)
ANION GAP SERPL CALCULATED.3IONS-SCNC: 10 MMOL/L (ref 7–16)
ANION GAP SERPL CALCULATED.3IONS-SCNC: 11 MMOL/L (ref 7–16)
AST SERPL-CCNC: 26 U/L (ref 0–31)
BACTERIA: ABNORMAL /HPF
BILIRUB SERPL-MCNC: 1.1 MG/DL (ref 0–1.2)
BILIRUBIN URINE: NEGATIVE
BLOOD, URINE: ABNORMAL
BUN BLDV-MCNC: 5 MG/DL (ref 6–20)
BUN BLDV-MCNC: 6 MG/DL (ref 6–20)
CALCIUM SERPL-MCNC: 7.9 MG/DL (ref 8.6–10.2)
CALCIUM SERPL-MCNC: 8 MG/DL (ref 8.6–10.2)
CHLORIDE BLD-SCNC: 92 MMOL/L (ref 98–107)
CHLORIDE BLD-SCNC: 95 MMOL/L (ref 98–107)
CLARITY: CLEAR
CO2: 27 MMOL/L (ref 22–29)
CO2: 27 MMOL/L (ref 22–29)
COLOR: YELLOW
CREAT SERPL-MCNC: 0.5 MG/DL (ref 0.5–1)
CREAT SERPL-MCNC: 0.5 MG/DL (ref 0.5–1)
EPITHELIAL CELLS, UA: ABNORMAL /HPF
GFR AFRICAN AMERICAN: >60
GFR AFRICAN AMERICAN: >60
GFR NON-AFRICAN AMERICAN: >60 ML/MIN/1.73
GFR NON-AFRICAN AMERICAN: >60 ML/MIN/1.73
GLUCOSE BLD-MCNC: 114 MG/DL (ref 74–99)
GLUCOSE BLD-MCNC: 128 MG/DL (ref 74–99)
GLUCOSE URINE: NEGATIVE MG/DL
HCT VFR BLD CALC: 28.7 % (ref 34–48)
HEMOGLOBIN: 9.6 G/DL (ref 11.5–15.5)
KETONES, URINE: ABNORMAL MG/DL
LEUKOCYTE ESTERASE, URINE: ABNORMAL
MCH RBC QN AUTO: 30.7 PG (ref 26–35)
MCHC RBC AUTO-ENTMCNC: 33.4 % (ref 32–34.5)
MCV RBC AUTO: 91.7 FL (ref 80–99.9)
NITRITE, URINE: NEGATIVE
PDW BLD-RTO: 13.2 FL (ref 11.5–15)
PH UA: 8 (ref 5–9)
PLATELET # BLD: 267 E9/L (ref 130–450)
PMV BLD AUTO: 10.2 FL (ref 7–12)
POTASSIUM SERPL-SCNC: 2.9 MMOL/L (ref 3.5–5)
POTASSIUM SERPL-SCNC: 3.2 MMOL/L (ref 3.5–5)
PROTEIN UA: NEGATIVE MG/DL
RBC # BLD: 3.13 E12/L (ref 3.5–5.5)
RBC UA: ABNORMAL /HPF (ref 0–2)
SODIUM BLD-SCNC: 130 MMOL/L (ref 132–146)
SODIUM BLD-SCNC: 132 MMOL/L (ref 132–146)
SPECIFIC GRAVITY UA: 1.01 (ref 1–1.03)
TOTAL PROTEIN: 5.5 G/DL (ref 6.4–8.3)
URINE CULTURE, ROUTINE: NORMAL
UROBILINOGEN, URINE: 0.2 E.U./DL
WBC # BLD: 14.9 E9/L (ref 4.5–11.5)
WBC UA: ABNORMAL /HPF (ref 0–5)

## 2022-02-17 PROCEDURE — 6370000000 HC RX 637 (ALT 250 FOR IP): Performed by: SURGERY

## 2022-02-17 PROCEDURE — 80053 COMPREHEN METABOLIC PANEL: CPT

## 2022-02-17 PROCEDURE — 2580000003 HC RX 258: Performed by: SURGERY

## 2022-02-17 PROCEDURE — 81001 URINALYSIS AUTO W/SCOPE: CPT

## 2022-02-17 PROCEDURE — 6370000000 HC RX 637 (ALT 250 FOR IP): Performed by: INTERNAL MEDICINE

## 2022-02-17 PROCEDURE — 2500000003 HC RX 250 WO HCPCS: Performed by: SURGERY

## 2022-02-17 PROCEDURE — 6360000002 HC RX W HCPCS: Performed by: SURGERY

## 2022-02-17 PROCEDURE — 1200000000 HC SEMI PRIVATE

## 2022-02-17 PROCEDURE — 80048 BASIC METABOLIC PNL TOTAL CA: CPT

## 2022-02-17 PROCEDURE — 85027 COMPLETE CBC AUTOMATED: CPT

## 2022-02-17 PROCEDURE — 36415 COLL VENOUS BLD VENIPUNCTURE: CPT

## 2022-02-17 PROCEDURE — 6370000000 HC RX 637 (ALT 250 FOR IP): Performed by: STUDENT IN AN ORGANIZED HEALTH CARE EDUCATION/TRAINING PROGRAM

## 2022-02-17 RX ORDER — DEXTROSE, SODIUM CHLORIDE, AND POTASSIUM CHLORIDE 5; .45; .3 G/100ML; G/100ML; G/100ML
INJECTION INTRAVENOUS CONTINUOUS
Status: DISCONTINUED | OUTPATIENT
Start: 2022-02-17 | End: 2022-02-19

## 2022-02-17 RX ORDER — LOPERAMIDE HYDROCHLORIDE 2 MG/1
2 CAPSULE ORAL 2 TIMES DAILY
Status: DISCONTINUED | OUTPATIENT
Start: 2022-02-17 | End: 2022-02-24 | Stop reason: HOSPADM

## 2022-02-17 RX ORDER — CALCIUM POLYCARBOPHIL 625 MG 625 MG/1
625 TABLET ORAL 2 TIMES DAILY
Status: DISCONTINUED | OUTPATIENT
Start: 2022-02-17 | End: 2022-02-24 | Stop reason: HOSPADM

## 2022-02-17 RX ORDER — LOPERAMIDE HYDROCHLORIDE 2 MG/1
2 CAPSULE ORAL 4 TIMES DAILY PRN
Status: DISCONTINUED | OUTPATIENT
Start: 2022-02-17 | End: 2022-02-17

## 2022-02-17 RX ORDER — POTASSIUM CHLORIDE 20 MEQ/1
20 TABLET, EXTENDED RELEASE ORAL
Status: DISCONTINUED | OUTPATIENT
Start: 2022-02-17 | End: 2022-02-18

## 2022-02-17 RX ADMIN — POTASSIUM CHLORIDE, DEXTROSE MONOHYDRATE AND SODIUM CHLORIDE: 300; 5; 450 INJECTION, SOLUTION INTRAVENOUS at 09:51

## 2022-02-17 RX ADMIN — METHOCARBAMOL 500 MG: 500 TABLET ORAL at 16:21

## 2022-02-17 RX ADMIN — ACETAMINOPHEN 650 MG: 325 TABLET ORAL at 19:17

## 2022-02-17 RX ADMIN — NORTRIPTYLINE HYDROCHLORIDE 10 MG: 10 CAPSULE ORAL at 08:10

## 2022-02-17 RX ADMIN — PSYLLIUM HUSK 1 PACKET: 3.4 GRANULE ORAL at 20:06

## 2022-02-17 RX ADMIN — PSYLLIUM HUSK 1 PACKET: 3.4 GRANULE ORAL at 08:10

## 2022-02-17 RX ADMIN — PANTOPRAZOLE SODIUM 40 MG: 40 TABLET, DELAYED RELEASE ORAL at 16:20

## 2022-02-17 RX ADMIN — POTASSIUM CHLORIDE 20 MEQ: 1500 TABLET, EXTENDED RELEASE ORAL at 08:11

## 2022-02-17 RX ADMIN — CIPROFLOXACIN 400 MG: 2 INJECTION, SOLUTION INTRAVENOUS at 17:09

## 2022-02-17 RX ADMIN — ONDANSETRON 4 MG: 2 INJECTION INTRAMUSCULAR; INTRAVENOUS at 16:14

## 2022-02-17 RX ADMIN — MORPHINE SULFATE 2 MG: 2 INJECTION, SOLUTION INTRAMUSCULAR; INTRAVENOUS at 08:14

## 2022-02-17 RX ADMIN — METRONIDAZOLE 500 MG: 500 INJECTION, SOLUTION INTRAVENOUS at 17:10

## 2022-02-17 RX ADMIN — MORPHINE SULFATE 2 MG: 2 INJECTION, SOLUTION INTRAMUSCULAR; INTRAVENOUS at 16:14

## 2022-02-17 RX ADMIN — MORPHINE SULFATE 4 MG: 4 INJECTION, SOLUTION INTRAMUSCULAR; INTRAVENOUS at 19:17

## 2022-02-17 RX ADMIN — METRONIDAZOLE 500 MG: 500 INJECTION, SOLUTION INTRAVENOUS at 02:16

## 2022-02-17 RX ADMIN — METHOCARBAMOL 500 MG: 500 TABLET ORAL at 08:13

## 2022-02-17 RX ADMIN — Medication 10 ML: at 08:14

## 2022-02-17 RX ADMIN — ENOXAPARIN SODIUM 40 MG: 100 INJECTION SUBCUTANEOUS at 08:09

## 2022-02-17 RX ADMIN — METRONIDAZOLE 500 MG: 500 INJECTION, SOLUTION INTRAVENOUS at 09:53

## 2022-02-17 RX ADMIN — PANTOPRAZOLE SODIUM 40 MG: 40 TABLET, DELAYED RELEASE ORAL at 05:27

## 2022-02-17 RX ADMIN — ACETAMINOPHEN 650 MG: 325 TABLET ORAL at 04:42

## 2022-02-17 RX ADMIN — POTASSIUM CHLORIDE, DEXTROSE MONOHYDRATE AND SODIUM CHLORIDE: 300; 5; 450 INJECTION, SOLUTION INTRAVENOUS at 18:13

## 2022-02-17 RX ADMIN — CIPROFLOXACIN 400 MG: 2 INJECTION, SOLUTION INTRAVENOUS at 05:03

## 2022-02-17 RX ADMIN — MORPHINE SULFATE 4 MG: 4 INJECTION, SOLUTION INTRAMUSCULAR; INTRAVENOUS at 02:20

## 2022-02-17 RX ADMIN — LOPERAMIDE HYDROCHLORIDE 2 MG: 2 CAPSULE ORAL at 09:51

## 2022-02-17 RX ADMIN — MORPHINE SULFATE 2 MG: 2 INJECTION, SOLUTION INTRAMUSCULAR; INTRAVENOUS at 12:24

## 2022-02-17 RX ADMIN — CALCIUM POLYCARBOPHIL 625 MG 625 MG: 625 TABLET ORAL at 08:10

## 2022-02-17 RX ADMIN — LOPERAMIDE HYDROCHLORIDE 2 MG: 2 CAPSULE ORAL at 20:06

## 2022-02-17 RX ADMIN — METHOCARBAMOL 500 MG: 500 TABLET ORAL at 20:06

## 2022-02-17 RX ADMIN — POTASSIUM CHLORIDE 20 MEQ: 1500 TABLET, EXTENDED RELEASE ORAL at 12:19

## 2022-02-17 RX ADMIN — POTASSIUM CHLORIDE 20 MEQ: 1500 TABLET, EXTENDED RELEASE ORAL at 16:20

## 2022-02-17 RX ADMIN — METHOCARBAMOL 500 MG: 500 TABLET ORAL at 12:19

## 2022-02-17 RX ADMIN — CALCIUM POLYCARBOPHIL 625 MG 625 MG: 625 TABLET ORAL at 20:06

## 2022-02-17 ASSESSMENT — PAIN DESCRIPTION - ORIENTATION
ORIENTATION: MID
ORIENTATION: MID

## 2022-02-17 ASSESSMENT — PAIN SCALES - GENERAL
PAINLEVEL_OUTOF10: 8
PAINLEVEL_OUTOF10: 9
PAINLEVEL_OUTOF10: 3
PAINLEVEL_OUTOF10: 2
PAINLEVEL_OUTOF10: 7
PAINLEVEL_OUTOF10: 3
PAINLEVEL_OUTOF10: 2
PAINLEVEL_OUTOF10: 7
PAINLEVEL_OUTOF10: 7
PAINLEVEL_OUTOF10: 8

## 2022-02-17 ASSESSMENT — PAIN DESCRIPTION - LOCATION
LOCATION: ABDOMEN
LOCATION: ABDOMEN

## 2022-02-17 ASSESSMENT — PAIN DESCRIPTION - PROGRESSION: CLINICAL_PROGRESSION: GRADUALLY IMPROVING

## 2022-02-17 ASSESSMENT — PAIN DESCRIPTION - DESCRIPTORS: DESCRIPTORS: TENDER

## 2022-02-17 ASSESSMENT — PAIN DESCRIPTION - PAIN TYPE
TYPE: SURGICAL PAIN

## 2022-02-17 NOTE — PLAN OF CARE
Problem: Fluid Volume:  Goal: Ability to achieve a balanced intake and output will improve  Outcome: Met This Shift

## 2022-02-17 NOTE — PROGRESS NOTES
Department of Internal Medicine        HISTORY OF PRESENT ILLNESS:      The patient is a 51 y.o. female who presents with having a elective laparoscopic sigmoid colon resection. Patient has a known history of sigmoid stricture. Patient had a anal proctosigmoidoscopy performed 1/25 with a scope with past 15 cm work there was complete closure of the rectosigmoid junction without an opening. Patient is seen postop. Patient has expected postop discomfort. Temperature is 97.8 with heart rate 72 blood pressure 140/65. O2 sat 98% on 2 L nasal cannula. 2/9/2022  Patient seen and examined on medical surgical floor. Patient complains of postop discomfort. She denies any problem with chest pain, nausea/vomiting or unusual shortness of breath. BUN/creatinine 5/0.6 with fasting blood sugar 126. Mild elevation of transaminase with a ALT of 69 AST is 35. Review of records have shown patient has intermittent elevation transaminase over the past 18 months. WBC is 21.5 with hemoglobin 12.6. Temperature 98.3 with heart rate 84 blood pressure 107/69. O2 sat 96% on room air at rest.  Urine output is very good. General surgery note reviewed. 2/10/2022  Patient seen and examined on medical surgical floor. Patient complains of postop discomfort. Patient denies any problem with chest pain, dizziness, nausea, fever/chills, dysuria, cough or unusual shortness of breath. Patient is passing some gas but no bowel movements. Increase activity today. BUN/creatinine 5/0.7 with serum sodium 130. WBC is still elevated 23.7 with hemoglobin 14.3. Temperature is 98.8 with heart rate 76 blood pressure 110/78. O2 sat 94% on room air at rest.  Urine output is adequate. 2/11/2022  Patient seen examined on medical surgical floor. Patient complains of persistent postop discomfort along with some nausea and poor appetite. Patient denies passing had any bowel movements or gas recently.   BUN/creatinine 6/0.6 with serum sodium 124 7. Potassium 3.4 with WBC 24.6 and hemoglobin 13.7. Temperature is 98.6nine 9.3 with heart rate of 99. O2 sat 98% on room air at rest.  General surgery note reviewed. CT of the abdomen pelvis was ordered for today. 2/12/2022  Patient seen examined on medical surgical floor. Patient states the abdominal distention seems to be improved. Patient still having postop discomfort and is mildly improved. Patient denies any chest pain, dizziness or unusual shortness of breath. Patient did have some nausea last night with supper. Patient denies any bowel movements at this time. CT of the abdomen pelvis from yesterday suggested ileus but cannot rule out obstruction. Temperature 98 with heart rate of 97 with blood pressure 124/67 O2 sat 93% on room air at rest.  WBC is 14.8 with hemoglobin 11.8. BUN/creatinine 7/0.6. Serum sodium increased to 134 from 127 yesterday. 2/13/2022  Patient seen examined on surgical medical floor. Patient still smokes postop discomfort. It seems to be slowly improving. Patient still has some abdominal distention intermittently. Patient says she is drinking fairly good but is not eating very much. She denies any chest pain or unusual shortness of breath at rest.  Serum sodium is 129 with BUN/creatinine 4/0.5. Serum potassium 3.4. WBC 12.9 hemoglobin 11.5. Temperature is 99.1 with a heart rate of 100 and blood pressure 140/77. O2 sat 92% room air at rest.  Check O2 saturation with activity on room air. 2/14/2022  Patient seen and examined on medical surgical floor. Patient still complain of some lower abdominal cramping. She denies any chest pain, nausea/vomiting, shortness of breath. Patient denies any dysuria or cough. BUN/creatinine 4/0.6 with serum sodium 128. WBC 15.4 today with a hemoglobin 1.7. Temperature 100.3 with heart rate of 100 and blood pressure 156/72. O2 sat 93% on room air at rest.  Patient had out about 1850 cc from ostomy yesterday afternoon. General surgery note reviewed. With patient persistent tach and leukocytosis will check UA and chest x-ray. 2/15/2022  Patient seen examined on medical surgical floor. Patient states she is slowly improving. Patient denies any chest pain, dizziness or unusual shortness of breath. Patient abdominal pain is slowly improving. Ostomy output ranges 250-1000 cc a shift. Temperature ranges 98.7100 with heart rate 98 blood pressure 143/78. O2 sat 95% on room air at rest.  BUN/creatinine was 6/0.6 with serum sodium 128. Potassium 3.3 with WBCs 10.1 hemoglobin 10.5. Urinalysis yesterday shows greater than 80 ketones with trace of leukocyte esterase with 510 WBCs and few bacteria. Chest x-ray yesterday showed bibasilar pleural and parenchymal changes with bilateral pleural effusion. Patient still with poor appetite. Abdomen shows moderate distention with hypoactive bowel sounds. 2/16/2022  Patient seen examined on medical surgical floor. Patient states she is feeling better with expected postop discomfort. Patient denies any chest pain, dizziness or unusual shortness of breath. BUN/creatinine 7/0.6. Serum sodium is 130. Total bili is increased to 1.9 today with direct bilirubin slightly increased 1.1. WBC is 11 with a hemoglobin 10.7. Temperature still 100 degrees with heart rate 91 and blood pressure 114/63. O2 sat 91-96% on room air. Patient still with increased output from ostomy range of 300-670 cc a shift. 2/17/2022  Patient seen examined on medical surgical floor. Patient states that her abdominal discomfort is slowly improving. She denies any chest pain, nausea/vomiting or unusual shortness of breath. serum potassium still low at 2.9 even with the patient receiving supplemental potassium 3 times a day. BUN/creatinine 6/0.5 with serum sodium 130. Total bili is improved to 1.1 with a WBC 14.9 hemoglobin 9.6. Urinalysis improved but still shows a trace of ketones.   Temperature ranges 98. 899.3 with a heart rate of 100 and blood pressure 119/70. O2 sat 93-96% on room air at rest.  Urinary output is fairly good with the patient having improvement in ostomy output ranging from 150-800 cc a shift. Patient started on IV fluids of 40 KCl at 100 cc an hour. Repeat BMP today at 4 PM to monitor serum potassium. Past Medical History:    Past Medical History:   Diagnosis Date    Acid reflux disease     Cyst of ovary     Fracture of nasal bone     Headache     Hearing loss     Hypertension     Migraine     Morbidly obese ([address] 10/05/2020    Multiple sclerosis ([address]     denies limitations at present 11/2020    VEENA no CPAP     severe VEENA per pt    Right shoulder pain 11/2020    Tinnitus     TMJ dysfunction      Past Surgical History:    Past Surgical History:   Procedure Laterality Date    APPENDECTOMY      BACK SURGERY      lumbar    BICEPS TENDON REPAIR Right 11/11/2020    BICEPS TENODESIS performed by Kelli Blizzard, MD at [place] [id number] Left 8/31/2021    LAPAROSCOPIC LEFT HEMICOLECTOMY WITH LOOP OSTOMY performed by Ana Ramirez MD at [address] 9/6/2021    DIAGNOSTIC LAPAROSCOPY POSSIBLE BOWEL RESECTION POSSILBE OSTOMY performed by Ana Ramirez MD at [address] 2/8/2022    LAPAROSCOPIC SIGMOID COLON RESECTION performed by Ana Ramirez MD at [address] [facility]  03/05/2018    Robotic hysterectomy, bilateral salpingectomy, right oophorectomy DR. ARIANA MONIQUE [facility]     HYSTERECTOMY, TOTAL ABDOMINAL      LARYNGOSCOPY N/A 7/17/2020    DIRECT LARYNGOSCOPY--OMNI GUIDE LASER performed by Claudio Roper MD at [address] PROCTOSIGMOIDOSCOPY N/A 10/12/2021    ANAL PROCTO SIGMOIDOSCOPY FLEXIBLE performed by Ana Ramirez MD at [address] [facility] N/A 1/25/2022    ANAL PROCTO SIGMOIDOSCOPY FLEXIBLE performed by Nikita Ortiz Alejandra Alvarez MD at [facility] ARTHROSCOPY Right 11/11/2020    RIGHT SHOULDER DIAGNOSTIC ARTHROSCOPY WITH DECOMPRESSION ROTATOR CUFF REPAIR performed by Tahira Madison MD at [place] [address]         Medications Prior to Admission:    @  Prior to Admission medications    Medication Sig Start Date End Date Taking? Authorizing Provider   dicyclomine (BENTYL) 10 MG capsule Take 10 mg by mouth 4 times daily (before meals and nightly)    Historical Provider, MD   vitamin D (D3-50) [address] UNIT CAPS Take 1 capsule by mouth once a week 11/30/21   Fernanda MARIXA Granados CNP   nortriptyline (PAMELOR) 10 MG capsule TAKE 1 CAPSULE BY MOUTH NIGHTLY FOR HEADACHES 11/5/21   Yecenia Villasenor PA-C   gabapentin (NEURONTIN) 300 MG capsule TAKE 1 CAPSULE BY MOUTH THREE TIMES DAILY 9/13/21 1/31/22  Fernanda MARIXA Granados CNP   dexlansoprazole (DEXILANT) 60 MG CPDR delayed release capsule Take 60 mg by mouth daily    Historical Provider, MD   baclofen (LIORESAL) 20 MG tablet Take 1 tablet by mouth 4 times daily as needed (muscle spasms; pain) 8/13/21   [facility], APRN - CNP   rOPINIRole (REQUIP) 0.5 MG tablet Take 1 tablet by mouth 2 times daily as needed (restless legs) 8/13/21   Fernanda MARIXA Granados CNP   ocrelizumab (OCREVUS) 300 MG/10ML SOLN injection Infuse 20 mLs intravenously every 6 months 7/19/21   MARIXA Shafer CNP       Allergies:  Patient has no known allergies.     Social History:   Social History     Socioeconomic History    Marital status: Single     Spouse name: Not on file    Number of children: 3    Years of education: 6    Highest education level: 11th grade   Occupational History    Not on file   Tobacco Use    Smoking status: Current Every Day Smoker     Packs/day: 0.50     Years: 25.00     Pack years: 12.50     Types: Cigarettes    Smokeless tobacco: Never Used    Tobacco comment: Ready to stop, requesting prescription for Chantix   Vaping Use    Vaping Use: Never used Substance and Sexual Activity    Alcohol use: Yes     Alcohol/week: 1.0 standard drink     Types: 1 Glasses of wine per week     Comment: socially    Drug use: No    Sexual activity: Yes     Partners: Male   Other Topics Concern    Not on file   Social History Narrative    Uses [employer] for transportation    [facility]     Social Determinants of Health     Financial Resource Strain: Medium Risk    Difficulty of Paying Living Expenses: Somewhat hard   Food Insecurity: Food Insecurity Present    Worried About Running Out of Food in the Last Year: Sometimes true    [place] of Food in the Last Year: Never true   Transportation Needs: No Transportation Needs    Lack of Transportation (Medical): No    Lack of Transportation (Non-Medical): No   Physical Activity: Sufficiently Active    Days of Exercise per Week: 3 days    Minutes of Exercise per Session: 60 min   Stress: No Stress Concern Present    Feeling of Stress : Not at all   Social Connections: Socially Isolated    Frequency of Communication with Friends and Family:  Three times a week    Frequency of Social Gatherings with Friends and Family: Twice a week    Attends Sikh Services: Never    Active Member of Clubs or Organizations: No    Attends Club or Organization Meetings: Never    Marital Status: Never    Intimate Partner Violence:     Fear of Current or Ex-Partner: Not on file    Emotionally Abused: Not on file    Physically Abused: Not on file    Sexually Abused: Not on file   Housing Stability:     Unable to Pay for Housing in the Last Year: Not on file    Number of [place] in the Last Year: Not on file    Unstable Housing in the Last Year: Not on file       Family History:   Family History   Problem Relation Age of Onset    Mult Sclerosis Mother     Colon Cancer Neg Hx     Uterine Cancer Neg Hx     Ovarian Cancer Neg Hx     Breast Cancer Neg Hx        REVIEW OF SYSTEMS:    Gen: Patient denies any lightheadedness or dizziness. No LOC or syncope. No fevers or chills. HEENT: No earache, sore throat or nasal congestion. Resp: Denies cough, hemoptysis or sputum production. Cardiac: Denies chest pain, SOB, diaphoresis or palpitations. GI:+ Abdominal distention. Lower abdominal cramping. No nausea, vomiting, diarrhea or constipation. No melena or hematochezia. : No urinary complaints, dysuria, hematuria or frequency. MSK: No extremity weakness, paralysis or paresthesias. PHYSICAL EXAM:    Vitals:  /70   Pulse 102   Temp 98.8 °F (37.1 °C) (Oral)   Resp 18   Ht 5' 2\" (1.575 m)   Wt 187 lb (84.8 kg)   LMP 01/05/2018   SpO2 93%   BMI 34.20 kg/m²     General:  This is a 51 y.o. yo female who is alert and oriented in postop distress  HEENT:  Head is normocephalic and atraumatic, PERRLA, EOMI, mucus membranes moist with no pharyngeal erythema or exudate. Neck:  Supple with no carotid bruits, JVD or thyromegaly.   No cervical adenopathy  CV:  Regular rate and rhythm, no murmurs  Lungs: Mildly coarse breath sounds to auscultation bilaterally with no wheezes, rales or rhonchi  Abdomen:  + Expected postop abdomen, + ileostomy,+ abdominal fat, bowel sounds  hypoactive  Extremities:  No edema, peripheral pulses intact bilaterally  Neuro:  Cranial nerves II-XII grossly intact; motor and sensory function intact with no focal deficits  Skin:  No rashes, lesions or wounds      DATA:  CBC with Differential:    Lab Results   Component Value Date    WBC 14.9 02/17/2022    RBC 3.13 02/17/2022    HGB 9.6 02/17/2022    HCT 28.7 02/17/2022     02/17/2022    MCV 91.7 02/17/2022    MCH 30.7 02/17/2022    MCHC 33.4 02/17/2022    RDW 13.2 02/17/2022    METASPCT 0.9 11/23/2021    LYMPHOPCT 23.2 02/02/2022    MONOPCT 6.7 02/02/2022    BASOPCT 0.4 02/02/2022    MONOSABS 0.62 02/02/2022    LYMPHSABS 2.13 02/02/2022    EOSABS 0.20 02/02/2022    BASOSABS 0.04 02/02/2022     CMP:    Lab Results Component Value Date     02/17/2022    K 2.9 02/17/2022    K 3.7 02/09/2022    CL 92 02/17/2022    CO2 27 02/17/2022    BUN 6 02/17/2022    CREATININE 0.5 02/17/2022    GFRAA >60 02/17/2022    LABGLOM >60 02/17/2022    GLUCOSE 114 02/17/2022    PROT 5.5 02/17/2022    LABALBU 2.6 02/17/2022    CALCIUM 8.0 02/17/2022    BILITOT 1.1 02/17/2022    ALKPHOS 134 02/17/2022    AST 26 02/17/2022    ALT 20 02/17/2022     Magnesium:    Lab Results   Component Value Date    MG 2.2 09/01/2021     Phosphorus:    Lab Results   Component Value Date    PHOS 3.2 09/01/2021     PT/INR:    Lab Results   Component Value Date    PROTIME 10.7 02/28/2020    INR 1.0 02/28/2020     Troponin:    Lab Results   Component Value Date    TROPONINI <0.01 07/20/2020     U/A:    Lab Results   Component Value Date    COLORU Yellow 02/17/2022    PROTEINU Negative 02/17/2022    PHUR 8.0 02/17/2022    WBCUA 0-1 02/17/2022    RBCUA 1-3 02/17/2022    TRICHOMONAS Present 02/26/2020    BACTERIA RARE 02/17/2022    CLARITYU Clear 02/17/2022    SPECGRAV 1.010 02/17/2022    LEUKOCYTESUR TRACE 02/17/2022    UROBILINOGEN 0.2 02/17/2022    BILIRUBINUR Negative 02/17/2022    BLOODU TRACE 02/17/2022    GLUCOSEU Negative 02/17/2022    AMORPHOUS FEW 11/23/2021     ABG:  No results found for: PH, PCO2, PO2, HCO3, BE, THGB, TCO2, O2SAT  HgBA1c:    Lab Results   Component Value Date    LABA1C 5.4 05/11/2021     FLP:    Lab Results   Component Value Date    TRIG 184 05/11/2021    HDL 44 05/11/2021    LDLCALC 144 05/11/2021    LABVLDL 37 05/11/2021     TSH:    Lab Results   Component Value Date    TSH 0.293 09/01/2021     IRON:  No results found for: IRON  LIPASE:    Lab Results   Component Value Date    LIPASE 10 09/05/2021       ASSESSMENT AND PLAN:      Patient Active Problem List    Diagnosis Date Noted    [address] [address]     Pelvic abscess in female     Ileus, postoperative ([facility] 09/05/2021    S/P colectomy 08/31/2021    Malignant [address] of descending [address]     Secondary restless legs syndrome 08/13/2021    Palafox's esophagus with dysplasia 05/11/2021    Morbidly obese ([facility] 10/05/2020    Multiple sclerosis ([facility] 08/04/2020    Vocal cord dysfunction 07/20/2020     Impression:  1. Sigmoid stricturelaparoscopic sigmoid resection 2/8/2022  2. History of VEENA-patient apparently been tested and is negative for CPAP use at this time  3. Obesity  4. Hypertension   5. History of current tobacco use  6. History of restless leg syndrome  7. Leukocytosis postop  8. Mild elevation transaminase postop  9. Hyponatremia postop    Plan:  Patient admitted to medical surgical floor  Home medications reviewed  Monitor heart rate, blood pressure, O2 saturation  Diet and abdominal pain meds per general surgery  Lovenox 40 mg subcu daily  IV fluids lactated Ringer's 100 cc an hour    Albuterol aerosol 2.5 mg every 4 hours as needed for shortness of breath    CT of the abdomen pelvis 2/11  Serum osmolality274        KCl 20 mEq- 3 times daily  IV fluids D5 with half-normal saline with 40 KCl 100 cc an hour    BMP 4 PM today    CMP, CBC in a.m.     Mikaela Velasco DO, D.OWagner  2/17/2022  10:40 AM

## 2022-02-17 NOTE — PROGRESS NOTES
GENERAL SURGERY  DAILY PROGRESS NOTE  2/17/2022    No chief complaint on file. Subjective:  Fevers improving. Still not eating much. Ambulating well. Objective:  /70   Pulse 102   Temp 98.8 °F (37.1 °C) (Oral)   Resp 18   Ht 5' 2\" (1.575 m)   Wt 187 lb (84.8 kg)   LMP 01/05/2018   SpO2 93%   BMI 34.20 kg/m²     GENERAL:  Laying in bed, awake, alert, cooperative, no acute distress  HEAD: Normocephalic, atraumatic  EYES: No sclera icterus, pupils equal  LUNGS:  No increased work of breathing  CARDIOVASCULAR:  RR  ABDOMEN:  Soft, non- distended, mildly tender around ostomy.   liquid stool in bag    EXTREMITIES: No edema or swelling  SKIN: Warm and dry    Assessment/Plan:  51 y.o. female with sigmoid stricture s/p laparoscopic sigmoid resection with anastomosis 2/8    - continue antibiotics - WBC count with slight bump yesterday  - pain control, nausea control  - ambulate  - encourage oral intake   - home once tolerating more diet and WBC count normal    Electronically signed by Jacob Kwon MD on 2/17/2022 at 6:38 AM      As above  Doing well  Severe electrolyte abnormality  Will add fiber and immodium  Ostomy output still too high  Correct electrolytes  Home once electrolytes improved    Wanda Pepe MD, MS  Minimally Invasive and Bariatric Surgery  [phone] (p)  2/17/2022  9:40 AM

## 2022-02-17 NOTE — CARE COORDINATION
CM note: POD9 lap sigmoid resection. Cipro and Flagyl IV continue. K+ low at 2.9 today and WBC elevated at 14.9  Plan remains to return home when medically ready, no discharge needs.

## 2022-02-18 ENCOUNTER — APPOINTMENT (OUTPATIENT)
Dept: INTERVENTIONAL RADIOLOGY/VASCULAR | Age: 47
DRG: 231 | End: 2022-02-18
Attending: SURGERY
Payer: COMMERCIAL

## 2022-02-18 ENCOUNTER — APPOINTMENT (OUTPATIENT)
Dept: CT IMAGING | Age: 47
DRG: 231 | End: 2022-02-18
Attending: SURGERY
Payer: COMMERCIAL

## 2022-02-18 LAB
ALBUMIN SERPL-MCNC: 2.5 G/DL (ref 3.5–5.2)
ALP BLD-CCNC: 133 U/L (ref 35–104)
ALT SERPL-CCNC: 19 U/L (ref 0–32)
ANION GAP SERPL CALCULATED.3IONS-SCNC: 8 MMOL/L (ref 7–16)
ANION GAP SERPL CALCULATED.3IONS-SCNC: 9 MMOL/L (ref 7–16)
AST SERPL-CCNC: 24 U/L (ref 0–31)
BILIRUB SERPL-MCNC: 0.9 MG/DL (ref 0–1.2)
BUN BLDV-MCNC: 4 MG/DL (ref 6–20)
BUN BLDV-MCNC: 4 MG/DL (ref 6–20)
CALCIUM SERPL-MCNC: 7.9 MG/DL (ref 8.6–10.2)
CALCIUM SERPL-MCNC: 8 MG/DL (ref 8.6–10.2)
CHLORIDE BLD-SCNC: 92 MMOL/L (ref 98–107)
CHLORIDE BLD-SCNC: 95 MMOL/L (ref 98–107)
CO2: 24 MMOL/L (ref 22–29)
CO2: 27 MMOL/L (ref 22–29)
CREAT SERPL-MCNC: 0.5 MG/DL (ref 0.5–1)
CREAT SERPL-MCNC: 0.6 MG/DL (ref 0.5–1)
GFR AFRICAN AMERICAN: >60
GFR AFRICAN AMERICAN: >60
GFR NON-AFRICAN AMERICAN: >60 ML/MIN/1.73
GFR NON-AFRICAN AMERICAN: >60 ML/MIN/1.73
GLUCOSE BLD-MCNC: 125 MG/DL (ref 74–99)
GLUCOSE BLD-MCNC: 131 MG/DL (ref 74–99)
HCT VFR BLD CALC: 28 % (ref 34–48)
HEMOGLOBIN: 9.3 G/DL (ref 11.5–15.5)
LACTIC ACID: 1.1 MMOL/L (ref 0.5–2.2)
MAGNESIUM: 2 MG/DL (ref 1.6–2.6)
MCH RBC QN AUTO: 31.2 PG (ref 26–35)
MCHC RBC AUTO-ENTMCNC: 33.2 % (ref 32–34.5)
MCV RBC AUTO: 94 FL (ref 80–99.9)
PDW BLD-RTO: 13.6 FL (ref 11.5–15)
PLATELET # BLD: 312 E9/L (ref 130–450)
PMV BLD AUTO: 10 FL (ref 7–12)
POTASSIUM SERPL-SCNC: 3.7 MMOL/L (ref 3.5–5)
POTASSIUM SERPL-SCNC: 3.8 MMOL/L (ref 3.5–5)
RBC # BLD: 2.98 E12/L (ref 3.5–5.5)
SODIUM BLD-SCNC: 125 MMOL/L (ref 132–146)
SODIUM BLD-SCNC: 130 MMOL/L (ref 132–146)
TOTAL PROTEIN: 5.4 G/DL (ref 6.4–8.3)
WBC # BLD: 17.9 E9/L (ref 4.5–11.5)

## 2022-02-18 PROCEDURE — 2580000003 HC RX 258: Performed by: SURGERY

## 2022-02-18 PROCEDURE — 10030 IMG GID FLU COLL DRG SFT TIS: CPT

## 2022-02-18 PROCEDURE — 0J983ZZ DRAINAGE OF ABDOMEN SUBCUTANEOUS TISSUE AND FASCIA, PERCUTANEOUS APPROACH: ICD-10-PCS | Performed by: SURGERY

## 2022-02-18 PROCEDURE — C1729 CATH, DRAINAGE: HCPCS

## 2022-02-18 PROCEDURE — 49406 IMAGE CATH FLUID PERI/RETRO: CPT

## 2022-02-18 PROCEDURE — 87070 CULTURE OTHR SPECIMN AEROBIC: CPT

## 2022-02-18 PROCEDURE — 2500000003 HC RX 250 WO HCPCS: Performed by: SURGERY

## 2022-02-18 PROCEDURE — 74177 CT ABD & PELVIS W/CONTRAST: CPT

## 2022-02-18 PROCEDURE — 87205 SMEAR GRAM STAIN: CPT

## 2022-02-18 PROCEDURE — 6370000000 HC RX 637 (ALT 250 FOR IP): Performed by: STUDENT IN AN ORGANIZED HEALTH CARE EDUCATION/TRAINING PROGRAM

## 2022-02-18 PROCEDURE — 6360000002 HC RX W HCPCS: Performed by: SURGERY

## 2022-02-18 PROCEDURE — 6370000000 HC RX 637 (ALT 250 FOR IP): Performed by: SURGERY

## 2022-02-18 PROCEDURE — 87186 SC STD MICRODIL/AGAR DIL: CPT

## 2022-02-18 PROCEDURE — 6360000004 HC RX CONTRAST MEDICATION: Performed by: RADIOLOGY

## 2022-02-18 PROCEDURE — 6370000000 HC RX 637 (ALT 250 FOR IP): Performed by: INTERNAL MEDICINE

## 2022-02-18 PROCEDURE — 80048 BASIC METABOLIC PNL TOTAL CA: CPT

## 2022-02-18 PROCEDURE — 85027 COMPLETE CBC AUTOMATED: CPT

## 2022-02-18 PROCEDURE — 1200000000 HC SEMI PRIVATE

## 2022-02-18 PROCEDURE — 83735 ASSAY OF MAGNESIUM: CPT

## 2022-02-18 PROCEDURE — 71260 CT THORAX DX C+: CPT

## 2022-02-18 PROCEDURE — 36415 COLL VENOUS BLD VENIPUNCTURE: CPT

## 2022-02-18 PROCEDURE — 83605 ASSAY OF LACTIC ACID: CPT

## 2022-02-18 PROCEDURE — 80053 COMPREHEN METABOLIC PANEL: CPT

## 2022-02-18 PROCEDURE — 0W9J3ZZ DRAINAGE OF PELVIC CAVITY, PERCUTANEOUS APPROACH: ICD-10-PCS | Performed by: SURGERY

## 2022-02-18 RX ORDER — POTASSIUM CHLORIDE 20 MEQ/1
20 TABLET, EXTENDED RELEASE ORAL
Status: DISCONTINUED | OUTPATIENT
Start: 2022-02-19 | End: 2022-02-24

## 2022-02-18 RX ADMIN — MORPHINE SULFATE 4 MG: 4 INJECTION, SOLUTION INTRAMUSCULAR; INTRAVENOUS at 14:59

## 2022-02-18 RX ADMIN — CIPROFLOXACIN 400 MG: 2 INJECTION, SOLUTION INTRAVENOUS at 06:00

## 2022-02-18 RX ADMIN — MORPHINE SULFATE 4 MG: 4 INJECTION, SOLUTION INTRAMUSCULAR; INTRAVENOUS at 02:51

## 2022-02-18 RX ADMIN — CIPROFLOXACIN 400 MG: 2 INJECTION, SOLUTION INTRAVENOUS at 21:14

## 2022-02-18 RX ADMIN — CALCIUM POLYCARBOPHIL 625 MG 625 MG: 625 TABLET ORAL at 21:12

## 2022-02-18 RX ADMIN — Medication 10 ML: at 21:00

## 2022-02-18 RX ADMIN — POTASSIUM CHLORIDE, DEXTROSE MONOHYDRATE AND SODIUM CHLORIDE: 300; 5; 450 INJECTION, SOLUTION INTRAVENOUS at 15:00

## 2022-02-18 RX ADMIN — METHOCARBAMOL 500 MG: 500 TABLET ORAL at 21:11

## 2022-02-18 RX ADMIN — IOPAMIDOL 75 ML: 755 INJECTION, SOLUTION INTRAVENOUS at 14:23

## 2022-02-18 RX ADMIN — METRONIDAZOLE 500 MG: 500 INJECTION, SOLUTION INTRAVENOUS at 09:29

## 2022-02-18 RX ADMIN — METHOCARBAMOL 500 MG: 500 TABLET ORAL at 08:10

## 2022-02-18 RX ADMIN — MORPHINE SULFATE 4 MG: 4 INJECTION, SOLUTION INTRAMUSCULAR; INTRAVENOUS at 09:26

## 2022-02-18 RX ADMIN — POTASSIUM CHLORIDE 20 MEQ: 1500 TABLET, EXTENDED RELEASE ORAL at 08:10

## 2022-02-18 RX ADMIN — LOPERAMIDE HYDROCHLORIDE 2 MG: 2 CAPSULE ORAL at 21:11

## 2022-02-18 RX ADMIN — METRONIDAZOLE 500 MG: 500 INJECTION, SOLUTION INTRAVENOUS at 18:04

## 2022-02-18 RX ADMIN — PANTOPRAZOLE SODIUM 40 MG: 40 TABLET, DELAYED RELEASE ORAL at 05:46

## 2022-02-18 RX ADMIN — IOHEXOL 50 ML: 240 INJECTION, SOLUTION INTRATHECAL; INTRAVASCULAR; INTRAVENOUS; ORAL at 14:23

## 2022-02-18 RX ADMIN — MORPHINE SULFATE 4 MG: 4 INJECTION, SOLUTION INTRAMUSCULAR; INTRAVENOUS at 21:10

## 2022-02-18 RX ADMIN — ACETAMINOPHEN 650 MG: 325 TABLET ORAL at 05:46

## 2022-02-18 RX ADMIN — PSYLLIUM HUSK 1 PACKET: 3.4 GRANULE ORAL at 21:13

## 2022-02-18 RX ADMIN — LOPERAMIDE HYDROCHLORIDE 2 MG: 2 CAPSULE ORAL at 08:10

## 2022-02-18 RX ADMIN — PSYLLIUM HUSK 1 PACKET: 3.4 GRANULE ORAL at 08:10

## 2022-02-18 RX ADMIN — ENOXAPARIN SODIUM 40 MG: 100 INJECTION SUBCUTANEOUS at 08:10

## 2022-02-18 RX ADMIN — METRONIDAZOLE 500 MG: 500 INJECTION, SOLUTION INTRAVENOUS at 02:47

## 2022-02-18 RX ADMIN — PANTOPRAZOLE SODIUM 40 MG: 40 TABLET, DELAYED RELEASE ORAL at 15:39

## 2022-02-18 ASSESSMENT — PAIN DESCRIPTION - PAIN TYPE
TYPE: SURGICAL PAIN
TYPE: SURGICAL PAIN

## 2022-02-18 ASSESSMENT — PAIN SCALES - GENERAL
PAINLEVEL_OUTOF10: 4
PAINLEVEL_OUTOF10: 8
PAINLEVEL_OUTOF10: 10
PAINLEVEL_OUTOF10: 9
PAINLEVEL_OUTOF10: 5
PAINLEVEL_OUTOF10: 3
PAINLEVEL_OUTOF10: 7
PAINLEVEL_OUTOF10: 2

## 2022-02-18 ASSESSMENT — PAIN DESCRIPTION - ORIENTATION
ORIENTATION: MID
ORIENTATION: MID

## 2022-02-18 ASSESSMENT — PAIN DESCRIPTION - LOCATION
LOCATION: ABDOMEN
LOCATION: ABDOMEN

## 2022-02-18 NOTE — PROGRESS NOTES
GENERAL SURGERY  DAILY PROGRESS NOTE  2/18/2022    No chief complaint on file. Subjective:  Had some nausea yesterday controlled with medications. Ostomy output beginning to thicken. Tolerating a few bites of food but drinking all her liquids. Objective:  /70   Pulse 100   Temp 100.6 °F (38.1 °C) (Oral)   Resp 16   Ht 5' 2\" (1.575 m)   Wt 187 lb (84.8 kg)   LMP 01/05/2018   SpO2 93%   BMI 34.20 kg/m²     GENERAL:  Laying in bed, awake, alert, cooperative, no acute distress  HEAD: Normocephalic, atraumatic  EYES: No sclera icterus, pupils equal  LUNGS:  No increased work of breathing  CARDIOVASCULAR:  RR  ABDOMEN:  Soft, non- distended, nontender.   liquid stool in bag    EXTREMITIES: No edema or swelling  SKIN: Warm and dry    Assessment/Plan:  51 y.o. female with sigmoid stricture s/p laparoscopic sigmoid resection with anastomosis 2/8    - continue antibiotics - WBC count with another slight bump yesterday  - pain control, nausea control  - ambulate  - encourage oral intake   - monitor ostomy output      Electronically signed by Devaughn Harper MD on 2/18/2022 at 6:07 AM      Leukocytosis trending up  Low grade temps  Borderline tachycardia  CT abd/pel and chest to r/o pneumonia, abscess    Kailee Labs, MD'

## 2022-02-18 NOTE — PROGRESS NOTES
Department of Internal Medicine        HISTORY OF PRESENT ILLNESS:      The patient is a 51 y.o. female who presents with having a elective laparoscopic sigmoid colon resection. Patient has a known history of sigmoid stricture. Patient had a anal proctosigmoidoscopy performed 1/25 with a scope with past 15 cm work there was complete closure of the rectosigmoid junction without an opening. Patient is seen postop. Patient has expected postop discomfort. Temperature is 97.8 with heart rate 72 blood pressure 140/65. O2 sat 98% on 2 L nasal cannula. 2/9/2022  Patient seen and examined on medical surgical floor. Patient complains of postop discomfort. She denies any problem with chest pain, nausea/vomiting or unusual shortness of breath. BUN/creatinine 5/0.6 with fasting blood sugar 126. Mild elevation of transaminase with a ALT of 69 AST is 35. Review of records have shown patient has intermittent elevation transaminase over the past 18 months. WBC is 21.5 with hemoglobin 12.6. Temperature 98.3 with heart rate 84 blood pressure 107/69. O2 sat 96% on room air at rest.  Urine output is very good. General surgery note reviewed. 2/10/2022  Patient seen and examined on medical surgical floor. Patient complains of postop discomfort. Patient denies any problem with chest pain, dizziness, nausea, fever/chills, dysuria, cough or unusual shortness of breath. Patient is passing some gas but no bowel movements. Increase activity today. BUN/creatinine 5/0.7 with serum sodium 130. WBC is still elevated 23.7 with hemoglobin 14.3. Temperature is 98.8 with heart rate 76 blood pressure 110/78. O2 sat 94% on room air at rest.  Urine output is adequate. 2/11/2022  Patient seen examined on medical surgical floor. Patient complains of persistent postop discomfort along with some nausea and poor appetite. Patient denies passing had any bowel movements or gas recently.   BUN/creatinine 6/0.6 with serum sodium 124 7. Potassium 3.4 with WBC 24.6 and hemoglobin 13.7. Temperature is 98.6nine 9.3 with heart rate of 99. O2 sat 98% on room air at rest.  General surgery note reviewed. CT of the abdomen pelvis was ordered for today. 2/12/2022  Patient seen examined on medical surgical floor. Patient states the abdominal distention seems to be improved. Patient still having postop discomfort and is mildly improved. Patient denies any chest pain, dizziness or unusual shortness of breath. Patient did have some nausea last night with supper. Patient denies any bowel movements at this time. CT of the abdomen pelvis from yesterday suggested ileus but cannot rule out obstruction. Temperature 98 with heart rate of 97 with blood pressure 124/67 O2 sat 93% on room air at rest.  WBC is 14.8 with hemoglobin 11.8. BUN/creatinine 7/0.6. Serum sodium increased to 134 from 127 yesterday. 2/13/2022  Patient seen examined on surgical medical floor. Patient still smokes postop discomfort. It seems to be slowly improving. Patient still has some abdominal distention intermittently. Patient says she is drinking fairly good but is not eating very much. She denies any chest pain or unusual shortness of breath at rest.  Serum sodium is 129 with BUN/creatinine 4/0.5. Serum potassium 3.4. WBC 12.9 hemoglobin 11.5. Temperature is 99.1 with a heart rate of 100 and blood pressure 140/77. O2 sat 92% room air at rest.  Check O2 saturation with activity on room air. 2/14/2022  Patient seen and examined on medical surgical floor. Patient still complain of some lower abdominal cramping. She denies any chest pain, nausea/vomiting, shortness of breath. Patient denies any dysuria or cough. BUN/creatinine 4/0.6 with serum sodium 128. WBC 15.4 today with a hemoglobin 1.7. Temperature 100.3 with heart rate of 100 and blood pressure 156/72. O2 sat 93% on room air at rest.  Patient had out about 1850 cc from ostomy yesterday afternoon. General surgery note reviewed. With patient persistent tach and leukocytosis will check UA and chest x-ray. 2/15/2022  Patient seen examined on medical surgical floor. Patient states she is slowly improving. Patient denies any chest pain, dizziness or unusual shortness of breath. Patient abdominal pain is slowly improving. Ostomy output ranges 250-1000 cc a shift. Temperature ranges 98.7100 with heart rate 98 blood pressure 143/78. O2 sat 95% on room air at rest.  BUN/creatinine was 6/0.6 with serum sodium 128. Potassium 3.3 with WBCs 10.1 hemoglobin 10.5. Urinalysis yesterday shows greater than 80 ketones with trace of leukocyte esterase with 510 WBCs and few bacteria. Chest x-ray yesterday showed bibasilar pleural and parenchymal changes with bilateral pleural effusion. Patient still with poor appetite. Abdomen shows moderate distention with hypoactive bowel sounds. 2/16/2022  Patient seen examined on medical surgical floor. Patient states she is feeling better with expected postop discomfort. Patient denies any chest pain, dizziness or unusual shortness of breath. BUN/creatinine 7/0.6. Serum sodium is 130. Total bili is increased to 1.9 today with direct bilirubin slightly increased 1.1. WBC is 11 with a hemoglobin 10.7. Temperature still 100 degrees with heart rate 91 and blood pressure 114/63. O2 sat 91-96% on room air. Patient still with increased output from ostomy range of 300-670 cc a shift. 2/17/2022  Patient seen examined on medical surgical floor. Patient states that her abdominal discomfort is slowly improving. She denies any chest pain, nausea/vomiting or unusual shortness of breath. serum potassium still low at 2.9 even with the patient receiving supplemental potassium 3 times a day. BUN/creatinine 6/0.5 with serum sodium 130. Total bili is improved to 1.1 with a WBC 14.9 hemoglobin 9.6. Urinalysis improved but still shows a trace of ketones.   Temperature ranges 98. 899.3 with a heart rate of 100 and blood pressure 119/70. O2 sat 93-96% on room air at rest.  Urinary output is fairly good with the patient having improvement in ostomy output ranging from 150-800 cc a shift. Patient started on IV fluids of 40 KCl at 100 cc an hour. Repeat BMP today at 4 PM to monitor serum potassium. 2/18/2022  Patient seen examined on medical surgical floor. Patient still with right-sided abdominal pain which is not worsening but not improving. Patient denies any cough. There is no dysuria. Patient does have some intermittent fevers and chills. WBC increased again today 17.9 with hemoglobin 9.3. Serum sodium is 130 with BUN/creatinine 4/0.6. Serum sodium is improved to 23.7. Liver enzymes are normal.  Temp 100.6 with heart rate 100 blood pressure 106/70. O2 sat 97% room air at rest.  Urine output is fairly good. Ostomy output improved ranges 250-350 cc a shift.   Patient is set up for a CT of the abdomen and chest with IV and oral contrast.      Past Medical History:    Past Medical History:   Diagnosis Date    Acid reflux disease     Cyst of ovary     Fracture of nasal bone     Headache     Hearing loss     Hypertension     Migraine     Morbidly obese ([address] 10/05/2020    Multiple sclerosis ([address]     denies limitations at present 11/2020    VEENA no CPAP     severe VEENA per pt    Right shoulder pain 11/2020    Tinnitus     TMJ dysfunction      Past Surgical History:    Past Surgical History:   Procedure Laterality Date    APPENDECTOMY      BACK SURGERY      lumbar    BICEPS TENDON REPAIR Right 11/11/2020    BICEPS TENODESIS performed by Tahira Madison MD at [place] [id number] Left 8/31/2021    LAPAROSCOPIC LEFT HEMICOLECTOMY WITH LOOP OSTOMY performed by Tin Ryan MD at [address] 9/6/2021    DIAGNOSTIC LAPAROSCOPY POSSIBLE BOWEL RESECTION POSSILBE OSTOMY performed by Tin Ryan MD at [address] 2/8/2022    LAPAROSCOPIC SIGMOID COLON RESECTION performed by Klever Mack MD at [address]  03/05/2018    Robotic hysterectomy, bilateral salpingectomy, right oophorectomy DR. ARIANA MONIQUE [facility]     HYSTERECTOMY, TOTAL ABDOMINAL      LARYNGOSCOPY N/A 7/17/2020    DIRECT LARYNGOSCOPY--OMNI GUIDE LASER [facility] PARTIAL HYSTERECTOMY      PROCTOSIGMOIDOSCOPY N/A 10/12/2021    ANAL PROCTO SIGMOIDOSCOPY FLEXIBLE performed by Klever Mack MD at [address] [place] [address] N/A 1/25/2022    ANAL PROCTO [address] performed by Klever Mack MD at [facility] ARTHROSCOPY Right 11/11/2020    RIGHT SHOULDER DIAGNOSTIC ARTHROSCOPY WITH DECOMPRESSION ROTATOR CUFF REPAIR performed by Zay Hawkins MD at [place] [address]         Medications Prior to Admission:    @  Prior to Admission medications    Medication Sig Start Date End Date Taking?  Authorizing Provider   dicyclomine (BENTYL) 10 MG capsule Take 10 mg by mouth 4 times daily (before meals and nightly)    Historical Provider, MD   vitamin D (D3-50) 84156 UNIT CAPS Take 1 capsule by mouth once a week 11/30/21   MARIXA Brown CNP   nortriptyline (PAMELOR) 10 MG capsule TAKE 1 CAPSULE BY MOUTH NIGHTLY FOR HEADACHES 11/5/21   Nathalia Barry PA-C   gabapentin (NEURONTIN) 300 MG capsule TAKE 1 CAPSULE BY MOUTH THREE TIMES DAILY 9/13/21 1/31/22  MARIXA Brown CNP   dexlansoprazole (DEXILANT) 60 MG CPDR delayed release capsule Take 60 mg by mouth daily    Historical Provider, MD   baclofen (LIORESAL) 20 MG tablet Take 1 tablet by mouth 4 times daily as needed (muscle spasms; pain) 8/13/21   MARIXA Brown CNP   rOPINIRole (REQUIP) 0.5 MG tablet Take 1 tablet by mouth 2 times daily as needed (restless legs) 8/13/21   MARIXA Brown CNP   ocrelizumab (OCREVUS) 300 MG/10ML SOLN injection Infuse 20 mLs intravenously every 6 months 7/19/21   MARIXA Cuadra CNP       Allergies:  Patient has no known allergies. Social History:   Social History     Socioeconomic History    Marital status: Single     Spouse name: Not on file    Number of children: 3    Years of education: 6    Highest education level: 11th grade   Occupational History    Not on file   Tobacco Use    Smoking status: Current Every Day Smoker     Packs/day: 0.50     Years: 25.00     Pack years: 12.50     Types: Cigarettes    Smokeless tobacco: Never Used    Tobacco comment: Ready to stop, requesting prescription for Chantix   Vaping Use    Vaping Use: Never used   Substance and Sexual Activity    Alcohol use: Yes     Alcohol/week: 1.0 standard drink     Types: 1 Glasses of wine per week     Comment: socially    Drug use: No    Sexual activity: Yes     Partners: Male   Other Topics Concern    Not on file   Social History Narrative    Uses [employer] for transportation    [employer]     Social Determinants of Health     Financial Resource Strain: Medium Risk    Difficulty of Paying Living Expenses: Somewhat hard   Food Insecurity: Food Insecurity Present    Worried About Running Out of Food in the Last Year: Sometimes true    [place] of Food in the Last Year: Never true   Transportation Needs: No Transportation Needs    Lack of Transportation (Medical): No    Lack of Transportation (Non-Medical): No   Physical Activity: Sufficiently Active    Days of Exercise per Week: 3 days    Minutes of Exercise per Session: 60 min   Stress: No Stress Concern Present    Feeling of Stress : Not at all   Social Connections: Socially Isolated    Frequency of Communication with Friends and Family:  Three times a week    Frequency of Social Gatherings with Friends and Family: Twice a week    Attends Hinduism Services: Never    Active Member of Clubs or Organizations: No    Attends Club or Organization Meetings: Never    Marital Status: Never    Intimate Partner Violence:     Fear of Current or Ex-Partner: Not on file    Emotionally Abused: Not on file    Physically Abused: Not on file    Sexually Abused: Not on file   Housing Stability:     Unable to Pay for Housing in the Last Year: Not on file    Number of [place] in the Last Year: Not on file    Unstable Housing in the Last Year: Not on file       Family History:   Family History   Problem Relation Age of Onset    Mult Sclerosis Mother     Colon Cancer Neg Hx     Uterine Cancer Neg Hx     Ovarian Cancer Neg Hx     Breast Cancer Neg Hx        REVIEW OF SYSTEMS:    Gen: Patient denies any lightheadedness or dizziness. No LOC or syncope. No fevers or chills. HEENT: No earache, sore throat or nasal congestion. Resp: Denies cough, hemoptysis or sputum production. Cardiac: Denies chest pain, SOB, diaphoresis or palpitations. GI:+ Abdominal distention. Lower abdominal cramping. No nausea, vomiting, diarrhea or constipation. No melena or hematochezia. : No urinary complaints, dysuria, hematuria or frequency. MSK: No extremity weakness, paralysis or paresthesias. PHYSICAL EXAM:    Vitals:  /70   Pulse 100   Temp 100.6 °F (38.1 °C) (Oral)   Resp 16   Ht 5' 2\" (1.575 m)   Wt 187 lb (84.8 kg)   LMP 01/05/2018   SpO2 93%   BMI 34.20 kg/m²     General:  This is a 51 y.o. yo female who is alert and oriented in postop distress  HEENT:  Head is normocephalic and atraumatic, PERRLA, EOMI, mucus membranes moist with no pharyngeal erythema or exudate. Neck:  Supple with no carotid bruits, JVD or thyromegaly.   No cervical adenopathy  CV:  Regular rate and rhythm, no murmurs  Lungs: Mildly coarse breath sounds to auscultation bilaterally with no wheezes, rales or rhonchi  Abdomen:  + Expected postop abdomen, + ileostomy,+ abdominal fat, bowel sounds  hypoactive  Extremities:  No edema, peripheral pulses intact bilaterally  Neuro:  Cranial nerves II-XII grossly intact; motor and sensory function intact with no focal deficits  Skin:  No rashes, lesions or wounds      DATA:  CBC with Differential:    Lab Results   Component Value Date    WBC 17.9 02/18/2022    RBC 2.98 02/18/2022    HGB 9.3 02/18/2022    HCT 28.0 02/18/2022     02/18/2022    MCV 94.0 02/18/2022    MCH 31.2 02/18/2022    MCHC 33.2 02/18/2022    RDW 13.6 02/18/2022    METASPCT 0.9 11/23/2021    LYMPHOPCT 23.2 02/02/2022    MONOPCT 6.7 02/02/2022    BASOPCT 0.4 02/02/2022    MONOSABS 0.62 02/02/2022    LYMPHSABS 2.13 02/02/2022    EOSABS 0.20 02/02/2022    BASOSABS 0.04 02/02/2022     CMP:    Lab Results   Component Value Date     02/18/2022    K 3.7 02/18/2022    K 3.7 02/09/2022    CL 95 02/18/2022    CO2 27 02/18/2022    BUN 4 02/18/2022    CREATININE 0.6 02/18/2022    GFRAA >60 02/18/2022    LABGLOM >60 02/18/2022    GLUCOSE 131 02/18/2022    PROT 5.4 02/18/2022    LABALBU 2.5 02/18/2022    CALCIUM 7.9 02/18/2022    BILITOT 0.9 02/18/2022    ALKPHOS 133 02/18/2022    AST 24 02/18/2022    ALT 19 02/18/2022     Magnesium:    Lab Results   Component Value Date    MG 2.0 02/18/2022     Phosphorus:    Lab Results   Component Value Date    PHOS 3.2 09/01/2021     PT/INR:    Lab Results   Component Value Date    PROTIME 10.7 02/28/2020    INR 1.0 02/28/2020     Troponin:    Lab Results   Component Value Date    TROPONINI <0.01 07/20/2020     U/A:    Lab Results   Component Value Date    COLORU Yellow 02/17/2022    PROTEINU Negative 02/17/2022    PHUR 8.0 02/17/2022    WBCUA 0-1 02/17/2022    RBCUA 1-3 02/17/2022    TRICHOMONAS Present 02/26/2020    BACTERIA RARE 02/17/2022    CLARITYU Clear 02/17/2022    SPECGRAV 1.010 02/17/2022    LEUKOCYTESUR TRACE 02/17/2022    UROBILINOGEN 0.2 02/17/2022    BILIRUBINUR Negative 02/17/2022    BLOODU TRACE 02/17/2022    GLUCOSEU Negative 02/17/2022    AMORPHOUS FEW 11/23/2021     ABG:  No results found for: PH,

## 2022-02-18 NOTE — PLAN OF CARE
Problem: Infection - Surgical Site:  Goal: Will show no infection signs and symptoms  Description: Will show no infection signs and symptoms  2/17/2022 2152 by Dominik Ventura RN  Outcome: Met This Shift  2/17/2022 2152 by Dominik Ventura RN  Outcome: Met This Shift  2/17/2022 1004 by Simran Davis RN  Outcome: Met This Shift

## 2022-02-18 NOTE — PLAN OF CARE
Problem: Infection - Surgical Site:  Goal: Will show no infection signs and symptoms  Description: Will show no infection signs and symptoms  2/17/2022 2152 by Melia Finney RN  Outcome: Met This Shift  2/17/2022 1004 by Jacob Cooper RN  Outcome: Met This Shift

## 2022-02-18 NOTE — PROGRESS NOTES
Patient came down to special procedures for fluid collection drainage x 2. Patient taken to CT scan for procedure. Patient positioned and prepped on table. Emotional support was given. 1653 start procedure VS  114/53  110  22  95% on room air      1717 end procedure VS  127/60  107  22  96% on room air     60 cc of fluid drained from LLQ     70 cc of fluid drained from right midline. Tegaderm, MFixx, suture and accordion drainage bag applied to LLQ. Tegaderm, MFixx, suture and accordion drainage bag applied to right midline under ostomy bag      Patient tolerated procedure well. Specimens sent to lab. Nurse to nurse given to [facility]     Patient positioned back on cart. Patient transported back to floor.

## 2022-02-18 NOTE — PROCEDURES
Diagnosis: LLQ abdomina/pelvic abscess, abscess in subcutaneous fat anterior abdominal wall    Procedure: CT guided placement of 10 Fr drains in the LLQ abscess and anterior abdominal wall abscess    Findings: Purulent material drained from both fluid collections, sent for micro    Specimen: Sent for micro, as above    EBL: Minimal    Complication: None apparent immediately    Plan: Gravity drainage for both catheters

## 2022-02-19 LAB
ALBUMIN SERPL-MCNC: 2.4 G/DL (ref 3.5–5.2)
ALP BLD-CCNC: 129 U/L (ref 35–104)
ALT SERPL-CCNC: 19 U/L (ref 0–32)
ANION GAP SERPL CALCULATED.3IONS-SCNC: 10 MMOL/L (ref 7–16)
AST SERPL-CCNC: 21 U/L (ref 0–31)
BILIRUB SERPL-MCNC: 1.1 MG/DL (ref 0–1.2)
BUN BLDV-MCNC: 4 MG/DL (ref 6–20)
CALCIUM SERPL-MCNC: 8.3 MG/DL (ref 8.6–10.2)
CHLORIDE BLD-SCNC: 94 MMOL/L (ref 98–107)
CO2: 24 MMOL/L (ref 22–29)
CREAT SERPL-MCNC: 0.5 MG/DL (ref 0.5–1)
GFR AFRICAN AMERICAN: >60
GFR NON-AFRICAN AMERICAN: >60 ML/MIN/1.73
GLUCOSE BLD-MCNC: 123 MG/DL (ref 74–99)
HCT VFR BLD CALC: 29.6 % (ref 34–48)
HEMOGLOBIN: 9.6 G/DL (ref 11.5–15.5)
MCH RBC QN AUTO: 30.8 PG (ref 26–35)
MCHC RBC AUTO-ENTMCNC: 32.4 % (ref 32–34.5)
MCV RBC AUTO: 94.9 FL (ref 80–99.9)
PDW BLD-RTO: 14 FL (ref 11.5–15)
PLATELET # BLD: 370 E9/L (ref 130–450)
PMV BLD AUTO: 10.1 FL (ref 7–12)
POTASSIUM SERPL-SCNC: 3.8 MMOL/L (ref 3.5–5)
RBC # BLD: 3.12 E12/L (ref 3.5–5.5)
SODIUM BLD-SCNC: 128 MMOL/L (ref 132–146)
TOTAL PROTEIN: 5.6 G/DL (ref 6.4–8.3)
WBC # BLD: 16.9 E9/L (ref 4.5–11.5)

## 2022-02-19 PROCEDURE — 6360000002 HC RX W HCPCS: Performed by: SPECIALIST

## 2022-02-19 PROCEDURE — 2580000003 HC RX 258: Performed by: SPECIALIST

## 2022-02-19 PROCEDURE — 2500000003 HC RX 250 WO HCPCS: Performed by: SURGERY

## 2022-02-19 PROCEDURE — 6360000002 HC RX W HCPCS: Performed by: SURGERY

## 2022-02-19 PROCEDURE — 1200000000 HC SEMI PRIVATE

## 2022-02-19 PROCEDURE — 6370000000 HC RX 637 (ALT 250 FOR IP): Performed by: SURGERY

## 2022-02-19 PROCEDURE — 99024 POSTOP FOLLOW-UP VISIT: CPT | Performed by: SURGERY

## 2022-02-19 PROCEDURE — 6360000002 HC RX W HCPCS: Performed by: INTERNAL MEDICINE

## 2022-02-19 PROCEDURE — 80053 COMPREHEN METABOLIC PANEL: CPT

## 2022-02-19 PROCEDURE — 36415 COLL VENOUS BLD VENIPUNCTURE: CPT

## 2022-02-19 PROCEDURE — 6370000000 HC RX 637 (ALT 250 FOR IP): Performed by: STUDENT IN AN ORGANIZED HEALTH CARE EDUCATION/TRAINING PROGRAM

## 2022-02-19 PROCEDURE — 2580000003 HC RX 258: Performed by: SURGERY

## 2022-02-19 PROCEDURE — 85027 COMPLETE CBC AUTOMATED: CPT

## 2022-02-19 PROCEDURE — 6370000000 HC RX 637 (ALT 250 FOR IP): Performed by: INTERNAL MEDICINE

## 2022-02-19 RX ORDER — SODIUM CHLORIDE 9 MG/ML
25 INJECTION, SOLUTION INTRAVENOUS EVERY 8 HOURS
Status: DISCONTINUED | OUTPATIENT
Start: 2022-02-19 | End: 2022-02-24 | Stop reason: HOSPADM

## 2022-02-19 RX ORDER — FLUCONAZOLE 2 MG/ML
400 INJECTION, SOLUTION INTRAVENOUS EVERY 24 HOURS
Status: DISCONTINUED | OUTPATIENT
Start: 2022-02-19 | End: 2022-02-24 | Stop reason: HOSPADM

## 2022-02-19 RX ORDER — SODIUM CHLORIDE AND POTASSIUM CHLORIDE .9; .15 G/100ML; G/100ML
SOLUTION INTRAVENOUS CONTINUOUS
Status: DISCONTINUED | OUTPATIENT
Start: 2022-02-19 | End: 2022-02-24

## 2022-02-19 RX ADMIN — LOPERAMIDE HYDROCHLORIDE 2 MG: 2 CAPSULE ORAL at 14:19

## 2022-02-19 RX ADMIN — CIPROFLOXACIN 400 MG: 2 INJECTION, SOLUTION INTRAVENOUS at 05:10

## 2022-02-19 RX ADMIN — METHOCARBAMOL 500 MG: 500 TABLET ORAL at 20:42

## 2022-02-19 RX ADMIN — CALCIUM POLYCARBOPHIL 625 MG 625 MG: 625 TABLET ORAL at 14:19

## 2022-02-19 RX ADMIN — POTASSIUM CHLORIDE, DEXTROSE MONOHYDRATE AND SODIUM CHLORIDE: 300; 5; 450 INJECTION, SOLUTION INTRAVENOUS at 05:06

## 2022-02-19 RX ADMIN — MORPHINE SULFATE 4 MG: 4 INJECTION, SOLUTION INTRAMUSCULAR; INTRAVENOUS at 16:37

## 2022-02-19 RX ADMIN — Medication 10 ML: at 09:28

## 2022-02-19 RX ADMIN — ENOXAPARIN SODIUM 40 MG: 100 INJECTION SUBCUTANEOUS at 09:23

## 2022-02-19 RX ADMIN — MORPHINE SULFATE 4 MG: 4 INJECTION, SOLUTION INTRAMUSCULAR; INTRAVENOUS at 09:27

## 2022-02-19 RX ADMIN — PANTOPRAZOLE SODIUM 40 MG: 40 TABLET, DELAYED RELEASE ORAL at 05:10

## 2022-02-19 RX ADMIN — LOPERAMIDE HYDROCHLORIDE 2 MG: 2 CAPSULE ORAL at 20:42

## 2022-02-19 RX ADMIN — MORPHINE SULFATE 4 MG: 4 INJECTION, SOLUTION INTRAMUSCULAR; INTRAVENOUS at 20:43

## 2022-02-19 RX ADMIN — PSYLLIUM HUSK 1 PACKET: 3.4 GRANULE ORAL at 20:42

## 2022-02-19 RX ADMIN — METRONIDAZOLE 500 MG: 500 INJECTION, SOLUTION INTRAVENOUS at 00:59

## 2022-02-19 RX ADMIN — MORPHINE SULFATE 4 MG: 4 INJECTION, SOLUTION INTRAMUSCULAR; INTRAVENOUS at 00:58

## 2022-02-19 RX ADMIN — Medication 10 ML: at 20:44

## 2022-02-19 RX ADMIN — SODIUM CHLORIDE 25 ML: 9 INJECTION, SOLUTION INTRAVENOUS at 20:42

## 2022-02-19 RX ADMIN — METRONIDAZOLE 500 MG: 500 INJECTION, SOLUTION INTRAVENOUS at 10:41

## 2022-02-19 RX ADMIN — FLUCONAZOLE 400 MG: 2 INJECTION INTRAVENOUS at 14:17

## 2022-02-19 RX ADMIN — CALCIUM POLYCARBOPHIL 625 MG 625 MG: 625 TABLET ORAL at 22:13

## 2022-02-19 RX ADMIN — PANTOPRAZOLE SODIUM 40 MG: 40 TABLET, DELAYED RELEASE ORAL at 16:29

## 2022-02-19 RX ADMIN — METHOCARBAMOL 500 MG: 500 TABLET ORAL at 16:29

## 2022-02-19 RX ADMIN — PIPERACILLIN SODIUM AND TAZOBACTAM SODIUM 3375 MG: 3; 375 INJECTION, POWDER, FOR SOLUTION INTRAVENOUS at 16:29

## 2022-02-19 RX ADMIN — METHOCARBAMOL 500 MG: 500 TABLET ORAL at 14:18

## 2022-02-19 RX ADMIN — TRAMADOL HYDROCHLORIDE 50 MG: 50 TABLET, COATED ORAL at 14:18

## 2022-02-19 RX ADMIN — MORPHINE SULFATE 4 MG: 4 INJECTION, SOLUTION INTRAMUSCULAR; INTRAVENOUS at 05:06

## 2022-02-19 RX ADMIN — POTASSIUM CHLORIDE AND SODIUM CHLORIDE: 900; 150 INJECTION, SOLUTION INTRAVENOUS at 11:55

## 2022-02-19 ASSESSMENT — PAIN DESCRIPTION - ONSET
ONSET: ON-GOING
ONSET: GRADUAL
ONSET: GRADUAL

## 2022-02-19 ASSESSMENT — PAIN DESCRIPTION - LOCATION
LOCATION: ABDOMEN

## 2022-02-19 ASSESSMENT — PAIN DESCRIPTION - FREQUENCY
FREQUENCY: INTERMITTENT
FREQUENCY: CONTINUOUS

## 2022-02-19 ASSESSMENT — PAIN SCALES - GENERAL
PAINLEVEL_OUTOF10: 0
PAINLEVEL_OUTOF10: 8
PAINLEVEL_OUTOF10: 7
PAINLEVEL_OUTOF10: 8
PAINLEVEL_OUTOF10: 7
PAINLEVEL_OUTOF10: 7
PAINLEVEL_OUTOF10: 4
PAINLEVEL_OUTOF10: 0
PAINLEVEL_OUTOF10: 5
PAINLEVEL_OUTOF10: 8

## 2022-02-19 ASSESSMENT — PAIN DESCRIPTION - PAIN TYPE
TYPE: ACUTE PAIN;SURGICAL PAIN
TYPE: SURGICAL PAIN

## 2022-02-19 ASSESSMENT — PAIN DESCRIPTION - DESCRIPTORS
DESCRIPTORS: ACHING;DISCOMFORT;SORE
DESCRIPTORS: ACHING;DISCOMFORT;DULL
DESCRIPTORS: ACHING;DISCOMFORT;SORE;TENDER
DESCRIPTORS: ACHING;DISCOMFORT;DULL

## 2022-02-19 ASSESSMENT — PAIN DESCRIPTION - PROGRESSION: CLINICAL_PROGRESSION: GRADUALLY IMPROVING

## 2022-02-19 NOTE — CONSULTS
[facility]  Consult Note    [address] [place] [address]  [place], [address]  Phone [phone]   [phone]      Admit Date: 2022  7:59 AM  Pt Name: Kristina Gonzalez  MRN: [id number]  : 1975  Reason for Consult:  No chief complaint on file. Requesting Physician:  Fide Lang MD  PCP: Ashish Moraes PA-C  History Obtained From:  patient, chart   ID consulted for Sigmoid stricture [facility]) [K56.699]  on hospital day 11  CHIEF COMPLAINT     No chief complaint on file. HISTORYOF PRESENT ILLNESS   Kristina Gonzalez is a 51 y.o. female who presents with   has a past medical history of Acid reflux disease, Cyst of ovary, Fracture of nasal bone, Headache, Hearing loss, Hypertension, Migraine, Morbidly obese (HCC), Multiple sclerosis ([address] VEENA no CPAP, Right shoulder pain, Tinnitus, and TMJ dysfunction. ED TRIAGEVITALS  BP: 116/70, Temp: 98.7 °F (37.1 °C), Pulse: 91, Resp: 18, SpO2: 96 %  HPI  Pt with h/o colon ca/ileosotmy/COVID 10/2021-decadron -baricitnib   s/p ANAL PROCTO SIGMOIDOSCOPY FLEXIBLE  Admitted on  for LAPAROSCOPIC SIGMOID COLON RESECTION with  stapled end-to-end anastomosis and mobilization of splenic flexure. Developed Ywai785.3 on  wyik530.5   currently afebrile     wbc11->17.9->16.9->14.1  Cr0.6  Id was asked to see for atbx due to CT the abdomen/pelvis/chest yesterday showed large 9 cm fluid collection in the left upper abdomen/pelvis.   S/p drain on    Pt has no f/c/n/vd/rash/itch   Looks fatigued     body fluid cx pending   2/15 urine cx <10K Proteus/gpo     REVIEW OF SYSTEMS     CONSTITUTIONAL:   No fever, chills, weight loss  ALLERGIES:    No urticaria, hay fever,    EYES:     No blurry vision, loss of vision, eye pain  ENT:      No hearing loss, sore throat  CARDIOVASCULAR:  No chest pain or palpitations  RESPIRATORY:   No cough, sob  ENDOCRINE:    No increase thirst, urination   HEME-LYMPH:   No easy bruising or bleeding  GI:     No nausea, vomiting or diarrhea  :     No urinary complaints  NEURO:    No seizures, stroke, HA  MUSCULOSKELETAL:  No muscle aches or pain, no joint pain  SKIN:     No rash or itch  PSYCH:    No depression or anxiety    Medications Prior to Admission: dicyclomine (BENTYL) 10 MG capsule, Take 10 mg by mouth 4 times daily (before meals and nightly)  vitamin D (D3-50) [address]505 UNIT CAPS, Take 1 capsule by mouth once a week  nortriptyline (PAMELOR) 10 MG capsule, TAKE 1 CAPSULE BY MOUTH NIGHTLY FOR HEADACHES  gabapentin (NEURONTIN) 300 MG capsule, TAKE 1 CAPSULE BY MOUTH THREE TIMES DAILY  dexlansoprazole (DEXILANT) 60 MG CPDR delayed release capsule, Take 60 mg by mouth daily  baclofen (LIORESAL) 20 MG tablet, Take 1 tablet by mouth 4 times daily as needed (muscle spasms; pain)  rOPINIRole (REQUIP) 0.5 MG tablet, Take 1 tablet by mouth 2 times daily as needed (restless legs)  ocrelizumab (OCREVUS) 300 MG/10ML SOLN injection, Infuse 20 mLs intravenously every 6 months  CURRENT MEDICATIONS     Current Facility-Administered Medications:     0.9% NaCl with KCl 20 mEq infusion, , IntraVENous, Continuous, Po Rocha, DO    potassium chloride (KLOR-CON M) extended release tablet 20 mEq, 20 mEq, Oral, Daily with breakfast, Mikaela Expose, DO    polycarbophil (FIBERCON) tablet 625 mg, 625 mg, Oral, BID, Juanito Arita MD, 625 mg at 02/18/22 2112    loperamide (IMODIUM) capsule 2 mg, 2 mg, Oral, BID, Juanito Arita MD, 2 mg at 02/18/22 2111    psyllium (KONSYL) 28.3 % packet 1 packet, 1 packet, Oral, BID, Baileyradha Velasquezup, DO, 1 packet at 02/18/22 2113    acetaminophen (TYLENOL) tablet 650 mg, 650 mg, Oral, Q6H PRN, Ana Ramirez MD, 650 mg at 02/18/22 0546    ciprofloxacin (CIPRO) IVPB 400 mg, 400 mg, IntraVENous, Q12H, Ana Ramirez MD, Stopped at 02/19/22 0649    metronidazole (FLAGYL) 500 mg in NaCl 100 mL IVPB premix, 500 mg, IntraVENous, Q8H, Tactigae Counter, MD, Last Rate: 100 mL/hr at 02/19/22 1041, 500 mg at 02/19/22 1041    ibuprofen (ADVIL;MOTRIN) tablet 400 mg, 400 mg, Oral, Q6H PRN, Anya Escalante MD, 400 mg at 02/14/22 2126    pantoprazole (PROTONIX) tablet 40 mg, 40 mg, Oral, BID AC, Po Rocha, DO, 40 mg at 02/19/22 0510    calcium carbonate (TUMS) chewable tablet 500 mg, 500 mg, Oral, TID PRN, Evie Fay, DO, 500 mg at 02/11/22 1243    sodium chloride flush 0.9 % injection 5-40 mL, 5-40 mL, IntraVENous, 2 times per day, Anya Escalante MD, 10 mL at 02/19/22 0928    sodium chloride flush 0.9 % injection 5-40 mL, 5-40 mL, IntraVENous, PRN, Anya Escalante MD    0.9 % sodium chloride infusion, 25 mL, IntraVENous, PRN, Anya Escalante MD, Last Rate: 100 mL/hr at 02/16/22 2149, 25 mL at 02/16/22 2149    ondansetron (ZOFRAN-ODT) disintegrating tablet 4 mg, 4 mg, Oral, Q8H PRN, 4 mg at 02/10/22 1505 **OR** ondansetron (ZOFRAN) injection 4 mg, 4 mg, IntraVENous, Q6H PRN, Anya Escalante MD, 4 mg at 02/17/22 1614    enoxaparin (LOVENOX) injection 40 mg, 40 mg, SubCUTAneous, Daily, Anya Escalante MD, 40 mg at 02/19/22 7678    traMADol (ULTRAM) tablet 25 mg, 25 mg, Oral, Q6H PRN **OR** traMADol (ULTRAM) tablet 50 mg, 50 mg, Oral, Q6H PRN, Anya Escalante MD, 50 mg at 02/15/22 1551    morphine (PF) injection 2 mg, 2 mg, IntraVENous, Q2H PRN, 2 mg at 02/17/22 1614 **OR** morphine injection 4 mg, 4 mg, IntraVENous, Q2H PRN, Anya Escalante MD, 4 mg at 02/19/22 4178    methocarbamol (ROBAXIN) tablet 500 mg, 500 mg, Oral, 4x Daily, Anya Escalante MD, 500 mg at 02/18/22 2111    nortriptyline (PAMELOR) capsule 10 mg, 10 mg, Oral, Nightly PRN, Evie Fay DO, 10 mg at 02/17/22 0810    rOPINIRole (REQUIP) tablet 0.5 mg, 0.5 mg, Oral, BID PRN, Evie Fay DO, 0.5 mg at 02/08/22 2018    albuterol (PROVENTIL) nebulizer solution 2.5 mg, 2.5 mg, Nebulization, Q4H PRN, Evie Fay DO  ALLERGIES     Patient has no known allergies. Immunization History   Administered Date(s) Administered    Influenza Virus Vaccine 03/09/2020      Internal Administration   First Dose      Second Dose           Last COVID Lab SARS-CoV-2 (no units)   Date Value   08/25/2021 Not Detected     SARS-CoV-2, PCR (no units)   Date Value   02/02/2022 Not Detected     SARS-CoV-2, NAAT (no units)   Date Value   10/30/2021 DETECTED (A)            PAST MEDICAL HISTORY     Past Medical History:   Diagnosis Date    Acid reflux disease     Cyst of ovary     Fracture of nasal bone     Headache     Hearing loss     Hypertension     Migraine     Morbidly obese ([facility] 10/05/2020    Multiple sclerosis ([facility]     denies limitations at present 11/2020    VEENA no CPAP     severe VEENA per pt    Right shoulder pain 11/2020    Tinnitus     TMJ dysfunction      SURGICAL HISTORY       Past Surgical History:   Procedure Laterality Date    APPENDECTOMY      BACK SURGERY      lumbar    BICEPS TENDON REPAIR Right 11/11/2020    BICEPS TENODESIS performed by Kimberly Kumari MD at [place] [address] Left 8/31/2021    LAPAROSCOPIC LEFT HEMICOLECTOMY WITH LOOP OSTOMY performed by Rashawn Brownlee MD at [address] 9/6/2021    [address] performed by Rashawn Browlnee MD at [address] 2/8/2022    LAPAROSCOPIC SIGMOID COLON RESECTION performed by Rashawn Brownlee MD at [address] [place] [address]  03/05/2018    Robotic hysterectomy, bilateral salpingectomy, right oophorectomy DR. ARIANA MONIQUE [facility]     HYSTERECTOMY, TOTAL ABDOMINAL      LARYNGOSCOPY N/A 7/17/2020    DIRECT LARYNGOSCOPY--OMNI GUIDE LASER performed by Waqar Yarbrough MD at [address] [facility]      PROCTOSIGMOIDOSCOPY N/A 10/12/2021    ANAL PROCTO SIGMOIDOSCOPY FLEXIBLE performed by Rashawn Brownlee MD at [facility] 1/25/2022    ANAL PROCTO SIGMOIDOSCOPY FLEXIBLE performed by Jessica Ji MD at [facility] ARTHROSCOPY Right 11/11/2020    RIGHT SHOULDER DIAGNOSTIC ARTHROSCOPY WITH DECOMPRESSION ROTATOR CUFF REPAIR performed by Carlos Jha MD at [address] [place] [address]       Family History   Problem Relation Age of Onset    Mult Sclerosis Mother     Colon Cancer Neg Hx     Uterine Cancer Neg Hx     Ovarian Cancer Neg Hx     Breast Cancer Neg Hx      SOCIAL HISTORY       Social History     Socioeconomic History    Marital status: Single     Spouse name: None    Number of children: 3    Years of education: 6    Highest education level: 11th grade   Occupational History    None   Tobacco Use    Smoking status: Current Every Day Smoker     Packs/day: 0.50     Years: 25.00     Pack years: 12.50     Types: Cigarettes    Smokeless tobacco: Never Used    Tobacco comment: Ready to stop, requesting prescription for Chantix   Vaping Use    Vaping Use: Never used   Substance and Sexual Activity    Alcohol use: Yes     Alcohol/week: 1.0 standard drink     Types: 1 Glasses of wine per week     Comment: socially    Drug use: No    Sexual activity: Yes     Partners: Male   Other Topics Concern    None   Social History Narrative    Uses [employer] for transportation    [employer]     Social Determinants of Health     Financial Resource Strain: Medium Risk    Difficulty of Paying Living Expenses: Somewhat hard   Food Insecurity: Food Insecurity Present    Worried About Running Out of Food in the Last Year: Sometimes true    [place] of Food in the Last Year: Never true   Transportation Needs: No Transportation Needs    Lack of Transportation (Medical): No    Lack of Transportation (Non-Medical):  No   Physical Activity: Sufficiently Active    Days of Exercise per Week: 3 days    Minutes of Exercise per Session: 60 min   Stress: No Stress Concern Present    Feeling of Stress : Not at all   Social Connections: Socially Isolated    Frequency of Communication with Friends and Family: Three times a week    Frequency of Social Gatherings with Friends and Family: Twice a week    Attends Hoahaoism Services: Never    Active Member of Clubs or Organizations: No    Attends Club or Organization Meetings: Never    Marital Status: Never    Intimate Partner Violence:     Fear of Current or Ex-Partner: Not on file    Emotionally Abused: Not on file    Physically Abused: Not on file    Sexually Abused: Not on file   Housing Stability:     Unable to Pay for Housing in the Last Year: Not on file    Number of [place] in the Last Year: Not on file    Unstable Housing in the Last Year: Not on file   · lives alone     PHYSICAL EXAM       ED Triage Vitals [02/08/22 0809]   BP Temp Temp Source Pulse Resp SpO2 Height Weight   122/81 98.1 °F (36.7 °C) Infrared 68 18 99 % 5' 2\" (1.575 m) 187 lb (84.8 kg)     Vitals:    Vitals:    02/19/22 0058 02/19/22 0715 02/19/22 0927 02/19/22 0954   BP: 110/62 (!) 112/99  116/70   Pulse: 98 91  91   Resp: 18 18  18   Temp: 99.2 °F (37.3 °C) 98.6 °F (37 °C)  98.7 °F (37.1 °C)   TempSrc: Oral Oral  Oral   SpO2: 94% 96% 94% 96%   Weight:       Height:         Physical Exam   Constitutional/General: Alert and oriented, NAD  Head: NC/AT  Eyes: PERRL, EOMI  Mouth: Normal mucosa, no thrush   Neck: Supple, full ROM,    Pulmonary: Lungs DEC to auscultation bilaterally. Not in respiratory distress  Cardiovascular:  Regular rate and rhythm, no murmurs, gallops, or rubs. Abdomen: Soft, + BS.    distension. Nontender. COLOSOOTMY DRASINS LEFT TAN [address] RIGHT DARK BROWN   Extremities: Moves all extremities x 4.    Warm and well perfused  Pulses:  Distal pulses intact  Skin: Warm and dry without rash  Neurologic:    No focal deficits  Psych: looks anxious Affect  ppiv     DIAGNOSTIC RESULTS   RADIOLOGY:   CT ABDOMEN WO CONTRAST Additional Contrast? None    Result Date: 2/11/2022  EXAMINATION: CT ABDOMEN WITHOUT CONTRAST 2/11/2022 9:19 am TECHNIQUE: CT of the abdomen was performed without the administration of intravenous contrast. Multiplanar reformatted images are provided for review. Dose modulation, iterative reconstruction, and/or weight based adjustment of the mA/kV was utilized to reduce the radiation dose to as low as reasonably achievable. COMPARISON: CT of the abdomen and pelvis, 09/05/2021. HISTORY: ORDERING SYSTEM PROVIDED HISTORY: ABD pain, RUQ distention TECHNOLOGIST PROVIDED HISTORY: Reason for exam:->ABD pain, RUQ distention Additional Contrast?->None FINDINGS: Lower Chest: Right greater than left basilar pulmonary opacities which may represent atelectasis, scarring and/or pneumonia. Trace bilateral pleural effusions. The heart is normal in size. No pericardial effusion. Organs: Liver: Unremarkable. Gallbladder: Surgically absent. Pancreas: Unremarkable. Spleen:  Unremarkable. Adrenals: Unremarkable. Kidneys: Unremarkable. GI/Bowel: Fluid distention of small-bowel loops the abdomen may represent ileus given patient's recent laparoscopic sigmoid resection. Obstruction cannot be excluded. A large parastomal hernia is seen at site of the ileostomy with multiple loops of relatively decompressed small bowel. Prominent density seen in the cecum appears to represent contrast from a fluoroscopic procedure. Peritoneum/Retroperitoneum: The abdominal aorta is of normal caliber. No pathologically enlarged lymph node. Small volume ascites is seen. Bones/Soft Tissues: The visualized bones are intact without fracture or focal lesion. Prominent loss of disc height with endplate sclerosis at O8-Y8. Miscellaneous: Small amount of subcutaneous gas in the right lateral mid abdomen may is likely from recent laparoscopic procedure. 1. Distention of small-bowel loops may represent ileus related to recent laparoscopic sigmoid resection. Obstruction cannot be excluded. 2. Prominent parastomal hernia at the site of the ileostomy with multiple loops of small bowel. 3. Small volume ascites. 4. Prominent hyperdensity seen in the region of the cecum likely represents contrast from prior fluoroscopic procedure. RECOMMENDATIONS: Unavailable     XR CHEST (2 VW)    Result Date: 2/14/2022  EXAMINATION: TWO XRAY VIEWS OF THE CHEST 2/14/2022 12:47 pm COMPARISON: 10/30/2021. HISTORY: ORDERING SYSTEM PROVIDED HISTORY: Elevated temperature postop, leukocytosis TECHNOLOGIST PROVIDED HISTORY: Reason for exam:->Elevated temperature postop, leukocytosis FINDINGS: Two views of the chest were obtained. The cardiac silhouette is normal in size. The pulmonary vasculature is within normal limits. There is consolidation and/or collapse in both lung bases. There are also small bilateral pleural effusions. Bibasilar pleural and parenchymal changes. CT CHEST W CONTRAST    Result Date: 2/18/2022  EXAMINATION: CT OF THE ABDOMEN AND PELVIS WITH CONTRAST; CT OF THE CHEST WITH CONTRAST 2/18/2022 2:07 pm TECHNIQUE: CT of the abdomen and pelvis was performed with the administration of intravenous contrast. Multiplanar reformatted images are provided for review. Dose modulation, iterative reconstruction, and/or weight based adjustment of the mA/kV was utilized to reduce the radiation dose to as low as reasonably achievable.; CT of the chest was performed with the administration of intravenous contrast. Multiplanar reformatted images are provided for review. Dose modulation, iterative reconstruction, and/or weight based adjustment of the mA/kV was utilized to reduce the radiation dose to as low as reasonably achievable.  COMPARISON: CT of the abdomen and pelvis February 11, 2022 and September 5, 2021 HISTORY: ORDERING SYSTEM PROVIDED HISTORY: leukocytosis, status post sigmoid resection/anastomosis on February 8, 2022 TECHNOLOGIST PROVIDED HISTORY: Reason for exam:->leukocytosis Additional Contrast?->Oral FINDINGS: Mediastinum: No abnormal lymphadenopathy. The pulmonary trunk is normal in size Lungs/pleura:   Small bilateral pleural effusions compressive atelectasis. Liver: No hepatic lesions identified. Bile ducts:  Gallbladder is unremarkable. No evidence of intrahepatic or extrahepatic biliary dilatation. Pancreas: No pancreatic lesions. The pancreatic duct is not dilated. Adrenal glands: Normal in appearance. Kidneys: No evidence of hydronephrosis. No abnormal renal lesions. Spleen: No enhancing lesions. Bowel: Oral contrast identified small bowel loops. Multiple prominent small bowel loops identified upper abdomen. There is interval development of irregular shaped 9 x 9 x 7 cm fluid collection in the left lower abdomen/pelvis with multiple air locules. Abdominal wall: Right peristomal hernia containing small bowel loops again identified. A 7 cm subcutaneous fluid collection air locule identified right anterior pelvic soft tissues. Peritoneum/Retroperitoneum: No abnormal pelvic lymphadenopathy. Pelvis: Limited evaluation due to streak artifacts from residual oral contrast. Bones/Soft tissues: No aggressive osseous lesions     Large 9 cm fluid collection of the left lower abdomen/pelvis is new compared to February 11, 2022. There are multiple small air locules identified within the collection. Although, no contrast is identified within the collection cavity, finding is still suspicious for possible bowel leak. Subcutaneous 7 cm collection with air locules in the right lower abdomen, may represent developing phlegmon. Partially imaged bilateral pleural effusions with compressive atelectasis Prominent small bowel loops in upper abdomen likely represents ileus.  RECOMMENDATIONS: Unavailable     CT ABDOMEN PELVIS W IV CONTRAST Additional Contrast? Oral    Result Date: 2/18/2022  EXAMINATION: CT OF THE ABDOMEN AND PELVIS WITH CONTRAST; CT OF THE CHEST WITH CONTRAST 2/18/2022 2:07 pm TECHNIQUE: CT of the abdomen and pelvis was performed with the administration of intravenous contrast. Multiplanar reformatted images are provided for review. Dose modulation, iterative reconstruction, and/or weight based adjustment of the mA/kV was utilized to reduce the radiation dose to as low as reasonably achievable.; CT of the chest was performed with the administration of intravenous contrast. Multiplanar reformatted images are provided for review. Dose modulation, iterative reconstruction, and/or weight based adjustment of the mA/kV was utilized to reduce the radiation dose to as low as reasonably achievable. COMPARISON: CT of the abdomen and pelvis February 11, 2022 and September 5, 2021 HISTORY: ORDERING SYSTEM PROVIDED HISTORY: leukocytosis, status post sigmoid resection/anastomosis on February 8, 2022 TECHNOLOGIST PROVIDED HISTORY: Reason for exam:->leukocytosis Additional Contrast?->Oral FINDINGS: Mediastinum: No abnormal lymphadenopathy. The pulmonary trunk is normal in size Lungs/pleura:   Small bilateral pleural effusions compressive atelectasis. Liver: No hepatic lesions identified. Bile ducts:  Gallbladder is unremarkable. No evidence of intrahepatic or extrahepatic biliary dilatation. Pancreas: No pancreatic lesions. The pancreatic duct is not dilated. Adrenal glands: Normal in appearance. Kidneys: No evidence of hydronephrosis. No abnormal renal lesions. Spleen: No enhancing lesions. Bowel: Oral contrast identified small bowel loops. Multiple prominent small bowel loops identified upper abdomen. There is interval development of irregular shaped 9 x 9 x 7 cm fluid collection in the left lower abdomen/pelvis with multiple air locules. Abdominal wall: Right peristomal hernia containing small bowel loops again identified. A 7 cm subcutaneous fluid collection air locule identified right anterior pelvic soft tissues. Peritoneum/Retroperitoneum: No abnormal pelvic lymphadenopathy. Pelvis: Limited evaluation due to streak artifacts from residual oral contrast. Bones/Soft tissues: No aggressive osseous lesions     Large 9 cm fluid collection of the left lower abdomen/pelvis is new compared to February 11, 2022. There are multiple small air locules identified within the collection. Although, no contrast is identified within the collection cavity, finding is still suspicious for possible bowel leak. Subcutaneous 7 cm collection with air locules in the right lower abdomen, may represent developing phlegmon. Partially imaged bilateral pleural effusions with compressive atelectasis Prominent small bowel loops in upper abdomen likely represents ileus. RECOMMENDATIONS: Unavailable     IR ABSCESS DRAINAGE PERC    CT guided drainage of a left lower quadrant abdominal/pelvic fluid collection and a fluid collection in the subcutaneous fat of the anterolateral right-sided anterior abdominal wall as discussed above. LABS  Recent Labs     02/17/22 0413 02/18/22 0328 02/19/22 0428   WBC 14.9* 17.9* 16.9*   HGB 9.6* 9.3* 9.6*   HCT 28.7* 28.0* 29.6*   MCV 91.7 94.0 94.9    312 370     Recent Labs     02/17/22  0413 02/17/22  1559 02/18/22 0328 02/18/22 0328 02/18/22  1931 02/18/22 1931 02/19/22 0428   *   < > 130*  --  125*  --  128*   K 2.9*   < > 3.7   < > 3.8   < > 3.8   CL 92*   < > 95*   < > 92*   < > 94*   CO2 27   < > 27   < > 24   < > 24   BUN 6   < > 4*  --  4*  --  4*   CREATININE 0.5   < > 0.6  --  0.5  --  0.5   GFRAA >60   < > >60  --  >60  --  >60   LABGLOM >60   < > >60  --  >60  --  >60   GLUCOSE 114*   < > 131*  --  125*  --  123*   PROT 5.5*  --  5.4*  --   --   --  5.6*   LABALBU 2.6*  --  2.5*  --   --   --  2.4*   CALCIUM 8.0*   < > 7.9*  --  8.0*  --  8.3*   BILITOT 1.1  --  0.9  --   --   --  1.1   ALKPHOS 134*  --  133*  --   --   --  129*   AST 26  --  24  --   --   --  21   ALT 20  --  19  --   --   --  19    < > = values in this interval not displayed. No results for input(s): PROCAL in the last 72 hours. Lab Results   Component Value Date    CRP 0.5 (H) 11/01/2021    CRP 0.9 (H) 10/31/2021    CRP 1.3 (H) 10/30/2021     Lab Results   Component Value Date    SEDRATE 3 10/30/2021    SEDRATE 5 05/22/2020     No results found for: [id number]  Lab Results   Component Value Date    COVID19 Not Detected 02/02/2022     COVID-19/IDALMIS-COV2 LABS  Recent Labs     02/17/22  0413 02/18/22  0328 02/19/22  0428   AST 26 24 21   ALT 20 19 19     Lab Results   Component Value Date    CHOL 225 05/11/2021    TRIG 184 05/11/2021    HDL 44 05/11/2021    LDLCALC 144 05/11/2021    LABVLDL 37 05/11/2021         Lab Results   Component Value Date    HEPAIGM Non-Reactive 08/13/2020    HEPBIGM Non-Reactive 08/13/2020    HCVABI Non-Reactive 08/13/2020     Hep C Ab Interp   Date Value Ref Range Status   08/13/2020 Non-Reactive NON REACT Final        MICROBIOLOGY:     Urine Culture, Routine   Date Value Ref Range Status   02/15/2022   Final    <10,000 CFU/mL  Proteus species  Gram positive organism     01/15/2018 Growth not present  Final     MRSA Culture Only   Date Value Ref Range Status   02/02/2022 Methicillin resistant Staph aureus not isolated  Final   10/07/2021 Methicillin resistant Staph aureus not isolated  Final   08/25/2021 Methicillin resistant Staph aureus not isolated  Final          FINAL IMPRESSION    Patient is a 51 y.o. female who presented with No chief complaint on file.    and admitted for Sigmoid stricture ([facility] [K56.699] Elective laparoscopic sigmoid colon resection     SEPSIS  LEUKOCYTOSIS/FEVERS  INTRAABD ABSCESS S/P DRAIN X2  -AWAIT CX  H/o covid     Currently on   ciprofloxacin (CIPRO) IVPB 400 mg, Q12H  metronidazole (FLAGYL) 500 mg in NaCl 100 mL IVPB premix, Q8H     atbx adjusted     piperacillin-tazobactam (ZOSYN) 3,375 mg in dextrose 5 % 50 mL IVPB extended infusion (mini-bag), Q8H  fluconazole (DIFLUCAN) in 0.9 % sodium chloride IVPB 400 mg, Q24H  0.9 % sodium chloride infusion, Q8H         Imaging and labs were reviewed per medical records and any ID pertinent labs were addressed with the patient. The patient/FAMILY  was educated about the diagnosis, prognosis, indications, risks and benefits of treatment. An opportunity to ask questions was given to the patient/FAMILY and questions were answered. Thank you for involving me in the care of Karissa Brothers. Please do not hesitate to call for any questions or concerns.          Electronically signed by Kary Baldwin MD on 2/19/2022 at 11:54 AM

## 2022-02-19 NOTE — PROGRESS NOTES
General Surgery Progress Note  [facility]    Patient's Name/Date of Birth: Renay Mccoy / 1975    Date: February 19, 2022     Surgeon: Brayden Ramos MD    Chief Complaint: s/p colectomy    Patient Active Problem List   Diagnosis    Vocal cord dysfunction    Multiple sclerosis ([address]    Morbidly obese ([address]    Palafox's esophagus with dysplasia    Secondary restless legs syndrome    S/P colectomy    Malignant neoplasm of descending colon ([address]    Ileus, postoperative ([address]    Pelvic abscess in female    Sigmoid stricture ([address]       Subjective: Status post IR drainage of pelvic and right abdominal wall abscess yesterday. Doing well this morning, complains of some cramping. Denies any fevers or chills. Objective:  /70   Pulse 91   Temp 98.7 °F (37.1 °C) (Oral)   Resp 18   Ht 5' 2\" (1.575 m)   Wt 187 lb (84.8 kg)   LMP 01/05/2018   SpO2 96%   BMI 34.20 kg/m²   Labs:  Recent Labs     02/17/22  0413 02/18/22  0328 02/19/22  0428   WBC 14.9* 17.9* 16.9*   HGB 9.6* 9.3* 9.6*   HCT 28.7* 28.0* 29.6*     Lab Results   Component Value Date    CREATININE 0.5 02/19/2022    BUN 4 (L) 02/19/2022     (L) 02/19/2022    K 3.8 02/19/2022    CL 94 (L) 02/19/2022    CO2 24 02/19/2022     No results for input(s): LIPASE, AMYLASE in the last 72 hours.   CBC with Differential:    Lab Results   Component Value Date    WBC 16.9 02/19/2022    RBC 3.12 02/19/2022    HGB 9.6 02/19/2022    HCT 29.6 02/19/2022     02/19/2022    MCV 94.9 02/19/2022    MCH 30.8 02/19/2022    MCHC 32.4 02/19/2022    RDW 14.0 02/19/2022    METASPCT 0.9 11/23/2021    LYMPHOPCT 23.2 02/02/2022    MONOPCT 6.7 02/02/2022    BASOPCT 0.4 02/02/2022    MONOSABS 0.62 02/02/2022    LYMPHSABS 2.13 02/02/2022    EOSABS 0.20 02/02/2022    BASOSABS 0.04 02/02/2022     CMP:    Lab Results   Component Value Date     02/19/2022    K 3.8 02/19/2022    K 3.7 02/09/2022    CL 94 02/19/2022    CO2 24 02/19/2022    BUN 4 02/19/2022    CREATININE 0.5 02/19/2022    GFRAA >60 02/19/2022    LABGLOM >60 02/19/2022    GLUCOSE 123 02/19/2022    PROT 5.6 02/19/2022    LABALBU 2.4 02/19/2022    CALCIUM 8.3 02/19/2022    BILITOT 1.1 02/19/2022    ALKPHOS 129 02/19/2022    AST 21 02/19/2022    ALT 19 02/19/2022       General appearance:  NAD  Head: NCAT, PERRLA, EOMI, red conjunctiva  Neck: supple, no masses  Lungs: CTAB, equal chest rise bilateral  Heart: Reg rate  Abdomen: soft, nondistended, tender left lower quadrant. No guarding or rebound.   Right abdomen ileostomy with still  Skin; no lesions  Gu: no cva tenderness  Extremities: extremities normal, atraumatic, no cyanosis or edema      Assessment/Plan:  Monae Munoz is a 51 y.o. female postop day 11 status post laparoscopic sigmoid colon resection for sigmoid stricture, now status post IR drainage of pelvic abscess and right abdominal abscess    Advance diet  Continue antibiotics  Consult ID for management of antibiotics in light of pelvic abscess  Correct electrolytes  Pain control      Denver Rucks, MD  2/19/2022  12:20 PM

## 2022-02-19 NOTE — PLAN OF CARE
Problem: SAFETY  Goal: Free from accidental physical injury  Outcome: Met This Shift     Problem: DAILY CARE  Goal: Daily care needs are met  Outcome: Met This Shift     Problem: PAIN  Goal: Patient's pain/discomfort is manageable  Outcome: Met This Shift     Problem: KNOWLEDGE DEFICIT  Goal: Patient/S.O. demonstrates understanding of disease process, treatment plan, medications, and discharge instructions.   Outcome: Met This Shift

## 2022-02-19 NOTE — PROGRESS NOTES
Department of Internal Medicine        HISTORY OF PRESENT ILLNESS:      The patient is a 51 y.o. female who presents with having a elective laparoscopic sigmoid colon resection. Patient has a known history of sigmoid stricture. Patient had a anal proctosigmoidoscopy performed 1/25 with a scope with past 15 cm work there was complete closure of the rectosigmoid junction without an opening. Patient is seen postop. Patient has expected postop discomfort. Temperature is 97.8 with heart rate 72 blood pressure 140/65. O2 sat 98% on 2 L nasal cannula. 2/9/2022  Patient seen and examined on medical surgical floor. Patient complains of postop discomfort. She denies any problem with chest pain, nausea/vomiting or unusual shortness of breath. BUN/creatinine 5/0.6 with fasting blood sugar 126. Mild elevation of transaminase with a ALT of 69 AST is 35. Review of records have shown patient has intermittent elevation transaminase over the past 18 months. WBC is 21.5 with hemoglobin 12.6. Temperature 98.3 with heart rate 84 blood pressure 107/69. O2 sat 96% on room air at rest.  Urine output is very good. General surgery note reviewed. 2/10/2022  Patient seen and examined on medical surgical floor. Patient complains of postop discomfort. Patient denies any problem with chest pain, dizziness, nausea, fever/chills, dysuria, cough or unusual shortness of breath. Patient is passing some gas but no bowel movements. Increase activity today. BUN/creatinine 5/0.7 with serum sodium 130. WBC is still elevated 23.7 with hemoglobin 14.3. Temperature is 98.8 with heart rate 76 blood pressure 110/78. O2 sat 94% on room air at rest.  Urine output is adequate. 2/11/2022  Patient seen examined on medical surgical floor. Patient complains of persistent postop discomfort along with some nausea and poor appetite. Patient denies passing had any bowel movements or gas recently.   BUN/creatinine 6/0.6 with serum sodium 124 7. Potassium 3.4 with WBC 24.6 and hemoglobin 13.7. Temperature is 98.6nine 9.3 with heart rate of 99. O2 sat 98% on room air at rest.  General surgery note reviewed. CT of the abdomen pelvis was ordered for today. 2/12/2022  Patient seen examined on medical surgical floor. Patient states the abdominal distention seems to be improved. Patient still having postop discomfort and is mildly improved. Patient denies any chest pain, dizziness or unusual shortness of breath. Patient did have some nausea last night with supper. Patient denies any bowel movements at this time. CT of the abdomen pelvis from yesterday suggested ileus but cannot rule out obstruction. Temperature 98 with heart rate of 97 with blood pressure 124/67 O2 sat 93% on room air at rest.  WBC is 14.8 with hemoglobin 11.8. BUN/creatinine 7/0.6. Serum sodium increased to 134 from 127 yesterday. 2/13/2022  Patient seen examined on surgical medical floor. Patient still smokes postop discomfort. It seems to be slowly improving. Patient still has some abdominal distention intermittently. Patient says she is drinking fairly good but is not eating very much. She denies any chest pain or unusual shortness of breath at rest.  Serum sodium is 129 with BUN/creatinine 4/0.5. Serum potassium 3.4. WBC 12.9 hemoglobin 11.5. Temperature is 99.1 with a heart rate of 100 and blood pressure 140/77. O2 sat 92% room air at rest.  Check O2 saturation with activity on room air. 2/14/2022  Patient seen and examined on medical surgical floor. Patient still complain of some lower abdominal cramping. She denies any chest pain, nausea/vomiting, shortness of breath. Patient denies any dysuria or cough. BUN/creatinine 4/0.6 with serum sodium 128. WBC 15.4 today with a hemoglobin 1.7. Temperature 100.3 with heart rate of 100 and blood pressure 156/72. O2 sat 93% on room air at rest.  Patient had out about 1850 cc from ostomy yesterday afternoon. 98. 899.3 with a heart rate of 100 and blood pressure 119/70. O2 sat 93-96% on room air at rest.  Urinary output is fairly good with the patient having improvement in ostomy output ranging from 150-800 cc a shift. Patient started on IV fluids of 40 KCl at 100 cc an hour. Repeat BMP today at 4 PM to monitor serum potassium. 2/18/2022  Patient seen examined on medical surgical floor. Patient still with right-sided abdominal pain which is not worsening but not improving. Patient denies any cough. There is no dysuria. Patient does have some intermittent fevers and chills. WBC increased again today 17.9 with hemoglobin 9.3. Serum sodium is 130 with BUN/creatinine 4/0.6. Serum sodium is improved to 23.7. Liver enzymes are normal.  Temp 100.6 with heart rate 100 blood pressure 106/70. O2 sat 97% room air at rest.  Urine output is fairly good. Ostomy output improved ranges 250-350 cc a shift. Patient is set up for a CT of the abdomen and chest with IV and oral contrast.    2/19/2022   Patient seen examined on medical surgical floor. Reviewed CT scan with the patient yesterday. Patient denies any problem with chest pain or dizziness. She denies any fever or chills today. Patient has expected postop discomfort. Patient has drains in left lower quadrant and right abdomen. CT the abdomen/pelvis/chest yesterday showed large 9 cm fluid collection in the left upper abdomen/pelvis. BUN/creatinine 4/0.5 with a serum sodium 128. WBC 16.9 with hemoglobin 9.6. Temperature ranges 98.7101.5. Heart rate 91 with blood pressure 116/70.   O2 sat 96% on room air at rest.        Past Medical History:    Past Medical History:   Diagnosis Date    Acid reflux disease     Cyst of ovary     Fracture of nasal bone     Headache     Hearing loss     Hypertension     Migraine     Morbidly obese ([address] 10/05/2020    Multiple sclerosis ([address]     denies limitations at present 11/2020    VEENA no CPAP     severe VEENA per pt    Right shoulder pain 11/2020    Tinnitus     TMJ dysfunction      Past Surgical History:    Past Surgical History:   Procedure Laterality Date    APPENDECTOMY      BACK SURGERY      lumbar    BICEPS TENDON REPAIR Right 11/11/2020    BICEPS TENODESIS performed by Raina Oneal MD at [place] [address] Left 8/31/2021    LAPAROSCOPIC LEFT HEMICOLECTOMY WITH LOOP OSTOMY performed by Alda Lovelace MD at [address] [place] [address] 9/6/2021    DIAGNOSTIC LAPAROSCOPY POSSIBLE BOWEL RESECTION POSSILBE OSTOMY performed by Alda Lovelace MD at [address] N/A 2/8/2022    LAPAROSCOPIC SIGMOID COLON RESECTION performed by Alda Lovelace MD at [address] [facility]  03/05/2018    Robotic hysterectomy, bilateral salpingectomy, right oophorectomy DR. ARIANA MONIQUE [facility]     HYSTERECTOMY, TOTAL ABDOMINAL      LARYNGOSCOPY N/A 7/17/2020    DIRECT LARYNGOSCOPY--OMNI GUIDE LASER performed by Michelle Giron MD at [place] [address] PARTIAL HYSTERECTOMY      PROCTOSIGMOIDOSCOPY N/A 10/12/2021    ANAL PROCTO SIGMOIDOSCOPY FLEXIBLE performed by Alda Lovelace MD at [address] [place] [address] N/A 1/25/2022    ANAL PROCTO [address] performed by Alda Lovelace MD at [facility] ARTHROSCOPY Right 11/11/2020    RIGHT SHOULDER DIAGNOSTIC ARTHROSCOPY WITH DECOMPRESSION ROTATOR CUFF REPAIR performed by Raina Oneal MD at [facility]         Medications Prior to Admission:    @  Prior to Admission medications    Medication Sig Start Date End Date Taking?  Authorizing Provider   dicyclomine (BENTYL) 10 MG capsule Take 10 mg by mouth 4 times daily (before meals and nightly)    Historical Provider, MD   vitamin D (D3-50) 17047 UNIT CAPS Take 1 capsule by mouth once a week 11/30/21   Marj Mcdonald APRN - CNP   nortriptyline (PAMELOR) 10 MG capsule TAKE 1 CAPSULE BY MOUTH NIGHTLY FOR HEADACHES 11/5/21   Debora Aden PA-C   gabapentin (NEURONTIN) 300 MG capsule TAKE 1 CAPSULE BY MOUTH THREE TIMES DAILY 9/13/21 1/31/22  MARIXA Mahmood CNP   dexlansoprazole (DEXILANT) 60 MG CPDR delayed release capsule Take 60 mg by mouth daily    Historical Provider, MD   baclofen (LIORESAL) 20 MG tablet Take 1 tablet by mouth 4 times daily as needed (muscle spasms; pain) 8/13/21   MARIXA Mahmood CNP   rOPINIRole (REQUIP) 0.5 MG tablet Take 1 tablet by mouth 2 times daily as needed (restless legs) 8/13/21   MARIXA Mahmood CNP   ocrelizumab (OCREVUS) 300 MG/10ML SOLN injection Infuse 20 mLs intravenously every 6 months 7/19/21   MARIXA Mahmood CNP       Allergies:  Patient has no known allergies. Social History:   Social History     Socioeconomic History    Marital status: Single     Spouse name: Not on file    Number of children: 3    Years of education: 6    Highest education level: 11th grade   Occupational History    Not on file   Tobacco Use    Smoking status: Current Every Day Smoker     Packs/day: 0.50     Years: 25.00     Pack years: 12.50     Types: Cigarettes    Smokeless tobacco: Never Used    Tobacco comment: Ready to stop, requesting prescription for Chantix   Vaping Use    Vaping Use: Never used   Substance and Sexual Activity    Alcohol use:  Yes     Alcohol/week: 1.0 standard drink     Types: 1 Glasses of wine per week     Comment: socially    Drug use: No    Sexual activity: Yes     Partners: Male   Other Topics Concern    Not on file   Social History Narrative    Uses [employer] insurance for transportation    [employer]     Social Determinants of Health     Financial Resource Strain: Medium Risk    Difficulty of Paying Living Expenses: Somewhat hard   Food Insecurity: Food Insecurity Present    Worried About Running Out of Food in the Last Year: Sometimes true    [place] of Food in the Last Year: Never true   Transportation Needs: No Transportation Needs    Lack of Transportation (Medical): No    Lack of Transportation (Non-Medical): No   Physical Activity: Sufficiently Active    Days of Exercise per Week: 3 days    Minutes of Exercise per Session: 60 min   Stress: No Stress Concern Present    Feeling of Stress : Not at all   Social Connections: Socially Isolated    Frequency of Communication with Friends and Family: Three times a week    Frequency of Social Gatherings with Friends and Family: Twice a week    Attends Congregational Services: Never    Active Member of Clubs or Organizations: No    Attends Club or Organization Meetings: Never    Marital Status: Never    Intimate Partner Violence:     Fear of Current or Ex-Partner: Not on file    Emotionally Abused: Not on file    Physically Abused: Not on file    Sexually Abused: Not on file   Housing Stability:     Unable to Pay for Housing in the Last Year: Not on file    Number of [place] in the Last Year: Not on file    Unstable Housing in the Last Year: Not on file       Family History:   Family History   Problem Relation Age of Onset    Mult Sclerosis Mother     Colon Cancer Neg Hx     Uterine Cancer Neg Hx     Ovarian Cancer Neg Hx     Breast Cancer Neg Hx        REVIEW OF SYSTEMS:    Gen: Patient denies any lightheadedness or dizziness. No LOC or syncope. No fevers or chills. HEENT: No earache, sore throat or nasal congestion. Resp: Denies cough, hemoptysis or sputum production. Cardiac: Denies chest pain, SOB, diaphoresis or palpitations. GI:+ Abdominal distention. Lower abdominal cramping. No nausea, vomiting, diarrhea or constipation. No melena or hematochezia. : No urinary complaints, dysuria, hematuria or frequency. MSK: No extremity weakness, paralysis or paresthesias.      PHYSICAL EXAM:    Vitals:  /70   Pulse 91   Temp 98.7 °F (37.1 °C) (Oral)   Resp 18   Ht 5' 2\" (1.575 m)   Wt 187 lb (84.8 kg)   LMP 01/05/2018   SpO2 96%   BMI 34.20 kg/m²     General:  This is a 51 y.o. yo female who is alert and oriented in postop distress  HEENT:  Head is normocephalic and atraumatic, PERRLA, EOMI, mucus membranes moist with no pharyngeal erythema or exudate. Neck:  Supple with no carotid bruits, JVD or thyromegaly.   No cervical adenopathy  CV:  Regular rate and rhythm, no murmurs  Lungs: Mildly coarse breath sounds to auscultation bilaterally with no wheezes, rales or rhonchi  Abdomen:  + Expected postop abdomen, + ileostomy,+ abdominal fat, bowel sounds  hypoactive  Extremities:  No edema, peripheral pulses intact bilaterally  Neuro:  Cranial nerves II-XII grossly intact; motor and sensory function intact with no focal deficits  Skin:  No rashes, lesions or wounds      DATA:  CBC with Differential:    Lab Results   Component Value Date    WBC 16.9 02/19/2022    RBC 3.12 02/19/2022    HGB 9.6 02/19/2022    HCT 29.6 02/19/2022     02/19/2022    MCV 94.9 02/19/2022    MCH 30.8 02/19/2022    MCHC 32.4 02/19/2022    RDW 14.0 02/19/2022    METASPCT 0.9 11/23/2021    LYMPHOPCT 23.2 02/02/2022    MONOPCT 6.7 02/02/2022    BASOPCT 0.4 02/02/2022    MONOSABS 0.62 02/02/2022    LYMPHSABS 2.13 02/02/2022    EOSABS 0.20 02/02/2022    BASOSABS 0.04 02/02/2022     CMP:    Lab Results   Component Value Date     02/19/2022    K 3.8 02/19/2022    K 3.7 02/09/2022    CL 94 02/19/2022    CO2 24 02/19/2022    BUN 4 02/19/2022    CREATININE 0.5 02/19/2022    GFRAA >60 02/19/2022    LABGLOM >60 02/19/2022    GLUCOSE 123 02/19/2022    PROT 5.6 02/19/2022    LABALBU 2.4 02/19/2022    CALCIUM 8.3 02/19/2022    BILITOT 1.1 02/19/2022    ALKPHOS 129 02/19/2022    AST 21 02/19/2022    ALT 19 02/19/2022     Magnesium:    Lab Results   Component Value Date    MG 2.0 02/18/2022     Phosphorus:    Lab Results   Component Value Date    PHOS 3.2 09/01/2021     PT/INR:    Lab Results   Component Value Date    PROTIME 10.7 02/28/2020    INR 1.0 02/28/2020     Troponin: Lab Results   Component Value Date    TROPONINI <0.01 07/20/2020     U/A:    Lab Results   Component Value Date    COLORU Yellow 02/17/2022    PROTEINU Negative 02/17/2022    PHUR 8.0 02/17/2022    WBCUA 0-1 02/17/2022    RBCUA 1-3 02/17/2022    TRICHOMONAS Present 02/26/2020    BACTERIA RARE 02/17/2022    CLARITYU Clear 02/17/2022    SPECGRAV 1.010 02/17/2022    LEUKOCYTESUR TRACE 02/17/2022    UROBILINOGEN 0.2 02/17/2022    BILIRUBINUR Negative 02/17/2022    BLOODU TRACE 02/17/2022    GLUCOSEU Negative 02/17/2022    AMORPHOUS FEW 11/23/2021     ABG:  No results found for: PH, PCO2, PO2, HCO3, BE, THGB, TCO2, O2SAT  HgBA1c:    Lab Results   Component Value Date    LABA1C 5.4 05/11/2021     FLP:    Lab Results   Component Value Date    TRIG 184 05/11/2021    HDL 44 05/11/2021    LDLCALC 144 05/11/2021    LABVLDL 37 05/11/2021     TSH:    Lab Results   Component Value Date    TSH 0.293 09/01/2021     IRON:  No results found for: IRON  LIPASE:    Lab Results   Component Value Date    LIPASE 10 09/05/2021       ASSESSMENT AND PLAN:      Patient Active Problem List    Diagnosis Date Noted    Sigmoid stricture ([address]     Pelvic abscess in female     Ileus, postoperative ([address] 09/05/2021    S/P colectomy 08/31/2021    Malignant neoplasm of descending colon ([address]     Secondary restless legs syndrome 08/13/2021    Palafox's esophagus with dysplasia 05/11/2021    Morbidly obese ([address] 10/05/2020    Multiple sclerosis ([address] 08/04/2020    Vocal cord dysfunction 07/20/2020     Impression:  1. Sigmoid stricturelaparoscopic sigmoid resection 2/8/2022  2. History of VEENA-patient apparently been tested and is negative for CPAP use at this time  3. Obesity  4. Hypertension   5. History of current tobacco use  6. History of restless leg syndrome  7. Leukocytosis postop  8. Mild elevation transaminase postop  9. Hyponatremia postop  7.   Left lower quadrant abdomen abscess postopCT-guided placement of drain 2/18    Plan:  Patient admitted to medical surgical floor  Home medications reviewed  Monitor heart rate, blood pressure, O2 saturation  Diet and abdominal pain meds per general surgery  Lovenox 40 mg subcu daily  IV fluids lactated Ringer's 100 cc an hour    Albuterol aerosol 2.5 mg every 4 hours as needed for shortness of breath    CT of the abdomen pelvis 2/11  Serum osmolality274        Decrease KCl 20 mEq-daily    IV Cipro/Flagylday 5    CT the chest with contrast, CT the abdomen with IV and oral contrast t-9 cm abscess left lower quadrant. Consult infectious disease  IV fluids normal saline 20 KCl 100 cc an hour  Switch IV vancomycin, Zosyn      BMP, CBC in a.m.     Filiberto Wetzel DO, D.OWagner  2/19/2022  10:15 AM

## 2022-02-20 LAB
ANION GAP SERPL CALCULATED.3IONS-SCNC: 9 MMOL/L (ref 7–16)
BUN BLDV-MCNC: 6 MG/DL (ref 6–20)
CALCIUM SERPL-MCNC: 8.2 MG/DL (ref 8.6–10.2)
CHLORIDE BLD-SCNC: 98 MMOL/L (ref 98–107)
CO2: 25 MMOL/L (ref 22–29)
CREAT SERPL-MCNC: 0.5 MG/DL (ref 0.5–1)
GFR AFRICAN AMERICAN: >60
GFR NON-AFRICAN AMERICAN: >60 ML/MIN/1.73
GLUCOSE BLD-MCNC: 113 MG/DL (ref 74–99)
GRAM STAIN ORDERABLE: NORMAL
GRAM STAIN ORDERABLE: NORMAL
HCT VFR BLD CALC: 30 % (ref 34–48)
HEMOGLOBIN: 9.6 G/DL (ref 11.5–15.5)
MCH RBC QN AUTO: 30.4 PG (ref 26–35)
MCHC RBC AUTO-ENTMCNC: 32 % (ref 32–34.5)
MCV RBC AUTO: 94.9 FL (ref 80–99.9)
PDW BLD-RTO: 13.9 FL (ref 11.5–15)
PLATELET # BLD: 438 E9/L (ref 130–450)
PMV BLD AUTO: 10 FL (ref 7–12)
POTASSIUM SERPL-SCNC: 4.5 MMOL/L (ref 3.5–5)
RBC # BLD: 3.16 E12/L (ref 3.5–5.5)
SODIUM BLD-SCNC: 132 MMOL/L (ref 132–146)
WBC # BLD: 14.1 E9/L (ref 4.5–11.5)

## 2022-02-20 PROCEDURE — 6360000002 HC RX W HCPCS: Performed by: INTERNAL MEDICINE

## 2022-02-20 PROCEDURE — 2580000003 HC RX 258: Performed by: SPECIALIST

## 2022-02-20 PROCEDURE — 80048 BASIC METABOLIC PNL TOTAL CA: CPT

## 2022-02-20 PROCEDURE — 6370000000 HC RX 637 (ALT 250 FOR IP): Performed by: INTERNAL MEDICINE

## 2022-02-20 PROCEDURE — 6370000000 HC RX 637 (ALT 250 FOR IP): Performed by: SURGERY

## 2022-02-20 PROCEDURE — 6360000002 HC RX W HCPCS: Performed by: SPECIALIST

## 2022-02-20 PROCEDURE — 6360000002 HC RX W HCPCS: Performed by: SURGERY

## 2022-02-20 PROCEDURE — 36415 COLL VENOUS BLD VENIPUNCTURE: CPT

## 2022-02-20 PROCEDURE — 6370000000 HC RX 637 (ALT 250 FOR IP): Performed by: STUDENT IN AN ORGANIZED HEALTH CARE EDUCATION/TRAINING PROGRAM

## 2022-02-20 PROCEDURE — 1200000000 HC SEMI PRIVATE

## 2022-02-20 PROCEDURE — 85027 COMPLETE CBC AUTOMATED: CPT

## 2022-02-20 RX ADMIN — PIPERACILLIN SODIUM AND TAZOBACTAM SODIUM 3375 MG: 3; 375 INJECTION, POWDER, FOR SOLUTION INTRAVENOUS at 00:42

## 2022-02-20 RX ADMIN — LOPERAMIDE HYDROCHLORIDE 2 MG: 2 CAPSULE ORAL at 20:22

## 2022-02-20 RX ADMIN — PIPERACILLIN SODIUM AND TAZOBACTAM SODIUM 3375 MG: 3; 375 INJECTION, POWDER, FOR SOLUTION INTRAVENOUS at 15:51

## 2022-02-20 RX ADMIN — TRAMADOL HYDROCHLORIDE 50 MG: 50 TABLET, COATED ORAL at 12:30

## 2022-02-20 RX ADMIN — PSYLLIUM HUSK 1 PACKET: 3.4 GRANULE ORAL at 20:22

## 2022-02-20 RX ADMIN — POTASSIUM CHLORIDE AND SODIUM CHLORIDE: 900; 150 INJECTION, SOLUTION INTRAVENOUS at 20:23

## 2022-02-20 RX ADMIN — MORPHINE SULFATE 4 MG: 4 INJECTION, SOLUTION INTRAMUSCULAR; INTRAVENOUS at 15:45

## 2022-02-20 RX ADMIN — PANTOPRAZOLE SODIUM 40 MG: 40 TABLET, DELAYED RELEASE ORAL at 15:51

## 2022-02-20 RX ADMIN — POTASSIUM CHLORIDE 20 MEQ: 1500 TABLET, EXTENDED RELEASE ORAL at 08:49

## 2022-02-20 RX ADMIN — SODIUM CHLORIDE 25 ML: 9 INJECTION, SOLUTION INTRAVENOUS at 12:50

## 2022-02-20 RX ADMIN — MORPHINE SULFATE 4 MG: 4 INJECTION, SOLUTION INTRAMUSCULAR; INTRAVENOUS at 20:58

## 2022-02-20 RX ADMIN — METHOCARBAMOL 500 MG: 500 TABLET ORAL at 13:24

## 2022-02-20 RX ADMIN — METHOCARBAMOL 500 MG: 500 TABLET ORAL at 20:22

## 2022-02-20 RX ADMIN — METHOCARBAMOL 500 MG: 500 TABLET ORAL at 17:06

## 2022-02-20 RX ADMIN — ENOXAPARIN SODIUM 40 MG: 100 INJECTION SUBCUTANEOUS at 08:49

## 2022-02-20 RX ADMIN — SODIUM CHLORIDE 25 ML: 9 INJECTION, SOLUTION INTRAVENOUS at 04:40

## 2022-02-20 RX ADMIN — METHOCARBAMOL 500 MG: 500 TABLET ORAL at 08:49

## 2022-02-20 RX ADMIN — FLUCONAZOLE 400 MG: 2 INJECTION INTRAVENOUS at 13:42

## 2022-02-20 RX ADMIN — CALCIUM POLYCARBOPHIL 625 MG 625 MG: 625 TABLET ORAL at 21:22

## 2022-02-20 RX ADMIN — POTASSIUM CHLORIDE AND SODIUM CHLORIDE: 900; 150 INJECTION, SOLUTION INTRAVENOUS at 06:36

## 2022-02-20 RX ADMIN — PANTOPRAZOLE SODIUM 40 MG: 40 TABLET, DELAYED RELEASE ORAL at 06:43

## 2022-02-20 RX ADMIN — MORPHINE SULFATE 4 MG: 4 INJECTION, SOLUTION INTRAMUSCULAR; INTRAVENOUS at 00:45

## 2022-02-20 RX ADMIN — PIPERACILLIN SODIUM AND TAZOBACTAM SODIUM 3375 MG: 3; 375 INJECTION, POWDER, FOR SOLUTION INTRAVENOUS at 08:50

## 2022-02-20 RX ADMIN — LOPERAMIDE HYDROCHLORIDE 2 MG: 2 CAPSULE ORAL at 08:49

## 2022-02-20 RX ADMIN — SODIUM CHLORIDE 25 ML: 9 INJECTION, SOLUTION INTRAVENOUS at 20:22

## 2022-02-20 RX ADMIN — MORPHINE SULFATE 4 MG: 4 INJECTION, SOLUTION INTRAMUSCULAR; INTRAVENOUS at 06:35

## 2022-02-20 ASSESSMENT — PAIN DESCRIPTION - DESCRIPTORS
DESCRIPTORS: ACHING;DISCOMFORT;SORE
DESCRIPTORS: ACHING;DISCOMFORT
DESCRIPTORS: DISCOMFORT;DULL;SORE
DESCRIPTORS: ACHING;DISCOMFORT;DULL;SORE

## 2022-02-20 ASSESSMENT — PAIN SCALES - GENERAL
PAINLEVEL_OUTOF10: 4
PAINLEVEL_OUTOF10: 7
PAINLEVEL_OUTOF10: 5
PAINLEVEL_OUTOF10: 5
PAINLEVEL_OUTOF10: 8
PAINLEVEL_OUTOF10: 2
PAINLEVEL_OUTOF10: 7
PAINLEVEL_OUTOF10: 7
PAINLEVEL_OUTOF10: 10
PAINLEVEL_OUTOF10: 5

## 2022-02-20 ASSESSMENT — PAIN DESCRIPTION - LOCATION
LOCATION: ABDOMEN

## 2022-02-20 ASSESSMENT — PAIN DESCRIPTION - FREQUENCY
FREQUENCY: INTERMITTENT

## 2022-02-20 ASSESSMENT — PAIN DESCRIPTION - PROGRESSION: CLINICAL_PROGRESSION: GRADUALLY IMPROVING

## 2022-02-20 ASSESSMENT — PAIN DESCRIPTION - PAIN TYPE
TYPE: SURGICAL PAIN
TYPE: ACUTE PAIN;SURGICAL PAIN

## 2022-02-20 ASSESSMENT — PAIN DESCRIPTION - ONSET: ONSET: GRADUAL

## 2022-02-20 ASSESSMENT — PAIN - FUNCTIONAL ASSESSMENT: PAIN_FUNCTIONAL_ASSESSMENT: PREVENTS OR INTERFERES SOME ACTIVE ACTIVITIES AND ADLS

## 2022-02-20 ASSESSMENT — PAIN DESCRIPTION - ORIENTATION: ORIENTATION: LEFT;RIGHT;LOWER

## 2022-02-20 NOTE — PROGRESS NOTES
124 7. Potassium 3.4 with WBC 24.6 and hemoglobin 13.7. Temperature is 98.6nine 9.3 with heart rate of 99. O2 sat 98% on room air at rest.  General surgery note reviewed. CT of the abdomen pelvis was ordered for today. 2/12/2022  Patient seen examined on medical surgical floor. Patient states the abdominal distention seems to be improved. Patient still having postop discomfort and is mildly improved. Patient denies any chest pain, dizziness or unusual shortness of breath. Patient did have some nausea last night with supper. Patient denies any bowel movements at this time. CT of the abdomen pelvis from yesterday suggested ileus but cannot rule out obstruction. Temperature 98 with heart rate of 97 with blood pressure 124/67 O2 sat 93% on room air at rest.  WBC is 14.8 with hemoglobin 11.8. BUN/creatinine 7/0.6. Serum sodium increased to 134 from 127 yesterday. 2/13/2022  Patient seen examined on surgical medical floor. Patient still smokes postop discomfort. It seems to be slowly improving. Patient still has some abdominal distention intermittently. Patient says she is drinking fairly good but is not eating very much. She denies any chest pain or unusual shortness of breath at rest.  Serum sodium is 129 with BUN/creatinine 4/0.5. Serum potassium 3.4. WBC 12.9 hemoglobin 11.5. Temperature is 99.1 with a heart rate of 100 and blood pressure 140/77. O2 sat 92% room air at rest.  Check O2 saturation with activity on room air. 2/14/2022  Patient seen and examined on medical surgical floor. Patient still complain of some lower abdominal cramping. She denies any chest pain, nausea/vomiting, shortness of breath. Patient denies any dysuria or cough. BUN/creatinine 4/0.6 with serum sodium 128. WBC 15.4 today with a hemoglobin 1.7. Temperature 100.3 with heart rate of 100 and blood pressure 156/72. O2 sat 93% on room air at rest.  Patient had out about 1850 cc from ostomy yesterday afternoon. General surgery note reviewed. With patient persistent tach and leukocytosis will check UA and chest x-ray. 2/15/2022  Patient seen examined on medical surgical floor. Patient states she is slowly improving. Patient denies any chest pain, dizziness or unusual shortness of breath. Patient abdominal pain is slowly improving. Ostomy output ranges 250-1000 cc a shift. Temperature ranges 98.7100 with heart rate 98 blood pressure 143/78. O2 sat 95% on room air at rest.  BUN/creatinine was 6/0.6 with serum sodium 128. Potassium 3.3 with WBCs 10.1 hemoglobin 10.5. Urinalysis yesterday shows greater than 80 ketones with trace of leukocyte esterase with 510 WBCs and few bacteria. Chest x-ray yesterday showed bibasilar pleural and parenchymal changes with bilateral pleural effusion. Patient still with poor appetite. Abdomen shows moderate distention with hypoactive bowel sounds. 2/16/2022  Patient seen examined on medical surgical floor. Patient states she is feeling better with expected postop discomfort. Patient denies any chest pain, dizziness or unusual shortness of breath. BUN/creatinine 7/0.6. Serum sodium is 130. Total bili is increased to 1.9 today with direct bilirubin slightly increased 1.1. WBC is 11 with a hemoglobin 10.7. Temperature still 100 degrees with heart rate 91 and blood pressure 114/63. O2 sat 91-96% on room air. Patient still with increased output from ostomy range of 300-670 cc a shift. 2/17/2022  Patient seen examined on medical surgical floor. Patient states that her abdominal discomfort is slowly improving. She denies any chest pain, nausea/vomiting or unusual shortness of breath. serum potassium still low at 2.9 even with the patient receiving supplemental potassium 3 times a day. BUN/creatinine 6/0.5 with serum sodium 130. Total bili is improved to 1.1 with a WBC 14.9 hemoglobin 9.6. Urinalysis improved but still shows a trace of ketones.   Temperature ranges 98. 899.3 with a heart rate of 100 and blood pressure 119/70. O2 sat 93-96% on room air at rest.  Urinary output is fairly good with the patient having improvement in ostomy output ranging from 150-800 cc a shift. Patient started on IV fluids of 40 KCl at 100 cc an hour. Repeat BMP today at 4 PM to monitor serum potassium. 2/18/2022  Patient seen examined on medical surgical floor. Patient still with right-sided abdominal pain which is not worsening but not improving. Patient denies any cough. There is no dysuria. Patient does have some intermittent fevers and chills. WBC increased again today 17.9 with hemoglobin 9.3. Serum sodium is 130 with BUN/creatinine 4/0.6. Serum sodium is improved to 23.7. Liver enzymes are normal.  Temp 100.6 with heart rate 100 blood pressure 106/70. O2 sat 97% room air at rest.  Urine output is fairly good. Ostomy output improved ranges 250-350 cc a shift. Patient is set up for a CT of the abdomen and chest with IV and oral contrast.    2/19/2022   Patient seen examined on medical surgical floor. Reviewed CT scan with the patient yesterday. Patient denies any problem with chest pain or dizziness. She denies any fever or chills today. Patient has expected postop discomfort. Patient has drains in left lower quadrant and right abdomen. CT the abdomen/pelvis/chest yesterday showed large 9 cm fluid collection in the left upper abdomen/pelvis. BUN/creatinine 4/0.5 with a serum sodium 128. WBC 16.9 with hemoglobin 9.6. Temperature ranges 98.7101.5. Heart rate 91 with blood pressure 116/70. O2 sat 96% on room air at rest.    2/20/2022  Patient seen examined on medical surgical floor. Patient with expected postop discomfort. The left quadrant drain has not put out much over the last 24 hours. Patient denies any problem with any chest pain, dizziness or unusual shortness of breath. BUN creatinine 60/0.5 with sodium 132. WBC is 14.1 with hemoglobin 9.6.   Temperature is 98.1 with heart rate 96 and blood pressure 100/63. O2 sat 98% room air at rest.  Urinary output is adequate. Past Medical History:    Past Medical History:   Diagnosis Date    Acid reflux disease     Cyst of ovary     Fracture of nasal bone     Headache     Hearing loss     Hypertension     Migraine     Morbidly obese ([address] 10/05/2020    Multiple sclerosis ([facility]     denies limitations at present 11/2020    VEENA no CPAP     severe VEENA per pt    Right shoulder pain 11/2020    Tinnitus     TMJ dysfunction      Past Surgical History:    Past Surgical History:   Procedure Laterality Date    APPENDECTOMY      BACK SURGERY      lumbar    BICEPS TENDON REPAIR Right 11/11/2020    BICEPS TENODESIS performed by Neelima Haji MD at [place] [address] Left 8/31/2021    LAPAROSCOPIC LEFT HEMICOLECTOMY WITH LOOP OSTOMY performed by Anish Ritter MD at [address] 9/6/2021    DIAGNOSTIC LAPAROSCOPY POSSIBLE BOWEL RESECTION POSSILBE OSTOMY performed by Anish Ritter MD at [address] 2/8/2022    LAPAROSCOPIC SIGMOID COLON RESECTION performed by Anish Ritter MD at [address]  03/05/2018    Robotic hysterectomy, bilateral salpingectomy, right oophorectomy DR. ARIANA MONIQUE [facility]     HYSTERECTOMY, TOTAL ABDOMINAL      LARYNGOSCOPY N/A 7/17/2020    DIRECT LARYNGOSCOPY--OMNI GUIDE LASER performed by Nunu Aburto MD at [facility] PARTIAL HYSTERECTOMY      PROCTOSIGMOIDOSCOPY N/A 10/12/2021    ANAL PROCTO SIGMOIDOSCOPY FLEXIBLE performed by Anish Ritter MD at [address] [place] [address] N/A 1/25/2022    ANAL PROCTO SIGMOIDOSCOPY FLEXIBLE performed by Anish Ritter MD at [facility] ARTHROSCOPY Right 11/11/2020    RIGHT SHOULDER DIAGNOSTIC ARTHROSCOPY WITH DECOMPRESSION ROTATOR CUFF REPAIR performed by Neelima Haji MD at [facility] Medications Prior to Admission:    @  Prior to Admission medications    Medication Sig Start Date End Date Taking? Authorizing Provider   dicyclomine (BENTYL) 10 MG capsule Take 10 mg by mouth 4 times daily (before meals and nightly)    Historical Provider, MD   vitamin D (D3-50) 27696 UNIT CAPS Take 1 capsule by mouth once a week 11/30/21   MARIXA Mike CNP   nortriptyline (PAMELOR) 10 MG capsule TAKE 1 CAPSULE BY MOUTH NIGHTLY FOR HEADACHES 11/5/21   Cristina Spencer PA-C   gabapentin (NEURONTIN) 300 MG capsule TAKE 1 CAPSULE BY MOUTH THREE TIMES DAILY 9/13/21 1/31/22  MARIXA Mike CNP   dexlansoprazole (DEXILANT) 60 MG CPDR delayed release capsule Take 60 mg by mouth daily    Historical Provider, MD   baclofen (LIORESAL) 20 MG tablet Take 1 tablet by mouth 4 times daily as needed (muscle spasms; pain) 8/13/21   MARIXA Mike CNP   rOPINIRole (REQUIP) 0.5 MG tablet Take 1 tablet by mouth 2 times daily as needed (restless legs) 8/13/21   MARIXA Mike CNP   ocrelizumab (OCREVUS) 300 MG/10ML SOLN injection Infuse 20 mLs intravenously every 6 months 7/19/21   MARIXA Mike CNP       Allergies:  Patient has no known allergies. Social History:   Social History     Socioeconomic History    Marital status: Single     Spouse name: Not on file    Number of children: 3    Years of education: 6    Highest education level: 11th grade   Occupational History    Not on file   Tobacco Use    Smoking status: Current Every Day Smoker     Packs/day: 0.50     Years: 25.00     Pack years: 12.50     Types: Cigarettes    Smokeless tobacco: Never Used    Tobacco comment: Ready to stop, requesting prescription for Chantix   Vaping Use    Vaping Use: Never used   Substance and Sexual Activity    Alcohol use:  Yes     Alcohol/week: 1.0 standard drink     Types: 1 Glasses of wine per week     Comment: socially    Drug use: No    Sexual activity: Yes     Partners: Male   Other Topics Concern    Not on file   Social History Narrative    Uses [employer] for transportation    [facility]     Social Determinants of Health     Financial Resource Strain: Medium Risk    Difficulty of Paying Living Expenses: Somewhat hard   Food Insecurity: Food Insecurity Present    Worried About Running Out of Food in the Last Year: Sometimes true    [place] of Food in the Last Year: Never true   Transportation Needs: No Transportation Needs    Lack of Transportation (Medical): No    Lack of Transportation (Non-Medical): No   Physical Activity: Sufficiently Active    Days of Exercise per Week: 3 days    Minutes of Exercise per Session: 60 min   Stress: No Stress Concern Present    Feeling of Stress : Not at all   Social Connections: Socially Isolated    Frequency of Communication with Friends and Family: Three times a week    Frequency of Social Gatherings with Friends and Family: Twice a week    Attends Jewish Services: Never    Active Member of Clubs or Organizations: No    Attends Club or Organization Meetings: Never    Marital Status: Never    Intimate Partner Violence:     Fear of Current or Ex-Partner: Not on file    Emotionally Abused: Not on file    Physically Abused: Not on file    Sexually Abused: Not on file   Housing Stability:     Unable to Pay for Housing in the Last Year: Not on file    Number of [place] in the Last Year: Not on file    Unstable Housing in the Last Year: Not on file       Family History:   Family History   Problem Relation Age of Onset    Mult Sclerosis Mother     Colon Cancer Neg Hx     Uterine Cancer Neg Hx     Ovarian Cancer Neg Hx     Breast Cancer Neg Hx        REVIEW OF SYSTEMS:    Gen: Patient denies any lightheadedness or dizziness. No LOC or syncope. No fevers or chills. HEENT: No earache, sore throat or nasal congestion. Resp: Denies cough, hemoptysis or sputum production.     Cardiac: Denies chest pain, 02/20/2022    GLUCOSE 113 02/20/2022    PROT 5.6 02/19/2022    LABALBU 2.4 02/19/2022    CALCIUM 8.2 02/20/2022    BILITOT 1.1 02/19/2022    ALKPHOS 129 02/19/2022    AST 21 02/19/2022    ALT 19 02/19/2022     Magnesium:    Lab Results   Component Value Date    MG 2.0 02/18/2022     Phosphorus:    Lab Results   Component Value Date    PHOS 3.2 09/01/2021     PT/INR:    Lab Results   Component Value Date    PROTIME 10.7 02/28/2020    INR 1.0 02/28/2020     Troponin:    Lab Results   Component Value Date    TROPONINI <0.01 07/20/2020     U/A:    Lab Results   Component Value Date    COLORU Yellow 02/17/2022    PROTEINU Negative 02/17/2022    PHUR 8.0 02/17/2022    WBCUA 0-1 02/17/2022    RBCUA 1-3 02/17/2022    TRICHOMONAS Present 02/26/2020    BACTERIA RARE 02/17/2022    CLARITYU Clear 02/17/2022    SPECGRAV 1.010 02/17/2022    LEUKOCYTESUR TRACE 02/17/2022    UROBILINOGEN 0.2 02/17/2022    BILIRUBINUR Negative 02/17/2022    BLOODU TRACE 02/17/2022    GLUCOSEU Negative 02/17/2022    AMORPHOUS FEW 11/23/2021     ABG:  No results found for: PH, PCO2, PO2, HCO3, BE, THGB, TCO2, O2SAT  HgBA1c:    Lab Results   Component Value Date    LABA1C 5.4 05/11/2021     FLP:    Lab Results   Component Value Date    TRIG 184 05/11/2021    HDL 44 05/11/2021    LDLCALC 144 05/11/2021    LABVLDL 37 05/11/2021     TSH:    Lab Results   Component Value Date    TSH 0.293 09/01/2021     IRON:  No results found for: IRON  LIPASE:    Lab Results   Component Value Date    LIPASE 10 09/05/2021       ASSESSMENT AND PLAN:      Patient Active Problem List    Diagnosis Date Noted    Sigmoid stricture ([facility]     Pelvic abscess in female     Ileus, postoperative ([facility] [facility] 09/05/2021    S/P colectomy 08/31/2021    Malignant neoplasm of descending colon ([facility]     Secondary restless legs syndrome 08/13/2021    Palafox's esophagus with dysplasia 05/11/2021    [facility]ly [facility] 10/05/2020    Multiple sclerosis ([facility] 08/04/2020    Vocal cord dysfunction 07/20/2020     Impression:  1. Sigmoid stricturelaparoscopic sigmoid resection 2/8/2022  2. History of VEENA-patient apparently been tested and is negative for CPAP use at this time  3. Obesity  4. Hypertension   5. History of current tobacco use  6. History of restless leg syndrome  7. Leukocytosis postop  8. Mild elevation transaminase postop  9. Hyponatremia postop  7. Left lower quadrant abdomen abscess postopCT-guided placement of drain 2/18    Plan:  Patient admitted to medical surgical floor  Home medications reviewed  Monitor heart rate, blood pressure, O2 saturation  Diet and abdominal pain meds per general surgery  Lovenox 40 mg subcu daily  IV fluids lactated Ringer's 100 cc an hour    Albuterol aerosol 2.5 mg every 4 hours as needed for shortness of breath    CT of the abdomen pelvis 2/11  Serum osmolality274        Decrease KCl 20 mEq-daily    IV Cipro/Flagylday 5    CT the chest with contrast, CT the abdomen with IV and oral contrast t-9 cm abscess left lower quadrant. Consult infectious disease  IV fluids normal saline 20 KCl 100 cc an hour  Switch IV Zosyn, Diflucan      BMP, CBC in a.shavon Muro DO, D.OWagner  2/20/2022  10:24 AM

## 2022-02-20 NOTE — PROGRESS NOTES
[facility]  [facility]  Progress Note    NAME: Prisca Dale  MR:  [id number]  :   1975  DATE OF SERVICE:22    This is a face to face encounter with Prisca Dale 51 y.o. female on 22    CHIEF COMPLAINT     ID following for No chief complaint on file. HISTORY OF PRESENT ILLNESS   Pt with h/o colon ca/ileosotmy/coivd 10/2021-decadron -baricitnib   s/p ANAL PROCTO SIGMOIDOSCOPY FLEXIBLE  Admitted on  for LAPAROSCOPIC SIGMOID COLON RESECTION with  stapled end-to-end anastomosis and mobilization of splenic flexure. Ivih252.3 on  csep555.5   currently afebrile     wbc11->17.9->16.9->14.1  Cr0.6  Id was asked to see for atbx due to CT the abdomen/pelvis/chest yesterday showed large 9 cm fluid collection in the left upper abdomen/pelvis. S/p drain on    Pt has no f/c/n/vd/rash/itch      body fluid cx pending   2/15 urine cx <10K Proteus/gpo     Pt seen and examined  22  fevers better has dec wbc  Dec pain in bed     Patient is tolerating medications. No reported adverse drug reactions. REVIEW OF SYSTEMS     As stated above in the chief complaint, otherwise negative.   CURRENT MEDICATIONS      piperacillin-tazobactam  3,375 mg IntraVENous Q8H    fluconazole  400 mg IntraVENous Q24H    potassium chloride  20 mEq Oral Daily with breakfast    polycarbophil  625 mg Oral BID    loperamide  2 mg Oral BID    psyllium  1 packet Oral BID    pantoprazole  40 mg Oral BID AC    sodium chloride flush  5-40 mL IntraVENous 2 times per day    enoxaparin  40 mg SubCUTAneous Daily    methocarbamol  500 mg Oral 4x Daily     Continuous Infusions:   0.9% NaCl with KCl 20 mEq 100 mL/hr at 22 0636    sodium chloride Stopped (22 0642)    sodium chloride 25 mL (22)     PRN Meds:acetaminophen, ibuprofen, calcium carbonate, sodium chloride flush, sodium chloride, ondansetron **OR** ondansetron, traMADol **OR** traMADol, morphine **OR** morphine, nortriptyline, rOPINIRole, albuterol    PHYSICAL EXAM     /63   Pulse 96   Temp 98.1 °F (36.7 °C) (Oral)   Resp 20   Ht 5' 2\" (1.575 m)   Wt 187 lb (84.8 kg)   LMP 2018   SpO2 98%   BMI 34.20 kg/m²   Temp  Av.5 °F (36.9 °C)  Min: 98.1 °F (36.7 °C)  Max: 98.8 °F (37.1 °C)  Constitutional:  The patient is awake, alert,   Skin:    Warm and dry. No rashes were noted. HEENT:   Round and reactive pupils. AT/NC  Neck:    Supple to movements. Chest:   No use of accessory muscles to breathe. Symmetrical expansion. Cardiovascular:  S1 and S2 are rhythmic and regular. No murmurs appreciated. Abdomen:   Dec  bowel sounds to auscultation. Benign to palpation. islsootmy drains x2 right blood tinge left purlence  Extremities:   No clubbing, no cyanosis, no edema. CNS    AAxO   Lines: piv      DIAGNOSTIC RESULTS   Radiology:    Recent Labs     22  043   WBC 17.9* 16.9* 14.1*   RBC 2.98* 3.12* 3.16*   HGB 9.3* 9.6* 9.6*   HCT 28.0* 29.6* 30.0*   MCV 94.0 94.9 94.9   MCH 31.2 30.8 30.4   MCHC 33.2 32.4 32.0   RDW 13.6 14.0 13.9    370 438   MPV 10.0 10.1 10.0     Recent Labs     22  19322  0432   *   < > 125* 128* 132   K 3.7   < > 3.8 3.8 4.5   CL 95*   < > 92* 94* 98   CO2 27   < > 24 24 25   BUN 4*   < > 4* 4* 6   CREATININE 0.6   < > 0.5 0.5 0.5   GLUCOSE 131*   < > 125* 123* 113*   PROT 5.4*  --   --  5.6*  --    LABALBU 2.5*  --   --  2.4*  --    CALCIUM 7.9*   < > 8.0* 8.3* 8.2*   BILITOT 0.9  --   --  1.1  --    ALKPHOS 133*  --   --  129*  --    AST 24  --   --  21  --    ALT 19  --   --  19  --     < > = values in this interval not displayed.      Lab Results   Component Value Date    CRP 0.5 (H) 2021    CRP 0.9 (H) 10/31/2021    CRP 1.3 (H) 10/30/2021     Lab Results   Component Value Date    SEDRATE 3 10/30/2021    SEDRATE 5 2020 Recent Labs     02/18/22  0328 02/19/22  0428   AST 24 21   ALT 19 19     Lab Results   Component Value Date    CHOL 225 05/11/2021    TRIG 184 05/11/2021    HDL 44 05/11/2021    LDLCALC 144 05/11/2021    LABVLDL 37 05/11/2021     Lab Results   Component Value Date/Time    VITD25 32 08/18/2021 08:00 AM       Microbiology:        MRSA Culture Only   Date Value Ref Range Status   02/02/2022 Methicillin resistant Staph aureus not isolated  Final           FINAL IMPRESSION    Pt had No chief complaint on file. Admitted for Sigmoid stricture ([facility] [H12.655]   complicated by sepsis intraabd abscess  Leukocytosis better  Fevers       Cont atbx await final drain cx prelim Proteus   piperacillin-tazobactam (ZOSYN) 3,375 mg in dextrose 5 % 50 mL IVPB extended infusion (mini-bag), Q8H  fluconazole (DIFLUCAN) in 0.9 % sodium chloride IVPB 400 mg, Q24H       · Monitor labs    Imaging and labs were reviewed per medical records. The patient was educated about the diagnosis, prognosis, indications, risks and benefits of treatment. An opportunity to ask questions was given to the patient/FAMILY. Thank you for involving me in the care of Cee Bar I will continue to follow. Please do not hesitate to call for any questions or concerns.     Electronically signed by Chelsey Padilla MD on 2/20/2022 at 9:34 AM

## 2022-02-20 NOTE — PLAN OF CARE
Problem: Infection - Surgical Site:  Goal: Will show no infection signs and symptoms  Description: Will show no infection signs and symptoms  Outcome: Met This Shift     Problem: Pain:  Goal: Pain level will decrease  Description: Pain level will decrease  Outcome: Met This Shift  Goal: Control of acute pain  Description: Control of acute pain  Outcome: Met This Shift     Problem:  Bowel/Gastric:  Goal: Ability to achieve a regular elimination pattern will improve  Description: Ability to achieve a regular elimination pattern will improve  Outcome: Met This Shift     Problem: Nutritional:  Goal: Ability to follow a diet with enough fiber (20 to 30 grams) for normal bowel function will improve  Description: Ability to follow a diet with enough fiber (20 to 30 grams) for normal bowel function will improve  Outcome: Met This Shift

## 2022-02-20 NOTE — PROGRESS NOTES
General Surgery Progress Note  [facility]    Patient's Name/Date of Birth: Bonita Cloud / 1975    Date: February 20, 2022     Surgeon: Avani Preston MD    Chief Complaint: s/p colectomy    Patient Active Problem List   Diagnosis    Vocal cord dysfunction    Multiple sclerosis ([address]    Morbidly obese ([address]    Palafox's esophagus with dysplasia    Secondary restless legs syndrome    S/P colectomy    Malignant neoplasm of descending colon ([address]    Ileus, postoperative ([address]    Pelvic abscess in female    Sigmoid stricture ([address]       Subjective: Feels better today. abdominal cramps. Objective:  /63   Pulse 96   Temp 98.1 °F (36.7 °C) (Oral)   Resp 20   Ht 5' 2\" (1.575 m)   Wt 187 lb (84.8 kg)   LMP 01/05/2018   SpO2 98%   BMI 34.20 kg/m²   Labs:  Recent Labs     02/18/22  0328 02/19/22  0428 02/20/22  0432   WBC 17.9* 16.9* 14.1*   HGB 9.3* 9.6* 9.6*   HCT 28.0* 29.6* 30.0*     Lab Results   Component Value Date    CREATININE 0.5 02/20/2022    BUN 6 02/20/2022     02/20/2022    K 4.5 02/20/2022    CL 98 02/20/2022    CO2 25 02/20/2022     No results for input(s): LIPASE, AMYLASE in the last 72 hours.   CBC with Differential:    Lab Results   Component Value Date    WBC 14.1 02/20/2022    RBC 3.16 02/20/2022    HGB 9.6 02/20/2022    HCT 30.0 02/20/2022     02/20/2022    MCV 94.9 02/20/2022    MCH 30.4 02/20/2022    MCHC 32.0 02/20/2022    RDW 13.9 02/20/2022    METASPCT 0.9 11/23/2021    LYMPHOPCT 23.2 02/02/2022    MONOPCT 6.7 02/02/2022    BASOPCT 0.4 02/02/2022    MONOSABS 0.62 02/02/2022    LYMPHSABS 2.13 02/02/2022    EOSABS 0.20 02/02/2022    BASOSABS 0.04 02/02/2022     CMP:    Lab Results   Component Value Date     02/20/2022    K 4.5 02/20/2022    K 3.7 02/09/2022    CL 98 02/20/2022    CO2 25 02/20/2022    BUN 6 02/20/2022    CREATININE 0.5 02/20/2022    GFRAA >60 02/20/2022    LABGLOM >60 02/20/2022    GLUCOSE 113 02/20/2022    PROT 5.6

## 2022-02-20 NOTE — PLAN OF CARE
Problem: Pain:  Goal: Pain level will decrease  2/20/2022 1041 by Perez Pratt RN  Outcome: Met This Shift     Problem: Pain:  Goal: Control of acute pain  2/20/2022 1041 by Perez Pratt RN  Outcome: Met This Shift     Problem: Physical Regulation:  Goal: Will show no signs and symptoms of electrolyte imbalance  Outcome: Met This Shift     Problem: SAFETY  Goal: Free from accidental physical injury  Outcome: Met This Shift     Problem: DAILY CARE  Goal: Daily care needs are met  Outcome: Met This Shift     Problem: PAIN  Goal: Patient's pain/discomfort is manageable  Outcome: Met This Shift     Problem: SKIN INTEGRITY  Goal: Skin integrity is maintained or improved  Outcome: Met This Shift     Problem: KNOWLEDGE DEFICIT  Goal: Patient/S.O. demonstrates understanding of disease process, treatment plan, medications, and discharge instructions. Outcome: Met This Shift     Problem: Infection - Surgical Site:  Goal: Will show no infection signs and symptoms  2/20/2022 1041 by Perez Pratt RN  Outcome: Not Met This Shift     Problem:  Bowel/Gastric:  Goal: Ability to achieve a regular elimination pattern will improve  2/20/2022 1041 by Perez Pratt RN  Outcome: Not Met This Shift     Problem: Nutritional:  Goal: Ability to follow a diet with enough fiber (20 to 30 grams) for normal bowel function will improve  2/20/2022 1041 by Perez Pratt RN  Outcome: Not Met This Shift     Problem: Fluid Volume:  Goal: Ability to achieve a balanced intake and output will improve  Outcome: Not Met This Shift     Problem: Physical Regulation:  Goal: Ability to maintain clinical measurements within normal limits will improve  Outcome: Not Met This Shift

## 2022-02-21 LAB
ALBUMIN SERPL-MCNC: 2.5 G/DL (ref 3.5–5.2)
ALP BLD-CCNC: 106 U/L (ref 35–104)
ALT SERPL-CCNC: 16 U/L (ref 0–32)
ANION GAP SERPL CALCULATED.3IONS-SCNC: 10 MMOL/L (ref 7–16)
AST SERPL-CCNC: 18 U/L (ref 0–31)
BILIRUB SERPL-MCNC: 0.7 MG/DL (ref 0–1.2)
BODY FLUID CULTURE, STERILE: ABNORMAL
BUN BLDV-MCNC: 3 MG/DL (ref 6–20)
CALCIUM SERPL-MCNC: 8.2 MG/DL (ref 8.6–10.2)
CHLORIDE BLD-SCNC: 101 MMOL/L (ref 98–107)
CO2: 23 MMOL/L (ref 22–29)
CREAT SERPL-MCNC: 0.5 MG/DL (ref 0.5–1)
GFR AFRICAN AMERICAN: >60
GFR NON-AFRICAN AMERICAN: >60 ML/MIN/1.73
GLUCOSE BLD-MCNC: 114 MG/DL (ref 74–99)
GRAM STAIN RESULT: ABNORMAL
HCT VFR BLD CALC: 28.2 % (ref 34–48)
HEMOGLOBIN: 9.1 G/DL (ref 11.5–15.5)
MCH RBC QN AUTO: 30.7 PG (ref 26–35)
MCHC RBC AUTO-ENTMCNC: 32.3 % (ref 32–34.5)
MCV RBC AUTO: 95.3 FL (ref 80–99.9)
ORGANISM: ABNORMAL
PDW BLD-RTO: 13.9 FL (ref 11.5–15)
PLATELET # BLD: 516 E9/L (ref 130–450)
PMV BLD AUTO: 9.6 FL (ref 7–12)
POTASSIUM SERPL-SCNC: 4.2 MMOL/L (ref 3.5–5)
RBC # BLD: 2.96 E12/L (ref 3.5–5.5)
SODIUM BLD-SCNC: 134 MMOL/L (ref 132–146)
TOTAL PROTEIN: 5.5 G/DL (ref 6.4–8.3)
WBC # BLD: 14.5 E9/L (ref 4.5–11.5)

## 2022-02-21 PROCEDURE — 36415 COLL VENOUS BLD VENIPUNCTURE: CPT

## 2022-02-21 PROCEDURE — 6360000002 HC RX W HCPCS: Performed by: SURGERY

## 2022-02-21 PROCEDURE — 80053 COMPREHEN METABOLIC PANEL: CPT

## 2022-02-21 PROCEDURE — 6370000000 HC RX 637 (ALT 250 FOR IP): Performed by: STUDENT IN AN ORGANIZED HEALTH CARE EDUCATION/TRAINING PROGRAM

## 2022-02-21 PROCEDURE — 6370000000 HC RX 637 (ALT 250 FOR IP): Performed by: SURGERY

## 2022-02-21 PROCEDURE — 1200000000 HC SEMI PRIVATE

## 2022-02-21 PROCEDURE — 2580000003 HC RX 258: Performed by: SPECIALIST

## 2022-02-21 PROCEDURE — 6360000002 HC RX W HCPCS: Performed by: SPECIALIST

## 2022-02-21 PROCEDURE — 6360000002 HC RX W HCPCS: Performed by: INTERNAL MEDICINE

## 2022-02-21 PROCEDURE — 6370000000 HC RX 637 (ALT 250 FOR IP): Performed by: INTERNAL MEDICINE

## 2022-02-21 PROCEDURE — 85027 COMPLETE CBC AUTOMATED: CPT

## 2022-02-21 RX ORDER — LIDOCAINE HYDROCHLORIDE 10 MG/ML
5 INJECTION, SOLUTION INFILTRATION; PERINEURAL ONCE
Status: DISCONTINUED | OUTPATIENT
Start: 2022-02-21 | End: 2022-02-24 | Stop reason: HOSPADM

## 2022-02-21 RX ORDER — SODIUM CHLORIDE 0.9 % (FLUSH) 0.9 %
10 SYRINGE (ML) INJECTION PRN
Status: DISCONTINUED | OUTPATIENT
Start: 2022-02-21 | End: 2022-02-24 | Stop reason: HOSPADM

## 2022-02-21 RX ORDER — SODIUM CHLORIDE 0.9 % (FLUSH) 0.9 %
10 SYRINGE (ML) INJECTION EVERY 12 HOURS SCHEDULED
Status: DISCONTINUED | OUTPATIENT
Start: 2022-02-21 | End: 2022-02-24 | Stop reason: HOSPADM

## 2022-02-21 RX ORDER — SODIUM CHLORIDE 9 MG/ML
25 INJECTION, SOLUTION INTRAVENOUS PRN
Status: DISCONTINUED | OUTPATIENT
Start: 2022-02-21 | End: 2022-02-24 | Stop reason: HOSPADM

## 2022-02-21 RX ADMIN — IBUPROFEN 400 MG: 400 TABLET, FILM COATED ORAL at 08:23

## 2022-02-21 RX ADMIN — POTASSIUM CHLORIDE AND SODIUM CHLORIDE: 900; 150 INJECTION, SOLUTION INTRAVENOUS at 05:58

## 2022-02-21 RX ADMIN — PANTOPRAZOLE SODIUM 40 MG: 40 TABLET, DELAYED RELEASE ORAL at 16:04

## 2022-02-21 RX ADMIN — LOPERAMIDE HYDROCHLORIDE 2 MG: 2 CAPSULE ORAL at 20:48

## 2022-02-21 RX ADMIN — TRAMADOL HYDROCHLORIDE 50 MG: 50 TABLET, COATED ORAL at 23:54

## 2022-02-21 RX ADMIN — METHOCARBAMOL 500 MG: 500 TABLET ORAL at 20:46

## 2022-02-21 RX ADMIN — LOPERAMIDE HYDROCHLORIDE 2 MG: 2 CAPSULE ORAL at 08:22

## 2022-02-21 RX ADMIN — PSYLLIUM HUSK 1 PACKET: 3.4 GRANULE ORAL at 08:23

## 2022-02-21 RX ADMIN — ENOXAPARIN SODIUM 40 MG: 100 INJECTION SUBCUTANEOUS at 08:20

## 2022-02-21 RX ADMIN — TRAMADOL HYDROCHLORIDE 50 MG: 50 TABLET, COATED ORAL at 00:35

## 2022-02-21 RX ADMIN — PANTOPRAZOLE SODIUM 40 MG: 40 TABLET, DELAYED RELEASE ORAL at 06:00

## 2022-02-21 RX ADMIN — METHOCARBAMOL 500 MG: 500 TABLET ORAL at 08:23

## 2022-02-21 RX ADMIN — METHOCARBAMOL 500 MG: 500 TABLET ORAL at 16:48

## 2022-02-21 RX ADMIN — TRAMADOL HYDROCHLORIDE 50 MG: 50 TABLET, COATED ORAL at 16:46

## 2022-02-21 RX ADMIN — POTASSIUM CHLORIDE AND SODIUM CHLORIDE: 900; 150 INJECTION, SOLUTION INTRAVENOUS at 20:44

## 2022-02-21 RX ADMIN — PIPERACILLIN SODIUM AND TAZOBACTAM SODIUM 3375 MG: 3; 375 INJECTION, POWDER, FOR SOLUTION INTRAVENOUS at 00:32

## 2022-02-21 RX ADMIN — TRAMADOL HYDROCHLORIDE 50 MG: 50 TABLET, COATED ORAL at 10:03

## 2022-02-21 RX ADMIN — CALCIUM POLYCARBOPHIL 625 MG 625 MG: 625 TABLET ORAL at 20:46

## 2022-02-21 RX ADMIN — MORPHINE SULFATE 4 MG: 4 INJECTION, SOLUTION INTRAMUSCULAR; INTRAVENOUS at 20:45

## 2022-02-21 RX ADMIN — PIPERACILLIN SODIUM AND TAZOBACTAM SODIUM 3375 MG: 3; 375 INJECTION, POWDER, FOR SOLUTION INTRAVENOUS at 23:50

## 2022-02-21 RX ADMIN — CALCIUM POLYCARBOPHIL 625 MG 625 MG: 625 TABLET ORAL at 08:22

## 2022-02-21 RX ADMIN — SODIUM CHLORIDE 25 ML: 9 INJECTION, SOLUTION INTRAVENOUS at 11:24

## 2022-02-21 RX ADMIN — PIPERACILLIN SODIUM AND TAZOBACTAM SODIUM 3375 MG: 3; 375 INJECTION, POWDER, FOR SOLUTION INTRAVENOUS at 08:23

## 2022-02-21 RX ADMIN — METHOCARBAMOL 500 MG: 500 TABLET ORAL at 14:20

## 2022-02-21 RX ADMIN — SODIUM CHLORIDE 25 ML: 9 INJECTION, SOLUTION INTRAVENOUS at 04:30

## 2022-02-21 RX ADMIN — POTASSIUM CHLORIDE 20 MEQ: 1500 TABLET, EXTENDED RELEASE ORAL at 08:23

## 2022-02-21 RX ADMIN — MORPHINE SULFATE 4 MG: 4 INJECTION, SOLUTION INTRAMUSCULAR; INTRAVENOUS at 05:58

## 2022-02-21 RX ADMIN — PSYLLIUM HUSK 1 PACKET: 3.4 GRANULE ORAL at 20:47

## 2022-02-21 RX ADMIN — PIPERACILLIN SODIUM AND TAZOBACTAM SODIUM 3375 MG: 3; 375 INJECTION, POWDER, FOR SOLUTION INTRAVENOUS at 16:42

## 2022-02-21 RX ADMIN — FLUCONAZOLE 400 MG: 2 INJECTION INTRAVENOUS at 14:21

## 2022-02-21 ASSESSMENT — PAIN DESCRIPTION - PAIN TYPE
TYPE: SURGICAL PAIN
TYPE: ACUTE PAIN;SURGICAL PAIN
TYPE: SURGICAL PAIN

## 2022-02-21 ASSESSMENT — PAIN DESCRIPTION - PROGRESSION: CLINICAL_PROGRESSION: GRADUALLY WORSENING

## 2022-02-21 ASSESSMENT — PAIN DESCRIPTION - ONSET: ONSET: ON-GOING

## 2022-02-21 ASSESSMENT — PAIN SCALES - GENERAL
PAINLEVEL_OUTOF10: 7
PAINLEVEL_OUTOF10: 7
PAINLEVEL_OUTOF10: 5
PAINLEVEL_OUTOF10: 8
PAINLEVEL_OUTOF10: 7
PAINLEVEL_OUTOF10: 7
PAINLEVEL_OUTOF10: 8
PAINLEVEL_OUTOF10: 5
PAINLEVEL_OUTOF10: 4
PAINLEVEL_OUTOF10: 8

## 2022-02-21 ASSESSMENT — PAIN DESCRIPTION - DESCRIPTORS
DESCRIPTORS: ACHING;DISCOMFORT;SORE;TENDER
DESCRIPTORS: DISCOMFORT;SORE

## 2022-02-21 ASSESSMENT — PAIN DESCRIPTION - LOCATION
LOCATION: ABDOMEN

## 2022-02-21 ASSESSMENT — PAIN - FUNCTIONAL ASSESSMENT: PAIN_FUNCTIONAL_ASSESSMENT: PREVENTS OR INTERFERES SOME ACTIVE ACTIVITIES AND ADLS

## 2022-02-21 ASSESSMENT — PAIN DESCRIPTION - ORIENTATION: ORIENTATION: LEFT;LOWER

## 2022-02-21 ASSESSMENT — PAIN DESCRIPTION - FREQUENCY: FREQUENCY: INTERMITTENT

## 2022-02-21 NOTE — PROGRESS NOTES
[address] [facility]  [facility]  Progress Note    NAME: Alek Tee  MR:  [id number]  :   1975  DATE OF SERVICE:22    This is a face to face encounter with Alek Tee 51 y.o. female on 22    CHIEF COMPLAINT     ID following for No chief complaint on file. HISTORY OF PRESENT ILLNESS   Pt with h/o colon ca/ileosotmy/coivd 10/2021-decadron -baricitnib   s/p ANAL PROCTO SIGMOIDOSCOPY FLEXIBLE  Admitted on  for LAPAROSCOPIC SIGMOID COLON RESECTION with  stapled end-to-end anastomosis and mobilization of splenic flexure. Lwjv650.3 on  kulx892.5   currently afebrile     wbc11->17.9->16.9->14.1  Cr0.6  Id was asked to see for atbx due to CT the abdomen/pelvis/chest yesterday showed large 9 cm fluid collection in the left upper abdomen/pelvis. S/p drain on    Pt has no f/c/n/vd/rash/itch      body fluid cx pending   2/15 urine cx <10K Proteus/gpo     Pt seen and examined  22   In bed drains in place abd kiet  Left quadrant   2022  fevers better has dec wbc  Dec pain in bed     Patient is tolerating medications. No reported adverse drug reactions. REVIEW OF SYSTEMS     As stated above in the chief complaint, otherwise negative.   CURRENT MEDICATIONS      piperacillin-tazobactam  3,375 mg IntraVENous Q8H    fluconazole  400 mg IntraVENous Q24H    potassium chloride  20 mEq Oral Daily with breakfast    polycarbophil  625 mg Oral BID    loperamide  2 mg Oral BID    psyllium  1 packet Oral BID    pantoprazole  40 mg Oral BID AC    sodium chloride flush  5-40 mL IntraVENous 2 times per day    enoxaparin  40 mg SubCUTAneous Daily    methocarbamol  500 mg Oral 4x Daily     Continuous Infusions:   0.9% NaCl with KCl 20 mEq 50 mL/hr at 22 1042    sodium chloride 25 mL (22 1124)    sodium chloride 25 mL (22 2149)     PRN Meds:acetaminophen, ibuprofen, calcium carbonate, sodium chloride flush, sodium chloride, ondansetron **OR** ondansetron, traMADol **OR** traMADol, morphine **OR** morphine, nortriptyline, rOPINIRole, albuterol    PHYSICAL EXAM     /67   Pulse 65   Temp 98.5 °F (36.9 °C) (Oral)   Resp 20   Ht 5' 2\" (1.575 m)   Wt 187 lb (84.8 kg)   LMP 2018   SpO2 93%   BMI 34.20 kg/m²   Temp  Av.3 °F (36.8 °C)  Min: 98 °F (36.7 °C)  Max: 98.5 °F (36.9 °C)  Constitutional:  The patient is awake, alert,   Skin:    Warm and dry. No rashes were noted. HEENT:   Round and reactive pupils. AT/NC  Neck:    Supple to movements. Chest:   No use of accessory muscles to breathe. Symmetrical expansion. Cardiovascular:  S1 and S2 are rhythmic and regular. No murmurs appreciated. Abdomen:   Dec  bowel sounds to auscultation. Benign to palpation. islsootmy drains x2 right blood tinge left purlence  Extremities:   No clubbing, no cyanosis, no edema.   CNS    AAxO   Lines: piv      DIAGNOSTIC RESULTS   Radiology:    Recent Labs     22   WBC 16.9* 14.1* 14.5*   RBC 3.12* 3.16* 2.96*   HGB 9.6* 9.6* 9.1*   HCT 29.6* 30.0* 28.2*   MCV 94.9 94.9 95.3   MCH 30.8 30.4 30.7   MCHC 32.4 32.0 32.3   RDW 14.0 13.9 13.9    438 516*   MPV 10.1 10.0 9.6     Recent Labs     22   * 132 134   K 3.8 4.5 4.2   CL 94* 98 101   CO2 24 25 23   BUN 4* 6 3*   CREATININE 0.5 0.5 0.5   GLUCOSE 123* 113* 114*   PROT 5.6*  --  5.5*   LABALBU 2.4*  --  2.5*   CALCIUM 8.3* 8.2* 8.2*   BILITOT 1.1  --  0.7   ALKPHOS 129*  --  106*   AST 21  --  18   ALT 19  --  16     Lab Results   Component Value Date    CRP 0.5 (H) 2021    CRP 0.9 (H) 10/31/2021    CRP 1.3 (H) 10/30/2021     Lab Results   Component Value Date    SEDRATE 3 10/30/2021    SEDRATE 5 2020     Recent Labs     22  0428 22  0438   AST 21 18   ALT 19 16     Lab Results   Component Value Date    CHOL 225 2021    TRIG 184 2021    HDL 44 05/11/2021    LDLCALC 144 05/11/2021    LABVLDL 37 05/11/2021     Lab Results   Component Value Date/Time    VITD25 32 08/18/2021 08:00 AM       Microbiology:        MRSA Culture Only   Date Value Ref Range Status   02/02/2022 Methicillin resistant Staph aureus not isolated  Final           FINAL IMPRESSION    Pt had No chief complaint on file. Admitted for Sigmoid stricture ([address] [W02.486]   complicated by sepsis intraabd abscess/Proteus   Leukocytosis better  Fevers better       Cont atbx await final drain cx prelim Proteus   piperacillin-tazobactam (ZOSYN) 3,375 mg in dextrose 5 % 50 mL IVPB extended infusion (mini-bag), Q8H  fluconazole (DIFLUCAN) in 0.9 % sodium chloride IVPB 400 mg, Q24H     PICC  Check home iv may need repeat ct a/p   · Monitor labs    Imaging and labs were reviewed per medical records. The patient was educated about the diagnosis, prognosis, indications, risks and benefits of treatment. An opportunity to ask questions was given to the patient/FAMILY. Thank you for involving me in the care of Citlalli Brown I will continue to follow. Please do not hesitate to call for any questions or concerns.     Electronically signed by Joyce Nicholson MD on 2/21/2022 at 1:03 PM

## 2022-02-21 NOTE — CARE COORDINATION
CM note: Met with patient for consult for IV antibiotics. Pt has had [facility] home care before and would like to use this provider again. Will NEED C orders and signed paper scripts to check benefits. Line will need placed prior to discharge and patient will need at least one dose of Invanz prior to discharge as well, not currently on. Pt has two drains in but is learning to manage them per the nurses. Informed patient that she will need to have one of her adult children available to learn as a \"caregiver\" and she voiced understanding.

## 2022-02-21 NOTE — PROGRESS NOTES
Department of Internal Medicine        HISTORY OF PRESENT ILLNESS:      The patient is a 51 y.o. female who presents with having a elective laparoscopic sigmoid colon resection. Patient has a known history of sigmoid stricture. Patient had a anal proctosigmoidoscopy performed 1/25 with a scope with past 15 cm work there was complete closure of the rectosigmoid junction without an opening. Patient is seen postop. Patient has expected postop discomfort. Temperature is 97.8 with heart rate 72 blood pressure 140/65. O2 sat 98% on 2 L nasal cannula. 2/9/2022  Patient seen and examined on medical surgical floor. Patient complains of postop discomfort. She denies any problem with chest pain, nausea/vomiting or unusual shortness of breath. BUN/creatinine 5/0.6 with fasting blood sugar 126. Mild elevation of transaminase with a ALT of 69 AST is 35. Review of records have shown patient has intermittent elevation transaminase over the past 18 months. WBC is 21.5 with hemoglobin 12.6. Temperature 98.3 with heart rate 84 blood pressure 107/69. O2 sat 96% on room air at rest.  Urine output is very good. General surgery note reviewed. 2/10/2022  Patient seen and examined on medical surgical floor. Patient complains of postop discomfort. Patient denies any problem with chest pain, dizziness, nausea, fever/chills, dysuria, cough or unusual shortness of breath. Patient is passing some gas but no bowel movements. Increase activity today. BUN/creatinine 5/0.7 with serum sodium 130. WBC is still elevated 23.7 with hemoglobin 14.3. Temperature is 98.8 with heart rate 76 blood pressure 110/78. O2 sat 94% on room air at rest.  Urine output is adequate. 2/11/2022  Patient seen examined on medical surgical floor. Patient complains of persistent postop discomfort along with some nausea and poor appetite. Patient denies passing had any bowel movements or gas recently.   BUN/creatinine 6/0.6 with serum sodium 124 7. Potassium 3.4 with WBC 24.6 and hemoglobin 13.7. Temperature is 98.6nine 9.3 with heart rate of 99. O2 sat 98% on room air at rest.  General surgery note reviewed. CT of the abdomen pelvis was ordered for today. 2/12/2022  Patient seen examined on medical surgical floor. Patient states the abdominal distention seems to be improved. Patient still having postop discomfort and is mildly improved. Patient denies any chest pain, dizziness or unusual shortness of breath. Patient did have some nausea last night with supper. Patient denies any bowel movements at this time. CT of the abdomen pelvis from yesterday suggested ileus but cannot rule out obstruction. Temperature 98 with heart rate of 97 with blood pressure 124/67 O2 sat 93% on room air at rest.  WBC is 14.8 with hemoglobin 11.8. BUN/creatinine 7/0.6. Serum sodium increased to 134 from 127 yesterday. 2/13/2022  Patient seen examined on surgical medical floor. Patient still smokes postop discomfort. It seems to be slowly improving. Patient still has some abdominal distention intermittently. Patient says she is drinking fairly good but is not eating very much. She denies any chest pain or unusual shortness of breath at rest.  Serum sodium is 129 with BUN/creatinine 4/0.5. Serum potassium 3.4. WBC 12.9 hemoglobin 11.5. Temperature is 99.1 with a heart rate of 100 and blood pressure 140/77. O2 sat 92% room air at rest.  Check O2 saturation with activity on room air. 2/14/2022  Patient seen and examined on medical surgical floor. Patient still complain of some lower abdominal cramping. She denies any chest pain, nausea/vomiting, shortness of breath. Patient denies any dysuria or cough. BUN/creatinine 4/0.6 with serum sodium 128. WBC 15.4 today with a hemoglobin 1.7. Temperature 100.3 with heart rate of 100 and blood pressure 156/72. O2 sat 93% on room air at rest.  Patient had out about 1850 cc from ostomy yesterday afternoon. General surgery note reviewed. With patient persistent tach and leukocytosis will check UA and chest x-ray. 2/15/2022  Patient seen examined on medical surgical floor. Patient states she is slowly improving. Patient denies any chest pain, dizziness or unusual shortness of breath. Patient abdominal pain is slowly improving. Ostomy output ranges 250-1000 cc a shift. Temperature ranges 98.7100 with heart rate 98 blood pressure 143/78. O2 sat 95% on room air at rest.  BUN/creatinine was 6/0.6 with serum sodium 128. Potassium 3.3 with WBCs 10.1 hemoglobin 10.5. Urinalysis yesterday shows greater than 80 ketones with trace of leukocyte esterase with 510 WBCs and few bacteria. Chest x-ray yesterday showed bibasilar pleural and parenchymal changes with bilateral pleural effusion. Patient still with poor appetite. Abdomen shows moderate distention with hypoactive bowel sounds. 2/16/2022  Patient seen examined on medical surgical floor. Patient states she is feeling better with expected postop discomfort. Patient denies any chest pain, dizziness or unusual shortness of breath. BUN/creatinine 7/0.6. Serum sodium is 130. Total bili is increased to 1.9 today with direct bilirubin slightly increased 1.1. WBC is 11 with a hemoglobin 10.7. Temperature still 100 degrees with heart rate 91 and blood pressure 114/63. O2 sat 91-96% on room air. Patient still with increased output from ostomy range of 300-670 cc a shift. 2/17/2022  Patient seen examined on medical surgical floor. Patient states that her abdominal discomfort is slowly improving. She denies any chest pain, nausea/vomiting or unusual shortness of breath. serum potassium still low at 2.9 even with the patient receiving supplemental potassium 3 times a day. BUN/creatinine 6/0.5 with serum sodium 130. Total bili is improved to 1.1 with a WBC 14.9 hemoglobin 9.6. Urinalysis improved but still shows a trace of ketones.   Temperature ranges 98. 899.3 with a heart rate of 100 and blood pressure 119/70. O2 sat 93-96% on room air at rest.  Urinary output is fairly good with the patient having improvement in ostomy output ranging from 150-800 cc a shift. Patient started on IV fluids of 40 KCl at 100 cc an hour. Repeat BMP today at 4 PM to monitor serum potassium. 2/18/2022  Patient seen examined on medical surgical floor. Patient still with right-sided abdominal pain which is not worsening but not improving. Patient denies any cough. There is no dysuria. Patient does have some intermittent fevers and chills. WBC increased again today 17.9 with hemoglobin 9.3. Serum sodium is 130 with BUN/creatinine 4/0.6. Serum sodium is improved to 23.7. Liver enzymes are normal.  Temp 100.6 with heart rate 100 blood pressure 106/70. O2 sat 97% room air at rest.  Urine output is fairly good. Ostomy output improved ranges 250-350 cc a shift. Patient is set up for a CT of the abdomen and chest with IV and oral contrast.    2/19/2022   Patient seen examined on medical surgical floor. Reviewed CT scan with the patient yesterday. Patient denies any problem with chest pain or dizziness. She denies any fever or chills today. Patient has expected postop discomfort. Patient has drains in left lower quadrant and right abdomen. CT the abdomen/pelvis/chest yesterday showed large 9 cm fluid collection in the left upper abdomen/pelvis. BUN/creatinine 4/0.5 with a serum sodium 128. WBC 16.9 with hemoglobin 9.6. Temperature ranges 98.7101.5. Heart rate 91 with blood pressure 116/70. O2 sat 96% on room air at rest.    2/20/2022  Patient seen examined on medical surgical floor. Patient with expected postop discomfort. The left quadrant drain has not put out much over the last 24 hours. Patient denies any problem with any chest pain, dizziness or unusual shortness of breath. BUN creatinine 60/0.5 with sodium 132. WBC is 14.1 with hemoglobin 9.6.   Temperature is 98.1 with heart rate 96 and blood pressure 100/63. O2 sat 98% room air at rest.  Urinary output is adequate. 2/21/2022  Patient seen examined on medical surgical floor. Patient states she is feeling little bit better today. Patient still has some abdominal pain. It is improved since she had the  drains inserted. She denies any problem with nausea/vomiting, chest pain or unusual shortness of breath. BUN/creatinine 3/0.5. Transaminases normal with a WBC 14.5 and hemoglobin 9.1. Temperature is 98.5 with heart rate of 65 blood pressure 111/67. O2 sat 93-97% room air at rest.  Urinary output is adequate. Ostomy output is improved with range of 300-400 cc a shift. Abdominal drains ranging 20-50 cc a shift. Past Medical History:    Past Medical History:   Diagnosis Date    Acid reflux disease     Cyst of ovary     Fracture of nasal bone     Headache     Hearing loss     Hypertension     Migraine     Morbidly obese ([address] 10/05/2020    Multiple sclerosis ([facility]     denies limitations at present 11/2020    VEENA no CPAP     severe VEENA per pt    Right shoulder pain 11/2020    Tinnitus     TMJ dysfunction      Past Surgical History:    Past Surgical History:   Procedure Laterality Date    APPENDECTOMY      BACK SURGERY      lumbar    BICEPS TENDON REPAIR Right 11/11/2020    BICEPS TENODESIS performed by Osbaldo Cole MD at [place] [id number] Left 8/31/2021    LAPAROSCOPIC LEFT HEMICOLECTOMY WITH LOOP OSTOMY performed by Jerica Hurtado MD at [address] 9/6/2021    DIAGNOSTIC LAPAROSCOPY POSSIBLE BOWEL RESECTION POSSILBE OSTOMY performed by Jerica Hurtado MD at [address] 2/8/2022    LAPAROSCOPIC SIGMOID COLON RESECTION performed by Jerica Hurtado MD at [address] [facility]  03/05/2018    Robotic hysterectomy, bilateral salpingectomy, right oophorectomy [facility] [place] [facility] [place] [address] HYSTERECTOMY, TOTAL ABDOMINAL      LARYNGOSCOPY N/A 7/17/2020    DIRECT LARYNGOSCOPY--OMNI GUIDE LASER performed by Deysi Hernandez MD at [facility] PARTIAL HYSTERECTOMY      PROCTOSIGMOIDOSCOPY N/A 10/12/2021    ANAL PROCTO SIGMOIDOSCOPY FLEXIBLE performed by Wanda Ha MD at [address] [place] [address] N/A 1/25/2022    ANAL PROCTO [address] performed by Wanda Ha MD at [facility] ARTHROSCOPY Right 11/11/2020    RIGHT SHOULDER DIAGNOSTIC ARTHROSCOPY WITH DECOMPRESSION ROTATOR CUFF REPAIR performed by Chadd Monte MD at [place] [address]         Medications Prior to Admission:    @  Prior to Admission medications    Medication Sig Start Date End Date Taking? Authorizing Provider   dicyclomine (BENTYL) 10 MG capsule Take 10 mg by mouth 4 times daily (before meals and nightly)    Historical Provider, MD   vitamin D (D3-50) [address]2893 UNIT CAPS Take 1 capsule by mouth once a week 11/30/21   MARIXA Reynoso CNP   nortriptyline (PAMELOR) 10 MG capsule TAKE 1 CAPSULE BY MOUTH NIGHTLY FOR HEADACHES 11/5/21   Tolu RashKIRSTEN   gabapentin (NEURONTIN) 300 MG capsule TAKE 1 CAPSULE BY MOUTH THREE TIMES DAILY 9/13/21 1/31/22  MARIXA Reynoso CNP   dexlansoprazole (DEXILANT) 60 MG CPDR delayed release capsule Take 60 mg by mouth daily    Historical Provider, MD   baclofen (LIORESAL) 20 MG tablet Take 1 tablet by mouth 4 times daily as needed (muscle spasms; pain) 8/13/21   MARIXA Reynoso CNP   rOPINIRole (REQUIP) 0.5 MG tablet Take 1 tablet by mouth 2 times daily as needed (restless legs) 8/13/21   MARIXA Reynoso CNP   ocrelizumab (OCREVUS) 300 MG/10ML SOLN injection Infuse 20 mLs intravenously every 6 months 7/19/21   MARIXA Reynoso CNP       Allergies:  Patient has no known allergies.     Social History:   Social History     Socioeconomic History    Marital status: Single     Spouse name: Not on file    Number of children: 3    Years of education: 6    Highest education level: 11th grade   Occupational History    Not on file   Tobacco Use    Smoking status: Current Every Day Smoker     Packs/day: 0.50     Years: 25.00     Pack years: 12.50     Types: Cigarettes    Smokeless tobacco: Never Used    Tobacco comment: Ready to stop, requesting prescription for Chantix   Vaping Use    Vaping Use: Never used   Substance and Sexual Activity    Alcohol use: Yes     Alcohol/week: 1.0 standard drink     Types: 1 Glasses of wine per week     Comment: socially    Drug use: No    Sexual activity: Yes     Partners: Male   Other Topics Concern    Not on file   Social History Narrative    Uses [employer] for transportation    [facility]     Social Determinants of Health     Financial Resource Strain: Medium Risk    Difficulty of Paying Living Expenses: Somewhat hard   Food Insecurity: Food Insecurity Present    Worried About Running Out of Food in the Last Year: Sometimes true    [place] of Food in the Last Year: Never true   Transportation Needs: No Transportation Needs    Lack of Transportation (Medical): No    Lack of Transportation (Non-Medical): No   Physical Activity: Sufficiently Active    Days of Exercise per Week: 3 days    Minutes of Exercise per Session: 60 min   Stress: No Stress Concern Present    Feeling of Stress : Not at all   Social Connections: Socially Isolated    Frequency of Communication with Friends and Family:  Three times a week    Frequency of Social Gatherings with Friends and Family: Twice a week    Attends Jewish Services: Never    Active Member of Clubs or Organizations: No    Attends Club or Organization Meetings: Never    Marital Status: Never    Intimate Partner Violence:     Fear of Current or Ex-Partner: Not on file    Emotionally Abused: Not on file    Physically Abused: Not on file    Sexually Abused: Not on file   Housing Stability:     Unable to Pay for Housing in the Last Year: Not on file    Number of Places Lived in the Last Year: Not on file    Unstable Housing in the Last Year: Not on file       Family History:   Family History   Problem Relation Age of Onset    Mult Sclerosis Mother     Colon Cancer Neg Hx     Uterine Cancer Neg Hx     Ovarian Cancer Neg Hx     Breast Cancer Neg Hx        REVIEW OF SYSTEMS:    Gen: Patient denies any lightheadedness or dizziness. No LOC or syncope. No fevers or chills. HEENT: No earache, sore throat or nasal congestion. Resp: Denies cough, hemoptysis or sputum production. Cardiac: Denies chest pain, SOB, diaphoresis or palpitations. GI:+ Abdominal distention. Lower abdominal cramping. No nausea, vomiting, diarrhea or constipation. No melena or hematochezia. : No urinary complaints, dysuria, hematuria or frequency. MSK: No extremity weakness, paralysis or paresthesias. PHYSICAL EXAM:    Vitals:  /67   Pulse 65   Temp 98.5 °F (36.9 °C) (Oral)   Resp 20   Ht 5' 2\" (1.575 m)   Wt 187 lb (84.8 kg)   LMP 01/05/2018   SpO2 93%   BMI 34.20 kg/m²     General:  This is a 51 y.o. yo female who is alert and oriented in postop distress  HEENT:  Head is normocephalic and atraumatic, PERRLA, EOMI, mucus membranes moist with no pharyngeal erythema or exudate. Neck:  Supple with no carotid bruits, JVD or thyromegaly.   No cervical adenopathy  CV:  Regular rate and rhythm, no murmurs  Lungs: Mildly coarse breath sounds to auscultation bilaterally with no wheezes, rales or rhonchi  Abdomen:  + Expected postop abdomen, + ileostomy,+ abdominal fat, bowel sounds  hypoactive  Extremities:  No edema, peripheral pulses intact bilaterally  Neuro:  Cranial nerves II-XII grossly intact; motor and sensory function intact with no focal deficits  Skin:  No rashes, lesions or wounds      DATA:  CBC with Differential:    Lab Results   Component Value Date    WBC 14.5 02/21/2022    RBC 2.96 02/21/2022    HGB 9.1 02/21/2022    HCT 28.2 02/21/2022     02/21/2022    MCV 95.3 02/21/2022    MCH 30.7 02/21/2022    MCHC 32.3 02/21/2022    RDW 13.9 02/21/2022    METASPCT 0.9 11/23/2021    LYMPHOPCT 23.2 02/02/2022    MONOPCT 6.7 02/02/2022    BASOPCT 0.4 02/02/2022    MONOSABS 0.62 02/02/2022    LYMPHSABS 2.13 02/02/2022    EOSABS 0.20 02/02/2022    BASOSABS 0.04 02/02/2022     CMP:    Lab Results   Component Value Date     02/21/2022    K 4.2 02/21/2022    K 3.7 02/09/2022     02/21/2022    CO2 23 02/21/2022    BUN 3 02/21/2022    CREATININE 0.5 02/21/2022    GFRAA >60 02/21/2022    LABGLOM >60 02/21/2022    GLUCOSE 114 02/21/2022    PROT 5.5 02/21/2022    LABALBU 2.5 02/21/2022    CALCIUM 8.2 02/21/2022    BILITOT 0.7 02/21/2022    ALKPHOS 106 02/21/2022    AST 18 02/21/2022    ALT 16 02/21/2022     Magnesium:    Lab Results   Component Value Date    MG 2.0 02/18/2022     Phosphorus:    Lab Results   Component Value Date    PHOS 3.2 09/01/2021     PT/INR:    Lab Results   Component Value Date    PROTIME 10.7 02/28/2020    INR 1.0 02/28/2020     Troponin:    Lab Results   Component Value Date    TROPONINI <0.01 07/20/2020     U/A:    Lab Results   Component Value Date    COLORU Yellow 02/17/2022    PROTEINU Negative 02/17/2022    PHUR 8.0 02/17/2022    WBCUA 0-1 02/17/2022    RBCUA 1-3 02/17/2022    TRICHOMONAS Present 02/26/2020    BACTERIA RARE 02/17/2022    CLARITYU Clear 02/17/2022    SPECGRAV 1.010 02/17/2022    LEUKOCYTESUR TRACE 02/17/2022    UROBILINOGEN 0.2 02/17/2022    BILIRUBINUR Negative 02/17/2022    BLOODU TRACE 02/17/2022    GLUCOSEU Negative 02/17/2022    AMORPHOUS FEW 11/23/2021     ABG:  No results found for: PH, PCO2, PO2, HCO3, BE, THGB, TCO2, O2SAT  HgBA1c:    Lab Results   Component Value Date    LABA1C 5.4 05/11/2021     FLP:    Lab Results   Component Value Date    TRIG 184 05/11/2021    HDL 44 05/11/2021    LDLCALC 144 05/11/2021    LABVLDL 37 05/11/2021     TSH:    Lab Results Component Value Date    TSH 0.293 09/01/2021     IRON:  No results found for: IRON  LIPASE:    Lab Results   Component Value Date    LIPASE 10 09/05/2021       ASSESSMENT AND PLAN:      Patient Active Problem List    Diagnosis Date Noted    Sigmoid stricture [facility] [address]     Pelvic abscess in female     Ileus, postoperative ([address] 09/05/2021    S/P colectomy 08/31/2021    Malignant neoplasm of descending colon ([address]     Secondary restless legs syndrome 08/13/2021    Palafox's esophagus with dysplasia 05/11/2021    Morbidly obese ([address] 10/05/2020    Multiple sclerosis ([facility] [address] 08/04/2020    Vocal cord dysfunction 07/20/2020     Impression:  1. Sigmoid stricturelaparoscopic sigmoid resection 2/8/2022  2. History of VEENA-patient apparently been tested and is negative for CPAP use at this time  3. Obesity  4. Hypertension   5. History of current tobacco use  6. History of restless leg syndrome  7. Leukocytosis postop  8. Mild elevation transaminase postop  9. Hyponatremia postop  7. Left lower quadrant abdomen abscess postopCT-guided placement of drain 2/18    Plan:  Patient admitted to medical surgical floor  Home medications reviewed  Monitor heart rate, blood pressure, O2 saturation  Diet and abdominal pain meds per general surgery  Lovenox 40 mg subcu daily  IV fluids lactated Ringer's 100 cc an hour    Albuterol aerosol 2.5 mg every 4 hours as needed for shortness of breath    CT of the abdomen pelvis 2/11  Serum osmolality274        Decrease KCl 20 mEq-daily    IV Cipro/Flagylday 5    CT the chest with contrast, CT the abdomen with IV and oral contrast t-9 cm abscess left lower quadrant. Consult infectious disease  Decrease IV fluids normal saline 20 KCl 50 cc an hour   IV Zosyn, Diflucan      BMP, CBC in a.m.     Ruthie Perez DO, D.OWagner  2/21/2022  9:55 AM

## 2022-02-21 NOTE — PROGRESS NOTES
General Surgery Progress Note  [facility]    Patient's Name/Date of Birth: Monae Munoz / 1975    Date: February 21, 2022     Surgeon: Madison Jackson MD    Chief Complaint: s/p colectomy    Patient Active Problem List   Diagnosis    Vocal cord dysfunction    Multiple sclerosis ([address]    Morbidly obese ([address]    Palafox's esophagus with dysplasia    Secondary restless legs syndrome    S/P colectomy    Malignant neoplasm of descending colon ([address]    Ileus, postoperative ([address]    Pelvic abscess in female    Sigmoid stricture ([address]       Subjective: just feels sore around drain sites. No nausea, tolerating limited diet but she states this is no different than when she is home. Objective:  /67   Pulse 65   Temp 98.5 °F (36.9 °C) (Oral)   Resp 20   Ht 5' 2\" (1.575 m)   Wt 187 lb (84.8 kg)   LMP 01/05/2018   SpO2 97%   BMI 34.20 kg/m²   Labs:  Recent Labs     02/19/22  0428 02/20/22  0432 02/21/22  0438   WBC 16.9* 14.1* 14.5*   HGB 9.6* 9.6* 9.1*   HCT 29.6* 30.0* 28.2*     Lab Results   Component Value Date    CREATININE 0.5 02/21/2022    BUN 3 (L) 02/21/2022     02/21/2022    K 4.2 02/21/2022     02/21/2022    CO2 23 02/21/2022     No results for input(s): LIPASE, AMYLASE in the last 72 hours.   CBC with Differential:    Lab Results   Component Value Date    WBC 14.5 02/21/2022    RBC 2.96 02/21/2022    HGB 9.1 02/21/2022    HCT 28.2 02/21/2022     02/21/2022    MCV 95.3 02/21/2022    MCH 30.7 02/21/2022    MCHC 32.3 02/21/2022    RDW 13.9 02/21/2022    METASPCT 0.9 11/23/2021    LYMPHOPCT 23.2 02/02/2022    MONOPCT 6.7 02/02/2022    BASOPCT 0.4 02/02/2022    MONOSABS 0.62 02/02/2022    LYMPHSABS 2.13 02/02/2022    EOSABS 0.20 02/02/2022    BASOSABS 0.04 02/02/2022     CMP:    Lab Results   Component Value Date     02/21/2022    K 4.2 02/21/2022    K 3.7 02/09/2022     02/21/2022    CO2 23 02/21/2022    BUN 3 02/21/2022    CREATININE 0.5 02/21/2022 GFRAA >60 02/21/2022    LABGLOM >60 02/21/2022    GLUCOSE 114 02/21/2022    PROT 5.5 02/21/2022    LABALBU 2.5 02/21/2022    CALCIUM 8.2 02/21/2022    BILITOT 0.7 02/21/2022    ALKPHOS 106 02/21/2022    AST 18 02/21/2022    ALT 16 02/21/2022       General appearance:  NAD  Head: NCAT, PERRLA, EOMI, red conjunctiva  Neck: supple, no masses  Lungs: CTAB, equal chest rise bilateral  Heart: Reg rate  Abdomen: soft, nondistended, tender mostly left lower quadrant around drain. No guarding or rebound. Right abdomen ileostomy with liquid output. LLQ and RLQ drains purulent  Skin; no lesions  Gu: no cva tenderness  Extremities: extremities normal, atraumatic, no cyanosis or edema      Assessment/Plan:  Kimberly Flores is a 51 y.o. female postop day 13 status post laparoscopic sigmoid colon resection for sigmoid stricture, now status post IR drainage of pelvic abscess and right abdominal abscess    Diet as tolerated  Continue antibiotics - ID recs appreciated - awaiting final cultures   Monitor ileostomy output   Ambulate       Reymundo Glover MD  2/21/2022  7:32 AM    As above.

## 2022-02-22 ENCOUNTER — APPOINTMENT (OUTPATIENT)
Dept: CT IMAGING | Age: 47
DRG: 231 | End: 2022-02-22
Attending: SURGERY
Payer: COMMERCIAL

## 2022-02-22 LAB
BODY FLUID CULTURE, STERILE: ABNORMAL
GRAM STAIN RESULT: ABNORMAL
HCT VFR BLD CALC: 30 % (ref 34–48)
HEMOGLOBIN: 9.3 G/DL (ref 11.5–15.5)
MCH RBC QN AUTO: 30.5 PG (ref 26–35)
MCHC RBC AUTO-ENTMCNC: 31 % (ref 32–34.5)
MCV RBC AUTO: 98.4 FL (ref 80–99.9)
ORGANISM: ABNORMAL
PDW BLD-RTO: 13.9 FL (ref 11.5–15)
PLATELET # BLD: 584 E9/L (ref 130–450)
PMV BLD AUTO: 9.7 FL (ref 7–12)
RBC # BLD: 3.05 E12/L (ref 3.5–5.5)
WBC # BLD: 16.8 E9/L (ref 4.5–11.5)

## 2022-02-22 PROCEDURE — 36569 INSJ PICC 5 YR+ W/O IMAGING: CPT

## 2022-02-22 PROCEDURE — 6360000002 HC RX W HCPCS: Performed by: SURGERY

## 2022-02-22 PROCEDURE — 2580000003 HC RX 258: Performed by: SPECIALIST

## 2022-02-22 PROCEDURE — 6360000002 HC RX W HCPCS: Performed by: SPECIALIST

## 2022-02-22 PROCEDURE — 6370000000 HC RX 637 (ALT 250 FOR IP): Performed by: STUDENT IN AN ORGANIZED HEALTH CARE EDUCATION/TRAINING PROGRAM

## 2022-02-22 PROCEDURE — 74177 CT ABD & PELVIS W/CONTRAST: CPT

## 2022-02-22 PROCEDURE — 6360000002 HC RX W HCPCS: Performed by: INTERNAL MEDICINE

## 2022-02-22 PROCEDURE — 6370000000 HC RX 637 (ALT 250 FOR IP): Performed by: SURGERY

## 2022-02-22 PROCEDURE — 36415 COLL VENOUS BLD VENIPUNCTURE: CPT

## 2022-02-22 PROCEDURE — 6360000004 HC RX CONTRAST MEDICATION: Performed by: RADIOLOGY

## 2022-02-22 PROCEDURE — 2720000010 HC SURG SUPPLY STERILE

## 2022-02-22 PROCEDURE — 1200000000 HC SEMI PRIVATE

## 2022-02-22 PROCEDURE — 85027 COMPLETE CBC AUTOMATED: CPT

## 2022-02-22 PROCEDURE — 6370000000 HC RX 637 (ALT 250 FOR IP): Performed by: INTERNAL MEDICINE

## 2022-02-22 PROCEDURE — 76937 US GUIDE VASCULAR ACCESS: CPT

## 2022-02-22 PROCEDURE — 2580000003 HC RX 258: Performed by: SURGERY

## 2022-02-22 PROCEDURE — C1751 CATH, INF, PER/CENT/MIDLINE: HCPCS

## 2022-02-22 PROCEDURE — 02HV33Z INSERTION OF INFUSION DEVICE INTO SUPERIOR VENA CAVA, PERCUTANEOUS APPROACH: ICD-10-PCS | Performed by: SURGERY

## 2022-02-22 RX ADMIN — CALCIUM POLYCARBOPHIL 625 MG 625 MG: 625 TABLET ORAL at 08:17

## 2022-02-22 RX ADMIN — SODIUM CHLORIDE 25 ML: 9 INJECTION, SOLUTION INTRAVENOUS at 20:46

## 2022-02-22 RX ADMIN — POTASSIUM CHLORIDE 20 MEQ: 1500 TABLET, EXTENDED RELEASE ORAL at 08:11

## 2022-02-22 RX ADMIN — LOPERAMIDE HYDROCHLORIDE 2 MG: 2 CAPSULE ORAL at 08:11

## 2022-02-22 RX ADMIN — CALCIUM POLYCARBOPHIL 625 MG 625 MG: 625 TABLET ORAL at 20:35

## 2022-02-22 RX ADMIN — PANTOPRAZOLE SODIUM 40 MG: 40 TABLET, DELAYED RELEASE ORAL at 05:53

## 2022-02-22 RX ADMIN — MORPHINE SULFATE 4 MG: 4 INJECTION, SOLUTION INTRAMUSCULAR; INTRAVENOUS at 20:35

## 2022-02-22 RX ADMIN — IOPAMIDOL 75 ML: 755 INJECTION, SOLUTION INTRAVENOUS at 12:57

## 2022-02-22 RX ADMIN — MORPHINE SULFATE 4 MG: 4 INJECTION, SOLUTION INTRAMUSCULAR; INTRAVENOUS at 11:48

## 2022-02-22 RX ADMIN — MORPHINE SULFATE 2 MG: 2 INJECTION, SOLUTION INTRAMUSCULAR; INTRAVENOUS at 18:02

## 2022-02-22 RX ADMIN — METHOCARBAMOL 500 MG: 500 TABLET ORAL at 08:11

## 2022-02-22 RX ADMIN — POTASSIUM CHLORIDE AND SODIUM CHLORIDE: 900; 150 INJECTION, SOLUTION INTRAVENOUS at 22:25

## 2022-02-22 RX ADMIN — Medication 10 ML: at 20:39

## 2022-02-22 RX ADMIN — PANTOPRAZOLE SODIUM 40 MG: 40 TABLET, DELAYED RELEASE ORAL at 17:19

## 2022-02-22 RX ADMIN — PIPERACILLIN SODIUM AND TAZOBACTAM SODIUM 3375 MG: 3; 375 INJECTION, POWDER, FOR SOLUTION INTRAVENOUS at 08:15

## 2022-02-22 RX ADMIN — SODIUM CHLORIDE 25 ML: 9 INJECTION, SOLUTION INTRAVENOUS at 12:03

## 2022-02-22 RX ADMIN — METHOCARBAMOL 500 MG: 500 TABLET ORAL at 20:35

## 2022-02-22 RX ADMIN — PSYLLIUM HUSK 1 PACKET: 3.4 GRANULE ORAL at 20:35

## 2022-02-22 RX ADMIN — TRAMADOL HYDROCHLORIDE 50 MG: 50 TABLET, COATED ORAL at 15:34

## 2022-02-22 RX ADMIN — LOPERAMIDE HYDROCHLORIDE 2 MG: 2 CAPSULE ORAL at 20:35

## 2022-02-22 RX ADMIN — METHOCARBAMOL 500 MG: 500 TABLET ORAL at 17:19

## 2022-02-22 RX ADMIN — PSYLLIUM HUSK 1 PACKET: 3.4 GRANULE ORAL at 08:10

## 2022-02-22 RX ADMIN — IOHEXOL 50 ML: 240 INJECTION, SOLUTION INTRATHECAL; INTRAVASCULAR; INTRAVENOUS; ORAL at 12:57

## 2022-02-22 RX ADMIN — MORPHINE SULFATE 4 MG: 4 INJECTION, SOLUTION INTRAMUSCULAR; INTRAVENOUS at 05:53

## 2022-02-22 RX ADMIN — SODIUM CHLORIDE, PRESERVATIVE FREE 10 ML: 5 INJECTION INTRAVENOUS at 20:38

## 2022-02-22 ASSESSMENT — PAIN DESCRIPTION - LOCATION
LOCATION: ABDOMEN

## 2022-02-22 ASSESSMENT — PAIN SCALES - GENERAL
PAINLEVEL_OUTOF10: 4
PAINLEVEL_OUTOF10: 8
PAINLEVEL_OUTOF10: 8
PAINLEVEL_OUTOF10: 4
PAINLEVEL_OUTOF10: 8
PAINLEVEL_OUTOF10: 8
PAINLEVEL_OUTOF10: 0
PAINLEVEL_OUTOF10: 8

## 2022-02-22 ASSESSMENT — PAIN SCALES - WONG BAKER: WONGBAKER_NUMERICALRESPONSE: 0

## 2022-02-22 ASSESSMENT — PAIN DESCRIPTION - PAIN TYPE
TYPE: SURGICAL PAIN
TYPE: ACUTE PAIN;SURGICAL PAIN
TYPE: SURGICAL PAIN
TYPE: SURGICAL PAIN

## 2022-02-22 ASSESSMENT — PAIN DESCRIPTION - DESCRIPTORS
DESCRIPTORS: CRAMPING
DESCRIPTORS: ACHING;CONSTANT
DESCRIPTORS: DISCOMFORT

## 2022-02-22 ASSESSMENT — PAIN DESCRIPTION - FREQUENCY
FREQUENCY: CONTINUOUS
FREQUENCY: INTERMITTENT

## 2022-02-22 ASSESSMENT — PAIN DESCRIPTION - ORIENTATION
ORIENTATION: RIGHT;LEFT;ANTERIOR;UPPER
ORIENTATION: RIGHT;LEFT;UPPER;LOWER

## 2022-02-22 ASSESSMENT — PAIN DESCRIPTION - PROGRESSION
CLINICAL_PROGRESSION: GRADUALLY WORSENING
CLINICAL_PROGRESSION: GRADUALLY IMPROVING

## 2022-02-22 ASSESSMENT — PAIN DESCRIPTION - ONSET: ONSET: GRADUAL

## 2022-02-22 NOTE — PROCEDURES
TONY power PICC Placement 2/22/2022    Product number: ask [id number] mhbv   Lot Number: [id number]   Consult: home abx   Ultrasound: yes   Left Basilic vein:                Upper Arm Circumference: 37cm    Size: 4.5fr    Exposed Length: 0    Internal Length: 40cm   Cut: 40cm   Vein Measurement: 0.60cm    Consent obtained  Risks and benefits explained  Time out prior to procedure  Tolerated well  Tip of cath in lower 1/3 SVC @ CAJ via VPS  Brisk blood return  Flushed well and capped  RN notified    Cornelious Mcardle, RN  2/22/2022  11:29 AM

## 2022-02-22 NOTE — PROGRESS NOTES
General Surgery Progress Note  [facility]    Patient's Name/Date of Birth: Dyanne Peabody / 1975    Date: February 22, 2022     Surgeon: Maryann Sierra MD    Chief Complaint: s/p colectomy    Patient Active Problem List   Diagnosis    Vocal cord dysfunction    Multiple sclerosis ([address]    Morbidly obese ([address]    Palafox's esophagus with dysplasia    Secondary restless legs syndrome    S/P colectomy    Malignant neoplasm of descending colon ([address]    Ileus, postoperative ([address]    Pelvic abscess in female    Sigmoid stricture (HCC)       Subjective: same cramping pain around LLQ drain. Tolerated some solid food yesterday. Having adequate ostomy output. Objective:  /71   Pulse 88   Temp 98.1 °F (36.7 °C) (Oral)   Resp 16   Ht 5' 2\" (1.575 m)   Wt 187 lb (84.8 kg)   LMP 01/05/2018   SpO2 98%   BMI 34.20 kg/m²   Labs:  Recent Labs     02/20/22  0432 02/21/22  0438 02/22/22  0428   WBC 14.1* 14.5* 16.8*   HGB 9.6* 9.1* 9.3*   HCT 30.0* 28.2* 30.0*     Lab Results   Component Value Date    CREATININE 0.5 02/21/2022    BUN 3 (L) 02/21/2022     02/21/2022    K 4.2 02/21/2022     02/21/2022    CO2 23 02/21/2022     No results for input(s): LIPASE, AMYLASE in the last 72 hours.   CBC with Differential:    Lab Results   Component Value Date    WBC 16.8 02/22/2022    RBC 3.05 02/22/2022    HGB 9.3 02/22/2022    HCT 30.0 02/22/2022     02/22/2022    MCV 98.4 02/22/2022    MCH 30.5 02/22/2022    MCHC 31.0 02/22/2022    RDW 13.9 02/22/2022    METASPCT 0.9 11/23/2021    LYMPHOPCT 23.2 02/02/2022    MONOPCT 6.7 02/02/2022    BASOPCT 0.4 02/02/2022    MONOSABS 0.62 02/02/2022    LYMPHSABS 2.13 02/02/2022    EOSABS 0.20 02/02/2022    BASOSABS 0.04 02/02/2022     CMP:    Lab Results   Component Value Date     02/21/2022    K 4.2 02/21/2022    K 3.7 02/09/2022     02/21/2022    CO2 23 02/21/2022    BUN 3 02/21/2022    CREATININE 0.5 02/21/2022    GFRAA >60 02/21/2022 LABGLOM >60 02/21/2022    GLUCOSE 114 02/21/2022    PROT 5.5 02/21/2022    LABALBU 2.5 02/21/2022    CALCIUM 8.2 02/21/2022    BILITOT 0.7 02/21/2022    ALKPHOS 106 02/21/2022    AST 18 02/21/2022    ALT 16 02/21/2022       General appearance:  NAD  Head: NCAT, PERRLA, EOMI, red conjunctiva  Neck: supple, no masses  Lungs: CTAB, equal chest rise bilateral  Heart: Reg rate  Abdomen: soft, nondistended, tender mostly left lower quadrant around drain. No guarding or rebound. Right abdomen ileostomy with liquid output.  LLQ and RLQ drains purulent  Skin; no lesions  Gu: no cva tenderness  Extremities: extremities normal, atraumatic, no cyanosis or edema      Assessment/Plan:  Miya Whitehead is a 51 y.o. female postop day 14 status post laparoscopic sigmoid colon resection for sigmoid stricture, now status post IR drainage of pelvic abscess and right abdominal abscess    Diet as tolerated  Continue antibiotics per ID  Ambulate   PICC and discharge planning      Brant Benavides MD  2/22/2022  6:56 AM    Stable overall however WBC increasing  IR drain check vs repeat CT  PICC for abx - appreciate ID input    Ananth Medina MD

## 2022-02-22 NOTE — PLAN OF CARE
Problem: Infection - Surgical Site:  Goal: Will show no infection signs and symptoms  Description: Will show no infection signs and symptoms  Outcome: Met This Shift     Problem: Pain:  Goal: Pain level will decrease  Description: Pain level will decrease  Outcome: Met This Shift  Goal: Control of acute pain  Description: Control of acute pain  Outcome: Met This Shift     Problem:  Bowel/Gastric:  Goal: Control of bowel function will improve  Description: Control of bowel function will improve  Outcome: Met This Shift  Goal: Ability to achieve a regular elimination pattern will improve  Description: Ability to achieve a regular elimination pattern will improve  Outcome: Met This Shift     Problem: Nutritional:  Goal: Ability to follow a diet with enough fiber (20 to 30 grams) for normal bowel function will improve  Description: Ability to follow a diet with enough fiber (20 to 30 grams) for normal bowel function will improve  Outcome: Met This Shift     Problem: Fluid Volume:  Goal: Ability to achieve a balanced intake and output will improve  Description: Ability to achieve a balanced intake and output will improve  Outcome: Met This Shift     Problem: Physical Regulation:  Goal: Ability to maintain clinical measurements within normal limits will improve  Description: Ability to maintain clinical measurements within normal limits will improve  Outcome: Met This Shift  Goal: Will show no signs and symptoms of electrolyte imbalance  Description: Will show no signs and symptoms of electrolyte imbalance  Outcome: Met This Shift     Problem: SAFETY  Goal: Free from accidental physical injury  Outcome: Met This Shift     Problem: DAILY CARE  Goal: Daily care needs are met  Outcome: Met This Shift     Problem: PAIN  Goal: Patient's pain/discomfort is manageable  Outcome: Met This Shift     Problem: SKIN INTEGRITY  Goal: Skin integrity is maintained or improved  Outcome: Met This Shift     Problem: KNOWLEDGE DEFICIT  Goal: Patient/S.O. demonstrates understanding of disease process, treatment plan, medications, and discharge instructions.   Outcome: Met This Shift

## 2022-02-22 NOTE — PROGRESS NOTES
Department of Internal Medicine        HISTORY OF PRESENT ILLNESS:      The patient is a 51 y.o. female who presents with having a elective laparoscopic sigmoid colon resection. Patient has a known history of sigmoid stricture. Patient had a anal proctosigmoidoscopy performed 1/25 with a scope with past 15 cm work there was complete closure of the rectosigmoid junction without an opening. Patient is seen postop. Patient has expected postop discomfort. Temperature is 97.8 with heart rate 72 blood pressure 140/65. O2 sat 98% on 2 L nasal cannula. 2/9/2022  Patient seen and examined on medical surgical floor. Patient complains of postop discomfort. She denies any problem with chest pain, nausea/vomiting or unusual shortness of breath. BUN/creatinine 5/0.6 with fasting blood sugar 126. Mild elevation of transaminase with a ALT of 69 AST is 35. Review of records have shown patient has intermittent elevation transaminase over the past 18 months. WBC is 21.5 with hemoglobin 12.6. Temperature 98.3 with heart rate 84 blood pressure 107/69. O2 sat 96% on room air at rest.  Urine output is very good. General surgery note reviewed. 2/10/2022  Patient seen and examined on medical surgical floor. Patient complains of postop discomfort. Patient denies any problem with chest pain, dizziness, nausea, fever/chills, dysuria, cough or unusual shortness of breath. Patient is passing some gas but no bowel movements. Increase activity today. BUN/creatinine 5/0.7 with serum sodium 130. WBC is still elevated 23.7 with hemoglobin 14.3. Temperature is 98.8 with heart rate 76 blood pressure 110/78. O2 sat 94% on room air at rest.  Urine output is adequate. 2/11/2022  Patient seen examined on medical surgical floor. Patient complains of persistent postop discomfort along with some nausea and poor appetite. Patient denies passing had any bowel movements or gas recently.   BUN/creatinine 6/0.6 with serum sodium 124 7. Potassium 3.4 with WBC 24.6 and hemoglobin 13.7. Temperature is 98.6nine 9.3 with heart rate of 99. O2 sat 98% on room air at rest.  General surgery note reviewed. CT of the abdomen pelvis was ordered for today. 2/12/2022  Patient seen examined on medical surgical floor. Patient states the abdominal distention seems to be improved. Patient still having postop discomfort and is mildly improved. Patient denies any chest pain, dizziness or unusual shortness of breath. Patient did have some nausea last night with supper. Patient denies any bowel movements at this time. CT of the abdomen pelvis from yesterday suggested ileus but cannot rule out obstruction. Temperature 98 with heart rate of 97 with blood pressure 124/67 O2 sat 93% on room air at rest.  WBC is 14.8 with hemoglobin 11.8. BUN/creatinine 7/0.6. Serum sodium increased to 134 from 127 yesterday. 2/13/2022  Patient seen examined on surgical medical floor. Patient still smokes postop discomfort. It seems to be slowly improving. Patient still has some abdominal distention intermittently. Patient says she is drinking fairly good but is not eating very much. She denies any chest pain or unusual shortness of breath at rest.  Serum sodium is 129 with BUN/creatinine 4/0.5. Serum potassium 3.4. WBC 12.9 hemoglobin 11.5. Temperature is 99.1 with a heart rate of 100 and blood pressure 140/77. O2 sat 92% room air at rest.  Check O2 saturation with activity on room air. 2/14/2022  Patient seen and examined on medical surgical floor. Patient still complain of some lower abdominal cramping. She denies any chest pain, nausea/vomiting, shortness of breath. Patient denies any dysuria or cough. BUN/creatinine 4/0.6 with serum sodium 128. WBC 15.4 today with a hemoglobin 1.7. Temperature 100.3 with heart rate of 100 and blood pressure 156/72. O2 sat 93% on room air at rest.  Patient had out about 1850 cc from ostomy yesterday afternoon. General surgery note reviewed. With patient persistent tach and leukocytosis will check UA and chest x-ray. 2/15/2022  Patient seen examined on medical surgical floor. Patient states she is slowly improving. Patient denies any chest pain, dizziness or unusual shortness of breath. Patient abdominal pain is slowly improving. Ostomy output ranges 250-1000 cc a shift. Temperature ranges 98.7100 with heart rate 98 blood pressure 143/78. O2 sat 95% on room air at rest.  BUN/creatinine was 6/0.6 with serum sodium 128. Potassium 3.3 with WBCs 10.1 hemoglobin 10.5. Urinalysis yesterday shows greater than 80 ketones with trace of leukocyte esterase with 510 WBCs and few bacteria. Chest x-ray yesterday showed bibasilar pleural and parenchymal changes with bilateral pleural effusion. Patient still with poor appetite. Abdomen shows moderate distention with hypoactive bowel sounds. 2/16/2022  Patient seen examined on medical surgical floor. Patient states she is feeling better with expected postop discomfort. Patient denies any chest pain, dizziness or unusual shortness of breath. BUN/creatinine 7/0.6. Serum sodium is 130. Total bili is increased to 1.9 today with direct bilirubin slightly increased 1.1. WBC is 11 with a hemoglobin 10.7. Temperature still 100 degrees with heart rate 91 and blood pressure 114/63. O2 sat 91-96% on room air. Patient still with increased output from ostomy range of 300-670 cc a shift. 2/17/2022  Patient seen examined on medical surgical floor. Patient states that her abdominal discomfort is slowly improving. She denies any chest pain, nausea/vomiting or unusual shortness of breath. serum potassium still low at 2.9 even with the patient receiving supplemental potassium 3 times a day. BUN/creatinine 6/0.5 with serum sodium 130. Total bili is improved to 1.1 with a WBC 14.9 hemoglobin 9.6. Urinalysis improved but still shows a trace of ketones.   Temperature ranges 98. 899.3 with a heart rate of 100 and blood pressure 119/70. O2 sat 93-96% on room air at rest.  Urinary output is fairly good with the patient having improvement in ostomy output ranging from 150-800 cc a shift. Patient started on IV fluids of 40 KCl at 100 cc an hour. Repeat BMP today at 4 PM to monitor serum potassium. 2/18/2022  Patient seen examined on medical surgical floor. Patient still with right-sided abdominal pain which is not worsening but not improving. Patient denies any cough. There is no dysuria. Patient does have some intermittent fevers and chills. WBC increased again today 17.9 with hemoglobin 9.3. Serum sodium is 130 with BUN/creatinine 4/0.6. Serum sodium is improved to 23.7. Liver enzymes are normal.  Temp 100.6 with heart rate 100 blood pressure 106/70. O2 sat 97% room air at rest.  Urine output is fairly good. Ostomy output improved ranges 250-350 cc a shift. Patient is set up for a CT of the abdomen and chest with IV and oral contrast.    2/19/2022   Patient seen examined on medical surgical floor. Reviewed CT scan with the patient yesterday. Patient denies any problem with chest pain or dizziness. She denies any fever or chills today. Patient has expected postop discomfort. Patient has drains in left lower quadrant and right abdomen. CT the abdomen/pelvis/chest yesterday showed large 9 cm fluid collection in the left upper abdomen/pelvis. BUN/creatinine 4/0.5 with a serum sodium 128. WBC 16.9 with hemoglobin 9.6. Temperature ranges 98.7101.5. Heart rate 91 with blood pressure 116/70. O2 sat 96% on room air at rest.    2/20/2022  Patient seen examined on medical surgical floor. Patient with expected postop discomfort. The left quadrant drain has not put out much over the last 24 hours. Patient denies any problem with any chest pain, dizziness or unusual shortness of breath. BUN creatinine 60/0.5 with sodium 132. WBC is 14.1 with hemoglobin 9.6.   Temperature is 98.1 with heart rate 96 and blood pressure 100/63. O2 sat 98% room air at rest.  Urinary output is adequate. 2/21/2022  Patient seen examined on medical surgical floor. Patient states she is feeling little bit better today. Patient still has some abdominal pain. It is improved since she had the  drains inserted. She denies any problem with nausea/vomiting, chest pain or unusual shortness of breath. BUN/creatinine 3/0.5. Transaminases normal with a WBC 14.5 and hemoglobin 9.1. Temperature is 98.5 with heart rate of 65 blood pressure 111/67. O2 sat 93-97% room air at rest.  Urinary output is adequate. Ostomy output is improved with range of 300-400 cc a shift. Abdominal drains ranging 20-50 cc a shift. 2/22/2022  Patient seen examined on medical surgical floor. Patient still with some abdominal discomfort more left upper mid quadrant. Patient's appetite though is still fairly good. Patient denies any chest pain or unusual shortness of breath. WBC increased 16.8 today with hemoglobin 9.3. Temperature is 98.1 with heart rate 88 blood pressure 140/71. O2 sat 98% on room air at rest.  Urine output is fairly good. Ostomy output ranges of 100-400 cc a shift. Drain output ranges 0-35 cc a shift. Patient is set up to have repeat CT of the abdomen today and possible replacement of abdominal drain.       Past Medical History:    Past Medical History:   Diagnosis Date    Acid reflux disease     Cyst of ovary     Fracture of nasal bone     Headache     Hearing loss     Hypertension     Migraine     Morbidly obese ([address] 10/05/2020    Multiple sclerosis ([address]     denies limitations at present 11/2020    VEENA no CPAP     severe VEENA per pt    Right shoulder pain 11/2020    Tinnitus     TMJ dysfunction      Past Surgical History:    Past Surgical History:   Procedure Laterality Date    APPENDECTOMY      BACK SURGERY      lumbar    BICEPS TENDON REPAIR Right 11/11/2020    BICEPS TENODESIS performed by Adair Bhandari MD at [facility] Left 8/31/2021    LAPAROSCOPIC LEFT HEMICOLECTOMY WITH LOOP OSTOMY performed by Gris Saunders MD at [facility] 9/6/2021    DIAGNOSTIC LAPAROSCOPY POSSIBLE BOWEL RESECTION POSSILBE OSTOMY performed by Gris Saunders MD at [address] N/A 2/8/2022    LAPAROSCOPIC SIGMOID COLON RESECTION performed by Gris Saunders MD at [address]  03/05/2018    Robotic hysterectomy, bilateral salpingectomy, right oophorectomy DR. ARIANA MONIQUE [facility]     HYSTERECTOMY, TOTAL ABDOMINAL      LARYNGOSCOPY N/A 7/17/2020    DIRECT LARYNGOSCOPY--OMNI GUIDE LASER performed by Germaine Treadwell MD at [facility] PARTIAL HYSTERECTOMY      PROCTOSIGMOIDOSCOPY N/A 10/12/2021    ANAL PROCTO SIGMOIDOSCOPY FLEXIBLE performed by Gris Saunders MD at [address] [place] [address] N/A 1/25/2022    ANAL PROCTO [address] performed by Gris Saunders MD at [facility] ARTHROSCOPY Right 11/11/2020    RIGHT SHOULDER DIAGNOSTIC ARTHROSCOPY WITH DECOMPRESSION ROTATOR CUFF REPAIR performed by Adair Bhandari MD at [place] [address]         Medications Prior to Admission:    @  Prior to Admission medications    Medication Sig Start Date End Date Taking? Authorizing Provider   ertapenem [facility]) infusion Infuse 1,000 mg intravenously every 24 hours for 21 days Compound per protocol.  2/22/22 3/15/22 Yes Gloria Suarez MD   dicyclomine (BENTYL) 10 MG capsule Take 10 mg by mouth 4 times daily (before meals and nightly)    Historical Provider, MD   vitamin D (D3-50) 58676 UNIT CAPS Take 1 capsule by mouth once a week 11/30/21   MARIXA Lee - CNP   nortriptyline (PAMELOR) 10 MG capsule TAKE 1 CAPSULE BY MOUTH NIGHTLY FOR HEADACHES 11/5/21   Melita Pineda PA-C   gabapentin (NEURONTIN) 300 MG capsule TAKE 1 CAPSULE BY MOUTH THREE TIMES DAILY 9/13/21 1/31/22  MARIXA David CNP   dexlansoprazole (DEXILANT) 60 MG CPDR delayed release capsule Take 60 mg by mouth daily    Historical Provider, MD   baclofen (LIORESAL) 20 MG tablet Take 1 tablet by mouth 4 times daily as needed (muscle spasms; pain) 8/13/21   MARIXA David CNP   rOPINIRole (REQUIP) 0.5 MG tablet Take 1 tablet by mouth 2 times daily as needed (restless legs) 8/13/21   MARIXA David CNP   ocrelizumab (OCREVUS) 300 MG/10ML SOLN injection Infuse 20 mLs intravenously every 6 months 7/19/21   MARIXA David CNP       Allergies:  Patient has no known allergies. Social History:   Social History     Socioeconomic History    Marital status: Single     Spouse name: Not on file    Number of children: 3    Years of education: 6    Highest education level: 11th grade   Occupational History    Not on file   Tobacco Use    Smoking status: Current Every Day Smoker     Packs/day: 0.50     Years: 25.00     Pack years: 12.50     Types: Cigarettes    Smokeless tobacco: Never Used    Tobacco comment: Ready to stop, requesting prescription for Chantix   Vaping Use    Vaping Use: Never used   Substance and Sexual Activity    Alcohol use: Yes     Alcohol/week: 1.0 standard drink     Types: 1 Glasses of wine per week     Comment: socially    Drug use: No    Sexual activity: Yes     Partners: Male   Other Topics Concern    Not on file   Social History Narrative    Uses [employer] insurance for transportation    [employer]     Social Determinants of Health     Financial Resource Strain: Medium Risk    Difficulty of Paying Living Expenses: Somewhat hard   Food Insecurity: Food Insecurity Present    Worried About Running Out of Food in the Last Year: Sometimes true    [place] of Food in the Last Year: Never true   Transportation Needs: No Transportation Needs    Lack of Transportation (Medical): No    Lack of Transportation (Non-Medical):  No   Physical Activity: Sufficiently Active    Days of Exercise per Week: 3 days    Minutes of Exercise per Session: 60 min   Stress: No Stress Concern Present    Feeling of Stress : Not at all   Social Connections: Socially Isolated    Frequency of Communication with Friends and Family: Three times a week    Frequency of Social Gatherings with Friends and Family: Twice a week    Attends [employer] Services: Never    Active Member of Clubs or Organizations: No    Attends Club or Organization Meetings: Never    Marital Status: Never    Intimate Partner Violence:     Fear of Current or Ex-Partner: Not on file    Emotionally Abused: Not on file    Physically Abused: Not on file    Sexually Abused: Not on file   Housing Stability:     Unable to Pay for Housing in the Last Year: Not on file    Number of [place] in the Last Year: Not on file    Unstable Housing in the Last Year: Not on file       Family History:   Family History   Problem Relation Age of Onset    Mult Sclerosis Mother     Colon Cancer Neg Hx     Uterine Cancer Neg Hx     Ovarian Cancer Neg Hx     Breast Cancer Neg Hx        REVIEW OF SYSTEMS:    Gen: Patient denies any lightheadedness or dizziness. No LOC or syncope. No fevers or chills. HEENT: No earache, sore throat or nasal congestion. Resp: Denies cough, hemoptysis or sputum production. Cardiac: Denies chest pain, SOB, diaphoresis or palpitations. GI:+ Abdominal distention. Lower abdominal cramping. No nausea, vomiting, diarrhea or constipation. No melena or hematochezia. : No urinary complaints, dysuria, hematuria or frequency. MSK: No extremity weakness, paralysis or paresthesias.      PHYSICAL EXAM:    Vitals:  /71   Pulse 88   Temp 98.1 °F (36.7 °C) (Oral)   Resp 16   Ht 5' 2\" (1.575 m)   Wt 187 lb (84.8 kg)   LMP 01/05/2018   SpO2 98%   BMI 34.20 kg/m²     General:  This is a 51 y.o. yo female who is alert and oriented in postop Component Value Date    COLORU Yellow 02/17/2022    PROTEINU Negative 02/17/2022    PHUR 8.0 02/17/2022    WBCUA 0-1 02/17/2022    RBCUA 1-3 02/17/2022    TRICHOMONAS Present 02/26/2020    BACTERIA RARE 02/17/2022    CLARITYU Clear 02/17/2022    SPECGRAV 1.010 02/17/2022    LEUKOCYTESUR TRACE 02/17/2022    UROBILINOGEN 0.2 02/17/2022    BILIRUBINUR Negative 02/17/2022    BLOODU TRACE 02/17/2022    GLUCOSEU Negative 02/17/2022    AMORPHOUS FEW 11/23/2021     ABG:  No results found for: PH, PCO2, PO2, HCO3, BE, THGB, TCO2, O2SAT  HgBA1c:    Lab Results   Component Value Date    LABA1C 5.4 05/11/2021     FLP:    Lab Results   Component Value Date    TRIG 184 05/11/2021    HDL 44 05/11/2021    LDLCALC 144 05/11/2021    LABVLDL 37 05/11/2021     TSH:    Lab Results   Component Value Date    TSH 0.293 09/01/2021     IRON:  No results found for: IRON  LIPASE:    Lab Results   Component Value Date    LIPASE 10 09/05/2021       ASSESSMENT AND PLAN:      Patient Active Problem List    Diagnosis Date Noted    Sigmoid stricture ([address]     Pelvic abscess in female     Ileus, postoperative ([address] 09/05/2021    S/P colectomy 08/31/2021    Malignant neoplasm of descending colon ([address]     Secondary restless legs syndrome 08/13/2021    Palafox's esophagus with dysplasia 05/11/2021    Morbidly obese ([address] 10/05/2020    Multiple sclerosis ([address] 08/04/2020    Vocal cord dysfunction 07/20/2020     Impression:  1. Sigmoid stricturelaparoscopic sigmoid resection 2/8/2022  2. History of VEENA-patient apparently been tested and is negative for CPAP use at this time  3. Obesity  4. Hypertension   5. History of current tobacco use  6. History of restless leg syndrome  7. Leukocytosis postop  8. Mild elevation transaminase postop  9. Hyponatremia postop  7.   Left lower quadrant abdomen abscess postopCT-guided placement of drain 2/18    Plan:  Patient admitted to medical surgical floor  Home medications reviewed  Monitor heart rate, blood pressure, O2 saturation  Diet and abdominal pain meds per general surgery  Lovenox 40 mg subcu daily  IV fluids lactated Ringer's 100 cc an hour    Albuterol aerosol 2.5 mg every 4 hours as needed for shortness of breath    CT of the abdomen pelvis 2/11  Serum osmolality274        Decrease KCl 20 mEq-daily    IV Cipro/Flagylday 5    CT the chest with contrast, CT the abdomen with IV and oral contrast t-9 cm abscess left lower quadrant. Consult infectious disease  Decrease IV fluids normal saline 20 KCl 50 cc an hour   IV Zosyn, Diflucan    CT of the abdomen pelvis with oral contrast 2/22      BMP, CBC in a.m.     Jerry Valdez DO, D.OWagner  2/22/2022  10:04 AM

## 2022-02-22 NOTE — CARE COORDINATION
Cm note: received script for invanz for 3 weeks, faxed to [facility] AND [facility] care. Pt to get PICC line today. WBC elevated to 16.8. drains in place, patient states learning care from nurses. Having adequate ostomy outpt per general surgery. Plan is to return home with [facility] home care for IV antibiotics.

## 2022-02-22 NOTE — PROGRESS NOTES
[facility]  [facility]  Progress Note    NAME: Gage Mcclelland  MR:  [id number]  :   1975  DATE OF SERVICE:22    This is a face to face encounter with Gage Mcclelland 51 y.o. female on 22    CHIEF COMPLAINT     ID following for No chief complaint on file. HISTORY OF PRESENT ILLNESS   Pt with h/o colon ca/ileosotmy/coivd 10/2021-decadron -baricitnib   s/p ANAL PROCTO SIGMOIDOSCOPY FLEXIBLE  Admitted on  for LAPAROSCOPIC SIGMOID COLON RESECTION with  stapled end-to-end anastomosis and mobilization of splenic flexure. Hydb878.3 on  ezwi879.5   currently afebrile     wbc11->17.9->16.9->14.1  Cr0.6  Id was asked to see for atbx due to CT the abdomen/pelvis/chest yesterday showed large 9 cm fluid collection in the left upper abdomen/pelvis. S/p drain on    Pt has no f/c/n/vd/rash/itch      body fluid cx pending   2/15 urine cx <10K Proteus/gpo     Pt seen and examined  22   In bed has abd pain  Afebrile on ra wbc16.8 cr0.5     2022  In bed drains in place abd kiet  Left quadrant   2022  fevers better has dec wbc  Dec pain in bed     Patient is tolerating medications. No reported adverse drug reactions. REVIEW OF SYSTEMS     As stated above in the chief complaint, otherwise negative.   CURRENT MEDICATIONS      lidocaine 1 % injection  5 mL IntraDERmal Once    sodium chloride flush  10 mL IntraVENous 2 times per day    piperacillin-tazobactam  3,375 mg IntraVENous Q8H    fluconazole  400 mg IntraVENous Q24H    potassium chloride  20 mEq Oral Daily with breakfast    polycarbophil  625 mg Oral BID    loperamide  2 mg Oral BID    psyllium  1 packet Oral BID    pantoprazole  40 mg Oral BID AC    sodium chloride flush  5-40 mL IntraVENous 2 times per day    enoxaparin  40 mg SubCUTAneous Daily    methocarbamol  500 mg Oral 4x Daily     Continuous Infusions:   sodium chloride      0.9% NaCl with KCl 20 mEq 50 05/11/2021    HDL 44 05/11/2021    LDLCALC 144 05/11/2021    LABVLDL 37 05/11/2021     Lab Results   Component Value Date/Time    VITD25 32 08/18/2021 08:00 AM       Microbiology:      MRSA Culture Only   Date Value Ref Range Status   02/02/2022 Methicillin resistant Staph aureus not isolated  Final        FINAL IMPRESSION    Pt had No chief complaint on file. Admitted for Sigmoid stricture ([facility] [address] [P31.960]   complicated by sepsis intraabd abscess/Proteus   Leukocytosis up   Fevers better     Cont atbx await final drain cx prelim Proteus   piperacillin-tazobactam (ZOSYN) 3,375 mg in dextrose 5 % 50 mL IVPB extended infusion (mini-bag), Q8H  fluconazole (DIFLUCAN) in 0.9 % sodium chloride IVPB 400 mg, Q24H     PICC  Check home iv may need repeat ct a/p   For re-eval from IR    · Monitor labs    Imaging and labs were reviewed per medical records. The patient was educated about the diagnosis, prognosis, indications, risks and benefits of treatment. An opportunity to ask questions was given to the patient/FAMILY. Thank you for involving me in the care of Shaggy Cedeno I will continue to follow. Please do not hesitate to call for any questions or concerns.     Electronically signed by Merline Ala, MD on 2/22/2022 at 9:20 AM

## 2022-02-22 NOTE — PROGRESS NOTES
Comprehensive Nutrition Assessment    Type and Reason for Visit:  Reassess    Nutrition Recommendations/Plan: Nutrition Progression    Nutrition Assessment:  Pt admits w/ abdominal pain. H/o GERD/morbidly obese. Pt is s/p laparoscopic sigmoid colon resection for sigmoid stricture, now status post IR drainage of pelvic abscess and right abdominal abscess. Will monitor Nutrition Progression    Malnutrition Assessment:  Malnutrition Status: At risk for malnutrition (Comment)    Context:  Acute Illness     Findings of the 6 clinical characteristics of malnutrition:  Energy Intake:  7 - 50% or less of estimated energy requirements for 5 or more days  Weight Loss:  No significant weight loss     Body Fat Loss:  No significant body fat loss     Muscle Mass Loss:  No significant muscle mass loss    Fluid Accumulation:  No significant fluid accumulation     Strength:  Not Performed    Estimated Daily Nutrient Needs:  Energy (kcal):  ; Weight Used for Energy Requirements:  Admission     Protein (g):  65-75 (1.3-1. .5gm/kg); Weight Used for Protein Requirements:  Ideal        Fluid (ml/day):  ; Method Used for Fluid Requirements:  1 ml/kcal      Nutrition Related Findings:  Abd distended, Nausea 2/2 post-pain, trace BLE edema, I/O WNL, Colostomy 8/31/21      Wounds:  None       Current Nutrition Therapies:    Diet NPO    Anthropometric Measures:  · Height: 5' 2\" (157.5 cm)  · Current Body Weight: 187 lb (84.8 kg) (actual; standing scale)   · Ideal Body Weight: 110 lbs; % Ideal Body Weight 170 %   · BMI: 34.2  · Adjusted Body Weight:  ; No Adjustment   · BMI Categories: Obese Class 1 (BMI 30.0-34. 9)       Nutrition Diagnosis:   · Inadequate oral intake related to altered GI function as evidenced by NPO or clear liquid status due to medical condition,GI abnormality      Nutrition Interventions:   Food and/or Nutrient Delivery:  Continue NPO (ADAT when medically feasible)  Nutrition Education/Counseling: Education initiated   Coordination of Nutrition Care:  Continue to monitor while inpatient    Goals:  Pt will consume >50-75% of meals/ONS       Nutrition Monitoring and Evaluation:   Behavioral-Environmental Outcomes:  None Identified   Food/Nutrient Intake Outcomes:  Diet Advancement/Tolerance  Physical Signs/Symptoms Outcomes:  Biochemical Data,GI Status,Nausea or Vomiting,Fluid Status or Edema,Weight,Skin,Nutrition Focused Physical Findings     Discharge Planning:     Too soon to determine     Electronically signed by Tc Lemon RD, LD on 2/22/22 at 9:44 AM EST    Contact: [phone]

## 2022-02-23 LAB
ALBUMIN SERPL-MCNC: 2.8 G/DL (ref 3.5–5.2)
ALP BLD-CCNC: 136 U/L (ref 35–104)
ALT SERPL-CCNC: 17 U/L (ref 0–32)
ANION GAP SERPL CALCULATED.3IONS-SCNC: 13 MMOL/L (ref 7–16)
AST SERPL-CCNC: 25 U/L (ref 0–31)
BILIRUB SERPL-MCNC: 0.6 MG/DL (ref 0–1.2)
BILIRUBIN URINE: NEGATIVE
BLOOD, URINE: NEGATIVE
BUN BLDV-MCNC: 2 MG/DL (ref 6–20)
CALCIUM SERPL-MCNC: 8.3 MG/DL (ref 8.6–10.2)
CHLORIDE BLD-SCNC: 98 MMOL/L (ref 98–107)
CLARITY: CLEAR
CO2: 23 MMOL/L (ref 22–29)
COLOR: YELLOW
CREAT SERPL-MCNC: 0.6 MG/DL (ref 0.5–1)
GFR AFRICAN AMERICAN: >60
GFR NON-AFRICAN AMERICAN: >60 ML/MIN/1.73
GLUCOSE BLD-MCNC: 105 MG/DL (ref 74–99)
GLUCOSE URINE: NEGATIVE MG/DL
HCT VFR BLD CALC: 30 % (ref 34–48)
HEMOGLOBIN: 9.4 G/DL (ref 11.5–15.5)
KETONES, URINE: NEGATIVE MG/DL
LEUKOCYTE ESTERASE, URINE: NEGATIVE
MCH RBC QN AUTO: 30.3 PG (ref 26–35)
MCHC RBC AUTO-ENTMCNC: 31.3 % (ref 32–34.5)
MCV RBC AUTO: 96.8 FL (ref 80–99.9)
NITRITE, URINE: NEGATIVE
PDW BLD-RTO: 14.6 FL (ref 11.5–15)
PH UA: 6 (ref 5–9)
PLATELET # BLD: 533 E9/L (ref 130–450)
PMV BLD AUTO: 9.5 FL (ref 7–12)
POTASSIUM SERPL-SCNC: 4.6 MMOL/L (ref 3.5–5)
PROTEIN UA: NEGATIVE MG/DL
RBC # BLD: 3.1 E12/L (ref 3.5–5.5)
SODIUM BLD-SCNC: 134 MMOL/L (ref 132–146)
SPECIFIC GRAVITY UA: 1.02 (ref 1–1.03)
TOTAL PROTEIN: 5.8 G/DL (ref 6.4–8.3)
UROBILINOGEN, URINE: 0.2 E.U./DL
WBC # BLD: 21.7 E9/L (ref 4.5–11.5)

## 2022-02-23 PROCEDURE — 6370000000 HC RX 637 (ALT 250 FOR IP): Performed by: SURGERY

## 2022-02-23 PROCEDURE — 6360000002 HC RX W HCPCS: Performed by: SURGERY

## 2022-02-23 PROCEDURE — 6370000000 HC RX 637 (ALT 250 FOR IP): Performed by: STUDENT IN AN ORGANIZED HEALTH CARE EDUCATION/TRAINING PROGRAM

## 2022-02-23 PROCEDURE — 2580000003 HC RX 258: Performed by: SPECIALIST

## 2022-02-23 PROCEDURE — 87088 URINE BACTERIA CULTURE: CPT

## 2022-02-23 PROCEDURE — 85027 COMPLETE CBC AUTOMATED: CPT

## 2022-02-23 PROCEDURE — 6360000002 HC RX W HCPCS: Performed by: INTERNAL MEDICINE

## 2022-02-23 PROCEDURE — 36415 COLL VENOUS BLD VENIPUNCTURE: CPT

## 2022-02-23 PROCEDURE — 6370000000 HC RX 637 (ALT 250 FOR IP): Performed by: INTERNAL MEDICINE

## 2022-02-23 PROCEDURE — 1200000000 HC SEMI PRIVATE

## 2022-02-23 PROCEDURE — 80053 COMPREHEN METABOLIC PANEL: CPT

## 2022-02-23 PROCEDURE — 6360000002 HC RX W HCPCS: Performed by: SPECIALIST

## 2022-02-23 PROCEDURE — 81003 URINALYSIS AUTO W/O SCOPE: CPT

## 2022-02-23 RX ADMIN — SODIUM CHLORIDE, PRESERVATIVE FREE 10 ML: 5 INJECTION INTRAVENOUS at 19:47

## 2022-02-23 RX ADMIN — ALTEPLASE 1 MG: 2.2 INJECTION, POWDER, LYOPHILIZED, FOR SOLUTION INTRAVENOUS at 23:23

## 2022-02-23 RX ADMIN — ENOXAPARIN SODIUM 40 MG: 100 INJECTION SUBCUTANEOUS at 08:30

## 2022-02-23 RX ADMIN — FLUCONAZOLE 400 MG: 2 INJECTION INTRAVENOUS at 13:47

## 2022-02-23 RX ADMIN — POTASSIUM CHLORIDE AND SODIUM CHLORIDE: 900; 150 INJECTION, SOLUTION INTRAVENOUS at 19:45

## 2022-02-23 RX ADMIN — POTASSIUM CHLORIDE 20 MEQ: 1500 TABLET, EXTENDED RELEASE ORAL at 08:25

## 2022-02-23 RX ADMIN — SODIUM CHLORIDE 25 ML: 9 INJECTION, SOLUTION INTRAVENOUS at 12:26

## 2022-02-23 RX ADMIN — CALCIUM POLYCARBOPHIL 625 MG 625 MG: 625 TABLET ORAL at 08:26

## 2022-02-23 RX ADMIN — SODIUM CHLORIDE 25 ML: 9 INJECTION, SOLUTION INTRAVENOUS at 21:14

## 2022-02-23 RX ADMIN — METHOCARBAMOL 500 MG: 500 TABLET ORAL at 08:25

## 2022-02-23 RX ADMIN — TRAMADOL HYDROCHLORIDE 50 MG: 50 TABLET, COATED ORAL at 06:09

## 2022-02-23 RX ADMIN — SODIUM CHLORIDE 25 ML: 9 INJECTION, SOLUTION INTRAVENOUS at 04:23

## 2022-02-23 RX ADMIN — PIPERACILLIN SODIUM AND TAZOBACTAM SODIUM 3375 MG: 3; 375 INJECTION, POWDER, FOR SOLUTION INTRAVENOUS at 00:22

## 2022-02-23 RX ADMIN — METHOCARBAMOL 500 MG: 500 TABLET ORAL at 19:45

## 2022-02-23 RX ADMIN — PIPERACILLIN SODIUM AND TAZOBACTAM SODIUM 3375 MG: 3; 375 INJECTION, POWDER, FOR SOLUTION INTRAVENOUS at 16:34

## 2022-02-23 RX ADMIN — PSYLLIUM HUSK 1 PACKET: 3.4 GRANULE ORAL at 08:27

## 2022-02-23 RX ADMIN — PSYLLIUM HUSK 1 PACKET: 3.4 GRANULE ORAL at 19:45

## 2022-02-23 RX ADMIN — CALCIUM POLYCARBOPHIL 625 MG 625 MG: 625 TABLET ORAL at 19:45

## 2022-02-23 RX ADMIN — TRAMADOL HYDROCHLORIDE 50 MG: 50 TABLET, COATED ORAL at 12:25

## 2022-02-23 RX ADMIN — LOPERAMIDE HYDROCHLORIDE 2 MG: 2 CAPSULE ORAL at 19:45

## 2022-02-23 RX ADMIN — METHOCARBAMOL 500 MG: 500 TABLET ORAL at 16:34

## 2022-02-23 RX ADMIN — LOPERAMIDE HYDROCHLORIDE 2 MG: 2 CAPSULE ORAL at 08:26

## 2022-02-23 RX ADMIN — METHOCARBAMOL 500 MG: 500 TABLET ORAL at 12:22

## 2022-02-23 RX ADMIN — TRAMADOL HYDROCHLORIDE 50 MG: 50 TABLET, COATED ORAL at 19:45

## 2022-02-23 RX ADMIN — MORPHINE SULFATE 2 MG: 2 INJECTION, SOLUTION INTRAMUSCULAR; INTRAVENOUS at 08:30

## 2022-02-23 RX ADMIN — PANTOPRAZOLE SODIUM 40 MG: 40 TABLET, DELAYED RELEASE ORAL at 06:09

## 2022-02-23 RX ADMIN — PANTOPRAZOLE SODIUM 40 MG: 40 TABLET, DELAYED RELEASE ORAL at 16:34

## 2022-02-23 RX ADMIN — PIPERACILLIN SODIUM AND TAZOBACTAM SODIUM 3375 MG: 3; 375 INJECTION, POWDER, FOR SOLUTION INTRAVENOUS at 08:29

## 2022-02-23 RX ADMIN — MORPHINE SULFATE 2 MG: 2 INJECTION, SOLUTION INTRAMUSCULAR; INTRAVENOUS at 16:34

## 2022-02-23 ASSESSMENT — PAIN DESCRIPTION - LOCATION
LOCATION: ABDOMEN

## 2022-02-23 ASSESSMENT — PAIN DESCRIPTION - PAIN TYPE
TYPE: ACUTE PAIN
TYPE: ACUTE PAIN
TYPE: ACUTE PAIN;SURGICAL PAIN

## 2022-02-23 ASSESSMENT — PAIN DESCRIPTION - PROGRESSION
CLINICAL_PROGRESSION: GRADUALLY IMPROVING
CLINICAL_PROGRESSION: GRADUALLY IMPROVING

## 2022-02-23 ASSESSMENT — PAIN SCALES - GENERAL
PAINLEVEL_OUTOF10: 6
PAINLEVEL_OUTOF10: 8
PAINLEVEL_OUTOF10: 0
PAINLEVEL_OUTOF10: 9
PAINLEVEL_OUTOF10: 9
PAINLEVEL_OUTOF10: 5
PAINLEVEL_OUTOF10: 8
PAINLEVEL_OUTOF10: 9

## 2022-02-23 ASSESSMENT — PAIN DESCRIPTION - DESCRIPTORS
DESCRIPTORS: DISCOMFORT
DESCRIPTORS: CRAMPING;SHARP
DESCRIPTORS: CRAMPING

## 2022-02-23 ASSESSMENT — PAIN DESCRIPTION - FREQUENCY: FREQUENCY: CONTINUOUS

## 2022-02-23 ASSESSMENT — PAIN DESCRIPTION - ONSET: ONSET: GRADUAL

## 2022-02-23 NOTE — PROGRESS NOTES
General Surgery Progress Note  [facility]    Patient's Name/Date of Birth: Delmar Coleman / 1975    Date: February 23, 2022     Surgeon: Jens Hoang MD    Chief Complaint: s/p colectomy    Patient Active Problem List   Diagnosis    Vocal cord dysfunction    Multiple sclerosis ([address]    Morbidly obese ([address]    Palafox's esophagus with dysplasia    Secondary restless legs syndrome    S/P colectomy    Malignant neoplasm of descending colon ([address]    Ileus, postoperative ([address]    Pelvic abscess in female    Sigmoid stricture ([address]       Subjective: still with some pain but nothing new or worsened. WBC count continues to rise. CT scan yesterday with improving fluid collections    Objective:  /60   Pulse 85   Temp 98.8 °F (37.1 °C) (Oral)   Resp 14   Ht 5' 2\" (1.575 m)   Wt 187 lb (84.8 kg)   LMP 01/05/2018   SpO2 93%   BMI 34.20 kg/m²   Labs:  Recent Labs     02/21/22  0438 02/22/22  0428 02/23/22  0430   WBC 14.5* 16.8* 21.7*   HGB 9.1* 9.3* 9.4*   HCT 28.2* 30.0* 30.0*     Lab Results   Component Value Date    CREATININE 0.6 02/23/2022    BUN 2 (L) 02/23/2022     02/23/2022    K 4.6 02/23/2022    CL 98 02/23/2022    CO2 23 02/23/2022     No results for input(s): LIPASE, AMYLASE in the last 72 hours.   CBC with Differential:    Lab Results   Component Value Date    WBC 21.7 02/23/2022    RBC 3.10 02/23/2022    HGB 9.4 02/23/2022    HCT 30.0 02/23/2022     02/23/2022    MCV 96.8 02/23/2022    MCH 30.3 02/23/2022    MCHC 31.3 02/23/2022    RDW 14.6 02/23/2022    METASPCT 0.9 11/23/2021    LYMPHOPCT 23.2 02/02/2022    MONOPCT 6.7 02/02/2022    BASOPCT 0.4 02/02/2022    MONOSABS 0.62 02/02/2022    LYMPHSABS 2.13 02/02/2022    EOSABS 0.20 02/02/2022    BASOSABS 0.04 02/02/2022     CMP:    Lab Results   Component Value Date     02/23/2022    K 4.6 02/23/2022    K 3.7 02/09/2022    CL 98 02/23/2022    CO2 23 02/23/2022    BUN 2 02/23/2022    CREATININE 0.6 02/23/2022 GFRAA >60 02/23/2022    LABGLOM >60 02/23/2022    GLUCOSE 105 02/23/2022    PROT 5.8 02/23/2022    LABALBU 2.8 02/23/2022    CALCIUM 8.3 02/23/2022    BILITOT 0.6 02/23/2022    ALKPHOS 136 02/23/2022    AST 25 02/23/2022    ALT 17 02/23/2022       General appearance:  NAD  Head: NCAT, PERRLA, EOMI, red conjunctiva  Neck: supple, no masses  Lungs: CTAB, equal chest rise bilateral  Heart: Reg rate  Abdomen: soft, nondistended, tender mostly left lower quadrant around drain. No guarding or rebound. Right abdomen ileostomy with liquid output. LLQ and RLQ drains purulent  Skin; no lesions  Gu: no cva tenderness  Extremities: extremities normal, atraumatic, no cyanosis or edema      Assessment/Plan:  Kevin Settler is a 51 y.o. female postop day 15 status post laparoscopic sigmoid colon resection for sigmoid stricture, now status post IR drainage of pelvic abscess and right abdominal abscess    Diet as tolerated  CT scan with drains in correct place, IR did not feel she needed a new drain at this time  Worsening leukocytosis again today - appreciate ID input      Theresa Arcos MD  2/23/2022  6:14 AM      As above.   Leukocytosis noted - CT reviewed - abscesses seem to be appropriately drained  Abx per ID   Diet as tolerated

## 2022-02-23 NOTE — PROGRESS NOTES
Department of Internal Medicine        HISTORY OF PRESENT ILLNESS:      The patient is a 51 y.o. female who presents with having a elective laparoscopic sigmoid colon resection. Patient has a known history of sigmoid stricture. Patient had a anal proctosigmoidoscopy performed 1/25 with a scope with past 15 cm work there was complete closure of the rectosigmoid junction without an opening. Patient is seen postop. Patient has expected postop discomfort. Temperature is 97.8 with heart rate 72 blood pressure 140/65. O2 sat 98% on 2 L nasal cannula. 2/9/2022  Patient seen and examined on medical surgical floor. Patient complains of postop discomfort. She denies any problem with chest pain, nausea/vomiting or unusual shortness of breath. BUN/creatinine 5/0.6 with fasting blood sugar 126. Mild elevation of transaminase with a ALT of 69 AST is 35. Review of records have shown patient has intermittent elevation transaminase over the past 18 months. WBC is 21.5 with hemoglobin 12.6. Temperature 98.3 with heart rate 84 blood pressure 107/69. O2 sat 96% on room air at rest.  Urine output is very good. General surgery note reviewed. 2/10/2022  Patient seen and examined on medical surgical floor. Patient complains of postop discomfort. Patient denies any problem with chest pain, dizziness, nausea, fever/chills, dysuria, cough or unusual shortness of breath. Patient is passing some gas but no bowel movements. Increase activity today. BUN/creatinine 5/0.7 with serum sodium 130. WBC is still elevated 23.7 with hemoglobin 14.3. Temperature is 98.8 with heart rate 76 blood pressure 110/78. O2 sat 94% on room air at rest.  Urine output is adequate. 2/11/2022  Patient seen examined on medical surgical floor. Patient complains of persistent postop discomfort along with some nausea and poor appetite. Patient denies passing had any bowel movements or gas recently.   BUN/creatinine 6/0.6 with serum sodium 124 7. Potassium 3.4 with WBC 24.6 and hemoglobin 13.7. Temperature is 98.6nine 9.3 with heart rate of 99. O2 sat 98% on room air at rest.  General surgery note reviewed. CT of the abdomen pelvis was ordered for today. 2/12/2022  Patient seen examined on medical surgical floor. Patient states the abdominal distention seems to be improved. Patient still having postop discomfort and is mildly improved. Patient denies any chest pain, dizziness or unusual shortness of breath. Patient did have some nausea last night with supper. Patient denies any bowel movements at this time. CT of the abdomen pelvis from yesterday suggested ileus but cannot rule out obstruction. Temperature 98 with heart rate of 97 with blood pressure 124/67 O2 sat 93% on room air at rest.  WBC is 14.8 with hemoglobin 11.8. BUN/creatinine 7/0.6. Serum sodium increased to 134 from 127 yesterday. 2/13/2022  Patient seen examined on surgical medical floor. Patient still smokes postop discomfort. It seems to be slowly improving. Patient still has some abdominal distention intermittently. Patient says she is drinking fairly good but is not eating very much. She denies any chest pain or unusual shortness of breath at rest.  Serum sodium is 129 with BUN/creatinine 4/0.5. Serum potassium 3.4. WBC 12.9 hemoglobin 11.5. Temperature is 99.1 with a heart rate of 100 and blood pressure 140/77. O2 sat 92% room air at rest.  Check O2 saturation with activity on room air. 2/14/2022  Patient seen and examined on medical surgical floor. Patient still complain of some lower abdominal cramping. She denies any chest pain, nausea/vomiting, shortness of breath. Patient denies any dysuria or cough. BUN/creatinine 4/0.6 with serum sodium 128. WBC 15.4 today with a hemoglobin 1.7. Temperature 100.3 with heart rate of 100 and blood pressure 156/72. O2 sat 93% on room air at rest.  Patient had out about 1850 cc from ostomy yesterday afternoon. General surgery note reviewed. With patient persistent tach and leukocytosis will check UA and chest x-ray. 2/15/2022  Patient seen examined on medical surgical floor. Patient states she is slowly improving. Patient denies any chest pain, dizziness or unusual shortness of breath. Patient abdominal pain is slowly improving. Ostomy output ranges 250-1000 cc a shift. Temperature ranges 98.7100 with heart rate 98 blood pressure 143/78. O2 sat 95% on room air at rest.  BUN/creatinine was 6/0.6 with serum sodium 128. Potassium 3.3 with WBCs 10.1 hemoglobin 10.5. Urinalysis yesterday shows greater than 80 ketones with trace of leukocyte esterase with 510 WBCs and few bacteria. Chest x-ray yesterday showed bibasilar pleural and parenchymal changes with bilateral pleural effusion. Patient still with poor appetite. Abdomen shows moderate distention with hypoactive bowel sounds. 2/16/2022  Patient seen examined on medical surgical floor. Patient states she is feeling better with expected postop discomfort. Patient denies any chest pain, dizziness or unusual shortness of breath. BUN/creatinine 7/0.6. Serum sodium is 130. Total bili is increased to 1.9 today with direct bilirubin slightly increased 1.1. WBC is 11 with a hemoglobin 10.7. Temperature still 100 degrees with heart rate 91 and blood pressure 114/63. O2 sat 91-96% on room air. Patient still with increased output from ostomy range of 300-670 cc a shift. 2/17/2022  Patient seen examined on medical surgical floor. Patient states that her abdominal discomfort is slowly improving. She denies any chest pain, nausea/vomiting or unusual shortness of breath. serum potassium still low at 2.9 even with the patient receiving supplemental potassium 3 times a day. BUN/creatinine 6/0.5 with serum sodium 130. Total bili is improved to 1.1 with a WBC 14.9 hemoglobin 9.6. Urinalysis improved but still shows a trace of ketones.   Temperature ranges 98. 899.3 with a heart rate of 100 and blood pressure 119/70. O2 sat 93-96% on room air at rest.  Urinary output is fairly good with the patient having improvement in ostomy output ranging from 150-800 cc a shift. Patient started on IV fluids of 40 KCl at 100 cc an hour. Repeat BMP today at 4 PM to monitor serum potassium. 2/18/2022  Patient seen examined on medical surgical floor. Patient still with right-sided abdominal pain which is not worsening but not improving. Patient denies any cough. There is no dysuria. Patient does have some intermittent fevers and chills. WBC increased again today 17.9 with hemoglobin 9.3. Serum sodium is 130 with BUN/creatinine 4/0.6. Serum sodium is improved to 23.7. Liver enzymes are normal.  Temp 100.6 with heart rate 100 blood pressure 106/70. O2 sat 97% room air at rest.  Urine output is fairly good. Ostomy output improved ranges 250-350 cc a shift. Patient is set up for a CT of the abdomen and chest with IV and oral contrast.    2/19/2022   Patient seen examined on medical surgical floor. Reviewed CT scan with the patient yesterday. Patient denies any problem with chest pain or dizziness. She denies any fever or chills today. Patient has expected postop discomfort. Patient has drains in left lower quadrant and right abdomen. CT the abdomen/pelvis/chest yesterday showed large 9 cm fluid collection in the left upper abdomen/pelvis. BUN/creatinine 4/0.5 with a serum sodium 128. WBC 16.9 with hemoglobin 9.6. Temperature ranges 98.7101.5. Heart rate 91 with blood pressure 116/70. O2 sat 96% on room air at rest.    2/20/2022  Patient seen examined on medical surgical floor. Patient with expected postop discomfort. The left quadrant drain has not put out much over the last 24 hours. Patient denies any problem with any chest pain, dizziness or unusual shortness of breath. BUN creatinine 60/0.5 with sodium 132. WBC is 14.1 with hemoglobin 9.6.   Temperature is 98.1 with heart rate 96 and blood pressure 100/63. O2 sat 98% room air at rest.  Urinary output is adequate. 2/21/2022  Patient seen examined on medical surgical floor. Patient states she is feeling little bit better today. Patient still has some abdominal pain. It is improved since she had the  drains inserted. She denies any problem with nausea/vomiting, chest pain or unusual shortness of breath. BUN/creatinine 3/0.5. Transaminases normal with a WBC 14.5 and hemoglobin 9.1. Temperature is 98.5 with heart rate of 65 blood pressure 111/67. O2 sat 93-97% room air at rest.  Urinary output is adequate. Ostomy output is improved with range of 300-400 cc a shift. Abdominal drains ranging 20-50 cc a shift. 2/22/2022  Patient seen examined on medical surgical floor. Patient still with some abdominal discomfort more left upper mid quadrant. Patient's appetite though is still fairly good. Patient denies any chest pain or unusual shortness of breath. WBC increased 16.8 today with hemoglobin 9.3. Temperature is 98.1 with heart rate 88 blood pressure 140/71. O2 sat 98% on room air at rest.  Urine output is fairly good. Ostomy output ranges of 100-400 cc a shift. Drain output ranges 0-35 cc a shift. Patient is set up to have repeat CT of the abdomen today and possible replacement of abdominal drain. 2/23/2022  Patient seen examined on medical surgical floor. Patient still with left greater than right abdominal discomfort which is mildly improved. Patient denies any nausea/vomiting. There is no chest pain or unusual shortness of breath. CT of the abdomen showed essentially resolved right lower quadrant subcutaneous fluid collection with about one half the size decreased fluid in the left lower mid abdomen. BUN/creatinine 2/0.6. Transaminases are normal with a WBC is increased again to 21.7 with hemoglobin 9.4. Platelet count five hundred thirty-three.   Temperature 98.8 with heart rate eighty-five blood pressure 112/60. O2 sat 93% room air at rest.  Urine output ranges 200-600 cc a shift. Ostomy output ranges  cc a shift. Output from the drain ranges 5-35 cc a shift. Past Medical History:    Past Medical History:   Diagnosis Date    Acid reflux disease     Cyst of ovary     Fracture of nasal bone     Headache     Hearing loss     Hypertension     Migraine     Morbidly obese ([address] 10/05/2020    Multiple sclerosis ([facility]     denies limitations at present 11/2020    VEENA no CPAP     severe VEENA per pt    Right shoulder pain 11/2020    Tinnitus     TMJ dysfunction      Past Surgical History:    Past Surgical History:   Procedure Laterality Date    APPENDECTOMY      BACK SURGERY      lumbar    BICEPS TENDON REPAIR Right 11/11/2020    BICEPS TENODESIS performed by Zoraida Bruno MD at [place] [address] Left 8/31/2021    LAPAROSCOPIC LEFT HEMICOLECTOMY WITH LOOP OSTOMY performed by Jeana Allen MD at [facility] [address] 9/6/2021    DIAGNOSTIC LAPAROSCOPY POSSIBLE BOWEL RESECTION POSSILBE OSTOMY performed by Jeana Allen MD at [address] 2/8/2022    LAPAROSCOPIC SIGMOID COLON RESECTION performed by Jeana Allen MD at [address] [facility]  03/05/2018    Robotic hysterectomy, bilateral salpingectomy, right oophorectomy DR. ARIANA MNOIQUE [facility]     HYSTERECTOMY, TOTAL ABDOMINAL      LARYNGOSCOPY N/A 7/17/2020    DIRECT LARYNGOSCOPY--OMNI GUIDE LASER performed by Bernie Ryan MD at [address]      PROCTOSIGMOIDOSCOPY N/A 10/12/2021    ANAL PROCTO SIGMOIDOSCOPY FLEXIBLE performed by Jeana Allen MD at [address] [place] [address] N/A 1/25/2022    ANAL PROCTO SIGMOIDOSCOPY FLEXIBLE performed by Jeana Allen MD at [facility] ARTHROSCOPY Right 11/11/2020    RIGHT SHOULDER DIAGNOSTIC ARTHROSCOPY WITH DECOMPRESSION ROTATOR CUFF REPAIR performed by Colin Rees MD at [facility]         Medications Prior to Admission:    @  Prior to Admission medications    Medication Sig Start Date End Date Taking? Authorizing Provider   ertapedavion [facility]) infusion Infuse 1,000 mg intravenously every 24 hours for 21 days Compound per protocol. 2/22/22 3/15/22 Yes James Jones MD   dicyclomine (BENTYL) 10 MG capsule Take 10 mg by mouth 4 times daily (before meals and nightly)    Historical Provider, MD   vitamin D (D3-50) 67298 UNIT CAPS Take 1 capsule by mouth once a week 11/30/21   MARIXA Villafuerte CNP   nortriptyline (PAMELOR) 10 MG capsule TAKE 1 CAPSULE BY MOUTH NIGHTLY FOR HEADACHES 11/5/21   Lori Prajapati PA-C   gabapentin (NEURONTIN) 300 MG capsule TAKE 1 CAPSULE BY MOUTH THREE TIMES DAILY 9/13/21 1/31/22  MARIXA Villafuerte CNP   dexlansoprazole (DEXILANT) 60 MG CPDR delayed release capsule Take 60 mg by mouth daily    Historical Provider, MD   baclofen (LIORESAL) 20 MG tablet Take 1 tablet by mouth 4 times daily as needed (muscle spasms; pain) 8/13/21   MARIXA Villafuerte CNP   rOPINIRole (REQUIP) 0.5 MG tablet Take 1 tablet by mouth 2 times daily as needed (restless legs) 8/13/21   MARIXA Villafuerte CNP   ocrelizumab (OCREVUS) 300 MG/10ML SOLN injection Infuse 20 mLs intravenously every 6 months 7/19/21   MARIXA Villafuerte CNP       Allergies:  Patient has no known allergies.     Social History:   Social History     Socioeconomic History    Marital status: Single     Spouse name: Not on file    Number of children: 3    Years of education: 6    Highest education level: 11th grade   Occupational History    Not on file   Tobacco Use    Smoking status: Current Every Day Smoker     Packs/day: 0.50     Years: 25.00     Pack years: 12.50     Types: Cigarettes    Smokeless tobacco: Never Used    Tobacco comment: Ready to stop, requesting prescription for Chantix   Vaping Use    Vaping Use: Never used   Substance and Sexual Activity    Alcohol use: Yes     Alcohol/week: 1.0 standard drink     Types: 1 Glasses of wine per week     Comment: socially    Drug use: No    Sexual activity: Yes     Partners: Male   Other Topics Concern    Not on file   Social History Narrative    Uses [employer] for transportation    [facility]     Social Determinants of Health     Financial Resource Strain: Medium Risk    Difficulty of Paying Living Expenses: Somewhat hard   Food Insecurity: Food Insecurity Present    Worried About Running Out of Food in the Last Year: Sometimes true    [place] of Food in the Last Year: Never true   Transportation Needs: No Transportation Needs    Lack of Transportation (Medical): No    Lack of Transportation (Non-Medical): No   Physical Activity: Sufficiently Active    Days of Exercise per Week: 3 days    Minutes of Exercise per Session: 60 min   Stress: No Stress Concern Present    Feeling of Stress : Not at all   Social Connections: Socially Isolated    Frequency of Communication with Friends and Family:  Three times a week    Frequency of Social Gatherings with Friends and Family: Twice a week    Attends Buddhism Services: Never    Active Member of Clubs or Organizations: No    Attends Club or Organization Meetings: Never    Marital Status: Never    Intimate Partner Violence:     Fear of Current or Ex-Partner: Not on file    Emotionally Abused: Not on file    Physically Abused: Not on file    Sexually Abused: Not on file   Housing Stability:     Unable to Pay for Housing in the Last Year: Not on file    Number of [place] in the Last Year: Not on file    Unstable Housing in the Last Year: Not on file       Family History:   Family History   Problem Relation Age of Onset    Mult Sclerosis Mother     Colon Cancer Neg Hx     Uterine Cancer Neg Hx     Ovarian Cancer Neg Hx     Breast Cancer Neg Hx        REVIEW OF SYSTEMS:    Gen: Patient denies any lightheadedness or dizziness. No LOC or syncope. No fevers or chills. HEENT: No earache, sore throat or nasal congestion. Resp: Denies cough, hemoptysis or sputum production. Cardiac: Denies chest pain, SOB, diaphoresis or palpitations. GI:+ Abdominal distention. Lower abdominal cramping. No nausea, vomiting, diarrhea or constipation. No melena or hematochezia. : No urinary complaints, dysuria, hematuria or frequency. MSK: No extremity weakness, paralysis or paresthesias. PHYSICAL EXAM:    Vitals:  /60   Pulse 85   Temp 98.8 °F (37.1 °C) (Oral)   Resp 14   Ht 5' 2\" (1.575 m)   Wt 187 lb (84.8 kg)   LMP 01/05/2018   SpO2 93%   BMI 34.20 kg/m²     General:  This is a 51 y.o. yo female who is alert and oriented in postop distress  HEENT:  Head is normocephalic and atraumatic, PERRLA, EOMI, mucus membranes moist with no pharyngeal erythema or exudate. Neck:  Supple with no carotid bruits, JVD or thyromegaly.   No cervical adenopathy  CV:  Regular rate and rhythm, no murmurs  Lungs: Mildly coarse breath sounds to auscultation bilaterally with no wheezes, rales or rhonchi  Abdomen:  + Expected postop abdomen, + ileostomy,+ abdominal fat, bowel sounds  hypoactive  Extremities:  No edema, peripheral pulses intact bilaterally  Neuro:  Cranial nerves II-XII grossly intact; motor and sensory function intact with no focal deficits  Skin:  No rashes, lesions or wounds      DATA:  CBC with Differential:    Lab Results   Component Value Date    WBC 21.7 02/23/2022    RBC 3.10 02/23/2022    HGB 9.4 02/23/2022    HCT 30.0 02/23/2022     02/23/2022    MCV 96.8 02/23/2022    MCH 30.3 02/23/2022    MCHC 31.3 02/23/2022    RDW 14.6 02/23/2022    METASPCT 0.9 11/23/2021    LYMPHOPCT 23.2 02/02/2022    MONOPCT 6.7 02/02/2022    BASOPCT 0.4 02/02/2022    MONOSABS 0.62 02/02/2022    LYMPHSABS 2.13 02/02/2022    EOSABS 0.20 02/02/2022    BASOSABS 0.04 02/02/2022     CMP: Lab Results   Component Value Date     02/23/2022    K 4.6 02/23/2022    K 3.7 02/09/2022    CL 98 02/23/2022    CO2 23 02/23/2022    BUN 2 02/23/2022    CREATININE 0.6 02/23/2022    GFRAA >60 02/23/2022    LABGLOM >60 02/23/2022    GLUCOSE 105 02/23/2022    PROT 5.8 02/23/2022    LABALBU 2.8 02/23/2022    CALCIUM 8.3 02/23/2022    BILITOT 0.6 02/23/2022    ALKPHOS 136 02/23/2022    AST 25 02/23/2022    ALT 17 02/23/2022     Magnesium:    Lab Results   Component Value Date    MG 2.0 02/18/2022     Phosphorus:    Lab Results   Component Value Date    PHOS 3.2 09/01/2021     PT/INR:    Lab Results   Component Value Date    PROTIME 10.7 02/28/2020    INR 1.0 02/28/2020     Troponin:    Lab Results   Component Value Date    TROPONINI <0.01 07/20/2020     U/A:    Lab Results   Component Value Date    COLORU Yellow 02/17/2022    PROTEINU Negative 02/17/2022    PHUR 8.0 02/17/2022    WBCUA 0-1 02/17/2022    RBCUA 1-3 02/17/2022    TRICHOMONAS Present 02/26/2020    BACTERIA RARE 02/17/2022    CLARITYU Clear 02/17/2022    SPECGRAV 1.010 02/17/2022    LEUKOCYTESUR TRACE 02/17/2022    UROBILINOGEN 0.2 02/17/2022    BILIRUBINUR Negative 02/17/2022    BLOODU TRACE 02/17/2022    GLUCOSEU Negative 02/17/2022    AMORPHOUS FEW 11/23/2021     ABG:  No results found for: PH, PCO2, PO2, HCO3, BE, THGB, TCO2, O2SAT  HgBA1c:    Lab Results   Component Value Date    LABA1C 5.4 05/11/2021     FLP:    Lab Results   Component Value Date    TRIG 184 05/11/2021    HDL 44 05/11/2021    LDLCALC 144 05/11/2021    LABVLDL 37 05/11/2021     TSH:    Lab Results   Component Value Date    TSH 0.293 09/01/2021     IRON:  No results found for: IRON  LIPASE:    Lab Results   Component Value Date    LIPASE 10 09/05/2021       ASSESSMENT AND PLAN:      Patient Active Problem List    Diagnosis Date Noted    [facility] strict[facility] [address]     Pelvic abscess in female     Ileus, postoperative ([facility] [address] 09/05/2021    S/P colectomy 08/31/2021    Malignant neoplasm of descending colon ([facility] [address]     Secondary restless legs syndrome 08/13/2021    Palafox's esophagus with dysplasia 05/11/2021    Morbidly obese ([facility] [address] [address] 10/05/2020    Multiple sclerosis ([facility] [address] 08/04/2020    Vocal cord dysfunction 07/20/2020     Impression:  1. Sigmoid stricturelaparoscopic sigmoid resection 2/8/2022  2. History of VEENA-patient apparently been tested and is negative for CPAP use at this time  3. Obesity  4. Hypertension   5. History of current tobacco use  6. History of restless leg syndrome  7. Leukocytosis postop  8. Mild elevation transaminase postop  9. Hyponatremia postop  7. Left lower quadrant greater than right abdomen abscess postopCT-guided placement of drain 2/18    Plan:  Patient admitted to medical surgical floor  Home medications reviewed  Monitor heart rate, blood pressure, O2 saturation  Diet and abdominal pain meds per general surgery  Lovenox 40 mg subcu daily  IV fluids lactated Ringer's 100 cc an hour    Albuterol aerosol 2.5 mg every 4 hours as needed for shortness of breath    CT of the abdomen pelvis 2/11  Serum osmolality274        Decrease KCl 20 mEq-daily    IV Cipro/Flagylday 5    CT the chest with contrast, CT the abdomen with IV and oral contrast t-9 cm abscess left lower quadrant. Consult infectious disease  Decrease IV fluids normal saline 20 KCl 50 cc an hour   IV Zosyn, Diflucan    CT of the abdomen pelvis with oral contrast 2/22-see progress note 2/23      BMP, CBC in a.m.     Arnoldo Montalvo DO, D.OWagner  2/23/2022  9:24 AM

## 2022-02-23 NOTE — PROGRESS NOTES
Physician Progress Note      Dallas Prince  CSN #:                  [id number]  :                       1975  ADMIT DATE:       2022 7:59 AM  DISCH DATE:  RESPONDING  PROVIDER #:        Riky Dwyer MD          QUERY TEXT:    Pt admitted with sigmoid stricture post laparoscopic sigmoid colon resection   . Pt noted to have elevated temp, hr, wbc , bili . If possible, please   document in the progress notes and discharge summary if you are evaluating and   /or treating any of the following: The medical record reflects the following:  Risk Factors: sigmoid stricture  post lap sigmoid resection, abd pain, nausea  Clinical Indicators: : HR , temp 98.1. : WBC 21.5, HR , temp   98.7-101.3.   CT abd:  Right > left basilar pulmonary opacities may   represent atelectasis, scarring and/or pneumonia. Trace bilateral pleural   effusions. Distention of small-bowel loops may represent ileus related to   recent laparoscopic sigmoid resection. Obstruction cannot be excluded. 2.Prominent parastomal hernia at the site of the ileostomy with multiple loops   of small bowel. .. : WBC 15.4,  max, temp 103.6 max. CXR findings:   consolidation and/or collapse in both lung. ..bases. ..sm  Treatment: IV Cipro, Flagyl, IVF 75-100cc/hr., nebs, inhalers. Thank you, Steve Jiang RN [facility] [phone]  Options provided:  -- Sepsis, present on admission  -- Sepsis, not present on admission  -- Sepsis was ruled out  -- Other - I will add my own diagnosis  -- Disagree - Not applicable / Not valid  -- Disagree - Clinically unable to determine / Unknown  -- Refer to Clinical Documentation Reviewer    PROVIDER RESPONSE TEXT:    This patient has sepsis that was not present on admission.     Query created by: José Alston on 2022 2:45 PM      Electronically signed by:  Riky Dwyer MD 2022 9:25 PM

## 2022-02-23 NOTE — CARE COORDINATION
CM note; [id number] [facility] home care following for IV antibiotics, script faxed yesterday, PICC line in. WBC elevated to 21.7, drains continue. [facility]

## 2022-02-23 NOTE — PROGRESS NOTES
[facility]  [facility]  Progress Note    NAME: Dorothea Cuba  MR:  [id number]  :   1975  DATE OF SERVICE:22    This is a face to face encounter with Dorothea Cuba 51 y.o. female on 22    CHIEF COMPLAINT     ID following for No chief complaint on file. HISTORY OF PRESENT ILLNESS   Pt with h/o colon ca/ileosotmy/coivd 10/2021-decadron -baricitnib   s/p ANAL PROCTO SIGMOIDOSCOPY FLEXIBLE  Admitted on  for LAPAROSCOPIC SIGMOID COLON RESECTION with  stapled end-to-end anastomosis and mobilization of splenic flexure. Hkwh270.3 on  dutw312.5   currently afebrile     wbc11->17.9->16.9->14.1  Cr0.6  Id was asked to see for atbx due to CT the abdomen/pelvis/chest yesterday showed large 9 cm fluid collection in the left upper abdomen/pelvis. S/p drain on    Pt has no f/c/n/vd/rash/itch      body fluid cx pending   2/15 urine cx <10K Proteus/gpo     Pt seen and examined  22   Patient in bed- feels tired and weak today. Has been afebrile. On room air.     2022  In bed has abd pain  Afebrile on ra wbc16.8 cr0.5     2022  In bed drains in place abd kiet  Left quadrant   2022  fevers better has dec wbc  Dec pain in bed     Patient is tolerating medications. No reported adverse drug reactions. REVIEW OF SYSTEMS     As stated above in the chief complaint, otherwise negative.   CURRENT MEDICATIONS      lidocaine 1 % injection  5 mL IntraDERmal Once    sodium chloride flush  10 mL IntraVENous 2 times per day    piperacillin-tazobactam  3,375 mg IntraVENous Q8H    fluconazole  400 mg IntraVENous Q24H    potassium chloride  20 mEq Oral Daily with breakfast    polycarbophil  625 mg Oral BID    loperamide  2 mg Oral BID    psyllium  1 packet Oral BID    pantoprazole  40 mg Oral BID AC    sodium chloride flush  5-40 mL IntraVENous 2 times per day    enoxaparin  40 mg SubCUTAneous Daily    methocarbamol  500 mg Oral 4x Daily     Continuous Infusions:   sodium chloride      0.9% NaCl with KCl 20 mEq 50 mL/hr at 225    sodium chloride 25 mL (22 1226)    sodium chloride 25 mL (229)     PRN Meds:sodium chloride flush, sodium chloride, acetaminophen, ibuprofen, calcium carbonate, sodium chloride flush, sodium chloride, ondansetron **OR** ondansetron, traMADol **OR** traMADol, morphine **OR** morphine, nortriptyline, rOPINIRole, albuterol    PHYSICAL EXAM     /60   Pulse 85   Temp 98.8 °F (37.1 °C) (Oral)   Resp 14   Ht 5' 2\" (1.575 m)   Wt 187 lb (84.8 kg)   LMP 2018   SpO2 93%   BMI 34.20 kg/m²   Temp  Av.6 °F (37 °C)  Min: 98.3 °F (36.8 °C)  Max: 98.8 °F (37.1 °C)  Constitutional:  The patient is awake, alert, weak today   Skin:    Warm and dry. HEENT:   AT/NC  Chest:   No use of accessory muscles to breathe. Symmetrical expansion. On ra dec bs ant   Cardiovascular:  S1 and S2 are rhythmic and regular. Abdomen:   Dec  bowel sounds to auscultation. ileostomy drains x2 right blood tinge left purulence yellow   Extremities:    no edema.   CNS    AAxO   Lines: left upper arm with PICC-2022     DIAGNOSTIC RESULTS   Radiology:    Recent Labs     22  0438 22  0428 22  043   WBC 14.5* 16.8* 21.7*   RBC 2.96* 3.05* 3.10*   HGB 9.1* 9.3* 9.4*   HCT 28.2* 30.0* 30.0*   MCV 95.3 98.4 96.8   MCH 30.7 30.5 30.3   MCHC 32.3 31.0* 31.3*   RDW 13.9 13.9 14.6   * 584* 533*   MPV 9.6 9.7 9.5     Recent Labs     22  0438 22  043    134   K 4.2 4.6    98   CO2 23 23   BUN 3* 2*   CREATININE 0.5 0.6   GLUCOSE 114* 105*   PROT 5.5* 5.8*   LABALBU 2.5* 2.8*   CALCIUM 8.2* 8.3*   BILITOT 0.7 0.6   ALKPHOS 106* 136*   AST 18 25   ALT 16 17     Lab Results   Component Value Date    CRP 0.5 (H) 2021    CRP 0.9 (H) 10/31/2021    CRP 1.3 (H) 10/30/2021     Lab Results   Component Value Date    SEDRATE 3 10/30/2021    SEDRATE 5 05/22/2020     Recent Labs     02/21/22  0438 02/23/22  0430   AST 18 25   ALT 16 17     Lab Results   Component Value Date    CHOL 225 05/11/2021    TRIG 184 05/11/2021    HDL 44 05/11/2021    LDLCALC 144 05/11/2021    LABVLDL 37 05/11/2021     Lab Results   Component Value Date/Time    VITD25 32 08/18/2021 08:00 AM       Microbiology:   2/18/2022- body fluid cx- Proteus mirabilis      MRSA Culture Only   Date Value Ref Range Status   02/02/2022 Methicillin resistant Staph aureus not isolated  Final     FINAL IMPRESSION    Pt had No chief complaint on file. [facility]   complicated by sepsis intraabd abscess/Proteus   Leukocytosis up   Fevers better     Plan:   · Continue zosyn for now  · Continue diflucan   · WBC- 21.7   · Discharge plan is home with home care   · Monitor labs  · Surgery following   · CT reviewed     Imaging and labs were reviewed per medical records. The patient was educated about the diagnosis, prognosis, indications, risks and benefits of treatment. An opportunity to ask questions was given to the patient/FAMILY. Thank you for involving me in the care of Renay Mccoy I will continue to follow. Please do not hesitate to call for any questions or concerns. Electronically signed by MARIXA Sung on 2/23/2022 at 1:21 PM     As above      This is a face to face encounter with Renay Mccoy on 02/23/22. I have performed and participated in the history, exam, medical decision making, and  POC with the NURSE PRACTITIONER, MARIXA Sung. In bed c/o dysuria/frequency  Bladder scan   ua urine cx  Follow cbc  Cont atbx    Imaging and labs were reviewed. Renay Mccoy was informed of their diagnosis, indications, risks and benefits of treatment. Renay Mccoy had the opportunity to ask questions. All questions were answered. Thank you for involving me in the care of Renay Mccoy.  Please do not hesitate to call for any questions or concerns.     Electronically signed by Ziggy Hood MD on 2/23/2022 at 3:30 PM

## 2022-02-23 NOTE — PLAN OF CARE
Problem: Infection - Surgical Site:  Goal: Will show no infection signs and symptoms  Description: Will show no infection signs and symptoms  Outcome: Not Met This Shift     Problem: Pain:  Goal: Pain level will decrease  Description: Pain level will decrease  Outcome: Met This Shift  Goal: Control of acute pain  Description: Control of acute pain  Outcome: Met This Shift     Problem: Bowel/Gastric:  Goal: Control of bowel function will improve  Description: Control of bowel function will improve  Outcome: Met This Shift  Goal: Ability to achieve a regular elimination pattern will improve  Description: Ability to achieve a regular elimination pattern will improve  Outcome: Met This Shift     Problem: Nutritional:  Goal: Ability to follow a diet with enough fiber (20 to 30 grams) for normal bowel function will improve  Description: Ability to follow a diet with enough fiber (20 to 30 grams) for normal bowel function will improve  Outcome: Met This Shift     Problem: SAFETY  Goal: Free from accidental physical injury  Outcome: Completed     Problem: DAILY CARE  Goal: Daily care needs are met  Outcome: Completed     Problem: PAIN  Goal: Patient's pain/discomfort is manageable  Outcome: Met This Shift     Problem: KNOWLEDGE DEFICIT  Goal: Patient/S.O. demonstrates understanding of disease process, treatment plan, medications, and discharge instructions.   Outcome: Completed

## 2022-02-24 VITALS
OXYGEN SATURATION: 98 % | SYSTOLIC BLOOD PRESSURE: 114 MMHG | HEART RATE: 87 BPM | TEMPERATURE: 99.1 F | DIASTOLIC BLOOD PRESSURE: 64 MMHG | BODY MASS INDEX: 34.41 KG/M2 | HEIGHT: 62 IN | WEIGHT: 187 LBS | RESPIRATION RATE: 18 BRPM

## 2022-02-24 LAB
HCT VFR BLD CALC: 30.5 % (ref 34–48)
HEMOGLOBIN: 9.6 G/DL (ref 11.5–15.5)
MCH RBC QN AUTO: 31 PG (ref 26–35)
MCHC RBC AUTO-ENTMCNC: 31.5 % (ref 32–34.5)
MCV RBC AUTO: 98.4 FL (ref 80–99.9)
PDW BLD-RTO: 14.6 FL (ref 11.5–15)
PLATELET # BLD: 487 E9/L (ref 130–450)
PMV BLD AUTO: 9.4 FL (ref 7–12)
RBC # BLD: 3.1 E12/L (ref 3.5–5.5)
REASON FOR REJECTION: NORMAL
REJECTED TEST: NORMAL
WBC # BLD: 20.5 E9/L (ref 4.5–11.5)

## 2022-02-24 PROCEDURE — 6360000002 HC RX W HCPCS: Performed by: SPECIALIST

## 2022-02-24 PROCEDURE — 6370000000 HC RX 637 (ALT 250 FOR IP): Performed by: SURGERY

## 2022-02-24 PROCEDURE — 85027 COMPLETE CBC AUTOMATED: CPT

## 2022-02-24 PROCEDURE — 2580000003 HC RX 258: Performed by: SPECIALIST

## 2022-02-24 PROCEDURE — 6370000000 HC RX 637 (ALT 250 FOR IP): Performed by: INTERNAL MEDICINE

## 2022-02-24 PROCEDURE — 6370000000 HC RX 637 (ALT 250 FOR IP): Performed by: STUDENT IN AN ORGANIZED HEALTH CARE EDUCATION/TRAINING PROGRAM

## 2022-02-24 PROCEDURE — 6360000002 HC RX W HCPCS: Performed by: SURGERY

## 2022-02-24 PROCEDURE — 6360000002 HC RX W HCPCS

## 2022-02-24 PROCEDURE — 6360000002 HC RX W HCPCS: Performed by: CLINICAL NURSE SPECIALIST

## 2022-02-24 PROCEDURE — 2580000003 HC RX 258: Performed by: CLINICAL NURSE SPECIALIST

## 2022-02-24 PROCEDURE — 36415 COLL VENOUS BLD VENIPUNCTURE: CPT

## 2022-02-24 RX ORDER — OXYCODONE HYDROCHLORIDE AND ACETAMINOPHEN 5; 325 MG/1; MG/1
1 TABLET ORAL EVERY 6 HOURS PRN
Qty: 12 TABLET | Refills: 0 | Status: SHIPPED | OUTPATIENT
Start: 2022-02-24 | End: 2022-02-24 | Stop reason: HOSPADM

## 2022-02-24 RX ORDER — METHOCARBAMOL 500 MG/1
500 TABLET, FILM COATED ORAL 4 TIMES DAILY
Qty: 40 TABLET | Refills: 0 | Status: SHIPPED | OUTPATIENT
Start: 2022-02-24 | End: 2022-03-06

## 2022-02-24 RX ORDER — HEPARIN SODIUM (PORCINE) LOCK FLUSH IV SOLN 100 UNIT/ML 100 UNIT/ML
SOLUTION INTRAVENOUS
Status: COMPLETED
Start: 2022-02-24 | End: 2022-02-24

## 2022-02-24 RX ORDER — FLUCONAZOLE 200 MG/1
200 TABLET ORAL DAILY
Qty: 20 TABLET | Refills: 0 | Status: SHIPPED | OUTPATIENT
Start: 2022-02-24 | End: 2022-03-16

## 2022-02-24 RX ORDER — HEPARIN SODIUM (PORCINE) LOCK FLUSH IV SOLN 100 UNIT/ML 100 UNIT/ML
100 SOLUTION INTRAVENOUS PRN
Status: DISCONTINUED | OUTPATIENT
Start: 2022-02-24 | End: 2022-02-24 | Stop reason: HOSPADM

## 2022-02-24 RX ORDER — TRAMADOL HYDROCHLORIDE 50 MG/1
50 TABLET ORAL EVERY 6 HOURS PRN
Qty: 28 TABLET | Refills: 0 | Status: SHIPPED | OUTPATIENT
Start: 2022-02-24 | End: 2022-02-24 | Stop reason: HOSPADM

## 2022-02-24 RX ORDER — PANTOPRAZOLE SODIUM 40 MG/1
40 TABLET, DELAYED RELEASE ORAL
Qty: 30 TABLET | Refills: 0 | Status: SHIPPED | OUTPATIENT
Start: 2022-02-24 | End: 2022-06-28

## 2022-02-24 RX ORDER — OXYCODONE HYDROCHLORIDE AND ACETAMINOPHEN 5; 325 MG/1; MG/1
1 TABLET ORAL EVERY 6 HOURS PRN
Qty: 12 TABLET | Refills: 0 | Status: SHIPPED | OUTPATIENT
Start: 2022-02-24 | End: 2022-02-27

## 2022-02-24 RX ADMIN — METHOCARBAMOL 500 MG: 500 TABLET ORAL at 17:31

## 2022-02-24 RX ADMIN — MORPHINE SULFATE 4 MG: 4 INJECTION, SOLUTION INTRAMUSCULAR; INTRAVENOUS at 09:10

## 2022-02-24 RX ADMIN — POTASSIUM CHLORIDE 20 MEQ: 1500 TABLET, EXTENDED RELEASE ORAL at 08:17

## 2022-02-24 RX ADMIN — PANTOPRAZOLE SODIUM 40 MG: 40 TABLET, DELAYED RELEASE ORAL at 17:31

## 2022-02-24 RX ADMIN — PANTOPRAZOLE SODIUM 40 MG: 40 TABLET, DELAYED RELEASE ORAL at 05:38

## 2022-02-24 RX ADMIN — PIPERACILLIN SODIUM AND TAZOBACTAM SODIUM 3375 MG: 3; 375 INJECTION, POWDER, FOR SOLUTION INTRAVENOUS at 01:56

## 2022-02-24 RX ADMIN — ENOXAPARIN SODIUM 40 MG: 100 INJECTION SUBCUTANEOUS at 08:19

## 2022-02-24 RX ADMIN — MORPHINE SULFATE 4 MG: 4 INJECTION, SOLUTION INTRAMUSCULAR; INTRAVENOUS at 01:48

## 2022-02-24 RX ADMIN — PSYLLIUM HUSK 1 PACKET: 3.4 GRANULE ORAL at 14:32

## 2022-02-24 RX ADMIN — TRAMADOL HYDROCHLORIDE 25 MG: 50 TABLET, COATED ORAL at 05:38

## 2022-02-24 RX ADMIN — HEPARIN SODIUM (PORCINE) LOCK FLUSH IV SOLN 100 UNIT/ML 100 UNITS: 100 SOLUTION at 14:32

## 2022-02-24 RX ADMIN — FLUCONAZOLE 400 MG: 2 INJECTION INTRAVENOUS at 12:43

## 2022-02-24 RX ADMIN — METHOCARBAMOL 500 MG: 500 TABLET ORAL at 12:43

## 2022-02-24 RX ADMIN — METHOCARBAMOL 500 MG: 500 TABLET ORAL at 08:17

## 2022-02-24 RX ADMIN — PSYLLIUM HUSK 1 PACKET: 3.4 GRANULE ORAL at 08:16

## 2022-02-24 RX ADMIN — ERTAPENEM SODIUM 1000 MG: 1 INJECTION, POWDER, LYOPHILIZED, FOR SOLUTION INTRAMUSCULAR; INTRAVENOUS at 11:25

## 2022-02-24 RX ADMIN — PIPERACILLIN SODIUM AND TAZOBACTAM SODIUM 3375 MG: 3; 375 INJECTION, POWDER, FOR SOLUTION INTRAVENOUS at 09:09

## 2022-02-24 RX ADMIN — HEPARIN 100 UNITS: 100 SYRINGE at 14:32

## 2022-02-24 RX ADMIN — LOPERAMIDE HYDROCHLORIDE 2 MG: 2 CAPSULE ORAL at 08:17

## 2022-02-24 RX ADMIN — TRAMADOL HYDROCHLORIDE 50 MG: 50 TABLET, COATED ORAL at 15:42

## 2022-02-24 RX ADMIN — SODIUM CHLORIDE 25 ML: 9 INJECTION, SOLUTION INTRAVENOUS at 06:00

## 2022-02-24 ASSESSMENT — PAIN DESCRIPTION - PAIN TYPE
TYPE: ACUTE PAIN

## 2022-02-24 ASSESSMENT — PAIN DESCRIPTION - LOCATION
LOCATION: ABDOMEN

## 2022-02-24 ASSESSMENT — PAIN DESCRIPTION - ONSET: ONSET: GRADUAL

## 2022-02-24 ASSESSMENT — PAIN SCALES - GENERAL
PAINLEVEL_OUTOF10: 8
PAINLEVEL_OUTOF10: 6
PAINLEVEL_OUTOF10: 0
PAINLEVEL_OUTOF10: 7
PAINLEVEL_OUTOF10: 8
PAINLEVEL_OUTOF10: 7
PAINLEVEL_OUTOF10: 4
PAINLEVEL_OUTOF10: 0

## 2022-02-24 ASSESSMENT — PAIN DESCRIPTION - DESCRIPTORS
DESCRIPTORS: ACHING
DESCRIPTORS: DISCOMFORT
DESCRIPTORS: CRAMPING

## 2022-02-24 ASSESSMENT — PAIN DESCRIPTION - PROGRESSION: CLINICAL_PROGRESSION: GRADUALLY IMPROVING

## 2022-02-24 ASSESSMENT — PAIN DESCRIPTION - ORIENTATION: ORIENTATION: RIGHT;LEFT

## 2022-02-24 NOTE — PROGRESS NOTES
Department of Internal Medicine        HISTORY OF PRESENT ILLNESS:      The patient is a 51 y.o. female who presents with having a elective laparoscopic sigmoid colon resection. Patient has a known history of sigmoid stricture. Patient had a anal proctosigmoidoscopy performed 1/25 with a scope with past 15 cm work there was complete closure of the rectosigmoid junction without an opening. Patient is seen postop. Patient has expected postop discomfort. Temperature is 97.8 with heart rate 72 blood pressure 140/65. O2 sat 98% on 2 L nasal cannula. 2/9/2022  Patient seen and examined on medical surgical floor. Patient complains of postop discomfort. She denies any problem with chest pain, nausea/vomiting or unusual shortness of breath. BUN/creatinine 5/0.6 with fasting blood sugar 126. Mild elevation of transaminase with a ALT of 69 AST is 35. Review of records have shown patient has intermittent elevation transaminase over the past 18 months. WBC is 21.5 with hemoglobin 12.6. Temperature 98.3 with heart rate 84 blood pressure 107/69. O2 sat 96% on room air at rest.  Urine output is very good. General surgery note reviewed. 2/10/2022  Patient seen and examined on medical surgical floor. Patient complains of postop discomfort. Patient denies any problem with chest pain, dizziness, nausea, fever/chills, dysuria, cough or unusual shortness of breath. Patient is passing some gas but no bowel movements. Increase activity today. BUN/creatinine 5/0.7 with serum sodium 130. WBC is still elevated 23.7 with hemoglobin 14.3. Temperature is 98.8 with heart rate 76 blood pressure 110/78. O2 sat 94% on room air at rest.  Urine output is adequate. 2/11/2022  Patient seen examined on medical surgical floor. Patient complains of persistent postop discomfort along with some nausea and poor appetite. Patient denies passing had any bowel movements or gas recently.   BUN/creatinine 6/0.6 with serum sodium 124 7. Potassium 3.4 with WBC 24.6 and hemoglobin 13.7. Temperature is 98.6nine 9.3 with heart rate of 99. O2 sat 98% on room air at rest.  General surgery note reviewed. CT of the abdomen pelvis was ordered for today. 2/12/2022  Patient seen examined on medical surgical floor. Patient states the abdominal distention seems to be improved. Patient still having postop discomfort and is mildly improved. Patient denies any chest pain, dizziness or unusual shortness of breath. Patient did have some nausea last night with supper. Patient denies any bowel movements at this time. CT of the abdomen pelvis from yesterday suggested ileus but cannot rule out obstruction. Temperature 98 with heart rate of 97 with blood pressure 124/67 O2 sat 93% on room air at rest.  WBC is 14.8 with hemoglobin 11.8. BUN/creatinine 7/0.6. Serum sodium increased to 134 from 127 yesterday. 2/13/2022  Patient seen examined on surgical medical floor. Patient still smokes postop discomfort. It seems to be slowly improving. Patient still has some abdominal distention intermittently. Patient says she is drinking fairly good but is not eating very much. She denies any chest pain or unusual shortness of breath at rest.  Serum sodium is 129 with BUN/creatinine 4/0.5. Serum potassium 3.4. WBC 12.9 hemoglobin 11.5. Temperature is 99.1 with a heart rate of 100 and blood pressure 140/77. O2 sat 92% room air at rest.  Check O2 saturation with activity on room air. 2/14/2022  Patient seen and examined on medical surgical floor. Patient still complain of some lower abdominal cramping. She denies any chest pain, nausea/vomiting, shortness of breath. Patient denies any dysuria or cough. BUN/creatinine 4/0.6 with serum sodium 128. WBC 15.4 today with a hemoglobin 1.7. Temperature 100.3 with heart rate of 100 and blood pressure 156/72. O2 sat 93% on room air at rest.  Patient had out about 1850 cc from ostomy yesterday afternoon. General surgery note reviewed. With patient persistent tach and leukocytosis will check UA and chest x-ray. 2/15/2022  Patient seen examined on medical surgical floor. Patient states she is slowly improving. Patient denies any chest pain, dizziness or unusual shortness of breath. Patient abdominal pain is slowly improving. Ostomy output ranges 250-1000 cc a shift. Temperature ranges 98.7100 with heart rate 98 blood pressure 143/78. O2 sat 95% on room air at rest.  BUN/creatinine was 6/0.6 with serum sodium 128. Potassium 3.3 with WBCs 10.1 hemoglobin 10.5. Urinalysis yesterday shows greater than 80 ketones with trace of leukocyte esterase with 510 WBCs and few bacteria. Chest x-ray yesterday showed bibasilar pleural and parenchymal changes with bilateral pleural effusion. Patient still with poor appetite. Abdomen shows moderate distention with hypoactive bowel sounds. 2/16/2022  Patient seen examined on medical surgical floor. Patient states she is feeling better with expected postop discomfort. Patient denies any chest pain, dizziness or unusual shortness of breath. BUN/creatinine 7/0.6. Serum sodium is 130. Total bili is increased to 1.9 today with direct bilirubin slightly increased 1.1. WBC is 11 with a hemoglobin 10.7. Temperature still 100 degrees with heart rate 91 and blood pressure 114/63. O2 sat 91-96% on room air. Patient still with increased output from ostomy range of 300-670 cc a shift. 2/17/2022  Patient seen examined on medical surgical floor. Patient states that her abdominal discomfort is slowly improving. She denies any chest pain, nausea/vomiting or unusual shortness of breath. serum potassium still low at 2.9 even with the patient receiving supplemental potassium 3 times a day. BUN/creatinine 6/0.5 with serum sodium 130. Total bili is improved to 1.1 with a WBC 14.9 hemoglobin 9.6. Urinalysis improved but still shows a trace of ketones.   Temperature ranges 98. 899.3 with a heart rate of 100 and blood pressure 119/70. O2 sat 93-96% on room air at rest.  Urinary output is fairly good with the patient having improvement in ostomy output ranging from 150-800 cc a shift. Patient started on IV fluids of 40 KCl at 100 cc an hour. Repeat BMP today at 4 PM to monitor serum potassium. 2/18/2022  Patient seen examined on medical surgical floor. Patient still with right-sided abdominal pain which is not worsening but not improving. Patient denies any cough. There is no dysuria. Patient does have some intermittent fevers and chills. WBC increased again today 17.9 with hemoglobin 9.3. Serum sodium is 130 with BUN/creatinine 4/0.6. Serum sodium is improved to 23.7. Liver enzymes are normal.  Temp 100.6 with heart rate 100 blood pressure 106/70. O2 sat 97% room air at rest.  Urine output is fairly good. Ostomy output improved ranges 250-350 cc a shift. Patient is set up for a CT of the abdomen and chest with IV and oral contrast.    2/19/2022   Patient seen examined on medical surgical floor. Reviewed CT scan with the patient yesterday. Patient denies any problem with chest pain or dizziness. She denies any fever or chills today. Patient has expected postop discomfort. Patient has drains in left lower quadrant and right abdomen. CT the abdomen/pelvis/chest yesterday showed large 9 cm fluid collection in the left upper abdomen/pelvis. BUN/creatinine 4/0.5 with a serum sodium 128. WBC 16.9 with hemoglobin 9.6. Temperature ranges 98.7101.5. Heart rate 91 with blood pressure 116/70. O2 sat 96% on room air at rest.    2/20/2022  Patient seen examined on medical surgical floor. Patient with expected postop discomfort. The left quadrant drain has not put out much over the last 24 hours. Patient denies any problem with any chest pain, dizziness or unusual shortness of breath. BUN creatinine 60/0.5 with sodium 132. WBC is 14.1 with hemoglobin 9.6.   Temperature is 98.1 with heart rate 96 and blood pressure 100/63. O2 sat 98% room air at rest.  Urinary output is adequate. 2/21/2022  Patient seen examined on medical surgical floor. Patient states she is feeling little bit better today. Patient still has some abdominal pain. It is improved since she had the  drains inserted. She denies any problem with nausea/vomiting, chest pain or unusual shortness of breath. BUN/creatinine 3/0.5. Transaminases normal with a WBC 14.5 and hemoglobin 9.1. Temperature is 98.5 with heart rate of 65 blood pressure 111/67. O2 sat 93-97% room air at rest.  Urinary output is adequate. Ostomy output is improved with range of 300-400 cc a shift. Abdominal drains ranging 20-50 cc a shift. 2/22/2022  Patient seen examined on medical surgical floor. Patient still with some abdominal discomfort more left upper mid quadrant. Patient's appetite though is still fairly good. Patient denies any chest pain or unusual shortness of breath. WBC increased 16.8 today with hemoglobin 9.3. Temperature is 98.1 with heart rate 88 blood pressure 140/71. O2 sat 98% on room air at rest.  Urine output is fairly good. Ostomy output ranges of 100-400 cc a shift. Drain output ranges 0-35 cc a shift. Patient is set up to have repeat CT of the abdomen today and possible replacement of abdominal drain. 2/23/2022  Patient seen examined on medical surgical floor. Patient still with left greater than right abdominal discomfort which is mildly improved. Patient denies any nausea/vomiting. There is no chest pain or unusual shortness of breath. CT of the abdomen showed essentially resolved right lower quadrant subcutaneous fluid collection with about one half the size decreased fluid in the left lower mid abdomen. BUN/creatinine 2/0.6. Transaminases are normal with a WBC is increased again to 21.7 with hemoglobin 9.4. Platelet count five hundred thirty-three.   Temperature 98.8 with heart rate eighty-five blood pressure 112/60. O2 sat 93% room air at rest.  Urine output ranges 200-600 cc a shift. Ostomy output ranges  cc a shift. Output from the drain ranges 5-35 cc a shift. 2/24/2022  Patient seen examined on medical surgical floor. Patient still with postop discomfort right side greater than left. Patient states that she is drinking very well but still has no appetite. Patient denies any chest pain or unusual shortness of breath. WBC 20.5 with hemoglobin 9.6. Platelet counts improved at 47. Temperature 98.1 with heart rate 73 blood pressure ranges 90/58105/60. O2 sat 93% on room air at rest.  Urine output is fairly good. Patient has  0-20 cc a shift coming out of the left drain. Past Medical History:    Past Medical History:   Diagnosis Date    Acid reflux disease     Cyst of ovary     Fracture of nasal bone     Headache     Hearing loss     Hypertension     Migraine     Morbidly obese ([address] 10/05/2020    Multiple sclerosis ([address]     denies limitations at present 11/2020    VEENA no CPAP     severe VEENA per pt    Right shoulder pain 11/2020    Tinnitus     TMJ dysfunction      Past Surgical History:    Past Surgical History:   Procedure Laterality Date    APPENDECTOMY      BACK SURGERY      lumbar    BICEPS TENDON REPAIR Right 11/11/2020    BICEPS TENODESIS performed by Valentin Grant MD at [place] [id number] Left 8/31/2021    LAPAROSCOPIC LEFT HEMICOLECTOMY WITH LOOP OSTOMY performed by Kandy Wing MD at [address] 9/6/2021    DIAGNOSTIC LAPAROSCOPY POSSIBLE BOWEL RESECTION POSSILBE OSTOMY performed by Kandy Wing MD at [address] 2/8/2022    LAPAROSCOPIC SIGMOID COLON RESECTION performed by Kandy Wing MD at [facility]  03/05/2018    Robotic hysterectomy, bilateral salpingectomy, right oophorectomy DR. [place] [facility] allergies. Social History:   Social History     Socioeconomic History    Marital status: Single     Spouse name: Not on file    Number of children: 3    Years of education: 6    Highest education level: 11th grade   Occupational History    Not on file   Tobacco Use    Smoking status: Current Every Day Smoker     Packs/day: 0.50     Years: 25.00     Pack years: 12.50     Types: Cigarettes    Smokeless tobacco: Never Used    Tobacco comment: Ready to stop, requesting prescription for Chantix   Vaping Use    Vaping Use: Never used   Substance and Sexual Activity    Alcohol use: Yes     Alcohol/week: 1.0 standard drink     Types: 1 Glasses of wine per week     Comment: socially    Drug use: No    Sexual activity: Yes     Partners: Male   Other Topics Concern    Not on file   Social History Narrative    Uses [address] for transportation    [employer]     Social Determinants of Health     Financial Resource Strain: Medium Risk    Difficulty of Paying Living Expenses: Somewhat hard   Food Insecurity: Food Insecurity Present    Worried About Running Out of Food in the Last Year: Sometimes true    [place] of Food in the Last Year: Never true   Transportation Needs: No Transportation Needs    Lack of Transportation (Medical): No    Lack of Transportation (Non-Medical): No   Physical Activity: Sufficiently Active    Days of Exercise per Week: 3 days    Minutes of Exercise per Session: 60 min   Stress: No Stress Concern Present    Feeling of Stress : Not at all   Social Connections: Socially Isolated    Frequency of Communication with Friends and Family:  Three times a week    Frequency of Social Gatherings with Friends and Family: Twice a week    Attends Yazidism Services: Never    Active Member of Clubs or Organizations: No    Attends Club or Organization Meetings: Never    Marital Status: Never    Intimate Partner Violence:     Fear of Current or Ex-Partner: Not on file    Emotionally Abused: Not on file    Physically Abused: Not on file    Sexually Abused: Not on file   Housing Stability:     Unable to Pay for Housing in the Last Year: Not on file    Number of Places Lived in the Last Year: Not on file    Unstable Housing in the Last Year: Not on file       Family History:   Family History   Problem Relation Age of Onset    Mult Sclerosis Mother     Colon Cancer Neg Hx     Uterine Cancer Neg Hx     Ovarian Cancer Neg Hx     Breast Cancer Neg Hx        REVIEW OF SYSTEMS:    Gen: Patient denies any lightheadedness or dizziness. No LOC or syncope. No fevers or chills. HEENT: No earache, sore throat or nasal congestion. Resp: Denies cough, hemoptysis or sputum production. Cardiac: Denies chest pain, SOB, diaphoresis or palpitations. GI:+ Abdominal distention. Lower abdominal cramping. No nausea, vomiting, diarrhea or constipation. No melena or hematochezia. : No urinary complaints, dysuria, hematuria or frequency. MSK: No extremity weakness, paralysis or paresthesias. PHYSICAL EXAM:    Vitals:  BP (!) 98/58   Pulse 73   Temp 98.1 °F (36.7 °C) (Oral)   Resp 20   Ht 5' 2\" (1.575 m)   Wt 187 lb (84.8 kg)   LMP 01/05/2018   SpO2 93%   BMI 34.20 kg/m²     General:  This is a 51 y.o. yo female who is alert and oriented in postop distress  HEENT:  Head is normocephalic and atraumatic, PERRLA, EOMI, mucus membranes moist with no pharyngeal erythema or exudate. Neck:  Supple with no carotid bruits, JVD or thyromegaly.   No cervical adenopathy  CV:  Regular rate and rhythm, no murmurs  Lungs: Mildly coarse breath sounds to auscultation bilaterally with no wheezes, rales or rhonchi  Abdomen:  + Expected postop abdomen, + ileostomy,+ abdominal fat, bowel sounds  hypoactive  Extremities:  No edema, peripheral pulses intact bilaterally  Neuro:  Cranial nerves II-XII grossly intact; motor and sensory function intact with no focal deficits  Skin: No rashes, lesions or wounds      DATA:  CBC with Differential:    Lab Results   Component Value Date    WBC 20.5 02/24/2022    RBC 3.10 02/24/2022    HGB 9.6 02/24/2022    HCT 30.5 02/24/2022     02/24/2022    MCV 98.4 02/24/2022    MCH 31.0 02/24/2022    MCHC 31.5 02/24/2022    RDW 14.6 02/24/2022    METASPCT 0.9 11/23/2021    LYMPHOPCT 23.2 02/02/2022    MONOPCT 6.7 02/02/2022    BASOPCT 0.4 02/02/2022    MONOSABS 0.62 02/02/2022    LYMPHSABS 2.13 02/02/2022    EOSABS 0.20 02/02/2022    BASOSABS 0.04 02/02/2022     CMP:    Lab Results   Component Value Date     02/23/2022    K 4.6 02/23/2022    K 3.7 02/09/2022    CL 98 02/23/2022    CO2 23 02/23/2022    BUN 2 02/23/2022    CREATININE 0.6 02/23/2022    GFRAA >60 02/23/2022    LABGLOM >60 02/23/2022    GLUCOSE 105 02/23/2022    PROT 5.8 02/23/2022    LABALBU 2.8 02/23/2022    CALCIUM 8.3 02/23/2022    BILITOT 0.6 02/23/2022    ALKPHOS 136 02/23/2022    AST 25 02/23/2022    ALT 17 02/23/2022     Magnesium:    Lab Results   Component Value Date    MG 2.0 02/18/2022     Phosphorus:    Lab Results   Component Value Date    PHOS 3.2 09/01/2021     PT/INR:    Lab Results   Component Value Date    PROTIME 10.7 02/28/2020    INR 1.0 02/28/2020     Troponin:    Lab Results   Component Value Date    TROPONINI <0.01 07/20/2020     U/A:    Lab Results   Component Value Date    COLORU Yellow 02/23/2022    PROTEINU Negative 02/23/2022    PHUR 6.0 02/23/2022    WBCUA 0-1 02/17/2022    RBCUA 1-3 02/17/2022    TRICHOMONAS Present 02/26/2020    BACTERIA RARE 02/17/2022    CLARITYU Clear 02/23/2022    SPECGRAV 1.020 02/23/2022    LEUKOCYTESUR Negative 02/23/2022    UROBILINOGEN 0.2 02/23/2022    BILIRUBINUR Negative 02/23/2022    BLOODU Negative 02/23/2022    GLUCOSEU Negative 02/23/2022    AMORPHOUS FEW 11/23/2021     ABG:  No results found for: PH, PCO2, PO2, HCO3, BE, THGB, TCO2, O2SAT  HgBA1c:    Lab Results   Component Value Date    LABA1C 5.4 05/11/2021     FLP: Lab Results   Component Value Date    TRIG 184 05/11/2021    HDL 44 05/11/2021    LDLCALC 144 05/11/2021    LABVLDL 37 05/11/2021     TSH:    Lab Results   Component Value Date    TSH 0.293 09/01/2021     IRON:  No results found for: IRON  LIPASE:    Lab Results   Component Value Date    LIPASE 10 09/05/2021       ASSESSMENT AND PLAN:      Patient Active Problem List    Diagnosis Date Noted    Sigmoid stricture ([address]     Pelvic abscess in female     Ileus, postoperative ([address] 09/05/2021    S/P colectomy 08/31/2021    Malignant neoplasm of descending colon ([address]     Secondary restless legs syndrome 08/13/2021    Palafox's esophagus with dysplasia 05/11/2021    Morbidly obese ([address] 10/05/2020    Multiple sclerosis ([address] 08/04/2020    Vocal cord dysfunction 07/20/2020     Impression:  1. Sigmoid stricturelaparoscopic sigmoid resection 2/8/2022  2. History of VEENA-patient apparently been tested and is negative for CPAP use at this time  3. Obesity  4. Hypertension   5. History of current tobacco use  6. History of restless leg syndrome  7. Sepsis postop-secondary to left lower quadrant abdominal abscess-Proteus  8. Mild elevation transaminase postop  9. Hyponatremia postop-hyperosmolar, euvolemic  10. Left lower quadrant greater than right abdomen abscess postopCT-guided placement of drain 2/18  11. Hypokalemia  12. Normocytic anemia postop    Plan:  Patient admitted to medical surgical floor  Home medications reviewed  Monitor heart rate, blood pressure, O2 saturation  Diet and abdominal pain meds per general surgery  Lovenox 40 mg subcu daily  IV fluids lactated Ringer's 100 cc an hour    Albuterol aerosol 2.5 mg every 4 hours as needed for shortness of breath    CT of the abdomen pelvis 2/11  Serum osmolality274        Decrease KCl 20 mEq-daily    IV Cipro/Flagylday 5    CT the chest with contrast, CT the abdomen with IV and oral contrast t-9 cm abscess left lower quadrant.   Consult infectious disease  Decrease IV fluids normal saline 20 KCl 50 cc an hour   IV Zosyn, Diflucan    CT of the abdomen pelvis with oral contrast 2/22-see progress note 2/23      BMP, CBC in a.m.     Felipe Ridley DO, D.OWagner  2/24/2022  9:28 AM

## 2022-02-24 NOTE — CARE COORDINATION
CM note: Plan is for patient to discharge home with [facility] care for [facility], has 100% coverage, will need a dose here prior to discharge. Currently on IV Zosyn and Diflucan. [facility] orders written; has PICC line.   WBCs still elevated but down from yesterday (20.5)

## 2022-02-24 NOTE — PROGRESS NOTES
General Surgery Progress Note  [facility]    Patient's Name/Date of Birth: Daniel Garcia / 1975    Date: February 24, 2022     Surgeon: Emma Guidry MD    Chief Complaint: s/p colectomy    Patient Active Problem List   Diagnosis    Vocal cord dysfunction    Multiple sclerosis ([address]    Morbidly obese ([address]    Palafox's esophagus with dysplasia    Secondary restless legs syndrome    S/P colectomy    Malignant neoplasm of descending colon ([address]    Ileus, postoperative ([address]    Pelvic abscess in female    Sigmoid stricture ([facility])       Subjective: feeling the same today. No acute changes     Objective:  BP (!) 98/58   Pulse 73   Temp 98.1 °F (36.7 °C) (Oral)   Resp 20   Ht 5' 2\" (1.575 m)   Wt 187 lb (84.8 kg)   LMP 01/05/2018   SpO2 93%   BMI 34.20 kg/m²   Labs:  Recent Labs     02/22/22  0428 02/23/22  0430 02/24/22  0752   WBC 16.8* 21.7* 20.5*   HGB 9.3* 9.4* 9.6*   HCT 30.0* 30.0* 30.5*     Lab Results   Component Value Date    CREATININE 0.6 02/23/2022    BUN 2 (L) 02/23/2022     02/23/2022    K 4.6 02/23/2022    CL 98 02/23/2022    CO2 23 02/23/2022     No results for input(s): LIPASE, AMYLASE in the last 72 hours.   CBC with Differential:    Lab Results   Component Value Date    WBC 20.5 02/24/2022    RBC 3.10 02/24/2022    HGB 9.6 02/24/2022    HCT 30.5 02/24/2022     02/24/2022    MCV 98.4 02/24/2022    MCH 31.0 02/24/2022    MCHC 31.5 02/24/2022    RDW 14.6 02/24/2022    METASPCT 0.9 11/23/2021    LYMPHOPCT 23.2 02/02/2022    MONOPCT 6.7 02/02/2022    BASOPCT 0.4 02/02/2022    MONOSABS 0.62 02/02/2022    LYMPHSABS 2.13 02/02/2022    EOSABS 0.20 02/02/2022    BASOSABS 0.04 02/02/2022     CMP:    Lab Results   Component Value Date     02/23/2022    K 4.6 02/23/2022    K 3.7 02/09/2022    CL 98 02/23/2022    CO2 23 02/23/2022    BUN 2 02/23/2022    CREATININE 0.6 02/23/2022    GFRAA >60 02/23/2022    LABGLOM >60 02/23/2022    GLUCOSE 105 02/23/2022    PROT 5.8 02/23/2022    LABALBU 2.8 02/23/2022    CALCIUM 8.3 02/23/2022    BILITOT 0.6 02/23/2022    ALKPHOS 136 02/23/2022    AST 25 02/23/2022    ALT 17 02/23/2022       General appearance:  NAD  Head: NCAT, PERRLA, EOMI, red conjunctiva  Neck: supple, no masses  Lungs: CTAB, equal chest rise bilateral  Heart: Reg rate  Abdomen: soft, nondistended, tender mostly left lower quadrant around drain. No guarding or rebound. Right abdomen ileostomy with liquid output. LLQ and RLQ drains purulent  Skin; no lesions  Gu: no cva tenderness  Extremities: extremities normal, atraumatic, no cyanosis or edema      Assessment/Plan:  Prisca Dale is a 51 y.o. female postop day 15 status post laparoscopic sigmoid colon resection for sigmoid stricture, now status post IR drainage of pelvic abscess and right abdominal abscess    Diet as tolerated  Ambulate antibiotics per ID      Jordan Ramos MD  2/24/2022  11:05 AM    As above.    Stable from an infectious disease standpoint, white blood cell count stable today  Okay to discharge on IV antibiotics  Follow-up 1 week with repeat CT abdomen pelvis

## 2022-02-24 NOTE — PROGRESS NOTES
CLINICAL PHARMACY NOTE: MEDS TO BEDS    Total # of Prescriptions Filled: 1   The following medications were delivered to the patient:  · Fluconazole 200 mg    Additional Documentation:

## 2022-02-24 NOTE — PROGRESS NOTES
[facility]  [facility]  Progress Note    NAME: Dorothea Cuba  MR:  [id number]  :   1975  DATE OF SERVICE:22    This is a face to face encounter with Dorothea Cuba 51 y.o. female on 22    CHIEF COMPLAINT     ID following     HISTORY OF PRESENT ILLNESS   Pt with h/o colon ca/ileosotmy/coivd 10/2021-decadron -baricitnib   s/p ANAL PROCTO SIGMOIDOSCOPY FLEXIBLE  Admitted on  for LAPAROSCOPIC SIGMOID COLON RESECTION with  stapled end-to-end anastomosis and mobilization of splenic flexure. Shdm232.3 on  hsnw153.5   currently afebrile     wbc11->17.9->16.9->14.1  Cr0.6  Id was asked to see for atbx due to CT the abdomen/pelvis/chest yesterday showed large 9 cm fluid collection in the left upper abdomen/pelvis. S/p drain on    Pt has no f/c/n/vd/rash/itch      body fluid cx pending   2/15 urine cx <10K Proteus/gpo     Pt seen and examined  22   Patient has poor appetite- weak today  On room air. No fevers. WBC- 20.5    2022  Patient in bed- feels tired and weak today. Has been afebrile. On room air.     2022  In bed has abd pain  Afebrile on ra wbc16.8 cr0.5     2022  In bed drains in place abd kiet  Left quadrant   2022  fevers better has dec wbc  Dec pain in bed     Patient is tolerating medications. No reported adverse drug reactions. REVIEW OF SYSTEMS     As stated above in the chief complaint, otherwise negative.   CURRENT MEDICATIONS      lidocaine 1 % injection  5 mL IntraDERmal Once    sodium chloride flush  10 mL IntraVENous 2 times per day    piperacillin-tazobactam  3,375 mg IntraVENous Q8H    fluconazole  400 mg IntraVENous Q24H    polycarbophil  625 mg Oral BID    loperamide  2 mg Oral BID    psyllium  1 packet Oral BID    pantoprazole  40 mg Oral BID AC    sodium chloride flush  5-40 mL IntraVENous 2 times per day    enoxaparin  40 mg SubCUTAneous Daily    methocarbamol  500 mg Oral 4x Daily     Continuous Infusions:   sodium chloride      sodium chloride Stopped (22 0800)    sodium chloride 25 mL (22 2149)     PRN Meds:sodium chloride flush, sodium chloride, acetaminophen, ibuprofen, calcium carbonate, sodium chloride flush, sodium chloride, ondansetron **OR** ondansetron, traMADol **OR** traMADol, morphine **OR** morphine, nortriptyline, rOPINIRole, albuterol    PHYSICAL EXAM     BP (!) 98/58   Pulse 73   Temp 98.1 °F (36.7 °C) (Oral)   Resp 20   Ht 5' 2\" (1.575 m)   Wt 187 lb (84.8 kg)   LMP 2018   SpO2 93%   BMI 34.20 kg/m²   Temp  Av.5 °F (36.9 °C)  Min: 98.1 °F (36.7 °C)  Max: 98.9 °F (37.2 °C)  Constitutional:  The patient is awake, alert, weak today - in bed   Skin:    Warm and dry. HEENT:   AT/NC  Chest:   No use of accessory muscles to breathe. Symmetrical expansion. On ra dec bs ant   Cardiovascular:  S1 and S2 are rhythmic and regular. Abdomen:   Dec  bowel sounds to auscultation. ileostomy drains x2 right blood tinge left purulence yellow   Extremities:    no edema.   CNS    AAxO   Lines: left upper arm with PICC-2022     DIAGNOSTIC RESULTS   Radiology:    Recent Labs     22  0428 22  0430 22  0752   WBC 16.8* 21.7* 20.5*   RBC 3.05* 3.10* 3.10*   HGB 9.3* 9.4* 9.6*   HCT 30.0* 30.0* 30.5*   MCV 98.4 96.8 98.4   MCH 30.5 30.3 31.0   MCHC 31.0* 31.3* 31.5*   RDW 13.9 14.6 14.6   * 533* 487*   MPV 9.7 9.5 9.4     Recent Labs     22  0430      K 4.6   CL 98   CO2 23   BUN 2*   CREATININE 0.6   GLUCOSE 105*   PROT 5.8*   LABALBU 2.8*   CALCIUM 8.3*   BILITOT 0.6   ALKPHOS 136*   AST 25   ALT 17     Lab Results   Component Value Date    CRP 0.5 (H) 2021    CRP 0.9 (H) 10/31/2021    CRP 1.3 (H) 10/30/2021     Lab Results   Component Value Date    SEDRATE 3 10/30/2021    SEDRATE 5 2020     Recent Labs     22  0430   AST 25   ALT 17     Lab Results   Component Value Date    CHOL 225 05/11/2021    TRIG 184 05/11/2021    HDL 44 05/11/2021    LDLCALC 144 05/11/2021    LABVLDL 37 05/11/2021     Lab Results   Component Value Date/Time    VITD25 32 08/18/2021 08:00 AM     Microbiology:   2/18/2022- body fluid cx- Proteus mirabilis      MRSA Culture Only   Date Value Ref Range Status   02/02/2022 Methicillin resistant Staph aureus not isolated  Final     FINAL IMPRESSION     Admitted for Sigmoid stricture ([facility] [address] [K41.606]   complicated by sepsis intraabd abscess/Proteus   Leukocytosis up   Fevers better     Plan:   · Change to invanz to get ready for discharge  · Continue diflucan   · WBC- 21.7 < 20.5  · Discharge plan is home with home care   · Monitor labs  · Urine culture pending   · Surgery following   · CT reviewed     Imaging and labs were reviewed per medical records. The patient was educated about the diagnosis, prognosis, indications, risks and benefits of treatment. An opportunity to ask questions was given to the patient/FAMILY. Thank you for involving me in the care of Teena Gonzales I will continue to follow. Please do not hesitate to call for any questions or concerns. Electronically signed by MARIXA Ruiz on 2/24/2022 at 10:12 AM     As above      This is a face to face encounter with Teena Gonzales on 02/24/22. I have performed and participated in the history, exam, medical decision making, and  POC with the NURSE PRACTITIONER, MARIXA Ruiz. afebrile on RA  Wbc20.5 cr0.8 ua neg   Imaging and labs were reviewed. Ct a/p Essentially resolved right lower quadrant subcutaneous fluid collection with   approximately 1/2 the size decreased fluid collection in the left lower mid   abdomen and superior left hemipelvis from prior comparison as detailed above. Artifact in the right lower quadrant limits evaluation of this region.      D/c with ertapenem for Proteus mirabilis abscess  Cont diflucan   Med rec    Teena Gonzales was informed of their diagnosis, indications, risks and benefits of treatment. Miya Whitehead had the opportunity to ask questions. All questions were answered. Thank you for involving me in the care of Miya Whitehead. Please do not hesitate to call for any questions or concerns.     Electronically signed by Reyes Barrier, MD on 2/24/2022 at 10:44 AM

## 2022-02-25 LAB — URINE CULTURE, ROUTINE: NORMAL

## 2022-02-28 ENCOUNTER — HOSPITAL ENCOUNTER (OUTPATIENT)
Dept: INFUSION THERAPY | Age: 47
Setting detail: INFUSION SERIES
End: 2022-02-28

## 2022-02-28 RX ORDER — LISINOPRIL AND HYDROCHLOROTHIAZIDE 12.5; 1 MG/1; MG/1
TABLET ORAL
Qty: 30 TABLET | Refills: 5 | Status: ON HOLD
Start: 2022-02-28 | End: 2022-04-04 | Stop reason: HOSPADM

## 2022-03-01 ENCOUNTER — HOSPITAL ENCOUNTER (OUTPATIENT)
Dept: INFUSION THERAPY | Age: 47
Setting detail: INFUSION SERIES
Discharge: HOME | End: 2022-03-01
Payer: COMMERCIAL

## 2022-03-01 VITALS
DIASTOLIC BLOOD PRESSURE: 64 MMHG | RESPIRATION RATE: 18 BRPM | TEMPERATURE: 98.7 F | SYSTOLIC BLOOD PRESSURE: 105 MMHG | HEART RATE: 77 BPM

## 2022-03-01 DIAGNOSIS — G35 MULTIPLE SCLEROSIS (HCC): Primary | ICD-10-CM

## 2022-03-01 PROCEDURE — 6370000000 HC RX 637 (ALT 250 FOR IP): Performed by: NURSE PRACTITIONER

## 2022-03-01 PROCEDURE — 6360000002 HC RX W HCPCS

## 2022-03-01 PROCEDURE — 2580000003 HC RX 258: Performed by: NURSE PRACTITIONER

## 2022-03-01 PROCEDURE — 96366 THER/PROPH/DIAG IV INF ADDON: CPT

## 2022-03-01 PROCEDURE — 6360000002 HC RX W HCPCS: Performed by: NURSE PRACTITIONER

## 2022-03-01 PROCEDURE — 96375 TX/PRO/DX INJ NEW DRUG ADDON: CPT

## 2022-03-01 PROCEDURE — 96365 THER/PROPH/DIAG IV INF INIT: CPT

## 2022-03-01 RX ORDER — DIPHENHYDRAMINE HYDROCHLORIDE 50 MG/ML
50 INJECTION INTRAMUSCULAR; INTRAVENOUS ONCE
Status: CANCELLED | OUTPATIENT
Start: 2022-03-01 | End: 2022-03-01

## 2022-03-01 RX ORDER — SODIUM CHLORIDE 9 MG/ML
INJECTION, SOLUTION INTRAVENOUS CONTINUOUS
Status: CANCELLED | OUTPATIENT
Start: 2022-03-01

## 2022-03-01 RX ORDER — SODIUM CHLORIDE 0.9 % (FLUSH) 0.9 %
10 SYRINGE (ML) INJECTION PRN
Status: CANCELLED | OUTPATIENT
Start: 2022-03-01

## 2022-03-01 RX ORDER — DIPHENHYDRAMINE HYDROCHLORIDE 50 MG/ML
25 INJECTION INTRAMUSCULAR; INTRAVENOUS ONCE
Status: COMPLETED | OUTPATIENT
Start: 2022-03-01 | End: 2022-03-01

## 2022-03-01 RX ORDER — SODIUM CHLORIDE 0.9 % (FLUSH) 0.9 %
10 SYRINGE (ML) INJECTION PRN
Status: DISCONTINUED | OUTPATIENT
Start: 2022-03-01 | End: 2022-03-02 | Stop reason: HOSPADM

## 2022-03-01 RX ORDER — METHYLPREDNISOLONE SODIUM SUCCINATE 125 MG/2ML
125 INJECTION, POWDER, LYOPHILIZED, FOR SOLUTION INTRAMUSCULAR; INTRAVENOUS ONCE
Status: CANCELLED | OUTPATIENT
Start: 2022-03-01 | End: 2022-03-01

## 2022-03-01 RX ORDER — HEPARIN SODIUM (PORCINE) LOCK FLUSH IV SOLN 100 UNIT/ML 100 UNIT/ML
SOLUTION INTRAVENOUS
Status: COMPLETED
Start: 2022-03-01 | End: 2022-03-01

## 2022-03-01 RX ORDER — DIPHENHYDRAMINE HYDROCHLORIDE 50 MG/ML
25 INJECTION INTRAMUSCULAR; INTRAVENOUS ONCE
Status: CANCELLED | OUTPATIENT
Start: 2022-03-01

## 2022-03-01 RX ORDER — ACETAMINOPHEN 325 MG/1
650 TABLET ORAL ONCE
Status: CANCELLED | OUTPATIENT
Start: 2022-03-01

## 2022-03-01 RX ORDER — METHYLPREDNISOLONE SODIUM SUCCINATE 125 MG/2ML
50 INJECTION, POWDER, LYOPHILIZED, FOR SOLUTION INTRAMUSCULAR; INTRAVENOUS ONCE
Status: CANCELLED
Start: 2022-03-01 | End: 2022-03-01

## 2022-03-01 RX ORDER — SODIUM CHLORIDE 9 MG/ML
INJECTION, SOLUTION INTRAVENOUS CONTINUOUS
Status: ACTIVE | OUTPATIENT
Start: 2022-03-01 | End: 2022-03-01

## 2022-03-01 RX ORDER — DIPHENHYDRAMINE HYDROCHLORIDE 50 MG/ML
25 INJECTION INTRAMUSCULAR; INTRAVENOUS ONCE
Status: CANCELLED
Start: 2022-03-01 | End: 2022-03-01

## 2022-03-01 RX ORDER — EPINEPHRINE 1 MG/ML
0.3 INJECTION, SOLUTION, CONCENTRATE INTRAVENOUS PRN
Status: CANCELLED | OUTPATIENT
Start: 2022-03-01

## 2022-03-01 RX ORDER — ACETAMINOPHEN 325 MG/1
650 TABLET ORAL ONCE
Status: COMPLETED | OUTPATIENT
Start: 2022-03-01 | End: 2022-03-01

## 2022-03-01 RX ORDER — METHYLPREDNISOLONE SODIUM SUCCINATE 125 MG/2ML
100 INJECTION, POWDER, LYOPHILIZED, FOR SOLUTION INTRAMUSCULAR; INTRAVENOUS ONCE
Status: COMPLETED | OUTPATIENT
Start: 2022-03-01 | End: 2022-03-01

## 2022-03-01 RX ORDER — METHYLPREDNISOLONE SODIUM SUCCINATE 125 MG/2ML
100 INJECTION, POWDER, LYOPHILIZED, FOR SOLUTION INTRAMUSCULAR; INTRAVENOUS ONCE
Status: CANCELLED | OUTPATIENT
Start: 2022-03-01

## 2022-03-01 RX ORDER — NORTRIPTYLINE HYDROCHLORIDE 10 MG/1
CAPSULE ORAL
Qty: 30 CAPSULE | Refills: 3 | Status: SHIPPED
Start: 2022-03-01 | End: 2022-06-30 | Stop reason: SDUPTHER

## 2022-03-01 RX ORDER — HEPARIN SODIUM (PORCINE) LOCK FLUSH IV SOLN 100 UNIT/ML 100 UNIT/ML
500 SOLUTION INTRAVENOUS PRN
Status: DISCONTINUED | OUTPATIENT
Start: 2022-03-01 | End: 2022-03-02 | Stop reason: HOSPADM

## 2022-03-01 RX ADMIN — SODIUM CHLORIDE, PRESERVATIVE FREE 10 ML: 5 INJECTION INTRAVENOUS at 08:39

## 2022-03-01 RX ADMIN — HEPARIN SODIUM (PORCINE) LOCK FLUSH IV SOLN 100 UNIT/ML 500 UNITS: 100 SOLUTION at 13:33

## 2022-03-01 RX ADMIN — ACETAMINOPHEN 650 MG: 325 TABLET ORAL at 08:41

## 2022-03-01 RX ADMIN — METHYLPREDNISOLONE SODIUM SUCCINATE 100 MG: 125 INJECTION, POWDER, FOR SOLUTION INTRAMUSCULAR; INTRAVENOUS at 08:35

## 2022-03-01 RX ADMIN — DIPHENHYDRAMINE HYDROCHLORIDE 25 MG: 50 INJECTION, SOLUTION INTRAMUSCULAR; INTRAVENOUS at 08:37

## 2022-03-01 RX ADMIN — SODIUM CHLORIDE, PRESERVATIVE FREE 10 ML: 5 INJECTION INTRAVENOUS at 13:33

## 2022-03-01 RX ADMIN — SODIUM CHLORIDE, PRESERVATIVE FREE 10 ML: 5 INJECTION INTRAVENOUS at 08:37

## 2022-03-01 RX ADMIN — SODIUM CHLORIDE: 9 INJECTION, SOLUTION INTRAVENOUS at 12:30

## 2022-03-01 RX ADMIN — HEPARIN 500 UNITS: 100 SYRINGE at 13:33

## 2022-03-01 RX ADMIN — OCRELIZUMAB 600 MG: 300 INJECTION INTRAVENOUS at 09:00

## 2022-03-01 ASSESSMENT — PAIN SCALES - GENERAL
PAINLEVEL_OUTOF10: 0
PAINLEVEL_OUTOF10: 0

## 2022-03-01 NOTE — DISCHARGE SUMMARY
Physician Discharge Summary     Patient ID:  Joseline Villasenor  [id number]  64 y.o.  1975    Admit date: 2/8/2022    Discharge date and time: 3/1/2022    Admitting Physician: Namrata Bella MD     Admission Diagnoses:   Patient Active Problem List   Diagnosis    Vocal cord dysfunction    Multiple sclerosis ([address]    Morbidly obese ([address]    Palafox's esophagus with dysplasia    Secondary restless legs syndrome    S/P colectomy    Malignant neoplasm of descending colon ([address]    Ileus, postoperative ([address]    Pelvic abscess in female    Sigmoid stricture [facility])       Discharge Diagnoses:   Patient Active Problem List   Diagnosis    Vocal cord dysfunction    Multiple sclerosis ([address]    Morbidly obese ([address]    Palafox's esophagus with dysplasia    Secondary restless legs syndrome    S/P colectomy    Malignant neoplasm of descending colon ([address]    Ileus, postoperative ([address]    Pelvic abscess in female    Sigmoid stricture ([address]       Admission Condition: good    Discharged Condition: stable    Indication for Admission: Status post colectomy    Hospital Course: Joseline Villasenor is a 51 y.o. female who presented for sigmoid colectomy for stricture. She underwent uncomplicated surgery. She had prior ileostomy which started working after surgery. She had high ileostomy output and had electrolyte replacement as well as bulking agents. After a week in the hospital, she had worsening leukocytosis and fever. CT abdomen pelvis revealed a right abdominal wall as well as of the left lower quadrant intra-abdominal abscesses. She underwent IR drain. She progressed after IR drain and had improvement of her fever curve. Her white blood cell count remained elevated however she had no signs of infection. She was seen by internal medicine and infectious disease and had a PICC line placed and was instituted on antibiotics.   She had antibiotics prescribed for discharge and was stable for discharge, tolerating a diet, having ostomy function, having minimal IR drain outputs. She will have a repeat CT scan in 1 week prior to her follow-up to determine possibility of drain removal.      Consults: as above. Significant Diagnostic Studies: as above. Treatments: as above. In process/preliminary results:  Outstanding Order Results     No orders found from 1/10/2022 to 2/9/2022. Patient Instructions:   Discharge Medication List as of 2/25/2022  7:59 AM      START taking these medications    Details   fluconazole (DIFLUCAN) 200 MG tablet Take 1 tablet by mouth daily for 20 days, Disp-20 tablet, R-0Normal      methocarbamol (ROBAXIN) 500 MG tablet Take 1 tablet by mouth 4 times daily for 10 days, Disp-40 tablet, R-0Normal      pantoprazole (PROTONIX) 40 MG tablet Take 1 tablet by mouth 2 times daily (before meals), Disp-30 tablet, R-0Normal      oxyCODONE-acetaminophen (PERCOCET) 5-325 MG per tablet Take 1 tablet by mouth every 6 hours as needed for Pain for up to 3 days. Intended supply: 3 days.  Take lowest dose possible to manage pain, Disp-12 tablet, R-0Normal      ertapenem (INVANZ) infusion Infuse 1,000 mg intravenously every 24 hours for 21 days Compound per protocol., Disp-21 g, R-0Normal         CONTINUE these medications which have NOT CHANGED    Details   dicyclomine (BENTYL) 10 MG capsule Take 10 mg by mouth 4 times daily (before meals and nightly)Historical Med      vitamin D (D3-50) 22888 UNIT CAPS Take 1 capsule by mouth once a week, Disp-4 capsule, R-5Normal      nortriptyline (PAMELOR) 10 MG capsule TAKE 1 CAPSULE BY MOUTH NIGHTLY FOR HEADACHES, Disp-30 capsule, R-3Normal      gabapentin (NEURONTIN) 300 MG capsule TAKE 1 CAPSULE BY MOUTH THREE TIMES DAILY, Disp-90 capsule, R-11Normal      dexlansoprazole (DEXILANT) 60 MG CPDR delayed release capsule Take 60 mg by mouth dailyHistorical Med      baclofen (LIORESAL) 20 MG tablet Take 1 tablet by mouth 4 times daily as needed (muscle spasms; pain),

## 2022-03-03 DIAGNOSIS — G89.18 POST-OP PAIN: Primary | ICD-10-CM

## 2022-03-03 RX ORDER — OXYCODONE HYDROCHLORIDE 5 MG/1
5 TABLET ORAL EVERY 6 HOURS PRN
Qty: 28 TABLET | Refills: 0 | Status: SHIPPED | OUTPATIENT
Start: 2022-03-03 | End: 2022-03-10

## 2022-03-14 ENCOUNTER — APPOINTMENT (OUTPATIENT)
Dept: ULTRASOUND IMAGING | Age: 47
End: 2022-03-14
Payer: COMMERCIAL

## 2022-03-14 ENCOUNTER — HOSPITAL ENCOUNTER (EMERGENCY)
Age: 47
Discharge: HOME | End: 2022-03-14
Payer: COMMERCIAL

## 2022-03-14 VITALS
RESPIRATION RATE: 16 BRPM | HEART RATE: 110 BPM | OXYGEN SATURATION: 99 % | WEIGHT: 187 LBS | HEIGHT: 62 IN | DIASTOLIC BLOOD PRESSURE: 84 MMHG | BODY MASS INDEX: 34.41 KG/M2 | SYSTOLIC BLOOD PRESSURE: 126 MMHG | TEMPERATURE: 97.2 F

## 2022-03-14 DIAGNOSIS — M79.652 LEFT THIGH PAIN: ICD-10-CM

## 2022-03-14 DIAGNOSIS — I82.812 THROMBOSIS OF LEFT SAPHENOUS VEIN: Primary | ICD-10-CM

## 2022-03-14 DIAGNOSIS — L02.91 ABSCESS: Primary | ICD-10-CM

## 2022-03-14 LAB
ANION GAP SERPL CALCULATED.3IONS-SCNC: 12 MMOL/L (ref 7–16)
BASOPHILS ABSOLUTE: 0.04 E9/L (ref 0–0.2)
BASOPHILS RELATIVE PERCENT: 0.4 % (ref 0–2)
BUN BLDV-MCNC: 4 MG/DL (ref 6–20)
CALCIUM SERPL-MCNC: 9.4 MG/DL (ref 8.6–10.2)
CHLORIDE BLD-SCNC: 102 MMOL/L (ref 98–107)
CO2: 24 MMOL/L (ref 22–29)
CREAT SERPL-MCNC: 0.7 MG/DL (ref 0.5–1)
EOSINOPHILS ABSOLUTE: 0.24 E9/L (ref 0.05–0.5)
EOSINOPHILS RELATIVE PERCENT: 2.3 % (ref 0–6)
GFR AFRICAN AMERICAN: >60
GFR NON-AFRICAN AMERICAN: >60 ML/MIN/1.73
GLUCOSE BLD-MCNC: 113 MG/DL (ref 74–99)
HCT VFR BLD CALC: 37.8 % (ref 34–48)
HEMOGLOBIN: 11.9 G/DL (ref 11.5–15.5)
IMMATURE GRANULOCYTES #: 0.04 E9/L
IMMATURE GRANULOCYTES %: 0.4 % (ref 0–5)
LYMPHOCYTES ABSOLUTE: 2.46 E9/L (ref 1.5–4)
LYMPHOCYTES RELATIVE PERCENT: 23.6 % (ref 20–42)
MCH RBC QN AUTO: 30.7 PG (ref 26–35)
MCHC RBC AUTO-ENTMCNC: 31.5 % (ref 32–34.5)
MCV RBC AUTO: 97.4 FL (ref 80–99.9)
MONOCYTES ABSOLUTE: 0.68 E9/L (ref 0.1–0.95)
MONOCYTES RELATIVE PERCENT: 6.5 % (ref 2–12)
NEUTROPHILS ABSOLUTE: 6.98 E9/L (ref 1.8–7.3)
NEUTROPHILS RELATIVE PERCENT: 66.8 % (ref 43–80)
PDW BLD-RTO: 14.9 FL (ref 11.5–15)
PLATELET # BLD: 300 E9/L (ref 130–450)
PMV BLD AUTO: 9.3 FL (ref 7–12)
POTASSIUM SERPL-SCNC: 4.2 MMOL/L (ref 3.5–5)
RBC # BLD: 3.88 E12/L (ref 3.5–5.5)
SODIUM BLD-SCNC: 138 MMOL/L (ref 132–146)
WBC # BLD: 10.4 E9/L (ref 4.5–11.5)

## 2022-03-14 PROCEDURE — 99284 EMERGENCY DEPT VISIT MOD MDM: CPT

## 2022-03-14 PROCEDURE — 93971 EXTREMITY STUDY: CPT

## 2022-03-14 PROCEDURE — 80048 BASIC METABOLIC PNL TOTAL CA: CPT

## 2022-03-14 PROCEDURE — 6370000000 HC RX 637 (ALT 250 FOR IP): Performed by: PHYSICIAN ASSISTANT

## 2022-03-14 PROCEDURE — 85025 COMPLETE CBC W/AUTO DIFF WBC: CPT

## 2022-03-14 RX ORDER — OXYCODONE HYDROCHLORIDE AND ACETAMINOPHEN 5; 325 MG/1; MG/1
1 TABLET ORAL ONCE
Status: COMPLETED | OUTPATIENT
Start: 2022-03-14 | End: 2022-03-14

## 2022-03-14 RX ADMIN — OXYCODONE AND ACETAMINOPHEN 1 TABLET: 5; 325 TABLET ORAL at 16:43

## 2022-03-14 ASSESSMENT — PAIN SCALES - GENERAL: PAINLEVEL_OUTOF10: 8

## 2022-03-14 NOTE — ED PROVIDER NOTES
[facility]     Department of Emergency Medicine   ED  Provider Note  Admit Date/RoomTime: 3/14/2022  4:12 PM  ED Room: 35/    Chief Complaint   Leg Pain (L leg swelling and pain onset this am, on inner thigh)    History of Present Illness      Miya Whitehead is a 51 y.o. old female who presents to the emergency department for left upper thigh swelling that began this morning. Patient also reports pain to the area. She denies any calf tenderness or swelling. She denies any chest pain or shortness of breath. Patient states she is currently following with infectious disease given intra-abdominal infection. She has had multiple drains in the colostomy bag present. She is denying any abdominal pain, nausea, vomiting, chest pain, shortness breath, pain with breathing, or recent trauma/injury. Patient is not on anticoagulation. She is alert oriented x3 and in no apparent distress at this exam.  Patient is nontoxic-appearing. She denies any past history of blood clots. ROS   Pertinent positives and negatives are stated within HPI, all other systems reviewed and are negative. Past Medical History:   Past Medical History:   Diagnosis Date    Acid reflux disease     Cyst of ovary     Fracture of nasal bone     Headache     Hearing loss     Hypertension     Migraine     Morbidly obese ([address] 10/05/2020    Multiple sclerosis ([address]     denies limitations at present 11/2020    VEENA no CPAP     severe VEENA per pt    Right shoulder pain 11/2020    Tinnitus     TMJ dysfunction       Past Surgical History:  has a past surgical history that includes Cholecystectomy; cyst removal; Hysterectomy (03/05/2018); laryngoscopy (N/A, 7/17/2020); Shoulder arthroscopy (Right, 11/11/2020); Biceps tendon repair (Right, 11/11/2020); Appendectomy; Esophagus surgery; partial hysterectomy (cervix not removed);  Hysterectomy, total abdominal; Tonsillectomy; colectomy (Left, 8/31/2021); colectomy (N/A, 9/6/2021); proctosigmoidoscopy (N/A, 10/12/2021); back surgery; proctosigmoidoscopy (N/A, 1/25/2022); and colectomy (N/A, 2/8/2022). Social History:  reports that she has been smoking cigarettes. She has a 12.50 pack-year smoking history. She has never used smokeless tobacco. She reports previous alcohol use. She reports that she does not use drugs. Family History: family history includes Mult Sclerosis in her mother. Allergies: Patient has no known allergies. Physical Exam     Vitals:    03/14/22 1446   BP: 126/84   Pulse: 110   Resp: 16   Temp: 97.2 °F (36.2 °C)   TempSrc: Temporal   SpO2: 99%   Weight: 187 lb (84.8 kg)   Height: 5' 2\" (1.575 m)   Oxygen Saturation Interpretation: Normal.    Constitutional:  Alert and oriented x3, development consistent with age. NAD  HEENT:  NC/NT. Airway patent. Neck:  Normal ROM. Supple. Non-tender  CVS: Regular rate for age, normal rhythm, pulse 88 at this exam  Resp: Clear and equal bilaterally with good airflow, no respiratory distress  Abdomen: multiple drains, colostomy bag, non-tender at this exam  Back: No lumbar tenderness. Lower Extremity:  Left: thigh. Tenderness: Mild to inner thigh             Swelling: Mild, upper, inner aspect             Deformity: No.             ROM: full range of motion. Skin:  no erythema, rash or wounds noted, compartments soft and compressible, no calf tenderness or swelling         Distal Function:              Motor deficit: none. Sensory deficit: none. Intact distally                Pulse deficit: none. Capillary refill: normal.  Integument:  Normal turgor. Warm, dry, without visible rash, unless noted elsewhere. Neurological: Motor functions intact.      Lab / Imaging Results   (All laboratory and radiology results have been personally reviewed by myself)  Labs:  Results for orders placed or performed during the hospital encounter of 03/14/22   CBC with Auto Differential   Result Value Ref Range    WBC 10.4 4.5 - 11.5 E9/L    RBC 3.88 3.50 - 5.50 E12/L    Hemoglobin 11.9 11.5 - 15.5 g/dL    Hematocrit 37.8 34.0 - 48.0 %    MCV 97.4 80.0 - 99.9 fL    MCH 30.7 26.0 - 35.0 pg    MCHC 31.5 (L) 32.0 - 34.5 %    RDW 14.9 11.5 - 15.0 fL    Platelets 094 578 - 789 E9/L    MPV 9.3 7.0 - 12.0 fL    Neutrophils % 66.8 43.0 - 80.0 %    Immature Granulocytes % 0.4 0.0 - 5.0 %    Lymphocytes % 23.6 20.0 - 42.0 %    Monocytes % 6.5 2.0 - 12.0 %    Eosinophils % 2.3 0.0 - 6.0 %    Basophils % 0.4 0.0 - 2.0 %    Neutrophils Absolute 6.98 1.80 - 7.30 E9/L    Immature Granulocytes # 0.04 E9/L    Lymphocytes Absolute 2.46 1.50 - 4.00 E9/L    Monocytes Absolute 0.68 0.10 - 0.95 E9/L    Eosinophils Absolute 0.24 0.05 - 0.50 E9/L    Basophils Absolute 0.04 0.00 - 0.20 K3/P   Basic Metabolic Panel   Result Value Ref Range    Sodium 138 132 - 146 mmol/L    Potassium 4.2 3.5 - 5.0 mmol/L    Chloride 102 98 - 107 mmol/L    CO2 24 22 - 29 mmol/L    Anion Gap 12 7 - 16 mmol/L    Glucose 113 (H) 74 - 99 mg/dL    BUN 4 (L) 6 - 20 mg/dL    CREATININE 0.7 0.5 - 1.0 mg/dL    GFR Non-African American >60 >=60 mL/min/1.73    GFR African American >60     Calcium 9.4 8.6 - 10.2 mg/dL     Imaging: All Radiology results interpreted by Radiologist unless otherwise noted. US DUP LOWER EXTREMITY LEFT KARI   Final Result   1. Acute appearing echogenic thrombus in the proximal to mid greater   saphenous vein. No color flow identified in these areas on color Doppler   evaluation. 2.  No additional evidence of venous thrombus in the left lower extremity. ED Course / Medical Decision Making     Medications   oxyCODONE-acetaminophen (PERCOCET) 5-325 MG per tablet 1 tablet (1 tablet Oral Given 3/14/22 6042)      Consult(s):  None    Procedure(s):  none    MDM:     US was obtained based on low suspicion for DVT as per history/physical findings.  Plan is subsequently for symptom control, limited use and  immobilization with appropriate outpatient follow-up. Counseling: The emergency provider has spoken with the patient or caregiver and discussed todays results, in addition to providing specific details for the plan of care and counseling regarding the diagnosis and prognosis. Questions are answered at this time and they are agreeable with the plan. Patient understands they must follow-up with PCP. ROSE discussed. They were educated on signs and symptoms that would require emergent return. Patient was educated on newly prescribed medications. Patient was able to ambulate out of department with no difficulty. Patient advised to return to ED with any development of chest pain or shortness of breath     Assessment      1. Thrombosis of left saphenous vein    2. Left thigh pain      Plan   Discharge to home  Patient condition is good    New Medications     New Prescriptions    No medications on file     Electronically signed by Moy Simon PA-C   DD: 3/14/22    **This report was transcribed using voice recognition software. Every effort was made to ensure accuracy; however, inadvertent computerized transcription errors may be present.     END OF ED PROVIDER NOTE        Moy Simon PA-C  03/14/22 2046

## 2022-03-17 ENCOUNTER — OFFICE VISIT (OUTPATIENT)
Dept: SURGERY | Age: 47
End: 2022-03-17

## 2022-03-17 VITALS
DIASTOLIC BLOOD PRESSURE: 85 MMHG | HEIGHT: 62 IN | WEIGHT: 180.2 LBS | RESPIRATION RATE: 18 BRPM | BODY MASS INDEX: 33.16 KG/M2 | TEMPERATURE: 97.9 F | OXYGEN SATURATION: 99 % | SYSTOLIC BLOOD PRESSURE: 132 MMHG | HEART RATE: 79 BPM

## 2022-03-17 DIAGNOSIS — Z09 POSTOPERATIVE EXAMINATION: Primary | ICD-10-CM

## 2022-03-17 PROCEDURE — 99024 POSTOP FOLLOW-UP VISIT: CPT | Performed by: SURGERY

## 2022-03-17 RX ORDER — POLYETHYLENE GLYCOL 3350 17 G/17G
POWDER, FOR SOLUTION ORAL
Status: ON HOLD | COMMUNITY
Start: 2022-03-16 | End: 2022-04-01 | Stop reason: ALTCHOICE

## 2022-03-17 RX ORDER — FAMOTIDINE 40 MG/1
TABLET, FILM COATED ORAL
Status: ON HOLD | COMMUNITY
Start: 2022-03-11 | End: 2022-04-04 | Stop reason: HOSPADM

## 2022-03-17 NOTE — PROGRESS NOTES
Surgery Progress Note            Chief complaint:   Chief Complaint   Patient presents with    Post-Op Check     LAP sigmoid & rectal resection      Patient Active Problem List   Diagnosis    Vocal cord dysfunction    Multiple sclerosis ([address]    Morbidly obese ([address]    Palafox's esophagus with dysplasia    Secondary restless legs syndrome    S/P colectomy    Malignant neoplasm of descending colon ([address]    Ileus, postoperative ([address]    Pelvic abscess in female    Sigmoid stricture ([address]       S: doing well tolerating diet and having good ostomy function, drains still in place, recent ED visit with saphenous vein thrombosis    O:   Vitals:    03/17/22 1358   BP: 132/85   Pulse: 79   Resp: 18   Temp: 97.9 °F (36.6 °C)   SpO2: 99%     No intake or output data in the 24 hours ending 03/17/22 1407        Labs:  Lab Results   Component Value Date    WBC 7.0 03/16/2022    WBC 10.4 03/14/2022    WBC 8.8 03/07/2022    HGB 11.3 03/16/2022    HGB 11.9 03/14/2022    HGB 10.1 03/07/2022    HCT 36.5 03/16/2022    HCT 37.8 03/14/2022    HCT 33.0 03/07/2022     Lab Results   Component Value Date    CREATININE 0.6 03/16/2022    BUN 5 (L) 03/16/2022     03/16/2022    K 4.4 03/16/2022     03/16/2022    CO2 22 03/16/2022     Lab Results   Component Value Date    LIPASE 10 09/05/2021    LIPASE 13 02/04/2018    LIPASE 11 01/15/2018         Physical exam:   /85   Pulse 79   Temp 97.9 °F (36.6 °C)   Resp 18   Ht 5' 2\" (1.575 m)   Wt 180 lb 3.2 oz (81.7 kg)   LMP 01/05/2018   SpO2 99%   BMI 32.96 kg/m²   General appearance: NAD  Head: NCAT  Neck: supple, no masses  Lungs: equal chest rise bilateral  Heart: S1S2 present  Abdomen: soft, nontender, nondistended,  Ostomy in place and drains in place  Skin; no lesions  Gu: no cva tenderness  Extremities: extremities normal, atraumatic, no cyanosis or edema    A:  S/p lap sigmoid with diverting ostomy and post op abscess s/p IR drains    P: will check CT to assess for drain removal and will add rectal contrast to assess new anastomosis. If all clear and when cleared by ID will set up for ileostomy takedown.      Jeana Allen MD, MD  3/17/2022

## 2022-03-22 ENCOUNTER — HOSPITAL ENCOUNTER (EMERGENCY)
Age: 47
Discharge: HOME | End: 2022-03-22
Attending: EMERGENCY MEDICINE
Payer: COMMERCIAL

## 2022-03-22 ENCOUNTER — APPOINTMENT (OUTPATIENT)
Dept: ULTRASOUND IMAGING | Age: 47
End: 2022-03-22
Payer: COMMERCIAL

## 2022-03-22 VITALS
DIASTOLIC BLOOD PRESSURE: 95 MMHG | TEMPERATURE: 96.8 F | HEART RATE: 86 BPM | OXYGEN SATURATION: 99 % | RESPIRATION RATE: 16 BRPM | SYSTOLIC BLOOD PRESSURE: 156 MMHG

## 2022-03-22 DIAGNOSIS — I82.422 ACUTE DEEP VEIN THROMBOSIS (DVT) OF ILIAC VEIN OF LEFT LOWER EXTREMITY (HCC): Primary | ICD-10-CM

## 2022-03-22 PROCEDURE — 6360000002 HC RX W HCPCS: Performed by: EMERGENCY MEDICINE

## 2022-03-22 PROCEDURE — 93971 EXTREMITY STUDY: CPT

## 2022-03-22 PROCEDURE — 99282 EMERGENCY DEPT VISIT SF MDM: CPT

## 2022-03-22 PROCEDURE — 96372 THER/PROPH/DIAG INJ SC/IM: CPT

## 2022-03-22 RX ADMIN — ENOXAPARIN SODIUM 80 MG: 100 INJECTION SUBCUTANEOUS at 17:18

## 2022-03-22 ASSESSMENT — ENCOUNTER SYMPTOMS
VOMITING: 0
WHEEZING: 0
COUGH: 0
DIARRHEA: 0
ABDOMINAL PAIN: 0
EYE DISCHARGE: 0
BACK PAIN: 0
EYE PAIN: 0
NAUSEA: 0
EYE REDNESS: 0
SHORTNESS OF BREATH: 0
ABDOMINAL DISTENTION: 0
SINUS PRESSURE: 0
SORE THROAT: 0

## 2022-03-22 NOTE — ED PROVIDER NOTES
53-year-old female presents emergency department with leg pain. Patient history of known saphenous vein superficial thrombophlebitis presents to the emergency department with concern for worsening swelling and pain in her left groin. The patient states no redness or swelling just some worsening tenderness that may be causing her have a little bit of an increase in difficulty walking. Patient states no fevers or chills no chest pain or shortness of breath no fevers no urinary symptoms no falls or injuries no other complaints at this time    The history is provided by the patient. Leg Pain  This is a new problem. The current episode started more than 1 week ago. The problem has not changed since onset. Pertinent negatives include no chest pain, no abdominal pain, no headaches and no shortness of breath. Nothing aggravates the symptoms. Nothing relieves the symptoms. She has tried nothing for the symptoms. The treatment provided no relief. Review of Systems   Constitutional: Negative for chills and fever. HENT: Negative for ear pain, sinus pressure and sore throat. Eyes: Negative for pain, discharge and redness. Respiratory: Negative for cough, shortness of breath and wheezing. Cardiovascular: Positive for leg swelling. Negative for chest pain. Gastrointestinal: Negative for abdominal distention, abdominal pain, diarrhea, nausea and vomiting. Genitourinary: Negative for dysuria and frequency. Musculoskeletal: Negative for arthralgias and back pain. Skin: Negative for rash and wound. Neurological: Negative for weakness and headaches. Hematological: Negative for adenopathy. All other systems reviewed and are negative. Physical Exam  Constitutional:       Appearance: Normal appearance. HENT:      Head: Normocephalic and atraumatic. Nose: Nose normal.   Eyes:      Extraocular Movements: Extraocular movements intact.       Pupils: Pupils are equal, round, and reactive to light. Cardiovascular:      Rate and Rhythm: Normal rate and regular rhythm. Pulses: Normal pulses. Heart sounds: Normal heart sounds. Pulmonary:      Effort: Pulmonary effort is normal.      Breath sounds: Normal breath sounds. Abdominal:      General: Abdomen is flat. Bowel sounds are normal. There is no distension. Palpations: Abdomen is soft. Tenderness: There is no abdominal tenderness. There is no guarding. Musculoskeletal:         General: Normal range of motion. Legs:    Neurological:      General: No focal deficit present. Mental Status: She is alert and oriented to person, place, and time. Procedures     MDM  Number of Diagnoses or Management Options  Acute deep vein thrombosis (DVT) of iliac vein of left lower extremity (HCC)  Diagnosis management comments: Patient seen and examined. Ultrasound of left leg ordered. Ultrasound reveals deep vein thrombosis. Lovenox injection given. Patient given prescription for Eliquis. Observed after getting Lovenox shot without any incident. Patient discharged follow-up with her primary care physician            --------------------------------------------- PAST HISTORY ---------------------------------------------  Past Medical History:  has a past medical history of Acid reflux disease, Cyst of ovary, Fracture of nasal bone, Headache, Hearing loss, Hypertension, Migraine, Morbidly obese (HCC), Multiple sclerosis ([facility] [address] VEENA no CPAP, Right shoulder pain, Tinnitus, and TMJ dysfunction. Past Surgical History:  has a past surgical history that includes Cholecystectomy; cyst removal; Hysterectomy (03/05/2018); laryngoscopy (N/A, 7/17/2020); Shoulder arthroscopy (Right, 11/11/2020); Biceps tendon repair (Right, 11/11/2020); Appendectomy; Esophagus surgery; partial hysterectomy (cervix not removed);  Hysterectomy, total abdominal; Tonsillectomy; colectomy (Left, 8/31/2021); colectomy (N/A, 9/6/2021); proctosigmoidoscopy (N/A, 10/12/2021); back surgery; proctosigmoidoscopy (N/A, 1/25/2022); and colectomy (N/A, 2/8/2022). Social History:  reports that she has been smoking cigarettes. She has a 12.50 pack-year smoking history. She has never used smokeless tobacco. She reports previous alcohol use. She reports that she does not use drugs. Family History: family history includes Mult Sclerosis in her mother. The patients home medications have been reviewed. Allergies: Patient has no known allergies. -------------------------------------------------- RESULTS -------------------------------------------------  Labs:  No results found for this visit on 03/22/22. Radiology:  US DUP LOWER EXTREMITY LEFT KARI   Final Result   Positive examination for nonocclusive deep vein thrombosis involving left   external iliac vein and left common femoral vein.             ------------------------- NURSING NOTES AND VITALS REVIEWED ---------------------------  Date / Time Roomed:  3/22/2022  3:01 PM  ED Bed Assignment:  [facility] [id number]    The nursing notes within the ED encounter and vital signs as below have been reviewed. BP (!) 156/95   Pulse 86   Temp 96.8 °F (36 °C) (Temporal)   Resp 16   LMP 01/05/2018   SpO2 99%   Oxygen Saturation Interpretation: Normal      ------------------------------------------ PROGRESS NOTES ------------------------------------------  I have spoken with the patient and discussed todays results, in addition to providing specific details for the plan of care and counseling regarding the diagnosis and prognosis. Their questions are answered at this time and they are agreeable with the plan. I discussed at length with them reasons for immediate return here for re evaluation.  They will followup with their primary care physician by calling their office on Monday.      --------------------------------- ADDITIONAL PROVIDER NOTES ---------------------------------  At this time the patient is without objective evidence of an acute process requiring hospitalization or inpatient management. They have remained hemodynamically stable throughout their entire ED visit and are stable for discharge with outpatient follow-up. The plan has been discussed in detail and they are aware of the specific conditions for emergent return, as well as the importance of follow-up. Discharge Medication List as of 3/22/2022  3:39 PM      START taking these medications    Details   apixaban starter pack (ELIQUIS DVT/PE STARTER PACK) 5 MG TBPK tablet Take 1 tablet by mouth See Admin Instructions, Disp-74 tablet, R-0Print           Please note that the withdrawal or failure to initiate urgent interventions for this patient would likely result in a life threatening deterioration or permanent disability. Accordingly this patient received 30 minutes of critical care time, excluding separately billable procedures. Diagnosis:  1. Acute deep vein thrombosis (DVT) of iliac vein of left lower extremity (HCC)        Disposition:  Patient's disposition: Discharge to home  Patient's condition is stable.        Jazmyn Miller DO  03/22/22 2036

## 2022-03-22 NOTE — ED NOTES
INITIAL CONTACT WITH PT DUE TO STAFFING. ALERT AND ORIENTED. SKIN WARM AND DRY. RESPIRATIONS EVEN AND UNLABORED. LS CLEAR. ABD SOFT AND NON DISTENDED. DENYING ABD PAINS. DENYING SOB OR CP.   MEDICATED PER ORDER DUE TO DVT. DISCHARGE INSTRUCTIONS PROVIDED AND GAIT STEADY TO RESTROOM. PT VERBALIZED UNDERSTANDING OF INSTRUCTIONS.  [id number] Morris Khan RN  03/22/22 1746

## 2022-03-25 ENCOUNTER — OFFICE VISIT (OUTPATIENT)
Dept: PRIMARY CARE CLINIC | Age: 47
End: 2022-03-25
Payer: COMMERCIAL

## 2022-03-25 VITALS
TEMPERATURE: 97.5 F | OXYGEN SATURATION: 98 % | WEIGHT: 178 LBS | HEART RATE: 78 BPM | BODY MASS INDEX: 33.61 KG/M2 | DIASTOLIC BLOOD PRESSURE: 80 MMHG | SYSTOLIC BLOOD PRESSURE: 130 MMHG | HEIGHT: 61 IN

## 2022-03-25 DIAGNOSIS — Z90.49 S/P COLECTOMY: ICD-10-CM

## 2022-03-25 DIAGNOSIS — T81.43XA POSTOPERATIVE INTRA-ABDOMINAL ABSCESS: ICD-10-CM

## 2022-03-25 DIAGNOSIS — I82.422 ACUTE DEEP VEIN THROMBOSIS (DVT) OF ILIAC VEIN OF LEFT LOWER EXTREMITY (HCC): Primary | ICD-10-CM

## 2022-03-25 DIAGNOSIS — Z09 HOSPITAL DISCHARGE FOLLOW-UP: ICD-10-CM

## 2022-03-25 DIAGNOSIS — C18.6 MALIGNANT NEOPLASM OF DESCENDING COLON (HCC): ICD-10-CM

## 2022-03-25 PROBLEM — K65.1 POSTOPERATIVE INTRA-ABDOMINAL ABSCESS (HCC): Status: ACTIVE | Noted: 2022-03-25

## 2022-03-25 PROCEDURE — 1111F DSCHRG MED/CURRENT MED MERGE: CPT | Performed by: PHYSICIAN ASSISTANT

## 2022-03-25 PROCEDURE — G8417 CALC BMI ABV UP PARAM F/U: HCPCS | Performed by: PHYSICIAN ASSISTANT

## 2022-03-25 PROCEDURE — G8427 DOCREV CUR MEDS BY ELIG CLIN: HCPCS | Performed by: PHYSICIAN ASSISTANT

## 2022-03-25 PROCEDURE — 99214 OFFICE O/P EST MOD 30 MIN: CPT | Performed by: PHYSICIAN ASSISTANT

## 2022-03-25 PROCEDURE — G8484 FLU IMMUNIZE NO ADMIN: HCPCS | Performed by: PHYSICIAN ASSISTANT

## 2022-03-25 PROCEDURE — 4004F PT TOBACCO SCREEN RCVD TLK: CPT | Performed by: PHYSICIAN ASSISTANT

## 2022-03-25 NOTE — PROGRESS NOTES
Chief Complaint   Patient presents with    Other     hospital follow up for left leg        HPI:  Kimo Doss presents to the office for ER follow up. Patient was seen in the ER for DVT of left lower extremity. Since being discharged from the ER Kendra's  symptoms have been improved . Any prescribed medications have not been finished. She was put on Eliquis, just started it today. Discharge instructions and any medication changes were again reviewed. Post op abscess. On atb via picc line per ID for abdominal abscess. She has an apt next week with ID. Patient's past medical, surgical, social and/or family history reviewed, updated in chart, and are non-contributory (unless otherwise stated). Medications and allergies also reviewed and updated in chart. Review of Systems:  Constitutional:  No fever, no fatigue, no chills, no headaches, no weight change  Dermatology:  No rash, no mole, no dry or sensitive skin  ENT:  No cough, no sore throat, no sinus pain, no runny nose, no ear pain  Cardiology:  No chest pain, no palpitations, no leg edema, no shortness of breath, no PND  Gastroenterology:  No dysphagia, no abdominal pain, no nausea, no vomiting, no constipation, no diarrhea, no heartburn  Musculoskeletal:  No joint pain, no leg cramps, no back pain, no muscle aches  Respiratory:  No shortness of breath, no orthopnea, no wheezing, no MEADE, no hemoptysis  Urology:  No blood in the urine, no urinary frequency, no urinary incontinence, no urinary urgency, no nocturia, no dysuria    Vitals:    03/25/22 1201   BP: 130/80   Pulse: 78   Temp: 97.5 °F (36.4 °C)   SpO2: 98%   Weight: 178 lb (80.7 kg)   Height: 5' 1\" (1.549 m)       Physical Exam  Constitutional:       General: She is not in acute distress. Appearance: She is well-developed. HENT:      Head: Normocephalic and atraumatic.       Right Ear: External ear normal.      Left Ear: External ear normal.      Nose: Nose normal.   Eyes:      General: No scleral icterus. Conjunctiva/sclera: Conjunctivae normal.      Pupils: Pupils are equal, round, and reactive to light. Neck:      Thyroid: No thyromegaly. Cardiovascular:      Rate and Rhythm: Normal rate and regular rhythm. Heart sounds: Normal heart sounds. No murmur heard. Pulmonary:      Effort: Pulmonary effort is normal. No accessory muscle usage or respiratory distress. Breath sounds: Normal breath sounds. No wheezing. Abdominal:      General: The ostomy site is clean. Musculoskeletal:         General: Normal range of motion. Cervical back: Normal range of motion and neck supple. Skin:     General: Skin is warm and dry. Findings: No rash. Neurological:      Mental Status: She is alert and oriented to person, place, and time. Deep Tendon Reflexes: Reflexes are normal and symmetric. Psychiatric:         Speech: Speech normal.         Behavior: Behavior normal.         Assessment/Plan:      Evelia Bravo was seen today for other. Diagnoses and all orders for this visit:    Acute deep vein thrombosis (DVT) of iliac vein of left lower extremity (HCC)  -     apixaban (ELIQUIS) 5 MG TABS tablet; Take 1 tablet by mouth 2 times daily    Hospital discharge follow-up  -all records reviewed    Malignant neoplasm of descending colon ([facility] [address]  - notes reviewed, s/p colostomy    S/P colectomy  - she anticipated reversal surgery, will have to deal with eliquis    Postoperative intra-abdominal abscess  -currently with picc line per ID, apt next week    As above. Call or go to ED immediately if symptoms worsen or persist.  Return in about 3 months (around 6/25/2022). , or sooner if necessary. Educational materials and/or home exercises printed for patient's review and were included in patient instructions on his/her After Visit Summary and given to patient at the end of visit.       Counseled regarding above diagnosis, including possible risks and complications,  especially if left uncontrolled. Counseled regarding the possible side effects, risks, benefits and alternatives to treatment; patient and/or guardian verbalizes understanding, agrees, feels comfortable with and wishes to proceed with above treatment plan. Advised patient to call with any new medication issues, and read all Rx info from pharmacy to assure aware of all possible risks and side effects of medication before taking. Reviewed age and gender appropriate health screening exams and vaccinations. Advised patient regarding importance of keeping up with recommended health maintenance and to schedule as soon as possible if overdue, as this is important in assessing for undiagnosed pathology, especially cancer, as well as protecting against potentially harmful/life threatening disease. Patient and/or guardian verbalizes understanding and agrees with above counseling, assessment and plan. All questions answered. Ramiro Hammer PA-C  3/25/2022    I have personally reviewed and updated the chief complaint, HPI, Past Medical, Family and Social History, as well as the above Review of Systems.

## 2022-04-01 ENCOUNTER — APPOINTMENT (OUTPATIENT)
Dept: CT IMAGING | Age: 47
DRG: 134 | End: 2022-04-01
Payer: COMMERCIAL

## 2022-04-01 ENCOUNTER — APPOINTMENT (OUTPATIENT)
Dept: GENERAL RADIOLOGY | Age: 47
DRG: 134 | End: 2022-04-01
Payer: COMMERCIAL

## 2022-04-01 ENCOUNTER — HOSPITAL ENCOUNTER (INPATIENT)
Age: 47
LOS: 3 days | Discharge: HOME HEALTH | DRG: 134 | End: 2022-04-04
Attending: EMERGENCY MEDICINE | Admitting: INTERNAL MEDICINE
Payer: COMMERCIAL

## 2022-04-01 DIAGNOSIS — I26.99 PULMONARY INFARCT (HCC): ICD-10-CM

## 2022-04-01 DIAGNOSIS — I26.99 ACUTE PULMONARY EMBOLISM WITHOUT ACUTE COR PULMONALE, UNSPECIFIED PULMONARY EMBOLISM TYPE (HCC): Primary | ICD-10-CM

## 2022-04-01 DIAGNOSIS — I26.94 MULTIPLE SUBSEGMENTAL PULMONARY EMBOLI WITHOUT ACUTE COR PULMONALE (HCC): ICD-10-CM

## 2022-04-01 LAB
ANION GAP SERPL CALCULATED.3IONS-SCNC: 9 MMOL/L (ref 7–16)
APTT: 28.2 SEC (ref 24.5–35.1)
APTT: 31 SEC (ref 24.5–35.1)
APTT: 34 SEC (ref 24.5–35.1)
BASOPHILS ABSOLUTE: 0.04 E9/L (ref 0–0.2)
BASOPHILS RELATIVE PERCENT: 0.4 % (ref 0–2)
BUN BLDV-MCNC: 6 MG/DL (ref 6–20)
C-REACTIVE PROTEIN: 1.4 MG/DL (ref 0–0.4)
CALCIUM SERPL-MCNC: 9.1 MG/DL (ref 8.6–10.2)
CHLORIDE BLD-SCNC: 107 MMOL/L (ref 98–107)
CO2: 23 MMOL/L (ref 22–29)
CREAT SERPL-MCNC: 0.6 MG/DL (ref 0.5–1)
EKG ATRIAL RATE: 66 BPM
EKG P AXIS: 67 DEGREES
EKG P-R INTERVAL: 180 MS
EKG Q-T INTERVAL: 400 MS
EKG QRS DURATION: 82 MS
EKG QTC CALCULATION (BAZETT): 419 MS
EKG R AXIS: 51 DEGREES
EKG T AXIS: 57 DEGREES
EKG VENTRICULAR RATE: 66 BPM
EOSINOPHILS ABSOLUTE: 0.32 E9/L (ref 0.05–0.5)
EOSINOPHILS RELATIVE PERCENT: 3.4 % (ref 0–6)
GFR AFRICAN AMERICAN: >60
GFR NON-AFRICAN AMERICAN: >60 ML/MIN/1.73
GLUCOSE BLD-MCNC: 106 MG/DL (ref 74–99)
HCT VFR BLD CALC: 36.1 % (ref 34–48)
HCT VFR BLD CALC: 36.7 % (ref 34–48)
HEMOGLOBIN: 11.4 G/DL (ref 11.5–15.5)
HEMOGLOBIN: 11.6 G/DL (ref 11.5–15.5)
IMMATURE GRANULOCYTES #: 0.06 E9/L
IMMATURE GRANULOCYTES %: 0.6 % (ref 0–5)
INR BLD: 1
LYMPHOCYTES ABSOLUTE: 2.78 E9/L (ref 1.5–4)
LYMPHOCYTES RELATIVE PERCENT: 29.3 % (ref 20–42)
MCH RBC QN AUTO: 30.2 PG (ref 26–35)
MCH RBC QN AUTO: 30.4 PG (ref 26–35)
MCHC RBC AUTO-ENTMCNC: 31.6 % (ref 32–34.5)
MCHC RBC AUTO-ENTMCNC: 31.6 % (ref 32–34.5)
MCV RBC AUTO: 95.8 FL (ref 80–99.9)
MCV RBC AUTO: 96.1 FL (ref 80–99.9)
MONOCYTES ABSOLUTE: 0.74 E9/L (ref 0.1–0.95)
MONOCYTES RELATIVE PERCENT: 7.8 % (ref 2–12)
NEUTROPHILS ABSOLUTE: 5.56 E9/L (ref 1.8–7.3)
NEUTROPHILS RELATIVE PERCENT: 58.5 % (ref 43–80)
PDW BLD-RTO: 13.6 FL (ref 11.5–15)
PDW BLD-RTO: 13.6 FL (ref 11.5–15)
PLATELET # BLD: 256 E9/L (ref 130–450)
PLATELET # BLD: 286 E9/L (ref 130–450)
PMV BLD AUTO: 9.4 FL (ref 7–12)
PMV BLD AUTO: 9.4 FL (ref 7–12)
POTASSIUM REFLEX MAGNESIUM: 3.9 MMOL/L (ref 3.5–5)
PRO-BNP: 57 PG/ML (ref 0–125)
PROTHROMBIN TIME: 11.1 SEC (ref 9.3–12.4)
RBC # BLD: 3.77 E12/L (ref 3.5–5.5)
RBC # BLD: 3.82 E12/L (ref 3.5–5.5)
SEDIMENTATION RATE, ERYTHROCYTE: 12 MM/HR (ref 0–20)
SODIUM BLD-SCNC: 139 MMOL/L (ref 132–146)
TROPONIN, HIGH SENSITIVITY: <6 NG/L (ref 0–9)
WBC # BLD: 7.6 E9/L (ref 4.5–11.5)
WBC # BLD: 9.5 E9/L (ref 4.5–11.5)

## 2022-04-01 PROCEDURE — 6370000000 HC RX 637 (ALT 250 FOR IP): Performed by: NURSE PRACTITIONER

## 2022-04-01 PROCEDURE — 6370000000 HC RX 637 (ALT 250 FOR IP): Performed by: INTERNAL MEDICINE

## 2022-04-01 PROCEDURE — 36415 COLL VENOUS BLD VENIPUNCTURE: CPT

## 2022-04-01 PROCEDURE — 85025 COMPLETE CBC W/AUTO DIFF WBC: CPT

## 2022-04-01 PROCEDURE — 84484 ASSAY OF TROPONIN QUANT: CPT

## 2022-04-01 PROCEDURE — 80048 BASIC METABOLIC PNL TOTAL CA: CPT

## 2022-04-01 PROCEDURE — 93010 ELECTROCARDIOGRAM REPORT: CPT | Performed by: INTERNAL MEDICINE

## 2022-04-01 PROCEDURE — 6360000004 HC RX CONTRAST MEDICATION: Performed by: RADIOLOGY

## 2022-04-01 PROCEDURE — 85610 PROTHROMBIN TIME: CPT

## 2022-04-01 PROCEDURE — 93005 ELECTROCARDIOGRAM TRACING: CPT | Performed by: EMERGENCY MEDICINE

## 2022-04-01 PROCEDURE — 2060000000 HC ICU INTERMEDIATE R&B

## 2022-04-01 PROCEDURE — APPSS30 APP SPLIT SHARED TIME 16-30 MINUTES: Performed by: NURSE PRACTITIONER

## 2022-04-01 PROCEDURE — 71045 X-RAY EXAM CHEST 1 VIEW: CPT

## 2022-04-01 PROCEDURE — 6360000002 HC RX W HCPCS: Performed by: EMERGENCY MEDICINE

## 2022-04-01 PROCEDURE — 83880 ASSAY OF NATRIURETIC PEPTIDE: CPT

## 2022-04-01 PROCEDURE — 85027 COMPLETE CBC AUTOMATED: CPT

## 2022-04-01 PROCEDURE — 99223 1ST HOSP IP/OBS HIGH 75: CPT | Performed by: INTERNAL MEDICINE

## 2022-04-01 PROCEDURE — 74177 CT ABD & PELVIS W/CONTRAST: CPT

## 2022-04-01 PROCEDURE — 99283 EMERGENCY DEPT VISIT LOW MDM: CPT

## 2022-04-01 PROCEDURE — A4216 STERILE WATER/SALINE, 10 ML: HCPCS | Performed by: SPECIALIST

## 2022-04-01 PROCEDURE — 99232 SBSQ HOSP IP/OBS MODERATE 35: CPT | Performed by: INTERNAL MEDICINE

## 2022-04-01 PROCEDURE — 2580000003 HC RX 258: Performed by: SPECIALIST

## 2022-04-01 PROCEDURE — 86140 C-REACTIVE PROTEIN: CPT

## 2022-04-01 PROCEDURE — 6360000002 HC RX W HCPCS: Performed by: NURSE PRACTITIONER

## 2022-04-01 PROCEDURE — 96374 THER/PROPH/DIAG INJ IV PUSH: CPT

## 2022-04-01 PROCEDURE — 6360000002 HC RX W HCPCS: Performed by: SPECIALIST

## 2022-04-01 PROCEDURE — 6370000000 HC RX 637 (ALT 250 FOR IP): Performed by: SPECIALIST

## 2022-04-01 PROCEDURE — 96372 THER/PROPH/DIAG INJ SC/IM: CPT

## 2022-04-01 PROCEDURE — 85651 RBC SED RATE NONAUTOMATED: CPT

## 2022-04-01 PROCEDURE — 71275 CT ANGIOGRAPHY CHEST: CPT

## 2022-04-01 PROCEDURE — 85730 THROMBOPLASTIN TIME PARTIAL: CPT

## 2022-04-01 RX ORDER — HEPARIN SODIUM 1000 [USP'U]/ML
80 INJECTION, SOLUTION INTRAVENOUS; SUBCUTANEOUS ONCE
Status: COMPLETED | OUTPATIENT
Start: 2022-04-01 | End: 2022-04-01

## 2022-04-01 RX ORDER — ALBUTEROL SULFATE 2.5 MG/3ML
2.5 SOLUTION RESPIRATORY (INHALATION) EVERY 6 HOURS PRN
Status: DISCONTINUED | OUTPATIENT
Start: 2022-04-01 | End: 2022-04-04 | Stop reason: HOSPADM

## 2022-04-01 RX ORDER — ROPINIROLE 0.5 MG/1
0.5 TABLET, FILM COATED ORAL 2 TIMES DAILY PRN
Status: DISCONTINUED | OUTPATIENT
Start: 2022-04-01 | End: 2022-04-01

## 2022-04-01 RX ORDER — ACETAMINOPHEN 325 MG/1
650 TABLET ORAL EVERY 6 HOURS PRN
Status: DISCONTINUED | OUTPATIENT
Start: 2022-04-01 | End: 2022-04-04 | Stop reason: HOSPADM

## 2022-04-01 RX ORDER — ROPINIROLE 0.5 MG/1
0.5 TABLET, FILM COATED ORAL NIGHTLY PRN
Status: DISCONTINUED | OUTPATIENT
Start: 2022-04-01 | End: 2022-04-04 | Stop reason: HOSPADM

## 2022-04-01 RX ORDER — HEPARIN SODIUM 1000 [USP'U]/ML
80 INJECTION, SOLUTION INTRAVENOUS; SUBCUTANEOUS PRN
Status: DISCONTINUED | OUTPATIENT
Start: 2022-04-01 | End: 2022-04-03 | Stop reason: ALTCHOICE

## 2022-04-01 RX ORDER — DEXAMETHASONE SODIUM PHOSPHATE 10 MG/ML
10 INJECTION, SOLUTION INTRAMUSCULAR; INTRAVENOUS ONCE
Status: COMPLETED | OUTPATIENT
Start: 2022-04-01 | End: 2022-04-01

## 2022-04-01 RX ORDER — NORTRIPTYLINE HYDROCHLORIDE 10 MG/1
10 CAPSULE ORAL NIGHTLY
Status: DISCONTINUED | OUTPATIENT
Start: 2022-04-01 | End: 2022-04-04 | Stop reason: HOSPADM

## 2022-04-01 RX ORDER — NICOTINE 21 MG/24HR
1 PATCH, TRANSDERMAL 24 HOURS TRANSDERMAL DAILY
Status: DISCONTINUED | OUTPATIENT
Start: 2022-04-01 | End: 2022-04-04 | Stop reason: HOSPADM

## 2022-04-01 RX ORDER — PANTOPRAZOLE SODIUM 40 MG/1
40 TABLET, DELAYED RELEASE ORAL
Status: DISCONTINUED | OUTPATIENT
Start: 2022-04-01 | End: 2022-04-01 | Stop reason: ALTCHOICE

## 2022-04-01 RX ORDER — FENTANYL CITRATE 50 UG/ML
25 INJECTION, SOLUTION INTRAMUSCULAR; INTRAVENOUS ONCE
Status: COMPLETED | OUTPATIENT
Start: 2022-04-01 | End: 2022-04-01

## 2022-04-01 RX ORDER — OXYCODONE HYDROCHLORIDE AND ACETAMINOPHEN 5; 325 MG/1; MG/1
1 TABLET ORAL EVERY 6 HOURS PRN
Status: DISCONTINUED | OUTPATIENT
Start: 2022-04-01 | End: 2022-04-04 | Stop reason: HOSPADM

## 2022-04-01 RX ORDER — FAMOTIDINE 20 MG/1
40 TABLET, FILM COATED ORAL 2 TIMES DAILY
Status: DISCONTINUED | OUTPATIENT
Start: 2022-04-01 | End: 2022-04-01 | Stop reason: ALTCHOICE

## 2022-04-01 RX ORDER — PANTOPRAZOLE SODIUM 40 MG/1
40 TABLET, DELAYED RELEASE ORAL
Status: DISCONTINUED | OUTPATIENT
Start: 2022-04-01 | End: 2022-04-04 | Stop reason: HOSPADM

## 2022-04-01 RX ORDER — HEPARIN SODIUM 10000 [USP'U]/100ML
5-30 INJECTION, SOLUTION INTRAVENOUS CONTINUOUS
Status: DISCONTINUED | OUTPATIENT
Start: 2022-04-01 | End: 2022-04-03

## 2022-04-01 RX ORDER — DICYCLOMINE HYDROCHLORIDE 10 MG/1
10 CAPSULE ORAL
Status: DISCONTINUED | OUTPATIENT
Start: 2022-04-01 | End: 2022-04-04 | Stop reason: HOSPADM

## 2022-04-01 RX ORDER — HEPARIN SODIUM 1000 [USP'U]/ML
40 INJECTION, SOLUTION INTRAVENOUS; SUBCUTANEOUS PRN
Status: DISCONTINUED | OUTPATIENT
Start: 2022-04-01 | End: 2022-04-03 | Stop reason: ALTCHOICE

## 2022-04-01 RX ADMIN — Medication 18 UNITS/KG/HR: at 18:30

## 2022-04-01 RX ADMIN — DICYCLOMINE HYDROCHLORIDE 10 MG: 10 CAPSULE ORAL at 21:23

## 2022-04-01 RX ADMIN — PANTOPRAZOLE SODIUM 40 MG: 40 TABLET, DELAYED RELEASE ORAL at 11:21

## 2022-04-01 RX ADMIN — IOPAMIDOL 75 ML: 755 INJECTION, SOLUTION INTRAVENOUS at 03:52

## 2022-04-01 RX ADMIN — ERTAPENEM SODIUM 1000 MG: 1 INJECTION, POWDER, LYOPHILIZED, FOR SOLUTION INTRAMUSCULAR; INTRAVENOUS at 15:46

## 2022-04-01 RX ADMIN — FENTANYL CITRATE 25 MCG: 50 INJECTION, SOLUTION INTRAMUSCULAR; INTRAVENOUS at 05:39

## 2022-04-01 RX ADMIN — ACETAMINOPHEN 650 MG: 325 TABLET ORAL at 11:21

## 2022-04-01 RX ADMIN — DICYCLOMINE HYDROCHLORIDE 10 MG: 10 CAPSULE ORAL at 16:15

## 2022-04-01 RX ADMIN — ENOXAPARIN SODIUM 80 MG: 100 INJECTION SUBCUTANEOUS at 05:45

## 2022-04-01 RX ADMIN — NORTRIPTYLINE HYDROCHLORIDE 10 MG: 10 CAPSULE ORAL at 21:23

## 2022-04-01 RX ADMIN — OXYCODONE AND ACETAMINOPHEN 1 TABLET: 5; 325 TABLET ORAL at 13:33

## 2022-04-01 RX ADMIN — HEPARIN SODIUM 6380 UNITS: 1000 INJECTION INTRAVENOUS; SUBCUTANEOUS at 18:30

## 2022-04-01 RX ADMIN — FLUCONAZOLE 400 MG: 150 TABLET ORAL at 15:47

## 2022-04-01 RX ADMIN — OXYCODONE AND ACETAMINOPHEN 1 TABLET: 5; 325 TABLET ORAL at 21:28

## 2022-04-01 RX ADMIN — DICYCLOMINE HYDROCHLORIDE 10 MG: 10 CAPSULE ORAL at 11:22

## 2022-04-01 RX ADMIN — IOPAMIDOL 75 ML: 755 INJECTION, SOLUTION INTRAVENOUS at 17:49

## 2022-04-01 RX ADMIN — PANTOPRAZOLE SODIUM 40 MG: 40 TABLET, DELAYED RELEASE ORAL at 15:47

## 2022-04-01 RX ADMIN — DEXAMETHASONE SODIUM PHOSPHATE 10 MG: 10 INJECTION INTRAMUSCULAR; INTRAVENOUS at 13:33

## 2022-04-01 RX ADMIN — IOHEXOL 50 ML: 240 INJECTION, SOLUTION INTRATHECAL; INTRAVASCULAR; INTRAVENOUS; ORAL at 17:49

## 2022-04-01 ASSESSMENT — PAIN SCALES - GENERAL
PAINLEVEL_OUTOF10: 10
PAINLEVEL_OUTOF10: 9
PAINLEVEL_OUTOF10: 10
PAINLEVEL_OUTOF10: 10
PAINLEVEL_OUTOF10: 0
PAINLEVEL_OUTOF10: 4
PAINLEVEL_OUTOF10: 4

## 2022-04-01 NOTE — PROGRESS NOTES
P Quality Flow/Interdisciplinary Rounds Progress Note        Quality Flow Rounds held on April 1, 2022    Disciplines Attending:  Bedside Nurse, ,  and Nursing Unit Leadership    Dusty Gregg was admitted on 4/1/2022  1:27 AM    Anticipated Discharge Date:  Expected Discharge Date: 05/28/22    Disposition:    Sergei Score:  Sergei Scale Score: 21    Readmission Risk              Risk of Unplanned Readmission:  20           Discussed patient goal for the day, patient clinical progression, and barriers to discharge. The following Goal(s) of the Day/Commitment(s) have been identified:  Check Pulm plan.       Brenda Barnett RN  April 1, 2022

## 2022-04-01 NOTE — ED PROVIDER NOTES
HPI:  4/1/22,   Time: 2:49 AM EDT       Sushant Alvarado is a 51 y.o. female presenting to the ED for chest pain or shortness of breath, beginning 1 day ago. The complaint has been persistent, mild in severity, and worsened by nothing. The patient states that starting today she began having chest pain in the center of her chest.  She describes as sharp and stabbing. She states she is having some mild shortness of breath with it as well. She states she has a history of DVT and was recently started on Eliquis. She states she is currently on day 5. She states she has been compliant with his medications. No abdominal pain. No nausea or vomiting. No urinary symptoms. No numbness tingling. Review of Systems:   Pertinent positives and negatives are stated within HPI, all other systems reviewed and are negative.          --------------------------------------------- PAST HISTORY ---------------------------------------------  Past Medical History:  has a past medical history of Acid reflux disease, Cyst of ovary, Fracture of nasal bone, Headache, Hearing loss, Hypertension, Migraine, Morbidly obese (HCC), Multiple sclerosis ([facility] [address] VEENA no CPAP, Right shoulder pain, Tinnitus, and TMJ dysfunction. Past Surgical History:  has a past surgical history that includes Cholecystectomy; cyst removal; Hysterectomy (03/05/2018); laryngoscopy (N/A, 7/17/2020); Shoulder arthroscopy (Right, 11/11/2020); Biceps tendon repair (Right, 11/11/2020); Appendectomy; Esophagus surgery; partial hysterectomy (cervix not removed); Hysterectomy, total abdominal; Tonsillectomy; colectomy (Left, 8/31/2021); colectomy (N/A, 9/6/2021); proctosigmoidoscopy (N/A, 10/12/2021); back surgery; proctosigmoidoscopy (N/A, 1/25/2022); and colectomy (N/A, 2/8/2022). Social History:  reports that she has been smoking cigarettes. She has a 12.50 pack-year smoking history. She has never used smokeless tobacco. She reports previous alcohol use.  She reports that she does not use drugs. Family History: family history includes Mult Sclerosis in her mother. The patients home medications have been reviewed. Allergies: Patient has no known allergies. ---------------------------------------------------PHYSICAL EXAM--------------------------------------    Constitutional/General: Alert and oriented x3, well appearing, non toxic in NAD  Head: Normocephalic and atraumatic  Eyes: PERRL, EOMI, conjunctive normal, sclera non icteric  Mouth: Oropharynx clear, handling secretions, no trismus, no asymmetry of the posterior oropharynx or uvular edema  Neck: Supple, full ROM, non tender to palpation in the midline, no stridor, no crepitus, no meningeal signs  Respiratory: Lungs clear to auscultation bilaterally, no wheezes, rales, or rhonchi. Not in respiratory distress  Cardiovascular:  Regular rate. Regular rhythm. No murmurs, gallops, or rubs. 2+ distal pulses  GI:  Abdomen Soft, Non tender, Non distended. Ostomy noted to right lower quadrant, drains noted bilaterally, no erythema  Musculoskeletal: Moves all extremities x 4. Warm and well perfused, no clubbing, cyanosis, or edema. Capillary refill <3 seconds  Integument: skin warm and dry. No rashes. Neurologic: GCS 15, no focal deficits, symmetric strength 5/5 in the upper and lower extremities bilaterally  Psychiatric: Normal Affect    -------------------------------------------------- RESULTS -------------------------------------------------  I have personally reviewed all laboratory and imaging results for this patient. Results are listed below.      LABS:  Results for orders placed or performed during the hospital encounter of 04/01/22   CBC with Auto Differential   Result Value Ref Range    WBC 9.5 4.5 - 11.5 E9/L    RBC 3.82 3.50 - 5.50 E12/L    Hemoglobin 11.6 11.5 - 15.5 g/dL    Hematocrit 36.7 34.0 - 48.0 %    MCV 96.1 80.0 - 99.9 fL    MCH 30.4 26.0 - 35.0 pg    MCHC 31.6 (L) 32.0 - 34.5 % RDW 13.6 11.5 - 15.0 fL    Platelets 044 154 - 991 E9/L    MPV 9.4 7.0 - 12.0 fL    Neutrophils % 58.5 43.0 - 80.0 %    Immature Granulocytes % 0.6 0.0 - 5.0 %    Lymphocytes % 29.3 20.0 - 42.0 %    Monocytes % 7.8 2.0 - 12.0 %    Eosinophils % 3.4 0.0 - 6.0 %    Basophils % 0.4 0.0 - 2.0 %    Neutrophils Absolute 5.56 1.80 - 7.30 E9/L    Immature Granulocytes # 0.06 E9/L    Lymphocytes Absolute 2.78 1.50 - 4.00 E9/L    Monocytes Absolute 0.74 0.10 - 0.95 E9/L    Eosinophils Absolute 0.32 0.05 - 0.50 E9/L    Basophils Absolute 0.04 0.00 - 0.20 I2/N   Basic Metabolic Panel w/ Reflex to MG   Result Value Ref Range    Sodium 139 132 - 146 mmol/L    Potassium reflex Magnesium 3.9 3.5 - 5.0 mmol/L    Chloride 107 98 - 107 mmol/L    CO2 23 22 - 29 mmol/L    Anion Gap 9 7 - 16 mmol/L    Glucose 106 (H) 74 - 99 mg/dL    BUN 6 6 - 20 mg/dL    CREATININE 0.6 0.5 - 1.0 mg/dL    GFR Non-African American >60 >=60 mL/min/1.73    GFR African American >60     Calcium 9.1 8.6 - 10.2 mg/dL   Troponin   Result Value Ref Range    Troponin, High Sensitivity <6 0 - 9 ng/L   Brain Natriuretic Peptide   Result Value Ref Range    Pro-BNP 57 0 - 125 pg/mL   Protime-INR   Result Value Ref Range    Protime 11.1 9.3 - 12.4 sec    INR 1.0    APTT   Result Value Ref Range    aPTT 31.0 24.5 - 35.1 sec   EKG 12 Lead   Result Value Ref Range    Ventricular Rate 66 BPM    Atrial Rate 66 BPM    P-R Interval 180 ms    QRS Duration 82 ms    Q-T Interval 400 ms    QTc Calculation (Bazett) 419 ms    P Axis 67 degrees    R Axis 51 degrees    T Axis 57 degrees       RADIOLOGY:  Interpreted by Radiologist.  CTA PULMONARY W CONTRAST   Final Result   Pulmonary emboli within the terminal right main pulmonary artery extending   into right upper and lower lobar, segmental, and subsegmental pulmonary   arteries. No evidence of right heart strain.       Small peripheral wedge-shaped consolidation in the anterior basilar right   upper lobe, concerning for pulmonary infarct. Critical results were called by Dr. Elisha Laurent MD to Dr. Karla Dominique on   4/1/2022 at 05:32. XR CHEST PORTABLE   Final Result   No acute process. EKG:  This EKG is signed and interpreted by the EP. EKG shows normal sinus rhythm 66 bpm.  Normal axis. Normal QRS. No STEMI.      ------------------------- NURSING NOTES AND VITALS REVIEWED ---------------------------   The nursing notes within the ED encounter and vital signs as below have been reviewed by myself. BP (!) 155/98   Pulse 82   Temp 96.8 °F (36 °C) (Tympanic)   Resp 18   LMP 01/05/2018   SpO2 98%   Oxygen Saturation Interpretation: Normal    The patients available past medical records and past encounters were reviewed. ------------------------------ ED COURSE/MEDICAL DECISION MAKING----------------------  Medications   iopamidol (ISOVUE-370) 76 % injection 75 mL (75 mLs IntraVENous Given 4/1/22 4072)   fentaNYL (SUBLIMAZE) injection 25 mcg (25 mcg IntraVENous Given 4/1/22 5963)   enoxaparin (LOVENOX) injection 80 mg (80 mg SubCUTAneous Given 4/1/22 7923)         ED COURSE:       Medical Decision Making: This is a 78-year-old female presented to the ED for chest pain or shortness of breath. Patient underwent laboratory work-up showed a normal CBC. Normal chemistry. Negative troponin. Negative BNP. EKG showed nonspecific findings. CTA did show pulmonary infarct as well as multiple pulmonary emboli. No evidence of heart strain. Due to patient failing Eliquis the patient was started on Lovenox. Case discussed with Dr. Vivian Escamilla who is on-call for [address] [place], [place] [address]ist who is agreed admit the patient for further anticoagulation and work-up.     Critical care: 31-minute    I, Dr. Saurav Tejeda, am the primary provider for this encounter    This patient's ED course included: a personal history and physicial examination, re-evaluation prior to disposition, multiple bedside re-evaluations, IV medications, cardiac monitoring and continuous pulse oximetry    This patient has remained hemodynamically stable during their ED course. Re-Evaluations:             Re-evaluation. Patients symptoms are improving      Counseling: The emergency provider has spoken with the patient and discussed todays results, in addition to providing specific details for the plan of care and counseling regarding the diagnosis and prognosis. Questions are answered at this time and they are agreeable with the plan.       --------------------------------- IMPRESSION AND DISPOSITION ---------------------------------    IMPRESSION  1. Multiple subsegmental pulmonary emboli without acute cor pulmonale (HCC)    2. Pulmonary infarct (HCC)        DISPOSITION  Disposition: Admit to telemetry  Patient condition is stable    NOTE: This report was transcribed using voice recognition software.  Every effort was made to ensure accuracy; however, inadvertent computerized transcription errors may be present        Maribel Alcazar DO  04/01/22 7379

## 2022-04-01 NOTE — PROGRESS NOTES
[facility] Follow Up Progress Note    Admitting Date and Time: 4/1/2022  1:27 AM  Admit Dx: Pulmonary infarct ([facility] [address] [I26.99]  Pulmonary embolism without acute cor pulmonale, unspecified chronicity, unspecified pulmonary embolism type (HCC) [I26.99]  Multiple subsegmental pulmonary emboli without acute cor pulmonale (HCC) [I26.94]    Subjective:  Patient is being followed for Pulmonary infarct ([address] [I26.99]  Pulmonary embolism without acute cor pulmonale, unspecified chronicity, unspecified pulmonary embolism type ([address] [I26.99]  Multiple subsegmental pulmonary emboli without acute cor pulmonale (HCC) [I26.94]     Pt awake and alert- resting in bed in no acute distress  Reporting feeling ok  C/o ongoing pleuritic chest pain at times with deep breaths  Denies fevers or chills  Was supposed to have a CT scan outpt yesterday to further eval if PICC line/ Abx could be stopped   IR drainage bags leaking          ROS: denies fever, chills, cp, sob, n/v, HA unless stated above.  pantoprazole  40 mg Oral QAM AC    dicyclomine  10 mg Oral 4x Daily AC & HS    famotidine  40 mg Oral BID    nortriptyline  10 mg Oral Nightly    pantoprazole  40 mg Oral BID AC     rOPINIRole, 0.5 mg, BID PRN         Objective:    BP (!) 148/71   Pulse 71   Temp 97.7 °F (36.5 °C) (Oral)   Resp 20   Ht 5' 1\" (1.549 m)   Wt 175 lb 12.8 oz (79.7 kg)   LMP 01/05/2018   SpO2 99%   BMI 33.22 kg/m²   General Appearance: alert and oriented to person, place and time and in no acute distress  Skin: warm and dry  Head: normocephalic and atraumatic  Neck: neck supple and non tender without mass   Pulmonary/Chest: clear to auscultation bilaterally-   Cardiovascular: normal rate, normal S1 and S2 and no carotid bruits  Abdomen: soft, non-tender, non-distended, normal bowel sounds,ostomy with liquid brown stool.  Bilateral IR drains   Extremities: no cyanosis, no clubbing and no edema  Neurologic: speech normal         Recent Labs     03/29/22  1052 04/01/22  0231    139   K 3.8 3.9    107   CO2 22 23   BUN 7 6   CREATININE 0.7 0.6   GLUCOSE 123* 106*   CALCIUM 9.3 9.1       Recent Labs     03/29/22  1052 04/01/22  0231   WBC 8.3 9.5   RBC 3.97 3.82   HGB 12.3 11.6   HCT 39.2 36.7   MCV 98.7 96.1   MCH 31.0 30.4   MCHC 31.4* 31.6*   RDW 14.1 13.6    286   MPV 9.6 9.4           Assessment:    Principal Problem:    Pulmonary embolism without acute cor pulmonale (HCC)  Active Problems:    Multiple sclerosis (HCC)    Morbidly obese (HCC)    S/P colectomy    Malignant neoplasm of descending colon (HCC)    Postoperative intra-abdominal abscess  Resolved Problems:    * No resolved hospital problems. *      Plan:  1. Acute PE- recently found DVT(provoked)- started anticoagulation 5 days prior: pt presented to the ER with chest pain and sob starting 1 day prior. She was recently started on eliquis for DVT- on day #5. CTA showing PE within the terminal right main pulmonary artery extending into the right upper and lower lobar,, segmental and subsegmental pulmonary arteries. No evidence of right heart strain. Possible right upper lobe pulmonary infarct. Received 1 dose of lovenox 1mg/kg. Heparin gtt started. Pulmonology consulted. 2.Pleurtic chest pain: likely associated to above. Pain management. 3. Recent post op intra abdominal abscess-s/p lap sigmoid diverting ostomy d/t stricture: Being followed by ID - had PICC. On invanz/ diflucan. Per ID recent note- plan was for possible dc abx soon. Consult ID/ surgery to assist    4. Left lower leg DVT-provoked from recent surgery- started on eliquis 5 days prior- do not feel this was a failure of treatment as eliquis just started. 5. H/o MS    6. HTN: monitor BP- holding ace/ hctzcombo    7. H/o colon cancer     8. Tobacco abuse: nicotine patch. Cessation advised         NOTE: This report was transcribed using voice recognition software.  Every effort was made to ensure accuracy; however, inadvertent computerized transcription errors may be present.   Electronically signed by BETTE Bran on 4/1/2022 at 8:48 AM

## 2022-04-01 NOTE — ED TRIAGE NOTES
sob, and chest pain mid-sternal, dvt dx, recently on elquis, day 5 Otezla Counseling: The side effects of Otezla were discussed with the patient, including but not limited to worsening or new depression, weight loss, diarrhea, nausea, upper respiratory tract infection, and headache. Patient instructed to call the office should any adverse effect occur.  The patient verbalized understanding of the proper use and possible adverse effects of Otezla.  All the patient's questions and concerns were addressed.

## 2022-04-01 NOTE — CONSULTS
[address] [place] [address] [facility]  Consultation Note     Admit Date: 4/1/2022  1:27 AM    Reason for Consult:   Abscess with IR drain    Attending Physician:  Sailaja Madrid MD    HISTORY OF PRESENT ILLNESS:             The history is obtained from extensive review of available past medical records. The patient is a 51 y.o. female who is previously known to the ID service. The patient was admitted to [facility] on 2/8/2022. She had undergone a laparoscopic sigmoidectomy with: Resection on 2/9/2022. She presented with fevers, leukocytosis and a 9 cm fluid collection in the abdomen that was drained percutaneously. Treated with Ceftriaxone and Ciprofloxacin. Seen by Dr. Silvina Kaiser and treated with Zosyn and Fluconazole. She was discharged on Ertapenem. She was seen in the office in follow-up on 3/31/2022. Head CT of the abdomen and pelvis was scheduled. The patient has been diagnosed with a DVT in the left lower extremity and was on Eliquis. The patient presents to the ED at [facility] on 4/1/2022 with shortness of breath and chest pain. She ruled in for pulmonary embolism. Past Medical History:        Diagnosis Date    Acid reflux disease     Cyst of ovary     Fracture of nasal bone     Headache     Hearing loss     Hypertension     Migraine     Morbidly obese ([address] 10/05/2020    Multiple sclerosis ([address]     denies limitations at present 11/2020    VEENA no CPAP     severe VEENA per pt    Right shoulder pain 11/2020    Tinnitus     TMJ dysfunction      February 2022. Admitted to [facility].  She had undergone a laparoscopic sigmoidectomy with: Resection on 2/9/2022. She presented with fevers, leukocytosis and a 9 cm fluid collection in the abdomen that was drained percutaneously. Treated with Ceftriaxone and Ciprofloxacin. Seen by Dr. Silvina Kaiser and treated with Zosyn and Fluconazole. She was discharged on Ertapenem and Fluconazole.   She was seen in the office in follow-up on 3/31/2022. Head CT of the abdomen and pelvis was scheduled. Past Surgical History:        Procedure Laterality Date    APPENDECTOMY      BACK SURGERY      lumbar    BICEPS TENDON REPAIR Right 11/11/2020    BICEPS TENODESIS performed by Yevgeniy Ghosh MD at [place] [address] Left 8/31/2021    LAPAROSCOPIC LEFT HEMICOLECTOMY WITH LOOP OSTOMY performed by Jordan Morrison MD at [address] [place] [address] 9/6/2021    DIAGNOSTIC LAPAROSCOPY POSSIBLE BOWEL RESECTION POSSILBE OSTOMY performed by Jordan Morrison MD at [address] [place] [address],[address] N/A 2/8/2022    LAPAROSCOPIC SIGMOID COLON RESECTION performed by Jordan Morrison MD at [address] [facility]  03/05/2018    Robotic hysterectomy, bilateral salpingectomy, right oophorectomy DR. ARIANA MONIQUE [facility]     HYSTERECTOMY, TOTAL ABDOMINAL      LARYNGOSCOPY N/A 7/17/2020    DIRECT LARYNGOSCOPY--OMNI GUIDE LASER performed by Charlene Ochoa MD at [facility] PARTIAL HYSTERECTOMY      PROCTOSIGMOIDOSCOPY N/A 10/12/2021    ANAL PROCTO SIGMOIDOSCOPY FLEXIBLE performed by Jordan Morrison MD at [address] [place] [address] N/A 1/25/2022    ANAL PROCTO SIGMOIDOSCOPY FLEXIBLE performed by Jordan Morrison MD at [facility] ARTHROSCOPY Right 11/11/2020    RIGHT SHOULDER DIAGNOSTIC ARTHROSCOPY WITH DECOMPRESSION ROTATOR CUFF REPAIR [facility]       Current Medications:   Scheduled Meds:   dicyclomine  10 mg Oral 4x Daily AC & HS    nortriptyline  10 mg Oral Nightly    pantoprazole  40 mg Oral BID AC    heparin (porcine)  80 Units/kg IntraVENous Once    nicotine  1 patch TransDERmal Daily     Continuous Infusions:   heparin (PORCINE) Infusion       PRN Meds:rOPINIRole, heparin (porcine), heparin (porcine), albuterol, acetaminophen, oxyCODONE-acetaminophen    Allergies:  Patient has no known allergies. Social History:   Social History     Socioeconomic History    Marital status: Single     Spouse name: None    Number of children: 3    Years of education: 6    Highest education level: 11th grade   Occupational History    None   Tobacco Use    Smoking status: Current Every Day Smoker     Packs/day: 0.50     Years: 25.00     Pack years: 12.50     Types: Cigarettes    Smokeless tobacco: Never Used    Tobacco comment: 4 cigarettes a day per patient   Vaping Use    Vaping Use: Never used   Substance and Sexual Activity    Alcohol use: Not Currently    Drug use: No    Sexual activity: Yes     Partners: Male   Other Topics Concern    None   Social History Narrative    Uses [email or web address] for transportation    [facility]     Social Determinants of Health     Financial Resource Strain: Medium Risk    Difficulty of Paying Living Expenses: Somewhat hard   Food Insecurity: Food Insecurity Present    Worried About Running Out of Food in the Last Year: Sometimes true    [place] of Food in the Last Year: Never true   Transportation Needs: No Transportation Needs    Lack of Transportation (Medical): No    Lack of Transportation (Non-Medical): No   Physical Activity: Sufficiently Active    Days of Exercise per Week: 3 days    Minutes of Exercise per Session: 60 min   Stress: No Stress Concern Present    Feeling of Stress : Not at all   Social Connections: Socially Isolated    Frequency of Communication with Friends and Family:  Three times a week    Frequency of Social Gatherings with Friends and Family: Twice a week    Attends Mandaeism Services: Never    Active Member of Clubs or Organizations: No    Attends Club or Organization Meetings: Never    Marital Status: Never    Intimate Partner Violence:     Fear of Current or Ex-Partner: Not on file    Emotionally Abused: Not on file    Physically Abused: Not on file    Sexually Abused: Not on file   Housing Stability:  Unable to Pay for Housing in the Last Year: Not on file    Number of Places Lived in the Last Year: Not on file    Unstable Housing in the Last Year: Not on file      Pets: None  Travel: No  The patient lives alone. She works at a dry     Family History:       Problem Relation Age of Onset    Mult Sclerosis Mother     Colon Cancer Neg Hx     Uterine Cancer Neg Hx     Ovarian Cancer Neg Hx     Breast Cancer Neg Hx    . Otherwise non-pertinent to the chief complaint. REVIEW OF SYSTEMS:    Constitutional: Negative for fevers, chills, diaphoresis  Neurologic: Negative   Psychiatric: Negative  Rheumatologic: Negative   Endocrine: Negative  Hematologic: Negative  Immunologic: Negative  ENT: Negative  Respiratory: Negative   Cardiovascular: As in the HPI  GI: As in the HPI  : Negative  Musculoskeletal: Negative  Skin: No rashes. PHYSICAL EXAM:    Vitals:   BP (!) 148/71   Pulse 71   Temp 97.7 °F (36.5 °C) (Oral)   Resp 20   Ht 5' 1\" (1.549 m)   Wt 175 lb 12.8 oz (79.7 kg)   LMP 01/05/2018   SpO2 99%   BMI 33.22 kg/m²   Constitutional: The patient is awake, alert, and oriented. No distress. Skin: Warm and dry. No rashes were noted. HEENT: Eyes show round, and reactive pupils. No jaundice. Moist mucous membranes, no ulcerations, no thrush. Neck: Supple to movements. No lymphadenopathy. Chest: No use of accessory muscles to breathe. Symmetrical expansion. Auscultation reveals no wheezing, crackles, or rhonchi. Cardiovascular: S1 and S2 are rhythmic and regular. No murmurs appreciated. Abdomen: Positive bowel sounds to auscultation. Midline incision healed. Left lower quadrant colostomy. There pigtail catheters on both sides of the abdomen with minimal purulent drainage. Extremities: No clubbing, no cyanosis, no edema. Lines: Left PICC.       CBC+dif:  Recent Labs     04/01/22  0231 04/01/22  0231 04/01/22  1100   WBC 9.5  --  7.6   HGB 11.6   < > 11.4*   HCT 36.7   < > 36.1 MCV 96.1   < > 95.8      < > 256   NEUTROABS 5.56  --   --     < > = values in this interval not displayed. Lab Results   Component Value Date    CRP 0.9 (H) 03/29/2022    CRP 1.4 (H) 03/22/2022    CRP 5.3 (H) 03/16/2022      No results found for: CRPHS  Lab Results   Component Value Date    SEDRATE 12 03/29/2022    SEDRATE 25 (H) 03/22/2022    SEDRATE 46 (H) 03/16/2022     Lab Results   Component Value Date    ALT 33 (H) 03/29/2022    AST 31 03/29/2022    ALKPHOS 138 (H) 03/29/2022    BILITOT 0.5 03/29/2022     Lab Results   Component Value Date     04/01/2022    K 3.9 04/01/2022     04/01/2022    CO2 23 04/01/2022    BUN 6 04/01/2022    CREATININE 0.6 04/01/2022    GFRAA >60 04/01/2022    LABGLOM >60 04/01/2022    GLUCOSE 106 04/01/2022    PROT 7.2 03/29/2022    LABALBU 4.1 03/29/2022    CALCIUM 9.1 04/01/2022    BILITOT 0.5 03/29/2022    ALKPHOS 138 03/29/2022    AST 31 03/29/2022    ALT 33 03/29/2022       Lab Results   Component Value Date    PROTIME 11.1 04/01/2022    INR 1.0 04/01/2022       Lab Results   Component Value Date    TSH 0.293 09/01/2021       Lab Results   Component Value Date    COLORU Yellow 02/23/2022    PHUR 6.0 02/23/2022    WBCUA 0-1 02/17/2022    RBCUA 1-3 02/17/2022    TRICHOMONAS Present 02/26/2020    BACTERIA RARE 02/17/2022    CLARITYU Clear 02/23/2022    SPECGRAV 1.020 02/23/2022    LEUKOCYTESUR Negative 02/23/2022    UROBILINOGEN 0.2 02/23/2022    BILIRUBINUR Negative 02/23/2022    BLOODU Negative 02/23/2022    GLUCOSEU Negative 02/23/2022    AMORPHOUS FEW 11/23/2021       No results found for: HCO3, BE, O2SAT, PH, THGB, PCO2, PO2, TCO2  Radiology:  Noted    Microbiology:  Pending  No results for input(s): BC in the last 72 hours. No results for input(s): ORG in the last 72 hours. No results for input(s): Redgie Rico in the last 72 hours. No results for input(s): STREPNEUMAGU in the last 72 hours. No results for input(s): LP1UAG in the last 72 hours.   No results for input(s): ASO in the last 72 hours. No results for input(s): CULTRESP in the last 72 hours. No results for input(s): PROCAL in the last 72 hours. Assessment:  · Status post sigmoid colectomy with colostomy 2/8/2022  · Pelvic fluid abscesses. Status post CT-guided drainage. Culture grew Proteus  · DVT left lower extremity  · Pulmonary embolism    Plan:    · Resume Ertapenem and oral Fluconazole  · Check cultures, baseline ESR, CRP  · CT of the abdomen and pelvis to see if we can remove the pigtail catheters  · Will follow with you    Thank you for having us see this patient in consultation. I will be discussing this case with the treating physicians.     Kaylen Rausch MD  1:49 PM  4/1/2022

## 2022-04-01 NOTE — CARE COORDINATION
Social work / Discharge planning:       Patient is from home and is active with [facility] for skilled nursing. If discharge plan is home, will need orders to resume home care. Currently on IV Invanz and Heparin. Social work will follow to assist with discharge planning.    Electronically signed by ROBIN Shukla on 4/1/2022 at 3:14 PM

## 2022-04-01 NOTE — PROGRESS NOTES
Consult for new PE, despite 5 days eliquis for provoked DVT, pulm infartion and pleuritic chest pain sent to [facility]

## 2022-04-01 NOTE — CONSULTS
Jill Washburn M.D.,[facility]  Thee Allen D.O., F.BENJAMIN.HAKEEM.OMICHAEL., Mekhi Mcgregor M.D. Carolyn Wilkes M.D. Abril Hua D.O. Patient:  Ricki Moreno 51 y.o. female MRN: [id number]     Date of Service: 4/1/2022      PULMONARY CONSULTATION    Reason for Consultation: New pulmonary embolism, despite 5 days Eliquis for provoked DVT, pulmonary infarction with pleuritic chest pain    Referring Physician: Dr. Carlos Enrique Arce MD    Communication with the referring physician will be sent via the electronic medical record. Chief Complaint: Chest pain, shortness of breath    CODE STATUS: Full    SUBJECTIVE:  HPI:  Ricki Moreno is a 51 y.o. female who we are asked to evaluate for new pulmonary embolism despite 5 days of Eliquis therapy for provoked DVT, pulmonary infarction with pleuritic chest pain. Her past medical history includes migraine headaches, hypertension, obesity BMI 33.22, multiple sclerosis on Ocrevus, baclofen, gabapentin per neurology,  TMJ, diagnosed with colon cancer August 2021 underwent several surgeries/colectomy with diverting colostomy considered cured-did not receive chemotherapy. She does have a history of mild sleep apnea. She has lost approximately 44 pounds over the past year. She underwent sleep study testing by Dr. Yajaira Lynch 5/12/2021 for symptoms of witnessed apneas snoring and restless legs with frequent nocturia. Her Moriah score was 4/24, mild sleep apnea, no treatment recommended. She had COVID October 2021 treated with baricitinib and Decadron. 1/25/22 s/p ANAL PROCTO SIGMOIDOSCOPY . Her most recent sigmoid colon surgery was February 8, 2022 with stapled end-to-end anastomosis and mobilization of splenic flexure by Dr Pillo Humphries at [facility] . She had a prolonged hospital course 18 days and patient on prolonged antibiotics treated for wound infection per ID service. She had bilateral abdominal abscess drains placed per IR .   She was treated with Rocephin and Cipro along with Zosyn and fluconazole. Cultures positive for Proteus. She discharged to home 3/1/2022 with a PICC line and given ertapenem and fluconazole per ID recommendations. She presented to the ED on 3/22/2022 with complaint of leg pain. Ultrasound lower extremity confirmed nonocclusive DVT involving left external iliac vein and left common femoral vein. No CT chest imaging was performed at that time. She was placed on Eliquis and discharged home. She was not hypoxic with no complaint at that time shortness of breath or chest pain. Yesterday evening she began to develop acute onset chest discomfort. No cough or hemoptysis. No syncope. She returned to the ED today for further evaluation. Radiology review-CTA chest demonstrates filling defects within the terminal right main pulmonary artery extending into the right upper and lower lobar segmental and subsegmental branches. Main PA is normal.  No evidence of RV strain. mediastinum with no evidence of lymphadenopathy. Small peripheral wedge-shaped consolidation in the anterior basilar right upper lobe concerning for pulmonary infarct. Lab testing-WBC 7.6, hemoglobin 11.4, sodium 139, potassium 3.9, BUN 6, creatinine 0.6, proBNP 57, twelve-lead EKG normal sinus rhythm. She is currently sitting up in bed in no acute distress not requiring supplemental oxygen. She received 1 dose of therapeutic Lovenox 1 mg/kg this morning. We will start her on heparin IV infusion for clot stabilization for the next 48 hours.   Past Medical History:   Diagnosis Date    Acid reflux disease     Cyst of ovary     Fracture of nasal bone     Headache     Hearing loss     Hypertension     Migraine     Morbidly obese ([address] 10/05/2020    Multiple sclerosis ([address]     denies limitations at present 11/2020    VEENA no CPAP     severe VEENA per pt    Right shoulder pain 11/2020    Tinnitus     TMJ dysfunction        Past Surgical History:   Procedure Laterality Date    APPENDECTOMY      BACK SURGERY      lumbar    BICEPS TENDON REPAIR Right 11/11/2020    BICEPS TENODESIS performed by Marquez Buck MD at [place] [id number] Left 8/31/2021    LAPAROSCOPIC LEFT HEMICOLECTOMY WITH LOOP OSTOMY performed by Rafa Otero MD at [facility] 9/6/2021    DIAGNOSTIC LAPAROSCOPY POSSIBLE BOWEL RESECTION POSSILBE OSTOMY performed by Rafa Otero MD at [address] N/A 2/8/2022    LAPAROSCOPIC SIGMOID COLON RESECTION performed by Rafa Otero MD at [address]  03/05/2018    Robotic hysterectomy, bilateral salpingectomy, right oophorectomy DR. ARIANA MONIQUE [facility]     HYSTERECTOMY, TOTAL ABDOMINAL      LARYNGOSCOPY N/A 7/17/2020    DIRECT LARYNGOSCOPY--OMNI GUIDE LASER performed by Gurmeet Eldridge MD at [address] [facility]      PROCTOSIGMOIDOSCOPY N/A 10/12/2021    ANAL PROCTO SIGMOIDOSCOPY FLEXIBLE performed by Rafa Otero MD at [address] [place] [address] N/A 1/25/2022    ANAL PROCTO SIGMOIDOSCOPY FLEXIBLE performed by Rafa Otero MD at [facility] ARTHROSCOPY Right 11/11/2020    RIGHT SHOULDER DIAGNOSTIC ARTHROSCOPY WITH DECOMPRESSION ROTATOR CUFF REPAIR performed by Marquez Buck MD at [facility] OR    [facility]         Family History   Problem Relation Age of Onset    Mult Sclerosis Mother     Colon Cancer Neg Hx     Uterine Cancer Neg Hx     Ovarian Cancer Neg Hx     Breast Cancer Neg Hx        Social History:   Social History     Socioeconomic History    Marital status: Single     Spouse name: Not on file    Number of children: 3    Years of education: 6    Highest education level: 11th grade   Occupational History    Not on file   Tobacco Use    Smoking status: Current Every Day Smoker     Packs/day: 0.50     Years: 25.00     Pack years: 12.50     Types: Cigarettes    Smokeless dust exposure. The patient reports does not have coal, foundry, quarry or [employer] exposure. Recent travel history none. There is not  history of recreational or IV drug use. There is not hot tub exposure. The patient does not have any exotic pets, turtles or exotic birds.        Vaccines:      Immunization History   Administered Date(s) Administered    Influenza Virus Vaccine 03/09/2020        Home Meds: Medications Prior to Admission: apixaban (ELIQUIS) 5 MG TABS tablet, Take 1 tablet by mouth 2 times daily  apixaban starter pack (ELIQUIS DVT/PE STARTER PACK) 5 MG TBPK tablet, Take 1 tablet by mouth See Admin Instructions  famotidine (PEPCID) 40 MG tablet, take 1 tablet by mouth at bedtime  nortriptyline (PAMELOR) 10 MG capsule, TAKE ONE (1) CAPSULE BY MOUTH NIGHTLY FOR HEADACHE  lisinopril-hydroCHLOROthiazide (PRINZIDE;ZESTORETIC) 10-12.5 MG per tablet, TAKE 1 TABLET BY MOUTH EVERY DAY  pantoprazole (PROTONIX) 40 MG tablet, Take 1 tablet by mouth 2 times daily (before meals)  dicyclomine (BENTYL) 10 MG capsule, Take 10 mg by mouth 4 times daily (before meals and nightly)  vitamin D (D3-50) 63962 UNIT CAPS, Take 1 capsule by mouth once a week (Patient taking differently: Take 50,000 Units by mouth once a week On Mondays)  gabapentin (NEURONTIN) 300 MG capsule, TAKE 1 CAPSULE BY MOUTH THREE TIMES DAILY  dexlansoprazole (DEXILANT) 60 MG CPDR delayed release capsule, Take 60 mg by mouth daily  baclofen (LIORESAL) 20 MG tablet, Take 1 tablet by mouth 4 times daily as needed (muscle spasms; pain)  rOPINIRole (REQUIP) 0.5 MG tablet, Take 1 tablet by mouth 2 times daily as needed (restless legs)  ocrelizumab (OCREVUS) 300 MG/10ML SOLN injection, Infuse 20 mLs intravenously every 6 months (Patient taking differently: Infuse 600 mg intravenously every 6 months Due in August 2022)    CURRENT MEDS :  Scheduled Meds:   dicyclomine  10 mg Oral 4x Daily AC & HS    nortriptyline  10 mg Oral Nightly    pantoprazole  40 mg Oral BID AC    heparin (porcine)  80 Units/kg IntraVENous Once    nicotine  1 patch TransDERmal Daily    fluconazole  400 mg Oral Daily    ertapenem (INVanz) IVPB  1,000 mg IntraVENous Q24H       Continuous Infusions:   heparin (PORCINE) Infusion         No Known Allergies    REVIEW OF SYSTEMS:  Constitutional: Denies fever, weight loss, night sweats, and fatigue  Skin: Denies pigmentation, dark lesions, and rashes   HEENT: Denies hearing loss, tinnitus, ear drainage, epistaxis, sore throat, and hoarseness. Cardiovascular: Denies palpitations,  and chest pressure. Respiratory: Shortness of breath, chest pain midsternum  Gastrointestinal: Denies nausea, vomiting, poor appetite, diarrhea, heartburn or reflux  Genitourinary: Denies dysuria, frequency, urgency or hematuria  Musculoskeletal: Denies myalgias, muscle weakness, and bone pain  Neurological: Denies dizziness, vertigo, headache, and focal weakness  Psychological: Denies anxiety and depression  Endocrine: Denies heat intolerance and cold intolerance  Hematopoietic/Lymphatic: Denies bleeding problems and blood transfusions    OBJECTIVE:   /78   Pulse 69   Temp 98.2 °F (36.8 °C) (Oral)   Resp 16   Ht 5' 1\" (1.549 m)   Wt 175 lb 12.8 oz (79.7 kg)   LMP 01/05/2018   SpO2 96%   BMI 33.22 kg/m²   SpO2 Readings from Last 1 Encounters:   04/01/22 96%        I/O:    Intake/Output Summary (Last 24 hours) at 4/1/2022 1612  Last data filed at 4/1/2022 1145  Gross per 24 hour   Intake --   Output 50 ml   Net -50 ml     Vent Information  SpO2: 96 %                Physical Exam:  General: The patient is lying in bed comfortably without any distress. Breathing is not labored  HEENT: Pupils are equal round and reactive to light, there are no oral lesions and no post-nasal drip   Neck: supple without adenopathy  Cardiovascular: regular rate and rhythm without murmur or gallop  Respiratory: Clear to auscultation bilaterally without wheezing or crackles. Air entry is symmetric  Abdomen: soft, non-tender, non-distended, normal bowel sounds diverting colostomy, bilateral abscess drains lower abdomen  Extremities: warm, no edema, no clubbing  Skin: no rash or lesion  Neurologic: CN II-XII grossly intact, no focal deficits    Pulmonary Function Testing none on file    Imaging personally reviewed:  CTA chest 4/1/2022  Pulmonary emboli within the terminal right main pulmonary artery extending   into right upper and lower lobar, segmental, and subsegmental pulmonary   arteries.  No evidence of right heart strain.       Small peripheral wedge-shaped consolidation in the anterior basilar right   upper lobe, concerning for pulmonary infarct.       Critical results were called by Dr. Logan Saul MD to Dr. Amie Nageotte on   4/1/2022 at 05:32.             Echo:  None on file    Labs:  Lab Results   Component Value Date    WBC 7.6 04/01/2022    HGB 11.4 04/01/2022    HCT 36.1 04/01/2022    MCV 95.8 04/01/2022    MCH 30.2 04/01/2022    MCHC 31.6 04/01/2022    RDW 13.6 04/01/2022     04/01/2022    MPV 9.4 04/01/2022     Lab Results   Component Value Date     04/01/2022    K 3.9 04/01/2022     04/01/2022    CO2 23 04/01/2022    BUN 6 04/01/2022    CREATININE 0.6 04/01/2022    LABALBU 4.1 03/29/2022    CALCIUM 9.1 04/01/2022    GFRAA >60 04/01/2022    LABGLOM >60 04/01/2022     Lab Results   Component Value Date    PROTIME 11.1 04/01/2022    INR 1.0 04/01/2022     Recent Labs     04/01/22  0231   PROBNP 57     No results for input(s): TROPONINI in the last 72 hours. No results for input(s): PROCAL in the last 72 hours. This SmartLink has not been configured with any valid records. Micro:  No results for input(s): CULTRESP in the last 72 hours. No results for input(s): LABGRAM in the last 72 hours. No results for input(s): LEGUR in the last 72 hours. No results for input(s): STREPNEUMAGU in the last 72 hours.   No results for input(s): LP1UAG in the last 72 hours. Assessment:  1. Acute pulmonary embolism right lung segmental/subsegmental branch with right upper lobe pulmonary infarct  2. DVT right lower extremity  3. History of colon cancer August 2021 with sigmoid colectomy and diverting colostomy 8/31/2022  4. 2/8/2022 sigmoid stricture requiring Laparoscopic sigmoid and rectal resection with stapled end-to-end anastomosis and mobilization of splenic flexure. 5. Complicated by postop pelvic fluid abscess requiring CT-guided drainage-cultures grew Proteus  6. Multiple sclerosis-follows with neurology-on Ocrevus, baclofen, gabapentin  7. Ongoing nicotine dependence half pack per day x30 years  8. History of COVID-19 pneumonia October 2021 treated with dexamethasone and baricitinib    Plan:  1. Dexamethasone 10 mg IV x1 for pleuritic chest pain   2. CTA chest reviewed , no recent study for comparison . Previously received 1 dose therapeutic Lovenox 1 mg/kg earlier today. 3. Start IV heparin infusion for clot stabilization over the next 48 hours then transition back to Eliquis with loading dose. Doubt failure of Eliquis therapy. Clot is considered provoked from recent hospital stay and prolonged immobilization. Patient with history of cancer considered cured after surgery, did not require chemotherapy. We will treat minimum 6 months. 4. Albuterol as needed for rescue  5. NicoDerm patch, tobacco cessation counseling  6. Outpatient PFTs on recovered  7. Antibiotics per ID service-fluconazole and ertapenem via PICC line  8. Local wound and ostomy care  9. Surgery consult, CT abdomen imaging ordered   10. GI prophylaxis-Protonix    Thank you for allowing me to participate in the care of Karissa Vyas. Please feel free to call with questions.      This plan of care was reviewed in collaboration with Dr. Kristian Meckel    Electronically signed by MARIXA Nation - CNP on 4/1/2022 at 4:12 PM      Note: This report was completed utilizing computer voice recognition software. Every effort has been made to ensure accuracy, however; inadvertent computerized transcription errors may be present    I personally saw, examined, and cared for the patient. Labs, medications, radiographs reviewed. I agree with history exam and plans detailed in NP note with the following additions: We are asked to see Ms. Garay for an acute pulmonary embolism with a small area of pulmonary infarction. She was recently diagnosed with a DVT and states that 5 days ago she started eliquis. She has extensive recent hospitalizations and these are provoked events  Difficult to say if this PE was present at the time of DVT diagnosis. It is possible that the clot has dislodged in the meantime and led to a new acute PE. I do not definitively think this is an eliquis failure, however, would give IV heparin x 48 hours for clot stabilization and then resume eliquis x 6 months. She has a h/o colon cancer s/p resection and is currently cancer free.     Omar Galaviz MD

## 2022-04-01 NOTE — HOME CARE
Current with [facility] for SN. Will need MONA orders prior to discharge if appropriate. Kaci Espino LPN, [facility].

## 2022-04-01 NOTE — PROGRESS NOTES
Colectomy 2/8/22  Multiple emboli  Consult for ileostomy  Patient is notin room  Tosha Hylton.  Emmie Monroe, CNS, Wound Care

## 2022-04-01 NOTE — H&P
[facility] History and Physical        Chief Complaint:  PE  History of Present Illness   The patient is a 51 y.o. female  Complicated hx  h/o colon ca/ileosotmy/coivd 10/2021-decadron -baricitnib  1/25 s/p ANAL PROCTO SIGMOIDOSCOPY FLEXIBLE  Admitted on 2/8 for LAPAROSCOPIC SIGMOID COLON RESECTION with  stapled end-to-end anastomosis and mobilization of splenic flexure    · With receent pelvic abscess per ID-On Dorena Ohms and Diflucan       recet [facility] hospital provoked DVT started on eliquis states she is currently on day 5. She states she has been compliant with his medications  Then earlier today - few hours ago abrput onset, SOB- bc cant take deep breath  Moderately severe midsternal chest pain. - hurts to take deep breathe, but NOT mechanical reproduced otherwise. known to [facility] pulmonary associates-- new PE, despite 5 days elliquis for provoked DVT, pulm infartion and pleuritic chest pain    - hx taken from the patient  REVIEW OF SYSTEMS:  no fevers, chills, cp, sob, n/v, ha, vision/hearing changes, wt changes, hot/cold flashes, other open skin lesions, diarrhea, constipation, dysuria/hematuria unless noted in HPI. Complete ROS performed with the patient and is otherwise negative.     Past Medical History:      Diagnosis Date    Acid reflux disease     Cyst of ovary     Fracture of nasal bone     Headache     Hearing loss     Hypertension     Migraine     Morbidly obese ([address] 10/05/2020    Multiple sclerosis ([address]     denies limitations at present 11/2020    VEENA no CPAP     severe VEENA per pt    Right shoulder pain 11/2020    Tinnitus     TMJ dysfunction        Past Surgical History:        Procedure Laterality Date    APPENDECTOMY      BACK SURGERY      lumbar    BICEPS TENDON REPAIR Right 11/11/2020    BICEPS TENODESIS performed by Yevgeniy Ghosh MD at [place] [address] Left 8/31/2021    LAPAROSCOPIC LEFT HEMICOLECTOMY WITH LOOP OSTOMY performed by Jurgen Han MD at [address]  9/6/2021    DIAGNOSTIC LAPAROSCOPY POSSIBLE BOWEL RESECTION POSSILBE OSTOMY performed by Jurgen Han MD at [address] N/A 2/8/2022    LAPAROSCOPIC SIGMOID COLON RESECTION performed by Jurgen Han MD at [address] [facility]  03/05/2018    Robotic hysterectomy, bilateral salpingectomy, right oophorectomy DR. ARIANA MONIQUE [facility]     HYSTERECTOMY, TOTAL ABDOMINAL      LARYNGOSCOPY N/A 7/17/2020    DIRECT LARYNGOSCOPY--OMNI GUIDE LASER performed by Jonathan Shelton MD at [facility] PARTIAL HYSTERECTOMY      PROCTOSIGMOIDOSCOPY N/A 10/12/2021    ANAL PROCTO SIGMOIDOSCOPY FLEXIBLE performed by Jurgen Han MD at [facility] N/A 1/25/2022    ANAL PROCTO [address] performed by Jurgen Han MD at [facility] ARTHROSCOPY Right 11/11/2020    RIGHT SHOULDER DIAGNOSTIC ARTHROSCOPY WITH DECOMPRESSION ROTATOR CUFF REPAIR performed by Doni Pat MD at [address] [place] [address] Medications:  Prior to Admission medications    Medication Sig Start Date End Date Taking?  Authorizing Provider   apixaban (ELIQUIS) 5 MG TABS tablet Take 1 tablet by mouth 2 times daily 3/25/22   Francine Neri PA-C   apixaban starter pack (ELIQUIS DVT/PE STARTER PACK) 5 MG TBPK tablet Take 1 tablet by mouth See Admin Instructions 3/22/22   Rae Negro, DO   polyethylene glycol Jessenia Fess) 17 GM/SCOOP powder  3/16/22   Historical Provider, MD   famotidine (PEPCID) 40 MG tablet take 1 tablet by mouth at bedtime 3/11/22   Historical Provider, MD   nortriptyline (PAMELOR) 10 MG capsule TAKE ONE (1) CAPSULE BY MOUTH NIGHTLY FOR HEADACHE 3/1/22   Francine Neri PA-C   lisinopril-hydroCHLOROthiazide (PRINZIDE;ZESTORETIC) 10-12.5 MG per tablet TAKE 1 TABLET BY MOUTH EVERY DAY 2/28/22   Francine Neri PA-C   pantoprazole (PROTONIX) 40 MG tablet Take 1 tablet by mouth 2 times daily (before meals) 2/24/22   Manuelito Yolanda Capal, DO   dicyclomine (BENTYL) 10 MG capsule Take 10 mg by mouth 4 times daily (before meals and nightly)    Historical Provider, MD   vitamin D (D3-50) 47820 UNIT CAPS Take 1 capsule by mouth once a week 11/30/21   MARIXA Hi CNP   gabapentin (NEURONTIN) 300 MG capsule TAKE 1 CAPSULE BY MOUTH THREE TIMES DAILY 9/13/21 1/31/22  MARIXA Hi CNP   dexlansoprazole (DEXILANT) 60 MG CPDR delayed release capsule Take 60 mg by mouth daily    Historical Provider, MD   baclofen (LIORESAL) 20 MG tablet Take 1 tablet by mouth 4 times daily as needed (muscle spasms; pain) 8/13/21   MARIXA Hi CNP   rOPINIRole (REQUIP) 0.5 MG tablet Take 1 tablet by mouth 2 times daily as needed (restless legs) 8/13/21   MARIXA Hi CNP   ocrelizumab (OCREVUS) 300 MG/10ML SOLN injection Infuse 20 mLs intravenously every 6 months 7/19/21   MARIXA Hi CNP       Allergies:  Patient has no known allergies. Social History:   TOBACCO:   reports that she has been smoking cigarettes. She has a 12.50 pack-year smoking history. She has never used smokeless tobacco.  ETOH:   reports previous alcohol use. Family History:       Problem Relation Age of Onset    Mult Sclerosis Mother     Colon Cancer Neg Hx     Uterine Cancer Neg Hx     Ovarian Cancer Neg Hx     Breast Cancer Neg Hx       Or deferred/otherwise considered non contributory to current admission  PHYSICAL EXAM:    VS: BP (!) 155/98   Pulse 82   Temp 96.8 °F (36 °C) (Tympanic)   Resp 18   LMP 01/05/2018   SpO2 98%     General Appearance:     + acute distress. Psych:  HEENT:    A.O.  As per HPI details  NC/AT, PERRL, no pallor no icterus, lips/ext mucous membrane grossly N    Neck:   Supple, trachea midline, no obvious JVD   Resp:     Dec bs, No wheezes, No rhonchi  Hurts to take deep breathe, lateral and PA pressures- don't reproduce substernal chest Dorlene Manual <0.01 07/20/2020    TROPONINI <0.01 02/26/2020    TROPONINI <0.01 02/05/2020       Radiology: XR CHEST PORTABLE    Result Date: 4/1/2022  EXAMINATION: ONE XRAY VIEW OF THE CHEST 4/1/2022 2:25 am COMPARISON: 02/14/2022 HISTORY: ORDERING SYSTEM PROVIDED HISTORY: cp TECHNOLOGIST PROVIDED HISTORY: Reason for exam:->cp FINDINGS: The lungs are without acute focal process. There is no effusion or pneumothorax. The cardiomediastinal silhouette is without acute process. The osseous structures are without acute process. No acute process. CTA PULMONARY W CONTRAST    Result Date: 4/1/2022  EXAMINATION: CTA OF THE CHEST 4/1/2022 3:52 am TECHNIQUE: CTA of the chest was performed after the administration of intravenous contrast.  Multiplanar reformatted images are provided for review. MIP images are provided for review. Dose modulation, iterative reconstruction, and/or weight based adjustment of the mA/kV was utilized to reduce the radiation dose to as low as reasonably achievable. COMPARISON: 02/18/2022 HISTORY: ORDERING SYSTEM PROVIDED HISTORY: cp sob TECHNOLOGIST PROVIDED HISTORY: Reason for exam:->cp sob Decision Support Exception - unselect if not a suspected or confirmed emergency medical condition->Emergency Medical Condition (MA) FINDINGS: Pulmonary Arteries: Pulmonary arteries are adequately opacified for evaluation. There are filling defects within the terminal right main pulmonary artery extending into right upper and lower lobe lobar, segmental, and subsegmental pulmonary arteries. Main pulmonary artery is normal in caliber. No evidence of right heart strain. Mediastinum: No evidence of mediastinal lymphadenopathy. The heart and pericardium demonstrate no acute abnormality. There is no acute abnormality of the thoracic aorta. Incidental note of aberrant right subclavian artery. Left upper extremity PICC, with tip extending into the mid to lower SVC. There is a tiny hiatal hernia. Lungs/pleura: There is a small peripheral wedge-shaped consolidation in the anterior basilar right upper lobe. The remainder of the lungs are clear. No pneumothorax or pleural effusion. Upper Abdomen:  Limited images of the upper abdomen demonstrate no acute abnormality. Soft Tissues/Bones: No acute bone or soft tissue abnormality. Pulmonary emboli within the terminal right main pulmonary artery extending into right upper and lower lobar, segmental, and subsegmental pulmonary arteries. No evidence of right heart strain. Small peripheral wedge-shaped consolidation in the anterior basilar right upper lobe, concerning for pulmonary infarct. Critical results were called by Dr. Melita Anthony MD to Dr. Nasrin Seay on 4/1/2022 at 05:32. EKG:  Normal sinus rhythm  Normal ECG  When compared with ECG of 30-OCT-2021 11:56,  No significant change was found   Assessment & Plan   ACTIVE hospital problems being addressed/reassessed for this admission:  Principal Problem:    Pulmonary embolism without acute cor pulmonale (HCC)  Active Problems:    Multiple sclerosis (HCC)    Morbidly obese (HCC)    S/P colectomy    Malignant neoplasm of descending colon (HCC)    Postoperative intra-abdominal abscess  Resolved Problems:    * No resolved hospital problems.  *    Code status/DVT prophylaxis and PLAN --see orders   Note extensive time spent coordinating care between ER docs, ER and floor nurses, and transitioning care over to day providers  Plan of care/ clinical impressions/communication specifics detailed below:    51 y.o. female  Complicated hx  h/o colon ca/ileosotmy/coivd 10/2021-decadron -baricitnib  1/25 s/p ANAL PROCTO SIGMOIDOSCOPY FLEXIBLE  Admitted on 2/8 for LAPAROSCOPIC SIGMOID COLON RESECTION with  stapled end-to-end anastomosis and mobilization of splenic flexure    · With receent pelvic abscess per ID-On Zayda Fragmin and Diflucan       recet [facility] hospital provoked DVT started on eliquis states she is currently on day 5.  She states she has been compliant with his medications  Then earlier today - few hours ago abrput onset, SOB- bc cant take deep breath  Moderately severe midsternal chest pain. - hurts to take deep breathe, but NOT mechanical reproduced otherwise. CT shows:  \"  Pulmonary emboli within the terminal right main pulmonary artery extending   into right upper and lower lobar, segmental, and subsegmental pulmonary   arteries.  No evidence of right heart strain.       Small peripheral wedge-shaped consolidation in the anterior basilar right   upper lobe, concerning for pulmonary infarct   \"  Given lovenox 1mg/kg-- consult plum for options  known to [facility] pulmonary associates-- new PE, despite 5 days elliquis for provoked DVT, pulm infartion and pleuritic chest pain  WBC 7.6-- fevers, but NOT recently. Recent sed rate 12,  Likely abscess treated stable. Seen by ID yesterday:  She hasn't gotten the abd CT yet, ?need to continue abx for abscess. Removed picc line?   Kristen Beverly MD   Night Officer, overnight admitting doctor at [facility] call day time doctor   for questions after 7:30am    Covering for [facility] Service  If Qs please call [phone]  Electronically signed by Austen Russell MD on 4/1/2022 at 6:30 AM

## 2022-04-02 LAB
ANION GAP SERPL CALCULATED.3IONS-SCNC: 9 MMOL/L (ref 7–16)
APTT: 60.4 SEC (ref 24.5–35.1)
APTT: 88.7 SEC (ref 24.5–35.1)
BUN BLDV-MCNC: 7 MG/DL (ref 6–20)
CALCIUM SERPL-MCNC: 9.2 MG/DL (ref 8.6–10.2)
CHLORIDE BLD-SCNC: 103 MMOL/L (ref 98–107)
CO2: 25 MMOL/L (ref 22–29)
CREAT SERPL-MCNC: 0.5 MG/DL (ref 0.5–1)
GFR AFRICAN AMERICAN: >60
GFR NON-AFRICAN AMERICAN: >60 ML/MIN/1.73
GLUCOSE BLD-MCNC: 142 MG/DL (ref 74–99)
HBA1C MFR BLD: 4.5 % (ref 4–5.6)
HCT VFR BLD CALC: 36 % (ref 34–48)
HEMOGLOBIN: 11.6 G/DL (ref 11.5–15.5)
MCH RBC QN AUTO: 30.7 PG (ref 26–35)
MCHC RBC AUTO-ENTMCNC: 32.2 % (ref 32–34.5)
MCV RBC AUTO: 95.2 FL (ref 80–99.9)
PDW BLD-RTO: 13.3 FL (ref 11.5–15)
PLATELET # BLD: 273 E9/L (ref 130–450)
PMV BLD AUTO: 9.6 FL (ref 7–12)
POTASSIUM SERPL-SCNC: 4.2 MMOL/L (ref 3.5–5)
RBC # BLD: 3.78 E12/L (ref 3.5–5.5)
SODIUM BLD-SCNC: 137 MMOL/L (ref 132–146)
WBC # BLD: 7.4 E9/L (ref 4.5–11.5)

## 2022-04-02 PROCEDURE — 99024 POSTOP FOLLOW-UP VISIT: CPT | Performed by: SURGERY

## 2022-04-02 PROCEDURE — 2060000000 HC ICU INTERMEDIATE R&B

## 2022-04-02 PROCEDURE — 83036 HEMOGLOBIN GLYCOSYLATED A1C: CPT

## 2022-04-02 PROCEDURE — 85730 THROMBOPLASTIN TIME PARTIAL: CPT

## 2022-04-02 PROCEDURE — 36415 COLL VENOUS BLD VENIPUNCTURE: CPT

## 2022-04-02 PROCEDURE — 6360000002 HC RX W HCPCS: Performed by: SPECIALIST

## 2022-04-02 PROCEDURE — 2580000003 HC RX 258: Performed by: SPECIALIST

## 2022-04-02 PROCEDURE — APPSS30 APP SPLIT SHARED TIME 16-30 MINUTES: Performed by: NURSE PRACTITIONER

## 2022-04-02 PROCEDURE — 99232 SBSQ HOSP IP/OBS MODERATE 35: CPT | Performed by: INTERNAL MEDICINE

## 2022-04-02 PROCEDURE — 80048 BASIC METABOLIC PNL TOTAL CA: CPT

## 2022-04-02 PROCEDURE — 85027 COMPLETE CBC AUTOMATED: CPT

## 2022-04-02 PROCEDURE — 6360000002 HC RX W HCPCS: Performed by: NURSE PRACTITIONER

## 2022-04-02 PROCEDURE — 6370000000 HC RX 637 (ALT 250 FOR IP): Performed by: NURSE PRACTITIONER

## 2022-04-02 PROCEDURE — A4216 STERILE WATER/SALINE, 10 ML: HCPCS | Performed by: SPECIALIST

## 2022-04-02 PROCEDURE — 6370000000 HC RX 637 (ALT 250 FOR IP): Performed by: SPECIALIST

## 2022-04-02 PROCEDURE — 6370000000 HC RX 637 (ALT 250 FOR IP): Performed by: INTERNAL MEDICINE

## 2022-04-02 RX ADMIN — OXYCODONE AND ACETAMINOPHEN 1 TABLET: 5; 325 TABLET ORAL at 15:41

## 2022-04-02 RX ADMIN — PANTOPRAZOLE SODIUM 40 MG: 40 TABLET, DELAYED RELEASE ORAL at 06:35

## 2022-04-02 RX ADMIN — PANTOPRAZOLE SODIUM 40 MG: 40 TABLET, DELAYED RELEASE ORAL at 15:39

## 2022-04-02 RX ADMIN — FLUCONAZOLE 400 MG: 150 TABLET ORAL at 09:58

## 2022-04-02 RX ADMIN — DICYCLOMINE HYDROCHLORIDE 10 MG: 10 CAPSULE ORAL at 20:13

## 2022-04-02 RX ADMIN — DICYCLOMINE HYDROCHLORIDE 10 MG: 10 CAPSULE ORAL at 15:39

## 2022-04-02 RX ADMIN — DICYCLOMINE HYDROCHLORIDE 10 MG: 10 CAPSULE ORAL at 06:35

## 2022-04-02 RX ADMIN — Medication 16 UNITS/KG/HR: at 14:20

## 2022-04-02 RX ADMIN — OXYCODONE AND ACETAMINOPHEN 1 TABLET: 5; 325 TABLET ORAL at 06:35

## 2022-04-02 RX ADMIN — OXYCODONE AND ACETAMINOPHEN 1 TABLET: 5; 325 TABLET ORAL at 20:13

## 2022-04-02 RX ADMIN — NORTRIPTYLINE HYDROCHLORIDE 10 MG: 10 CAPSULE ORAL at 20:13

## 2022-04-02 RX ADMIN — ERTAPENEM SODIUM 1000 MG: 1 INJECTION, POWDER, LYOPHILIZED, FOR SOLUTION INTRAMUSCULAR; INTRAVENOUS at 15:40

## 2022-04-02 RX ADMIN — DICYCLOMINE HYDROCHLORIDE 10 MG: 10 CAPSULE ORAL at 09:58

## 2022-04-02 ASSESSMENT — PAIN SCALES - GENERAL
PAINLEVEL_OUTOF10: 3
PAINLEVEL_OUTOF10: 2
PAINLEVEL_OUTOF10: 7
PAINLEVEL_OUTOF10: 9
PAINLEVEL_OUTOF10: 8

## 2022-04-02 NOTE — PROGRESS NOTES
Jill Washburn M.D. Alise Noel M.D. Lorene Rubenstein, M.D. Abril Hua D.O. Daily Pulmonary Progress Note    Patient:  Ricki Moreno 51 y.o. female MRN: [id number]     Date of Service: 4/2/2022        Subjective      Patient was seen and examined. Still has some anterior chest discomfort when she takes a deep breath in.  Minimal cough. No hemoptysis. Objective   Vitals: /70   Pulse 57   Temp 98.3 °F (36.8 °C) (Oral)   Resp 18   Ht 5' 1\" (1.549 m)   Wt 175 lb 12.8 oz (79.7 kg)   LMP 01/05/2018   SpO2 97%   BMI 33.22 kg/m²     I/O:    Intake/Output Summary (Last 24 hours) at 4/2/2022 1204  Last data filed at 4/2/2022 0636  Gross per 24 hour   Intake 141.61 ml   Output 300 ml   Net -158.39 ml       CURRENT MEDS :  Scheduled Meds:   dicyclomine  10 mg Oral 4x Daily AC & HS    nortriptyline  10 mg Oral Nightly    pantoprazole  40 mg Oral BID AC    nicotine  1 patch TransDERmal Daily    fluconazole  400 mg Oral Daily    ertapenem (INVanz) IVPB  1,000 mg IntraVENous Q24H       Continuous Infusions:   heparin (PORCINE) Infusion 16 Units/kg/hr (04/02/22 0643)       PRN Meds:  rOPINIRole, heparin (porcine), heparin (porcine), albuterol, acetaminophen, oxyCODONE-acetaminophen      Physical Exam:  Physical Exam  Constitutional:       General: She is not in acute distress. HENT:      Head: Normocephalic and atraumatic. Mouth/Throat:      Pharynx: No oropharyngeal exudate. Eyes:      General: No scleral icterus. Conjunctiva/sclera: Conjunctivae normal.   Neck:      Trachea: No tracheal deviation. Cardiovascular:      Rate and Rhythm: Normal rate. Heart sounds: Normal heart sounds. Pulmonary:      Effort: Pulmonary effort is normal.      Breath sounds: Normal breath sounds. Abdominal:      Palpations: Abdomen is soft. Tenderness: There is no abdominal tenderness. Musculoskeletal:         General: No swelling or deformity. Cervical back: Neck supple. Lymphadenopathy:      Cervical: No cervical adenopathy. Skin:     General: Skin is warm. Findings: No rash. Neurological:      General: No focal deficit present. Mental Status: She is alert and oriented to person, place, and time. Psychiatric:         Behavior: Behavior normal.         Pertinent/ New Labs and Imaging Studies     Pulmonary Function Testing personally reviewed and interpreted. PERTINENT LAB RESULTS: Labs reviewed. DIAGNOSTICS: Pertinent imaging reviewed. Assessment:      1. Acute pulmonary embolism right lung segmental/subsegmental branch with right upper lobe pulmonary infarct. Provoked VTE given recent surgery. 2. DVT right lower extremity  3. History of colon cancer August 2021 with sigmoid colectomy and diverting colostomy 8/31/2022  4. 2/8/2022 sigmoid stricture requiring Laparoscopic sigmoid and rectal resection with stapled end-to-end anastomosis and mobilization of splenic flexure. 5. Complicated by postop pelvic fluid abscess requiring CT-guided drainage-cultures grew Proteus  6. Multiple sclerosis-follows with neurology-on Ocrevus, baclofen, gabapentin  7. Ongoing nicotine dependence half pack per day x30 years  8. History of COVID-19 pneumonia October 2021        Plan:      Continue heparin drip until abdominal/pelvic drains are removed. Once drains are removed then can transition back to Eliquis.  No significant fluid collections around drain. Will consult IR to remove drain.  Suspect VTE is provoked given recent prolonged hospitalization, immobilization, and abdominal surgery. Doubt Eliquis failure.  Doubt Eliquis failure as unable to determine if patient had PE at time of DVT and had only been on anticoagulant for short period time. Would treat for minimum 6 months with Eliquis. Hx: CA but has been cured by surgery.    Albuterol as needed   Antibiotics per ID, fluconazole, ertapenem      Thank you for allowing me to participate in the care of Elizabeth Andrew. Please feel free to call with questions.      Electronically signed by Gurmeet Gonzales DO on 4/2/2022 at 12:04 PM

## 2022-04-02 NOTE — CONSULTS
General Surgery Consult    Patient's Name/Date of Birth: Antonette Joyner / 1975    Date: April 2, 2022     Consulting Surgeon: Isha Bentley M.D.    PCP: Stella Ross PA-C     Chief Complaint: drain in place    HPI:   Antonette Joyner is a 51 y.o. female who presents for  evaluation of drains in place and one leaking and CT showing resolution of intraabdominal abscesses. She is in with PE and on heparin gtt now. Past Medical History:   Diagnosis Date    Acid reflux disease     Cyst of ovary     Fracture of nasal bone     Headache     Hearing loss     Hypertension     Migraine     Morbidly obese ([address] 10/05/2020    Multiple sclerosis ([facility]     denies limitations at present 11/2020    VEENA no CPAP     severe VEENA per pt    Right shoulder pain 11/2020    Tinnitus     TMJ dysfunction        Past Surgical History:   Procedure Laterality Date    APPENDECTOMY      BACK SURGERY      lumbar    BICEPS TENDON REPAIR Right 11/11/2020    BICEPS TENODESIS performed by Shweta Villalpando MD at [place] [address] Left 8/31/2021    LAPAROSCOPIC LEFT HEMICOLECTOMY WITH LOOP OSTOMY performed by Ulysses Silva, MD at [address] 9/6/2021    DIAGNOSTIC LAPAROSCOPY POSSIBLE BOWEL RESECTION POSSILBE OSTOMY performed by Ulysses Silva, MD at [address] 2/8/2022    LAPAROSCOPIC SIGMOID COLON RESECTION performed by Ulysses Silva, MD at [address]  03/05/2018    Robotic hysterectomy, bilateral salpingectomy, right oophorectomy DR. ARIANA MONIQUE [facility]     HYSTERECTOMY, TOTAL ABDOMINAL      LARYNGOSCOPY N/A 7/17/2020    DIRECT LARYNGOSCOPY--OMNI GUIDE LASER performed by Rosemary Vuong MD at [address] [facility] [address] N/A 10/12/2021    ANAL PROCTO SIGMOIDOSCOPY FLEXIBLE performed by Ulysses Silva, MD at [address] N/A 1/25/2022    ANAL PROCTO SIGMOIDOSCOPY FLEXIBLE performed by Fernando Oliveira MD at [facility] ARTHROSCOPY Right 11/11/2020    RIGHT SHOULDER DIAGNOSTIC ARTHROSCOPY WITH DECOMPRESSION ROTATOR CUFF REPAIR performed by Mani Villalpando MD at [place] [address]         Current Facility-Administered Medications   Medication Dose Route Frequency Provider Last Rate Last Admin    dicyclomine (BENTYL) capsule 10 mg  10 mg Oral 4x Daily AC & HS Serenity Villasenor MD   10 mg at 04/02/22 0958    nortriptyline (PAMELOR) capsule 10 mg  10 mg Oral Nightly Serenity Villasenor MD   10 mg at 04/01/22 2123    pantoprazole (PROTONIX) tablet 40 mg  40 mg Oral BID AC Serenity Villasenor MD   40 mg at 04/02/22 3188    rOPINIRole (REQUIP) tablet 0.5 mg  0.5 mg Oral Nightly PRN Tatum Sosa MD        heparin (porcine) injection 6,380 Units  80 Units/kg IntraVENous PRN MARIXA Pisano CNP        heparin (porcine) injection 3,190 Units  40 Units/kg IntraVENous PRN MARIXA Pisano CNP        heparin 25,000 units in dextrose 5% 250 mL (premix) infusion  5-30 Units/kg/hr IntraVENous Continuous MARIXA Pisano CNP 12.8 mL/hr at 04/02/22 1420 16 Units/kg/hr at 04/02/22 1420    nicotine (NICODERM CQ) 21 MG/24HR 1 patch  1 patch TransDERmal Daily MARIXA Pisano CNP   1 patch at 04/02/22 1002    albuterol (PROVENTIL) nebulizer solution 2.5 mg  2.5 mg Nebulization Q6H PRN MARIXA Pisano CNP        acetaminophen (TYLENOL) tablet 650 mg  650 mg Oral Q6H PRN Tatum Sosa MD   650 mg at 04/01/22 1121    oxyCODONE-acetaminophen (PERCOCET) 5-325 MG per tablet 1 tablet  1 tablet Oral Q6H PRN BETTE Cardona   1 tablet at 04/02/22 0355    fluconazole (DIFLUCAN) tablet 400 mg  400 mg Oral Daily Yoel Mao MD   400 mg at 04/02/22 0958    ertapenem (INVanz) 1,000 mg in sodium chloride (PF) 10 mL IV syringe  1,000 mg IntraVENous Q24H Yoel Mao MD   1,000 mg at 04/01/22 1549 No Known Allergies    The patient has a family history that is negative for severe cardiovascular or respiratory issues, negative for reaction to anesthesia. Social History     Socioeconomic History    Marital status: Single     Spouse name: Not on file    Number of children: 3    Years of education: 6    Highest education level: 11th grade   Occupational History    Not on file   Tobacco Use    Smoking status: Current Every Day Smoker     Packs/day: 0.50     Years: 25.00     Pack years: 12.50     Types: Cigarettes    Smokeless tobacco: Never Used    Tobacco comment: 4 cigarettes a day per patient   Vaping Use    Vaping Use: Never used   Substance and Sexual Activity    Alcohol use: Not Currently    Drug use: No    Sexual activity: Yes     Partners: Male   Other Topics Concern    Not on file   Social History Narrative    Uses [employer] for transportation    [employer]     Social Determinants of Health     Financial Resource Strain: Medium Risk    Difficulty of Paying Living Expenses: Somewhat hard   Food Insecurity: Food Insecurity Present    Worried About Running Out of Food in the Last Year: Sometimes true    [place] of Food in the Last Year: Never true   Transportation Needs: No Transportation Needs    Lack of Transportation (Medical): No    Lack of Transportation (Non-Medical): No   Physical Activity: Sufficiently Active    Days of Exercise per Week: 3 days    Minutes of Exercise per Session: 60 min   Stress: No Stress Concern Present    Feeling of Stress : Not at all   Social Connections: Socially Isolated    Frequency of Communication with Friends and Family:  Three times a week    Frequency of Social Gatherings with Friends and Family: Twice a week    Attends Shinto Services: Never    Active Member of Clubs or Organizations: No    Attends Club or Organization Meetings: Never    Marital Status: Never    Intimate Partner Violence:     Fear of Current or Ex-Partner: Not on file    Emotionally Abused: Not on file    Physically Abused: Not on file    Sexually Abused: Not on file   Housing Stability:     Unable to Pay for Housing in the Last Year: Not on file    Number of Places Lived in the Last Year: Not on file    Unstable Housing in the Last Year: Not on file           Review of Systems  General ROS: negative for - chills, fatigue or fever  ENT ROS: negative for - headaches, hearing change or nasal congestion  Endocrine ROS: negative for - breast changes or galactorrhea, no polyuria  Respiratory ROS: negative for - hemoptysis, orthopnea or pleuritic pain  Cardiovascular ROS: negative for - dyspnea on exertion, edema or loss of consciousness  Gastrointestinal ROS: negative for - blood in stools, heartburn, hematemesis or melena  : no polyuria, no dysuria  Musculoskeletal ROS: negative for - gait disturbance  Dermatological ROS: negative for - acne, dry skin or eczema  Neuro ROS: no recent memory loss or mood swings. Physical exam:   /70   Pulse 57   Temp 98.3 °F (36.8 °C) (Oral)   Resp 18   Ht 5' 1\" (1.549 m)   Wt 175 lb 12.8 oz (79.7 kg)   LMP 01/05/2018   SpO2 97%   BMI 33.22 kg/m²   General appearance:  NAD  Head: NCAT, PERRLA, EOMI, red conjunctiva  Neck: supple, no masses  Lungs: CTAB, equal chest rise bilateral  Heart: Reg rate  Abdomen: soft, nontender, nondistended, IR drains in place, ostomy with stool function  Skin; no lesions  Gu: no cva tenderness  Extremities: extremities normal, atraumatic, no cyanosis or edema      Radiology:  CT abdomen/pelvis: no abscesses left    Assessment:  51 y.o. female with IR drains in place and resolution of fluid collections intraabdominally    Plan:  I removed both IR drains beside and is ok to d/c when ok with others.        Gregory Storey MD  4/2/2022  2:35 PM

## 2022-04-02 NOTE — PROGRESS NOTES
[facility] Progress Note    Admitting Date and Time: 4/1/2022  1:27 AM  Admit Dx: Pulmonary infarct ([facility] [address] [I26.99]  Pulmonary embolism without acute cor pulmonale, unspecified chronicity, unspecified pulmonary embolism type (HCC) [I26.99]  Multiple subsegmental pulmonary emboli without acute cor pulmonale (HCC) [I26.94]    Subjective:  Patient is being followed for Pulmonary infarct ([facility] [address] [I26.99]  Pulmonary embolism without acute cor pulmonale, unspecified chronicity, unspecified pulmonary embolism type ([facility] [address] [I26.99]  Multiple subsegmental pulmonary emboli without acute cor pulmonale (HCC) [I26.94]     Patient awake and alert- resting in bed in no acute distress  Reporting pleuritic chest pain better  Hoping to get IR drains removed soon  No issues overnight          ROS: denies fever, chills, cp, sob, n/v, HA unless stated above.  dicyclomine  10 mg Oral 4x Daily AC & HS    nortriptyline  10 mg Oral Nightly    pantoprazole  40 mg Oral BID AC    nicotine  1 patch TransDERmal Daily    fluconazole  400 mg Oral Daily    ertapenem (INVanz) IVPB  1,000 mg IntraVENous Q24H     rOPINIRole, 0.5 mg, Nightly PRN  heparin (porcine), 80 Units/kg, PRN  heparin (porcine), 40 Units/kg, PRN  albuterol, 2.5 mg, Q6H PRN  acetaminophen, 650 mg, Q6H PRN  oxyCODONE-acetaminophen, 1 tablet, Q6H PRN         Objective:    /70   Pulse 57   Temp 98.3 °F (36.8 °C) (Oral)   Resp 18   Ht 5' 1\" (1.549 m)   Wt 175 lb 12.8 oz (79.7 kg)   LMP 01/05/2018   SpO2 97%   BMI 33.22 kg/m²   General Appearance: alert and oriented to person, place and time and in no acute distress  Skin: warm and dry  Head: normocephalic and atraumatic  Neck: neck supple and non tender without mass   Pulmonary/Chest: clear to auscultation bilaterally-   Cardiovascular: normal rate, normal S1 and S2 and no carotid bruits  Abdomen: soft, non-tender, non-distended, normal bowel sounds,ostomy with liquid brown stool.  Bilateral IR drains   Extremities: no cyanosis, no clubbing and no edema  Neurologic: speech normal         Recent Labs     04/01/22  0231 04/02/22  0648    137   K 3.9 4.2    103   CO2 23 25   BUN 6 7   CREATININE 0.6 0.5   GLUCOSE 106* 142*   CALCIUM 9.1 9.2       Recent Labs     04/01/22  0231 04/01/22  1100 04/02/22  0648   WBC 9.5 7.6 7.4   RBC 3.82 3.77 3.78   HGB 11.6 11.4* 11.6   HCT 36.7 36.1 36.0   MCV 96.1 95.8 95.2   MCH 30.4 30.2 30.7   MCHC 31.6* 31.6* 32.2   RDW 13.6 13.6 13.3    256 273   MPV 9.4 9.4 9.6       Radiology:     Assessment:    Principal Problem:    Pulmonary embolism without acute cor pulmonale (HCC)  Active Problems:    Multiple sclerosis (HCC)    Morbidly obese (HCC)    S/P colectomy    Malignant neoplasm of descending colon (HCC)    Postoperative intra-abdominal abscess    Multiple subsegmental pulmonary emboli without acute cor pulmonale (HCC)  Resolved Problems:    * No resolved hospital problems. *      Plan:  1. Acute PE- recently found DVT(provoked)- started anticoagulation 5 days prior: pt presented to the ER with chest pain and sob starting 1 day prior. She was recently started on eliquis for DVT- on day #5-doubt failure as just started. CTA showing PE within the terminal right main pulmonary artery extending into the right upper and lower lobar,segmental and subsegmental pulmonary arteries. No evidence of right heart strain. Possible right upper lobe pulmonary infarct. Received 1 dose of lovenox 1mg/kg. Heparin gtt started- plan for 48 hours and then transition to eliquis. Pulmonology consulted- appreciate input. On RA      2. Pleurtic chest pain: likely associated to above. Pain management. Received decadron x 1  per pulmonology. Pain better today.      3. Recent post op intra abdominal abscess-s/p lap sigmoid diverting ostomy d/t stricture: Being followed by ID - had PICC. On invanz/ diflucan. Per ID recent note- plan was for possible dc abx soon.  Consult ID/ surgery to assist. CT abd/ pelvis ordered to reassess- no fluid collections noted. ? If drains can be removed- will touch base with ID. IR likely not available over the weekend.      4. Left lower leg DVT-provoked from recent surgery- started on eliquis 5 days prior- do not feel this was a failure of treatment as eliquis just started.      5. H/o MS     6. HTN: monitor BP- holding ace/ hctzcombo     7. H/o colon cancer with colon cancer Aug 2021  With sigmoid colectomy and diverting colostomy 8/31      8. Tobacco abuse: nicotine patch. Cessation advised             NOTE: This report was transcribed using voice recognition software. Every effort was made to ensure accuracy; however, inadvertent computerized transcription errors may be present.   Electronically signed by BETTE Carlson on 4/2/2022 at 8:25 AM

## 2022-04-02 NOTE — PROGRESS NOTES
[address] [place] [address] [facility]  Progress Note      Chief Complaint   Patient presents with    Shortness of Breath     sob, and chest pain mid-sternal, dvt dx, recently on elquis, day 5    Chest Pain   SUBJECTIVE:    Patient is tolerating medications. No reported adverse drug reactions. No nausea, vomiting, diarrhea. Sitting up in bed, on phone   Denies shortness of breath or pain     Review of systems:  As stated above in the chief complaint, otherwise negative. Medications:  Scheduled Meds:   dicyclomine  10 mg Oral 4x Daily AC & HS    nortriptyline  10 mg Oral Nightly    pantoprazole  40 mg Oral BID AC    nicotine  1 patch TransDERmal Daily    fluconazole  400 mg Oral Daily    ertapenem (INVanz) IVPB  1,000 mg IntraVENous Q24H     Continuous Infusions:   heparin (PORCINE) Infusion 16 Units/kg/hr (22 0643)     PRN Meds:rOPINIRole, heparin (porcine), heparin (porcine), albuterol, acetaminophen, oxyCODONE-acetaminophen    OBJECTIVE:  /70   Pulse 57   Temp 98.3 °F (36.8 °C) (Oral)   Resp 18   Ht 5' 1\" (1.549 m)   Wt 175 lb 12.8 oz (79.7 kg)   LMP 2018   SpO2 97%   BMI 33.22 kg/m²   Temp  Av.1 °F (36.7 °C)  Min: 97.6 °F (36.4 °C)  Max: 98.3 °F (36.8 °C)  Constitutional: The patient is awake, alert, and oriented. Sitting up in bed, in no distress. Skin: Warm and dry. No rashes were noted. HEENT: Round and reactive pupils. Moist mucous membranes. No ulcerations or thrush. Neck: Supple to movements. Chest: No use of accessory muscles to breathe. Symmetrical expansion. No wheezing, crackles or rhonchi. Cardiovascular: S1 and S2 are rhythmic and regular. No murmurs appreciated. Abdomen: Positive bowel sounds to auscultation. Benign to palpation. RLQ colostomy. There pigtail catheters on both sides of the abdomen with minimal purulent drainage. Extremities: No edema.   Lines: peripheral    Laboratory and Tests Review:  Lab Results   Component Value Date    WBC 7.4 04/02/2022    WBC 7.6 04/01/2022    WBC 9.5 04/01/2022    HGB 11.6 04/02/2022    HCT 36.0 04/02/2022    MCV 95.2 04/02/2022     04/02/2022     Lab Results   Component Value Date    NEUTROABS 5.56 04/01/2022    NEUTROABS 5.08 03/29/2022    NEUTROABS 3.49 03/24/2022     No results found for: CRP  Lab Results   Component Value Date    ALT 33 (H) 03/29/2022    AST 31 03/29/2022    ALKPHOS 138 (H) 03/29/2022    BILITOT 0.5 03/29/2022     Lab Results   Component Value Date     04/02/2022    K 4.2 04/02/2022    K 3.9 04/01/2022     04/02/2022    CO2 25 04/02/2022    BUN 7 04/02/2022    CREATININE 0.5 04/02/2022    CREATININE 0.6 04/01/2022    CREATININE 0.7 03/29/2022    GFRAA >60 04/02/2022    LABGLOM >60 04/02/2022    GLUCOSE 142 04/02/2022    PROT 7.2 03/29/2022    LABALBU 4.1 03/29/2022    CALCIUM 9.2 04/02/2022    BILITOT 0.5 03/29/2022    ALKPHOS 138 03/29/2022    AST 31 03/29/2022    ALT 33 03/29/2022     Lab Results   Component Value Date    CRP 1.4 (H) 04/01/2022    CRP 0.9 (H) 03/29/2022    CRP 1.4 (H) 03/22/2022     Lab Results   Component Value Date    SEDRATE 12 04/01/2022    SEDRATE 12 03/29/2022    SEDRATE 25 (H) 03/22/2022     Radiology:    CT Abdomen/Pelvis   1. Marked hepatomegaly   2. Status post partial colectomy   3. No signs of significant lymphadenopathy or ascites within the visualized   abdomen or pelvis   4. There is a stable poorly defined 2 mm opacity at the right lung base   posteriorly, of questionable significance   5. No fluid collections are noted adjacent to the left and right pelvic drains       Microbiology:   No new cultures     ASSESSMENT:  · Status post sigmoid colectomy with colostomy 2/8/2022  · Pelvic fluid abscesses. Status post CT-guided drainage.   Culture grew Proteus  · DVT left lower extremity  · Pulmonary embolism    PLAN:  · Continue Ertapenem & oral Fluconazole for now   · IR consulted to remove drains on Monday   · Check final cultures  · Monitor labs    MARIXA Alicia CNP  12:06 PM  4/2/2022       Pt seen and examined. Above discussed agree with advanced practice nurse. Labs, cultures, and radiographs reviewed. Face to Face encounter occurred. Changes made as necessary.      Nargis Howell MD

## 2022-04-03 LAB
ANION GAP SERPL CALCULATED.3IONS-SCNC: 10 MMOL/L (ref 7–16)
APTT: 105.1 SEC (ref 24.5–35.1)
BUN BLDV-MCNC: 15 MG/DL (ref 6–20)
CALCIUM SERPL-MCNC: 9.1 MG/DL (ref 8.6–10.2)
CHLORIDE BLD-SCNC: 105 MMOL/L (ref 98–107)
CO2: 25 MMOL/L (ref 22–29)
CREAT SERPL-MCNC: 0.6 MG/DL (ref 0.5–1)
GFR AFRICAN AMERICAN: >60
GFR NON-AFRICAN AMERICAN: >60 ML/MIN/1.73
GLUCOSE BLD-MCNC: 94 MG/DL (ref 74–99)
HCT VFR BLD CALC: 35 % (ref 34–48)
HEMOGLOBIN: 10.9 G/DL (ref 11.5–15.5)
MCH RBC QN AUTO: 30.3 PG (ref 26–35)
MCHC RBC AUTO-ENTMCNC: 31.1 % (ref 32–34.5)
MCV RBC AUTO: 97.2 FL (ref 80–99.9)
PDW BLD-RTO: 13.5 FL (ref 11.5–15)
PLATELET # BLD: 268 E9/L (ref 130–450)
PMV BLD AUTO: 9.6 FL (ref 7–12)
POTASSIUM SERPL-SCNC: 4.4 MMOL/L (ref 3.5–5)
RBC # BLD: 3.6 E12/L (ref 3.5–5.5)
SODIUM BLD-SCNC: 140 MMOL/L (ref 132–146)
WBC # BLD: 8.2 E9/L (ref 4.5–11.5)

## 2022-04-03 PROCEDURE — 6360000002 HC RX W HCPCS: Performed by: SPECIALIST

## 2022-04-03 PROCEDURE — 85730 THROMBOPLASTIN TIME PARTIAL: CPT

## 2022-04-03 PROCEDURE — APPSS30 APP SPLIT SHARED TIME 16-30 MINUTES: Performed by: NURSE PRACTITIONER

## 2022-04-03 PROCEDURE — 6370000000 HC RX 637 (ALT 250 FOR IP): Performed by: NURSE PRACTITIONER

## 2022-04-03 PROCEDURE — A4216 STERILE WATER/SALINE, 10 ML: HCPCS | Performed by: SPECIALIST

## 2022-04-03 PROCEDURE — 80048 BASIC METABOLIC PNL TOTAL CA: CPT

## 2022-04-03 PROCEDURE — 85027 COMPLETE CBC AUTOMATED: CPT

## 2022-04-03 PROCEDURE — 36415 COLL VENOUS BLD VENIPUNCTURE: CPT

## 2022-04-03 PROCEDURE — 6370000000 HC RX 637 (ALT 250 FOR IP): Performed by: INTERNAL MEDICINE

## 2022-04-03 PROCEDURE — 99232 SBSQ HOSP IP/OBS MODERATE 35: CPT | Performed by: INTERNAL MEDICINE

## 2022-04-03 PROCEDURE — 2060000000 HC ICU INTERMEDIATE R&B

## 2022-04-03 PROCEDURE — 2580000003 HC RX 258: Performed by: SPECIALIST

## 2022-04-03 PROCEDURE — 6370000000 HC RX 637 (ALT 250 FOR IP): Performed by: SPECIALIST

## 2022-04-03 RX ADMIN — DICYCLOMINE HYDROCHLORIDE 10 MG: 10 CAPSULE ORAL at 20:03

## 2022-04-03 RX ADMIN — PANTOPRAZOLE SODIUM 40 MG: 40 TABLET, DELAYED RELEASE ORAL at 14:53

## 2022-04-03 RX ADMIN — DICYCLOMINE HYDROCHLORIDE 10 MG: 10 CAPSULE ORAL at 09:07

## 2022-04-03 RX ADMIN — OXYCODONE AND ACETAMINOPHEN 1 TABLET: 5; 325 TABLET ORAL at 11:14

## 2022-04-03 RX ADMIN — OXYCODONE AND ACETAMINOPHEN 1 TABLET: 5; 325 TABLET ORAL at 20:03

## 2022-04-03 RX ADMIN — NORTRIPTYLINE HYDROCHLORIDE 10 MG: 10 CAPSULE ORAL at 20:03

## 2022-04-03 RX ADMIN — PANTOPRAZOLE SODIUM 40 MG: 40 TABLET, DELAYED RELEASE ORAL at 05:41

## 2022-04-03 RX ADMIN — APIXABAN 5 MG: 5 TABLET, FILM COATED ORAL at 20:03

## 2022-04-03 RX ADMIN — DICYCLOMINE HYDROCHLORIDE 10 MG: 10 CAPSULE ORAL at 05:41

## 2022-04-03 RX ADMIN — ERTAPENEM SODIUM 1000 MG: 1 INJECTION, POWDER, LYOPHILIZED, FOR SOLUTION INTRAMUSCULAR; INTRAVENOUS at 14:53

## 2022-04-03 RX ADMIN — FLUCONAZOLE 400 MG: 150 TABLET ORAL at 09:07

## 2022-04-03 RX ADMIN — APIXABAN 5 MG: 5 TABLET, FILM COATED ORAL at 09:07

## 2022-04-03 RX ADMIN — DICYCLOMINE HYDROCHLORIDE 10 MG: 10 CAPSULE ORAL at 16:54

## 2022-04-03 ASSESSMENT — PAIN SCALES - GENERAL
PAINLEVEL_OUTOF10: 2
PAINLEVEL_OUTOF10: 8
PAINLEVEL_OUTOF10: 0
PAINLEVEL_OUTOF10: 9

## 2022-04-03 ASSESSMENT — PAIN DESCRIPTION - PAIN TYPE: TYPE: ACUTE PAIN

## 2022-04-03 ASSESSMENT — PAIN DESCRIPTION - LOCATION: LOCATION: RIB CAGE

## 2022-04-03 NOTE — PROGRESS NOTES
[facility] Progress Note    Admitting Date and Time: 4/1/2022  1:27 AM  Admit Dx: Pulmonary infarct ([facility] [address] [I26.99]  Pulmonary embolism without acute cor pulmonale, unspecified chronicity, unspecified pulmonary embolism type (HCC) [I26.99]  Multiple subsegmental pulmonary emboli without acute cor pulmonale (HCC) [I26.94]    Subjective:  Patient is being followed for Pulmonary infarct ([facility] [address] [I26.99]  Pulmonary embolism without acute cor pulmonale, unspecified chronicity, unspecified pulmonary embolism type ([facility] [address] [I26.99]  Multiple subsegmental pulmonary emboli without acute cor pulmonale (HCC) [I26.94]       Patient awake and alert- resting in bed in no acute distress  Reporting overall feeling better  Still with pleuritic chest pain at times  Drains removed at bedside by surgery yesterday  Appetite good  Denies sob            ROS: denies fever, chills, cp, sob, n/v, HA unless stated above.  apixaban  5 mg Oral BID    dicyclomine  10 mg Oral 4x Daily AC & HS    nortriptyline  10 mg Oral Nightly    pantoprazole  40 mg Oral BID AC    nicotine  1 patch TransDERmal Daily    fluconazole  400 mg Oral Daily    ertapenem (INVanz) IVPB  1,000 mg IntraVENous Q24H     rOPINIRole, 0.5 mg, Nightly PRN  albuterol, 2.5 mg, Q6H PRN  acetaminophen, 650 mg, Q6H PRN  oxyCODONE-acetaminophen, 1 tablet, Q6H PRN         Objective:    /65   Pulse 67   Temp 98.2 °F (36.8 °C)   Resp 16   Ht 5' 1\" (1.549 m)   Wt 175 lb 12.8 oz (79.7 kg)   LMP 01/05/2018   SpO2 97%   BMI 33.22 kg/m²   General Appearance: alert and oriented to person, place and time and in no acute distress  Skin: warm and dry  Head: normocephalic and atraumatic  Neck: neck supple and non tender without mass   Pulmonary/Chest: clear to auscultation bilaterally-   Cardiovascular: normal rate, normal S1 and S2 and no carotid bruits  Abdomen: soft, non-tender, non-distended, normal bowel sounds,ostomy with liquid brown stool. Extremities: no cyanosis, no clubbing and no edema  Neurologic: speech normal       Recent Labs     04/01/22  0231 04/02/22  0648 04/03/22  0555    137 140   K 3.9 4.2 4.4    103 105   CO2 23 25 25   BUN 6 7 15   CREATININE 0.6 0.5 0.6   GLUCOSE 106* 142* 94   CALCIUM 9.1 9.2 9.1       Recent Labs     04/01/22  1100 04/02/22  0648 04/03/22  0555   WBC 7.6 7.4 8.2   RBC 3.77 3.78 3.60   HGB 11.4* 11.6 10.9*   HCT 36.1 36.0 35.0   MCV 95.8 95.2 97.2   MCH 30.2 30.7 30.3   MCHC 31.6* 32.2 31.1*   RDW 13.6 13.3 13.5    273 268   MPV 9.4 9.6 9.6         Assessment:    Principal Problem:    Pulmonary embolism without acute cor pulmonale (HCC)  Active Problems:    Multiple sclerosis (HCC)    Morbidly obese (HCC)    S/P colectomy    Malignant neoplasm of descending colon (HCC)    Postoperative intra-abdominal abscess    Multiple subsegmental pulmonary emboli without acute cor pulmonale (HCC)  Resolved Problems:    * No resolved hospital problems. *      Plan:  1.  Acute PE- recently found DVT(provoked)- started anticoagulation 5 days prior: pt presented to the ER with chest pain and sob starting 1 day prior. She was recently started on eliquis for DVT- on day #5-doubt failure as just started. CTA showing PE within the terminal right main pulmonary artery extending into the right upper and lower lobar,segmental and subsegmental pulmonary arteries. No evidence of right heart strain.  Possible right upper lobe pulmonary infarct. Received 1 dose of lovenox 1mg/kg. Heparin gtt started-transitioned back to eliquis.  Pulmonology consulted- appreciate input. On RA      2. Pleurtic chest pain: likely associated to above. Pain management. Received decadron x 1  per pulmonology. Pain better today.      3. Recent post op intra abdominal abscess-s/p lap sigmoid diverting ostomy d/t stricture: Being followed by ID - had PICC. On invanz/ diflucan. Per ID recent note- plan was for possible dc abx soon.  Consult ID/ surgery to assist. CT abd/ pelvis ordered to reassess- no fluid collections noted. ? Surgery removed drains at bedside on 4/2.      4. Left lower leg DVT-provoked from recent surgery- started on eliquis 5 days prior- do not feel this was a failure of treatment as eliquis just started. eliquis resumed.      5. H/o MS     6. HTN: monitor BP- holding ace/ hctzcombo     7. H/o colon cancer with colon cancer Aug 2021  With sigmoid colectomy and diverting colostomy 8/31      8. Tobacco abuse: nicotine patch. Cessation advised          Dispo: likely dc soon. DC when ok by ID and pulmonology       NOTE: This report was transcribed using voice recognition software. Every effort was made to ensure accuracy; however, inadvertent computerized transcription errors may be present.   Electronically signed by BETTE Bran on 4/3/2022 at 9:13 AM

## 2022-04-03 NOTE — PROGRESS NOTES
[address] [place] [address] [facility]  Progress Note      Chief Complaint   Patient presents with    Shortness of Breath     sob, and chest pain mid-sternal, dvt dx, recently on elquis, day 5    Chest Pain   SUBJECTIVE:    Patient is tolerating medications. No reported adverse drug reactions. No nausea, vomiting, diarrhea. Sitting up in bed, on phone   Denies shortness of breath or pain   Drain removed by surgery. Review of systems:  As stated above in the chief complaint, otherwise negative. Medications:  Scheduled Meds:   apixaban  5 mg Oral BID    dicyclomine  10 mg Oral 4x Daily AC & HS    nortriptyline  10 mg Oral Nightly    pantoprazole  40 mg Oral BID AC    nicotine  1 patch TransDERmal Daily    fluconazole  400 mg Oral Daily    ertapenem (INVanz) IVPB  1,000 mg IntraVENous Q24H     Continuous Infusions:     PRN Meds:rOPINIRole, albuterol, acetaminophen, oxyCODONE-acetaminophen    OBJECTIVE:  /65   Pulse 67   Temp 98.2 °F (36.8 °C)   Resp 16   Ht 5' 1\" (1.549 m)   Wt 175 lb 12.8 oz (79.7 kg)   LMP 2018   SpO2 97%   BMI 33.22 kg/m²   Temp  Av °F (36.7 °C)  Min: 97.8 °F (36.6 °C)  Max: 98.2 °F (36.8 °C)  Constitutional: The patient is awake, alert, and oriented. Sitting up in bed, in no distress. Skin: Warm and dry. No rashes were noted. HEENT: Round and reactive pupils. Moist mucous membranes. No ulcerations or thrush. Neck: Supple to movements. Chest: No use of accessory muscles to breathe. Symmetrical expansion. No wheezing, crackles or rhonchi. Cardiovascular: S1 and S2 are rhythmic and regular. No murmurs appreciated. Abdomen: Positive bowel sounds to auscultation. Benign to palpation. RLQ colostomy. drains removed  Extremities: No edema.   Lines: peripheral    Laboratory and Tests Review:  Lab Results   Component Value Date    WBC 8.2 2022    WBC 7.4 2022    WBC 7.6 2022    HGB 10.9 (L) 2022    HCT 35.0 2022    MCV 97.2 04/03/2022     04/03/2022     Lab Results   Component Value Date    NEUTROABS 5.56 04/01/2022    NEUTROABS 5.08 03/29/2022    NEUTROABS 3.49 03/24/2022     No results found for: CRPHS  Lab Results   Component Value Date    ALT 33 (H) 03/29/2022    AST 31 03/29/2022    ALKPHOS 138 (H) 03/29/2022    BILITOT 0.5 03/29/2022     Lab Results   Component Value Date     04/03/2022    K 4.4 04/03/2022    K 3.9 04/01/2022     04/03/2022    CO2 25 04/03/2022    BUN 15 04/03/2022    CREATININE 0.6 04/03/2022    CREATININE 0.5 04/02/2022    CREATININE 0.6 04/01/2022    GFRAA >60 04/03/2022    LABGLOM >60 04/03/2022    GLUCOSE 94 04/03/2022    PROT 7.2 03/29/2022    LABALBU 4.1 03/29/2022    CALCIUM 9.1 04/03/2022    BILITOT 0.5 03/29/2022    ALKPHOS 138 03/29/2022    AST 31 03/29/2022    ALT 33 03/29/2022     Lab Results   Component Value Date    CRP 1.4 (H) 04/01/2022    CRP 0.9 (H) 03/29/2022    CRP 1.4 (H) 03/22/2022     Lab Results   Component Value Date    SEDRATE 12 04/01/2022    SEDRATE 12 03/29/2022    SEDRATE 25 (H) 03/22/2022     Radiology:    CT Abdomen/Pelvis   1. Marked hepatomegaly   2. Status post partial colectomy   3. No signs of significant lymphadenopathy or ascites within the visualized   abdomen or pelvis   4. There is a stable poorly defined 2 mm opacity at the right lung base   posteriorly, of questionable significance   5. No fluid collections are noted adjacent to the left and right pelvic drains       Microbiology:   No new cultures     ASSESSMENT:  Status post sigmoid colectomy with colostomy 2/8/2022  Pelvic fluid abscesses. Status post CT-guided drainage. Culture grew Proteus  DVT left lower extremity  Pulmonary embolism    PLAN:  Continue Ertapenem & oral Fluconazole for now   Surgery following-drains removed  Check final cultures  Monitor labs    MARIXA Chan CNP  9:35 AM  4/3/2022     Pt seen and examined. Above discussed agree with advanced practice nurse.  Labs,

## 2022-04-04 VITALS
SYSTOLIC BLOOD PRESSURE: 106 MMHG | DIASTOLIC BLOOD PRESSURE: 56 MMHG | RESPIRATION RATE: 18 BRPM | OXYGEN SATURATION: 97 % | HEIGHT: 61 IN | TEMPERATURE: 96.4 F | WEIGHT: 175.8 LBS | HEART RATE: 60 BPM | BODY MASS INDEX: 33.19 KG/M2

## 2022-04-04 LAB
ANION GAP SERPL CALCULATED.3IONS-SCNC: 8 MMOL/L (ref 7–16)
BUN BLDV-MCNC: 12 MG/DL (ref 6–20)
CALCIUM SERPL-MCNC: 9.5 MG/DL (ref 8.6–10.2)
CHLORIDE BLD-SCNC: 101 MMOL/L (ref 98–107)
CO2: 29 MMOL/L (ref 22–29)
CREAT SERPL-MCNC: 0.8 MG/DL (ref 0.5–1)
GFR AFRICAN AMERICAN: >60
GFR NON-AFRICAN AMERICAN: >60 ML/MIN/1.73
GLUCOSE BLD-MCNC: 88 MG/DL (ref 74–99)
POTASSIUM SERPL-SCNC: 4.3 MMOL/L (ref 3.5–5)
SODIUM BLD-SCNC: 138 MMOL/L (ref 132–146)

## 2022-04-04 PROCEDURE — APPSS45 APP SPLIT SHARED TIME 31-45 MINUTES: Performed by: NURSE PRACTITIONER

## 2022-04-04 PROCEDURE — 36415 COLL VENOUS BLD VENIPUNCTURE: CPT

## 2022-04-04 PROCEDURE — 6370000000 HC RX 637 (ALT 250 FOR IP): Performed by: INTERNAL MEDICINE

## 2022-04-04 PROCEDURE — 80048 BASIC METABOLIC PNL TOTAL CA: CPT

## 2022-04-04 PROCEDURE — 6370000000 HC RX 637 (ALT 250 FOR IP): Performed by: NURSE PRACTITIONER

## 2022-04-04 PROCEDURE — 99239 HOSP IP/OBS DSCHRG MGMT >30: CPT | Performed by: INTERNAL MEDICINE

## 2022-04-04 PROCEDURE — 6370000000 HC RX 637 (ALT 250 FOR IP): Performed by: SPECIALIST

## 2022-04-04 RX ORDER — OXYCODONE HYDROCHLORIDE AND ACETAMINOPHEN 5; 325 MG/1; MG/1
1 TABLET ORAL EVERY 6 HOURS PRN
Qty: 10 TABLET | Refills: 0 | Status: SHIPPED | OUTPATIENT
Start: 2022-04-04 | End: 2022-04-07

## 2022-04-04 RX ADMIN — FLUCONAZOLE 400 MG: 150 TABLET ORAL at 08:09

## 2022-04-04 RX ADMIN — APIXABAN 5 MG: 5 TABLET, FILM COATED ORAL at 08:00

## 2022-04-04 RX ADMIN — PANTOPRAZOLE SODIUM 40 MG: 40 TABLET, DELAYED RELEASE ORAL at 05:49

## 2022-04-04 RX ADMIN — DICYCLOMINE HYDROCHLORIDE 10 MG: 10 CAPSULE ORAL at 05:49

## 2022-04-04 RX ADMIN — DICYCLOMINE HYDROCHLORIDE 10 MG: 10 CAPSULE ORAL at 11:16

## 2022-04-04 RX ADMIN — OXYCODONE AND ACETAMINOPHEN 1 TABLET: 5; 325 TABLET ORAL at 08:09

## 2022-04-04 ASSESSMENT — PAIN DESCRIPTION - LOCATION: LOCATION: RIB CAGE

## 2022-04-04 ASSESSMENT — PAIN DESCRIPTION - PROGRESSION: CLINICAL_PROGRESSION: GRADUALLY IMPROVING

## 2022-04-04 ASSESSMENT — PAIN DESCRIPTION - PAIN TYPE: TYPE: ACUTE PAIN

## 2022-04-04 ASSESSMENT — PAIN SCALES - GENERAL
PAINLEVEL_OUTOF10: 7
PAINLEVEL_OUTOF10: 2
PAINLEVEL_OUTOF10: 2

## 2022-04-04 NOTE — DISCHARGE SUMMARY
[facility] Physician Discharge Summary       [facility] [place] [address]  Maribeth Reinoso [facility] [address] [id number] [phone]    Call in 1 week  Call PCP in 1 week to go over hospitalization, medications and follow up. Jimi Holloway MD  [address] [place] [address] [address]  [phone]    Call in 1 week  Infectious Disease     Rafa Otero MD  [place] [address] Sheryle Dodrill [phone]      As needed- General Surgery       Activity level: As tolerated    Dispo: Home     Condition on discharge: Stable     Patient ID:  Parvez Ziegler  [id number]  9 y.o.  1975    Admit date: 4/1/2022    Discharge date and time:  4/4/2022  2:31 PM    Admission Diagnoses:   Principal Problem:    Pulmonary embolism without acute cor pulmonale ([address]  Active Problems:    Multiple sclerosis ([address]    Morbidly obese ([address]    S/P colectomy    Malignant neoplasm of descending colon ([address]    Postoperative intra-abdominal abscess    Multiple subsegmental pulmonary emboli without acute cor pulmonale ([address]  Resolved Problems:    * No resolved hospital problems. *      Discharge Diagnoses:   Principal Problem:    Pulmonary embolism without acute cor pulmonale (HCC)  Active Problems:    Multiple sclerosis (HCC)    Morbidly obese (HCC)    S/P colectomy    Malignant neoplasm of descending colon (HCC)    Postoperative intra-abdominal abscess    Multiple subsegmental pulmonary emboli without acute cor pulmonale (HCC)  Resolved Problems:    * No resolved hospital problems.  *      Consults:  IP CONSULT TO PULMONOLOGY  IP CONSULT TO SOCIAL WORK  IP CONSULT TO INFECTIOUS DISEASES  IP CONSULT TO GENERAL SURGERY  IP CONSULT TO [facility] [place]  Course:   Patient Parvez Ziegler is a 51 y.o. presented with Pulmonary infarct ([address] [I26.99]  Pulmonary embolism without acute cor pulmonale, unspecified chronicity, unspecified pulmonary embolism type ([facility] [I26.99]  Multiple subsegmental pulmonary emboli without acute cor pulmonale (HCC) [I26.94]   Patient presented to the ER with c/o sudden onset sob and difficulty taking a deep breath. During hospitalization pt was followed by pulmonology and ID. She was treated for;     1.  Acute PE- recently found DVT(provoked)- started anticoagulation 5 days prior: pt presented to the ER with chest pain and sob starting 1 day prior. She was recently started on eliquis for DVT- on day #5-doubt failure as just started. CTA showing PE within the terminal right main pulmonary artery extending into the right upper and lower lobar,segmental and subsegmental pulmonary arteries. No evidence of right heart strain.  Possible right upper lobe pulmonary infarct. Received 1 dose of lovenox 1mg/kg. Heparin gtt started-transitioned back to eliquis.  Pulmonology consulted- appreciate input. On RA. Continue eliquis. Pain better. Will give short course of pain medication.     2. Pleurtic chest pain: likely associated to above. Pain management. Received decadron x 1  per pulmonology. Pain better today.      3. Recent post op intra abdominal abscess-s/p lap sigmoid diverting ostomy d/t stricture: Being followed by ID - had PICC. On invanz/ diflucan. Per ID recent note- plan was for possible dc abx soon. Consult ID/ surgery to assist. CT abd/ pelvis ordered to reassess- no fluid collections noted. ? Surgery removed drains at bedside on 4/2. PICC line dc and abx stopped. Appreciate ID input.      4. Left lower leg DVT-provoked from recent surgery- started on eliquis 5 days prior- do not feel this was a failure of treatment as eliquis just started. eliquis resumed. Continue eliquis on dc- likely will need anticoagulation for 6 months     5. H/o MS     6. HTN: monitor BP- Continue to hold ace/ hctz combo.  BP check in 1 week with PCP     7. H/o colon cancer with colon cancer Aug 2021  With sigmoid colectomy and diverting colostomy 8/31      8. Tobacco abuse: nicotine patch. Cessation advised        PT feeling much better and ready to go home. Patient discharged in stable condition with the following medications, instructions and follow up. Discharge Exam:  General Appearance: alert and oriented to person, place and time and in no acute distress  Skin: warm and dry  Head: normocephalic and atraumatic  Neck: neck supple and non tender without mass   Pulmonary/Chest: clear to auscultation bilaterally-  Cardiovascular: normal rate, normal S1 and S2 and no carotid bruits  Abdomen: soft, non-tender, non-distended, normal bowel sounds, no masses or organomegaly  Extremities: no cyanosis, no clubbing and no edema  Neurologic: speech normal     I/O last 3 completed shifts:  In: -   Out: 850 [Stool:850]  I/O this shift:  In: 240 [P.O.:240]  Out: -       LABS:  Recent Labs     04/02/22  0648 04/03/22  0555 04/04/22  0634    140 138   K 4.2 4.4 4.3    105 101   CO2 25 25 29   BUN 7 15 12   CREATININE 0.5 0.6 0.8   GLUCOSE 142* 94 88   CALCIUM 9.2 9.1 9.5       Recent Labs     04/02/22  0648 04/03/22  0555   WBC 7.4 8.2   RBC 3.78 3.60   HGB 11.6 10.9*   HCT 36.0 35.0   MCV 95.2 97.2   MCH 30.7 30.3   MCHC 32.2 31.1*   RDW 13.3 13.5    268   MPV 9.6 9.6       No results for input(s): POCGLU in the last 72 hours. Imaging:  CT ABDOMEN PELVIS W IV CONTRAST Additional Contrast? Oral    Result Date: 4/1/2022  EXAMINATION: CT OF THE ABDOMEN AND PELVIS WITH CONTRAST 4/1/2022 5:37 pm TECHNIQUE: CT of the abdomen and pelvis was performed with the administration of intravenous contrast. Multiplanar reformatted images are provided for review. Dose modulation, iterative reconstruction, and/or weight based adjustment of the mA/kV was utilized to reduce the radiation dose to as low as reasonably achievable. Total DLP: 778.06 mGy COMPARISON: None.  HISTORY: ORDERING SYSTEM PROVIDED HISTORY: assess abscess and IR drain TECHNOLOGIST PROVIDED HISTORY: Reason for exam:->assess abscess and IR drain Additional Contrast?->Oral FINDINGS: Lower Chest: At the right lung base posteriorly on axial image 10 there is a 2 mm ground-glass opacity. This is too small to characterize. It is unchanged when compared the study of 02/22/2022. No consolidation, pneumothorax or pleural effusions are observed. The heart does not appear enlarged. Organs: The liver is prominent in size. The right lobe measures 24.6 cm compatible with hepatomegaly. No duct dilation is observed. The gallbladder is surgically absent. The pancreas, spleen and adrenal glands revealed no acute abnormalities. The kidneys are symmetrically dense without signs of stones or hydronephrosis. No perinephric stranding was observed. GI/Bowel: There is a small sliding-type hiatus hernia present. There is enteric contrast within the stomach. Within the limits of the examination, no gastric abnormalities are identified. There is opacifications of the small bowel loops and there are no signs of mechanical obstruction. In the region of the terminal ileum appears within the normal range. There is a colostomy in the right lower quadrant. Small bowel loops extend into the colostomy. The colon is not identified and may be surgically absent. There is a large volume of high density material or opaque contrast within the pelvis the limits evaluation of the colonic detail. There is a mucous fistula present in this may contain a significant volume of residual oral contrast.  The heart catheter is extending into the pelvis bilaterally but no definite abscess is seen. On the left the catheter extends within the peritoneal cavity and on the right the catheter is within the subcutaneous tissues. Pelvis: The urinary bladder reveals normal rounded contour without signs of distension or wall thickening.   The uterus and adnexa are not visualized and this region is obscured by artifact. No significant lymphadenopathy or ascites are observed within the pelvis. Peritoneum/Retroperitoneum: There are no signs of retroperitoneal lymphadenopathy aneurysm or hemorrhage present. Bones/Soft Tissues: No acute abnormalities of the spine are identified. There is high density material within the disc at L5-S1 compatible with type 2 Modic change and advanced disc degeneration. 1. Marked hepatomegaly 2. Status post partial colectomy 3. No signs of significant lymphadenopathy or ascites within the visualized abdomen or pelvis 4. There is a stable poorly defined 2 mm opacity at the right lung base posteriorly, of questionable significance 5. No fluid collections are noted adjacent to the left and right pelvic drains RECOMMENDATIONS: Unavailable     XR CHEST PORTABLE    Result Date: 4/1/2022  EXAMINATION: ONE XRAY VIEW OF THE CHEST 4/1/2022 2:25 am COMPARISON: 02/14/2022 HISTORY: ORDERING SYSTEM PROVIDED HISTORY: cp TECHNOLOGIST PROVIDED HISTORY: Reason for exam:->cp FINDINGS: The lungs are without acute focal process. There is no effusion or pneumothorax. The cardiomediastinal silhouette is without acute process. The osseous structures are without acute process. No acute process. CTA PULMONARY W CONTRAST    Result Date: 4/1/2022  EXAMINATION: CTA OF THE CHEST 4/1/2022 3:52 am TECHNIQUE: CTA of the chest was performed after the administration of intravenous contrast.  Multiplanar reformatted images are provided for review. MIP images are provided for review. Dose modulation, iterative reconstruction, and/or weight based adjustment of the mA/kV was utilized to reduce the radiation dose to as low as reasonably achievable.  COMPARISON: 02/18/2022 HISTORY: ORDERING SYSTEM PROVIDED HISTORY: cp sob TECHNOLOGIST PROVIDED HISTORY: Reason for exam:->cp sob Decision Support Exception - unselect if not a suspected or confirmed emergency medical condition->Emergency Medical Condition (MA) FINDINGS: Pulmonary Arteries: Pulmonary arteries are adequately opacified for evaluation. There are filling defects within the terminal right main pulmonary artery extending into right upper and lower lobe lobar, segmental, and subsegmental pulmonary arteries. Main pulmonary artery is normal in caliber. No evidence of right heart strain. Mediastinum: No evidence of mediastinal lymphadenopathy. The heart and pericardium demonstrate no acute abnormality. There is no acute abnormality of the thoracic aorta. Incidental note of aberrant right subclavian artery. Left upper extremity PICC, with tip extending into the mid to lower SVC. There is a tiny hiatal hernia. Lungs/pleura: There is a small peripheral wedge-shaped consolidation in the anterior basilar right upper lobe. The remainder of the lungs are clear. No pneumothorax or pleural effusion. Upper Abdomen:  Limited images of the upper abdomen demonstrate no acute abnormality. Soft Tissues/Bones: No acute bone or soft tissue abnormality. Pulmonary emboli within the terminal right main pulmonary artery extending into right upper and lower lobar, segmental, and subsegmental pulmonary arteries. No evidence of right heart strain. Small peripheral wedge-shaped consolidation in the anterior basilar right upper lobe, concerning for pulmonary infarct. Critical results were called by Dr. Maria Elena Gay MD to Dr. Bonita Macario on 4/1/2022 at 05:32. Patient Instructions:      Medication List      START taking these medications    oxyCODONE-acetaminophen 5-325 MG per tablet  Commonly known as: PERCOCET  Take 1 tablet by mouth every 6 hours as needed for Pain for up to 3 days. CHANGE how you take these medications    apixaban 5 MG Tabs tablet  Commonly known as: Eliquis  Take 1 tablet by mouth 2 times daily  What changed: Another medication with the same name was removed. Continue taking this medication, and follow the directions you see here.      R2-30 18450 UNIT Caps  Generic drug: vitamin D  Take 1 capsule by mouth once a week  What changed: additional instructions     Ocrevus 300 MG/10ML Soln injection  Generic drug: ocrelizumab  Infuse 20 mLs intravenously every 6 months  What changed: additional instructions        CONTINUE taking these medications    baclofen 20 MG tablet  Commonly known as: LIORESAL  Take 1 tablet by mouth 4 times daily as needed (muscle spasms; pain)     gabapentin 300 MG capsule  Commonly known as: NEURONTIN  TAKE 1 CAPSULE BY MOUTH THREE TIMES DAILY     nortriptyline 10 MG capsule  Commonly known as: PAMELOR  TAKE ONE (1) CAPSULE BY MOUTH NIGHTLY FOR HEADACHE     pantoprazole 40 MG tablet  Commonly known as: PROTONIX  Take 1 tablet by mouth 2 times daily (before meals)     rOPINIRole 0.5 MG tablet  Commonly known as: Requip  Take 1 tablet by mouth 2 times daily as needed (restless legs)        STOP taking these medications    Dexilant 60 MG Cpdr delayed release capsule  Generic drug: dexlansoprazole     dicyclomine 10 MG capsule  Commonly known as: BENTYL     famotidine 40 MG tablet  Commonly known as: PEPCID     lisinopril-hydroCHLOROthiazide 10-12.5 MG per tablet  Commonly known as: PRINZIDE;ZESTORETIC     polyethylene glycol 17 GM/SCOOP powder  Commonly known as: GLYCOLAX           Where to Get Your Medications      You can get these medications from any pharmacy    Bring a paper prescription for each of these medications  · oxyCODONE-acetaminophen 5-325 MG per tablet           Note that more than 30 minutes was spent in preparing discharge papers, discussing discharge with patient, medication review, etc.    Signed:  Electronically signed by BETTE Swanson on 4/4/2022 at 2:31 PM

## 2022-04-04 NOTE — PROGRESS NOTES
Mary Gomez M.D. Jacquelin Carballo M.D. Clara Tomlin M.D. Eliecer Asher D.O. Daily Pulmonary Progress Note    Patient:  Marisa Reddy 51 y.o. female MRN: [id number]     Date of Service: 4/4/2022        Subjective      Patient was seen and examined. Laying in bed on room air with no acute distress. Patient states she feels she is ready to go home. Denies any hemoptysis or blood in her urine or stool. Objective   Vitals: BP (!) 106/56   Pulse 60   Temp 96.4 °F (35.8 °C) (Axillary)   Resp 18   Ht 5' 1\" (1.549 m)   Wt 175 lb 12.8 oz (79.7 kg)   LMP 01/05/2018   SpO2 97%   BMI 33.22 kg/m²     I/O:    Intake/Output Summary (Last 24 hours) at 4/4/2022 1623  Last data filed at 4/4/2022 0745  Gross per 24 hour   Intake 240 ml   Output --   Net 240 ml       CURRENT MEDS :  Scheduled Meds:   apixaban  5 mg Oral BID    dicyclomine  10 mg Oral 4x Daily AC & HS    nortriptyline  10 mg Oral Nightly    pantoprazole  40 mg Oral BID AC    nicotine  1 patch TransDERmal Daily       Continuous Infusions:       PRN Meds:  rOPINIRole, albuterol, acetaminophen, oxyCODONE-acetaminophen      Physical Exam:  Physical Exam  Constitutional:       General: She is not in acute distress. HENT:      Head: Normocephalic and atraumatic. Mouth/Throat:      Pharynx: No oropharyngeal exudate. Eyes:      General: No scleral icterus. Conjunctiva/sclera: Conjunctivae normal.   Neck:      Trachea: No tracheal deviation. Cardiovascular:      Rate and Rhythm: Normal rate. Heart sounds: Normal heart sounds. Pulmonary:      Effort: Pulmonary effort is normal.      Breath sounds: Normal breath sounds. Abdominal:      Palpations: Abdomen is soft. Tenderness: There is no abdominal tenderness. Musculoskeletal:         General: No swelling or deformity. Cervical back: Neck supple. Lymphadenopathy:      Cervical: No cervical adenopathy. Skin:     General: Skin is warm. Findings: No rash. Neurological:      General: No focal deficit present. Mental Status: She is alert and oriented to person, place, and time. Psychiatric:         Behavior: Behavior normal.         Pertinent/ New Labs and Imaging Studies     Pulmonary Function Testing personally reviewed and interpreted. PERTINENT LAB RESULTS: Labs reviewed. DIAGNOSTICS: Pertinent imaging reviewed. Assessment:      Acute pulmonary embolism right lung segmental/subsegmental branch with right upper lobe pulmonary infarct. Provoked VTE given recent surgery. DVT right lower extremity  History of colon cancer August 2021 with sigmoid colectomy and diverting colostomy 8/31/2022 2/8/2022 sigmoid stricture requiring Laparoscopic sigmoid and rectal resection with stapled end-to-end anastomosis and mobilization of splenic flexure. Complicated by postop pelvic fluid abscess requiring CT-guided drainage-cultures grew Proteus  Multiple sclerosis-follows with neurology-on Ocrevus, baclofen, gabapentin  Ongoing nicotine dependence half pack per day x30 years  History of COVID-19 pneumonia October 2021        Plan:     Continue Eliquis 5 mg twice daily, monitor for bleeding. Would treat for minimum 6 months with Eliquis  Albuterol as needed  Tobacco cessation education, nicotine patch  Okay to discharge from pulmonary point of view when okay with others       This plan of care was reviewed in collaboration with Dr. Woody Potter  Electronically signed by MARIXA Haas CNP on 4/4/2022 at 4:23 PM      I personally saw, examined and provided care for the patient. Radiographs, labs and medication list were reviewed by me independently. I spoke with bedside nursing, therapists and consultants. The case was discussed in detail and plans for care were established. Review of CNP documentation was conducted and revisions were made as appropriate. I agree with the above documented exam, problem list and plan of care. Florentino Peres MD

## 2022-04-04 NOTE — CARE COORDINATION
Social work / Discharge Planning:       Discharge order noted. [facility], [facility]. updated on discharge. Order to resume home care is on the chart.    Electronically signed by ROBIN Rosales on 4/4/2022 at 3:23 PM

## 2022-04-04 NOTE — PROGRESS NOTES
[address] [place] [address] [facility]  Progress Note      Chief Complaint   Patient presents with    Shortness of Breath     sob, and chest pain mid-sternal, dvt dx, recently on elquis, day 5    Chest Pain     SUBJECTIVE:  Patient is tolerating medications. No reported adverse drug reactions. No nausea, vomiting, diarrhea. Drains removed 2022  ocassional chest pain with deep inspiration       Review of systems:  As stated above in the chief complaint, otherwise negative. Medications:  Scheduled Meds:   apixaban  5 mg Oral BID    dicyclomine  10 mg Oral 4x Daily AC & HS    nortriptyline  10 mg Oral Nightly    pantoprazole  40 mg Oral BID AC    nicotine  1 patch TransDERmal Daily    fluconazole  400 mg Oral Daily    ertapenem (INVanz) IVPB  1,000 mg IntraVENous Q24H     Continuous Infusions:    PRN Meds:rOPINIRole, albuterol, acetaminophen, oxyCODONE-acetaminophen    OBJECTIVE:  BP (!) 106/56   Pulse 60   Temp 96.4 °F (35.8 °C) (Axillary)   Resp 18   Ht 5' 1\" (1.549 m)   Wt 175 lb 12.8 oz (79.7 kg)   LMP 2018   SpO2 97%   BMI 33.22 kg/m²   Temp  Av.3 °F (36.3 °C)  Min: 96.4 °F (35.8 °C)  Max: 97.7 °F (36.5 °C)  Constitutional: The patient is awake, alert, and oriented. Sitting up in bed, in no distress. Skin: Warm and dry. No rashes were noted. HEENT: Round and reactive pupils. Moist mucous membranes. No ulcerations or thrush. Neck: Supple to movements. Chest: No use of accessory muscles to breathe. Symmetrical expansion. No wheezing, crackles or rhonchi. Cardiovascular: S1 and S2 are rhythmic and regular. No murmurs appreciated. Abdomen: Positive bowel sounds to auscultation. Benign to palpation. RLQ colostomy. Extremities: mild edema.   Lines: peripheral    Laboratory and Tests Review:  Lab Results   Component Value Date    WBC 8.2 2022    WBC 7.4 2022    WBC 7.6 2022    HGB 10.9 (L) 2022    HCT 35.0 2022    MCV 97.2 04/03/2022     04/03/2022     Lab Results   Component Value Date    NEUTROABS 5.56 04/01/2022    NEUTROABS 5.08 03/29/2022    NEUTROABS 3.49 03/24/2022     No results found for: CRPHS  Lab Results   Component Value Date    ALT 33 (H) 03/29/2022    AST 31 03/29/2022    ALKPHOS 138 (H) 03/29/2022    BILITOT 0.5 03/29/2022     Lab Results   Component Value Date     04/04/2022    K 4.3 04/04/2022    K 3.9 04/01/2022     04/04/2022    CO2 29 04/04/2022    BUN 12 04/04/2022    CREATININE 0.8 04/04/2022    CREATININE 0.6 04/03/2022    CREATININE 0.5 04/02/2022    GFRAA >60 04/04/2022    LABGLOM >60 04/04/2022    GLUCOSE 88 04/04/2022    PROT 7.2 03/29/2022    LABALBU 4.1 03/29/2022    CALCIUM 9.5 04/04/2022    BILITOT 0.5 03/29/2022    ALKPHOS 138 03/29/2022    AST 31 03/29/2022    ALT 33 03/29/2022     Lab Results   Component Value Date    CRP 1.4 (H) 04/01/2022    CRP 0.9 (H) 03/29/2022    CRP 1.4 (H) 03/22/2022     Lab Results   Component Value Date    SEDRATE 12 04/01/2022    SEDRATE 12 03/29/2022    SEDRATE 25 (H) 03/22/2022     Radiology:    CT Abdomen/Pelvis   1. Marked hepatomegaly   2. Status post partial colectomy   3. No signs of significant lymphadenopathy or ascites within the visualized   abdomen or pelvis   4. There is a stable poorly defined 2 mm opacity at the right lung base   posteriorly, of questionable significance   5. No fluid collections are noted adjacent to the left and right pelvic drains       Microbiology:   No new cultures     ASSESSMENT:  · Status post sigmoid colectomy with colostomy 2/8/2022  · Pelvic fluid abscesses. Status post CT-guided drainage. Culture grew Proteus  · DVT left lower extremity  · Pulmonary embolism    PLAN:  · Stop Ertapenem & oral Fluconazole  · Remove PICC  · Surgery following-drains removed 4/2/2022  · Check final cultures  · Labs reviewed     MARIXA Metz - CNP  10:48 AM  4/4/2022     Patient seen and examined.  I had a face to face encounter with the patient. Agree with exam.  Assessment and plan as outlined above and directed by me. Addition and corrections were done as deemed appropriate. My exam and plan include: Repeat CT shows resolution of abscess. Pigtail catheter is in removed. Antibiotics can be discontinued. She can be discharged from ID standpoint.     Christiano Jack MD  4/4/2022  2:05 PM

## 2022-04-05 ENCOUNTER — PATIENT MESSAGE (OUTPATIENT)
Dept: PRIMARY CARE CLINIC | Age: 47
End: 2022-04-05

## 2022-04-05 ENCOUNTER — TELEPHONE (OUTPATIENT)
Dept: PRIMARY CARE CLINIC | Age: 47
End: 2022-04-05

## 2022-04-05 NOTE — TELEPHONE ENCOUNTER
From: Mann Nash  To:  Madelyn Valiente  Sent: 4/5/2022 11:10 AM EDT  Subject: Inhaler     Good after I was recently hospitalized I have blood clots in my lung I was wondering if there is any type of inhaler that I can use to help with my breathing

## 2022-04-07 RX ORDER — ALBUTEROL SULFATE 90 UG/1
2 AEROSOL, METERED RESPIRATORY (INHALATION) 4 TIMES DAILY PRN
Qty: 54 G | Refills: 0 | Status: SHIPPED
Start: 2022-04-07 | End: 2022-05-27

## 2022-04-11 ENCOUNTER — OFFICE VISIT (OUTPATIENT)
Dept: PRIMARY CARE CLINIC | Age: 47
End: 2022-04-11
Payer: COMMERCIAL

## 2022-04-11 VITALS
OXYGEN SATURATION: 98 % | HEART RATE: 104 BPM | TEMPERATURE: 97.4 F | SYSTOLIC BLOOD PRESSURE: 98 MMHG | HEIGHT: 60 IN | WEIGHT: 175 LBS | RESPIRATION RATE: 12 BRPM | BODY MASS INDEX: 34.36 KG/M2 | DIASTOLIC BLOOD PRESSURE: 64 MMHG

## 2022-04-11 DIAGNOSIS — Z09 HOSPITAL DISCHARGE FOLLOW-UP: Primary | ICD-10-CM

## 2022-04-11 DIAGNOSIS — I82.422 ACUTE DEEP VEIN THROMBOSIS (DVT) OF ILIAC VEIN OF LEFT LOWER EXTREMITY (HCC): ICD-10-CM

## 2022-04-11 DIAGNOSIS — J30.2 SEASONAL ALLERGIES: ICD-10-CM

## 2022-04-11 DIAGNOSIS — I26.94 MULTIPLE SUBSEGMENTAL PULMONARY EMBOLI WITHOUT ACUTE COR PULMONALE (HCC): ICD-10-CM

## 2022-04-11 PROCEDURE — 1111F DSCHRG MED/CURRENT MED MERGE: CPT | Performed by: PHYSICIAN ASSISTANT

## 2022-04-11 PROCEDURE — 99214 OFFICE O/P EST MOD 30 MIN: CPT | Performed by: PHYSICIAN ASSISTANT

## 2022-04-11 RX ORDER — FLUTICASONE PROPIONATE 50 MCG
1 SPRAY, SUSPENSION (ML) NASAL DAILY
Qty: 32 G | Refills: 1 | Status: SHIPPED
Start: 2022-04-11 | End: 2022-07-29

## 2022-04-11 RX ORDER — FAMOTIDINE 40 MG/1
TABLET, FILM COATED ORAL
COMMUNITY
Start: 2022-04-04

## 2022-04-11 RX ORDER — LORATADINE 10 MG/1
10 TABLET ORAL DAILY
Qty: 30 TABLET | Refills: 0 | Status: SHIPPED | OUTPATIENT
Start: 2022-04-11 | End: 2022-05-11

## 2022-04-11 NOTE — PROGRESS NOTES
Post-Discharge Transitional Care Follow Up      Parvez Ziegler   YOB: 1975    Date of Office Visit:  4/11/2022  Date of Hospital Admission: 4/1/22  Date of Hospital Discharge: 4/4/22  Readmission Risk Score (high >=14%. Medium >=10%):Readmission Risk Score: 20.7 ( )      Care management risk score Rising risk (score 2-5) and Complex Care (Scores >=6): 8     Non face to face  following discharge, date last encounter closed (first attempt may have been earlier): 4/5/2022  2:21 PM     Call initiated 2 business days of discharge: Yes     Hospital discharge follow-up  -     VT DISCHARGE MEDS RECONCILED W/ CURRENT OUTPATIENT MED LIST  Acute deep vein thrombosis (DVT) of iliac vein of left lower extremity ([address]  Multiple subsegmental pulmonary emboli without acute cor pulmonale [facility])      Medical Decision Making: moderate complexity  Return in 1 month (on 5/11/2022). On this date 4/11/2022 I have spent 15 minutes reviewing previous notes, test results and face to face with the patient discussing the diagnosis and importance of compliance with the treatment plan as well as documenting on the day of the visit. Subjective:   Pt presents for f/u from hospital admission on 4/1 for cp. She was found to have right subsegmental PE. She was previously on eliquis. Since discharge, her breathing has improved. She is using albuterol, and believes it is helping her. Her chest pain has also resolved. She now complains of \"sinuses. \" no fevers. She is seeing Dr Jennie Vo next week to determine reversal of colostomy. She should be on eliquis for 6 months. Inpatient course: Discharge summary reviewed- see chart.     Interval history/Current status: stable    Patient Active Problem List   Diagnosis    Vocal cord dysfunction    Multiple sclerosis ([address]    Morbidly obese ([address]    Palafox's esophagus with dysplasia    Secondary restless legs syndrome    S/P colectomy    Malignant ne[facility] of descending [facility]    Ileus, postoperative ([facility]    Pelvic abscess in female    Sigmoid stricture ([facility]    Postoperative intra-abdominal abscess    Pulmonary embolism without acute cor pulmonale (HCC)    Multiple subsegmental pulmonary emboli without acute cor pulmonale (HCC)       Medications listed as ordered at the time of discharge from hospital     Medication List          Accurate as of April 11, 2022 12:38 PM. If you have any questions, ask your nurse or doctor.             CHANGE how you take these medications    D3-50 00263 UNIT Caps  Generic drug: vitamin D  Take 1 capsule by mouth once a week  What changed: additional instructions     Ocrevus 300 MG/10ML Soln injection  Generic drug: ocrelizumab  Infuse 20 mLs intravenously every 6 months  What changed: additional instructions        CONTINUE taking these medications    albuterol sulfate  (90 Base) MCG/ACT inhaler  Commonly known as: Ventolin HFA  Inhale 2 puffs into the lungs 4 times daily as needed for Wheezing     apixaban 5 MG Tabs tablet  Commonly known as: Eliquis  Take 1 tablet by mouth 2 times daily     baclofen 20 MG tablet  Commonly known as: LIORESAL  Take 1 tablet by mouth 4 times daily as needed (muscle spasms; pain)     famotidine 40 MG tablet  Commonly known as: PEPCID     gabapentin 300 MG capsule  Commonly known as: NEURONTIN  TAKE 1 CAPSULE BY MOUTH THREE TIMES DAILY     nortriptyline 10 MG capsule  Commonly known as: PAMELOR  TAKE ONE (1) CAPSULE BY MOUTH NIGHTLY FOR HEADACHE     pantoprazole 40 MG tablet  Commonly known as: PROTONIX  Take 1 tablet by mouth 2 times daily (before meals)     rOPINIRole 0.5 MG tablet  Commonly known as: Requip  Take 1 tablet by mouth 2 times daily as needed (restless legs)             Medications marked \"taking\" at this time  Outpatient Medications Marked as Taking for the 4/11/22 encounter (Office Visit) with Madelyn Valiente PA-C   Medication Sig Dispense Refill    famotidine (PEPCID) 40 MG tablet TAKE 1 TABLET BY MOUTH DAILY IN THE EVENING *EMERGENCY REFILL*      albuterol sulfate HFA (VENTOLIN HFA) 108 (90 Base) MCG/ACT inhaler Inhale 2 puffs into the lungs 4 times daily as needed for Wheezing 54 g 0    apixaban (ELIQUIS) 5 MG TABS tablet Take 1 tablet by mouth 2 times daily 180 tablet 1    nortriptyline (PAMELOR) 10 MG capsule TAKE ONE (1) CAPSULE BY MOUTH NIGHTLY FOR HEADACHE 30 capsule 3    pantoprazole (PROTONIX) 40 MG tablet Take 1 tablet by mouth 2 times daily (before meals) 30 tablet 0    vitamin D (D3-50) 82824 UNIT CAPS Take 1 capsule by mouth once a week (Patient taking differently: Take 50,000 Units by mouth once a week On Mondays) 4 capsule 5    baclofen (LIORESAL) 20 MG tablet Take 1 tablet by mouth 4 times daily as needed (muscle spasms; pain) 360 tablet 3    rOPINIRole (REQUIP) 0.5 MG tablet Take 1 tablet by mouth 2 times daily as needed (restless legs) 60 tablet 11    ocrelizumab (OCREVUS) 300 MG/10ML SOLN injection Infuse 20 mLs intravenously every 6 months (Patient taking differently: Infuse 600 mg intravenously every 6 months Due in August 2022) 20 mL 1        Medications patient taking as of now reconciled against medications ordered at time of hospital discharge: Yes    Review of Systems   All other systems reviewed and are negative. Objective:    BP 98/64   Pulse 104   Temp 97.4 °F (36.3 °C)   Resp 12   Ht 5' (1.524 m)   Wt 175 lb (79.4 kg)   LMP 01/05/2018   SpO2 98%   BMI 34.18 kg/m²   Physical Exam  Constitutional:       General: She is not in acute distress. Appearance: She is well-developed. HENT:      Head: Normocephalic and atraumatic. Right Ear: External ear normal.      Left Ear: External ear normal.      Nose: Nose normal.   Eyes:      General: No scleral icterus. Conjunctiva/sclera: Conjunctivae normal.      Pupils: Pupils are equal, round, and reactive to light. Neck:      Thyroid: No thyromegaly.    Cardiovascular:      Rate and Rhythm: Normal rate and regular rhythm. Heart sounds: Normal heart sounds. No murmur heard. Pulmonary:      Effort: Pulmonary effort is normal. No accessory muscle usage or respiratory distress. Breath sounds: Normal breath sounds. No wheezing. Musculoskeletal:         General: Normal range of motion. Cervical back: Normal range of motion and neck supple. Skin:     General: Skin is warm and dry. Findings: No rash. Neurological:      Mental Status: She is alert and oriented to person, place, and time. Deep Tendon Reflexes: Reflexes are normal and symmetric.    Psychiatric:         Speech: Speech normal.         Behavior: Behavior normal.         An electronic signature was used to authenticate this note.  --Beau Tran PA-C

## 2022-04-14 ENCOUNTER — OFFICE VISIT (OUTPATIENT)
Dept: SURGERY | Age: 47
End: 2022-04-14

## 2022-04-14 VITALS
HEART RATE: 80 BPM | TEMPERATURE: 97.3 F | WEIGHT: 175 LBS | BODY MASS INDEX: 34.36 KG/M2 | SYSTOLIC BLOOD PRESSURE: 136 MMHG | DIASTOLIC BLOOD PRESSURE: 73 MMHG | HEIGHT: 60 IN

## 2022-04-14 DIAGNOSIS — Z90.49 S/P COLECTOMY: Primary | ICD-10-CM

## 2022-04-14 DIAGNOSIS — Z93.2 ILEOSTOMY IN PLACE (HCC): ICD-10-CM

## 2022-04-14 PROCEDURE — 99024 POSTOP FOLLOW-UP VISIT: CPT | Performed by: SURGERY

## 2022-04-14 NOTE — PROGRESS NOTES
Surgery Progress Note            Chief complaint:   Chief Complaint   Patient presents with    Other     Consult for test      Patient Active Problem List   Diagnosis    Vocal cord dysfunction    [facility]    Morbidly obese ([facility]    Palafox's esophagus with dysplasia    Secondary restless legs syndrome    S/P colectomy    Malignant neoplasm of descending colon ([facility]    Ileus, postoperative ([facility]    Pelvic abscess in female    Sigmoid stricture ([facility]    Postoperative intra-abdominal abscess    Pulmonary embolism without acute cor pulmonale ([facility]    Multiple subsegmental pulmonary emboli without acute cor pulmonale (HCC)       S: doing well     O:   Vitals:    04/14/22 1433   BP: 136/73   Pulse: 80   Temp: 97.3 °F (36.3 °C)     No intake or output data in the 24 hours ending 04/14/22 1441        Labs:  Lab Results   Component Value Date    WBC 8.2 04/03/2022    WBC 7.4 04/02/2022    WBC 7.6 04/01/2022    HGB 10.9 04/03/2022    HGB 11.6 04/02/2022    HGB 11.4 04/01/2022    HCT 35.0 04/03/2022    HCT 36.0 04/02/2022    HCT 36.1 04/01/2022     Lab Results   Component Value Date    CREATININE 0.8 04/04/2022    BUN 12 04/04/2022     04/04/2022    K 4.3 04/04/2022     04/04/2022    CO2 29 04/04/2022     Lab Results   Component Value Date    LIPASE 10 09/05/2021    LIPASE 13 02/04/2018    LIPASE 11 01/15/2018         Physical exam:   /73   Pulse 80   Temp 97.3 °F (36.3 °C)   Ht 5' (1.524 m)   Wt 175 lb (79.4 kg)   LMP 01/05/2018   BMI 34.18 kg/m²   General appearance: NAD  Head: NCAT  Neck: supple, no masses  Lungs: equal chest rise bilateral  Heart: S1S2 present  Abdomen: soft, nontender, nondistended,  Ostomy pink and patent with stool  Skin; no lesions  Gu: no cva tenderness  Extremities: extremities normal, atraumatic, no cyanosis or edema    A:  S/p lap revision of colorectal anastomosis and ileostomy with post op abscess s/p IR drain then post op PE on anticoagulation    P: will get barium enema to assess new anastomosis and then have her see hem/onc to help determine safest time for ileostomy reversal and anticoagulation bridge.     Thien Blanco MD, MD  4/14/2022

## 2022-05-06 RX ORDER — METHOCARBAMOL 750 MG/1
TABLET ORAL
Qty: 4 CAPSULE | Refills: 10 | OUTPATIENT
Start: 2022-05-06

## 2022-05-12 RX ORDER — METHOCARBAMOL 750 MG/1
TABLET ORAL
Qty: 4 CAPSULE | Refills: 10 | OUTPATIENT
Start: 2022-05-12

## 2022-05-18 VITALS
RESPIRATION RATE: 16 BRPM | HEIGHT: 61 IN | DIASTOLIC BLOOD PRESSURE: 86 MMHG | OXYGEN SATURATION: 100 % | HEART RATE: 85 BPM | WEIGHT: 175 LBS | SYSTOLIC BLOOD PRESSURE: 140 MMHG | BODY MASS INDEX: 33.04 KG/M2 | TEMPERATURE: 96.9 F

## 2022-05-18 ASSESSMENT — PAIN - FUNCTIONAL ASSESSMENT: PAIN_FUNCTIONAL_ASSESSMENT: 0-10

## 2022-05-18 ASSESSMENT — PAIN SCALES - GENERAL: PAINLEVEL_OUTOF10: 10

## 2022-05-19 ENCOUNTER — HOSPITAL ENCOUNTER (EMERGENCY)
Age: 47
Discharge: LEFT BEFORE DISCHARGE | End: 2022-05-19

## 2022-05-19 NOTE — ED NOTES
Patient called but no answer.  Patient left without telling staff     Shantanu Shah LPN  [id number] [phone]

## 2022-05-23 ENCOUNTER — HOSPITAL ENCOUNTER (OUTPATIENT)
Dept: GENERAL RADIOLOGY | Age: 47
Discharge: HOME | End: 2022-05-25
Payer: COMMERCIAL

## 2022-05-23 ENCOUNTER — HOSPITAL ENCOUNTER (OUTPATIENT)
Age: 47
Discharge: HOME | End: 2022-05-23
Payer: COMMERCIAL

## 2022-05-23 DIAGNOSIS — Z90.49 S/P COLECTOMY: Primary | ICD-10-CM

## 2022-05-23 DIAGNOSIS — Z90.49 S/P COLECTOMY: ICD-10-CM

## 2022-05-23 PROCEDURE — 74270 X-RAY XM COLON 1CNTRST STD: CPT

## 2022-05-23 PROCEDURE — 99202 OFFICE O/P NEW SF 15 MIN: CPT

## 2022-05-23 PROCEDURE — 6360000004 HC RX CONTRAST MEDICATION: Performed by: RADIOLOGY

## 2022-05-23 RX ADMIN — DIATRIZOATE MEGLUMINE AND DIATRIZOATE SODIUM 600 ML: 660; 100 LIQUID ORAL; RECTAL at 10:22

## 2022-05-23 NOTE — FLOWSHEET NOTE
Initial ET Nurse  Admit Date: 5/23/2022  2:06 PM    Reason for consult:  Ileostomy, pouching issues, skin red and blistering  Significant history:  Has MS  Stoma assessment: per patient this is a recent event. Ostomy done last august at [address] [place] [address], Dr. Marbella Delcid . Had been scheduled for reversal several times but always some problem arose. Is out of pouches due to frequent changing  Pouch surrounded by gauze, adhesive stretch tape multiple layers  Peristomal skin denuded, red, distal r im from tape iw raw, red, painful  Significant parastoma hernia present , soft. aware and will repair with reversal. Has appt with dr. Marbella Delcid Thursday. No changes in weight or diet or output relating to leakage. 05/23/22 1542   Ileostomy/Jejunostomy RUQ Loop ileostomy   Placement Date: 08/31/21   Location: RUQ  Ostomy Type: Loop ileostomy   Stomal Appliance Changed;Leaking   Stoma  Assessment   (red)   Peristomal Assessment Denuded; Maceration;Painful;Red   Treatment   (domeboro, powder, no sting)   Stool Appearance Loose   Stool Color Brown   Stool Amount Small     Plan:  Patient verbalizes understanding. 2 piece flex convex with barrier strips. Supplies given. Instructed to call re progress and or need supplies. Belt applied. Clinical Level of Care Assessment    Outpatient Ostomy Care      NAME:  Alaina Zhao  YOB: 1975  MEDICAL RECORD NUMBER:  [id number]   DATE:  5/23/2022      Patient Sindy Hyde Assessment- Document in Flowsheet I&O   Points   Review of chart []   0   Assess Complete Ostomy tab in Navigator for assessment of; stoma status, peristomal skin, presence of hernia/stool consistency/diet/related medications   Simple adjustments to pouch size/pouch system, new stoma pattern, accessory addition/deletion.    []   1   Assess Complete Ostomy tab in Navigator for assessment of; stoma status, peristomal skin, presence of hernia/stool consistency/diet/related medications   Moderate adjustments to pouch size/pouch system, new stoma pattern, accessory addition/deletion. Observe patient/caregiver with hands-on care. 1-2 adjustments to pouch size/system/skin care/accessory addition or deletion. [x]   2   Assess Complete Ostomy tab in Navigator for assessment of; stoma status, peristomal skin, presence of hernia/stool consistency/diet/related medications   Complex adjustments to pouch size/pouch system, new stoma pattern, accessory addition/deletion. 3 or more complex adjustments to pouch size/system/skin care/accessory addition or deletion. Observe patient/caregiver with hands-on care. Assess patient/patient abdomen for optimal pre-marked stoma site. Assess patient abdomen for type of hernia belt/accessory needed. []   3         Ambulation Status Documented in [facility] Clinical Note  Status Definition Points   Independent Independently able to ambulate. Fully able (without any assistance) to get on/off exam table/chair. [x]   0   Minimal Physical Assistance Requires physical assistance of one person to ambulate and/or position patient to be examined. Includes necessary physical assistance to position lower extremities on/off stool. []   1   Moderate Physical Assistance Requires at least one staff member to physically assist patient in ambulating into treatment room, and/or on off chair/bed. Requires assistance to bathroom. []   2   Full Assistance Requires assistance of at least two staff members to transfer patient into treatment room and/or on/off bed/chair. \"Total Transfer\". Unable to use bathroom requires bedside commode and/or bedpan []   3       Teaching Effort Documented in [facility] Clinical Note  Effort Definition Points   No Teaching  []   0   General Initial/Simple lesson:  Assess readiness to learn, assess patient learning style to determine educational flow/special needs for learning. Teaching related to 1-3 topics  Documentation in [facility] completed.    []   1   Intermediate Assess readiness to learn, assess patient learning style to determine educational flow/special needs for learning. Teaching related to 3-4 topics. Hernia belt application and care considerations  [facility]. [x]   2   Complex Assess readiness to learn, assess patient learning style to determine educational flow/special needs for learning. Teaching of greater than 5 additional topics   Pre-operative ostomy education with review of written resources for patient/family/caregiver as needed. Demonstration/return demonstration of ostomy irrigation  [facility] completed. []   3     Patient Assessment and Planning in [facility] Clinical Note   Planning Definition Points   Simple Simple pouch change procedure completed and reviewed with patient/family/caregiver   Documentation in [facility] completed. [x]   1   Intermediate Moderate level of follow-up needs:   Pouch change/discharge procedure revised and reviewed with patient/caregiver. Communications with outside resources; i.e. Telephone calls to Surgeon/ PCP, family/caregiver, home health, ECF. Documentation in [facility] completed. []   2   Complex Complex level of instructions/changes:   Family/Caregiver learning/demonstration/return demonstration visit. Pouching/discharge procedure revised/reviewed with patient/family/caregiver. Contact with outside resources; i.e. communication with Surgeon/ PCP, home health, ECF. Contact/referral to ostomy appliance supplier for new or additional products. Review when to call [facility] or schedule follow-up visit. Referral to Emergency Department   Documentation in [facility] completed. []   3       Is this the Patient's First Visit with [facility] @ [address]?  yes    Is this Patient Established to this [facility] within the last 3 years?    yes             Clinical Level of Care      Points  0-3  Level 1 []     Points  4-6  Level 2 [x]     Points  7-8  Level 3 [] Points  9-10  Level 4 []     Points  11-12  Level 5 []       Electronically signed by MARIXA Martins on 5/23/2022 at 3:54 PM        MARIXA Martins 5/23/2022 3:44 PM

## 2022-05-26 ENCOUNTER — OFFICE VISIT (OUTPATIENT)
Dept: SURGERY | Age: 47
End: 2022-05-26
Payer: COMMERCIAL

## 2022-05-26 VITALS
WEIGHT: 175 LBS | HEIGHT: 61 IN | RESPIRATION RATE: 18 BRPM | SYSTOLIC BLOOD PRESSURE: 142 MMHG | HEART RATE: 81 BPM | DIASTOLIC BLOOD PRESSURE: 88 MMHG | BODY MASS INDEX: 33.04 KG/M2 | TEMPERATURE: 97.2 F

## 2022-05-26 DIAGNOSIS — Z93.2 ILEOSTOMY IN PLACE (HCC): Primary | ICD-10-CM

## 2022-05-26 PROCEDURE — 99213 OFFICE O/P EST LOW 20 MIN: CPT | Performed by: SURGERY

## 2022-05-26 PROCEDURE — G8427 DOCREV CUR MEDS BY ELIG CLIN: HCPCS | Performed by: SURGERY

## 2022-05-26 PROCEDURE — 4004F PT TOBACCO SCREEN RCVD TLK: CPT | Performed by: SURGERY

## 2022-05-26 PROCEDURE — G8417 CALC BMI ABV UP PARAM F/U: HCPCS | Performed by: SURGERY

## 2022-05-26 NOTE — PROGRESS NOTES
General Surgery History and Physical    Patient's Name/Date of Birth: Elizabeth Andrew / 1975    Date: May 26, 2022     Surgeon: Anika Flores M.D.    PCP: Jitendra Copeland PA-C     Chief Complaint: ileostomy in place    HPI:   Elizabeth Andrew is a 51 y.o. female who presents for evaluation of ileostomy in place and good barium enema. Past Medical History:   Diagnosis Date    Acid reflux disease     Cyst of ovary     Fracture of nasal bone     Headache     Hearing loss     Hypertension     Migraine     Morbidly obese ([address] 10/05/2020    Multiple sclerosis ([address]     denies limitations at present 11/2020    VEENA no CPAP     severe VEENA per pt    Right shoulder pain 11/2020    Tinnitus     TMJ dysfunction        Past Surgical History:   Procedure Laterality Date    APPENDECTOMY      BACK SURGERY      lumbar    BICEPS TENDON REPAIR Right 11/11/2020    BICEPS TENODESIS performed by Magdaleno Obrien MD at [place] [address] Left 8/31/2021    LAPAROSCOPIC LEFT HEMICOLECTOMY WITH LOOP OSTOMY performed by Yaneth Vargas MD at [address] 9/6/2021    DIAGNOSTIC LAPAROSCOPY POSSIBLE BOWEL RESECTION POSSILBE OSTOMY performed by Yaneth Vargas MD at [address] 2/8/2022    LAPAROSCOPIC SIGMOID COLON RESECTION performed by Yaneth Vargas MD at [address] [facility]  03/05/2018    Robotic hysterectomy, bilateral salpingectomy, right oophorectomy DR. ARIANA MONIQUE [facility]     HYSTERECTOMY, TOTAL ABDOMINAL      LARYNGOSCOPY N/A 7/17/2020    DIRECT LARYNGOSCOPY--OMNI GUIDE LASER performed by Rachel Baldwin MD at [address] PROCTOSIGMOIDOSCOPY N/A 10/12/2021    ANAL PROCTO SIGMOIDOSCOPY FLEXIBLE performed by Yaneth Vargas MD at [address] [facility] N/A 1/25/2022    ANAL PROCTO SIGMOIDOSCOPY FLEXIBLE performed by Yaneth Vargas MD at [address] [facility] SHOULDER ARTHROSCOPY Right 11/11/2020    RIGHT SHOULDER DIAGNOSTIC ARTHROSCOPY WITH DECOMPRESSION ROTATOR CUFF REPAIR performed by Nga Calvin MD at [facility]         Current Outpatient Medications   Medication Sig Dispense Refill    famotidine (PEPCID) 40 MG tablet TAKE 1 TABLET BY MOUTH DAILY IN THE EVENING *EMERGENCY REFILL*      fluticasone (FLONASE) 50 MCG/ACT nasal spray 1 spray by Each Nostril route daily 32 g 1    albuterol sulfate HFA (VENTOLIN HFA) 108 (90 Base) MCG/ACT inhaler Inhale 2 puffs into the lungs 4 times daily as needed for Wheezing 54 g 0    apixaban (ELIQUIS) 5 MG TABS tablet Take 1 tablet by mouth 2 times daily 180 tablet 1    nortriptyline (PAMELOR) 10 MG capsule TAKE ONE (1) CAPSULE BY MOUTH NIGHTLY FOR HEADACHE 30 capsule 3    pantoprazole (PROTONIX) 40 MG tablet Take 1 tablet by mouth 2 times daily (before meals) 30 tablet 0    vitamin D (D3-50) 69256 UNIT CAPS Take 1 capsule by mouth once a week (Patient taking differently: Take 50,000 Units by mouth once a week On Mondays) 4 capsule 5    baclofen (LIORESAL) 20 MG tablet Take 1 tablet by mouth 4 times daily as needed (muscle spasms; pain) 360 tablet 3    rOPINIRole (REQUIP) 0.5 MG tablet Take 1 tablet by mouth 2 times daily as needed (restless legs) 60 tablet 11    ocrelizumab (OCREVUS) 300 MG/10ML SOLN injection Infuse 20 mLs intravenously every 6 months (Patient taking differently: Infuse 600 mg intravenously every 6 months Due in August 2022) 20 mL 1    gabapentin (NEURONTIN) 300 MG capsule TAKE 1 CAPSULE BY MOUTH THREE TIMES DAILY 90 capsule 11     No current facility-administered medications for this visit. No Known Allergies    The patient has a family history that is negative for severe cardiovascular or respiratory issues, negative for reaction to anesthesia.     Social History     Socioeconomic History    Marital status: Single     Spouse name: Not on file    Number of children: 3    Years of education: 6    Highest education level: 11th grade   Occupational History    Not on file   Tobacco Use    Smoking status: Current Every Day Smoker     Packs/day: 0.50     Years: 25.00     Pack years: 12.50     Types: Cigarettes    Smokeless tobacco: Never Used    Tobacco comment: 4 cigarettes a day per patient   Vaping Use    Vaping Use: Never used   Substance and Sexual Activity    Alcohol use: Not Currently    Drug use: No    Sexual activity: Yes     Partners: Male   Other Topics Concern    Not on file   Social History Narrative    Uses [employer] for transportation    [employer]     Social Determinants of Health     Financial Resource Strain:     Difficulty of Paying Living Expenses: Not on file   Food Insecurity:     Worried About Running Out of Food in the Last Year: Not on file    [place] of Food in the Last Year: Not on file   Transportation Needs:     Lack of Transportation (Medical): Not on file    Lack of Transportation (Non-Medical):  Not on file   Physical Activity:     Days of Exercise per Week: Not on file    Minutes of Exercise per Session: Not on file   Stress:     Feeling of Stress : Not on file   Social Connections:     Frequency of Communication with Friends and Family: Not on file    Frequency of Social Gatherings with Friends and Family: Not on file    Attends [employer] Services: Not on file    Active Member of [address] [place] [address] or Organizations: Not on file    Attends Club or Organization Meetings: Not on file    Marital Status: Not on file   Intimate Partner Violence:     Fear of Current or Ex-Partner: Not on file    Emotionally Abused: Not on file    Physically Abused: Not on file    Sexually Abused: Not on file   Housing Stability:     Unable to Pay for Housing in the Last Year: Not on file    Number of [place] in the Last Year: Not on file    Unstable Housing in the Last Year: Not on file           Review of Systems  Review of Systems -  General ROS: negative for - chills, fatigue or malaise  ENT ROS: negative for - hearing change, nasal congestion or nasal discharge  Allergy and Immunology ROS: negative for - hives, itchy/watery eyes or nasal congestion  Hematological and Lymphatic ROS: negative for - blood clots, blood transfusions, bruising or fatigue  Endocrine ROS: negative for - malaise/lethargy, mood swings, palpitations or polydipsia/polyuria  Respiratory ROS: negative for - sputum changes, stridor, tachypnea or wheezing  Cardiovascular ROS: negative for - irregular heartbeat, loss of consciousness, murmur or orthopnea  Gastrointestinal ROS: negative for - constipation, diarrhea, gas/bloating, heartburn or hematemesis  Genito-Urinary ROS: negative for -  genital discharge, genital ulcers or hematuria  Musculoskeletal ROS: negative for - gait disturbance, muscle pain or muscular weakness    Physical exam:   BP (!) 142/88   Pulse 81   Temp 97.2 °F (36.2 °C) (Temporal)   Resp 18   Ht 5' 1\" (1.549 m)   Wt 175 lb (79.4 kg)   LMP 01/05/2018   BMI 33.07 kg/m²   General appearance:  NAD  Head: NCAT, PERRLA, EOMI, red conjunctiva  Neck: supple, no masses  Lungs: CTAB, equal chest rise bilateral  Heart: Reg rate  Abdomen: soft, nontender, nondistended, ostomy in place  Skin; no lesions  Gu: no cva tenderness  Extremities: extremities normal, atraumatic, no cyanosis or edema      Radiology:  Barium enema: no issues    Assessment:  51 y.o. female with ileostomy in place for takedown    Plan: For lap possible open takedown  Discussed the risk, benefits and alternatives of surgery including wound infections, bleeding, scar and hernia formation and the risks of general anesthetic including MI, CVA, sudden death or reactions to anesthetic medications. The patient understands the risks and alternatives and the possibility of converting to an open procedure.  All questions were answered to the patient's satisfaction and they freely signed the consent.       Dario Coelho MD  1:27 PM  5/26/2022

## 2022-05-27 ENCOUNTER — TELEPHONE (OUTPATIENT)
Dept: SURGERY | Age: 47
End: 2022-05-27

## 2022-05-27 ENCOUNTER — OFFICE VISIT (OUTPATIENT)
Dept: PRIMARY CARE CLINIC | Age: 47
End: 2022-05-27
Payer: COMMERCIAL

## 2022-05-27 VITALS
HEART RATE: 71 BPM | HEIGHT: 61 IN | BODY MASS INDEX: 34.81 KG/M2 | DIASTOLIC BLOOD PRESSURE: 76 MMHG | WEIGHT: 184.4 LBS | TEMPERATURE: 97.2 F | SYSTOLIC BLOOD PRESSURE: 122 MMHG | RESPIRATION RATE: 16 BRPM | OXYGEN SATURATION: 99 %

## 2022-05-27 DIAGNOSIS — Z86.711 HISTORY OF PULMONARY EMBOLISM: ICD-10-CM

## 2022-05-27 DIAGNOSIS — Z79.01 ANTICOAGULANT LONG-TERM USE: ICD-10-CM

## 2022-05-27 DIAGNOSIS — Z93.2 ILEOSTOMY IN PLACE (HCC): Primary | ICD-10-CM

## 2022-05-27 PROCEDURE — G8417 CALC BMI ABV UP PARAM F/U: HCPCS | Performed by: PHYSICIAN ASSISTANT

## 2022-05-27 PROCEDURE — 99214 OFFICE O/P EST MOD 30 MIN: CPT | Performed by: PHYSICIAN ASSISTANT

## 2022-05-27 PROCEDURE — G8427 DOCREV CUR MEDS BY ELIG CLIN: HCPCS | Performed by: PHYSICIAN ASSISTANT

## 2022-05-27 PROCEDURE — 4004F PT TOBACCO SCREEN RCVD TLK: CPT | Performed by: PHYSICIAN ASSISTANT

## 2022-05-27 SDOH — ECONOMIC STABILITY: FOOD INSECURITY: WITHIN THE PAST 12 MONTHS, YOU WORRIED THAT YOUR FOOD WOULD RUN OUT BEFORE YOU GOT MONEY TO BUY MORE.: NEVER TRUE

## 2022-05-27 SDOH — ECONOMIC STABILITY: FOOD INSECURITY: WITHIN THE PAST 12 MONTHS, THE FOOD YOU BOUGHT JUST DIDN'T LAST AND YOU DIDN'T HAVE MONEY TO GET MORE.: NEVER TRUE

## 2022-05-27 ASSESSMENT — PATIENT HEALTH QUESTIONNAIRE - PHQ9
SUM OF ALL RESPONSES TO PHQ QUESTIONS 1-9: 0
SUM OF ALL RESPONSES TO PHQ9 QUESTIONS 1 & 2: 0
SUM OF ALL RESPONSES TO PHQ QUESTIONS 1-9: 0
1. LITTLE INTEREST OR PLEASURE IN DOING THINGS: 0
SUM OF ALL RESPONSES TO PHQ QUESTIONS 1-9: 0
2. FEELING DOWN, DEPRESSED OR HOPELESS: 0
SUM OF ALL RESPONSES TO PHQ QUESTIONS 1-9: 0

## 2022-05-27 ASSESSMENT — SOCIAL DETERMINANTS OF HEALTH (SDOH): HOW HARD IS IT FOR YOU TO PAY FOR THE VERY BASICS LIKE FOOD, HOUSING, MEDICAL CARE, AND HEATING?: NOT HARD AT ALL

## 2022-05-27 NOTE — TELEPHONE ENCOUNTER
Prior Authorization Form:      DEMOGRAPHICS:                     Patient Name:  Deshaun Miranda  Patient :  1975            Insurance:  Payor: [address] [place] [address]  [address] / Plan: [facility] [address] / Product Type: *No Product type* /   Insurance ID Number:    Payor/Plan Subscr  Sex Relation Sub.  Ins. ID Effective Group Num   1. JAROD VELÁZQUEZ* PAVEL MARISCAL 1975 Female Self [id number]                                    [address]         DIAGNOSIS & PROCEDURE:                       Procedure/Operation: Laparoscopic possible open ileostomy reversal           CPT Code: 89371    Diagnosis:  Ileostomy in place    ICD10 Code: Z93.2    Location:  [address] [place] [address]    Surgeon:  Clarita Watson INFORMATION:                          Date: 2022    Time: TBD              Anesthesia:  General                                                       Status:  Outpatient        Special Comments:         Electronically signed by Fuad Rudolph on 2022 at 12:43 PM

## 2022-05-27 NOTE — TELEPHONE ENCOUNTER
Per the order of Dr. Melissa Schroeder, patient has been scheduled for Laparoscopic ileostomy reversal, possible open on 6. 1.2022. Patient provided with procedure information over the phone and informed that written information will also be mailed to her. Patient instructed to please contact our office with any questions. Patient scheduled for post op follow up appointment in the [facility] office    [facility] Jennie Vo to enter orders.     Electronically signed by Elzbieta Chowdhury on 5/27/22 at 12:40 PM EDT

## 2022-05-27 NOTE — PROGRESS NOTES
Chief Complaint   Patient presents with    Discuss Medications     having reversal colostomy surgery. HPI:  Patient is here for follow-up of PE, diagnosed on 4/1. She has been doing well on Eliquis. Her SOB has resolved. She is no longer using albuterol. She is having problems with her ileostomy. She has had surrounding rashes and difficulty getting supplies. Dr Amos Beverly wants to do the take down surgery as soon as next week. They are requesting input on the Eliquis management. Dr Keyana Doyle note reviewed. Patient's past medical, surgical, social and/or family history reviewed, updated in chart, and are non-contributory (unless otherwise stated). Medications and allergies also reviewed and updated in chart. Review of Systems:  Constitutional:  No fever, no fatigue, no chills, no headaches, no weight change  Dermatology:  No rash, no mole, no dry or sensitive skin  ENT:  No cough, no sore throat, no sinus pain, no runny nose, no ear pain  Cardiology:  No chest pain, no palpitations, no leg edema, no shortness of breath, no PND  Gastroenterology:  No dysphagia, no abdominal pain, no nausea, no vomiting, no constipation, no diarrhea, no heartburn  Musculoskeletal:  No joint pain, no leg cramps, no back pain, no muscle aches  Respiratory:  No shortness of breath, no orthopnea, no wheezing, no MEADE, no hemoptysis  Urology:  No blood in the urine, no urinary frequency, no urinary incontinence, no urinary urgency, no nocturia, no dysuria    Vitals:    05/27/22 1024   BP: 122/76   Pulse: 71   Resp: 16   Temp: 97.2 °F (36.2 °C)   SpO2: 99%   Weight: 184 lb 6.4 oz (83.6 kg)   Height: 5' 1\" (1.549 m)       Physical Exam  Constitutional:       General: She is not in acute distress. Appearance: She is well-developed. HENT:      Head: Normocephalic and atraumatic. Right Ear: External ear normal.      Left Ear: External ear normal.      Nose: Nose normal.   Eyes:      General: No scleral icterus. Conjunctiva/sclera: Conjunctivae normal.      Pupils: Pupils are equal, round, and reactive to light. Neck:      Thyroid: No thyromegaly. Cardiovascular:      Rate and Rhythm: Normal rate and regular rhythm. Heart sounds: Normal heart sounds. No murmur heard. Pulmonary:      Effort: Pulmonary effort is normal. No accessory muscle usage or respiratory distress. Breath sounds: Normal breath sounds. No wheezing. Musculoskeletal:         General: Normal range of motion. Cervical back: Normal range of motion and neck supple. Right lower leg: No edema. Left lower leg: No edema. Skin:     General: Skin is warm and dry. Findings: No rash. Neurological:      Mental Status: She is alert and oriented to person, place, and time. Deep Tendon Reflexes: Reflexes are normal and symmetric. Psychiatric:         Speech: Speech normal.         Behavior: Behavior normal.         Assessment/Plan:      Alon Hernandez was seen today for discuss medications. Diagnoses and all orders for this visit:    Ileostomy in place [facility])  - note reviewed from Dr Jennie Vo, surgery planned for next week as she is having complications    Acute pulmonary embolism without acute cor pulmonale, unspecified pulmonary embolism type ([address]  - doing well on Elquis. pe occurred in April    Anticoagulant long-term use  -hold eliquis 3 days before surgery, resume post-op     As above. Call or go to ED immediately if symptoms worsen or persist.  F/u 1 month or sooner if necessary. Educational materials and/or home exercises printed for patient's review and were included in patient instructions on his/her After Visit Summary and given to patient at the end of visit. Counseled regarding above diagnosis, including possible risks and complications,  especially if left uncontrolled.     Counseled regarding the possible side effects, risks, benefits and alternatives to treatment; patient and/or guardian verbalizes understanding, agrees, feels comfortable with and wishes to proceed with above treatment plan. Advised patient to call with any new medication issues, and read all Rx info from pharmacy to assure aware of all possible risks and side effects of medication before taking. Reviewed age and gender appropriate health screening exams and vaccinations. Advised patient regarding importance of keeping up with recommended health maintenance and to schedule as soon as possible if overdue, as this is important in assessing for undiagnosed pathology, especially cancer, as well as protecting against potentially harmful/life threatening disease. Patient and/or guardian verbalizes understanding and agrees with above counseling, assessment and plan. All questions answered. Bridger Ford PA-C  5/27/2022    I have personally reviewed and updated the chief complaint, HPI, Past Medical, Family and Social History, as well as the above Review of Systems.

## 2022-05-31 ENCOUNTER — ANESTHESIA EVENT (OUTPATIENT)
Dept: OPERATING ROOM | Age: 47
DRG: 230 | End: 2022-05-31
Payer: COMMERCIAL

## 2022-05-31 ENCOUNTER — HOSPITAL ENCOUNTER (OUTPATIENT)
Dept: PREADMISSION TESTING | Age: 47
Discharge: HOME | DRG: 230 | End: 2022-05-31
Payer: COMMERCIAL

## 2022-05-31 ENCOUNTER — PREP FOR PROCEDURE (OUTPATIENT)
Dept: SURGERY | Age: 47
End: 2022-05-31

## 2022-05-31 VITALS
OXYGEN SATURATION: 99 % | TEMPERATURE: 97.7 F | RESPIRATION RATE: 20 BRPM | BODY MASS INDEX: 35.12 KG/M2 | SYSTOLIC BLOOD PRESSURE: 124 MMHG | DIASTOLIC BLOOD PRESSURE: 76 MMHG | WEIGHT: 186 LBS | HEART RATE: 69 BPM | HEIGHT: 61 IN

## 2022-05-31 DIAGNOSIS — Z01.818 PRE-OP TESTING: Primary | ICD-10-CM

## 2022-05-31 LAB
ANION GAP SERPL CALCULATED.3IONS-SCNC: 11 MMOL/L (ref 7–16)
BUN BLDV-MCNC: 8 MG/DL (ref 6–20)
CALCIUM SERPL-MCNC: 9 MG/DL (ref 8.6–10.2)
CHLORIDE BLD-SCNC: 105 MMOL/L (ref 98–107)
CO2: 23 MMOL/L (ref 22–29)
CREAT SERPL-MCNC: 0.6 MG/DL (ref 0.5–1)
GFR AFRICAN AMERICAN: >60
GFR NON-AFRICAN AMERICAN: >60 ML/MIN/1.73
GLUCOSE BLD-MCNC: 104 MG/DL (ref 74–99)
HCT VFR BLD CALC: 43 % (ref 34–48)
HEMOGLOBIN: 13.8 G/DL (ref 11.5–15.5)
MCH RBC QN AUTO: 30.5 PG (ref 26–35)
MCHC RBC AUTO-ENTMCNC: 32.1 % (ref 32–34.5)
MCV RBC AUTO: 94.9 FL (ref 80–99.9)
PDW BLD-RTO: 12.6 FL (ref 11.5–15)
PLATELET # BLD: 263 E9/L (ref 130–450)
PMV BLD AUTO: 9.5 FL (ref 7–12)
POTASSIUM REFLEX MAGNESIUM: 4.2 MMOL/L (ref 3.5–5)
RBC # BLD: 4.53 E12/L (ref 3.5–5.5)
SODIUM BLD-SCNC: 139 MMOL/L (ref 132–146)
WBC # BLD: 7.9 E9/L (ref 4.5–11.5)

## 2022-05-31 PROCEDURE — 80048 BASIC METABOLIC PNL TOTAL CA: CPT

## 2022-05-31 PROCEDURE — 87081 CULTURE SCREEN ONLY: CPT

## 2022-05-31 PROCEDURE — 36415 COLL VENOUS BLD VENIPUNCTURE: CPT

## 2022-05-31 PROCEDURE — 85027 COMPLETE CBC AUTOMATED: CPT

## 2022-05-31 RX ORDER — SODIUM CHLORIDE 0.9 % (FLUSH) 0.9 %
5-40 SYRINGE (ML) INJECTION PRN
Status: CANCELLED | OUTPATIENT
Start: 2022-05-31

## 2022-05-31 RX ORDER — SODIUM CHLORIDE 0.9 % (FLUSH) 0.9 %
5-40 SYRINGE (ML) INJECTION EVERY 12 HOURS SCHEDULED
Status: CANCELLED | OUTPATIENT
Start: 2022-05-31

## 2022-05-31 RX ORDER — SODIUM CHLORIDE, SODIUM LACTATE, POTASSIUM CHLORIDE, CALCIUM CHLORIDE 600; 310; 30; 20 MG/100ML; MG/100ML; MG/100ML; MG/100ML
INJECTION, SOLUTION INTRAVENOUS CONTINUOUS
Status: CANCELLED | OUTPATIENT
Start: 2022-05-31

## 2022-05-31 RX ORDER — SODIUM CHLORIDE 9 MG/ML
INJECTION, SOLUTION INTRAVENOUS PRN
Status: CANCELLED | OUTPATIENT
Start: 2022-05-31

## 2022-05-31 NOTE — PROGRESS NOTES
Call to [address] and Janine Nesbitt in surg scheduling notified of hx MDRO/need for contact isolation. Per Preston Farooq in ID, due to MDRO pt has lifetime isolation pop-up for hospitalizations.

## 2022-05-31 NOTE — PROGRESS NOTES
[address] [facility]                                                                                                                    PRE OP INSTRUCTIONS FOR  Ana Espino        Date: 5/31/2022    Date of surgery: 6/1/22   Arrival Time: Hospital will call you between 5pm and 7pm with your final arrival time for surgery    1. Do not eat or drink anything after midnight prior to surgery. This includes no water, chewing gum, mints or ice chips. 2. Take the following medications with a small sip of water on the morning of Surgery: Gabapentin, Pantoprazole     3. Diabetics may take evening dose of insulin but none after midnight. If you feel symptomatic or low blood sugar morning of surgery drink 1-2 ounces of apple juice only. 4. Aspirin, Ibuprofen, Advil, Naproxen, Vitamin E and other Anti-inflammatory products should be stopped  before surgery  as directed by your physician. Take Tylenol only unless instructed otherwise by your surgeon. 5. Check with your Doctor regarding stopping Plavix, Coumadin, Lovenox, Eliquis, Effient, or other blood thinners. 6. Do not smoke,use illicit drugs and do not drink any alcoholic beverages 24 hours prior to surgery. 7. You may brush your teeth the morning of surgery. DO NOT SWALLOW WATER    8. You MUST make arrangements for a responsible adult to take you home after your surgery. You will not be allowed to leave alone or drive yourself home. It is strongly suggested someone stay with you the first 24 hrs. Your surgery will be cancelled if you do not have a ride home. 9. PEDIATRIC PATIENTS ONLY:  A parent/legal guardian must accompany a child scheduled for surgery and plan to stay at the hospital until the child is discharged. Please do not bring other children with you.     10. Please wear simple, loose fitting clothing to the hospital.  Reino Apley not bring valuables (money, credit cards, checkbooks, etc.) Do not wear any makeup (including no eye makeup) or nail polish on your fingers or toes. 11. DO NOT wear any jewelry or piercings on day of surgery. All body piercing jewelry must be removed. 12. Shower the night before surgery with _x__Antibacterial soap /ELISE WIPES________    13. TOTAL JOINT REPLACEMENT/HYSTERECTOMY PATIENTS ONLY---Remember to bring Blood Bank bracelet to the hospital on the day of surgery. 14. If you have a Living Will and Durable Power of  for Healthcare, please bring in a copy. 15. If appropriate bring crutches, inspirex, WALKER, CANE etc... 12. Notify your Surgeon if you develop any illness between now and surgery time, cough, cold, fever, sore throat, nausea, vomiting, etc.  Please notify your surgeon if you experience dizziness, shortness of breath or blurred vision between now & the time of your surgery. 17. If you have ___dentures, they will be removed before going to the OR; we will provide you a container. If you wear ___contact lenses or ___glasses, they will be removed; please bring a case for them. 18. To provide excellent care visitors will be limited to 2 in the room at any given time. 19. Please bring picture ID and insurance card. 20. Sleep apnea patients need to bring CPAP SETTINGS to hospital on day of surgery. 21. During flu season no children under the age of 15 are permitted in the hospital for the safety of all patients. 22. Other                  Please call AMBULATORY CARE if you have any further questions.    [address]     [address]

## 2022-06-01 ENCOUNTER — ANESTHESIA (OUTPATIENT)
Dept: OPERATING ROOM | Age: 47
DRG: 230 | End: 2022-06-01
Payer: COMMERCIAL

## 2022-06-01 ENCOUNTER — HOSPITAL ENCOUNTER (INPATIENT)
Age: 47
LOS: 6 days | Discharge: HOME | DRG: 230 | End: 2022-06-07
Attending: SURGERY | Admitting: SURGERY
Payer: COMMERCIAL

## 2022-06-01 DIAGNOSIS — Z93.2 ILEOSTOMY IN PLACE (HCC): ICD-10-CM

## 2022-06-01 DIAGNOSIS — G89.18 POSTOPERATIVE PAIN: Primary | ICD-10-CM

## 2022-06-01 PROBLEM — Z98.890 S/P CLOSURE OF ILEOSTOMY: Status: ACTIVE | Noted: 2022-06-01

## 2022-06-01 LAB — MRSA CULTURE ONLY: NORMAL

## 2022-06-01 PROCEDURE — 6370000000 HC RX 637 (ALT 250 FOR IP): Performed by: SURGERY

## 2022-06-01 PROCEDURE — 0DBB0ZZ EXCISION OF ILEUM, OPEN APPROACH: ICD-10-PCS | Performed by: SURGERY

## 2022-06-01 PROCEDURE — 6360000002 HC RX W HCPCS: Performed by: SURGERY

## 2022-06-01 PROCEDURE — 44625 REPAIR BOWEL OPENING: CPT | Performed by: SURGERY

## 2022-06-01 PROCEDURE — 3600000014 HC SURGERY LEVEL 4 ADDTL 15MIN: Performed by: SURGERY

## 2022-06-01 PROCEDURE — 6360000002 HC RX W HCPCS: Performed by: ANESTHESIOLOGY

## 2022-06-01 PROCEDURE — 3700000001 HC ADD 15 MINUTES (ANESTHESIA): Performed by: SURGERY

## 2022-06-01 PROCEDURE — 6360000002 HC RX W HCPCS

## 2022-06-01 PROCEDURE — 2580000003 HC RX 258

## 2022-06-01 PROCEDURE — 7100000001 HC PACU RECOVERY - ADDTL 15 MIN: Performed by: SURGERY

## 2022-06-01 PROCEDURE — 2500000003 HC RX 250 WO HCPCS: Performed by: SURGERY

## 2022-06-01 PROCEDURE — 3700000000 HC ANESTHESIA ATTENDED CARE: Performed by: SURGERY

## 2022-06-01 PROCEDURE — 2580000003 HC RX 258: Performed by: SURGERY

## 2022-06-01 PROCEDURE — 3600000004 HC SURGERY LEVEL 4 BASE: Performed by: SURGERY

## 2022-06-01 PROCEDURE — 2720000010 HC SURG SUPPLY STERILE: Performed by: SURGERY

## 2022-06-01 PROCEDURE — 2709999900 HC NON-CHARGEABLE SUPPLY: Performed by: SURGERY

## 2022-06-01 PROCEDURE — 7100000000 HC PACU RECOVERY - FIRST 15 MIN: Performed by: SURGERY

## 2022-06-01 PROCEDURE — 2500000003 HC RX 250 WO HCPCS

## 2022-06-01 PROCEDURE — 99024 POSTOP FOLLOW-UP VISIT: CPT | Performed by: SURGERY

## 2022-06-01 PROCEDURE — 88304 TISSUE EXAM BY PATHOLOGIST: CPT

## 2022-06-01 PROCEDURE — 1200000000 HC SEMI PRIVATE

## 2022-06-01 RX ORDER — DEXAMETHASONE SODIUM PHOSPHATE 10 MG/ML
INJECTION, SOLUTION INTRAMUSCULAR; INTRAVENOUS PRN
Status: DISCONTINUED | OUTPATIENT
Start: 2022-06-01 | End: 2022-06-01 | Stop reason: SDUPTHER

## 2022-06-01 RX ORDER — IPRATROPIUM BROMIDE AND ALBUTEROL SULFATE 2.5; .5 MG/3ML; MG/3ML
1 SOLUTION RESPIRATORY (INHALATION) EVERY 4 HOURS PRN
Status: DISCONTINUED | OUTPATIENT
Start: 2022-06-01 | End: 2022-06-03

## 2022-06-01 RX ORDER — SODIUM CHLORIDE 0.9 % (FLUSH) 0.9 %
5-40 SYRINGE (ML) INJECTION EVERY 12 HOURS SCHEDULED
Status: DISCONTINUED | OUTPATIENT
Start: 2022-06-01 | End: 2022-06-07 | Stop reason: HOSPADM

## 2022-06-01 RX ORDER — TRAMADOL HYDROCHLORIDE 50 MG/1
25 TABLET ORAL EVERY 6 HOURS PRN
Status: DISCONTINUED | OUTPATIENT
Start: 2022-06-01 | End: 2022-06-07 | Stop reason: HOSPADM

## 2022-06-01 RX ORDER — CEFAZOLIN SODIUM 2 G/50ML
2000 SOLUTION INTRAVENOUS
Status: COMPLETED | OUTPATIENT
Start: 2022-06-01 | End: 2022-06-01

## 2022-06-01 RX ORDER — SODIUM CHLORIDE 9 MG/ML
INJECTION, SOLUTION INTRAVENOUS PRN
Status: DISCONTINUED | OUTPATIENT
Start: 2022-06-01 | End: 2022-06-01 | Stop reason: HOSPADM

## 2022-06-01 RX ORDER — BUPIVACAINE HYDROCHLORIDE AND EPINEPHRINE 2.5; 5 MG/ML; UG/ML
INJECTION, SOLUTION EPIDURAL; INFILTRATION; INTRACAUDAL; PERINEURAL PRN
Status: DISCONTINUED | OUTPATIENT
Start: 2022-06-01 | End: 2022-06-01 | Stop reason: ALTCHOICE

## 2022-06-01 RX ORDER — SODIUM CHLORIDE 0.9 % (FLUSH) 0.9 %
5-40 SYRINGE (ML) INJECTION PRN
Status: DISCONTINUED | OUTPATIENT
Start: 2022-06-01 | End: 2022-06-07 | Stop reason: HOSPADM

## 2022-06-01 RX ORDER — SODIUM CHLORIDE 9 MG/ML
INJECTION, SOLUTION INTRAVENOUS PRN
Status: DISCONTINUED | OUTPATIENT
Start: 2022-06-01 | End: 2022-06-07 | Stop reason: HOSPADM

## 2022-06-01 RX ORDER — SODIUM CHLORIDE 0.9 % (FLUSH) 0.9 %
5-40 SYRINGE (ML) INJECTION PRN
Status: DISCONTINUED | OUTPATIENT
Start: 2022-06-01 | End: 2022-06-01 | Stop reason: HOSPADM

## 2022-06-01 RX ORDER — SODIUM CHLORIDE, SODIUM LACTATE, POTASSIUM CHLORIDE, CALCIUM CHLORIDE 600; 310; 30; 20 MG/100ML; MG/100ML; MG/100ML; MG/100ML
INJECTION, SOLUTION INTRAVENOUS CONTINUOUS
Status: DISCONTINUED | OUTPATIENT
Start: 2022-06-01 | End: 2022-06-01 | Stop reason: HOSPADM

## 2022-06-01 RX ORDER — MEPERIDINE HYDROCHLORIDE 25 MG/ML
INJECTION INTRAMUSCULAR; INTRAVENOUS; SUBCUTANEOUS
Status: COMPLETED
Start: 2022-06-01 | End: 2022-06-01

## 2022-06-01 RX ORDER — MIDAZOLAM HYDROCHLORIDE 1 MG/ML
INJECTION INTRAMUSCULAR; INTRAVENOUS PRN
Status: DISCONTINUED | OUTPATIENT
Start: 2022-06-01 | End: 2022-06-01 | Stop reason: SDUPTHER

## 2022-06-01 RX ORDER — FLUTICASONE PROPIONATE 50 MCG
1 SPRAY, SUSPENSION (ML) NASAL DAILY
Status: DISCONTINUED | OUTPATIENT
Start: 2022-06-01 | End: 2022-06-07 | Stop reason: HOSPADM

## 2022-06-01 RX ORDER — ROCURONIUM BROMIDE 10 MG/ML
INJECTION, SOLUTION INTRAVENOUS PRN
Status: DISCONTINUED | OUTPATIENT
Start: 2022-06-01 | End: 2022-06-01 | Stop reason: SDUPTHER

## 2022-06-01 RX ORDER — GLYCOPYRROLATE 0.2 MG/ML
INJECTION INTRAMUSCULAR; INTRAVENOUS PRN
Status: DISCONTINUED | OUTPATIENT
Start: 2022-06-01 | End: 2022-06-01 | Stop reason: SDUPTHER

## 2022-06-01 RX ORDER — SODIUM CHLORIDE 9 MG/ML
INJECTION, SOLUTION INTRAVENOUS CONTINUOUS PRN
Status: DISCONTINUED | OUTPATIENT
Start: 2022-06-01 | End: 2022-06-01 | Stop reason: SDUPTHER

## 2022-06-01 RX ORDER — METHOCARBAMOL 500 MG/1
500 TABLET, FILM COATED ORAL 4 TIMES DAILY
Status: DISCONTINUED | OUTPATIENT
Start: 2022-06-01 | End: 2022-06-07 | Stop reason: HOSPADM

## 2022-06-01 RX ORDER — FENTANYL CITRATE 50 UG/ML
25 INJECTION, SOLUTION INTRAMUSCULAR; INTRAVENOUS EVERY 10 MIN PRN
Status: COMPLETED | OUTPATIENT
Start: 2022-06-01 | End: 2022-06-01

## 2022-06-01 RX ORDER — PROPOFOL 10 MG/ML
INJECTION, EMULSION INTRAVENOUS PRN
Status: DISCONTINUED | OUTPATIENT
Start: 2022-06-01 | End: 2022-06-01 | Stop reason: SDUPTHER

## 2022-06-01 RX ORDER — ONDANSETRON 2 MG/ML
4 INJECTION INTRAMUSCULAR; INTRAVENOUS EVERY 6 HOURS PRN
Status: DISCONTINUED | OUTPATIENT
Start: 2022-06-01 | End: 2022-06-07 | Stop reason: HOSPADM

## 2022-06-01 RX ORDER — ENOXAPARIN SODIUM 100 MG/ML
40 INJECTION SUBCUTANEOUS DAILY
Status: DISCONTINUED | OUTPATIENT
Start: 2022-06-02 | End: 2022-06-01

## 2022-06-01 RX ORDER — NORTRIPTYLINE HYDROCHLORIDE 10 MG/1
10 CAPSULE ORAL NIGHTLY PRN
Status: DISCONTINUED | OUTPATIENT
Start: 2022-06-01 | End: 2022-06-07 | Stop reason: HOSPADM

## 2022-06-01 RX ORDER — LISINOPRIL 10 MG/1
10 TABLET ORAL DAILY
Status: DISCONTINUED | OUTPATIENT
Start: 2022-06-01 | End: 2022-06-07 | Stop reason: HOSPADM

## 2022-06-01 RX ORDER — FENTANYL CITRATE 0.05 MG/ML
INJECTION, SOLUTION INTRAMUSCULAR; INTRAVENOUS
Status: COMPLETED
Start: 2022-06-01 | End: 2022-06-01

## 2022-06-01 RX ORDER — ONDANSETRON 4 MG/1
4 TABLET, ORALLY DISINTEGRATING ORAL EVERY 8 HOURS PRN
Status: DISCONTINUED | OUTPATIENT
Start: 2022-06-01 | End: 2022-06-07 | Stop reason: HOSPADM

## 2022-06-01 RX ORDER — FENTANYL CITRATE 50 UG/ML
INJECTION, SOLUTION INTRAMUSCULAR; INTRAVENOUS PRN
Status: DISCONTINUED | OUTPATIENT
Start: 2022-06-01 | End: 2022-06-01 | Stop reason: SDUPTHER

## 2022-06-01 RX ORDER — MORPHINE SULFATE 4 MG/ML
4 INJECTION, SOLUTION INTRAMUSCULAR; INTRAVENOUS
Status: DISCONTINUED | OUTPATIENT
Start: 2022-06-01 | End: 2022-06-07 | Stop reason: HOSPADM

## 2022-06-01 RX ORDER — MORPHINE SULFATE 2 MG/ML
2 INJECTION, SOLUTION INTRAMUSCULAR; INTRAVENOUS
Status: DISCONTINUED | OUTPATIENT
Start: 2022-06-01 | End: 2022-06-07 | Stop reason: HOSPADM

## 2022-06-01 RX ORDER — MEPERIDINE HYDROCHLORIDE 25 MG/ML
12.5 INJECTION INTRAMUSCULAR; INTRAVENOUS; SUBCUTANEOUS EVERY 5 MIN PRN
Status: COMPLETED | OUTPATIENT
Start: 2022-06-01 | End: 2022-06-01

## 2022-06-01 RX ORDER — ENOXAPARIN SODIUM 100 MG/ML
40 INJECTION SUBCUTANEOUS ONCE
Status: COMPLETED | OUTPATIENT
Start: 2022-06-01 | End: 2022-06-01

## 2022-06-01 RX ORDER — KETOROLAC TROMETHAMINE 30 MG/ML
15 INJECTION, SOLUTION INTRAMUSCULAR; INTRAVENOUS EVERY 6 HOURS
Status: COMPLETED | OUTPATIENT
Start: 2022-06-01 | End: 2022-06-02

## 2022-06-01 RX ORDER — KETOROLAC TROMETHAMINE 15 MG/ML
INJECTION, SOLUTION INTRAMUSCULAR; INTRAVENOUS
Status: COMPLETED
Start: 2022-06-01 | End: 2022-06-01

## 2022-06-01 RX ORDER — NEOSTIGMINE METHYLSULFATE 1 MG/ML
INJECTION, SOLUTION INTRAVENOUS PRN
Status: DISCONTINUED | OUTPATIENT
Start: 2022-06-01 | End: 2022-06-01 | Stop reason: SDUPTHER

## 2022-06-01 RX ORDER — BACLOFEN 10 MG/1
20 TABLET ORAL 4 TIMES DAILY PRN
Status: DISCONTINUED | OUTPATIENT
Start: 2022-06-01 | End: 2022-06-07 | Stop reason: HOSPADM

## 2022-06-01 RX ORDER — KETOROLAC TROMETHAMINE 30 MG/ML
INJECTION, SOLUTION INTRAMUSCULAR; INTRAVENOUS PRN
Status: DISCONTINUED | OUTPATIENT
Start: 2022-06-01 | End: 2022-06-01 | Stop reason: SDUPTHER

## 2022-06-01 RX ORDER — TRAMADOL HYDROCHLORIDE 50 MG/1
50 TABLET ORAL EVERY 6 HOURS PRN
Status: DISCONTINUED | OUTPATIENT
Start: 2022-06-01 | End: 2022-06-07 | Stop reason: HOSPADM

## 2022-06-01 RX ORDER — KETOROLAC TROMETHAMINE 30 MG/ML
15 INJECTION, SOLUTION INTRAMUSCULAR; INTRAVENOUS ONCE
Status: DISCONTINUED | OUTPATIENT
Start: 2022-06-01 | End: 2022-06-01 | Stop reason: HOSPADM

## 2022-06-01 RX ORDER — SODIUM CHLORIDE, SODIUM LACTATE, POTASSIUM CHLORIDE, CALCIUM CHLORIDE 600; 310; 30; 20 MG/100ML; MG/100ML; MG/100ML; MG/100ML
INJECTION, SOLUTION INTRAVENOUS CONTINUOUS
Status: DISCONTINUED | OUTPATIENT
Start: 2022-06-01 | End: 2022-06-07 | Stop reason: HOSPADM

## 2022-06-01 RX ORDER — SODIUM CHLORIDE 0.9 % (FLUSH) 0.9 %
5-40 SYRINGE (ML) INJECTION EVERY 12 HOURS SCHEDULED
Status: DISCONTINUED | OUTPATIENT
Start: 2022-06-01 | End: 2022-06-01 | Stop reason: HOSPADM

## 2022-06-01 RX ADMIN — MEPERIDINE HYDROCHLORIDE 12.5 MG: 25 INJECTION, SOLUTION INTRAMUSCULAR; INTRAVENOUS; SUBCUTANEOUS at 13:59

## 2022-06-01 RX ADMIN — MEPERIDINE HYDROCHLORIDE 12.5 MG: 25 INJECTION, SOLUTION INTRAMUSCULAR; INTRAVENOUS; SUBCUTANEOUS at 12:56

## 2022-06-01 RX ADMIN — MORPHINE SULFATE 4 MG: 4 INJECTION, SOLUTION INTRAMUSCULAR; INTRAVENOUS at 21:00

## 2022-06-01 RX ADMIN — ROCURONIUM BROMIDE 40 MG: 10 SOLUTION INTRAVENOUS at 11:22

## 2022-06-01 RX ADMIN — MEPERIDINE HYDROCHLORIDE 12.5 MG: 25 INJECTION, SOLUTION INTRAMUSCULAR; INTRAVENOUS; SUBCUTANEOUS at 13:26

## 2022-06-01 RX ADMIN — MIDAZOLAM 2 MG: 1 INJECTION INTRAMUSCULAR; INTRAVENOUS at 11:14

## 2022-06-01 RX ADMIN — DEXAMETHASONE SODIUM PHOSPHATE 10 MG: 10 INJECTION, SOLUTION INTRAMUSCULAR; INTRAVENOUS at 11:29

## 2022-06-01 RX ADMIN — FENTANYL CITRATE 50 MCG: 50 INJECTION, SOLUTION INTRAMUSCULAR; INTRAVENOUS at 11:22

## 2022-06-01 RX ADMIN — KETOROLAC TROMETHAMINE 15 MG: 15 INJECTION, SOLUTION INTRAMUSCULAR; INTRAVENOUS at 13:15

## 2022-06-01 RX ADMIN — FENTANYL CITRATE 25 MCG: 50 INJECTION INTRAMUSCULAR; INTRAVENOUS at 15:45

## 2022-06-01 RX ADMIN — FENTANYL CITRATE 50 MCG: 50 INJECTION, SOLUTION INTRAMUSCULAR; INTRAVENOUS at 11:33

## 2022-06-01 RX ADMIN — KETOROLAC TROMETHAMINE 15 MG: 30 INJECTION, SOLUTION INTRAMUSCULAR at 18:37

## 2022-06-01 RX ADMIN — KETOROLAC TROMETHAMINE 30 MG: 30 INJECTION, SOLUTION INTRAMUSCULAR; INTRAVENOUS at 11:58

## 2022-06-01 RX ADMIN — PROPOFOL 160 MG: 10 INJECTION, EMULSION INTRAVENOUS at 11:22

## 2022-06-01 RX ADMIN — Medication 3 MG: at 12:10

## 2022-06-01 RX ADMIN — FENTANYL CITRATE 100 MCG: 50 INJECTION, SOLUTION INTRAMUSCULAR; INTRAVENOUS at 11:37

## 2022-06-01 RX ADMIN — MEPERIDINE HYDROCHLORIDE 12.5 MG: 25 INJECTION, SOLUTION INTRAMUSCULAR; INTRAVENOUS; SUBCUTANEOUS at 14:13

## 2022-06-01 RX ADMIN — FENTANYL CITRATE 25 MCG: 50 INJECTION INTRAMUSCULAR; INTRAVENOUS at 13:05

## 2022-06-01 RX ADMIN — FENTANYL CITRATE 25 MCG: 50 INJECTION INTRAMUSCULAR; INTRAVENOUS at 13:42

## 2022-06-01 RX ADMIN — Medication 0.5 MG: at 12:28

## 2022-06-01 RX ADMIN — ENOXAPARIN SODIUM 40 MG: 100 INJECTION SUBCUTANEOUS at 18:42

## 2022-06-01 RX ADMIN — Medication 0.5 MG: at 12:40

## 2022-06-01 RX ADMIN — FENTANYL CITRATE 25 MCG: 50 INJECTION INTRAMUSCULAR; INTRAVENOUS at 14:35

## 2022-06-01 RX ADMIN — SODIUM CHLORIDE: 9 INJECTION, SOLUTION INTRAVENOUS at 11:59

## 2022-06-01 RX ADMIN — SODIUM CHLORIDE, POTASSIUM CHLORIDE, SODIUM LACTATE AND CALCIUM CHLORIDE: 600; 310; 30; 20 INJECTION, SOLUTION INTRAVENOUS at 07:49

## 2022-06-01 RX ADMIN — HYDROMORPHONE HYDROCHLORIDE 0.5 MG: 1 INJECTION, SOLUTION INTRAMUSCULAR; INTRAVENOUS; SUBCUTANEOUS at 12:40

## 2022-06-01 RX ADMIN — HYDROMORPHONE HYDROCHLORIDE 0.5 MG: 1 INJECTION, SOLUTION INTRAMUSCULAR; INTRAVENOUS; SUBCUTANEOUS at 12:28

## 2022-06-01 RX ADMIN — SODIUM CHLORIDE, POTASSIUM CHLORIDE, SODIUM LACTATE AND CALCIUM CHLORIDE: 600; 310; 30; 20 INJECTION, SOLUTION INTRAVENOUS at 18:38

## 2022-06-01 RX ADMIN — CEFAZOLIN SODIUM 2000 MG: 2 SOLUTION INTRAVENOUS at 11:19

## 2022-06-01 RX ADMIN — GLYCOPYRROLATE 0.6 MG: 0.2 INJECTION INTRAMUSCULAR; INTRAVENOUS at 12:10

## 2022-06-01 RX ADMIN — METHOCARBAMOL TABLETS 500 MG: 500 TABLET, COATED ORAL at 19:53

## 2022-06-01 ASSESSMENT — PAIN - FUNCTIONAL ASSESSMENT
PAIN_FUNCTIONAL_ASSESSMENT: PREVENTS OR INTERFERES SOME ACTIVE ACTIVITIES AND ADLS
PAIN_FUNCTIONAL_ASSESSMENT: NONE - DENIES PAIN
PAIN_FUNCTIONAL_ASSESSMENT: PREVENTS OR INTERFERES SOME ACTIVE ACTIVITIES AND ADLS
PAIN_FUNCTIONAL_ASSESSMENT: PREVENTS OR INTERFERES SOME ACTIVE ACTIVITIES AND ADLS

## 2022-06-01 ASSESSMENT — PAIN DESCRIPTION - FREQUENCY
FREQUENCY: CONTINUOUS
FREQUENCY: INTERMITTENT
FREQUENCY: CONTINUOUS
FREQUENCY: INTERMITTENT
FREQUENCY: CONTINUOUS
FREQUENCY: CONTINUOUS

## 2022-06-01 ASSESSMENT — PAIN DESCRIPTION - PAIN TYPE
TYPE: SURGICAL PAIN

## 2022-06-01 ASSESSMENT — PAIN SCALES - GENERAL
PAINLEVEL_OUTOF10: 7
PAINLEVEL_OUTOF10: 5
PAINLEVEL_OUTOF10: 8
PAINLEVEL_OUTOF10: 7
PAINLEVEL_OUTOF10: 6
PAINLEVEL_OUTOF10: 6
PAINLEVEL_OUTOF10: 10
PAINLEVEL_OUTOF10: 7
PAINLEVEL_OUTOF10: 9
PAINLEVEL_OUTOF10: 6
PAINLEVEL_OUTOF10: 8
PAINLEVEL_OUTOF10: 4
PAINLEVEL_OUTOF10: 8
PAINLEVEL_OUTOF10: 8
PAINLEVEL_OUTOF10: 9

## 2022-06-01 ASSESSMENT — PAIN DESCRIPTION - DESCRIPTORS
DESCRIPTORS: SORE
DESCRIPTORS: SORE
DESCRIPTORS: ACHING;BURNING;SORE
DESCRIPTORS: BURNING;SORE
DESCRIPTORS: SORE;ACHING
DESCRIPTORS: BURNING;SORE
DESCRIPTORS: SHARP
DESCRIPTORS: ACHING;SORE
DESCRIPTORS: SORE
DESCRIPTORS: DISCOMFORT;SORE
DESCRIPTORS: BURNING;SORE
DESCRIPTORS: SORE
DESCRIPTORS: ACHING;SORE

## 2022-06-01 ASSESSMENT — PAIN DESCRIPTION - LOCATION
LOCATION: ABDOMEN

## 2022-06-01 ASSESSMENT — PAIN DESCRIPTION - ORIENTATION
ORIENTATION: MID;RIGHT
ORIENTATION: RIGHT;OUTER;MID
ORIENTATION: OUTER;RIGHT
ORIENTATION: MID
ORIENTATION: RIGHT;MID
ORIENTATION: RIGHT;MID
ORIENTATION: MID
ORIENTATION: MID;RIGHT

## 2022-06-01 ASSESSMENT — PAIN DESCRIPTION - ONSET
ONSET: ON-GOING

## 2022-06-01 ASSESSMENT — LIFESTYLE VARIABLES: SMOKING_STATUS: 1

## 2022-06-01 NOTE — ANESTHESIA PRE PROCEDURE
Department of Anesthesiology  Preprocedure Note       Name:  Vero Watson   Age:  51 y.o.  :  1975                                          MRN:  [id number]         Date:  2022      Surgeon: Shital Izaguirre):  Alia Galan MD    Procedure: Procedure(s):  LAPAROSCOPIC ILEOSTOMY REVERSAL POSS OPEN    Medications prior to admission:   Prior to Admission medications    Medication Sig Start Date End Date Taking?  Authorizing Provider   LISINOPRIL PO Take by mouth daily    Historical Provider, MD   famotidine (PEPCID) 40 MG tablet TAKE 1 TABLET BY MOUTH DAILY IN THE EVENING *EMERGENCY REFILL* 22   Historical Provider, MD   fluticasone (FLONASE) 50 MCG/ACT nasal spray 1 spray by Each Nostril route daily 22   Pati Reyes PA-C   apixaban (ELIQUIS) 5 MG TABS tablet Take 1 tablet by mouth 2 times daily 3/25/22   Pati Reyes PA-C   nortriptyline (PAMELOR) 10 MG capsule TAKE ONE (1) CAPSULE BY MOUTH NIGHTLY FOR HEADACHE 3/1/22   Pati Reyes PA-C   pantoprazole (PROTONIX) 40 MG tablet Take 1 tablet by mouth 2 times daily (before meals) 22   Steven Willis DO   vitamin D (D3-50) 59902 UNIT CAPS Take 1 capsule by mouth once a week  Patient taking differently: Take 50,000 Units by mouth once a week On 21   Tad Irons, APRN - CNP   gabapentin (NEURONTIN) 300 MG capsule TAKE 1 CAPSULE BY MOUTH THREE TIMES DAILY 21  Tad Irons, APRN - CNP   baclofen (LIORESAL) 20 MG tablet Take 1 tablet by mouth 4 times daily as needed (muscle spasms; pain) 21   Tad Irons, APRN - CNP   rOPINIRole (REQUIP) 0.5 MG tablet Take 1 tablet by mouth 2 times daily as needed (restless legs) 21   Tad Irons, APRN - CNP   ocrelizumab (OCREVUS) 300 MG/10ML SOLN injection Infuse 20 mLs intravenously every 6 months  Patient taking differently: Infuse 600 mg intravenously every 6 months Due in 21   Tad Irons, APRN - CNP       Current medications:    No current facility-administered medications for this visit. No current outpatient medications on file.      Facility-Administered Medications Ordered in Other Visits   Medication Dose Route Frequency Provider Last Rate Last Admin    0.9 % sodium chloride infusion   IntraVENous PRN Chace Davila MD        ceFAZolin (ANCEF) 2000 mg in dextrose 3 % 50 mL IVPB (duplex)  2,000 mg IntraVENous On Call to [facility]        lactated ringers infusion   IntraVENous Continuous Chace Davila  mL/hr at 06/01/22 1114 NoRateChange at 06/01/22 1114    sodium chloride flush 0.9 % injection 5-40 mL  5-40 mL IntraVENous 2 times per day Chace Davila MD        sodium chloride flush 0.9 % injection 5-40 mL  5-40 mL IntraVENous PRN Chace Davila MD           Allergies:  No Known Allergies    Problem List:    Patient Active Problem List   Diagnosis Code    Vocal cord dysfunction J38.3    Multiple sclerosis ([address] G35    Morbidly obese ([address] E66.01    Palafox's esophagus with dysplasia K22.719    Secondary restless legs syndrome G25.81    S/P colectomy Z90.49    Malignant neoplasm of descending colon ([address] C18.6    Ileus, postoperative ([address] K91.89, K56.7    Pelvic abscess in female N73.9    Sigmoid stricture ([address] K56.699    Postoperative intra-abdominal abscess T81.43XA    Pulmonary embolism without acute cor pulmonale (HCC) I26.99    Multiple subsegmental pulmonary emboli without acute cor pulmonale (HCC) I26.94    Ileostomy in place ([address] Z93.2       Past Medical History:        Diagnosis Date    Acid reflux disease     Cyst of ovary     Fracture of nasal bone     Headache     Hearing loss     Hx of blood clots     leg to lung    Hypertension     Migraine     Morbidly obese ([address] 10/05/2020    Multiple sclerosis ([address]     denies limitations at present 11/2020    VEENA no CPAP     severe VEENA per pt    Right shoulder pain 11/2020    Tinnitus     TMJ dysfunction     left side       Past Surgical History:        Procedure Laterality Date    APPENDECTOMY      BACK SURGERY      lumbar    BICEPS TENDON REPAIR Right 11/11/2020    BICEPS TENODESIS performed by Yevgeniy Ghosh MD at [place] [address] Left 8/31/2021    LAPAROSCOPIC LEFT HEMICOLECTOMY WITH LOOP OSTOMY performed by Jordan Morrison MD at [address] [facility] 9/6/2021    DIAGNOSTIC LAPAROSCOPY POSSIBLE BOWEL RESECTION POSSILBE OSTOMY performed by Jordan Morrison MD at [address] [place] [address],[facility] Floor N/A 2/8/2022    LAPAROSCOPIC SIGMOID COLON RESECTION performed by Jordan Morrison MD at [address] [place] [address]  03/05/2018    Robotic hysterectomy, bilateral salpingectomy, right oophorectomy DR. ARIANA MONIQUE [facility]     HYSTERECTOMY, TOTAL ABDOMINAL      LARYNGOSCOPY N/A 7/17/2020    DIRECT LARYNGOSCOPY--OMNI GUIDE LASER performed by Charlene Ochoa MD at [facility] PARTIAL HYSTERECTOMY      PROCTOSIGMOIDOSCOPY N/A 10/12/2021    ANAL PROCTO SIGMOIDOSCOPY FLEXIBLE performed by Jordan Morrison MD at [address] [place] [address] N/A 1/25/2022    ANAL PROCTO SIGMOIDOSCOPY FLEXIBLE performed by Jordan Morrison MD at [facility] ARTHROSCOPY Right 11/11/2020    RIGHT SHOULDER DIAGNOSTIC ARTHROSCOPY WITH DECOMPRESSION ROTATOR CUFF REPAIR performed by Yevgeniy Ghosh MD at [place] [address]         Social History:    Social History     Tobacco Use    Smoking status: Current Every Day Smoker     Packs/day: 0.50     Years: 25.00     Pack years: 12.50     Types: Cigarettes    Smokeless tobacco: Never Used    Tobacco comment:     Substance Use Topics    Alcohol use: Not Currently                                Ready to quit: Not Answered  Counseling given: Not Answered  Comment:        Vital Signs (Current): There were no vitals filed for this visit.                                            BP Readings from Last 3 Encounters:   06/01/22 107/63   05/31/22 124/76   05/27/22 122/76       NPO Status:                                                                                 BMI:   Wt Readings from Last 3 Encounters:   06/01/22 186 lb (84.4 kg)   05/31/22 186 lb (84.4 kg)   05/27/22 184 lb 6.4 oz (83.6 kg)     There is no height or weight on file to calculate BMI.    CBC:   Lab Results   Component Value Date    WBC 7.9 05/31/2022    RBC 4.53 05/31/2022    HGB 13.8 05/31/2022    HCT 43.0 05/31/2022    MCV 94.9 05/31/2022    RDW 12.6 05/31/2022     05/31/2022       CMP:   Lab Results   Component Value Date     05/31/2022    K 4.2 05/31/2022     05/31/2022    CO2 23 05/31/2022    BUN 8 05/31/2022    CREATININE 0.6 05/31/2022    GFRAA >60 05/31/2022    LABGLOM >60 05/31/2022    GLUCOSE 104 05/31/2022    PROT 7.2 03/29/2022    CALCIUM 9.0 05/31/2022    BILITOT 0.5 03/29/2022    ALKPHOS 138 03/29/2022    AST 31 03/29/2022    ALT 33 03/29/2022       POC Tests: No results for input(s): POCGLU, POCNA, POCK, POCCL, POCBUN, POCHEMO, POCHCT in the last 72 hours. Coags:   Lab Results   Component Value Date    PROTIME 11.1 04/01/2022    INR 1.0 04/01/2022    APTT 105.1 04/03/2022       HCG (If Applicable):   Lab Results   Component Value Date    PREGTESTUR neg 02/04/2018        ABGs: No results found for: PHART, PO2ART, MKG9ODH, IBI8ZRY, BEART, Y7TNQDCJ     Type & Screen (If Applicable):  Lab Results   Component Value Date    LABABO A 03/05/2018       Drug/Infectious Status (If Applicable):  No results found for: HIV, HEPCAB    COVID-19 Screening (If Applicable):   Lab Results   Component Value Date    COVID19 Not Detected 02/02/2022     EKG:10/2021   NSR    U/S CAROTID : 2020      Impression   No hemodynamically significant stenosis (less than 50%) in the   bilateral internal carotid arteries.      CXR: 2021  Impression   No acute process.         CTA 2021     Impression   No CT evidence of pulmonary embolism.  Mild ground-glass opacities in the   right lung compatible with history of COVID-19 pneumonia. Anesthesia Evaluation  Patient summary reviewed no history of anesthetic complications:   Airway: Mallampati: II  TM distance: >3 FB   Neck ROM: full  Mouth opening: > = 3 FB   Dental:          Pulmonary: breath sounds clear to auscultation  (+) sleep apnea: on noncompliant,  current smoker (0.5 PPD.)          Patient smoked on day of surgery. Cardiovascular:  Exercise tolerance: good (>4 METS),       (-) hypertension (pt states used to have high blood pressure )    ECG reviewed  Rhythm: regular  Rate: normal           Beta Blocker:  Not on Beta Blocker      ROS comment: EKG: Normal Sinus Rhythm 92. Neuro/Psych:   (+) headaches: migraine headaches,              ROS comment: Multiple sclerosis   TMJ dysfunction    GI/Hepatic/Renal:   (+) GERD:,           Endo/Other:    (+) malignancy/cancer (colon ca dx august 2021 no chem no radiation ). Pt had no PAT visit        ROS comment: Palafox's esophagus with dysplasia      Sigmoid stricture  Malignant neoplasm of descending colon   Ileus, postoperative   S/P colectomy Abdominal:             Vascular: negative vascular ROS. Other Findings:             Anesthesia Plan      general     ASA 3     (#20 RAC)  Induction: intravenous. BIS  MIPS: Postoperative opioids intended, Prophylactic antiemetics administered and Postoperative trial extubation. Anesthetic plan and risks discussed with patient. Use of blood products discussed with patient whom consented to blood products. Plan discussed with CRNA and attending.     Attending anesthesiologist reviewed and agrees with Liz Dallas MD   6/1/2022

## 2022-06-01 NOTE — CONSULTS
Department of Internal Medicine        HISTORY OF PRESENT ILLNESS:      The patient is a 51 y.o. female who presents with having a laparoscopic ileostomy reversal.  The patient is seen postop in recovery. Patient has expected postop discomfort. Patient denies any problem with chest pain, dizziness, nausea/vomiting or unusual shortness of breath. Preop lab work reviewed and were normal.  O2 saturation 94% on room air. Blood pressure 144/93 with a heart rate of 72. Past Medical History:    Past Medical History:   Diagnosis Date    Acid reflux disease     Cyst of ovary     Fracture of nasal bone     Headache     Hearing loss     Hx of blood clots     leg to lung    Hypertension     Migraine     Morbidly obese ([address] 10/05/2020    Multiple sclerosis ([address]     denies limitations at present 11/2020    VEENA no CPAP     severe VEENA per pt    Right shoulder pain 11/2020    Tinnitus     TMJ dysfunction     left side     Past Surgical History:    Past Surgical History:   Procedure Laterality Date    APPENDECTOMY      BACK SURGERY      lumbar    BICEPS TENDON REPAIR Right 11/11/2020    BICEPS TENODESIS performed by Cam Byrne MD at [place] [address] Left 8/31/2021    LAPAROSCOPIC LEFT HEMICOLECTOMY WITH LOOP OSTOMY performed by Nicholas Alvarenga MD at [address] 9/6/2021    DIAGNOSTIC LAPAROSCOPY POSSIBLE BOWEL RESECTION POSSILBE OSTOMY performed by Nicholas Alvarenga MD at [address] 2/8/2022    LAPAROSCOPIC SIGMOID COLON RESECTION performed by Nicholas Alvarenga MD at [address] [place] [address]  03/05/2018    Robotic hysterectomy, bilateral salpingectomy, right oophorectomy DR. ARIANA MONIQUE [facility]     HYSTERECTOMY, TOTAL ABDOMINAL      LARYNGOSCOPY N/A 7/17/2020    DIRECT LARYNGOSCOPY--OMNI GUIDE LASER performed by Kike Granados MD at [address] [place] [address]      PROCTOSIGMOIDOSCOPY N/A 10/12/2021    ANAL PROCTO SIGMOIDOSCOPY FLEXIBLE performed by Mary Jo Lopez MD at [address] [place] [address] N/A 1/25/2022    ANAL PROCTO SIGMOIDOSCOPY FLEXIBLE performed by Mary Jo Lopez MD at [facility] ARTHROSCOPY Right 11/11/2020    RIGHT SHOULDER DIAGNOSTIC ARTHROSCOPY WITH DECOMPRESSION ROTATOR CUFF REPAIR performed by Haven Elena MD at [place] [address]         Medications Prior to Admission:    @  Prior to Admission medications    Medication Sig Start Date End Date Taking?  Authorizing Provider   LISINOPRIL PO Take by mouth daily   Yes Historical Provider, MD   famotidine (PEPCID) 40 MG tablet TAKE 1 TABLET BY MOUTH DAILY IN THE EVENING *EMERGENCY REFILL* 4/4/22   Historical Provider, MD   fluticasone (FLONASE) 50 MCG/ACT nasal spray 1 spray by Each Nostril route daily 4/11/22   Chetan Cherry PA-C   apixaban (ELIQUIS) 5 MG TABS tablet Take 1 tablet by mouth 2 times daily 3/25/22   Chetan Cherry PA-C   nortriptyline (PAMELOR) 10 MG capsule TAKE ONE (1) CAPSULE BY MOUTH NIGHTLY FOR HEADACHE 3/1/22   Chetan Cherry PA-C   pantoprazole (PROTONIX) 40 MG tablet Take 1 tablet by mouth 2 times daily (before meals) 2/24/22   Saba Recio DO   vitamin D (D3-50) 18706 UNIT CAPS Take 1 capsule by mouth once a week  Patient taking differently: Take 50,000 Units by mouth once a week On Mondays 11/30/21   MARIXA Segura CNP   gabapentin (NEURONTIN) 300 MG capsule TAKE 1 CAPSULE BY MOUTH THREE TIMES DAILY 9/13/21 5/27/22  MARIXA Segura CNP   baclofen (LIORESAL) 20 MG tablet Take 1 tablet by mouth 4 times daily as needed (muscle spasms; pain) 8/13/21   MARIXA Segura CNP   rOPINIRole (REQUIP) 0.5 MG tablet Take 1 tablet by mouth 2 times daily as needed (restless legs) 8/13/21   MARIXA Segura CNP   ocrelizumab (OCREVUS) 300 MG/10ML SOLN injection Infuse 20 mLs intravenously every 6 months  Patient taking differently: Infuse 600 mg intravenously every 6 months Due in August 2022 7/19/21   MARIXA Mcginnis CNP       Allergies:  Patient has no known allergies. Social History:   Social History     Socioeconomic History    Marital status: Single     Spouse name: Not on file    Number of children: 3    Years of education: 6    Highest education level: 11th grade   Occupational History    Not on file   Tobacco Use    Smoking status: Current Every Day Smoker     Packs/day: 0.50     Years: 25.00     Pack years: 12.50     Types: Cigarettes    Smokeless tobacco: Never Used    Tobacco comment:     Vaping Use    Vaping Use: Never used   Substance and Sexual Activity    Alcohol use: Not Currently    Drug use: No    Sexual activity: Yes     Partners: Male   Other Topics Concern    Not on file   Social History Narrative    Uses [employer] for transportation    [employer]     Social Determinants of Health     Financial Resource Strain: Low Risk     Difficulty of Paying Living Expenses: Not hard at all   Food Insecurity: No Food Insecurity    Worried About Running Out of Food in the Last Year: Never true    [place]ar of Food in the Last Year: Never true   Transportation Needs:     Lack of Transportation (Medical): Not on file    Lack of Transportation (Non-Medical):  Not on file   Physical Activity:     Days of Exercise per Week: Not on file    Minutes of Exercise per Session: Not on file   Stress:     Feeling of Stress : Not on file   Social Connections:     Frequency of Communication with Friends and Family: Not on file    Frequency of Social Gatherings with Friends and Family: Not on file    Attends [employer] Services: Not on file    Active Member of Clubs or Organizations: Not on file    Attends Club or Organization Meetings: Not on file    Marital Status: Not on file   Intimate Partner Violence:     Fear of Current or Ex-Partner: Not on file    Emotionally Abused: Not on file    Physically Abused: Not on file    Sexually Abused: Not on file   Housing Stability:     Unable to Pay for Housing in the Last Year: Not on file    Number of Places Lived in the Last Year: Not on file    Unstable Housing in the Last Year: Not on file       Family History:   Family History   Problem Relation Age of Onset    Mult Sclerosis Mother     Colon Cancer Neg Hx     Uterine Cancer Neg Hx     Ovarian Cancer Neg Hx     Breast Cancer Neg Hx        REVIEW OF SYSTEMS:    Gen: Patient denies any lightheadedness or dizziness. No LOC or syncope. No fevers or chills. HEENT: No earache, sore throat or nasal congestion. Resp: Denies cough, hemoptysis or sputum production. Cardiac: Denies chest pain, SOB, diaphoresis or palpitations. GI: No nausea, vomiting, diarrhea or constipation. No melena or hematochezia. : No urinary complaints, dysuria, hematuria or frequency. MSK: No extremity weakness, paralysis or paresthesias. PHYSICAL EXAM:    Vitals:  BP (!) 144/93   Pulse 73   Temp (!) 96.6 °F (35.9 °C)   Resp 17   Ht 5' 1\" (1.549 m)   Wt 186 lb (84.4 kg)   LMP 01/05/2018   SpO2 94%   BMI 35.14 kg/m²     General:  This is a 51 y.o. yo female who is alert and oriented in expected postop discomfort  HEENT:  Head is normocephalic and atraumatic, PERRLA, EOMI, mucus membranes moist with no pharyngeal erythema or exudate. Neck:  Supple with no carotid bruits, JVD or thyromegaly.   No cervical adenopathy  CV:  Regular rate and rhythm, no murmurs  Lungs: Coarse breath sounds to auscultation bilaterally with no wheezes, rales or rhonchi  Abdomen:  + Postop abdomen, mildly distended, bowel sounds present  Extremities:  No edema, peripheral pulses intact bilaterally  Neuro:  Cranial nerves II-XII grossly intact; motor and sensory function intact with no focal deficits  Skin:  No rashes, lesions or wounds      DATA:  CBC with Differential:    Lab Results   Component Value Date    WBC 7.9 05/31/2022    RBC 4.53 05/31/2022    HGB 13.8 05/31/2022    HCT 43.0 05/31/2022     05/31/2022    MCV 94.9 05/31/2022    MCH 30.5 05/31/2022    MCHC 32.1 05/31/2022    RDW 12.6 05/31/2022    METASPCT 0.9 11/23/2021    LYMPHOPCT 29.3 04/01/2022    MONOPCT 7.8 04/01/2022    BASOPCT 0.4 04/01/2022    MONOSABS 0.74 04/01/2022    LYMPHSABS 2.78 04/01/2022    EOSABS 0.32 04/01/2022    BASOSABS 0.04 04/01/2022     CMP:    Lab Results   Component Value Date     05/31/2022    K 4.2 05/31/2022     05/31/2022    CO2 23 05/31/2022    BUN 8 05/31/2022    CREATININE 0.6 05/31/2022    GFRAA >60 05/31/2022    LABGLOM >60 05/31/2022    GLUCOSE 104 05/31/2022    PROT 7.2 03/29/2022    LABALBU 4.1 03/29/2022    CALCIUM 9.0 05/31/2022    BILITOT 0.5 03/29/2022    ALKPHOS 138 03/29/2022    AST 31 03/29/2022    ALT 33 03/29/2022     Magnesium:    Lab Results   Component Value Date    MG 2.0 02/18/2022     Phosphorus:    Lab Results   Component Value Date    PHOS 3.2 09/01/2021     PT/INR:    Lab Results   Component Value Date    PROTIME 11.1 04/01/2022    INR 1.0 04/01/2022     Troponin:    Lab Results   Component Value Date    TROPONINI <0.01 07/20/2020     U/A:    Lab Results   Component Value Date    COLORU Yellow 02/23/2022    PROTEINU Negative 02/23/2022    PHUR 6.0 02/23/2022    WBCUA 0-1 02/17/2022    RBCUA 1-3 02/17/2022    TRICHOMONAS Present 02/26/2020    BACTERIA RARE 02/17/2022    CLARITYU Clear 02/23/2022    SPECGRAV 1.020 02/23/2022    LEUKOCYTESUR Negative 02/23/2022    UROBILINOGEN 0.2 02/23/2022    BILIRUBINUR Negative 02/23/2022    BLOODU Negative 02/23/2022    GLUCOSEU Negative 02/23/2022    AMORPHOUS FEW 11/23/2021     ABG:  No results found for: PH, PCO2, PO2, HCO3, BE, THGB, TCO2, O2SAT  HgBA1c:    Lab Results   Component Value Date    LABA1C 4.5 04/02/2022     FLP:    Lab Results   Component Value Date    TRIG 184 05/11/2021    HDL 44 05/11/2021    LDLCALC 144 05/11/2021    LABVLDL 37 05/11/2021     TSH:    Lab Results Component Value Date    TSH 0.293 09/01/2021     IRON:  No results found for: IRON  LIPASE:    Lab Results   Component Value Date    LIPASE 10 09/05/2021       ASSESSMENT AND PLAN:      Patient Active Problem List    Diagnosis Date Noted    Ileostomy in place [facility]) 06/01/2022    S/P closure of ileostomy 06/01/2022    Pulmonary embolism without acute cor pulmonale ([address] 04/01/2022    Multiple subsegmental pulmonary emboli without acute cor pulmonale (HCC)     Postoperative intra-abdominal abscess 03/25/2022    Sigmoid stricture ([address]     Pelvic abscess in female     Ileus, postoperative ([address] 09/05/2021    S/P colectomy 08/31/2021    Malignant neoplasm of descending colon ([address]     Secondary restless legs syndrome 08/13/2021    Palafox's esophagus with dysplasia 05/11/2021    Morbidly obese ([address] 10/05/2020    Multiple sclerosis ([address] 08/04/2020    Vocal cord dysfunction 07/20/2020     Impression:  1. Status post laparoscopic ileostomy reversal  2. History of pulmonary as was him with without cor pulmonale 4/1/2022  3. History hypertension  4. History of VEENA-not on CPAP  5. Current tobacco use  6. History of restless leg syndrome  7. History of multiple sclerosis  8. History of DVT left external iliac and left common femoral vein    Plan:  Patient admitted to the medical surgical floor  Home medications reviewed  Monitor heart rate, blood pressure, O2 saturation  Diet and abdominal pain meds per general surgery  Activity as tolerated  Resume Eliquis when okay with general surgery if not started now the patient will have to be started on heparin drip  DuoNeb aerosols every 4 hours as needed  NicoDerm patch    CMP, CBC in a.m.       Key Barney DO, D.GABRIEL  6/1/2022  1:40 PM

## 2022-06-01 NOTE — OP NOTE
Operative Note      Patient: Vinh Carias  YOB: 1975  MRN: [id number]    Date of Procedure: 6/1/2022    Pre-Op Diagnosis: Ileostomy in place [facility]) [Z93.2]    Post-Op Diagnosis: Same       Procedure(s):  LAPAROSCOPIC ILEOSTOMY REVERSAL POSS OPEN    Surgeon(s):  Nicholas Alvarenga MD    Assistant:   First Assistant: Glenn Maxwell  Resident: Susie Byers DO    Anesthesia: General    Estimated Blood Loss (mL): less than 50     Complications: None    Specimens:   ID Type Source Tests Collected by Time Destination   A : illeostomy Tissue Tissue SURGICAL PATHOLOGY Nicholas Alvarenga MD 6/1/2022 1143        Implants:  * No implants in log *      Drains:   Colostomy RUQ Loop (Active)       Ileostomy/Jejunostomy RUQ Loop ileostomy (Active)   Stomal Appliance Changed;Leaking 05/23/22 1542   Peristomal Assessment Denuded; Maceration;Painful;Red 05/23/22 1542   Stool Appearance Loose 05/23/22 1542   Stool Color Brown 05/23/22 1542   Stool Amount Small 05/23/22 1542       Urinary Catheter Stevens (Active)       [REMOVED] Closed/Suction Drain Right RLQ Accordion (Removed)       Findings: as dictated    Detailed Description of Procedure:   [id number]    Electronically signed by Nicholas Alvarenga MD on 6/1/2022 at 11:50 AM

## 2022-06-01 NOTE — H&P
General Surgery History and Physical     Patient's Name/Date of Birth: Satinder Hurst / 1975     Date: 6/1/2022      Surgeon: Cedrick Chappell M.D.     PCP: Brenda Caputo PA-C     Chief Complaint: ileostomy in place     HPI:   Satinder Hurst is a 51 y.o. female who presents for evaluation of ileostomy in place and good barium enema.         Past Medical History        Past Medical History:   Diagnosis Date    Acid reflux disease      Cyst of ovary      Fracture of nasal bone      Headache      Hearing loss      Hypertension      Migraine      Morbidly obese ([address] 10/05/2020    Multiple sclerosis ([address]       denies limitations at present 11/2020    VEENA no CPAP       severe VEENA per pt    Right shoulder pain 11/2020    Tinnitus      TMJ dysfunction              Past Surgical History         Past Surgical History:   Procedure Laterality Date    APPENDECTOMY        BACK SURGERY         lumbar    BICEPS TENDON REPAIR Right 11/11/2020     BICEPS TENODESIS performed by Karene Lundborg, MD at [facility] Left 8/31/2021     LAPAROSCOPIC LEFT HEMICOLECTOMY WITH LOOP OSTOMY performed by Alhaji Baker MD at [address] 9/6/2021     DIAGNOSTIC LAPAROSCOPY POSSIBLE BOWEL RESECTION POSSILBE OSTOMY performed by Alhaji Baker MD at [address] 2/8/2022     LAPAROSCOPIC SIGMOID COLON RESECTION performed by Alhaji Baker MD at [facility]   03/05/2018     Robotic hysterectomy, bilateral salpingectomy, right oophorectomy DR. ARIANA MONIQUE [facility]     HYSTERECTOMY, TOTAL ABDOMINAL        LARYNGOSCOPY N/A 7/17/2020     DIRECT LARYNGOSCOPY--OMNI GUIDE LASER performed by Diane Zhang MD at [facility]        PROCTOSIGMOIDOSCOPY N/A 10/12/2021     ANAL PROCTO SIGMOIDOSCOPY FLEXIBLE performed by Alhaji Baker MD at [facility] 1/25/2022     ANAL PROCTO SIGMOIDOSCOPY FLEXIBLE performed by Rafa Otero MD at [facility] ARTHROSCOPY Right 11/11/2020     RIGHT SHOULDER DIAGNOSTIC ARTHROSCOPY WITH DECOMPRESSION ROTATOR CUFF REPAIR performed by Marquez Buck MD at [place] [address]                Current Facility-Administered Medications          Current Outpatient Medications   Medication Sig Dispense Refill    famotidine (PEPCID) 40 MG tablet TAKE 1 TABLET BY MOUTH DAILY IN THE EVENING *EMERGENCY REFILL*        fluticasone (FLONASE) 50 MCG/ACT nasal spray 1 spray by Each Nostril route daily 32 g 1    albuterol sulfate HFA (VENTOLIN HFA) 108 (90 Base) MCG/ACT inhaler Inhale 2 puffs into the lungs 4 times daily as needed for Wheezing 54 g 0    apixaban (ELIQUIS) 5 MG TABS tablet Take 1 tablet by mouth 2 times daily 180 tablet 1    nortriptyline (PAMELOR) 10 MG capsule TAKE ONE (1) CAPSULE BY MOUTH NIGHTLY FOR HEADACHE 30 capsule 3    pantoprazole (PROTONIX) 40 MG tablet Take 1 tablet by mouth 2 times daily (before meals) 30 tablet 0    vitamin D (D3-50) 36546 UNIT CAPS Take 1 capsule by mouth once a week (Patient taking differently: Take 50,000 Units by mouth once a week On Mondays) 4 capsule 5    baclofen (LIORESAL) 20 MG tablet Take 1 tablet by mouth 4 times daily as needed (muscle spasms; pain) 360 tablet 3    rOPINIRole (REQUIP) 0.5 MG tablet Take 1 tablet by mouth 2 times daily as needed (restless legs) 60 tablet 11    ocrelizumab (OCREVUS) 300 MG/10ML SOLN injection Infuse 20 mLs intravenously every 6 months (Patient taking differently: Infuse 600 mg intravenously every 6 months Due in August 2022) 20 mL 1    gabapentin (NEURONTIN) 300 MG capsule TAKE 1 CAPSULE BY MOUTH THREE TIMES DAILY 90 capsule 11      No current facility-administered medications for this visit.            No Known Allergies     The patient has a family history that is negative for severe cardiovascular or respiratory issues, negative for reaction to anesthesia.     Social History               Socioeconomic History    Marital status: Single       Spouse name: Not on file    Number of children: 3    Years of education: 6    Highest education level: 11th grade   Occupational History    Not on file   Tobacco Use    Smoking status: Current Every Day Smoker       Packs/day: 0.50       Years: 25.00       Pack years: 12.50       Types: Cigarettes    Smokeless tobacco: Never Used    Tobacco comment: 4 cigarettes a day per patient   Vaping Use    Vaping Use: Never used   Substance and Sexual Activity    Alcohol use: Not Currently    Drug use: No    Sexual activity: Yes       Partners: Male   Other Topics Concern    Not on file   Social History Narrative     Uses [employer] insurance for transportation     Receives food stamps      Social Determinants of Health          Financial Resource Strain:     Difficulty of Paying Living Expenses: Not on file   Food Insecurity:     Worried About Running Out of Food in the Last Year: Not on file    [place]ar of Food in the Last Year: Not on file   Transportation Needs:     Lack of Transportation (Medical): Not on file    Lack of Transportation (Non-Medical):  Not on file   Physical Activity:     Days of Exercise per Week: Not on file    Minutes of Exercise per Session: Not on file   Stress:     Feeling of Stress : Not on file   Social Connections:     Frequency of Communication with Friends and Family: Not on file    Frequency of Social Gatherings with Friends and Family: Not on file    Attends Sabianism Services: Not on file    Active Member of Clubs or Organizations: Not on file    Attends Club or Organization Meetings: Not on file    Marital Status: Not on file   Intimate Partner Violence:     Fear of Current or Ex-Partner: Not on file    Emotionally Abused: Not on file    Physically Abused: Not on file    Sexually Abused: Not on file   Housing Stability:     Unable to Pay for Housing in the Last Year: Not on file    Number of Places Lived in the Last Year: Not on file    Unstable Housing in the Last Year: Not on file                  Review of Systems  Review of Systems -  General ROS: negative for - chills, fatigue or malaise  ENT ROS: negative for - hearing change, nasal congestion or nasal discharge  Allergy and Immunology ROS: negative for - hives, itchy/watery eyes or nasal congestion  Hematological and Lymphatic ROS: negative for - blood clots, blood transfusions, bruising or fatigue  Endocrine ROS: negative for - malaise/lethargy, mood swings, palpitations or polydipsia/polyuria  Respiratory ROS: negative for - sputum changes, stridor, tachypnea or wheezing  Cardiovascular ROS: negative for - irregular heartbeat, loss of consciousness, murmur or orthopnea  Gastrointestinal ROS: negative for - constipation, diarrhea, gas/bloating, heartburn or hematemesis  Genito-Urinary ROS: negative for -  genital discharge, genital ulcers or hematuria  Musculoskeletal ROS: negative for - gait disturbance, muscle pain or muscular weakness     Physical exam:   BP (!) 142/88   Pulse 81   Temp 97.2 °F (36.2 °C) (Temporal)   Resp 18   Ht 5' 1\" (1.549 m)   Wt 175 lb (79.4 kg)   LMP 01/05/2018   BMI 33.07 kg/m²   General appearance:  NAD  Head: NCAT, PERRLA, EOMI, red conjunctiva  Neck: supple, no masses  Lungs: CTAB, equal chest rise bilateral  Heart: Reg rate  Abdomen: soft, nontender, nondistended, ostomy in place  Skin; no lesions  Gu: no cva tenderness  Extremities: extremities normal, atraumatic, no cyanosis or edema        Radiology:  Barium enema: no issues     Assessment:  51 y.o. female with ileostomy in place for takedown     Plan:   For lap possible open takedown  Discussed the risk, benefits and alternatives of surgery including wound infections, bleeding, scar and hernia formation and the risks of general anesthetic including MI, CVA, sudden death or reactions to anesthetic medications. The patient understands the risks and alternatives and the possibility of converting to an open procedure.  All questions were answered to the patient's satisfaction and they freely signed the consent.        Donavan Durbin MD

## 2022-06-01 NOTE — OP NOTE
[address]                                OPERATIVE REPORT    PATIENT NAME: Danielle Garcia                   :        1975  MED REC NO:   [id number]                            ROOM:  ACCOUNT NO:   [de-identified]                           ADMIT DATE: 2022  PROVIDER:     Cara Guadalupe MD    DATE OF PROCEDURE:  2022    PREOPERATIVE DIAGNOSIS:  Ileostomy in place. POSTOPERATIVE DIAGNOSIS:  Ileostomy in place. OPERATION PERFORMED:  Ileostomy reversal.    SURGEON:  Cara Guadalupe MD    ASSISTANT:  Emily Ford DO    ANESTHESIA:  General.    COMPLICATIONS:  None. FLUIDS:  Crystalloid. BLOOD LOSS:  Minimal.    DISPOSITION:  To be admitted to the floor for routine postoperative  care. SPECIMEN:  Ileostomy and skin. INDICATION:  This is a 57-year-old female with the aforementioned  diagnosis. I explained the risks, benefits, potential outcomes,  alternate treatment to the aforementioned procedure and she agreed to  proceed understanding those risks and potential outcomes. OPERATIVE PROCEDURE:  The patient was brought into the operative suite,  placed under general anesthesia, had bilateral PCDs placed, was then  prepped and draped in normal sterile condition after the stoma had been  closed. Once this was done, local anesthetic was infiltrated around the  area in the right lower quadrant around the stoma. An elliptical  incision was made approximately 6 cm x 2 cm in length. Once this was  done, we were able to dissect down with electrocautery to the previous  hernia sac. We dissected free the hernia sac at the fascial  attachments, delivered the small bowel and the ostomy through the  abdominal wall. We then stapled off the ostomy in the hernia sac at  that point and did a side-to-side stapled anastomosis with a BEAU-75  stapler.   Once this was done, the common enterotomy was closed with a  BEAU-75 stapler. The mesenteric defect was closed over the staple line  to support it. This was then dropped back into the abdomen after being  grossly patent by examination. Once this was done, the fascial defect  was closed with interrupted figure-of-eight #1 PDS sutures. The  subcutaneous tissues were loosely approximated with Vicryl suture and  then the skin was packed with Betadine-soaked duc.   The patient was  then woken up in stable condition and taken to Christianne Mcknight MD    D: 06/01/2022 11:53:16       T: 06/01/2022 11:55:29     [email or web address]  Job#: [id number]     Doc#: [id number]    CC:

## 2022-06-02 LAB
ALBUMIN SERPL-MCNC: 3.6 G/DL (ref 3.5–5.2)
ALP BLD-CCNC: 82 U/L (ref 35–104)
ALT SERPL-CCNC: 56 U/L (ref 0–32)
ANION GAP SERPL CALCULATED.3IONS-SCNC: 9 MMOL/L (ref 7–16)
AST SERPL-CCNC: 38 U/L (ref 0–31)
BASOPHILS ABSOLUTE: 0.02 E9/L (ref 0–0.2)
BASOPHILS RELATIVE PERCENT: 0.1 % (ref 0–2)
BILIRUB SERPL-MCNC: 0.5 MG/DL (ref 0–1.2)
BUN BLDV-MCNC: 7 MG/DL (ref 6–20)
CALCIUM SERPL-MCNC: 8.7 MG/DL (ref 8.6–10.2)
CHLORIDE BLD-SCNC: 104 MMOL/L (ref 98–107)
CO2: 22 MMOL/L (ref 22–29)
CREAT SERPL-MCNC: 0.5 MG/DL (ref 0.5–1)
EOSINOPHILS ABSOLUTE: 0 E9/L (ref 0.05–0.5)
EOSINOPHILS RELATIVE PERCENT: 0 % (ref 0–6)
GFR AFRICAN AMERICAN: >60
GFR NON-AFRICAN AMERICAN: >60 ML/MIN/1.73
GLUCOSE BLD-MCNC: 127 MG/DL (ref 74–99)
HCT VFR BLD CALC: 37.6 % (ref 34–48)
HEMOGLOBIN: 12.2 G/DL (ref 11.5–15.5)
IMMATURE GRANULOCYTES #: 0.09 E9/L
IMMATURE GRANULOCYTES %: 0.6 % (ref 0–5)
LYMPHOCYTES ABSOLUTE: 1.4 E9/L (ref 1.5–4)
LYMPHOCYTES RELATIVE PERCENT: 9.2 % (ref 20–42)
MCH RBC QN AUTO: 29.8 PG (ref 26–35)
MCHC RBC AUTO-ENTMCNC: 32.4 % (ref 32–34.5)
MCV RBC AUTO: 91.7 FL (ref 80–99.9)
MONOCYTES ABSOLUTE: 1.11 E9/L (ref 0.1–0.95)
MONOCYTES RELATIVE PERCENT: 7.3 % (ref 2–12)
NEUTROPHILS ABSOLUTE: 12.55 E9/L (ref 1.8–7.3)
NEUTROPHILS RELATIVE PERCENT: 82.8 % (ref 43–80)
PDW BLD-RTO: 12.3 FL (ref 11.5–15)
PLATELET # BLD: 210 E9/L (ref 130–450)
PMV BLD AUTO: 9.4 FL (ref 7–12)
POTASSIUM REFLEX MAGNESIUM: 4.4 MMOL/L (ref 3.5–5)
RBC # BLD: 4.1 E12/L (ref 3.5–5.5)
SODIUM BLD-SCNC: 135 MMOL/L (ref 132–146)
TOTAL PROTEIN: 5.9 G/DL (ref 6.4–8.3)
WBC # BLD: 15.2 E9/L (ref 4.5–11.5)

## 2022-06-02 PROCEDURE — 85025 COMPLETE CBC W/AUTO DIFF WBC: CPT

## 2022-06-02 PROCEDURE — 6370000000 HC RX 637 (ALT 250 FOR IP): Performed by: SURGERY

## 2022-06-02 PROCEDURE — 80053 COMPREHEN METABOLIC PANEL: CPT

## 2022-06-02 PROCEDURE — 6370000000 HC RX 637 (ALT 250 FOR IP): Performed by: INTERNAL MEDICINE

## 2022-06-02 PROCEDURE — 6360000002 HC RX W HCPCS: Performed by: SURGERY

## 2022-06-02 PROCEDURE — 36415 COLL VENOUS BLD VENIPUNCTURE: CPT

## 2022-06-02 PROCEDURE — 2580000003 HC RX 258: Performed by: SURGERY

## 2022-06-02 PROCEDURE — 1200000000 HC SEMI PRIVATE

## 2022-06-02 RX ORDER — FAMOTIDINE 20 MG/1
20 TABLET, FILM COATED ORAL NIGHTLY
Status: DISCONTINUED | OUTPATIENT
Start: 2022-06-02 | End: 2022-06-07 | Stop reason: HOSPADM

## 2022-06-02 RX ORDER — FAMOTIDINE 20 MG/1
20 TABLET, FILM COATED ORAL 2 TIMES DAILY
Status: CANCELLED | OUTPATIENT
Start: 2022-06-02

## 2022-06-02 RX ADMIN — SODIUM CHLORIDE, POTASSIUM CHLORIDE, SODIUM LACTATE AND CALCIUM CHLORIDE: 600; 310; 30; 20 INJECTION, SOLUTION INTRAVENOUS at 01:31

## 2022-06-02 RX ADMIN — MORPHINE SULFATE 4 MG: 4 INJECTION, SOLUTION INTRAMUSCULAR; INTRAVENOUS at 22:28

## 2022-06-02 RX ADMIN — KETOROLAC TROMETHAMINE 15 MG: 30 INJECTION, SOLUTION INTRAMUSCULAR at 13:10

## 2022-06-02 RX ADMIN — METHOCARBAMOL TABLETS 500 MG: 500 TABLET, COATED ORAL at 07:52

## 2022-06-02 RX ADMIN — METHOCARBAMOL TABLETS 500 MG: 500 TABLET, COATED ORAL at 13:10

## 2022-06-02 RX ADMIN — METHOCARBAMOL TABLETS 500 MG: 500 TABLET, COATED ORAL at 17:51

## 2022-06-02 RX ADMIN — MORPHINE SULFATE 4 MG: 4 INJECTION, SOLUTION INTRAMUSCULAR; INTRAVENOUS at 01:28

## 2022-06-02 RX ADMIN — FAMOTIDINE 20 MG: 20 TABLET ORAL at 23:42

## 2022-06-02 RX ADMIN — TRAMADOL HYDROCHLORIDE 50 MG: 50 TABLET, COATED ORAL at 05:45

## 2022-06-02 RX ADMIN — KETOROLAC TROMETHAMINE 15 MG: 30 INJECTION, SOLUTION INTRAMUSCULAR at 17:51

## 2022-06-02 RX ADMIN — APIXABAN 5 MG: 5 TABLET, FILM COATED ORAL at 07:52

## 2022-06-02 RX ADMIN — SODIUM CHLORIDE, POTASSIUM CHLORIDE, SODIUM LACTATE AND CALCIUM CHLORIDE: 600; 310; 30; 20 INJECTION, SOLUTION INTRAVENOUS at 23:43

## 2022-06-02 RX ADMIN — Medication 5 ML: at 07:52

## 2022-06-02 RX ADMIN — MORPHINE SULFATE 2 MG: 2 INJECTION, SOLUTION INTRAMUSCULAR; INTRAVENOUS at 10:35

## 2022-06-02 RX ADMIN — APIXABAN 5 MG: 5 TABLET, FILM COATED ORAL at 21:18

## 2022-06-02 RX ADMIN — MORPHINE SULFATE 4 MG: 4 INJECTION, SOLUTION INTRAMUSCULAR; INTRAVENOUS at 19:54

## 2022-06-02 RX ADMIN — MORPHINE SULFATE 4 MG: 4 INJECTION, SOLUTION INTRAMUSCULAR; INTRAVENOUS at 07:52

## 2022-06-02 RX ADMIN — TRAMADOL HYDROCHLORIDE 50 MG: 50 TABLET, COATED ORAL at 16:21

## 2022-06-02 RX ADMIN — Medication 10 ML: at 19:57

## 2022-06-02 RX ADMIN — METHOCARBAMOL TABLETS 500 MG: 500 TABLET, COATED ORAL at 21:18

## 2022-06-02 RX ADMIN — KETOROLAC TROMETHAMINE 15 MG: 30 INJECTION, SOLUTION INTRAMUSCULAR at 04:04

## 2022-06-02 RX ADMIN — LISINOPRIL 10 MG: 10 TABLET ORAL at 07:52

## 2022-06-02 RX ADMIN — KETOROLAC TROMETHAMINE 15 MG: 30 INJECTION, SOLUTION INTRAMUSCULAR at 00:09

## 2022-06-02 ASSESSMENT — PAIN SCALES - GENERAL
PAINLEVEL_OUTOF10: 3
PAINLEVEL_OUTOF10: 7
PAINLEVEL_OUTOF10: 5
PAINLEVEL_OUTOF10: 4
PAINLEVEL_OUTOF10: 1
PAINLEVEL_OUTOF10: 2
PAINLEVEL_OUTOF10: 8
PAINLEVEL_OUTOF10: 7
PAINLEVEL_OUTOF10: 3
PAINLEVEL_OUTOF10: 2
PAINLEVEL_OUTOF10: 4
PAINLEVEL_OUTOF10: 8

## 2022-06-02 ASSESSMENT — PAIN DESCRIPTION - ONSET: ONSET: GRADUAL

## 2022-06-02 ASSESSMENT — PAIN DESCRIPTION - PAIN TYPE
TYPE: SURGICAL PAIN
TYPE: ACUTE PAIN;SURGICAL PAIN

## 2022-06-02 ASSESSMENT — PAIN DESCRIPTION - ORIENTATION: ORIENTATION: MID

## 2022-06-02 ASSESSMENT — PAIN DESCRIPTION - LOCATION
LOCATION: ABDOMEN

## 2022-06-02 ASSESSMENT — PAIN DESCRIPTION - FREQUENCY
FREQUENCY: INTERMITTENT
FREQUENCY: CONTINUOUS

## 2022-06-02 ASSESSMENT — PAIN DESCRIPTION - DESCRIPTORS
DESCRIPTORS: ACHING;DISCOMFORT
DESCRIPTORS: DISCOMFORT;TENDER;STABBING

## 2022-06-02 ASSESSMENT — PAIN - FUNCTIONAL ASSESSMENT: PAIN_FUNCTIONAL_ASSESSMENT: ACTIVITIES ARE NOT PREVENTED

## 2022-06-02 NOTE — ANESTHESIA POSTPROCEDURE EVALUATION
Department of Anesthesiology  Postprocedure Note    Patient: Isabell Manzano  MRN: [id number]  YOB: 1975  Date of evaluation: 6/2/2022  Time:  8:35 AM     Procedure Summary     Date: 06/01/22 Room / Location: [address] [place] [address] 644 / 4199 [facility]    Anesthesia Start: 2917 Anesthesia Stop: 6703    Procedure: OPEN ILEOSTOMY REVERSAL (N/A Abdomen) Diagnosis:       Ileostomy in place [facility])      (Ileostomy in place [facility]) [Z93.2])    Surgeons: Abelardo Corea MD Responsible Provider: Sandeep Lopez MD    Anesthesia Type: general ASA Status: 3          Anesthesia Type: No value filed. Daniel Phase I: Daniel Score: 9    Daniel Phase II:      Last vitals: Reviewed and per EMR flowsheets.        Anesthesia Post Evaluation    Patient location during evaluation: PACU  Patient participation: complete - patient participated  Level of consciousness: awake  Pain score: 0  Airway patency: patent  Nausea & Vomiting: no nausea  Complications: no  Cardiovascular status: blood pressure returned to baseline  Respiratory status: acceptable  Hydration status: euvolemic

## 2022-06-02 NOTE — PROGRESS NOTES
Department of Internal Medicine        HISTORY OF PRESENT ILLNESS:      The patient is a 51 y.o. female who presents with having a laparoscopic ileostomy reversal.  The patient is seen postop in recovery. Patient has expected postop discomfort. Patient denies any problem with chest pain, dizziness, nausea/vomiting or unusual shortness of breath. Preop lab work reviewed and were normal.  O2 saturation 94% on room air. Blood pressure 144/93 with a heart rate of 72.    6/2/2022  Patient seen examined on medical surgical floor. Patient has expected postop discomfort. Patient has a chronic nonproductive cough that is about the same. She denies any chest pain, dizziness or nausea vomiting. BUN/creatinine 7/0.5 with WBC 15.2 today with hemoglobin 12.2. Mild elevation transaminase. Temperature is 97.9 with heart rate 55 blood pressure 115/79. O2 sat 95% room air at rest.  Urine output is good. Remove Stevens catheter today.     Past Medical History:    Past Medical History:   Diagnosis Date    Acid reflux disease     Cyst of ovary     Fracture of nasal bone     Headache     Hearing loss     Hx of blood clots     leg to lung    Hypertension     Migraine     Morbidly obese ([address] 10/05/2020    Multiple sclerosis ([address]     denies limitations at present 11/2020    VEENA no CPAP     severe VEENA per pt    Right shoulder pain 11/2020    Tinnitus     TMJ dysfunction     left side     Past Surgical History:    Past Surgical History:   Procedure Laterality Date    APPENDECTOMY      BACK SURGERY      lumbar    BICEPS TENDON REPAIR Right 11/11/2020    BICEPS TENODESIS performed by Apollo Nguyen MD at [place] [address] Left 8/31/2021    LAPAROSCOPIC LEFT HEMICOLECTOMY WITH LOOP OSTOMY performed by Paola Villasenor MD at [address] [place] [address],[address] N/A 9/6/2021    DIAGNOSTIC LAPAROSCOPY POSSIBLE BOWEL RESECTION POSSILBE OSTOMY performed by Paola Villasenor MD at [address] [place] [address] 2/8/2022    LAPAROSCOPIC SIGMOID COLON RESECTION performed by Cathy Wong MD at [address] [facility] N/A 6/1/2022    OPEN ILEOSTOMY REVERSAL performed by Cathy Wong MD at [address] [facility]  03/05/2018    Robotic hysterectomy, bilateral salpingectomy, right oophorectomy DR. ARIANA MONIQUE [facility]     HYSTERECTOMY, TOTAL ABDOMINAL      LARYNGOSCOPY N/A 7/17/2020    DIRECT LARYNGOSCOPY--OMNI GUIDE LASER performed by Dillon Aguirre MD at [place] [address] PARTIAL HYSTERECTOMY      PROCTOSIGMOIDOSCOPY N/A 10/12/2021    ANAL PROCTO SIGMOIDOSCOPY FLEXIBLE performed by Cathy Wong MD at [address] [place] [address] N/A 1/25/2022    ANAL PROCTO [address] performed by Cathy Wong MD at [facility] ARTHROSCOPY Right 11/11/2020    RIGHT SHOULDER DIAGNOSTIC ARTHROSCOPY WITH DECOMPRESSION ROTATOR CUFF REPAIR performed by Doron Camacho MD at [place] [address]         Medications Prior to Admission:    @  Prior to Admission medications    Medication Sig Start Date End Date Taking?  Authorizing Provider   LISINOPRIL PO Take by mouth daily   Yes Historical Provider, MD   famotidine (PEPCID) 40 MG tablet TAKE 1 TABLET BY MOUTH DAILY IN THE EVENING *EMERGENCY REFILL* 4/4/22   Historical Provider, MD   fluticasone (FLONASE) 50 MCG/ACT nasal spray 1 spray by Each Nostril route daily 4/11/22   Mika Pope PA-C   apixaban (ELIQUIS) 5 MG TABS tablet Take 1 tablet by mouth 2 times daily 3/25/22   Mika Pope PA-C   nortriptyline (PAMELOR) 10 MG capsule TAKE ONE (1) CAPSULE BY MOUTH NIGHTLY FOR HEADACHE 3/1/22   Mika Pope PA-C   pantoprazole (PROTONIX) 40 MG tablet Take 1 tablet by mouth 2 times daily (before meals) 2/24/22   Jose Hannah,    vitamin D (D3-50) 32685 UNIT CAPS Take 1 capsule by mouth once a week  Patient taking differently: Take 50,000 Units by mouth once a week On Mondays 11/30/21 MARIXA Amaro CNP   gabapentin (NEURONTIN) 300 MG capsule TAKE 1 CAPSULE BY MOUTH THREE TIMES DAILY 9/13/21 5/27/22  MARIXA Amaro CNP   baclofen (LIORESAL) 20 MG tablet Take 1 tablet by mouth 4 times daily as needed (muscle spasms; pain) 8/13/21   MARIXA Amaro CNP   rOPINIRole (REQUIP) 0.5 MG tablet Take 1 tablet by mouth 2 times daily as needed (restless legs) 8/13/21   MARIXA Amaro CNP   ocrelizumab (OCREVUS) 300 MG/10ML SOLN injection Infuse 20 mLs intravenously every 6 months  Patient taking differently: Infuse 600 mg intravenously every 6 months Due in August 2022 7/19/21   MARIXA Amaro CNP       Allergies:  Patient has no known allergies. Social History:   Social History     Socioeconomic History    Marital status: Single     Spouse name: Not on file    Number of children: 3    Years of education: 6    Highest education level: 11th grade   Occupational History    Not on file   Tobacco Use    Smoking status: Current Every Day Smoker     Packs/day: 0.50     Years: 25.00     Pack years: 12.50     Types: Cigarettes    Smokeless tobacco: Never Used    Tobacco comment:     Vaping Use    Vaping Use: Never used   Substance and Sexual Activity    Alcohol use: Not Currently    Drug use: No    Sexual activity: Yes     Partners: Male   Other Topics Concern    Not on file   Social History Narrative    Uses [employer] for transportation    [employer]     Social Determinants of Health     Financial Resource Strain: Low Risk     Difficulty of Paying Living Expenses: Not hard at all   Food Insecurity: No Food Insecurity    Worried About Running Out of Food in the Last Year: Never true    [place] of Food in the Last Year: Never true   Transportation Needs:     Lack of Transportation (Medical): Not on file    Lack of Transportation (Non-Medical):  Not on file   Physical Activity:     Days of Exercise per Week: Not on file    Minutes of Exercise per Session: Not on file   Stress:     Feeling of Stress : Not on file   Social Connections:     Frequency of Communication with Friends and Family: Not on file    Frequency of Social Gatherings with Friends and Family: Not on file    Attends Mu-ism Services: Not on file    Active Member of Clubs or Organizations: Not on file    Attends Club or Organization Meetings: Not on file    Marital Status: Not on file   Intimate Partner Violence:     Fear of Current or Ex-Partner: Not on file    Emotionally Abused: Not on file    Physically Abused: Not on file    Sexually Abused: Not on file   Housing Stability:     Unable to Pay for Housing in the Last Year: Not on file    Number of [place] in the Last Year: Not on file    Unstable Housing in the Last Year: Not on file       Family History:   Family History   Problem Relation Age of Onset    Mult Sclerosis Mother     Colon Cancer Neg Hx     Uterine Cancer Neg Hx     Ovarian Cancer Neg Hx     Breast Cancer Neg Hx        REVIEW OF SYSTEMS:    Gen: Patient denies any lightheadedness or dizziness. No LOC or syncope. No fevers or chills. HEENT: No earache, sore throat or nasal congestion. Resp: Denies cough, hemoptysis or sputum production. Cardiac: Denies chest pain, SOB, diaphoresis or palpitations. GI: No nausea, vomiting, diarrhea or constipation. No melena or hematochezia. : No urinary complaints, dysuria, hematuria or frequency. MSK: No extremity weakness, paralysis or paresthesias. PHYSICAL EXAM:    Vitals:  /79   Pulse 55   Temp 97.9 °F (36.6 °C)   Resp 16   Ht 5' 1\" (1.549 m)   Wt 186 lb (84.4 kg)   LMP 01/05/2018   SpO2 95%   BMI 35.14 kg/m²     General:  This is a 51 y.o. yo female who is alert and oriented in expected postop discomfort  HEENT:  Head is normocephalic and atraumatic, PERRLA, EOMI, mucus membranes moist with no pharyngeal erythema or exudate.   Neck:  Supple with no carotid bruits, JVD or thyromegaly.   No cervical adenopathy  CV:  Regular rate and rhythm, no murmurs  Lungs: Coarse breath sounds to auscultation bilaterally with no wheezes, rales or rhonchi  Abdomen:  + Postop abdomen, mildly distended, bowel sounds present  Extremities:  No edema, peripheral pulses intact bilaterally  Neuro:  Cranial nerves II-XII grossly intact; motor and sensory function intact with no focal deficits  Skin:  No rashes, lesions or wounds      DATA:  CBC with Differential:    Lab Results   Component Value Date    WBC 15.2 06/02/2022    RBC 4.10 06/02/2022    HGB 12.2 06/02/2022    HCT 37.6 06/02/2022     06/02/2022    MCV 91.7 06/02/2022    MCH 29.8 06/02/2022    MCHC 32.4 06/02/2022    RDW 12.3 06/02/2022    METASPCT 0.9 11/23/2021    LYMPHOPCT 9.2 06/02/2022    MONOPCT 7.3 06/02/2022    BASOPCT 0.1 06/02/2022    MONOSABS 1.11 06/02/2022    LYMPHSABS 1.40 06/02/2022    EOSABS 0.00 06/02/2022    BASOSABS 0.02 06/02/2022     CMP:    Lab Results   Component Value Date     06/02/2022    K 4.4 06/02/2022     06/02/2022    CO2 22 06/02/2022    BUN 7 06/02/2022    CREATININE 0.5 06/02/2022    GFRAA >60 06/02/2022    LABGLOM >60 06/02/2022    GLUCOSE 127 06/02/2022    PROT 5.9 06/02/2022    LABALBU 3.6 06/02/2022    CALCIUM 8.7 06/02/2022    BILITOT 0.5 06/02/2022    ALKPHOS 82 06/02/2022    AST 38 06/02/2022    ALT 56 06/02/2022     Magnesium:    Lab Results   Component Value Date    MG 2.0 02/18/2022     Phosphorus:    Lab Results   Component Value Date    PHOS 3.2 09/01/2021     PT/INR:    Lab Results   Component Value Date    PROTIME 11.1 04/01/2022    INR 1.0 04/01/2022     Troponin:    Lab Results   Component Value Date    TROPONINI <0.01 07/20/2020     U/A:    Lab Results   Component Value Date    COLORU Yellow 02/23/2022    PROTEINU Negative 02/23/2022    PHUR 6.0 02/23/2022    WBCUA 0-1 02/17/2022    RBCUA 1-3 02/17/2022    TRICHOMONAS Present 02/26/2020    BACTERIA RARE 02/17/2022 CLARITYU Clear 02/23/2022    SPECGRAV 1.020 02/23/2022    LEUKOCYTESUR Negative 02/23/2022    UROBILINOGEN 0.2 02/23/2022    BILIRUBINUR Negative 02/23/2022    BLOODU Negative 02/23/2022    GLUCOSEU Negative 02/23/2022    AMORPHOUS FEW 11/23/2021     ABG:  No results found for: PH, PCO2, PO2, HCO3, BE, THGB, TCO2, O2SAT  HgBA1c:    Lab Results   Component Value Date    LABA1C 4.5 04/02/2022     FLP:    Lab Results   Component Value Date    TRIG 184 05/11/2021    HDL 44 05/11/2021    LDLCALC 144 05/11/2021    LABVLDL 37 05/11/2021     TSH:    Lab Results   Component Value Date    TSH 0.293 09/01/2021     IRON:  No results found for: IRON  LIPASE:    Lab Results   Component Value Date    LIPASE 10 09/05/2021       ASSESSMENT AND PLAN:      Patient Active Problem List    Diagnosis Date Noted    Ileostomy in place [facility]) 06/01/2022    S/P closure of ileostomy 06/01/2022    Pulmonary embolism without acute cor pulmonale ([address] 04/01/2022    Multiple subsegmental pulmonary emboli without acute cor pulmonale (HCC)     Postoperative intra-abdominal abscess 03/25/2022    Sigmoid stricture ([address]     Pelvic abscess in female     Ileus, postoperative ([address] 09/05/2021    S/P colectomy 08/31/2021    Malignant neoplasm of descending colon ([address]     Secondary restless legs syndrome 08/13/2021    Palafox's esophagus with dysplasia 05/11/2021    Morbidly obese ([address] 10/05/2020    Multiple sclerosis ([address] 08/04/2020    Vocal cord dysfunction 07/20/2020     Impression:  1. Status post laparoscopic ileostomy reversal  2. History of pulmonary as was him with without cor pulmonale 4/1/2022  3. History hypertension  4. History of VEENA-not on CPAP  5. Current tobacco use  6. History of restless leg syndrome  7. History of multiple sclerosis  8.   History of DVT left external iliac and left common femoral vein    Plan:  Patient admitted to the medical surgical floor  Home medications reviewed  Monitor heart rate, blood pressure, O2 saturation  Diet and abdominal pain meds per general surgery  Activity as tolerated  Resume Eliquis when okay with general surgery if not started now the patient will have to be started on heparin drip  DuoNeb aerosols every 4 hours as needed  NicoDerm patch    Remove Stevens catheter    CMP, CBC in a.m.       Ej Bailey DO, D.O.  6/2/2022  10:53 AM

## 2022-06-02 NOTE — CARE COORDINATION
6-2-Cm note: met with pt for transiton of care needs, pt lives alone, denies any needs for homegoing, states her dtr lives down the road and can help her if needed, dtr will provide transport home.  Electronically signed by Jo-Ann St RN on 6/2/2022 at 10:01 AM

## 2022-06-02 NOTE — PROGRESS NOTES
GENERAL SURGERY  DAILY PROGRESS NOTE  6/2/2022  CC: s/p ileostomy reversal    Subjective:  States she sore but OK. Is tolerating CLD. Denies nausea, vomiting. Is not yet passing gas    Objective:  /79   Pulse 55   Temp 97.9 °F (36.6 °C)   Resp 16   Ht 5' 1\" (1.549 m)   Wt 186 lb (84.4 kg)   LMP 01/05/2018   SpO2 95%   BMI 35.14 kg/m²     GENERAL:  Laying in bed, awake, alert, cooperative, no apparent distress  HEAD: Normocephalic, atraumatic  EYES: No sclera icterus, pupils equal  LUNGS:  No increased work of breathing  CARDIOVASCULAR:  RR  ABDOMEN:  Soft, appropriately tender, non-distended  EXTREMITIES: No edema or swelling  SKIN: Warm and dry    Assessment/Plan:  51 y.o. female s/p ileostomy reversal, POD1.     Advance to FLD  Remove anaya  Pain/nausea control prn  Restart eliquis  Await return of bowel function      Electronically signed by Susan Centeno MD on 6/2/2022 at 9:12 AM     As above

## 2022-06-03 ENCOUNTER — APPOINTMENT (OUTPATIENT)
Dept: GENERAL RADIOLOGY | Age: 47
DRG: 230 | End: 2022-06-03
Attending: SURGERY
Payer: COMMERCIAL

## 2022-06-03 LAB
ANION GAP SERPL CALCULATED.3IONS-SCNC: 6 MMOL/L (ref 7–16)
BILIRUBIN URINE: NEGATIVE
BLOOD, URINE: NEGATIVE
BUN BLDV-MCNC: 6 MG/DL (ref 6–20)
CALCIUM SERPL-MCNC: 8.1 MG/DL (ref 8.6–10.2)
CHLORIDE BLD-SCNC: 101 MMOL/L (ref 98–107)
CLARITY: CLEAR
CO2: 28 MMOL/L (ref 22–29)
COLOR: YELLOW
CREAT SERPL-MCNC: 0.7 MG/DL (ref 0.5–1)
GFR AFRICAN AMERICAN: >60
GFR NON-AFRICAN AMERICAN: >60 ML/MIN/1.73
GLUCOSE BLD-MCNC: 107 MG/DL (ref 74–99)
GLUCOSE URINE: NEGATIVE MG/DL
HCT VFR BLD CALC: 37.4 % (ref 34–48)
HEMOGLOBIN: 12.1 G/DL (ref 11.5–15.5)
KETONES, URINE: NEGATIVE MG/DL
LEUKOCYTE ESTERASE, URINE: NEGATIVE
MCH RBC QN AUTO: 30.2 PG (ref 26–35)
MCHC RBC AUTO-ENTMCNC: 32.4 % (ref 32–34.5)
MCV RBC AUTO: 93.3 FL (ref 80–99.9)
NITRITE, URINE: NEGATIVE
PDW BLD-RTO: 12.6 FL (ref 11.5–15)
PH UA: 5 (ref 5–9)
PLATELET # BLD: 203 E9/L (ref 130–450)
PMV BLD AUTO: 9.7 FL (ref 7–12)
POTASSIUM SERPL-SCNC: 3.9 MMOL/L (ref 3.5–5)
PROCALCITONIN: 0.1 NG/ML (ref 0–0.08)
PROTEIN UA: NEGATIVE MG/DL
RBC # BLD: 4.01 E12/L (ref 3.5–5.5)
SODIUM BLD-SCNC: 135 MMOL/L (ref 132–146)
SPECIFIC GRAVITY UA: 1.01 (ref 1–1.03)
UROBILINOGEN, URINE: 0.2 E.U./DL
WBC # BLD: 13.7 E9/L (ref 4.5–11.5)

## 2022-06-03 PROCEDURE — 94664 DEMO&/EVAL PT USE INHALER: CPT

## 2022-06-03 PROCEDURE — 6370000000 HC RX 637 (ALT 250 FOR IP): Performed by: INTERNAL MEDICINE

## 2022-06-03 PROCEDURE — 85027 COMPLETE CBC AUTOMATED: CPT

## 2022-06-03 PROCEDURE — 80048 BASIC METABOLIC PNL TOTAL CA: CPT

## 2022-06-03 PROCEDURE — 81003 URINALYSIS AUTO W/O SCOPE: CPT

## 2022-06-03 PROCEDURE — 84145 PROCALCITONIN (PCT): CPT

## 2022-06-03 PROCEDURE — 94640 AIRWAY INHALATION TREATMENT: CPT

## 2022-06-03 PROCEDURE — 1200000000 HC SEMI PRIVATE

## 2022-06-03 PROCEDURE — 6360000002 HC RX W HCPCS: Performed by: SURGERY

## 2022-06-03 PROCEDURE — 71046 X-RAY EXAM CHEST 2 VIEWS: CPT

## 2022-06-03 PROCEDURE — 36415 COLL VENOUS BLD VENIPUNCTURE: CPT

## 2022-06-03 PROCEDURE — 6360000002 HC RX W HCPCS: Performed by: INTERNAL MEDICINE

## 2022-06-03 PROCEDURE — 6370000000 HC RX 637 (ALT 250 FOR IP): Performed by: SURGERY

## 2022-06-03 PROCEDURE — 2580000003 HC RX 258: Performed by: INTERNAL MEDICINE

## 2022-06-03 PROCEDURE — 2580000003 HC RX 258: Performed by: SURGERY

## 2022-06-03 PROCEDURE — 87040 BLOOD CULTURE FOR BACTERIA: CPT

## 2022-06-03 RX ORDER — IPRATROPIUM BROMIDE AND ALBUTEROL SULFATE 2.5; .5 MG/3ML; MG/3ML
1 SOLUTION RESPIRATORY (INHALATION)
Status: DISCONTINUED | OUTPATIENT
Start: 2022-06-03 | End: 2022-06-07 | Stop reason: HOSPADM

## 2022-06-03 RX ORDER — METHOCARBAMOL 750 MG/1
50000 TABLET ORAL WEEKLY
Qty: 4 CAPSULE | Refills: 11 | Status: SHIPPED
Start: 2022-06-03 | End: 2022-06-28 | Stop reason: SDUPTHER

## 2022-06-03 RX ADMIN — IPRATROPIUM BROMIDE AND ALBUTEROL SULFATE 1 AMPULE: .5; 2.5 SOLUTION RESPIRATORY (INHALATION) at 15:13

## 2022-06-03 RX ADMIN — FAMOTIDINE 20 MG: 20 TABLET ORAL at 20:29

## 2022-06-03 RX ADMIN — SODIUM CHLORIDE, POTASSIUM CHLORIDE, SODIUM LACTATE AND CALCIUM CHLORIDE: 600; 310; 30; 20 INJECTION, SOLUTION INTRAVENOUS at 09:31

## 2022-06-03 RX ADMIN — TRAMADOL HYDROCHLORIDE 50 MG: 50 TABLET, COATED ORAL at 15:51

## 2022-06-03 RX ADMIN — TRAMADOL HYDROCHLORIDE 50 MG: 50 TABLET, COATED ORAL at 09:30

## 2022-06-03 RX ADMIN — MORPHINE SULFATE 4 MG: 4 INJECTION, SOLUTION INTRAMUSCULAR; INTRAVENOUS at 23:20

## 2022-06-03 RX ADMIN — VANCOMYCIN HYDROCHLORIDE 1250 MG: 10 INJECTION, POWDER, LYOPHILIZED, FOR SOLUTION INTRAVENOUS at 15:53

## 2022-06-03 RX ADMIN — LISINOPRIL 10 MG: 10 TABLET ORAL at 09:30

## 2022-06-03 RX ADMIN — APIXABAN 5 MG: 5 TABLET, FILM COATED ORAL at 09:30

## 2022-06-03 RX ADMIN — MORPHINE SULFATE 4 MG: 4 INJECTION, SOLUTION INTRAMUSCULAR; INTRAVENOUS at 01:39

## 2022-06-03 RX ADMIN — MORPHINE SULFATE 4 MG: 4 INJECTION, SOLUTION INTRAMUSCULAR; INTRAVENOUS at 20:29

## 2022-06-03 RX ADMIN — MORPHINE SULFATE 4 MG: 4 INJECTION, SOLUTION INTRAMUSCULAR; INTRAVENOUS at 04:50

## 2022-06-03 RX ADMIN — METHOCARBAMOL TABLETS 500 MG: 500 TABLET, COATED ORAL at 20:29

## 2022-06-03 RX ADMIN — APIXABAN 5 MG: 5 TABLET, FILM COATED ORAL at 20:29

## 2022-06-03 RX ADMIN — METHOCARBAMOL TABLETS 500 MG: 500 TABLET, COATED ORAL at 09:30

## 2022-06-03 RX ADMIN — METHOCARBAMOL TABLETS 500 MG: 500 TABLET, COATED ORAL at 15:51

## 2022-06-03 RX ADMIN — CEFEPIME 2000 MG: 2 INJECTION, POWDER, FOR SOLUTION INTRAVENOUS at 20:34

## 2022-06-03 RX ADMIN — MORPHINE SULFATE 4 MG: 4 INJECTION, SOLUTION INTRAMUSCULAR; INTRAVENOUS at 12:46

## 2022-06-03 RX ADMIN — CEFEPIME HYDROCHLORIDE 2000 MG: 2 INJECTION, POWDER, FOR SOLUTION INTRAVENOUS at 13:33

## 2022-06-03 RX ADMIN — METHOCARBAMOL TABLETS 500 MG: 500 TABLET, COATED ORAL at 13:33

## 2022-06-03 RX ADMIN — IPRATROPIUM BROMIDE AND ALBUTEROL SULFATE 1 AMPULE: .5; 2.5 SOLUTION RESPIRATORY (INHALATION) at 20:09

## 2022-06-03 ASSESSMENT — PAIN DESCRIPTION - DESCRIPTORS: DESCRIPTORS: CRAMPING;ACHING;TENDER

## 2022-06-03 ASSESSMENT — PAIN SCALES - GENERAL
PAINLEVEL_OUTOF10: 7
PAINLEVEL_OUTOF10: 9
PAINLEVEL_OUTOF10: 9

## 2022-06-03 NOTE — FLOWSHEET NOTE
Inpatient Wound Care    Admit Date: 6/1/2022  6:41 AM    Reason for consult:  ABD INCISION    Significant history:    Past Medical History:   Diagnosis Date    Acid reflux disease     Cyst of ovary     Fracture of nasal bone     Headache     Hearing loss     Hx of blood clots     leg to lung    Hypertension     Migraine     Morbidly [facility] ([facility] 10/05/2020    Multiple sclerosis ([facility]     denies limitations at present 11/2020    VEENA no CPAP     severe VEENA per pt    Right shoulder pain 11/2020    Tinnitus     TMJ dysfunction     left side       Wound history:      Findings:       06/03/22 1504   Incision 06/01/22 Abdomen Medial;Right;Upper   Date First Assessed: 06/01/22   Present on Hospital Admission: No  Location: Abdomen  Incision Location Orientation: Medial;Right;Upper  Incision Outcome:  Well approximated   Incision Cleansed Cleansed with saline   Dressing/Treatment ABD pad;Dry dressing   Incision Length (cm) 0.8   Incision Width (cm) 8 cm   Drainage Amount Moderate   Drainage Description Serosanguinous   Odor None   CALLED Dr Jyothi Nolasco re aldair in incision    Impression:  Surgical wound    Interventions in place:  dsd    Plan:  Aldair removed per order   Plan to cont dsd daily      Sandeep Garcia RN 6/3/2022 3:05 PM

## 2022-06-03 NOTE — PROGRESS NOTES
Pharmacy Note - Extended Infusion Beta-Lactam Adjustment    Cefepime 1,000 mg IV every 12 hours, infused over 30 minutes, ordered for treatment of pneumonia (HAP). Per [facility] Extended Infusion Beta-Lactam Policy, cefepime 8,342 mg IV every 12 hours, infused over 30 minutes, will be changed to cefepime 2,000 mg IV once (loading dose), infused over 30 minutes, followed by cefepime 2,000 mg IV every 8 hours, infused over 240 minutes. Estimated Creatinine Clearance: Estimated Creatinine Clearance: 137 mL/min (based on SCr of 0.5 mg/dL). Dialysis Status, [place], CKD: N/A    BMI: Body mass index is 35.14 kg/m². Rationale for Adjustment: Agent demonstrates time-dependent effect on bacterial eradication. Extended-infusion dosing strategy aims to enhance microbiologic and clinical efficacy. Pharmacy will continue to monitor cultures and sensitivities (where available) and adjust dose as necessary. Please call with any questions.     Thank you,    Lamar Kapoor, PharmD   6/3/2022 12:41 PM

## 2022-06-03 NOTE — PROGRESS NOTES
Pharmacy Consultation Note  (Antibiotic Dosing and Monitoring)    Initial consult date: 6/3/22  Consulting physician/provider: Koki Alford  Drug: Vancomycin  Indication: Pneumonia    Age/  Gender Height Weight IBW  Allergy Information   46 y.o./female 5' 1\" (154.9 cm) 186 lb (84.4 kg)     Ideal body weight: 47.8 kg (105 lb 6.1 oz)  Adjusted ideal body weight: 62.4 kg (137 lb 10 oz)   Patient has no known allergies. Renal Function:  Recent Labs     06/02/22  0340   BUN 7   CREATININE 0.5       Intake/Output Summary (Last 24 hours) at 6/3/2022 1247  Last data filed at 6/3/2022 1231  Gross per 24 hour   Intake 1917.17 ml   Output --   Net 1917.17 ml       Vancomycin Monitoring:  Trough:  No results for input(s): VANCOTROUGH in the last 72 hours. Random:  No results for input(s): VANCORANDOM in the last 72 hours. Vancomycin Administration Times:  Recent vancomycin administrations      No vancomycin IV orders with administrations found. Assessment:  · Patient is a 51 y.o. female who has been initiated on vancomycin  · Estimated Creatinine Clearance: 137 mL/min (based on SCr of 0.5 mg/dL).   · To dose vancomycin, pharmacy will be utilizing [employer] calculation software for goal AUC/-600 mg/L-hr    Plan:  · Will continue vancomycin 1250 mg IV every 12 hours  · Will check vancomycin levels when appropriate  · Will continue to monitor renal function   · Clinical pharmacy to follow      Lidia Castorena [facility] 6/3/2022 12:47 PM

## 2022-06-03 NOTE — PROGRESS NOTES
GENERAL SURGERY  DAILY PROGRESS NOTE  6/3/2022  CC: s/p ileostomy reversal    Subjective:  Feeling better however tachycardic this morning. Had 4 BMs yesterday. Tolerating regular diet    Objective:  /76   Pulse (!) 109   Temp 100 °F (37.8 °C)   Resp 18   Ht 5' 1\" (1.549 m)   Wt 186 lb (84.4 kg)   LMP 01/05/2018   SpO2 (!) 85%   BMI 35.14 kg/m²     GENERAL:  Laying in bed, awake, alert, cooperative, no apparent distress  HEAD: Normocephalic, atraumatic  EYES: No sclera icterus, pupils equal  LUNGS:  No increased work of breathing  CARDIOVASCULAR:  RR  ABDOMEN:  Soft, appropriately tender, non-distended. Betadine duc in place  EXTREMITIES: No edema or swelling  SKIN: Warm and dry    Assessment/Plan:  51 y.o. female s/p ileostomy reversal, POD2. Regular diet  Pain/nausea control prn  Continue eliquis  Consult wound care for healing ileostomy site  Tachycardic, will hold on discharge for now and see how she does over the next couple of days.    Awaiting CBC for today      Electronically signed by Nara Miller MD on 6/3/2022 at 12:29 PM

## 2022-06-03 NOTE — PLAN OF CARE
Problem: Discharge Planning  Goal: Discharge to home or other facility with appropriate resources  6/3/2022 0004 by Juan Carlos Knowles RN  Outcome: Progressing     Problem: Pain  Goal: Verbalizes/displays adequate comfort level or baseline comfort level  6/3/2022 0004 by Juan Carlos Knowles RN  Outcome: Progressing     Problem: Safety - Adult  Goal: Free from fall injury  6/3/2022 0004 by Juan Carlos Knowles RN  Outcome: Progressing     Problem: ABCDS Injury Assessment  Goal: Absence of physical injury  6/3/2022 0004 by Juan Carlos Knowles RN  Outcome: Progressing

## 2022-06-03 NOTE — PROGRESS NOTES
Department of Internal Medicine        HISTORY OF PRESENT ILLNESS:      The patient is a 51 y.o. female who presents with having a laparoscopic ileostomy reversal.  The patient is seen postop in recovery. Patient has expected postop discomfort. Patient denies any problem with chest pain, dizziness, nausea/vomiting or unusual shortness of breath. Preop lab work reviewed and were normal.  O2 saturation 94% on room air. Blood pressure 144/93 with a heart rate of 72.    6/2/2022  Patient seen examined on medical surgical floor. Patient has expected postop discomfort. Patient has a chronic nonproductive cough that is about the same. She denies any chest pain, dizziness or nausea vomiting. BUN/creatinine 7/0.5 with WBC 15.2 today with hemoglobin 12.2. Mild elevation transaminase. Temperature is 97.9 with heart rate 55 blood pressure 115/79. O2 sat 95% room air at rest.  Urine output is good. Remove Stevens catheter today. 6/3/2022  Patient seen examined on medical surgical floor. Patient complained of increased abdominal discomfort this morning. She denies any chest pain, fever/chills, dizziness or nausea vomiting. Temperature 99.7 with heart rate of 111 and blood pressure 143/75. O2 sat 91-97% room air at rest.  Urine output is fairly good. Pending BMP, CBC this morning.     Past Medical History:    Past Medical History:   Diagnosis Date    Acid reflux disease     Cyst of ovary     Fracture of nasal bone     Headache     Hearing loss     Hx of blood clots     leg to lung    Hypertension     Migraine     Morbidly obese ([address] 10/05/2020    Multiple sclerosis ([address]     denies limitations at present 11/2020    VEENA no CPAP     severe VEENA per pt    Right shoulder pain 11/2020    Tinnitus     TMJ dysfunction     left side     Past Surgical History:    Past Surgical History:   Procedure Laterality Date    APPENDECTOMY      BACK SURGERY      lumbar    BICEPS TENDON REPAIR Right 11/11/2020 BICEPS TENODESIS performed by Magdaleno Obrien MD at [facility] Left 8/31/2021    LAPAROSCOPIC LEFT HEMICOLECTOMY WITH LOOP OSTOMY performed by Jing Knight MD at [address] [facility] 9/6/2021    DIAGNOSTIC LAPAROSCOPY POSSIBLE BOWEL RESECTION POSSILBE OSTOMY performed by Jing Knight MD at [address] N/A 2/8/2022    LAPAROSCOPIC SIGMOID COLON RESECTION performed by Jing Knight MD at [address] N/A 6/1/2022    OPEN ILEOSTOMY REVERSAL performed by Jing Knight MD at [address] [facility]  03/05/2018    Robotic hysterectomy, bilateral salpingectomy, right oophorectomy DR. ARIANA MONIQUE [facility]     HYSTERECTOMY, TOTAL ABDOMINAL      LARYNGOSCOPY N/A 7/17/2020    DIRECT LARYNGOSCOPY--OMNI GUIDE LASER performed by Rachel Baldwin MD at [place] [address] PARTIAL HYSTERECTOMY      PROCTOSIGMOIDOSCOPY N/A 10/12/2021    ANAL PROCTO SIGMOIDOSCOPY FLEXIBLE performed by Jing Knight MD at [address] [place] [address] N/A 1/25/2022    ANAL PROCTO [address] performed by Jing Knihgt MD at [facility] ARTHROSCOPY Right 11/11/2020    RIGHT SHOULDER DIAGNOSTIC ARTHROSCOPY WITH DECOMPRESSION ROTATOR CUFF REPAIR performed by Magdaleno Obrien MD at [place] [address]         Medications Prior to Admission:    @  Prior to Admission medications    Medication Sig Start Date End Date Taking?  Authorizing Provider   LISINOPRIL PO Take by mouth daily   Yes Historical Provider, MD   famotidine (PEPCID) 40 MG tablet TAKE 1 TABLET BY MOUTH DAILY IN THE EVENING *EMERGENCY REFILL* 4/4/22   Historical Provider, MD   fluticasone (FLONASE) 50 MCG/ACT nasal spray 1 spray by Each Nostril route daily 4/11/22   Garry Muñiz PA-C   apixaban (ELIQUIS) 5 MG TABS tablet Take 1 tablet by mouth 2 times daily 3/25/22   Garry Muñiz PA-C   nortriptyline (PAMELOR) 10 MG capsule TAKE ONE (1) CAPSULE BY MOUTH NIGHTLY FOR HEADACHE 3/1/22   Marija Jonas PA-C   pantoprazole (PROTONIX) 40 MG tablet Take 1 tablet by mouth 2 times daily (before meals) 2/24/22   Zoila Mancini DO   vitamin D (D3-50) [address]5033 UNIT CAPS Take 1 capsule by mouth once a week  Patient taking differently: Take 50,000 Units by mouth once a week On Mondays 11/30/21   [facility], APR[facility] - CNP   gabapentin (NEURONTIN) 300 MG capsule TAKE 1 CAPSULE BY MOUTH THREE TIMES DAILY 9/13/21 5/27/22  [facility], APRN - CNP   baclofen (LIORESAL) 20 MG tablet Take 1 tablet by mouth 4 times daily as needed (muscle spasms; pain) 8/13/21   [facility], APRN - CNP   rOPINIRole (REQUIP) 0.5 MG tablet Take 1 tablet by mouth 2 times daily as needed (restless legs) 8/13/21   [facility] CNP   ocrelizumab (OCREVUS) 300 MG/10ML SOLN injection Infuse 20 mLs intravenously every 6 months  Patient taking differently: Infuse 600 mg intravenously every 6 months Due in August 2022 7/19/21   [facility], APRN - CNP       Allergies:  Patient has no known allergies.     Social History:   Social History     Socioeconomic History    Marital status: Single     Spouse name: Not on file    Number of children: 3    Years of education: 6    Highest education level: 11th grade   Occupational History    Not on file   Tobacco Use    Smoking status: Current Every Day Smoker     Packs/day: 0.50     Years: 25.00     Pack years: 12.50     Types: Cigarettes    Smokeless tobacco: Never Used    Tobacco comment:     Vaping Use    Vaping Use: Never used   Substance and Sexual Activity    Alcohol use: Not Currently    Drug use: No    Sexual activity: Yes     Partners: Male   Other Topics Concern    Not on file   Social History Narrative    Uses [employer] for transportation    [employer]     Social Determinants of Health     Financial Resource Strain: Low Risk     Difficulty of Paying Living Expenses: Not hard at all   Food Insecurity: No Food Insecurity    Worried About Running Out of Food in the Last Year: Never true    Ran Out of Food in the Last Year: Never true   Transportation Needs:     Lack of Transportation (Medical): Not on file    Lack of Transportation (Non-Medical): Not on file   Physical Activity:     Days of Exercise per Week: Not on file    Minutes of Exercise per Session: Not on file   Stress:     Feeling of Stress : Not on file   Social Connections:     Frequency of Communication with Friends and Family: Not on file    Frequency of Social Gatherings with Friends and Family: Not on file    Attends Mandae[employer] Services: Not on file    Active Member of [address] [place] [address] [employer] or Organizations: Not on file    Attends Club or Organization Meetings: Not on file    Marital Status: Not on file   Intimate Partner Violence:     Fear of Current or Ex-Partner: Not on file    Emotionally Abused: Not on file    Physically Abused: Not on file    Sexually Abused: Not on file   Housing Stability:     Unable to Pay for Housing in the Last Year: Not on file    Number of [place] in the Last Year: Not on file    Unstable Housing in the Last Year: Not on file       Family History:   Family History   Problem Relation Age of Onset    Mult Sclerosis Mother     Colon Cancer Neg Hx     Uterine Cancer Neg Hx     Ovarian Cancer Neg Hx     Breast Cancer Neg Hx        REVIEW OF SYSTEMS:    Gen: Patient denies any lightheadedness or dizziness. No LOC or syncope. No fevers or chills. HEENT: No earache, sore throat or nasal congestion. Resp: Denies cough, hemoptysis or sputum production. Cardiac: Denies chest pain, SOB, diaphoresis or palpitations. GI: No nausea, vomiting, diarrhea or constipation. No melena or hematochezia. : No urinary complaints, dysuria, hematuria or frequency. MSK: No extremity weakness, paralysis or paresthesias.      PHYSICAL EXAM:    Vitals:  BP (!) 143/75   Pulse (!) 111   Temp 99.7 °F (37.6 °C) (Oral)   Resp 16   Ht 5' 1\" (1.549 m)   Wt 186 lb (84.4 kg)   LMP 01/05/2018   SpO2 91%   BMI 35.14 kg/m²     General:  This is a 51 y.o. yo female who is alert and oriented in expected postop discomfort  HEENT:  Head is normocephalic and atraumatic, PERRLA, EOMI, mucus membranes moist with no pharyngeal erythema or exudate. Neck:  Supple with no carotid bruits, JVD or thyromegaly.   No cervical adenopathy  CV:  Regular rate and rhythm, no murmurs  Lungs: Coarse breath sounds to auscultation bilaterally with no wheezes, rales or rhonchi  Abdomen:  + Postop abdomen, mildly distended, bowel sounds present  Extremities:  No edema, peripheral pulses intact bilaterally  Neuro:  Cranial nerves II-XII grossly intact; motor and sensory function intact with no focal deficits  Skin:  No rashes, lesions or wounds      DATA:  CBC with Differential:    Lab Results   Component Value Date    WBC 15.2 06/02/2022    RBC 4.10 06/02/2022    HGB 12.2 06/02/2022    HCT 37.6 06/02/2022     06/02/2022    MCV 91.7 06/02/2022    MCH 29.8 06/02/2022    MCHC 32.4 06/02/2022    RDW 12.3 06/02/2022    METASPCT 0.9 11/23/2021    LYMPHOPCT 9.2 06/02/2022    MONOPCT 7.3 06/02/2022    BASOPCT 0.1 06/02/2022    MONOSABS 1.11 06/02/2022    LYMPHSABS 1.40 06/02/2022    EOSABS 0.00 06/02/2022    BASOSABS 0.02 06/02/2022     CMP:    Lab Results   Component Value Date     06/02/2022    K 4.4 06/02/2022     06/02/2022    CO2 22 06/02/2022    BUN 7 06/02/2022    CREATININE 0.5 06/02/2022    GFRAA >60 06/02/2022    LABGLOM >60 06/02/2022    GLUCOSE 127 06/02/2022    PROT 5.9 06/02/2022    LABALBU 3.6 06/02/2022    CALCIUM 8.7 06/02/2022    BILITOT 0.5 06/02/2022    ALKPHOS 82 06/02/2022    AST 38 06/02/2022    ALT 56 06/02/2022     Magnesium:    Lab Results   Component Value Date    MG 2.0 02/18/2022     Phosphorus:    Lab Results   Component Value Date    PHOS 3.2 09/01/2021     PT/INR:    Lab Results   Component Value Date PROTIME 11.1 04/01/2022    INR 1.0 04/01/2022     Troponin:    Lab Results   Component Value Date    TROPONINI <0.01 07/20/2020     U/A:    Lab Results   Component Value Date    COLORU Yellow 02/23/2022    PROTEINU Negative 02/23/2022    PHUR 6.0 02/23/2022    WBCUA 0-1 02/17/2022    RBCUA 1-3 02/17/2022    TRICHOMONAS Present 02/26/2020    BACTERIA RARE 02/17/2022    CLARITYU Clear 02/23/2022    SPECGRAV 1.020 02/23/2022    LEUKOCYTESUR Negative 02/23/2022    UROBILINOGEN 0.2 02/23/2022    BILIRUBINUR Negative 02/23/2022    BLOODU Negative 02/23/2022    GLUCOSEU Negative 02/23/2022    AMORPHOUS FEW 11/23/2021     ABG:  No results found for: PH, PCO2, PO2, HCO3, BE, THGB, TCO2, O2SAT  HgBA1c:    Lab Results   Component Value Date    LABA1C 4.5 04/02/2022     FLP:    Lab Results   Component Value Date    TRIG 184 05/11/2021    HDL 44 05/11/2021    LDLCALC 144 05/11/2021    LABVLDL 37 05/11/2021     TSH:    Lab Results   Component Value Date    TSH 0.293 09/01/2021     IRON:  No results found for: IRON  LIPASE:    Lab Results   Component Value Date    LIPASE 10 09/05/2021       ASSESSMENT AND PLAN:      Patient Active Problem List    Diagnosis Date Noted    Ileostomy in place [facility]) 06/01/2022    S/P closure of ileostomy 06/01/2022    Pulmonary embolism without acute cor pulmonale ([address] 04/01/2022    Multiple subsegmental pulmonary emboli without acute cor pulmonale (HCC)     Postoperative intra-abdominal abscess 03/25/2022    Sigmoid stricture ([address]     Pelvic abscess in female     Ileus, postoperative ([address] 09/05/2021    S/P colectomy 08/31/2021    Malignant neoplasm of descending colon ([address]     Secondary restless legs syndrome 08/13/2021    Palafox's esophagus with dysplasia 05/11/2021    Morbidly obese ([address] 10/05/2020    Multiple sclerosis ([facility] 08/04/2020    Vocal cord dysfunction 07/20/2020     Impression:  1. Status post laparoscopic ileostomy reversal  2.   History of pulmonary as was him with

## 2022-06-04 ENCOUNTER — APPOINTMENT (OUTPATIENT)
Dept: CT IMAGING | Age: 47
DRG: 230 | End: 2022-06-04
Attending: SURGERY
Payer: COMMERCIAL

## 2022-06-04 LAB
ALBUMIN SERPL-MCNC: 2.9 G/DL (ref 3.5–5.2)
ALP BLD-CCNC: 83 U/L (ref 35–104)
ALT SERPL-CCNC: 24 U/L (ref 0–32)
ANION GAP SERPL CALCULATED.3IONS-SCNC: 7 MMOL/L (ref 7–16)
AST SERPL-CCNC: 12 U/L (ref 0–31)
BASOPHILS ABSOLUTE: 0.03 E9/L (ref 0–0.2)
BASOPHILS RELATIVE PERCENT: 0.3 % (ref 0–2)
BILIRUB SERPL-MCNC: 1.9 MG/DL (ref 0–1.2)
BUN BLDV-MCNC: 6 MG/DL (ref 6–20)
CALCIUM SERPL-MCNC: 8.6 MG/DL (ref 8.6–10.2)
CHLORIDE BLD-SCNC: 98 MMOL/L (ref 98–107)
CO2: 28 MMOL/L (ref 22–29)
CREAT SERPL-MCNC: 0.6 MG/DL (ref 0.5–1)
D DIMER: 1044 NG/ML DDU
EOSINOPHILS ABSOLUTE: 0.22 E9/L (ref 0.05–0.5)
EOSINOPHILS RELATIVE PERCENT: 1.8 % (ref 0–6)
GFR AFRICAN AMERICAN: >60
GFR NON-AFRICAN AMERICAN: >60 ML/MIN/1.73
GLUCOSE BLD-MCNC: 130 MG/DL (ref 74–99)
HCT VFR BLD CALC: 35 % (ref 34–48)
HEMOGLOBIN: 11.2 G/DL (ref 11.5–15.5)
IMMATURE GRANULOCYTES #: 0.07 E9/L
IMMATURE GRANULOCYTES %: 0.6 % (ref 0–5)
LYMPHOCYTES ABSOLUTE: 1.45 E9/L (ref 1.5–4)
LYMPHOCYTES RELATIVE PERCENT: 12.2 % (ref 20–42)
MCH RBC QN AUTO: 30.3 PG (ref 26–35)
MCHC RBC AUTO-ENTMCNC: 32 % (ref 32–34.5)
MCV RBC AUTO: 94.6 FL (ref 80–99.9)
MONOCYTES ABSOLUTE: 1.19 E9/L (ref 0.1–0.95)
MONOCYTES RELATIVE PERCENT: 10 % (ref 2–12)
NEUTROPHILS ABSOLUTE: 8.95 E9/L (ref 1.8–7.3)
NEUTROPHILS RELATIVE PERCENT: 75.1 % (ref 43–80)
PDW BLD-RTO: 12.5 FL (ref 11.5–15)
PLATELET # BLD: 183 E9/L (ref 130–450)
PMV BLD AUTO: 9.6 FL (ref 7–12)
POTASSIUM SERPL-SCNC: 3.8 MMOL/L (ref 3.5–5)
RBC # BLD: 3.7 E12/L (ref 3.5–5.5)
SARS-COV-2, NAAT: NOT DETECTED
SODIUM BLD-SCNC: 133 MMOL/L (ref 132–146)
TOTAL PROTEIN: 5.2 G/DL (ref 6.4–8.3)
WBC # BLD: 11.9 E9/L (ref 4.5–11.5)

## 2022-06-04 PROCEDURE — 87635 SARS-COV-2 COVID-19 AMP PRB: CPT

## 2022-06-04 PROCEDURE — 6370000000 HC RX 637 (ALT 250 FOR IP): Performed by: INTERNAL MEDICINE

## 2022-06-04 PROCEDURE — 1200000000 HC SEMI PRIVATE

## 2022-06-04 PROCEDURE — 2580000003 HC RX 258: Performed by: SURGERY

## 2022-06-04 PROCEDURE — 2580000003 HC RX 258: Performed by: INTERNAL MEDICINE

## 2022-06-04 PROCEDURE — 80053 COMPREHEN METABOLIC PANEL: CPT

## 2022-06-04 PROCEDURE — 6360000002 HC RX W HCPCS: Performed by: INTERNAL MEDICINE

## 2022-06-04 PROCEDURE — 85378 FIBRIN DEGRADE SEMIQUANT: CPT

## 2022-06-04 PROCEDURE — 6370000000 HC RX 637 (ALT 250 FOR IP): Performed by: SURGERY

## 2022-06-04 PROCEDURE — 36415 COLL VENOUS BLD VENIPUNCTURE: CPT

## 2022-06-04 PROCEDURE — 87070 CULTURE OTHR SPECIMN AEROBIC: CPT

## 2022-06-04 PROCEDURE — 6360000004 HC RX CONTRAST MEDICATION: Performed by: RADIOLOGY

## 2022-06-04 PROCEDURE — 2700000000 HC OXYGEN THERAPY PER DAY

## 2022-06-04 PROCEDURE — 94640 AIRWAY INHALATION TREATMENT: CPT

## 2022-06-04 PROCEDURE — 6360000002 HC RX W HCPCS: Performed by: SURGERY

## 2022-06-04 PROCEDURE — 71275 CT ANGIOGRAPHY CHEST: CPT

## 2022-06-04 PROCEDURE — 85025 COMPLETE CBC W/AUTO DIFF WBC: CPT

## 2022-06-04 PROCEDURE — 87206 SMEAR FLUORESCENT/ACID STAI: CPT

## 2022-06-04 RX ADMIN — METHOCARBAMOL TABLETS 500 MG: 500 TABLET, COATED ORAL at 07:59

## 2022-06-04 RX ADMIN — APIXABAN 5 MG: 5 TABLET, FILM COATED ORAL at 07:58

## 2022-06-04 RX ADMIN — CEFEPIME 2000 MG: 2 INJECTION, POWDER, FOR SOLUTION INTRAVENOUS at 21:28

## 2022-06-04 RX ADMIN — IOPAMIDOL 75 ML: 755 INJECTION, SOLUTION INTRAVENOUS at 13:10

## 2022-06-04 RX ADMIN — VANCOMYCIN HYDROCHLORIDE 1250 MG: 10 INJECTION, POWDER, LYOPHILIZED, FOR SOLUTION INTRAVENOUS at 02:11

## 2022-06-04 RX ADMIN — MORPHINE SULFATE 2 MG: 2 INJECTION, SOLUTION INTRAMUSCULAR; INTRAVENOUS at 15:43

## 2022-06-04 RX ADMIN — CEFEPIME 2000 MG: 2 INJECTION, POWDER, FOR SOLUTION INTRAVENOUS at 13:33

## 2022-06-04 RX ADMIN — MORPHINE SULFATE 4 MG: 4 INJECTION, SOLUTION INTRAMUSCULAR; INTRAVENOUS at 23:15

## 2022-06-04 RX ADMIN — TRAMADOL HYDROCHLORIDE 50 MG: 50 TABLET, COATED ORAL at 21:26

## 2022-06-04 RX ADMIN — VANCOMYCIN HYDROCHLORIDE 1250 MG: 10 INJECTION, POWDER, LYOPHILIZED, FOR SOLUTION INTRAVENOUS at 15:56

## 2022-06-04 RX ADMIN — IPRATROPIUM BROMIDE AND ALBUTEROL SULFATE 1 AMPULE: .5; 2.5 SOLUTION RESPIRATORY (INHALATION) at 18:38

## 2022-06-04 RX ADMIN — IPRATROPIUM BROMIDE AND ALBUTEROL SULFATE 1 AMPULE: .5; 2.5 SOLUTION RESPIRATORY (INHALATION) at 06:30

## 2022-06-04 RX ADMIN — IPRATROPIUM BROMIDE AND ALBUTEROL SULFATE 1 AMPULE: .5; 2.5 SOLUTION RESPIRATORY (INHALATION) at 14:55

## 2022-06-04 RX ADMIN — APIXABAN 5 MG: 5 TABLET, FILM COATED ORAL at 21:26

## 2022-06-04 RX ADMIN — METHOCARBAMOL TABLETS 500 MG: 500 TABLET, COATED ORAL at 18:08

## 2022-06-04 RX ADMIN — MORPHINE SULFATE 4 MG: 4 INJECTION, SOLUTION INTRAMUSCULAR; INTRAVENOUS at 02:09

## 2022-06-04 RX ADMIN — Medication 10 ML: at 07:51

## 2022-06-04 RX ADMIN — FAMOTIDINE 20 MG: 20 TABLET ORAL at 21:26

## 2022-06-04 RX ADMIN — TRAMADOL HYDROCHLORIDE 50 MG: 50 TABLET, COATED ORAL at 13:30

## 2022-06-04 RX ADMIN — METHOCARBAMOL TABLETS 500 MG: 500 TABLET, COATED ORAL at 13:30

## 2022-06-04 RX ADMIN — CEFEPIME 2000 MG: 2 INJECTION, POWDER, FOR SOLUTION INTRAVENOUS at 04:47

## 2022-06-04 RX ADMIN — Medication 10 ML: at 21:27

## 2022-06-04 RX ADMIN — IPRATROPIUM BROMIDE AND ALBUTEROL SULFATE 1 AMPULE: .5; 2.5 SOLUTION RESPIRATORY (INHALATION) at 10:11

## 2022-06-04 RX ADMIN — SODIUM CHLORIDE, PRESERVATIVE FREE 10 ML: 5 INJECTION INTRAVENOUS at 15:44

## 2022-06-04 RX ADMIN — METHOCARBAMOL TABLETS 500 MG: 500 TABLET, COATED ORAL at 21:26

## 2022-06-04 RX ADMIN — MORPHINE SULFATE 4 MG: 4 INJECTION, SOLUTION INTRAMUSCULAR; INTRAVENOUS at 07:48

## 2022-06-04 ASSESSMENT — PAIN SCALES - GENERAL
PAINLEVEL_OUTOF10: 5
PAINLEVEL_OUTOF10: 7
PAINLEVEL_OUTOF10: 6
PAINLEVEL_OUTOF10: 9
PAINLEVEL_OUTOF10: 7
PAINLEVEL_OUTOF10: 5

## 2022-06-04 ASSESSMENT — PAIN DESCRIPTION - FREQUENCY
FREQUENCY: INTERMITTENT
FREQUENCY: INTERMITTENT

## 2022-06-04 ASSESSMENT — PAIN DESCRIPTION - DESCRIPTORS
DESCRIPTORS: ACHING;DISCOMFORT;SORE
DESCRIPTORS: ACHING;DISCOMFORT;PENETRATING

## 2022-06-04 ASSESSMENT — PAIN DESCRIPTION - ONSET
ONSET: GRADUAL
ONSET: ON-GOING

## 2022-06-04 ASSESSMENT — PAIN DESCRIPTION - ORIENTATION
ORIENTATION: LEFT;RIGHT;LOWER
ORIENTATION: LEFT;RIGHT;MID

## 2022-06-04 ASSESSMENT — PAIN DESCRIPTION - LOCATION
LOCATION: ABDOMEN
LOCATION: ABDOMEN

## 2022-06-04 ASSESSMENT — PAIN DESCRIPTION - PAIN TYPE
TYPE: ACUTE PAIN;SURGICAL PAIN
TYPE: ACUTE PAIN;SURGICAL PAIN

## 2022-06-04 NOTE — PROGRESS NOTES
Department of Internal Medicine        HISTORY OF PRESENT ILLNESS:      The patient is a 51 y.o. female who presents with having a laparoscopic ileostomy reversal.  The patient is seen postop in recovery. Patient has expected postop discomfort. Patient denies any problem with chest pain, dizziness, nausea/vomiting or unusual shortness of breath. Preop lab work reviewed and were normal.  O2 saturation 94% on room air. Blood pressure 144/93 with a heart rate of 72.    6/2/2022  Patient seen examined on medical surgical floor. Patient has expected postop discomfort. Patient has a chronic nonproductive cough that is about the same. She denies any chest pain, dizziness or nausea vomiting. BUN/creatinine 7/0.5 with WBC 15.2 today with hemoglobin 12.2. Mild elevation transaminase. Temperature is 97.9 with heart rate 55 blood pressure 115/79. O2 sat 95% room air at rest.  Urine output is good. Remove Stevens catheter today. 6/3/2022  Patient seen examined on medical surgical floor. Patient complained of increased abdominal discomfort this morning. She denies any chest pain, fever/chills, dizziness or nausea vomiting. Temperature 99.7 with heart rate of 111 and blood pressure 143/75. O2 sat 91-97% room air at rest.  Urine output is fairly good. Pending BMP, CBC this morning. 6/4/2022  Patient seen examined medical surgical floor. Patient states she feels little bit better today. Patient still with productive cough. Abdominal pain is still there but improved from yesterday. She denies any chest pain. BUN/creatinine 6/0.6 with normal electrolytes. Patient procalcitonin 0.10 yesterday with normal liver enzymes except for total bili at 1.9. WBC is improved at 11.9 with hemoglobin 11.2. Urinalysis negative. Temperature seems to be improving and currently 98.4 with the patient did have a temperature yesterday 99.2. Blood pressure little bit lower today currently 92/57.   O2 sat 97% but is increased to Madelaine Saul is a 57 year old female presenting for a follow up on left foot diabetic ulcer, removed by Dr. Hartmann, she has not been feeling well, she has been throwing up everyday since having the ulcer removed, she is throwing up at least 4-5 times a day. She states she cant eat or drink with out throwing up.Dr. Hartmann told her to go to the hospital if she isnt feeling well, but she wanted to come here.    Fasting Status: Not Fasting    Reports latex allergy or sensitivity.    Patient would like communication of their results via:      Cell Phone:   Telephone Information:   Mobile [phone]   Mobile [phone]     Okay to leave a message containing results? Yes    Medications reviewed and updated.    Social History     Tobacco Use   Smoking Status Never Smoker   Smokeless Tobacco Never Used       Health Maintenance Summary     Shingles Vaccine (1 of 2)  Overdue since 6/4/2011    Diabetes Eye Exam (Yearly)  Overdue since 4/9/2019    Pneumococcal Vaccine 0-64 (3 of 3 - PPSV23)  Overdue since 4/15/2019    Diabetes A1C (Every 3 Months)  Next due on 7/23/2019    Depression Screening (Yearly)  Next due on 9/4/2019    Diabetes Foot Exam (Yearly)  Next due on 2/6/2020    Diabetes GFR (Yearly)  Next due on 4/23/2020    Breast Cancer Screening (Every 2 Years)  Next due on 12/20/2020    Colorectal Cancer Screening-Colonoscopy (Every 5 Years)  Next due on 2/4/2024    DTaP/Tdap/Td Vaccine (3 - Td)  Next due on 9/4/2028    Hepatitis C Screening   Completed    Influenza Vaccine   Completed          Health Maintenance Due   Topic Date Due   • Shingles Vaccine (1 of 2) 06/04/2011   • Diabetes Eye Exam  04/09/2019   • Pneumococcal Vaccine 0-64 (3 of 3 - PPSV23) 04/15/2019             Above listed discussed with provider.   4 L nasal cannula. We will check a D-dimer and case discussed with general surgery. If D-dimer is elevated we will do a CTA of the lungs. Pending sputum cultures. Past Medical History:    Past Medical History:   Diagnosis Date    Acid reflux disease     Cyst of ovary     Fracture of nasal bone     Headache     Hearing loss     Hx of blood clots     leg to lung    Hypertension     Migraine     Morbidly obese ([address] 10/05/2020    Multiple sclerosis ([address]     denies limitations at present 11/2020    VEENA no CPAP     severe VEENA per pt    Right shoulder pain 11/2020    Tinnitus     TMJ dysfunction     left side     Past Surgical History:    Past Surgical History:   Procedure Laterality Date    APPENDECTOMY      BACK SURGERY      lumbar    BICEPS TENDON REPAIR Right 11/11/2020    BICEPS TENODESIS performed by Jolie Brothers MD at [place] [address] Left 8/31/2021    LAPAROSCOPIC LEFT HEMICOLECTOMY WITH LOOP OSTOMY performed by Jenny Taylor MD at [address] [facility] 9/6/2021    DIAGNOSTIC LAPAROSCOPY POSSIBLE BOWEL RESECTION POSSILBE OSTOMY performed by Jenny Taylor MD at [address] N/A 2/8/2022    LAPAROSCOPIC SIGMOID COLON RESECTION performed by Jenny Taylor MD at [address] [facility] N/A 6/1/2022    OPEN ILEOSTOMY REVERSAL performed by Jenny Taylor MD at [address]  03/05/2018    Robotic hysterectomy, bilateral salpingectomy, right oophorectomy DR. ARIANA MONIQUE [facility]     HYSTERECTOMY, TOTAL ABDOMINAL      LARYNGOSCOPY N/A 7/17/2020    DIRECT LARYNGOSCOPY--OMNI GUIDE LASER performed by Chantel Alcazar MD at [facility] PARTIAL HYSTERECTOMY      PROCTOSIGMOIDOSCOPY N/A 10/12/2021    ANAL PROCTO SIGMOIDOSCOPY FLEXIBLE performed by Jenny Taylor MD at [address] [place] [address] N/A 1/25/2022    ANAL PROCTO [address] performed by Jenny Taylor MD at [facility] ARTHROSCOPY Right 11/11/2020    RIGHT SHOULDER DIAGNOSTIC ARTHROSCOPY WITH DECOMPRESSION ROTATOR CUFF REPAIR performed by Nga Calvin MD at [place] [address]         Medications Prior to Admission:    @  Prior to Admission medications    Medication Sig Start Date End Date Taking? Authorizing Provider   LISINOPRIL PO Take by mouth daily   Yes Historical Provider, MD   vitamin D (D3-50) 11970 UNIT CAPS Take 1 capsule by mouth once a week 6/3/22   MARIXA Adamson CNP   famotidine (PEPCID) 40 MG tablet TAKE 1 TABLET BY MOUTH DAILY IN THE EVENING *EMERGENCY REFILL* 4/4/22   Historical Provider, MD   fluticasone (FLONASE) 50 MCG/ACT nasal spray 1 spray by Each Nostril route daily 4/11/22   Gael Banegas PA-C   apixaban (ELIQUIS) 5 MG TABS tablet Take 1 tablet by mouth 2 times daily 3/25/22   Gael Banegas PA-C   nortriptyline (PAMELOR) 10 MG capsule TAKE ONE (1) CAPSULE BY MOUTH NIGHTLY FOR HEADACHE 3/1/22   Gael Banegas PA-C   pantoprazole (PROTONIX) 40 MG tablet Take 1 tablet by mouth 2 times daily (before meals) 2/24/22   Sofi Clarity, DO   gabapentin (NEURONTIN) 300 MG capsule TAKE 1 CAPSULE BY MOUTH THREE TIMES DAILY 9/13/21 5/27/22  MARIXA Adamson CNP   baclofen (LIORESAL) 20 MG tablet Take 1 tablet by mouth 4 times daily as needed (muscle spasms; pain) 8/13/21   MARIXA Adamson CNP   rOPINIRole (REQUIP) 0.5 MG tablet Take 1 tablet by mouth 2 times daily as needed (restless legs) 8/13/21   MARIXA Adamson CNP   ocrelizumab (OCREVUS) 300 MG/10ML SOLN injection Infuse 20 mLs intravenously every 6 months  Patient taking differently: Infuse 600 mg intravenously every 6 months Due in August 2022 7/19/21   MARIXA Adamson CNP       Allergies:  Patient has no known allergies.     Social History:   Social History     Socioeconomic History    Marital status: Single     Spouse name: Not on file    Number of children: 3    Years of education: 6    Highest education level: 11th grade   Occupational History    Not on file   Tobacco Use    Smoking status: Current Every Day Smoker     Packs/day: 0.50     Years: 25.00     Pack years: 12.50     Types: Cigarettes    Smokeless tobacco: Never Used    Tobacco comment:     Vaping Use    Vaping Use: Never used   Substance and Sexual Activity    Alcohol use: Not Currently    Drug use: No    Sexual activity: Yes     Partners: Male   Other Topics Concern    Not on file   Social History Narrative    Uses [employer] for transportation    [place] [employer]     Social Determinants of Health     Financial Resource Strain: Low Risk     Difficulty of Paying Living Expenses: Not hard at all   Food Insecurity: No Food Insecurity    Worried About Running Out of Food in the Last Year: Never true    [place] of Food in the Last Year: Never true   Transportation Needs:     Lack of Transportation (Medical): Not on file    Lack of Transportation (Non-Medical):  Not on file   Physical Activity:     Days of Exercise per Week: Not on file    Minutes of Exercise per Session: Not on file   Stress:     Feeling of Stress : Not on file   Social Connections:     Frequency of Communication with Friends and Family: Not on file    Frequency of Social Gatherings with Friends and Family: Not on file    Attends Sabianist Services: Not on file    Active Member of [address] [place] [address] [employer] or Organizations: Not on file    Attends Club or Organization Meetings: Not on file    Marital Status: Not on file   Intimate Partner Violence:     Fear of Current or Ex-Partner: Not on file    Emotionally Abused: Not on file    Physically Abused: Not on file    Sexually Abused: Not on file   Housing Stability:     Unable to Pay for Housing in the Last Year: Not on file    Number of [place] in the Last Year: Not on file    Unstable Housing in the Last Year: Not on file       Family History:   Family History   Problem Relation Age of Onset    Mult Sclerosis Mother     Colon Cancer Neg Hx     Uterine Cancer Neg Hx     Ovarian Cancer Neg Hx     Breast Cancer Neg Hx        REVIEW OF SYSTEMS:    Gen: Patient denies any lightheadedness or dizziness. No LOC or syncope. No fevers or chills. HEENT: No earache, sore throat or nasal congestion. Resp: Denies cough, hemoptysis or sputum production. Cardiac: Denies chest pain, SOB, diaphoresis or palpitations. GI: No nausea, vomiting, diarrhea or constipation. No melena or hematochezia. : No urinary complaints, dysuria, hematuria or frequency. MSK: No extremity weakness, paralysis or paresthesias. PHYSICAL EXAM:    Vitals:  BP (!) 92/57   Pulse 96   Temp 98.4 °F (36.9 °C) (Oral)   Resp 17   Ht 5' 1\" (1.549 m)   Wt 186 lb (84.4 kg)   LMP 01/05/2018   SpO2 97%   BMI 35.14 kg/m²     General:  This is a 51 y.o. yo female who is alert and oriented in expected postop discomfort  HEENT:  Head is normocephalic and atraumatic, PERRLA, EOMI, mucus membranes moist with no pharyngeal erythema or exudate. Neck:  Supple with no carotid bruits, JVD or thyromegaly.   No cervical adenopathy  CV:  Regular rate and rhythm, no murmurs  Lungs: Coarse breath sounds to auscultation bilaterally with no wheezes, rales or rhonchi  Abdomen:  + Postop abdomen, mildly distended, bowel sounds present  Extremities:  No edema, peripheral pulses intact bilaterally  Neuro:  Cranial nerves II-XII grossly intact; motor and sensory function intact with no focal deficits  Skin:  No rashes, lesions or wounds      DATA:  CBC with Differential:    Lab Results   Component Value Date    WBC 11.9 06/04/2022    RBC 3.70 06/04/2022    HGB 11.2 06/04/2022    HCT 35.0 06/04/2022     06/04/2022    MCV 94.6 06/04/2022    MCH 30.3 06/04/2022    MCHC 32.0 06/04/2022    RDW 12.5 06/04/2022    METASPCT 0.9 11/23/2021    LYMPHOPCT 12.2 06/04/2022    MONOPCT 10.0 06/04/2022    BASOPCT 0.3 06/04/2022    MONOSABS 1.19 06/04/2022    LYMPHSABS 1.45 06/04/2022    EOSABS 0.22 06/04/2022    BASOSABS 0.03 06/04/2022     CMP:    Lab Results   Component Value Date     06/04/2022    K 3.8 06/04/2022    K 4.4 06/02/2022    CL 98 06/04/2022    CO2 28 06/04/2022    BUN 6 06/04/2022    CREATININE 0.6 06/04/2022    GFRAA >60 06/04/2022    LABGLOM >60 06/04/2022    GLUCOSE 130 06/04/2022    PROT 5.2 06/04/2022    LABALBU 2.9 06/04/2022    CALCIUM 8.6 06/04/2022    BILITOT 1.9 06/04/2022    ALKPHOS 83 06/04/2022    AST 12 06/04/2022    ALT 24 06/04/2022     Magnesium:    Lab Results   Component Value Date    MG 2.0 02/18/2022     Phosphorus:    Lab Results   Component Value Date    PHOS 3.2 09/01/2021     PT/INR:    Lab Results   Component Value Date    PROTIME 11.1 04/01/2022    INR 1.0 04/01/2022     Troponin:    Lab Results   Component Value Date    TROPONINI <0.01 07/20/2020     U/A:    Lab Results   Component Value Date    COLORU Yellow 06/03/2022    PROTEINU Negative 06/03/2022    PHUR 5.0 06/03/2022    WBCUA 0-1 02/17/2022    RBCUA 1-3 02/17/2022    TRICHOMONAS Present 02/26/2020    BACTERIA RARE 02/17/2022    CLARITYU Clear 06/03/2022    SPECGRAV 1.015 06/03/2022    LEUKOCYTESUR Negative 06/03/2022    UROBILINOGEN 0.2 06/03/2022    BILIRUBINUR Negative 06/03/2022    BLOODU Negative 06/03/2022    GLUCOSEU Negative 06/03/2022    AMORPHOUS FEW 11/23/2021     ABG:  No results found for: PH, PCO2, PO2, HCO3, BE, THGB, TCO2, O2SAT  HgBA1c:    Lab Results   Component Value Date    LABA1C 4.5 04/02/2022     FLP:    Lab Results   Component Value Date    TRIG 184 05/11/2021    HDL 44 05/11/2021    LDLCALC 144 05/11/2021    LABVLDL 37 05/11/2021     TSH:    Lab Results   Component Value Date    TSH 0.293 09/01/2021     IRON:  No results found for: IRON  LIPASE:    Lab Results   Component Value Date    LIPASE 10 09/05/2021       ASSESSMENT AND PLAN:      Patient Active Problem List    Diagnosis Date Noted    Ile[facility] in place [facility]) 06/01/2022    S/P closure of ileostomy 06/01/2022    Pulmonary embolism without acute cor pulmonale (HCC) 04/01/2022    Multiple subsegmental pulmonary emboli without acute cor pulmonale (HCC)     Postoperative intra-abdominal abscess 03/25/2022    Sigmoid stricture (HCC)     Pelvic abscess in female     Ileus, postoperative ([facility] 09/05/2021    S/P colectomy 08/31/2021    Malignant neoplasm of descending colon ([facility]     Secondary restless legs syndrome 08/13/2021    Palafox's esophagus with dysplasia 05/11/2021    Morbidly obese ([facility] 10/05/2020    Multiple sclerosis ([facility] 08/04/2020    Vocal cord dysfunction 07/20/2020     Impression:  1. Status post laparoscopic ileostomy reversal  2. History of pulmonary as was him with without cor pulmonale 4/1/2022  3. History hypertension  4. History of VEENA-not on CPAP  5. Current tobacco use  6. History of restless leg syndrome  7. History of multiple sclerosis  8. History of DVT left external iliac and left common femoral vein  9. Acute hypoxic respiratory failure postop  10. Bilateral pneumonia      Plan:  Patient admitted to the medical surgical floor  Home medications reviewed  Monitor heart rate, blood pressure, O2 saturation  Diet and abdominal pain meds per general surgery  Activity as tolerated  DuoNeb aerosols every 4 hours as needed  NicoDerm patch    Remove Stevens catheter  Eliquis 5 mg twice daily    Hold lisinopril-low blood pressure  Patient on lactated Ringer's 100 cc an hour  Blood culture x2  Sputum culture  IV vancomycin  DuoNeb aerosols 4 times daily  IV cefepime 1 g every 12 hours    CMP, CBC in a.m.       Mary Golden DO, D.OWagner  6/4/2022  10:12 AM

## 2022-06-04 NOTE — PLAN OF CARE
Problem: Pain  Goal: Verbalizes/displays adequate comfort level or baseline comfort level  6/4/2022 0039 by Ortiz Quinonez RN  Outcome: Progressing  6/3/2022 1716 by Allyn Love RN  Outcome: Progressing     Problem: Safety - Adult  Goal: Free from fall injury  6/4/2022 0039 by Ortiz Quinonez RN  Outcome: Progressing  6/3/2022 1716 by Allyn Love RN  Outcome: Progressing     Problem: ABCDS Injury Assessment  Goal: Absence of physical injury  6/4/2022 0039 by Ortiz Quinonez RN  Outcome: Progressing  6/3/2022 1716 by Allyn Love RN  Outcome: Progressing

## 2022-06-04 NOTE — PROGRESS NOTES
General Surgery Progress Note  Mike Gabriel MD, MS    Patient's Name/Date of Birth: Yg Patel / 1975    Date: June 4, 2022     Surgeon: Reanna Rowe MD    Chief Complaint: s/p ileostomy reversal    Patient Active Problem List   Diagnosis    Vocal cord dysfunction    Multiple sclerosis ([address]    Morbidly obese ([address]    Palafox's esophagus with dysplasia    Secondary restless legs syndrome    S/P colectomy    Malignant neoplasm of descending colon ([address]    Ileus, postoperative ([address]    Pelvic abscess in female    Sigmoid stricture ([address]    Postoperative intra-abdominal abscess    Pulmonary embolism without acute cor pulmonale ([address]    Multiple subsegmental pulmonary emboli without acute cor pulmonale ([address]    Ileostomy in place ([address]    S/P closure of ileostomy       Subjective: improved, abd pain seems better    Objective:  BP (!) 92/57   Pulse 96   Temp 98.4 °F (36.9 °C) (Oral)   Resp 17   Ht 5' 1\" (1.549 m)   Wt 186 lb (84.4 kg)   LMP 01/05/2018   SpO2 97%   BMI 35.14 kg/m²   Labs:  Recent Labs     06/02/22  0340 06/03/22  1355 06/04/22  0423   WBC 15.2* 13.7* 11.9*   HGB 12.2 12.1 11.2*   HCT 37.6 37.4 35.0     Lab Results   Component Value Date    CREATININE 0.6 06/04/2022    BUN 6 06/04/2022     06/04/2022    K 3.8 06/04/2022    CL 98 06/04/2022    CO2 28 06/04/2022     No results for input(s): LIPASE, AMYLASE in the last 72 hours.       General appearance:  NAD  Head: NCAT, PERRLA, EOMI, red conjunctiva  Neck: supple, no masses  Lungs: Equal chest rise bilateral, 4L NC  Heart: Reg rate, intermittent tachy  Abdomen: soft, nondistended, tender appropriately, incision C/D/I, RLQ wound clean, some serosang drainage  Skin; no lesions  Gu: no cva tenderness  Extremities: extremities normal, atraumatic, no cyanosis or edema      Assessment/Plan:  Yg Patel is a 51 y.o. female POD 3 ileostomy reversal, hypoxia, tachycardia    Check CTA chest, COVID test, ddimer  Reg diet  Ambulate  Incentive spirometer    Physician Signature: Electronically signed by Dr. Emma Kohli  [phone] (p)  6/4/2022  10:12 AM

## 2022-06-04 NOTE — PROGRESS NOTES
Pharmacy Consultation Note  (Antibiotic Dosing and Monitoring)    Initial consult date: 6/3/22  Consulting physician/provider: Bhavna Bellamy  Drug: Vancomycin  Indication: Pneumonia    Age/  Gender Height Weight IBW  Allergy Information   46 y.o./female 5' 1\" (154.9 cm) 186 lb (84.4 kg)     Ideal body weight: 47.8 kg (105 lb 6.1 oz)  Adjusted ideal body weight: 62.4 kg (137 lb 10 oz)   Patient has no known allergies. Renal Function:  Recent Labs     06/02/22  0340 06/03/22  1355 06/04/22  0423   BUN 7 6 6   CREATININE 0.5 0.7 0.6       Intake/Output Summary (Last 24 hours) at 6/4/2022 1228  Last data filed at 6/4/2022 0751  Gross per 24 hour   Intake 130 ml   Output 650 ml   Net -520 ml       Vancomycin Monitoring:  Trough:  No results for input(s): VANCOTROUGH in the last 72 hours. Random:  No results for input(s): VANCORANDOM in the last 72 hours. Recent vancomycin administrations                   vancomycin (VANCOCIN) 1,250 mg in dextrose 5 % 250 mL IVPB (mg) 1,250 mg New Bag 06/04/22 0211     1,250 mg New Bag 06/03/22 1553                  Assessment:  · Patient is a 51 y.o. female who has been initiated on vancomycin  Estimated Creatinine Clearance: 114 mL/min (based on SCr of 0.6 mg/dL).   · To dose vancomycin, pharmacy will be utilizing [employer] calculation software for goal AUC/-600 mg/L-hr   · 6/4: Scr 0.6, continue same, draw trough tomorrow    Plan:  · Will continue vancomycin 1250 mg IV every 12 hours  · Will check vancomycin trough before 4th dose, 6/5 @ 0130  · Hold dose if trough is >20 mcg/mL  · Will continue to monitor renal function   · Clinical pharmacy to follow      CARYL Draper ALLEN [facility] 6/4/2022 12:28 PM

## 2022-06-05 LAB
ALBUMIN SERPL-MCNC: 3.1 G/DL (ref 3.5–5.2)
ALP BLD-CCNC: 92 U/L (ref 35–104)
ALT SERPL-CCNC: 20 U/L (ref 0–32)
ANION GAP SERPL CALCULATED.3IONS-SCNC: 9 MMOL/L (ref 7–16)
AST SERPL-CCNC: 10 U/L (ref 0–31)
BASOPHILS ABSOLUTE: 0.01 E9/L (ref 0–0.2)
BASOPHILS RELATIVE PERCENT: 0.1 % (ref 0–2)
BILIRUB SERPL-MCNC: 0.9 MG/DL (ref 0–1.2)
BUN BLDV-MCNC: 5 MG/DL (ref 6–20)
CALCIUM SERPL-MCNC: 8.5 MG/DL (ref 8.6–10.2)
CHLORIDE BLD-SCNC: 97 MMOL/L (ref 98–107)
CO2: 27 MMOL/L (ref 22–29)
CREAT SERPL-MCNC: 0.5 MG/DL (ref 0.5–1)
EOSINOPHILS ABSOLUTE: 0.26 E9/L (ref 0.05–0.5)
EOSINOPHILS RELATIVE PERCENT: 2.1 % (ref 0–6)
GFR AFRICAN AMERICAN: >60
GFR NON-AFRICAN AMERICAN: >60 ML/MIN/1.73
GLUCOSE BLD-MCNC: 116 MG/DL (ref 74–99)
HCT VFR BLD CALC: 35 % (ref 34–48)
HEMOGLOBIN: 11.6 G/DL (ref 11.5–15.5)
IMMATURE GRANULOCYTES #: 0.05 E9/L
IMMATURE GRANULOCYTES %: 0.4 % (ref 0–5)
LYMPHOCYTES ABSOLUTE: 1.35 E9/L (ref 1.5–4)
LYMPHOCYTES RELATIVE PERCENT: 11 % (ref 20–42)
MCH RBC QN AUTO: 30.8 PG (ref 26–35)
MCHC RBC AUTO-ENTMCNC: 33.1 % (ref 32–34.5)
MCV RBC AUTO: 92.8 FL (ref 80–99.9)
MONOCYTES ABSOLUTE: 1 E9/L (ref 0.1–0.95)
MONOCYTES RELATIVE PERCENT: 8.1 % (ref 2–12)
NEUTROPHILS ABSOLUTE: 9.61 E9/L (ref 1.8–7.3)
NEUTROPHILS RELATIVE PERCENT: 78.3 % (ref 43–80)
PDW BLD-RTO: 12.3 FL (ref 11.5–15)
PLATELET # BLD: 193 E9/L (ref 130–450)
PMV BLD AUTO: 9.6 FL (ref 7–12)
POTASSIUM SERPL-SCNC: 3.5 MMOL/L (ref 3.5–5)
PROCALCITONIN: 0.13 NG/ML (ref 0–0.08)
RBC # BLD: 3.77 E12/L (ref 3.5–5.5)
SODIUM BLD-SCNC: 133 MMOL/L (ref 132–146)
TOTAL PROTEIN: 6 G/DL (ref 6.4–8.3)
VANCOMYCIN TROUGH: 6.3 MCG/ML (ref 5–16)
WBC # BLD: 12.3 E9/L (ref 4.5–11.5)

## 2022-06-05 PROCEDURE — 6370000000 HC RX 637 (ALT 250 FOR IP): Performed by: INTERNAL MEDICINE

## 2022-06-05 PROCEDURE — 1200000000 HC SEMI PRIVATE

## 2022-06-05 PROCEDURE — 94640 AIRWAY INHALATION TREATMENT: CPT

## 2022-06-05 PROCEDURE — 84145 PROCALCITONIN (PCT): CPT

## 2022-06-05 PROCEDURE — 6370000000 HC RX 637 (ALT 250 FOR IP): Performed by: SURGERY

## 2022-06-05 PROCEDURE — 80053 COMPREHEN METABOLIC PANEL: CPT

## 2022-06-05 PROCEDURE — 2580000003 HC RX 258: Performed by: INTERNAL MEDICINE

## 2022-06-05 PROCEDURE — 2580000003 HC RX 258: Performed by: SURGERY

## 2022-06-05 PROCEDURE — 36415 COLL VENOUS BLD VENIPUNCTURE: CPT

## 2022-06-05 PROCEDURE — 6360000002 HC RX W HCPCS: Performed by: SURGERY

## 2022-06-05 PROCEDURE — 80202 ASSAY OF VANCOMYCIN: CPT

## 2022-06-05 PROCEDURE — 85025 COMPLETE CBC W/AUTO DIFF WBC: CPT

## 2022-06-05 PROCEDURE — 6360000002 HC RX W HCPCS: Performed by: INTERNAL MEDICINE

## 2022-06-05 RX ADMIN — APIXABAN 5 MG: 5 TABLET, FILM COATED ORAL at 08:42

## 2022-06-05 RX ADMIN — MORPHINE SULFATE 4 MG: 4 INJECTION, SOLUTION INTRAMUSCULAR; INTRAVENOUS at 14:01

## 2022-06-05 RX ADMIN — METHOCARBAMOL TABLETS 500 MG: 500 TABLET, COATED ORAL at 08:41

## 2022-06-05 RX ADMIN — VANCOMYCIN HYDROCHLORIDE 1000 MG: 1 INJECTION, POWDER, LYOPHILIZED, FOR SOLUTION INTRAVENOUS at 11:14

## 2022-06-05 RX ADMIN — BACLOFEN 20 MG: 10 TABLET ORAL at 12:15

## 2022-06-05 RX ADMIN — FAMOTIDINE 20 MG: 20 TABLET ORAL at 19:40

## 2022-06-05 RX ADMIN — TRAMADOL HYDROCHLORIDE 50 MG: 50 TABLET, COATED ORAL at 12:09

## 2022-06-05 RX ADMIN — CEFEPIME 2000 MG: 2 INJECTION, POWDER, FOR SOLUTION INTRAVENOUS at 04:07

## 2022-06-05 RX ADMIN — METHOCARBAMOL TABLETS 500 MG: 500 TABLET, COATED ORAL at 19:40

## 2022-06-05 RX ADMIN — TRAMADOL HYDROCHLORIDE 50 MG: 50 TABLET, COATED ORAL at 19:01

## 2022-06-05 RX ADMIN — APIXABAN 5 MG: 5 TABLET, FILM COATED ORAL at 19:40

## 2022-06-05 RX ADMIN — IPRATROPIUM BROMIDE AND ALBUTEROL SULFATE 1 AMPULE: .5; 2.5 SOLUTION RESPIRATORY (INHALATION) at 13:04

## 2022-06-05 RX ADMIN — IPRATROPIUM BROMIDE AND ALBUTEROL SULFATE 1 AMPULE: .5; 2.5 SOLUTION RESPIRATORY (INHALATION) at 06:11

## 2022-06-05 RX ADMIN — SODIUM CHLORIDE, PRESERVATIVE FREE 10 ML: 5 INJECTION INTRAVENOUS at 14:02

## 2022-06-05 RX ADMIN — Medication 10 ML: at 07:43

## 2022-06-05 RX ADMIN — CEFEPIME 2000 MG: 2 INJECTION, POWDER, FOR SOLUTION INTRAVENOUS at 19:46

## 2022-06-05 RX ADMIN — BACLOFEN 20 MG: 10 TABLET ORAL at 17:06

## 2022-06-05 RX ADMIN — CEFEPIME 2000 MG: 2 INJECTION, POWDER, FOR SOLUTION INTRAVENOUS at 12:11

## 2022-06-05 RX ADMIN — MORPHINE SULFATE 4 MG: 4 INJECTION, SOLUTION INTRAMUSCULAR; INTRAVENOUS at 07:43

## 2022-06-05 RX ADMIN — MORPHINE SULFATE 4 MG: 4 INJECTION, SOLUTION INTRAMUSCULAR; INTRAVENOUS at 20:03

## 2022-06-05 RX ADMIN — METHOCARBAMOL TABLETS 500 MG: 500 TABLET, COATED ORAL at 17:05

## 2022-06-05 RX ADMIN — IPRATROPIUM BROMIDE AND ALBUTEROL SULFATE 1 AMPULE: .5; 2.5 SOLUTION RESPIRATORY (INHALATION) at 10:00

## 2022-06-05 RX ADMIN — TRAMADOL HYDROCHLORIDE 50 MG: 50 TABLET, COATED ORAL at 04:05

## 2022-06-05 RX ADMIN — VANCOMYCIN HYDROCHLORIDE 1250 MG: 10 INJECTION, POWDER, LYOPHILIZED, FOR SOLUTION INTRAVENOUS at 02:21

## 2022-06-05 RX ADMIN — MORPHINE SULFATE 4 MG: 4 INJECTION, SOLUTION INTRAMUSCULAR; INTRAVENOUS at 23:37

## 2022-06-05 RX ADMIN — METHOCARBAMOL TABLETS 500 MG: 500 TABLET, COATED ORAL at 14:01

## 2022-06-05 RX ADMIN — IPRATROPIUM BROMIDE AND ALBUTEROL SULFATE 1 AMPULE: .5; 2.5 SOLUTION RESPIRATORY (INHALATION) at 19:19

## 2022-06-05 RX ADMIN — VANCOMYCIN HYDROCHLORIDE 1500 MG: 10 INJECTION, POWDER, LYOPHILIZED, FOR SOLUTION INTRAVENOUS at 19:44

## 2022-06-05 RX ADMIN — SODIUM CHLORIDE, POTASSIUM CHLORIDE, SODIUM LACTATE AND CALCIUM CHLORIDE: 600; 310; 30; 20 INJECTION, SOLUTION INTRAVENOUS at 19:45

## 2022-06-05 ASSESSMENT — PAIN DESCRIPTION - ONSET
ONSET: GRADUAL
ONSET: GRADUAL

## 2022-06-05 ASSESSMENT — PAIN DESCRIPTION - PAIN TYPE
TYPE: ACUTE PAIN;SURGICAL PAIN
TYPE: ACUTE PAIN;SURGICAL PAIN

## 2022-06-05 ASSESSMENT — PAIN DESCRIPTION - DESCRIPTORS
DESCRIPTORS: ACHING;DISCOMFORT;SORE
DESCRIPTORS: SHARP;ACHING;SHOOTING
DESCRIPTORS: ACHING
DESCRIPTORS: ACHING;DISCOMFORT;SORE

## 2022-06-05 ASSESSMENT — PAIN SCALES - GENERAL
PAINLEVEL_OUTOF10: 8
PAINLEVEL_OUTOF10: 8
PAINLEVEL_OUTOF10: 5
PAINLEVEL_OUTOF10: 5
PAINLEVEL_OUTOF10: 7
PAINLEVEL_OUTOF10: 5
PAINLEVEL_OUTOF10: 10
PAINLEVEL_OUTOF10: 7
PAINLEVEL_OUTOF10: 6
PAINLEVEL_OUTOF10: 8

## 2022-06-05 ASSESSMENT — PAIN DESCRIPTION - FREQUENCY
FREQUENCY: INTERMITTENT
FREQUENCY: INTERMITTENT

## 2022-06-05 ASSESSMENT — PAIN DESCRIPTION - LOCATION
LOCATION: ABDOMEN

## 2022-06-05 ASSESSMENT — PAIN DESCRIPTION - ORIENTATION
ORIENTATION: RIGHT;LEFT;LOWER

## 2022-06-05 NOTE — PROGRESS NOTES
Department of Internal Medicine        HISTORY OF PRESENT ILLNESS:      The patient is a 51 y.o. female who presents with having a laparoscopic ileostomy reversal.  The patient is seen postop in recovery. Patient has expected postop discomfort. Patient denies any problem with chest pain, dizziness, nausea/vomiting or unusual shortness of breath. Preop lab work reviewed and were normal.  O2 saturation 94% on room air. Blood pressure 144/93 with a heart rate of 72.    6/2/2022  Patient seen examined on medical surgical floor. Patient has expected postop discomfort. Patient has a chronic nonproductive cough that is about the same. She denies any chest pain, dizziness or nausea vomiting. BUN/creatinine 7/0.5 with WBC 15.2 today with hemoglobin 12.2. Mild elevation transaminase. Temperature is 97.9 with heart rate 55 blood pressure 115/79. O2 sat 95% room air at rest.  Urine output is good. Remove Stevens catheter today. 6/3/2022  Patient seen examined on medical surgical floor. Patient complained of increased abdominal discomfort this morning. She denies any chest pain, fever/chills, dizziness or nausea vomiting. Temperature 99.7 with heart rate of 111 and blood pressure 143/75. O2 sat 91-97% room air at rest.  Urine output is fairly good. Pending BMP, CBC this morning. 6/4/2022  Patient seen examined medical surgical floor. Patient states she feels little bit better today. Patient still with productive cough. Abdominal pain is still there but improved from yesterday. She denies any chest pain. BUN/creatinine 6/0.6 with normal electrolytes. Patient procalcitonin 0.10 yesterday with normal liver enzymes except for total bili at 1.9. WBC is improved at 11.9 with hemoglobin 11.2. Urinalysis negative. Temperature seems to be improving and currently 98.4 with the patient did have a temperature yesterday 99.2. Blood pressure little bit lower today currently 92/57.   O2 sat 97% but is increased to 4 L nasal cannula. We will check a D-dimer and case discussed with general surgery. If D-dimer is elevated we will do a CTA of the lungs. Pending sputum cultures. 6/5/2022  Patient seen examined on medical surgical floor. Patient feels better today. Patient has less abdominal pain but still has postop discomfort. Patient has a clear productive cough. Patient's appetite is much improved today and she did eat most of her breakfast for the first time. She denies any chest pain. There is no fever/chills or dizziness. D-dimer was 1044. CTA lungs showed no evidence of pulmonary embolism. There was consolidative infiltrate or atelectatic change in lung bases bilaterally. Small effusions noted in the bases. BUN/creatinine is 5/0.5 with fasting blood sugar 116. Liver enzymes normal with a bilirubin down to 0.9 and WBC 12.3 and hemoglobin 11.6. Temperature is 97.6 with heart rate 93 and blood pressure 111/64.   O2 sat 92% on room air at rest.    Past Medical History:    Past Medical History:   Diagnosis Date    Acid reflux disease     Cyst of ovary     Fracture of nasal bone     Headache     Hearing loss     Hx of blood clots     leg to lung    Hypertension     Migraine     Morbidly obese ([address] 10/05/2020    Multiple sclerosis ([address]     denies limitations at present 11/2020    VEENA no CPAP     severe VEENA per pt    Right shoulder pain 11/2020    Tinnitus     TMJ dysfunction     left side     Past Surgical History:    Past Surgical History:   Procedure Laterality Date    APPENDECTOMY      BACK SURGERY      lumbar    BICEPS TENDON REPAIR Right 11/11/2020    BICEPS TENODESIS performed by Azul Shelton MD at [place] [address] Left 8/31/2021    LAPAROSCOPIC LEFT HEMICOLECTOMY WITH LOOP OSTOMY performed by Nu Huang MD at [address] [place] [address],[address] N/A 9/6/2021    DIAGNOSTIC LAPAROSCOPY POSSIBLE BOWEL RESECTION POSSILBE OSTOMY performed by Nu Huang MD at [facility] OR    COLECTOMY N/A 2/8/2022    LAPAROSCOPIC SIGMOID COLON RESECTION performed by Anderson iH MD at [address] [facility] N/A 6/1/2022    OPEN ILEOSTOMY REVERSAL performed by Anderson Hi MD at [address] [facility]  03/05/2018    Robotic hysterectomy, bilateral salpingectomy, right oophorectomy DR. ARIANA MONIQUE [facility]     HYSTERECTOMY, TOTAL ABDOMINAL      LARYNGOSCOPY N/A 7/17/2020    DIRECT LARYNGOSCOPY--OMNI GUIDE LASER performed by Jomar Thornton MD at [facility] PARTIAL HYSTERECTOMY      PROCTOSIGMOIDOSCOPY N/A 10/12/2021    ANAL PROCTO SIGMOIDOSCOPY FLEXIBLE performed by Anderson Hi MD at [address] [place] [address] N/A 1/25/2022    ANAL PROCTO [address] performed by Anderson Hi MD at [facility] ARTHROSCOPY Right 11/11/2020    RIGHT SHOULDER DIAGNOSTIC ARTHROSCOPY WITH DECOMPRESSION ROTATOR CUFF REPAIR performed by Elizabeth Birmingham MD at [place] [address]         Medications Prior to Admission:    @  Prior to Admission medications    Medication Sig Start Date End Date Taking?  Authorizing Provider   LISINOPRIL PO Take by mouth daily   Yes Historical Provider, MD   vitamin D (D3-50) 15968 UNIT CAPS Take 1 capsule by mouth once a week 6/3/22   Davie Sheridan, MARIXA - CNP   famotidine (PEPCID) 40 MG tablet TAKE 1 TABLET BY MOUTH DAILY IN THE EVENING *EMERGENCY REFILL* 4/4/22   Historical Provider, MD   fluticasone (FLONASE) 50 MCG/ACT nasal spray 1 spray by Each Nostril route daily 4/11/22   Darcie Barry PA-C   apixaban (ELIQUIS) 5 MG TABS tablet Take 1 tablet by mouth 2 times daily 3/25/22   Darcie Barry PA-C   nortriptyline (PAMELOR) 10 MG capsule TAKE ONE (1) CAPSULE BY MOUTH NIGHTLY FOR HEADACHE 3/1/22   Darcie Barry PA-C   pantoprazole (PROTONIX) 40 MG tablet Take 1 tablet by mouth 2 times daily (before meals) 2/24/22   Kobi Finley DO   gabapentin (NEURONTIN) 300 MG capsule TAKE 1 CAPSULE BY MOUTH THREE TIMES DAILY 9/13/21 5/27/22  Tad Irons, APRN - CNP   baclofen (LIORESAL) 20 MG tablet Take 1 tablet by mouth 4 times daily as needed (muscle spasms; pain) 8/13/21   Tad Irons, APRN - CNP   rOPINIRole (REQUIP) 0.5 MG tablet Take 1 tablet by mouth 2 times daily as needed (restless legs) 8/13/21   Tad Irons, APRN - CNP   ocrelizumab (OCREVUS) 300 MG/10ML SOLN injection Infuse 20 mLs intravenously every 6 months  Patient taking differently: Infuse 600 mg intravenously every 6 months Due in August 2022 7/19/21   Tad Irons, APRN - CNP       Allergies:  Patient has no known allergies. Social History:   Social History     Socioeconomic History    Marital status: Single     Spouse name: Not on file    Number of children: 3    Years of education: 6    Highest education level: 11th grade   Occupational History    Not on file   Tobacco Use    Smoking status: Current Every Day Smoker     Packs/day: 0.50     Years: 25.00     Pack years: 12.50     Types: Cigarettes    Smokeless tobacco: Never Used    Tobacco comment:     Vaping Use    Vaping Use: Never used   Substance and Sexual Activity    Alcohol use: Not Currently    Drug use: No    Sexual activity: Yes     Partners: Male   Other Topics Concern    Not on file   Social History Narrative    Uses [employer] for transportation    [employer]     Social Determinants of Health     Financial Resource Strain: Low Risk     Difficulty of Paying Living Expenses: Not hard at all   Food Insecurity: No Food Insecurity    Worried About Running Out of Food in the Last Year: Never true    [place] of Food in the Last Year: Never true   Transportation Needs:     Lack of Transportation (Medical): Not on file    Lack of Transportation (Non-Medical):  Not on file   Physical Activity:     Days of Exercise per Week: Not on file    Minutes of Exercise per Session: Not on file   Stress:     Feeling of Stress : Not on file   Social Connections:     Frequency of Communication with Friends and Family: Not on file    Frequency of Social Gatherings with Friends and Family: Not on file    Attends [employer] Services: Not on file    Active Member of Clubs or Organizations: Not on file    Attends Club or Organization Meetings: Not on file    Marital Status: Not on file   Intimate Partner Violence:     Fear of Current or Ex-Partner: Not on file    Emotionally Abused: Not on file    Physically Abused: Not on file    Sexually Abused: Not on file   Housing Stability:     Unable to Pay for Housing in the Last Year: Not on file    Number of [place] in the Last Year: Not on file    Unstable Housing in the Last Year: Not on file       Family History:   Family History   Problem Relation Age of Onset    Mult Sclerosis Mother     Colon Cancer Neg Hx     Uterine Cancer Neg Hx     Ovarian Cancer Neg Hx     Breast Cancer Neg Hx        REVIEW OF SYSTEMS:    Gen: Patient denies any lightheadedness or dizziness. No LOC or syncope. No fevers or chills. HEENT: No earache, sore throat or nasal congestion. Resp: Denies cough, hemoptysis or sputum production. Cardiac: Denies chest pain, SOB, diaphoresis or palpitations. GI: No nausea, vomiting, diarrhea or constipation. No melena or hematochezia. : No urinary complaints, dysuria, hematuria or frequency. MSK: No extremity weakness, paralysis or paresthesias. PHYSICAL EXAM:    Vitals:  /64   Pulse 93   Temp 97.6 °F (36.4 °C) (Oral)   Resp 16   Ht 5' 1\" (1.549 m)   Wt 186 lb (84.4 kg)   LMP 01/05/2018   SpO2 92%   BMI 35.14 kg/m²     General:  This is a 51 y.o. yo female who is alert and oriented in expected postop discomfort  HEENT:  Head is normocephalic and atraumatic, PERRLA, EOMI, mucus membranes moist with no pharyngeal erythema or exudate. Neck:  Supple with no carotid bruits, JVD or thyromegaly.   No cervical adenopathy  CV: Regular rate and rhythm, no murmurs  Lungs: Coarse breath sounds to auscultation bilaterally with no wheezes, rales or rhonchi  Abdomen:  + Postop abdomen, mildly distended, bowel sounds present  Extremities:  No edema, peripheral pulses intact bilaterally  Neuro:  Cranial nerves II-XII grossly intact; motor and sensory function intact with no focal deficits  Skin:  No rashes, lesions or wounds      DATA:  CBC with Differential:    Lab Results   Component Value Date    WBC 12.3 06/05/2022    RBC 3.77 06/05/2022    HGB 11.6 06/05/2022    HCT 35.0 06/05/2022     06/05/2022    MCV 92.8 06/05/2022    MCH 30.8 06/05/2022    MCHC 33.1 06/05/2022    RDW 12.3 06/05/2022    METASPCT 0.9 11/23/2021    LYMPHOPCT 11.0 06/05/2022    MONOPCT 8.1 06/05/2022    BASOPCT 0.1 06/05/2022    MONOSABS 1.00 06/05/2022    LYMPHSABS 1.35 06/05/2022    EOSABS 0.26 06/05/2022    BASOSABS 0.01 06/05/2022     CMP:    Lab Results   Component Value Date     06/05/2022    K 3.5 06/05/2022    K 4.4 06/02/2022    CL 97 06/05/2022    CO2 27 06/05/2022    BUN 5 06/05/2022    CREATININE 0.5 06/05/2022    GFRAA >60 06/05/2022    LABGLOM >60 06/05/2022    GLUCOSE 116 06/05/2022    PROT 6.0 06/05/2022    LABALBU 3.1 06/05/2022    CALCIUM 8.5 06/05/2022    BILITOT 0.9 06/05/2022    ALKPHOS 92 06/05/2022    AST 10 06/05/2022    ALT 20 06/05/2022     Magnesium:    Lab Results   Component Value Date    MG 2.0 02/18/2022     Phosphorus:    Lab Results   Component Value Date    PHOS 3.2 09/01/2021     PT/INR:    Lab Results   Component Value Date    PROTIME 11.1 04/01/2022    INR 1.0 04/01/2022     Troponin:    Lab Results   Component Value Date    TROPONINI <0.01 07/20/2020     U/A:    Lab Results   Component Value Date    COLORU Yellow 06/03/2022    PROTEINU Negative 06/03/2022    PHUR 5.0 06/03/2022    WBCUA 0-1 02/17/2022    RBCUA 1-3 02/17/2022    TRICHOMONAS Present 02/26/2020    BACTERIA RARE 02/17/2022    CLARITYU Clear 06/03/2022 surgical floor  Home medications reviewed  Monitor heart rate, blood pressure, O2 saturation  Diet and abdominal pain meds per general surgery  Activity as tolerated  DuoNeb aerosols every 4 hours as needed  NicoDerm patch    Remove Stevens catheter  Eliquis 5 mg twice daily    Hold lisinopril-low blood pressure  Patient on lactated Ringer's 100 cc an hour  Blood culture x2  Sputum culture  IV vancomycin  DuoNeb aerosols 4 times daily  IV cefepime 1 g every 12 hours    Procalcitonin 6/5-pending    CMP, CBC in a.m.       Jackelyn Aguirre DO, PEDRO  6/5/2022  10:22 AM

## 2022-06-05 NOTE — PROGRESS NOTES
Pharmacy Consultation Note  (Antibiotic Dosing and Monitoring)    Initial consult date: 6/3/22  Consulting physician/provider: Melissa Uribe  Drug: Vancomycin  Indication: Pneumonia    Age/  Gender Height Weight IBW  Allergy Information   46 y.o./female 5' 1\" (154.9 cm) 186 lb (84.4 kg)     Ideal body weight: 47.8 kg (105 lb 6.1 oz)  Adjusted ideal body weight: 62.4 kg (137 lb 10 oz)   Patient has no known allergies. Renal Function:  Recent Labs     06/03/22  1355 06/04/22  0423 06/05/22  0134   BUN 6 6 5*   CREATININE 0.7 0.6 0.5       Intake/Output Summary (Last 24 hours) at 6/5/2022 0334  Last data filed at 6/4/2022 1840  Gross per 24 hour   Intake 2818.68 ml   Output --   Net 2818.68 ml       Vancomycin Monitoring:  Trough:    Recent Labs     06/05/22  0134   VANCOTROUGH 6.3     Random:  No results for input(s): VANCORANDOM in the last 72 hours. Recent vancomycin administrations                   vancomycin (VANCOCIN) 1,250 mg in dextrose 5 % 250 mL IVPB (mg) 1,250 mg New Bag 06/05/22 0221     1,250 mg New Bag 06/04/22 1556     1,250 mg New Bag  0211     1,250 mg New Bag 06/03/22 1553                 Assessment:  · Patient is a 51 y.o. female who has been initiated on vancomycin  Estimated Creatinine Clearance: 137 mL/min (based on SCr of 0.5 mg/dL).   · To dose vancomycin, pharmacy will be utilizing [employer] calculation software for goal AUC/-600 mg/L-hr   · 6/4: Scr 0.6, continue same, draw trough tomorrow  · 6/5: vanco level 6.3 prior to 4th dose    Plan:  · Will increase to vancomycin 1000 mg IV every 8 hours  · Will check vancomycin trough as necessary  · Will continue to monitor renal function   · Clinical pharmacy to follow      Thania Pond [facility] 6/5/2022 3:34 AM

## 2022-06-05 NOTE — PROGRESS NOTES
General Surgery Progress Note  Анна Hernandez MD, MS    Patient's Name/Date of Birth: Marisa Reddy / 1975    Date: June 5, 2022     Surgeon: Yelena Modi MD    Chief Complaint: s/p ileostomy reversal    Patient Active Problem List   Diagnosis    Vocal cord dysfunction    Multiple sclerosis ([address]    Morbidly obese ([address]    Palafox's esophagus with dysplasia    Secondary restless legs syndrome    S/P colectomy    Malignant neoplasm of descending colon ([address]    Ileus, postoperative ([address]    Pelvic abscess in female    Sigmoid stricture ([address]    Postoperative intra-abdominal abscess    Pulmonary embolism without acute cor pulmonale ([address]    Multiple subsegmental pulmonary emboli without acute cor pulmonale ([address]    Ileostomy in place ([address]    S/P closure of ileostomy       Subjective: improved, off oxygen, CTA neg for PE, COVID Neg, feels better    Objective:  /64   Pulse 93   Temp 97.6 °F (36.4 °C) (Oral)   Resp 16   Ht 5' 1\" (1.549 m)   Wt 186 lb (84.4 kg)   LMP 01/05/2018   SpO2 92%   BMI 35.14 kg/m²   Labs:  Recent Labs     06/03/22  1355 06/04/22  0423 06/05/22  0134   WBC 13.7* 11.9* 12.3*   HGB 12.1 11.2* 11.6   HCT 37.4 35.0 35.0     Lab Results   Component Value Date    CREATININE 0.5 06/05/2022    BUN 5 (L) 06/05/2022     06/05/2022    K 3.5 06/05/2022    CL 97 (L) 06/05/2022    CO2 27 06/05/2022     No results for input(s): LIPASE, AMYLASE in the last 72 hours.       General appearance:  NAD  Head: NCAT, PERRLA, EOMI, red conjunctiva  Neck: supple, no masses  Lungs: Equal chest rise bilateral, RA  Heart: Reg rate, intermittent tachy  Abdomen: soft, nondistended, tender appropriately, incision C/D/I, RLQ wound clean, some serosang drainage  Skin; no lesions  Gu: no cva tenderness  Extremities: extremities normal, atraumatic, no cyanosis or edema      Assessment/Plan:  Marisa Reddy is a 51 y.o. female POD 4 ileostomy reversal, hypoxia, tachycardia    Out of bed ambulation  Continue local wound care right lower abdominal wall  DC when okay with other services    Physician Signature: Electronically signed by Dr. Kirk [facility]  [phone] (p)  6/5/2022  11:16 AM

## 2022-06-05 NOTE — PROGRESS NOTES
Oxygen 95% on room air at rest. Ambulated patient in hallway 100 ft up and 100 feet bact then returned to room, SPO2 with activity 93 to 92%. Oxygen recovery rate 92% while sitting in chair, resting after walking. Tolerated ambulation well.

## 2022-06-06 LAB
ALBUMIN SERPL-MCNC: 3.1 G/DL (ref 3.5–5.2)
ALP BLD-CCNC: 95 U/L (ref 35–104)
ALT SERPL-CCNC: 15 U/L (ref 0–32)
ANION GAP SERPL CALCULATED.3IONS-SCNC: 7 MMOL/L (ref 7–16)
AST SERPL-CCNC: 8 U/L (ref 0–31)
BASOPHILS ABSOLUTE: 0.02 E9/L (ref 0–0.2)
BASOPHILS RELATIVE PERCENT: 0.3 % (ref 0–2)
BILIRUB SERPL-MCNC: 0.6 MG/DL (ref 0–1.2)
BUN BLDV-MCNC: 4 MG/DL (ref 6–20)
CALCIUM SERPL-MCNC: 8.6 MG/DL (ref 8.6–10.2)
CHLORIDE BLD-SCNC: 99 MMOL/L (ref 98–107)
CO2: 28 MMOL/L (ref 22–29)
CREAT SERPL-MCNC: 0.5 MG/DL (ref 0.5–1)
CULTURE, RESPIRATORY: NORMAL
EOSINOPHILS ABSOLUTE: 0.31 E9/L (ref 0.05–0.5)
EOSINOPHILS RELATIVE PERCENT: 4.4 % (ref 0–6)
GFR AFRICAN AMERICAN: >60
GFR NON-AFRICAN AMERICAN: >60 ML/MIN/1.73
GLUCOSE BLD-MCNC: 123 MG/DL (ref 74–99)
HCT VFR BLD CALC: 34.2 % (ref 34–48)
HEMOGLOBIN: 11 G/DL (ref 11.5–15.5)
IMMATURE GRANULOCYTES #: 0.05 E9/L
IMMATURE GRANULOCYTES %: 0.7 % (ref 0–5)
LYMPHOCYTES ABSOLUTE: 1.18 E9/L (ref 1.5–4)
LYMPHOCYTES RELATIVE PERCENT: 16.9 % (ref 20–42)
MCH RBC QN AUTO: 29.6 PG (ref 26–35)
MCHC RBC AUTO-ENTMCNC: 32.2 % (ref 32–34.5)
MCV RBC AUTO: 92.2 FL (ref 80–99.9)
MONOCYTES ABSOLUTE: 0.7 E9/L (ref 0.1–0.95)
MONOCYTES RELATIVE PERCENT: 10 % (ref 2–12)
NEUTROPHILS ABSOLUTE: 4.73 E9/L (ref 1.8–7.3)
NEUTROPHILS RELATIVE PERCENT: 67.7 % (ref 43–80)
PDW BLD-RTO: 12.2 FL (ref 11.5–15)
PLATELET # BLD: 211 E9/L (ref 130–450)
PMV BLD AUTO: 9.6 FL (ref 7–12)
POTASSIUM SERPL-SCNC: 3.6 MMOL/L (ref 3.5–5)
RBC # BLD: 3.71 E12/L (ref 3.5–5.5)
SMEAR, RESPIRATORY: NORMAL
SODIUM BLD-SCNC: 134 MMOL/L (ref 132–146)
TOTAL PROTEIN: 6 G/DL (ref 6.4–8.3)
WBC # BLD: 7 E9/L (ref 4.5–11.5)

## 2022-06-06 PROCEDURE — 94640 AIRWAY INHALATION TREATMENT: CPT

## 2022-06-06 PROCEDURE — 1200000000 HC SEMI PRIVATE

## 2022-06-06 PROCEDURE — 6360000002 HC RX W HCPCS: Performed by: SURGERY

## 2022-06-06 PROCEDURE — 6370000000 HC RX 637 (ALT 250 FOR IP): Performed by: INTERNAL MEDICINE

## 2022-06-06 PROCEDURE — 2580000003 HC RX 258: Performed by: INTERNAL MEDICINE

## 2022-06-06 PROCEDURE — 36415 COLL VENOUS BLD VENIPUNCTURE: CPT

## 2022-06-06 PROCEDURE — 6370000000 HC RX 637 (ALT 250 FOR IP): Performed by: SURGERY

## 2022-06-06 PROCEDURE — 85025 COMPLETE CBC W/AUTO DIFF WBC: CPT

## 2022-06-06 PROCEDURE — 6360000002 HC RX W HCPCS: Performed by: INTERNAL MEDICINE

## 2022-06-06 PROCEDURE — 80053 COMPREHEN METABOLIC PANEL: CPT

## 2022-06-06 RX ADMIN — METHOCARBAMOL TABLETS 500 MG: 500 TABLET, COATED ORAL at 13:53

## 2022-06-06 RX ADMIN — VANCOMYCIN HYDROCHLORIDE 1500 MG: 10 INJECTION, POWDER, LYOPHILIZED, FOR SOLUTION INTRAVENOUS at 07:51

## 2022-06-06 RX ADMIN — MORPHINE SULFATE 4 MG: 4 INJECTION, SOLUTION INTRAMUSCULAR; INTRAVENOUS at 02:49

## 2022-06-06 RX ADMIN — CEFEPIME 2000 MG: 2 INJECTION, POWDER, FOR SOLUTION INTRAVENOUS at 12:21

## 2022-06-06 RX ADMIN — TRAMADOL HYDROCHLORIDE 50 MG: 50 TABLET, COATED ORAL at 13:53

## 2022-06-06 RX ADMIN — TRAMADOL HYDROCHLORIDE 50 MG: 50 TABLET, COATED ORAL at 04:51

## 2022-06-06 RX ADMIN — CEFEPIME 2000 MG: 2 INJECTION, POWDER, FOR SOLUTION INTRAVENOUS at 04:55

## 2022-06-06 RX ADMIN — CEFEPIME 2000 MG: 2 INJECTION, POWDER, FOR SOLUTION INTRAVENOUS at 20:00

## 2022-06-06 RX ADMIN — MORPHINE SULFATE 4 MG: 4 INJECTION, SOLUTION INTRAMUSCULAR; INTRAVENOUS at 07:49

## 2022-06-06 RX ADMIN — IPRATROPIUM BROMIDE AND ALBUTEROL SULFATE 1 AMPULE: .5; 2.5 SOLUTION RESPIRATORY (INHALATION) at 17:22

## 2022-06-06 RX ADMIN — METHOCARBAMOL TABLETS 500 MG: 500 TABLET, COATED ORAL at 20:21

## 2022-06-06 RX ADMIN — VANCOMYCIN HYDROCHLORIDE 1500 MG: 10 INJECTION, POWDER, LYOPHILIZED, FOR SOLUTION INTRAVENOUS at 20:00

## 2022-06-06 RX ADMIN — FAMOTIDINE 20 MG: 20 TABLET ORAL at 20:21

## 2022-06-06 RX ADMIN — METHOCARBAMOL TABLETS 500 MG: 500 TABLET, COATED ORAL at 16:38

## 2022-06-06 RX ADMIN — MORPHINE SULFATE 4 MG: 4 INJECTION, SOLUTION INTRAMUSCULAR; INTRAVENOUS at 16:37

## 2022-06-06 RX ADMIN — APIXABAN 5 MG: 5 TABLET, FILM COATED ORAL at 07:49

## 2022-06-06 RX ADMIN — IPRATROPIUM BROMIDE AND ALBUTEROL SULFATE 1 AMPULE: .5; 2.5 SOLUTION RESPIRATORY (INHALATION) at 06:26

## 2022-06-06 RX ADMIN — APIXABAN 5 MG: 5 TABLET, FILM COATED ORAL at 20:21

## 2022-06-06 RX ADMIN — METHOCARBAMOL TABLETS 500 MG: 500 TABLET, COATED ORAL at 07:49

## 2022-06-06 RX ADMIN — IPRATROPIUM BROMIDE AND ALBUTEROL SULFATE 1 AMPULE: .5; 2.5 SOLUTION RESPIRATORY (INHALATION) at 09:50

## 2022-06-06 RX ADMIN — IPRATROPIUM BROMIDE AND ALBUTEROL SULFATE 1 AMPULE: .5; 2.5 SOLUTION RESPIRATORY (INHALATION) at 13:58

## 2022-06-06 RX ADMIN — MORPHINE SULFATE 4 MG: 4 INJECTION, SOLUTION INTRAMUSCULAR; INTRAVENOUS at 20:19

## 2022-06-06 ASSESSMENT — PAIN DESCRIPTION - DESCRIPTORS: DESCRIPTORS: SHARP

## 2022-06-06 ASSESSMENT — PAIN DESCRIPTION - ORIENTATION: ORIENTATION: MID

## 2022-06-06 ASSESSMENT — PAIN SCALES - GENERAL
PAINLEVEL_OUTOF10: 6
PAINLEVEL_OUTOF10: 7
PAINLEVEL_OUTOF10: 8
PAINLEVEL_OUTOF10: 7
PAINLEVEL_OUTOF10: 9

## 2022-06-06 ASSESSMENT — PAIN - FUNCTIONAL ASSESSMENT: PAIN_FUNCTIONAL_ASSESSMENT: PREVENTS OR INTERFERES SOME ACTIVE ACTIVITIES AND ADLS

## 2022-06-06 ASSESSMENT — PAIN DESCRIPTION - LOCATION: LOCATION: ABDOMEN

## 2022-06-06 NOTE — PROGRESS NOTES
Pharmacy Consultation Note  (Antibiotic Dosing and Monitoring)    Initial consult date: 6/3/22  Consulting physician/provider: Balbir Simms  Drug: Vancomycin  Indication: Pneumonia    Age/  Gender Height Weight IBW  Allergy Information   46 y.o./female 5' 1\" (154.9 cm) 186 lb (84.4 kg)     Ideal body weight: 47.8 kg (105 lb 6.1 oz)  Adjusted ideal body weight: 62.4 kg (137 lb 10 oz)   Patient has no known allergies. Renal Function:  Recent Labs     06/04/22  0423 06/05/22  0134 06/06/22  0455   BUN 6 5* 4*   CREATININE 0.6 0.5 0.5       Intake/Output Summary (Last 24 hours) at 6/6/2022 1215  Last data filed at 6/6/2022 0900  Gross per 24 hour   Intake 1719.57 ml   Output --   Net 1719.57 ml       Vancomycin Monitoring:  Trough:    Recent Labs     06/05/22  0134   VANCOTROUGH 6.3     Random:  No results for input(s): VANCORANDOM in the last 72 hours. Recent vancomycin administrations                   vancomycin (VANCOCIN) 1,500 mg in dextrose 5 % 300 mL IVPB (mg) 1,500 mg New Bag 06/06/22 0751     1,500 mg New Bag 06/05/22 1944    vancomycin (VANCOCIN) 1,000 mg in dextrose 5 % 250 mL IVPB (mg) 1,000 mg New Bag 06/05/22 1114    vancomycin (VANCOCIN) 1,250 mg in dextrose 5 % 250 mL IVPB (mg) 1,250 mg New Bag 06/05/22 0221     1,250 mg New Bag 06/04/22 1556     1,250 mg New Bag  0211     1,250 mg New Bag 06/03/22 1553                      Assessment:  · Patient is a 51 y.o. female who has been initiated on vancomycin  Estimated Creatinine Clearance: 137 mL/min (based on SCr of 0.5 mg/dL). · To dose vancomycin, pharmacy will be utilizing [employer] calculation software for goal AUC/-600 mg/L-hr   · 6/4: Scr 0.6, continue same, draw trough tomorrow  · 6/5: Scr 0.5, trough= 6.3, increase dose to 1500mg q12h  · 6/6: Scr 0.5, continue 1500mg q12h    Plan:  · Continue vancomycin 1500 mg IV every 12 hours. · Trough due before 4th dose tomorrow am @ 0730.   · Possible discharge tomorrow (6/7/22)   · Will continue to monitor renal function   · Clinical pharmacy to follow    Darin Weems PharmD 6/6/2022 12:18 PM

## 2022-06-06 NOTE — PROGRESS NOTES
Department of Internal Medicine        HISTORY OF PRESENT ILLNESS:      The patient is a 51 y.o. female who presents with having a laparoscopic ileostomy reversal.  The patient is seen postop in recovery. Patient has expected postop discomfort. Patient denies any problem with chest pain, dizziness, nausea/vomiting or unusual shortness of breath. Preop lab work reviewed and were normal.  O2 saturation 94% on room air. Blood pressure 144/93 with a heart rate of 72.    6/2/2022  Patient seen examined on medical surgical floor. Patient has expected postop discomfort. Patient has a chronic nonproductive cough that is about the same. She denies any chest pain, dizziness or nausea vomiting. BUN/creatinine 7/0.5 with WBC 15.2 today with hemoglobin 12.2. Mild elevation transaminase. Temperature is 97.9 with heart rate 55 blood pressure 115/79. O2 sat 95% room air at rest.  Urine output is good. Remove Stevens catheter today. 6/3/2022  Patient seen examined on medical surgical floor. Patient complained of increased abdominal discomfort this morning. She denies any chest pain, fever/chills, dizziness or nausea vomiting. Temperature 99.7 with heart rate of 111 and blood pressure 143/75. O2 sat 91-97% room air at rest.  Urine output is fairly good. Pending BMP, CBC this morning. 6/4/2022  Patient seen examined medical surgical floor. Patient states she feels little bit better today. Patient still with productive cough. Abdominal pain is still there but improved from yesterday. She denies any chest pain. BUN/creatinine 6/0.6 with normal electrolytes. Patient procalcitonin 0.10 yesterday with normal liver enzymes except for total bili at 1.9. WBC is improved at 11.9 with hemoglobin 11.2. Urinalysis negative. Temperature seems to be improving and currently 98.4 with the patient did have a temperature yesterday 99.2. Blood pressure little bit lower today currently 92/57.   O2 sat 97% but is increased to 4 L nasal cannula. We will check a D-dimer and case discussed with general surgery. If D-dimer is elevated we will do a CTA of the lungs. Pending sputum cultures. 6/5/2022  Patient seen examined on medical surgical floor. Patient feels better today. Patient has less abdominal pain but still has postop discomfort. Patient has a clear productive cough. Patient's appetite is much improved today and she did eat most of her breakfast for the first time. She denies any chest pain. There is no fever/chills or dizziness. D-dimer was 1044. CTA lungs showed no evidence of pulmonary embolism. There was consolidative infiltrate or atelectatic change in lung bases bilaterally. Small effusions noted in the bases. BUN/creatinine is 5/0.5 with fasting blood sugar 116. Liver enzymes normal with a bilirubin down to 0.9 and WBC 12.3 and hemoglobin 11.6. Temperature is 97.6 with heart rate 93 and blood pressure 111/64. O2 sat 92% on room air at rest.    6/6/2022  Patient seen examined on medical surgical floor. Patient states he feels good again today. Patient denies any chest pain. Patient has expected postop discomfort. Patient denies shortness of breath at rest.  BUN/creatinine is 4/0.5 with normal electrolytes. Liver enzymes normal with a WBC of 7.0 today with hemoglobin 11. Temperature is 98.3 with heart rate 81 blood pressure 116/77. O2 sat 92% on room air at rest.  Patient's O2 sat was 92-93% with activity on room air yesterday afternoon. Patient is doing much better with the IV vancomycin and cefepime. There is been no cultures back at this time. Considering the patient's had to prolonged hospitalizations previous 2 surgeries internal medicine feel more comfortable trying to complete the 5-day of IV antibiotics which would be tomorrow and then discharge after that.     Past Medical History:    Past Medical History:   Diagnosis Date    Acid reflux disease     Cyst of ovary     Fracture of nasal bone     Headache     Hearing loss     Hx of blood clots     leg to lung    Hypertension     Migraine     Morbidly obese ([address] 10/05/2020    Multiple sclerosis ([address]     denies limitations at present 11/2020    VEENA no CPAP     severe VEENA per pt    Right shoulder pain 11/2020    Tinnitus     TMJ dysfunction     left side     Past Surgical History:    Past Surgical History:   Procedure Laterality Date    APPENDECTOMY      BACK SURGERY      lumbar    BICEPS TENDON REPAIR Right 11/11/2020    BICEPS TENODESIS performed by Vicente Curry MD at [place] [address] Left 8/31/2021    LAPAROSCOPIC LEFT HEMICOLECTOMY WITH LOOP OSTOMY performed by Sarah Joy MD at [address] 9/6/2021    DIAGNOSTIC LAPAROSCOPY POSSIBLE BOWEL RESECTION POSSILBE OSTOMY performed by Sarah Joy MD at [address] 2/8/2022    LAPAROSCOPIC SIGMOID COLON RESECTION performed by Sarah Joy MD at [facility] N/A 6/1/2022    OPEN ILEOSTOMY REVERSAL performed by Sarah Joy MD at [address]  03/05/2018    Robotic hysterectomy, bilateral salpingectomy, right oophorectomy DR. ARIANA MONIQUE [facility]     HYSTERECTOMY, TOTAL ABDOMINAL      LARYNGOSCOPY N/A 7/17/2020    DIRECT LARYNGOSCOPY--OMNI GUIDE LASER performed by Shantelle Quiñones MD at [facility] PARTIAL HYSTERECTOMY      PROCTOSIGMOIDOSCOPY N/A 10/12/2021    ANAL PROCTO SIGMOIDOSCOPY FLEXIBLE performed by Sarah Joy MD at [address] [place] [address] N/A 1/25/2022    ANAL PROCTO [address] performed by Sarah Joy MD at [facility] ARTHROSCOPY Right 11/11/2020    RIGHT SHOULDER DIAGNOSTIC ARTHROSCOPY WITH DECOMPRESSION ROTATOR CUFF REPAIR performed by Vicente Curry MD at [facility]         Medications Prior to Admission:    @  Prior to Admission medications    Medication Sig Start Date End Date Taking? Authorizing Provider   LISINOPRIL PO Take by mouth daily   Yes Historical Provider, MD   vitamin D (D3-50) 90629 UNIT CAPS Take 1 capsule by mouth once a week 6/3/22   MARIXA Pollard CNP   famotidine (PEPCID) 40 MG tablet TAKE 1 TABLET BY MOUTH DAILY IN THE EVENING *EMERGENCY REFILL* 4/4/22   Historical Provider, MD   fluticasone (FLONASE) 50 MCG/ACT nasal spray 1 spray by Each Nostril route daily 4/11/22   Angelica Culp PA-C   apixaban (ELIQUIS) 5 MG TABS tablet Take 1 tablet by mouth 2 times daily 3/25/22   Angelica Culp PA-C   nortriptyline (PAMELOR) 10 MG capsule TAKE ONE (1) CAPSULE BY MOUTH NIGHTLY FOR HEADACHE 3/1/22   Angelica Culp PA-C   pantoprazole (PROTONIX) 40 MG tablet Take 1 tablet by mouth 2 times daily (before meals) 2/24/22   Karl Snooks, DO   gabapentin (NEURONTIN) 300 MG capsule TAKE 1 CAPSULE BY MOUTH THREE TIMES DAILY 9/13/21 5/27/22  MARIXA Pollard CNP   baclofen (LIORESAL) 20 MG tablet Take 1 tablet by mouth 4 times daily as needed (muscle spasms; pain) 8/13/21   MARIXA Pollard CNP   rOPINIRole (REQUIP) 0.5 MG tablet Take 1 tablet by mouth 2 times daily as needed (restless legs) 8/13/21   MARIXA Pollard CNP   ocrelizumab (OCREVUS) 300 MG/10ML SOLN injection Infuse 20 mLs intravenously every 6 months  Patient taking differently: Infuse 600 mg intravenously every 6 months Due in August 2022 7/19/21   MARIXA Pollard CNP       Allergies:  Patient has no known allergies.     Social History:   Social History     Socioeconomic History    Marital status: Single     Spouse name: Not on file    Number of children: 3    Years of education: 6    Highest education level: 11th grade   Occupational History    Not on file   Tobacco Use    Smoking status: Current Every Day Smoker     Packs/day: 0.50     Years: 25.00     Pack years: 12.50     Types: Cigarettes    Smokeless tobacco: Never Used    Tobacco comment:     Vaping Use    Vaping Use: Never used   Substance and Sexual Activity    Alcohol use: Not Currently    Drug use: No    Sexual activity: Yes     Partners: Male   Other Topics Concern    Not on file   Social History Narrative    Uses [employer] for transportation    [facility]     Social Determinants of Health     Financial Resource Strain: Low Risk     Difficulty of Paying Living Expenses: Not hard at all   Food Insecurity: No Food Insecurity    Worried About Running Out of Food in the Last Year: Never true    [address] in the Last Year: Never true   Transportation Needs:     Lack of Transportation (Medical): Not on file    Lack of Transportation (Non-Medical): Not on file   Physical Activity:     Days of Exercise per Week: Not on file    Minutes of Exercise per Session: Not on file   Stress:     Feeling of Stress : Not on file   Social Connections:     Frequency of Communication with Friends and Family: Not on file    Frequency of Social Gatherings with Friends and Family: Not on file    Attends Yazidi Services: Not on file    Active Member of [address] [place] [address] or Organizations: Not on file    Attends Club or Organization Meetings: Not on file    Marital Status: Not on file   Intimate Partner Violence:     Fear of Current or Ex-Partner: Not on file    Emotionally Abused: Not on file    Physically Abused: Not on file    Sexually Abused: Not on file   Housing Stability:     Unable to Pay for Housing in the Last Year: Not on file    Number of [place] in the Last Year: Not on file    Unstable Housing in the Last Year: Not on file       Family History:   Family History   Problem Relation Age of Onset    Mult Sclerosis Mother     Colon Cancer Neg Hx     Uterine Cancer Neg Hx     Ovarian Cancer Neg Hx     Breast Cancer Neg Hx        REVIEW OF SYSTEMS:    Gen: Patient denies any lightheadedness or dizziness. No LOC or syncope. No fevers or chills.     HEENT: No earache, sore throat or nasal congestion. Resp: Denies cough, hemoptysis or sputum production. Cardiac: Denies chest pain, SOB, diaphoresis or palpitations. GI: No nausea, vomiting, diarrhea or constipation. No melena or hematochezia. : No urinary complaints, dysuria, hematuria or frequency. MSK: No extremity weakness, paralysis or paresthesias. PHYSICAL EXAM:    Vitals:  /77   Pulse 81   Temp 98 °F (36.7 °C) (Oral)   Resp 16   Ht 5' 1\" (1.549 m)   Wt 186 lb (84.4 kg)   LMP 01/05/2018   SpO2 92%   BMI 35.14 kg/m²     General:  This is a 51 y.o. yo female who is alert and oriented in expected postop discomfort  HEENT:  Head is normocephalic and atraumatic, PERRLA, EOMI, mucus membranes moist with no pharyngeal erythema or exudate. Neck:  Supple with no carotid bruits, JVD or thyromegaly.   No cervical adenopathy  CV:  Regular rate and rhythm, no murmurs  Lungs: Coarse breath sounds to auscultation bilaterally with no wheezes, rales or rhonchi  Abdomen:  + Postop abdomen, mildly distended, bowel sounds present  Extremities:  No edema, peripheral pulses intact bilaterally  Neuro:  Cranial nerves II-XII grossly intact; motor and sensory function intact with no focal deficits  Skin:  No rashes, lesions or wounds      DATA:  CBC with Differential:    Lab Results   Component Value Date    WBC 7.0 06/06/2022    RBC 3.71 06/06/2022    HGB 11.0 06/06/2022    HCT 34.2 06/06/2022     06/06/2022    MCV 92.2 06/06/2022    MCH 29.6 06/06/2022    MCHC 32.2 06/06/2022    RDW 12.2 06/06/2022    METASPCT 0.9 11/23/2021    LYMPHOPCT 16.9 06/06/2022    MONOPCT 10.0 06/06/2022    BASOPCT 0.3 06/06/2022    MONOSABS 0.70 06/06/2022    LYMPHSABS 1.18 06/06/2022    EOSABS 0.31 06/06/2022    BASOSABS 0.02 06/06/2022     CMP:    Lab Results   Component Value Date     06/06/2022    K 3.6 06/06/2022    K 4.4 06/02/2022    CL 99 06/06/2022    CO2 28 06/06/2022    BUN 4 06/06/2022    CREATININE 0.5 06/06/2022    GFRAA >60 06/06/2022    LABGLOM >60 06/06/2022    GLUCOSE 123 06/06/2022    PROT 6.0 06/06/2022    LABALBU 3.1 06/06/2022    CALCIUM 8.6 06/06/2022    BILITOT 0.6 06/06/2022    ALKPHOS 95 06/06/2022    AST 8 06/06/2022    ALT 15 06/06/2022     Magnesium:    Lab Results   Component Value Date    MG 2.0 02/18/2022     Phosphorus:    Lab Results   Component Value Date    PHOS 3.2 09/01/2021     PT/INR:    Lab Results   Component Value Date    PROTIME 11.1 04/01/2022    INR 1.0 04/01/2022     Troponin:    Lab Results   Component Value Date    TROPONINI <0.01 07/20/2020     U/A:    Lab Results   Component Value Date    COLORU Yellow 06/03/2022    PROTEINU Negative 06/03/2022    PHUR 5.0 06/03/2022    WBCUA 0-1 02/17/2022    RBCUA 1-3 02/17/2022    TRICHOMONAS Present 02/26/2020    BACTERIA RARE 02/17/2022    CLARITYU Clear 06/03/2022    SPECGRAV 1.015 06/03/2022    LEUKOCYTESUR Negative 06/03/2022    UROBILINOGEN 0.2 06/03/2022    BILIRUBINUR Negative 06/03/2022    BLOODU Negative 06/03/2022    GLUCOSEU Negative 06/03/2022    AMORPHOUS FEW 11/23/2021     ABG:  No results found for: PH, PCO2, PO2, HCO3, BE, THGB, TCO2, O2SAT  HgBA1c:    Lab Results   Component Value Date    LABA1C 4.5 04/02/2022     FLP:    Lab Results   Component Value Date    TRIG 184 05/11/2021    HDL 44 05/11/2021    LDLCALC 144 05/11/2021    LABVLDL 37 05/11/2021     TSH:    Lab Results   Component Value Date    TSH 0.293 09/01/2021     IRON:  No results found for: IRON  LIPASE:    Lab Results   Component Value Date    LIPASE 10 09/05/2021       ASSESSMENT AND PLAN:      Patient Active Problem List    Diagnosis Date Noted    Ileostomy in place [facility]) 06/01/2022    S/P closure of ileostomy 06/01/2022    Pulmonary embolism without acute cor pulmonale ([address] 04/01/2022    Multiple subsegmental pulmonary emboli without acute cor pulmonale (HCC)     Postoperative intra-abdominal abscess 03/25/2022    Sigmoid stricture ([address]     Pelvic abscess in female     Ileus, postoperative ([facility] 09/05/2021    S/P colectomy 08/31/2021    Malignant ne[facility] of descending colon ([facility]     Secondary restless legs syndrome 08/13/2021    Palafox's esophagus with dysplasia 05/11/2021    Morbidly obese ([facility] 10/05/2020    Multiple sclerosis ([facility] 08/04/2020    Vocal cord dysfunction 07/20/2020     Impression:  1. Status post laparoscopic ileostomy reversal  2. History of pulmonary as was him with without cor pulmonale 4/1/2022  3. History hypertension-acute hypotension 6/4  4. History of VEENA-not on CPAP  5. Current tobacco use  6. History of restless leg syndrome  7. History of multiple sclerosis  8. History of DVT left external iliac and left common femoral vein  9. Acute hypoxic respiratory failure postop  10. Bilateral pneumonia      Plan:  Patient admitted to the medical surgical floor  Home medications reviewed  Monitor heart rate, blood pressure, O2 saturation  Diet and abdominal pain meds per general surgery  Activity as tolerated  DuoNeb aerosols every 4 hours as needed  NicoDerm patch    Remove Stevens catheter  Eliquis 5 mg twice daily    Hold lisinopril-low blood pressure  Patient on lactated Ringer's 100 cc an hour  Blood culture x2  Sputum culture  IV vancomycin-day 4  DuoNeb aerosols 4 times daily  IV cefepime 1 g every 12 hours-day 4    Procalcitonin 6/5-pending    Tentative discharge 6/7    CMP, CBC in a.m.       Tono Ley DO, D.OWagner  6/6/2022  10:36 AM

## 2022-06-06 NOTE — PROGRESS NOTES
General Surgery Progress Note  [facility]    Patient's Name/Date of Birth: Alaina Zhao / 1975    Date: June 6, 2022     Chief Complaint: s/p ileostomy reversal    Patient Active Problem List   Diagnosis    Vocal cord dysfunction    Multiple sclerosis ([address]    Morbidly obese ([address]    Palafox's esophagus with dysplasia    Secondary restless legs syndrome    S/P colectomy    Malignant neoplasm of descending colon ([address]    Ileus, postoperative ([address]    Pelvic abscess in female    Sigmoid stricture ([address]    Postoperative intra-abdominal abscess    Pulmonary embolism without acute cor pulmonale (HCC)    Multiple subsegmental pulmonary emboli without acute cor pulmonale ([address]    Ileostomy in place ([address]    S/P closure of ileostomy       Subjective: Feeling good this morning, off oxygen. Tolerating diet, no acute events overnight    Objective:  /77   Pulse 81   Temp 98 °F (36.7 °C) (Oral)   Resp 16   Ht 5' 1\" (1.549 m)   Wt 186 lb (84.4 kg)   LMP 01/05/2018   SpO2 92%   BMI 35.14 kg/m²   Labs:  Recent Labs     06/04/22  0423 06/05/22  0134 06/06/22  0455   WBC 11.9* 12.3* 7.0   HGB 11.2* 11.6 11.0*   HCT 35.0 35.0 34.2     Lab Results   Component Value Date    CREATININE 0.5 06/06/2022    BUN 4 (L) 06/06/2022     06/06/2022    K 3.6 06/06/2022    CL 99 06/06/2022    CO2 28 06/06/2022     No results for input(s): LIPASE, AMYLASE in the last 72 hours.       General appearance:  NAD  Head: NCAT, PERRLA, EOMI, red conjunctiva  Neck: supple, no masses  Lungs: Equal chest rise bilateral, RA  Heart: Reg rate, intermittent tachy  Abdomen: soft, nondistended, tender appropriately, incision C/D/I, RLQ wound clean, some serosang drainage  Skin; no lesions  Gu: no cva tenderness  Extremities: extremities normal, atraumatic, no cyanosis or edema      Assessment/Plan:  Alaina Zhao is a 51 y.o. female POD 5 ileostomy reversal, hypoxia, tachycardia    Out of bed ambulation  Continue local wound care right lower abdominal wall  DC when okay with other services    Electronically signed by Cassie Jones MD on 6/6/2022 at 7:15 AM     Home when ok with Medicine

## 2022-06-06 NOTE — PLAN OF CARE
Problem: Discharge Planning  Goal: Discharge to home or other facility with appropriate resources  Outcome: Progressing     Problem: Pain  Goal: Verbalizes/displays adequate comfort level or baseline comfort level  Outcome: Progressing     Problem: Safety - Adult  Goal: Free from fall injury  Outcome: Adequate for Discharge     Problem: ABCDS Injury Assessment  Goal: Absence of physical injury  Outcome: Adequate for Discharge     Problem: Skin/Tissue Integrity - Adult  Goal: Incisions, wounds, or drain sites healing without S/S of infection  Outcome: Progressing

## 2022-06-07 VITALS
HEART RATE: 78 BPM | TEMPERATURE: 98.1 F | HEIGHT: 61 IN | DIASTOLIC BLOOD PRESSURE: 77 MMHG | RESPIRATION RATE: 19 BRPM | SYSTOLIC BLOOD PRESSURE: 128 MMHG | OXYGEN SATURATION: 98 % | WEIGHT: 186 LBS | BODY MASS INDEX: 35.12 KG/M2

## 2022-06-07 LAB
ALBUMIN SERPL-MCNC: 3.2 G/DL (ref 3.5–5.2)
ALP BLD-CCNC: 94 U/L (ref 35–104)
ALT SERPL-CCNC: 13 U/L (ref 0–32)
ANION GAP SERPL CALCULATED.3IONS-SCNC: 8 MMOL/L (ref 7–16)
AST SERPL-CCNC: 9 U/L (ref 0–31)
BASOPHILS ABSOLUTE: 0.03 E9/L (ref 0–0.2)
BASOPHILS RELATIVE PERCENT: 0.5 % (ref 0–2)
BILIRUB SERPL-MCNC: 0.6 MG/DL (ref 0–1.2)
BUN BLDV-MCNC: 4 MG/DL (ref 6–20)
CALCIUM SERPL-MCNC: 8.7 MG/DL (ref 8.6–10.2)
CHLORIDE BLD-SCNC: 99 MMOL/L (ref 98–107)
CO2: 28 MMOL/L (ref 22–29)
CREAT SERPL-MCNC: 0.5 MG/DL (ref 0.5–1)
EOSINOPHILS ABSOLUTE: 0.25 E9/L (ref 0.05–0.5)
EOSINOPHILS RELATIVE PERCENT: 4 % (ref 0–6)
GFR AFRICAN AMERICAN: >60
GFR NON-AFRICAN AMERICAN: >60 ML/MIN/1.73
GLUCOSE BLD-MCNC: 106 MG/DL (ref 74–99)
HCT VFR BLD CALC: 31.4 % (ref 34–48)
HEMOGLOBIN: 10.1 G/DL (ref 11.5–15.5)
IMMATURE GRANULOCYTES #: 0.05 E9/L
IMMATURE GRANULOCYTES %: 0.8 % (ref 0–5)
LYMPHOCYTES ABSOLUTE: 1.25 E9/L (ref 1.5–4)
LYMPHOCYTES RELATIVE PERCENT: 20 % (ref 20–42)
MCH RBC QN AUTO: 29.4 PG (ref 26–35)
MCHC RBC AUTO-ENTMCNC: 32.2 % (ref 32–34.5)
MCV RBC AUTO: 91.5 FL (ref 80–99.9)
MONOCYTES ABSOLUTE: 0.77 E9/L (ref 0.1–0.95)
MONOCYTES RELATIVE PERCENT: 12.3 % (ref 2–12)
NEUTROPHILS ABSOLUTE: 3.91 E9/L (ref 1.8–7.3)
NEUTROPHILS RELATIVE PERCENT: 62.4 % (ref 43–80)
PDW BLD-RTO: 12.2 FL (ref 11.5–15)
PLATELET # BLD: 239 E9/L (ref 130–450)
PMV BLD AUTO: 9.5 FL (ref 7–12)
POTASSIUM SERPL-SCNC: 3.7 MMOL/L (ref 3.5–5)
RBC # BLD: 3.43 E12/L (ref 3.5–5.5)
SODIUM BLD-SCNC: 135 MMOL/L (ref 132–146)
TOTAL PROTEIN: 6.1 G/DL (ref 6.4–8.3)
VANCOMYCIN TROUGH: 21.4 MCG/ML (ref 5–16)
WBC # BLD: 6.3 E9/L (ref 4.5–11.5)

## 2022-06-07 PROCEDURE — 80053 COMPREHEN METABOLIC PANEL: CPT

## 2022-06-07 PROCEDURE — 94640 AIRWAY INHALATION TREATMENT: CPT

## 2022-06-07 PROCEDURE — 85025 COMPLETE CBC W/AUTO DIFF WBC: CPT

## 2022-06-07 PROCEDURE — 6360000002 HC RX W HCPCS: Performed by: SURGERY

## 2022-06-07 PROCEDURE — 6370000000 HC RX 637 (ALT 250 FOR IP): Performed by: SURGERY

## 2022-06-07 PROCEDURE — 36415 COLL VENOUS BLD VENIPUNCTURE: CPT

## 2022-06-07 PROCEDURE — 6370000000 HC RX 637 (ALT 250 FOR IP): Performed by: INTERNAL MEDICINE

## 2022-06-07 PROCEDURE — 80202 ASSAY OF VANCOMYCIN: CPT

## 2022-06-07 PROCEDURE — 2580000003 HC RX 258: Performed by: INTERNAL MEDICINE

## 2022-06-07 PROCEDURE — 6360000002 HC RX W HCPCS: Performed by: INTERNAL MEDICINE

## 2022-06-07 RX ORDER — DOCUSATE SODIUM 100 MG/1
100 CAPSULE, LIQUID FILLED ORAL 2 TIMES DAILY
Qty: 30 CAPSULE | Refills: 0 | Status: SHIPPED | OUTPATIENT
Start: 2022-06-07 | End: 2022-06-22

## 2022-06-07 RX ORDER — HYDROCODONE BITARTRATE AND ACETAMINOPHEN 5; 325 MG/1; MG/1
1 TABLET ORAL EVERY 6 HOURS PRN
Qty: 12 TABLET | Refills: 0 | Status: SHIPPED | OUTPATIENT
Start: 2022-06-07 | End: 2022-06-10

## 2022-06-07 RX ORDER — DOXYCYCLINE HYCLATE 100 MG/1
100 CAPSULE ORAL 2 TIMES DAILY
Qty: 12 CAPSULE | Refills: 0 | Status: SHIPPED | OUTPATIENT
Start: 2022-06-07 | End: 2022-06-13

## 2022-06-07 RX ORDER — CEFDINIR 300 MG/1
300 CAPSULE ORAL 2 TIMES DAILY
Qty: 12 CAPSULE | Refills: 0 | Status: SHIPPED | OUTPATIENT
Start: 2022-06-07 | End: 2022-06-13

## 2022-06-07 RX ADMIN — TRAMADOL HYDROCHLORIDE 50 MG: 50 TABLET, COATED ORAL at 09:06

## 2022-06-07 RX ADMIN — METHOCARBAMOL TABLETS 500 MG: 500 TABLET, COATED ORAL at 08:57

## 2022-06-07 RX ADMIN — MORPHINE SULFATE 2 MG: 2 INJECTION, SOLUTION INTRAMUSCULAR; INTRAVENOUS at 05:51

## 2022-06-07 RX ADMIN — IPRATROPIUM BROMIDE AND ALBUTEROL SULFATE 1 AMPULE: .5; 2.5 SOLUTION RESPIRATORY (INHALATION) at 05:28

## 2022-06-07 RX ADMIN — IPRATROPIUM BROMIDE AND ALBUTEROL SULFATE 1 AMPULE: .5; 2.5 SOLUTION RESPIRATORY (INHALATION) at 08:55

## 2022-06-07 RX ADMIN — APIXABAN 5 MG: 5 TABLET, FILM COATED ORAL at 08:57

## 2022-06-07 RX ADMIN — CEFEPIME 2000 MG: 2 INJECTION, POWDER, FOR SOLUTION INTRAVENOUS at 05:54

## 2022-06-07 RX ADMIN — MORPHINE SULFATE 4 MG: 4 INJECTION, SOLUTION INTRAMUSCULAR; INTRAVENOUS at 01:07

## 2022-06-07 ASSESSMENT — PAIN DESCRIPTION - LOCATION
LOCATION: ABDOMEN

## 2022-06-07 ASSESSMENT — PAIN DESCRIPTION - DESCRIPTORS
DESCRIPTORS: ACHING

## 2022-06-07 ASSESSMENT — PAIN SCALES - GENERAL
PAINLEVEL_OUTOF10: 9
PAINLEVEL_OUTOF10: 10
PAINLEVEL_OUTOF10: 8
PAINLEVEL_OUTOF10: 6

## 2022-06-07 ASSESSMENT — PAIN DESCRIPTION - ORIENTATION
ORIENTATION: INNER

## 2022-06-07 ASSESSMENT — PAIN - FUNCTIONAL ASSESSMENT
PAIN_FUNCTIONAL_ASSESSMENT: PREVENTS OR INTERFERES SOME ACTIVE ACTIVITIES AND ADLS

## 2022-06-07 NOTE — DISCHARGE SUMMARY
Physician Discharge Summary     Patient ID:  Nabil Barrera  [id number]  53 y.o.  1975    Admit date: 6/1/2022    Discharge date and time: 6/7/2022    Admitting Physician: Lesli Campos MD     Admission Diagnoses:   Patient Active Problem List   Diagnosis    Vocal cord dysfunction    Multiple sclerosis ([address]    Morbidly obese ([address]    Palafox's esophagus with dysplasia    Secondary restless legs syndrome    S/P colectomy    Malignant neoplasm of descending colon ([address]    Ileus, postoperative ([address]    Pelvic abscess in female    Sigmoid stricture ([address]    Postoperative intra-abdominal abscess    Pulmonary embolism without acute cor pulmonale (HCC)    Multiple subsegmental pulmonary emboli without acute cor pulmonale ([address]    Ileostomy in place ([address]    S/P closure of ileostomy       Discharge Diagnoses:   Patient Active Problem List   Diagnosis    Vocal cord dysfunction    Multiple sclerosis ([address]    Morbidly obese ([address]    Palafox's esophagus with dysplasia    Secondary restless legs syndrome    S/P colectomy    Malignant neoplasm of descending colon ([address]    Ileus, postoperative ([address]    Pelvic abscess in female    Sigmoid stricture ([address]    Postoperative intra-abdominal abscess    Pulmonary embolism without acute cor pulmonale (HCC)    Multiple subsegmental pulmonary emboli without acute cor pulmonale ([address]    Ileostomy in place ([address]    S/P closure of ileostomy       Admission Condition: good    Discharged Condition: good    Indication for Admission: s/p ileostomy reversal, [facility]    Hospital Course: Nabil Barrera is a 51 y.o. female admitted after ileostomy reversal. She developed hypoxia and sob postop and CXR revealed pneumonia. Her oxygen was weaned. Her abx were continued and she imrpoved. She did well postoperatively, diet was advanced, they were ambulating independently and pain was controlled.  They were discharged with appropriate medication, instructions and follow up. Consults: Medicine    Treatments: IV hydration, antibiotics:  analgesia:  and surgery: ileostomy reversal    In process/preliminary results:  Outstanding Order Results     Date and Time Order Name Status Description    6/3/2022  1:55 PM Culture, Blood 2 Preliminary     6/3/2022  1:55 PM Culture, Blood 1 Preliminary     6/1/2022 12:00 AM Surgical Pathology In process           Patient Instructions:   Current Discharge Medication List      START taking these medications    Details   docusate sodium (COLACE) 100 mg capsule Take 1 capsule by mouth 2 times daily for 15 days  Qty: 30 capsule, Refills: 0      HYDROcodone-acetaminophen (NORCO) 5-325 MG per tablet Take 1 tablet by mouth every 6 hours as needed for Pain for up to 3 days.   Qty: 12 tablet, Refills: 0    Comments: Reduce doses taken as pain becomes manageable  Associated Diagnoses: Postoperative pain         CONTINUE these medications which have NOT CHANGED    Details   LISINOPRIL PO Take by mouth daily      vitamin D (D3-50) 88756 UNIT CAPS Take 1 capsule by mouth once a week  Qty: 4 capsule, Refills: 11      famotidine (PEPCID) 40 MG tablet TAKE 1 TABLET BY MOUTH DAILY IN THE EVENING *EMERGENCY REFILL*      fluticasone (FLONASE) 50 MCG/ACT nasal spray 1 spray by Each Nostril route daily  Qty: 32 g, Refills: 1    Associated Diagnoses: Seasonal allergies      apixaban (ELIQUIS) 5 MG TABS tablet Take 1 tablet by mouth 2 times daily  Qty: 180 tablet, Refills: 1    Associated Diagnoses: Acute deep vein thrombosis (DVT) of iliac vein of left lower extremity (HCC)      nortriptyline (PAMELOR) 10 MG capsule TAKE ONE (1) CAPSULE BY MOUTH NIGHTLY FOR HEADACHE  Qty: 30 capsule, Refills: 3      pantoprazole (PROTONIX) 40 MG tablet Take 1 tablet by mouth 2 times daily (before meals)  Qty: 30 tablet, Refills: 0      gabapentin (NEURONTIN) 300 MG capsule TAKE 1 CAPSULE BY MOUTH THREE TIMES DAILY  Qty: 90 capsule, Refills: 11      baclofen (LIORESAL) 20 MG tablet Take 1 tablet by mouth 4 times daily as needed (muscle spasms; pain)  Qty: 360 tablet, Refills: 3      rOPINIRole (REQUIP) 0.5 MG tablet Take 1 tablet by mouth 2 times daily as needed (restless legs)  Qty: 60 tablet, Refills: 11      ocrelizumab (OCREVUS) 300 MG/10ML SOLN injection Infuse 20 mLs intravenously every 6 months  Qty: 20 mL, Refills: 1             Discharge Exam:  General appearance: AAOx3, NAD  Head: NCAT, PERRLA, EOMI, red conjunctiva  Neck: supple, no masses  Lungs: Equal chest rise bilateral  Heart: Reg rate  Abdomen: soft, nondistended, tender appropriately, normotympanic, no guarding, no peritoneal signs, incision C/D/I, drain output serosanguinous, dressing dry  Extremities: extremities normal, atraumatic, no cyanosis or edema    Disposition: home    Patient Instructions: Activity: no lifting, Driving, or Strenuous exercise for 2 weeks  Diet: regular diet  Wound Care: keep wound clean and dry    Follow-up with Dr Marquis Pires in 2 weeks.     Signed:  Lesli Campos MD  6/7/2022  7:28 AM

## 2022-06-07 NOTE — PROGRESS NOTES
CLINICAL PHARMACY NOTE: MEDS TO BEDS    Total # of Prescriptions Filled: 2   The following medications were delivered to the patient:  · Cefdinir 300mg  · Doxycycline Hyc 100mg    Additional Documentation:

## 2022-06-07 NOTE — PLAN OF CARE
Problem: Discharge Planning  Goal: Discharge to home or other facility with appropriate resources  Outcome: Progressing     Problem: Pain  Goal: Verbalizes/displays adequate comfort level or baseline comfort level  Outcome: Progressing     Problem: Safety - Adult  Goal: Free from fall injury  Outcome: Progressing     Problem: ABCDS Injury Assessment  Goal: Absence of physical injury  Outcome: Progressing     Problem: Skin/Tissue Integrity - Adult  Goal: Incisions, wounds, or drain sites healing without S/S of infection  Outcome: Progressing

## 2022-06-07 NOTE — PROGRESS NOTES
Physician Progress Note      Ning Nichols  TONYA #:                  [id number]  :                       1975  ADMIT DATE:       2022 6:41 AM  DISCH DATE:  RESPONDING  PROVIDER #:        MARY WILCOX DO          QUERY TEXT:    Pt admitted for Ileostomy reversal and noted documentation of Acute   Respiratory failure  post op per progress notes on 22. Please document   in progress notes and discharge summary if you are evaluating/treating any of   the following: The medical record reflects the following:  Risk Factors: Bilateral pneumonia, VEENA, Nicotine use, Hx PE  Clinical Indicators: 85% pulse ox room air-91% c/o   SOB,  tachycardia   100-111,  Treatment: Nebulizers, Pulse ox monitoring, oxygen 4lnc  CTA chest    Thank you,  Claudene Furry RN BSN CCDS  Options provided:  -- Acute  Respiratory failure is due to chronic underlying condition of VEENA   and nicotine use  and is not a complication of the procedure  -- Acute Respiratory failure?is not a complication of the procedure but was   due to, Please specify. -- [Postoperative Respiratory failure is a complication of the procedure  -- Other - I will add my own diagnosis  -- Disagree - Not applicable / Not valid  -- Disagree - Clinically unable to determine / Unknown  -- Refer to Clinical Documentation Reviewer    PROVIDER RESPONSE TEXT:    Respiratory failure is not a complication of the procedure but was due to   pneumonia- possible aspiration    Query created by: Hanna Doran on 2022 11:38 AM      QUERY TEXT:    Pt admitted for ileostomy reversal.  Per prog notes  Bilateral pneumonia is   noted  with elevated temp, procalcitonin, hypotensive with tachycardia. If   possible, please document in the progress notes and discharge summary if you   are evaluating and /or treating any of the following: The medical record reflects the following:  Risk Factors: Post op,  Clinical Indicators:  Max temp 100 Procalcitonin 0.10-0.13, WBC 15.2-7.0,   Pulse ,  90/56-90/53, Per surgery notes POD 3 ileostomy reversal,  hypoxia, tachycardia  ? Treatment: Vanco, Cefipime, CTA , D Dimer   hr, Blood and sputum   c/s, Hold lisinopril low bp    Thank you,  Maggie Alvarado RN BSN CCDS  Options provided:  -- Sepsis, not present on admission  -- Bilateral pneumonia without Sepsis  -- Other - I will add my own diagnosis  -- Disagree - Not applicable / Not valid  -- Disagree - Clinically unable to determine / Unknown  -- Refer to Clinical Documentation Reviewer    PROVIDER RESPONSE TEXT:    This patient has sepsis which was not present on admission.     Query created by: Liudmila Silverio on 6/6/2022 11:47 AM      Electronically signed by:  Vero Paez DO 6/7/2022 8:58 AM

## 2022-06-07 NOTE — PLAN OF CARE
Problem: Discharge Planning  Goal: Discharge to home or other facility with appropriate resources  6/7/2022 1120 by Carina Nolen RN  Outcome: Completed  6/7/2022 0806 by Carina Nolen RN  Outcome: Progressing     Problem: Pain  Goal: Verbalizes/displays adequate comfort level or baseline comfort level  6/7/2022 1120 by Carina Nolen RN  Outcome: Completed  6/7/2022 0806 by Carina Nolen RN  Outcome: Progressing     Problem: Safety - Adult  Goal: Free from fall injury  6/7/2022 1120 by Carina Nolen RN  Outcome: Completed  6/7/2022 0806 by Carina Nolen RN  Outcome: Progressing     Problem: ABCDS Injury Assessment  Goal: Absence of physical injury  6/7/2022 1120 by Carina Nolen RN  Outcome: Completed  6/7/2022 0806 by Carina Nolen RN  Outcome: Progressing     Problem: Skin/Tissue Integrity - Adult  Goal: Incisions, wounds, or drain sites healing without S/S of infection  6/7/2022 1120 by Carina Nolen RN  Outcome: Completed  6/7/2022 0806 by Carina Nolen RN  Outcome: Progressing

## 2022-06-07 NOTE — PROGRESS NOTES
Pharmacy Consultation Note  (Antibiotic Dosing and Monitoring)    Initial consult date: 6/3/22  Consulting physician/provider: Alayna Richard  Drug: Vancomycin  Indication: Pneumonia    Age/  Gender Height Weight IBW  Allergy Information   46 y.o./female 5' 1\" (154.9 cm) 186 lb (84.4 kg)     Ideal body weight: 47.8 kg (105 lb 6.1 oz)  Adjusted ideal body weight: 62.4 kg (137 lb 10 oz)   Patient has no known allergies. Renal Function:  Recent Labs     06/05/22  0134 06/06/22  0455 06/07/22  0435   BUN 5* 4* 4*   CREATININE 0.5 0.5 0.5       Intake/Output Summary (Last 24 hours) at 6/7/2022 1322  Last data filed at 6/7/2022 1233  Gross per 24 hour   Intake 2585.08 ml   Output --   Net 2585.08 ml       Vancomycin Monitoring:  Trough:    Recent Labs     06/05/22  0134 06/07/22  0435   VANCOTROUGH 6.3 21.4*     Random:  No results for input(s): VANCORANDOM in the last 72 hours. Vancomycin Administration Times:  Recent vancomycin administrations                   vancomycin (VANCOCIN) 1,500 mg in dextrose 5 % 300 mL IVPB (mg) 1,500 mg New Bag 06/06/22 2000     1,500 mg New Bag  0751     1,500 mg New Bag 06/05/22 1944    vancomycin (VANCOCIN) 1,000 mg in dextrose 5 % 250 mL IVPB (mg) 1,000 mg New Bag 06/05/22 1114    vancomycin (VANCOCIN) 1,250 mg in dextrose 5 % 250 mL IVPB (mg) 1,250 mg New Bag 06/05/22 0221     1,250 mg New Bag 06/04/22 1556              Assessment:  · Patient is a 51 y.o. female who has been initiated on vancomycin  Estimated Creatinine Clearance: 137 mL/min (based on SCr of 0.5 mg/dL).   · To dose vancomycin, pharmacy will be utilizing [employer] calculation software for goal AUC/-600 mg/L-hr   · 6/4: Scr 0.6, continue same, draw trough tomorrow  · 6/5: Scr 0.5, trough= 6.3, increase dose to 1500mg q12h  · 6/6: Scr 0.5, continue 1500mg q12h   · 6/7: SCr 0.5, trough = 21.4 mcg/mL @ 0435, dose held    Plan:  · Patient will be discharged today, no dose given  · Will check vancomycin levels when appropriate  · Will continue to monitor renal function   · Clinical pharmacy to follow    Daniel Cornejo PharmD 6/7/2022 1:22 PM

## 2022-06-07 NOTE — ADT AUTH CERT
Utilization Reviews         Urologic Surgery or Procedure GRG - Care Day 6 (6/6/2022) by Mary Beth Ojeda RN       Review Status Review Entered   Completed 6/7/2022 11:37      Criteria Review      Care Day: 6 Care Date: 6/6/2022 Level of Care: Inpatient Floor    Guideline Day 2    Clinical Status    (X) * No ICU or intermediate care needs    6/7/2022 11:37 AM EDT by Jeremias Eller      Admitted at Med surg    Interventions    (X) Inpatient interventions continue    6/7/2022 11:37 AM EDT by Jeremias Eller      cefepime (MAXIPIME) 2000 mg IVPB minibag EVERY 8 HOURS Route: IV  vancomycin (VANCOCIN) 1,500 mg in dextrose 5 % 300 mL IVPB EVERY 12 HOURS Route: IV    ( ) Transition to oral routes    6/7/2022 11:37 AM EDT by Jeremias Eller      none noted    * Milestone   Additional Notes   DATE: 6/6/22 MED SURG          Pertinent Updates:   Pain score 9/10    Given morphine injection PRN IV X4 and ULTRAM PRN PO   Trying to complete the 5-day of IV antibiotics.          Vitals:TEMP 98.1 (36.7) ORAL RR 18 HR 95 /77 SPO2 92% RA         Abnl/Pertinent Labs/Radiology/Diagnostic Studies:   BUN,BUNPL: 4 (L)   GLUCOSE, FASTING,GF: 123 (H)   Total Protein: 6.0 (L)   Albumin: 3.1 (L)   Hemoglobin Quant: 11.0 (L)   Lymphocyte %: 16.9 (L)   Lymphocytes Absolute: 1.18 (L)         Physical Exam:   Head:  red conjunctiva   Heart: intermittent tachy         MD Consults/Assessments & Plans:   GENERAL SURGERY   Out of bed ambulation   Continue local wound care right lower abdominal wall   DC when okay with other services       IM   Plan:   Patient admitted to the medical surgical floor   Home medications reviewed   Monitor heart rate, blood pressure, O2 saturation   Diet and abdominal pain meds per general surgery   Activity as tolerated   DuoNeb aerosols every 4 hours as needed   NicoDerm patch   Remove Stevens catheter   Eliquis 5 mg twice daily   Hold lisinopril-low blood pressure   Patient on lactated Ringer's 100 cc an hour Blood culture x2   Sputum culture   IV vancomycin-day 4   DuoNeb aerosols 4 times daily   IV cefepime 1 g every 12 hours-day 4         Medications:   apixaban (ELIQUIS) tablet 5 mg 2 TIMES DAILY Route: PO   famotidine (PEPCID) tablet 20 mg NIGHTLY Route: PO   ipratropium-albuterol (DUONEB) nebulizer solution 1 ampule EVERY 4 HOURS WHILE AWAKE Route: IN   methocarbamol (ROBAXIN) tablet 500 mg 4 TIMES DAILY Route: PO      lactated ringers infusion 100 mL/hr Freq: CONTINUOUS Route: IV      morphine injection 4 mg EVERY 2 HOURS PRN Route: IV x4   traMADol (ULTRAM) tablet 50 mg EVERY 6 HOURS PRN Route: PO x2         Orders:   Incentive spirometry nursing         Urologic Surgery or Procedure GRG - Care Day 5 (6/5/2022) by Esperanza Scott RN       Review Status Review Entered   Completed 6/7/2022 10:51      Criteria Review      Care Day: 5 Care Date: 6/5/2022 Level of Care: Intermediate Care    Guideline Day 2    Clinical Status    ( ) * No ICU or intermediate care needs    Interventions    (X) Inpatient interventions continue    6/7/2022 10:51 AM EDT by Joel Mcintosh      cefepime (MAXIPIME) 2000 mg IVPB minibag  : EVERY 8 HOURS Route: IV  vancomycin (VANCOCIN) 1,000 mg in dextrose 5 % 250 mL IVPB   EVERY 8 HOURS Route: IV  vancomycin (VANCOCIN) 1,250 mg in dextrose 5 % 250 mL IVPB  : EVERY 12 HOURS Rout    * Milestone   Additional Notes   DATE: 6/5      Pertinent Updates:   off oxygen, CTA neg for PE, COVID Neg   still has postop discomfort.       Vitals:/64   Pulse 93   Temp 97.6 °F (36.4 °C) (Oral)   Resp 16    SpO2 92%          Abnl/Pertinent Labs/Radiology/Diagnostic Studies:    CTA lungs showed no evidence of pulmonary embolism.  There was consolidative infiltrate or atelectatic change in lung bases bilaterally.  Small effusions noted in the bases.  BUN/creatinine is 5/0.5 with fasting blood sugar 116.  Liver enzymes normal with a bilirubin down to 0.9 and WBC 12.3 and hemoglobin 11.6.  Temperature

## 2022-06-07 NOTE — PROGRESS NOTES
Department of Internal Medicine        HISTORY OF PRESENT ILLNESS:      The patient is a 51 y.o. female who presents with having a laparoscopic ileostomy reversal.  The patient is seen postop in recovery. Patient has expected postop discomfort. Patient denies any problem with chest pain, dizziness, nausea/vomiting or unusual shortness of breath. Preop lab work reviewed and were normal.  O2 saturation 94% on room air. Blood pressure 144/93 with a heart rate of 72.    6/2/2022  Patient seen examined on medical surgical floor. Patient has expected postop discomfort. Patient has a chronic nonproductive cough that is about the same. She denies any chest pain, dizziness or nausea vomiting. BUN/creatinine 7/0.5 with WBC 15.2 today with hemoglobin 12.2. Mild elevation transaminase. Temperature is 97.9 with heart rate 55 blood pressure 115/79. O2 sat 95% room air at rest.  Urine output is good. Remove Stevens catheter today. 6/3/2022  Patient seen examined on medical surgical floor. Patient complained of increased abdominal discomfort this morning. She denies any chest pain, fever/chills, dizziness or nausea vomiting. Temperature 99.7 with heart rate of 111 and blood pressure 143/75. O2 sat 91-97% room air at rest.  Urine output is fairly good. Pending BMP, CBC this morning. 6/4/2022  Patient seen examined medical surgical floor. Patient states she feels little bit better today. Patient still with productive cough. Abdominal pain is still there but improved from yesterday. She denies any chest pain. BUN/creatinine 6/0.6 with normal electrolytes. Patient procalcitonin 0.10 yesterday with normal liver enzymes except for total bili at 1.9. WBC is improved at 11.9 with hemoglobin 11.2. Urinalysis negative. Temperature seems to be improving and currently 98.4 with the patient did have a temperature yesterday 99.2. Blood pressure little bit lower today currently 92/57.   O2 sat 97% but is increased to 4 L nasal cannula. We will check a D-dimer and case discussed with general surgery. If D-dimer is elevated we will do a CTA of the lungs. Pending sputum cultures. 6/5/2022  Patient seen examined on medical surgical floor. Patient feels better today. Patient has less abdominal pain but still has postop discomfort. Patient has a clear productive cough. Patient's appetite is much improved today and she did eat most of her breakfast for the first time. She denies any chest pain. There is no fever/chills or dizziness. D-dimer was 1044. CTA lungs showed no evidence of pulmonary embolism. There was consolidative infiltrate or atelectatic change in lung bases bilaterally. Small effusions noted in the bases. BUN/creatinine is 5/0.5 with fasting blood sugar 116. Liver enzymes normal with a bilirubin down to 0.9 and WBC 12.3 and hemoglobin 11.6. Temperature is 97.6 with heart rate 93 and blood pressure 111/64. O2 sat 92% on room air at rest.    6/6/2022  Patient seen examined on medical surgical floor. Patient states he feels good again today. Patient denies any chest pain. Patient has expected postop discomfort. Patient denies shortness of breath at rest.  BUN/creatinine is 4/0.5 with normal electrolytes. Liver enzymes normal with a WBC of 7.0 today with hemoglobin 11. Temperature is 98.3 with heart rate 81 blood pressure 116/77. O2 sat 92% on room air at rest.  Patient's O2 sat was 92-93% with activity on room air yesterday afternoon. Patient is doing much better with the IV vancomycin and cefepime. There is been no cultures back at this time. Considering the patient's had to prolonged hospitalizations previous 2 surgeries internal medicine feel more comfortable trying to complete the 5-day of IV antibiotics which would be tomorrow and then discharge after that. 6/7/2022  Patient seen examined on medical surgical floor. Patient states he feels fairly good today.   Patient denies any shortness [facility]     HYSTERECTOMY, TOTAL ABDOMINAL      LARYNGOSCOPY N/A 7/17/2020    DIRECT LARYNGOSCOPY--OMNI GUIDE LASER performed by Warden Garry MD at [facility] PARTIAL HYSTERECTOMY      PROCTOSIGMOIDOSCOPY N/A 10/12/2021    ANAL PROCTO SIGMOIDOSCOPY FLEXIBLE performed by Balbir Ascencio MD at [address] [place] [address] N/A 1/25/2022    ANAL PROCTO [address] performed by Balbir Ascencio MD at [facility] ARTHROSCOPY Right 11/11/2020    RIGHT SHOULDER DIAGNOSTIC ARTHROSCOPY WITH DECOMPRESSION ROTATOR CUFF REPAIR performed by Gucci Hinkle MD at [place] [id number]         Medications Prior to Admission:    @  Prior to Admission medications    Medication Sig Start Date End Date Taking? Authorizing Provider   docusate sodium (COLACE) 100 mg capsule Take 1 capsule by mouth 2 times daily for 15 days 6/7/22 6/22/22 Yes Caffie Osgood, MD   HYDROcodone-acetaminophen (NORCO) 5-325 MG per tablet Take 1 tablet by mouth every 6 hours as needed for Pain for up to 3 days.  6/7/22 6/10/22 Yes Caffie Osgood, MD   LISINOPRIL PO Take by mouth daily   Yes Historical Provider, MD   vitamin D (D3-50) 31187 UNIT CAPS Take 1 capsule by mouth once a week 6/3/22   Ambreen Filter, APRN - CNP   famotidine (PEPCID) 40 MG tablet TAKE 1 TABLET BY MOUTH DAILY IN THE EVENING *EMERGENCY REFILL* 4/4/22   Historical Provider, MD   fluticasone (FLONASE) 50 MCG/ACT nasal spray 1 spray by Each Nostril route daily 4/11/22   Clara Arana PA-C   apixaban (ELIQUIS) 5 MG TABS tablet Take 1 tablet by mouth 2 times daily 3/25/22   Clara Arana PA-C   nortriptyline (PAMELOR) 10 MG capsule TAKE ONE (1) CAPSULE BY MOUTH NIGHTLY FOR HEADACHE 3/1/22   Clara Arana PA-C   pantoprazole (PROTONIX) 40 MG tablet Take 1 tablet by mouth 2 times daily (before meals) 2/24/22   Candace Brandt DO   gabapentin (NEURONTIN) 300 MG capsule TAKE 1 CAPSULE BY MOUTH THREE TIMES DAILY 9/13/21 5/27/22  Elk Filter, APRN - CNP   baclofen (LIORESAL) 20 MG tablet Take 1 tablet by mouth 4 times daily as needed (muscle spasms; pain) 8/13/21   MARIXA Burktet CNP   rOPINIRole (REQUIP) 0.5 MG tablet Take 1 tablet by mouth 2 times daily as needed (restless legs) 8/13/21   MARIXA Burkett CNP   ocrelizumab (OCREVUS) 300 MG/10ML SOLN injection Infuse 20 mLs intravenously every 6 months  Patient taking differently: Infuse 600 mg intravenously every 6 months Due in August 2022 7/19/21   MARIXA Burkett CNP       Allergies:  Patient has no known allergies. Social History:   Social History     Socioeconomic History    Marital status: Single     Spouse name: Not on file    Number of children: 3    Years of education: 6    Highest education level: 11th grade   Occupational History    Not on file   Tobacco Use    Smoking status: Current Every Day Smoker     Packs/day: 0.50     Years: 25.00     Pack years: 12.50     Types: Cigarettes    Smokeless tobacco: Never Used    Tobacco comment:     Vaping Use    Vaping Use: Never used   Substance and Sexual Activity    Alcohol use: Not Currently    Drug use: No    Sexual activity: Yes     Partners: Male   Other Topics Concern    Not on file   Social History Narrative    Uses [employer] for transportation    [employer]     Social Determinants of Health     Financial Resource Strain: Low Risk     Difficulty of Paying Living Expenses: Not hard at all   Food Insecurity: No Food Insecurity    Worried About Running Out of Food in the Last Year: Never true    [place] of Food in the Last Year: Never true   Transportation Needs:     Lack of Transportation (Medical): Not on file    Lack of Transportation (Non-Medical):  Not on file   Physical Activity:     Days of Exercise per Week: Not on file    Minutes of Exercise per Session: Not on file   Stress:     Feeling of Stress : Not on file   Social Connections:     Frequency of Communication with Friends and Family: Not on file    Frequency of Social Gatherings with Friends and Family: Not on file    Attends Yazidism Services: Not on file    Active Member of Clubs or Organizations: Not on file    Attends Club or Organization Meetings: Not on file    Marital Status: Not on file   Intimate Partner Violence:     Fear of Current or Ex-Partner: Not on file    Emotionally Abused: Not on file    Physically Abused: Not on file    Sexually Abused: Not on file   Housing Stability:     Unable to Pay for Housing in the Last Year: Not on file    Number of [place] in the Last Year: Not on file    Unstable Housing in the Last Year: Not on file       Family History:   Family History   Problem Relation Age of Onset    Mult Sclerosis Mother     Colon Cancer Neg Hx     Uterine Cancer Neg Hx     Ovarian Cancer Neg Hx     Breast Cancer Neg Hx        REVIEW OF SYSTEMS:    Gen: Patient denies any lightheadedness or dizziness. No LOC or syncope. No fevers or chills. HEENT: No earache, sore throat or nasal congestion. Resp: Denies cough, hemoptysis or sputum production. Cardiac: Denies chest pain, SOB, diaphoresis or palpitations. GI: No nausea, vomiting, diarrhea or constipation. No melena or hematochezia. : No urinary complaints, dysuria, hematuria or frequency. MSK: No extremity weakness, paralysis or paresthesias. PHYSICAL EXAM:    Vitals:  /77   Pulse 78   Temp 98.1 °F (36.7 °C) (Oral)   Resp 19   Ht 5' 1\" (1.549 m)   Wt 186 lb (84.4 kg)   LMP 01/05/2018   SpO2 98%   BMI 35.14 kg/m²     General:  This is a 51 y.o. yo female who is alert and oriented in expected postop discomfort  HEENT:  Head is normocephalic and atraumatic, PERRLA, EOMI, mucus membranes moist with no pharyngeal erythema or exudate. Neck:  Supple with no carotid bruits, JVD or thyromegaly.   No cervical adenopathy  CV:  Regular rate and rhythm, no murmurs  Lungs: Coarse breath sounds to auscultation bilaterally with no wheezes, rales or rhonchi  Abdomen:  + Postop abdomen, mildly distended, bowel sounds present  Extremities:  No edema, peripheral pulses intact bilaterally  Neuro:  Cranial nerves II-XII grossly intact; motor and sensory function intact with no focal deficits  Skin:  No rashes, lesions or wounds      DATA:  CBC with Differential:    Lab Results   Component Value Date    WBC 6.3 06/07/2022    RBC 3.43 06/07/2022    HGB 10.1 06/07/2022    HCT 31.4 06/07/2022     06/07/2022    MCV 91.5 06/07/2022    MCH 29.4 06/07/2022    MCHC 32.2 06/07/2022    RDW 12.2 06/07/2022    METASPCT 0.9 11/23/2021    LYMPHOPCT 20.0 06/07/2022    MONOPCT 12.3 06/07/2022    BASOPCT 0.5 06/07/2022    MONOSABS 0.77 06/07/2022    LYMPHSABS 1.25 06/07/2022    EOSABS 0.25 06/07/2022    BASOSABS 0.03 06/07/2022     CMP:    Lab Results   Component Value Date     06/07/2022    K 3.7 06/07/2022    K 4.4 06/02/2022    CL 99 06/07/2022    CO2 28 06/07/2022    BUN 4 06/07/2022    CREATININE 0.5 06/07/2022    GFRAA >60 06/07/2022    LABGLOM >60 06/07/2022    GLUCOSE 106 06/07/2022    PROT 6.1 06/07/2022    LABALBU 3.2 06/07/2022    CALCIUM 8.7 06/07/2022    BILITOT 0.6 06/07/2022    ALKPHOS 94 06/07/2022    AST 9 06/07/2022    ALT 13 06/07/2022     Magnesium:    Lab Results   Component Value Date    MG 2.0 02/18/2022     Phosphorus:    Lab Results   Component Value Date    PHOS 3.2 09/01/2021     PT/INR:    Lab Results   Component Value Date    PROTIME 11.1 04/01/2022    INR 1.0 04/01/2022     Troponin:    Lab Results   Component Value Date    TROPONINI <0.01 07/20/2020     U/A:    Lab Results   Component Value Date    COLORU Yellow 06/03/2022    PROTEINU Negative 06/03/2022    PHUR 5.0 06/03/2022    WBCUA 0-1 02/17/2022    RBCUA 1-3 02/17/2022    TRICHOMONAS Present 02/26/2020    BACTERIA RARE 02/17/2022    CLARITYU Clear 06/03/2022    SPECGRAV 1.015 06/03/2022    LEUKOCYTESUR Negative 06/03/2022    UROBILINOGEN 0.2 06/03/2022    BILIRUBINUR Negative 06/03/2022    BLOODU Negative 06/03/2022    GLUCOSEU Negative 06/03/2022    AMORPHOUS FEW 11/23/2021     ABG:  No results found for: PH, PCO2, PO2, HCO3, BE, THGB, TCO2, O2SAT  HgBA1c:    Lab Results   Component Value Date    LABA1C 4.5 04/02/2022     FLP:    Lab Results   Component Value Date    TRIG 184 05/11/2021    HDL 44 05/11/2021    LDLCALC 144 05/11/2021    LABVLDL 37 05/11/2021     TSH:    Lab Results   Component Value Date    TSH 0.293 09/01/2021     IRON:  No results found for: IRON  LIPASE:    Lab Results   Component Value Date    LIPASE 10 09/05/2021       ASSESSMENT AND PLAN:      Patient Active Problem List    Diagnosis Date Noted    Ile[facility] in place [facility]) 06/01/2022    S/P closure of ileostomy 06/01/2022    Pulmonary embolism without acute cor pulmonale ([address] 04/01/2022    Multiple subsegmental pulmonary emboli without acute cor pulmonale (HCC)     Postoperative intra-abdominal abscess 03/25/2022    Sigmoid stricture ([address]     Pelvic abscess in female     Ileus, postoperative ([address] 09/05/2021    S/P colectomy 08/31/2021    Malignant neoplasm of descending colon ([address]     Secondary restless legs syndrome 08/13/2021    Palafox's esophagus with dysplasia 05/11/2021    Morbidly obese ([address] 10/05/2020    Multiple sclerosis ([address] 08/04/2020    Vocal cord dysfunction 07/20/2020     Impression:  1. Status post laparoscopic ileostomy reversal  2. History of pulmonary as was him with without cor pulmonale 4/1/2022  3. History hypertension-acute hypotension 6/4  4. History of VEENA-not on CPAP  5. Current tobacco use  6. History of restless leg syndrome  7. History of multiple sclerosis  8. History of DVT left external iliac and left common femoral vein  9. Acute hypoxic respiratory failure postop-resolved  10.   Bilateral pneumonia-possible aspiration      Plan:  Discharge home today    Prescription for Vibramycin 100 mg twice daily for 6 days  Prescription for Omnicef 300 mg twice daily for 6 days    Continue home meds    Patient follow-up primary care physician in 1 week or as directed    Patient to follow-up with general surgery as directed    Patient should repeat chest x-ray PA and lateral in 2 weeks    Bushra Grijalva DO, D.O.  6/7/2022  10:15 AM

## 2022-06-08 LAB
BLOOD CULTURE, ROUTINE: NORMAL
CULTURE, BLOOD 2: NORMAL

## 2022-06-23 ENCOUNTER — OFFICE VISIT (OUTPATIENT)
Dept: SURGERY | Age: 47
End: 2022-06-23

## 2022-06-23 VITALS
BODY MASS INDEX: 35.12 KG/M2 | WEIGHT: 186 LBS | HEART RATE: 79 BPM | RESPIRATION RATE: 18 BRPM | DIASTOLIC BLOOD PRESSURE: 86 MMHG | HEIGHT: 61 IN | SYSTOLIC BLOOD PRESSURE: 136 MMHG | TEMPERATURE: 97.5 F

## 2022-06-23 DIAGNOSIS — Z98.890 S/P CLOSURE OF ILEOSTOMY: Primary | ICD-10-CM

## 2022-06-23 PROCEDURE — 99024 POSTOP FOLLOW-UP VISIT: CPT | Performed by: SURGERY

## 2022-06-23 NOTE — PROGRESS NOTES
Surgery Progress Note            Chief complaint:   Patient Active Problem List   Diagnosis    Vocal cord dysfunction    Multiple sclerosis ([address]    [facility]bidly [facility]    Palafox's esophagus with dysplasia    Secondary restless legs syndrome    S/P colectomy    Malignant neoplasm of descending colon ([address]    Ileus, postoperative ([address]    Pelvic abscess in female    Sigmoid stricture ([address]    Postoperative intra-abdominal abscess    Pulmonary embolism without acute cor pulmonale (HCC)    Multiple subsegmental pulmonary emboli without acute cor pulmonale ([address]    Ileostomy in place ([address]    S/P closure of ileostomy       S: doing well    O:   Vitals:    06/23/22 1345   BP: 136/86   Pulse: 79   Resp: 18   Temp: 97.5 °F (36.4 °C)     No intake or output data in the 24 hours ending 06/23/22 1353        Labs:  No results for input(s): WBC, HGB, HCT in the last 72 hours. Invalid input(s): PLR  Lab Results   Component Value Date    CREATININE 0.5 06/07/2022    BUN 4 (L) 06/07/2022     06/07/2022    K 3.7 06/07/2022    CL 99 06/07/2022    CO2 28 06/07/2022     No results for input(s): LIPASE, AMYLASE in the last 72 hours. Physical exam:   /86   Pulse 79   Temp 97.5 °F (36.4 °C) (Temporal)   Resp 18   Ht 5' 1\" (1.549 m)   Wt 186 lb (84.4 kg)   LMP 01/05/2018   BMI 35.14 kg/m²   General appearance: NAD  Head: NCAT  Neck: supple, no masses  Lungs: equal chest rise bilateral  Heart: S1S2 present  Abdomen: soft, nontender, nondistended  Skin; no new lesions, incisions clean and intact  Gu: no cva tenderness  Extremities: extremities normal, atraumatic, no cyanosis or edema    A:  Post op ileostomy reversal    P: follow up as needed.      Thien Blanco MD, MD  6/23/2022

## 2022-06-24 ENCOUNTER — HOSPITAL ENCOUNTER (EMERGENCY)
Age: 47
Discharge: HOME | End: 2022-06-24
Attending: EMERGENCY MEDICINE
Payer: COMMERCIAL

## 2022-06-24 ENCOUNTER — APPOINTMENT (OUTPATIENT)
Dept: GENERAL RADIOLOGY | Age: 47
End: 2022-06-24
Payer: COMMERCIAL

## 2022-06-24 ENCOUNTER — APPOINTMENT (OUTPATIENT)
Dept: CT IMAGING | Age: 47
End: 2022-06-24
Payer: COMMERCIAL

## 2022-06-24 VITALS
RESPIRATION RATE: 16 BRPM | HEART RATE: 78 BPM | SYSTOLIC BLOOD PRESSURE: 104 MMHG | HEIGHT: 61 IN | OXYGEN SATURATION: 97 % | DIASTOLIC BLOOD PRESSURE: 64 MMHG | TEMPERATURE: 97.7 F | BODY MASS INDEX: 33.99 KG/M2 | WEIGHT: 180 LBS

## 2022-06-24 DIAGNOSIS — J18.9 PNEUMONIA DUE TO INFECTIOUS ORGANISM, UNSPECIFIED LATERALITY, UNSPECIFIED PART OF LUNG: ICD-10-CM

## 2022-06-24 DIAGNOSIS — R07.9 CHEST PAIN, UNSPECIFIED TYPE: Primary | ICD-10-CM

## 2022-06-24 LAB
ALBUMIN SERPL-MCNC: 4.1 G/DL (ref 3.5–5.2)
ALP BLD-CCNC: 93 U/L (ref 35–104)
ALT SERPL-CCNC: 14 U/L (ref 0–32)
ANION GAP SERPL CALCULATED.3IONS-SCNC: 13 MMOL/L (ref 7–16)
AST SERPL-CCNC: 11 U/L (ref 0–31)
BASOPHILS ABSOLUTE: 0.05 E9/L (ref 0–0.2)
BASOPHILS RELATIVE PERCENT: 0.7 % (ref 0–2)
BILIRUB SERPL-MCNC: 0.3 MG/DL (ref 0–1.2)
BUN BLDV-MCNC: 7 MG/DL (ref 6–20)
CALCIUM SERPL-MCNC: 9 MG/DL (ref 8.6–10.2)
CHLORIDE BLD-SCNC: 102 MMOL/L (ref 98–107)
CO2: 24 MMOL/L (ref 22–29)
CREAT SERPL-MCNC: 0.7 MG/DL (ref 0.5–1)
EKG ATRIAL RATE: 67 BPM
EKG P AXIS: 55 DEGREES
EKG P-R INTERVAL: 160 MS
EKG Q-T INTERVAL: 390 MS
EKG QRS DURATION: 84 MS
EKG QTC CALCULATION (BAZETT): 412 MS
EKG R AXIS: 40 DEGREES
EKG T AXIS: 39 DEGREES
EKG VENTRICULAR RATE: 67 BPM
EOSINOPHILS ABSOLUTE: 0.31 E9/L (ref 0.05–0.5)
EOSINOPHILS RELATIVE PERCENT: 4.4 % (ref 0–6)
GFR AFRICAN AMERICAN: >60
GFR NON-AFRICAN AMERICAN: >60 ML/MIN/1.73
GLUCOSE BLD-MCNC: 109 MG/DL (ref 74–99)
HCT VFR BLD CALC: 39 % (ref 34–48)
HEMOGLOBIN: 12.5 G/DL (ref 11.5–15.5)
IMMATURE GRANULOCYTES #: 0.04 E9/L
IMMATURE GRANULOCYTES %: 0.6 % (ref 0–5)
LIPASE: 23 U/L (ref 13–60)
LYMPHOCYTES ABSOLUTE: 2.13 E9/L (ref 1.5–4)
LYMPHOCYTES RELATIVE PERCENT: 30.3 % (ref 20–42)
MCH RBC QN AUTO: 29.8 PG (ref 26–35)
MCHC RBC AUTO-ENTMCNC: 32.1 % (ref 32–34.5)
MCV RBC AUTO: 92.9 FL (ref 80–99.9)
MONOCYTES ABSOLUTE: 0.76 E9/L (ref 0.1–0.95)
MONOCYTES RELATIVE PERCENT: 10.8 % (ref 2–12)
NEUTROPHILS ABSOLUTE: 3.73 E9/L (ref 1.8–7.3)
NEUTROPHILS RELATIVE PERCENT: 53.2 % (ref 43–80)
PDW BLD-RTO: 13.1 FL (ref 11.5–15)
PLATELET # BLD: 317 E9/L (ref 130–450)
PMV BLD AUTO: 9.3 FL (ref 7–12)
POTASSIUM REFLEX MAGNESIUM: 3.7 MMOL/L (ref 3.5–5)
PRO-BNP: 38 PG/ML (ref 0–125)
RBC # BLD: 4.2 E12/L (ref 3.5–5.5)
SODIUM BLD-SCNC: 139 MMOL/L (ref 132–146)
TOTAL PROTEIN: 6.7 G/DL (ref 6.4–8.3)
TROPONIN, HIGH SENSITIVITY: <6 NG/L (ref 0–9)
WBC # BLD: 7 E9/L (ref 4.5–11.5)

## 2022-06-24 PROCEDURE — 80053 COMPREHEN METABOLIC PANEL: CPT

## 2022-06-24 PROCEDURE — 71275 CT ANGIOGRAPHY CHEST: CPT

## 2022-06-24 PROCEDURE — 6360000002 HC RX W HCPCS: Performed by: EMERGENCY MEDICINE

## 2022-06-24 PROCEDURE — 85025 COMPLETE CBC W/AUTO DIFF WBC: CPT

## 2022-06-24 PROCEDURE — 36415 COLL VENOUS BLD VENIPUNCTURE: CPT

## 2022-06-24 PROCEDURE — 71045 X-RAY EXAM CHEST 1 VIEW: CPT

## 2022-06-24 PROCEDURE — 93005 ELECTROCARDIOGRAM TRACING: CPT | Performed by: EMERGENCY MEDICINE

## 2022-06-24 PROCEDURE — 84484 ASSAY OF TROPONIN QUANT: CPT

## 2022-06-24 PROCEDURE — 83690 ASSAY OF LIPASE: CPT

## 2022-06-24 PROCEDURE — 99285 EMERGENCY DEPT VISIT HI MDM: CPT

## 2022-06-24 PROCEDURE — 96374 THER/PROPH/DIAG INJ IV PUSH: CPT

## 2022-06-24 PROCEDURE — 6360000004 HC RX CONTRAST MEDICATION: Performed by: RADIOLOGY

## 2022-06-24 PROCEDURE — 83880 ASSAY OF NATRIURETIC PEPTIDE: CPT

## 2022-06-24 RX ORDER — AMOXICILLIN AND CLAVULANATE POTASSIUM 875; 125 MG/1; MG/1
1 TABLET, FILM COATED ORAL 2 TIMES DAILY
Qty: 14 TABLET | Refills: 0 | Status: SHIPPED | OUTPATIENT
Start: 2022-06-24 | End: 2022-07-01

## 2022-06-24 RX ORDER — KETOROLAC TROMETHAMINE 30 MG/ML
30 INJECTION, SOLUTION INTRAMUSCULAR; INTRAVENOUS ONCE
Status: COMPLETED | OUTPATIENT
Start: 2022-06-24 | End: 2022-06-24

## 2022-06-24 RX ADMIN — IOPAMIDOL 75 ML: 755 INJECTION, SOLUTION INTRAVENOUS at 06:07

## 2022-06-24 RX ADMIN — KETOROLAC TROMETHAMINE 30 MG: 30 INJECTION, SOLUTION INTRAMUSCULAR; INTRAVENOUS at 06:05

## 2022-06-24 ASSESSMENT — PAIN - FUNCTIONAL ASSESSMENT: PAIN_FUNCTIONAL_ASSESSMENT: NONE - DENIES PAIN

## 2022-06-24 ASSESSMENT — ENCOUNTER SYMPTOMS
BACK PAIN: 0
COUGH: 0
ABDOMINAL PAIN: 0

## 2022-06-24 NOTE — ED PROVIDER NOTES
This is a 65-year-old female with a past medical history of pulmonary embolism multiple sclerosis as colon cancer who presents to the ED for evaluation of chest pain. Patient states that about 3 weeks ago she had a reversal of her ostomy by Dr. Brook Bowman. Patient states that she has been having pain for about the past 2 days to her right chest right back and worse with inspiration. Patient states that she is on Eliquis however she was not on it while she was having her surgery. She notes that her surgery was complicated with pneumonia and had to be hospitalized for several days after the surgery. Patient remarks that she has no fevers or chills trauma or falls. No other reported mitigating or exacerbating factors. The history is provided by the patient. Review of Systems   Constitutional: Negative for fever. HENT: Negative for congestion. Eyes: Negative for visual disturbance. Respiratory: Negative for cough. Cardiovascular: Positive for chest pain. Gastrointestinal: Negative for abdominal pain. Endocrine: Negative for polyuria. Genitourinary: Negative for dysuria. Musculoskeletal: Negative for back pain. Skin: Negative for rash. Allergic/Immunologic: Negative for immunocompromised state. Neurological: Negative for headaches. Hematological: Bruises/bleeds easily. Psychiatric/Behavioral: Negative for confusion. Physical Exam  Vitals and nursing note reviewed. Constitutional:       General: She is not in acute distress. Appearance: She is well-developed. She is not ill-appearing. HENT:      Head: Normocephalic and atraumatic. Eyes:      Extraocular Movements: Extraocular movements intact. Pupils: Pupils are equal, round, and reactive to light. Neck:      Vascular: No JVD. Cardiovascular:      Rate and Rhythm: Normal rate and regular rhythm. Pulmonary:      Effort: Pulmonary effort is normal.   Abdominal:      General: There is no distension. Palpations: Abdomen is soft. Musculoskeletal:      Cervical back: Normal range of motion and neck supple. Right lower leg: No edema. Left lower leg: No edema. Skin:     General: Skin is warm and dry. Capillary Refill: Capillary refill takes less than 2 seconds. Neurological:      General: No focal deficit present. Mental Status: She is alert and oriented to person, place, and time. Mental status is at baseline. Cranial Nerves: No cranial nerve deficit. Psychiatric:         Mood and Affect: Mood normal.         Behavior: Behavior normal.          Procedures     MDM  Number of Diagnoses or Management Options  Chest pain, unspecified type  Pneumonia due to infectious organism, unspecified laterality, unspecified part of lung  Diagnosis management comments: Patient is a pleasant 69-year-old female who recently had ostomy reversal currently this was complicated by pneumonia she presented to the ED for about 2 days worth of right upper back pain. Patient stable vital signs was nontoxic no distress no signs of respiratory distress she had a broad differential including ACS pulmonary embolism as well as pneumonia to name a few. CTA was pending and signed out to my collegue. She was found to have a pneumonia. She was discharged on Augmentin and discharged with instructions to follow up with their PCP and surgery team                    --------------------------------------------- PAST HISTORY ---------------------------------------------  Past Medical History:  has a past medical history of Acid reflux disease, Cyst of ovary, Fracture of nasal bone, Headache, Hearing loss, Hx of blood clots, Hypertension, Migraine, Morbidly obese (HCC), Multiple sclerosis ([address] VEENA no CPAP, Right shoulder pain, Tinnitus, and TMJ dysfunction. Past Surgical History:  has a past surgical history that includes Cholecystectomy; cyst removal; Hysterectomy (03/05/2018); laryngoscopy (N/A, 7/17/2020);  Shoulder arthroscopy (Right, 11/11/2020); Biceps tendon repair (Right, 11/11/2020); Appendectomy; Esophagus surgery; Partial hysterectomy; Hysterectomy, total abdominal; Tonsillectomy; colectomy (Left, 8/31/2021); colectomy (N/A, 9/6/2021); proctosigmoidoscopy (N/A, 10/12/2021); back surgery; proctosigmoidoscopy (N/A, 1/25/2022); colectomy (N/A, 2/8/2022); and Colostomy Closure (N/A, 6/1/2022). Social History:  reports that she has been smoking cigarettes. She has a 12.50 pack-year smoking history. She has never used smokeless tobacco. She reports previous alcohol use. She reports that she does not use drugs. Family History: family history includes Mult Sclerosis in her mother. The patients home medications have been reviewed. Allergies: Patient has no known allergies.     -------------------------------------------------- RESULTS -------------------------------------------------  Labs:  Results for orders placed or performed during the hospital encounter of 06/24/22   Comprehensive Metabolic Panel w/ Reflex to MG   Result Value Ref Range    Sodium 139 132 - 146 mmol/L    Potassium reflex Magnesium 3.7 3.5 - 5.0 mmol/L    Chloride 102 98 - 107 mmol/L    CO2 24 22 - 29 mmol/L    Anion Gap 13 7 - 16 mmol/L    Glucose 109 (H) 74 - 99 mg/dL    BUN 7 6 - 20 mg/dL    CREATININE 0.7 0.5 - 1.0 mg/dL    GFR Non-African American >60 >=60 mL/min/1.73    GFR African American >60     Calcium 9.0 8.6 - 10.2 mg/dL    Total Protein 6.7 6.4 - 8.3 g/dL    Albumin 4.1 3.5 - 5.2 g/dL    Total Bilirubin 0.3 0.0 - 1.2 mg/dL    Alkaline Phosphatase 93 35 - 104 U/L    ALT 14 0 - 32 U/L    AST 11 0 - 31 U/L   CBC with Auto Differential   Result Value Ref Range    WBC 7.0 4.5 - 11.5 E9/L    RBC 4.20 3.50 - 5.50 E12/L    Hemoglobin 12.5 11.5 - 15.5 g/dL    Hematocrit 39.0 34.0 - 48.0 %    MCV 92.9 80.0 - 99.9 fL    MCH 29.8 26.0 - 35.0 pg    MCHC 32.1 32.0 - 34.5 %    RDW 13.1 11.5 - 15.0 fL    Platelets 628 027 - 432 E9/L    MPV 9.3 7.0 - 12.0 fL    Neutrophils % 53.2 43.0 - 80.0 %    Immature Granulocytes % 0.6 0.0 - 5.0 %    Lymphocytes % 30.3 20.0 - 42.0 %    Monocytes % 10.8 2.0 - 12.0 %    Eosinophils % 4.4 0.0 - 6.0 %    Basophils % 0.7 0.0 - 2.0 %    Neutrophils Absolute 3.73 1.80 - 7.30 E9/L    Immature Granulocytes # 0.04 E9/L    Lymphocytes Absolute 2.13 1.50 - 4.00 E9/L    Monocytes Absolute 0.76 0.10 - 0.95 E9/L    Eosinophils Absolute 0.31 0.05 - 0.50 E9/L    Basophils Absolute 0.05 0.00 - 0.20 E9/L   Troponin   Result Value Ref Range    Troponin, High Sensitivity <6 0 - 9 ng/L   Lipase   Result Value Ref Range    Lipase 23 13 - 60 U/L   Brain Natriuretic Peptide   Result Value Ref Range    Pro-BNP 38 0 - 125 pg/mL   EKG 12 Lead   Result Value Ref Range    Ventricular Rate 67 BPM    Atrial Rate 67 BPM    P-R Interval 160 ms    QRS Duration 84 ms    Q-T Interval 390 ms    QTc Calculation (Bazett) 412 ms    P Axis 55 degrees    R Axis 40 degrees    T Axis 39 degrees       Radiology:  CTA PULMONARY W CONTRAST   Final Result   1. There is no pulmonary embolus. 2. Small infiltrate seen within the right upper lobe medially measuring 1.9 x   1.7 x 1.1 cm.   3. Interval clearing of the previously noted bibasilar infiltrates when   compared with the prior study of 06/04/2022      RECOMMENDATIONS:   Unavailable         XR CHEST PORTABLE   Final Result   No acute findings. ------------------------- NURSING NOTES AND VITALS REVIEWED ---------------------------  Date / Time Roomed:  6/24/2022  4:16 AM  ED Bed Assignment:  16/16    The nursing notes within the ED encounter and vital signs as below have been reviewed.    /64   Pulse 78   Temp 97.7 °F (36.5 °C) (Oral)   Resp 16   Ht 5' 1\" (1.549 m)   Wt 180 lb (81.6 kg)   LMP 01/05/2018   SpO2 97%   BMI 34.01 kg/m²   Oxygen Saturation Interpretation: Normal      ------------------------------------------ PROGRESS NOTES ------------------------------------------  5:57 AM EDT  I have spoken with the patient and discussed todays results, in addition to providing specific details for the plan of care and counseling regarding the diagnosis and prognosis. Their questions are answered at this time and they are agreeable with the plan. I discussed at length with them reasons for immediate return here for re evaluation. They will followup with their PCP    --------------------------------- ADDITIONAL PROVIDER NOTES ---------------------------------  At this time the patient is without objective evidence of an acute process requiring hospitalization or inpatient management. They have remained hemodynamically stable throughout their entire ED visit and are stable for discharge with outpatient follow-up. The plan has been discussed in detail and they are aware of the specific conditions for emergent return, as well as the importance of follow-up. Discharge Medication List as of 6/24/2022  9:02 AM      START taking these medications    Details   amoxicillin-clavulanate (AUGMENTIN) 875-125 MG per tablet Take 1 tablet by mouth 2 times daily for 7 days, Disp-14 tablet, R-0Print             Diagnosis:  1. Chest pain, unspecified type    2. Pneumonia due to infectious organism, unspecified laterality, unspecified part of lung        Disposition:  Patient's disposition: Discharge to home  Patient's condition is stable.       Susan Fitzgerald DO  06/27/22 [id number]

## 2022-06-24 NOTE — ED NOTES
7:19 AM EDT    I received this patient at sign out from Dr. Jamal Laurent   I have discussed the patient's initial exam, treatment and plan of care with the out going physician. I have introduced my self to the patient / family and have answered their questions to this point. I have examined the patient myself and reviewed ordered tests / medications and reviewed any available results to this point. If a resident is involved in the Emergency Department care, I have discussed my findings and plan with them as well. Patient has follow-up with her PCP next week. She is stable for discharge home.      Fredis Tamayo,   06/24/22 2861

## 2022-06-28 ENCOUNTER — OFFICE VISIT (OUTPATIENT)
Dept: NEUROLOGY | Age: 47
End: 2022-06-28
Payer: COMMERCIAL

## 2022-06-28 VITALS
HEART RATE: 77 BPM | SYSTOLIC BLOOD PRESSURE: 123 MMHG | DIASTOLIC BLOOD PRESSURE: 84 MMHG | OXYGEN SATURATION: 99 % | TEMPERATURE: 97 F | BODY MASS INDEX: 34.55 KG/M2 | WEIGHT: 183 LBS | HEIGHT: 61 IN

## 2022-06-28 DIAGNOSIS — G25.81 SECONDARY RESTLESS LEGS SYNDROME: ICD-10-CM

## 2022-06-28 DIAGNOSIS — G35 MULTIPLE SCLEROSIS (HCC): Primary | ICD-10-CM

## 2022-06-28 DIAGNOSIS — G35 MULTIPLE SCLEROSIS (HCC): ICD-10-CM

## 2022-06-28 PROBLEM — I26.99 PULMONARY EMBOLISM WITHOUT ACUTE COR PULMONALE (HCC): Status: RESOLVED | Noted: 2022-04-01 | Resolved: 2022-06-28

## 2022-06-28 PROBLEM — T81.43XA POSTOPERATIVE INTRA-ABDOMINAL ABSCESS: Status: RESOLVED | Noted: 2022-03-25 | Resolved: 2022-06-28

## 2022-06-28 PROBLEM — K65.1 POSTOPERATIVE INTRA-ABDOMINAL ABSCESS (HCC): Status: RESOLVED | Noted: 2022-03-25 | Resolved: 2022-06-28

## 2022-06-28 PROBLEM — K91.89 ILEUS, POSTOPERATIVE (HCC): Status: RESOLVED | Noted: 2021-09-05 | Resolved: 2022-06-28

## 2022-06-28 PROBLEM — K56.7 ILEUS, POSTOPERATIVE (HCC): Status: RESOLVED | Noted: 2021-09-05 | Resolved: 2022-06-28

## 2022-06-28 LAB — VITAMIN D 25-HYDROXY: 30 NG/ML (ref 30–100)

## 2022-06-28 PROCEDURE — 99214 OFFICE O/P EST MOD 30 MIN: CPT | Performed by: NURSE PRACTITIONER

## 2022-06-28 PROCEDURE — 1111F DSCHRG MED/CURRENT MED MERGE: CPT | Performed by: NURSE PRACTITIONER

## 2022-06-28 PROCEDURE — G8427 DOCREV CUR MEDS BY ELIG CLIN: HCPCS | Performed by: NURSE PRACTITIONER

## 2022-06-28 PROCEDURE — G8417 CALC BMI ABV UP PARAM F/U: HCPCS | Performed by: NURSE PRACTITIONER

## 2022-06-28 PROCEDURE — 4004F PT TOBACCO SCREEN RCVD TLK: CPT | Performed by: NURSE PRACTITIONER

## 2022-06-28 RX ORDER — BACLOFEN 20 MG/1
20 TABLET ORAL 4 TIMES DAILY PRN
Qty: 360 TABLET | Refills: 3 | Status: SHIPPED
Start: 2022-06-28 | End: 2022-08-02

## 2022-06-28 RX ORDER — METHOCARBAMOL 750 MG/1
50000 TABLET ORAL WEEKLY
Qty: 4 CAPSULE | Refills: 11 | Status: SHIPPED | OUTPATIENT
Start: 2022-06-28

## 2022-06-28 RX ORDER — ROPINIROLE 0.5 MG/1
TABLET, FILM COATED ORAL
Qty: 90 TABLET | Refills: 3 | Status: SHIPPED
Start: 2022-06-28 | End: 2022-08-02

## 2022-06-28 RX ORDER — GABAPENTIN 400 MG/1
400 CAPSULE ORAL 3 TIMES DAILY
Qty: 90 CAPSULE | Refills: 11 | Status: SHIPPED | OUTPATIENT
Start: 2022-06-28 | End: 2023-06-23

## 2022-06-28 NOTE — PATIENT INSTRUCTIONS
Patient Education        Multiple Sclerosis (MS): Care Instructions  Your Care Instructions  Multiple sclerosis, also called MS, is a disease that can affect the brain, spinal cord, and nerves to the eyes. MS can cause problems with muscle control and strength, vision, balance, feeling, and thinking. Whatever your symptoms are, taking medicine correctly and following your doctor's advice for home carecan help you maintain your quality of life. Follow-up care is a key part of your treatment and safety. Be sure to make and go to all appointments, and call your doctor if you are having problems. It's also a good idea to know your test results and keep alist of the medicines you take. How can you care for yourself at home? General care   Take your medicines exactly as prescribed. Call your doctor if you think you are having a problem with your medicine.  Use a cane, walker, or scooter if your doctor suggests it.  Keep doing your normal activities as much as you can.  If you have problems urinating, press or tap your bladder area to help start urine flow. If you have trouble controlling your urine, plan your fluid intake and activities so that a toilet will be available when you need it.  Spend time with family and friends. Join a support group for people with MS if you want extra help.  Depression is common with this condition. Tell your doctor if you have trouble sleeping, are eating too much or are not hungry, or feel sad or tearful all the time. Depression can be treated with medicine and counseling. Diet and exercise   Eat a balanced diet.  If you have problems swallowing, change how and what you eat:  ? Try thick drinks, such as milk shakes. They are easier to swallow than other fluids. ? Do not eat foods that crumble easily. These can cause choking. ? Use a  to prepare food. Soft foods need less chewing.   ? Eat small meals often so that you do not get tired from eating larger meals.   Get exercise on most days. Work with your doctor to set up a program of walking, swimming, or other exercise that you are able to do. A physical therapist can teach you exercises if you cannot walk but can move your limbs and trunk. Or you can do exercises to help with coordination and balance. You can help improve muscle stiffness by doing exercises while lying in certain positions. When should you call for help? Call your doctor now or seek immediate medical care if:     You have a change in symptoms.      You fall or have another injury.      You have symptoms of a urinary infection. For example:  ? You have blood or pus in your urine. ? You have pain in your back just below your rib cage. This is called flank pain. ? You have a fever, chills, or body aches. ? It hurts to urinate. ? You have groin or belly pain. Watch closely for changes in your health, and be sure to contact your doctor if:     You want more information about MS or medicines.      You have questions about alternative treatments. Do not use any other treatments without talking to your doctor first.   Where can you learn more? Go to https://[employer]. org and sign in to your [facility] account. Enter D192 in the [employer] box to learn more about \"Multiple Sclerosis (MS): Care Instructions. \"     If you do not have an account, please click on the \"Sign Up Now\" link. Current as of: December 13, 2021               Content Version: 13.3  © 5199-6354 Healthwise, Incorporated. Care instructions adapted under license by [facility] ([facility]). If you have questions about a medical condition or this instruction, always ask your healthcare professional. [address] any warranty or liability for your use of this information.

## 2022-06-28 NOTE — PROGRESS NOTES
[address] [facility] MSN, APRN-CNP, [address], [address] [place] [address]      [phone]                                     Sushant Alvarado is a 51 y.o. right handed woman    We are following her MS    She alone and remains a good historian    She got COVID last October, and was unable to get her updated MRIs. She was unable to get her second COVID vaccination. She then underwent colon surgery for her cancer. She had infectious complications, multiple subsequent surgeries, PEs, and hospitalizations over the past 6 mos, and had ileostomy reversal in March. Thankfully, she will not require chemo. She is currently on antibiotic for pneumonia. Since all of these issues, she has had increased leg weakness and difficulty ascending stairs. She also has burning pains in both legs and is using gabapentin 3 times daily and baclofen 4 times daily--- which is slightly helpful. No falls, bladder issues, or vision issues. Next Ocrevus infusion is in August.  Last MRIs in July/August 2020 showed unchanged, large white matter lesions with no brainstem or cervical lesions. She has been taking weekly vitamin D. Vocal quality has improved. Restless leg symptoms have worsened, mainly at night.     No chest pain or palpitations  No SOB  No vertigo, lightheadedness or loss of consciousness  No falls, tripping or stumbling  No incontinence of bowels or bladder  No itching or bruising appreciated  No speech or swallowing troubles     ROS otherwise negative     Medications:     Current Outpatient Medications   Medication Sig Dispense Refill    amoxicillin-clavulanate (AUGMENTIN) 875-125 MG per tablet Take 1 tablet by mouth 2 times daily for 7 days 14 tablet 0    vitamin D (D3-50) 47852 UNIT CAPS Take 1 capsule by mouth once a week 4 capsule 11    LISINOPRIL PO Take by mouth daily      famotidine (PEPCID) 40 MG tablet TAKE 1 TABLET BY MOUTH DAILY IN THE EVENING *EMERGENCY REFILL*      fluticasone (FLONASE) 50 MCG/ACT nasal spray 1 spray by Each Nostril route daily 32 g 1    apixaban (ELIQUIS) 5 MG TABS tablet Take 1 tablet by mouth 2 times daily 180 tablet 1    nortriptyline (PAMELOR) 10 MG capsule TAKE ONE (1) CAPSULE BY MOUTH NIGHTLY FOR HEADACHE 30 capsule 3    gabapentin (NEURONTIN) 300 MG capsule TAKE 1 CAPSULE BY MOUTH THREE TIMES DAILY 90 capsule 11    baclofen (LIORESAL) 20 MG tablet Take 1 tablet by mouth 4 times daily as needed (muscle spasms; pain) 360 tablet 3    rOPINIRole (REQUIP) 0.5 MG tablet Take 1 tablet by mouth 2 times daily as needed (restless legs) 60 tablet 11    ocrelizumab (OCREVUS) 300 MG/10ML SOLN injection Infuse 20 mLs intravenously every 6 months (Patient taking differently: Infuse 600 mg intravenously every 6 months Due in August 2022) 20 mL 1     No current facility-administered medications for this visit.      Objective:     /84   Pulse 77   Temp 97 °F (36.1 °C)   Ht 5' 1\" (1.549 m)   Wt 183 lb (83 kg)   LMP 01/05/2018   SpO2 99%   BMI 34.58 kg/m²     General appearance: alert, appears stated age, cooperative and no distress  Head: Normocephalic, atraumatic, poor dentition  Eyes: Sclera clear  Neck: full ROM  Lungs: Clear throughout  Heart: Regular rate and rhythm  Extremities: no cyanosis or edema  Pulses: 2+ throughout  Skin: no obvious lesions on face or UEs    Mental Status: alert, oriented x4    Appropriate attention/concentration  Intact fundus of knowledge  Memories intact    Speech: no dysarthria  Language: no aphasias    Cranial Nerves:  I: smell    II: visual acuity     II: visual fields Full   II: pupils ERRLA    No cecilia or red desat   III,VII: ptosis None   III,IV,VI: extraocular muscles  EOMI without nystagmus    V: mastication normal   V: facial light touch sensation     V,VII: corneal reflex     VII: facial muscle function - upper  Normal   VII: facial muscle function - lower Normal   VIII: hearing Normal WWO    Check vitamin D, IgG, JCV    Continue Ocrevus every 6 months    Recommend she complete her COVID vaccinations (at least one month before next Ocrevus).     Continue vitamin D 50,000 units weekly--goal 40-60     Continue baclofen to 20 mg four times daily PRN; increase gabapentin 400mg TID    Change Requip 0.5 mg AM, 0.5 mg 2 hours before bed, and 0.5 at bedtime    [facility]    Return to office in 6 months or sooner as needed    Rashard Barraza, MARIXA - CNP  2:12 PM  6/28/2022

## 2022-06-29 PROBLEM — Z93.2 ILEOSTOMY IN PLACE (HCC): Status: RESOLVED | Noted: 2022-06-01 | Resolved: 2022-06-29

## 2022-06-29 LAB — IGG: 854 MG/DL (ref 700–1600)

## 2022-06-30 RX ORDER — NORTRIPTYLINE HYDROCHLORIDE 10 MG/1
CAPSULE ORAL
Qty: 30 CAPSULE | Refills: 10 | Status: SHIPPED | OUTPATIENT
Start: 2022-06-30

## 2022-07-05 LAB
Lab: NORMAL
REPORT: NORMAL
THIS TEST SENT TO: NORMAL

## 2022-07-07 ENCOUNTER — HOSPITAL ENCOUNTER (OUTPATIENT)
Dept: PHYSICAL THERAPY | Age: 47
Setting detail: THERAPIES SERIES
Discharge: HOME | End: 2022-07-07
Payer: COMMERCIAL

## 2022-07-07 PROCEDURE — 97161 PT EVAL LOW COMPLEX 20 MIN: CPT | Performed by: PHYSICAL THERAPIST

## 2022-07-07 ASSESSMENT — PAIN - FUNCTIONAL ASSESSMENT: PAIN_FUNCTIONAL_ASSESSMENT: PREVENTS OR INTERFERES SOME ACTIVE ACTIVITIES AND ADLS

## 2022-07-07 ASSESSMENT — PAIN DESCRIPTION - DESCRIPTORS: DESCRIPTORS: ACHING

## 2022-07-07 ASSESSMENT — PAIN DESCRIPTION - ORIENTATION: ORIENTATION: RIGHT;LEFT

## 2022-07-07 ASSESSMENT — PAIN DESCRIPTION - LOCATION: LOCATION: FOOT;LEG

## 2022-07-07 ASSESSMENT — PAIN DESCRIPTION - PAIN TYPE: TYPE: CHRONIC PAIN

## 2022-07-07 ASSESSMENT — PAIN SCALES - GENERAL: PAINLEVEL_OUTOF10: 10

## 2022-07-07 NOTE — PROGRESS NOTES
Physical Therapy: Initial Evaluation    Patient: John Harding (1 y.o. female)   Examination Date:   Plan of Care Certification Period: 2022 to        :  1975 ;    Confirmed: Yes MRN: [id number]  CSN: [id number]   Insurance: Payor: [address] [place] [address]  [address] / Plan: [facility] [address] / Product Type: *No Product type* /   Insurance ID: [id number] - (Medicaid Managed) Secondary Insurance (if applicable):    Referring Physician: MARIXA Quinones -*     PCP: Adithya Huddleston PA-C Visits to Date/Visits Approved:     No Show/Cancelled Appts:   /       Medical Diagnosis: Multiple sclerosis [G35] MS  Treatment Diagnosis:       PERTINENT MEDICAL HISTORY           Medical History: Chart Reviewed: Yes   Past Medical History:   Diagnosis Date    Acid reflux disease     Colon cancer ([facility]     s/p surgery    Cyst of ovary     Fracture of nasal bone     Hearing loss     Hx of blood clots     leg to lung    Hypertension     Morbidly obese ([address] 10/05/2020    Multiple sclerosis ([address]     denies limitations at present 2020    VEENA no CPAP     severe VEENA per pt    Right shoulder pain 2020    RLS (restless legs syndrome)     Tinnitus     TMJ dysfunction     left side     Surgical History:   Past Surgical History:   Procedure Laterality Date    APPENDECTOMY      BACK SURGERY      lumbar    BICEPS TENDON REPAIR Right 2020    BICEPS TENODESIS performed by Trenton Carbajal MD at [facility] Left 2021    LAPAROSCOPIC [address] performed by Katia White MD at [address] 2021    [address] performed by Katia White MD at [address] 2022    LAPAROSCOPIC SIGMOID COLON RESECTION performed by Katia White MD at [address] N/A 2022    OPEN ILEOSTOMY REVERSAL performed by Ismael Peres daily, Disp: 32 g, Rfl: 1    apixaban (ELIQUIS) 5 MG TABS tablet, Take 1 tablet by mouth 2 times daily, Disp: 180 tablet, Rfl: 1    ocrelizumab (OCREVUS) 300 MG/10ML SOLN injection, Infuse 20 mLs intravenously every 6 months (Patient taking differently: Infuse 600 mg intravenously every 6 months Due in August 2022), Disp: 20 mL, Rfl: 1  Allergies: Patient has no known allergies. SUBJECTIVE EXAMINATION       Subjective History: Onset Date: 06/28/22  Subjective: pt presents to therapy with c/o aching/weakness across B LE's over last six months of insidious onset; tells PT she has been hospitalized a couple of times for CA sx and thinks her MS has flared as well; no c/o falls but tells PT she does feel unstable at times; c/o N/T into B feet making prolonged standing difficult; MEDS of no help; trouble with sleep RTD for follow-up in six months  Additional Pertinent Hx (if applicable):            Learning/Language: Learning  Does the patient/guardian have any barriers to learning?: No barriers  What is the preferred language of the patient/guardian?: English     Pain Screening    Pain Screening  Patient Currently in Pain: Yes  Pain Assessment: 0-10  Pain Level: 10  Pain Type: Chronic pain  Pain Location: Foot,Leg  Pain Orientation: Right,Left  Pain Descriptors: Aching  Functional Pain Assessment: Prevents or interferes some active activities and ADLs       OBJECTIVE EXAMINATION     Regional Screen:   no signs of disdiodocokinesia; coordination for B UE/LE's is GOOD  B LE AROM WNL for all ranges;  B LE strength grossly 4/5 for all ranges  Endurance for all prolonged activities is GOOD-       Ambulation/Gait (if applicable):   Ambulation  Surface: level tile  Device: No Device  Assistance: Independent  Gait Deviations: None    Balance Screen:   Balance  Comments: static/dynamic balance is GOOD;TINETTI score is 25/28     ASSESSMENT     Impression:      Body Structures, Functions, Activity Limitations Requiring Skilled Therapeutic Intervention: Decreased strength,Decreased endurance,Decreased balance    Statement of Medical Necessity: Physical Therapy is both indicated and medically necessary as outlined in the POC to increase the likelihood of meeting the functionally related goals stated below. Patient's Activity Tolerance:        Patient's rehabilitation potential/prognosis is considered to be: Fair,Good    Factors which may impact rehabilitation potential include:          GOALS   Patient Goal(s):    Goals Completed by 3-4 weeks Goal Status   increase strength across B LE's to grossly 4+, 5/5 for all ranges improving static/dynamic balance to GOOD+/NORMAL     improve endurance for all prolonged activities to GOOD/GOOD+     assure I with [facility] for home management of condition                     TREATMENT PLAN            Pt. actively involved in establishing Plan of Care and Goals: Yes  Patient/ Caregiver education and instruction:               Treatment may include any combination of the following: ROM,Strengthening,Balance training,Gait training,Endurance training,Home exercise program     Frequency / Duration:  Patient to be seen pt to be seen 1-2x/week/3-4 weeks for   weeks      Eval Complexity:    Decision Making: Low Complexity     Therapist Signature: Abisai Ontiveros PT    Date: 4/0/8930     I certify that the above Therapy Services are being furnished while the patient is under my care. I agree with the treatment plan and certify that this therapy is necessary. Physician's Signature:  ___________________________   Date:_______                                                                   MARIXA Deleon -*        Physician Comments: _______________________________________________    Please sign and return to [facility]. Please fax to the location listed below.  Dayton Echevarria for this referral!    [address]  [address] [place] [id number]  Dept:

## 2022-07-14 ENCOUNTER — HOSPITAL ENCOUNTER (OUTPATIENT)
Dept: PHYSICAL THERAPY | Age: 47
Setting detail: THERAPIES SERIES
Discharge: HOME | End: 2022-07-14
Payer: COMMERCIAL

## 2022-07-14 PROCEDURE — 97530 THERAPEUTIC ACTIVITIES: CPT | Performed by: PHYSICAL THERAPIST

## 2022-07-14 PROCEDURE — 97110 THERAPEUTIC EXERCISES: CPT | Performed by: PHYSICAL THERAPIST

## 2022-07-14 NOTE — PROGRESS NOTES
S:  pt presents to therapy for only scheduled visit of the week; at week's end she reports that she feels about the same since eval; c/o aching across B buttocks into thighs; no c/o buckling or LOB over last week's time; HEP going well per pt    O:  performed the exercises/tasks as written in the flowsheet for the week ending 7/15/22; initiated HEP for home management of condition; B LE strength grossly 4/5 for all ranges    A:  leeanna tx well; pt able to perform all requested tasks with good form and pacing noted;  B LE strength grossly stable since eval; gait stable with normal mechanics noted B LE's/trunk; static/dynamic balance is GOOD/GOOD+; endurance for all prolonged activities is GOOD    P:  cont with POC of strengthening/balance/endurance activities  for B LE's as pt tolerates

## 2022-07-14 NOTE — PROGRESS NOTES
[facility]                Phone: [phone]   Fax: [phone]    Physical Therapy Daily Treatment Note  Date:  2022    Patient Name:  Herbert Chahal    :  1975  MRN: [id number]    Evaluating therapist:  ZURDO Kohler                     (22)  Restrictions/Precautions:    Diagnosis:  MS  Treatment Diagnosis:    Insurance/Certification information:  [facility]  Referring Physician:  June Sprague. Plan of care signed (Y/N):    Visit# / total visits:  -  Pain level: /10   Time In:  1103   Time Out:  1147    Subjective:      Exercises:  Exercise/Equipment Resistance/Repetitions Other comments   StepOne   10 min            seated hip abd [id number]                      flex 2m23x1md     NK flex/ext 2x10x3.75lb           [employer] squats 2x10         L8           toe raises 2x15    step ups 2x10x6\"            side  2x10x6\"                                                              Other Therapeutic Activities:      Home Exercise Program:  provided 22    Manual Treatments:      Modalities:      Timed Code Treatment Minutes: Total Treatment Minutes:      Treatment/Activity Tolerance:  [] Patient tolerated treatment well [] Patient limited by fatique  [] Patient limited by pain  [] Patient limited by other medical complications  [] Other:     Prognosis: [] Good [] Fair  [] Poor    Patient Requires Follow-up: [] Yes  [] No    Plan:   [] Continue per plan of care [] Alter current plan (see comments)  [] Plan of care initiated [] Hold pending MD visit [] Discharge  Plan for Next Session:      See Weekly Progress Note: []  Yes  []  No  Next due:        Electronically signed by:   Thomas Jay PT

## 2022-07-21 ENCOUNTER — HOSPITAL ENCOUNTER (OUTPATIENT)
Dept: PHYSICAL THERAPY | Age: 47
Setting detail: THERAPIES SERIES
Discharge: HOME | End: 2022-07-21
Payer: COMMERCIAL

## 2022-07-21 NOTE — PROGRESS NOTES
[address] [facility]                Phone: [phone]  Fax: [phone]    Physical Therapy  Cancellation/No-show Note  Patient Name:  Dorie Aviles  :  1975   Date:  2022    For today's appointment patient:  []  Cancelled  []  Rescheduled appointment  [x]  No-show     Reason given by patient:  []  Patient ill  []  Conflicting appointment  []  No transportation    []  Conflict with work  [x]  No reason given  []  Other:     Comments:      Electronically signed by:   Brion Bamberger, PT

## 2022-07-26 ENCOUNTER — HOSPITAL ENCOUNTER (INPATIENT)
Age: 47
LOS: 3 days | Discharge: HOME | DRG: 711 | End: 2022-07-29
Attending: EMERGENCY MEDICINE | Admitting: SURGERY
Payer: COMMERCIAL

## 2022-07-26 ENCOUNTER — APPOINTMENT (OUTPATIENT)
Dept: CT IMAGING | Age: 47
DRG: 711 | End: 2022-07-26
Payer: COMMERCIAL

## 2022-07-26 DIAGNOSIS — T88.8XXA FLUID COLLECTION AT SURGICAL SITE, INITIAL ENCOUNTER: ICD-10-CM

## 2022-07-26 DIAGNOSIS — R10.31 RIGHT LOWER QUADRANT ABDOMINAL PAIN: Primary | ICD-10-CM

## 2022-07-26 DIAGNOSIS — T81.49XA ABDOMINAL WALL ABSCESS AT SITE OF SURGICAL WOUND: ICD-10-CM

## 2022-07-26 PROBLEM — S30.1XXA ABDOMINAL WALL SEROMA: Status: ACTIVE | Noted: 2022-07-26

## 2022-07-26 LAB
ALBUMIN SERPL-MCNC: 4.7 G/DL (ref 3.5–5.2)
ALP BLD-CCNC: 109 U/L (ref 35–104)
ALT SERPL-CCNC: 15 U/L (ref 0–32)
ANION GAP SERPL CALCULATED.3IONS-SCNC: 16 MMOL/L (ref 7–16)
APTT: 27.7 SEC (ref 24.5–35.1)
AST SERPL-CCNC: 10 U/L (ref 0–31)
BACTERIA: ABNORMAL /HPF
BASOPHILS ABSOLUTE: 0.05 E9/L (ref 0–0.2)
BASOPHILS RELATIVE PERCENT: 0.4 % (ref 0–2)
BILIRUB SERPL-MCNC: 0.5 MG/DL (ref 0–1.2)
BILIRUBIN URINE: NEGATIVE
BLOOD, URINE: NEGATIVE
BUN BLDV-MCNC: 8 MG/DL (ref 6–20)
CALCIUM SERPL-MCNC: 9.5 MG/DL (ref 8.6–10.2)
CHLORIDE BLD-SCNC: 100 MMOL/L (ref 98–107)
CLARITY: CLEAR
CO2: 26 MMOL/L (ref 22–29)
COLOR: YELLOW
CREAT SERPL-MCNC: 0.7 MG/DL (ref 0.5–1)
EOSINOPHILS ABSOLUTE: 0.12 E9/L (ref 0.05–0.5)
EOSINOPHILS RELATIVE PERCENT: 1 % (ref 0–6)
EPITHELIAL CELLS, UA: ABNORMAL /HPF
GFR AFRICAN AMERICAN: >60
GFR NON-AFRICAN AMERICAN: >60 ML/MIN/1.73
GLUCOSE BLD-MCNC: 106 MG/DL (ref 74–99)
GLUCOSE URINE: NEGATIVE MG/DL
HCT VFR BLD CALC: 43.5 % (ref 34–48)
HEMOGLOBIN: 14.4 G/DL (ref 11.5–15.5)
IMMATURE GRANULOCYTES #: 0.06 E9/L
IMMATURE GRANULOCYTES %: 0.5 % (ref 0–5)
INR BLD: 1
KETONES, URINE: NEGATIVE MG/DL
LACTIC ACID: 0.7 MMOL/L (ref 0.5–2.2)
LEUKOCYTE ESTERASE, URINE: NEGATIVE
LYMPHOCYTES ABSOLUTE: 2.38 E9/L (ref 1.5–4)
LYMPHOCYTES RELATIVE PERCENT: 20 % (ref 20–42)
MCH RBC QN AUTO: 30.3 PG (ref 26–35)
MCHC RBC AUTO-ENTMCNC: 33.1 % (ref 32–34.5)
MCV RBC AUTO: 91.4 FL (ref 80–99.9)
MONOCYTES ABSOLUTE: 0.95 E9/L (ref 0.1–0.95)
MONOCYTES RELATIVE PERCENT: 8 % (ref 2–12)
NEUTROPHILS ABSOLUTE: 8.35 E9/L (ref 1.8–7.3)
NEUTROPHILS RELATIVE PERCENT: 70.1 % (ref 43–80)
NITRITE, URINE: NEGATIVE
PDW BLD-RTO: 12.7 FL (ref 11.5–15)
PH UA: 5.5 (ref 5–9)
PLATELET # BLD: 266 E9/L (ref 130–450)
PMV BLD AUTO: 9.7 FL (ref 7–12)
POTASSIUM REFLEX MAGNESIUM: 4.1 MMOL/L (ref 3.5–5)
PROTEIN UA: NEGATIVE MG/DL
PROTHROMBIN TIME: 10.7 SEC (ref 9.3–12.4)
RBC # BLD: 4.76 E12/L (ref 3.5–5.5)
RBC UA: ABNORMAL /HPF (ref 0–2)
SODIUM BLD-SCNC: 142 MMOL/L (ref 132–146)
SPECIFIC GRAVITY UA: >=1.03 (ref 1–1.03)
TOTAL PROTEIN: 8.1 G/DL (ref 6.4–8.3)
UROBILINOGEN, URINE: 0.2 E.U./DL
WBC # BLD: 11.9 E9/L (ref 4.5–11.5)
WBC UA: ABNORMAL /HPF (ref 0–5)

## 2022-07-26 PROCEDURE — 6360000002 HC RX W HCPCS: Performed by: STUDENT IN AN ORGANIZED HEALTH CARE EDUCATION/TRAINING PROGRAM

## 2022-07-26 PROCEDURE — 85025 COMPLETE CBC W/AUTO DIFF WBC: CPT

## 2022-07-26 PROCEDURE — 2580000003 HC RX 258: Performed by: EMERGENCY MEDICINE

## 2022-07-26 PROCEDURE — 80053 COMPREHEN METABOLIC PANEL: CPT

## 2022-07-26 PROCEDURE — 85730 THROMBOPLASTIN TIME PARTIAL: CPT

## 2022-07-26 PROCEDURE — 74177 CT ABD & PELVIS W/CONTRAST: CPT

## 2022-07-26 PROCEDURE — 1200000000 HC SEMI PRIVATE

## 2022-07-26 PROCEDURE — 6370000000 HC RX 637 (ALT 250 FOR IP): Performed by: STUDENT IN AN ORGANIZED HEALTH CARE EDUCATION/TRAINING PROGRAM

## 2022-07-26 PROCEDURE — 99253 IP/OBS CNSLTJ NEW/EST LOW 45: CPT | Performed by: SURGERY

## 2022-07-26 PROCEDURE — 96375 TX/PRO/DX INJ NEW DRUG ADDON: CPT

## 2022-07-26 PROCEDURE — 2580000003 HC RX 258: Performed by: STUDENT IN AN ORGANIZED HEALTH CARE EDUCATION/TRAINING PROGRAM

## 2022-07-26 PROCEDURE — 6360000004 HC RX CONTRAST MEDICATION: Performed by: RADIOLOGY

## 2022-07-26 PROCEDURE — 81001 URINALYSIS AUTO W/SCOPE: CPT

## 2022-07-26 PROCEDURE — 83605 ASSAY OF LACTIC ACID: CPT

## 2022-07-26 PROCEDURE — 96374 THER/PROPH/DIAG INJ IV PUSH: CPT

## 2022-07-26 PROCEDURE — 6360000002 HC RX W HCPCS: Performed by: EMERGENCY MEDICINE

## 2022-07-26 PROCEDURE — 85610 PROTHROMBIN TIME: CPT

## 2022-07-26 PROCEDURE — 99285 EMERGENCY DEPT VISIT HI MDM: CPT

## 2022-07-26 RX ORDER — ONDANSETRON 2 MG/ML
4 INJECTION INTRAMUSCULAR; INTRAVENOUS EVERY 6 HOURS PRN
Status: DISCONTINUED | OUTPATIENT
Start: 2022-07-26 | End: 2022-07-29 | Stop reason: HOSPADM

## 2022-07-26 RX ORDER — GABAPENTIN 400 MG/1
400 CAPSULE ORAL 3 TIMES DAILY
Status: DISCONTINUED | OUTPATIENT
Start: 2022-07-26 | End: 2022-07-29 | Stop reason: HOSPADM

## 2022-07-26 RX ORDER — SODIUM CHLORIDE 9 MG/ML
INJECTION, SOLUTION INTRAVENOUS PRN
Status: DISCONTINUED | OUTPATIENT
Start: 2022-07-26 | End: 2022-07-27 | Stop reason: SDUPTHER

## 2022-07-26 RX ORDER — FENTANYL CITRATE 50 UG/ML
50 INJECTION, SOLUTION INTRAMUSCULAR; INTRAVENOUS ONCE
Status: COMPLETED | OUTPATIENT
Start: 2022-07-26 | End: 2022-07-26

## 2022-07-26 RX ORDER — ACETAMINOPHEN 325 MG/1
650 TABLET ORAL EVERY 4 HOURS PRN
Status: DISCONTINUED | OUTPATIENT
Start: 2022-07-26 | End: 2022-07-29 | Stop reason: HOSPADM

## 2022-07-26 RX ORDER — FLUTICASONE PROPIONATE 50 MCG
1 SPRAY, SUSPENSION (ML) NASAL DAILY
Status: DISCONTINUED | OUTPATIENT
Start: 2022-07-26 | End: 2022-07-29 | Stop reason: HOSPADM

## 2022-07-26 RX ORDER — SODIUM CHLORIDE 0.9 % (FLUSH) 0.9 %
10 SYRINGE (ML) INJECTION EVERY 12 HOURS SCHEDULED
Status: DISCONTINUED | OUTPATIENT
Start: 2022-07-26 | End: 2022-07-27 | Stop reason: SDUPTHER

## 2022-07-26 RX ORDER — OXYCODONE HYDROCHLORIDE 5 MG/1
5 TABLET ORAL EVERY 4 HOURS PRN
Status: DISCONTINUED | OUTPATIENT
Start: 2022-07-26 | End: 2022-07-29 | Stop reason: HOSPADM

## 2022-07-26 RX ORDER — 0.9 % SODIUM CHLORIDE 0.9 %
1000 INTRAVENOUS SOLUTION INTRAVENOUS ONCE
Status: COMPLETED | OUTPATIENT
Start: 2022-07-26 | End: 2022-07-26

## 2022-07-26 RX ORDER — ONDANSETRON 4 MG/1
4 TABLET, ORALLY DISINTEGRATING ORAL EVERY 8 HOURS PRN
Status: DISCONTINUED | OUTPATIENT
Start: 2022-07-26 | End: 2022-07-29 | Stop reason: HOSPADM

## 2022-07-26 RX ORDER — SODIUM CHLORIDE, SODIUM LACTATE, POTASSIUM CHLORIDE, CALCIUM CHLORIDE 600; 310; 30; 20 MG/100ML; MG/100ML; MG/100ML; MG/100ML
INJECTION, SOLUTION INTRAVENOUS CONTINUOUS
Status: DISCONTINUED | OUTPATIENT
Start: 2022-07-27 | End: 2022-07-29 | Stop reason: HOSPADM

## 2022-07-26 RX ORDER — SODIUM CHLORIDE 0.9 % (FLUSH) 0.9 %
10 SYRINGE (ML) INJECTION PRN
Status: DISCONTINUED | OUTPATIENT
Start: 2022-07-26 | End: 2022-07-27 | Stop reason: SDUPTHER

## 2022-07-26 RX ORDER — FAMOTIDINE 20 MG/1
40 TABLET, FILM COATED ORAL DAILY
Status: DISCONTINUED | OUTPATIENT
Start: 2022-07-26 | End: 2022-07-29 | Stop reason: HOSPADM

## 2022-07-26 RX ORDER — ONDANSETRON 2 MG/ML
4 INJECTION INTRAMUSCULAR; INTRAVENOUS ONCE
Status: COMPLETED | OUTPATIENT
Start: 2022-07-26 | End: 2022-07-26

## 2022-07-26 RX ORDER — ROPINIROLE 0.5 MG/1
0.5 TABLET, FILM COATED ORAL NIGHTLY
Status: DISCONTINUED | OUTPATIENT
Start: 2022-07-26 | End: 2022-07-29 | Stop reason: HOSPADM

## 2022-07-26 RX ORDER — ENOXAPARIN SODIUM 100 MG/ML
40 INJECTION SUBCUTANEOUS DAILY
Status: DISCONTINUED | OUTPATIENT
Start: 2022-07-26 | End: 2022-07-29 | Stop reason: HOSPADM

## 2022-07-26 RX ADMIN — FENTANYL CITRATE 50 MCG: 50 INJECTION, SOLUTION INTRAMUSCULAR; INTRAVENOUS at 12:06

## 2022-07-26 RX ADMIN — PIPERACILLIN AND TAZOBACTAM 4500 MG: 4; .5 INJECTION, POWDER, FOR SOLUTION INTRAVENOUS at 21:22

## 2022-07-26 RX ADMIN — ONDANSETRON HYDROCHLORIDE 4 MG: 2 SOLUTION INTRAMUSCULAR; INTRAVENOUS at 12:06

## 2022-07-26 RX ADMIN — SODIUM CHLORIDE, PRESERVATIVE FREE 10 ML: 5 INJECTION INTRAVENOUS at 21:23

## 2022-07-26 RX ADMIN — GABAPENTIN 400 MG: 400 CAPSULE ORAL at 21:30

## 2022-07-26 RX ADMIN — SODIUM CHLORIDE 1000 ML: 9 INJECTION, SOLUTION INTRAVENOUS at 12:06

## 2022-07-26 RX ADMIN — IOPAMIDOL 50 ML: 755 INJECTION, SOLUTION INTRAVENOUS at 14:20

## 2022-07-26 ASSESSMENT — ENCOUNTER SYMPTOMS
COUGH: 0
BLOOD IN STOOL: 0
SHORTNESS OF BREATH: 0
BACK PAIN: 0
VOMITING: 0
RHINORRHEA: 0
ABDOMINAL PAIN: 1
NAUSEA: 1
DIARRHEA: 0
CONSTIPATION: 0
ABDOMINAL DISTENTION: 0

## 2022-07-26 ASSESSMENT — PAIN SCALES - GENERAL
PAINLEVEL_OUTOF10: 8
PAINLEVEL_OUTOF10: 6

## 2022-07-26 NOTE — H&P
GENERAL SURGERY  H&P   7/26/2022    HPI  Daniel Bañuelos is a 51 y.o. female who presents for evaluation of abdominal pain. Patient states she woke up this morning with right-sided abdominal pain just inferior to where her surgical incision is. Patient states she felt a knot over the area which was progressively got larger as the day went on. The area is very tender to touch, she denies any fevers, chills, vomiting, chest pain, shortness of breath. She has not noticed any drainage from the area, and all of her pain feels superficial. Admits to some nausea, diminished appetite. Patient has a past medical history includes left-sided colon cancer for which she underwent laparoscopic left colectomy with diverting loop ileostomy with Dr. Jhonny Jackman in 8/21. Patient underwent diagnostic lap with washout and drain placement in 9/21, underwent laparoscopic sigmoid and rectal resection for stricture in 2/22, and ileostomy reversal at the beginning of June 2022.        Past Medical History:   Diagnosis Date    Acid reflux disease     Colon cancer ([address]     s/p surgery    Cyst of ovary     Fracture of nasal bone     Hearing loss     Hx of blood clots     leg to lung    Hypertension     Morbidly obese ([address] 10/05/2020    Multiple sclerosis ([address]     denies limitations at present 11/2020    VEENA no CPAP     severe VEENA per pt    Right shoulder pain 11/2020    RLS (restless legs syndrome)     Tinnitus     TMJ dysfunction     left side       Past Surgical History:   Procedure Laterality Date    APPENDECTOMY      BACK SURGERY      lumbar    BICEPS TENDON REPAIR Right 11/11/2020    BICEPS TENODESIS performed by Reginaldo Ray MD at [place] [address] Left 8/31/2021    LAPAROSCOPIC LEFT HEMICOLECTOMY WITH LOOP OSTOMY performed by Patel Cardoso MD at [place] N/A 9/6/2021    DIAGNOSTIC LAPAROSCOPY POSSIBLE BOWEL RESECTION POSSILBE OSTOMY performed by Patel Cardoso MD at [place] N/A 2/8/2022    LAPAROSCOPIC SIGMOID COLON RESECTION performed by Avelina Vargas MD at [address] [place] [address] 6/1/2022    OPEN ILEOSTOMY REVERSAL performed by Avelina Vargas MD at [address] [place] [address] (CERVIX STATUS UNKNOWN)  03/05/2018    Robotic hysterectomy, bilateral salpingectomy, right oophorectomy DR. ARIANA MONIQUE [facility]     HYSTERECTOMY, TOTAL ABDOMINAL (CERVIX REMOVED)      LARYNGOSCOPY N/A 7/17/2020    DIRECT LARYNGOSCOPY--OMNI GUIDE LASER performed by Teagan Jean MD at [address] (CERVIX NOT REMOVED)      PROCTOSIGMOIDOSCOPY N/A 10/12/2021    ANAL PROCTO SIGMOIDOSCOPY FLEXIBLE performed by Avelina Vargas MD at [facility]. [address] N/A 1/25/2022    ANAL PROCTO [address] performed by Avelina Vargas MD at [facility] ARTHROSCOPY Right 11/11/2020    RIGHT SHOULDER DIAGNOSTIC ARTHROSCOPY WITH DECOMPRESSION ROTATOR CUFF REPAIR performed by Tawnya Briggs MD at [place] [address]         Medications Prior to Admission    Prior to Admission medications    Medication Sig Start Date End Date Taking? Authorizing Provider   nortriptyline (PAMELOR) 10 MG capsule TAKE 1 CAPSULE BY MOUTH NIGHTLY FOR HEADACHE 6/30/22   Dafne Rhodes PA-C   rOPINIRole (REQUIP) 0.5 MG tablet Take one tab every morning; one tab two hours before bed; and one tab at bedtime 6/28/22   MARIXA Sullivan CNP   vitamin D (D3-50) [address]647 UNIT CAPS Take 1 capsule by mouth once a week 6/28/22   MARIXA Sullivan CNP   gabapentin (NEURONTIN) 400 MG capsule Take 1 capsule by mouth 3 times daily for 360 days.  6/28/22 6/23/23  MARIXA Sullivan CNP   baclofen (LIORESAL) 20 MG tablet Take 1 tablet by mouth 4 times daily as needed (muscle spasms; pain) 6/28/22   MARIXA Sullivan CNP   LISINOPRIL PO Take by mouth daily    Historical Provider, MD   famotidine (PEPCID) 40 MG tablet TAKE 1 TABLET BY MOUTH DAILY IN THE EVENING *EMERGENCY REFILL* 4/4/22   Historical Provider, MD   fluticasone Yomi Cedilloo) 50 MCG/ACT nasal spray 1 spray by Each Nostril route daily 4/11/22   Katy Reyes PA-C   apixaban (ELIQUIS) 5 MG TABS tablet Take 1 tablet by mouth 2 times daily 3/25/22   Katy Reyes PA-C   ocrelizumab (OCREVUS) 300 MG/10ML SOLN injection Infuse 20 mLs intravenously every 6 months  Patient taking differently: Infuse 600 mg intravenously every 6 months Due in August 2022 7/19/21   Carry JenamariemarMARIXA - CNP       No Known Allergies    Family History   Problem Relation Age of Onset    Mult Sclerosis Mother     Colon Cancer Neg Hx     Uterine Cancer Neg Hx     Ovarian Cancer Neg Hx     Breast Cancer Neg Hx        Social History     Tobacco Use    Smoking status: Every Day     Packs/day: 0.50     Years: 25.00     Pack years: 12.50     Types: Cigarettes    Smokeless tobacco: Never    Tobacco comments:         Vaping Use    Vaping Use: Never used   Substance Use Topics    Alcohol use: Not Currently    Drug use: No         Review of Systems: pertinent ROS listed in HPI, all others negative       PHYSICAL EXAM:    Vitals:    07/26/22 1123   BP: (!) 152/106   Pulse:    Resp:    Temp:    SpO2:        GENERAL:  NAD. A&Ox3. HEAD:  Normocephalic. Atraumatic. EYES:   No scleral icterus. PERRL. LUNGS:  No increased work of breathing. CARDIOVASCULAR: RR  ABDOMEN:  Soft, non-distended, non-tender. No guarding, rigidity, rebound. Patient has an area of induration just inferior and lateral to her ileostomy reversal incision. No erythema or palpable fluctuance over the area. Patient is tender to palpation over that area. EXTREMITIES:   MAEx4. Atraumatic. No LE edema. SKIN:  Warm and dry  NEUROLOGIC:  No focal neurologic deficits. ASSESSMENT/PLAN:  51 y.o. female with concern for postop subcue abscess or seroma after ileostomy takedown on 6/1/2022. She last took her Eliquis yesterday evening.     Plan  -Admit to surgery  -Okay for diet n.p.o. midnight  -Plan for OR tomorrow for I&D of abscess  -Hold Eliquis  -As needed pain and nausea control  -Add on zosyn, patient's most recent antibiogram showed susceptibility to zosyn     Plan discussed with Dr. Alie Montez. Caesar Horn MD  Surgery Resident PGY-2  7/26/2022  5:01 PM    General Surgery Consult Note  German Nelson MD, MS    Patient's Name/Date of Birth: Haley Martinez / 1975    Date: July 27, 2022     Surgeon: Alie Montez MD    Requesting Physician:     Chief Complaint: abd pain    Patient Active Problem List   Diagnosis    Vocal cord dysfunction    Multiple sclerosis ([address]    Morbidly obese ([address]    Palafox's esophagus with dysplasia    Secondary restless legs syndrome    S/P colectomy    Malignant neoplasm of descending colon ([address]    Multiple subsegmental pulmonary emboli without acute cor pulmonale ([address]    S/P closure of ileostomy    Abdominal wall seroma       Subjective: As above. I saw and examined the patient and discussed the above HPI with the resident and agree with documented positive and negative findings. Timing is constant, radiation to RLQ, alleviated by none and started days ago and severity is 7/10. Objective:  /72   Pulse 82   Temp 98.7 °F (37.1 °C) (Oral)   Resp 18   Ht 5' 1\" (1.549 m)   Wt 180 lb (81.6 kg)   LMP 01/05/2018   SpO2 95%   BMI 34.01 kg/m²   Labs:  Reviewed by me and relevant abnormalities noted       A complete 10 system review was performed and are otherwise negative unless mentioned in the above HPI. Specific negatives are listed below but may not include all those reviewed.     General ROS: negative obtundation, AMS  ENT ROS: negative rhinorrhea, epistaxis  Allergy and Immunology ROS: negative itchy/watery eyes or nasal congestion  Hematological and Lymphatic ROS: negative spontaneous bleeding or bruising  Endocrine ROS: negative  lethargy, mood swings, palpitations or

## 2022-07-26 NOTE — PROGRESS NOTES
This patient is on medication that requires renal, weight, and/or indication dose adjustment. Date Body Weight IBW  Adjusted BW SCr  CrCl Dialysis status   7/26/2022 180 lb (81.6 kg) Ideal body weight: 47.8 kg (105 lb 6.1 oz)  Adjusted ideal body weight: 61.3 kg (135 lb 3.6 oz) Serum creatinine: 0.7 mg/dL 07/26/22 1147  Estimated creatinine clearance: 96 mL/min N/a       Pharmacy has dose-adjusted the following medication(s):    Date Previous Order Adjusted Order   7/26/2022 Zosyn 3.375 g q8h Zosyn 4.5 g q8h       These changes were made per protocol according to the [facility] Clinical Guidance for Pharmacists. *Please note this dose may need readjusted if patient's condition changes. Please contact pharmacy with any questions regarding these changes.     Juliet Pizarro [facility]  7/26/2022  6:26 PM

## 2022-07-26 NOTE — ED PROVIDER NOTES
HPI:  7/26/22,   Time: 11:36 AM EDT       Conni Buerger is a 51 y.o. female presenting to the ED for abd pain, beginning 1 day ago. The complaint has been persistent, mild in severity, and worsened by nothing. Bib private vehicle, hx recurrent abd infections, hx illeostomy with reversal in June this year. Right sided abd pain near ostomy site, states feels mushy. Nausea w/o emesis. No constipation/diarrhea. No fever/chills/sweats/urinary sx    Review of Systems:   Pertinent positives and negatives are stated within HPI, all other systems reviewed and are negative.          --------------------------------------------- PAST HISTORY ---------------------------------------------  Past Medical History:  has a past medical history of Acid reflux disease, Colon cancer ([address] Cyst of ovary, Fracture of nasal bone, Hearing loss, Hx of blood clots, Hypertension, Morbidly obese ([address] Multiple sclerosis ([address] VEENA no CPAP, Right shoulder pain, RLS (restless legs syndrome), Tinnitus, and TMJ dysfunction. Past Surgical History:  has a past surgical history that includes Cholecystectomy; cyst removal; Hysterectomy (03/05/2018); laryngoscopy (N/A, 7/17/2020); Shoulder arthroscopy (Right, 11/11/2020); Biceps tendon repair (Right, 11/11/2020); Appendectomy; Esophagus surgery; Partial hysterectomy; Hysterectomy, total abdominal; Tonsillectomy; colectomy (Left, 8/31/2021); colectomy (N/A, 9/6/2021); proctosigmoidoscopy (N/A, 10/12/2021); back surgery; proctosigmoidoscopy (N/A, 1/25/2022); colectomy (N/A, 2/8/2022); and Colostomy Closure (N/A, 6/1/2022). Social History:  reports that she has been smoking cigarettes. She has a 12.50 pack-year smoking history. She has never used smokeless tobacco. She reports that she does not currently use alcohol. She reports that she does not use drugs. Family History: family history includes Mult Sclerosis in her mother.      The patients home medications have been reviewed. Allergies: Patient has no known allergies. ---------------------------------------------------PHYSICAL EXAM--------------------------------------    Constitutional/General: Alert and oriented x3, well appearing, non toxic in NAD  Head: Normocephalic and atraumatic  Eyes: PERRL, EOMI, conjunctive normal, sclera non icteric  Mouth: Oropharynx clear, handling secretions, no trismus, no asymmetry of the posterior oropharynx or uvular edema  Neck: Supple, full ROM,   Respiratory:  Not in respiratory distress  Cardiovascular:   2+ distal pulses  Chest: No chest wall tenderness  GI:  Abdomen Soft, mild right sided abd ttp Non distended. Well healed scar, no drainage  Musculoskeletal: Moves all extremities x 4. Warm and well perfused,   Integument: skin warm and dry. No rashes. Lymphatic: no lymphadenopathy noted  Neurologic: GCS 15, no focal deficits, y  Psychiatric: Normal Affect    -------------------------------------------------- RESULTS -------------------------------------------------  I have personally reviewed all laboratory and imaging results for this patient. Results are listed below.      LABS:  Results for orders placed or performed during the hospital encounter of 07/26/22   CBC with Auto Differential   Result Value Ref Range    WBC 11.9 (H) 4.5 - 11.5 E9/L    RBC 4.76 3.50 - 5.50 E12/L    Hemoglobin 14.4 11.5 - 15.5 g/dL    Hematocrit 43.5 34.0 - 48.0 %    MCV 91.4 80.0 - 99.9 fL    MCH 30.3 26.0 - 35.0 pg    MCHC 33.1 32.0 - 34.5 %    RDW 12.7 11.5 - 15.0 fL    Platelets 910 858 - 596 E9/L    MPV 9.7 7.0 - 12.0 fL    Neutrophils % 70.1 43.0 - 80.0 %    Immature Granulocytes % 0.5 0.0 - 5.0 %    Lymphocytes % 20.0 20.0 - 42.0 %    Monocytes % 8.0 2.0 - 12.0 %    Eosinophils % 1.0 0.0 - 6.0 %    Basophils % 0.4 0.0 - 2.0 %    Neutrophils Absolute 8.35 (H) 1.80 - 7.30 E9/L    Immature Granulocytes # 0.06 E9/L    Lymphocytes Absolute 2.38 1.50 - 4.00 E9/L    Monocytes Absolute 0.95 0.10 - 0.95 E9/L    Eosinophils Absolute 0.12 0.05 - 0.50 E9/L    Basophils Absolute 0.05 0.00 - 0.20 E9/L   Comprehensive Metabolic Panel w/ Reflex to MG   Result Value Ref Range    Sodium 142 132 - 146 mmol/L    Potassium reflex Magnesium 4.1 3.5 - 5.0 mmol/L    Chloride 100 98 - 107 mmol/L    CO2 26 22 - 29 mmol/L    Anion Gap 16 7 - 16 mmol/L    Glucose 106 (H) 74 - 99 mg/dL    BUN 8 6 - 20 mg/dL    Creatinine 0.7 0.5 - 1.0 mg/dL    GFR Non-African American >60 >=60 mL/min/1.73    GFR African American >60     Calcium 9.5 8.6 - 10.2 mg/dL    Total Protein 8.1 6.4 - 8.3 g/dL    Albumin 4.7 3.5 - 5.2 g/dL    Total Bilirubin 0.5 0.0 - 1.2 mg/dL    Alkaline Phosphatase 109 (H) 35 - 104 U/L    ALT 15 0 - 32 U/L    AST 10 0 - 31 U/L   APTT   Result Value Ref Range    aPTT 27.7 24.5 - 35.1 sec   Protime-INR   Result Value Ref Range    Protime 10.7 9.3 - 12.4 sec    INR 1.0    Urinalysis with Microscopic   Result Value Ref Range    Color, UA Yellow Straw/Yellow    Clarity, UA Clear Clear    Glucose, Ur Negative Negative mg/dL    Bilirubin Urine Negative Negative    Ketones, Urine Negative Negative mg/dL    Specific Gravity, UA >=1.030 1.005 - 1.030    Blood, Urine Negative Negative    pH, UA 5.5 5.0 - 9.0    Protein, UA Negative Negative mg/dL    Urobilinogen, Urine 0.2 <2.0 E.U./dL    Nitrite, Urine Negative Negative    Leukocyte Esterase, Urine Negative Negative    WBC, UA NONE 0 - 5 /HPF    RBC, UA NONE 0 - 2 /HPF    Epithelial Cells, UA FEW /HPF    Bacteria, UA RARE (A) None Seen /HPF   Lactic Acid   Result Value Ref Range    Lactic Acid 0.7 0.5 - 2.2 mmol/L       RADIOLOGY:  Interpreted by Radiologist.  CT ABDOMEN PELVIS W IV CONTRAST Additional Contrast? None   Final Result   Fluid density with surrounding edema right anterolateral abdominal wall 6.6   cm in maximum dimension without gas locules. Findings concerning for   postsurgical fluid collection of hernia repair compared to prior.   Some   associated edema concerning for potential minimal inflammatory findings and   or postsurgical changes. No intra-abdominal fluid collection or abscess   intra-abdominal.             EKG:  This EKG is signed and interpreted by the EP. Time:   Rate:   Rhythm:   Interpretation:   Comparison:       ------------------------- NURSING NOTES AND VITALS REVIEWED ---------------------------   The nursing notes within the ED encounter and vital signs as below have been reviewed by myself. /72   Pulse 82   Temp 98.7 °F (37.1 °C) (Oral)   Resp 18   Ht 5' 1\" (1.549 m)   Wt 180 lb (81.6 kg)   LMP 01/05/2018   SpO2 95%   BMI 34.01 kg/m²   Oxygen Saturation Interpretation: Normal    The patients available past medical records and past encounters were reviewed.         ------------------------------ ED COURSE/MEDICAL DECISION MAKING----------------------  Medications   famotidine (PEPCID) tablet 40 mg (40 mg Oral Given 7/27/22 0816)   fluticasone (FLONASE) 50 MCG/ACT nasal spray 1 spray (1 spray Each Nostril Not Given 7/27/22 0812)   gabapentin (NEURONTIN) capsule 400 mg (400 mg Oral Given 7/27/22 1332)   rOPINIRole (REQUIP) tablet 0.5 mg (0.5 mg Oral Not Given 7/26/22 2128)   ondansetron (ZOFRAN-ODT) disintegrating tablet 4 mg (has no administration in time range)     Or   ondansetron (ZOFRAN) injection 4 mg (has no administration in time range)   enoxaparin (LOVENOX) injection 40 mg (40 mg SubCUTAneous Not Given 7/27/22 0812)   lactated ringers infusion ( IntraVENous New Bag 7/27/22 0109)   piperacillin-tazobactam (ZOSYN) 4,500 mg in dextrose 5 % 100 mL IVPB ([id number]) (4,500 mg IntraVENous New Bag 7/27/22 1202)   acetaminophen (TYLENOL) tablet 650 mg (has no administration in time range)   oxyCODONE (ROXICODONE) immediate release tablet 5 mg (5 mg Oral Given 7/27/22 1332)   povidone-iodine (BETADINE) 10 % external solution (has no administration in time range)   lidocaine PF 1 % injection 5 mL (has no administration in time range)   sodium chloride flush 0.9 % injection 5-40 mL (has no administration in time range)   sodium chloride flush 0.9 % injection 5-40 mL (has no administration in time range)   0.9 % sodium chloride infusion (has no administration in time range)   heparin flush 100 UNIT/ML injection 300 Units (has no administration in time range)   heparin flush 100 UNIT/ML injection 300 Units (has no administration in time range)   fentaNYL (SUBLIMAZE) injection 50 mcg (50 mcg IntraVENous Given 7/26/22 1206)   ondansetron (ZOFRAN) injection 4 mg (4 mg IntraVENous Given 7/26/22 1206)   0.9 % sodium chloride bolus (0 mLs IntraVENous Stopped 7/26/22 1306)   iopamidol (ISOVUE-370) 76 % injection 50 mL (50 mLs IntraVENous Given 7/26/22 1420)   lidocaine 1 % injection 20 mL (20 mLs IntraDERmal Given 7/27/22 0849)   fentaNYL (SUBLIMAZE) injection 100 mcg (100 mcg IntraVENous Given 7/27/22 0847)         ED COURSE:  ED Course as of 07/27/22 1524   Tue Jul 26, 2022   1556 Alk Phos(!): 109 [AA]   1613 Received sign out Dr. Tom Richard already on board. Right sided abdominal pain where she had ostomy reversal in June  CT abdomen/pelvis showing possible abscess. Admission likely  [PP]      ED Course User Index  [AA] Leticia Reyes MD  [PP] Veronique Russo DO       Medical Decision Making:    Results noted, surgery consulted, will admti      This patient's ED course included: a personal history and physicial examination    This patient has remained hemodynamically stable during their ED course. Consultations:             surgery  Critical Care:         Counseling: The emergency provider has spoken with the patient and discussed todays results, in addition to providing specific details for the plan of care and counseling regarding the diagnosis and prognosis.   Questions are answered at this time and they are agreeable with the plan.       --------------------------------- IMPRESSION AND DISPOSITION ---------------------------------    IMPRESSION  1. Right lower quadrant abdominal pain    2. Fluid collection at surgical site, initial encounter        DISPOSITION  Disposition: Admit to med/surg floor  Patient condition is stable    NOTE: This report was transcribed using voice recognition software.  Every effort was made to ensure accuracy; however, inadvertent computerized transcription errors may be present        Gonzalo De Los Santos MD  07/27/22 152

## 2022-07-26 NOTE — ED PROVIDER NOTES
HPI     Patient is a 51 y.o. female presents with a chief complaint of abdominal pain  This has been occurring for 1 day. Patient states that it gets better with nothing. Patient states that it gets worse with nothing. Patient states that it is severe in severity. Patient states it was acute in onset. Patient is a 51 y.o. female who presented with acute onset of right sided abdominal pain where previous ostomy site was located. The site became very tender, erythematous and warm and a lump subsequently formed subcutaneously. Patient had nausea and increased frequency in BM but no changes in characteristic of BM. No fever. Patient Of note patient had an ostomy reversal 2 months ago. Patient also had a hernia on the ostomy site which was repaired during ostomy reversal.       Review of Systems   Constitutional:  Negative for chills and fever. HENT:  Negative for congestion and rhinorrhea. Respiratory:  Negative for cough and shortness of breath. Cardiovascular:  Positive for leg swelling. Negative for chest pain and palpitations. Intermittent left leg swelling   Gastrointestinal:  Positive for abdominal pain and nausea. Negative for abdominal distention, blood in stool, constipation, diarrhea and vomiting. +frequent bowel movements   Genitourinary:  Positive for dysuria. Negative for flank pain and hematuria. Musculoskeletal:  Negative for arthralgias and back pain. Neurological:  Negative for dizziness, syncope, light-headedness and headaches. Physical Exam  Vitals reviewed. Constitutional:       General: She is not in acute distress. Appearance: She is well-developed. She is not ill-appearing. HENT:      Head: Normocephalic and atraumatic. Eyes:      General: No scleral icterus. Extraocular Movements: Extraocular movements intact. Cardiovascular:      Rate and Rhythm: Normal rate and regular rhythm. Heart sounds: Normal heart sounds.  No murmur heard.  Pulmonary:      Effort: Pulmonary effort is normal. No respiratory distress. Breath sounds: Normal breath sounds. No stridor. No wheezing, rhonchi or rales. Abdominal:      General: A surgical scar is present. Bowel sounds are normal. There is no distension. Palpations: Abdomen is soft. There is mass. There is no hepatomegaly. Tenderness: There is abdominal tenderness in the right lower quadrant, periumbilical area and left lower quadrant. There is no right CVA tenderness, left CVA tenderness, guarding or rebound. Negative signs include Ha's sign. Hernia: A hernia is present. Hernia is present in the ventral area. Comments: 1 inch horizontal scar 9 o'clock to umbilicus  1 cm x 3 cm subcutaneous lesion, exquisitely tender, erythema and warmth of overlying skin  No caput medusae or spider angiomata   Skin:     General: Skin is warm and dry. Coloration: Skin is not jaundiced. Neurological:      General: No focal deficit present. Mental Status: She is alert and oriented to person, place, and time. Cranial Nerves: No cranial nerve deficit. Procedures     MDM  Number of Diagnoses or Management Options  Diagnosis management comments: Ordered CT abdomen w contrast showing fluid collection without free air concerning for possible abscess  WBC slightly elevated, afebrile, VS stable  Lactic acid wnl           ED Course as of 07/27/22 0607   Tue Jul 26, 2022   1556 Alk Phos(!): 109 [AA]   1613 Received sign out Dr. Burak Diaz already on board. Right sided abdominal pain where she had ostomy reversal in June  CT abdomen/pelvis showing possible abscess. Admission likely  [PP]      ED Course User Index  [AA] Isma De Paz MD  [PP] Erica Byers DO      Patient is a 51 y.o. female presenting with abdominal pain.         Patient was seen and evaluated by myself and my attending Mandy Muniz MD. Assessment and Plan discussed with attending provider, please see attestation for final plan of care. This note was done using dictation software and there may be some grammatical errors associated with this. Gilmar Willard MD     ED Course as of 07/27/22 7696   Tue Jul 26, 2022   7420 Alk Phos(!): 109 [AA]   1613 Received sign out Dr. Inocencio Laurent already on board. Right sided abdominal pain where she had ostomy reversal in June  CT abdomen/pelvis showing possible abscess. Admission likely  [PP]      ED Course User Index  [AA] Gilmar Willard MD  [PP] Queta Ford, DO       --------------------------------------------- PAST HISTORY ---------------------------------------------  Past Medical History:  has a past medical history of Acid reflux disease, Colon cancer ([facility] [address] Cyst of ovary, Fracture of nasal bone, Hearing loss, Hx of blood clots, Hypertension, Morbidly obese ([address] Multiple sclerosis ([address] VEENA no CPAP, Right shoulder pain, RLS (restless legs syndrome), Tinnitus, and TMJ dysfunction. Past Surgical History:  has a past surgical history that includes Cholecystectomy; cyst removal; Hysterectomy (03/05/2018); laryngoscopy (N/A, 7/17/2020); Shoulder arthroscopy (Right, 11/11/2020); Biceps tendon repair (Right, 11/11/2020); Appendectomy; Esophagus surgery; Partial hysterectomy; Hysterectomy, total abdominal; Tonsillectomy; colectomy (Left, 8/31/2021); colectomy (N/A, 9/6/2021); proctosigmoidoscopy (N/A, 10/12/2021); back surgery; proctosigmoidoscopy (N/A, 1/25/2022); colectomy (N/A, 2/8/2022); and Colostomy Closure (N/A, 6/1/2022). Social History:  reports that she has been smoking cigarettes. She has a 12.50 pack-year smoking history. She has never used smokeless tobacco. She reports that she does not currently use alcohol. She reports that she does not use drugs. Family History: family history includes Mult Sclerosis in her mother. The patients home medications have been reviewed.     Allergies: Patient has no known allergies.     -------------------------------------------------- RESULTS -------------------------------------------------    LABS:  Results for orders placed or performed during the hospital encounter of 07/26/22   CBC with Auto Differential   Result Value Ref Range    WBC 11.9 (H) 4.5 - 11.5 E9/L    RBC 4.76 3.50 - 5.50 E12/L    Hemoglobin 14.4 11.5 - 15.5 g/dL    Hematocrit 43.5 34.0 - 48.0 %    MCV 91.4 80.0 - 99.9 fL    MCH 30.3 26.0 - 35.0 pg    MCHC 33.1 32.0 - 34.5 %    RDW 12.7 11.5 - 15.0 fL    Platelets 161 403 - 161 E9/L    MPV 9.7 7.0 - 12.0 fL    Neutrophils % 70.1 43.0 - 80.0 %    Immature Granulocytes % 0.5 0.0 - 5.0 %    Lymphocytes % 20.0 20.0 - 42.0 %    Monocytes % 8.0 2.0 - 12.0 %    Eosinophils % 1.0 0.0 - 6.0 %    Basophils % 0.4 0.0 - 2.0 %    Neutrophils Absolute 8.35 (H) 1.80 - 7.30 E9/L    Immature Granulocytes # 0.06 E9/L    Lymphocytes Absolute 2.38 1.50 - 4.00 E9/L    Monocytes Absolute 0.95 0.10 - 0.95 E9/L    Eosinophils Absolute 0.12 0.05 - 0.50 E9/L    Basophils Absolute 0.05 0.00 - 0.20 E9/L   Comprehensive Metabolic Panel w/ Reflex to MG   Result Value Ref Range    Sodium 142 132 - 146 mmol/L    Potassium reflex Magnesium 4.1 3.5 - 5.0 mmol/L    Chloride 100 98 - 107 mmol/L    CO2 26 22 - 29 mmol/L    Anion Gap 16 7 - 16 mmol/L    Glucose 106 (H) 74 - 99 mg/dL    BUN 8 6 - 20 mg/dL    Creatinine 0.7 0.5 - 1.0 mg/dL    GFR Non-African American >60 >=60 mL/min/1.73    GFR African American >60     Calcium 9.5 8.6 - 10.2 mg/dL    Total Protein 8.1 6.4 - 8.3 g/dL    Albumin 4.7 3.5 - 5.2 g/dL    Total Bilirubin 0.5 0.0 - 1.2 mg/dL    Alkaline Phosphatase 109 (H) 35 - 104 U/L    ALT 15 0 - 32 U/L    AST 10 0 - 31 U/L   APTT   Result Value Ref Range    aPTT 27.7 24.5 - 35.1 sec   Protime-INR   Result Value Ref Range    Protime 10.7 9.3 - 12.4 sec    INR 1.0    Urinalysis with Microscopic   Result Value Ref Range    Color, UA Yellow Straw/Yellow    Clarity, UA Clear Clear    Glucose, Ur Negative Negative mg/dL    Bilirubin Urine Negative Negative    Ketones, Urine Negative Negative mg/dL    Specific Gravity, UA >=1.030 1.005 - 1.030    Blood, Urine Negative Negative    pH, UA 5.5 5.0 - 9.0    Protein, UA Negative Negative mg/dL    Urobilinogen, Urine 0.2 <2.0 E.U./dL    Nitrite, Urine Negative Negative    Leukocyte Esterase, Urine Negative Negative    WBC, UA NONE 0 - 5 /HPF    RBC, UA NONE 0 - 2 /HPF    Epithelial Cells, UA FEW /HPF    Bacteria, UA RARE (A) None Seen /HPF   Lactic Acid   Result Value Ref Range    Lactic Acid 0.7 0.5 - 2.2 mmol/L       RADIOLOGY:  CT ABDOMEN PELVIS W IV CONTRAST Additional Contrast? None   Final Result   Fluid density with surrounding edema right anterolateral abdominal wall 6.6   cm in maximum dimension without gas locules. Findings concerning for   postsurgical fluid collection of hernia repair compared to prior. Some   associated edema concerning for potential minimal inflammatory findings and   or postsurgical changes. No intra-abdominal fluid collection or abscess   intra-abdominal.                 ------------------------- NURSING NOTES AND VITALS REVIEWED ---------------------------  Date / Time Roomed:  7/26/2022 11:29 AM  ED Bed Assignment:  [id number]    The nursing notes within the ED encounter and vital signs as below have been reviewed.      Patient Vitals for the past 24 hrs:   BP Temp Temp src Pulse Resp SpO2 Height Weight   07/26/22 2130 131/75 98.6 °F (37 °C) Oral 81 18 -- -- --   07/26/22 1206 -- -- -- -- -- 97 % -- --   07/26/22 1123 (!) 152/106 -- -- -- -- -- 5' 1\" (1.549 m) 180 lb (81.6 kg)   07/26/22 1115 -- 97.7 °F (36.5 °C) Oral 91 18 100 % -- --       Oxygen Saturation Interpretation: Normal    ------------------------------------------ PROGRESS NOTES ------------------------------------------  Re-evaluation(s):   Patients symptoms show no change  Repeat physical examination is not changed    Counseling:  I have spoken with the patient and discussed todays results, in addition to providing specific details for the plan of care and counseling regarding the diagnosis and prognosis. Their questions are answered at this time and they are agreeable with the plan of admission.    --------------------------------- ADDITIONAL PROVIDER NOTES ---------------------------------  Consultations:  Spoke with Dr. Aixa Mooney Discussed case. They will admit the patient. This patient's ED course included: a personal history and physicial examination, cardiac monitoring, and continuous pulse oximetry    This patient has remained hemodynamically stable during their ED course. Diagnosis:  No diagnosis found.     Disposition:  Patient's disposition: Admit to med/surg floor  Patient's condition is Stable         Jennie Hill MD  Resident  07/27/22 8449

## 2022-07-27 PROCEDURE — 2580000003 HC RX 258: Performed by: SURGERY

## 2022-07-27 PROCEDURE — 6360000002 HC RX W HCPCS: Performed by: STUDENT IN AN ORGANIZED HEALTH CARE EDUCATION/TRAINING PROGRAM

## 2022-07-27 PROCEDURE — 6370000000 HC RX 637 (ALT 250 FOR IP): Performed by: STUDENT IN AN ORGANIZED HEALTH CARE EDUCATION/TRAINING PROGRAM

## 2022-07-27 PROCEDURE — 6360000002 HC RX W HCPCS: Performed by: SPECIALIST

## 2022-07-27 PROCEDURE — 87070 CULTURE OTHR SPECIMN AEROBIC: CPT

## 2022-07-27 PROCEDURE — 2580000003 HC RX 258: Performed by: SPECIALIST

## 2022-07-27 PROCEDURE — 6360000002 HC RX W HCPCS: Performed by: SURGERY

## 2022-07-27 PROCEDURE — 0J980ZZ DRAINAGE OF ABDOMEN SUBCUTANEOUS TISSUE AND FASCIA, OPEN APPROACH: ICD-10-PCS | Performed by: SURGERY

## 2022-07-27 PROCEDURE — 2500000003 HC RX 250 WO HCPCS: Performed by: STUDENT IN AN ORGANIZED HEALTH CARE EDUCATION/TRAINING PROGRAM

## 2022-07-27 PROCEDURE — 2580000003 HC RX 258: Performed by: STUDENT IN AN ORGANIZED HEALTH CARE EDUCATION/TRAINING PROGRAM

## 2022-07-27 PROCEDURE — 1200000000 HC SEMI PRIVATE

## 2022-07-27 RX ORDER — HEPARIN SODIUM (PORCINE) LOCK FLUSH IV SOLN 100 UNIT/ML 100 UNIT/ML
3 SOLUTION INTRAVENOUS EVERY 12 HOURS SCHEDULED
Status: DISCONTINUED | OUTPATIENT
Start: 2022-07-27 | End: 2022-07-29 | Stop reason: HOSPADM

## 2022-07-27 RX ORDER — ACETAMINOPHEN 650 MG
TABLET, EXTENDED RELEASE ORAL PRN
Status: DISCONTINUED | OUTPATIENT
Start: 2022-07-27 | End: 2022-07-29 | Stop reason: HOSPADM

## 2022-07-27 RX ORDER — FENTANYL CITRATE 50 UG/ML
100 INJECTION, SOLUTION INTRAMUSCULAR; INTRAVENOUS SEE ADMIN INSTRUCTIONS
Status: COMPLETED | OUTPATIENT
Start: 2022-07-27 | End: 2022-07-27

## 2022-07-27 RX ORDER — HEPARIN SODIUM (PORCINE) LOCK FLUSH IV SOLN 100 UNIT/ML 100 UNIT/ML
3 SOLUTION INTRAVENOUS PRN
Status: DISCONTINUED | OUTPATIENT
Start: 2022-07-27 | End: 2022-07-29 | Stop reason: HOSPADM

## 2022-07-27 RX ORDER — SODIUM CHLORIDE 9 MG/ML
INJECTION, SOLUTION INTRAVENOUS PRN
Status: DISCONTINUED | OUTPATIENT
Start: 2022-07-27 | End: 2022-07-29 | Stop reason: HOSPADM

## 2022-07-27 RX ORDER — SODIUM CHLORIDE 0.9 % (FLUSH) 0.9 %
5-40 SYRINGE (ML) INJECTION EVERY 12 HOURS SCHEDULED
Status: DISCONTINUED | OUTPATIENT
Start: 2022-07-27 | End: 2022-07-29 | Stop reason: HOSPADM

## 2022-07-27 RX ORDER — SODIUM CHLORIDE 0.9 % (FLUSH) 0.9 %
5-40 SYRINGE (ML) INJECTION PRN
Status: DISCONTINUED | OUTPATIENT
Start: 2022-07-27 | End: 2022-07-29 | Stop reason: HOSPADM

## 2022-07-27 RX ORDER — LIDOCAINE HYDROCHLORIDE 10 MG/ML
5 INJECTION, SOLUTION EPIDURAL; INFILTRATION; INTRACAUDAL; PERINEURAL ONCE
Status: DISCONTINUED | OUTPATIENT
Start: 2022-07-27 | End: 2022-07-29 | Stop reason: HOSPADM

## 2022-07-27 RX ADMIN — SODIUM CHLORIDE, PRESERVATIVE FREE 300 UNITS: 5 INJECTION INTRAVENOUS at 21:41

## 2022-07-27 RX ADMIN — SODIUM CHLORIDE, PRESERVATIVE FREE 10 ML: 5 INJECTION INTRAVENOUS at 21:41

## 2022-07-27 RX ADMIN — FENTANYL CITRATE 100 MCG: 50 INJECTION, SOLUTION INTRAMUSCULAR; INTRAVENOUS at 08:47

## 2022-07-27 RX ADMIN — OXYCODONE 5 MG: 5 TABLET ORAL at 13:32

## 2022-07-27 RX ADMIN — OXYCODONE 5 MG: 5 TABLET ORAL at 17:48

## 2022-07-27 RX ADMIN — OXYCODONE 5 MG: 5 TABLET ORAL at 08:16

## 2022-07-27 RX ADMIN — SODIUM CHLORIDE, POTASSIUM CHLORIDE, SODIUM LACTATE AND CALCIUM CHLORIDE: 600; 310; 30; 20 INJECTION, SOLUTION INTRAVENOUS at 01:09

## 2022-07-27 RX ADMIN — PIPERACILLIN AND TAZOBACTAM 4500 MG: 4; .5 INJECTION, POWDER, FOR SOLUTION INTRAVENOUS at 12:02

## 2022-07-27 RX ADMIN — GABAPENTIN 400 MG: 400 CAPSULE ORAL at 08:16

## 2022-07-27 RX ADMIN — FAMOTIDINE 40 MG: 20 TABLET ORAL at 08:16

## 2022-07-27 RX ADMIN — OXYCODONE 5 MG: 5 TABLET ORAL at 21:41

## 2022-07-27 RX ADMIN — PIPERACILLIN AND TAZOBACTAM 4500 MG: 4; .5 INJECTION, POWDER, FOR SOLUTION INTRAVENOUS at 04:52

## 2022-07-27 RX ADMIN — GABAPENTIN 400 MG: 400 CAPSULE ORAL at 21:40

## 2022-07-27 RX ADMIN — GABAPENTIN 400 MG: 400 CAPSULE ORAL at 13:32

## 2022-07-27 RX ADMIN — ROPINIROLE HYDROCHLORIDE 0.5 MG: 0.5 TABLET, FILM COATED ORAL at 21:41

## 2022-07-27 RX ADMIN — LIDOCAINE HYDROCHLORIDE 20 ML: 10 INJECTION, SOLUTION INFILTRATION; PERINEURAL at 08:49

## 2022-07-27 RX ADMIN — OXYCODONE 5 MG: 5 TABLET ORAL at 01:15

## 2022-07-27 RX ADMIN — MEROPENEM 1000 MG: 1 INJECTION, POWDER, FOR SOLUTION INTRAVENOUS at 18:54

## 2022-07-27 ASSESSMENT — PAIN DESCRIPTION - DESCRIPTORS
DESCRIPTORS: ACHING;DISCOMFORT
DESCRIPTORS: ACHING;DISCOMFORT;SORE

## 2022-07-27 ASSESSMENT — PAIN DESCRIPTION - ORIENTATION
ORIENTATION: RIGHT
ORIENTATION: RIGHT

## 2022-07-27 ASSESSMENT — PAIN - FUNCTIONAL ASSESSMENT: PAIN_FUNCTIONAL_ASSESSMENT: ACTIVITIES ARE NOT PREVENTED

## 2022-07-27 ASSESSMENT — PAIN DESCRIPTION - PAIN TYPE: TYPE: ACUTE PAIN

## 2022-07-27 ASSESSMENT — PAIN SCALES - GENERAL
PAINLEVEL_OUTOF10: 9
PAINLEVEL_OUTOF10: 7
PAINLEVEL_OUTOF10: 9
PAINLEVEL_OUTOF10: 7
PAINLEVEL_OUTOF10: 8
PAINLEVEL_OUTOF10: 10

## 2022-07-27 ASSESSMENT — PAIN DESCRIPTION - LOCATION
LOCATION: ABDOMEN
LOCATION: ABDOMEN

## 2022-07-27 NOTE — PROGRESS NOTES
GENERAL SURGERY  DAILY PROGRESS NOTE  7/27/2022    CHIEF COMPLAINT:  Chief Complaint   Patient presents with    Abdominal Pain     Ileostomy reversal done June 1, 2022, states woke up this morning with abdominal pain. Site feels warm and mushy, thinks could be an infection. SUBJECTIVE:  No acute complaints overnight. OBJECTIVE:  /75   Pulse 81   Temp 98.6 °F (37 °C) (Oral)   Resp 18   Ht 5' 1\" (1.549 m)   Wt 180 lb (81.6 kg)   LMP 01/05/2018   SpO2 97%   BMI 34.01 kg/m²     GENERAL:  NAD. A&Ox3. LUNGS:  No increased work of breathing. CARDIOVASCULAR: RR  ABDOMEN:  Soft, non-distended, non-tender. No guarding, rigidity, rebound. Patient has area of induration inferior lateral to her ileostomy reversal incision no erythema or palpable fluctuance over the area. ASSESSMENT/PLAN:  51 y.o. female with concern for postop subcutaneous abscess or seroma after ileostomy takedown on 6/1/2022. Last took Eliquis 7/25. N.p.o.  OR today I&D of abscess  Hold Eliquis  Pain, nausea control  Av    Will discuss with Dr. Tj Ramirez.     Jarad Velasquez, DO  Surgery Resident PGY-1  7/27/2022  6:15 AM

## 2022-07-27 NOTE — CONSULTS
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 woman  known to the Infectious Diseases service. The patient is admitted through the emergency room with right-sided abdominal pain. This was a site of a previous ostomy. He was tender erythematous and warm and there is a indurated area subcutaneously. The patient had nausea and increased bowel movements. No fevers. Previous to this the patient had the ostomy reversal 2 months ago. Patient had a hernia at the site of the previous ostomy. This was repaired during the reversal.  Patient historically had left-sided colon cancer and underwent laparoscopic left colectomy with a diverting loop ileostomy in August 2021. Colon cancer. The ileostomy reversal was in June 2022. April 2022: Admitted to [facility] with shortness of breath and found to have a DVT in the left lower extremity    February 2022. Admitted to [facility].  She had undergone a laparoscopic sigmoidectomy with: Resection on 2/9/2022. She presented with fevers, leukocytosis and a 9 cm fluid collection in the abdomen that was drained percutaneously. Treated with Ceftriaxone and Ciprofloxacin. Seen by Dr. William Bee and treated with Zosyn and Fluconazole. She was discharged on Ertapenem and Fluconazole. She was seen in the office in follow-up on 3/31/2022. Head CT of the abdomen and pelvis was scheduled.             Past Medical History:        Diagnosis Date    Acid reflux disease     Colon cancer ([facility]     s/p surgery    Cyst of ovary     Fracture of nasal bone     Hearing loss     Hx of blood clots     leg to lung    Hypertension     Morbidly obese ([address] 10/05/2020    Multiple sclerosis ([facility] denies limitations at present 11/2020    VEENA no CPAP     severe VEENA per pt    Right shoulder pain 11/2020    RLS (restless legs syndrome)     Tinnitus     TMJ dysfunction     left side     Past Surgical History:        Procedure Laterality Date    APPENDECTOMY      BACK SURGERY      lumbar    BICEPS TENDON REPAIR Right 11/11/2020    BICEPS TENODESIS performed by Guilherme Curiel MD at [place] [id number] Left 8/31/2021    LAPAROSCOPIC LEFT HEMICOLECTOMY WITH LOOP OSTOMY performed by Nargis Mcclelland MD at [place] N/A 9/6/2021    DIAGNOSTIC LAPAROSCOPY POSSIBLE BOWEL RESECTION POSSILBE OSTOMY performed by Nargis Mcclelland MD at [place] N/A 2/8/2022    LAPAROSCOPIC SIGMOID COLON RESECTION performed by Nargis Mcclelland MD at [address] [place] [address] 6/1/2022    OPEN ILEOSTOMY REVERSAL performed by Nargis Mcclelland MD at [address] [place] [address] (CERVIX STATUS UNKNOWN)  03/05/2018    Robotic hysterectomy, bilateral salpingectomy, right oophorectomy DR. ARIANA MONIQUE [facility]     HYSTERECTOMY, TOTAL ABDOMINAL (CERVIX REMOVED)      LARYNGOSCOPY N/A 7/17/2020    DIRECT LARYNGOSCOPY--OMNI GUIDE LASER performed by Jose Villalpando MD at [address] (CERVIX NOT REMOVED)      PROCTOSIGMOIDOSCOPY N/A 10/12/2021    ANAL PROCTO SIGMOIDOSCOPY FLEXIBLE performed by Nargis Mcclelland MD at [facility] [address] N/A 1/25/2022    ANAL PROCTO [address] performed by Nargis Mcclelland MD at [facility] ARTHROSCOPY Right 11/11/2020    RIGHT SHOULDER DIAGNOSTIC ARTHROSCOPY WITH DECOMPRESSION ROTATOR CUFF REPAIR performed by Guilherme Curiel MD at [place] [address]       Current Medications:   Scheduled Meds:   lidocaine PF  5 mL IntraDERmal Once    sodium chloride flush  5-40 mL IntraVENous 2 times per day    heparin flush  3 mL IntraVENous 2 times per day    famotidine  40 mg Oral Daily    fluticasone  1 spray Each Nostril Daily    gabapentin  400 mg Oral TID    rOPINIRole  0.5 mg Oral Nightly    enoxaparin  40 mg SubCUTAneous Daily    piperacillin-tazobactam  4,500 mg IntraVENous Q8H     Continuous Infusions:   sodium chloride      lactated ringers 125 mL/hr at 07/27/22 0109     PRN Meds:povidone-iodine, sodium chloride flush, sodium chloride, heparin flush, ondansetron **OR** ondansetron, acetaminophen, oxyCODONE    Allergies:  Patient has no known allergies. Social History:   Social History     Socioeconomic History    Marital status: Single     Spouse name: None    Number of children: 3    Years of education: 11    Highest education level: 11th grade   Tobacco Use    Smoking status: Every Day     Packs/day: 0.50     Years: 25.00     Pack years: 12.50     Types: Cigarettes    Smokeless tobacco: Never    Tobacco comments:         Vaping Use    Vaping Use: Never used   Substance and Sexual Activity    Alcohol use: Not Currently    Drug use: No    Sexual activity: Yes     Partners: Male   Social History Narrative    Uses [employer] for transportation    [employer]     Social Determinants of Health     Financial Resource Strain: Low Risk     Difficulty of Paying Living Expenses: Not hard at all   Food Insecurity: No Food Insecurity    Worried About Running Out of Food in the Last Year: Never true    Ran Out of Food in the Last Year: Never true         Family History:       Problem Relation Age of Onset    Mult Sclerosis Mother     Colon Cancer Neg Hx     Uterine Cancer Neg Hx     Ovarian Cancer Neg Hx     Breast Cancer Neg Hx    . Otherwise non-pertinent to the chief complaint. REVIEW OF SYSTEMS:    CONSTITUTIONAL:  No chills, fevers or night sweats. No loss of weight. EYES:  No double vision or drainage from eyes, ears or throat. HEENT:  No neck stiffness. No dysphagia.  No drainage from eyes, ears or throat  RESPIRATORY:  No cough, productive sputum or hemoptysis. CARDIOVASCULAR:  No chest pain, palpitations, orthopnea or dyspnea on exertion. GASTROINTESTINAL: See history of present illness  GENITOURINARY:  No frequency burning dysuria or hematuria. INTEGUMENT/BREAST:  No rash or breast masses. HEMATOLOGIC/LYMPHATIC:  No lymphadenopathy or blood dyscrasics. ALLERGIC/IMMUNOLOGIC:  No anaphylaxis. ENDOCRINE:  No polyuria or polydipsia or temperature intolerance. MUSCULOSKELETAL:  No myalgia or arthralgia. Full ROM. NEUROLOGICAL:  No focal motor sensory deficit. BEHAVIOR/PSYCH:  No psychosis. PHYSICAL EXAM:    Vitals:    /72   Pulse 82   Temp 98.7 °F (37.1 °C) (Oral)   Resp 18   Ht 5' 1\" (1.549 m)   Wt 180 lb (81.6 kg)   LMP 01/05/2018   SpO2 95%   BMI 34.01 kg/m²   Constitutional: The patient is awake, alert, and oriented. Skin: Warm and dry. No rashes were noted. No jaundice. HEENT: Eyes show round, and reactive pupils. Moist mucous membranes, no ulcerations, no thrush. Neck: Supple to movements. No lymphadenopathy. Chest: No use of accessory muscles to breathe. Symmetrical expansion. Auscultation reveals no wheezing, crackles, or rhonchi. Cardiovascular: S1 and S2 are rhythmic and regular. No murmurs appreciated. Abdomen: Positive bowel sounds to auscultation. Benign to palpation. No masses felt. No hepatosplenomegaly. Abscess cavity has been packed with gauze. Genitourinary: Female  Extremities: No clubbing, no cyanosis, no edema.   Musculoskeletal: Equal and symmetrical  Neurological: No focal  Lines: peripheral      CBC+dif:  Recent Labs     07/26/22  1147   WBC 11.9*   HGB 14.4   HCT 43.5   MCV 91.4      NEUTROABS 8.35*     Lab Results   Component Value Date    CRP 1.4 (H) 04/01/2022    CRP 0.9 (H) 03/29/2022    CRP 1.4 (H) 03/22/2022     No results found for: CRPHS  Lab Results   Component Value Date    SEDRATE 12 04/01/2022    SEDRATE 12 03/29/2022    SEDRATE 25 (H) 03/22/2022     Lab Results   Component Value Date    ALT 15 07/26/2022    AST 10 07/26/2022    ALKPHOS 109 (H) 07/26/2022    BILITOT 0.5 07/26/2022     Lab Results   Component Value Date/Time     07/26/2022 11:47 AM    K 4.1 07/26/2022 11:47 AM     07/26/2022 11:47 AM    CO2 26 07/26/2022 11:47 AM    BUN 8 07/26/2022 11:47 AM    CREATININE 0.7 07/26/2022 11:47 AM    GFRAA >60 07/26/2022 11:47 AM    LABGLOM >60 07/26/2022 11:47 AM    GLUCOSE 106 07/26/2022 11:47 AM    PROT 8.1 07/26/2022 11:47 AM    LABALBU 4.7 07/26/2022 11:47 AM    CALCIUM 9.5 07/26/2022 11:47 AM    BILITOT 0.5 07/26/2022 11:47 AM    ALKPHOS 109 07/26/2022 11:47 AM    AST 10 07/26/2022 11:47 AM    ALT 15 07/26/2022 11:47 AM       Lab Results   Component Value Date/Time    PROTIME 10.7 07/26/2022 11:47 AM    INR 1.0 07/26/2022 11:47 AM       Lab Results   Component Value Date/Time    TSH 0.293 09/01/2021 05:36 AM       Lab Results   Component Value Date/Time    COLORU Yellow 07/26/2022 11:47 AM    PHUR 5.5 07/26/2022 11:47 AM    WBCUA NONE 07/26/2022 11:47 AM    RBCUA NONE 07/26/2022 11:47 AM    TRICHOMONAS Present 02/26/2020 01:10 PM    BACTERIA RARE 07/26/2022 11:47 AM    CLARITYU Clear 07/26/2022 11:47 AM    SPECGRAV >=1.030 07/26/2022 11:47 AM    LEUKOCYTESUR Negative 07/26/2022 11:47 AM    UROBILINOGEN 0.2 07/26/2022 11:47 AM    BILIRUBINUR Negative 07/26/2022 11:47 AM    BLOODU Negative 07/26/2022 11:47 AM    GLUCOSEU Negative 07/26/2022 11:47 AM    AMORPHOUS FEW 11/23/2021 11:10 AM       No results found for: SKU8PTX, BEART, U9BRZJUM, PHART, THGBART, LON4NGN, PO2ART, OQS5DBR  Radiology:  CT ABDOMEN PELVIS W IV CONTRAST Additional Contrast? None   Final Result   Fluid density with surrounding edema right anterolateral abdominal wall 6.6   cm in maximum dimension without gas locules. Findings concerning for   postsurgical fluid collection of hernia repair compared to prior.   Some   associated edema concerning for potential minimal inflammatory findings and   or postsurgical changes. No intra-abdominal fluid collection or abscess   intra-abdominal.             Microbiology:  Pending  No results for input(s): BC in the last 72 hours. No results for input(s): ORG in the last 72 hours. No results for input(s): Juan Carlos Aguayo in the last 72 hours. No results for input(s): STREPNEUMAGU in the last 72 hours. No results for input(s): LP1UAG in the last 72 hours. No results for input(s): ASO in the last 72 hours. No results for input(s): CULTRESP in the last 72 hours. Assessment:  Patient grew a Proteus amp C resistant organism 2/18/2022. Likely that her abscess is growing the same organism    Plan:    Cont Invanz x10 days as a outpatient  PICC line insertion  Check cultures  Baseline ESR, CRP  Monitor labs  Will follow with you    Thank you for having us see this patient in consultation. I will be discussing this case with the treating physicians.       Electronically signed by Andrey Loo MD on 7/27/2022 at 5:06 PM

## 2022-07-27 NOTE — PLAN OF CARE
Problem: Skin/Tissue Integrity  Goal: Absence of new skin breakdown  Description: 1. Monitor for areas of redness and/or skin breakdown  2. Assess vascular access sites hourly  3. Every 4-6 hours minimum:  Change oxygen saturation probe site  4. Every 4-6 hours:  If on nasal continuous positive airway pressure, respiratory therapy assess nares and determine need for appliance change or resting period.   Outcome: Progressing Towards Goal     Problem: Safety - Adult  Goal: Free from fall injury  Outcome: Progressing Towards Goal     Problem: ABCDS Injury Assessment  Goal: Absence of physical injury  Outcome: Progressing Towards Goal

## 2022-07-27 NOTE — PROCEDURES
Incision and Drainage Procedure Note    Indication: Abscess    Procedure: The patient was positioned appropriately and the skin over the incision site was prepped with betadine and draped in a sterile fashion. Local anesthesia was obtained by infiltration using 1% Lidocaine without epinephrine. An incision was then made over the center of the lesion and approximately 30 cc of purulent and bloody material was expressed. Loculations were broken up using a hemostat and more of the material was able to be expressed. The drainage cavity was then irrigated, packed with sterile gauze, and dressed with gauze and tape. The patient tolerated the procedure well.     Complications: None    Dory Guadarrama DO  General Surgery PGY-1

## 2022-07-27 NOTE — CARE COORDINATION
Social Work/Case Management Transition of Care Planning (Eric Ventura [place] [phone]): Met with patient at bedside. She was alert and oriented x4. She reports she resides in a 2 story duplex. Her bedroom is on the 2nd floor and her bathroom is on the 1st floor. There are 4 NEERU and there are bilateral hand rails. She lives alone. She has 3 adult children who live nearby. She reports good family support with her oldest daughter, her sister, and her boyfriend providing support/assistance. Patient does not drive but is independent with all other aspects of care. Her sister provides transportation. Patient works full time at [employer]. She plans to return to work when medically stable. She does not use any DME. Her PCP is Dr. Gerhard Capone. Pharmacy is [facility] on [address]. Patient reports she has used [facility] in the past and would use them again if needed. When asked about home care for the abdominal wound, patient declined. She stated she can be taught how to do the packing while here. Plan is to discharge home with no needs. Sister to provide transport.     Eric VenturaROBIN  7/27/2022

## 2022-07-28 PROCEDURE — 6360000002 HC RX W HCPCS: Performed by: SPECIALIST

## 2022-07-28 PROCEDURE — 6370000000 HC RX 637 (ALT 250 FOR IP): Performed by: STUDENT IN AN ORGANIZED HEALTH CARE EDUCATION/TRAINING PROGRAM

## 2022-07-28 PROCEDURE — 2580000003 HC RX 258: Performed by: SPECIALIST

## 2022-07-28 PROCEDURE — 2580000003 HC RX 258: Performed by: SURGERY

## 2022-07-28 PROCEDURE — 1200000000 HC SEMI PRIVATE

## 2022-07-28 PROCEDURE — 6360000002 HC RX W HCPCS: Performed by: SURGERY

## 2022-07-28 RX ADMIN — FAMOTIDINE 40 MG: 20 TABLET ORAL at 09:03

## 2022-07-28 RX ADMIN — ACETAMINOPHEN 325MG 650 MG: 325 TABLET ORAL at 14:45

## 2022-07-28 RX ADMIN — OXYCODONE 5 MG: 5 TABLET ORAL at 14:45

## 2022-07-28 RX ADMIN — OXYCODONE 5 MG: 5 TABLET ORAL at 19:24

## 2022-07-28 RX ADMIN — ERTAPENEM SODIUM 1000 MG: 1 INJECTION, POWDER, LYOPHILIZED, FOR SOLUTION INTRAMUSCULAR; INTRAVENOUS at 14:48

## 2022-07-28 RX ADMIN — ACETAMINOPHEN 325MG 650 MG: 325 TABLET ORAL at 09:03

## 2022-07-28 RX ADMIN — SODIUM CHLORIDE, PRESERVATIVE FREE 10 ML: 5 INJECTION INTRAVENOUS at 09:04

## 2022-07-28 RX ADMIN — SODIUM CHLORIDE, PRESERVATIVE FREE 10 ML: 5 INJECTION INTRAVENOUS at 21:01

## 2022-07-28 RX ADMIN — GABAPENTIN 400 MG: 400 CAPSULE ORAL at 09:03

## 2022-07-28 RX ADMIN — MEROPENEM 1000 MG: 1 INJECTION, POWDER, FOR SOLUTION INTRAVENOUS at 01:42

## 2022-07-28 RX ADMIN — GABAPENTIN 400 MG: 400 CAPSULE ORAL at 14:45

## 2022-07-28 RX ADMIN — ACETAMINOPHEN 325MG 650 MG: 325 TABLET ORAL at 19:24

## 2022-07-28 RX ADMIN — OXYCODONE 5 MG: 5 TABLET ORAL at 02:01

## 2022-07-28 RX ADMIN — ROPINIROLE HYDROCHLORIDE 0.5 MG: 0.5 TABLET, FILM COATED ORAL at 21:00

## 2022-07-28 RX ADMIN — MEROPENEM 1000 MG: 1 INJECTION, POWDER, FOR SOLUTION INTRAVENOUS at 09:08

## 2022-07-28 RX ADMIN — GABAPENTIN 400 MG: 400 CAPSULE ORAL at 21:00

## 2022-07-28 RX ADMIN — OXYCODONE 5 MG: 5 TABLET ORAL at 09:03

## 2022-07-28 ASSESSMENT — PAIN DESCRIPTION - ORIENTATION
ORIENTATION: RIGHT

## 2022-07-28 ASSESSMENT — PAIN SCALES - GENERAL
PAINLEVEL_OUTOF10: 3
PAINLEVEL_OUTOF10: 8
PAINLEVEL_OUTOF10: 8
PAINLEVEL_OUTOF10: 9

## 2022-07-28 ASSESSMENT — PAIN - FUNCTIONAL ASSESSMENT
PAIN_FUNCTIONAL_ASSESSMENT: ACTIVITIES ARE NOT PREVENTED
PAIN_FUNCTIONAL_ASSESSMENT: ACTIVITIES ARE NOT PREVENTED

## 2022-07-28 ASSESSMENT — PAIN DESCRIPTION - FREQUENCY
FREQUENCY: CONTINUOUS
FREQUENCY: CONTINUOUS

## 2022-07-28 ASSESSMENT — PAIN DESCRIPTION - DESCRIPTORS
DESCRIPTORS: ACHING;DISCOMFORT
DESCRIPTORS: ACHING;DISCOMFORT
DESCRIPTORS: BURNING;DISCOMFORT;STABBING

## 2022-07-28 ASSESSMENT — PAIN DESCRIPTION - LOCATION
LOCATION: ABDOMEN

## 2022-07-28 ASSESSMENT — PAIN DESCRIPTION - ONSET
ONSET: ON-GOING
ONSET: ON-GOING

## 2022-07-28 ASSESSMENT — PAIN DESCRIPTION - PAIN TYPE
TYPE: SURGICAL PAIN
TYPE: SURGICAL PAIN

## 2022-07-28 NOTE — DISCHARGE INSTRUCTIONS
[address] [place] [address] [facility]  ([address])  [address] [place] [address]  [place], [address]  Phone [phone]   Fax [phone]        Nany Bowman. Chema Patterson MD, MD Macrina Wayne MD Cesario Meek, MD Munir P. Margretta Ano, MD Hannah Ping, RN, CNP      Valarie Feldman, RN, CNS      MD Marleen Wise Cha, MD     STANDING ORDERS (ID Protocol)     Visiting nurses are to write the Primary Care Physician and their own call back number on all laboratory requisition forms. Abnormal lab values are called to the physician by the nurse and NOT by the laboratory. Fax all labs to the office in a timely manner, during office hours. All faxes should include nurses name and call back number. Vascular Access Devices or VADs (TLC, PICC, Midline, etc) will be replaced as necessary. Draw all blood work from VADs, except for drug levels. If unable to access a VAD, insert a peripheral catheter temporarily. Contact the Primary Care Physician or [facility] office for surgical referral.  Use tPA (Yuko Natalia) as per agency protocol to restore patency of VAD. Remove VAD upon completion of IV antibiotics, unless otherwise specified by the ordering physician. If VAD cannot be removed, schedule appointment at office for removal.  Notify ordering physician or office if patient requires admission to the hospital with reason for admission. Discontinue all blood work upon completion of IV antibiotics, unless otherwise specified by ordering physician. Notify ordering physician if the patient does not receive the scheduled antibiotic for 24 hours or more. The Pharmacy and [address] [place] [address] may adjust the timing of the infusion and blood work to accommodate the patients home care conditions. When PICC or VAD is to be removed, documentation of length of inserted PICC.  PICC or VAD length is to correlate with sensitivity. If the patient has a fever or increasing drainage or foul odor from a wound. Notify the treating physician in a timely manner  Stool specimen. If diarrhea occurs while on antibiotics, send stools for C. difficile and WBCs. When a drug is discontinued due to a low white blood cell count (WBCs) draw two consecutive CBC with differential and CMP. ALLERGIC OR ADVERSE REACTIONS TO MEDICATIONS  Mild reaction: (itching, with or without rash):  Administer Benadryl 50mg po x 1, then 25mg po q6h prn. Notify office or physician in a timely matter. Moderate reaction (itching with or without rash and/or wheezing, dyspnea, itchy throat):  Administer Benadryl 50 mg IV push x 1. Notify office or physician in a timely manner. Severe reaction i.e. Anaphylaxis (wheezing or stidor, sudden rash, lightheadedness, hypotension):  Administer epinephrine subcutaneous 0.3mg (1:1000) x 1 dose. May repeat twice every 5 minutes if needed. Call EMS and notify office or physician immediately. For all above reactions: administer Solu-Cortef 100mg slow IV push x 1. VASCULAR ACCESS DRESSING CHANGE PROTOCOL  Cleanse insertion site with Shur-Clens® or equivalent three times. Secure catheter with Steri-Strips®, Bone®, or equivalent securing device. Apply Opsite® 3000 or equivalent transparent dressing. Change dressing twice weekly, maintaining sterile technique. If there is a BioPatch®, SilverSite® or equivalent, change once weekly only or as needed. FOLLOW-UP VISITS  Nursing staff should call the office during business hours to schedule a follow-up appointment once the patient is admitted to the service or facility. Every effort should be made to have patient follow-up within 2 weeks of discharge. Continue IV antibiotic therapy until patient is seen in the office or unless specific stop date is noted on the original order or unless otherwise ordered by physician.   Call office to ensure stop date is correct before stopping antibiotics. Tristen Redd. MD Simeon Mei MD Richardine Christ, MD Vicki Li, MD Munir P. Bette Karst, MD

## 2022-07-28 NOTE — PROGRESS NOTES
GENERAL SURGERY  DAILY PROGRESS NOTE  7/28/2022    CHIEF COMPLAINT:  Chief Complaint   Patient presents with    Abdominal Pain     Ileostomy reversal done June 1, 2022, states woke up this morning with abdominal pain. Site feels warm and mushy, thinks could be an infection. SUBJECTIVE:  No complaints. Had bandage changed twice overnight for serosanguineous drainage soaking through. OBJECTIVE:  /61   Pulse 72   Temp 97.5 °F (36.4 °C) (Temporal)   Resp 18   Ht 5' 1\" (1.549 m)   Wt 180 lb (81.6 kg)   LMP 01/05/2018   SpO2 93%   BMI 34.01 kg/m²     GENERAL:  NAD. A&Ox3. LUNGS:  No increased work of breathing. CARDIOVASCULAR: RR  ABDOMEN:  Soft, non-distended, non-tender. No guarding, rigidity, rebound. Cruciate incision, packed w iodoform, covered w gauze and tape in LRQ. ASSESSMENT/PLAN:  51 y.o. female with concern for postop subcutaneous abscess after ileostomy takedown on 6/1/2022. PICC line today  10 days Invanz outpatient  Home health care for PICC and dressing changes  Ok for discharge today    Will discuss with Dr. Tammy Benítez.     Brittany Chavez DO  Surgery Resident PGY-1  7/28/2022  6:08 AM

## 2022-07-28 NOTE — PROGRESS NOTES
Patient complaining of burning to abdomen. Pain medication administered and dressing changed due to moderate amount of drainage. Surgical resident made aware via perfect serve. No new orders at this time.

## 2022-07-28 NOTE — PROGRESS NOTES
[address] [place] [address] [facility]  Progress Note      Chief Complaint   Patient presents with    Abdominal Pain     Ileostomy reversal done 2022, states woke up this morning with abdominal pain. Site feels warm and mushy, thinks could be an infection. SUBJECTIVE:  Patient is tolerating medications. No reported adverse drug reactions. No nausea, vomiting, diarrhea. Tolerating atb  Review of systems:  As stated above in the chief complaint, otherwise negative. Medications:  Scheduled Meds:   lidocaine PF  5 mL IntraDERmal Once    sodium chloride flush  5-40 mL IntraVENous 2 times per day    heparin flush  3 mL IntraVENous 2 times per day    meropenem  1,000 mg IntraVENous Q8H    famotidine  40 mg Oral Daily    fluticasone  1 spray Each Nostril Daily    gabapentin  400 mg Oral TID    rOPINIRole  0.5 mg Oral Nightly    enoxaparin  40 mg SubCUTAneous Daily     Continuous Infusions:   sodium chloride      lactated ringers 125 mL/hr at 22 1734     PRN Meds:povidone-iodine, sodium chloride flush, sodium chloride, heparin flush, ondansetron **OR** ondansetron, acetaminophen, oxyCODONE    OBJECTIVE:  /62   Pulse 69   Temp 98 °F (36.7 °C) (Oral)   Resp 18   Ht 5' 1\" (1.549 m)   Wt 180 lb (81.6 kg)   LMP 2018   SpO2 95%   BMI 34.01 kg/m²   Temp  Av.8 °F (36.6 °C)  Min: 97.5 °F (36.4 °C)  Max: 98 °F (36.7 °C)  Constitutional: The patient is awake, alert, and oriented. Skin: Warm and dry. No rashes were noted. HEENT: Round and reactive pupils. Moist mucous membranes. No ulcerations or thrush. Neck: Supple to movements. Chest: No use of accessory muscles to breathe. Symmetrical expansion. No wheezing, crackles or rhonchi. Cardiovascular: S1 and S2 are rhythmic and regular. No murmurs appreciated. Abdomen: Positive bowel sounds to auscultation. Benign to palpation. No masses felt. No hepatosplenomegaly.  RUQ dressed  Genitourinary: female  Extremities: No clubbing, no cyanosis, no edema.   Lines: peripheral    Laboratory and Tests Review:  Lab Results   Component Value Date    WBC 11.9 (H) 07/26/2022    WBC 7.0 06/24/2022    WBC 6.3 06/07/2022    HGB 14.4 07/26/2022    HCT 43.5 07/26/2022    MCV 91.4 07/26/2022     07/26/2022     Lab Results   Component Value Date    NEUTROABS 8.35 (H) 07/26/2022    NEUTROABS 3.73 06/24/2022    NEUTROABS 3.91 06/07/2022     No results found for: CRPHS  Lab Results   Component Value Date    ALT 15 07/26/2022    AST 10 07/26/2022    ALKPHOS 109 (H) 07/26/2022    BILITOT 0.5 07/26/2022     Lab Results   Component Value Date/Time     07/26/2022 11:47 AM    K 4.1 07/26/2022 11:47 AM     07/26/2022 11:47 AM    CO2 26 07/26/2022 11:47 AM    BUN 8 07/26/2022 11:47 AM    CREATININE 0.7 07/26/2022 11:47 AM    CREATININE 0.7 06/24/2022 04:44 AM    CREATININE 0.5 06/07/2022 04:35 AM    GFRAA >60 07/26/2022 11:47 AM    LABGLOM >60 07/26/2022 11:47 AM    GLUCOSE 106 07/26/2022 11:47 AM    PROT 8.1 07/26/2022 11:47 AM    LABALBU 4.7 07/26/2022 11:47 AM    CALCIUM 9.5 07/26/2022 11:47 AM    BILITOT 0.5 07/26/2022 11:47 AM    ALKPHOS 109 07/26/2022 11:47 AM    AST 10 07/26/2022 11:47 AM    ALT 15 07/26/2022 11:47 AM     Lab Results   Component Value Date    CRP 1.4 (H) 04/01/2022    CRP 0.9 (H) 03/29/2022    CRP 1.4 (H) 03/22/2022     Lab Results   Component Value Date    SEDRATE 12 04/01/2022    SEDRATE 12 03/29/2022    SEDRATE 25 (H) 03/22/2022     Radiology:  reviewed    Microbiology:   Lab Results   Component Value Date/Time    BC 5 Days no growth 06/03/2022 01:55 PM    ORG Proteus mirabilis 02/18/2022 05:10 PM    ORG Proteus mirabilis 02/18/2022 05:00 PM     Lab Results   Component Value Date/Time    BLOODCULT2 5 Days no growth 06/03/2022 01:55 PM    ORG Proteus mirabilis 02/18/2022 05:10 PM    ORG Proteus mirabilis 02/18/2022 05:00 PM     WOUND/ABSCESS   Date Value Ref Range Status   07/27/2022 Growth

## 2022-07-28 NOTE — CARE COORDINATION
Social Work/Case Management Transition of Care Planning Citlaly Scott [phone]): Multiple home care agencies were contacted. The earliest start of care is Tuesday. Discussed infusion center again with patient. She is agreeable to the infusion center at [facility] (1-). Phone call to Marco Granados at the infusion center. She indicated they can see her at 12:30 tomorrow. Script was faxed to the infusion center. Spoke with floor nurse about teaching patient the wound care. Patient indicated she does not feel she will need home care if she is taught here. Patient will discharge to home today with follow up at [facility] (1-) infusion center tomorrow pending PICC placement. Family to provide transport.  ROBIN Lopez  7/28/2022

## 2022-07-28 NOTE — PROGRESS NOTES
[address] [place] [address] [facility]  Progress Note      Chief Complaint   Patient presents with    Abdominal Pain     Ileostomy reversal done 2022, states woke up this morning with abdominal pain. Site feels warm and mushy, thinks could be an infection. SUBJECTIVE:  Patient is tolerating medications. No reported adverse drug reactions. No nausea, vomiting, diarrhea. Afebrile  Review of systems:  As stated above in the chief complaint, otherwise negative. Medications:  Scheduled Meds:   ertapenem (INVanz) IVPB  1,000 mg IntraVENous Q24H    lidocaine PF  5 mL IntraDERmal Once    sodium chloride flush  5-40 mL IntraVENous 2 times per day    heparin flush  3 mL IntraVENous 2 times per day    famotidine  40 mg Oral Daily    fluticasone  1 spray Each Nostril Daily    gabapentin  400 mg Oral TID    rOPINIRole  0.5 mg Oral Nightly    enoxaparin  40 mg SubCUTAneous Daily     Continuous Infusions:   sodium chloride      lactated ringers 125 mL/hr at 22 1734     PRN Meds:povidone-iodine, sodium chloride flush, sodium chloride, heparin flush, ondansetron **OR** ondansetron, acetaminophen, oxyCODONE    OBJECTIVE:  /62   Pulse 69   Temp 98 °F (36.7 °C) (Oral)   Resp 18   Ht 5' 1\" (1.549 m)   Wt 180 lb (81.6 kg)   LMP 2018   SpO2 95%   BMI 34.01 kg/m²   Temp  Av.8 °F (36.6 °C)  Min: 97.5 °F (36.4 °C)  Max: 98 °F (36.7 °C)  Constitutional: The patient is awake, alert, and oriented. Skin: Warm and dry. No rashes were noted. HEENT: Round and reactive pupils. Moist mucous membranes. No ulcerations or thrush. Neck: Supple to movements. Chest: No use of accessory muscles to breathe. Symmetrical expansion. No wheezing, crackles or rhonchi. Cardiovascular: S1 and S2 are rhythmic and regular. No murmurs appreciated. Abdomen: Positive bowel sounds to auscultation. Benign to palpation. No masses felt. No hepatosplenomegaly.   Dressed  Genitourinary: Female  Extremities: No clubbing, no cyanosis, no edema.   Lines: peripheral    Laboratory and Tests Review:  Lab Results   Component Value Date    WBC 11.9 (H) 07/26/2022    WBC 7.0 06/24/2022    WBC 6.3 06/07/2022    HGB 14.4 07/26/2022    HCT 43.5 07/26/2022    MCV 91.4 07/26/2022     07/26/2022     Lab Results   Component Value Date    NEUTROABS 8.35 (H) 07/26/2022    NEUTROABS 3.73 06/24/2022    NEUTROABS 3.91 06/07/2022     No results found for: CRPHS  Lab Results   Component Value Date    ALT 15 07/26/2022    AST 10 07/26/2022    ALKPHOS 109 (H) 07/26/2022    BILITOT 0.5 07/26/2022     Lab Results   Component Value Date/Time     07/26/2022 11:47 AM    K 4.1 07/26/2022 11:47 AM     07/26/2022 11:47 AM    CO2 26 07/26/2022 11:47 AM    BUN 8 07/26/2022 11:47 AM    CREATININE 0.7 07/26/2022 11:47 AM    CREATININE 0.7 06/24/2022 04:44 AM    CREATININE 0.5 06/07/2022 04:35 AM    GFRAA >60 07/26/2022 11:47 AM    LABGLOM >60 07/26/2022 11:47 AM    GLUCOSE 106 07/26/2022 11:47 AM    PROT 8.1 07/26/2022 11:47 AM    LABALBU 4.7 07/26/2022 11:47 AM    CALCIUM 9.5 07/26/2022 11:47 AM    BILITOT 0.5 07/26/2022 11:47 AM    ALKPHOS 109 07/26/2022 11:47 AM    AST 10 07/26/2022 11:47 AM    ALT 15 07/26/2022 11:47 AM     Lab Results   Component Value Date    CRP 1.4 (H) 04/01/2022    CRP 0.9 (H) 03/29/2022    CRP 1.4 (H) 03/22/2022     Lab Results   Component Value Date    SEDRATE 12 04/01/2022    SEDRATE 12 03/29/2022    SEDRATE 25 (H) 03/22/2022     Radiology:      Microbiology:   Lab Results   Component Value Date/Time    BC 5 Days no growth 06/03/2022 01:55 PM    ORG Proteus mirabilis 02/18/2022 05:10 PM    ORG Proteus mirabilis 02/18/2022 05:00 PM     Lab Results   Component Value Date/Time    BLOODCULT2 5 Days no growth 06/03/2022 01:55 PM    ORG Proteus mirabilis 02/18/2022 05:10 PM    ORG Proteus mirabilis 02/18/2022 05:00 PM     WOUND/ABSCESS   Date Value Ref Range Status   07/27/2022 Growth not present, incubation continues  Preliminary     Smear, Respiratory   Date Value Ref Range Status   06/04/2022   Final    Rare Polymorphonuclear leukocytes  Rare Epithelial cells  Few Mononuclear leukocytes  Rare Gram positive cocci in chains  Rare Gram positive rods       No results found for: MPNEUMO, CLAMYDCU, LABLEGI, AFBCX, FUNGSM, LABFUNG  CULTURE, RESPIRATORY   Date Value Ref Range Status   06/04/2022 Oral Pharyngeal Zoila present  Final     No results found for: CXCATHTIP  Body Fluid Culture, Sterile   Date Value Ref Range Status   02/18/2022 Light growth  Final     No results found for: CXSURG  Urine Culture, Routine   Date Value Ref Range Status   02/23/2022 Growth not present  Final   02/15/2022   Final    <10,000 CFU/mL  Proteus species  Gram positive organism     01/15/2018 Growth not present  Final     MRSA Culture Only   Date Value Ref Range Status   05/31/2022 Methicillin resistant Staph aureus not isolated  Final   02/02/2022 Methicillin resistant Staph aureus not isolated  Final   10/07/2021 Methicillin resistant Staph aureus not isolated  Final       ASSESSMENT:  Peristomal abscess-cultures pending    PLAN:  Continue Amalia Belcher in preparation for discharge  Check final cultures  Maria A Church MD  5:08 PM  7/28/2022

## 2022-07-28 NOTE — CARE COORDINATION
Social Work/Case Management Transition of Care Planning (Steve Blue [place] [phone]): Met with patient at bedside to discuss transition of care options - home care vs infusion center. Patient indicated since she will need home care for the wound she would prefer home health care over going to the infusion center daily. Referral made to [facility] per patient choice. The earliest they can do a start of care is Tuesday. Called [facility] but they do not accept the insurance. Referral made to [address] [place] [address] skilled nursing for IV antibiotics x 10 days and wound care. Also made referral to [facility] to supply antibiotic and supplies. Await response.     ROBIN Lau  7/28/2022

## 2022-07-29 ENCOUNTER — APPOINTMENT (OUTPATIENT)
Dept: GENERAL RADIOLOGY | Age: 47
DRG: 711 | End: 2022-07-29
Payer: COMMERCIAL

## 2022-07-29 ENCOUNTER — HOSPITAL ENCOUNTER (OUTPATIENT)
Dept: INFUSION THERAPY | Age: 47
Setting detail: INFUSION SERIES
End: 2022-07-29

## 2022-07-29 VITALS
HEIGHT: 61 IN | RESPIRATION RATE: 16 BRPM | SYSTOLIC BLOOD PRESSURE: 118 MMHG | OXYGEN SATURATION: 95 % | WEIGHT: 180 LBS | TEMPERATURE: 97.7 F | DIASTOLIC BLOOD PRESSURE: 68 MMHG | HEART RATE: 57 BPM | BODY MASS INDEX: 33.99 KG/M2

## 2022-07-29 DIAGNOSIS — J30.2 SEASONAL ALLERGIES: ICD-10-CM

## 2022-07-29 PROBLEM — T81.49XA ABDOMINAL WALL ABSCESS AT SITE OF SURGICAL WOUND: Status: ACTIVE | Noted: 2022-07-29

## 2022-07-29 LAB
ALBUMIN SERPL-MCNC: 3.5 G/DL (ref 3.5–5.2)
ALP BLD-CCNC: 88 U/L (ref 35–104)
ALT SERPL-CCNC: 27 U/L (ref 0–32)
ANION GAP SERPL CALCULATED.3IONS-SCNC: 10 MMOL/L (ref 7–16)
AST SERPL-CCNC: 25 U/L (ref 0–31)
BASOPHILS ABSOLUTE: 0.04 E9/L (ref 0–0.2)
BASOPHILS RELATIVE PERCENT: 0.8 % (ref 0–2)
BILIRUB SERPL-MCNC: 0.5 MG/DL (ref 0–1.2)
BUN BLDV-MCNC: 9 MG/DL (ref 6–20)
CALCIUM SERPL-MCNC: 8.8 MG/DL (ref 8.6–10.2)
CHLORIDE BLD-SCNC: 102 MMOL/L (ref 98–107)
CO2: 27 MMOL/L (ref 22–29)
CREAT SERPL-MCNC: 0.7 MG/DL (ref 0.5–1)
EOSINOPHILS ABSOLUTE: 0.21 E9/L (ref 0.05–0.5)
EOSINOPHILS RELATIVE PERCENT: 4 % (ref 0–6)
GFR AFRICAN AMERICAN: >60
GFR NON-AFRICAN AMERICAN: >60 ML/MIN/1.73
GLUCOSE BLD-MCNC: 114 MG/DL (ref 74–99)
HCT VFR BLD CALC: 36.7 % (ref 34–48)
HEMOGLOBIN: 11.9 G/DL (ref 11.5–15.5)
IMMATURE GRANULOCYTES #: 0.04 E9/L
IMMATURE GRANULOCYTES %: 0.8 % (ref 0–5)
LYMPHOCYTES ABSOLUTE: 1.5 E9/L (ref 1.5–4)
LYMPHOCYTES RELATIVE PERCENT: 28.2 % (ref 20–42)
MCH RBC QN AUTO: 30 PG (ref 26–35)
MCHC RBC AUTO-ENTMCNC: 32.4 % (ref 32–34.5)
MCV RBC AUTO: 92.4 FL (ref 80–99.9)
MONOCYTES ABSOLUTE: 0.74 E9/L (ref 0.1–0.95)
MONOCYTES RELATIVE PERCENT: 13.9 % (ref 2–12)
NEUTROPHILS ABSOLUTE: 2.78 E9/L (ref 1.8–7.3)
NEUTROPHILS RELATIVE PERCENT: 52.3 % (ref 43–80)
PDW BLD-RTO: 12.2 FL (ref 11.5–15)
PLATELET # BLD: 210 E9/L (ref 130–450)
PMV BLD AUTO: 9.8 FL (ref 7–12)
POTASSIUM SERPL-SCNC: 4.4 MMOL/L (ref 3.5–5)
RBC # BLD: 3.97 E12/L (ref 3.5–5.5)
SODIUM BLD-SCNC: 139 MMOL/L (ref 132–146)
TOTAL PROTEIN: 6.5 G/DL (ref 6.4–8.3)
WBC # BLD: 5.3 E9/L (ref 4.5–11.5)

## 2022-07-29 PROCEDURE — 99231 SBSQ HOSP IP/OBS SF/LOW 25: CPT | Performed by: SURGERY

## 2022-07-29 PROCEDURE — 6370000000 HC RX 637 (ALT 250 FOR IP): Performed by: STUDENT IN AN ORGANIZED HEALTH CARE EDUCATION/TRAINING PROGRAM

## 2022-07-29 PROCEDURE — 2580000003 HC RX 258: Performed by: SPECIALIST

## 2022-07-29 PROCEDURE — 71045 X-RAY EXAM CHEST 1 VIEW: CPT

## 2022-07-29 PROCEDURE — 36415 COLL VENOUS BLD VENIPUNCTURE: CPT

## 2022-07-29 PROCEDURE — C1751 CATH, INF, PER/CENT/MIDLINE: HCPCS

## 2022-07-29 PROCEDURE — 02HV33Z INSERTION OF INFUSION DEVICE INTO SUPERIOR VENA CAVA, PERCUTANEOUS APPROACH: ICD-10-PCS | Performed by: SURGERY

## 2022-07-29 PROCEDURE — 76937 US GUIDE VASCULAR ACCESS: CPT

## 2022-07-29 PROCEDURE — 80053 COMPREHEN METABOLIC PANEL: CPT

## 2022-07-29 PROCEDURE — 6360000002 HC RX W HCPCS: Performed by: SPECIALIST

## 2022-07-29 PROCEDURE — 36569 INSJ PICC 5 YR+ W/O IMAGING: CPT

## 2022-07-29 PROCEDURE — 2580000003 HC RX 258: Performed by: SURGERY

## 2022-07-29 PROCEDURE — 85025 COMPLETE CBC W/AUTO DIFF WBC: CPT

## 2022-07-29 RX ORDER — ALBUTEROL SULFATE 90 UG/1
4 AEROSOL, METERED RESPIRATORY (INHALATION) PRN
Status: CANCELLED | OUTPATIENT
Start: 2022-07-29

## 2022-07-29 RX ORDER — EPINEPHRINE 1 MG/ML
0.3 INJECTION, SOLUTION, CONCENTRATE INTRAVENOUS PRN
Status: CANCELLED | OUTPATIENT
Start: 2022-07-30

## 2022-07-29 RX ORDER — FLUTICASONE PROPIONATE 50 MCG
SPRAY, SUSPENSION (ML) NASAL
Qty: 16 G | Refills: 10 | Status: SHIPPED | OUTPATIENT
Start: 2022-07-29

## 2022-07-29 RX ORDER — SODIUM CHLORIDE 9 MG/ML
5-250 INJECTION, SOLUTION INTRAVENOUS PRN
Status: CANCELLED | OUTPATIENT
Start: 2022-07-30

## 2022-07-29 RX ORDER — SODIUM CHLORIDE 9 MG/ML
5-250 INJECTION, SOLUTION INTRAVENOUS PRN
Status: CANCELLED | OUTPATIENT
Start: 2022-07-29

## 2022-07-29 RX ORDER — ACETAMINOPHEN 325 MG/1
650 TABLET ORAL
Status: CANCELLED | OUTPATIENT
Start: 2022-07-30

## 2022-07-29 RX ORDER — DIPHENHYDRAMINE HYDROCHLORIDE 50 MG/ML
50 INJECTION INTRAMUSCULAR; INTRAVENOUS
Status: CANCELLED | OUTPATIENT
Start: 2022-07-29

## 2022-07-29 RX ORDER — ONDANSETRON 2 MG/ML
8 INJECTION INTRAMUSCULAR; INTRAVENOUS
Status: CANCELLED | OUTPATIENT
Start: 2022-07-30

## 2022-07-29 RX ORDER — ACETAMINOPHEN 325 MG/1
650 TABLET ORAL
Status: CANCELLED | OUTPATIENT
Start: 2022-07-29

## 2022-07-29 RX ORDER — SODIUM CHLORIDE 0.9 % (FLUSH) 0.9 %
5-40 SYRINGE (ML) INJECTION PRN
Status: CANCELLED | OUTPATIENT
Start: 2022-07-29

## 2022-07-29 RX ORDER — ONDANSETRON 2 MG/ML
8 INJECTION INTRAMUSCULAR; INTRAVENOUS
Status: CANCELLED | OUTPATIENT
Start: 2022-07-29

## 2022-07-29 RX ORDER — ALBUTEROL SULFATE 90 UG/1
4 AEROSOL, METERED RESPIRATORY (INHALATION) PRN
Status: CANCELLED | OUTPATIENT
Start: 2022-07-30

## 2022-07-29 RX ORDER — OXYCODONE HYDROCHLORIDE 5 MG/1
5 TABLET ORAL EVERY 8 HOURS PRN
Qty: 15 TABLET | Refills: 0 | Status: SHIPPED | OUTPATIENT
Start: 2022-07-29 | End: 2022-08-03

## 2022-07-29 RX ORDER — HEPARIN SODIUM (PORCINE) LOCK FLUSH IV SOLN 100 UNIT/ML 100 UNIT/ML
500 SOLUTION INTRAVENOUS PRN
Status: CANCELLED | OUTPATIENT
Start: 2022-07-29

## 2022-07-29 RX ORDER — EPINEPHRINE 1 MG/ML
0.3 INJECTION, SOLUTION, CONCENTRATE INTRAVENOUS PRN
Status: CANCELLED | OUTPATIENT
Start: 2022-07-29

## 2022-07-29 RX ORDER — SODIUM CHLORIDE 9 MG/ML
INJECTION, SOLUTION INTRAVENOUS CONTINUOUS
Status: CANCELLED | OUTPATIENT
Start: 2022-07-30

## 2022-07-29 RX ORDER — SODIUM CHLORIDE 9 MG/ML
INJECTION, SOLUTION INTRAVENOUS CONTINUOUS
Status: CANCELLED | OUTPATIENT
Start: 2022-07-29

## 2022-07-29 RX ORDER — DIPHENHYDRAMINE HYDROCHLORIDE 50 MG/ML
50 INJECTION INTRAMUSCULAR; INTRAVENOUS
Status: CANCELLED | OUTPATIENT
Start: 2022-07-30

## 2022-07-29 RX ORDER — SODIUM CHLORIDE 0.9 % (FLUSH) 0.9 %
5-40 SYRINGE (ML) INJECTION PRN
Status: CANCELLED | OUTPATIENT
Start: 2022-07-30

## 2022-07-29 RX ORDER — HEPARIN SODIUM (PORCINE) LOCK FLUSH IV SOLN 100 UNIT/ML 100 UNIT/ML
500 SOLUTION INTRAVENOUS PRN
Status: CANCELLED | OUTPATIENT
Start: 2022-07-30

## 2022-07-29 RX ADMIN — OXYCODONE 5 MG: 5 TABLET ORAL at 07:12

## 2022-07-29 RX ADMIN — FAMOTIDINE 40 MG: 20 TABLET ORAL at 09:34

## 2022-07-29 RX ADMIN — OXYCODONE 5 MG: 5 TABLET ORAL at 03:09

## 2022-07-29 RX ADMIN — ERTAPENEM SODIUM 1000 MG: 1 INJECTION, POWDER, LYOPHILIZED, FOR SOLUTION INTRAMUSCULAR; INTRAVENOUS at 13:18

## 2022-07-29 RX ADMIN — OXYCODONE 5 MG: 5 TABLET ORAL at 11:48

## 2022-07-29 RX ADMIN — GABAPENTIN 400 MG: 400 CAPSULE ORAL at 13:22

## 2022-07-29 RX ADMIN — SODIUM CHLORIDE, PRESERVATIVE FREE 10 ML: 5 INJECTION INTRAVENOUS at 09:34

## 2022-07-29 RX ADMIN — GABAPENTIN 400 MG: 400 CAPSULE ORAL at 09:34

## 2022-07-29 ASSESSMENT — PAIN DESCRIPTION - LOCATION
LOCATION: ABDOMEN
LOCATION: ABDOMEN

## 2022-07-29 ASSESSMENT — PAIN DESCRIPTION - ORIENTATION
ORIENTATION: RIGHT;LOWER
ORIENTATION: RIGHT

## 2022-07-29 ASSESSMENT — PAIN SCALES - GENERAL
PAINLEVEL_OUTOF10: 4
PAINLEVEL_OUTOF10: 8
PAINLEVEL_OUTOF10: 8
PAINLEVEL_OUTOF10: 6

## 2022-07-29 ASSESSMENT — PAIN DESCRIPTION - PAIN TYPE: TYPE: SURGICAL PAIN

## 2022-07-29 ASSESSMENT — PAIN DESCRIPTION - DESCRIPTORS
DESCRIPTORS: ACHING;DISCOMFORT;SORE
DESCRIPTORS: ACHING;CRAMPING;DISCOMFORT

## 2022-07-29 NOTE — PROGRESS NOTES
[address] [place] [address] [facility]  Progress Note      Chief Complaint   Patient presents with    Abdominal Pain     Ileostomy reversal done 2022, states woke up this morning with abdominal pain. Site feels warm and mushy, thinks could be an infection. SUBJECTIVE:  Patient is tolerating medications. No reported adverse drug reactions. No nausea, vomiting, diarrhea. Afebrile  Feels better  Review of systems:  As stated above in the chief complaint, otherwise negative. Medications:  Scheduled Meds:   ertapenem (INVanz) IVPB  1,000 mg IntraVENous Q24H    lidocaine PF  5 mL IntraDERmal Once    sodium chloride flush  5-40 mL IntraVENous 2 times per day    heparin flush  3 mL IntraVENous 2 times per day    famotidine  40 mg Oral Daily    fluticasone  1 spray Each Nostril Daily    gabapentin  400 mg Oral TID    rOPINIRole  0.5 mg Oral Nightly    enoxaparin  40 mg SubCUTAneous Daily     Continuous Infusions:   sodium chloride      lactated ringers 125 mL/hr at 22 1734     PRN Meds:povidone-iodine, sodium chloride flush, sodium chloride, heparin flush, ondansetron **OR** ondansetron, acetaminophen, oxyCODONE    OBJECTIVE:  /68   Pulse 57   Temp 97.7 °F (36.5 °C) (Oral)   Resp 16   Ht 5' 1\" (1.549 m)   Wt 180 lb (81.6 kg)   LMP 2018   SpO2 95%   BMI 34.01 kg/m²   Temp  Av.7 °F (36.5 °C)  Min: 97.6 °F (36.4 °C)  Max: 97.7 °F (36.5 °C)  Constitutional: The patient is awake, alert, and oriented. Skin: Warm and dry. No rashes were noted. HEENT: Round and reactive pupils. Moist mucous membranes. No ulcerations or thrush. Neck: Supple to movements. Chest: No use of accessory muscles to breathe. Symmetrical expansion. No wheezing, crackles or rhonchi. Cardiovascular: S1 and S2 are rhythmic and regular. No murmurs appreciated. Abdomen: Positive bowel sounds to auscultation. Benign to palpation. No masses felt. No hepatosplenomegaly. Dressed  Genitourinary: Female  Extremities: No clubbing, no cyanosis, no edema.   Lines: peripheral    Laboratory and Tests Review:  Lab Results   Component Value Date    WBC 5.3 07/29/2022    WBC 11.9 (H) 07/26/2022    WBC 7.0 06/24/2022    HGB 11.9 07/29/2022    HCT 36.7 07/29/2022    MCV 92.4 07/29/2022     07/29/2022     Lab Results   Component Value Date    NEUTROABS 2.78 07/29/2022    NEUTROABS 8.35 (H) 07/26/2022    NEUTROABS 3.73 06/24/2022     No results found for: CRP  Lab Results   Component Value Date    ALT 27 07/29/2022    AST 25 07/29/2022    ALKPHOS 88 07/29/2022    BILITOT 0.5 07/29/2022     Lab Results   Component Value Date/Time     07/29/2022 04:43 AM    K 4.4 07/29/2022 04:43 AM    K 4.1 07/26/2022 11:47 AM     07/29/2022 04:43 AM    CO2 27 07/29/2022 04:43 AM    BUN 9 07/29/2022 04:43 AM    CREATININE 0.7 07/29/2022 04:43 AM    CREATININE 0.7 07/26/2022 11:47 AM    CREATININE 0.7 06/24/2022 04:44 AM    GFRAA >60 07/29/2022 04:43 AM    LABGLOM >60 07/29/2022 04:43 AM    GLUCOSE 114 07/29/2022 04:43 AM    PROT 6.5 07/29/2022 04:43 AM    LABALBU 3.5 07/29/2022 04:43 AM    CALCIUM 8.8 07/29/2022 04:43 AM    BILITOT 0.5 07/29/2022 04:43 AM    ALKPHOS 88 07/29/2022 04:43 AM    AST 25 07/29/2022 04:43 AM    ALT 27 07/29/2022 04:43 AM     Lab Results   Component Value Date    CRP 1.4 (H) 04/01/2022    CRP 0.9 (H) 03/29/2022    CRP 1.4 (H) 03/22/2022     Lab Results   Component Value Date    SEDRATE 12 04/01/2022    SEDRATE 12 03/29/2022    SEDRATE 25 (H) 03/22/2022     Radiology:      Microbiology:   Lab Results   Component Value Date/Time    BC 5 Days no growth 06/03/2022 01:55 PM    ORG Proteus mirabilis 02/18/2022 05:10 PM    ORG Proteus mirabilis 02/18/2022 05:00 PM     Lab Results   Component Value Date/Time    BLOODCULT2 5 Days no growth 06/03/2022 01:55 PM    ORG Proteus mirabilis 02/18/2022 05:10 PM    ORG Proteus mirabilis 02/18/2022 05:00 PM     WOUND/ABSCESS   Date Value Ref Range Status   07/27/2022 Growth not present, incubation continues  Preliminary     Smear, Respiratory   Date Value Ref Range Status   06/04/2022   Final    Rare Polymorphonuclear leukocytes  Rare Epithelial cells  Few Mononuclear leukocytes  Rare Gram positive cocci in chains  Rare Gram positive rods       No results found for: MPNEUMO, CLAMYDCU, LABLEGI, AFBCX, FUNGSM, LABFUNG  CULTURE, RESPIRATORY   Date Value Ref Range Status   06/04/2022 Oral Pharyngeal Zoila present  Final     No results found for: CXCATHTIP  Body Fluid Culture, Sterile   Date Value Ref Range Status   02/18/2022 Light growth  Final     No results found for: CXSURG  Urine Culture, Routine   Date Value Ref Range Status   02/23/2022 Growth not present  Final   02/15/2022   Final    <10,000 CFU/mL  Proteus species  Gram positive organism     01/15/2018 Growth not present  Final     MRSA Culture Only   Date Value Ref Range Status   05/31/2022 Methicillin resistant Staph aureus not isolated  Final   02/02/2022 Methicillin resistant Staph aureus not isolated  Final   10/07/2021 Methicillin resistant Staph aureus not isolated  Final       ASSESSMENT:  Peristomal abscess-cultures pending    PLAN:  Continue Invanz and set up for infusion center arranged  Check final cultures  Monitor labs    Ree Garcia MD  1:27 PM  7/29/2022

## 2022-07-29 NOTE — CARE COORDINATION
Social Work/Case Management Transition of Care Planning (Tayo Salgado [place] [phone]):  Notified Damian Farooq at the [facility] patient did not discharge yesterday as she is still waiting for PICC to be placed. She provided times for the infusions for the next 3 days. Saturday and Sunday at 10:15, Monday at 9:30.   Tayo Salgado, ROBIN  7/29/2022

## 2022-07-29 NOTE — DISCHARGE INSTR - COC
Continuity of Care Form    Patient Name: Leela Al   :  1975  MRN:  [id number]    Admit date:  2022  Discharge date:  ***    Code Status Order: Full Code   Advance Directives:     Admitting Physician:  Nathan Solis MD  PCP: Hanh Yarbrough PA-C    Discharging Nurse: [facility] Unit/Room#: 8475/1377-J  Discharging Unit Phone Number: ***    Emergency Contact:   Extended Emergency Contact Information  Primary Emergency Contact: [address] Phone: [phone]  Mobile Phone: [phone]  Relation: Child  Preferred language: English   needed? No    Past Surgical History:  Past Surgical History:   Procedure Laterality Date    APPENDECTOMY      BACK SURGERY      lumbar    BICEPS TENDON REPAIR Right 2020    BICEPS TENODESIS performed by Wendy De Luna MD at [place] [address] Left 2021    LAPAROSCOPIC LEFT HEMICOLECTOMY WITH LOOP OSTOMY performed by Vilma Pinon MD at [place] N/A 2021    DIAGNOSTIC LAPAROSCOPY POSSIBLE BOWEL RESECTION POSSILBE OSTOMY performed by Vilma Pinon MD at [place] N/A 2022    LAPAROSCOPIC SIGMOID COLON RESECTION performed by Vilma Pinon MD at [address] [place] [address] 2022    OPEN ILEOSTOMY REVERSAL performed by Vilma Pinon MD at [address] [place] [address] ([address] [place] [address])  2018    Robotic hysterectomy, bilateral salpingectomy, right oophorectomy DR. ARIANA MONIQUE [facility]     HYSTERECTOMY, TOTAL ABDOMINAL (CERVIX REMOVED)      LARYNGOSCOPY N/A 2020    DIRECT LARYNGOSCOPY--OMNI GUIDE LASER performed by Vasile Tran MD at [address] (CERVIX NOT REMOVED)      PROCTOSIGMOIDOSCOPY N/A 10/12/2021    ANAL PROCTO SIGMOIDOSCOPY FLEXIBLE performed by Vilma Pinon MD at [facility] 2022    ANAL PROCTO [address] performed by Delmy Oliver MD at [facility] ARTHROSCOPY Right 2020    RIGHT SHOULDER DIAGNOSTIC ARTHROSCOPY WITH DECOMPRESSION ROTATOR CUFF REPAIR performed by Berna Bauman MD at [place] [address]         Immunization History:   Immunization History   Administered Date(s) Administered    Influenza Virus Vaccine 2020       Active Problems:  Patient Active Problem List   Diagnosis Code    Vocal cord dysfunction J38.3    Multiple sclerosis ([facility] G35    Morbidly obese (HCC) E66.01    Palafox's esophagus with dysplasia K22.719    Secondary restless legs 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 1XXA    Abdominal wall abscess at site of surgical wound T81.49XA       Isolation/Infection:   Isolation            Contact          Patient Infection Status       Infection Onset Added Last Indicated Last Indicated By Review Planned Expiration Resolved Resolved By    MDRO (multi-drug resistant organism)  22 Amena Lloyd        Proteus mirabilis, aspirate, 2022    Resolved    COVID-19 (Rule Out) 22 COVID-19, Rapid (Ordered)   22 Rule-Out Test Resulted    COVID-19 (Rule Out) 22 COVID-19 (Ordered)   22 Rule-Out Test Resulted    COVID-19 (Rule Out) 22 Covid-19 Ambulatory (Ordered)   22 Rule-Out Test Resulted    COVID-19 10/30/21 10/30/21 10/30/21 COVID-19, Rapid   21     COVID-19 (Rule Out) 10/30/21 10/30/21 10/30/21 COVID-19, Rapid (Ordered)   10/30/21 Rule-Out Test Resulted    COVID-19 (Rule Out) 10/07/21 10/08/21 10/08/21 Covid-19 Ambulatory (Ordered)   10/09/21 Rule-Out Test Resulted    COVID-19 (Rule Out) 21 Covid-19 Ambulatory (Ordered)   21 Rule-Out Test Resulted    COVID-19 (Rule Out) 20 Covid-19 Ambulatory (Ordered)   20 Rule-Out Test Resulted    COVID-19 (Rule Out) 08/10/20 08/10/20 08/10/20 COVID-19 Ambulatory (Ordered)   20 Rule-Out Test Resulted    COVID-19 (Rule Out) 20 Covid-19 Ambulatory (Ordered)   07/15/20 Rule-Out Test Resulted            Nurse Assessment:  Last Vital Signs: /68   Pulse 57   Temp 97.7 °F (36.5 °C) (Oral)   Resp 16   Ht 5' 1\" (1.549 m)   Wt 180 lb (81.6 kg)   LMP 2018   SpO2 95%   BMI 34.01 kg/m²     Last documented pain score (0-10 scale): Pain Level: 4  Last Weight:   Wt Readings from Last 1 Encounters:   22 180 lb (81.6 kg)     Mental Status:  {IP PT MENTAL STATUS:82131}    IV Access:  {[facility] IV ACCESS:555796506}    Nursing Mobility/ADLs:  Walking   {CHP DME PWF}  Transfer  {CHP DME DWII:398311417}  Bathing  {CHP DME RULD:305221750}  Dressing  {CHP DME GSZB:101074961}  Toileting  {CHP DME NSDO:081151405}  Feeding  {P DME DIANNE:918899628}  Med Admin  {P DME KHDY:469751257}  Med Delivery   {[facility] MED Delivery:950402044}    Wound Care Documentation and Therapy:  Incision 22 Abdomen Medial;Right;Upper (Active)   Number of days: 58       Incision 22 Abdomen Lower;Right (Active)   Dressing Status Clean;Dry; Intact 22 09   Incision Cleansed Betadine/povidone iodine 22 0313   Dressing/Treatment ABD pad;Dry dressing 22 0955   Drainage Amount Small 22 0955   Drainage Description Serosanguinous 22 0955   Odor None 22 0313   Number of days: 2        Elimination:  Continence:    Bowel: {YES / NZ:23492}  Bladder: {YES / :04969}  Urinary Catheter: {Urinary Catheter:117691554}   Colostomy/Ileostomy/Ileal Conduit: {YES / N}       Date of Last BM: ***    Intake/Output Summary (Last 24 hours) at 2022 1055  Last data filed at 2022 0539  Gross per 24 hour   Intake 960 ml   Output -- Net 960 ml     I/O last 3 completed shifts: In: 12 [P.O.:960]  Out: -     Safety Concerns:     [address] Concerns:047574601}    Impairments/Disabilities:      [address] Impairments/Disabilities:394086263}    Nutrition Therapy:  Current Nutrition Therapy:   [address] Diet List:040934157}    Routes of Feeding: {CHP DME Other Feedings:541620156}  Liquids: {Slp liquid thickness:80468}  Daily Fluid Restriction: {CHP DME Yes amt example:955292779}  Last Modified Barium Swallow with Video (Video Swallowing Test): {Done Not Done FHJS:762853009}    Treatments at the Time of Hospital Discharge:   Respiratory Treatments: ***  Oxygen Therapy:  {Therapy; copd oxygen:65407}  Ventilator:    {[facility] Vent KHBO:036672546}    Rehab Therapies: {THERAPEUTIC INTERVENTION:8795939221}  Weight Bearing Status/Restrictions: [address]  Weight Bearin}  Other Medical Equipment (for information only, NOT a DME order):  {EQUIPMENT:724310270}  Other Treatments: ***    Patient's personal belongings (please select all that are sent with patient):  {CHP DME Belongings:961120678}    RN SIGNATURE:  {Esignature:698888478}    CASE MANAGEMENT/SOCIAL WORK SECTION    Inpatient Status Date: ***    Readmission Risk Assessment Score:  Readmission Risk              Risk of Unplanned Readmission:  [id number]           Discharging to  Agency   Name: [address] [place] [address]  Address:  [address] [place] [address].   [place]   Phone:  [phone]  Fax:  [phone]    Infusion Schedule:  Saturday and : 10:15am  Must call  [phone] as doors will be locked on the weekend    Monday: 9:30am    Dialysis Facility (if applicable)   Name:  Address:  Dialysis Schedule:  Phone:  Fax:    / signature: {Esignature:887298442}Electronically signed by ROBIN Lopez on 2022 at 10:58 AM      PHYSICIAN SECTION    Prognosis: {Prognosis:0965086780}    Condition at Discharge: [address] Patient Condition:208129731}    Rehab Potential (if transferring to Rehab): {Prognosis:4220242508}    Recommended Labs or Other Treatments After Discharge: ***    Physician Certification: I certify the above information and transfer of Dorie Aviles  is necessary for the continuing treatment of the diagnosis listed and that she requires {Admit to Appropriate Level of Care:55250} for {GREATER/LESS:913044167} 30 days.      Update Admission H&P: {CHP DME Changes in XBSOP:502287183}    PHYSICIAN SIGNATURE:  {Esignature:689334633}

## 2022-07-29 NOTE — PROGRESS NOTES
Vascular Access Procedure Note    Procedure Date:   7/29/2022    Pre-procedure Verification/Time-Out:  The proposed risks versus benefits of this procedure were discussed in detail by the physician with patient. written consent was obtained from the patient. Relevant documentation was reviewed prior to procedure including signed consent form and medications. All necessary equipment for procedure is available at time of procedure yes. An audible time out was done at 0830AM by team members, correctly identifying patients name, medical record number, correct side, correct site, and correct procedure to be performed with registered nurse members of the procedure team all in agreement.         Indication for Procedure:   Reason for Insertion: intravenous antibiotics    ASA Assessment (Required for Moderate & Deep Sedation):      Procedure:   Reason for Consultation: power PICC line    Clinician Performing Procedure:   Eneida Alford RN    Assistant:  none    Sedation:   Analgesia Used: lidocaine 1%    Procedure Details/Findings:  Catheter Slovak Size: 4.5  Lot Number: [id number]  Product #: [id number]  Expiration Date: 11/30/2023  Maximum Barrier Precautions: cap, eye shield, full body drape, gloves, gown, handwashing, and mask  Skin Prep: chlorhexidine  Technique: modified seldinger and ultrasound guided with VPS  Attempts: 1  Exposed (cm): 1  Total (cm): 41  Placement Site: left brachial vein  Vessel Size: 0.52  Dressing: securement device, transparent dressing, and biopatch  Blood Return: Yes   Ultrasound Guidance: Yes   Arm Circumference Mid-Bicep (cm): 33  Chest X-Ray Ordered: chest x-ray  End Placement: svc    Complications:   none     Post-operative Condition:  stable  Patient Tolerated Procedure: well     Comments/Post-operative Education:   Post Procedure Interventions: patient verbalized understanding of education    Shruti Lorenzo RN  07/29/22  9:16 AM

## 2022-07-29 NOTE — PROGRESS NOTES
GENERAL SURGERY  DAILY PROGRESS NOTE  7/29/2022    CHIEF COMPLAINT:  Chief Complaint   Patient presents with    Abdominal Pain     Ileostomy reversal done June 1, 2022, states woke up this morning with abdominal pain. Site feels warm and mushy, thinks could be an infection. SUBJECTIVE:  No complaints. Had bandage changed once overnight, serosanguineous drainage. OBJECTIVE:  /70   Pulse 63   Temp 97.6 °F (36.4 °C) (Oral)   Resp 18   Ht 5' 1\" (1.549 m)   Wt 180 lb (81.6 kg)   LMP 01/05/2018   SpO2 93%   BMI 34.01 kg/m²     GENERAL:  NAD. A&Ox3. LUNGS:  No increased work of breathing. CARDIOVASCULAR: RR  ABDOMEN:  Soft, non-distended, non-tender. No guarding, rigidity, rebound. Cruciate incision, packed w iodoform, covered w gauze and tape in LRQ. ASSESSMENT/PLAN:  51 y.o. female with concern for postop subcutaneous abscess after ileostomy takedown on 6/1/2022. PICC line today  10 days [facility] outpatient  Home health care for PICC and dressing changes  Ok for discharge when PICC placed and set up for home abx    Will discuss with Dr. Willian Baez.     Karla Crabtree, DO  Surgery Resident PGY-1  7/29/2022  6:27 AM     General Surgery Progress Note  Cara Malone MD, MS    Patient's Name/Date of Birth: Enrico Vasquez / 1975    Date: July 29, 2022     Surgeon: Willian Baez MD    Chief Complaint: abdominal wall abscess    Patient Active Problem List   Diagnosis    Vocal cord dysfunction    Multiple sclerosis ([address]    Morbidly obese ([address]    Palafox's esophagus with dysplasia    Secondary restless legs syndrome    S/P colectomy    Malignant neoplasm of descending colon ([address]    Multiple subsegmental pulmonary emboli without acute cor pulmonale ([address]    S/P closure of ileostomy    Abdominal wall seroma       Subjective: doing well, pain controlled, no nausea, awaiting PICC, admits drainage has decreased from wound    Objective:  /68   Pulse 57   Temp 97.7 °F (36.5 °C) (Oral)   Resp 16   Ht 5' 1\" (1.549 m)   Wt 180 lb (81.6 kg)   LMP 01/05/2018   SpO2 95%   BMI 34.01 kg/m²   Labs:  Recent Labs     07/26/22  1147 07/29/22  0443   WBC 11.9* 5.3   HGB 14.4 11.9   HCT 43.5 36.7     Lab Results   Component Value Date    CREATININE 0.7 07/29/2022    BUN 9 07/29/2022     07/29/2022    K 4.4 07/29/2022     07/29/2022    CO2 27 07/29/2022     No results for input(s): LIPASE, AMYLASE in the last 72 hours.   CBC with Differential:    Lab Results   Component Value Date/Time    WBC 5.3 07/29/2022 04:43 AM    RBC 3.97 07/29/2022 04:43 AM    HGB 11.9 07/29/2022 04:43 AM    HCT 36.7 07/29/2022 04:43 AM     07/29/2022 04:43 AM    MCV 92.4 07/29/2022 04:43 AM    MCH 30.0 07/29/2022 04:43 AM    MCHC 32.4 07/29/2022 04:43 AM    RDW 12.2 07/29/2022 04:43 AM    METASPCT 0.9 11/23/2021 11:14 AM    LYMPHOPCT 28.2 07/29/2022 04:43 AM    MONOPCT 13.9 07/29/2022 04:43 AM    BASOPCT 0.8 07/29/2022 04:43 AM    MONOSABS 0.74 07/29/2022 04:43 AM    LYMPHSABS 1.50 07/29/2022 04:43 AM    EOSABS 0.21 07/29/2022 04:43 AM    BASOSABS 0.04 07/29/2022 04:43 AM       General appearance:  NAD  Head: NCAT, PERRLA, EOMI, red conjunctiva  Neck: supple, no masses  Lungs: CTAB, equal chest rise bilateral  Heart: Reg rate  Abdomen: soft, nondistended, tender appropriately, wound packed, dressing dry  Skin; no lesions  Gu: no cva tenderness  Extremities: extremities normal, atraumatic, no cyanosis or edema      Assessment/Plan:  Rich Matos is a 51 y.o. female s/p I&D abd wall abscess    PICC line  Abx per ID  Labs reviewed  Refusing home care assistance as she states she can pack wound  Follow up in wound care   Follow up in our office 2 weeks    Physician Signature: Electronically signed by Dr. Hans Sutherland  [phone] (p)  7/29/2022  8:10 AM

## 2022-07-29 NOTE — DISCHARGE SUMMARY
Physician Discharge Summary     Patient ID:  Aura Marshall  [id number]  85 y.o.  1975    Admit date: 7/26/2022    Discharge date and time: No discharge date for patient encounter. Admitting Physician: Vanessa Francisco MD     Admission Diagnoses: Abscess after procedure [T81.49XA]    Discharge Diagnoses: Principal Problem:    Abdominal wall seroma  Active Problems:    Abdominal wall abscess at site of surgical wound  Resolved Problems:    * No resolved hospital problems. *      Admission Condition: good    Discharged Condition: stable      Hospital Course:  Aura Marshall is a 51 y.o. female who presented on 7/26 for abdominal pain with erythema, induration, fluctuance inferolateral to her incisional scar. Work up revealed subcutaneous 6.6 cm abscess. Patient was admitted overnight and her home eliquis was held. Bedside incision and drainage with 20cc of purulence was performed on 7/27 and the procedure was tolerated well. Due to patient's previous abdominal infection with multidrug resistant proteus mirabilis, ID was consulted and patient was started on ertapenem. Patient needed PICC line placed to continue patient antibiotic therapy, however due to line placement team unavailability PICC line was unable to be placed until 7/29. The patient's course was otherwise uneventful. She progressed well, pain was controlled on PO medications. She was tolerating a regular diet with no nausea or vomiting, and was in a suitable condition for discharge to home in stable condition.       Consults:   IP CONSULT TO GENERAL SURGERY  IP CONSULT TO INFECTIOUS DISEASES  IP CONSULT TO HOME CARE NEEDS  IP CONSULT TO SOCIAL WORK  IP CONSULT TO PHARMACY    Significant Diagnostic Studies:   CT ABDOMEN PELVIS W IV CONTRAST Additional Contrast? None    Result Date: 7/26/2022  EXAMINATION: CT OF THE ABDOMEN AND PELVIS WITH CONTRAST 7/26/2022 2:12 pm TECHNIQUE: CT of the abdomen and pelvis was performed with the administration of intravenous contrast. Multiplanar reformatted images are provided for review. Automated exposure control, iterative reconstruction, and/or weight based adjustment of the mA/kV was utilized to reduce the radiation dose to as low as reasonably achievable. COMPARISON: CT abdomen and pelvis dated 04/01/2022 HISTORY: ORDERING SYSTEM PROVIDED HISTORY: abd pain/hx ostomy with reversal TECHNOLOGIST PROVIDED HISTORY: Additional Contrast?->None Reason for exam:->abd pain/hx ostomy with reversal Decision Support Exception - unselect if not a suspected or confirmed emergency medical condition->Emergency Medical Condition (MA) What reading provider will be dictating this exam?->CRC FINDINGS: Lower Chest: Lung bases are clear. Organs: Liver without focal lesion. Gallbladder surgically absent. Pancreas and spleen unremarkable. Adrenals without nodule. Kidneys without suspicious renal lesion and no hydronephrosis. GI/Bowel: Small hiatal hernia. Postsurgical changes from prior resection with stable appearance of colorectal anastomosis. No evidence for focal inflammatory changes at the anastomosis. Single bowel loop containing right anterolateral ventral abdominal wall hernia. Pelvis: No suspicious pelvic lesion or bulky pelvic adenopathy/free fluid. Peritoneum/Retroperitoneum: No bulky retroperitoneal adenopathy. No suspicious peritoneal or mesenteric process Vasculature: Grossly normal caliber of abdominal aorta and vasculature Bones/Soft Tissues: No acute osseous findings. Fluid density with surrounding edema right anterolateral abdominal wall 6.6 cm in maximum dimension without gas locules. Fluid density with surrounding edema right anterolateral abdominal wall 6.6 cm in maximum dimension without gas locules. Findings concerning for postsurgical fluid collection of hernia repair compared to prior. Some associated edema concerning for potential minimal inflammatory findings and or postsurgical changes.   No intra-abdominal fluid collection or abscess intra-abdominal.       Disposition: home    In process/preliminary results:  Outstanding Order Results       Date and Time Order Name Status Description    7/29/2022 10:30 AM XR CHEST PORTABLE In process     7/27/2022 10:25 AM Culture, Wound Preliminary             Patient Instructions:   Current Discharge Medication List        START taking these medications    Details   oxyCODONE (ROXICODONE) 5 MG immediate release tablet Take 1 tablet by mouth every 8 hours as needed for Pain for up to 5 days. Intended supply: 5 days. Take lowest dose possible to manage pain  Qty: 15 tablet, Refills: 0    Comments: Reduce doses taken as pain becomes manageable. Can be used for dressing changes. Associated Diagnoses: Abdominal wall abscess at site of surgical wound      ertapenem [facility]) infusion Infuse 1,000 mg intravenously every 24 hours for 13 days Compound per protocol. Qty: 13 g, Refills: 0           CONTINUE these medications which have NOT CHANGED    Details   nortriptyline (PAMELOR) 10 MG capsule TAKE 1 CAPSULE BY MOUTH NIGHTLY FOR HEADACHE  Qty: 30 capsule, Refills: 10      rOPINIRole (REQUIP) 0.5 MG tablet Take one tab every morning; one tab two hours before bed; and one tab at bedtime  Qty: 90 tablet, Refills: 3      vitamin D (D3-50) 03953 UNIT CAPS Take 1 capsule by mouth once a week  Qty: 4 capsule, Refills: 11      gabapentin (NEURONTIN) 400 MG capsule Take 1 capsule by mouth 3 times daily for 360 days.   Qty: 90 capsule, Refills: 11      baclofen (LIORESAL) 20 MG tablet Take 1 tablet by mouth 4 times daily as needed (muscle spasms; pain)  Qty: 360 tablet, Refills: 3      LISINOPRIL PO Take by mouth daily      famotidine (PEPCID) 40 MG tablet TAKE 1 TABLET BY MOUTH DAILY IN THE EVENING *EMERGENCY REFILL*      fluticasone (FLONASE) 50 MCG/ACT nasal spray 1 spray by Each Nostril route daily  Qty: 32 g, Refills: 1    Associated Diagnoses: Seasonal allergies      apixaban (ELIQUIS) 5 MG TABS tablet Take 1 tablet by mouth 2 times daily  Qty: 180 tablet, Refills: 1    Associated Diagnoses: Acute deep vein thrombosis (DVT) of iliac vein of left lower extremity (HCC)      ocrelizumab (OCREVUS) 300 MG/10ML SOLN injection Infuse 20 mLs intravenously every 6 months  Qty: 20 mL, Refills: 1           STOP taking these medications       VENTOLIN  (90 Base) MCG/ACT inhaler Comments:   Reason for Stopping:                  Follow up:   Vanessa Francisco MD  [address] [facility]  [phone]    Follow up in 2 week(s)  For wound re-check       Signed:  Karly Chowdhury DO  7/29/2022  10:56 AM

## 2022-07-29 NOTE — PLAN OF CARE
Problem: Skin/Tissue Integrity  Goal: Absence of new skin breakdown  Description: 1. Monitor for areas of redness and/or skin breakdown  2. Assess vascular access sites hourly  3. Every 4-6 hours minimum:  Change oxygen saturation probe site  4. Every 4-6 hours:  If on nasal continuous positive airway pressure, respiratory therapy assess nares and determine need for appliance change or resting period.   Outcome: Completed     Problem: Safety - Adult  Goal: Free from fall injury  Outcome: Completed     Problem: ABCDS Injury Assessment  Goal: Absence of physical injury  Outcome: Completed

## 2022-07-30 ENCOUNTER — HOSPITAL ENCOUNTER (OUTPATIENT)
Dept: INFUSION THERAPY | Age: 47
Setting detail: INFUSION SERIES
Discharge: HOME | End: 2022-07-30
Payer: COMMERCIAL

## 2022-07-30 VITALS
DIASTOLIC BLOOD PRESSURE: 83 MMHG | SYSTOLIC BLOOD PRESSURE: 135 MMHG | OXYGEN SATURATION: 100 % | RESPIRATION RATE: 18 BRPM | TEMPERATURE: 97.3 F | HEART RATE: 78 BPM

## 2022-07-30 DIAGNOSIS — S30.1XXD ABDOMINAL WALL SEROMA, SUBSEQUENT ENCOUNTER: Primary | ICD-10-CM

## 2022-07-30 PROCEDURE — 96365 THER/PROPH/DIAG IV INF INIT: CPT

## 2022-07-30 PROCEDURE — 2580000003 HC RX 258: Performed by: SPECIALIST

## 2022-07-30 PROCEDURE — 6360000002 HC RX W HCPCS: Performed by: SPECIALIST

## 2022-07-30 RX ORDER — SODIUM CHLORIDE 0.9 % (FLUSH) 0.9 %
5-40 SYRINGE (ML) INJECTION PRN
Status: CANCELLED | OUTPATIENT
Start: 2022-07-31

## 2022-07-30 RX ORDER — ALBUTEROL SULFATE 90 UG/1
4 AEROSOL, METERED RESPIRATORY (INHALATION) PRN
Status: CANCELLED | OUTPATIENT
Start: 2022-07-31

## 2022-07-30 RX ORDER — ACETAMINOPHEN 325 MG/1
650 TABLET ORAL
Status: CANCELLED | OUTPATIENT
Start: 2022-07-31

## 2022-07-30 RX ORDER — DIPHENHYDRAMINE HYDROCHLORIDE 50 MG/ML
50 INJECTION INTRAMUSCULAR; INTRAVENOUS
Status: CANCELLED | OUTPATIENT
Start: 2022-07-31

## 2022-07-30 RX ORDER — SODIUM CHLORIDE 9 MG/ML
INJECTION, SOLUTION INTRAVENOUS CONTINUOUS
Status: CANCELLED | OUTPATIENT
Start: 2022-07-31

## 2022-07-30 RX ORDER — SODIUM CHLORIDE 9 MG/ML
5-250 INJECTION, SOLUTION INTRAVENOUS PRN
Status: CANCELLED | OUTPATIENT
Start: 2022-07-31

## 2022-07-30 RX ORDER — HEPARIN SODIUM (PORCINE) LOCK FLUSH IV SOLN 100 UNIT/ML 100 UNIT/ML
500 SOLUTION INTRAVENOUS PRN
Status: CANCELLED | OUTPATIENT
Start: 2022-07-31

## 2022-07-30 RX ORDER — ONDANSETRON 2 MG/ML
8 INJECTION INTRAMUSCULAR; INTRAVENOUS
Status: CANCELLED | OUTPATIENT
Start: 2022-07-31

## 2022-07-30 RX ORDER — HEPARIN SODIUM (PORCINE) LOCK FLUSH IV SOLN 100 UNIT/ML 100 UNIT/ML
500 SOLUTION INTRAVENOUS PRN
Status: DISCONTINUED | OUTPATIENT
Start: 2022-07-30 | End: 2022-07-31 | Stop reason: HOSPADM

## 2022-07-30 RX ORDER — SODIUM CHLORIDE 0.9 % (FLUSH) 0.9 %
5-40 SYRINGE (ML) INJECTION PRN
Status: DISCONTINUED | OUTPATIENT
Start: 2022-07-30 | End: 2022-07-31 | Stop reason: HOSPADM

## 2022-07-30 RX ORDER — EPINEPHRINE 1 MG/ML
0.3 INJECTION, SOLUTION, CONCENTRATE INTRAVENOUS PRN
Status: CANCELLED | OUTPATIENT
Start: 2022-07-31

## 2022-07-30 RX ADMIN — HEPARIN 500 UNITS: 100 SYRINGE at 11:06

## 2022-07-30 RX ADMIN — ERTAPENEM SODIUM 1000 MG: 1 INJECTION, POWDER, LYOPHILIZED, FOR SOLUTION INTRAMUSCULAR; INTRAVENOUS at 10:34

## 2022-07-30 ASSESSMENT — PAIN - FUNCTIONAL ASSESSMENT: PAIN_FUNCTIONAL_ASSESSMENT: PREVENTS OR INTERFERES SOME ACTIVE ACTIVITIES AND ADLS

## 2022-07-30 ASSESSMENT — PAIN DESCRIPTION - ONSET: ONSET: ON-GOING

## 2022-07-30 ASSESSMENT — PAIN DESCRIPTION - PAIN TYPE: TYPE: SURGICAL PAIN

## 2022-07-30 ASSESSMENT — PAIN SCALES - GENERAL: PAINLEVEL_OUTOF10: 8

## 2022-07-30 ASSESSMENT — PAIN DESCRIPTION - DESCRIPTORS: DESCRIPTORS: BURNING;DISCOMFORT

## 2022-07-30 ASSESSMENT — PAIN DESCRIPTION - LOCATION: LOCATION: ABDOMEN

## 2022-07-30 ASSESSMENT — PAIN DESCRIPTION - FREQUENCY: FREQUENCY: CONTINUOUS

## 2022-07-30 ASSESSMENT — PAIN DESCRIPTION - ORIENTATION: ORIENTATION: RIGHT;LOWER

## 2022-07-30 NOTE — PROGRESS NOTES
Patient tolerated invanz infusion well. Patient alert and oriented x3. No distress noted. Vital signs stable. Patient denies any new or worsening pain. Patient denies any needs. All questions answered.

## 2022-07-31 ENCOUNTER — HOSPITAL ENCOUNTER (OUTPATIENT)
Dept: INFUSION THERAPY | Age: 47
Setting detail: INFUSION SERIES
Discharge: HOME | End: 2022-07-31
Payer: COMMERCIAL

## 2022-07-31 VITALS
HEART RATE: 72 BPM | SYSTOLIC BLOOD PRESSURE: 142 MMHG | DIASTOLIC BLOOD PRESSURE: 80 MMHG | RESPIRATION RATE: 18 BRPM | TEMPERATURE: 97.2 F

## 2022-07-31 DIAGNOSIS — S30.1XXD ABDOMINAL WALL SEROMA, SUBSEQUENT ENCOUNTER: Primary | ICD-10-CM

## 2022-07-31 LAB — WOUND/ABSCESS: NORMAL

## 2022-07-31 PROCEDURE — 2580000003 HC RX 258: Performed by: SPECIALIST

## 2022-07-31 PROCEDURE — 6360000002 HC RX W HCPCS: Performed by: SPECIALIST

## 2022-07-31 PROCEDURE — 96365 THER/PROPH/DIAG IV INF INIT: CPT

## 2022-07-31 RX ORDER — DIPHENHYDRAMINE HYDROCHLORIDE 50 MG/ML
50 INJECTION INTRAMUSCULAR; INTRAVENOUS
Status: CANCELLED | OUTPATIENT
Start: 2022-08-01

## 2022-07-31 RX ORDER — HEPARIN SODIUM (PORCINE) LOCK FLUSH IV SOLN 100 UNIT/ML 100 UNIT/ML
500 SOLUTION INTRAVENOUS PRN
Status: DISCONTINUED | OUTPATIENT
Start: 2022-07-31 | End: 2022-08-01 | Stop reason: HOSPADM

## 2022-07-31 RX ORDER — SODIUM CHLORIDE 9 MG/ML
INJECTION, SOLUTION INTRAVENOUS CONTINUOUS
Status: CANCELLED | OUTPATIENT
Start: 2022-08-01

## 2022-07-31 RX ORDER — SODIUM CHLORIDE 0.9 % (FLUSH) 0.9 %
5-40 SYRINGE (ML) INJECTION PRN
Status: DISCONTINUED | OUTPATIENT
Start: 2022-07-31 | End: 2022-08-01 | Stop reason: HOSPADM

## 2022-07-31 RX ORDER — HEPARIN SODIUM (PORCINE) LOCK FLUSH IV SOLN 100 UNIT/ML 100 UNIT/ML
500 SOLUTION INTRAVENOUS PRN
Status: CANCELLED | OUTPATIENT
Start: 2022-08-01

## 2022-07-31 RX ORDER — SODIUM CHLORIDE 0.9 % (FLUSH) 0.9 %
5-40 SYRINGE (ML) INJECTION PRN
Status: CANCELLED | OUTPATIENT
Start: 2022-08-01

## 2022-07-31 RX ORDER — EPINEPHRINE 1 MG/ML
0.3 INJECTION, SOLUTION, CONCENTRATE INTRAVENOUS PRN
Status: CANCELLED | OUTPATIENT
Start: 2022-08-01

## 2022-07-31 RX ORDER — ACETAMINOPHEN 325 MG/1
650 TABLET ORAL
Status: CANCELLED | OUTPATIENT
Start: 2022-08-01

## 2022-07-31 RX ORDER — SODIUM CHLORIDE 9 MG/ML
5-250 INJECTION, SOLUTION INTRAVENOUS PRN
Status: CANCELLED | OUTPATIENT
Start: 2022-08-01

## 2022-07-31 RX ORDER — ONDANSETRON 2 MG/ML
8 INJECTION INTRAMUSCULAR; INTRAVENOUS
Status: CANCELLED | OUTPATIENT
Start: 2022-08-01

## 2022-07-31 RX ORDER — ALBUTEROL SULFATE 90 UG/1
4 AEROSOL, METERED RESPIRATORY (INHALATION) PRN
Status: CANCELLED | OUTPATIENT
Start: 2022-08-01

## 2022-07-31 RX ADMIN — SODIUM CHLORIDE, PRESERVATIVE FREE 10 ML: 5 INJECTION INTRAVENOUS at 10:42

## 2022-07-31 RX ADMIN — SODIUM CHLORIDE, PRESERVATIVE FREE 10 ML: 5 INJECTION INTRAVENOUS at 11:10

## 2022-07-31 RX ADMIN — ERTAPENEM SODIUM 1000 MG: 1 INJECTION, POWDER, LYOPHILIZED, FOR SOLUTION INTRAMUSCULAR; INTRAVENOUS at 09:51

## 2022-07-31 RX ADMIN — HEPARIN 500 UNITS: 100 SYRINGE at 11:11

## 2022-07-31 ASSESSMENT — PAIN SCALES - GENERAL: PAINLEVEL_OUTOF10: 0

## 2022-08-01 ENCOUNTER — HOSPITAL ENCOUNTER (OUTPATIENT)
Dept: INFUSION THERAPY | Age: 47
Setting detail: INFUSION SERIES
Discharge: HOME | End: 2022-08-01
Payer: COMMERCIAL

## 2022-08-01 VITALS
RESPIRATION RATE: 18 BRPM | HEART RATE: 75 BPM | TEMPERATURE: 97.1 F | SYSTOLIC BLOOD PRESSURE: 130 MMHG | OXYGEN SATURATION: 100 % | DIASTOLIC BLOOD PRESSURE: 87 MMHG

## 2022-08-01 DIAGNOSIS — S30.1XXD ABDOMINAL WALL SEROMA, SUBSEQUENT ENCOUNTER: Primary | ICD-10-CM

## 2022-08-01 LAB
ALBUMIN SERPL-MCNC: 4.2 G/DL (ref 3.5–5.2)
ALP BLD-CCNC: 122 U/L (ref 35–104)
ALT SERPL-CCNC: 38 U/L (ref 0–32)
ANION GAP SERPL CALCULATED.3IONS-SCNC: 13 MMOL/L (ref 7–16)
AST SERPL-CCNC: 13 U/L (ref 0–31)
BASOPHILS ABSOLUTE: 0.06 E9/L (ref 0–0.2)
BASOPHILS RELATIVE PERCENT: 0.8 % (ref 0–2)
BILIRUB SERPL-MCNC: 0.2 MG/DL (ref 0–1.2)
BUN BLDV-MCNC: 10 MG/DL (ref 6–20)
C-REACTIVE PROTEIN: 1.7 MG/DL (ref 0–0.4)
CALCIUM SERPL-MCNC: 9.4 MG/DL (ref 8.6–10.2)
CHLORIDE BLD-SCNC: 105 MMOL/L (ref 98–107)
CO2: 24 MMOL/L (ref 22–29)
CREAT SERPL-MCNC: 0.6 MG/DL (ref 0.5–1)
EOSINOPHILS ABSOLUTE: 0.25 E9/L (ref 0.05–0.5)
EOSINOPHILS RELATIVE PERCENT: 3.5 % (ref 0–6)
GFR AFRICAN AMERICAN: >60
GFR NON-AFRICAN AMERICAN: >60 ML/MIN/1.73
GLUCOSE BLD-MCNC: 97 MG/DL (ref 74–99)
HCT VFR BLD CALC: 40.7 % (ref 34–48)
HEMOGLOBIN: 13.4 G/DL (ref 11.5–15.5)
IMMATURE GRANULOCYTES #: 0.07 E9/L
IMMATURE GRANULOCYTES %: 1 % (ref 0–5)
LYMPHOCYTES ABSOLUTE: 2.15 E9/L (ref 1.5–4)
LYMPHOCYTES RELATIVE PERCENT: 29.7 % (ref 20–42)
MCH RBC QN AUTO: 30 PG (ref 26–35)
MCHC RBC AUTO-ENTMCNC: 32.9 % (ref 32–34.5)
MCV RBC AUTO: 91.1 FL (ref 80–99.9)
MONOCYTES ABSOLUTE: 0.65 E9/L (ref 0.1–0.95)
MONOCYTES RELATIVE PERCENT: 9 % (ref 2–12)
NEUTROPHILS ABSOLUTE: 4.05 E9/L (ref 1.8–7.3)
NEUTROPHILS RELATIVE PERCENT: 56 % (ref 43–80)
PDW BLD-RTO: 12.3 FL (ref 11.5–15)
PLATELET # BLD: 281 E9/L (ref 130–450)
PMV BLD AUTO: 9.4 FL (ref 7–12)
POTASSIUM SERPL-SCNC: 4.5 MMOL/L (ref 3.5–5)
RBC # BLD: 4.47 E12/L (ref 3.5–5.5)
SEDIMENTATION RATE, ERYTHROCYTE: 27 MM/HR (ref 0–20)
SODIUM BLD-SCNC: 142 MMOL/L (ref 132–146)
TOTAL PROTEIN: 7.2 G/DL (ref 6.4–8.3)
WBC # BLD: 7.2 E9/L (ref 4.5–11.5)

## 2022-08-01 PROCEDURE — 96365 THER/PROPH/DIAG IV INF INIT: CPT

## 2022-08-01 PROCEDURE — 85651 RBC SED RATE NONAUTOMATED: CPT

## 2022-08-01 PROCEDURE — 2580000003 HC RX 258: Performed by: SPECIALIST

## 2022-08-01 PROCEDURE — 36415 COLL VENOUS BLD VENIPUNCTURE: CPT

## 2022-08-01 PROCEDURE — 86140 C-REACTIVE PROTEIN: CPT

## 2022-08-01 PROCEDURE — 85025 COMPLETE CBC W/AUTO DIFF WBC: CPT

## 2022-08-01 PROCEDURE — 6360000002 HC RX W HCPCS: Performed by: SPECIALIST

## 2022-08-01 PROCEDURE — 80053 COMPREHEN METABOLIC PANEL: CPT

## 2022-08-01 RX ORDER — SODIUM CHLORIDE 9 MG/ML
INJECTION, SOLUTION INTRAVENOUS CONTINUOUS
Status: CANCELLED | OUTPATIENT
Start: 2022-08-02

## 2022-08-01 RX ORDER — HEPARIN SODIUM (PORCINE) LOCK FLUSH IV SOLN 100 UNIT/ML 100 UNIT/ML
500 SOLUTION INTRAVENOUS PRN
Status: DISCONTINUED | OUTPATIENT
Start: 2022-08-01 | End: 2022-08-02 | Stop reason: HOSPADM

## 2022-08-01 RX ORDER — EPINEPHRINE 1 MG/ML
0.3 INJECTION, SOLUTION, CONCENTRATE INTRAVENOUS PRN
Status: CANCELLED | OUTPATIENT
Start: 2022-08-02

## 2022-08-01 RX ORDER — ALBUTEROL SULFATE 90 UG/1
4 AEROSOL, METERED RESPIRATORY (INHALATION) PRN
Status: CANCELLED | OUTPATIENT
Start: 2022-08-02

## 2022-08-01 RX ORDER — SODIUM CHLORIDE 9 MG/ML
5-250 INJECTION, SOLUTION INTRAVENOUS PRN
Status: CANCELLED | OUTPATIENT
Start: 2022-08-02

## 2022-08-01 RX ORDER — HEPARIN SODIUM (PORCINE) LOCK FLUSH IV SOLN 100 UNIT/ML 100 UNIT/ML
500 SOLUTION INTRAVENOUS PRN
Status: CANCELLED | OUTPATIENT
Start: 2022-08-02

## 2022-08-01 RX ORDER — SODIUM CHLORIDE 0.9 % (FLUSH) 0.9 %
5-40 SYRINGE (ML) INJECTION PRN
Status: DISCONTINUED | OUTPATIENT
Start: 2022-08-01 | End: 2022-08-02 | Stop reason: HOSPADM

## 2022-08-01 RX ORDER — ACETAMINOPHEN 325 MG/1
650 TABLET ORAL
Status: CANCELLED | OUTPATIENT
Start: 2022-08-02

## 2022-08-01 RX ORDER — DIPHENHYDRAMINE HYDROCHLORIDE 50 MG/ML
50 INJECTION INTRAMUSCULAR; INTRAVENOUS
Status: CANCELLED | OUTPATIENT
Start: 2022-08-02

## 2022-08-01 RX ORDER — SODIUM CHLORIDE 0.9 % (FLUSH) 0.9 %
5-40 SYRINGE (ML) INJECTION PRN
Status: CANCELLED | OUTPATIENT
Start: 2022-08-02

## 2022-08-01 RX ORDER — ONDANSETRON 2 MG/ML
8 INJECTION INTRAMUSCULAR; INTRAVENOUS
Status: CANCELLED | OUTPATIENT
Start: 2022-08-02

## 2022-08-01 RX ADMIN — SODIUM CHLORIDE, PRESERVATIVE FREE 10 ML: 5 INJECTION INTRAVENOUS at 09:29

## 2022-08-01 RX ADMIN — SODIUM CHLORIDE, PRESERVATIVE FREE 10 ML: 5 INJECTION INTRAVENOUS at 10:06

## 2022-08-01 RX ADMIN — HEPARIN 500 UNITS: 100 SYRINGE at 10:06

## 2022-08-01 RX ADMIN — ERTAPENEM SODIUM 1000 MG: 1 INJECTION INTRAMUSCULAR; INTRAVENOUS at 09:34

## 2022-08-01 RX ADMIN — SODIUM CHLORIDE, PRESERVATIVE FREE 10 ML: 5 INJECTION INTRAVENOUS at 09:27

## 2022-08-02 ENCOUNTER — HOSPITAL ENCOUNTER (OUTPATIENT)
Dept: INFUSION THERAPY | Age: 47
Setting detail: INFUSION SERIES
Discharge: HOME | End: 2022-08-02
Payer: COMMERCIAL

## 2022-08-02 VITALS
SYSTOLIC BLOOD PRESSURE: 122 MMHG | TEMPERATURE: 97.3 F | DIASTOLIC BLOOD PRESSURE: 87 MMHG | HEART RATE: 67 BPM | RESPIRATION RATE: 18 BRPM | OXYGEN SATURATION: 100 %

## 2022-08-02 DIAGNOSIS — S30.1XXD ABDOMINAL WALL SEROMA, SUBSEQUENT ENCOUNTER: Primary | ICD-10-CM

## 2022-08-02 PROCEDURE — 6360000002 HC RX W HCPCS: Performed by: SPECIALIST

## 2022-08-02 PROCEDURE — 2580000003 HC RX 258: Performed by: SPECIALIST

## 2022-08-02 PROCEDURE — 96365 THER/PROPH/DIAG IV INF INIT: CPT

## 2022-08-02 RX ORDER — EPINEPHRINE 1 MG/ML
0.3 INJECTION, SOLUTION, CONCENTRATE INTRAVENOUS PRN
Status: CANCELLED | OUTPATIENT
Start: 2022-08-03

## 2022-08-02 RX ORDER — SODIUM CHLORIDE 0.9 % (FLUSH) 0.9 %
5-40 SYRINGE (ML) INJECTION PRN
Status: DISCONTINUED | OUTPATIENT
Start: 2022-08-02 | End: 2022-08-03 | Stop reason: HOSPADM

## 2022-08-02 RX ORDER — HEPARIN SODIUM (PORCINE) LOCK FLUSH IV SOLN 100 UNIT/ML 100 UNIT/ML
500 SOLUTION INTRAVENOUS PRN
Status: DISCONTINUED | OUTPATIENT
Start: 2022-08-02 | End: 2022-08-03 | Stop reason: HOSPADM

## 2022-08-02 RX ORDER — SODIUM CHLORIDE 0.9 % (FLUSH) 0.9 %
SYRINGE (ML) INJECTION
Status: DISPENSED
Start: 2022-08-02 | End: 2022-08-02

## 2022-08-02 RX ORDER — HEPARIN SODIUM (PORCINE) LOCK FLUSH IV SOLN 100 UNIT/ML 100 UNIT/ML
500 SOLUTION INTRAVENOUS PRN
Status: CANCELLED | OUTPATIENT
Start: 2022-08-03

## 2022-08-02 RX ORDER — SODIUM CHLORIDE 9 MG/ML
5-250 INJECTION, SOLUTION INTRAVENOUS PRN
Status: CANCELLED | OUTPATIENT
Start: 2022-08-03

## 2022-08-02 RX ORDER — BACLOFEN 20 MG/1
20 TABLET ORAL 4 TIMES DAILY PRN
Qty: 360 TABLET | Refills: 3 | Status: SHIPPED | OUTPATIENT
Start: 2022-08-02

## 2022-08-02 RX ORDER — DIPHENHYDRAMINE HYDROCHLORIDE 50 MG/ML
50 INJECTION INTRAMUSCULAR; INTRAVENOUS
Status: CANCELLED | OUTPATIENT
Start: 2022-08-03

## 2022-08-02 RX ORDER — ACETAMINOPHEN 325 MG/1
650 TABLET ORAL
Status: CANCELLED | OUTPATIENT
Start: 2022-08-03

## 2022-08-02 RX ORDER — ONDANSETRON 2 MG/ML
8 INJECTION INTRAMUSCULAR; INTRAVENOUS
Status: CANCELLED | OUTPATIENT
Start: 2022-08-03

## 2022-08-02 RX ORDER — SODIUM CHLORIDE 9 MG/ML
INJECTION, SOLUTION INTRAVENOUS CONTINUOUS
Status: CANCELLED | OUTPATIENT
Start: 2022-08-03

## 2022-08-02 RX ORDER — SODIUM CHLORIDE 0.9 % (FLUSH) 0.9 %
5-40 SYRINGE (ML) INJECTION PRN
Status: CANCELLED | OUTPATIENT
Start: 2022-08-03

## 2022-08-02 RX ORDER — ROPINIROLE 0.5 MG/1
TABLET, FILM COATED ORAL
Qty: 270 TABLET | Refills: 3 | Status: SHIPPED | OUTPATIENT
Start: 2022-08-02

## 2022-08-02 RX ORDER — ALBUTEROL SULFATE 90 UG/1
4 AEROSOL, METERED RESPIRATORY (INHALATION) PRN
Status: CANCELLED | OUTPATIENT
Start: 2022-08-03

## 2022-08-02 RX ADMIN — SODIUM CHLORIDE, PRESERVATIVE FREE 10 ML: 5 INJECTION INTRAVENOUS at 11:06

## 2022-08-02 RX ADMIN — HEPARIN 500 UNITS: 100 SYRINGE at 11:40

## 2022-08-02 RX ADMIN — ERTAPENEM SODIUM 1000 MG: 1 INJECTION INTRAMUSCULAR; INTRAVENOUS at 11:06

## 2022-08-02 ASSESSMENT — PAIN SCALES - GENERAL: PAINLEVEL_OUTOF10: 0

## 2022-08-03 ENCOUNTER — HOSPITAL ENCOUNTER (OUTPATIENT)
Dept: INFUSION THERAPY | Age: 47
Setting detail: INFUSION SERIES
Discharge: HOME | End: 2022-08-03
Payer: COMMERCIAL

## 2022-08-03 VITALS
TEMPERATURE: 97.5 F | DIASTOLIC BLOOD PRESSURE: 84 MMHG | OXYGEN SATURATION: 100 % | SYSTOLIC BLOOD PRESSURE: 126 MMHG | HEART RATE: 73 BPM | RESPIRATION RATE: 18 BRPM

## 2022-08-03 DIAGNOSIS — S30.1XXD ABDOMINAL WALL SEROMA, SUBSEQUENT ENCOUNTER: Primary | ICD-10-CM

## 2022-08-03 PROCEDURE — 6360000002 HC RX W HCPCS: Performed by: SPECIALIST

## 2022-08-03 PROCEDURE — 96365 THER/PROPH/DIAG IV INF INIT: CPT

## 2022-08-03 PROCEDURE — 2580000003 HC RX 258: Performed by: SPECIALIST

## 2022-08-03 RX ORDER — DIPHENHYDRAMINE HYDROCHLORIDE 50 MG/ML
50 INJECTION INTRAMUSCULAR; INTRAVENOUS
Status: CANCELLED | OUTPATIENT
Start: 2022-08-04

## 2022-08-03 RX ORDER — EPINEPHRINE 1 MG/ML
0.3 INJECTION, SOLUTION, CONCENTRATE INTRAVENOUS PRN
Status: CANCELLED | OUTPATIENT
Start: 2022-08-04

## 2022-08-03 RX ORDER — ONDANSETRON 2 MG/ML
8 INJECTION INTRAMUSCULAR; INTRAVENOUS
Status: CANCELLED | OUTPATIENT
Start: 2022-08-04

## 2022-08-03 RX ORDER — ACETAMINOPHEN 325 MG/1
650 TABLET ORAL
Status: CANCELLED | OUTPATIENT
Start: 2022-08-04

## 2022-08-03 RX ORDER — HEPARIN SODIUM (PORCINE) LOCK FLUSH IV SOLN 100 UNIT/ML 100 UNIT/ML
500 SOLUTION INTRAVENOUS PRN
Status: CANCELLED | OUTPATIENT
Start: 2022-08-04

## 2022-08-03 RX ORDER — ALBUTEROL SULFATE 90 UG/1
4 AEROSOL, METERED RESPIRATORY (INHALATION) PRN
Status: CANCELLED | OUTPATIENT
Start: 2022-08-04

## 2022-08-03 RX ORDER — SODIUM CHLORIDE 9 MG/ML
INJECTION, SOLUTION INTRAVENOUS CONTINUOUS
Status: CANCELLED | OUTPATIENT
Start: 2022-08-04

## 2022-08-03 RX ORDER — SODIUM CHLORIDE 0.9 % (FLUSH) 0.9 %
5-40 SYRINGE (ML) INJECTION PRN
Status: DISCONTINUED | OUTPATIENT
Start: 2022-08-03 | End: 2022-08-04 | Stop reason: HOSPADM

## 2022-08-03 RX ORDER — SODIUM CHLORIDE 0.9 % (FLUSH) 0.9 %
5-40 SYRINGE (ML) INJECTION PRN
Status: CANCELLED | OUTPATIENT
Start: 2022-08-04

## 2022-08-03 RX ORDER — SODIUM CHLORIDE 9 MG/ML
5-250 INJECTION, SOLUTION INTRAVENOUS PRN
Status: CANCELLED | OUTPATIENT
Start: 2022-08-04

## 2022-08-03 RX ORDER — HEPARIN SODIUM (PORCINE) LOCK FLUSH IV SOLN 100 UNIT/ML 100 UNIT/ML
500 SOLUTION INTRAVENOUS PRN
Status: DISCONTINUED | OUTPATIENT
Start: 2022-08-03 | End: 2022-08-04 | Stop reason: HOSPADM

## 2022-08-03 RX ADMIN — ERTAPENEM SODIUM 1000 MG: 1 INJECTION INTRAMUSCULAR; INTRAVENOUS at 10:40

## 2022-08-03 RX ADMIN — SODIUM CHLORIDE, PRESERVATIVE FREE 10 ML: 5 INJECTION INTRAVENOUS at 11:10

## 2022-08-03 RX ADMIN — HEPARIN 500 UNITS: 100 SYRINGE at 11:10

## 2022-08-03 RX ADMIN — SODIUM CHLORIDE, PRESERVATIVE FREE 10 ML: 5 INJECTION INTRAVENOUS at 10:36

## 2022-08-03 ASSESSMENT — PAIN SCALES - GENERAL: PAINLEVEL_OUTOF10: 0

## 2022-08-03 NOTE — PROGRESS NOTES
Patient tolerated invanz infusion well. Patient alert and oriented x3. No distress noted. Vital signs stable. Patient denies any new or worsening pain. Offered patient education an/or discharge material.  Patient declined. Patient denies any needs. All questions answered.
32.3

## 2022-08-04 ENCOUNTER — HOSPITAL ENCOUNTER (OUTPATIENT)
Dept: INFUSION THERAPY | Age: 47
Setting detail: INFUSION SERIES
Discharge: HOME | End: 2022-08-04
Payer: COMMERCIAL

## 2022-08-04 VITALS
RESPIRATION RATE: 18 BRPM | HEART RATE: 69 BPM | TEMPERATURE: 97.2 F | SYSTOLIC BLOOD PRESSURE: 133 MMHG | OXYGEN SATURATION: 100 % | DIASTOLIC BLOOD PRESSURE: 80 MMHG

## 2022-08-04 DIAGNOSIS — S30.1XXD ABDOMINAL WALL SEROMA, SUBSEQUENT ENCOUNTER: Primary | ICD-10-CM

## 2022-08-04 LAB
ALBUMIN SERPL-MCNC: 4.2 G/DL (ref 3.5–5.2)
ALP BLD-CCNC: 102 U/L (ref 35–104)
ALT SERPL-CCNC: 22 U/L (ref 0–32)
ANION GAP SERPL CALCULATED.3IONS-SCNC: 11 MMOL/L (ref 7–16)
AST SERPL-CCNC: 12 U/L (ref 0–31)
BASOPHILS ABSOLUTE: 0.04 E9/L (ref 0–0.2)
BASOPHILS RELATIVE PERCENT: 0.7 % (ref 0–2)
BILIRUB SERPL-MCNC: 0.3 MG/DL (ref 0–1.2)
BUN BLDV-MCNC: 7 MG/DL (ref 6–20)
CALCIUM SERPL-MCNC: 9 MG/DL (ref 8.6–10.2)
CHLORIDE BLD-SCNC: 106 MMOL/L (ref 98–107)
CO2: 26 MMOL/L (ref 22–29)
CREAT SERPL-MCNC: 0.6 MG/DL (ref 0.5–1)
EOSINOPHILS ABSOLUTE: 0.21 E9/L (ref 0.05–0.5)
EOSINOPHILS RELATIVE PERCENT: 3.5 % (ref 0–6)
GFR AFRICAN AMERICAN: >60
GFR NON-AFRICAN AMERICAN: >60 ML/MIN/1.73
GLUCOSE BLD-MCNC: 92 MG/DL (ref 74–99)
HCT VFR BLD CALC: 38.5 % (ref 34–48)
HEMOGLOBIN: 12.8 G/DL (ref 11.5–15.5)
IMMATURE GRANULOCYTES #: 0.05 E9/L
IMMATURE GRANULOCYTES %: 0.8 % (ref 0–5)
LYMPHOCYTES ABSOLUTE: 2.33 E9/L (ref 1.5–4)
LYMPHOCYTES RELATIVE PERCENT: 38.6 % (ref 20–42)
MCH RBC QN AUTO: 29.6 PG (ref 26–35)
MCHC RBC AUTO-ENTMCNC: 33.2 % (ref 32–34.5)
MCV RBC AUTO: 88.9 FL (ref 80–99.9)
MONOCYTES ABSOLUTE: 0.48 E9/L (ref 0.1–0.95)
MONOCYTES RELATIVE PERCENT: 7.9 % (ref 2–12)
NEUTROPHILS ABSOLUTE: 2.93 E9/L (ref 1.8–7.3)
NEUTROPHILS RELATIVE PERCENT: 48.5 % (ref 43–80)
PDW BLD-RTO: 12.2 FL (ref 11.5–15)
PLATELET # BLD: 290 E9/L (ref 130–450)
PMV BLD AUTO: 9.2 FL (ref 7–12)
POTASSIUM SERPL-SCNC: 4.1 MMOL/L (ref 3.5–5)
RBC # BLD: 4.33 E12/L (ref 3.5–5.5)
SODIUM BLD-SCNC: 143 MMOL/L (ref 132–146)
TOTAL PROTEIN: 6.8 G/DL (ref 6.4–8.3)
WBC # BLD: 6 E9/L (ref 4.5–11.5)

## 2022-08-04 PROCEDURE — 6360000002 HC RX W HCPCS: Performed by: SPECIALIST

## 2022-08-04 PROCEDURE — 2580000003 HC RX 258: Performed by: SPECIALIST

## 2022-08-04 PROCEDURE — 85025 COMPLETE CBC W/AUTO DIFF WBC: CPT

## 2022-08-04 PROCEDURE — 36592 COLLECT BLOOD FROM PICC: CPT

## 2022-08-04 PROCEDURE — 80053 COMPREHEN METABOLIC PANEL: CPT

## 2022-08-04 PROCEDURE — 96365 THER/PROPH/DIAG IV INF INIT: CPT

## 2022-08-04 PROCEDURE — 36415 COLL VENOUS BLD VENIPUNCTURE: CPT

## 2022-08-04 RX ORDER — SODIUM CHLORIDE 9 MG/ML
INJECTION, SOLUTION INTRAVENOUS CONTINUOUS
Status: CANCELLED | OUTPATIENT
Start: 2022-08-05

## 2022-08-04 RX ORDER — ONDANSETRON 2 MG/ML
8 INJECTION INTRAMUSCULAR; INTRAVENOUS
Status: CANCELLED | OUTPATIENT
Start: 2022-08-05

## 2022-08-04 RX ORDER — SODIUM CHLORIDE 9 MG/ML
5-250 INJECTION, SOLUTION INTRAVENOUS PRN
Status: CANCELLED | OUTPATIENT
Start: 2022-08-05

## 2022-08-04 RX ORDER — HEPARIN SODIUM (PORCINE) LOCK FLUSH IV SOLN 100 UNIT/ML 100 UNIT/ML
500 SOLUTION INTRAVENOUS PRN
Status: CANCELLED | OUTPATIENT
Start: 2022-08-05

## 2022-08-04 RX ORDER — ACETAMINOPHEN 325 MG/1
650 TABLET ORAL
Status: CANCELLED | OUTPATIENT
Start: 2022-08-05

## 2022-08-04 RX ORDER — SODIUM CHLORIDE 0.9 % (FLUSH) 0.9 %
5-40 SYRINGE (ML) INJECTION PRN
Status: CANCELLED | OUTPATIENT
Start: 2022-08-05

## 2022-08-04 RX ORDER — ALBUTEROL SULFATE 90 UG/1
4 AEROSOL, METERED RESPIRATORY (INHALATION) PRN
Status: CANCELLED | OUTPATIENT
Start: 2022-08-05

## 2022-08-04 RX ORDER — EPINEPHRINE 1 MG/ML
0.3 INJECTION, SOLUTION, CONCENTRATE INTRAVENOUS PRN
Status: CANCELLED | OUTPATIENT
Start: 2022-08-05

## 2022-08-04 RX ORDER — SODIUM CHLORIDE 0.9 % (FLUSH) 0.9 %
5-40 SYRINGE (ML) INJECTION PRN
Status: DISCONTINUED | OUTPATIENT
Start: 2022-08-04 | End: 2022-08-05 | Stop reason: HOSPADM

## 2022-08-04 RX ORDER — DIPHENHYDRAMINE HYDROCHLORIDE 50 MG/ML
50 INJECTION INTRAMUSCULAR; INTRAVENOUS
Status: CANCELLED | OUTPATIENT
Start: 2022-08-05

## 2022-08-04 RX ORDER — HEPARIN SODIUM (PORCINE) LOCK FLUSH IV SOLN 100 UNIT/ML 100 UNIT/ML
500 SOLUTION INTRAVENOUS PRN
Status: DISCONTINUED | OUTPATIENT
Start: 2022-08-04 | End: 2022-08-05 | Stop reason: HOSPADM

## 2022-08-04 RX ADMIN — ERTAPENEM SODIUM 1000 MG: 1 INJECTION INTRAMUSCULAR; INTRAVENOUS at 09:57

## 2022-08-04 RX ADMIN — HEPARIN 500 UNITS: 100 SYRINGE at 10:26

## 2022-08-04 RX ADMIN — SODIUM CHLORIDE, PRESERVATIVE FREE 10 ML: 5 INJECTION INTRAVENOUS at 09:49

## 2022-08-04 RX ADMIN — SODIUM CHLORIDE, PRESERVATIVE FREE 10 ML: 5 INJECTION INTRAVENOUS at 09:52

## 2022-08-04 RX ADMIN — SODIUM CHLORIDE, PRESERVATIVE FREE 10 ML: 5 INJECTION INTRAVENOUS at 10:26

## 2022-08-04 RX ADMIN — SODIUM CHLORIDE, PRESERVATIVE FREE 10 ML: 5 INJECTION INTRAVENOUS at 09:50

## 2022-08-05 ENCOUNTER — HOSPITAL ENCOUNTER (OUTPATIENT)
Dept: INFUSION THERAPY | Age: 47
Setting detail: INFUSION SERIES
Discharge: HOME | End: 2022-08-05
Payer: COMMERCIAL

## 2022-08-05 VITALS
SYSTOLIC BLOOD PRESSURE: 123 MMHG | TEMPERATURE: 96.8 F | RESPIRATION RATE: 18 BRPM | HEART RATE: 77 BPM | DIASTOLIC BLOOD PRESSURE: 77 MMHG

## 2022-08-05 DIAGNOSIS — S30.1XXD ABDOMINAL WALL SEROMA, SUBSEQUENT ENCOUNTER: Primary | ICD-10-CM

## 2022-08-05 PROCEDURE — 2580000003 HC RX 258: Performed by: SPECIALIST

## 2022-08-05 PROCEDURE — 96365 THER/PROPH/DIAG IV INF INIT: CPT

## 2022-08-05 PROCEDURE — 6360000002 HC RX W HCPCS: Performed by: SPECIALIST

## 2022-08-05 RX ORDER — SODIUM CHLORIDE 0.9 % (FLUSH) 0.9 %
5-40 SYRINGE (ML) INJECTION PRN
Status: CANCELLED | OUTPATIENT
Start: 2022-08-06

## 2022-08-05 RX ORDER — DIPHENHYDRAMINE HYDROCHLORIDE 50 MG/ML
50 INJECTION INTRAMUSCULAR; INTRAVENOUS
Status: CANCELLED | OUTPATIENT
Start: 2022-08-06

## 2022-08-05 RX ORDER — SODIUM CHLORIDE 9 MG/ML
5-250 INJECTION, SOLUTION INTRAVENOUS PRN
Status: CANCELLED | OUTPATIENT
Start: 2022-08-06

## 2022-08-05 RX ORDER — HEPARIN SODIUM (PORCINE) LOCK FLUSH IV SOLN 100 UNIT/ML 100 UNIT/ML
500 SOLUTION INTRAVENOUS PRN
Status: DISCONTINUED | OUTPATIENT
Start: 2022-08-05 | End: 2022-08-06 | Stop reason: HOSPADM

## 2022-08-05 RX ORDER — SODIUM CHLORIDE 0.9 % (FLUSH) 0.9 %
5-40 SYRINGE (ML) INJECTION PRN
Status: DISCONTINUED | OUTPATIENT
Start: 2022-08-05 | End: 2022-08-06 | Stop reason: HOSPADM

## 2022-08-05 RX ORDER — ACETAMINOPHEN 325 MG/1
650 TABLET ORAL
Status: CANCELLED | OUTPATIENT
Start: 2022-08-06

## 2022-08-05 RX ORDER — ALBUTEROL SULFATE 90 UG/1
4 AEROSOL, METERED RESPIRATORY (INHALATION) PRN
Status: CANCELLED | OUTPATIENT
Start: 2022-08-06

## 2022-08-05 RX ORDER — HEPARIN SODIUM (PORCINE) LOCK FLUSH IV SOLN 100 UNIT/ML 100 UNIT/ML
500 SOLUTION INTRAVENOUS PRN
Status: CANCELLED | OUTPATIENT
Start: 2022-08-06

## 2022-08-05 RX ORDER — EPINEPHRINE 1 MG/ML
0.3 INJECTION, SOLUTION, CONCENTRATE INTRAVENOUS PRN
Status: CANCELLED | OUTPATIENT
Start: 2022-08-06

## 2022-08-05 RX ORDER — SODIUM CHLORIDE 9 MG/ML
INJECTION, SOLUTION INTRAVENOUS CONTINUOUS
Status: CANCELLED | OUTPATIENT
Start: 2022-08-06

## 2022-08-05 RX ORDER — ONDANSETRON 2 MG/ML
8 INJECTION INTRAMUSCULAR; INTRAVENOUS
Status: CANCELLED | OUTPATIENT
Start: 2022-08-06

## 2022-08-05 RX ADMIN — SODIUM CHLORIDE, PRESERVATIVE FREE 10 ML: 5 INJECTION INTRAVENOUS at 11:17

## 2022-08-05 RX ADMIN — SODIUM CHLORIDE, PRESERVATIVE FREE 10 ML: 5 INJECTION INTRAVENOUS at 10:49

## 2022-08-05 RX ADMIN — HEPARIN 500 UNITS: 100 SYRINGE at 11:17

## 2022-08-05 RX ADMIN — ERTAPENEM SODIUM 1000 MG: 1 INJECTION INTRAMUSCULAR; INTRAVENOUS at 10:52

## 2022-08-06 ENCOUNTER — HOSPITAL ENCOUNTER (OUTPATIENT)
Dept: INFUSION THERAPY | Age: 47
Setting detail: INFUSION SERIES
Discharge: HOME | End: 2022-08-06
Payer: COMMERCIAL

## 2022-08-06 VITALS — RESPIRATION RATE: 18 BRPM | SYSTOLIC BLOOD PRESSURE: 112 MMHG | DIASTOLIC BLOOD PRESSURE: 74 MMHG | TEMPERATURE: 98.4 F

## 2022-08-06 DIAGNOSIS — S30.1XXD ABDOMINAL WALL SEROMA, SUBSEQUENT ENCOUNTER: Primary | ICD-10-CM

## 2022-08-06 PROCEDURE — 2580000003 HC RX 258: Performed by: SPECIALIST

## 2022-08-06 PROCEDURE — 96365 THER/PROPH/DIAG IV INF INIT: CPT

## 2022-08-06 PROCEDURE — 6360000002 HC RX W HCPCS: Performed by: SPECIALIST

## 2022-08-06 RX ORDER — SODIUM CHLORIDE 0.9 % (FLUSH) 0.9 %
5-40 SYRINGE (ML) INJECTION PRN
Status: DISCONTINUED | OUTPATIENT
Start: 2022-08-06 | End: 2022-08-07 | Stop reason: HOSPADM

## 2022-08-06 RX ORDER — EPINEPHRINE 1 MG/ML
0.3 INJECTION, SOLUTION, CONCENTRATE INTRAVENOUS PRN
Status: CANCELLED | OUTPATIENT
Start: 2022-08-07

## 2022-08-06 RX ORDER — ACETAMINOPHEN 325 MG/1
650 TABLET ORAL
Status: CANCELLED | OUTPATIENT
Start: 2022-08-07

## 2022-08-06 RX ORDER — HEPARIN SODIUM (PORCINE) LOCK FLUSH IV SOLN 100 UNIT/ML 100 UNIT/ML
500 SOLUTION INTRAVENOUS PRN
Status: CANCELLED | OUTPATIENT
Start: 2022-08-07

## 2022-08-06 RX ORDER — ONDANSETRON 2 MG/ML
8 INJECTION INTRAMUSCULAR; INTRAVENOUS
Status: CANCELLED | OUTPATIENT
Start: 2022-08-07

## 2022-08-06 RX ORDER — SODIUM CHLORIDE 9 MG/ML
INJECTION, SOLUTION INTRAVENOUS CONTINUOUS
Status: CANCELLED | OUTPATIENT
Start: 2022-08-07

## 2022-08-06 RX ORDER — DIPHENHYDRAMINE HYDROCHLORIDE 50 MG/ML
50 INJECTION INTRAMUSCULAR; INTRAVENOUS
Status: CANCELLED | OUTPATIENT
Start: 2022-08-07

## 2022-08-06 RX ORDER — SODIUM CHLORIDE 0.9 % (FLUSH) 0.9 %
5-40 SYRINGE (ML) INJECTION PRN
Status: CANCELLED | OUTPATIENT
Start: 2022-08-07

## 2022-08-06 RX ORDER — SODIUM CHLORIDE 9 MG/ML
5-250 INJECTION, SOLUTION INTRAVENOUS PRN
Status: CANCELLED | OUTPATIENT
Start: 2022-08-07

## 2022-08-06 RX ORDER — ALBUTEROL SULFATE 90 UG/1
4 AEROSOL, METERED RESPIRATORY (INHALATION) PRN
Status: CANCELLED | OUTPATIENT
Start: 2022-08-07

## 2022-08-06 RX ORDER — HEPARIN SODIUM (PORCINE) LOCK FLUSH IV SOLN 100 UNIT/ML 100 UNIT/ML
500 SOLUTION INTRAVENOUS PRN
Status: DISCONTINUED | OUTPATIENT
Start: 2022-08-06 | End: 2022-08-07 | Stop reason: HOSPADM

## 2022-08-06 RX ADMIN — ERTAPENEM SODIUM 1000 MG: 1 INJECTION, POWDER, LYOPHILIZED, FOR SOLUTION INTRAMUSCULAR; INTRAVENOUS at 10:09

## 2022-08-06 RX ADMIN — SODIUM CHLORIDE, PRESERVATIVE FREE 10 ML: 5 INJECTION INTRAVENOUS at 10:20

## 2022-08-06 RX ADMIN — SODIUM CHLORIDE, PRESERVATIVE FREE 10 ML: 5 INJECTION INTRAVENOUS at 10:38

## 2022-08-06 RX ADMIN — HEPARIN 500 UNITS: 100 SYRINGE at 10:39

## 2022-08-07 ENCOUNTER — HOSPITAL ENCOUNTER (OUTPATIENT)
Dept: INFUSION THERAPY | Age: 47
Setting detail: INFUSION SERIES
Discharge: HOME | End: 2022-08-07
Payer: COMMERCIAL

## 2022-08-07 VITALS
RESPIRATION RATE: 18 BRPM | SYSTOLIC BLOOD PRESSURE: 115 MMHG | DIASTOLIC BLOOD PRESSURE: 76 MMHG | HEART RATE: 80 BPM | TEMPERATURE: 96.4 F

## 2022-08-07 DIAGNOSIS — S30.1XXD ABDOMINAL WALL SEROMA, SUBSEQUENT ENCOUNTER: Primary | ICD-10-CM

## 2022-08-07 PROCEDURE — 2580000003 HC RX 258: Performed by: SPECIALIST

## 2022-08-07 PROCEDURE — 6360000002 HC RX W HCPCS: Performed by: SPECIALIST

## 2022-08-07 PROCEDURE — 96365 THER/PROPH/DIAG IV INF INIT: CPT

## 2022-08-07 RX ORDER — ONDANSETRON 2 MG/ML
8 INJECTION INTRAMUSCULAR; INTRAVENOUS
Status: CANCELLED | OUTPATIENT
Start: 2022-08-08

## 2022-08-07 RX ORDER — ACETAMINOPHEN 325 MG/1
650 TABLET ORAL
Status: CANCELLED | OUTPATIENT
Start: 2022-08-08

## 2022-08-07 RX ORDER — DIPHENHYDRAMINE HYDROCHLORIDE 50 MG/ML
50 INJECTION INTRAMUSCULAR; INTRAVENOUS
Status: CANCELLED | OUTPATIENT
Start: 2022-08-08

## 2022-08-07 RX ORDER — HEPARIN SODIUM (PORCINE) LOCK FLUSH IV SOLN 100 UNIT/ML 100 UNIT/ML
500 SOLUTION INTRAVENOUS PRN
Status: DISCONTINUED | OUTPATIENT
Start: 2022-08-07 | End: 2022-08-08 | Stop reason: HOSPADM

## 2022-08-07 RX ORDER — SODIUM CHLORIDE 0.9 % (FLUSH) 0.9 %
5-40 SYRINGE (ML) INJECTION PRN
Status: DISCONTINUED | OUTPATIENT
Start: 2022-08-07 | End: 2022-08-08 | Stop reason: HOSPADM

## 2022-08-07 RX ORDER — ALBUTEROL SULFATE 90 UG/1
4 AEROSOL, METERED RESPIRATORY (INHALATION) PRN
Status: CANCELLED | OUTPATIENT
Start: 2022-08-08

## 2022-08-07 RX ORDER — SODIUM CHLORIDE 9 MG/ML
INJECTION, SOLUTION INTRAVENOUS CONTINUOUS
Status: CANCELLED | OUTPATIENT
Start: 2022-08-08

## 2022-08-07 RX ORDER — HEPARIN SODIUM (PORCINE) LOCK FLUSH IV SOLN 100 UNIT/ML 100 UNIT/ML
500 SOLUTION INTRAVENOUS PRN
Status: CANCELLED | OUTPATIENT
Start: 2022-08-08

## 2022-08-07 RX ORDER — EPINEPHRINE 1 MG/ML
0.3 INJECTION, SOLUTION, CONCENTRATE INTRAVENOUS PRN
Status: CANCELLED | OUTPATIENT
Start: 2022-08-08

## 2022-08-07 RX ORDER — SODIUM CHLORIDE 9 MG/ML
5-250 INJECTION, SOLUTION INTRAVENOUS PRN
Status: CANCELLED | OUTPATIENT
Start: 2022-08-08

## 2022-08-07 RX ORDER — SODIUM CHLORIDE 0.9 % (FLUSH) 0.9 %
5-40 SYRINGE (ML) INJECTION PRN
Status: CANCELLED | OUTPATIENT
Start: 2022-08-08

## 2022-08-07 RX ADMIN — HEPARIN 500 UNITS: 100 SYRINGE at 10:42

## 2022-08-07 RX ADMIN — ERTAPENEM SODIUM 1000 MG: 1 INJECTION, POWDER, LYOPHILIZED, FOR SOLUTION INTRAMUSCULAR; INTRAVENOUS at 10:15

## 2022-08-07 RX ADMIN — SODIUM CHLORIDE, PRESERVATIVE FREE 10 ML: 5 INJECTION INTRAVENOUS at 10:42

## 2022-08-07 RX ADMIN — SODIUM CHLORIDE, PRESERVATIVE FREE 10 ML: 5 INJECTION INTRAVENOUS at 10:15

## 2022-08-07 ASSESSMENT — PAIN SCALES - GENERAL: PAINLEVEL_OUTOF10: 0

## 2022-08-08 ENCOUNTER — HOSPITAL ENCOUNTER (OUTPATIENT)
Dept: INFUSION THERAPY | Age: 47
Setting detail: INFUSION SERIES
Discharge: HOME | End: 2022-08-08
Payer: COMMERCIAL

## 2022-08-08 VITALS
RESPIRATION RATE: 18 BRPM | SYSTOLIC BLOOD PRESSURE: 126 MMHG | HEART RATE: 81 BPM | DIASTOLIC BLOOD PRESSURE: 70 MMHG | OXYGEN SATURATION: 100 % | TEMPERATURE: 97.2 F

## 2022-08-08 DIAGNOSIS — S30.1XXD ABDOMINAL WALL SEROMA, SUBSEQUENT ENCOUNTER: Primary | ICD-10-CM

## 2022-08-08 LAB
ALBUMIN SERPL-MCNC: 4.4 G/DL (ref 3.5–5.2)
ALP BLD-CCNC: 109 U/L (ref 35–104)
ALT SERPL-CCNC: 21 U/L (ref 0–32)
ANION GAP SERPL CALCULATED.3IONS-SCNC: 15 MMOL/L (ref 7–16)
AST SERPL-CCNC: 15 U/L (ref 0–31)
BASOPHILS ABSOLUTE: 0.05 E9/L (ref 0–0.2)
BASOPHILS RELATIVE PERCENT: 0.7 % (ref 0–2)
BILIRUB SERPL-MCNC: 0.2 MG/DL (ref 0–1.2)
BUN BLDV-MCNC: 10 MG/DL (ref 6–20)
C-REACTIVE PROTEIN: 0.6 MG/DL (ref 0–0.4)
CALCIUM SERPL-MCNC: 9.4 MG/DL (ref 8.6–10.2)
CHLORIDE BLD-SCNC: 106 MMOL/L (ref 98–107)
CO2: 21 MMOL/L (ref 22–29)
CREAT SERPL-MCNC: 0.7 MG/DL (ref 0.5–1)
EOSINOPHILS ABSOLUTE: 0.3 E9/L (ref 0.05–0.5)
EOSINOPHILS RELATIVE PERCENT: 4 % (ref 0–6)
GFR AFRICAN AMERICAN: >60
GFR NON-AFRICAN AMERICAN: >60 ML/MIN/1.73
GLUCOSE BLD-MCNC: 162 MG/DL (ref 74–99)
HCT VFR BLD CALC: 42.2 % (ref 34–48)
HEMOGLOBIN: 13.4 G/DL (ref 11.5–15.5)
IMMATURE GRANULOCYTES #: 0.05 E9/L
IMMATURE GRANULOCYTES %: 0.7 % (ref 0–5)
LYMPHOCYTES ABSOLUTE: 2.23 E9/L (ref 1.5–4)
LYMPHOCYTES RELATIVE PERCENT: 29.8 % (ref 20–42)
MCH RBC QN AUTO: 29.8 PG (ref 26–35)
MCHC RBC AUTO-ENTMCNC: 31.8 % (ref 32–34.5)
MCV RBC AUTO: 94 FL (ref 80–99.9)
MONOCYTES ABSOLUTE: 0.45 E9/L (ref 0.1–0.95)
MONOCYTES RELATIVE PERCENT: 6 % (ref 2–12)
NEUTROPHILS ABSOLUTE: 4.41 E9/L (ref 1.8–7.3)
NEUTROPHILS RELATIVE PERCENT: 58.8 % (ref 43–80)
PDW BLD-RTO: 12.5 FL (ref 11.5–15)
PLATELET # BLD: 313 E9/L (ref 130–450)
PMV BLD AUTO: 9.2 FL (ref 7–12)
POTASSIUM SERPL-SCNC: 3.8 MMOL/L (ref 3.5–5)
RBC # BLD: 4.49 E12/L (ref 3.5–5.5)
REASON FOR REJECTION: NORMAL
REJECTED TEST: NORMAL
SODIUM BLD-SCNC: 142 MMOL/L (ref 132–146)
TOTAL PROTEIN: 7.3 G/DL (ref 6.4–8.3)
WBC # BLD: 7.5 E9/L (ref 4.5–11.5)

## 2022-08-08 PROCEDURE — 2580000003 HC RX 258: Performed by: SPECIALIST

## 2022-08-08 PROCEDURE — 96365 THER/PROPH/DIAG IV INF INIT: CPT

## 2022-08-08 PROCEDURE — 85025 COMPLETE CBC W/AUTO DIFF WBC: CPT

## 2022-08-08 PROCEDURE — 36592 COLLECT BLOOD FROM PICC: CPT

## 2022-08-08 PROCEDURE — 86140 C-REACTIVE PROTEIN: CPT

## 2022-08-08 PROCEDURE — 80053 COMPREHEN METABOLIC PANEL: CPT

## 2022-08-08 PROCEDURE — 6360000002 HC RX W HCPCS: Performed by: SPECIALIST

## 2022-08-08 RX ORDER — ALBUTEROL SULFATE 90 UG/1
4 AEROSOL, METERED RESPIRATORY (INHALATION) PRN
Status: CANCELLED | OUTPATIENT
Start: 2022-08-09

## 2022-08-08 RX ORDER — ONDANSETRON 2 MG/ML
8 INJECTION INTRAMUSCULAR; INTRAVENOUS
Status: CANCELLED | OUTPATIENT
Start: 2022-08-09

## 2022-08-08 RX ORDER — SODIUM CHLORIDE 0.9 % (FLUSH) 0.9 %
5-40 SYRINGE (ML) INJECTION PRN
Status: DISCONTINUED | OUTPATIENT
Start: 2022-08-08 | End: 2022-08-09 | Stop reason: HOSPADM

## 2022-08-08 RX ORDER — HEPARIN SODIUM (PORCINE) LOCK FLUSH IV SOLN 100 UNIT/ML 100 UNIT/ML
500 SOLUTION INTRAVENOUS PRN
Status: DISCONTINUED | OUTPATIENT
Start: 2022-08-08 | End: 2022-08-09 | Stop reason: HOSPADM

## 2022-08-08 RX ORDER — SODIUM CHLORIDE 9 MG/ML
5-250 INJECTION, SOLUTION INTRAVENOUS PRN
Status: CANCELLED | OUTPATIENT
Start: 2022-08-09

## 2022-08-08 RX ORDER — HEPARIN SODIUM (PORCINE) LOCK FLUSH IV SOLN 100 UNIT/ML 100 UNIT/ML
500 SOLUTION INTRAVENOUS PRN
Status: CANCELLED | OUTPATIENT
Start: 2022-08-09

## 2022-08-08 RX ORDER — EPINEPHRINE 1 MG/ML
0.3 INJECTION, SOLUTION, CONCENTRATE INTRAVENOUS PRN
Status: CANCELLED | OUTPATIENT
Start: 2022-08-09

## 2022-08-08 RX ORDER — ACETAMINOPHEN 325 MG/1
650 TABLET ORAL
Status: CANCELLED | OUTPATIENT
Start: 2022-08-09

## 2022-08-08 RX ORDER — SODIUM CHLORIDE 0.9 % (FLUSH) 0.9 %
5-40 SYRINGE (ML) INJECTION PRN
Status: CANCELLED | OUTPATIENT
Start: 2022-08-09

## 2022-08-08 RX ORDER — SODIUM CHLORIDE 9 MG/ML
INJECTION, SOLUTION INTRAVENOUS CONTINUOUS
Status: CANCELLED | OUTPATIENT
Start: 2022-08-09

## 2022-08-08 RX ORDER — DIPHENHYDRAMINE HYDROCHLORIDE 50 MG/ML
50 INJECTION INTRAMUSCULAR; INTRAVENOUS
Status: CANCELLED | OUTPATIENT
Start: 2022-08-09

## 2022-08-08 RX ADMIN — SODIUM CHLORIDE, PRESERVATIVE FREE 10 ML: 5 INJECTION INTRAVENOUS at 12:51

## 2022-08-08 RX ADMIN — HEPARIN 500 UNITS: 100 SYRINGE at 12:51

## 2022-08-08 RX ADMIN — ERTAPENEM SODIUM 1000 MG: 1 INJECTION INTRAMUSCULAR; INTRAVENOUS at 12:21

## 2022-08-09 ENCOUNTER — HOSPITAL ENCOUNTER (OUTPATIENT)
Dept: INFUSION THERAPY | Age: 47
Setting detail: INFUSION SERIES
Discharge: HOME | End: 2022-08-09
Payer: COMMERCIAL

## 2022-08-09 VITALS
OXYGEN SATURATION: 100 % | TEMPERATURE: 97.5 F | HEART RATE: 81 BPM | DIASTOLIC BLOOD PRESSURE: 73 MMHG | RESPIRATION RATE: 20 BRPM | SYSTOLIC BLOOD PRESSURE: 112 MMHG

## 2022-08-09 DIAGNOSIS — S30.1XXD ABDOMINAL WALL SEROMA, SUBSEQUENT ENCOUNTER: Primary | ICD-10-CM

## 2022-08-09 PROCEDURE — 2580000003 HC RX 258: Performed by: SPECIALIST

## 2022-08-09 PROCEDURE — 6360000002 HC RX W HCPCS: Performed by: SPECIALIST

## 2022-08-09 PROCEDURE — 96365 THER/PROPH/DIAG IV INF INIT: CPT

## 2022-08-09 RX ORDER — ACETAMINOPHEN 325 MG/1
650 TABLET ORAL
Status: CANCELLED | OUTPATIENT
Start: 2022-08-10

## 2022-08-09 RX ORDER — ONDANSETRON 2 MG/ML
8 INJECTION INTRAMUSCULAR; INTRAVENOUS
Status: CANCELLED | OUTPATIENT
Start: 2022-08-10

## 2022-08-09 RX ORDER — ALBUTEROL SULFATE 90 UG/1
4 AEROSOL, METERED RESPIRATORY (INHALATION) PRN
Status: CANCELLED | OUTPATIENT
Start: 2022-08-10

## 2022-08-09 RX ORDER — DIPHENHYDRAMINE HYDROCHLORIDE 50 MG/ML
50 INJECTION INTRAMUSCULAR; INTRAVENOUS
Status: CANCELLED | OUTPATIENT
Start: 2022-08-10

## 2022-08-09 RX ORDER — EPINEPHRINE 1 MG/ML
0.3 INJECTION, SOLUTION, CONCENTRATE INTRAVENOUS PRN
Status: CANCELLED | OUTPATIENT
Start: 2022-08-10

## 2022-08-09 RX ORDER — SODIUM CHLORIDE 9 MG/ML
5-250 INJECTION, SOLUTION INTRAVENOUS PRN
Status: CANCELLED | OUTPATIENT
Start: 2022-08-10

## 2022-08-09 RX ORDER — HEPARIN SODIUM (PORCINE) LOCK FLUSH IV SOLN 100 UNIT/ML 100 UNIT/ML
500 SOLUTION INTRAVENOUS PRN
Status: CANCELLED | OUTPATIENT
Start: 2022-08-10

## 2022-08-09 RX ORDER — SODIUM CHLORIDE 0.9 % (FLUSH) 0.9 %
5-40 SYRINGE (ML) INJECTION PRN
Status: CANCELLED | OUTPATIENT
Start: 2022-08-10

## 2022-08-09 RX ORDER — SODIUM CHLORIDE 0.9 % (FLUSH) 0.9 %
5-40 SYRINGE (ML) INJECTION PRN
Status: DISCONTINUED | OUTPATIENT
Start: 2022-08-09 | End: 2022-08-10 | Stop reason: HOSPADM

## 2022-08-09 RX ORDER — SODIUM CHLORIDE 9 MG/ML
INJECTION, SOLUTION INTRAVENOUS CONTINUOUS
Status: CANCELLED | OUTPATIENT
Start: 2022-08-10

## 2022-08-09 RX ORDER — HEPARIN SODIUM (PORCINE) LOCK FLUSH IV SOLN 100 UNIT/ML 100 UNIT/ML
500 SOLUTION INTRAVENOUS PRN
Status: DISCONTINUED | OUTPATIENT
Start: 2022-08-09 | End: 2022-08-10 | Stop reason: HOSPADM

## 2022-08-09 RX ADMIN — HEPARIN 500 UNITS: 100 SYRINGE at 10:14

## 2022-08-09 RX ADMIN — SODIUM CHLORIDE, PRESERVATIVE FREE 10 ML: 5 INJECTION INTRAVENOUS at 09:41

## 2022-08-09 RX ADMIN — SODIUM CHLORIDE, PRESERVATIVE FREE 10 ML: 5 INJECTION INTRAVENOUS at 10:14

## 2022-08-09 RX ADMIN — ERTAPENEM SODIUM 1000 MG: 1 INJECTION INTRAMUSCULAR; INTRAVENOUS at 09:45

## 2022-08-09 NOTE — PROGRESS NOTES
Patient tolerated invanz infusion well. Patient alert and oriented x3. No distress noted. Vital signs stable. Patient denies any new or worsening pain. Offered patient education and/or discharge material.  Patient declined. Patient denies any needs. All questions answered.

## 2022-08-10 ENCOUNTER — HOSPITAL ENCOUNTER (OUTPATIENT)
Dept: INFUSION THERAPY | Age: 47
Setting detail: INFUSION SERIES
Discharge: HOME | End: 2022-08-10
Payer: COMMERCIAL

## 2022-08-10 VITALS
OXYGEN SATURATION: 100 % | DIASTOLIC BLOOD PRESSURE: 80 MMHG | SYSTOLIC BLOOD PRESSURE: 117 MMHG | HEART RATE: 85 BPM | TEMPERATURE: 97.3 F | RESPIRATION RATE: 18 BRPM

## 2022-08-10 DIAGNOSIS — S30.1XXD ABDOMINAL WALL SEROMA, SUBSEQUENT ENCOUNTER: Primary | ICD-10-CM

## 2022-08-10 PROCEDURE — 96365 THER/PROPH/DIAG IV INF INIT: CPT

## 2022-08-10 PROCEDURE — 36593 DECLOT VASCULAR DEVICE: CPT

## 2022-08-10 PROCEDURE — 2580000003 HC RX 258

## 2022-08-10 PROCEDURE — 6360000002 HC RX W HCPCS: Performed by: SPECIALIST

## 2022-08-10 PROCEDURE — 2580000003 HC RX 258: Performed by: SPECIALIST

## 2022-08-10 RX ORDER — HEPARIN SODIUM (PORCINE) LOCK FLUSH IV SOLN 100 UNIT/ML 100 UNIT/ML
500 SOLUTION INTRAVENOUS PRN
Status: CANCELLED | OUTPATIENT
Start: 2022-08-11

## 2022-08-10 RX ORDER — ACETAMINOPHEN 325 MG/1
650 TABLET ORAL
Status: CANCELLED | OUTPATIENT
Start: 2022-08-11

## 2022-08-10 RX ORDER — SODIUM CHLORIDE 9 MG/ML
INJECTION, SOLUTION INTRAVENOUS CONTINUOUS
Status: CANCELLED | OUTPATIENT
Start: 2022-08-11

## 2022-08-10 RX ORDER — ALBUTEROL SULFATE 90 UG/1
4 AEROSOL, METERED RESPIRATORY (INHALATION) PRN
Status: CANCELLED | OUTPATIENT
Start: 2022-08-11

## 2022-08-10 RX ORDER — SODIUM CHLORIDE 0.9 % (FLUSH) 0.9 %
5-40 SYRINGE (ML) INJECTION PRN
Status: DISCONTINUED | OUTPATIENT
Start: 2022-08-10 | End: 2022-08-11 | Stop reason: HOSPADM

## 2022-08-10 RX ORDER — ONDANSETRON 2 MG/ML
8 INJECTION INTRAMUSCULAR; INTRAVENOUS
Status: CANCELLED | OUTPATIENT
Start: 2022-08-11

## 2022-08-10 RX ORDER — SODIUM CHLORIDE 9 MG/ML
5-250 INJECTION, SOLUTION INTRAVENOUS PRN
Status: CANCELLED | OUTPATIENT
Start: 2022-08-11

## 2022-08-10 RX ORDER — OCRELIZUMAB 300 MG/10ML
600 INJECTION INTRAVENOUS
Qty: 20 ML | Refills: 1 | Status: SHIPPED | OUTPATIENT
Start: 2022-08-10

## 2022-08-10 RX ORDER — HEPARIN SODIUM (PORCINE) LOCK FLUSH IV SOLN 100 UNIT/ML 100 UNIT/ML
500 SOLUTION INTRAVENOUS PRN
Status: DISCONTINUED | OUTPATIENT
Start: 2022-08-10 | End: 2022-08-11 | Stop reason: HOSPADM

## 2022-08-10 RX ORDER — DIPHENHYDRAMINE HYDROCHLORIDE 50 MG/ML
50 INJECTION INTRAMUSCULAR; INTRAVENOUS
Status: CANCELLED | OUTPATIENT
Start: 2022-08-11

## 2022-08-10 RX ORDER — SODIUM CHLORIDE 0.9 % (FLUSH) 0.9 %
5-40 SYRINGE (ML) INJECTION PRN
Status: CANCELLED | OUTPATIENT
Start: 2022-08-11

## 2022-08-10 RX ORDER — EPINEPHRINE 1 MG/ML
0.3 INJECTION, SOLUTION, CONCENTRATE INTRAVENOUS PRN
Status: CANCELLED | OUTPATIENT
Start: 2022-08-11

## 2022-08-10 RX ADMIN — WATER 2.2 ML: 1 INJECTION INTRAMUSCULAR; INTRAVENOUS; SUBCUTANEOUS at 11:05

## 2022-08-10 RX ADMIN — SODIUM CHLORIDE, PRESERVATIVE FREE 10 ML: 5 INJECTION INTRAVENOUS at 10:35

## 2022-08-10 RX ADMIN — SODIUM CHLORIDE, PRESERVATIVE FREE 10 ML: 5 INJECTION INTRAVENOUS at 11:44

## 2022-08-10 RX ADMIN — HEPARIN 500 UNITS: 100 SYRINGE at 11:45

## 2022-08-10 RX ADMIN — SODIUM CHLORIDE, PRESERVATIVE FREE 10 ML: 5 INJECTION INTRAVENOUS at 11:01

## 2022-08-10 RX ADMIN — ERTAPENEM SODIUM 1000 MG: 1 INJECTION INTRAMUSCULAR; INTRAVENOUS at 10:33

## 2022-08-10 RX ADMIN — ALTEPLASE 2 MG: 2.2 INJECTION, POWDER, LYOPHILIZED, FOR SOLUTION INTRAVENOUS at 11:05

## 2022-08-10 NOTE — PROGRESS NOTES
Patient tolerated invanz infusion well. Used cath dmitriy to declot her line and that was successful after 30 minutes of dwell time. Patient alert and oriented x3. No distress noted. Vital signs stable. Patient denies any new or worsening pain. Offered patient education an/or discharge material.  Patient declined. Patient denies any needs. All questions answered.

## 2022-08-11 RX ORDER — ACETAMINOPHEN 325 MG/1
650 TABLET ORAL ONCE
Status: CANCELLED | OUTPATIENT
Start: 2022-08-11

## 2022-08-11 RX ORDER — METHYLPREDNISOLONE SODIUM SUCCINATE 125 MG/2ML
125 INJECTION, POWDER, LYOPHILIZED, FOR SOLUTION INTRAMUSCULAR; INTRAVENOUS ONCE
OUTPATIENT
Start: 2022-08-12 | End: 2022-08-12

## 2022-08-11 RX ORDER — SODIUM CHLORIDE 0.9 % (FLUSH) 0.9 %
10 SYRINGE (ML) INJECTION PRN
Status: CANCELLED | OUTPATIENT
Start: 2022-08-12

## 2022-08-11 RX ORDER — SODIUM CHLORIDE 0.9 % (FLUSH) 0.9 %
10 SYRINGE (ML) INJECTION PRN
Status: CANCELLED | OUTPATIENT
Start: 2022-08-11

## 2022-08-11 RX ORDER — DIPHENHYDRAMINE HYDROCHLORIDE 50 MG/ML
50 INJECTION INTRAMUSCULAR; INTRAVENOUS ONCE
Status: CANCELLED | OUTPATIENT
Start: 2022-08-11 | End: 2022-08-11

## 2022-08-11 RX ORDER — DIPHENHYDRAMINE HYDROCHLORIDE 50 MG/ML
25 INJECTION INTRAMUSCULAR; INTRAVENOUS ONCE
Status: CANCELLED | OUTPATIENT
Start: 2022-08-11

## 2022-08-11 RX ORDER — METHYLPREDNISOLONE SODIUM SUCCINATE 125 MG/2ML
100 INJECTION, POWDER, LYOPHILIZED, FOR SOLUTION INTRAMUSCULAR; INTRAVENOUS ONCE
Status: CANCELLED | OUTPATIENT
Start: 2022-08-11

## 2022-08-11 RX ORDER — ACETAMINOPHEN 325 MG/1
650 TABLET ORAL ONCE
Status: CANCELLED | OUTPATIENT
Start: 2022-08-12

## 2022-08-11 RX ORDER — SODIUM CHLORIDE 9 MG/ML
INJECTION, SOLUTION INTRAVENOUS CONTINUOUS
Status: CANCELLED | OUTPATIENT
Start: 2022-08-12

## 2022-08-11 RX ORDER — DIPHENHYDRAMINE HYDROCHLORIDE 50 MG/ML
50 INJECTION INTRAMUSCULAR; INTRAVENOUS ONCE
OUTPATIENT
Start: 2022-08-12 | End: 2022-08-12

## 2022-08-11 RX ORDER — SODIUM CHLORIDE 9 MG/ML
INJECTION, SOLUTION INTRAVENOUS CONTINUOUS
OUTPATIENT
Start: 2022-08-12

## 2022-08-11 RX ORDER — HEPARIN SODIUM (PORCINE) LOCK FLUSH IV SOLN 100 UNIT/ML 100 UNIT/ML
500 SOLUTION INTRAVENOUS PRN
Status: CANCELLED | OUTPATIENT
Start: 2022-08-11

## 2022-08-11 RX ORDER — EPINEPHRINE 1 MG/ML
0.3 INJECTION, SOLUTION, CONCENTRATE INTRAVENOUS PRN
OUTPATIENT
Start: 2022-08-12

## 2022-08-11 RX ORDER — METHYLPREDNISOLONE SODIUM SUCCINATE 125 MG/2ML
100 INJECTION, POWDER, LYOPHILIZED, FOR SOLUTION INTRAMUSCULAR; INTRAVENOUS ONCE
Status: CANCELLED | OUTPATIENT
Start: 2022-08-12

## 2022-08-11 RX ORDER — DIPHENHYDRAMINE HYDROCHLORIDE 50 MG/ML
25 INJECTION INTRAMUSCULAR; INTRAVENOUS ONCE
Status: CANCELLED | OUTPATIENT
Start: 2022-08-12

## 2022-08-11 RX ORDER — SODIUM CHLORIDE 9 MG/ML
INJECTION, SOLUTION INTRAVENOUS CONTINUOUS
Status: CANCELLED | OUTPATIENT
Start: 2022-08-11

## 2022-08-11 RX ORDER — EPINEPHRINE 1 MG/ML
0.3 INJECTION, SOLUTION, CONCENTRATE INTRAVENOUS PRN
Status: CANCELLED | OUTPATIENT
Start: 2022-08-11

## 2022-08-11 RX ORDER — METHYLPREDNISOLONE SODIUM SUCCINATE 125 MG/2ML
125 INJECTION, POWDER, LYOPHILIZED, FOR SOLUTION INTRAMUSCULAR; INTRAVENOUS ONCE
Status: CANCELLED | OUTPATIENT
Start: 2022-08-11 | End: 2022-08-11

## 2022-08-11 RX ORDER — HEPARIN SODIUM (PORCINE) LOCK FLUSH IV SOLN 100 UNIT/ML 100 UNIT/ML
500 SOLUTION INTRAVENOUS PRN
OUTPATIENT
Start: 2022-08-12

## 2022-08-26 RX ORDER — LISINOPRIL AND HYDROCHLOROTHIAZIDE 12.5; 1 MG/1; MG/1
TABLET ORAL
Qty: 30 TABLET | Refills: 10 | OUTPATIENT
Start: 2022-08-26

## 2022-08-29 ENCOUNTER — HOSPITAL ENCOUNTER (EMERGENCY)
Age: 47
Discharge: LEFT BEFORE DISCHARGE | End: 2022-08-29
Payer: COMMERCIAL

## 2022-08-29 VITALS
RESPIRATION RATE: 19 BRPM | HEART RATE: 77 BPM | WEIGHT: 190 LBS | HEIGHT: 61 IN | BODY MASS INDEX: 35.87 KG/M2 | OXYGEN SATURATION: 98 % | DIASTOLIC BLOOD PRESSURE: 106 MMHG | TEMPERATURE: 97.8 F | SYSTOLIC BLOOD PRESSURE: 149 MMHG

## 2022-08-29 DIAGNOSIS — Z53.21 ELOPED FROM EMERGENCY DEPARTMENT: Primary | ICD-10-CM

## 2022-08-29 LAB
ALBUMIN SERPL-MCNC: 4.5 G/DL (ref 3.5–5.2)
ALP BLD-CCNC: 105 U/L (ref 35–104)
ALT SERPL-CCNC: 18 U/L (ref 0–32)
ANION GAP SERPL CALCULATED.3IONS-SCNC: 10 MMOL/L (ref 7–16)
AST SERPL-CCNC: 13 U/L (ref 0–31)
BASOPHILS ABSOLUTE: 0.05 E9/L (ref 0–0.2)
BASOPHILS RELATIVE PERCENT: 0.6 % (ref 0–2)
BILIRUB SERPL-MCNC: 0.3 MG/DL (ref 0–1.2)
BILIRUBIN URINE: NEGATIVE
BLOOD, URINE: NEGATIVE
BUN BLDV-MCNC: 5 MG/DL (ref 6–20)
CALCIUM SERPL-MCNC: 9.5 MG/DL (ref 8.6–10.2)
CHLORIDE BLD-SCNC: 107 MMOL/L (ref 98–107)
CLARITY: CLEAR
CO2: 23 MMOL/L (ref 22–29)
COLOR: YELLOW
CREAT SERPL-MCNC: 0.8 MG/DL (ref 0.5–1)
EOSINOPHILS ABSOLUTE: 0.24 E9/L (ref 0.05–0.5)
EOSINOPHILS RELATIVE PERCENT: 3 % (ref 0–6)
GFR AFRICAN AMERICAN: >60
GFR NON-AFRICAN AMERICAN: >60 ML/MIN/1.73
GLUCOSE BLD-MCNC: 97 MG/DL (ref 74–99)
GLUCOSE URINE: NEGATIVE MG/DL
HCT VFR BLD CALC: 41.5 % (ref 34–48)
HEMOGLOBIN: 13.9 G/DL (ref 11.5–15.5)
IMMATURE GRANULOCYTES #: 0.04 E9/L
IMMATURE GRANULOCYTES %: 0.5 % (ref 0–5)
KETONES, URINE: NEGATIVE MG/DL
LACTIC ACID, SEPSIS: 0.7 MMOL/L (ref 0.5–1.9)
LEUKOCYTE ESTERASE, URINE: NEGATIVE
LIPASE: 15 U/L (ref 13–60)
LYMPHOCYTES ABSOLUTE: 2.92 E9/L (ref 1.5–4)
LYMPHOCYTES RELATIVE PERCENT: 36 % (ref 20–42)
MCH RBC QN AUTO: 30 PG (ref 26–35)
MCHC RBC AUTO-ENTMCNC: 33.5 % (ref 32–34.5)
MCV RBC AUTO: 89.6 FL (ref 80–99.9)
MONOCYTES ABSOLUTE: 0.65 E9/L (ref 0.1–0.95)
MONOCYTES RELATIVE PERCENT: 8 % (ref 2–12)
NEUTROPHILS ABSOLUTE: 4.2 E9/L (ref 1.8–7.3)
NEUTROPHILS RELATIVE PERCENT: 51.9 % (ref 43–80)
NITRITE, URINE: NEGATIVE
PDW BLD-RTO: 13 FL (ref 11.5–15)
PH UA: 6 (ref 5–9)
PLATELET # BLD: 238 E9/L (ref 130–450)
PMV BLD AUTO: 9.6 FL (ref 7–12)
POTASSIUM SERPL-SCNC: 4.4 MMOL/L (ref 3.5–5)
PROTEIN UA: NEGATIVE MG/DL
RBC # BLD: 4.63 E12/L (ref 3.5–5.5)
SODIUM BLD-SCNC: 140 MMOL/L (ref 132–146)
SPECIFIC GRAVITY UA: >=1.03 (ref 1–1.03)
TOTAL PROTEIN: 7.2 G/DL (ref 6.4–8.3)
UROBILINOGEN, URINE: 0.2 E.U./DL
WBC # BLD: 8.1 E9/L (ref 4.5–11.5)

## 2022-08-29 PROCEDURE — 83690 ASSAY OF LIPASE: CPT

## 2022-08-29 PROCEDURE — 36415 COLL VENOUS BLD VENIPUNCTURE: CPT

## 2022-08-29 PROCEDURE — 85025 COMPLETE CBC W/AUTO DIFF WBC: CPT

## 2022-08-29 PROCEDURE — 81003 URINALYSIS AUTO W/O SCOPE: CPT

## 2022-08-29 PROCEDURE — 83605 ASSAY OF LACTIC ACID: CPT

## 2022-08-29 PROCEDURE — 80053 COMPREHEN METABOLIC PANEL: CPT

## 2022-08-29 PROCEDURE — 4500000002 HC ER NO CHARGE

## 2022-08-29 ASSESSMENT — PAIN DESCRIPTION - DESCRIPTORS: DESCRIPTORS: THROBBING

## 2022-08-29 ASSESSMENT — PAIN DESCRIPTION - PAIN TYPE: TYPE: ACUTE PAIN

## 2022-08-29 ASSESSMENT — PAIN DESCRIPTION - ORIENTATION: ORIENTATION: RIGHT;LEFT;UPPER

## 2022-08-29 ASSESSMENT — PAIN DESCRIPTION - LOCATION: LOCATION: ABDOMEN

## 2022-08-29 ASSESSMENT — PAIN SCALES - GENERAL: PAINLEVEL_OUTOF10: 6

## 2022-08-29 ASSESSMENT — PAIN - FUNCTIONAL ASSESSMENT: PAIN_FUNCTIONAL_ASSESSMENT: 0-10

## 2022-08-30 NOTE — PROGRESS NOTES
Date:  8/30/2022          Dear Reji Bee: This is to inform you that, as per [facility] Physical Therapy department policy, your patient Param Aguirre is as of todays date being discharged from Physical Therapy secondary to the following reasons:    If a Physical Therapy outpatient misses three consecutive scheduled appointments   without any prior notification, he or she will be discharged from physical therapy services. A new prescription will be necessary to resume physical therapy. If a client is absent for 50% of scheduled visits during a one-month period, he or she will be discharged from physical therapy services. A new prescription will be necessary to resume physical therapy. Last date of service:  7/14/22       If you have any questions, please feel free to call at [phone]      Thank you          Aminta Gallo, PT    [phone]    The information contained in this Fax the designated recipients intend message only for the personal and confidential use named above. This information is to be handled as privileged and confidential.  If the reader of this message is not the intended recipient, you are hereby notified that you have received this document in error and that any review, dissemination, distribution or copying of this message is strictly prohibited. If you receive this communication in error, please notify us immediately at the above number and return the original to us by mail.   Thank you      [address] [place], [place] [id number] [phone]

## 2022-09-07 ENCOUNTER — TELEPHONE (OUTPATIENT)
Dept: PRIMARY CARE CLINIC | Age: 47
End: 2022-09-07

## 2022-09-07 NOTE — TELEPHONE ENCOUNTER
[facility] calling pt needs refill on lisinopril /hctz 10/12.5mg daily this is not on med list     advise

## 2022-09-08 RX ORDER — LISINOPRIL AND HYDROCHLOROTHIAZIDE 12.5; 1 MG/1; MG/1
1 TABLET ORAL DAILY
Qty: 30 TABLET | Refills: 5 | Status: SHIPPED | OUTPATIENT
Start: 2022-09-08

## 2022-09-13 DIAGNOSIS — I82.422 ACUTE DEEP VEIN THROMBOSIS (DVT) OF ILIAC VEIN OF LEFT LOWER EXTREMITY (HCC): ICD-10-CM

## 2022-09-14 DIAGNOSIS — I82.422 ACUTE DEEP VEIN THROMBOSIS (DVT) OF ILIAC VEIN OF LEFT LOWER EXTREMITY (HCC): ICD-10-CM

## 2022-09-14 RX ORDER — APIXABAN 5 MG/1
TABLET, FILM COATED ORAL
Qty: 60 TABLET | Refills: 5 | Status: SHIPPED
Start: 2022-09-14 | End: 2022-09-15 | Stop reason: SDUPTHER

## 2022-09-15 ENCOUNTER — HOSPITAL ENCOUNTER (OUTPATIENT)
Dept: INFUSION THERAPY | Age: 47
Setting detail: INFUSION SERIES
Discharge: HOME | End: 2022-09-15
Payer: COMMERCIAL

## 2022-09-15 VITALS
HEART RATE: 80 BPM | OXYGEN SATURATION: 100 % | SYSTOLIC BLOOD PRESSURE: 122 MMHG | TEMPERATURE: 97.5 F | DIASTOLIC BLOOD PRESSURE: 68 MMHG | RESPIRATION RATE: 16 BRPM

## 2022-09-15 DIAGNOSIS — G35 MULTIPLE SCLEROSIS (HCC): Primary | ICD-10-CM

## 2022-09-15 PROCEDURE — 6360000002 HC RX W HCPCS: Performed by: NURSE PRACTITIONER

## 2022-09-15 PROCEDURE — 96366 THER/PROPH/DIAG IV INF ADDON: CPT

## 2022-09-15 PROCEDURE — 2580000003 HC RX 258

## 2022-09-15 PROCEDURE — 2580000003 HC RX 258: Performed by: NURSE PRACTITIONER

## 2022-09-15 PROCEDURE — 96375 TX/PRO/DX INJ NEW DRUG ADDON: CPT

## 2022-09-15 PROCEDURE — 96365 THER/PROPH/DIAG IV INF INIT: CPT

## 2022-09-15 PROCEDURE — 6370000000 HC RX 637 (ALT 250 FOR IP): Performed by: NURSE PRACTITIONER

## 2022-09-15 RX ORDER — METHYLPREDNISOLONE SODIUM SUCCINATE 125 MG/2ML
125 INJECTION, POWDER, LYOPHILIZED, FOR SOLUTION INTRAMUSCULAR; INTRAVENOUS ONCE
OUTPATIENT
Start: 2023-01-26 | End: 2023-01-26

## 2022-09-15 RX ORDER — SODIUM CHLORIDE 9 MG/ML
INJECTION, SOLUTION INTRAVENOUS CONTINUOUS
OUTPATIENT
Start: 2023-01-26

## 2022-09-15 RX ORDER — DIPHENHYDRAMINE HYDROCHLORIDE 50 MG/ML
50 INJECTION INTRAMUSCULAR; INTRAVENOUS ONCE
OUTPATIENT
Start: 2023-01-26 | End: 2023-01-26

## 2022-09-15 RX ORDER — DIPHENHYDRAMINE HYDROCHLORIDE 50 MG/ML
25 INJECTION INTRAMUSCULAR; INTRAVENOUS ONCE
OUTPATIENT
Start: 2023-01-26

## 2022-09-15 RX ORDER — SODIUM CHLORIDE 0.9 % (FLUSH) 0.9 %
SYRINGE (ML) INJECTION
Status: COMPLETED
Start: 2022-09-15 | End: 2022-09-15

## 2022-09-15 RX ORDER — SODIUM CHLORIDE 0.9 % (FLUSH) 0.9 %
10 SYRINGE (ML) INJECTION PRN
Status: DISCONTINUED | OUTPATIENT
Start: 2022-09-15 | End: 2022-09-16 | Stop reason: HOSPADM

## 2022-09-15 RX ORDER — METHYLPREDNISOLONE SODIUM SUCCINATE 125 MG/2ML
100 INJECTION, POWDER, LYOPHILIZED, FOR SOLUTION INTRAMUSCULAR; INTRAVENOUS ONCE
OUTPATIENT
Start: 2023-01-26

## 2022-09-15 RX ORDER — ACETAMINOPHEN 325 MG/1
650 TABLET ORAL ONCE
OUTPATIENT
Start: 2023-01-26

## 2022-09-15 RX ORDER — EPINEPHRINE 1 MG/ML
0.3 INJECTION, SOLUTION, CONCENTRATE INTRAVENOUS PRN
OUTPATIENT
Start: 2023-01-26

## 2022-09-15 RX ORDER — DIPHENHYDRAMINE HYDROCHLORIDE 50 MG/ML
25 INJECTION INTRAMUSCULAR; INTRAVENOUS ONCE
Status: COMPLETED | OUTPATIENT
Start: 2022-09-15 | End: 2022-09-15

## 2022-09-15 RX ORDER — SODIUM CHLORIDE 0.9 % (FLUSH) 0.9 %
10 SYRINGE (ML) INJECTION PRN
OUTPATIENT
Start: 2023-01-26

## 2022-09-15 RX ORDER — ACETAMINOPHEN 325 MG/1
650 TABLET ORAL ONCE
Status: COMPLETED | OUTPATIENT
Start: 2022-09-15 | End: 2022-09-15

## 2022-09-15 RX ORDER — METHYLPREDNISOLONE SODIUM SUCCINATE 125 MG/2ML
100 INJECTION, POWDER, LYOPHILIZED, FOR SOLUTION INTRAMUSCULAR; INTRAVENOUS ONCE
Status: COMPLETED | OUTPATIENT
Start: 2022-09-15 | End: 2022-09-15

## 2022-09-15 RX ORDER — SODIUM CHLORIDE 9 MG/ML
INJECTION, SOLUTION INTRAVENOUS CONTINUOUS
Status: ACTIVE | OUTPATIENT
Start: 2022-09-15 | End: 2022-09-15

## 2022-09-15 RX ORDER — APIXABAN 5 MG/1
TABLET, FILM COATED ORAL
Qty: 60 TABLET | Refills: 2 | Status: SHIPPED
Start: 2022-09-15 | End: 2022-09-22 | Stop reason: SDUPTHER

## 2022-09-15 RX ORDER — HEPARIN SODIUM (PORCINE) LOCK FLUSH IV SOLN 100 UNIT/ML 100 UNIT/ML
500 SOLUTION INTRAVENOUS PRN
OUTPATIENT
Start: 2023-01-26

## 2022-09-15 RX ADMIN — ACETAMINOPHEN 650 MG: 325 TABLET, FILM COATED ORAL at 08:28

## 2022-09-15 RX ADMIN — SODIUM CHLORIDE, PRESERVATIVE FREE 10 ML: 5 INJECTION INTRAVENOUS at 08:31

## 2022-09-15 RX ADMIN — METHYLPREDNISOLONE SODIUM SUCCINATE 100 MG: 125 INJECTION, POWDER, FOR SOLUTION INTRAMUSCULAR; INTRAVENOUS at 08:29

## 2022-09-15 RX ADMIN — DIPHENHYDRAMINE HYDROCHLORIDE 25 MG: 50 INJECTION INTRAMUSCULAR; INTRAVENOUS at 08:32

## 2022-09-15 RX ADMIN — SODIUM CHLORIDE: 9 INJECTION, SOLUTION INTRAVENOUS at 12:16

## 2022-09-15 RX ADMIN — SODIUM CHLORIDE, PRESERVATIVE FREE 10 ML: 5 INJECTION INTRAVENOUS at 08:33

## 2022-09-15 RX ADMIN — OCRELIZUMAB 600 MG: 300 INJECTION INTRAVENOUS at 08:50

## 2022-09-15 ASSESSMENT — PAIN SCALES - GENERAL
PAINLEVEL_OUTOF10: 0
PAINLEVEL_OUTOF10: 0

## 2022-09-20 DIAGNOSIS — I82.422 ACUTE DEEP VEIN THROMBOSIS (DVT) OF ILIAC VEIN OF LEFT LOWER EXTREMITY (HCC): ICD-10-CM

## 2022-09-21 DIAGNOSIS — I82.422 ACUTE DEEP VEIN THROMBOSIS (DVT) OF ILIAC VEIN OF LEFT LOWER EXTREMITY (HCC): ICD-10-CM

## 2022-09-21 RX ORDER — APIXABAN 5 MG/1
TABLET, FILM COATED ORAL
Qty: 60 TABLET | Refills: 10 | OUTPATIENT
Start: 2022-09-21

## 2022-09-22 RX ORDER — APIXABAN 5 MG/1
TABLET, FILM COATED ORAL
Qty: 60 TABLET | Refills: 10 | Status: SHIPPED
Start: 2022-09-22 | End: 2022-10-27

## 2022-10-20 ENCOUNTER — APPOINTMENT (OUTPATIENT)
Dept: CT IMAGING | Age: 47
End: 2022-10-20
Payer: COMMERCIAL

## 2022-10-20 ENCOUNTER — APPOINTMENT (OUTPATIENT)
Dept: GENERAL RADIOLOGY | Age: 47
End: 2022-10-20
Payer: COMMERCIAL

## 2022-10-20 ENCOUNTER — TELEPHONE (OUTPATIENT)
Dept: SURGERY | Age: 47
End: 2022-10-20

## 2022-10-20 ENCOUNTER — OFFICE VISIT (OUTPATIENT)
Dept: SURGERY | Age: 47
End: 2022-10-20
Payer: COMMERCIAL

## 2022-10-20 ENCOUNTER — HOSPITAL ENCOUNTER (EMERGENCY)
Age: 47
Discharge: HOME | End: 2022-10-21
Attending: EMERGENCY MEDICINE
Payer: COMMERCIAL

## 2022-10-20 VITALS
HEIGHT: 61 IN | WEIGHT: 190 LBS | SYSTOLIC BLOOD PRESSURE: 157 MMHG | BODY MASS INDEX: 35.87 KG/M2 | HEART RATE: 78 BPM | TEMPERATURE: 97.4 F | DIASTOLIC BLOOD PRESSURE: 93 MMHG

## 2022-10-20 DIAGNOSIS — R07.9 CHEST PAIN, UNSPECIFIED TYPE: Primary | ICD-10-CM

## 2022-10-20 DIAGNOSIS — K43.2 INCISIONAL HERNIA, WITHOUT OBSTRUCTION OR GANGRENE: Primary | ICD-10-CM

## 2022-10-20 LAB
ALBUMIN SERPL-MCNC: 4.1 G/DL (ref 3.5–5.2)
ALP BLD-CCNC: 96 U/L (ref 35–104)
ALT SERPL-CCNC: 23 U/L (ref 0–32)
ANION GAP SERPL CALCULATED.3IONS-SCNC: 11 MMOL/L (ref 7–16)
AST SERPL-CCNC: 14 U/L (ref 0–31)
BASOPHILS ABSOLUTE: 0.05 E9/L (ref 0–0.2)
BASOPHILS RELATIVE PERCENT: 0.4 % (ref 0–2)
BILIRUB SERPL-MCNC: 0.2 MG/DL (ref 0–1.2)
BUN BLDV-MCNC: 11 MG/DL (ref 6–20)
CALCIUM SERPL-MCNC: 9 MG/DL (ref 8.6–10.2)
CHLORIDE BLD-SCNC: 102 MMOL/L (ref 98–107)
CO2: 27 MMOL/L (ref 22–29)
CREAT SERPL-MCNC: 0.7 MG/DL (ref 0.5–1)
EOSINOPHILS ABSOLUTE: 0.32 E9/L (ref 0.05–0.5)
EOSINOPHILS RELATIVE PERCENT: 2.8 % (ref 0–6)
GFR SERPL CREATININE-BSD FRML MDRD: >60 ML/MIN/1.73
GLUCOSE BLD-MCNC: 139 MG/DL (ref 74–99)
HCT VFR BLD CALC: 41.7 % (ref 34–48)
HEMOGLOBIN: 13.8 G/DL (ref 11.5–15.5)
IMMATURE GRANULOCYTES #: 0.13 E9/L
IMMATURE GRANULOCYTES %: 1.1 % (ref 0–5)
LYMPHOCYTES ABSOLUTE: 2.97 E9/L (ref 1.5–4)
LYMPHOCYTES RELATIVE PERCENT: 26.1 % (ref 20–42)
MCH RBC QN AUTO: 30.2 PG (ref 26–35)
MCHC RBC AUTO-ENTMCNC: 33.1 % (ref 32–34.5)
MCV RBC AUTO: 91.2 FL (ref 80–99.9)
MONOCYTES ABSOLUTE: 0.96 E9/L (ref 0.1–0.95)
MONOCYTES RELATIVE PERCENT: 8.5 % (ref 2–12)
NEUTROPHILS ABSOLUTE: 6.93 E9/L (ref 1.8–7.3)
NEUTROPHILS RELATIVE PERCENT: 61.1 % (ref 43–80)
PDW BLD-RTO: 12.9 FL (ref 11.5–15)
PLATELET # BLD: 257 E9/L (ref 130–450)
PMV BLD AUTO: 9.5 FL (ref 7–12)
POTASSIUM REFLEX MAGNESIUM: 3.9 MMOL/L (ref 3.5–5)
RBC # BLD: 4.57 E12/L (ref 3.5–5.5)
SODIUM BLD-SCNC: 140 MMOL/L (ref 132–146)
TOTAL PROTEIN: 6.9 G/DL (ref 6.4–8.3)
TROPONIN, HIGH SENSITIVITY: <6 NG/L (ref 0–9)
WBC # BLD: 11.4 E9/L (ref 4.5–11.5)

## 2022-10-20 PROCEDURE — 71046 X-RAY EXAM CHEST 2 VIEWS: CPT

## 2022-10-20 PROCEDURE — 85025 COMPLETE CBC W/AUTO DIFF WBC: CPT

## 2022-10-20 PROCEDURE — G8484 FLU IMMUNIZE NO ADMIN: HCPCS | Performed by: SURGERY

## 2022-10-20 PROCEDURE — 4004F PT TOBACCO SCREEN RCVD TLK: CPT | Performed by: SURGERY

## 2022-10-20 PROCEDURE — 71275 CT ANGIOGRAPHY CHEST: CPT

## 2022-10-20 PROCEDURE — 80053 COMPREHEN METABOLIC PANEL: CPT

## 2022-10-20 PROCEDURE — 99285 EMERGENCY DEPT VISIT HI MDM: CPT

## 2022-10-20 PROCEDURE — 93005 ELECTROCARDIOGRAM TRACING: CPT

## 2022-10-20 PROCEDURE — G8417 CALC BMI ABV UP PARAM F/U: HCPCS | Performed by: SURGERY

## 2022-10-20 PROCEDURE — 99214 OFFICE O/P EST MOD 30 MIN: CPT | Performed by: SURGERY

## 2022-10-20 PROCEDURE — 84484 ASSAY OF TROPONIN QUANT: CPT

## 2022-10-20 PROCEDURE — G8427 DOCREV CUR MEDS BY ELIG CLIN: HCPCS | Performed by: SURGERY

## 2022-10-20 PROCEDURE — 6360000004 HC RX CONTRAST MEDICATION: Performed by: RADIOLOGY

## 2022-10-20 PROCEDURE — 96374 THER/PROPH/DIAG INJ IV PUSH: CPT

## 2022-10-20 PROCEDURE — 36415 COLL VENOUS BLD VENIPUNCTURE: CPT

## 2022-10-20 RX ORDER — DICYCLOMINE HYDROCHLORIDE 10 MG/1
CAPSULE ORAL
COMMUNITY
Start: 2022-10-12

## 2022-10-20 RX ORDER — PANTOPRAZOLE SODIUM 40 MG/1
TABLET, DELAYED RELEASE ORAL
COMMUNITY
Start: 2022-10-12

## 2022-10-20 RX ADMIN — IOPAMIDOL 70 ML: 755 INJECTION, SOLUTION INTRAVENOUS at 23:50

## 2022-10-20 ASSESSMENT — ENCOUNTER SYMPTOMS
WHEEZING: 0
RHINORRHEA: 0
BACK PAIN: 0
CHEST TIGHTNESS: 1
SHORTNESS OF BREATH: 1
ABDOMINAL PAIN: 0
COUGH: 0
SORE THROAT: 0
DIARRHEA: 0
NAUSEA: 0
VOMITING: 0
PHOTOPHOBIA: 0

## 2022-10-20 NOTE — PROGRESS NOTES
General Surgery History and Physical    Patient's Name/Date of Birth: Alberto Lind / 1975    Date: October 20, 2022     Surgeon: Dixie De Jesus M.D.    PCP: Melly Narayan PA-C     Chief Complaint: incisional hernia    HPI:   Alberto Lind is a 49 y.o. female who presents for evaluation of incisional hernia. It has become larger and more painful recently and the pt wants repair. Past Medical History:   Diagnosis Date    Acid reflux disease     Colon cancer ([address]     s/p surgery    Cyst of ovary     Fracture of nasal bone     Hearing loss     Hx of blood clots     leg to lung    Hypertension     Morbidly obese ([address] 10/05/2020    Multiple sclerosis ([facility]     denies limitations at present 11/2020    VEENA no CPAP     severe VEENA per pt    Pulmonary embolism ([facility] 2021    Right shoulder pain 11/2020    RLS (restless legs syndrome)     Tinnitus     TMJ dysfunction     left side       Past Surgical History:   Procedure Laterality Date    APPENDECTOMY      BACK SURGERY      lumbar    BICEPS TENDON REPAIR Right 11/11/2020    BICEPS TENODESIS performed by Joy Georges MD at [place] [address] Left 8/31/2021    LAPAROSCOPIC LEFT HEMICOLECTOMY WITH LOOP OSTOMY performed by Guy Kayser, MD at [address] [place] [address] 9/6/2021    DIAGNOSTIC LAPAROSCOPY POSSIBLE BOWEL RESECTION POSSILBE OSTOMY performed by Guy Kayser, MD at [place] N/A 2/8/2022    LAPAROSCOPIC SIGMOID COLON RESECTION performed by Guy Kayser, MD at [address] [place] [address] 6/1/2022    OPEN ILEOSTOMY REVERSAL performed by Guy Kayser, MD at [address] [place] [address] ([address] [place] [address])  03/05/2018    Robotic hysterectomy, bilateral salpingectomy, right oophorectomy DR. ARIANA MONIQUE [facility]     HYSTERECTOMY, TOTAL ABDOMINAL (CERVIX REMOVED)      LARYNGOSCOPY N/A 7/17/2020    DIRECT LARYNGOSCOPY--OMNI GUIDE LASER performed by Soco Leach MD at [facility]    PARTIAL HYSTERECTOMY (CERVIX NOT REMOVED)      PROCTOSIGMOIDOSCOPY N/A 10/12/2021    ANAL PROCTO SIGMOIDOSCOPY FLEXIBLE performed by Yg Singh MD at [facility]. [address] N/A 1/25/2022    ANAL PROCTO [address] performed by Yg Singh MD at [facility] ARTHROSCOPY Right 11/11/2020    RIGHT SHOULDER DIAGNOSTIC ARTHROSCOPY WITH DECOMPRESSION ROTATOR CUFF REPAIR performed by Mingo Corea MD at [address] [place], [place] [address]Th [facility]         Current Outpatient Medications   Medication Sig Dispense Refill    pantoprazole (PROTONIX) 40 MG tablet       dicyclomine (BENTYL) 10 MG capsule TAKE 1 CAPSULE BY MOUTH 3 TIMES DAILY AS NEEDED FOR PAIN      VENTOLIN  (90 Base) MCG/ACT inhaler       lisinopril-hydroCHLOROthiazide (PRINZIDE;ZESTORETIC) 10-12.5 MG per tablet Take 1 tablet by mouth daily 30 tablet 5    ocrelizumab (OCREVUS) 300 MG/10ML SOLN injection Infuse 20 mLs intravenously every 6 months 20 mL 1    rOPINIRole (REQUIP) 0.5 MG tablet 1 tablet by mouth every morning; 1 tablet 2 hours before bed; 1 tablet at bedtime 270 tablet 3    baclofen (LIORESAL) 20 MG tablet Take 1 tablet by mouth 4 times daily as needed (spasms/pain) 360 tablet 3    fluticasone (FLONASE) 50 MCG/ACT nasal spray INSTILL ONE (1) SPRAY IN EACH NOSTRIL ONCE DAILY (Patient taking differently: daily as needed) 16 g 10    nortriptyline (PAMELOR) 10 MG capsule TAKE 1 CAPSULE BY MOUTH NIGHTLY FOR HEADACHE 30 capsule 10    vitamin D (D3-50) 63897 UNIT CAPS Take 1 capsule by mouth once a week 4 capsule 11    gabapentin (NEURONTIN) 400 MG capsule Take 1 capsule by mouth 3 times daily for 360 days. 90 capsule 11    famotidine (PEPCID) 40 MG tablet TAKE 1 TABLET BY MOUTH DAILY IN THE EVENING *EMERGENCY REFILL*      ELIQUIS 5 MG TABS tablet TAKE 1 TABLET BY MOUTH TWICE DAILY (Patient not taking: Reported on 10/20/2022) 60 tablet 10     No current facility-administered medications for this visit. No Known Allergies    The patient has a family history that is negative for severe cardiovascular or respiratory issues, negative for reaction to anesthesia.     Social History     Socioeconomic History    Marital status: Single     Spouse name: Not on file    Number of children: 3    Years of education: 11    Highest education level: 11th grade   Occupational History    Not on file   Tobacco Use    Smoking status: Every Day     Packs/day: 0.50     Years: 25.00     Pack years: 12.50     Types: Cigarettes    Smokeless tobacco: Never    Tobacco comments:         Vaping Use    Vaping Use: Never used   Substance and Sexual Activity    Alcohol use: Not Currently    Drug use: No    Sexual activity: Yes     Partners: Male   Other Topics Concern    Not on file   Social History Narrative    Uses [employer] for transportation    NetPayment stamps     Social Determinants of Health     Financial Resource Strain: Low Risk     Difficulty of Paying Living Expenses: Not hard at all   Food Insecurity: No Food Insecurity    Worried About Running Out of Food in the Last Year: Never true    Ran Out of Food in the Last Year: Never true   Transportation Needs: Not on file   Physical Activity: Not on file   Stress: Not on file   Social Connections: Not on file   Intimate Partner Violence: Not on file   Housing Stability: Not on file           Review of Systems  Review of Systems -  General ROS: negative for - chills, fatigue or malaise  ENT ROS: negative for - hearing change, nasal congestion or nasal discharge  Allergy and Immunology ROS: negative for - hives, itchy/watery eyes or nasal congestion  Hematological and Lymphatic ROS: negative for - blood clots, blood transfusions, bruising or fatigue  Endocrine ROS: negative for - malaise/lethargy, mood swings, palpitations or polydipsia/polyuria  Respiratory ROS: negative for - sputum changes, stridor, tachypnea or wheezing  Cardiovascular ROS: negative for - irregular heartbeat, loss of consciousness, murmur or orthopnea  Gastrointestinal ROS: negative for - constipation, diarrhea, gas/bloating, heartburn or hematemesis  Genito-Urinary ROS: negative for -  genital discharge, genital ulcers or hematuria  Musculoskeletal ROS: negative for - gait disturbance, muscle pain or muscular weakness    Physical exam:   BP (!) 157/93   Pulse 78   Temp 97.4 °F (36.3 °C) (Temporal)   Ht 5' 1\" (1.549 m)   Wt 190 lb (86.2 kg)   LMP 01/05/2018   BMI 35.90 kg/m²   General appearance:  NAD  Psycho/social: negative for tremor or hallucinations  Head: NCAT, PERRLA, EOMI, red conjunctiva  Neck: supple, no masses  Lungs: CTAB, equal chest rise bilateral  Heart: Reg rate  Abdomen: soft, nontender, nondistended, incisional hernia at periumbilical area and 2.5 cm in size  Skin; no lesions  Gu: no cva tenderness  Extremities: extremities normal, atraumatic, no cyanosis or edema        Assessment:  49 y.o. female with incisional hernia    Plan: Will plan for lap possible open incisional hernia  Discussed the risk, benefits and alternatives of surgery including wound infections, bleeding, scar and hernia formation and the risks of general anesthetic including MI, CVA, sudden death or reactions to anesthetic medications. The patient understands the risks and alternatives and the possibility of converting to an open procedure. All questions were answered to the patient's satisfaction and they freely signed the consent.       Sydnee Duncan MD  10:22 AM  10/20/2022

## 2022-10-20 NOTE — TELEPHONE ENCOUNTER
Per the order of Dr. Carlos Abraham, patient has been scheduled for Lap poss open incisional hernia repair w mesh on 10/28/22. Patient instructed to please contact our office with any questions. Surgery scheduled through [address] [place] [address] RdWagner Garcia to enter orders. Prior Authorization Form:      DEMOGRAPHICS:                     Patient Name:  Wenceslao Bear  Patient :  1975            Insurance:  Payor: [address] [place] [address] [address] / Plan: [address] [place] [address] [address] / Product Type: *No Product type* /   Insurance ID Number:    Payer/Plan Subscr  Sex Relation Sub.  Ins. ID Effective Group Num   1. JAROD CELESTINZACKERY* PAVEL MARISCAL 1975 Female Self [id number]                                    [address]         DIAGNOSIS & PROCEDURE:                       Procedure/Operation: Laparoscopic possible open incisional hernia repair with mesh           CPT Code: 11838    Diagnosis:  incisional hernia    ICD10 Code: K43.2    Location:  [place] [address]    Surgeon:  Allyn Phan INFORMATION:                          Date: 10/28/22    Time: TBD              Anesthesia:  General                                                       Status:  Outpatient        Special Comments:  N/A       Electronically signed by Delilah George LPN on  at 11:24 AM

## 2022-10-20 NOTE — Clinical Note
Alba Mcdaniels was seen and treated in our emergency department on 10/20/2022. She may return to work on 10/22/2022. If you have any questions or concerns, please don't hesitate to call.       Renee Sweeney, DO

## 2022-10-20 NOTE — Clinical Note
Kong Gordon was seen and treated in our emergency department on 10/20/2022. She may return to work on 10/22/2022. If you have any questions or concerns, please don't hesitate to call.       Kelle Jackson, DO

## 2022-10-21 ENCOUNTER — PREP FOR PROCEDURE (OUTPATIENT)
Dept: SURGERY | Age: 47
End: 2022-10-21

## 2022-10-21 VITALS
DIASTOLIC BLOOD PRESSURE: 84 MMHG | BODY MASS INDEX: 34.01 KG/M2 | TEMPERATURE: 97.2 F | RESPIRATION RATE: 18 BRPM | SYSTOLIC BLOOD PRESSURE: 144 MMHG | HEART RATE: 75 BPM | WEIGHT: 180 LBS | OXYGEN SATURATION: 98 %

## 2022-10-21 LAB
EKG ATRIAL RATE: 79 BPM
EKG P AXIS: 63 DEGREES
EKG P-R INTERVAL: 176 MS
EKG Q-T INTERVAL: 354 MS
EKG QRS DURATION: 82 MS
EKG QTC CALCULATION (BAZETT): 405 MS
EKG R AXIS: 59 DEGREES
EKG T AXIS: 44 DEGREES
EKG VENTRICULAR RATE: 79 BPM

## 2022-10-21 PROCEDURE — 6360000002 HC RX W HCPCS

## 2022-10-21 RX ORDER — SODIUM CHLORIDE 0.9 % (FLUSH) 0.9 %
5-40 SYRINGE (ML) INJECTION PRN
Status: CANCELLED | OUTPATIENT
Start: 2022-10-21

## 2022-10-21 RX ORDER — SODIUM CHLORIDE 9 MG/ML
INJECTION, SOLUTION INTRAVENOUS PRN
Status: CANCELLED | OUTPATIENT
Start: 2022-10-21

## 2022-10-21 RX ORDER — KETOROLAC TROMETHAMINE 15 MG/ML
15 INJECTION, SOLUTION INTRAMUSCULAR; INTRAVENOUS ONCE
Status: COMPLETED | OUTPATIENT
Start: 2022-10-21 | End: 2022-10-21

## 2022-10-21 RX ORDER — SODIUM CHLORIDE, SODIUM LACTATE, POTASSIUM CHLORIDE, CALCIUM CHLORIDE 600; 310; 30; 20 MG/100ML; MG/100ML; MG/100ML; MG/100ML
INJECTION, SOLUTION INTRAVENOUS CONTINUOUS
Status: CANCELLED | OUTPATIENT
Start: 2022-10-21

## 2022-10-21 RX ORDER — SODIUM CHLORIDE 0.9 % (FLUSH) 0.9 %
5-40 SYRINGE (ML) INJECTION EVERY 12 HOURS SCHEDULED
Status: CANCELLED | OUTPATIENT
Start: 2022-10-21

## 2022-10-21 RX ADMIN — KETOROLAC TROMETHAMINE 15 MG: 15 INJECTION, SOLUTION INTRAMUSCULAR; INTRAVENOUS at 00:40

## 2022-10-21 ASSESSMENT — PAIN SCALES - GENERAL: PAINLEVEL_OUTOF10: 9

## 2022-10-21 NOTE — ED PROVIDER NOTES
HPI     Patient is a 49 y.o. female presents with a chief complaint of chest pain and shortness of breath. Patient states that this morning she woke up with pain on the right side of her chest.  She states then that this evening the pain moved to the center of her chest and now has pain with inspiration. Patient has a history of PEs and she states this feels very similar to her previous PEs. She is no longer on blood thinners. Patient does have a history of colon cancer as well. This has been occurring for 1 day. Patient states that it gets better with nothing. Patient states that it gets worse with inspiration. Patient states that it is severe in severity. Patient states it was acute in onset. Review of Systems   Constitutional:  Negative for appetite change, chills, fatigue and fever. HENT:  Negative for congestion, rhinorrhea and sore throat. Eyes:  Negative for photophobia and visual disturbance. Respiratory:  Positive for chest tightness and shortness of breath. Negative for cough and wheezing. Cardiovascular:  Positive for chest pain. Negative for palpitations and leg swelling. Gastrointestinal:  Negative for abdominal pain, diarrhea, nausea and vomiting. Endocrine: Negative for polydipsia and polyuria. Genitourinary:  Negative for dysuria and vaginal bleeding. Musculoskeletal:  Negative for back pain and neck pain. Skin:  Negative for rash. Neurological:  Negative for dizziness and headaches. All other systems reviewed and are negative. Physical Exam  Vitals and nursing note reviewed. Constitutional:       General: She is not in acute distress. Appearance: She is not ill-appearing or toxic-appearing. HENT:      Head: Normocephalic and atraumatic. Right Ear: External ear normal.      Left Ear: External ear normal.      Nose: Nose normal. No congestion or rhinorrhea.       Mouth/Throat:      Mouth: Mucous membranes are moist.   Eyes:      General: Right eye: No discharge. Left eye: No discharge. Pupils: Pupils are equal, round, and reactive to light. Cardiovascular:      Rate and Rhythm: Normal rate. Pulses: Normal pulses. No decreased pulses. Heart sounds: No murmur heard. Pulmonary:      Effort: Pulmonary effort is normal. No respiratory distress. Breath sounds: No stridor. Decreased breath sounds (Decreased breath sounds, bilaterally. Patient is a smoker) present. No wheezing, rhonchi or rales. Chest:      Chest wall: No deformity or tenderness. Abdominal:      General: Abdomen is flat. There is no distension. Tenderness: There is no abdominal tenderness. There is no guarding or rebound. Musculoskeletal:         General: No swelling or deformity. Right lower leg: No tenderness. No edema. Left lower leg: No tenderness. No edema. Skin:     General: Skin is warm. Capillary Refill: Capillary refill takes less than 2 seconds. Coloration: Skin is not cyanotic or jaundiced. Findings: No bruising or rash. Neurological:      General: No focal deficit present. Mental Status: She is alert. Cranial Nerves: No cranial nerve deficit. Psychiatric:         Mood and Affect: Mood normal.   EKG: This EKG is signed and interpreted by me. Rate: 79 bpm  Rhythm: Sinus  Interpretation: No acute changes  Comparison: Stable from 6/24/2022    Procedures     MDM     Amount and/or Complexity of Data Reviewed  Clinical lab tests: reviewed  Tests in the radiology section of CPT®: reviewed  Tests in the medicine section of CPT®: reviewed    Patient was given a CTA based on her history and not being on any anticoagulation. Patient had clinical symptoms concerning of a pulmonary embolism. CTA showed no signs of a PE. There was a small area of nodular atelectasis that the patient was informed of and instructed to follow-up with her primary care provider. Patient was given Toradol for pain control. EKG showed normal sinus rhythm was stable compared to previous EKG. Patient had no elevation in white blood cell count. Patient states she has been coughing more due to her GERD. This chest pain is not cardiac in nature. Troponin was also negative. Patient was instructed to follow-up with primary care provider to monitor the nodular atelectasis in her lungs. Patient was instructed to discontinue smoking and she stated interest in smoking cessation. Patient is a 49 y.o. female presenting with chest pain, shortness of breath. Patient was given return precautions. Labs were interpreted by me. Patient will follow up with their primary care provider. Patient is agreeable to this plan. Patient has remained stable throughout their stay in the ED. Patient was seen and evaluated by myself and my attending Gerardo Gutierrez MD. Assessment and Plan discussed with attending provider, please see attestation for final plan of care. This note was done using dictation software and there may be some grammatical errors associated with this. Kelle Jackson, DO           --------------------------------------------- PAST HISTORY ---------------------------------------------  Past Medical History:  has a past medical history of Acid reflux disease, Colon cancer ([facility] [address] Cyst of ovary, Fracture of nasal bone, Hearing loss, Hx of blood clots, Hypertension, Morbidly obese ([address] Multiple sclerosis ([facility] [address] VEENA no CPAP, Pulmonary embolism ([address] Right shoulder pain, RLS (restless legs syndrome), Tinnitus, and TMJ dysfunction. Past Surgical History:  has a past surgical history that includes Cholecystectomy; cyst removal; Hysterectomy (03/05/2018); laryngoscopy (N/A, 7/17/2020); Shoulder arthroscopy (Right, 11/11/2020); Biceps tendon repair (Right, 11/11/2020); Appendectomy; Esophagus surgery; Partial hysterectomy;  Hysterectomy, total abdominal; Tonsillectomy; colectomy (Left, 8/31/2021); colectomy (N/A, 9/6/2021); proctosigmoidoscopy (N/A, 10/12/2021); back surgery; proctosigmoidoscopy (N/A, 1/25/2022); colectomy (N/A, 2/8/2022); and Colostomy Closure (N/A, 6/1/2022). Social History:  reports that she has been smoking cigarettes. She has a 12.50 pack-year smoking history. She has never used smokeless tobacco. She reports that she does not currently use alcohol. She reports that she does not use drugs. Family History: family history includes Mult Sclerosis in her mother. The patients home medications have been reviewed. Allergies: Patient has no known allergies.     -------------------------------------------------- RESULTS -------------------------------------------------  Labs:  Results for orders placed or performed during the hospital encounter of 10/20/22   CBC with Auto Differential   Result Value Ref Range    WBC 11.4 4.5 - 11.5 E9/L    RBC 4.57 3.50 - 5.50 E12/L    Hemoglobin 13.8 11.5 - 15.5 g/dL    Hematocrit 41.7 34.0 - 48.0 %    MCV 91.2 80.0 - 99.9 fL    MCH 30.2 26.0 - 35.0 pg    MCHC 33.1 32.0 - 34.5 %    RDW 12.9 11.5 - 15.0 fL    Platelets 807 246 - 752 E9/L    MPV 9.5 7.0 - 12.0 fL    Neutrophils % 61.1 43.0 - 80.0 %    Immature Granulocytes % 1.1 0.0 - 5.0 %    Lymphocytes % 26.1 20.0 - 42.0 %    Monocytes % 8.5 2.0 - 12.0 %    Eosinophils % 2.8 0.0 - 6.0 %    Basophils % 0.4 0.0 - 2.0 %    Neutrophils Absolute 6.93 1.80 - 7.30 E9/L    Immature Granulocytes # 0.13 E9/L    Lymphocytes Absolute 2.97 1.50 - 4.00 E9/L    Monocytes Absolute 0.96 (H) 0.10 - 0.95 E9/L    Eosinophils Absolute 0.32 0.05 - 0.50 E9/L    Basophils Absolute 0.05 0.00 - 0.20 E9/L   Comprehensive Metabolic Panel w/ Reflex to MG   Result Value Ref Range    Sodium 140 132 - 146 mmol/L    Potassium reflex Magnesium 3.9 3.5 - 5.0 mmol/L    Chloride 102 98 - 107 mmol/L    CO2 27 22 - 29 mmol/L    Anion Gap 11 7 - 16 mmol/L    Glucose 139 (H) 74 - 99 mg/dL    BUN 11 6 - 20 mg/dL    Creatinine 0.7 0.5 - 1.0 mg/dL    Est, Glom Filt Rate >60 >=60 mL/min/1.73    Calcium 9.0 8.6 - 10.2 mg/dL    Total Protein 6.9 6.4 - 8.3 g/dL    Albumin 4.1 3.5 - 5.2 g/dL    Total Bilirubin 0.2 0.0 - 1.2 mg/dL    Alkaline Phosphatase 96 35 - 104 U/L    ALT 23 0 - 32 U/L    AST 14 0 - 31 U/L   Troponin   Result Value Ref Range    Troponin, High Sensitivity <6 0 - 9 ng/L   EKG 12 Lead   Result Value Ref Range    Ventricular Rate 79 BPM    Atrial Rate 79 BPM    P-R Interval 176 ms    QRS Duration 82 ms    Q-T Interval 354 ms    QTc Calculation (Bazett) 405 ms    P Axis 63 degrees    R Axis 59 degrees    T Axis 44 degrees       Radiology:  CTA PULMONARY W CONTRAST   Final Result   1. No evidence of pulmonary embolism. 2. Geographic ground-glass infiltrates with scattered lobular sparing   compatible with reactive airways disease. 3. 16 mm nodular atelectasis or other airspace disease anterior right upper   lobe. 4. Small retrocardiac hiatal hernia. RECOMMENDATIONS:   Careful clinical correlation and follow up recommended. XR CHEST (2 VW)   Final Result   No acute process. ------------------------- NURSING NOTES AND VITALS REVIEWED ---------------------------  Date / Time Roomed:  10/20/2022 10:46 PM  ED Bed Assignment:  16/16    The nursing notes within the ED encounter and vital signs as below have been reviewed. BP (!) 144/84   Pulse 75   Temp 97.2 °F (36.2 °C)   Resp 18   Wt 180 lb (81.6 kg)   LMP 01/05/2018   SpO2 98%   BMI 34.01 kg/m²   Oxygen Saturation Interpretation: Normal      ------------------------------------------ PROGRESS NOTES ------------------------------------------  12:55 AM EDT  I have spoken with the patient and family if present and discussed todays results, in addition to providing specific details for the plan of care and counseling regarding the diagnosis and prognosis. Their questions are answered at this time and they are agreeable with the plan.  I discussed at length with them reasons for immediate return here for re evaluation. They will followup with their primary care provider  by calling their office as soon as possible.      --------------------------------- ADDITIONAL PROVIDER NOTES ---------------------------------  At this time the patient is without objective evidence of an acute process requiring hospitalization or inpatient management. They have remained hemodynamically stable throughout their entire ED visit and are stable for discharge with outpatient follow-up. The plan has been discussed in detail and they are aware of the specific conditions for emergent return, as well as the importance of follow-up. Discharge Medication List as of 10/21/2022 12:36 AM          Diagnosis:  1. Chest pain, unspecified type        Disposition:  Patient's disposition: Discharge to home  Patient's condition is stable.       Hector Nolasco DO  Resident  10/21/22 [id number]

## 2022-10-21 NOTE — ED NOTES
Pt verbalizes understanding of discharge instructions and follow up.  Pt ambulatory out of ED, ramon, A&O Michelle Winchester RN  10/21/22 0040

## 2022-10-21 NOTE — DISCHARGE INSTRUCTIONS
CTA findings as follows    CTA PULMONARY W CONTRAST   Final Result   1. No evidence of pulmonary embolism. 2. Geographic ground-glass infiltrates with scattered lobular sparing   compatible with reactive airways disease. 3. 16 mm nodular atelectasis or other airspace disease anterior right upper   lobe. 4. Small retrocardiac hiatal hernia. RECOMMENDATIONS:   Careful clinical correlation and follow up recommended. XR CHEST (2 VW)   Final Result   No acute process.

## 2022-10-24 NOTE — TELEPHONE ENCOUNTER
Patient provided with surgery and bowel prep instructions during office visit. Patient scheduled for follow up visit with Dr. Mable Paul on 09/16/2021 in [facility]. Surgery scheduling form faxed and confirmation received.     Electronically signed by Beena Weems MA on 8/19/2021 at 3:55 PM Star Wedge Flap Text: The defect edges were debeveled with a #15 scalpel blade.  Given the location of the defect, shape of the defect and the proximity to free margins a star wedge flap was deemed most appropriate.  Using a sterile surgical marker, an appropriate rotation flap was drawn incorporating the defect and placing the expected incisions within the relaxed skin tension lines where possible. The area thus outlined was incised deep to adipose tissue with a #15 scalpel blade.  The skin margins were undermined to an appropriate distance in all directions utilizing iris scissors.

## 2022-10-27 ENCOUNTER — ANESTHESIA EVENT (OUTPATIENT)
Dept: OPERATING ROOM | Age: 47
End: 2022-10-27
Payer: COMMERCIAL

## 2022-10-27 ASSESSMENT — LIFESTYLE VARIABLES: SMOKING_STATUS: 1

## 2022-10-27 NOTE — ANESTHESIA PRE PROCEDURE
Department of Anesthesiology  Preprocedure Note       Name:  Saritha Bronson   Age:  49 y.o.  :  1975                                          MRN:  [id number]         Date:  10/27/2022      Surgeon: Shelby Potts):  Jovanni Nguyen MD    Procedure: Procedure(s):  LAPAROSCOPIC POSSIBLE OPEN INCISIONAL HERNIA REPAIR WITH MESH    Medications prior to admission:   Prior to Admission medications    Medication Sig Start Date End Date Taking? Authorizing Provider   pantoprazole (PROTONIX) 40 MG tablet  10/12/22   Historical Provider, MD   dicyclomine (BENTYL) 10 MG capsule TAKE 1 CAPSULE BY MOUTH 3 TIMES DAILY AS NEEDED FOR PAIN 10/12/22   Historical Provider, MD   VENTOLIN  (90 Base) MCG/ACT inhaler  10/12/22   Historical Provider, MD   lisinopril-hydroCHLOROthiazide (PRINZIDE;ZESTORETIC) 10-12.5 MG per tablet Take 1 tablet by mouth daily 9/8/22   Duard DayKIRSTEN   ocrelizumab (OCREVUS) 300 MG/10ML SOLN injection Infuse 20 mLs intravenously every 6 months 8/10/22   MARIXA Bradley CNP   rOPINIRole (REQUIP) 0.5 MG tablet 1 tablet by mouth every morning; 1 tablet 2 hours before bed; 1 tablet at bedtime 22   MARIXA Bradley CNP   baclofen (LIORESAL) 20 MG tablet Take 1 tablet by mouth 4 times daily as needed (spasms/pain) 22   MARIXA Bradley CNP   fluticasone (FLONASE) 50 MCG/ACT nasal spray INSTILL ONE (1) SPRAY IN [facility] NOSTRIL ONCE DAILY  Patient taking differently: daily as needed 22   Homar KIRSTEN Gerard   nortriptyline (PAMELOR) 10 MG capsule TAKE 1 CAPSULE BY MOUTH NIGHTLY FOR HEADACHE 6/30/22   Duard DayKIRSTEN   vitamin D (D3-50) 69841 UNIT CAPS Take 1 capsule by mouth once a week 22   MARIXA Bradley CNP   gabapentin (NEURONTIN) 400 MG capsule Take 1 capsule by mouth 3 times daily for 360 days.  22  MARIXA Bradley CNP   famotidine (PEPCID) 40 MG tablet TAKE 1 TABLET BY MOUTH DAILY IN THE EVENING *EMERGENCY REFILL* 22   Historical Provider, MD       Current medications:    No current facility-administered medications for this encounter. Current Outpatient Medications   Medication Sig Dispense Refill    pantoprazole (PROTONIX) 40 MG tablet       dicyclomine (BENTYL) 10 MG capsule TAKE 1 CAPSULE BY MOUTH 3 TIMES DAILY AS NEEDED FOR PAIN      VENTOLIN  (90 Base) MCG/ACT inhaler       lisinopril-hydroCHLOROthiazide (PRINZIDE;ZESTORETIC) 10-12.5 MG per tablet Take 1 tablet by mouth daily 30 tablet 5    ocrelizumab (OCREVUS) 300 MG/10ML SOLN injection Infuse 20 mLs intravenously every 6 months 20 mL 1    rOPINIRole (REQUIP) 0.5 MG tablet 1 tablet by mouth every morning; 1 tablet 2 hours before bed; 1 tablet at bedtime 270 tablet 3    baclofen (LIORESAL) 20 MG tablet Take 1 tablet by mouth 4 times daily as needed (spasms/pain) 360 tablet 3    fluticasone (FLONASE) 50 MCG/ACT nasal spray INSTILL ONE (1) SPRAY IN EACH NOSTRIL ONCE DAILY (Patient taking differently: daily as needed) 16 g 10    nortriptyline (PAMELOR) 10 MG capsule TAKE 1 CAPSULE BY MOUTH NIGHTLY FOR HEADACHE 30 capsule 10    vitamin D (D3-50) 57814 UNIT CAPS Take 1 capsule by mouth once a week 4 capsule 11    gabapentin (NEURONTIN) 400 MG capsule Take 1 capsule by mouth 3 times daily for 360 days. 90 capsule 11    famotidine (PEPCID) 40 MG tablet TAKE 1 TABLET BY MOUTH DAILY IN THE EVENING *EMERGENCY REFILL*         Allergies:  No Known Allergies    Problem List:    Patient Active Problem List   Diagnosis Code    Vocal cord dysfunction J38.3    Multiple sclerosis ([facility] [address] G35    Morbidly obese ([address] E66.01    Palafox's esophagus with dysplasia K22.719    Secondary restless legs syndrome G25.81    S/P colectomy Z90.49    Malignant neoplasm of descending colon ([address] C18.6    Multiple subsegmental pulmonary emboli without acute cor pulmonale (HCC) I26.94    S/P closure of ileostomy Z98.890    Abdominal wall seroma S30. 1XXA    Abdominal wall abscess at site of surgical wound T81.49XA    Incisional hernia K43.2       Past Medical History:        Diagnosis Date    Acid reflux disease     Colon cancer ([facility] [address]     s/p surgery    Cyst of ovary     Fracture of nasal bone     Hearing loss     Hx of blood clots     leg to lung    Hypertension     Morbidly obese ([facility] [address] 10/05/2020    Multiple sclerosis ([facility] [address]     denies limitations at present 11/2020    VEENA no CPAP     severe VEENA per pt    Pulmonary embolism ([address] 2021    Right shoulder pain 11/2020    RLS (restless legs syndrome)     Tinnitus     TMJ dysfunction     left side       Past Surgical History:        Procedure Laterality Date    APPENDECTOMY      BACK SURGERY      lumbar    BICEPS TENDON REPAIR Right 11/11/2020    BICEPS TENODESIS performed by Shelby Cardenas MD at [place] [address] Left 8/31/2021    LAPAROSCOPIC LEFT HEMICOLECTOMY WITH LOOP OSTOMY performed by Katrina Mena MD at [address] 9/6/2021    [address] performed by Katrina Mena MD at [address] 2/8/2022    LAPAROSCOPIC SIGMOID COLON RESECTION performed by Katrina Mena MD at [address] N/A 6/1/2022    OPEN ILEOSTOMY REVERSAL performed by Katrina Mena MD at [address] ([facility])  03/05/2018    Robotic hysterectomy, bilateral salpingectomy, right oophorectomy DR. ARIANA MONIQUE [facility]     HYSTERECTOMY, TOTAL ABDOMINAL (CERVIX REMOVED)      LARYNGOSCOPY N/A 7/17/2020    DIRECT LARYNGOSCOPY--OMNI GUIDE LASER performed by Tawnya Farrell MD at [address] (CERVIX NOT REMOVED)      PROCTOSIGMOIDOSCOPY N/A 10/12/2021    ANAL PROCTO SIGMOIDOSCOPY FLEXIBLE performed by Katrina Mena MD at [facility] N/A 1/25/2022    ANAL PROCTO SIGMOIDOSCOPY FLEXIBLE performed by Katrina Mena MD at [facility]    SHOULDER ARTHROSCOPY Right 11/11/2020    RIGHT SHOULDER DIAGNOSTIC ARTHROSCOPY WITH DECOMPRESSION ROTATOR CUFF REPAIR performed by Shelby Cardenas MD at [place] [address]         Social History:    Social History     Tobacco Use    Smoking status: Every Day     Packs/day: 0.50     Years: 25.00     Pack years: 12.50     Types: Cigarettes    Smokeless tobacco: Never    Tobacco comments:         Substance Use Topics    Alcohol use: Not Currently                                Ready to quit: Not Answered  Counseling given: Not Answered  Tobacco comments:        Vital Signs (Current): There were no vitals filed for this visit.                                            BP Readings from Last 3 Encounters:   10/21/22 (!) 144/84   10/20/22 (!) 157/93   09/15/22 122/68       NPO Status:                                                                                 BMI:   Wt Readings from Last 3 Encounters:   10/20/22 180 lb (81.6 kg)   10/20/22 190 lb (86.2 kg)   07/26/22 180 lb (81.6 kg)     There is no height or weight on file to calculate BMI.    CBC:   Lab Results   Component Value Date/Time    WBC 11.4 10/20/2022 11:12 PM    RBC 4.57 10/20/2022 11:12 PM    HGB 13.8 10/20/2022 11:12 PM    HCT 41.7 10/20/2022 11:12 PM    MCV 91.2 10/20/2022 11:12 PM    RDW 12.9 10/20/2022 11:12 PM     10/20/2022 11:12 PM       CMP:   Lab Results   Component Value Date/Time     10/20/2022 11:12 PM    K 3.9 10/20/2022 11:12 PM     10/20/2022 11:12 PM    CO2 27 10/20/2022 11:12 PM    BUN 11 10/20/2022 11:12 PM    CREATININE 0.7 10/20/2022 11:12 PM    GFRAA >60 08/29/2022 12:42 PM    LABGLOM >60 10/20/2022 11:12 PM    GLUCOSE 139 10/20/2022 11:12 PM    PROT 6.9 10/20/2022 11:12 PM    CALCIUM 9.0 10/20/2022 11:12 PM    BILITOT 0.2 10/20/2022 11:12 PM    ALKPHOS 96 10/20/2022 11:12 PM    AST 14 10/20/2022 11:12 PM    ALT 23 10/20/2022 11:12 PM       POC Tests: No results for input(s): POCGLU, Simi Riley, POCCL, POCBUN, POCHEMO, POCHCT in the last 72 hours. Coags:   Lab Results   Component Value Date/Time    PROTIME 10.7 07/26/2022 11:47 AM    INR 1.0 07/26/2022 11:47 AM    APTT 27.7 07/26/2022 11:47 AM       HCG (If Applicable):   Lab Results   Component Value Date    PREGTESTUR neg 02/04/2018        ABGs: No results found for: PHART, PO2ART, XQZ5TMD, SXN8QHW, BEART, A0LKLHGK     Type & Screen (If Applicable):  Lab Results   Component Value Date    LABABO A 03/05/2018       Drug/Infectious Status (If Applicable):  No results found for: HIV, HEPCAB    COVID-19 Screening (If Applicable):   Lab Results   Component Value Date/Time    COVID19 Not Detected 06/04/2022 11:40 AM    COVID19 Not Detected 02/02/2022 12:04 PM     Imaging:     EKG 10/20/22  Normal sinus rhythm  Normal ECG  When compared with ECG of 25-AUG-2021 11:50,  No significant change was found  Confirmed by Lizy Muhammad ([id number]) on 10/21/2022 7:37:43 AM    US BILAT CAROTID 2/22/20  No hemodynamically significant stenosis (less than 50%) in the   bilateral internal carotid arteries. US DUPLEX LLE 3/22/22  Positive examination for nonocclusive deep vein thrombosis involving left external iliac vein and left common femoral vein. CT CHEST WITH CONTRAST 10/20/22  1. No evidence of pulmonary embolism. 2. Geographic ground-glass infiltrates with scattered lobular sparing   compatible with reactive airways disease. 3. 16 mm nodular atelectasis or other airspace disease anterior right upper   lobe. 4. Small retrocardiac hiatal hernia.          Anesthesia Evaluation  Patient summary reviewed and Nursing notes reviewed no history of anesthetic complications:   Airway: Mallampati: II          Dental:    (+) poor dentition      Pulmonary: breath sounds clear to auscultation  (+) sleep apnea:  current smoker                          ROS comment: Vocal Cord Dysfunction s/p Repair with MD Janett Mejia, 7/17/20     Cardiovascular:  Exercise tolerance: good (>4 METS),   (+) hypertension:,       ECG reviewed  Rhythm: regular  Rate: normal                    Neuro/Psych:   (+) neuromuscular disease: multiple sclerosis,              ROS comment: TMJ, left localization GI/Hepatic/Renal:   (+) GERD ( Clement's Esophageous ):,           Endo/Other:    (+) malignancy/cancer ( Malignant neoplasm of descending colon ([address]). Abdominal:   (+) obese,     Abdomen: soft. Vascular:   + DVT (  left external iliac vein and left common femoral vein. 3/22), .        ROS comment: US DUPLEX LLE 3/22/22  Positive examination for nonocclusive deep vein thrombosis involving left external iliac vein and left common femoral vein. . Other Findings:           Anesthesia Plan      general     ASA 3     (Pt reported to have Grade I View capable of being obtained via Direct Laryngoscopy and Video Laryngoscopy with and without Succinylcholine)  Induction: intravenous. MIPS: Prophylactic antiemetics administered. Anesthetic plan and risks discussed with patient. Use of blood products discussed with patient whom consented to blood products.                      Giles Kebede RN   10/27/2022

## 2022-10-27 NOTE — PROGRESS NOTES
[address] [facility]                                                                                                                    PRE OP INSTRUCTIONS FOR  Denise Frost        Date: 10/27/2022    Date of surgery: 10/28/22   Arrival Time: Hospital will call you between 5pm and 7pm with your final arrival time for surgery    Do not eat or drink anything after midnight prior to surgery. This includes no water, chewing gum, mints or ice chips. Take the following medications with a small sip of water on the morning of Surgery: gabapentin, protonix, flonase, inhaler       Aspirin, Ibuprofen, Advil, Naproxen, Vitamin E and other Anti-inflammatory products should be stopped  before surgery  as directed by your physician. Take Tylenol only unless instructed otherwise by your surgeon. Do not smoke,use illicit drugs and do not drink any alcoholic beverages 24 hours prior to surgery. You may brush your teeth the morning of surgery. DO NOT SWALLOW WATER    You MUST make arrangements for a responsible adult to take you home after your surgery. You will not be allowed to leave alone or drive yourself home. It is strongly suggested someone stay with you the first 24 hrs. Your surgery will be cancelled if you do not have a ride home. Please wear simple, loose fitting clothing to the hospital.  Riya Galvan not bring valuables (money, credit cards, checkbooks, etc.) Do not wear any makeup (including no eye makeup) or nail polish on your fingers or toes. DO NOT wear any jewelry or piercings on day of surgery. All body piercing jewelry must be removed. Shower the night before surgery with __x_Antibacterial soap /ELISE WIPES________      If you have a Living Will and Durable Power of  for Healthcare, please bring in a copy. If appropriate bring crutches, inspirex, WALKER, CANE etc...     Notify your Surgeon if you develop any illness between now and surgery time, cough, cold, fever, sore throat, nausea, vomiting, etc.  Please notify your surgeon if you experience dizziness, shortness of breath or blurred vision between now & the time of your surgery. If you have ___dentures, they will be removed before going to the OR; we will provide you a container. If you wear ___contact lenses or ___glasses, they will be removed; please bring a case for them. To provide excellent care visitors will be limited to 1 in the room at any given time. Please bring picture ID and insurance card. Sleep apnea patients need to bring CPAP AND SETTINGS to hospital on day of surgery. During flu season no children under the age of 15 are permitted in the hospital for the safety of all patients. Other On day of surgery please come to the main lobby and stop at the information desk. Please call AMBULATORY CARE if you have any further questions.    [address] [facility]     [address]

## 2022-10-28 ENCOUNTER — HOSPITAL ENCOUNTER (OUTPATIENT)
Age: 47
Setting detail: OUTPATIENT SURGERY
Discharge: HOME | End: 2022-10-28
Attending: SURGERY | Admitting: SURGERY
Payer: COMMERCIAL

## 2022-10-28 ENCOUNTER — ANESTHESIA (OUTPATIENT)
Dept: OPERATING ROOM | Age: 47
End: 2022-10-28
Payer: COMMERCIAL

## 2022-10-28 VITALS
HEIGHT: 61 IN | DIASTOLIC BLOOD PRESSURE: 73 MMHG | RESPIRATION RATE: 20 BRPM | WEIGHT: 215 LBS | BODY MASS INDEX: 40.59 KG/M2 | SYSTOLIC BLOOD PRESSURE: 124 MMHG | HEART RATE: 73 BPM | TEMPERATURE: 98 F | OXYGEN SATURATION: 94 %

## 2022-10-28 DIAGNOSIS — G89.18 POST-OP PAIN: Primary | ICD-10-CM

## 2022-10-28 DIAGNOSIS — K43.2 INCISIONAL HERNIA: ICD-10-CM

## 2022-10-28 PROCEDURE — 99024 POSTOP FOLLOW-UP VISIT: CPT | Performed by: SURGERY

## 2022-10-28 PROCEDURE — 49568 PR IMPLANT MESH HERNIA REPAIR/DEBRIDEMENT CLOSURE: CPT | Performed by: SURGERY

## 2022-10-28 PROCEDURE — 36415 COLL VENOUS BLD VENIPUNCTURE: CPT

## 2022-10-28 PROCEDURE — 3700000001 HC ADD 15 MINUTES (ANESTHESIA): Performed by: SURGERY

## 2022-10-28 PROCEDURE — 3600000004 HC SURGERY LEVEL 4 BASE: Performed by: SURGERY

## 2022-10-28 PROCEDURE — 6370000000 HC RX 637 (ALT 250 FOR IP): Performed by: SURGERY

## 2022-10-28 PROCEDURE — 2580000003 HC RX 258: Performed by: SURGERY

## 2022-10-28 PROCEDURE — 3600000014 HC SURGERY LEVEL 4 ADDTL 15MIN: Performed by: SURGERY

## 2022-10-28 PROCEDURE — 6360000002 HC RX W HCPCS: Performed by: ANESTHESIOLOGY

## 2022-10-28 PROCEDURE — 2500000003 HC RX 250 WO HCPCS

## 2022-10-28 PROCEDURE — 2709999900 HC NON-CHARGEABLE SUPPLY: Performed by: SURGERY

## 2022-10-28 PROCEDURE — 2720000010 HC SURG SUPPLY STERILE: Performed by: SURGERY

## 2022-10-28 PROCEDURE — 7100000010 HC PHASE II RECOVERY - FIRST 15 MIN: Performed by: SURGERY

## 2022-10-28 PROCEDURE — 6360000002 HC RX W HCPCS

## 2022-10-28 PROCEDURE — 6360000002 HC RX W HCPCS: Performed by: SURGERY

## 2022-10-28 PROCEDURE — 2500000003 HC RX 250 WO HCPCS: Performed by: SURGERY

## 2022-10-28 PROCEDURE — 7100000001 HC PACU RECOVERY - ADDTL 15 MIN: Performed by: SURGERY

## 2022-10-28 PROCEDURE — 49561 PR REPAIR INCISIONAL HERNIA,STRANG: CPT | Performed by: SURGERY

## 2022-10-28 PROCEDURE — C1781 MESH (IMPLANTABLE): HCPCS | Performed by: SURGERY

## 2022-10-28 PROCEDURE — 88302 TISSUE EXAM BY PATHOLOGIST: CPT

## 2022-10-28 PROCEDURE — 7100000000 HC PACU RECOVERY - FIRST 15 MIN: Performed by: SURGERY

## 2022-10-28 PROCEDURE — 7100000011 HC PHASE II RECOVERY - ADDTL 15 MIN: Performed by: SURGERY

## 2022-10-28 PROCEDURE — 3700000000 HC ANESTHESIA ATTENDED CARE: Performed by: SURGERY

## 2022-10-28 DEVICE — VENTRIO ST HERNIA PATCH, 11.4 CM (4.5"), CIRCLE
Type: IMPLANTABLE DEVICE | Site: ABDOMEN | Status: FUNCTIONAL
Brand: VENTRIO

## 2022-10-28 RX ORDER — SODIUM CHLORIDE 9 MG/ML
INJECTION, SOLUTION INTRAVENOUS PRN
Status: DISCONTINUED | OUTPATIENT
Start: 2022-10-28 | End: 2022-10-28 | Stop reason: HOSPADM

## 2022-10-28 RX ORDER — OXYCODONE HYDROCHLORIDE AND ACETAMINOPHEN 5; 325 MG/1; MG/1
1 TABLET ORAL EVERY 6 HOURS PRN
Qty: 28 TABLET | Refills: 0 | Status: SHIPPED | OUTPATIENT
Start: 2022-10-28 | End: 2022-11-04

## 2022-10-28 RX ORDER — SODIUM CHLORIDE, SODIUM LACTATE, POTASSIUM CHLORIDE, CALCIUM CHLORIDE 600; 310; 30; 20 MG/100ML; MG/100ML; MG/100ML; MG/100ML
INJECTION, SOLUTION INTRAVENOUS CONTINUOUS
Status: DISCONTINUED | OUTPATIENT
Start: 2022-10-28 | End: 2022-10-28 | Stop reason: HOSPADM

## 2022-10-28 RX ORDER — FENTANYL CITRATE 50 UG/ML
25 INJECTION, SOLUTION INTRAMUSCULAR; INTRAVENOUS EVERY 5 MIN PRN
Status: DISCONTINUED | OUTPATIENT
Start: 2022-10-28 | End: 2022-10-28 | Stop reason: HOSPADM

## 2022-10-28 RX ORDER — DEXAMETHASONE SODIUM PHOSPHATE 4 MG/ML
INJECTION, SOLUTION INTRA-ARTICULAR; INTRALESIONAL; INTRAMUSCULAR; INTRAVENOUS; SOFT TISSUE PRN
Status: DISCONTINUED | OUTPATIENT
Start: 2022-10-28 | End: 2022-10-28 | Stop reason: SDUPTHER

## 2022-10-28 RX ORDER — IPRATROPIUM BROMIDE AND ALBUTEROL SULFATE 2.5; .5 MG/3ML; MG/3ML
1 SOLUTION RESPIRATORY (INHALATION)
Status: DISCONTINUED | OUTPATIENT
Start: 2022-10-28 | End: 2022-10-28 | Stop reason: HOSPADM

## 2022-10-28 RX ORDER — MIDAZOLAM HYDROCHLORIDE 1 MG/ML
2 INJECTION INTRAMUSCULAR; INTRAVENOUS
Status: DISCONTINUED | OUTPATIENT
Start: 2022-10-28 | End: 2022-10-28 | Stop reason: HOSPADM

## 2022-10-28 RX ORDER — CEFAZOLIN SODIUM 1 G/3ML
INJECTION, POWDER, FOR SOLUTION INTRAMUSCULAR; INTRAVENOUS PRN
Status: DISCONTINUED | OUTPATIENT
Start: 2022-10-28 | End: 2022-10-28 | Stop reason: SDUPTHER

## 2022-10-28 RX ORDER — METHOCARBAMOL 100 MG/ML
INJECTION, SOLUTION INTRAMUSCULAR; INTRAVENOUS
Status: COMPLETED
Start: 2022-10-28 | End: 2022-10-28

## 2022-10-28 RX ORDER — PROCHLORPERAZINE EDISYLATE 5 MG/ML
5 INJECTION INTRAMUSCULAR; INTRAVENOUS
Status: DISCONTINUED | OUTPATIENT
Start: 2022-10-28 | End: 2022-10-28 | Stop reason: HOSPADM

## 2022-10-28 RX ORDER — SODIUM CHLORIDE 0.9 % (FLUSH) 0.9 %
5-40 SYRINGE (ML) INJECTION EVERY 12 HOURS SCHEDULED
Status: DISCONTINUED | OUTPATIENT
Start: 2022-10-28 | End: 2022-10-28 | Stop reason: HOSPADM

## 2022-10-28 RX ORDER — ONDANSETRON 2 MG/ML
4 INJECTION INTRAMUSCULAR; INTRAVENOUS
Status: DISCONTINUED | OUTPATIENT
Start: 2022-10-28 | End: 2022-10-28 | Stop reason: HOSPADM

## 2022-10-28 RX ORDER — GLYCOPYRROLATE 0.2 MG/ML
INJECTION INTRAMUSCULAR; INTRAVENOUS PRN
Status: DISCONTINUED | OUTPATIENT
Start: 2022-10-28 | End: 2022-10-28 | Stop reason: SDUPTHER

## 2022-10-28 RX ORDER — MEPERIDINE HYDROCHLORIDE 25 MG/ML
12.5 INJECTION INTRAMUSCULAR; INTRAVENOUS; SUBCUTANEOUS EVERY 5 MIN PRN
Status: COMPLETED | OUTPATIENT
Start: 2022-10-28 | End: 2022-10-28

## 2022-10-28 RX ORDER — FENTANYL CITRATE 50 UG/ML
INJECTION, SOLUTION INTRAMUSCULAR; INTRAVENOUS PRN
Status: DISCONTINUED | OUTPATIENT
Start: 2022-10-28 | End: 2022-10-28 | Stop reason: SDUPTHER

## 2022-10-28 RX ORDER — METHOCARBAMOL 100 MG/ML
1000 INJECTION, SOLUTION INTRAMUSCULAR; INTRAVENOUS ONCE
Status: COMPLETED | OUTPATIENT
Start: 2022-10-28 | End: 2022-10-28

## 2022-10-28 RX ORDER — MORPHINE SULFATE 2 MG/ML
INJECTION, SOLUTION INTRAMUSCULAR; INTRAVENOUS
Status: COMPLETED
Start: 2022-10-28 | End: 2022-10-28

## 2022-10-28 RX ORDER — LABETALOL HYDROCHLORIDE 5 MG/ML
10 INJECTION, SOLUTION INTRAVENOUS
Status: DISCONTINUED | OUTPATIENT
Start: 2022-10-28 | End: 2022-10-28 | Stop reason: HOSPADM

## 2022-10-28 RX ORDER — NEOSTIGMINE METHYLSULFATE 1 MG/ML
INJECTION, SOLUTION INTRAVENOUS PRN
Status: DISCONTINUED | OUTPATIENT
Start: 2022-10-28 | End: 2022-10-28 | Stop reason: SDUPTHER

## 2022-10-28 RX ORDER — MEPERIDINE HYDROCHLORIDE 25 MG/ML
INJECTION INTRAMUSCULAR; INTRAVENOUS; SUBCUTANEOUS
Status: COMPLETED
Start: 2022-10-28 | End: 2022-10-28

## 2022-10-28 RX ORDER — IBUPROFEN 800 MG/1
800 TABLET ORAL EVERY 6 HOURS PRN
Qty: 20 TABLET | Refills: 0 | Status: SHIPPED | OUTPATIENT
Start: 2022-10-28

## 2022-10-28 RX ORDER — PROPOFOL 10 MG/ML
INJECTION, EMULSION INTRAVENOUS PRN
Status: DISCONTINUED | OUTPATIENT
Start: 2022-10-28 | End: 2022-10-28 | Stop reason: SDUPTHER

## 2022-10-28 RX ORDER — LIDOCAINE HYDROCHLORIDE 20 MG/ML
INJECTION, SOLUTION INTRAVENOUS PRN
Status: DISCONTINUED | OUTPATIENT
Start: 2022-10-28 | End: 2022-10-28 | Stop reason: SDUPTHER

## 2022-10-28 RX ORDER — BUPIVACAINE HYDROCHLORIDE 2.5 MG/ML
INJECTION, SOLUTION EPIDURAL; INFILTRATION; INTRACAUDAL PRN
Status: DISCONTINUED | OUTPATIENT
Start: 2022-10-28 | End: 2022-10-28 | Stop reason: ALTCHOICE

## 2022-10-28 RX ORDER — KETOROLAC TROMETHAMINE 30 MG/ML
INJECTION, SOLUTION INTRAMUSCULAR; INTRAVENOUS
Status: COMPLETED
Start: 2022-10-28 | End: 2022-10-28

## 2022-10-28 RX ORDER — ONDANSETRON 2 MG/ML
INJECTION INTRAMUSCULAR; INTRAVENOUS PRN
Status: DISCONTINUED | OUTPATIENT
Start: 2022-10-28 | End: 2022-10-28 | Stop reason: SDUPTHER

## 2022-10-28 RX ORDER — HYDRALAZINE HYDROCHLORIDE 20 MG/ML
10 INJECTION INTRAMUSCULAR; INTRAVENOUS
Status: DISCONTINUED | OUTPATIENT
Start: 2022-10-28 | End: 2022-10-28 | Stop reason: HOSPADM

## 2022-10-28 RX ORDER — KETOROLAC TROMETHAMINE 30 MG/ML
30 INJECTION, SOLUTION INTRAMUSCULAR; INTRAVENOUS
Status: COMPLETED | OUTPATIENT
Start: 2022-10-28 | End: 2022-10-28

## 2022-10-28 RX ORDER — ROCURONIUM BROMIDE 10 MG/ML
INJECTION, SOLUTION INTRAVENOUS PRN
Status: DISCONTINUED | OUTPATIENT
Start: 2022-10-28 | End: 2022-10-28 | Stop reason: SDUPTHER

## 2022-10-28 RX ORDER — MORPHINE SULFATE 2 MG/ML
2 INJECTION, SOLUTION INTRAMUSCULAR; INTRAVENOUS EVERY 5 MIN PRN
Status: DISCONTINUED | OUTPATIENT
Start: 2022-10-28 | End: 2022-10-28 | Stop reason: HOSPADM

## 2022-10-28 RX ORDER — SODIUM CHLORIDE 0.9 % (FLUSH) 0.9 %
5-40 SYRINGE (ML) INJECTION PRN
Status: DISCONTINUED | OUTPATIENT
Start: 2022-10-28 | End: 2022-10-28 | Stop reason: HOSPADM

## 2022-10-28 RX ORDER — MIDAZOLAM HYDROCHLORIDE 1 MG/ML
INJECTION INTRAMUSCULAR; INTRAVENOUS PRN
Status: DISCONTINUED | OUTPATIENT
Start: 2022-10-28 | End: 2022-10-28 | Stop reason: SDUPTHER

## 2022-10-28 RX ORDER — METHOCARBAMOL 500 MG/1
500 TABLET, FILM COATED ORAL 4 TIMES DAILY
Qty: 40 TABLET | Refills: 0 | Status: SHIPPED | OUTPATIENT
Start: 2022-10-28 | End: 2022-11-07

## 2022-10-28 RX ORDER — DIPHENHYDRAMINE HYDROCHLORIDE 50 MG/ML
12.5 INJECTION INTRAMUSCULAR; INTRAVENOUS
Status: DISCONTINUED | OUTPATIENT
Start: 2022-10-28 | End: 2022-10-28 | Stop reason: HOSPADM

## 2022-10-28 RX ADMIN — FENTANYL CITRATE 50 MCG: 50 INJECTION, SOLUTION INTRAMUSCULAR; INTRAVENOUS at 10:14

## 2022-10-28 RX ADMIN — Medication 3 MG: at 10:42

## 2022-10-28 RX ADMIN — METHOCARBAMOL 1000 MG: 100 INJECTION, SOLUTION INTRAMUSCULAR; INTRAVENOUS at 11:41

## 2022-10-28 RX ADMIN — ROCURONIUM BROMIDE 30 MG: 10 INJECTION, SOLUTION INTRAVENOUS at 10:05

## 2022-10-28 RX ADMIN — MORPHINE SULFATE 2 MG: 2 INJECTION, SOLUTION INTRAMUSCULAR; INTRAVENOUS at 11:39

## 2022-10-28 RX ADMIN — HYDROMORPHONE HYDROCHLORIDE 0.5 MG: 1 INJECTION, SOLUTION INTRAMUSCULAR; INTRAVENOUS; SUBCUTANEOUS at 12:15

## 2022-10-28 RX ADMIN — FENTANYL CITRATE 50 MCG: 50 INJECTION, SOLUTION INTRAMUSCULAR; INTRAVENOUS at 10:35

## 2022-10-28 RX ADMIN — KETOROLAC TROMETHAMINE 30 MG: 30 INJECTION, SOLUTION INTRAMUSCULAR at 11:18

## 2022-10-28 RX ADMIN — ONDANSETRON 4 MG: 2 INJECTION INTRAMUSCULAR; INTRAVENOUS at 10:15

## 2022-10-28 RX ADMIN — SODIUM CHLORIDE, POTASSIUM CHLORIDE, SODIUM LACTATE AND CALCIUM CHLORIDE: 600; 310; 30; 20 INJECTION, SOLUTION INTRAVENOUS at 09:54

## 2022-10-28 RX ADMIN — PROPOFOL 150 MG: 10 INJECTION, EMULSION INTRAVENOUS at 10:05

## 2022-10-28 RX ADMIN — FENTANYL CITRATE 50 MCG: 50 INJECTION, SOLUTION INTRAMUSCULAR; INTRAVENOUS at 10:55

## 2022-10-28 RX ADMIN — CEFAZOLIN 2 G: 1 INJECTION, POWDER, FOR SOLUTION INTRAMUSCULAR; INTRAVENOUS at 09:54

## 2022-10-28 RX ADMIN — SODIUM CHLORIDE, POTASSIUM CHLORIDE, SODIUM LACTATE AND CALCIUM CHLORIDE: 600; 310; 30; 20 INJECTION, SOLUTION INTRAVENOUS at 10:18

## 2022-10-28 RX ADMIN — MORPHINE SULFATE 2 MG: 2 INJECTION, SOLUTION INTRAMUSCULAR; INTRAVENOUS at 11:20

## 2022-10-28 RX ADMIN — DEXAMETHASONE SODIUM PHOSPHATE 10 MG: 4 INJECTION, SOLUTION INTRAMUSCULAR; INTRAVENOUS at 10:15

## 2022-10-28 RX ADMIN — HYDROMORPHONE HYDROCHLORIDE 0.5 MG: 1 INJECTION, SOLUTION INTRAMUSCULAR; INTRAVENOUS; SUBCUTANEOUS at 11:50

## 2022-10-28 RX ADMIN — HYDROMORPHONE HYDROCHLORIDE 0.5 MG: 1 INJECTION, SOLUTION INTRAMUSCULAR; INTRAVENOUS; SUBCUTANEOUS at 12:44

## 2022-10-28 RX ADMIN — CEFAZOLIN 2000 MG: 2 INJECTION, POWDER, FOR SOLUTION INTRAMUSCULAR; INTRAVENOUS at 09:56

## 2022-10-28 RX ADMIN — MORPHINE SULFATE 2 MG: 2 INJECTION, SOLUTION INTRAMUSCULAR; INTRAVENOUS at 11:30

## 2022-10-28 RX ADMIN — MEPERIDINE HYDROCHLORIDE 12.5 MG: 25 INJECTION, SOLUTION INTRAMUSCULAR; INTRAVENOUS; SUBCUTANEOUS at 11:13

## 2022-10-28 RX ADMIN — FENTANYL CITRATE 50 MCG: 50 INJECTION, SOLUTION INTRAMUSCULAR; INTRAVENOUS at 10:43

## 2022-10-28 RX ADMIN — MIDAZOLAM 2 MG: 1 INJECTION INTRAMUSCULAR; INTRAVENOUS at 10:00

## 2022-10-28 RX ADMIN — KETOROLAC TROMETHAMINE 30 MG: 30 INJECTION, SOLUTION INTRAMUSCULAR; INTRAVENOUS at 11:18

## 2022-10-28 RX ADMIN — MEPERIDINE HYDROCHLORIDE 12.5 MG: 25 INJECTION, SOLUTION INTRAMUSCULAR; INTRAVENOUS; SUBCUTANEOUS at 11:07

## 2022-10-28 RX ADMIN — LIDOCAINE HYDROCHLORIDE 80 MG: 20 INJECTION, SOLUTION INTRAVENOUS at 10:05

## 2022-10-28 RX ADMIN — FENTANYL CITRATE 100 MCG: 50 INJECTION, SOLUTION INTRAMUSCULAR; INTRAVENOUS at 10:05

## 2022-10-28 RX ADMIN — FENTANYL CITRATE 50 MCG: 50 INJECTION, SOLUTION INTRAMUSCULAR; INTRAVENOUS at 10:58

## 2022-10-28 RX ADMIN — GLYCOPYRROLATE 0.6 MG: 0.2 INJECTION, SOLUTION INTRAMUSCULAR; INTRAVENOUS at 10:42

## 2022-10-28 ASSESSMENT — PAIN DESCRIPTION - LOCATION
LOCATION: ABDOMEN

## 2022-10-28 ASSESSMENT — PAIN DESCRIPTION - PAIN TYPE
TYPE: ACUTE PAIN;SURGICAL PAIN

## 2022-10-28 ASSESSMENT — PAIN DESCRIPTION - ONSET
ONSET: ON-GOING
ONSET: SUDDEN
ONSET: SUDDEN
ONSET: ON-GOING

## 2022-10-28 ASSESSMENT — PAIN DESCRIPTION - DESCRIPTORS
DESCRIPTORS: CRAMPING;SPASM;STABBING
DESCRIPTORS: CRAMPING;DISCOMFORT
DESCRIPTORS: SHARP
DESCRIPTORS: ACHING;DISCOMFORT;CRAMPING
DESCRIPTORS: ACHING;DISCOMFORT
DESCRIPTORS: ACHING;DISCOMFORT;SHOOTING
DESCRIPTORS: ACHING;DISCOMFORT
DESCRIPTORS: ACHING;DISCOMFORT
DESCRIPTORS: ACHING;DISCOMFORT;CRAMPING;SHOOTING
DESCRIPTORS: ACHING;DISCOMFORT
DESCRIPTORS: SPASM;SHARP;STABBING

## 2022-10-28 ASSESSMENT — PAIN DESCRIPTION - FREQUENCY
FREQUENCY: CONTINUOUS
FREQUENCY: INTERMITTENT
FREQUENCY: CONTINUOUS

## 2022-10-28 ASSESSMENT — PAIN SCALES - GENERAL
PAINLEVEL_OUTOF10: 7
PAINLEVEL_OUTOF10: 10
PAINLEVEL_OUTOF10: 9
PAINLEVEL_OUTOF10: 10
PAINLEVEL_OUTOF10: 9
PAINLEVEL_OUTOF10: 10
PAINLEVEL_OUTOF10: 9
PAINLEVEL_OUTOF10: 9
PAINLEVEL_OUTOF10: 5
PAINLEVEL_OUTOF10: 9
PAINLEVEL_OUTOF10: 10
PAINLEVEL_OUTOF10: 9

## 2022-10-28 ASSESSMENT — PAIN - FUNCTIONAL ASSESSMENT
PAIN_FUNCTIONAL_ASSESSMENT: PREVENTS OR INTERFERES WITH ALL ACTIVE AND SOME PASSIVE ACTIVITIES
PAIN_FUNCTIONAL_ASSESSMENT: INTOLERABLE, UNABLE TO DO ANY ACTIVE OR PASSIVE ACTIVITIES
PAIN_FUNCTIONAL_ASSESSMENT: NONE - DENIES PAIN

## 2022-10-28 ASSESSMENT — PAIN DESCRIPTION - ORIENTATION
ORIENTATION: MID;UPPER
ORIENTATION: MID
ORIENTATION: MID
ORIENTATION: MID;UPPER
ORIENTATION: MID;UPPER
ORIENTATION: MID

## 2022-10-28 NOTE — OP NOTE
[address] [facility]    Operative Report  DATE OF PROCEDURE: 10/28/2022  SURGEON: Dr. Neftali Astorga MD, M.DWagner   [facility]: SURGEON: non   PREOPERATIVE DIAGNOSIS: Incisional hernia, incarcerated x 2   POSTOPERATIVE DIAGNOSIS: same  OPERATION: Incisional hernia repair with mesh x 2   ANESTHESIA: General endotracheal.   ESTIMATED BLOOD LOSS: None. COMPLICATIONS: None. HISTORY: Alena Diehl is a  49 y.o.  female. She presented with a large, painful incisional hernia located periumbilical and RLQ. The pain continued getting worse and did not improved. We discussed the different medical and surgical options and she decided to proceed with epair with mesh. She understood the extensive risks involved in the surgery and wished to proceed. OPERATION: The patient was placed on the table and placed under general anesthesia. She was given Ancef 2g IV preop. SCDs were placed on the legs as DVT prophylaxis. Abdomen was prepped with chloraPrep and draped in the usual sterile fashion. A linear incision was made over the hernia defects. Dissection was then carried down with electrocautery. The hernia sac was identified. This hernia sac was then excised and sent off the field. Marcaine 0.25% plus epinephrine was used to inject the skin and muscle to help with postop pain control. A 5 mm incision was made in the left lateral abdomen. An Ethicon 5 mm non-bladed trochar was used to enter the abdomen and insufflate with CO2. An angled 5 mm scope was used and there was good visualization. 2 more 5 mm trochars were placed inferiorly. Once all the adhesions were taken down, attention was turned to the umbilicus. An 4.5 inch Ventrio ST meshs  were inserted through the fascial defects. The fascias was closed with interrupted 1 PDS suture.  The mesh was unraveled,the secure strap 5-mm tacking device was used to place tacks in a circumferential fashion, roughly 1 cm apart around the mesh, securing it to the anterior abdominal wall. The mesh lay flat in good position covering all the defects. The C02 was all released and trochars removed. 3-0 vicry and 4-0 monocryl was used to close the skin incisions. Derma+flex glue was placed on the incisions, no dressing. An abdominal binder was placed. The needle and sponge count were correct. The patient tolerated the procedure well and went to recovery in stable condition.      Physician Signature: Electronically signed by Dr. Katrina Mena MD, M.D.

## 2022-10-28 NOTE — PROGRESS NOTES
CLINICAL PHARMACY NOTE: MEDS TO BEDS    Total # of Prescriptions Filled: 3   The following medications were delivered to the patient:  ROBAXIN 500 MG  IBUPROFEN 800 MG  PERCOCET 5/325 MG    Additional Documentation:

## 2022-10-28 NOTE — H&P
General Surgery History and Physical     Patient's Name/Date of Birth: Salomon Payton / 1975     Date: October 28, 2022      Surgeon: Mitchell Tyson M.D.     PCP: Isis Roland PA-C     Chief Complaint: incisional hernia     HPI:   Salomon Payton is a 49 y.o. female who presents for evaluation of incisional hernia. It has become larger and more painful recently and the pt wants repair. Past Medical History        Past Medical History:   Diagnosis Date    Acid reflux disease      Colon cancer ([address]       s/p surgery    Cyst of ovary      Fracture of nasal bone      Hearing loss      Hx of blood clots       leg to lung    Hypertension      Morbidly obese ([address] 10/05/2020    Multiple sclerosis ([address]       denies limitations at present 11/2020    VEENA no CPAP       severe VEENA per pt    Pulmonary embolism ([address] 2021    Right shoulder pain 11/2020    RLS (restless legs syndrome)      Tinnitus      TMJ dysfunction       left side            Past Surgical History         Past Surgical History:   Procedure Laterality Date    APPENDECTOMY        BACK SURGERY         lumbar    BICEPS TENDON REPAIR Right 11/11/2020     BICEPS TENODESIS performed by Sury Salguero MD at [facility] 8/31/2021     LAPAROSCOPIC LEFT HEMICOLECTOMY WITH LOOP OSTOMY performed by Shaheed Antunez MD at [place] N/A 9/6/2021     DIAGNOSTIC LAPAROSCOPY POSSIBLE BOWEL RESECTION POSSILBE OSTOMY performed by Shaheed Antunez MD at [place] N/A 2/8/2022     LAPAROSCOPIC SIGMOID COLON RESECTION performed by Shaheed Antunez MD at [address] [place] [address] 6/1/2022     OPEN ILEOSTOMY REVERSAL performed by Shaheed Antunez MD at [address] [place] [address] ([address] [place] [address])   03/05/2018     Robotic hysterectomy, bilateral salpingectomy, right oophorectomy DR. ARIANA MONIQUE [facility]     HYSTERECTOMY, TOTAL ABDOMINAL (CERVIX REMOVED) LARYNGOSCOPY N/A 7/17/2020     DIRECT LARYNGOSCOPY--OMNI GUIDE LASER performed by Haley Vogel MD at [address] (CERVIX NOT REMOVED)        PROCTOSIGMOIDOSCOPY N/A 10/12/2021     ANAL PROCTO SIGMOIDOSCOPY FLEXIBLE performed by Kelvin Santacruz MD at [facility]. [address] N/A 1/25/2022     ANAL PROCTO [address] performed by Kelvin Santacruz MD at [facility] ARTHROSCOPY Right 11/11/2020     RIGHT SHOULDER DIAGNOSTIC ARTHROSCOPY WITH DECOMPRESSION ROTATOR CUFF REPAIR performed by Austin Sparks MD at [address] [place] [address]                Current Facility-Administered Medications          Current Outpatient Medications   Medication Sig Dispense Refill    pantoprazole (PROTONIX) 40 MG tablet          dicyclomine (BENTYL) 10 MG capsule TAKE 1 CAPSULE BY MOUTH 3 TIMES DAILY AS NEEDED FOR PAIN        VENTOLIN  (90 Base) MCG/ACT inhaler          lisinopril-hydroCHLOROthiazide (PRINZIDE;ZESTORETIC) 10-12.5 MG per tablet Take 1 tablet by mouth daily 30 tablet 5    ocrelizumab (OCREVUS) 300 MG/10ML SOLN injection Infuse 20 mLs intravenously every 6 months 20 mL 1    rOPINIRole (REQUIP) 0.5 MG tablet 1 tablet by mouth every morning; 1 tablet 2 hours before bed; 1 tablet at bedtime 270 tablet 3    baclofen (LIORESAL) 20 MG tablet Take 1 tablet by mouth 4 times daily as needed (spasms/pain) 360 tablet 3    fluticasone (FLONASE) 50 MCG/ACT nasal spray INSTILL ONE (1) SPRAY IN EACH NOSTRIL ONCE DAILY (Patient taking differently: daily as needed) 16 g 10    nortriptyline (PAMELOR) 10 MG capsule TAKE 1 CAPSULE BY MOUTH NIGHTLY FOR HEADACHE 30 capsule 10    vitamin D (D3-50) 33278 UNIT CAPS Take 1 capsule by mouth once a week 4 capsule 11    gabapentin (NEURONTIN) 400 MG capsule Take 1 capsule by mouth 3 times daily for 360 days.  90 capsule 11    famotidine (PEPCID) 40 MG tablet TAKE 1 TABLET BY MOUTH DAILY IN THE EVENING *EMERGENCY REFILL*        ELIQUIS 5 MG TABS tablet TAKE 1 TABLET BY MOUTH TWICE DAILY (Patient not taking: Reported on 10/20/2022) 60 tablet 10      No current facility-administered medications for this visit. No Known Allergies     The patient has a family history that is negative for severe cardiovascular or respiratory issues, negative for reaction to anesthesia.      Social History               Socioeconomic History    Marital status: Single       Spouse name: Not on file    Number of children: 3    Years of education: 11    Highest education level: 11th grade   Occupational History    Not on file   Tobacco Use    Smoking status: Every Day       Packs/day: 0.50       Years: 25.00       Pack years: 12.50       Types: Cigarettes    Smokeless tobacco: Never    Tobacco comments:           Vaping Use    Vaping Use: Never used   Substance and Sexual Activity    Alcohol use: Not Currently    Drug use: No    Sexual activity: Yes       Partners: Male   Other Topics Concern    Not on file   Social History Narrative     Uses [employer] for transportation     Network [employer]      Social Determinants of Health          Financial Resource Strain: Low Risk     Difficulty of Paying Living Expenses: Not hard at all   Food Insecurity: No Food Insecurity    Worried About Running Out of Food in the Last Year: Never true    Ran Out of Food in the Last Year: Never true   Transportation Needs: Not on file   Physical Activity: Not on file   Stress: Not on file   Social Connections: Not on file   Intimate Partner Violence: Not on file   Housing Stability: Not on file                  Review of Systems  Review of Systems -  General ROS: negative for - chills, fatigue or malaise  ENT ROS: negative for - hearing change, nasal congestion or nasal discharge  Allergy and Immunology ROS: negative for - hives, itchy/watery eyes or nasal congestion  Hematological and Lymphatic ROS: negative for - blood clots, blood transfusions, bruising or fatigue  Endocrine ROS: negative for - malaise/lethargy, mood swings, palpitations or polydipsia/polyuria  Respiratory ROS: negative for - sputum changes, stridor, tachypnea or wheezing  Cardiovascular ROS: negative for - irregular heartbeat, loss of consciousness, murmur or orthopnea  Gastrointestinal ROS: negative for - constipation, diarrhea, gas/bloating, heartburn or hematemesis  Genito-Urinary ROS: negative for -  genital discharge, genital ulcers or hematuria  Musculoskeletal ROS: negative for - gait disturbance, muscle pain or muscular weakness     Physical exam:   BP (!) 157/93   Pulse 78   Temp 97.4 °F (36.3 °C) (Temporal)   Ht 5' 1\" (1.549 m)   Wt 190 lb (86.2 kg)   LMP 01/05/2018   BMI 35.90 kg/m²   General appearance:  NAD  Psycho/social: negative for tremor or hallucinations  Head: NCAT, PERRLA, EOMI, red conjunctiva  Neck: supple, no masses  Lungs: CTAB, equal chest rise bilateral  Heart: Reg rate  Abdomen: soft, nontender, nondistended, incisional hernia at periumbilical area and 2.5 cm in size  Skin; no lesions  Gu: no cva tenderness  Extremities: extremities normal, atraumatic, no cyanosis or edema           Assessment:  49 y.o. female with incisional hernia     Plan: Will plan for lap possible open incisional hernia  Discussed the risk, benefits and alternatives of surgery including wound infections, bleeding, scar and hernia formation and the risks of general anesthetic including MI, CVA, sudden death or reactions to anesthetic medications. The patient understands the risks and alternatives and the possibility of converting to an open procedure. All questions were answered to the patient's satisfaction and they freely signed the consent.         Kaylen Mcgarry MD

## 2022-10-28 NOTE — DISCHARGE INSTRUCTIONS
Patient Discharge Instructions  Discharge Date:  10/28/2022    Discharged To: Home    RESUME ACTIVITY:      BATHING:  May shower 24hrs after surgery, may bathe 3 days after surgery    DRIVING: No driving while on pain medications    RETURN TO WORK: after follow up appointment    WALKING:  Yes    SEXUAL ACTIVITY: Yes    STAIRS:  Yes    LIFTING: Less than 25 lbs for 2weeks then no more than 50lbs for the next two, then no limitations    DIET: common adult    BOWELS: constipation is a side effect of your pain meds, take a daily laxative (miralax, dulcolax, etc.) as needed to keep your bowels moving as they normally do, do not go 2-3 days without having a bowel movement. SPECIAL INSTRUCTIONS:     Call the office at [phone] if you have a fever > 100 F, or if your incision becomes red, tender, or drains more than a small amount of clear fluid.     Call  for follow up appointment with Dr. Sreedhar Mcginnis in:  2weeks

## 2022-11-03 RX ORDER — IBUPROFEN 800 MG/1
800 TABLET ORAL 2 TIMES DAILY PRN
Qty: 60 TABLET | Refills: 0 | Status: SHIPPED | OUTPATIENT
Start: 2022-11-03

## 2022-11-03 RX ORDER — METHOCARBAMOL 500 MG/1
500 TABLET, FILM COATED ORAL 4 TIMES DAILY
Qty: 40 TABLET | Refills: 0 | Status: SHIPPED | OUTPATIENT
Start: 2022-11-03 | End: 2022-11-13

## 2022-12-22 ENCOUNTER — OFFICE VISIT (OUTPATIENT)
Dept: SURGERY | Age: 47
End: 2022-12-22

## 2022-12-22 VITALS
DIASTOLIC BLOOD PRESSURE: 90 MMHG | SYSTOLIC BLOOD PRESSURE: 146 MMHG | HEIGHT: 61 IN | TEMPERATURE: 97.8 F | HEART RATE: 70 BPM | BODY MASS INDEX: 40.59 KG/M2 | OXYGEN SATURATION: 100 % | WEIGHT: 215 LBS | RESPIRATION RATE: 18 BRPM

## 2022-12-22 DIAGNOSIS — G89.18 POST-OP PAIN: ICD-10-CM

## 2022-12-22 DIAGNOSIS — Z09 POSTOPERATIVE EXAMINATION: Primary | ICD-10-CM

## 2022-12-22 PROCEDURE — 99024 POSTOP FOLLOW-UP VISIT: CPT | Performed by: SURGERY

## 2022-12-22 RX ORDER — OXYCODONE HYDROCHLORIDE AND ACETAMINOPHEN 5; 325 MG/1; MG/1
1 TABLET ORAL EVERY 6 HOURS PRN
Qty: 28 TABLET | Refills: 0 | Status: SHIPPED | OUTPATIENT
Start: 2022-12-22 | End: 2022-12-29

## 2022-12-22 NOTE — PROGRESS NOTES
Surgery Progress Note            Chief complaint:   Patient Active Problem List   Diagnosis    Vocal cord dysfunction    Multiple sclerosis (HCC)    Morbidly obese (HCC)    Palafox's esophagus with dysplasia    Secondary restless legs syndrome    S/P colectomy    Malignant neoplasm of descending colon ([address]    Multiple subsegmental pulmonary emboli without acute cor pulmonale (HCC)    S/P closure of ileostomy    Abdominal wall seroma    Abdominal wall abscess at site of surgical wound    Incisional hernia       S: pain is increasing and incisions are intermittently bleeding    O:   Vitals:    12/22/22 0849   BP: (!) 146/90   Pulse: 70   Resp: 18   Temp: 97.8 °F (36.6 °C)   SpO2: 100%     No intake or output data in the 24 hours ending 12/22/22 0859        Labs:  No results for input(s): WBC, HGB, HCT in the last 72 hours. Invalid input(s): PLR  Lab Results   Component Value Date    CREATININE 0.7 10/20/2022    BUN 11 10/20/2022     10/20/2022    K 3.9 10/20/2022     10/20/2022    CO2 27 10/20/2022     No results for input(s): LIPASE, AMYLASE in the last 72 hours.     Physical exam:   BP (!) 146/90 (Site: Left Upper Arm, Position: Sitting, Cuff Size: Large Adult)   Pulse 70   Temp 97.8 °F (36.6 °C) (Infrared)   Resp 18   Ht 5' 1\" (1.549 m)   Wt 215 lb (97.5 kg)   LMP 01/05/2018   SpO2 100%   BMI 40.62 kg/m²   General appearance: NAD  Head: NCAT  Neck: supple, no masses  Lungs: equal chest rise bilateral  Heart: S1S2 present  Abdomen: soft, nontender, nondistended  Skin; no new lesions, incisions clean and intact  Gu: no cva tenderness  Extremities: extremities normal, atraumatic, no cyanosis or edema    A:  Post op incisional hernia repair x 2 with increasing pain and drainage    P:will get CT and cbc, cmp    Noa Cao MD, MD  12/22/2022

## 2023-01-02 ENCOUNTER — APPOINTMENT (OUTPATIENT)
Dept: CT IMAGING | Age: 48
End: 2023-01-02
Payer: COMMERCIAL

## 2023-01-02 ENCOUNTER — HOSPITAL ENCOUNTER (EMERGENCY)
Age: 48
Discharge: HOME | End: 2023-01-02
Payer: COMMERCIAL

## 2023-01-02 VITALS
TEMPERATURE: 97.3 F | HEIGHT: 61 IN | BODY MASS INDEX: 39.65 KG/M2 | SYSTOLIC BLOOD PRESSURE: 160 MMHG | HEART RATE: 73 BPM | OXYGEN SATURATION: 100 % | WEIGHT: 210 LBS | RESPIRATION RATE: 15 BRPM | DIASTOLIC BLOOD PRESSURE: 89 MMHG

## 2023-01-02 DIAGNOSIS — S30.1XXA ABDOMINAL WALL SEROMA, INITIAL ENCOUNTER: Primary | ICD-10-CM

## 2023-01-02 DIAGNOSIS — R10.84 GENERALIZED ABDOMINAL PAIN: ICD-10-CM

## 2023-01-02 LAB
ALBUMIN SERPL-MCNC: 4.1 G/DL (ref 3.5–5.2)
ALP BLD-CCNC: 111 U/L (ref 35–104)
ALT SERPL-CCNC: 37 U/L (ref 0–32)
ANION GAP SERPL CALCULATED.3IONS-SCNC: 10 MMOL/L (ref 7–16)
AST SERPL-CCNC: 23 U/L (ref 0–31)
BACTERIA: ABNORMAL /HPF
BASOPHILS ABSOLUTE: 0.05 E9/L (ref 0–0.2)
BASOPHILS RELATIVE PERCENT: 0.6 % (ref 0–2)
BILIRUB SERPL-MCNC: 0.2 MG/DL (ref 0–1.2)
BILIRUBIN URINE: NEGATIVE
BLOOD, URINE: NEGATIVE
BUN BLDV-MCNC: 10 MG/DL (ref 6–20)
CALCIUM SERPL-MCNC: 9.3 MG/DL (ref 8.6–10.2)
CHLORIDE BLD-SCNC: 101 MMOL/L (ref 98–107)
CLARITY: CLEAR
CO2: 26 MMOL/L (ref 22–29)
COLOR: YELLOW
CREAT SERPL-MCNC: 0.6 MG/DL (ref 0.5–1)
EOSINOPHILS ABSOLUTE: 0.32 E9/L (ref 0.05–0.5)
EOSINOPHILS RELATIVE PERCENT: 3.7 % (ref 0–6)
EPITHELIAL CELLS, UA: ABNORMAL /HPF
GFR SERPL CREATININE-BSD FRML MDRD: >60 ML/MIN/1.73
GLUCOSE BLD-MCNC: 97 MG/DL (ref 74–99)
GLUCOSE URINE: NEGATIVE MG/DL
HCT VFR BLD CALC: 47 % (ref 34–48)
HEMOGLOBIN: 15.1 G/DL (ref 11.5–15.5)
IMMATURE GRANULOCYTES #: 0.08 E9/L
IMMATURE GRANULOCYTES %: 0.9 % (ref 0–5)
KETONES, URINE: NEGATIVE MG/DL
LACTIC ACID: 1.6 MMOL/L (ref 0.5–2.2)
LEUKOCYTE ESTERASE, URINE: NEGATIVE
LIPASE: 20 U/L (ref 13–60)
LYMPHOCYTES ABSOLUTE: 2.41 E9/L (ref 1.5–4)
LYMPHOCYTES RELATIVE PERCENT: 27.7 % (ref 20–42)
MCH RBC QN AUTO: 30.1 PG (ref 26–35)
MCHC RBC AUTO-ENTMCNC: 32.1 % (ref 32–34.5)
MCV RBC AUTO: 93.8 FL (ref 80–99.9)
MONOCYTES ABSOLUTE: 0.72 E9/L (ref 0.1–0.95)
MONOCYTES RELATIVE PERCENT: 8.3 % (ref 2–12)
NEUTROPHILS ABSOLUTE: 5.11 E9/L (ref 1.8–7.3)
NEUTROPHILS RELATIVE PERCENT: 58.8 % (ref 43–80)
NITRITE, URINE: NEGATIVE
PDW BLD-RTO: 12.6 FL (ref 11.5–15)
PH UA: 5 (ref 5–9)
PLATELET # BLD: 296 E9/L (ref 130–450)
PMV BLD AUTO: 9.5 FL (ref 7–12)
POTASSIUM SERPL-SCNC: 4.5 MMOL/L (ref 3.5–5)
PROTEIN UA: NEGATIVE MG/DL
RBC # BLD: 5.01 E12/L (ref 3.5–5.5)
RBC UA: ABNORMAL /HPF (ref 0–2)
SODIUM BLD-SCNC: 137 MMOL/L (ref 132–146)
SPECIFIC GRAVITY UA: 1.02 (ref 1–1.03)
TOTAL PROTEIN: 6.9 G/DL (ref 6.4–8.3)
UROBILINOGEN, URINE: 0.2 E.U./DL
WBC # BLD: 8.7 E9/L (ref 4.5–11.5)
WBC UA: ABNORMAL /HPF (ref 0–5)

## 2023-01-02 PROCEDURE — 83690 ASSAY OF LIPASE: CPT

## 2023-01-02 PROCEDURE — 6360000002 HC RX W HCPCS: Performed by: PHYSICIAN ASSISTANT

## 2023-01-02 PROCEDURE — 74177 CT ABD & PELVIS W/CONTRAST: CPT

## 2023-01-02 PROCEDURE — 85025 COMPLETE CBC W/AUTO DIFF WBC: CPT

## 2023-01-02 PROCEDURE — 36415 COLL VENOUS BLD VENIPUNCTURE: CPT

## 2023-01-02 PROCEDURE — 96375 TX/PRO/DX INJ NEW DRUG ADDON: CPT

## 2023-01-02 PROCEDURE — 83605 ASSAY OF LACTIC ACID: CPT

## 2023-01-02 PROCEDURE — 80053 COMPREHEN METABOLIC PANEL: CPT

## 2023-01-02 PROCEDURE — 96374 THER/PROPH/DIAG INJ IV PUSH: CPT

## 2023-01-02 PROCEDURE — 99285 EMERGENCY DEPT VISIT HI MDM: CPT

## 2023-01-02 PROCEDURE — 6360000004 HC RX CONTRAST MEDICATION: Performed by: RADIOLOGY

## 2023-01-02 PROCEDURE — 81001 URINALYSIS AUTO W/SCOPE: CPT

## 2023-01-02 RX ORDER — FENTANYL CITRATE 0.05 MG/ML
50 INJECTION, SOLUTION INTRAMUSCULAR; INTRAVENOUS ONCE
Status: COMPLETED | OUTPATIENT
Start: 2023-01-02 | End: 2023-01-02

## 2023-01-02 RX ORDER — HYDROCODONE BITARTRATE AND ACETAMINOPHEN 5; 325 MG/1; MG/1
1 TABLET ORAL EVERY 6 HOURS PRN
Qty: 12 TABLET | Refills: 0 | Status: SHIPPED | OUTPATIENT
Start: 2023-01-02 | End: 2023-01-05

## 2023-01-02 RX ORDER — ONDANSETRON 2 MG/ML
4 INJECTION INTRAMUSCULAR; INTRAVENOUS ONCE
Status: COMPLETED | OUTPATIENT
Start: 2023-01-02 | End: 2023-01-02

## 2023-01-02 RX ADMIN — FENTANYL CITRATE 50 MCG: 50 INJECTION INTRAMUSCULAR; INTRAVENOUS at 11:48

## 2023-01-02 RX ADMIN — IOPAMIDOL 75 ML: 755 INJECTION, SOLUTION INTRAVENOUS at 13:12

## 2023-01-02 RX ADMIN — ONDANSETRON 4 MG: 2 INJECTION INTRAMUSCULAR; INTRAVENOUS at 11:45

## 2023-01-02 ASSESSMENT — PAIN SCALES - GENERAL
PAINLEVEL_OUTOF10: 8
PAINLEVEL_OUTOF10: 8

## 2023-01-02 ASSESSMENT — PAIN DESCRIPTION - LOCATION: LOCATION: ABDOMEN

## 2023-01-02 ASSESSMENT — PAIN - FUNCTIONAL ASSESSMENT: PAIN_FUNCTIONAL_ASSESSMENT: 0-10

## 2023-01-02 NOTE — ED PROVIDER NOTES
Independent [facility] Visit. Seen with Imelda Garcia PA-C    HPI:  1/2/23,   Time: 10:13 AM CAESAR Kraus is a 49 y.o. female presenting to the ED for abdominal pain that has been intermittent since October. The pt has an extensive hx of abdominal surgeries and her most recent was a hernia repair in October. She reports frequent infections after her surgeries and her pain today feels similar to previous. She states the pain in dull, cramping, burning and diffuse across her abdomen. She has not taken any medications at home. Nothing makes her pain better or worse. Her last BM was this morning. She report nausea and dysuria. She denies any fever, chills, body aches, chest pain, back pain, vomiting, diarrhea, hematuria or melena. ROS:   Pertinent positives and negatives are stated within HPI, all other systems reviewed and are negative.  --------------------------------------------- PAST HISTORY ---------------------------------------------  Past Medical History:  has a past medical history of Acid reflux disease, Colon cancer ([address] Cyst of ovary, Fracture of nasal bone, Hearing loss, Hx of blood clots, Hypertension, Morbidly obese ([address] Multiple sclerosis ([address] VEENA no CPAP, Pulmonary embolism ([address] Right shoulder pain, RLS (restless legs syndrome), Tinnitus, and TMJ dysfunction. Past Surgical History:  has a past surgical history that includes Cholecystectomy; cyst removal; Hysterectomy (03/05/2018); laryngoscopy (N/A, 7/17/2020); Shoulder arthroscopy (Right, 11/11/2020); Biceps tendon repair (Right, 11/11/2020); Appendectomy; Esophagus surgery; Partial hysterectomy; Hysterectomy, total abdominal; Tonsillectomy; colectomy (Left, 8/31/2021); colectomy (N/A, 9/6/2021); proctosigmoidoscopy (N/A, 10/12/2021); back surgery; proctosigmoidoscopy (N/A, 1/25/2022); colectomy (N/A, 2/8/2022); Colostomy Closure (N/A, 6/1/2022); and ventral hernia repair (N/A, 10/28/2022).     Social History:  reports that she has been smoking cigarettes. She has a 12.50 pack-year smoking history. She has never used smokeless tobacco. She reports that she does not currently use alcohol. She reports that she does not use drugs. Family History: family history includes Mult Sclerosis in her mother. The patients home medications have been reviewed. Allergies: Patient has no known allergies.     -------------------------------------------------- RESULTS -------------------------------------------------  All laboratory and radiology results have been personally reviewed by myself   LABS:  Results for orders placed or performed during the hospital encounter of 01/02/23   CBC with Auto Differential   Result Value Ref Range    WBC 8.7 4.5 - 11.5 E9/L    RBC 5.01 3.50 - 5.50 E12/L    Hemoglobin 15.1 11.5 - 15.5 g/dL    Hematocrit 47.0 34.0 - 48.0 %    MCV 93.8 80.0 - 99.9 fL    MCH 30.1 26.0 - 35.0 pg    MCHC 32.1 32.0 - 34.5 %    RDW 12.6 11.5 - 15.0 fL    Platelets 630 202 - 009 E9/L    MPV 9.5 7.0 - 12.0 fL    Neutrophils % 58.8 43.0 - 80.0 %    Immature Granulocytes % 0.9 0.0 - 5.0 %    Lymphocytes % 27.7 20.0 - 42.0 %    Monocytes % 8.3 2.0 - 12.0 %    Eosinophils % 3.7 0.0 - 6.0 %    Basophils % 0.6 0.0 - 2.0 %    Neutrophils Absolute 5.11 1.80 - 7.30 E9/L    Immature Granulocytes # 0.08 E9/L    Lymphocytes Absolute 2.41 1.50 - 4.00 E9/L    Monocytes Absolute 0.72 0.10 - 0.95 E9/L    Eosinophils Absolute 0.32 0.05 - 0.50 E9/L    Basophils Absolute 0.05 0.00 - 0.20 E9/L   Comprehensive Metabolic Panel   Result Value Ref Range    Sodium 137 132 - 146 mmol/L    Potassium 4.5 3.5 - 5.0 mmol/L    Chloride 101 98 - 107 mmol/L    CO2 26 22 - 29 mmol/L    Anion Gap 10 7 - 16 mmol/L    Glucose 97 74 - 99 mg/dL    BUN 10 6 - 20 mg/dL    Creatinine 0.6 0.5 - 1.0 mg/dL    Est, Glom Filt Rate >60 >=60 mL/min/1.73    Calcium 9.3 8.6 - 10.2 mg/dL    Total Protein 6.9 6.4 - 8.3 g/dL    Albumin 4.1 3.5 - 5.2 g/dL    Total Bilirubin 0.2 0.0 - 1.2 mg/dL    Alkaline Phosphatase 111 (H) 35 - 104 U/L    ALT 37 (H) 0 - 32 U/L    AST 23 0 - 31 U/L   Lipase   Result Value Ref Range    Lipase 20 13 - 60 U/L   Urinalysis with Microscopic   Result Value Ref Range    Color, UA Yellow Straw/Yellow    Clarity, UA Clear Clear    Glucose, Ur Negative Negative mg/dL    Bilirubin Urine Negative Negative    Ketones, Urine Negative Negative mg/dL    Specific Gravity, UA 1.020 1.005 - 1.030    Blood, Urine Negative Negative    pH, UA 5.0 5.0 - 9.0    Protein, UA Negative Negative mg/dL    Urobilinogen, Urine 0.2 <2.0 E.U./dL    Nitrite, Urine Negative Negative    Leukocyte Esterase, Urine Negative Negative    WBC, UA 0-1 0 - 5 /HPF    RBC, UA 0-1 0 - 2 /HPF    Epithelial Cells, UA MODERATE /HPF    Bacteria, UA FEW (A) None Seen /HPF   Lactic Acid   Result Value Ref Range    Lactic Acid 1.6 0.5 - 2.2 mmol/L       RADIOLOGY:  Interpreted by Radiologist.  CT ABDOMEN PELVIS W IV CONTRAST Additional Contrast? None   Final Result   No evidence of acute intra-abdominal or pelvic process. Mildly dilated and thickened loops of small bowel in the left mid abdomen   suspicious for mild enteritis. Interval organization of previously noted soft tissue edema in the right   anterior abdominal wall now discrete encapsulated fluid measuring 3.1 x 1.9   cm. This may represent seroma or hematoma or abscess. ------------------------- NURSING NOTES AND VITALS REVIEWED ---------------------------   The nursing notes within the ED encounter and vital signs as below have been reviewed.    BP (!) 189/103   Pulse 80   Temp 97.3 °F (36.3 °C) (Infrared)   Resp 18   Ht 5' 1\" (1.549 m)   Wt 210 lb (95.3 kg)   LMP 01/05/2018   SpO2 98%   BMI 39.68 kg/m²   Oxygen Saturation Interpretation: Normal      ---------------------------------------------------PHYSICAL EXAM--------------------------------------    Constitutional/General: Alert and oriented x3, well appearing, non toxic in NAD  Head: NC/AT  Eyes: PERRL, EOMI  Mouth: Oropharynx clear, handling secretions, no trismus  Neck: Supple, full ROM, no meningeal signs  Pulmonary: Lungs clear to auscultation bilaterally, no wheezes, rales, or rhonchi. Not in respiratory distress  Cardiovascular:  Regular rate and rhythm, no murmurs, gallops, or rubs. 2+ distal pulses  Abdomen: Soft, non distended, no guarding. Multiple surgical scars present, no erythema, edema or drainage. +TTP in all quadrants of the abdomen. BS present and normoactive. No CVA tenderness. Extremities: Moves all extremities x 4. Warm and well perfused  Skin: warm and dry without rash  Neurologic: GCS 15,  Psych: Normal Affect      ------------------------------ ED COURSE/MEDICAL DECISION MAKING----------------------  Medications   ondansetron (ZOFRAN) injection 4 mg (4 mg IntraVENous Given by Other 1/2/23 1145)   fentaNYL (SUBLIMAZE) injection 50 mcg (50 mcg IntraVENous Given by Other 1/2/23 1148)   iopamidol (ISOVUE-370) 76 % injection 75 mL (75 mLs IntraVENous Given 1/2/23 1312)     ED Course as of 01/02/23 1431   Mon Jan 02, 2023   1017 This is a 79-year-old female that presents to the emergency room today for abdominal pain. Patient has had multiple previous abdominal surgeries, the most recent being 2 months ago by Dr. Fidel Roth. She had a hernia repair with mesh placement. She states that she has been having intermittent cramping abdominal pain since the surgery. She saw Dr. Fidel Roth on 12/22/2022. She is scheduled to have labs and a CT scan done for tomorrow. She states that she presents today because her pain was significantly worse. She complains of some nausea but no vomiting. She denies any diarrhea or constipation. No fevers or chills. She denies dysuria but reports that her abdomen does hurt worse with urination. She denies any hematuria. Nothing seems to make her symptoms better or worse.   On physical exam, she has some mild diffuse abdominal tenderness to palpation. No rigidity or surgical abdomen. Surgical wounds are present without any erythema, wound dehiscence or drainage. Heart rate regular, normal rhythm. Clear to auscultation. Plan is for labs, CT, urinalysis. [SB]   8655 Spoke w/Dr. Edilson Cedeño, on for Dr. Sam Wolf. Discussed case and results. Dr. Edilson Cedeño states it is likely a chronic seroma, and she can be d/c home at this time, they f/u in the office. [SB]      ED Course User Index  [SB] DANISH Sharif         Recheck: 2:00pm  -pt updated on results. Pt. Still reporting pain. Medical Decision Making:    oJhn Harding is a 49 y.o. female presenting to the ED for abdominal pain that has been intermittent since October. The pt has an extensive hx of abdominal surgeries and her most recent was a hernia repair in October. She reports frequent infections after her surgeries and her pain today feels similar to previous. She states the pain in dull, cramping, burning and diffuse across her abdomen. She has not taken any medications at home. Nothing makes her pain better or worse. Her last BM was this morning. She report nausea and dysuria. She denies any fever, chills, body aches, chest pain, back pain, vomiting, diarrhea, hematuria or melena. VS stable, PE demonstrated diffuse tenderness to palpation and previous healed surgical scars. Ddx included colitis vs intra-abdominal infection. PT. Given 4mg IV Zofran and 50mcg fentanyl for pain control. CBC, lipase, U/A and lactic acid unremarkable. CMP demonstrated mildly elevated Alk phos and ALT. CT of the abdomen revealed mild enteritis and a soft tissue edema concerning for seroma/hematoma or abscess. Consult placed with general surgeon who reccommended discharge home with f/u in his office. PT. Discharged home in stable condition with [place].          Counseling:    The emergency provider has spoken with the patient and discussed todays results, in addition to providing specific details for the plan of care and counseling regarding the diagnosis and prognosis.  Questions are answered at this time and they are agreeable with the plan.       --------------------------------- IMPRESSION AND DISPOSITION ---------------------------------    IMPRESSION  1. Abdominal wall seroma, initial encounter    2. Generalized abdominal pain        DISPOSITION  Disposition: Discharge to home  Patient condition is good                 DANISH Sanchez  01/02/23 8864

## 2023-01-02 NOTE — Clinical Note
Libia Ayers was seen and treated in our emergency department on 1/2/2023. She may return to work on . If you have any questions or concerns, please don't hesitate to call.       Stephanie Moore PA-C

## 2023-01-12 ENCOUNTER — OFFICE VISIT (OUTPATIENT)
Dept: SURGERY | Age: 48
End: 2023-01-12

## 2023-01-12 VITALS — WEIGHT: 220 LBS | BODY MASS INDEX: 41.54 KG/M2 | RESPIRATION RATE: 16 BRPM | HEIGHT: 61 IN

## 2023-01-12 DIAGNOSIS — S30.1XXD ABDOMINAL WALL SEROMA, SUBSEQUENT ENCOUNTER: Primary | ICD-10-CM

## 2023-01-12 NOTE — PROGRESS NOTES
Surgery Progress Note            Chief complaint:   Chief Complaint   Patient presents with    Results     CT Scan      Patient Active Problem List   Diagnosis    Vocal cord dysfunction    Multiple sclerosis ([facility] [address]    Morbidly obese ([facility] [address]    Palafox's esophagus with dysplasia    Secondary restless legs syndrome    S/P colectomy    Malignant neoplasm of descending colon ([facility] [address]    Multiple subsegmental pulmonary emboli without acute cor pulmonale (HCC)    S/P closure of ileostomy    Abdominal wall seroma    Abdominal wall abscess at site of surgical wound    Incisional hernia       S: still with pain over area of seroma    O:   Vitals:    01/12/23 0943   Resp: 16     No intake or output data in the 24 hours ending 01/12/23 0948        Labs:  Lab Results   Component Value Date/Time    WBC 8.7 01/02/2023 10:19 AM    WBC 11.4 10/20/2022 11:12 PM    WBC 8.1 08/29/2022 12:42 PM    HGB 15.1 01/02/2023 10:19 AM    HGB 13.8 10/20/2022 11:12 PM    HGB 13.9 08/29/2022 12:42 PM    HCT 47.0 01/02/2023 10:19 AM    HCT 41.7 10/20/2022 11:12 PM    HCT 41.5 08/29/2022 12:42 PM     Lab Results   Component Value Date    CREATININE 0.6 01/02/2023    BUN 10 01/02/2023     01/02/2023    K 4.5 01/02/2023     01/02/2023    CO2 26 01/02/2023     Lab Results   Component Value Date/Time    LIPASE 20 01/02/2023 10:19 AM    LIPASE 15 08/29/2022 12:42 PM    LIPASE 23 06/24/2022 04:44 AM         Physical exam:   Resp 16   Ht 5' 1\" (1.549 m)   Wt 220 lb (99.8 kg)   LMP 01/05/2018   BMI 41.57 kg/m²   General appearance: NAD  Head: NCAT  Neck: supple, no masses  Lungs: equal chest rise bilateral  Heart: S1S2 present  Abdomen: soft,  moderately tender on right , nondistended,  Incisions clean and intact  Skin; no lesions  Gu: no cva tenderness  Extremities: extremities normal, atraumatic, no cyanosis or edema    A:  post op abd wall seroma with severe pain and otherwise normal CT    P: will get IR drainage of this due to pain     Raul Hi Deborah Miguel MD, MD  1/12/2023

## 2023-01-17 ENCOUNTER — TELEPHONE (OUTPATIENT)
Dept: SURGERY | Age: 48
End: 2023-01-17

## 2023-01-17 NOTE — TELEPHONE ENCOUNTER
Patient called requesting pain medication. Per Dr Mery Gunn, patient needs to alternate ibuprofen and tylenol.   Patient advised

## 2023-01-19 ENCOUNTER — HOSPITAL ENCOUNTER (OUTPATIENT)
Dept: CT IMAGING | Age: 48
Discharge: HOME | End: 2023-01-21
Payer: COMMERCIAL

## 2023-01-19 VITALS
RESPIRATION RATE: 17 BRPM | SYSTOLIC BLOOD PRESSURE: 144 MMHG | DIASTOLIC BLOOD PRESSURE: 89 MMHG | OXYGEN SATURATION: 99 % | TEMPERATURE: 97.6 F | HEART RATE: 70 BPM | BODY MASS INDEX: 41.54 KG/M2 | WEIGHT: 220 LBS | HEIGHT: 61 IN

## 2023-01-19 DIAGNOSIS — Z09 POSTOPERATIVE EXAMINATION: ICD-10-CM

## 2023-01-19 DIAGNOSIS — S30.1XXD ABDOMINAL WALL SEROMA, SUBSEQUENT ENCOUNTER: ICD-10-CM

## 2023-01-19 LAB
ALBUMIN SERPL-MCNC: 4.3 G/DL (ref 3.5–5.2)
ALP BLD-CCNC: 118 U/L (ref 35–104)
ALT SERPL-CCNC: 58 U/L (ref 0–32)
ANION GAP SERPL CALCULATED.3IONS-SCNC: 8 MMOL/L (ref 7–16)
AST SERPL-CCNC: 27 U/L (ref 0–31)
BILIRUB SERPL-MCNC: 0.5 MG/DL (ref 0–1.2)
BUN BLDV-MCNC: 10 MG/DL (ref 6–20)
CALCIUM SERPL-MCNC: 9.6 MG/DL (ref 8.6–10.2)
CHLORIDE BLD-SCNC: 99 MMOL/L (ref 98–107)
CO2: 29 MMOL/L (ref 22–29)
CREAT SERPL-MCNC: 0.7 MG/DL (ref 0.5–1)
GFR SERPL CREATININE-BSD FRML MDRD: >60 ML/MIN/1.73
GLUCOSE BLD-MCNC: 102 MG/DL (ref 74–99)
HCT VFR BLD CALC: 42.9 % (ref 34–48)
HEMOGLOBIN: 14.3 G/DL (ref 11.5–15.5)
INR BLD: 1
MCH RBC QN AUTO: 30.8 PG (ref 26–35)
MCHC RBC AUTO-ENTMCNC: 33.3 % (ref 32–34.5)
MCV RBC AUTO: 92.3 FL (ref 80–99.9)
PDW BLD-RTO: 12.5 FL (ref 11.5–15)
PLATELET # BLD: 259 E9/L (ref 130–450)
PMV BLD AUTO: 9.6 FL (ref 7–12)
POTASSIUM SERPL-SCNC: 4.3 MMOL/L (ref 3.5–5)
PROTHROMBIN TIME: 11.2 SEC (ref 9.3–12.4)
RBC # BLD: 4.65 E12/L (ref 3.5–5.5)
SODIUM BLD-SCNC: 136 MMOL/L (ref 132–146)
TOTAL PROTEIN: 7.1 G/DL (ref 6.4–8.3)
WBC # BLD: 8.5 E9/L (ref 4.5–11.5)

## 2023-01-19 PROCEDURE — 85027 COMPLETE CBC AUTOMATED: CPT

## 2023-01-19 PROCEDURE — 2500000003 HC RX 250 WO HCPCS: Performed by: RADIOLOGY

## 2023-01-19 PROCEDURE — 85610 PROTHROMBIN TIME: CPT

## 2023-01-19 PROCEDURE — 87205 SMEAR GRAM STAIN: CPT

## 2023-01-19 PROCEDURE — 36415 COLL VENOUS BLD VENIPUNCTURE: CPT

## 2023-01-19 PROCEDURE — 6360000002 HC RX W HCPCS: Performed by: RADIOLOGY

## 2023-01-19 PROCEDURE — 7100000011 HC PHASE II RECOVERY - ADDTL 15 MIN

## 2023-01-19 PROCEDURE — 80053 COMPREHEN METABOLIC PANEL: CPT

## 2023-01-19 PROCEDURE — 96365 THER/PROPH/DIAG IV INF INIT: CPT

## 2023-01-19 PROCEDURE — 77012 CT SCAN FOR NEEDLE BIOPSY: CPT

## 2023-01-19 PROCEDURE — 87070 CULTURE OTHR SPECIMN AEROBIC: CPT

## 2023-01-19 PROCEDURE — 10160 PNXR ASPIR ABSC HMTMA BULLA: CPT

## 2023-01-19 PROCEDURE — 7100000010 HC PHASE II RECOVERY - FIRST 15 MIN

## 2023-01-19 RX ORDER — LIDOCAINE HYDROCHLORIDE 20 MG/ML
INJECTION, SOLUTION INFILTRATION; PERINEURAL
Status: COMPLETED | OUTPATIENT
Start: 2023-01-19 | End: 2023-01-19

## 2023-01-19 RX ORDER — MIDAZOLAM HYDROCHLORIDE 2 MG/2ML
INJECTION, SOLUTION INTRAMUSCULAR; INTRAVENOUS
Status: COMPLETED | OUTPATIENT
Start: 2023-01-19 | End: 2023-01-19

## 2023-01-19 RX ORDER — FENTANYL CITRATE 50 UG/ML
INJECTION, SOLUTION INTRAMUSCULAR; INTRAVENOUS
Status: COMPLETED | OUTPATIENT
Start: 2023-01-19 | End: 2023-01-19

## 2023-01-19 RX ORDER — LIDOCAINE HYDROCHLORIDE AND EPINEPHRINE BITARTRATE 20; .01 MG/ML; MG/ML
INJECTION, SOLUTION SUBCUTANEOUS
Status: COMPLETED | OUTPATIENT
Start: 2023-01-19 | End: 2023-01-19

## 2023-01-19 RX ORDER — MIDAZOLAM HYDROCHLORIDE 5 MG/ML
INJECTION INTRAMUSCULAR; INTRAVENOUS
Status: COMPLETED | OUTPATIENT
Start: 2023-01-19 | End: 2023-01-19

## 2023-01-19 RX ORDER — DIPHENHYDRAMINE HYDROCHLORIDE 50 MG/ML
INJECTION INTRAMUSCULAR; INTRAVENOUS
Status: COMPLETED | OUTPATIENT
Start: 2023-01-19 | End: 2023-01-19

## 2023-01-19 RX ADMIN — FENTANYL CITRATE 25 MCG: 50 INJECTION, SOLUTION INTRAMUSCULAR; INTRAVENOUS at 12:06

## 2023-01-19 RX ADMIN — MIDAZOLAM HYDROCHLORIDE 0.5 MG: 1 INJECTION, SOLUTION INTRAMUSCULAR; INTRAVENOUS at 12:11

## 2023-01-19 RX ADMIN — FENTANYL CITRATE 25 MCG: 50 INJECTION, SOLUTION INTRAMUSCULAR; INTRAVENOUS at 12:12

## 2023-01-19 RX ADMIN — LIDOCAINE HYDROCHLORIDE,EPINEPHRINE BITARTRATE 2 ML: 20; .01 INJECTION, SOLUTION INFILTRATION; PERINEURAL at 12:17

## 2023-01-19 RX ADMIN — DIPHENHYDRAMINE HYDROCHLORIDE 25 MG: 50 INJECTION, SOLUTION INTRAMUSCULAR; INTRAVENOUS at 12:04

## 2023-01-19 RX ADMIN — MIDAZOLAM HYDROCHLORIDE 0.5 MG: 5 INJECTION INTRAMUSCULAR; INTRAVENOUS at 12:05

## 2023-01-19 RX ADMIN — LIDOCAINE HYDROCHLORIDE 2 ML: 20 INJECTION, SOLUTION INFILTRATION; PERINEURAL at 12:17

## 2023-01-19 ASSESSMENT — PAIN - FUNCTIONAL ASSESSMENT: PAIN_FUNCTIONAL_ASSESSMENT: NONE - DENIES PAIN

## 2023-01-19 NOTE — PRE SEDATION
Sedation Pre-Procedure Note    Patient Name: Nabil Barrera   YOB: 1975  Room/Bed: Room/bed info not found  Medical Record Number: [id number]  Date: 1/19/2023   Time: 12:27 PM       Indication:  seroma    Consent: I have discussed with the patient and/or the patient representative the indication, alternatives, and the possible risks and/or complications of the planned procedure and the anesthesia methods. The patient and/or patient representative appear to understand and agree to proceed. Vital Signs:   Vitals:    01/19/23 1220   BP: 126/82   Pulse: 63   Resp: 20   Temp:    SpO2: 100%       Past Medical History:   has a past medical history of Acid reflux disease, Colon cancer ([facility] [address] Cyst of ovary, Fracture of nasal bone, Hearing loss, Hx of blood clots, Hypertension, Morbidly obese (HCC), Multiple sclerosis ([address] VEENA no CPAP, Pulmonary embolism ([address] Right shoulder pain, RLS (restless legs syndrome), Tinnitus, and TMJ dysfunction. Past Surgical History:   has a past surgical history that includes Cholecystectomy; cyst removal; Hysterectomy (03/05/2018); laryngoscopy (N/A, 7/17/2020); Shoulder arthroscopy (Right, 11/11/2020); Biceps tendon repair (Right, 11/11/2020); Appendectomy; Esophagus surgery; Partial hysterectomy; Hysterectomy, total abdominal; Tonsillectomy; colectomy (Left, 8/31/2021); colectomy (N/A, 9/6/2021); proctosigmoidoscopy (N/A, 10/12/2021); back surgery; proctosigmoidoscopy (N/A, 1/25/2022); colectomy (N/A, 2/8/2022); Colostomy Closure (N/A, 6/1/2022); and ventral hernia repair (N/A, 10/28/2022). Medications:   Scheduled Meds:   Continuous Infusions:   PRN Meds:   Home Meds:   Prior to Admission medications    Medication Sig Start Date End Date Taking?  Authorizing Provider   ibuprofen (ADVIL;MOTRIN) 800 MG tablet Take 1 tablet by mouth 2 times daily as needed for Pain  Patient not taking: Reported on 1/19/2023 11/3/22   Thien Blanco MD   ibuprofen (ADVIL;MOTRIN) 800 MG tablet Take 1 tablet by mouth every 6 hours as needed for Pain  Patient not taking: Reported on 1/19/2023 10/28/22   Ortiz Bowser MD   pantoprazole (PROTONIX) 40 MG tablet  10/12/22   Historical Provider, MD   dicyclomine (BENTYL) 10 MG capsule TAKE 1 CAPSULE BY MOUTH 3 TIMES DAILY AS NEEDED FOR PAIN 10/12/22   Historical Provider, MD   VENTOLIN  (90 Base) MCG/ACT inhaler  10/12/22   Historical Provider, MD   lisinopril-hydroCHLOROthiazide (PRINZIDE;ZESTORETIC) 10-12.5 MG per tablet Take 1 tablet by mouth daily 9/8/22   Ar Mcleod PA-C   ocrelizumab (OCREVUS) 300 MG/10ML SOLN injection Infuse 20 mLs intravenously every 6 months 8/10/22   MARIXA Rich CNP   rOPINIRole (REQUIP) 0.5 MG tablet 1 tablet by mouth every morning; 1 tablet 2 hours before bed; 1 tablet at bedtime 8/2/22   MARIXA Rich CNP   baclofen (LIORESAL) 20 MG tablet Take 1 tablet by mouth 4 times daily as needed (spasms/pain) 8/2/22   MARIXA Rich CNP   fluticasone (FLONASE) 50 MCG/ACT nasal spray INSTILL ONE (1) SPRAY IN [facility] NOSTRIL ONCE DAILY  Patient taking differently: daily as needed 7/29/22   Ar Mcleod PA-C   nortriptyline (PAMELOR) 10 MG capsule TAKE 1 CAPSULE BY MOUTH NIGHTLY FOR HEADACHE 6/30/22   Ar Mcleod PA-C   vitamin D (D3-50) 09629 UNIT CAPS Take 1 capsule by mouth once a week 6/28/22   MARIXA Rich CNP   gabapentin (NEURONTIN) 400 MG capsule Take 1 capsule by mouth 3 times daily for 360 days.  6/28/22 6/23/23  MARIXA Rich CNP   famotidine (PEPCID) 40 MG tablet TAKE 1 TABLET BY MOUTH DAILY IN THE EVENING *EMERGENCY REFILL* 4/4/22   Historical Provider, MD     Coumadin Use Last 7 Days:  no  Antiplatelet drug therapy use last 7 days: no  Other anticoagulant use last 7 days: no  Additional Medication Information:  lidocaine      Pre-Sedation Documentation and Exam:   I have personally completed a history, physical exam & review of systems for this patient (see notes).     Mallampati Airway Assessment:  normal    Prior History of Anesthesia Complications:   none    ASA Classification:  Class 1 - A normal healthy patient    Sedation/ Anesthesia Plan:   intravenous sedation    Medications Planned:   diazepam (Valium) intravenously and midazolam (Versed)  intravenously    Patient is an appropriate candidate for plan of sedation: yes    Electronically signed by Shaina Gonsalves MD on 1/19/2023 at 12:27 PM

## 2023-01-19 NOTE — PROCEDURES
1230 Patient returned from procedure. Dressing checked, clean, dry, and intact. Patient stable. No s/s of complications noted or reported. Vitals will be checked q 15min, see flow sheets. [id number] Patient eating and drinking well with no s/s of complications noted or reported. MD states okay to d/c s/p 30 min sedation med admin. Its been 30 min since last dose, pt meets d/c criteria. 1300 Patient discharged, site was checked with every set of vitals. Site clean dry and intact. Discharge papers reviewed with patient, questions answered, discharge paper signed. Patient taken to door via ambulation. Patient in stable condition, no s/s of complications noted or reported.

## 2023-01-19 NOTE — BRIEF OP NOTE
Brief Postoperative Note      Patient: Rinku Palmer  YOB: 1975  MRN: [id number]    Date of Procedure: 1/19/2023    Pre-Op Diagnosis: * Seromas    Post-Op Diagnosis: Same       [place]    CT Guided seroma drainage    Assistant:  * No surgical staff found *    Anesthesia: * No anesthesia type entered *    Estimated Blood Loss (mL): Minimal    Complications: None    Specimens:   * No specimens in log *    Implants:  * No implants in log *      Drains:   Colostomy RUQ Loop (Active)       Findings: 20 ccs from the superior and 10 ccs from the lower seroma    Electronically signed by Jack Castillo MD on 1/19/2023 at 12:28 PM

## 2023-01-19 NOTE — PROCEDURES
Patient arrived via ambulation with boyfriend, Neelima Powell to Radiology department for abd wall asp. Allergies, home medications, H&P and fasting instructions reviewed with patient. Vital signs taken. IV placed, blood obtained, IV flushed and prn adapter attached. Blood sample sent to lab for ordered tests. Procedural instructions given, questions answered, understanding expressed and consent signed. Patient given fluoroscopy education, no questions at this time.

## 2023-01-20 LAB
GRAM STAIN ORDERABLE: NORMAL
GRAM STAIN ORDERABLE: NORMAL

## 2023-01-21 LAB
BODY FLUID CULTURE, STERILE: NORMAL
BODY FLUID CULTURE, STERILE: NORMAL
GRAM STAIN RESULT: NORMAL
GRAM STAIN RESULT: NORMAL

## 2023-01-27 ENCOUNTER — PATIENT MESSAGE (OUTPATIENT)
Dept: SURGERY | Age: 48
End: 2023-01-27

## 2023-01-30 NOTE — TELEPHONE ENCOUNTER
From: Fe Screen  To: Dr. Carolyn Desir: 1/27/2023 12:08 PM EST  Subject: Test results    Dr Cody Gonzalez I was wondering if you got my test results back from my procedure they only took a little bit of fluid out and my stomach still has cramping and pain if possible can we have a phone appointment to go over the results unless you need to see me in the office

## 2023-02-16 ENCOUNTER — HOSPITAL ENCOUNTER (OUTPATIENT)
Dept: CT IMAGING | Age: 48
Discharge: HOME | End: 2023-02-16
Payer: COMMERCIAL

## 2023-02-16 DIAGNOSIS — I10 ESSENTIAL (PRIMARY) HYPERTENSION: ICD-10-CM

## 2023-02-16 DIAGNOSIS — C18.6 MALIGNANT NEOPLASM OF DESCENDING COLON (HCC): ICD-10-CM

## 2023-02-16 DIAGNOSIS — R91.1 SOLITARY PULMONARY NODULE: ICD-10-CM

## 2023-02-16 PROCEDURE — 71260 CT THORAX DX C+: CPT

## 2023-02-16 PROCEDURE — 6360000004 HC RX CONTRAST MEDICATION: Performed by: RADIOLOGY

## 2023-02-16 RX ADMIN — IOPAMIDOL 80 ML: 755 INJECTION, SOLUTION INTRAVENOUS at 07:20

## 2023-03-16 ENCOUNTER — HOSPITAL ENCOUNTER (EMERGENCY)
Age: 48
Discharge: HOME | End: 2023-03-17
Attending: EMERGENCY MEDICINE
Payer: COMMERCIAL

## 2023-03-16 ENCOUNTER — APPOINTMENT (OUTPATIENT)
Dept: ULTRASOUND IMAGING | Age: 48
End: 2023-03-16
Payer: COMMERCIAL

## 2023-03-16 ENCOUNTER — APPOINTMENT (OUTPATIENT)
Dept: CT IMAGING | Age: 48
End: 2023-03-16
Payer: COMMERCIAL

## 2023-03-16 ENCOUNTER — HOSPITAL ENCOUNTER (OUTPATIENT)
Dept: INFUSION THERAPY | Age: 48
Setting detail: INFUSION SERIES
Discharge: HOME | End: 2023-03-16
Payer: COMMERCIAL

## 2023-03-16 VITALS
BODY MASS INDEX: 39.27 KG/M2 | TEMPERATURE: 97.6 F | DIASTOLIC BLOOD PRESSURE: 86 MMHG | HEIGHT: 60 IN | RESPIRATION RATE: 18 BRPM | HEART RATE: 70 BPM | OXYGEN SATURATION: 97 % | SYSTOLIC BLOOD PRESSURE: 178 MMHG | WEIGHT: 200 LBS

## 2023-03-16 VITALS
SYSTOLIC BLOOD PRESSURE: 145 MMHG | HEART RATE: 90 BPM | TEMPERATURE: 98.1 F | RESPIRATION RATE: 20 BRPM | OXYGEN SATURATION: 96 % | DIASTOLIC BLOOD PRESSURE: 75 MMHG

## 2023-03-16 DIAGNOSIS — R10.84 GENERALIZED ABDOMINAL PAIN: Primary | ICD-10-CM

## 2023-03-16 DIAGNOSIS — G35 MULTIPLE SCLEROSIS (HCC): Primary | ICD-10-CM

## 2023-03-16 LAB
ALBUMIN SERPL-MCNC: 3.5 G/DL (ref 3.5–5.2)
ALP SERPL-CCNC: 94 U/L (ref 35–104)
ALT SERPL-CCNC: 44 U/L (ref 0–32)
ANION GAP SERPL CALCULATED.3IONS-SCNC: 11 MMOL/L (ref 7–16)
AST SERPL-CCNC: 31 U/L (ref 0–31)
BACTERIA URNS QL MICRO: ABNORMAL /HPF
BASOPHILS # BLD: 0.01 E9/L (ref 0–0.2)
BASOPHILS NFR BLD: 0.1 % (ref 0–2)
BILIRUB SERPL-MCNC: <0.2 MG/DL (ref 0–1.2)
BILIRUB UR QL STRIP: NEGATIVE
BUN SERPL-MCNC: 7 MG/DL (ref 6–20)
CALCIUM SERPL-MCNC: 7.6 MG/DL (ref 8.6–10.2)
CHLORIDE SERPL-SCNC: 109 MMOL/L (ref 98–107)
CLARITY UR: CLEAR
CO2 SERPL-SCNC: 17 MMOL/L (ref 22–29)
COLOR UR: YELLOW
CREAT SERPL-MCNC: 0.5 MG/DL (ref 0.5–1)
EOSINOPHIL # BLD: 0.01 E9/L (ref 0.05–0.5)
EOSINOPHIL NFR BLD: 0.1 % (ref 0–6)
ERYTHROCYTE [DISTWIDTH] IN BLOOD BY AUTOMATED COUNT: 12.9 FL (ref 11.5–15)
GLUCOSE SERPL-MCNC: 172 MG/DL (ref 74–99)
GLUCOSE UR STRIP-MCNC: 500 MG/DL
HCT VFR BLD AUTO: 44 % (ref 34–48)
HGB BLD-MCNC: 14.4 G/DL (ref 11.5–15.5)
HGB UR QL STRIP: ABNORMAL
IMM GRANULOCYTES # BLD: 0.12 E9/L
IMM GRANULOCYTES NFR BLD: 1.2 % (ref 0–5)
KETONES UR STRIP-MCNC: ABNORMAL MG/DL
LACTATE BLDV-SCNC: 3.8 MMOL/L (ref 0.5–2.2)
LEUKOCYTE ESTERASE UR QL STRIP: NEGATIVE
LIPASE: 16 U/L (ref 13–60)
LYMPHOCYTES # BLD: 0.69 E9/L (ref 1.5–4)
LYMPHOCYTES NFR BLD: 6.9 % (ref 20–42)
MCH RBC QN AUTO: 30.2 PG (ref 26–35)
MCHC RBC AUTO-ENTMCNC: 32.7 % (ref 32–34.5)
MCV RBC AUTO: 92.2 FL (ref 80–99.9)
MONOCYTES # BLD: 0.15 E9/L (ref 0.1–0.95)
MONOCYTES NFR BLD: 1.5 % (ref 2–12)
NEUTROPHILS # BLD: 9.05 E9/L (ref 1.8–7.3)
NEUTS SEG NFR BLD: 90.2 % (ref 43–80)
NITRITE UR QL STRIP: NEGATIVE
PH UR STRIP: 6.5 [PH] (ref 5–9)
PLATELET # BLD AUTO: 310 E9/L (ref 130–450)
PMV BLD AUTO: 9.4 FL (ref 7–12)
POTASSIUM SERPL-SCNC: 4.8 MMOL/L (ref 3.5–5)
PROT SERPL-MCNC: 6.1 G/DL (ref 6.4–8.3)
PROT UR STRIP-MCNC: NEGATIVE MG/DL
RBC # BLD AUTO: 4.77 E12/L (ref 3.5–5.5)
RBC #/AREA URNS HPF: ABNORMAL /HPF (ref 0–2)
REASON FOR REJECTION: NORMAL
REJECTED TEST: NORMAL
SODIUM SERPL-SCNC: 137 MMOL/L (ref 132–146)
SP GR UR STRIP: 1.01 (ref 1–1.03)
UROBILINOGEN UR STRIP-ACNC: 0.2 E.U./DL
WBC # BLD: 10 E9/L (ref 4.5–11.5)
WBC #/AREA URNS HPF: ABNORMAL /HPF (ref 0–5)

## 2023-03-16 PROCEDURE — 6370000000 HC RX 637 (ALT 250 FOR IP): Performed by: EMERGENCY MEDICINE

## 2023-03-16 PROCEDURE — 36415 COLL VENOUS BLD VENIPUNCTURE: CPT

## 2023-03-16 PROCEDURE — 80053 COMPREHEN METABOLIC PANEL: CPT

## 2023-03-16 PROCEDURE — 99285 EMERGENCY DEPT VISIT HI MDM: CPT

## 2023-03-16 PROCEDURE — 93005 ELECTROCARDIOGRAM TRACING: CPT | Performed by: EMERGENCY MEDICINE

## 2023-03-16 PROCEDURE — 2580000003 HC RX 258: Performed by: EMERGENCY MEDICINE

## 2023-03-16 PROCEDURE — 6360000004 HC RX CONTRAST MEDICATION: Performed by: RADIOLOGY

## 2023-03-16 PROCEDURE — 96374 THER/PROPH/DIAG INJ IV PUSH: CPT

## 2023-03-16 PROCEDURE — 76830 TRANSVAGINAL US NON-OB: CPT

## 2023-03-16 PROCEDURE — 74177 CT ABD & PELVIS W/CONTRAST: CPT

## 2023-03-16 PROCEDURE — 96361 HYDRATE IV INFUSION ADD-ON: CPT

## 2023-03-16 PROCEDURE — 96375 TX/PRO/DX INJ NEW DRUG ADDON: CPT

## 2023-03-16 PROCEDURE — 85025 COMPLETE CBC W/AUTO DIFF WBC: CPT

## 2023-03-16 PROCEDURE — 83690 ASSAY OF LIPASE: CPT

## 2023-03-16 PROCEDURE — 81001 URINALYSIS AUTO W/SCOPE: CPT

## 2023-03-16 PROCEDURE — 83605 ASSAY OF LACTIC ACID: CPT

## 2023-03-16 PROCEDURE — 6360000002 HC RX W HCPCS: Performed by: EMERGENCY MEDICINE

## 2023-03-16 PROCEDURE — 96365 THER/PROPH/DIAG IV INF INIT: CPT

## 2023-03-16 PROCEDURE — 6360000002 HC RX W HCPCS: Performed by: NURSE PRACTITIONER

## 2023-03-16 PROCEDURE — 6370000000 HC RX 637 (ALT 250 FOR IP): Performed by: NURSE PRACTITIONER

## 2023-03-16 PROCEDURE — 2580000003 HC RX 258: Performed by: NURSE PRACTITIONER

## 2023-03-16 PROCEDURE — 76857 US EXAM PELVIC LIMITED: CPT

## 2023-03-16 PROCEDURE — 84484 ASSAY OF TROPONIN QUANT: CPT

## 2023-03-16 PROCEDURE — 96366 THER/PROPH/DIAG IV INF ADDON: CPT

## 2023-03-16 RX ORDER — DIPHENHYDRAMINE HCL 25 MG
25 TABLET ORAL ONCE
Start: 2023-09-14 | End: 2023-09-14

## 2023-03-16 RX ORDER — METHYLPREDNISOLONE SODIUM SUCCINATE 125 MG/2ML
100 INJECTION, POWDER, LYOPHILIZED, FOR SOLUTION INTRAMUSCULAR; INTRAVENOUS ONCE
Status: COMPLETED | OUTPATIENT
Start: 2023-03-16 | End: 2023-03-16

## 2023-03-16 RX ORDER — ACETAMINOPHEN 325 MG/1
650 TABLET ORAL ONCE
Status: COMPLETED | OUTPATIENT
Start: 2023-03-16 | End: 2023-03-16

## 2023-03-16 RX ORDER — EPINEPHRINE 1 MG/ML
0.3 INJECTION, SOLUTION, CONCENTRATE INTRAVENOUS PRN
OUTPATIENT
Start: 2023-03-16

## 2023-03-16 RX ORDER — KETOROLAC TROMETHAMINE 30 MG/ML
30 INJECTION, SOLUTION INTRAMUSCULAR; INTRAVENOUS ONCE
Status: COMPLETED | OUTPATIENT
Start: 2023-03-16 | End: 2023-03-16

## 2023-03-16 RX ORDER — METHYLPREDNISOLONE SODIUM SUCCINATE 125 MG/2ML
125 INJECTION, POWDER, LYOPHILIZED, FOR SOLUTION INTRAMUSCULAR; INTRAVENOUS ONCE
OUTPATIENT
Start: 2023-03-16 | End: 2023-03-16

## 2023-03-16 RX ORDER — METHYLPREDNISOLONE SODIUM SUCCINATE 125 MG/2ML
100 INJECTION, POWDER, LYOPHILIZED, FOR SOLUTION INTRAMUSCULAR; INTRAVENOUS ONCE
Status: CANCELLED | OUTPATIENT
Start: 2023-03-16

## 2023-03-16 RX ORDER — SODIUM CHLORIDE 0.9 % (FLUSH) 0.9 %
10 SYRINGE (ML) INJECTION PRN
Status: DISCONTINUED | OUTPATIENT
Start: 2023-03-16 | End: 2023-03-17 | Stop reason: HOSPADM

## 2023-03-16 RX ORDER — DIPHENHYDRAMINE HYDROCHLORIDE 50 MG/ML
25 INJECTION INTRAMUSCULAR; INTRAVENOUS ONCE
Status: CANCELLED | OUTPATIENT
Start: 2023-03-16

## 2023-03-16 RX ORDER — SODIUM CHLORIDE 9 MG/ML
INJECTION, SOLUTION INTRAVENOUS CONTINUOUS
Status: DISCONTINUED | OUTPATIENT
Start: 2023-03-16 | End: 2023-03-16 | Stop reason: HOSPADM

## 2023-03-16 RX ORDER — SODIUM CHLORIDE 9 MG/ML
INJECTION, SOLUTION INTRAVENOUS CONTINUOUS
Status: CANCELLED | OUTPATIENT
Start: 2023-03-16

## 2023-03-16 RX ORDER — DIPHENHYDRAMINE HCL 25 MG
25 TABLET ORAL ONCE
Status: CANCELLED
Start: 2023-03-16 | End: 2023-03-16

## 2023-03-16 RX ORDER — ACETAMINOPHEN 325 MG/1
650 TABLET ORAL ONCE
Status: CANCELLED | OUTPATIENT
Start: 2023-03-16

## 2023-03-16 RX ORDER — DIPHENHYDRAMINE HCL 25 MG
25 TABLET ORAL ONCE
Status: COMPLETED | OUTPATIENT
Start: 2023-03-16 | End: 2023-03-16

## 2023-03-16 RX ORDER — ONDANSETRON 2 MG/ML
4 INJECTION INTRAMUSCULAR; INTRAVENOUS ONCE
Status: COMPLETED | OUTPATIENT
Start: 2023-03-16 | End: 2023-03-16

## 2023-03-16 RX ORDER — DIPHENHYDRAMINE HYDROCHLORIDE 50 MG/ML
50 INJECTION INTRAMUSCULAR; INTRAVENOUS ONCE
OUTPATIENT
Start: 2023-03-16 | End: 2023-03-16

## 2023-03-16 RX ORDER — DIPHENHYDRAMINE HYDROCHLORIDE 50 MG/ML
25 INJECTION INTRAMUSCULAR; INTRAVENOUS ONCE
Status: DISCONTINUED | OUTPATIENT
Start: 2023-03-16 | End: 2023-03-16

## 2023-03-16 RX ORDER — SODIUM CHLORIDE 9 MG/ML
INJECTION, SOLUTION INTRAVENOUS CONTINUOUS
OUTPATIENT
Start: 2023-03-16

## 2023-03-16 RX ORDER — 0.9 % SODIUM CHLORIDE 0.9 %
1000 INTRAVENOUS SOLUTION INTRAVENOUS ONCE
Status: COMPLETED | OUTPATIENT
Start: 2023-03-16 | End: 2023-03-17

## 2023-03-16 RX ORDER — HEPARIN SODIUM (PORCINE) LOCK FLUSH IV SOLN 100 UNIT/ML 100 UNIT/ML
500 SOLUTION INTRAVENOUS PRN
Status: DISCONTINUED | OUTPATIENT
Start: 2023-03-16 | End: 2023-03-17 | Stop reason: HOSPADM

## 2023-03-16 RX ORDER — SODIUM CHLORIDE 0.9 % (FLUSH) 0.9 %
10 SYRINGE (ML) INJECTION PRN
OUTPATIENT
Start: 2023-03-16

## 2023-03-16 RX ORDER — HEPARIN SODIUM (PORCINE) LOCK FLUSH IV SOLN 100 UNIT/ML 100 UNIT/ML
500 SOLUTION INTRAVENOUS PRN
OUTPATIENT
Start: 2023-03-16

## 2023-03-16 RX ADMIN — DIPHENHYDRAMINE HYDROCHLORIDE 25 MG: 25 TABLET ORAL at 08:51

## 2023-03-16 RX ADMIN — SODIUM CHLORIDE 30 ML: 9 INJECTION, SOLUTION INTRAVENOUS at 13:18

## 2023-03-16 RX ADMIN — LIDOCAINE HYDROCHLORIDE: 20 SOLUTION ORAL; TOPICAL at 23:43

## 2023-03-16 RX ADMIN — ACETAMINOPHEN 650 MG: 325 TABLET, FILM COATED ORAL at 08:30

## 2023-03-16 RX ADMIN — METHYLPREDNISOLONE SODIUM SUCCINATE 100 MG: 125 INJECTION, POWDER, FOR SOLUTION INTRAMUSCULAR; INTRAVENOUS at 08:56

## 2023-03-16 RX ADMIN — IOPAMIDOL 70 ML: 755 INJECTION, SOLUTION INTRAVENOUS at 21:11

## 2023-03-16 RX ADMIN — SODIUM CHLORIDE, PRESERVATIVE FREE 10 ML: 5 INJECTION INTRAVENOUS at 14:27

## 2023-03-16 RX ADMIN — SODIUM CHLORIDE, PRESERVATIVE FREE 10 ML: 5 INJECTION INTRAVENOUS at 08:51

## 2023-03-16 RX ADMIN — KETOROLAC TROMETHAMINE 30 MG: 30 INJECTION, SOLUTION INTRAMUSCULAR; INTRAVENOUS at 20:19

## 2023-03-16 RX ADMIN — ONDANSETRON 4 MG: 2 INJECTION INTRAMUSCULAR; INTRAVENOUS at 20:21

## 2023-03-16 RX ADMIN — SODIUM CHLORIDE 1000 ML: 9 INJECTION, SOLUTION INTRAVENOUS at 23:35

## 2023-03-16 RX ADMIN — SODIUM CHLORIDE, PRESERVATIVE FREE 10 ML: 5 INJECTION INTRAVENOUS at 08:56

## 2023-03-16 RX ADMIN — OCRELIZUMAB 600 MG: 300 INJECTION INTRAVENOUS at 09:24

## 2023-03-16 ASSESSMENT — LIFESTYLE VARIABLES
HOW OFTEN DO YOU HAVE A DRINK CONTAINING ALCOHOL: NEVER
HOW MANY STANDARD DRINKS CONTAINING ALCOHOL DO YOU HAVE ON A TYPICAL DAY: PATIENT DOES NOT DRINK

## 2023-03-16 ASSESSMENT — ENCOUNTER SYMPTOMS
EYE REDNESS: 0
SHORTNESS OF BREATH: 0
ABDOMINAL PAIN: 1
VOMITING: 0
NAUSEA: 0

## 2023-03-16 ASSESSMENT — PAIN DESCRIPTION - LOCATION
LOCATION: ABDOMEN
LOCATION: ABDOMEN

## 2023-03-16 ASSESSMENT — PAIN DESCRIPTION - DESCRIPTORS: DESCRIPTORS: SHARP;CRAMPING;TIGHTNESS

## 2023-03-16 ASSESSMENT — PAIN SCALES - GENERAL
PAINLEVEL_OUTOF10: 10
PAINLEVEL_OUTOF10: 7
PAINLEVEL_OUTOF10: 10
PAINLEVEL_OUTOF10: 7

## 2023-03-16 ASSESSMENT — PAIN - FUNCTIONAL ASSESSMENT
PAIN_FUNCTIONAL_ASSESSMENT: INTOLERABLE, UNABLE TO DO ANY ACTIVE OR PASSIVE ACTIVITIES
PAIN_FUNCTIONAL_ASSESSMENT: ACTIVITIES ARE NOT PREVENTED

## 2023-03-16 ASSESSMENT — PAIN DESCRIPTION - DIRECTION: RADIATING_TOWARDS: NO RADIATION

## 2023-03-16 ASSESSMENT — PAIN DESCRIPTION - ONSET: ONSET: ON-GOING

## 2023-03-16 ASSESSMENT — PAIN DESCRIPTION - ORIENTATION
ORIENTATION: RIGHT;LOWER
ORIENTATION: RIGHT;LOWER

## 2023-03-16 ASSESSMENT — PAIN DESCRIPTION - PAIN TYPE: TYPE: ACUTE PAIN

## 2023-03-16 ASSESSMENT — PAIN DESCRIPTION - FREQUENCY: FREQUENCY: INTERMITTENT

## 2023-03-16 NOTE — PROGRESS NOTES
Patient tolerated ocrevus infusion well. Remained on unit for 1 hour after ocrevus infusion. Patient alert and oriented x3. No distress noted. Vital signs stable. Patient denies any new or worsening pain. Offered patient education and/or discharge material. Patient declined. Patient denies any needs. All questions answered. D/C in stable condition.

## 2023-03-17 LAB
EKG ATRIAL RATE: 69 BPM
EKG P AXIS: 49 DEGREES
EKG P-R INTERVAL: 126 MS
EKG Q-T INTERVAL: 392 MS
EKG QRS DURATION: 88 MS
EKG QTC CALCULATION (BAZETT): 420 MS
EKG R AXIS: 59 DEGREES
EKG T AXIS: 67 DEGREES
EKG VENTRICULAR RATE: 69 BPM
LACTATE BLDV-SCNC: 1.8 MMOL/L (ref 0.5–2.2)
TROPONIN, HIGH SENSITIVITY: <6 NG/L (ref 0–9)

## 2023-03-17 PROCEDURE — 36415 COLL VENOUS BLD VENIPUNCTURE: CPT

## 2023-03-17 PROCEDURE — 83605 ASSAY OF LACTIC ACID: CPT

## 2023-03-17 PROCEDURE — 93010 ELECTROCARDIOGRAM REPORT: CPT | Performed by: INTERNAL MEDICINE

## 2023-03-17 ASSESSMENT — PAIN SCALES - GENERAL: PAINLEVEL_OUTOF10: 8

## 2023-03-17 ASSESSMENT — PAIN DESCRIPTION - ORIENTATION: ORIENTATION: RIGHT;LOWER

## 2023-03-17 ASSESSMENT — PAIN DESCRIPTION - LOCATION: LOCATION: ABDOMEN

## 2023-03-17 ASSESSMENT — PAIN DESCRIPTION - DESCRIPTORS: DESCRIPTORS: SHOOTING

## 2023-03-17 NOTE — ED NOTES
Critical result from lab of lactic acid at 3.8 and primary RN made aware.       Jonny Bryan, ELI  03/16/23 2108

## 2023-03-17 NOTE — ED NOTES
Repeat trop sent now.  Repeat lactic will be drawn after fluids, going in now     Mehran Green, ELI  03/16/23 6261

## 2023-03-17 NOTE — ED PROVIDER NOTES
[address]        Pt Name: Sav Robert  MRN: [id number]  [place] 1975  Date of evaluation: 3/16/2023  Provider: Rudi Ruano DO  PCP: Teddy Tuttle PA-C  Note Started: 9:33 PM EDT 3/16/23    CHIEF COMPLAINT       Chief Complaint   Patient presents with    Abdominal Pain     Pt states she is having pain in RLQ, and center of upper abdomen, states pain has been getting worse over last three days. HISTORY OF PRESENT ILLNESS: 1 or more Elements       Sav Robert is a 49 y.o. female who presents to the emergency department with a chief complaint of abdominal pain. The history is obtained from patient as well as patient's medical record. Patient is presenting to the emergency department the chief complaint of a 3-day history of abdominal pain. Patient states the pain was initially located in epigastric region is described as aching and is worse with bending over. Nothing makes it better. No treatment prior to arrival.  States she is now having pain in her right lower quadrant. States this just began today. Moderate in severity. Nothing makes it better. Nothing makes worse. The patient with no treatment prior to arrival.  She denies any fevers, nausea, vomiting, chest pain, shortness of breath, dysuria, diarrhea or constipation. Nursing Notes were all reviewed and agreed with or any disagreements were addressed in the HPI. REVIEW OF SYSTEMS :      Review of Systems   Constitutional:  Negative for chills and fatigue. HENT:  Negative for congestion. Eyes:  Negative for redness. Respiratory:  Negative for shortness of breath. Cardiovascular:  Negative for chest pain. Gastrointestinal:  Positive for abdominal pain. Negative for nausea and vomiting. Genitourinary:  Negative for dysuria. Musculoskeletal:  Negative for arthralgias. Skin:  Negative for rash.    Neurological: Negative for light-headedness. Psychiatric/Behavioral:  Negative for confusion. All other systems reviewed and are negative. Positives and Pertinent negatives as per HPI. SURGICAL HISTORY     Past Surgical History:   Procedure Laterality Date    APPENDECTOMY      BACK SURGERY      lumbar    BICEPS TENDON REPAIR Right 11/11/2020    BICEPS TENODESIS performed by Dante Ball MD at [place] [address] Left 8/31/2021    LAPAROSCOPIC LEFT HEMICOLECTOMY WITH LOOP OSTOMY performed by Willam Tom MD at [place] N/A 9/6/2021    DIAGNOSTIC LAPAROSCOPY POSSIBLE BOWEL RESECTION POSSILBE OSTOMY performed by Willam Tom MD at [place] N/A 2/8/2022    LAPAROSCOPIC SIGMOID COLON RESECTION performed by Willam Tom MD at [address] [place] [address] 6/1/2022    OPEN ILEOSTOMY REVERSAL performed by Willam Tom MD at [address] [place] [address] ASP ABS HEMATOMA BULLA CYST  1/19/2023    CT ASP ABS HEMATOMA BULLA CYST 1/19/2023 L Marty Mai MD [facility]    CT ASP ABS HEMATOMA BULLA CYST  1/19/2023    CT ASP ABS HEMATOMA BULLA CYST 1/19/2023 L Marty Mai MD [facility]    CYST REMOVAL      ESOPHAGUS SURGERY      HYSTERECTOMY (CERVIX STATUS UNKNOWN)  03/05/2018    Robotic hysterectomy, bilateral salpingectomy, right oophorectomy DR. ARIANA MONIQUE [facility]     HYSTERECTOMY, TOTAL ABDOMINAL (CERVIX REMOVED)      LARYNGOSCOPY N/A 7/17/2020    DIRECT LARYNGOSCOPY--OMNI GUIDE LASER performed by Jim Baez MD at [address] (CERVIX NOT REMOVED)      PROCTOSIGMOIDOSCOPY N/A 10/12/2021    ANAL PROCTO SIGMOIDOSCOPY FLEXIBLE performed by Willam Tom MD at [address] [place] [address] N/A 1/25/2022    ANAL PROCTO [address] performed by Willam Tom MD at [facility] ARTHROSCOPY Right 11/11/2020    RIGHT SHOULDER DIAGNOSTIC ARTHROSCOPY WITH DECOMPRESSION ROTATOR CUFF REPAIR performed by Dante Ball MD at [address] [place] [address] TONSILLECTOMY      VENTRAL HERNIA REPAIR N/A 10/28/2022    LAPAROSCOPIC INCISIONAL HERNIA REPAIR WITH MESH performed by Ernesto Portillo MD at [address]       Previous Medications    BACLOFEN (LIORESAL) 20 MG TABLET    Take 1 tablet by mouth 4 times daily as needed (spasms/pain)    DICYCLOMINE (BENTYL) 10 MG CAPSULE    TAKE 1 CAPSULE BY MOUTH 3 TIMES DAILY AS NEEDED FOR PAIN    FAMOTIDINE (PEPCID) 40 MG TABLET    TAKE 1 TABLET BY MOUTH DAILY IN THE EVENING *EMERGENCY REFILL*    FLUTICASONE (FLONASE) 50 MCG/ACT NASAL SPRAY    INSTILL ONE (1) SPRAY IN EACH NOSTRIL ONCE DAILY    GABAPENTIN (NEURONTIN) 400 MG CAPSULE    Take 1 capsule by mouth 3 times daily for 360 days. LISINOPRIL-HYDROCHLOROTHIAZIDE (PRINZIDE;ZESTORETIC) 10-12.5 MG PER TABLET    TAKE 1 TABLET BY MOUTH DAILY    NORTRIPTYLINE (PAMELOR) 10 MG CAPSULE    TAKE 1 CAPSULE BY MOUTH NIGHTLY FOR HEADACHE    OCRELIZUMAB (OCREVUS) 300 MG/10ML SOLN INJECTION    Infuse 20 mLs intravenously every 6 months    PANTOPRAZOLE (PROTONIX) 40 MG TABLET    Take 40 mg by mouth daily    ROPINIROLE (REQUIP) 0.5 MG TABLET    1 tablet by mouth every morning; 1 tablet 2 hours before bed; 1 tablet at bedtime    VENTOLIN  (90 BASE) MCG/ACT INHALER    Inhale 2 puffs into the lungs as needed for Shortness of Breath or Wheezing    VITAMIN D (D3-50) 45272 UNIT CAPS    Take 1 capsule by mouth once a week       ALLERGIES     Patient has no known allergies.     FAMILYHISTORY       Family History   Problem Relation Age of Onset    Cancer Mother     Mult Sclerosis Mother     Colon Cancer Neg Hx     Uterine Cancer Neg Hx     Ovarian Cancer Neg Hx     Breast Cancer Neg Hx         SOCIAL HISTORY       Social History     Tobacco Use    Smoking status: Every Day     Packs/day: 0.50     Years: 25.00     Pack years: 12.50     Types: Cigarettes     Passive exposure: Current    Smokeless tobacco: Never    Tobacco comments:         Vaping Use    Vaping Use: Never used   Substance Use Topics    Alcohol use: Not Currently    Drug use: No       SCREENINGS        Velia Coma Scale  Eye Opening: Spontaneous  Best Verbal Response: Oriented  Best Motor Response: Obeys commands  [place] Coma Scale Score: 15                CIWA Assessment  BP: (!) 178/86  Heart Rate: 70           PHYSICAL EXAM  1 or more Elements     ED Triage Vitals   BP Temp Temp Source Heart Rate Resp SpO2 Height Weight   03/16/23 1722 03/16/23 1709 03/16/23 1709 03/16/23 1709 03/16/23 1709 03/16/23 1709 03/16/23 1722 03/16/23 1722   (!) 178/86 97.6 °F (36.4 °C) Oral (!) 108 18 99 % 5' (1.524 m) 200 lb (90.7 kg)         Constitutional/General: Alert and oriented x3, well appearing, non toxic in NAD  Head: NC/AT  Eyes:  EOMI  Mouth: Oropharynx clear, handling secretions, no trismus  Neck: Supple, full ROM  Pulmonary: Lungs clear to auscultation bilaterally, no wheezes, rales, or rhonchi. Not in respiratory distress  Cardiovascular:  Regular rate and rhythm, no murmurs, gallops, or rubs. 2+ distal pulses  Abdomen: Soft, mild tender in epigastric region right lower quadrant, no rebound, rigidity or guarding  Extremities: Moves all extremities x 4. Warm and well perfused  Skin: warm and dry without rash  Neurologic: GCS 15, no gross focal neurologic deficits  Psych: Normal Affect      DIAGNOSTIC RESULTS     I have personally reviewed all laboratory and imaging results for this patient. Results are listed below.      LABS:  Results for orders placed or performed during the hospital encounter of 03/16/23   CBC with Auto Differential   Result Value Ref Range    WBC 10.0 4.5 - 11.5 E9/L    RBC 4.77 3.50 - 5.50 E12/L    Hemoglobin 14.4 11.5 - 15.5 g/dL    Hematocrit 44.0 34.0 - 48.0 %    MCV 92.2 80.0 - 99.9 fL    MCH 30.2 26.0 - 35.0 pg    MCHC 32.7 32.0 - 34.5 %    RDW 12.9 11.5 - 15.0 fL    Platelets 439 859 - 071 E9/L    MPV 9.4 7.0 - 12.0 fL    Neutrophils % 90.2 (H) 43.0 - 80.0 %    Immature Granulocytes % 1.2 0.0 - 5.0 %    Lymphocytes % 6.9 (L) 20.0 - 42.0 %    Monocytes % 1.5 (L) 2.0 - 12.0 %    Eosinophils % 0.1 0.0 - 6.0 %    Basophils % 0.1 0.0 - 2.0 %    Neutrophils Absolute 9.05 (H) 1.80 - 7.30 E9/L    Immature Granulocytes # 0.12 E9/L    Lymphocytes Absolute 0.69 (L) 1.50 - 4.00 E9/L    Monocytes Absolute 0.15 0.10 - 0.95 E9/L    Eosinophils Absolute 0.01 (L) 0.05 - 0.50 E9/L    Basophils Absolute 0.01 0.00 - 0.20 E9/L   CMP   Result Value Ref Range    Sodium 137 132 - 146 mmol/L    Potassium 4.8 3.5 - 5.0 mmol/L    Chloride 109 (H) 98 - 107 mmol/L    CO2 17 (L) 22 - 29 mmol/L    Anion Gap 11 7 - 16 mmol/L    Glucose 172 (H) 74 - 99 mg/dL    BUN 7 6 - 20 mg/dL    Creatinine 0.5 0.5 - 1.0 mg/dL    Est, Glom Filt Rate >60 >=60 mL/min/1.73    Calcium 7.6 (L) 8.6 - 10.2 mg/dL    Total Protein 6.1 (L) 6.4 - 8.3 g/dL    Albumin 3.5 3.5 - 5.2 g/dL    Total Bilirubin <0.2 0.0 - 1.2 mg/dL    Alkaline Phosphatase 94 35 - 104 U/L    ALT 44 (H) 0 - 32 U/L    AST 31 0 - 31 U/L   Lipase   Result Value Ref Range    Lipase 16 13 - 60 U/L   Lactic Acid   Result Value Ref Range    Lactic Acid 3.8 (HH) 0.5 - 2.2 mmol/L   Urinalysis with Microscopic   Result Value Ref Range    Color, UA Yellow Straw/Yellow    Clarity, UA Clear Clear    Glucose, Ur 500 (A) Negative mg/dL    Bilirubin Urine Negative Negative    Ketones, Urine TRACE (A) Negative mg/dL    Specific Gravity, UA 1.010 1.005 - 1.030    Blood, Urine TRACE (A) Negative    pH, UA 6.5 5.0 - 9.0    Protein, UA Negative Negative mg/dL    Urobilinogen, Urine 0.2 <2.0 E.U./dL    Nitrite, Urine Negative Negative    Leukocyte Esterase, Urine Negative Negative    WBC, UA NONE 0 - 5 /HPF    RBC, UA NONE 0 - 2 /HPF    Bacteria, UA NONE SEEN None Seen /HPF   SPECIMEN REJECTION   Result Value Ref Range    Rejected Test trop     Reason for Rejection see below    Lactic Acid   Result Value Ref Range    Lactic Acid 1.8 0.5 - 2.2 mmol/L   Troponin   Result Value Ref Range    Troponin, High Sensitivity <6 0 - 9 ng/L       RADIOLOGY:  Interpreted by Radiologist.  Marbin Maloney   Final Result   1. Small amount of left adnexal fluid, nonspecific   2. Surgically absent uterus and right ovary         US NON OB TRANSVAGINAL   Final Result   1. Small amount of left adnexal fluid, nonspecific   2. Surgically absent uterus and right ovary         CT ABDOMEN PELVIS W IV CONTRAST Additional Contrast? None   Final Result      1. Hypodensity of the liver parenchyma suggests fatty infiltration though   other forms of intrinsic liver disease are not excluded. No focal liver   lesion or bile duct dilation is noted. 2.  Otherwise no acute pathology or significant change noted. RADIOLOGY:   Non-plain film images such as CT, Ultrasound and MRI are read by the radiologist. Plain radiographic images are visualized and preliminarily interpreted by the ED Provider       Interpretation per the Radiologist below, if available at the time of this note:    US PELVIS LIMITED   Final Result   1. Small amount of left adnexal fluid, nonspecific   2. Surgically absent uterus and right ovary         US NON OB TRANSVAGINAL   Final Result   1. Small amount of left adnexal fluid, nonspecific   2. Surgically absent uterus and right ovary         CT ABDOMEN PELVIS W IV CONTRAST Additional Contrast? None   Final Result      1. Hypodensity of the liver parenchyma suggests fatty infiltration though   other forms of intrinsic liver disease are not excluded. No focal liver   lesion or bile duct dilation is noted. 2.  Otherwise no acute pathology or significant change noted.            CT ABDOMEN PELVIS W IV CONTRAST Additional Contrast? None    Result Date: 3/16/2023  EXAM: CT Abdomen and Pelvis With Intravenous Contrast EXAM DATE/TIME: 3/16/2023 9:16 pm CLINICAL HISTORY: ORDERING SYSTEM PROVIDED  pain  TECHNOLOGIST PROVIDED HISTORY:  Reason for exam:->pain  Additional Contrast?->None  Decision Support Exception - unselect if not a suspected or confirmed emergency medical condition->Emergency Medical Condition (MA) TECHNIQUE: Axial computed tomography images of the abdomen and pelvis with intravenous contrast.  This CT exam was performed using one or more of the following dose reduction techniques:  automated exposure control, adjustment of the mA and/or kV according to patient size, and/or use of iterative reconstruction technique. COMPARISON: 01/02/2023 FINDINGS: Lung bases:  No acute findings.  No mass.  No consolidation. ABDOMEN: Liver:  Liver parenchyma is hypodense but without focal liver lesion. Gallbladder and bile ducts:  Prior cholecystectomy.  No ductal dilation. Pancreas:  No acute findings.  No mass.  No ductal dilation. Spleen:  No acute findings.  No splenomegaly. Adrenals:  No acute findings.  No mass. Kidneys and ureters:  No acute findings.  No solid mass.  No hydronephrosis. Stomach and bowel:  Apparent partial colectomy with small bowel anastomosis in the anterior right abdomen.  Otherwise nonspecific bowel gas pattern.  No obstruction. PELVIS: Appendix:  See above. Bladder:  No acute findings.  No mass. Reproductive:  Prior hysterectomy. ABDOMEN and PELVIS: Intraperitoneal space:  No acute findings.  No free air.  No significant fluid collection. Bones/joints:  No acute fracture.  No dislocation. Soft tissues:  No acute findings. Vasculature:  No acute findings.  No abdominal aortic aneurysm. Lymph nodes:  No acute findings.  No enlarged lymph nodes.     1.  Hypodensity of the liver parenchyma suggests fatty infiltration though other forms of intrinsic liver disease are not excluded. No focal liver lesion or bile duct dilation is noted. 2.  Otherwise no acute pathology or significant change noted.       No results found.    PROCEDURES   Unless otherwise noted below, none       JACY:   Dr. Bulmaro KENDALL am the primary physician of record.    Kendra Garay is a 47 y.o. female who presents to the ED for  abdominal pain  Vital signs upon arrival BP (!) 178/86   Pulse 70   Temp 97.6 °F (36.4 °C) (Oral)   Resp 18   Ht 5' (1.524 m)   Wt 200 lb (90.7 kg)   LMP 01/05/2018   SpO2 97%   BMI 39.06 kg/m²     History From: Patient patient's medical record    Independent Historian: None    Limitations to history: None      History: The patient  presents to the emergency department with a chief complaint of abdominal pain. The history is obtained from patient as well as patient's medical record. Patient is presenting to the emergency department the chief complaint of a 3-day history of abdominal pain. Patient states the pain was initially located in epigastric region is described as aching and is worse with bending over. Nothing makes it better. No treatment prior to arrival.  States she is now having pain in her right lower quadrant. States this just began today. Moderate in severity. Nothing makes it better. Nothing makes worse. The patient with no treatment prior to arrival.  She denies any fevers, nausea, vomiting, chest pain, shortness of breath, dysuria, diarrhea or constipation. Physical exam: Patient sitting in the bed no acute distress.   Heart regular rate and rhythm, lungs clear to auscultation bilaterally patient with mild tenderness to palpation epigastric region as well as the right lower quadrant,     Differential diagnosis includes but not limited to :  Gastritis  Hiatal hernia  Pancreatitis-no CT of the  Hepatitis-no CT evidence  Appendicitis-no CT evidence  Ovarian torsion    Patient treated with   Medications   ketorolac (TORADOL) injection 30 mg (30 mg IntraVENous Given 3/16/23 2019)   ondansetron (ZOFRAN) injection 4 mg (4 mg IntraVENous Given 3/16/23 2021)   iopamidol (ISOVUE-370) 76 % injection 70 mL (70 mLs IntraVENous Given 3/16/23 2111)   0.9 % sodium chloride bolus (0 mLs IntraVENous Stopped 3/17/23 0114)   aluminum & magnesium hydroxide-simethicone (MAALOX) 30 mL, lidocaine viscous hcl (XYLOCAINE) 5 mL (GI COCKTAIL) ( Oral Given 3/16/23 9580)         Labs independently interpreted and reviewed by me demonstrate troponin less than 6, repeat lactic acid 1.8, LFTs unremarkable. White count normal 10.0 with normal hemoglobin 14.4 and normal hematocrit 44. Renal function normal BUN 7 creatinine 0.5 with electrolytes unremarkable normal sodium and normal potassium    Imaging reviewed and interpreted by me demonstrates ultrasound pelvis as well as CT abdomen pelvis both read by radiology showed no acute processes    EKG: This EKG is signed and interpreted by me. Normal sinus rhythm rate of 69, no ST segment elevation or depression, VA interval 126 MS, QRS 82 MS, QTc 420 MS    Discussions with other health professionals:   12:02 PM EDT  I received this patient at sign out from Dr. Deepthi Garcia.  I have discussed the patient's initial exam, treatment and plan of care with the out going physician. I have introduced my self to the patient / family and have answered their questions to this point. I have examined the patient myself and reviewed ordered tests / medications and  reviewed any available results to this point. If a resident is involved in the Emergency Department care, I have discussed my findings and plan with them as well. Social determinants: None    Chronic conditions: Multiple abdominal surgeries, describing pain over the scars to the right mid abdominal region as well as a history of Palafox's esophagus for GERD follows with GI closely and complains of intermittent epigastric pain. CONSULTS: None    Risk/Disposition: Patient stable condition, discharged with outpatient follow-up    Pt will be d/c and will follow up with her PCP and GI. She is educated on signs and symptoms that require emergent evaluation. Pt is advised to return to the ED if her symptoms change or worsen. If her pain persists, pt may need further evaluation.  Pt is agreeable to plan and all questions have been answered at this time. PATIENT REFERRED TO:  Phillip Collier PA-C  [address] [facility],[address] [phone]    Call in 3 days      DISCHARGE MEDICATIONS:  New Prescriptions    No medications on file           [address]:    Vitals:    03/16/23 1709 03/16/23 1722 03/16/23 2030   BP:  (!) 178/86    Pulse: (!) 108 100 70   Resp: 18 18    Temp: 97.6 °F (36.4 °C)     TempSrc: Oral     SpO2: 99% 97% 97%   Weight:  200 lb (90.7 kg)    Height:  5' (1.524 m)        ED Course as of 03/17/23 0147   Fri Mar 17, 2023   0006 12:06 AM EDT  I received this patient at sign out from Dr. Isabella Matute. Denise Elliott. I have discussed the patient's initial exam, treatment and plan of care with the out going physician. I have introduced my self to the patient / family and have answered their questions to this point. I have examined the patient myself and reviewed ordered tests / medications and  reviewed any available results to this point. If a resident is involved in the Emergency Department care, I have discussed my findings and plan with them as well. [NC]      ED Course User Index  [NC] Lucy Cornejo DO          I am the Primary Clinician of Record. FINAL IMPRESSION      1. Generalized abdominal pain          DISPOSITION/PLAN      Decision To Discharge 03/17/2023 01:44:10 AM    Patient's disposition: Discharge to home  Patient's condition is good.            (Please note that portions of this note were completed with a voice recognition program.  Efforts were made to edit the dictations but occasionally words are mis-transcribed.)    Lucy Cornejo DO (electronically signed)       Lucy Cornejo DO  03/17/23 0147

## 2023-03-30 ENCOUNTER — HOSPITAL ENCOUNTER (OUTPATIENT)
Dept: MRI IMAGING | Age: 48
Discharge: HOME | End: 2023-04-01
Payer: COMMERCIAL

## 2023-03-30 DIAGNOSIS — C18.6 MALIGNANT NEOPLASM OF DESCENDING COLON (HCC): ICD-10-CM

## 2023-03-30 DIAGNOSIS — I10 ESSENTIAL (PRIMARY) HYPERTENSION: ICD-10-CM

## 2023-03-30 DIAGNOSIS — R79.89 OTHER SPECIFIED ABNORMAL FINDINGS OF BLOOD CHEMISTRY: ICD-10-CM

## 2023-03-30 DIAGNOSIS — K77 LIVER DISORDERS IN DISEASES CLASSIFIED ELSEWHERE: ICD-10-CM

## 2023-03-30 DIAGNOSIS — R91.1 SOLITARY PULMONARY NODULE: ICD-10-CM

## 2023-03-30 PROCEDURE — A9577 INJ MULTIHANCE: HCPCS | Performed by: RADIOLOGY

## 2023-03-30 PROCEDURE — 74183 MRI ABD W/O CNTR FLWD CNTR: CPT

## 2023-03-30 PROCEDURE — 6360000004 HC RX CONTRAST MEDICATION: Performed by: RADIOLOGY

## 2023-03-30 RX ADMIN — GADOBENATE DIMEGLUMINE 20 ML: 529 INJECTION, SOLUTION INTRAVENOUS at 08:31

## 2023-04-01 DIAGNOSIS — I82.422 ACUTE DEEP VEIN THROMBOSIS (DVT) OF ILIAC VEIN OF LEFT LOWER EXTREMITY (HCC): ICD-10-CM

## 2023-04-04 RX ORDER — APIXABAN 5 MG/1
TABLET, FILM COATED ORAL
Qty: 60 TABLET | Refills: 2 | OUTPATIENT
Start: 2023-04-04

## 2023-04-10 RX ORDER — METHOCARBAMOL 750 MG/1
TABLET ORAL
Qty: 4 CAPSULE | Refills: 10 | OUTPATIENT
Start: 2023-04-10

## 2023-04-17 RX ORDER — METHOCARBAMOL 750 MG/1
TABLET ORAL
Qty: 4 CAPSULE | Refills: 10 | OUTPATIENT
Start: 2023-04-17

## 2023-04-25 RX ORDER — METHOCARBAMOL 750 MG/1
50000 TABLET ORAL WEEKLY
Qty: 12 CAPSULE | Refills: 3 | Status: SHIPPED | OUTPATIENT
Start: 2023-04-25

## 2023-05-10 RX ORDER — NORTRIPTYLINE HYDROCHLORIDE 10 MG/1
CAPSULE ORAL
Qty: 30 CAPSULE | Refills: 0 | Status: SHIPPED
Start: 2023-05-10 | End: 2023-06-09

## 2023-05-10 RX ORDER — ALBUTEROL SULFATE 90 UG/1
AEROSOL, METERED RESPIRATORY (INHALATION)
Qty: 18 G | Refills: 0 | Status: SHIPPED
Start: 2023-05-10 | End: 2023-06-09

## 2023-06-08 DIAGNOSIS — J30.2 SEASONAL ALLERGIES: ICD-10-CM

## 2023-06-09 RX ORDER — ALBUTEROL SULFATE 90 UG/1
AEROSOL, METERED RESPIRATORY (INHALATION)
Qty: 18 G | Refills: 0 | Status: SHIPPED
Start: 2023-06-09 | End: 2023-07-03 | Stop reason: SDUPTHER

## 2023-06-09 RX ORDER — NORTRIPTYLINE HYDROCHLORIDE 10 MG/1
CAPSULE ORAL
Qty: 30 CAPSULE | Refills: 0 | Status: SHIPPED
Start: 2023-06-09 | End: 2023-07-11 | Stop reason: SDUPTHER

## 2023-06-09 RX ORDER — FLUTICASONE PROPIONATE 50 MCG
SPRAY, SUSPENSION (ML) NASAL
Qty: 16 G | Refills: 0 | Status: SHIPPED
Start: 2023-06-09 | End: 2023-07-11 | Stop reason: SDUPTHER

## 2023-06-22 ENCOUNTER — HOSPITAL ENCOUNTER (OUTPATIENT)
Dept: MRI IMAGING | Age: 48
Discharge: HOME | End: 2023-06-24
Payer: COMMERCIAL

## 2023-06-22 DIAGNOSIS — G35 MULTIPLE SCLEROSIS (HCC): ICD-10-CM

## 2023-06-22 PROCEDURE — A9577 INJ MULTIHANCE: HCPCS | Performed by: RADIOLOGY

## 2023-06-22 PROCEDURE — 6360000004 HC RX CONTRAST MEDICATION: Performed by: RADIOLOGY

## 2023-06-22 PROCEDURE — 70553 MRI BRAIN STEM W/O & W/DYE: CPT

## 2023-06-22 PROCEDURE — 72156 MRI NECK SPINE W/O & W/DYE: CPT

## 2023-06-22 RX ADMIN — GADOBENATE DIMEGLUMINE 19 ML: 529 INJECTION, SOLUTION INTRAVENOUS at 11:16

## 2023-06-26 ENCOUNTER — TELEPHONE (OUTPATIENT)
Dept: PRIMARY CARE CLINIC | Age: 48
End: 2023-06-26

## 2023-06-27 ENCOUNTER — TELEPHONE (OUTPATIENT)
Dept: NEUROLOGY | Age: 48
End: 2023-06-27

## 2023-06-27 RX ORDER — GABAPENTIN 400 MG/1
400 CAPSULE ORAL 3 TIMES DAILY
Qty: 270 CAPSULE | Refills: 0 | Status: SHIPPED | OUTPATIENT
Start: 2023-06-27 | End: 2023-09-25

## 2023-06-30 SDOH — ECONOMIC STABILITY: INCOME INSECURITY: HOW HARD IS IT FOR YOU TO PAY FOR THE VERY BASICS LIKE FOOD, HOUSING, MEDICAL CARE, AND HEATING?: NOT VERY HARD

## 2023-06-30 SDOH — ECONOMIC STABILITY: TRANSPORTATION INSECURITY
IN THE PAST 12 MONTHS, HAS LACK OF TRANSPORTATION KEPT YOU FROM MEETINGS, WORK, OR FROM GETTING THINGS NEEDED FOR DAILY LIVING?: NO

## 2023-06-30 SDOH — ECONOMIC STABILITY: FOOD INSECURITY: WITHIN THE PAST 12 MONTHS, THE FOOD YOU BOUGHT JUST DIDN'T LAST AND YOU DIDN'T HAVE MONEY TO GET MORE.: NEVER TRUE

## 2023-06-30 SDOH — ECONOMIC STABILITY: FOOD INSECURITY: WITHIN THE PAST 12 MONTHS, YOU WORRIED THAT YOUR FOOD WOULD RUN OUT BEFORE YOU GOT MONEY TO BUY MORE.: NEVER TRUE

## 2023-06-30 SDOH — ECONOMIC STABILITY: HOUSING INSECURITY
IN THE LAST 12 MONTHS, WAS THERE A TIME WHEN YOU DID NOT HAVE A STEADY PLACE TO SLEEP OR SLEPT IN A SHELTER (INCLUDING NOW)?: NO

## 2023-06-30 ASSESSMENT — PATIENT HEALTH QUESTIONNAIRE - PHQ9
2. FEELING DOWN, DEPRESSED OR HOPELESS: 0
SUM OF ALL RESPONSES TO PHQ9 QUESTIONS 1 & 2: 0
1. LITTLE INTEREST OR PLEASURE IN DOING THINGS: 0
SUM OF ALL RESPONSES TO PHQ QUESTIONS 1-9: 0
1. LITTLE INTEREST OR PLEASURE IN DOING THINGS: NOT AT ALL
SUM OF ALL RESPONSES TO PHQ QUESTIONS 1-9: 0
SUM OF ALL RESPONSES TO PHQ QUESTIONS 1-9: 0
2. FEELING DOWN, DEPRESSED OR HOPELESS: NOT AT ALL
SUM OF ALL RESPONSES TO PHQ QUESTIONS 1-9: 0
SUM OF ALL RESPONSES TO PHQ9 QUESTIONS 1 & 2: 0

## 2023-07-03 ENCOUNTER — OFFICE VISIT (OUTPATIENT)
Dept: PRIMARY CARE CLINIC | Age: 48
End: 2023-07-03
Payer: COMMERCIAL

## 2023-07-03 VITALS
DIASTOLIC BLOOD PRESSURE: 80 MMHG | TEMPERATURE: 97.3 F | OXYGEN SATURATION: 97 % | WEIGHT: 250 LBS | HEIGHT: 60 IN | BODY MASS INDEX: 49.08 KG/M2 | SYSTOLIC BLOOD PRESSURE: 120 MMHG | RESPIRATION RATE: 18 BRPM | HEART RATE: 80 BPM

## 2023-07-03 DIAGNOSIS — F17.200 SMOKER: ICD-10-CM

## 2023-07-03 DIAGNOSIS — Z00.00 ENCOUNTER FOR WELL ADULT EXAM WITHOUT ABNORMAL FINDINGS: Primary | ICD-10-CM

## 2023-07-03 DIAGNOSIS — J45.31 MILD PERSISTENT ASTHMA WITH ACUTE EXACERBATION: ICD-10-CM

## 2023-07-03 DIAGNOSIS — I10 ESSENTIAL HYPERTENSION: ICD-10-CM

## 2023-07-03 DIAGNOSIS — Z87.891 PERSONAL HISTORY OF TOBACCO USE, PRESENTING HAZARDS TO HEALTH: ICD-10-CM

## 2023-07-03 PROBLEM — T81.49XA ABDOMINAL WALL ABSCESS AT SITE OF SURGICAL WOUND: Status: RESOLVED | Noted: 2022-07-29 | Resolved: 2023-07-03

## 2023-07-03 PROBLEM — S30.1XXA ABDOMINAL WALL SEROMA: Status: RESOLVED | Noted: 2022-07-26 | Resolved: 2023-07-03

## 2023-07-03 PROCEDURE — G8417 CALC BMI ABV UP PARAM F/U: HCPCS | Performed by: PHYSICIAN ASSISTANT

## 2023-07-03 PROCEDURE — G8427 DOCREV CUR MEDS BY ELIG CLIN: HCPCS | Performed by: PHYSICIAN ASSISTANT

## 2023-07-03 PROCEDURE — 3074F SYST BP LT 130 MM HG: CPT | Performed by: PHYSICIAN ASSISTANT

## 2023-07-03 PROCEDURE — 99396 PREV VISIT EST AGE 40-64: CPT | Performed by: PHYSICIAN ASSISTANT

## 2023-07-03 PROCEDURE — 4004F PT TOBACCO SCREEN RCVD TLK: CPT | Performed by: PHYSICIAN ASSISTANT

## 2023-07-03 PROCEDURE — 99406 BEHAV CHNG SMOKING 3-10 MIN: CPT | Performed by: PHYSICIAN ASSISTANT

## 2023-07-03 PROCEDURE — 3079F DIAST BP 80-89 MM HG: CPT | Performed by: PHYSICIAN ASSISTANT

## 2023-07-03 PROCEDURE — 99213 OFFICE O/P EST LOW 20 MIN: CPT | Performed by: PHYSICIAN ASSISTANT

## 2023-07-03 RX ORDER — FLUTICASONE PROPIONATE AND SALMETEROL XINAFOATE 115; 21 UG/1; UG/1
2 AEROSOL, METERED RESPIRATORY (INHALATION) 2 TIMES DAILY
Qty: 12 G | Refills: 1
Start: 2023-07-03 | End: 2023-07-03 | Stop reason: SDUPTHER

## 2023-07-03 RX ORDER — FLUTICASONE PROPIONATE AND SALMETEROL XINAFOATE 115; 21 UG/1; UG/1
2 AEROSOL, METERED RESPIRATORY (INHALATION) 2 TIMES DAILY
Qty: 12 G | Refills: 1 | Status: SHIPPED
Start: 2023-07-03 | End: 2023-07-07 | Stop reason: SDUPTHER

## 2023-07-03 RX ORDER — ALBUTEROL SULFATE 90 UG/1
AEROSOL, METERED RESPIRATORY (INHALATION)
Qty: 18 G | Refills: 5 | Status: SHIPPED | OUTPATIENT
Start: 2023-07-03

## 2023-07-03 RX ORDER — VARENICLINE TARTRATE
KIT
Qty: 53 EACH | Refills: 0 | Status: SHIPPED | OUTPATIENT
Start: 2023-07-03

## 2023-07-07 DIAGNOSIS — J45.31 MILD PERSISTENT ASTHMA WITH ACUTE EXACERBATION: ICD-10-CM

## 2023-07-07 RX ORDER — FLUTICASONE PROPIONATE AND SALMETEROL XINAFOATE 115; 21 UG/1; UG/1
2 AEROSOL, METERED RESPIRATORY (INHALATION) 2 TIMES DAILY
Qty: 12 G | Refills: 1 | Status: SHIPPED | OUTPATIENT
Start: 2023-07-07

## 2023-07-10 DIAGNOSIS — J30.2 SEASONAL ALLERGIES: ICD-10-CM

## 2023-07-11 RX ORDER — FLUTICASONE PROPIONATE 50 MCG
SPRAY, SUSPENSION (ML) NASAL
Qty: 16 G | Refills: 10 | Status: SHIPPED
Start: 2023-07-11 | End: 2023-07-13

## 2023-07-11 RX ORDER — NORTRIPTYLINE HYDROCHLORIDE 10 MG/1
CAPSULE ORAL
Qty: 30 CAPSULE | Refills: 10 | Status: SHIPPED | OUTPATIENT
Start: 2023-07-11

## 2023-07-12 RX ORDER — ROPINIROLE 0.5 MG/1
TABLET, FILM COATED ORAL
Qty: 90 TABLET | Refills: 10 | Status: SHIPPED | OUTPATIENT
Start: 2023-07-12

## 2023-07-12 RX ORDER — BACLOFEN 20 MG/1
TABLET ORAL
Qty: 120 TABLET | Refills: 10 | Status: SHIPPED | OUTPATIENT
Start: 2023-07-12

## 2023-07-13 ENCOUNTER — OFFICE VISIT (OUTPATIENT)
Dept: NEUROLOGY | Age: 48
End: 2023-07-13
Payer: COMMERCIAL

## 2023-07-13 VITALS
SYSTOLIC BLOOD PRESSURE: 129 MMHG | HEIGHT: 61 IN | BODY MASS INDEX: 47.2 KG/M2 | HEART RATE: 70 BPM | OXYGEN SATURATION: 96 % | TEMPERATURE: 97.2 F | RESPIRATION RATE: 16 BRPM | DIASTOLIC BLOOD PRESSURE: 84 MMHG | WEIGHT: 250 LBS

## 2023-07-13 DIAGNOSIS — G25.81 SECONDARY RESTLESS LEGS SYNDROME: ICD-10-CM

## 2023-07-13 DIAGNOSIS — H53.8 BLURRED VISION, BILATERAL: ICD-10-CM

## 2023-07-13 DIAGNOSIS — E83.51 HYPOCALCEMIA: ICD-10-CM

## 2023-07-13 DIAGNOSIS — G35 MULTIPLE SCLEROSIS (HCC): Primary | ICD-10-CM

## 2023-07-13 PROBLEM — Z90.49 S/P COLECTOMY: Status: RESOLVED | Noted: 2021-08-31 | Resolved: 2023-07-13

## 2023-07-13 PROCEDURE — 99214 OFFICE O/P EST MOD 30 MIN: CPT | Performed by: NURSE PRACTITIONER

## 2023-07-13 PROCEDURE — 4004F PT TOBACCO SCREEN RCVD TLK: CPT | Performed by: NURSE PRACTITIONER

## 2023-07-13 PROCEDURE — G8417 CALC BMI ABV UP PARAM F/U: HCPCS | Performed by: NURSE PRACTITIONER

## 2023-07-13 PROCEDURE — G8427 DOCREV CUR MEDS BY ELIG CLIN: HCPCS | Performed by: NURSE PRACTITIONER

## 2023-07-13 RX ORDER — GABAPENTIN 400 MG/1
400 CAPSULE ORAL 3 TIMES DAILY
Qty: 270 CAPSULE | Refills: 0 | Status: CANCELLED | OUTPATIENT
Start: 2023-07-13 | End: 2023-10-11

## 2023-07-13 NOTE — PROGRESS NOTES
[address] MSN, APRN-CNP, [address] [facility] , [address] [facility], [address] [place] [address]      [phone]                                   Office Follow Up    Shilpa Summers is a 49 y.o. right handed woman    We are following her for relapsing remitting MS MS    She alone and remains a good historian    Updated MRIs in June of this year showed stable white matter lesions with no enhancements and no cervical involvement. She remains on Ocrevus every 6 months. She continues to note paresthesias in her fingertips as well as numbness and loss of feeling on the outsides of both of her thighs as well as numbness and tingling in both legs. She feels like bugs will crawl in her legs at times. Has bad mid lower back pain typically upon arising which makes it hard to walk at times, with no radicular symptoms down either leg. Still having intermittent muscle spasms which are normally well controlled on baclofen--she takes an average of 3 times daily--this also helps her back pains. Her current gabapentin dose has been helpful for her paresthesias. No new weakness, falls, or bladder issues. Having intermittent transient blurred vision but no vision loss, headache, or eye pain. She does not have an ophthalmologist.  Taking weekly vitamin D and last level was adequate. She was unable to complete PT due to subsequent hospitalizations. Restless leg symptoms are now controlled on divided doses of Requip. She does not exercise and continues to smoke but is now on Chantix and trying to quit. No SI or HI. She states that her colon cancer has been in remission.     She had blood work in March which showed hypocalcemia of 7.6--previous levels have been in the 9.0s    No chest pain or palpitations  No SOB  No vertigo, lightheadedness or loss of consciousness  No incontinence of bowels or bladder  No itching or bruising appreciated  No speech or swallowing troubles     ROS

## 2023-07-28 RX ORDER — VARENICLINE TARTRATE 1 MG/1
1 TABLET, FILM COATED ORAL 2 TIMES DAILY
Qty: 60 TABLET | Refills: 1 | Status: SHIPPED | OUTPATIENT
Start: 2023-07-28

## 2023-08-01 ENCOUNTER — HOSPITAL ENCOUNTER (EMERGENCY)
Age: 48
Discharge: HOME | End: 2023-08-01
Attending: STUDENT IN AN ORGANIZED HEALTH CARE EDUCATION/TRAINING PROGRAM
Payer: COMMERCIAL

## 2023-08-01 ENCOUNTER — APPOINTMENT (OUTPATIENT)
Dept: CT IMAGING | Age: 48
End: 2023-08-01
Payer: COMMERCIAL

## 2023-08-01 VITALS
DIASTOLIC BLOOD PRESSURE: 87 MMHG | TEMPERATURE: 98 F | WEIGHT: 240 LBS | SYSTOLIC BLOOD PRESSURE: 150 MMHG | BODY MASS INDEX: 45.35 KG/M2 | RESPIRATION RATE: 18 BRPM | HEART RATE: 75 BPM | OXYGEN SATURATION: 100 %

## 2023-08-01 DIAGNOSIS — E86.0 DEHYDRATION, MILD: ICD-10-CM

## 2023-08-01 DIAGNOSIS — N10 ACUTE PYELONEPHRITIS: Primary | ICD-10-CM

## 2023-08-01 LAB
ALBUMIN SERPL-MCNC: 4.9 G/DL (ref 3.5–5.2)
ALP SERPL-CCNC: 120 U/L (ref 35–104)
ALT SERPL-CCNC: 59 U/L (ref 0–32)
ANION GAP SERPL CALCULATED.3IONS-SCNC: 15 MMOL/L (ref 7–16)
AST SERPL-CCNC: 25 U/L (ref 0–31)
BACTERIA URNS QL MICRO: ABNORMAL
BASOPHILS # BLD: 0.06 K/UL (ref 0–0.2)
BASOPHILS NFR BLD: 1 % (ref 0–2)
BILIRUB DIRECT SERPL-MCNC: <0.2 MG/DL (ref 0–0.3)
BILIRUB INDIRECT SERPL-MCNC: ABNORMAL MG/DL (ref 0–1)
BILIRUB SERPL-MCNC: 0.7 MG/DL (ref 0–1.2)
BILIRUB UR QL STRIP: NEGATIVE
BUN SERPL-MCNC: 7 MG/DL (ref 6–20)
CALCIUM SERPL-MCNC: 10.1 MG/DL (ref 8.6–10.2)
CHLORIDE SERPL-SCNC: 99 MMOL/L (ref 98–107)
CLARITY UR: ABNORMAL
CO2 SERPL-SCNC: 26 MMOL/L (ref 22–29)
COLOR UR: YELLOW
CREAT SERPL-MCNC: 0.7 MG/DL (ref 0.5–1)
EOSINOPHIL # BLD: 0.19 K/UL (ref 0.05–0.5)
EOSINOPHILS RELATIVE PERCENT: 2 % (ref 0–6)
EPI CELLS #/AREA URNS HPF: ABNORMAL /HPF
ERYTHROCYTE [DISTWIDTH] IN BLOOD BY AUTOMATED COUNT: 12.4 % (ref 11.5–15)
GFR SERPL CREATININE-BSD FRML MDRD: >60 ML/MIN/1.73M2
GLUCOSE SERPL-MCNC: 108 MG/DL (ref 74–99)
GLUCOSE UR STRIP-MCNC: NEGATIVE MG/DL
HCT VFR BLD AUTO: 45 % (ref 34–48)
HGB BLD-MCNC: 15.2 G/DL (ref 11.5–15.5)
HGB UR QL STRIP.AUTO: ABNORMAL
IMM GRANULOCYTES # BLD AUTO: 0.11 K/UL (ref 0–0.58)
IMM GRANULOCYTES NFR BLD: 1 % (ref 0–5)
KETONES UR STRIP-MCNC: NEGATIVE MG/DL
LACTATE BLDV-SCNC: 1.5 MMOL/L (ref 0.5–2.2)
LACTATE BLDV-SCNC: 3 MMOL/L (ref 0.5–2.2)
LEUKOCYTE ESTERASE UR QL STRIP: ABNORMAL
LIPASE SERPL-CCNC: 15 U/L (ref 13–60)
LYMPHOCYTES NFR BLD: 3.47 K/UL (ref 1.5–4)
LYMPHOCYTES RELATIVE PERCENT: 27 % (ref 20–42)
MAGNESIUM SERPL-MCNC: 2.2 MG/DL (ref 1.6–2.6)
MCH RBC QN AUTO: 31.6 PG (ref 26–35)
MCHC RBC AUTO-ENTMCNC: 33.8 G/DL (ref 32–34.5)
MCV RBC AUTO: 93.6 FL (ref 80–99.9)
MONOCYTES NFR BLD: 1.17 K/UL (ref 0.1–0.95)
MONOCYTES NFR BLD: 9 % (ref 2–12)
NEUTROPHILS NFR BLD: 61 % (ref 43–80)
NEUTS SEG NFR BLD: 7.9 K/UL (ref 1.8–7.3)
NITRITE UR QL STRIP: NEGATIVE
PH UR STRIP: 5 [PH] (ref 5–9)
PLATELET # BLD AUTO: 307 K/UL (ref 130–450)
PMV BLD AUTO: 9.6 FL (ref 7–12)
POTASSIUM SERPL-SCNC: 4 MMOL/L (ref 3.5–5)
PROT SERPL-MCNC: 7.9 G/DL (ref 6.4–8.3)
PROT UR STRIP-MCNC: ABNORMAL MG/DL
RBC # BLD AUTO: 4.81 M/UL (ref 3.5–5.5)
RBC #/AREA URNS HPF: ABNORMAL /HPF
SODIUM SERPL-SCNC: 140 MMOL/L (ref 132–146)
SP GR UR STRIP: 1.02 (ref 1–1.03)
TROPONIN I SERPL HS-MCNC: 7 NG/L (ref 0–9)
UROBILINOGEN UR STRIP-ACNC: 0.2 EU/DL (ref 0–1)
WBC #/AREA URNS HPF: ABNORMAL /HPF
WBC OTHER # BLD: 12.9 K/UL (ref 4.5–11.5)

## 2023-08-01 PROCEDURE — 87088 URINE BACTERIA CULTURE: CPT

## 2023-08-01 PROCEDURE — 84484 ASSAY OF TROPONIN QUANT: CPT

## 2023-08-01 PROCEDURE — 36415 COLL VENOUS BLD VENIPUNCTURE: CPT

## 2023-08-01 PROCEDURE — 96361 HYDRATE IV INFUSION ADD-ON: CPT

## 2023-08-01 PROCEDURE — 83605 ASSAY OF LACTIC ACID: CPT

## 2023-08-01 PROCEDURE — 96375 TX/PRO/DX INJ NEW DRUG ADDON: CPT

## 2023-08-01 PROCEDURE — 6360000004 HC RX CONTRAST MEDICATION: Performed by: RADIOLOGY

## 2023-08-01 PROCEDURE — 83690 ASSAY OF LIPASE: CPT

## 2023-08-01 PROCEDURE — 99285 EMERGENCY DEPT VISIT HI MDM: CPT

## 2023-08-01 PROCEDURE — 2580000003 HC RX 258: Performed by: STUDENT IN AN ORGANIZED HEALTH CARE EDUCATION/TRAINING PROGRAM

## 2023-08-01 PROCEDURE — 6360000002 HC RX W HCPCS: Performed by: STUDENT IN AN ORGANIZED HEALTH CARE EDUCATION/TRAINING PROGRAM

## 2023-08-01 PROCEDURE — 83735 ASSAY OF MAGNESIUM: CPT

## 2023-08-01 PROCEDURE — 81001 URINALYSIS AUTO W/SCOPE: CPT

## 2023-08-01 PROCEDURE — 82248 BILIRUBIN DIRECT: CPT

## 2023-08-01 PROCEDURE — 87086 URINE CULTURE/COLONY COUNT: CPT

## 2023-08-01 PROCEDURE — 80053 COMPREHEN METABOLIC PANEL: CPT

## 2023-08-01 PROCEDURE — 93005 ELECTROCARDIOGRAM TRACING: CPT | Performed by: STUDENT IN AN ORGANIZED HEALTH CARE EDUCATION/TRAINING PROGRAM

## 2023-08-01 PROCEDURE — 74177 CT ABD & PELVIS W/CONTRAST: CPT

## 2023-08-01 PROCEDURE — 85025 COMPLETE CBC W/AUTO DIFF WBC: CPT

## 2023-08-01 PROCEDURE — 96374 THER/PROPH/DIAG INJ IV PUSH: CPT

## 2023-08-01 RX ORDER — KETOROLAC TROMETHAMINE 30 MG/ML
30 INJECTION, SOLUTION INTRAMUSCULAR; INTRAVENOUS ONCE
Status: COMPLETED | OUTPATIENT
Start: 2023-08-01 | End: 2023-08-01

## 2023-08-01 RX ORDER — ONDANSETRON 2 MG/ML
4 INJECTION INTRAMUSCULAR; INTRAVENOUS ONCE
Status: COMPLETED | OUTPATIENT
Start: 2023-08-01 | End: 2023-08-01

## 2023-08-01 RX ORDER — OXYCODONE HYDROCHLORIDE AND ACETAMINOPHEN 5; 325 MG/1; MG/1
1 TABLET ORAL EVERY 8 HOURS PRN
Qty: 5 TABLET | Refills: 0 | Status: SHIPPED | OUTPATIENT
Start: 2023-08-01 | End: 2023-08-04

## 2023-08-01 RX ORDER — CEFDINIR 300 MG/1
300 CAPSULE ORAL 2 TIMES DAILY
Qty: 28 CAPSULE | Refills: 0 | Status: SHIPPED | OUTPATIENT
Start: 2023-08-01 | End: 2023-08-15

## 2023-08-01 RX ORDER — MORPHINE SULFATE 4 MG/ML
4 INJECTION, SOLUTION INTRAMUSCULAR; INTRAVENOUS ONCE
Status: COMPLETED | OUTPATIENT
Start: 2023-08-01 | End: 2023-08-01

## 2023-08-01 RX ORDER — 0.9 % SODIUM CHLORIDE 0.9 %
1000 INTRAVENOUS SOLUTION INTRAVENOUS ONCE
Status: COMPLETED | OUTPATIENT
Start: 2023-08-01 | End: 2023-08-01

## 2023-08-01 RX ORDER — ONDANSETRON 4 MG/1
4 TABLET, ORALLY DISINTEGRATING ORAL EVERY 8 HOURS PRN
Qty: 15 TABLET | Refills: 0 | Status: SHIPPED | OUTPATIENT
Start: 2023-08-01

## 2023-08-01 RX ADMIN — IOPAMIDOL 70 ML: 755 INJECTION, SOLUTION INTRAVENOUS at 20:07

## 2023-08-01 RX ADMIN — ONDANSETRON 4 MG: 2 INJECTION INTRAMUSCULAR; INTRAVENOUS at 18:46

## 2023-08-01 RX ADMIN — SODIUM CHLORIDE 1000 ML: 9 INJECTION, SOLUTION INTRAVENOUS at 18:57

## 2023-08-01 RX ADMIN — KETOROLAC TROMETHAMINE 30 MG: 30 INJECTION, SOLUTION INTRAMUSCULAR; INTRAVENOUS at 22:30

## 2023-08-01 RX ADMIN — MORPHINE SULFATE 4 MG: 4 INJECTION, SOLUTION INTRAMUSCULAR; INTRAVENOUS at 18:47

## 2023-08-01 RX ADMIN — WATER 2000 MG: 1 INJECTION INTRAMUSCULAR; INTRAVENOUS; SUBCUTANEOUS at 20:50

## 2023-08-01 ASSESSMENT — LIFESTYLE VARIABLES: HOW MANY STANDARD DRINKS CONTAINING ALCOHOL DO YOU HAVE ON A TYPICAL DAY: PATIENT DOES NOT DRINK

## 2023-08-01 ASSESSMENT — PAIN SCALES - GENERAL
PAINLEVEL_OUTOF10: 10
PAINLEVEL_OUTOF10: 10

## 2023-08-01 ASSESSMENT — ENCOUNTER SYMPTOMS
NAUSEA: 1
ABDOMINAL PAIN: 1
CONSTIPATION: 0
BLOOD IN STOOL: 0
SHORTNESS OF BREATH: 0
COUGH: 0
VOMITING: 1
DIARRHEA: 0

## 2023-08-01 ASSESSMENT — PAIN - FUNCTIONAL ASSESSMENT: PAIN_FUNCTIONAL_ASSESSMENT: 0-10

## 2023-08-01 ASSESSMENT — PAIN DESCRIPTION - DESCRIPTORS: DESCRIPTORS: ACHING;SHARP

## 2023-08-01 ASSESSMENT — PAIN DESCRIPTION - ORIENTATION: ORIENTATION: RIGHT;LEFT

## 2023-08-01 ASSESSMENT — PAIN DESCRIPTION - LOCATION: LOCATION: ABDOMEN;BACK

## 2023-08-01 NOTE — ED PROVIDER NOTES
[address]        Pt Name: Shahab Love  MRN: [id number]  [address] [facility] [place] 1975  Date of evaluation: 8/1/2023  Provider: Amberly Engle DO  PCP: Carly Jane PA-C  Note Started: 6:36 PM EDT 8/1/23    CHIEF COMPLAINT       Chief Complaint   Patient presents with    Abdominal Pain    Back Pain     Lower abd started yesterday    Headache    Emesis     Has had bowel ob/ with infection       HISTORY OF PRESENT ILLNESS: 1 or more Elements     Limitations to history : None    Shahab Love is a 49 y.o. 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 She denies any fevers or dysuria but states that she is urinating frequently. Denies any history of kidney stones. She has not had any diarrhea, black or bloody stools or constipation. She denies any numbness or weakness anywhere, chest pain or palpitations or shortness of breath. Nursing Notes were all reviewed and agreed with or any disagreements were addressed in the HPI. REVIEW OF EXTERNAL NOTE :       none    Chart Review/External Note Review    Last Echo reviewed by Me:  No results found for: LVEF, LVEFMODE        Controlled Substance Monitoring:    Acute and Chronic Pain Monitoring:   No flowsheet data found. REVIEW OF SYSTEMS :      Review of Systems   Constitutional:  Negative for chills and fever. Respiratory:  Negative for cough and shortness of breath. Cardiovascular:  Negative for chest pain and palpitations. Gastrointestinal:  Positive for abdominal pain, nausea and vomiting. Negative for blood in stool, constipation and diarrhea.

## 2023-08-02 LAB
EKG ATRIAL RATE: 90 BPM
EKG P AXIS: 54 DEGREES
EKG P-R INTERVAL: 152 MS
EKG Q-T INTERVAL: 338 MS
EKG QRS DURATION: 74 MS
EKG QTC CALCULATION (BAZETT): 413 MS
EKG R AXIS: 7 DEGREES
EKG T AXIS: 56 DEGREES
EKG VENTRICULAR RATE: 90 BPM

## 2023-08-02 PROCEDURE — 93010 ELECTROCARDIOGRAM REPORT: CPT | Performed by: INTERNAL MEDICINE

## 2023-08-02 NOTE — DISCHARGE INSTRUCTIONS
Please return to the ER for any new or worsening symptoms including but not limited to Inability to keep down liquids, Fever, or worsening abdominal pain, difficulty urinating or decreased urination  If prescribed, please be sure to  your prescriptions from the pharmacy  Please follow-up with Primary care provider as instructed

## 2023-08-04 LAB
MICROORGANISM SPEC CULT: ABNORMAL
SPECIMEN DESCRIPTION: ABNORMAL

## 2023-08-09 RX ORDER — LISINOPRIL AND HYDROCHLOROTHIAZIDE 12.5; 1 MG/1; MG/1
1 TABLET ORAL DAILY
Qty: 30 TABLET | Refills: 3 | Status: SHIPPED | OUTPATIENT
Start: 2023-08-09

## 2023-08-15 DIAGNOSIS — J45.31 MILD PERSISTENT ASTHMA WITH ACUTE EXACERBATION: ICD-10-CM

## 2023-08-15 DIAGNOSIS — G35 MULTIPLE SCLEROSIS (HCC): Primary | ICD-10-CM

## 2023-08-15 RX ORDER — HEPARIN SODIUM (PORCINE) LOCK FLUSH IV SOLN 100 UNIT/ML 100 UNIT/ML
500 SOLUTION INTRAVENOUS PRN
OUTPATIENT
Start: 2023-08-15

## 2023-08-15 RX ORDER — ONDANSETRON 2 MG/ML
8 INJECTION INTRAMUSCULAR; INTRAVENOUS
OUTPATIENT
Start: 2023-08-15

## 2023-08-15 RX ORDER — ACETAMINOPHEN 325 MG/1
650 TABLET ORAL ONCE
OUTPATIENT
Start: 2023-08-15 | End: 2023-08-15

## 2023-08-15 RX ORDER — SODIUM CHLORIDE 9 MG/ML
5-250 INJECTION, SOLUTION INTRAVENOUS PRN
OUTPATIENT
Start: 2023-08-15

## 2023-08-15 RX ORDER — DIPHENHYDRAMINE HYDROCHLORIDE 50 MG/ML
25 INJECTION INTRAMUSCULAR; INTRAVENOUS ONCE
OUTPATIENT
Start: 2023-08-15 | End: 2023-08-15

## 2023-08-15 RX ORDER — SODIUM CHLORIDE 9 MG/ML
INJECTION, SOLUTION INTRAVENOUS CONTINUOUS
OUTPATIENT
Start: 2023-08-15

## 2023-08-15 RX ORDER — DIPHENHYDRAMINE HYDROCHLORIDE 50 MG/ML
50 INJECTION INTRAMUSCULAR; INTRAVENOUS
OUTPATIENT
Start: 2023-08-15

## 2023-08-15 RX ORDER — ACETAMINOPHEN 325 MG/1
650 TABLET ORAL
OUTPATIENT
Start: 2023-08-15

## 2023-08-15 RX ORDER — EPINEPHRINE 1 MG/ML
0.3 INJECTION, SOLUTION, CONCENTRATE INTRAVENOUS PRN
OUTPATIENT
Start: 2023-08-15

## 2023-08-15 RX ORDER — SODIUM CHLORIDE 0.9 % (FLUSH) 0.9 %
5-40 SYRINGE (ML) INJECTION PRN
OUTPATIENT
Start: 2023-08-15

## 2023-08-15 RX ORDER — ALBUTEROL SULFATE 90 UG/1
4 AEROSOL, METERED RESPIRATORY (INHALATION) PRN
OUTPATIENT
Start: 2023-08-15

## 2023-08-15 RX ORDER — FAMOTIDINE 10 MG/ML
20 INJECTION, SOLUTION INTRAVENOUS
OUTPATIENT
Start: 2023-08-15

## 2023-08-16 RX ORDER — FLUTICASONE PROPIONATE AND SALMETEROL XINAFOATE 115; 21 UG/1; UG/1
AEROSOL, METERED RESPIRATORY (INHALATION)
Qty: 12 G | Refills: 3 | Status: SHIPPED | OUTPATIENT
Start: 2023-08-16

## 2023-09-07 DIAGNOSIS — I82.422 ACUTE DEEP VEIN THROMBOSIS (DVT) OF ILIAC VEIN OF LEFT LOWER EXTREMITY (HCC): ICD-10-CM

## 2023-09-08 RX ORDER — APIXABAN 5 MG/1
TABLET, FILM COATED ORAL
Qty: 60 TABLET | Refills: 3 | Status: SHIPPED
Start: 2023-09-08 | End: 2023-09-28 | Stop reason: ALTCHOICE

## 2023-09-11 RX ORDER — GABAPENTIN 400 MG/1
400 CAPSULE ORAL 3 TIMES DAILY
Qty: 270 CAPSULE | Refills: 3 | Status: SHIPPED
Start: 2023-09-11 | End: 2023-10-25

## 2023-09-14 ENCOUNTER — HOSPITAL ENCOUNTER (OUTPATIENT)
Dept: INFUSION THERAPY | Age: 48
Setting detail: INFUSION SERIES
End: 2023-09-14

## 2023-09-19 RX ORDER — GABAPENTIN 400 MG/1
400 CAPSULE ORAL 3 TIMES DAILY
Qty: 90 CAPSULE | Refills: 10 | OUTPATIENT
Start: 2023-09-19

## 2023-09-20 ENCOUNTER — NURSE ONLY (OUTPATIENT)
Dept: PRIMARY CARE CLINIC | Age: 48
End: 2023-09-20
Payer: COMMERCIAL

## 2023-09-20 DIAGNOSIS — Z23 ENCOUNTER FOR IMMUNIZATION: Primary | ICD-10-CM

## 2023-09-20 PROCEDURE — 90471 IMMUNIZATION ADMIN: CPT | Performed by: PHYSICIAN ASSISTANT

## 2023-09-20 PROCEDURE — 90674 CCIIV4 VAC NO PRSV 0.5 ML IM: CPT | Performed by: PHYSICIAN ASSISTANT

## 2023-09-20 PROCEDURE — 99999 PR OFFICE/OUTPT VISIT,PROCEDURE ONLY: CPT | Performed by: PHYSICIAN ASSISTANT

## 2023-09-28 ENCOUNTER — HOSPITAL ENCOUNTER (OUTPATIENT)
Dept: INFUSION THERAPY | Age: 48
Setting detail: INFUSION SERIES
Discharge: HOME | End: 2023-09-28
Payer: COMMERCIAL

## 2023-09-28 VITALS
RESPIRATION RATE: 12 BRPM | DIASTOLIC BLOOD PRESSURE: 76 MMHG | SYSTOLIC BLOOD PRESSURE: 131 MMHG | TEMPERATURE: 97 F | OXYGEN SATURATION: 97 % | HEART RATE: 82 BPM

## 2023-09-28 DIAGNOSIS — G35 MULTIPLE SCLEROSIS (HCC): Primary | ICD-10-CM

## 2023-09-28 PROCEDURE — 2580000003 HC RX 258: Performed by: NURSE PRACTITIONER

## 2023-09-28 PROCEDURE — 96375 TX/PRO/DX INJ NEW DRUG ADDON: CPT

## 2023-09-28 PROCEDURE — 6360000002 HC RX W HCPCS: Performed by: NURSE PRACTITIONER

## 2023-09-28 PROCEDURE — 6370000000 HC RX 637 (ALT 250 FOR IP): Performed by: NURSE PRACTITIONER

## 2023-09-28 PROCEDURE — 96366 THER/PROPH/DIAG IV INF ADDON: CPT

## 2023-09-28 PROCEDURE — 96365 THER/PROPH/DIAG IV INF INIT: CPT

## 2023-09-28 RX ORDER — DIPHENHYDRAMINE HYDROCHLORIDE 50 MG/ML
50 INJECTION INTRAMUSCULAR; INTRAVENOUS
OUTPATIENT
Start: 2024-03-24

## 2023-09-28 RX ORDER — DIPHENHYDRAMINE HYDROCHLORIDE 50 MG/ML
25 INJECTION INTRAMUSCULAR; INTRAVENOUS ONCE
Status: COMPLETED | OUTPATIENT
Start: 2023-09-28 | End: 2023-09-28

## 2023-09-28 RX ORDER — SODIUM CHLORIDE 9 MG/ML
5-250 INJECTION, SOLUTION INTRAVENOUS PRN
OUTPATIENT
Start: 2024-03-24

## 2023-09-28 RX ORDER — HEPARIN 100 UNIT/ML
500 SYRINGE INTRAVENOUS PRN
OUTPATIENT
Start: 2024-03-24

## 2023-09-28 RX ORDER — SODIUM CHLORIDE 0.9 % (FLUSH) 0.9 %
5-40 SYRINGE (ML) INJECTION PRN
Status: DISCONTINUED | OUTPATIENT
Start: 2023-09-28 | End: 2023-09-29 | Stop reason: HOSPADM

## 2023-09-28 RX ORDER — ACETAMINOPHEN 325 MG/1
650 TABLET ORAL ONCE
OUTPATIENT
Start: 2024-03-24 | End: 2024-03-24

## 2023-09-28 RX ORDER — EPINEPHRINE 1 MG/ML
0.3 INJECTION, SOLUTION, CONCENTRATE INTRAVENOUS PRN
OUTPATIENT
Start: 2024-03-24

## 2023-09-28 RX ORDER — SODIUM CHLORIDE 9 MG/ML
INJECTION, SOLUTION INTRAVENOUS CONTINUOUS
OUTPATIENT
Start: 2024-03-24

## 2023-09-28 RX ORDER — DIPHENHYDRAMINE HYDROCHLORIDE 50 MG/ML
25 INJECTION INTRAMUSCULAR; INTRAVENOUS ONCE
OUTPATIENT
Start: 2024-03-24 | End: 2024-03-24

## 2023-09-28 RX ORDER — ONDANSETRON 2 MG/ML
8 INJECTION INTRAMUSCULAR; INTRAVENOUS
OUTPATIENT
Start: 2024-03-24

## 2023-09-28 RX ORDER — ACETAMINOPHEN 325 MG/1
650 TABLET ORAL ONCE
Status: COMPLETED | OUTPATIENT
Start: 2023-09-28 | End: 2023-09-28

## 2023-09-28 RX ORDER — METHYLPREDNISOLONE SODIUM SUCCINATE 1 G/16ML
125 INJECTION, POWDER, LYOPHILIZED, FOR SOLUTION INTRAMUSCULAR; INTRAVENOUS ONCE
Status: COMPLETED | OUTPATIENT
Start: 2023-09-28 | End: 2023-09-28

## 2023-09-28 RX ORDER — ALBUTEROL SULFATE 90 UG/1
4 AEROSOL, METERED RESPIRATORY (INHALATION) PRN
OUTPATIENT
Start: 2024-03-24

## 2023-09-28 RX ORDER — SODIUM CHLORIDE 9 MG/ML
5-250 INJECTION, SOLUTION INTRAVENOUS PRN
Status: DISCONTINUED | OUTPATIENT
Start: 2023-09-28 | End: 2023-09-29 | Stop reason: HOSPADM

## 2023-09-28 RX ORDER — SODIUM CHLORIDE 0.9 % (FLUSH) 0.9 %
5-40 SYRINGE (ML) INJECTION PRN
OUTPATIENT
Start: 2024-03-24

## 2023-09-28 RX ORDER — ACETAMINOPHEN 325 MG/1
650 TABLET ORAL
OUTPATIENT
Start: 2024-03-24

## 2023-09-28 RX ADMIN — ACETAMINOPHEN 650 MG: 325 TABLET, FILM COATED ORAL at 08:41

## 2023-09-28 RX ADMIN — SODIUM CHLORIDE, PRESERVATIVE FREE 10 ML: 5 INJECTION INTRAVENOUS at 08:47

## 2023-09-28 RX ADMIN — METHYLPREDNISOLONE SODIUM SUCCINATE 125 MG: 125 INJECTION, POWDER, FOR SOLUTION INTRAMUSCULAR; INTRAVENOUS at 08:42

## 2023-09-28 RX ADMIN — SODIUM CHLORIDE 50 ML: 9 INJECTION, SOLUTION INTRAVENOUS at 13:18

## 2023-09-28 RX ADMIN — OCRELIZUMAB 600 MG: 300 INJECTION INTRAVENOUS at 09:24

## 2023-09-28 RX ADMIN — DIPHENHYDRAMINE HYDROCHLORIDE 25 MG: 50 INJECTION, SOLUTION INTRAMUSCULAR; INTRAVENOUS at 08:46

## 2023-09-28 RX ADMIN — SODIUM CHLORIDE, PRESERVATIVE FREE 10 ML: 5 INJECTION INTRAVENOUS at 08:43

## 2023-09-28 RX ADMIN — SODIUM CHLORIDE, PRESERVATIVE FREE 10 ML: 5 INJECTION INTRAVENOUS at 08:41

## 2023-09-28 NOTE — PROGRESS NOTES
Patient tolerated ocrevus infusion well. Remained on unit for 1 hours after treatment. Patient alert and oriented x3. No distress noted. Vital signs stable. Patient denies any new or worsening pain. Offered patient education and/or discharge material. Patient declined. Patient denies any needs. All questions answered. D/C in stable condition.

## 2023-10-25 ENCOUNTER — PATIENT MESSAGE (OUTPATIENT)
Dept: NEUROLOGY | Age: 48
End: 2023-10-25

## 2023-10-25 DIAGNOSIS — E83.51 HYPOCALCEMIA: Primary | ICD-10-CM

## 2023-10-25 RX ORDER — GABAPENTIN 400 MG/1
400 CAPSULE ORAL 4 TIMES DAILY
Qty: 360 CAPSULE | Refills: 3
Start: 2023-10-25 | End: 2024-10-19

## 2023-10-25 RX ORDER — PREDNISONE 20 MG/1
20 TABLET ORAL DAILY
Qty: 5 TABLET | Refills: 0 | Status: SHIPPED | OUTPATIENT
Start: 2023-10-25 | End: 2023-10-30

## 2023-10-27 ENCOUNTER — HOSPITAL ENCOUNTER (OUTPATIENT)
Age: 48
Discharge: HOME | End: 2023-10-27
Payer: COMMERCIAL

## 2023-10-27 DIAGNOSIS — E83.51 HYPOCALCEMIA: ICD-10-CM

## 2023-10-27 DIAGNOSIS — G35 MULTIPLE SCLEROSIS (HCC): ICD-10-CM

## 2023-10-27 LAB
ALBUMIN SERPL-MCNC: 4.4 G/DL (ref 3.5–5.2)
ALP SERPL-CCNC: 109 U/L (ref 35–104)
ALT SERPL-CCNC: 58 U/L (ref 0–32)
ANION GAP SERPL CALCULATED.3IONS-SCNC: 10 MMOL/L (ref 7–16)
AST SERPL-CCNC: 23 U/L (ref 0–31)
BASOPHILS # BLD: 0.07 K/UL (ref 0–0.2)
BASOPHILS NFR BLD: 1 % (ref 0–2)
BILIRUB SERPL-MCNC: 0.3 MG/DL (ref 0–1.2)
BUN SERPL-MCNC: 8 MG/DL (ref 6–20)
CA-I BLD-SCNC: 1.2 MMOL/L (ref 1.15–1.33)
CALCIUM SERPL-MCNC: 9.6 MG/DL (ref 8.6–10.2)
CHLORIDE SERPL-SCNC: 102 MMOL/L (ref 98–107)
CO2 SERPL-SCNC: 27 MMOL/L (ref 22–29)
CREAT SERPL-MCNC: 0.8 MG/DL (ref 0.5–1)
EOSINOPHIL # BLD: 0.06 K/UL (ref 0.05–0.5)
EOSINOPHILS RELATIVE PERCENT: 0 % (ref 0–6)
ERYTHROCYTE [DISTWIDTH] IN BLOOD BY AUTOMATED COUNT: 12.6 % (ref 11.5–15)
GFR SERPL CREATININE-BSD FRML MDRD: >60 ML/MIN/1.73M2
GLUCOSE SERPL-MCNC: 115 MG/DL (ref 74–99)
HCT VFR BLD AUTO: 44.1 % (ref 34–48)
HGB BLD-MCNC: 15 G/DL (ref 11.5–15.5)
IMM GRANULOCYTES # BLD AUTO: 0.27 K/UL (ref 0–0.58)
IMM GRANULOCYTES NFR BLD: 2 % (ref 0–5)
LYMPHOCYTES NFR BLD: 3.04 K/UL (ref 1.5–4)
LYMPHOCYTES RELATIVE PERCENT: 21 % (ref 20–42)
MCH RBC QN AUTO: 31.6 PG (ref 26–35)
MCHC RBC AUTO-ENTMCNC: 34 G/DL (ref 32–34.5)
MCV RBC AUTO: 92.8 FL (ref 80–99.9)
MONOCYTES NFR BLD: 1.17 K/UL (ref 0.1–0.95)
MONOCYTES NFR BLD: 8 % (ref 2–12)
NEUTROPHILS NFR BLD: 69 % (ref 43–80)
NEUTS SEG NFR BLD: 10.08 K/UL (ref 1.8–7.3)
PLATELET # BLD AUTO: 314 K/UL (ref 130–450)
PMV BLD AUTO: 9.4 FL (ref 7–12)
POTASSIUM SERPL-SCNC: 4.3 MMOL/L (ref 3.5–5)
PROT SERPL-MCNC: 7.2 G/DL (ref 6.4–8.3)
RBC # BLD AUTO: 4.75 M/UL (ref 3.5–5.5)
SODIUM SERPL-SCNC: 139 MMOL/L (ref 132–146)
WBC OTHER # BLD: 14.7 K/UL (ref 4.5–11.5)

## 2023-10-27 PROCEDURE — 82330 ASSAY OF CALCIUM: CPT

## 2023-10-27 PROCEDURE — 85025 COMPLETE CBC W/AUTO DIFF WBC: CPT

## 2023-10-27 PROCEDURE — 36415 COLL VENOUS BLD VENIPUNCTURE: CPT

## 2023-10-27 PROCEDURE — 80053 COMPREHEN METABOLIC PANEL: CPT

## 2023-10-30 ENCOUNTER — TELEPHONE (OUTPATIENT)
Dept: PRIMARY CARE CLINIC | Age: 48
End: 2023-10-30

## 2023-10-30 NOTE — TELEPHONE ENCOUNTER
----- Message from Margareth Woo sent at 10/30/2023 10:53 AM EDT -----  Subject: Results Request    QUESTIONS  Results: Bloodwork; Ordered by:   Date Performed: 2023-10-27  ---------------------------------------------------------------------------  --------------  Fred Rubio TBTT    [phone]; OK to leave message on voicemail  ---------------------------------------------------------------------------  --------------

## 2023-10-31 ENCOUNTER — APPOINTMENT (OUTPATIENT)
Dept: CT IMAGING | Age: 48
End: 2023-10-31
Payer: COMMERCIAL

## 2023-10-31 ENCOUNTER — HOSPITAL ENCOUNTER (EMERGENCY)
Age: 48
Discharge: HOME | End: 2023-10-31
Attending: EMERGENCY MEDICINE
Payer: COMMERCIAL

## 2023-10-31 VITALS
HEART RATE: 86 BPM | TEMPERATURE: 97.6 F | OXYGEN SATURATION: 99 % | RESPIRATION RATE: 16 BRPM | WEIGHT: 260 LBS | SYSTOLIC BLOOD PRESSURE: 140 MMHG | BODY MASS INDEX: 49.13 KG/M2 | DIASTOLIC BLOOD PRESSURE: 88 MMHG

## 2023-10-31 DIAGNOSIS — K76.0 HEPATIC STEATOSIS: ICD-10-CM

## 2023-10-31 DIAGNOSIS — R10.30 LOWER ABDOMINAL PAIN: Primary | ICD-10-CM

## 2023-10-31 LAB
ALBUMIN SERPL-MCNC: 4.6 G/DL (ref 3.5–5.2)
ALP SERPL-CCNC: 115 U/L (ref 35–104)
ALT SERPL-CCNC: 44 U/L (ref 0–32)
ANION GAP SERPL CALCULATED.3IONS-SCNC: 14 MMOL/L (ref 7–16)
AST SERPL-CCNC: 25 U/L (ref 0–31)
ATYPICAL LYMPHOCYTE ABSOLUTE COUNT: 1.66 K/UL (ref 0–0.46)
ATYPICAL LYMPHOCYTES: 11 % (ref 0–4)
BASOPHILS # BLD: 0 K/UL (ref 0–0.2)
BASOPHILS NFR BLD: 0 % (ref 0–2)
BILIRUB SERPL-MCNC: 0.3 MG/DL (ref 0–1.2)
BILIRUB UR QL STRIP: NEGATIVE
BUN SERPL-MCNC: 9 MG/DL (ref 6–20)
CALCIUM SERPL-MCNC: 9.2 MG/DL (ref 8.6–10.2)
CHLORIDE SERPL-SCNC: 102 MMOL/L (ref 98–107)
CLARITY UR: CLEAR
CO2 SERPL-SCNC: 25 MMOL/L (ref 22–29)
COLOR UR: ABNORMAL
COMMENT: ABNORMAL
CREAT SERPL-MCNC: 0.7 MG/DL (ref 0.5–1)
EOSINOPHIL # BLD: 0.15 K/UL (ref 0.05–0.5)
EOSINOPHILS RELATIVE PERCENT: 1 % (ref 0–6)
ERYTHROCYTE [DISTWIDTH] IN BLOOD BY AUTOMATED COUNT: 12.9 % (ref 11.5–15)
GFR SERPL CREATININE-BSD FRML MDRD: >60 ML/MIN/1.73M2
GLUCOSE SERPL-MCNC: 101 MG/DL (ref 74–99)
GLUCOSE UR STRIP-MCNC: NEGATIVE MG/DL
HCT VFR BLD AUTO: 46.7 % (ref 34–48)
HGB BLD-MCNC: 15.7 G/DL (ref 11.5–15.5)
HGB UR QL STRIP.AUTO: NEGATIVE
KETONES UR STRIP-MCNC: NEGATIVE MG/DL
LACTATE BLDV-SCNC: 1.5 MMOL/L (ref 0.5–2.2)
LACTATE BLDV-SCNC: 2.9 MMOL/L (ref 0.5–2.2)
LEUKOCYTE ESTERASE UR QL STRIP: NEGATIVE
LIPASE SERPL-CCNC: 21 U/L (ref 13–60)
LYMPHOCYTES NFR BLD: 3.02 K/UL (ref 1.5–4)
LYMPHOCYTES RELATIVE PERCENT: 20 % (ref 20–42)
MAGNESIUM SERPL-MCNC: 2.1 MG/DL (ref 1.6–2.6)
MCH RBC QN AUTO: 31.5 PG (ref 26–35)
MCHC RBC AUTO-ENTMCNC: 33.6 G/DL (ref 32–34.5)
MCV RBC AUTO: 93.6 FL (ref 80–99.9)
MONOCYTES NFR BLD: 1.06 K/UL (ref 0.1–0.95)
MONOCYTES NFR BLD: 7 % (ref 2–12)
MYELOCYTES ABSOLUTE COUNT: 0.15 K/UL
MYELOCYTES: 1 %
NEUTROPHILS NFR BLD: 60 % (ref 43–80)
NEUTS SEG NFR BLD: 9.06 K/UL (ref 1.8–7.3)
NITRITE UR QL STRIP: NEGATIVE
PH UR STRIP: 5 [PH] (ref 5–9)
PLATELET # BLD AUTO: 309 K/UL (ref 130–450)
PMV BLD AUTO: 9.6 FL (ref 7–12)
POTASSIUM SERPL-SCNC: 3.8 MMOL/L (ref 3.5–5)
PROT SERPL-MCNC: 7.5 G/DL (ref 6.4–8.3)
PROT UR STRIP-MCNC: NEGATIVE MG/DL
RBC # BLD AUTO: 4.99 M/UL (ref 3.5–5.5)
RBC # BLD: NORMAL 10*6/UL
SODIUM SERPL-SCNC: 141 MMOL/L (ref 132–146)
SP GR UR STRIP: >1.03 (ref 1–1.03)
UROBILINOGEN UR STRIP-ACNC: 0.2 EU/DL (ref 0–1)
WBC OTHER # BLD: 15.1 K/UL (ref 4.5–11.5)

## 2023-10-31 PROCEDURE — 83690 ASSAY OF LIPASE: CPT

## 2023-10-31 PROCEDURE — 99285 EMERGENCY DEPT VISIT HI MDM: CPT

## 2023-10-31 PROCEDURE — 6370000000 HC RX 637 (ALT 250 FOR IP)

## 2023-10-31 PROCEDURE — 74177 CT ABD & PELVIS W/CONTRAST: CPT

## 2023-10-31 PROCEDURE — 6360000004 HC RX CONTRAST MEDICATION: Performed by: RADIOLOGY

## 2023-10-31 PROCEDURE — 83605 ASSAY OF LACTIC ACID: CPT

## 2023-10-31 PROCEDURE — 85025 COMPLETE CBC W/AUTO DIFF WBC: CPT

## 2023-10-31 PROCEDURE — 96374 THER/PROPH/DIAG INJ IV PUSH: CPT

## 2023-10-31 PROCEDURE — 6360000002 HC RX W HCPCS

## 2023-10-31 PROCEDURE — 2580000003 HC RX 258

## 2023-10-31 PROCEDURE — 83735 ASSAY OF MAGNESIUM: CPT

## 2023-10-31 PROCEDURE — 36415 COLL VENOUS BLD VENIPUNCTURE: CPT

## 2023-10-31 PROCEDURE — 80053 COMPREHEN METABOLIC PANEL: CPT

## 2023-10-31 PROCEDURE — 81003 URINALYSIS AUTO W/O SCOPE: CPT

## 2023-10-31 RX ORDER — FENTANYL CITRATE 0.05 MG/ML
50 INJECTION, SOLUTION INTRAMUSCULAR; INTRAVENOUS ONCE
Status: COMPLETED | OUTPATIENT
Start: 2023-10-31 | End: 2023-10-31

## 2023-10-31 RX ORDER — 0.9 % SODIUM CHLORIDE 0.9 %
1000 INTRAVENOUS SOLUTION INTRAVENOUS ONCE
Status: COMPLETED | OUTPATIENT
Start: 2023-10-31 | End: 2023-10-31

## 2023-10-31 RX ORDER — IBUPROFEN 400 MG/1
400 TABLET ORAL ONCE
Status: COMPLETED | OUTPATIENT
Start: 2023-10-31 | End: 2023-10-31

## 2023-10-31 RX ADMIN — IBUPROFEN 400 MG: 400 TABLET ORAL at 16:07

## 2023-10-31 RX ADMIN — IOPAMIDOL 18 ML: 755 INJECTION, SOLUTION INTRAVENOUS at 14:13

## 2023-10-31 RX ADMIN — SODIUM CHLORIDE 1000 ML: 9 INJECTION, SOLUTION INTRAVENOUS at 14:39

## 2023-10-31 RX ADMIN — IOPAMIDOL 75 ML: 755 INJECTION, SOLUTION INTRAVENOUS at 14:05

## 2023-10-31 RX ADMIN — FENTANYL CITRATE 50 MCG: 0.05 INJECTION, SOLUTION INTRAMUSCULAR; INTRAVENOUS at 12:38

## 2023-10-31 RX ADMIN — SODIUM CHLORIDE 1000 ML: 9 INJECTION, SOLUTION INTRAVENOUS at 12:39

## 2023-10-31 ASSESSMENT — PAIN - FUNCTIONAL ASSESSMENT: PAIN_FUNCTIONAL_ASSESSMENT: 0-10

## 2023-10-31 ASSESSMENT — PAIN SCALES - GENERAL
PAINLEVEL_OUTOF10: 9
PAINLEVEL_OUTOF10: 9

## 2023-10-31 ASSESSMENT — PAIN DESCRIPTION - ORIENTATION
ORIENTATION: RIGHT
ORIENTATION: RIGHT;LOWER

## 2023-10-31 ASSESSMENT — PAIN DESCRIPTION - LOCATION
LOCATION: ABDOMEN
LOCATION: ABDOMEN

## 2023-10-31 ASSESSMENT — PAIN DESCRIPTION - DESCRIPTORS: DESCRIPTORS: SHARP

## 2023-10-31 NOTE — ED PROVIDER NOTES
hypoperfusion or ischemia. A CT abdomen with IV contrast was ordered to evaluate for, but without limitation to, ureterolithiasis, nephrolithiasis, constipation, hollow organ perforation, small bowel obstruction, bowel ischemia, pneumoperitoneum, diverticulitis, cholecystitis, appendicitis, perforation. 58-year-old female here with lower abdominal pain worse right lower quadrant associated with possible bloody bowel movements for the past week. Patient has 6 extensive surgical history including but not limited to ileostomy creation and eventual takedown, cholecystectomy, appendectomy, and several others. Reports she has not had xu bloody bowel movements or melena however does report brown stool and reports order appears pink occasionally. Denies vaginal bleeding, hematuria, hematemesis, hematochezia, fever, chills, chest pain, shortness of breath, dizziness, weakness, numbness, tingling in extremities, dysuria, diarrhea, constipation. On initial evaluation, patient is in no acute distress, afebrile, vital signs significant for blood pressure 155/90. All other vital signs within normal limits. On exam, abdomen soft, nondistended, there is lower abdominal palpation right lower quadrant and infraumbilical area and left lower quadrant. Grossly nontender to palpation elsewhere, lungs CTAB, heart sounds normal, no focal deficits, skin warm and dry, neurovascular intact throughout. Presentation concerning for not limited to: Colitis, diverticulosis, enteritis, gastritis, viral syndrome, constipation to name a few. Results remarkable for lactic 2.9, repeat 1.5, random glucose 101, alk phos 115, ALT 44, WBC 15.1, hemoglobin 15.7, ANC 9.06, urine showing dark yellow color, greater than 1.030 specific gravity. Imaging showing no acute process in the abdomen or pelvis and is stable right ventral abdominal wall hernia.   It is possible patient's symptoms are musculoskeletal, neurological, or viral in

## 2023-11-16 ENCOUNTER — OFFICE VISIT (OUTPATIENT)
Dept: SURGERY | Age: 48
End: 2023-11-16
Payer: COMMERCIAL

## 2023-11-16 ENCOUNTER — PREP FOR PROCEDURE (OUTPATIENT)
Dept: SURGERY | Age: 48
End: 2023-11-16

## 2023-11-16 ENCOUNTER — TELEPHONE (OUTPATIENT)
Dept: SURGERY | Age: 48
End: 2023-11-16

## 2023-11-16 VITALS
DIASTOLIC BLOOD PRESSURE: 87 MMHG | WEIGHT: 260 LBS | BODY MASS INDEX: 49.09 KG/M2 | SYSTOLIC BLOOD PRESSURE: 155 MMHG | HEART RATE: 78 BPM | TEMPERATURE: 97.7 F | HEIGHT: 61 IN

## 2023-11-16 DIAGNOSIS — K44.9 HIATAL HERNIA: ICD-10-CM

## 2023-11-16 DIAGNOSIS — K43.2 INCISIONAL HERNIA, WITHOUT OBSTRUCTION OR GANGRENE: ICD-10-CM

## 2023-11-16 DIAGNOSIS — K44.9 HIATAL HERNIA: Primary | ICD-10-CM

## 2023-11-16 PROCEDURE — G8482 FLU IMMUNIZE ORDER/ADMIN: HCPCS | Performed by: SURGERY

## 2023-11-16 PROCEDURE — G8427 DOCREV CUR MEDS BY ELIG CLIN: HCPCS | Performed by: SURGERY

## 2023-11-16 PROCEDURE — 4004F PT TOBACCO SCREEN RCVD TLK: CPT | Performed by: SURGERY

## 2023-11-16 PROCEDURE — 99214 OFFICE O/P EST MOD 30 MIN: CPT | Performed by: SURGERY

## 2023-11-16 PROCEDURE — G8417 CALC BMI ABV UP PARAM F/U: HCPCS | Performed by: SURGERY

## 2023-11-16 RX ORDER — SODIUM CHLORIDE 9 MG/ML
INJECTION, SOLUTION INTRAVENOUS PRN
Status: CANCELLED | OUTPATIENT
Start: 2023-11-16

## 2023-11-16 RX ORDER — SODIUM CHLORIDE 0.9 % (FLUSH) 0.9 %
5-40 SYRINGE (ML) INJECTION EVERY 12 HOURS SCHEDULED
Status: CANCELLED | OUTPATIENT
Start: 2023-11-16

## 2023-11-16 RX ORDER — SODIUM CHLORIDE 0.9 % (FLUSH) 0.9 %
5-40 SYRINGE (ML) INJECTION PRN
Status: CANCELLED | OUTPATIENT
Start: 2023-11-16

## 2023-11-16 RX ORDER — FLUTICASONE PROPIONATE 50 MCG
SPRAY, SUSPENSION (ML) NASAL
COMMUNITY
Start: 2023-11-06

## 2023-11-16 RX ORDER — SODIUM CHLORIDE, SODIUM LACTATE, POTASSIUM CHLORIDE, CALCIUM CHLORIDE 600; 310; 30; 20 MG/100ML; MG/100ML; MG/100ML; MG/100ML
INJECTION, SOLUTION INTRAVENOUS CONTINUOUS
Status: CANCELLED | OUTPATIENT
Start: 2023-11-16

## 2023-11-16 NOTE — TELEPHONE ENCOUNTER
Per the order of Dr. Moreno Singh, patient has been scheduled for Laparoscopic hiatal hernia repair and incisional hernia repair with mesh on 2023. Patient provided with procedure information during office visit and scheduled for post op follow up appointment. Patient instructed to please contact our office with any questions. Patient provided with dietary information for after surgery. Procedure scheduled through [facility]. Dr. Moreno Singh to enter orders. Prior Authorization Form:      DEMOGRAPHICS:                     Patient Name:  Aron Corona  Patient :  1975            Insurance:  Payor: [address] [place] [address] / Plan: [address] [place] [address] / Product Type: *No Product type* /   Insurance ID Number:    Payer/Plan Subscr  Sex Relation Sub.  Ins. ID Effective Group Num   1. JAROD CELESTINZACKERY* PAVEL MARISCAL 1975 Female Self [id number] 20                                    [address]         DIAGNOSIS & PROCEDURE:                       Procedure/Operation: Laparoscopic hiatal hernia repair with mesh and laparoscopic incisional hernia repair with mesh           CPT Code: 25568, 06271    Diagnosis:  Hiatal Hernia, Incisional hernia    ICD10 Code: K44.9, K43.2    Location:  [address] [place] [address]    Surgeon:  Prabhjot Timmons INFORMATION:                          Date: 2023    Time: TBD              Anesthesia:  General                                                       Status:  Outpatient        Special Comments:         Electronically signed by Fernandez Rocha on 2023 at 11:09 AM

## 2023-11-16 NOTE — PROGRESS NOTES
[facility]     Social Determinants of Health     Financial Resource Strain: Low Risk  (6/30/2023)    Overall Financial Resource Strain (CARDIA)     Difficulty of Paying Living Expenses: Not very hard   Food Insecurity: No Food Insecurity (6/30/2023)    Hunger Vital Sign     Worried About Running Out of Food in the Last Year: Never true     Ran Out of Food in the Last Year: Never true   Transportation Needs: Unknown (6/30/2023)    PRAPARE - Transportation     Lack of Transportation (Medical): Not on file     Lack of Transportation (Non-Medical): No   Physical Activity: Sufficiently Active (5/4/2021)    Exercise Vital Sign     Days of Exercise per Week: 3 days     Minutes of Exercise per Session: 60 min   Stress: No Stress Concern Present (5/4/2021)    [facility]     Feeling of Stress : Not at all   Social Connections: Socially Isolated (5/4/2021)    Social Connection and Isolation Panel [NHANES]     Frequency of Communication with Friends and Family:  Three times a week     Frequency of Social Gatherings with Friends and Family: Twice a week     Attends [employer] Services: Never     Active Member of Clubs or Organizations: No     Attends Club or Organization Meetings: Never     Marital Status: Never    Intimate Partner Violence: Not on file   Housing Stability: Unknown (6/30/2023)    Housing Stability Vital Sign     Unable to Pay for Housing in the Last Year: Not on file     Number of [address] in the Last Year: Not on file     Unstable Housing in the Last Year: No           Review of Systems  Review of Systems -  General ROS: negative for - chills, fatigue or malaise  ENT ROS: negative for - hearing change, nasal congestion or nasal discharge  Allergy and Immunology ROS: negative for - hives, itchy/watery eyes or nasal congestion  Hematological and Lymphatic ROS: negative for - blood clots, blood transfusions, bruising or fatigue  Endocrine ROS:

## 2023-11-17 ENCOUNTER — PREP FOR PROCEDURE (OUTPATIENT)
Dept: PAIN MANAGEMENT | Age: 48
End: 2023-11-17

## 2023-11-17 ENCOUNTER — OFFICE VISIT (OUTPATIENT)
Dept: PAIN MANAGEMENT | Age: 48
End: 2023-11-17
Payer: COMMERCIAL

## 2023-11-17 VITALS
TEMPERATURE: 97.6 F | HEART RATE: 78 BPM | OXYGEN SATURATION: 97 % | SYSTOLIC BLOOD PRESSURE: 130 MMHG | RESPIRATION RATE: 18 BRPM | DIASTOLIC BLOOD PRESSURE: 84 MMHG | WEIGHT: 260 LBS | BODY MASS INDEX: 49.09 KG/M2 | HEIGHT: 61 IN

## 2023-11-17 DIAGNOSIS — M47.817 LUMBOSACRAL SPONDYLOSIS WITHOUT MYELOPATHY: ICD-10-CM

## 2023-11-17 DIAGNOSIS — E66.9 OBESITY, UNSPECIFIED CLASSIFICATION, UNSPECIFIED OBESITY TYPE, UNSPECIFIED WHETHER SERIOUS COMORBIDITY PRESENT: ICD-10-CM

## 2023-11-17 DIAGNOSIS — F17.200 SMOKING: ICD-10-CM

## 2023-11-17 DIAGNOSIS — M53.3 SACROILIAC DYSFUNCTION: Primary | ICD-10-CM

## 2023-11-17 DIAGNOSIS — Z98.890 S/P DISCECTOMY: ICD-10-CM

## 2023-11-17 DIAGNOSIS — G57.10 MERALGIA PARESTHETICA, UNSPECIFIED LATERALITY: ICD-10-CM

## 2023-11-17 DIAGNOSIS — M51.36 DDD (DEGENERATIVE DISC DISEASE), LUMBAR: ICD-10-CM

## 2023-11-17 DIAGNOSIS — G35 MULTIPLE SCLEROSIS (HCC): ICD-10-CM

## 2023-11-17 DIAGNOSIS — M54.16 LUMBAR RADICULAR PAIN: ICD-10-CM

## 2023-11-17 PROCEDURE — 99204 OFFICE O/P NEW MOD 45 MIN: CPT | Performed by: ANESTHESIOLOGY

## 2023-11-17 RX ORDER — SODIUM CHLORIDE 9 MG/ML
INJECTION, SOLUTION INTRAVENOUS PRN
Status: CANCELLED | OUTPATIENT
Start: 2023-11-17

## 2023-11-17 RX ORDER — SODIUM CHLORIDE 0.9 % (FLUSH) 0.9 %
5-40 SYRINGE (ML) INJECTION EVERY 12 HOURS SCHEDULED
Status: CANCELLED | OUTPATIENT
Start: 2023-11-17

## 2023-11-17 RX ORDER — SODIUM CHLORIDE 0.9 % (FLUSH) 0.9 %
5-40 SYRINGE (ML) INJECTION PRN
Status: CANCELLED | OUTPATIENT
Start: 2023-11-17

## 2023-11-22 ENCOUNTER — HOSPITAL ENCOUNTER (OUTPATIENT)
Dept: PREADMISSION TESTING | Age: 48
Discharge: HOME | End: 2023-11-22
Payer: COMMERCIAL

## 2023-11-22 ENCOUNTER — PATIENT MESSAGE (OUTPATIENT)
Dept: PAIN MANAGEMENT | Age: 48
End: 2023-11-22

## 2023-11-22 VITALS
TEMPERATURE: 97.1 F | DIASTOLIC BLOOD PRESSURE: 89 MMHG | OXYGEN SATURATION: 98 % | SYSTOLIC BLOOD PRESSURE: 152 MMHG | HEIGHT: 60 IN | HEART RATE: 73 BPM | BODY MASS INDEX: 52.03 KG/M2 | WEIGHT: 265 LBS | RESPIRATION RATE: 16 BRPM

## 2023-11-22 DIAGNOSIS — Z01.818 PRE-OP TESTING: Primary | ICD-10-CM

## 2023-11-22 LAB
ANION GAP SERPL CALCULATED.3IONS-SCNC: 9 MMOL/L (ref 7–16)
BASOPHILS # BLD: 0.05 K/UL (ref 0–0.2)
BASOPHILS NFR BLD: 1 % (ref 0–2)
BUN SERPL-MCNC: 8 MG/DL (ref 6–20)
CALCIUM SERPL-MCNC: 9.3 MG/DL (ref 8.6–10.2)
CHLORIDE SERPL-SCNC: 105 MMOL/L (ref 98–107)
CO2 SERPL-SCNC: 29 MMOL/L (ref 22–29)
CREAT SERPL-MCNC: 0.7 MG/DL (ref 0.5–1)
EOSINOPHIL # BLD: 0.26 K/UL (ref 0.05–0.5)
EOSINOPHILS RELATIVE PERCENT: 3 % (ref 0–6)
ERYTHROCYTE [DISTWIDTH] IN BLOOD BY AUTOMATED COUNT: 13.2 % (ref 11.5–15)
GFR SERPL CREATININE-BSD FRML MDRD: >60 ML/MIN/1.73M2
GLUCOSE SERPL-MCNC: 107 MG/DL (ref 74–99)
HCT VFR BLD AUTO: 44.3 % (ref 34–48)
HGB BLD-MCNC: 14.8 G/DL (ref 11.5–15.5)
IMM GRANULOCYTES # BLD AUTO: 0.12 K/UL (ref 0–0.58)
IMM GRANULOCYTES NFR BLD: 1 % (ref 0–5)
LYMPHOCYTES NFR BLD: 2.62 K/UL (ref 1.5–4)
LYMPHOCYTES RELATIVE PERCENT: 28 % (ref 20–42)
MCH RBC QN AUTO: 30.9 PG (ref 26–35)
MCHC RBC AUTO-ENTMCNC: 33.4 G/DL (ref 32–34.5)
MCV RBC AUTO: 92.5 FL (ref 80–99.9)
MONOCYTES NFR BLD: 0.72 K/UL (ref 0.1–0.95)
MONOCYTES NFR BLD: 8 % (ref 2–12)
NEUTROPHILS NFR BLD: 60 % (ref 43–80)
NEUTS SEG NFR BLD: 5.65 K/UL (ref 1.8–7.3)
PLATELET # BLD AUTO: 297 K/UL (ref 130–450)
PMV BLD AUTO: 9.3 FL (ref 7–12)
POTASSIUM SERPL-SCNC: 4.5 MMOL/L (ref 3.5–5)
RBC # BLD AUTO: 4.79 M/UL (ref 3.5–5.5)
SODIUM SERPL-SCNC: 143 MMOL/L (ref 132–146)
WBC OTHER # BLD: 9.4 K/UL (ref 4.5–11.5)

## 2023-11-22 PROCEDURE — 80048 BASIC METABOLIC PNL TOTAL CA: CPT

## 2023-11-22 PROCEDURE — 85025 COMPLETE CBC W/AUTO DIFF WBC: CPT

## 2023-11-22 RX ORDER — NABUMETONE 750 MG/1
750 TABLET, FILM COATED ORAL 2 TIMES DAILY PRN
Qty: 30 TABLET | Refills: 1 | Status: SHIPPED | OUTPATIENT
Start: 2023-11-22

## 2023-11-22 ASSESSMENT — PAIN DESCRIPTION - ORIENTATION: ORIENTATION: LOWER

## 2023-11-22 ASSESSMENT — PAIN DESCRIPTION - DESCRIPTORS: DESCRIPTORS: SHARP

## 2023-11-22 ASSESSMENT — PAIN SCALES - GENERAL: PAINLEVEL_OUTOF10: 8

## 2023-11-22 ASSESSMENT — PAIN DESCRIPTION - LOCATION: LOCATION: BACK

## 2023-11-22 ASSESSMENT — PAIN DESCRIPTION - PAIN TYPE: TYPE: CHRONIC PAIN

## 2023-11-22 NOTE — PROGRESS NOTES
[address]                                                                                                                    PRE OP INSTRUCTIONS FOR  Jordon Samson        Date: 11/22/2023    Date of surgery: 11/28/23       Arrival Time: Hospital will call you between 5pm and 7pm with your final arrival time for surgery    Nothing by mouth (NPO) as instructed.______after midnight______    Take the following medications with a small sip of water on the morning of Surgery: inhaler, gabapentin, ropinirole, flonase, baclofen, dicyclomine, famotidine, pantoprazole     Diabetics may take evening dose of insulin but none after midnight. If you feel symptomatic or low blood sugar morning of surgery drink 1-2 ounces of apple juice only. Aspirin, Ibuprofen, Advil, Naproxen, Vitamin E and other Anti-inflammatory products should be stopped  before surgery  as directed by your physician. Take Tylenol only unless instructed otherwise by your surgeon. Check with your Doctor regarding stopping Plavix, Coumadin, Lovenox, Eliquis, Effient, or other blood thinners. Do not smoke,use illicit drugs and do not drink any alcoholic beverages 24 hours prior to surgery. You may brush your teeth the morning of surgery. DO NOT SWALLOW WATER    You MUST make arrangements for a responsible adult to take you home after your surgery. You will not be allowed to leave alone or drive yourself home. It is strongly suggested someone stay with you the first 24 hrs. Your surgery will be cancelled if you do not have a ride home. PEDIATRIC PATIENTS ONLY:  A parent/legal guardian must accompany a child scheduled for surgery and plan to stay at the hospital until the child is discharged. Please do not bring other children with you.     Please wear simple, loose fitting clothing to the hospital.  [place] not bring valuables (money, credit cards, checkbooks, etc.) Do not wear any makeup (including no eye makeup) or

## 2023-11-27 ENCOUNTER — ANESTHESIA EVENT (OUTPATIENT)
Dept: OPERATING ROOM | Age: 48
End: 2023-11-27
Payer: COMMERCIAL

## 2023-11-27 ASSESSMENT — COPD QUESTIONNAIRES: CAT_SEVERITY: MILD

## 2023-11-27 NOTE — ANESTHESIA PRE PROCEDURE
hypertension:, hyperlipidemia      ECG reviewed  Rhythm: regular  Rate: normal                 ROS comment: EKG: Normal sinus rhythm  Possible Left atrial enlargement  Borderline ECG  When compared with ECG of 16-MAR-2023 20:30,  No significant change was found  Confirmed by Nigel Cedillo ([id number]) on 8/2/2023 1:46:45 PM     Neuro/Psych:   (+) neuromuscular disease: multiple sclerosis, headaches:, psychiatric history:depression/anxiety              ROS comment: TMJ, left localization GI/Hepatic/Renal:   (+) hiatal hernia, GERD ( Clement's Esophageous ):, liver disease:, morbid obesity ( Super morbid obesity BMI: 51.75)         ROS comment: Palafox's esophagus with dysplasia. Endo/Other:    (+) : arthritis: OA., malignancy/cancer ( Malignant neoplasm of descending colon  ). Abdominal:   (+) obese (  Super morbid obesity BMI: 51.75),           Vascular:   + DVT (  left external iliac vein and left common femoral vein. 3/22), .        ROS comment: US DUPLEX LLE 3/22/22  Positive examination for nonocclusive deep vein thrombosis involving left external iliac vein and left common femoral vein. . Other Findings:             Anesthesia Plan      general     ASA 3     (Pt reported to have Grade I View capable of being obtained via Direct Laryngoscopy and Video Laryngoscopy with and without Succinylcholine  Pre-oxygenate, Reverse trendelenberg postioning.)  Induction: intravenous. MIPS: Prophylactic antiemetics administered. Anesthetic plan and risks discussed with patient. Plan discussed with CRNA. Pt seen and examined, chart reviewed (including anesthesia, drug and allergy history). No interval changes to history and physical examination. (except as noted above). Anesthetic plan, risks, benefits, alternatives, and personnel involved discussed with patient. Patient verbalized an understanding and agrees to proceed.   Plan discussed with care team members and agreed

## 2023-11-28 ENCOUNTER — ANESTHESIA (OUTPATIENT)
Dept: OPERATING ROOM | Age: 48
End: 2023-11-28
Payer: COMMERCIAL

## 2023-11-28 ENCOUNTER — HOSPITAL ENCOUNTER (OUTPATIENT)
Age: 48
Setting detail: OUTPATIENT SURGERY
Discharge: HOME | End: 2023-11-28
Attending: SURGERY | Admitting: SURGERY
Payer: COMMERCIAL

## 2023-11-28 VITALS
TEMPERATURE: 96.9 F | DIASTOLIC BLOOD PRESSURE: 69 MMHG | HEART RATE: 86 BPM | RESPIRATION RATE: 20 BRPM | OXYGEN SATURATION: 95 % | SYSTOLIC BLOOD PRESSURE: 132 MMHG

## 2023-11-28 DIAGNOSIS — K43.2 INCISIONAL HERNIA, WITHOUT OBSTRUCTION OR GANGRENE: Primary | ICD-10-CM

## 2023-11-28 PROCEDURE — 3700000001 HC ADD 15 MINUTES (ANESTHESIA): Performed by: SURGERY

## 2023-11-28 PROCEDURE — 6360000002 HC RX W HCPCS: Performed by: SURGERY

## 2023-11-28 PROCEDURE — 6370000000 HC RX 637 (ALT 250 FOR IP): Performed by: ANESTHESIOLOGY

## 2023-11-28 PROCEDURE — 49616 RPR AA HRN RCR 3-10 NCR/STRN: CPT | Performed by: SURGERY

## 2023-11-28 PROCEDURE — 2580000003 HC RX 258: Performed by: SURGERY

## 2023-11-28 PROCEDURE — 3600000014 HC SURGERY LEVEL 4 ADDTL 15MIN: Performed by: SURGERY

## 2023-11-28 PROCEDURE — 3600000004 HC SURGERY LEVEL 4 BASE: Performed by: SURGERY

## 2023-11-28 PROCEDURE — 43282 LAP PARAESOPH HER RPR W/MESH: CPT | Performed by: SURGERY

## 2023-11-28 PROCEDURE — 6360000002 HC RX W HCPCS

## 2023-11-28 PROCEDURE — 7100000001 HC PACU RECOVERY - ADDTL 15 MIN: Performed by: SURGERY

## 2023-11-28 PROCEDURE — 2500000003 HC RX 250 WO HCPCS: Performed by: SURGERY

## 2023-11-28 PROCEDURE — 7100000011 HC PHASE II RECOVERY - ADDTL 15 MIN: Performed by: SURGERY

## 2023-11-28 PROCEDURE — 7100000000 HC PACU RECOVERY - FIRST 15 MIN: Performed by: SURGERY

## 2023-11-28 PROCEDURE — 99024 POSTOP FOLLOW-UP VISIT: CPT | Performed by: SURGERY

## 2023-11-28 PROCEDURE — 7100000010 HC PHASE II RECOVERY - FIRST 15 MIN: Performed by: SURGERY

## 2023-11-28 PROCEDURE — 2580000003 HC RX 258: Performed by: NURSE ANESTHETIST, CERTIFIED REGISTERED

## 2023-11-28 PROCEDURE — 2720000010 HC SURG SUPPLY STERILE: Performed by: SURGERY

## 2023-11-28 PROCEDURE — 3700000000 HC ANESTHESIA ATTENDED CARE: Performed by: SURGERY

## 2023-11-28 PROCEDURE — C1781 MESH (IMPLANTABLE): HCPCS | Performed by: SURGERY

## 2023-11-28 PROCEDURE — 2709999900 HC NON-CHARGEABLE SUPPLY: Performed by: SURGERY

## 2023-11-28 PROCEDURE — 6360000002 HC RX W HCPCS: Performed by: ANESTHESIOLOGY

## 2023-11-28 PROCEDURE — 2500000003 HC RX 250 WO HCPCS

## 2023-11-28 PROCEDURE — 49659 UNLSTD LAPS PX HRNAP HRNRPHY: CPT | Performed by: SURGERY

## 2023-11-28 DEVICE — BARD VENTRALEX HERNIA PATCH, 6.4 CM (2.5"), MEDIUM CIRCLE WITH STRAP
Type: IMPLANTABLE DEVICE | Site: ABDOMEN | Status: FUNCTIONAL
Brand: VENTRALEX

## 2023-11-28 DEVICE — PHASIX ST MESH, 7 CM X 10 CM (3" X 4"), RECTANGLE
Type: IMPLANTABLE DEVICE | Site: ABDOMEN | Status: FUNCTIONAL
Brand: PHASIX

## 2023-11-28 RX ORDER — SODIUM CHLORIDE, SODIUM LACTATE, POTASSIUM CHLORIDE, CALCIUM CHLORIDE 600; 310; 30; 20 MG/100ML; MG/100ML; MG/100ML; MG/100ML
INJECTION, SOLUTION INTRAVENOUS CONTINUOUS
Status: DISCONTINUED | OUTPATIENT
Start: 2023-11-28 | End: 2023-11-28 | Stop reason: HOSPADM

## 2023-11-28 RX ORDER — PROPOFOL 10 MG/ML
INJECTION, EMULSION INTRAVENOUS PRN
Status: DISCONTINUED | OUTPATIENT
Start: 2023-11-28 | End: 2023-11-28 | Stop reason: SDUPTHER

## 2023-11-28 RX ORDER — MEPERIDINE HYDROCHLORIDE 25 MG/ML
12.5 INJECTION INTRAMUSCULAR; INTRAVENOUS; SUBCUTANEOUS EVERY 5 MIN PRN
Status: DISCONTINUED | OUTPATIENT
Start: 2023-11-28 | End: 2023-11-28 | Stop reason: HOSPADM

## 2023-11-28 RX ORDER — ROCURONIUM BROMIDE 10 MG/ML
INJECTION, SOLUTION INTRAVENOUS PRN
Status: DISCONTINUED | OUTPATIENT
Start: 2023-11-28 | End: 2023-11-28 | Stop reason: SDUPTHER

## 2023-11-28 RX ORDER — SODIUM CHLORIDE 0.9 % (FLUSH) 0.9 %
5-40 SYRINGE (ML) INJECTION PRN
Status: DISCONTINUED | OUTPATIENT
Start: 2023-11-28 | End: 2023-11-28 | Stop reason: HOSPADM

## 2023-11-28 RX ORDER — METHOCARBAMOL 100 MG/ML
INJECTION, SOLUTION INTRAMUSCULAR; INTRAVENOUS
Status: COMPLETED
Start: 2023-11-28 | End: 2023-11-28

## 2023-11-28 RX ORDER — SODIUM CHLORIDE 0.9 % (FLUSH) 0.9 %
5-40 SYRINGE (ML) INJECTION EVERY 12 HOURS SCHEDULED
Status: DISCONTINUED | OUTPATIENT
Start: 2023-11-28 | End: 2023-11-28 | Stop reason: HOSPADM

## 2023-11-28 RX ORDER — DEXAMETHASONE SODIUM PHOSPHATE 10 MG/ML
INJECTION, SOLUTION INTRAMUSCULAR; INTRAVENOUS PRN
Status: DISCONTINUED | OUTPATIENT
Start: 2023-11-28 | End: 2023-11-28 | Stop reason: SDUPTHER

## 2023-11-28 RX ORDER — METHOCARBAMOL 100 MG/ML
1000 INJECTION, SOLUTION INTRAMUSCULAR; INTRAVENOUS ONCE
Status: COMPLETED | OUTPATIENT
Start: 2023-11-28 | End: 2023-11-28

## 2023-11-28 RX ORDER — ONDANSETRON 2 MG/ML
INJECTION INTRAMUSCULAR; INTRAVENOUS PRN
Status: DISCONTINUED | OUTPATIENT
Start: 2023-11-28 | End: 2023-11-28 | Stop reason: SDUPTHER

## 2023-11-28 RX ORDER — IBUPROFEN 800 MG/1
800 TABLET ORAL EVERY 6 HOURS PRN
Qty: 20 TABLET | Refills: 0 | Status: SHIPPED | OUTPATIENT
Start: 2023-11-28

## 2023-11-28 RX ORDER — MIDAZOLAM HYDROCHLORIDE 1 MG/ML
INJECTION INTRAMUSCULAR; INTRAVENOUS PRN
Status: DISCONTINUED | OUTPATIENT
Start: 2023-11-28 | End: 2023-11-28 | Stop reason: SDUPTHER

## 2023-11-28 RX ORDER — SODIUM CHLORIDE 9 MG/ML
INJECTION, SOLUTION INTRAVENOUS PRN
Status: DISCONTINUED | OUTPATIENT
Start: 2023-11-28 | End: 2023-11-28 | Stop reason: SDUPTHER

## 2023-11-28 RX ORDER — ESMOLOL HYDROCHLORIDE 10 MG/ML
INJECTION INTRAVENOUS PRN
Status: DISCONTINUED | OUTPATIENT
Start: 2023-11-28 | End: 2023-11-28 | Stop reason: SDUPTHER

## 2023-11-28 RX ORDER — LABETALOL HYDROCHLORIDE 5 MG/ML
10 INJECTION, SOLUTION INTRAVENOUS
Status: DISCONTINUED | OUTPATIENT
Start: 2023-11-28 | End: 2023-11-28 | Stop reason: HOSPADM

## 2023-11-28 RX ORDER — HYDRALAZINE HYDROCHLORIDE 20 MG/ML
10 INJECTION INTRAMUSCULAR; INTRAVENOUS
Status: DISCONTINUED | OUTPATIENT
Start: 2023-11-28 | End: 2023-11-28 | Stop reason: HOSPADM

## 2023-11-28 RX ORDER — FENTANYL CITRATE 50 UG/ML
50 INJECTION, SOLUTION INTRAMUSCULAR; INTRAVENOUS EVERY 10 MIN PRN
Status: COMPLETED | OUTPATIENT
Start: 2023-11-28 | End: 2023-11-28

## 2023-11-28 RX ORDER — METHOCARBAMOL 500 MG/1
500 TABLET, FILM COATED ORAL 4 TIMES DAILY
Qty: 30 TABLET | Refills: 0 | Status: SHIPPED | OUTPATIENT
Start: 2023-11-28 | End: 2023-12-08

## 2023-11-28 RX ORDER — HYDROMORPHONE HYDROCHLORIDE 1 MG/ML
0.5 INJECTION, SOLUTION INTRAMUSCULAR; INTRAVENOUS; SUBCUTANEOUS EVERY 5 MIN PRN
Status: COMPLETED | OUTPATIENT
Start: 2023-11-28 | End: 2023-11-28

## 2023-11-28 RX ORDER — OXYCODONE HYDROCHLORIDE AND ACETAMINOPHEN 5; 325 MG/1; MG/1
1 TABLET ORAL ONCE
Status: COMPLETED | OUTPATIENT
Start: 2023-11-28 | End: 2023-11-28

## 2023-11-28 RX ORDER — DIPHENHYDRAMINE HYDROCHLORIDE 50 MG/ML
12.5 INJECTION INTRAMUSCULAR; INTRAVENOUS
Status: DISCONTINUED | OUTPATIENT
Start: 2023-11-28 | End: 2023-11-28 | Stop reason: HOSPADM

## 2023-11-28 RX ORDER — PROCHLORPERAZINE EDISYLATE 5 MG/ML
5 INJECTION INTRAMUSCULAR; INTRAVENOUS
Status: DISCONTINUED | OUTPATIENT
Start: 2023-11-28 | End: 2023-11-28 | Stop reason: HOSPADM

## 2023-11-28 RX ORDER — SODIUM CHLORIDE 9 MG/ML
INJECTION, SOLUTION INTRAVENOUS PRN
Status: DISCONTINUED | OUTPATIENT
Start: 2023-11-28 | End: 2023-11-28 | Stop reason: HOSPADM

## 2023-11-28 RX ORDER — IPRATROPIUM BROMIDE AND ALBUTEROL SULFATE 2.5; .5 MG/3ML; MG/3ML
1 SOLUTION RESPIRATORY (INHALATION)
Status: COMPLETED | OUTPATIENT
Start: 2023-11-28 | End: 2023-11-28

## 2023-11-28 RX ORDER — KETOROLAC TROMETHAMINE 30 MG/ML
30 INJECTION, SOLUTION INTRAMUSCULAR; INTRAVENOUS ONCE
Status: DISCONTINUED | OUTPATIENT
Start: 2023-11-28 | End: 2023-11-28 | Stop reason: HOSPADM

## 2023-11-28 RX ORDER — FENTANYL CITRATE 50 UG/ML
INJECTION, SOLUTION INTRAMUSCULAR; INTRAVENOUS PRN
Status: DISCONTINUED | OUTPATIENT
Start: 2023-11-28 | End: 2023-11-28 | Stop reason: SDUPTHER

## 2023-11-28 RX ORDER — LIDOCAINE HYDROCHLORIDE AND EPINEPHRINE 10; 10 MG/ML; UG/ML
INJECTION, SOLUTION INFILTRATION; PERINEURAL PRN
Status: DISCONTINUED | OUTPATIENT
Start: 2023-11-28 | End: 2023-11-28 | Stop reason: ALTCHOICE

## 2023-11-28 RX ORDER — SODIUM CHLORIDE, SODIUM LACTATE, POTASSIUM CHLORIDE, CALCIUM CHLORIDE 600; 310; 30; 20 MG/100ML; MG/100ML; MG/100ML; MG/100ML
INJECTION, SOLUTION INTRAVENOUS CONTINUOUS PRN
Status: DISCONTINUED | OUTPATIENT
Start: 2023-11-28 | End: 2023-11-28 | Stop reason: SDUPTHER

## 2023-11-28 RX ORDER — OXYCODONE HYDROCHLORIDE AND ACETAMINOPHEN 5; 325 MG/1; MG/1
1 TABLET ORAL EVERY 6 HOURS PRN
Qty: 20 TABLET | Refills: 0 | Status: SHIPPED | OUTPATIENT
Start: 2023-11-28 | End: 2023-12-05

## 2023-11-28 RX ORDER — KETOROLAC TROMETHAMINE 30 MG/ML
INJECTION, SOLUTION INTRAMUSCULAR; INTRAVENOUS PRN
Status: DISCONTINUED | OUTPATIENT
Start: 2023-11-28 | End: 2023-11-28 | Stop reason: SDUPTHER

## 2023-11-28 RX ORDER — HALOPERIDOL 5 MG/ML
1 INJECTION INTRAMUSCULAR
Status: DISCONTINUED | OUTPATIENT
Start: 2023-11-28 | End: 2023-11-28 | Stop reason: HOSPADM

## 2023-11-28 RX ORDER — FENTANYL CITRATE 50 UG/ML
INJECTION, SOLUTION INTRAMUSCULAR; INTRAVENOUS
Status: COMPLETED
Start: 2023-11-28 | End: 2023-11-28

## 2023-11-28 RX ORDER — HYDROMORPHONE HYDROCHLORIDE 1 MG/ML
0.25 INJECTION, SOLUTION INTRAMUSCULAR; INTRAVENOUS; SUBCUTANEOUS EVERY 5 MIN PRN
Status: DISCONTINUED | OUTPATIENT
Start: 2023-11-28 | End: 2023-11-28 | Stop reason: HOSPADM

## 2023-11-28 RX ORDER — LIDOCAINE HYDROCHLORIDE 20 MG/ML
INJECTION, SOLUTION INTRAVENOUS PRN
Status: DISCONTINUED | OUTPATIENT
Start: 2023-11-28 | End: 2023-11-28 | Stop reason: SDUPTHER

## 2023-11-28 RX ADMIN — ONDANSETRON 4 MG: 2 INJECTION INTRAMUSCULAR; INTRAVENOUS at 08:09

## 2023-11-28 RX ADMIN — KETOROLAC TROMETHAMINE 30 MG: 30 INJECTION, SOLUTION INTRAMUSCULAR at 08:28

## 2023-11-28 RX ADMIN — FENTANYL CITRATE 50 MCG: 50 INJECTION, SOLUTION INTRAMUSCULAR; INTRAVENOUS at 08:01

## 2023-11-28 RX ADMIN — FENTANYL CITRATE 50 MCG: 50 INJECTION INTRAMUSCULAR; INTRAVENOUS at 09:43

## 2023-11-28 RX ADMIN — FENTANYL CITRATE 50 MCG: 50 INJECTION INTRAMUSCULAR; INTRAVENOUS at 09:33

## 2023-11-28 RX ADMIN — ROCURONIUM BROMIDE 40 MG: 10 INJECTION, SOLUTION INTRAVENOUS at 07:41

## 2023-11-28 RX ADMIN — ROCURONIUM BROMIDE 10 MG: 10 INJECTION, SOLUTION INTRAVENOUS at 08:09

## 2023-11-28 RX ADMIN — OXYCODONE AND ACETAMINOPHEN 1 TABLET: 5; 325 TABLET ORAL at 11:39

## 2023-11-28 RX ADMIN — ESMOLOL HYDROCHLORIDE 20 MG: 10 INJECTION, SOLUTION INTRAVENOUS at 08:56

## 2023-11-28 RX ADMIN — SODIUM CHLORIDE, POTASSIUM CHLORIDE, SODIUM LACTATE AND CALCIUM CHLORIDE: 600; 310; 30; 20 INJECTION, SOLUTION INTRAVENOUS at 08:21

## 2023-11-28 RX ADMIN — HYDROMORPHONE HYDROCHLORIDE 0.5 MG: 1 INJECTION, SOLUTION INTRAMUSCULAR; INTRAVENOUS; SUBCUTANEOUS at 09:08

## 2023-11-28 RX ADMIN — LIDOCAINE HYDROCHLORIDE 100 MG: 20 INJECTION, SOLUTION INTRAVENOUS at 07:41

## 2023-11-28 RX ADMIN — FENTANYL CITRATE 100 MCG: 50 INJECTION, SOLUTION INTRAMUSCULAR; INTRAVENOUS at 07:41

## 2023-11-28 RX ADMIN — METHOCARBAMOL 1000 MG: 100 INJECTION INTRAMUSCULAR; INTRAVENOUS at 09:24

## 2023-11-28 RX ADMIN — PROPOFOL 150 MG: 10 INJECTION, EMULSION INTRAVENOUS at 07:41

## 2023-11-28 RX ADMIN — FENTANYL CITRATE 50 MCG: 50 INJECTION, SOLUTION INTRAMUSCULAR; INTRAVENOUS at 08:52

## 2023-11-28 RX ADMIN — SODIUM CHLORIDE 3000 MG: 9 INJECTION, SOLUTION INTRAVENOUS at 07:49

## 2023-11-28 RX ADMIN — SUGAMMADEX 300 MG: 100 INJECTION, SOLUTION INTRAVENOUS at 08:43

## 2023-11-28 RX ADMIN — SODIUM CHLORIDE, POTASSIUM CHLORIDE, SODIUM LACTATE AND CALCIUM CHLORIDE: 600; 310; 30; 20 INJECTION, SOLUTION INTRAVENOUS at 06:21

## 2023-11-28 RX ADMIN — ESMOLOL HYDROCHLORIDE 50 MG: 10 INJECTION, SOLUTION INTRAVENOUS at 08:03

## 2023-11-28 RX ADMIN — DEXAMETHASONE SODIUM PHOSPHATE 10 MG: 10 INJECTION, SOLUTION INTRAMUSCULAR; INTRAVENOUS at 07:46

## 2023-11-28 RX ADMIN — SODIUM CHLORIDE, POTASSIUM CHLORIDE, SODIUM LACTATE AND CALCIUM CHLORIDE: 600; 310; 30; 20 INJECTION, SOLUTION INTRAVENOUS at 07:34

## 2023-11-28 RX ADMIN — FENTANYL CITRATE 50 MCG: 50 INJECTION, SOLUTION INTRAMUSCULAR; INTRAVENOUS at 07:52

## 2023-11-28 RX ADMIN — MIDAZOLAM 2 MG: 1 INJECTION INTRAMUSCULAR; INTRAVENOUS at 07:37

## 2023-11-28 RX ADMIN — IPRATROPIUM BROMIDE AND ALBUTEROL SULFATE 1 DOSE: 2.5; .5 SOLUTION RESPIRATORY (INHALATION) at 09:08

## 2023-11-28 RX ADMIN — METHOCARBAMOL 1000 MG: 100 INJECTION, SOLUTION INTRAMUSCULAR; INTRAVENOUS at 09:24

## 2023-11-28 RX ADMIN — MEPERIDINE HYDROCHLORIDE 12.5 MG: 25 INJECTION INTRAMUSCULAR; INTRAVENOUS; SUBCUTANEOUS at 09:25

## 2023-11-28 RX ADMIN — HYDROMORPHONE HYDROCHLORIDE 0.5 MG: 1 INJECTION, SOLUTION INTRAMUSCULAR; INTRAVENOUS; SUBCUTANEOUS at 09:15

## 2023-11-28 ASSESSMENT — PAIN DESCRIPTION - DESCRIPTORS
DESCRIPTORS: SORE;SHARP
DESCRIPTORS: SORE
DESCRIPTORS: SORE;SHARP
DESCRIPTORS: SORE;SHARP
DESCRIPTORS: SORE
DESCRIPTORS: SORE;SHARP
DESCRIPTORS: SHARP
DESCRIPTORS: SHARP

## 2023-11-28 ASSESSMENT — PAIN DESCRIPTION - LOCATION
LOCATION: ABDOMEN

## 2023-11-28 ASSESSMENT — PAIN SCALES - GENERAL
PAINLEVEL_OUTOF10: 5
PAINLEVEL_OUTOF10: 10
PAINLEVEL_OUTOF10: 8
PAINLEVEL_OUTOF10: 10
PAINLEVEL_OUTOF10: 8
PAINLEVEL_OUTOF10: 10
PAINLEVEL_OUTOF10: 4
PAINLEVEL_OUTOF10: 9
PAINLEVEL_OUTOF10: 8
PAINLEVEL_OUTOF10: 8

## 2023-11-28 ASSESSMENT — PAIN DESCRIPTION - PAIN TYPE
TYPE: SURGICAL PAIN

## 2023-11-28 ASSESSMENT — PAIN DESCRIPTION - ONSET
ONSET: ON-GOING

## 2023-11-28 ASSESSMENT — PAIN DESCRIPTION - FREQUENCY
FREQUENCY: CONTINUOUS

## 2023-11-28 ASSESSMENT — PAIN - FUNCTIONAL ASSESSMENT
PAIN_FUNCTIONAL_ASSESSMENT: ACTIVITIES ARE NOT PREVENTED
PAIN_FUNCTIONAL_ASSESSMENT: NONE - DENIES PAIN
PAIN_FUNCTIONAL_ASSESSMENT: ACTIVITIES ARE NOT PREVENTED

## 2023-11-28 ASSESSMENT — PAIN DESCRIPTION - ORIENTATION
ORIENTATION: MID

## 2023-11-28 ASSESSMENT — LIFESTYLE VARIABLES: SMOKING_STATUS: 1

## 2023-11-28 NOTE — H&P
General Surgery History and Physical     Patient's Name/Date of Birth: Florencio Oliver / 1975     Date: November 28, 2023      Surgeon: Leveda Bence M.D.     PCP: Leigha Barraza PA-C     Chief Complaint: hiatal hernia     HPI:   Florencio Oliver is a 51 y.o. female who presents for evaluation of symptomatic hiatal hernia that was responsive to medical treatment but has become recalcitrant. Has severe GERD, EGD showed HH, Has a recurrent incisional hernia as well Has had CT imaging.  Timing is constant, radiation to epigastrium, alleviated by nothing and started years ago and severity is 2-7/10         Past Medical History        Past Medical History:   Diagnosis Date    Acid reflux disease      Colon cancer ([address])       s/p surgery    Cyst of ovary      Fracture of nasal bone      Headache      Hearing loss      Hx of blood clots       leg to lung    Hypertension      Liver lesion       [place]    Lung nodule       RUL    Morbidly obese ([address]) 10/05/2020    Multiple sclerosis ([address])       denies limitations at present 11/2020    VEENA no CPAP       severe VEENA per pt    Pulmonary embolism ([address]) 2021    Restless legs syndrome      Right shoulder pain 11/2020    RLS (restless legs syndrome)      Tinnitus      TMJ dysfunction       left side    Ventral hernia       october 2022            Past Surgical History         Past Surgical History:   Procedure Laterality Date    APPENDECTOMY        BACK SURGERY         lumbar    BICEPS TENDON REPAIR Right 11/11/2020     BICEPS TENODESIS performed by Lori Avery MD at [address] Left 08/31/2021     LAPAROSCOPIC LEFT HEMICOLECTOMY WITH LOOP OSTOMY performed by Geo Leyva MD at [address] N/A 09/06/2021     DIAGNOSTIC LAPAROSCOPY POSSIBLE BOWEL RESECTION POSSILBE OSTOMY performed by Geo Leyva MD at [facility] N/A 02/08/2022     LAPAROSCOPIC SIGMOID COLON RESECTION performed by Geo Leyva MD at

## 2023-11-28 NOTE — DISCHARGE INSTRUCTIONS
Patient Discharge Instructions for Hiatal hernia repair  Discharge Date:  11/28/2023    Discharged To: Home    RESUME ACTIVITY:      BATHING:  May shower 24hrs after surgery, may bathe 3 days after surgery    DRIVING: No driving while on pain medications    RETURN TO WORK: after follow up appointment    WALKING:  Yes    SEXUAL ACTIVITY: Yes    STAIRS:  Yes    LIFTING: Less than 25 pounds for 2 weeks then less than 50 pounds for 2 additional weeks then no limitations    DIET: per handout already given    BOWELS: constipation is a side effect of your pain meds, take a daily laxative (miralax, dulcolax, etc.) as needed to keep your bowels moving as they normally do, do not go 2-3 days without having a bowel movement. SPECIAL INSTRUCTIONS:     Call the office at [phone] if you have a fever > 100 F, or if your incision becomes red, tender, or drains more than a small amount of clear fluid.     Call  for follow up appointment with Dr. Danielle Diaz in:  as scheduled

## 2023-11-28 NOTE — ANESTHESIA POSTPROCEDURE EVALUATION
Department of Anesthesiology  Postprocedure Note    Patient: Ramy Hwang  MRN: [id number]  YOB: 1975  Date of evaluation: 11/28/2023      Procedure Summary     Date: 11/28/23 Room / Location: [facility] 03 / [address] [address]    Anesthesia Start: 1731 Anesthesia Stop: 8416    Procedure: HERNIA HIATAL LAPAROSCOPIC WITH MESH AND LAPAROSCOPIC INCISIONAL HERNIA REPAIR WITH MESH (Abdomen) Diagnosis:       Hiatal hernia      Incisional hernia      (Hiatal hernia [K44.9])      (Incisional hernia [K43.2])    Surgeons: Raul Valentin MD Responsible Provider: Meseret Roberson MD    Anesthesia Type: general ASA Status: 3          Anesthesia Type: No value filed.     Daniel Phase I: Daniel Score: 9    Daniel Phase II: Daniel Score: 10      Anesthesia Post Evaluation    Patient location during evaluation: PACU  Patient participation: complete - patient participated  Level of consciousness: awake  Pain score: 3  Airway patency: patent  Nausea & Vomiting: no nausea and no vomiting  Complications: no  Cardiovascular status: hemodynamically stable  Respiratory status: acceptable  Hydration status: euvolemic  Pain management: adequate

## 2023-11-28 NOTE — OP NOTE
DATE OF PROCEDURE: 11/28/2023    SURGEON: Tianna Messer M.D.    [facility]    PREOPERATIVE DIAGNOSIS: Large paraesophageal midline hiatal hernia with severe reflux and muscle disruption. Recurrent incisional hernia repair with mesh    POSTOPERATIVE DIAGNOSIS: same    OPERATION:   1.)Laparoscopic midline paraesophageal hiatal hernia repair  with mesh  2.) cassandra cutaneous flap and  3.) recurrent incisional hernia repair with mesh      ANESTHESIA: General.    ESTIMATED BLOOD LOSS: 40 mL. COMPLICATIONS: None. FLUIDS: Crystalloid. DISPOSITION: home    SPECIMEN: None. INDICATION: This is a patient who came in with the aforementioned  diagnosis. The patient had severe reflux. I explained the risks, benefits,  potential outcomes, alternative treatment to aforementioned procedure, and  she agreed to proceed, understanding those risks and potential outcomes. DESCRIPTION OF PROCEDURE: The patient was brought to the operative suite,  was placed under general anesthesia, was given preoperative antibiotics  within 30 minutes of incision, then had bilateral PCDs placed. Once this was done a 5 mm incision was made in the supraumbilical area. After local anesthetic was infiltrated into the abdominal wall, a Veress  needle was used to pass into the peritoneum. CO2 was then used to insufflate  the abdomen to a pressure of 15 mmHg. At this time, the Veress needle was  removed, and an 5 mm trocar was placed through this incision. Four  additional trocars were placed, 2 in the right lateral abdomen, one a 5 mm  trocar that was scythed through the rectus sheath at 2 different levels. An  additional 5 mm trocar was placed in the right lateral abdomen. An 5 mm  trocar was also placed in the left upper quadrant of the abdomen. At this time,  a Meaghan retractor was put into this to retract the liver,  exposing the hiatal hernia. Hiatal hernia was identified.  The hernia sac  and its crural attachments were taken

## 2023-11-28 NOTE — PROGRESS NOTES
CLINICAL PHARMACY NOTE: MEDS TO BEDS    Total # of Prescriptions Filled: 3   The following medications were delivered to the patient:  Percocet 5-325 mg  Methocarbamol 500 mg  Ibuprofen 800 mg    Additional Documentation:

## 2023-12-01 ENCOUNTER — TELEPHONE (OUTPATIENT)
Dept: PAIN MANAGEMENT | Age: 48
End: 2023-12-01

## 2023-12-01 NOTE — TELEPHONE ENCOUNTER
SANTY for Silvano Granda letting her know that [facility] has denied her SIJ injection, waiting a call back to explain why they have. I spoke to Silvano Granda, she states that in January she is getting Medicare, she would like to wait until then to have the injection. She has a follow up on 1/12/24 and will discuss injection with Dr. Keshawn Bryson.

## 2023-12-07 DIAGNOSIS — J45.31 MILD PERSISTENT ASTHMA WITH ACUTE EXACERBATION: ICD-10-CM

## 2023-12-08 RX ORDER — FLUTICASONE PROPIONATE AND SALMETEROL XINAFOATE 115; 21 UG/1; UG/1
AEROSOL, METERED RESPIRATORY (INHALATION)
Qty: 12 G | Refills: 3 | Status: SHIPPED | OUTPATIENT
Start: 2023-12-08

## 2023-12-08 RX ORDER — LISINOPRIL AND HYDROCHLOROTHIAZIDE 12.5; 1 MG/1; MG/1
1 TABLET ORAL DAILY
Qty: 30 TABLET | Refills: 3 | Status: SHIPPED | OUTPATIENT
Start: 2023-12-08

## 2024-02-29 ENCOUNTER — HOSPITAL ENCOUNTER (OUTPATIENT)
Dept: CT IMAGING | Age: 49
Discharge: HOME | End: 2024-02-29
Payer: MEDICARE

## 2024-02-29 DIAGNOSIS — C18.6 MALIGNANT NEOPLASM OF DESCENDING COLON (HCC): ICD-10-CM

## 2024-02-29 DIAGNOSIS — R79.89 HYPOURICEMIA: ICD-10-CM

## 2024-02-29 DIAGNOSIS — K77 LIVER DISORDERS IN DISEASES CLASSIFIED ELSEWHERE: ICD-10-CM

## 2024-02-29 DIAGNOSIS — R91.1 SOLITARY PULMONARY NODULE: ICD-10-CM

## 2024-02-29 DIAGNOSIS — I10 ESSENTIAL HYPERTENSION, MALIGNANT: ICD-10-CM

## 2024-02-29 PROCEDURE — 6360000004 HC RX CONTRAST MEDICATION: Performed by: RADIOLOGY

## 2024-02-29 PROCEDURE — 71260 CT THORAX DX C+: CPT

## 2024-02-29 RX ADMIN — IOPAMIDOL 75 ML: 755 INJECTION, SOLUTION INTRAVENOUS at 09:24

## 2024-03-07 DIAGNOSIS — G35 MULTIPLE SCLEROSIS (HCC): Primary | ICD-10-CM

## 2024-03-07 DIAGNOSIS — E55.9 VITAMIN D DEFICIENCY: ICD-10-CM

## 2024-03-07 RX ORDER — METHOCARBAMOL 750 MG/1
TABLET ORAL
Qty: 5 CAPSULE | Refills: 3 | Status: ON HOLD | OUTPATIENT
Start: 2024-03-07

## 2024-03-07 NOTE — TELEPHONE ENCOUNTER
90 day supply ordered. She needs an updated Vit D level (none since 2022) and she needs a follow up scheduled. Labs have been ordered

## 2024-03-09 ENCOUNTER — HOSPITAL ENCOUNTER (INPATIENT)
Age: 49
LOS: 4 days | Discharge: HOME | DRG: 389 | End: 2024-03-13
Attending: EMERGENCY MEDICINE | Admitting: INTERNAL MEDICINE
Payer: MEDICARE

## 2024-03-09 ENCOUNTER — APPOINTMENT (OUTPATIENT)
Dept: GENERAL RADIOLOGY | Age: 49
DRG: 389 | End: 2024-03-09
Payer: MEDICARE

## 2024-03-09 ENCOUNTER — APPOINTMENT (OUTPATIENT)
Dept: CT IMAGING | Age: 49
DRG: 389 | End: 2024-03-09
Payer: MEDICARE

## 2024-03-09 DIAGNOSIS — K56.609 SMALL BOWEL OBSTRUCTION (HCC): Primary | ICD-10-CM

## 2024-03-09 LAB
ALBUMIN SERPL-MCNC: 4.3 G/DL (ref 3.5–5.2)
ALP SERPL-CCNC: 122 U/L (ref 35–104)
ALT SERPL-CCNC: 63 U/L (ref 0–32)
ANION GAP SERPL CALCULATED.3IONS-SCNC: 16 MMOL/L (ref 7–16)
AST SERPL-CCNC: 41 U/L (ref 0–31)
BACTERIA URNS QL MICRO: ABNORMAL
BASOPHILS # BLD: 0.06 K/UL (ref 0–0.2)
BASOPHILS NFR BLD: 0 % (ref 0–2)
BILIRUB SERPL-MCNC: 0.5 MG/DL (ref 0–1.2)
BILIRUB UR QL STRIP: NEGATIVE
BUN SERPL-MCNC: 9 MG/DL (ref 6–20)
CALCIUM SERPL-MCNC: 10.3 MG/DL (ref 8.6–10.2)
CHLORIDE SERPL-SCNC: 98 MMOL/L (ref 98–107)
CLARITY UR: CLEAR
CO2 SERPL-SCNC: 22 MMOL/L (ref 22–29)
COLOR UR: YELLOW
CREAT SERPL-MCNC: 0.7 MG/DL (ref 0.5–1)
CRP SERPL HS-MCNC: 27 MG/L (ref 0–5)
EOSINOPHIL # BLD: 0.15 K/UL (ref 0.05–0.5)
EOSINOPHILS RELATIVE PERCENT: 1 % (ref 0–6)
ERYTHROCYTE [DISTWIDTH] IN BLOOD BY AUTOMATED COUNT: 12.8 % (ref 11.5–15)
GFR SERPL CREATININE-BSD FRML MDRD: >60 ML/MIN/1.73M2
GLUCOSE SERPL-MCNC: 156 MG/DL (ref 74–99)
GLUCOSE UR STRIP-MCNC: NEGATIVE MG/DL
HCT VFR BLD AUTO: 47.1 % (ref 34–48)
HGB BLD-MCNC: 16.3 G/DL (ref 11.5–15.5)
HGB UR QL STRIP.AUTO: NEGATIVE
IMM GRANULOCYTES # BLD AUTO: 0.18 K/UL (ref 0–0.58)
IMM GRANULOCYTES NFR BLD: 1 % (ref 0–5)
KETONES UR STRIP-MCNC: ABNORMAL MG/DL
LACTATE BLDV-SCNC: 2.1 MMOL/L (ref 0.5–2.2)
LACTATE BLDV-SCNC: 3 MMOL/L (ref 0.5–2.2)
LEUKOCYTE ESTERASE UR QL STRIP: NEGATIVE
LIPASE SERPL-CCNC: 18 U/L (ref 13–60)
LYMPHOCYTES NFR BLD: 1.91 K/UL (ref 1.5–4)
LYMPHOCYTES RELATIVE PERCENT: 13 % (ref 20–42)
MCH RBC QN AUTO: 31.2 PG (ref 26–35)
MCHC RBC AUTO-ENTMCNC: 34.6 G/DL (ref 32–34.5)
MCV RBC AUTO: 90.2 FL (ref 80–99.9)
MONOCYTES NFR BLD: 0.97 K/UL (ref 0.1–0.95)
MONOCYTES NFR BLD: 6 % (ref 2–12)
NEUTROPHILS NFR BLD: 78 % (ref 43–80)
NEUTS SEG NFR BLD: 11.92 K/UL (ref 1.8–7.3)
NITRITE UR QL STRIP: POSITIVE
PH UR STRIP: 6 [PH] (ref 5–9)
PLATELET # BLD AUTO: 333 K/UL (ref 130–450)
PMV BLD AUTO: 9.6 FL (ref 7–12)
POTASSIUM SERPL-SCNC: 4.2 MMOL/L (ref 3.5–5)
PROCALCITONIN SERPL-MCNC: 0.07 NG/ML (ref 0–0.08)
PROT SERPL-MCNC: 7.7 G/DL (ref 6.4–8.3)
PROT UR STRIP-MCNC: 30 MG/DL
RBC # BLD AUTO: 5.22 M/UL (ref 3.5–5.5)
RBC #/AREA URNS HPF: ABNORMAL /HPF
SODIUM SERPL-SCNC: 136 MMOL/L (ref 132–146)
SP GR UR STRIP: 1.02 (ref 1–1.03)
TROPONIN I SERPL HS-MCNC: <6 NG/L (ref 0–9)
UROBILINOGEN UR STRIP-ACNC: 0.2 EU/DL (ref 0–1)
WBC #/AREA URNS HPF: ABNORMAL /HPF
WBC OTHER # BLD: 15.2 K/UL (ref 4.5–11.5)

## 2024-03-09 PROCEDURE — APPSS60 APP SPLIT SHARED TIME 46-60 MINUTES

## 2024-03-09 PROCEDURE — 85025 COMPLETE CBC W/AUTO DIFF WBC: CPT

## 2024-03-09 PROCEDURE — 6360000004 HC RX CONTRAST MEDICATION: Performed by: RADIOLOGY

## 2024-03-09 PROCEDURE — 96374 THER/PROPH/DIAG INJ IV PUSH: CPT

## 2024-03-09 PROCEDURE — A4216 STERILE WATER/SALINE, 10 ML: HCPCS | Performed by: EMERGENCY MEDICINE

## 2024-03-09 PROCEDURE — 99223 1ST HOSP IP/OBS HIGH 75: CPT | Performed by: SURGERY

## 2024-03-09 PROCEDURE — 83690 ASSAY OF LIPASE: CPT

## 2024-03-09 PROCEDURE — 96376 TX/PRO/DX INJ SAME DRUG ADON: CPT

## 2024-03-09 PROCEDURE — 36415 COLL VENOUS BLD VENIPUNCTURE: CPT

## 2024-03-09 PROCEDURE — 2580000003 HC RX 258: Performed by: EMERGENCY MEDICINE

## 2024-03-09 PROCEDURE — 6370000000 HC RX 637 (ALT 250 FOR IP): Performed by: EMERGENCY MEDICINE

## 2024-03-09 PROCEDURE — 81001 URINALYSIS AUTO W/SCOPE: CPT

## 2024-03-09 PROCEDURE — A4216 STERILE WATER/SALINE, 10 ML: HCPCS

## 2024-03-09 PROCEDURE — 6360000002 HC RX W HCPCS

## 2024-03-09 PROCEDURE — 0D9670Z DRAINAGE OF STOMACH WITH DRAINAGE DEVICE, VIA NATURAL OR ARTIFICIAL OPENING: ICD-10-PCS | Performed by: EMERGENCY MEDICINE

## 2024-03-09 PROCEDURE — 84145 PROCALCITONIN (PCT): CPT

## 2024-03-09 PROCEDURE — 87040 BLOOD CULTURE FOR BACTERIA: CPT

## 2024-03-09 PROCEDURE — 74177 CT ABD & PELVIS W/CONTRAST: CPT

## 2024-03-09 PROCEDURE — 6360000002 HC RX W HCPCS: Performed by: EMERGENCY MEDICINE

## 2024-03-09 PROCEDURE — 74018 RADEX ABDOMEN 1 VIEW: CPT

## 2024-03-09 PROCEDURE — 2500000003 HC RX 250 WO HCPCS: Performed by: EMERGENCY MEDICINE

## 2024-03-09 PROCEDURE — 87077 CULTURE AEROBIC IDENTIFY: CPT

## 2024-03-09 PROCEDURE — 99285 EMERGENCY DEPT VISIT HI MDM: CPT

## 2024-03-09 PROCEDURE — 93005 ELECTROCARDIOGRAM TRACING: CPT | Performed by: EMERGENCY MEDICINE

## 2024-03-09 PROCEDURE — 71045 X-RAY EXAM CHEST 1 VIEW: CPT

## 2024-03-09 PROCEDURE — 96375 TX/PRO/DX INJ NEW DRUG ADDON: CPT

## 2024-03-09 PROCEDURE — 2580000003 HC RX 258

## 2024-03-09 PROCEDURE — C9113 INJ PANTOPRAZOLE SODIUM, VIA: HCPCS

## 2024-03-09 PROCEDURE — 86140 C-REACTIVE PROTEIN: CPT

## 2024-03-09 PROCEDURE — 80053 COMPREHEN METABOLIC PANEL: CPT

## 2024-03-09 PROCEDURE — 87086 URINE CULTURE/COLONY COUNT: CPT

## 2024-03-09 PROCEDURE — 1200000000 HC SEMI PRIVATE

## 2024-03-09 PROCEDURE — 84484 ASSAY OF TROPONIN QUANT: CPT

## 2024-03-09 PROCEDURE — 83605 ASSAY OF LACTIC ACID: CPT

## 2024-03-09 PROCEDURE — 96361 HYDRATE IV INFUSION ADD-ON: CPT

## 2024-03-09 PROCEDURE — 99222 1ST HOSP IP/OBS MODERATE 55: CPT | Performed by: INTERNAL MEDICINE

## 2024-03-09 RX ORDER — SODIUM CHLORIDE 9 MG/ML
INJECTION, SOLUTION INTRAVENOUS PRN
Status: DISCONTINUED | OUTPATIENT
Start: 2024-03-09 | End: 2024-03-13 | Stop reason: HOSPADM

## 2024-03-09 RX ORDER — MORPHINE SULFATE 5 MG/ML
5 INJECTION, SOLUTION INTRAMUSCULAR; INTRAVENOUS ONCE
Status: COMPLETED | OUTPATIENT
Start: 2024-03-09 | End: 2024-03-09

## 2024-03-09 RX ORDER — HYDRALAZINE HYDROCHLORIDE 20 MG/ML
10 INJECTION INTRAMUSCULAR; INTRAVENOUS EVERY 6 HOURS PRN
Status: DISCONTINUED | OUTPATIENT
Start: 2024-03-09 | End: 2024-03-13 | Stop reason: HOSPADM

## 2024-03-09 RX ORDER — ACETAMINOPHEN 325 MG/1
650 TABLET ORAL EVERY 6 HOURS PRN
Status: DISCONTINUED | OUTPATIENT
Start: 2024-03-09 | End: 2024-03-13 | Stop reason: HOSPADM

## 2024-03-09 RX ORDER — ENOXAPARIN SODIUM 100 MG/ML
40 INJECTION SUBCUTANEOUS DAILY
Status: DISCONTINUED | OUTPATIENT
Start: 2024-03-09 | End: 2024-03-13 | Stop reason: HOSPADM

## 2024-03-09 RX ORDER — ONDANSETRON 4 MG/1
4 TABLET, ORALLY DISINTEGRATING ORAL EVERY 8 HOURS PRN
Status: DISCONTINUED | OUTPATIENT
Start: 2024-03-09 | End: 2024-03-13 | Stop reason: HOSPADM

## 2024-03-09 RX ORDER — BUDESONIDE AND FORMOTEROL FUMARATE DIHYDRATE 160; 4.5 UG/1; UG/1
2 AEROSOL RESPIRATORY (INHALATION)
Status: CANCELLED | OUTPATIENT
Start: 2024-03-09

## 2024-03-09 RX ORDER — MORPHINE SULFATE 2 MG/ML
2 INJECTION, SOLUTION INTRAMUSCULAR; INTRAVENOUS EVERY 4 HOURS PRN
Status: DISCONTINUED | OUTPATIENT
Start: 2024-03-09 | End: 2024-03-13 | Stop reason: HOSPADM

## 2024-03-09 RX ORDER — ACETAMINOPHEN 650 MG/1
650 SUPPOSITORY RECTAL EVERY 6 HOURS PRN
Status: DISCONTINUED | OUTPATIENT
Start: 2024-03-09 | End: 2024-03-13 | Stop reason: HOSPADM

## 2024-03-09 RX ORDER — MORPHINE SULFATE 4 MG/ML
4 INJECTION, SOLUTION INTRAMUSCULAR; INTRAVENOUS EVERY 4 HOURS PRN
Status: DISCONTINUED | OUTPATIENT
Start: 2024-03-09 | End: 2024-03-13 | Stop reason: HOSPADM

## 2024-03-09 RX ORDER — MORPHINE SULFATE 4 MG/ML
4 INJECTION, SOLUTION INTRAMUSCULAR; INTRAVENOUS ONCE
Status: COMPLETED | OUTPATIENT
Start: 2024-03-09 | End: 2024-03-09

## 2024-03-09 RX ORDER — SODIUM CHLORIDE 0.9 % (FLUSH) 0.9 %
5-40 SYRINGE (ML) INJECTION EVERY 12 HOURS SCHEDULED
Status: DISCONTINUED | OUTPATIENT
Start: 2024-03-09 | End: 2024-03-13 | Stop reason: HOSPADM

## 2024-03-09 RX ORDER — 0.9 % SODIUM CHLORIDE 0.9 %
1000 INTRAVENOUS SOLUTION INTRAVENOUS ONCE
Status: COMPLETED | OUTPATIENT
Start: 2024-03-09 | End: 2024-03-09

## 2024-03-09 RX ORDER — BACLOFEN 10 MG/1
20 TABLET ORAL 4 TIMES DAILY PRN
Status: CANCELLED | OUTPATIENT
Start: 2024-03-09

## 2024-03-09 RX ORDER — GABAPENTIN 400 MG/1
400 CAPSULE ORAL 4 TIMES DAILY
Status: CANCELLED | OUTPATIENT
Start: 2024-03-09

## 2024-03-09 RX ORDER — ONDANSETRON 2 MG/ML
4 INJECTION INTRAMUSCULAR; INTRAVENOUS ONCE
Status: COMPLETED | OUTPATIENT
Start: 2024-03-09 | End: 2024-03-09

## 2024-03-09 RX ORDER — MIDAZOLAM HYDROCHLORIDE 1 MG/ML
1 INJECTION INTRAMUSCULAR; INTRAVENOUS ONCE
Status: COMPLETED | OUTPATIENT
Start: 2024-03-09 | End: 2024-03-09

## 2024-03-09 RX ORDER — SODIUM CHLORIDE 0.9 % (FLUSH) 0.9 %
5-40 SYRINGE (ML) INJECTION PRN
Status: DISCONTINUED | OUTPATIENT
Start: 2024-03-09 | End: 2024-03-13 | Stop reason: HOSPADM

## 2024-03-09 RX ORDER — ROPINIROLE 0.5 MG/1
0.5 TABLET, FILM COATED ORAL 3 TIMES DAILY
Status: CANCELLED | OUTPATIENT
Start: 2024-03-09

## 2024-03-09 RX ORDER — DICYCLOMINE HYDROCHLORIDE 10 MG/1
10 CAPSULE ORAL 3 TIMES DAILY PRN
Status: CANCELLED | OUTPATIENT
Start: 2024-03-09

## 2024-03-09 RX ORDER — 0.9 % SODIUM CHLORIDE 0.9 %
1000 INTRAVENOUS SOLUTION INTRAVENOUS ONCE
Status: DISCONTINUED | OUTPATIENT
Start: 2024-03-09 | End: 2024-03-09

## 2024-03-09 RX ORDER — SODIUM CHLORIDE 9 MG/ML
INJECTION, SOLUTION INTRAVENOUS CONTINUOUS
Status: DISCONTINUED | OUTPATIENT
Start: 2024-03-09 | End: 2024-03-13 | Stop reason: HOSPADM

## 2024-03-09 RX ORDER — ONDANSETRON 2 MG/ML
4 INJECTION INTRAMUSCULAR; INTRAVENOUS EVERY 6 HOURS PRN
Status: DISCONTINUED | OUTPATIENT
Start: 2024-03-09 | End: 2024-03-13 | Stop reason: HOSPADM

## 2024-03-09 RX ORDER — POLYETHYLENE GLYCOL 3350 17 G/17G
17 POWDER, FOR SOLUTION ORAL DAILY PRN
Status: DISCONTINUED | OUTPATIENT
Start: 2024-03-09 | End: 2024-03-13 | Stop reason: HOSPADM

## 2024-03-09 RX ORDER — NORTRIPTYLINE HYDROCHLORIDE 10 MG/1
10 CAPSULE ORAL NIGHTLY
Status: CANCELLED | OUTPATIENT
Start: 2024-03-09

## 2024-03-09 RX ORDER — LISINOPRIL AND HYDROCHLOROTHIAZIDE 12.5; 1 MG/1; MG/1
1 TABLET ORAL DAILY
Status: CANCELLED | OUTPATIENT
Start: 2024-03-09

## 2024-03-09 RX ADMIN — MORPHINE SULFATE 4 MG: 4 INJECTION, SOLUTION INTRAMUSCULAR; INTRAVENOUS at 20:57

## 2024-03-09 RX ADMIN — ONDANSETRON 4 MG: 2 INJECTION INTRAMUSCULAR; INTRAVENOUS at 10:29

## 2024-03-09 RX ADMIN — MORPHINE SULFATE 4 MG: 4 INJECTION, SOLUTION INTRAMUSCULAR; INTRAVENOUS at 07:31

## 2024-03-09 RX ADMIN — IOPAMIDOL 75 ML: 755 INJECTION, SOLUTION INTRAVENOUS at 06:59

## 2024-03-09 RX ADMIN — MORPHINE SULFATE 4 MG: 4 INJECTION, SOLUTION INTRAMUSCULAR; INTRAVENOUS at 14:54

## 2024-03-09 RX ADMIN — ONDANSETRON 4 MG: 2 INJECTION INTRAMUSCULAR; INTRAVENOUS at 05:06

## 2024-03-09 RX ADMIN — ALUMINUM HYDROXIDE, MAGNESIUM HYDROXIDE, AND SIMETHICONE: 1200; 120; 1200 SUSPENSION ORAL at 07:31

## 2024-03-09 RX ADMIN — ONDANSETRON 4 MG: 2 INJECTION INTRAMUSCULAR; INTRAVENOUS at 16:16

## 2024-03-09 RX ADMIN — MORPHINE SULFATE 4 MG: 4 INJECTION, SOLUTION INTRAMUSCULAR; INTRAVENOUS at 11:00

## 2024-03-09 RX ADMIN — PANTOPRAZOLE SODIUM 40 MG: 40 INJECTION, POWDER, FOR SOLUTION INTRAVENOUS at 20:57

## 2024-03-09 RX ADMIN — SODIUM CHLORIDE, PRESERVATIVE FREE 10 ML: 5 INJECTION INTRAVENOUS at 20:58

## 2024-03-09 RX ADMIN — SODIUM CHLORIDE, PRESERVATIVE FREE 10 ML: 5 INJECTION INTRAVENOUS at 14:44

## 2024-03-09 RX ADMIN — SODIUM CHLORIDE 1000 ML: 9 INJECTION, SOLUTION INTRAVENOUS at 05:05

## 2024-03-09 RX ADMIN — MIDAZOLAM HYDROCHLORIDE 1 MG: 1 INJECTION, SOLUTION INTRAMUSCULAR; INTRAVENOUS at 08:21

## 2024-03-09 RX ADMIN — WATER 1000 MG: 1 INJECTION INTRAMUSCULAR; INTRAVENOUS; SUBCUTANEOUS at 14:43

## 2024-03-09 RX ADMIN — SODIUM CHLORIDE 1000 ML: 9 INJECTION, SOLUTION INTRAVENOUS at 07:44

## 2024-03-09 RX ADMIN — FAMOTIDINE 20 MG: 10 INJECTION, SOLUTION INTRAVENOUS at 07:31

## 2024-03-09 RX ADMIN — PANTOPRAZOLE SODIUM 40 MG: 40 INJECTION, POWDER, FOR SOLUTION INTRAVENOUS at 14:42

## 2024-03-09 RX ADMIN — SODIUM CHLORIDE 100 ML/HR: 9 INJECTION, SOLUTION INTRAVENOUS at 14:42

## 2024-03-09 RX ADMIN — MORPHINE SULFATE 5 MG: 5 INJECTION, SOLUTION INTRAMUSCULAR; INTRAVENOUS at 05:06

## 2024-03-09 RX ADMIN — ONDANSETRON 4 MG: 2 INJECTION INTRAMUSCULAR; INTRAVENOUS at 20:57

## 2024-03-09 ASSESSMENT — PAIN DESCRIPTION - LOCATION
LOCATION: ABDOMEN

## 2024-03-09 ASSESSMENT — PAIN DESCRIPTION - DESCRIPTORS
DESCRIPTORS: SPASM
DESCRIPTORS: ACHING

## 2024-03-09 ASSESSMENT — PAIN SCALES - GENERAL
PAINLEVEL_OUTOF10: 10
PAINLEVEL_OUTOF10: 10
PAINLEVEL_OUTOF10: 8
PAINLEVEL_OUTOF10: 8
PAINLEVEL_OUTOF10: 7
PAINLEVEL_OUTOF10: 7

## 2024-03-09 ASSESSMENT — LIFESTYLE VARIABLES
HOW MANY STANDARD DRINKS CONTAINING ALCOHOL DO YOU HAVE ON A TYPICAL DAY: PATIENT DOES NOT DRINK
HOW MANY STANDARD DRINKS CONTAINING ALCOHOL DO YOU HAVE ON A TYPICAL DAY: PATIENT DOES NOT DRINK
HOW OFTEN DO YOU HAVE A DRINK CONTAINING ALCOHOL: NEVER
HOW OFTEN DO YOU HAVE A DRINK CONTAINING ALCOHOL: NEVER

## 2024-03-09 ASSESSMENT — PAIN DESCRIPTION - ORIENTATION: ORIENTATION: MID

## 2024-03-09 NOTE — CONSULTS
General Surgery Consult    Patient's Name/Date of Birth: Kendra Garay / 1975    Date: March 9, 2024     Consulting Surgeon: Dominik Becerra M.D.    PCP: Amanda Quarles PA-C     Chief Complaint: sbo    HPI:   Kendra Garay is a 48 y.o. female who presents for  evaluation of sbo on CT as she presented with abd pain. Timing is constant, radiation to epigastrium, alleviated by nothing and started yesterday, severity 2-7/10      Past Medical History:   Diagnosis Date    Acid reflux disease     Colon cancer (HCC)     s/p surgery    Cyst of ovary     Fracture of nasal bone     Headache     Hearing loss     Hx of blood clots     leg to lung    Hypertension     Liver lesion     20[place]    Lung nodule     RUL    Morbidly obese (HCC) 10/05/2020    Multiple sclerosis (HCC)     denies limitations at present 11/2020    VEENA no CPAP     severe VEENA per pt    Pulmonary embolism (HCC) 2021    Restless legs syndrome     Right shoulder pain 11/2020    RLS (restless legs syndrome)     Tinnitus     TMJ dysfunction     left side    Ventral hernia     october 2022       Past Surgical History:   Procedure Laterality Date    APPENDECTOMY      BACK SURGERY      lumbar    BICEPS TENDON REPAIR Right 11/11/2020    BICEPS TENODESIS performed by Mu De León MD at [facility] OR    CHOLECYSTECTOMY      COLECTOMY Left 08/31/2021    LAPAROSCOPIC LEFT HEMICOLECTOMY WITH LOOP OSTOMY performed by Dominik Becerra MD at [facility] OR    COLECTOMY N/A 09/06/2021    DIAGNOSTIC LAPAROSCOPY POSSIBLE BOWEL RESECTION POSSILBE OSTOMY performed by Dominik Becerra MD at [facility] OR    COLECTOMY N/A 02/08/2022    LAPAROSCOPIC SIGMOID COLON RESECTION performed by Dominik Becerra MD at [facility] OR    COLOSTOMY CLOSURE N/A 06/01/2022    OPEN ILEOSTOMY REVERSAL performed by Dominik Becerra MD at [facility] OR    CT ASP ABS HEMATOMA BULLA CYST  01/19/2023    CT ASP ABS HEMATOMA BULLA CYST 1/19/2023 RADHA Mason MD [facility]    CT ASP ABS HEMATOMA BULLA CYST  01/19/2023

## 2024-03-09 NOTE — ED PROVIDER NOTES
11/22/2023 92.5  80.0 - 99.9 fL Final    MCH 11/22/2023 30.9  26.0 - 35.0 pg Final    MCHC 11/22/2023 33.4  32.0 - 34.5 g/dL Final    RDW 11/22/2023 13.2  11.5 - 15.0 % Final    Platelets 11/22/2023 297  130 - 450 k/uL Final    MPV 11/22/2023 9.3  7.0 - 12.0 fL Final    Neutrophils % 11/22/2023 60  43.0 - 80.0 % Final    Lymphocytes % 11/22/2023 28  20.0 - 42.0 % Final    Monocytes % 11/22/2023 8  2.0 - 12.0 % Final    Eosinophils % 11/22/2023 3  0 - 6 % Final    Basophils % 11/22/2023 1  0.0 - 2.0 % Final    Immature Granulocytes 11/22/2023 1  0.0 - 5.0 % Final    Neutrophils Absolute 11/22/2023 5.65  1.80 - 7.30 k/uL Final    Lymphocytes Absolute 11/22/2023 2.62  1.50 - 4.00 k/uL Final    Monocytes Absolute 11/22/2023 0.72  0.10 - 0.95 k/uL Final    Eosinophils Absolute 11/22/2023 0.26  0.05 - 0.50 k/uL Final    Basophils Absolute 11/22/2023 0.05  0.00 - 0.20 k/uL Final    Absolute Immature Granulocyte 11/22/2023 0.12  0.00 - 0.58 k/uL Final    Sodium 11/22/2023 143  132 - 146 mmol/L Final    Potassium 11/22/2023 4.5  3.5 - 5.0 mmol/L Final    Chloride 11/22/2023 105  98 - 107 mmol/L Final    CO2 11/22/2023 29  22 - 29 mmol/L Final    Anion Gap 11/22/2023 9  7 - 16 mmol/L Final    Glucose 11/22/2023 107 (H)  74 - 99 mg/dL Final    BUN 11/22/2023 8  6 - 20 mg/dL Final    Creatinine 11/22/2023 0.7  0.50 - 1.00 mg/dL Final    Est, Glom Filt Rate 11/22/2023 >60  >60 mL/min/1.73m2 Final    Calcium 11/22/2023 9.3  8.6 - 10.2 mg/dL Final       6:36 AM EST  I have signed this patient out to the oncoming physician, Dr. Rutledge.  I have discussed the patient's initial exam, treatment and plan of care with the on coming physician.  I have notified the patient / family of the change in treating physician and answered their questions to this point.         Wilmer Summers,   03/11/24 0619

## 2024-03-09 NOTE — H&P
every 6 months with restless leg syndrome  - Continue Requip, baclofen and Neurontin once okay for diet per general surgery    Morbid obesity  - BMI 49.1, diet modification    VEENA  - Not on home CPAP    GERD  - PPI twice daily      Code Status: Full code  DVT prophylaxis: Lovenox    NOTE: This report was transcribed using voice recognition software. Every effort was made to ensure accuracy; however, inadvertent computerized transcription errors may be present.     Electronically signed by MARIXA Romo CNP on 3/9/2024 at 10:46 AM

## 2024-03-10 LAB
ALBUMIN SERPL-MCNC: 3.4 G/DL (ref 3.5–5.2)
ALP SERPL-CCNC: 98 U/L (ref 35–104)
ALT SERPL-CCNC: 61 U/L (ref 0–32)
ANION GAP SERPL CALCULATED.3IONS-SCNC: 10 MMOL/L (ref 7–16)
AST SERPL-CCNC: 31 U/L (ref 0–31)
BASOPHILS # BLD: 0.03 K/UL (ref 0–0.2)
BASOPHILS NFR BLD: 0 % (ref 0–2)
BILIRUB SERPL-MCNC: 0.5 MG/DL (ref 0–1.2)
BUN SERPL-MCNC: 8 MG/DL (ref 6–20)
CALCIUM SERPL-MCNC: 8.4 MG/DL (ref 8.6–10.2)
CHLORIDE SERPL-SCNC: 106 MMOL/L (ref 98–107)
CO2 SERPL-SCNC: 27 MMOL/L (ref 22–29)
CREAT SERPL-MCNC: 0.8 MG/DL (ref 0.5–1)
EKG ATRIAL RATE: 109 BPM
EKG P AXIS: 50 DEGREES
EKG P-R INTERVAL: 150 MS
EKG Q-T INTERVAL: 314 MS
EKG QRS DURATION: 76 MS
EKG QTC CALCULATION (BAZETT): 422 MS
EKG R AXIS: 58 DEGREES
EKG T AXIS: 54 DEGREES
EKG VENTRICULAR RATE: 109 BPM
EOSINOPHIL # BLD: 0.22 K/UL (ref 0.05–0.5)
EOSINOPHILS RELATIVE PERCENT: 3 % (ref 0–6)
ERYTHROCYTE [DISTWIDTH] IN BLOOD BY AUTOMATED COUNT: 13.1 % (ref 11.5–15)
GFR SERPL CREATININE-BSD FRML MDRD: >60 ML/MIN/1.73M2
GLUCOSE SERPL-MCNC: 96 MG/DL (ref 74–99)
HCT VFR BLD AUTO: 38.9 % (ref 34–48)
HGB BLD-MCNC: 12.7 G/DL (ref 11.5–15.5)
IMM GRANULOCYTES # BLD AUTO: 0.05 K/UL (ref 0–0.58)
IMM GRANULOCYTES NFR BLD: 1 % (ref 0–5)
LYMPHOCYTES NFR BLD: 2.2 K/UL (ref 1.5–4)
LYMPHOCYTES RELATIVE PERCENT: 28 % (ref 20–42)
MAGNESIUM SERPL-MCNC: 2.3 MG/DL (ref 1.6–2.6)
MCH RBC QN AUTO: 31.4 PG (ref 26–35)
MCHC RBC AUTO-ENTMCNC: 32.6 G/DL (ref 32–34.5)
MCV RBC AUTO: 96.3 FL (ref 80–99.9)
MONOCYTES NFR BLD: 0.72 K/UL (ref 0.1–0.95)
MONOCYTES NFR BLD: 9 % (ref 2–12)
NEUTROPHILS NFR BLD: 59 % (ref 43–80)
NEUTS SEG NFR BLD: 4.69 K/UL (ref 1.8–7.3)
PHOSPHATE SERPL-MCNC: 3.2 MG/DL (ref 2.5–4.5)
PLATELET # BLD AUTO: 243 K/UL (ref 130–450)
PMV BLD AUTO: 9.6 FL (ref 7–12)
POTASSIUM SERPL-SCNC: 4 MMOL/L (ref 3.5–5)
PROT SERPL-MCNC: 5.8 G/DL (ref 6.4–8.3)
RBC # BLD AUTO: 4.04 M/UL (ref 3.5–5.5)
SODIUM SERPL-SCNC: 143 MMOL/L (ref 132–146)
WBC OTHER # BLD: 7.9 K/UL (ref 4.5–11.5)

## 2024-03-10 PROCEDURE — 36415 COLL VENOUS BLD VENIPUNCTURE: CPT

## 2024-03-10 PROCEDURE — 2580000003 HC RX 258

## 2024-03-10 PROCEDURE — 99232 SBSQ HOSP IP/OBS MODERATE 35: CPT | Performed by: SURGERY

## 2024-03-10 PROCEDURE — 99232 SBSQ HOSP IP/OBS MODERATE 35: CPT | Performed by: INTERNAL MEDICINE

## 2024-03-10 PROCEDURE — 80053 COMPREHEN METABOLIC PANEL: CPT

## 2024-03-10 PROCEDURE — 84100 ASSAY OF PHOSPHORUS: CPT

## 2024-03-10 PROCEDURE — 83735 ASSAY OF MAGNESIUM: CPT

## 2024-03-10 PROCEDURE — 1200000000 HC SEMI PRIVATE

## 2024-03-10 PROCEDURE — A4216 STERILE WATER/SALINE, 10 ML: HCPCS

## 2024-03-10 PROCEDURE — C9113 INJ PANTOPRAZOLE SODIUM, VIA: HCPCS

## 2024-03-10 PROCEDURE — 6360000002 HC RX W HCPCS

## 2024-03-10 PROCEDURE — 6370000000 HC RX 637 (ALT 250 FOR IP): Performed by: INTERNAL MEDICINE

## 2024-03-10 PROCEDURE — 85025 COMPLETE CBC W/AUTO DIFF WBC: CPT

## 2024-03-10 PROCEDURE — 93010 ELECTROCARDIOGRAM REPORT: CPT | Performed by: INTERNAL MEDICINE

## 2024-03-10 RX ADMIN — MORPHINE SULFATE 4 MG: 4 INJECTION, SOLUTION INTRAMUSCULAR; INTRAVENOUS at 14:34

## 2024-03-10 RX ADMIN — MORPHINE SULFATE 4 MG: 4 INJECTION, SOLUTION INTRAMUSCULAR; INTRAVENOUS at 23:35

## 2024-03-10 RX ADMIN — PANTOPRAZOLE SODIUM 40 MG: 40 INJECTION, POWDER, FOR SOLUTION INTRAVENOUS at 09:52

## 2024-03-10 RX ADMIN — MORPHINE SULFATE 4 MG: 4 INJECTION, SOLUTION INTRAMUSCULAR; INTRAVENOUS at 19:29

## 2024-03-10 RX ADMIN — WATER 1000 MG: 1 INJECTION INTRAMUSCULAR; INTRAVENOUS; SUBCUTANEOUS at 11:50

## 2024-03-10 RX ADMIN — SODIUM CHLORIDE 100 ML/HR: 9 INJECTION, SOLUTION INTRAVENOUS at 10:10

## 2024-03-10 RX ADMIN — MORPHINE SULFATE 4 MG: 4 INJECTION, SOLUTION INTRAMUSCULAR; INTRAVENOUS at 05:09

## 2024-03-10 RX ADMIN — BENZOCAINE 6 MG-MENTHOL 10 MG LOZENGES 1 LOZENGE: at 20:08

## 2024-03-10 RX ADMIN — MORPHINE SULFATE 4 MG: 4 INJECTION, SOLUTION INTRAMUSCULAR; INTRAVENOUS at 09:53

## 2024-03-10 RX ADMIN — PANTOPRAZOLE SODIUM 40 MG: 40 INJECTION, POWDER, FOR SOLUTION INTRAVENOUS at 19:30

## 2024-03-10 ASSESSMENT — PAIN SCALES - GENERAL
PAINLEVEL_OUTOF10: 7
PAINLEVEL_OUTOF10: 3
PAINLEVEL_OUTOF10: 7

## 2024-03-10 ASSESSMENT — PAIN DESCRIPTION - ORIENTATION
ORIENTATION: MID;UPPER
ORIENTATION: MID;UPPER

## 2024-03-10 ASSESSMENT — PAIN DESCRIPTION - DESCRIPTORS: DESCRIPTORS: SPASM

## 2024-03-10 ASSESSMENT — PAIN DESCRIPTION - LOCATION
LOCATION: ABDOMEN

## 2024-03-10 ASSESSMENT — PAIN DESCRIPTION - PAIN TYPE
TYPE: ACUTE PAIN
TYPE: ACUTE PAIN

## 2024-03-10 ASSESSMENT — PAIN SCALES - WONG BAKER: WONGBAKER_NUMERICALRESPONSE: 0

## 2024-03-11 LAB
ALBUMIN SERPL-MCNC: 3.6 G/DL (ref 3.5–5.2)
ALP SERPL-CCNC: 88 U/L (ref 35–104)
ALT SERPL-CCNC: 56 U/L (ref 0–32)
ANION GAP SERPL CALCULATED.3IONS-SCNC: 10 MMOL/L (ref 7–16)
AST SERPL-CCNC: 28 U/L (ref 0–31)
BASOPHILS # BLD: 0.03 K/UL (ref 0–0.2)
BASOPHILS NFR BLD: 0 % (ref 0–2)
BILIRUB SERPL-MCNC: 0.5 MG/DL (ref 0–1.2)
BUN SERPL-MCNC: 6 MG/DL (ref 6–20)
CALCIUM SERPL-MCNC: 8.2 MG/DL (ref 8.6–10.2)
CHLORIDE SERPL-SCNC: 104 MMOL/L (ref 98–107)
CO2 SERPL-SCNC: 23 MMOL/L (ref 22–29)
CREAT SERPL-MCNC: 0.6 MG/DL (ref 0.5–1)
EOSINOPHIL # BLD: 0.28 K/UL (ref 0.05–0.5)
EOSINOPHILS RELATIVE PERCENT: 4 % (ref 0–6)
ERYTHROCYTE [DISTWIDTH] IN BLOOD BY AUTOMATED COUNT: 12.8 % (ref 11.5–15)
GFR SERPL CREATININE-BSD FRML MDRD: >60 ML/MIN/1.73M2
GLUCOSE SERPL-MCNC: 100 MG/DL (ref 74–99)
HCT VFR BLD AUTO: 36.8 % (ref 34–48)
HGB BLD-MCNC: 12.2 G/DL (ref 11.5–15.5)
IMM GRANULOCYTES # BLD AUTO: 0.06 K/UL (ref 0–0.58)
IMM GRANULOCYTES NFR BLD: 1 % (ref 0–5)
LYMPHOCYTES NFR BLD: 2.2 K/UL (ref 1.5–4)
LYMPHOCYTES RELATIVE PERCENT: 28 % (ref 20–42)
MAGNESIUM SERPL-MCNC: 2.1 MG/DL (ref 1.6–2.6)
MCH RBC QN AUTO: 31.1 PG (ref 26–35)
MCHC RBC AUTO-ENTMCNC: 33.2 G/DL (ref 32–34.5)
MCV RBC AUTO: 93.9 FL (ref 80–99.9)
MICROORGANISM SPEC CULT: ABNORMAL
MONOCYTES NFR BLD: 0.71 K/UL (ref 0.1–0.95)
MONOCYTES NFR BLD: 9 % (ref 2–12)
NEUTROPHILS NFR BLD: 59 % (ref 43–80)
NEUTS SEG NFR BLD: 4.7 K/UL (ref 1.8–7.3)
PHOSPHATE SERPL-MCNC: 3.1 MG/DL (ref 2.5–4.5)
PLATELET # BLD AUTO: 222 K/UL (ref 130–450)
PMV BLD AUTO: 9.5 FL (ref 7–12)
POTASSIUM SERPL-SCNC: 3.6 MMOL/L (ref 3.5–5)
PROT SERPL-MCNC: 6.1 G/DL (ref 6.4–8.3)
RBC # BLD AUTO: 3.92 M/UL (ref 3.5–5.5)
SODIUM SERPL-SCNC: 137 MMOL/L (ref 132–146)
SPECIMEN DESCRIPTION: ABNORMAL
WBC OTHER # BLD: 8 K/UL (ref 4.5–11.5)

## 2024-03-11 PROCEDURE — 6360000002 HC RX W HCPCS

## 2024-03-11 PROCEDURE — C9113 INJ PANTOPRAZOLE SODIUM, VIA: HCPCS

## 2024-03-11 PROCEDURE — 6370000000 HC RX 637 (ALT 250 FOR IP): Performed by: SURGERY

## 2024-03-11 PROCEDURE — 83735 ASSAY OF MAGNESIUM: CPT

## 2024-03-11 PROCEDURE — 99232 SBSQ HOSP IP/OBS MODERATE 35: CPT | Performed by: INTERNAL MEDICINE

## 2024-03-11 PROCEDURE — 36415 COLL VENOUS BLD VENIPUNCTURE: CPT

## 2024-03-11 PROCEDURE — 1200000000 HC SEMI PRIVATE

## 2024-03-11 PROCEDURE — 85025 COMPLETE CBC W/AUTO DIFF WBC: CPT

## 2024-03-11 PROCEDURE — 84100 ASSAY OF PHOSPHORUS: CPT

## 2024-03-11 PROCEDURE — 6370000000 HC RX 637 (ALT 250 FOR IP)

## 2024-03-11 PROCEDURE — A4216 STERILE WATER/SALINE, 10 ML: HCPCS

## 2024-03-11 PROCEDURE — 80053 COMPREHEN METABOLIC PANEL: CPT

## 2024-03-11 PROCEDURE — 99232 SBSQ HOSP IP/OBS MODERATE 35: CPT | Performed by: SURGERY

## 2024-03-11 PROCEDURE — 2580000003 HC RX 258

## 2024-03-11 RX ORDER — BISACODYL 5 MG/1
10 TABLET, DELAYED RELEASE ORAL EVERY 4 HOURS
Status: DISPENSED | OUTPATIENT
Start: 2024-03-11 | End: 2024-03-11

## 2024-03-11 RX ADMIN — PANTOPRAZOLE SODIUM 40 MG: 40 INJECTION, POWDER, FOR SOLUTION INTRAVENOUS at 08:12

## 2024-03-11 RX ADMIN — MORPHINE SULFATE 4 MG: 4 INJECTION, SOLUTION INTRAMUSCULAR; INTRAVENOUS at 16:32

## 2024-03-11 RX ADMIN — BISACODYL 10 MG: 5 TABLET, COATED ORAL at 07:31

## 2024-03-11 RX ADMIN — MORPHINE SULFATE 4 MG: 4 INJECTION, SOLUTION INTRAMUSCULAR; INTRAVENOUS at 10:05

## 2024-03-11 RX ADMIN — SODIUM CHLORIDE: 9 INJECTION, SOLUTION INTRAVENOUS at 18:39

## 2024-03-11 RX ADMIN — ONDANSETRON 4 MG: 2 INJECTION INTRAMUSCULAR; INTRAVENOUS at 10:05

## 2024-03-11 RX ADMIN — PANTOPRAZOLE SODIUM 40 MG: 40 INJECTION, POWDER, FOR SOLUTION INTRAVENOUS at 20:59

## 2024-03-11 RX ADMIN — MORPHINE SULFATE 4 MG: 4 INJECTION, SOLUTION INTRAMUSCULAR; INTRAVENOUS at 20:59

## 2024-03-11 RX ADMIN — ONDANSETRON 4 MG: 4 TABLET, ORALLY DISINTEGRATING ORAL at 20:59

## 2024-03-11 RX ADMIN — MORPHINE SULFATE 4 MG: 4 INJECTION, SOLUTION INTRAMUSCULAR; INTRAVENOUS at 05:35

## 2024-03-11 RX ADMIN — ENOXAPARIN SODIUM 40 MG: 100 INJECTION SUBCUTANEOUS at 08:12

## 2024-03-11 ASSESSMENT — PAIN DESCRIPTION - LOCATION
LOCATION: ABDOMEN

## 2024-03-11 ASSESSMENT — PAIN - FUNCTIONAL ASSESSMENT
PAIN_FUNCTIONAL_ASSESSMENT: ACTIVITIES ARE NOT PREVENTED

## 2024-03-11 ASSESSMENT — PAIN DESCRIPTION - DESCRIPTORS
DESCRIPTORS: ACHING;JABBING;NAGGING
DESCRIPTORS: ACHING;DISCOMFORT;CRAMPING
DESCRIPTORS: ACHING;DISCOMFORT;SORE
DESCRIPTORS: TIGHTNESS

## 2024-03-11 ASSESSMENT — PAIN SCALES - GENERAL
PAINLEVEL_OUTOF10: 8
PAINLEVEL_OUTOF10: 7
PAINLEVEL_OUTOF10: 3
PAINLEVEL_OUTOF10: 8
PAINLEVEL_OUTOF10: 4
PAINLEVEL_OUTOF10: 3
PAINLEVEL_OUTOF10: 7
PAINLEVEL_OUTOF10: 4

## 2024-03-11 ASSESSMENT — PAIN DESCRIPTION - PAIN TYPE
TYPE: ACUTE PAIN;CHRONIC PAIN
TYPE: ACUTE PAIN
TYPE: ACUTE PAIN

## 2024-03-11 ASSESSMENT — PAIN DESCRIPTION - ORIENTATION
ORIENTATION: MID;UPPER
ORIENTATION: INNER
ORIENTATION: INNER

## 2024-03-11 ASSESSMENT — PAIN DESCRIPTION - FREQUENCY: FREQUENCY: INTERMITTENT

## 2024-03-11 ASSESSMENT — PAIN DESCRIPTION - ONSET: ONSET: GRADUAL

## 2024-03-12 LAB
ALBUMIN SERPL-MCNC: 3.6 G/DL (ref 3.5–5.2)
ALP SERPL-CCNC: 85 U/L (ref 35–104)
ALT SERPL-CCNC: 54 U/L (ref 0–32)
ANION GAP SERPL CALCULATED.3IONS-SCNC: 10 MMOL/L (ref 7–16)
AST SERPL-CCNC: 28 U/L (ref 0–31)
BASOPHILS # BLD: 0.03 K/UL (ref 0–0.2)
BASOPHILS NFR BLD: 0 % (ref 0–2)
BILIRUB SERPL-MCNC: 0.4 MG/DL (ref 0–1.2)
BUN SERPL-MCNC: 4 MG/DL (ref 6–20)
CALCIUM SERPL-MCNC: 8.4 MG/DL (ref 8.6–10.2)
CHLORIDE SERPL-SCNC: 107 MMOL/L (ref 98–107)
CO2 SERPL-SCNC: 23 MMOL/L (ref 22–29)
CREAT SERPL-MCNC: 0.6 MG/DL (ref 0.5–1)
EOSINOPHIL # BLD: 0.21 K/UL (ref 0.05–0.5)
EOSINOPHILS RELATIVE PERCENT: 3 % (ref 0–6)
ERYTHROCYTE [DISTWIDTH] IN BLOOD BY AUTOMATED COUNT: 12.8 % (ref 11.5–15)
GFR SERPL CREATININE-BSD FRML MDRD: >60 ML/MIN/1.73M2
GLUCOSE SERPL-MCNC: 111 MG/DL (ref 74–99)
HCT VFR BLD AUTO: 36.7 % (ref 34–48)
HGB BLD-MCNC: 12.2 G/DL (ref 11.5–15.5)
IMM GRANULOCYTES # BLD AUTO: 0.06 K/UL (ref 0–0.58)
IMM GRANULOCYTES NFR BLD: 1 % (ref 0–5)
LYMPHOCYTES NFR BLD: 1.77 K/UL (ref 1.5–4)
LYMPHOCYTES RELATIVE PERCENT: 26 % (ref 20–42)
MAGNESIUM SERPL-MCNC: 2 MG/DL (ref 1.6–2.6)
MCH RBC QN AUTO: 31.4 PG (ref 26–35)
MCHC RBC AUTO-ENTMCNC: 33.2 G/DL (ref 32–34.5)
MCV RBC AUTO: 94.3 FL (ref 80–99.9)
MONOCYTES NFR BLD: 0.62 K/UL (ref 0.1–0.95)
MONOCYTES NFR BLD: 9 % (ref 2–12)
NEUTROPHILS NFR BLD: 60 % (ref 43–80)
NEUTS SEG NFR BLD: 4.05 K/UL (ref 1.8–7.3)
PHOSPHATE SERPL-MCNC: 3.3 MG/DL (ref 2.5–4.5)
PLATELET # BLD AUTO: 217 K/UL (ref 130–450)
PMV BLD AUTO: 9.5 FL (ref 7–12)
POTASSIUM SERPL-SCNC: 4.1 MMOL/L (ref 3.5–5)
PROT SERPL-MCNC: 6.2 G/DL (ref 6.4–8.3)
RBC # BLD AUTO: 3.89 M/UL (ref 3.5–5.5)
SODIUM SERPL-SCNC: 140 MMOL/L (ref 132–146)
WBC OTHER # BLD: 6.7 K/UL (ref 4.5–11.5)

## 2024-03-12 PROCEDURE — 85025 COMPLETE CBC W/AUTO DIFF WBC: CPT

## 2024-03-12 PROCEDURE — A4216 STERILE WATER/SALINE, 10 ML: HCPCS

## 2024-03-12 PROCEDURE — 84100 ASSAY OF PHOSPHORUS: CPT

## 2024-03-12 PROCEDURE — C9113 INJ PANTOPRAZOLE SODIUM, VIA: HCPCS

## 2024-03-12 PROCEDURE — 6370000000 HC RX 637 (ALT 250 FOR IP)

## 2024-03-12 PROCEDURE — 36415 COLL VENOUS BLD VENIPUNCTURE: CPT

## 2024-03-12 PROCEDURE — 2580000003 HC RX 258

## 2024-03-12 PROCEDURE — 1200000000 HC SEMI PRIVATE

## 2024-03-12 PROCEDURE — 83735 ASSAY OF MAGNESIUM: CPT

## 2024-03-12 PROCEDURE — 99232 SBSQ HOSP IP/OBS MODERATE 35: CPT | Performed by: SURGERY

## 2024-03-12 PROCEDURE — 99232 SBSQ HOSP IP/OBS MODERATE 35: CPT | Performed by: INTERNAL MEDICINE

## 2024-03-12 PROCEDURE — 6370000000 HC RX 637 (ALT 250 FOR IP): Performed by: SURGERY

## 2024-03-12 PROCEDURE — 6360000002 HC RX W HCPCS

## 2024-03-12 PROCEDURE — 80053 COMPREHEN METABOLIC PANEL: CPT

## 2024-03-12 RX ORDER — DIPHENHYDRAMINE HCL 25 MG
25 TABLET ORAL EVERY 6 HOURS PRN
Status: DISCONTINUED | OUTPATIENT
Start: 2024-03-12 | End: 2024-03-13 | Stop reason: HOSPADM

## 2024-03-12 RX ORDER — METHOCARBAMOL 500 MG/1
500 TABLET, FILM COATED ORAL 4 TIMES DAILY
Status: DISCONTINUED | OUTPATIENT
Start: 2024-03-12 | End: 2024-03-13 | Stop reason: HOSPADM

## 2024-03-12 RX ADMIN — METHOCARBAMOL 500 MG: 500 TABLET ORAL at 13:14

## 2024-03-12 RX ADMIN — PANTOPRAZOLE SODIUM 40 MG: 40 INJECTION, POWDER, FOR SOLUTION INTRAVENOUS at 20:58

## 2024-03-12 RX ADMIN — MORPHINE SULFATE 4 MG: 4 INJECTION, SOLUTION INTRAMUSCULAR; INTRAVENOUS at 03:39

## 2024-03-12 RX ADMIN — METHOCARBAMOL 500 MG: 500 TABLET ORAL at 16:54

## 2024-03-12 RX ADMIN — MORPHINE SULFATE 4 MG: 4 INJECTION, SOLUTION INTRAMUSCULAR; INTRAVENOUS at 21:01

## 2024-03-12 RX ADMIN — METHOCARBAMOL 500 MG: 500 TABLET ORAL at 08:26

## 2024-03-12 RX ADMIN — MORPHINE SULFATE 4 MG: 4 INJECTION, SOLUTION INTRAMUSCULAR; INTRAVENOUS at 13:15

## 2024-03-12 RX ADMIN — MORPHINE SULFATE 4 MG: 4 INJECTION, SOLUTION INTRAMUSCULAR; INTRAVENOUS at 17:08

## 2024-03-12 RX ADMIN — METHOCARBAMOL 500 MG: 500 TABLET ORAL at 20:58

## 2024-03-12 RX ADMIN — POLYETHYLENE GLYCOL 3350 17 G: 17 POWDER, FOR SOLUTION ORAL at 13:15

## 2024-03-12 RX ADMIN — MORPHINE SULFATE 4 MG: 4 INJECTION, SOLUTION INTRAMUSCULAR; INTRAVENOUS at 08:25

## 2024-03-12 RX ADMIN — PANTOPRAZOLE SODIUM 40 MG: 40 INJECTION, POWDER, FOR SOLUTION INTRAVENOUS at 08:25

## 2024-03-12 RX ADMIN — ENOXAPARIN SODIUM 40 MG: 100 INJECTION SUBCUTANEOUS at 08:26

## 2024-03-12 ASSESSMENT — PAIN SCALES - GENERAL
PAINLEVEL_OUTOF10: 3
PAINLEVEL_OUTOF10: 7
PAINLEVEL_OUTOF10: 7
PAINLEVEL_OUTOF10: 3
PAINLEVEL_OUTOF10: 4
PAINLEVEL_OUTOF10: 8
PAINLEVEL_OUTOF10: 8
PAINLEVEL_OUTOF10: 9

## 2024-03-12 ASSESSMENT — PAIN - FUNCTIONAL ASSESSMENT: PAIN_FUNCTIONAL_ASSESSMENT: ACTIVITIES ARE NOT PREVENTED

## 2024-03-12 ASSESSMENT — PAIN DESCRIPTION - LOCATION
LOCATION: ABDOMEN
LOCATION: ABDOMEN

## 2024-03-12 ASSESSMENT — PAIN DESCRIPTION - FREQUENCY: FREQUENCY: INTERMITTENT

## 2024-03-12 ASSESSMENT — PAIN DESCRIPTION - ORIENTATION
ORIENTATION: MID;UPPER
ORIENTATION: UPPER

## 2024-03-12 ASSESSMENT — PAIN DESCRIPTION - DESCRIPTORS
DESCRIPTORS: POUNDING;PRESSURE;ACHING
DESCRIPTORS: CRAMPING

## 2024-03-12 ASSESSMENT — PAIN DESCRIPTION - PAIN TYPE: TYPE: ACUTE PAIN

## 2024-03-12 ASSESSMENT — PAIN DESCRIPTION - ONSET: ONSET: ON-GOING

## 2024-03-12 NOTE — PLAN OF CARE
Problem: Discharge Planning  Goal: Discharge to home or other facility with appropriate resources  3/11/2024 2134 by Kylie Flores RN  Outcome: Progressing  3/11/2024 1025 by Hue Espino RN  Outcome: Progressing     Problem: Pain  Goal: Verbalizes/displays adequate comfort level or baseline comfort level  3/11/2024 2134 by Kylie Flores RN  Outcome: Progressing  3/11/2024 1025 by Hue Espino RN  Outcome: Progressing     Problem: Safety - Adult  Goal: Free from fall injury  3/11/2024 2134 by Kylie Flores RN  Outcome: Progressing  3/11/2024 1025 by Hue Espino RN  Outcome: Progressing     Problem: ABCDS Injury Assessment  Goal: Absence of physical injury  3/11/2024 2134 by Kylie Flores RN  Outcome: Progressing  3/11/2024 1025 by Hue Espino RN  Outcome: Progressing

## 2024-03-13 ENCOUNTER — APPOINTMENT (OUTPATIENT)
Dept: CT IMAGING | Age: 49
DRG: 389 | End: 2024-03-13
Payer: MEDICARE

## 2024-03-13 VITALS
WEIGHT: 260 LBS | TEMPERATURE: 98.5 F | HEIGHT: 61 IN | SYSTOLIC BLOOD PRESSURE: 161 MMHG | OXYGEN SATURATION: 98 % | DIASTOLIC BLOOD PRESSURE: 96 MMHG | RESPIRATION RATE: 16 BRPM | HEART RATE: 75 BPM | BODY MASS INDEX: 49.09 KG/M2

## 2024-03-13 LAB
ALBUMIN SERPL-MCNC: 3.7 G/DL (ref 3.5–5.2)
ALP SERPL-CCNC: 87 U/L (ref 35–104)
ALT SERPL-CCNC: 78 U/L (ref 0–32)
ANION GAP SERPL CALCULATED.3IONS-SCNC: 11 MMOL/L (ref 7–16)
AST SERPL-CCNC: 46 U/L (ref 0–31)
BASOPHILS # BLD: 0.03 K/UL (ref 0–0.2)
BASOPHILS NFR BLD: 0 % (ref 0–2)
BILIRUB SERPL-MCNC: 0.5 MG/DL (ref 0–1.2)
BUN SERPL-MCNC: 5 MG/DL (ref 6–20)
CALCIUM SERPL-MCNC: 8.8 MG/DL (ref 8.6–10.2)
CHLORIDE SERPL-SCNC: 105 MMOL/L (ref 98–107)
CO2 SERPL-SCNC: 24 MMOL/L (ref 22–29)
CREAT SERPL-MCNC: 0.6 MG/DL (ref 0.5–1)
EOSINOPHIL # BLD: 0.22 K/UL (ref 0.05–0.5)
EOSINOPHILS RELATIVE PERCENT: 3 % (ref 0–6)
ERYTHROCYTE [DISTWIDTH] IN BLOOD BY AUTOMATED COUNT: 12.7 % (ref 11.5–15)
GFR SERPL CREATININE-BSD FRML MDRD: >60 ML/MIN/1.73M2
GLUCOSE SERPL-MCNC: 126 MG/DL (ref 74–99)
HCT VFR BLD AUTO: 35.9 % (ref 34–48)
HGB BLD-MCNC: 12 G/DL (ref 11.5–15.5)
IMM GRANULOCYTES # BLD AUTO: 0.07 K/UL (ref 0–0.58)
IMM GRANULOCYTES NFR BLD: 1 % (ref 0–5)
LYMPHOCYTES NFR BLD: 1.87 K/UL (ref 1.5–4)
LYMPHOCYTES RELATIVE PERCENT: 28 % (ref 20–42)
MCH RBC QN AUTO: 30.9 PG (ref 26–35)
MCHC RBC AUTO-ENTMCNC: 33.4 G/DL (ref 32–34.5)
MCV RBC AUTO: 92.5 FL (ref 80–99.9)
MONOCYTES NFR BLD: 0.58 K/UL (ref 0.1–0.95)
MONOCYTES NFR BLD: 9 % (ref 2–12)
NEUTROPHILS NFR BLD: 59 % (ref 43–80)
NEUTS SEG NFR BLD: 3.96 K/UL (ref 1.8–7.3)
PLATELET # BLD AUTO: 233 K/UL (ref 130–450)
PMV BLD AUTO: 9.5 FL (ref 7–12)
POTASSIUM SERPL-SCNC: 4.1 MMOL/L (ref 3.5–5)
PROT SERPL-MCNC: 6.2 G/DL (ref 6.4–8.3)
RBC # BLD AUTO: 3.88 M/UL (ref 3.5–5.5)
SODIUM SERPL-SCNC: 140 MMOL/L (ref 132–146)
WBC OTHER # BLD: 6.7 K/UL (ref 4.5–11.5)

## 2024-03-13 PROCEDURE — A4216 STERILE WATER/SALINE, 10 ML: HCPCS

## 2024-03-13 PROCEDURE — 6360000004 HC RX CONTRAST MEDICATION: Performed by: RADIOLOGY

## 2024-03-13 PROCEDURE — 6360000002 HC RX W HCPCS

## 2024-03-13 PROCEDURE — 6370000000 HC RX 637 (ALT 250 FOR IP): Performed by: SURGERY

## 2024-03-13 PROCEDURE — 74177 CT ABD & PELVIS W/CONTRAST: CPT

## 2024-03-13 PROCEDURE — 80053 COMPREHEN METABOLIC PANEL: CPT

## 2024-03-13 PROCEDURE — 99233 SBSQ HOSP IP/OBS HIGH 50: CPT | Performed by: SURGERY

## 2024-03-13 PROCEDURE — 85025 COMPLETE CBC W/AUTO DIFF WBC: CPT

## 2024-03-13 PROCEDURE — 2580000003 HC RX 258

## 2024-03-13 PROCEDURE — 36415 COLL VENOUS BLD VENIPUNCTURE: CPT

## 2024-03-13 PROCEDURE — C9113 INJ PANTOPRAZOLE SODIUM, VIA: HCPCS

## 2024-03-13 RX ADMIN — MORPHINE SULFATE 4 MG: 4 INJECTION, SOLUTION INTRAMUSCULAR; INTRAVENOUS at 18:18

## 2024-03-13 RX ADMIN — ENOXAPARIN SODIUM 40 MG: 100 INJECTION SUBCUTANEOUS at 09:09

## 2024-03-13 RX ADMIN — MORPHINE SULFATE 4 MG: 4 INJECTION, SOLUTION INTRAMUSCULAR; INTRAVENOUS at 09:09

## 2024-03-13 RX ADMIN — MORPHINE SULFATE 4 MG: 4 INJECTION, SOLUTION INTRAMUSCULAR; INTRAVENOUS at 04:31

## 2024-03-13 RX ADMIN — METHOCARBAMOL 500 MG: 500 TABLET ORAL at 14:17

## 2024-03-13 RX ADMIN — SODIUM CHLORIDE: 9 INJECTION, SOLUTION INTRAVENOUS at 14:47

## 2024-03-13 RX ADMIN — IOPAMIDOL 18 ML: 755 INJECTION, SOLUTION INTRAVENOUS at 12:55

## 2024-03-13 RX ADMIN — METHOCARBAMOL 500 MG: 500 TABLET ORAL at 17:50

## 2024-03-13 RX ADMIN — IOPAMIDOL 75 ML: 755 INJECTION, SOLUTION INTRAVENOUS at 12:54

## 2024-03-13 RX ADMIN — METHOCARBAMOL 500 MG: 500 TABLET ORAL at 09:09

## 2024-03-13 RX ADMIN — SODIUM CHLORIDE: 9 INJECTION, SOLUTION INTRAVENOUS at 09:08

## 2024-03-13 RX ADMIN — MORPHINE SULFATE 4 MG: 4 INJECTION, SOLUTION INTRAMUSCULAR; INTRAVENOUS at 14:18

## 2024-03-13 RX ADMIN — PANTOPRAZOLE SODIUM 40 MG: 40 INJECTION, POWDER, FOR SOLUTION INTRAVENOUS at 09:09

## 2024-03-13 ASSESSMENT — PAIN SCALES - GENERAL
PAINLEVEL_OUTOF10: 8
PAINLEVEL_OUTOF10: 4
PAINLEVEL_OUTOF10: 8
PAINLEVEL_OUTOF10: 8
PAINLEVEL_OUTOF10: 10

## 2024-03-13 ASSESSMENT — PAIN SCALES - WONG BAKER: WONGBAKER_NUMERICALRESPONSE: 0

## 2024-03-13 ASSESSMENT — PAIN DESCRIPTION - DESCRIPTORS: DESCRIPTORS: CRAMPING

## 2024-03-13 ASSESSMENT — PAIN DESCRIPTION - LOCATION: LOCATION: ABDOMEN

## 2024-03-13 NOTE — CARE COORDINATION
3/11 CM note: Per IDR and chart review no discharge needs at this time. Pt admitted with SBO, NGT has been clamped and patient has been placed on a diet. Anticipate discharge home tomorrow per IM progress note.

## 2024-03-13 NOTE — DISCHARGE SUMMARY
muscle relaxant for ab cramping  3/13 surgery reports ok to d/c home if ct ok.  It was see report  H/o colon cancer s/p colon resection / hemiciolectomy followed by colostomy closure and then most recent 10/2023, ventral hernia repair with mesh   Transaminitis improving  Leukocytosis- Resolved.   MS on baclofen and Ocrelizumab injections every 6 months with RLS  restart baclofen  Htn , bp optimal will resume her lisinopril if needed, monitor Bp closely   H/o PE No longer requiring anticoagulation.   Morbid Obesity BMI 49.13 Lifestyle modifications, weight loss as outpatient.  I releated this to her shannan  SHANNAN: Does not wear CPAP/BP. I recommended it.  Last sleep study reversed recommendation so I said to f/u.  GERD-PPI  Urine showed positive nitrites only no WBC , no symptoms , doubt UTI, s/p rocephin. leukocytosis resolved   Lovenox for dvt prophylaxis   Full code  Disposition: home probably tomorrow         Discharge Exam:  Vitals:    03/13/24 0617 03/13/24 0909 03/13/24 1417 03/13/24 1643   BP: 126/87   (!) 161/96   Pulse: 85   75   Resp: 17 16 16 16   Temp: 98.1 °F (36.7 °C)   98.5 °F (36.9 °C)   TempSrc: Oral   Oral   SpO2: 95%   98%   Weight:       Height:           No acute distress moist membranes   Normocephalic, without obvious abnormality, atraumatic, external ears without lesions,   Neck supple no cervical lymphadenopathy  Heart has a regular rate and rhythm no murmur  Lungs are clear to auscultation bilaterally with equal movements.  Abdomen is soft nontender nondistended no rebound or guarding.  No  significant peripheral edema good peripheral perfusion.  No significant rashes or new skin lesions.  No new focality on neuro exam which is overall grossly intact.  Affect and mood seem to be appropriate     I/O last 3 completed shifts:  In: 2151.2 [I.V.:2151.2]  Out: -   I/O this shift:  In: 360 [P.O.:360]  Out: -       LABS:  Recent Labs     03/11/24  0531 03/12/24  0532 03/13/24  0526    140 140   K

## 2024-03-13 NOTE — DISCHARGE INSTRUCTIONS
help?   Call your doctor now or seek immediate medical care if:    You have a fever.     You are vomiting.     You have new or worse belly pain.     You cannot pass stools or gas.   Watch closely for changes in your health, and be sure to contact your doctor if you have any problems.  Where can you learn more?  Go to https://[email or web address].net/patientEd and enter B082 to learn more about \"Bowel Obstruction: Care Instructions.\"  Current as of: October 19, 2023               Content Version: 14.0  © 7760-5221 [employer].   Care instructions adapted under license by [employer]. If you have questions about a medical condition or this instruction, always ask your healthcare professional. [employer] disclaims any warranty or liability for your use of this information.

## 2024-03-13 NOTE — PLAN OF CARE
Problem: Discharge Planning  Goal: Discharge to home or other facility with appropriate resources  3/13/2024 1132 by Harshad Astudillo, RN  Outcome: Progressing     Problem: Pain  Goal: Verbalizes/displays adequate comfort level or baseline comfort level  3/13/2024 1132 by Harshad Astudillo, RN  Outcome: Progressing     Problem: Safety - Adult  Goal: Free from fall injury  3/13/2024 1132 by Harshad Astudillo, RN  Outcome: Progressing     Problem: ABCDS Injury Assessment  Goal: Absence of physical injury  3/13/2024 1132 by Harshad Atsudillo, RN  Outcome: Progressing     Problem: Gastrointestinal - Adult  Goal: Maintains or returns to baseline bowel function  Outcome: Progressing

## 2024-03-13 NOTE — PROGRESS NOTES
[facility] Hospitalist   Progress Note    Admitting Date and Time: 3/9/2024  4:32 AM  Admit Dx: Intestinal obstruction (HCC) [K56.609]    Subjective:  3/11: had 1st bm today since last 3-4 days ago.  Surgery advance to solid food today.  Less ab pain today    3/10: Pt feels better this morning. NGT clamped. Seems to be tolerating. Denies any nausea and vomiting. Placed on CLD, possible removal of NGT later this afternoon if tolerating.     Per RN: No new concerns. Possible removal NGT later today. Placed on CLD NGT clamped.     ROS: denies fever, chills, cp, sob, n/v, HA unless stated above.     bisacodyl  10 mg Oral Q4H    sodium chloride flush  5-40 mL IntraVENous 2 times per day    pantoprazole (PROTONIX) 40 mg in sodium chloride (PF) 0.9 % 10 mL injection  40 mg IntraVENous BID    enoxaparin  40 mg SubCUTAneous Daily     Benzocaine-Menthol, 1 lozenge, Q2H PRN  sodium chloride flush, 5-40 mL, PRN  sodium chloride, , PRN  ondansetron, 4 mg, Q8H PRN   Or  ondansetron, 4 mg, Q6H PRN  polyethylene glycol, 17 g, Daily PRN  acetaminophen, 650 mg, Q6H PRN   Or  acetaminophen, 650 mg, Q6H PRN  hydrALAZINE, 10 mg, Q6H PRN  morphine, 2 mg, Q4H PRN   Or  morphine, 4 mg, Q4H PRN         Objective:    BP (!) 106/57   Pulse 86   Temp 97.6 °F (36.4 °C) (Oral)   Resp 16   Ht 1.549 m (5' 1\")   Wt 117.9 kg (260 lb)   LMP 01/05/2018   SpO2 94%   BMI 49.13 kg/m²   General Appearance: alert and oriented to person, place and time and in no acute distress  Skin: warm and dry  Head: normocephalic and atraumatic  Eyes: pupils equal, round, and reactive to light, extraocular eye movements intact, conjunctivae normal  Neck: neck supple and non tender without mass   Pulmonary/Chest: clear to auscultation bilaterally- no wheezes, rales or rhonchi, normal air movement, no respiratory distress  Cardiovascular: normal rate, normal S1 and S2 and no RGm  Abdomen: soft, non-tender, non-distended, normal bowel 
4 Eyes Skin Assessment     NAME:  Kendra Garay  YOB: 1975  MEDICAL RECORD NUMBER:  [id number]    The patient is being assessed for  Admission    I agree that at least one RN has performed a thorough Head to Toe Skin Assessment on the patient. ALL assessment sites listed below have been assessed.      Areas assessed by both nurses:    Head, Face, Ears, Shoulders, Back, Chest, Arms, Elbows, Hands, Sacrum. Buttock, Coccyx, Ischium, Legs. Feet and Heels, and Under Medical Devices         Does the Patient have a Wound? No noted wound(s)       Sergei Prevention initiated by RN: Yes  Wound Care Orders initiated by RN: No    Pressure Injury (Stage 3,4, Unstageable, DTI, NWPT, and Complex wounds) if present, place Wound referral order by RN under : No    New Ostomies, if present place, Ostomy referral order under : No     Nurse 1 eSignature: Electronically signed by Nena Ewing RN on 3/9/24 at 5:04 PM EST    **SHARE this note so that the co-signing nurse can place an eSignature**    Nurse 2 eSignature: {Esignature:[id number]}   
Surgery Progress Note            Chief complaint:   Chief Complaint   Patient presents with    Abdominal Pain     And emesis starting 7pm      Patient Active Problem List   Diagnosis    Vocal cord dysfunction    Multiple sclerosis (HCC)    Morbidly obese (HCC)    Palafox's esophagus with dysplasia    Secondary restless legs syndrome    Malignant neoplasm of descending colon (HCC)    S/P closure of ileostomy    Incisional hernia    Hiatal hernia    Sacroiliac dysfunction    SBO (small bowel obstruction) (HCC)       S: passed some flatus, no bm, pain is improved    O:   Vitals:    03/10/24 0543   BP: 122/78   Pulse: 80   Resp: 18   Temp: 98.1 °F (36.7 °C)   SpO2: 93%       Intake/Output Summary (Last 24 hours) at 3/10/2024 0839  Last data filed at 3/10/2024 0514  Gross per 24 hour   Intake 0 ml   Output 2050 ml   Net -2050 ml           Labs:  Lab Results   Component Value Date/Time    WBC 7.9 03/10/2024 04:56 AM    WBC 15.2 03/09/2024 05:05 AM    WBC 9.4 11/22/2023 10:10 AM    HGB 12.7 03/10/2024 04:56 AM    HGB 16.3 03/09/2024 05:05 AM    HGB 14.8 11/22/2023 10:10 AM    HCT 38.9 03/10/2024 04:56 AM    HCT 47.1 03/09/2024 05:05 AM    HCT 44.3 11/22/2023 10:10 AM     Lab Results   Component Value Date    CREATININE 0.8 03/10/2024    BUN 8 03/10/2024     03/10/2024    K 4.0 03/10/2024     03/10/2024    CO2 27 03/10/2024     Lab Results   Component Value Date/Time    LIPASE 18 03/09/2024 05:05 AM    LIPASE 21 10/31/2023 11:45 AM    LIPASE 15 08/01/2023 06:39 PM         Physical exam:   /78   Pulse 80   Temp 98.1 °F (36.7 °C) (Oral)   Resp 18   Ht 1.549 m (5' 1\")   Wt 117.9 kg (260 lb)   LMP 01/05/2018   SpO2 93%   BMI 49.13 kg/m²   General appearance: NAD  Head: NCAT  Neck: supple, no masses  Lungs: equal chest rise bilateral  Heart: S1S2 present  Abdomen: soft, nontender, nondistended  Skin; no lesions  Gu: no cva tenderness  Extremities: extremities normal, atraumatic, no cyanosis or 
Surgery Progress Note            Chief complaint:   Chief Complaint   Patient presents with    Abdominal Pain     And emesis starting 7pm      Patient Active Problem List   Diagnosis    Vocal cord dysfunction    Multiple sclerosis (HCC)    Morbidly obese (HCC)    Palafox's esophagus with dysplasia    Secondary restless legs syndrome    Malignant neoplasm of descending colon (HCC)    S/P closure of ileostomy    Incisional hernia    Hiatal hernia    Sacroiliac dysfunction    SBO (small bowel obstruction) (HCC)       S: passed some flatus, no bm, pain is improved    O:   Vitals:    03/11/24 0605   BP:    Pulse:    Resp: 16   Temp:    SpO2:        Intake/Output Summary (Last 24 hours) at 3/11/2024 0716  Last data filed at 3/11/2024 0612  Gross per 24 hour   Intake 1053.99 ml   Output --   Net 1053.99 ml             Labs:  Lab Results   Component Value Date/Time    WBC 8.0 03/11/2024 05:31 AM    WBC 7.9 03/10/2024 04:56 AM    WBC 15.2 03/09/2024 05:05 AM    HGB 12.2 03/11/2024 05:31 AM    HGB 12.7 03/10/2024 04:56 AM    HGB 16.3 03/09/2024 05:05 AM    HCT 36.8 03/11/2024 05:31 AM    HCT 38.9 03/10/2024 04:56 AM    HCT 47.1 03/09/2024 05:05 AM     Lab Results   Component Value Date    CREATININE 0.6 03/11/2024    BUN 6 03/11/2024     03/11/2024    K 3.6 03/11/2024     03/11/2024    CO2 23 03/11/2024     Lab Results   Component Value Date/Time    LIPASE 18 03/09/2024 05:05 AM    LIPASE 21 10/31/2023 11:45 AM    LIPASE 15 08/01/2023 06:39 PM         Physical exam:   BP (!) 106/57   Pulse 86   Temp 97.6 °F (36.4 °C) (Oral)   Resp 16   Ht 1.549 m (5' 1\")   Wt 117.9 kg (260 lb)   LMP 01/05/2018   SpO2 94%   BMI 49.13 kg/m²   General appearance: NAD  Head: NCAT  Neck: supple, no masses  Lungs: equal chest rise bilateral  Heart: S1S2 present  Abdomen: soft, nontender, nondistended  Skin; no lesions  Gu: no cva tenderness  Extremities: extremities normal, atraumatic, no cyanosis or edema    A:  sbo 
Surgery Progress Note            Chief complaint:   Chief Complaint   Patient presents with    Abdominal Pain     And emesis starting 7pm      Patient Active Problem List   Diagnosis    Vocal cord dysfunction    Multiple sclerosis (HCC)    Morbidly obese (HCC)    Palafox's esophagus with dysplasia    Secondary restless legs syndrome    Malignant neoplasm of descending colon (HCC)    S/P closure of ileostomy    Incisional hernia    Hiatal hernia    Sacroiliac dysfunction    SBO (small bowel obstruction) ([facility])       S: tolerating diet and moving bowels, having muscle cramping with movement    O:   Vitals:    03/12/24 0515   BP: 123/64   Pulse: 90   Resp: 16   Temp: 98.2 °F (36.8 °C)   SpO2: 98%       Intake/Output Summary (Last 24 hours) at 3/12/2024 0727  Last data filed at 3/11/2024 1839  Gross per 24 hour   Intake 2138.53 ml   Output --   Net 2138.53 ml             Labs:  Lab Results   Component Value Date/Time    WBC 6.7 03/12/2024 05:32 AM    WBC 8.0 03/11/2024 05:31 AM    WBC 7.9 03/10/2024 04:56 AM    HGB 12.2 03/12/2024 05:32 AM    HGB 12.2 03/11/2024 05:31 AM    HGB 12.7 03/10/2024 04:56 AM    HCT 36.7 03/12/2024 05:32 AM    HCT 36.8 03/11/2024 05:31 AM    HCT 38.9 03/10/2024 04:56 AM     Lab Results   Component Value Date    CREATININE 0.6 03/12/2024    BUN 4 (L) 03/12/2024     03/12/2024    K 4.1 03/12/2024     03/12/2024    CO2 23 03/12/2024     Lab Results   Component Value Date/Time    LIPASE 18 03/09/2024 05:05 AM    LIPASE 21 10/31/2023 11:45 AM    LIPASE 15 08/01/2023 06:39 PM         Physical exam:   /64   Pulse 90   Temp 98.2 °F (36.8 °C) (Oral)   Resp 16   Ht 1.549 m (5' 1\")   Wt 117.9 kg (260 lb)   LMP 01/05/2018   SpO2 98%   BMI 49.13 kg/m²   General appearance: NAD  Head: NCAT  Neck: supple, no masses  Lungs: equal chest rise bilateral  Heart: S1S2 present  Abdomen: soft, nontender, nondistended  Skin; no lesions  Gu: no cva tenderness  Extremities: extremities normal, 
extremities normal, atraumatic, no cyanosis or edema    A:  sbo resolved muscle cramping and abd pain    P:will check repeat CT, if no issues will d/c as is likely musculoskeletal, if any pathology will discuss with her treatment tomorrow    Dominik Becerra MD  3/13/2024    
Resolved. WBC 7.9  Hx PE- 4/2022. No longer requiring anticoagulation.   HTN-  lisinopril-hydrochlorothiazide. Hydralazine PRN.    MS- Ocrelizumab injections every 6 months with RLS. Requip/Baclofen/Neurontin.   Morbid Obesity BMI 49.13 Lifestyle modifications, weight loss  VEENA- Does not wear CPAP/BP.   GERD-PPI  Colon Cancer- S/P several procedures. Latest 2/8/2022 S/p laparoscopic sigmoid and rectal resection with stapled end to end anastomosis and mobilization of splenic flexure      NOTE: This report was transcribed using voice recognition software. Every effort was made to ensure accuracy; however, inadvertent computerized transcription errors may be present.     Electronically signed by MARIXA Caecres CNP on 3/10/2024 at 8:31 AM                 
with a history of GERD, colon cancer s/p resection, PE/DVT, HTN, morbid obesity, MS, VEENA, restless leg syndrome, presents to the emergency department for evaluation of nausea, vomiting and abdominal pain.  Patient states symptoms began last night around 7 PM.  Multiple episodes of bilious vomiting overnight patient states > 10 episodes.  Abdominal pain is located in the epigastric region.  Patient has extensive abdominal surgical history beginning with colon resection in 2021 and most recently with laparoscopic paraesophageal hiatal hernia repair 11/28/2023.        Small bowel obstruction , h/o multiple surgeries.   NG clamped then removed 3/10.  3/11: surgery says resolving 3/12: surgery adding muscle relaxant for ab cramping  H/o colon cancer s/p colon resection / hemiciolectomy followed by colostomy closure and then most recent 10/2023, ventral hernia repair with mesh   Transaminitis improving  Leukocytosis- Resolved.   MS on baclofen and Ocrelizumab injections every 6 months with RLS  restart baclofen  Htn , bp optimal will resume her lisinopril if needed, monitor Bp closely   H/o PE No longer requiring anticoagulation.   Morbid Obesity BMI 49.13 Lifestyle modifications, weight loss as outpatient.  I releated this to her veena  VEENA- Does not wear CPAP/BP. I recommended it.  Last sleep study reversed recommendation so I said to f/u.  GERD-PPI  Urine showed positive nitrites only no WBC , no symptoms , doubt UTI, s/p rocephin. leukocytosis resolved   Lovenox for dvt prophylaxis   Full code  Disposition: home probably tomorrow     NOTE: This report was transcribed using voice recognition software. Every effort was made to ensure accuracy; however, inadvertent computerized transcription errors may be present.     Electronically signed by MANUEL HANLEY MD on 3/12/2024 at 11:36 AM

## 2024-03-14 LAB
MICROORGANISM SPEC CULT: NORMAL
MICROORGANISM SPEC CULT: NORMAL
SERVICE CMNT-IMP: NORMAL
SERVICE CMNT-IMP: NORMAL
SPECIMEN DESCRIPTION: NORMAL
SPECIMEN DESCRIPTION: NORMAL

## 2024-03-28 ENCOUNTER — HOSPITAL ENCOUNTER (OUTPATIENT)
Dept: INFUSION THERAPY | Age: 49
Setting detail: INFUSION SERIES
Discharge: HOME | End: 2024-03-28
Payer: MEDICARE

## 2024-03-28 VITALS
TEMPERATURE: 97.1 F | SYSTOLIC BLOOD PRESSURE: 129 MMHG | RESPIRATION RATE: 16 BRPM | HEART RATE: 86 BPM | DIASTOLIC BLOOD PRESSURE: 88 MMHG | OXYGEN SATURATION: 98 %

## 2024-03-28 DIAGNOSIS — G35 MULTIPLE SCLEROSIS (HCC): Primary | ICD-10-CM

## 2024-03-28 PROCEDURE — 96365 THER/PROPH/DIAG IV INF INIT: CPT

## 2024-03-28 PROCEDURE — 96366 THER/PROPH/DIAG IV INF ADDON: CPT

## 2024-03-28 PROCEDURE — 6370000000 HC RX 637 (ALT 250 FOR IP): Performed by: NURSE PRACTITIONER

## 2024-03-28 PROCEDURE — 6360000002 HC RX W HCPCS: Performed by: NURSE PRACTITIONER

## 2024-03-28 PROCEDURE — 2580000003 HC RX 258: Performed by: NURSE PRACTITIONER

## 2024-03-28 PROCEDURE — 96375 TX/PRO/DX INJ NEW DRUG ADDON: CPT

## 2024-03-28 RX ORDER — SODIUM CHLORIDE 9 MG/ML
5-250 INJECTION, SOLUTION INTRAVENOUS PRN
Status: DISCONTINUED | OUTPATIENT
Start: 2024-03-28 | End: 2024-03-29 | Stop reason: HOSPADM

## 2024-03-28 RX ORDER — METHYLPREDNISOLONE SODIUM SUCCINATE 1 G/16ML
125 INJECTION, POWDER, LYOPHILIZED, FOR SOLUTION INTRAMUSCULAR; INTRAVENOUS ONCE
Status: CANCELLED
Start: 2024-03-28 | End: 2024-03-28

## 2024-03-28 RX ORDER — SODIUM CHLORIDE 9 MG/ML
5-250 INJECTION, SOLUTION INTRAVENOUS PRN
OUTPATIENT
Start: 2024-04-26

## 2024-03-28 RX ORDER — ALBUTEROL SULFATE 90 UG/1
4 AEROSOL, METERED RESPIRATORY (INHALATION) PRN
OUTPATIENT
Start: 2024-04-26

## 2024-03-28 RX ORDER — ACETAMINOPHEN 325 MG/1
650 TABLET ORAL ONCE
OUTPATIENT
Start: 2024-04-26 | End: 2024-04-26

## 2024-03-28 RX ORDER — SODIUM CHLORIDE 9 MG/ML
INJECTION, SOLUTION INTRAVENOUS CONTINUOUS
OUTPATIENT
Start: 2024-04-26

## 2024-03-28 RX ORDER — ONDANSETRON 2 MG/ML
8 INJECTION INTRAMUSCULAR; INTRAVENOUS
OUTPATIENT
Start: 2024-04-26

## 2024-03-28 RX ORDER — METHYLPREDNISOLONE SODIUM SUCCINATE 1 G/16ML
125 INJECTION, POWDER, LYOPHILIZED, FOR SOLUTION INTRAMUSCULAR; INTRAVENOUS ONCE
Status: COMPLETED | OUTPATIENT
Start: 2024-03-28 | End: 2024-03-28

## 2024-03-28 RX ORDER — HEPARIN 100 UNIT/ML
500 SYRINGE INTRAVENOUS PRN
OUTPATIENT
Start: 2024-04-26

## 2024-03-28 RX ORDER — DIPHENHYDRAMINE HYDROCHLORIDE 50 MG/ML
25 INJECTION INTRAMUSCULAR; INTRAVENOUS ONCE
OUTPATIENT
Start: 2024-04-26 | End: 2024-04-26

## 2024-03-28 RX ORDER — ACETAMINOPHEN 325 MG/1
650 TABLET ORAL ONCE
Status: COMPLETED | OUTPATIENT
Start: 2024-03-28 | End: 2024-03-28

## 2024-03-28 RX ORDER — EPINEPHRINE 1 MG/ML
0.3 INJECTION, SOLUTION, CONCENTRATE INTRAVENOUS PRN
OUTPATIENT
Start: 2024-04-26

## 2024-03-28 RX ORDER — METHYLPREDNISOLONE SODIUM SUCCINATE 1 G/16ML
125 INJECTION, POWDER, LYOPHILIZED, FOR SOLUTION INTRAMUSCULAR; INTRAVENOUS ONCE
Start: 2024-04-25 | End: 2024-04-25

## 2024-03-28 RX ORDER — ACETAMINOPHEN 325 MG/1
650 TABLET ORAL
OUTPATIENT
Start: 2024-04-26

## 2024-03-28 RX ORDER — SODIUM CHLORIDE 0.9 % (FLUSH) 0.9 %
5-40 SYRINGE (ML) INJECTION PRN
OUTPATIENT
Start: 2024-04-26

## 2024-03-28 RX ORDER — DIPHENHYDRAMINE HYDROCHLORIDE 50 MG/ML
50 INJECTION INTRAMUSCULAR; INTRAVENOUS
OUTPATIENT
Start: 2024-04-26

## 2024-03-28 RX ORDER — SODIUM CHLORIDE 0.9 % (FLUSH) 0.9 %
5-40 SYRINGE (ML) INJECTION PRN
Status: DISCONTINUED | OUTPATIENT
Start: 2024-03-28 | End: 2024-03-29 | Stop reason: HOSPADM

## 2024-03-28 RX ORDER — DIPHENHYDRAMINE HYDROCHLORIDE 50 MG/ML
25 INJECTION INTRAMUSCULAR; INTRAVENOUS ONCE
Status: COMPLETED | OUTPATIENT
Start: 2024-03-28 | End: 2024-03-28

## 2024-03-28 RX ADMIN — DIPHENHYDRAMINE HYDROCHLORIDE 25 MG: 50 INJECTION INTRAMUSCULAR; INTRAVENOUS at 08:41

## 2024-03-28 RX ADMIN — SODIUM CHLORIDE 50 ML: 9 INJECTION, SOLUTION INTRAVENOUS at 13:38

## 2024-03-28 RX ADMIN — SODIUM CHLORIDE, PRESERVATIVE FREE 10 ML: 5 INJECTION INTRAVENOUS at 08:47

## 2024-03-28 RX ADMIN — ACETAMINOPHEN 650 MG: 325 TABLET, FILM COATED ORAL at 08:35

## 2024-03-28 RX ADMIN — METHYLPREDNISOLONE SODIUM SUCCINATE 125 MG: 125 INJECTION, POWDER, FOR SOLUTION INTRAMUSCULAR; INTRAVENOUS at 08:48

## 2024-03-28 RX ADMIN — SODIUM CHLORIDE, PRESERVATIVE FREE 10 ML: 5 INJECTION INTRAVENOUS at 08:42

## 2024-03-28 RX ADMIN — SODIUM CHLORIDE, PRESERVATIVE FREE 10 ML: 5 INJECTION INTRAVENOUS at 08:35

## 2024-03-28 RX ADMIN — OCRELIZUMAB 600 MG: 300 INJECTION INTRAVENOUS at 09:38

## 2024-03-28 NOTE — PROGRESS NOTES
Patient tolerated ocrevus infusion well. Remained on unit for 1 hour after treatment. Patient alert and oriented x3. No distress noted. Vital signs stable. Patient denies any new or worsening pain. Offered patient education and/or discharge material. Patient declined. Patient denies any needs. All questions answered. D/C in stable condition.

## 2024-04-02 ENCOUNTER — OFFICE VISIT (OUTPATIENT)
Dept: PAIN MANAGEMENT | Age: 49
End: 2024-04-02
Payer: MEDICARE

## 2024-04-02 ENCOUNTER — PREP FOR PROCEDURE (OUTPATIENT)
Dept: PAIN MANAGEMENT | Age: 49
End: 2024-04-02

## 2024-04-02 VITALS
RESPIRATION RATE: 18 BRPM | TEMPERATURE: 96.9 F | SYSTOLIC BLOOD PRESSURE: 138 MMHG | BODY MASS INDEX: 49.09 KG/M2 | WEIGHT: 260 LBS | HEIGHT: 61 IN | HEART RATE: 84 BPM | OXYGEN SATURATION: 97 % | DIASTOLIC BLOOD PRESSURE: 90 MMHG

## 2024-04-02 DIAGNOSIS — M53.3 SACROILIAC DYSFUNCTION: Primary | ICD-10-CM

## 2024-04-02 DIAGNOSIS — E66.9 OBESITY, UNSPECIFIED CLASSIFICATION, UNSPECIFIED OBESITY TYPE, UNSPECIFIED WHETHER SERIOUS COMORBIDITY PRESENT: ICD-10-CM

## 2024-04-02 DIAGNOSIS — G35 MULTIPLE SCLEROSIS (HCC): ICD-10-CM

## 2024-04-02 DIAGNOSIS — M47.817 LUMBOSACRAL SPONDYLOSIS WITHOUT MYELOPATHY: ICD-10-CM

## 2024-04-02 DIAGNOSIS — M53.3 DISORDER OF SACRUM: ICD-10-CM

## 2024-04-02 DIAGNOSIS — M51.36 DDD (DEGENERATIVE DISC DISEASE), LUMBAR: ICD-10-CM

## 2024-04-02 DIAGNOSIS — M54.16 LUMBAR RADICULAR PAIN: ICD-10-CM

## 2024-04-02 PROCEDURE — G8427 DOCREV CUR MEDS BY ELIG CLIN: HCPCS | Performed by: ANESTHESIOLOGY

## 2024-04-02 PROCEDURE — 99213 OFFICE O/P EST LOW 20 MIN: CPT | Performed by: ANESTHESIOLOGY

## 2024-04-02 PROCEDURE — 4004F PT TOBACCO SCREEN RCVD TLK: CPT | Performed by: ANESTHESIOLOGY

## 2024-04-02 PROCEDURE — 1111F DSCHRG MED/CURRENT MED MERGE: CPT | Performed by: ANESTHESIOLOGY

## 2024-04-02 PROCEDURE — G8417 CALC BMI ABV UP PARAM F/U: HCPCS | Performed by: ANESTHESIOLOGY

## 2024-04-02 PROCEDURE — 99214 OFFICE O/P EST MOD 30 MIN: CPT | Performed by: ANESTHESIOLOGY

## 2024-04-02 RX ORDER — SODIUM CHLORIDE 0.9 % (FLUSH) 0.9 %
5-40 SYRINGE (ML) INJECTION EVERY 12 HOURS SCHEDULED
Status: CANCELLED | OUTPATIENT
Start: 2024-04-02

## 2024-04-02 RX ORDER — SODIUM CHLORIDE 0.9 % (FLUSH) 0.9 %
5-40 SYRINGE (ML) INJECTION PRN
Status: CANCELLED | OUTPATIENT
Start: 2024-04-02

## 2024-04-02 RX ORDER — SODIUM CHLORIDE 9 MG/ML
INJECTION, SOLUTION INTRAVENOUS PRN
Status: CANCELLED | OUTPATIENT
Start: 2024-04-02

## 2024-04-02 NOTE — PROGRESS NOTES
Kendra Garay presents to the [facility] on 4/2/2024. Kendra is complaining of pain in her lower back. The pain is constant. The pain is described as aching, sharp, stiff and with pressure. Pain is rated on her best day at a 5, on her worst day at a 10, and on average at a 8 on the VAS scale. She took her last dose of Gabapentin and Baclofen today.      Any procedures since your last visit: No    She is not on NSAIDS and  is not on anticoagulation medications to include none and is managed by NA.     Pacemaker or defibrillator: No    Medication Contract and Consent for Opioid Use Documents Filed        No documents found                       Resp 18   Ht 1.549 m (5' 0.98\")   Wt 117.9 kg (260 lb)   LMP 01/05/2018   BMI 49.15 kg/m²      Patient's last menstrual period was 01/05/2018.  
Yes     Lack of Transportation (Non-Medical): Yes   Physical Activity: Sufficiently Active (5/4/2021)    Exercise Vital Sign     Days of Exercise per Week: 3 days     Minutes of Exercise per Session: 60 min   Stress: No Stress Concern Present (5/4/2021)    [employer] - Occupational Stress Questionnaire     Feeling of Stress : Not at all   Social Connections: Socially Isolated (5/4/2021)    Social Connection and Isolation Panel [NHANES]     Frequency of Communication with Friends and Family: Three times a week     Frequency of Social Gatherings with Friends and Family: Twice a week     Attends Rastafarian Services: Never     Active Member of Clubs or Organizations: No     Attends Club or Organization Meetings: Never     Marital Status: Never    Intimate Partner Violence: Not on file   Housing Stability: Low Risk  (3/11/2024)    Housing Stability Vital Sign     Unable to Pay for Housing in the Last Year: No     Number of Places Lived in the Last Year: 1     Unstable Housing in the Last Year: No       Family History   Problem Relation Age of Onset    Cancer Mother     Mult Sclerosis Mother     Alcohol Abuse Father     Colon Cancer Neg Hx     Uterine Cancer Neg Hx     Ovarian Cancer Neg Hx     Breast Cancer Neg Hx        REVIEW OF SYSTEMS:     Kendra denies fever/chills, chest pain, shortness of breath, new bowel or bladder complaints. All other review of systems was negative.    PHYSICAL EXAMINATION:      BP (!) 138/90   Pulse 84   Temp 96.9 °F (36.1 °C) (Infrared)   Resp 18   Ht 1.549 m (5' 0.98\")   Wt 117.9 kg (260 lb)   LMP 01/05/2018   SpO2 97%   BMI 49.15 kg/m²   General:       General appearance:  Pleasant and well-hydrated, in no distress and A & O x 3  Build:Obese  Function: Rises from seated position easily and Moves about room without difficulty     HEENT:     Head:normocephalic, atraumatic     Lungs:     Breathing:normal breathing pattern      CVS:     RRR

## 2024-04-02 NOTE — H&P (VIEW-ONLY)
01/25/2022    ANAL PROCTO SIGMOIDOSCOPY FLEXIBLE performed by Dominik Becerra MD at [facility] ENDOSCOPY    SHOULDER ARTHROSCOPY Right 11/11/2020    RIGHT SHOULDER DIAGNOSTIC ARTHROSCOPY WITH DECOMPRESSION ROTATOR CUFF REPAIR performed by Mu De León MD at [facility] OR    SUBTOTAL COLECTOMY      TONSILLECTOMY      TOTAL COLECTOMY      UPPER GASTROINTESTINAL ENDOSCOPY      VENTRAL HERNIA REPAIR N/A 10/28/2022    LAPAROSCOPIC INCISIONAL HERNIA REPAIR WITH MESH performed by Dominik Becerra MD at [facility] OR       Prior to Admission medications    Medication Sig Start Date End Date Taking? Authorizing Provider   vitamin D (D3-50) 09211 UNIT CAPS TAKE 1 CAPSULE BY MOUTH ONCE WEEKLY ON SUNDAYS 3/7/24  Yes Geronimo Vick APRN - CNP   ADVAIR -21 MCG/ACT inhaler INHALE 2 PUFFS BY MOUTH INTO THE LUNGS TWICE DAILY 12/8/23  Yes Amanda Quarles PA-C   lisinopril-hydroCHLOROthiazide (PRINZIDE;ZESTORETIC) 10-12.5 MG per tablet TAKE 1 TABLET BY MOUTH DAILY 12/8/23  Yes Amanda Quarles PA-C   fluticasone (FLONASE) 50 MCG/ACT nasal spray 1 spray by Each Nostril route daily as needed for Rhinitis or Allergies 11/6/23  Yes Provider, MD Vero   gabapentin (NEURONTIN) 400 MG capsule Take 1 capsule by mouth 4 times daily for 360 days. 10/25/23 10/19/24 Yes Geronimo Vick APRN - CNP   rOPINIRole (REQUIP) 0.5 MG tablet TAKE 1 TABLET BY MOUTH EVERY MORNING, TAKE 1 TABLET 2 HOURS BEFORE BED, AND TAKE 1 TABLET AT BEDTIME  Patient taking differently: Take 1 tablet by mouth 3 times daily TAKE 1 TABLET BY MOUTH EVERY MORNING, TAKE 1 TABLET 2 HOURS BEFORE BED, AND TAKE 1 TABLET AT BEDTIME 7/12/23  Yes Geronimo Vick APRN - CNP   baclofen (LIORESAL) 20 MG tablet TAKE 1 TABLET BY MOUTH FOUR TIMES DAILY AS NEEDED FOR SPASMS/PAIN 7/12/23  Yes Geronimo Vick APRN - CNP   nortriptyline (PAMELOR) 10 MG capsule TAKE 1 CAPSULE BY MOUTH NIGHTLY FOR HEADACHE 7/11/23  Yes Amanda Quarles PA-C   VENTOLIN  (90 Base) MCG/ACT inhaler INHALE 2

## 2024-04-03 PROBLEM — M53.3 DISORDER OF SACRUM: Status: ACTIVE | Noted: 2024-04-02

## 2024-04-05 DIAGNOSIS — J45.31 MILD PERSISTENT ASTHMA WITH ACUTE EXACERBATION: ICD-10-CM

## 2024-04-05 RX ORDER — LISINOPRIL AND HYDROCHLOROTHIAZIDE 12.5; 1 MG/1; MG/1
1 TABLET ORAL DAILY
Qty: 30 TABLET | Refills: 3 | Status: SHIPPED | OUTPATIENT
Start: 2024-04-05

## 2024-04-05 RX ORDER — FLUTICASONE PROPIONATE AND SALMETEROL XINAFOATE 115; 21 UG/1; UG/1
AEROSOL, METERED RESPIRATORY (INHALATION)
Qty: 12 G | Refills: 10 | Status: SHIPPED | OUTPATIENT
Start: 2024-04-05

## 2024-04-08 DIAGNOSIS — J45.31 MILD PERSISTENT ASTHMA WITH ACUTE EXACERBATION: ICD-10-CM

## 2024-04-08 RX ORDER — FLUTICASONE PROPIONATE AND SALMETEROL XINAFOATE 115; 21 UG/1; UG/1
AEROSOL, METERED RESPIRATORY (INHALATION)
Qty: 12 G | Refills: 10 | OUTPATIENT
Start: 2024-04-08

## 2024-04-09 ENCOUNTER — HOSPITAL ENCOUNTER (OUTPATIENT)
Age: 49
Setting detail: OUTPATIENT SURGERY
Discharge: HOME | End: 2024-04-09
Attending: ANESTHESIOLOGY | Admitting: ANESTHESIOLOGY
Payer: MEDICARE

## 2024-04-09 ENCOUNTER — HOSPITAL ENCOUNTER (OUTPATIENT)
Dept: OPERATING ROOM | Age: 49
Setting detail: OUTPATIENT SURGERY
Discharge: HOME | End: 2024-04-09
Attending: ANESTHESIOLOGY
Payer: MEDICARE

## 2024-04-09 VITALS
OXYGEN SATURATION: 95 % | SYSTOLIC BLOOD PRESSURE: 135 MMHG | HEIGHT: 60 IN | TEMPERATURE: 97.8 F | WEIGHT: 260 LBS | RESPIRATION RATE: 16 BRPM | DIASTOLIC BLOOD PRESSURE: 64 MMHG | HEART RATE: 89 BPM | BODY MASS INDEX: 51.04 KG/M2

## 2024-04-09 DIAGNOSIS — M53.3 DISORDER OF SACRUM: ICD-10-CM

## 2024-04-09 PROCEDURE — 7100000010 HC PHASE II RECOVERY - FIRST 15 MIN: Performed by: ANESTHESIOLOGY

## 2024-04-09 PROCEDURE — 2709999900 HC NON-CHARGEABLE SUPPLY: Performed by: ANESTHESIOLOGY

## 2024-04-09 PROCEDURE — 6360000004 HC RX CONTRAST MEDICATION: Performed by: ANESTHESIOLOGY

## 2024-04-09 PROCEDURE — 6360000002 HC RX W HCPCS: Performed by: ANESTHESIOLOGY

## 2024-04-09 PROCEDURE — 2500000003 HC RX 250 WO HCPCS: Performed by: ANESTHESIOLOGY

## 2024-04-09 PROCEDURE — 27096 INJECT SACROILIAC JOINT: CPT | Performed by: ANESTHESIOLOGY

## 2024-04-09 PROCEDURE — 3600000005 HC SURGERY LEVEL 5 BASE: Performed by: ANESTHESIOLOGY

## 2024-04-09 PROCEDURE — 7100000011 HC PHASE II RECOVERY - ADDTL 15 MIN: Performed by: ANESTHESIOLOGY

## 2024-04-09 RX ORDER — SODIUM CHLORIDE 0.9 % (FLUSH) 0.9 %
5-40 SYRINGE (ML) INJECTION EVERY 12 HOURS SCHEDULED
Status: DISCONTINUED | OUTPATIENT
Start: 2024-04-09 | End: 2024-04-09 | Stop reason: HOSPADM

## 2024-04-09 RX ORDER — BUPIVACAINE HYDROCHLORIDE 2.5 MG/ML
INJECTION, SOLUTION EPIDURAL; INFILTRATION; INTRACAUDAL PRN
Status: DISCONTINUED | OUTPATIENT
Start: 2024-04-09 | End: 2024-04-09 | Stop reason: ALTCHOICE

## 2024-04-09 RX ORDER — SODIUM CHLORIDE 9 MG/ML
INJECTION, SOLUTION INTRAVENOUS PRN
Status: DISCONTINUED | OUTPATIENT
Start: 2024-04-09 | End: 2024-04-09 | Stop reason: HOSPADM

## 2024-04-09 RX ORDER — METHYLPREDNISOLONE ACETATE 40 MG/ML
INJECTION, SUSPENSION INTRA-ARTICULAR; INTRALESIONAL; INTRAMUSCULAR; SOFT TISSUE PRN
Status: DISCONTINUED | OUTPATIENT
Start: 2024-04-09 | End: 2024-04-09 | Stop reason: ALTCHOICE

## 2024-04-09 RX ORDER — LIDOCAINE HYDROCHLORIDE 5 MG/ML
INJECTION, SOLUTION INFILTRATION; INTRAVENOUS PRN
Status: DISCONTINUED | OUTPATIENT
Start: 2024-04-09 | End: 2024-04-09 | Stop reason: ALTCHOICE

## 2024-04-09 RX ORDER — SODIUM CHLORIDE 0.9 % (FLUSH) 0.9 %
5-40 SYRINGE (ML) INJECTION PRN
Status: DISCONTINUED | OUTPATIENT
Start: 2024-04-09 | End: 2024-04-09 | Stop reason: HOSPADM

## 2024-04-09 RX ORDER — DEXLANSOPRAZOLE 30 MG/1
30 CAPSULE, DELAYED RELEASE ORAL DAILY
COMMUNITY

## 2024-04-09 ASSESSMENT — PAIN - FUNCTIONAL ASSESSMENT
PAIN_FUNCTIONAL_ASSESSMENT: 0-10
PAIN_FUNCTIONAL_ASSESSMENT: NONE - DENIES PAIN
PAIN_FUNCTIONAL_ASSESSMENT: NONE - DENIES PAIN

## 2024-04-09 NOTE — INTERVAL H&P NOTE
Update History & Physical    The patient's History and Physical of April 2, 2024 was reviewed with the patient and I examined the patient. There was no change. The surgical site was confirmed by the patient and me.     Plan: The risks, benefits, expected outcome, and alternative to the recommended procedure have been discussed with the patient. Patient understands and wants to proceed with the procedure.     Electronically signed by Rashad Chow MD on 4/9/2024

## 2024-04-09 NOTE — OP NOTE
Operative Note      Patient: Kendra Graay  YOB: 1975  MRN: [id number]    Date of Procedure: 2024    Pre-Op Diagnosis Codes:     * Disorder of sacrum [M53.3]    Post-Op Diagnosis: Same       Procedure(s):  RIGHT SACROILIAC JOINT INJECTION UNDER FLUOROSCOPIC GUIDANCE    Surgeon(s):  Rashad Chow MD    Assistant:   * No surgical staff found *    Anesthesia: Local    Estimated Blood Loss (mL): Minimal    Complications: None    Specimens:   * No specimens in log *    Implants:  * No implants in log *      Drains:   None    Findings:    Other Findings: good needle placement    Detailed Description of Procedure:   2024    Patient: Kendra Garay  :  1975  Age:  48 y.o.  Sex:  female     PRE-OPERATIVE DIAGNOSIS:      Sacroiliac dysfunction    POST-OPERATIVE DIAGNOSIS: Same.    PROCEDURE:  Fluoroscopic guided Right   sacroiliac joint injection with steroid.    SURGEON:  Rashad Chow MD    ANESTHESIA: Local    ESTIMATED BLOOD LOSS: None.  ______________________________________________________________________  BRIEF HISTORY:  Kendra Garay comes in today for  Right sacroiliac joint injection under fluoroscopic guidance. The potential complications as well as the procedure in detail were explained to her today. She has elected to undergo the aforementioned procedure.    Kendra's complete History & Physical examination were reviewed in depth, a copy of which is in the chart.      DESCRIPTION OF PROCEDURE:    After confirming written and informed consent, a time-out was performed and Kendra’s name and date of birth, the procedure to be performed as well as the plan for the location of the needle insertion were confirmed.    The patient was brought into the procedure room and placed in the prone position on the fluoroscopy table. Standard monitors were placed and vital signs were observed throughout the procedure. The low back and upper buttocks area was prepped with chloraprep and

## 2024-04-09 NOTE — DISCHARGE INSTRUCTIONS
[facility] Pain Management Department  [phone]   Post-Pain Block/ Radiofrequency Home Going Instructions    1-Go home, rest for the remainder of the day  2-Please do not lift over 20 pounds the day of the injection  3-If you received sedation No: alcohol, driving, operating lawn mowers, plows, tractors or other dangerous equipment until next morning. Do not make important decisions or sign legal documents for 24 hours. You may experience light headedness, dizziness, nausea or sleepiness after sedation. Do not stay alone. A responsible adult must be with you for 24 hours. You could be nauseated from the medications you have received. Your IV site may be sore and bruised.    4-No dietary restrictions     5-Resume all medications the same day, blood thinners to be resumed 24 hours after injection    6-Keep the surgical site clean and dry, you may shower the next morning and remove the      dressing.     7- No sitz baths, tub baths or hot tubs/swimming for 24 hours.       8- If you have any pain at the injection site(s), application of an ice pack to the area should be       helpful, 20 minutes on/20 minutes off for next 48 hours.  9- Call [facility] pain management immediately at if you develop.  Fever greater than 100.4 F  Have bleeding or drainage from the puncture site  Have progressive Leg/arm numbness and or weakness  Loss of control of bowel and or bladder (wet/soil yourself)  Severe headache with inability to lift head  10-You may return to work the next day         Infection After Surgery: Care Instructions  Overview  After surgery, an infection is always possible. It doesn't mean that the surgery didn't go well.  Because an infection can be serious, your doctor has taken steps to manage it.  Your doctor checked the infection and cleaned it if necessary. Your doctor may have made an opening in the area so that the pus can drain out. You may have gauze in the cut so that the area will stay open and keep

## 2024-06-05 RX ORDER — BACLOFEN 20 MG/1
TABLET ORAL
Qty: 120 TABLET | Refills: 10 | Status: SHIPPED | OUTPATIENT
Start: 2024-06-05

## 2024-06-05 RX ORDER — NORTRIPTYLINE HYDROCHLORIDE 10 MG/1
CAPSULE ORAL
Qty: 30 CAPSULE | Refills: 1 | Status: SHIPPED | OUTPATIENT
Start: 2024-06-05

## 2024-06-05 RX ORDER — ROPINIROLE 0.5 MG/1
TABLET, FILM COATED ORAL
Qty: 90 TABLET | Refills: 10 | Status: SHIPPED | OUTPATIENT
Start: 2024-06-05

## 2024-06-06 ENCOUNTER — HOSPITAL ENCOUNTER (OUTPATIENT)
Dept: MRI IMAGING | Age: 49
Discharge: HOME | End: 2024-06-08
Attending: ANESTHESIOLOGY
Payer: MEDICARE

## 2024-06-06 ENCOUNTER — HOSPITAL ENCOUNTER (OUTPATIENT)
Age: 49
Discharge: HOME | End: 2024-06-06
Attending: ANESTHESIOLOGY
Payer: MEDICARE

## 2024-06-06 DIAGNOSIS — M51.36 DDD (DEGENERATIVE DISC DISEASE), LUMBAR: ICD-10-CM

## 2024-06-06 DIAGNOSIS — G35 MULTIPLE SCLEROSIS (HCC): ICD-10-CM

## 2024-06-06 DIAGNOSIS — M54.16 LUMBAR RADICULAR PAIN: ICD-10-CM

## 2024-06-06 LAB
ALBUMIN SERPL-MCNC: 4.3 G/DL (ref 3.5–5.2)
ALP SERPL-CCNC: 102 U/L (ref 35–104)
ALT SERPL-CCNC: 38 U/L (ref 0–32)
ANION GAP SERPL CALCULATED.3IONS-SCNC: 13 MMOL/L (ref 7–16)
AST SERPL-CCNC: 24 U/L (ref 0–31)
BASOPHILS # BLD: 0.1 K/UL (ref 0–0.2)
BASOPHILS NFR BLD: 1 % (ref 0–2)
BILIRUB SERPL-MCNC: 0.5 MG/DL (ref 0–1.2)
BUN SERPL-MCNC: 7 MG/DL (ref 6–20)
CALCIUM SERPL-MCNC: 9.5 MG/DL (ref 8.6–10.2)
CHLORIDE SERPL-SCNC: 103 MMOL/L (ref 98–107)
CO2 SERPL-SCNC: 23 MMOL/L (ref 22–29)
CREAT SERPL-MCNC: 0.7 MG/DL (ref 0.5–1)
EOSINOPHIL # BLD: 0.34 K/UL (ref 0.05–0.5)
EOSINOPHILS RELATIVE PERCENT: 3 % (ref 0–6)
ERYTHROCYTE [DISTWIDTH] IN BLOOD BY AUTOMATED COUNT: 13.2 % (ref 11.5–15)
GFR, ESTIMATED: >90 ML/MIN/1.73M2
GLUCOSE SERPL-MCNC: 104 MG/DL (ref 74–99)
HCT VFR BLD AUTO: 45.4 % (ref 34–48)
HGB BLD-MCNC: 14.8 G/DL (ref 11.5–15.5)
IMM GRANULOCYTES # BLD AUTO: 0.27 K/UL (ref 0–0.58)
IMM GRANULOCYTES NFR BLD: 3 % (ref 0–5)
LYMPHOCYTES NFR BLD: 2.61 K/UL (ref 1.5–4)
LYMPHOCYTES RELATIVE PERCENT: 24 % (ref 20–42)
MCH RBC QN AUTO: 30.9 PG (ref 26–35)
MCHC RBC AUTO-ENTMCNC: 32.6 G/DL (ref 32–34.5)
MCV RBC AUTO: 94.8 FL (ref 80–99.9)
MONOCYTES NFR BLD: 0.98 K/UL (ref 0.1–0.95)
MONOCYTES NFR BLD: 9 % (ref 2–12)
NEUTROPHILS NFR BLD: 61 % (ref 43–80)
NEUTS SEG NFR BLD: 6.57 K/UL (ref 1.8–7.3)
PLATELET # BLD AUTO: 261 K/UL (ref 130–450)
PMV BLD AUTO: 9.3 FL (ref 7–12)
POTASSIUM SERPL-SCNC: 4.7 MMOL/L (ref 3.5–5)
PROT SERPL-MCNC: 7.2 G/DL (ref 6.4–8.3)
RBC # BLD AUTO: 4.79 M/UL (ref 3.5–5.5)
SODIUM SERPL-SCNC: 139 MMOL/L (ref 132–146)
WBC OTHER # BLD: 10.9 K/UL (ref 4.5–11.5)

## 2024-06-06 PROCEDURE — 36415 COLL VENOUS BLD VENIPUNCTURE: CPT

## 2024-06-06 PROCEDURE — 6360000004 HC RX CONTRAST MEDICATION: Performed by: RADIOLOGY

## 2024-06-06 PROCEDURE — 72158 MRI LUMBAR SPINE W/O & W/DYE: CPT

## 2024-06-06 PROCEDURE — A9577 INJ MULTIHANCE: HCPCS | Performed by: RADIOLOGY

## 2024-06-06 PROCEDURE — 85025 COMPLETE CBC W/AUTO DIFF WBC: CPT

## 2024-06-06 PROCEDURE — 80053 COMPREHEN METABOLIC PANEL: CPT

## 2024-06-06 RX ADMIN — GADOBENATE DIMEGLUMINE 20 ML: 529 INJECTION, SOLUTION INTRAVENOUS at 11:26

## 2024-06-14 ENCOUNTER — HOSPITAL ENCOUNTER (EMERGENCY)
Age: 49
Discharge: HOME | End: 2024-06-14
Payer: MEDICARE

## 2024-06-14 ENCOUNTER — APPOINTMENT (OUTPATIENT)
Dept: ULTRASOUND IMAGING | Age: 49
End: 2024-06-14
Payer: MEDICARE

## 2024-06-14 VITALS
RESPIRATION RATE: 18 BRPM | SYSTOLIC BLOOD PRESSURE: 169 MMHG | HEART RATE: 106 BPM | TEMPERATURE: 97.4 F | DIASTOLIC BLOOD PRESSURE: 80 MMHG | OXYGEN SATURATION: 97 %

## 2024-06-14 DIAGNOSIS — M79.601 RIGHT ARM PAIN: Primary | ICD-10-CM

## 2024-06-14 PROCEDURE — 99284 EMERGENCY DEPT VISIT MOD MDM: CPT

## 2024-06-14 PROCEDURE — 93971 EXTREMITY STUDY: CPT

## 2024-06-14 RX ORDER — IBUPROFEN 600 MG/1
600 TABLET ORAL ONCE
Status: DISCONTINUED | OUTPATIENT
Start: 2024-06-14 | End: 2024-06-14 | Stop reason: HOSPADM

## 2024-06-14 RX ORDER — DOXYCYCLINE HYCLATE 100 MG
100 TABLET ORAL 2 TIMES DAILY
Qty: 20 TABLET | Refills: 0 | Status: SHIPPED | OUTPATIENT
Start: 2024-06-14 | End: 2024-06-24

## 2024-06-14 ASSESSMENT — LIFESTYLE VARIABLES
HOW MANY STANDARD DRINKS CONTAINING ALCOHOL DO YOU HAVE ON A TYPICAL DAY: PATIENT DOES NOT DRINK
HOW OFTEN DO YOU HAVE A DRINK CONTAINING ALCOHOL: NEVER

## 2024-06-14 NOTE — ED PROVIDER NOTES
Independent DUGLAS Visit.           [facility] EMERGENCY DEPARTMENT  ED  Encounter Note  Admit Date/RoomTime: 2024  6:15 PM  ED Room: [place]OR[place]/FLORENCE  NAME: Kendra Garay  : 1975  MRN: [id number]  PCP: Amanda Quarles PA-C    CHIEF COMPLAINT     Arm Pain (Right arm, onset yesterday, denies injury )    HISTORY OF PRESENT ILLNESS        Kendra Garay is a 48 y.o. female who presents to the ED by private vehicle for right arm pain, beginning 1 day(s) ago. The complaint has been persistent and are moderate in severity.  The patient states she started with some pain in her right arm yesterday.  She states that it started out of the blue and she does not remember any particular injury or trauma.  The pain is diffuse from the elbow down into the forearm.  She did recently have an MRI with an IV in that right arm for medication.  This was done for some back pain she has been having.  The patient feels well otherwise.  Denies fever, chills or vomiting.   The patient does have hx of colon CA S/P Colon rection.   Hx MS and prior PE as well.   Not currently on any blood thinners.      REVIEW OF SYSTEMS     Pertinent positives and negatives are stated within HPI, all other systems reviewed and are negative.    Past Medical History:  has a past medical history of Acid reflux disease, Bowel obstruction (HCC), Colon cancer (HCC), Cyst of ovary, Fracture of nasal bone, Headache, Hearing loss, Hx of blood clots, Hypertension, Liver lesion, Lung nodule, Morbidly obese (HCC), Multiple sclerosis (HCC), VEENA no CPAP, Pulmonary embolism (HCC), Restless legs syndrome, Right shoulder pain, RLS (restless legs syndrome), Tinnitus, TMJ dysfunction, and Ventral hernia.  Surgical History:  has a past surgical history that includes Cholecystectomy; cyst removal; Hysterectomy (2018); laryngoscopy (N/A, 2020); Shoulder arthroscopy (Right, 2020); Biceps tendon repair (Right, 2020);

## 2024-06-25 ENCOUNTER — OFFICE VISIT (OUTPATIENT)
Dept: PAIN MANAGEMENT | Age: 49
End: 2024-06-25
Payer: MEDICARE

## 2024-06-25 ENCOUNTER — PREP FOR PROCEDURE (OUTPATIENT)
Dept: PAIN MANAGEMENT | Age: 49
End: 2024-06-25

## 2024-06-25 VITALS
WEIGHT: 260 LBS | HEIGHT: 60 IN | DIASTOLIC BLOOD PRESSURE: 88 MMHG | SYSTOLIC BLOOD PRESSURE: 144 MMHG | HEART RATE: 108 BPM | RESPIRATION RATE: 18 BRPM | OXYGEN SATURATION: 98 % | BODY MASS INDEX: 51.04 KG/M2 | TEMPERATURE: 96.2 F

## 2024-06-25 DIAGNOSIS — F17.200 SMOKING: ICD-10-CM

## 2024-06-25 DIAGNOSIS — M51.36 DDD (DEGENERATIVE DISC DISEASE), LUMBAR: Primary | ICD-10-CM

## 2024-06-25 DIAGNOSIS — M54.16 LUMBAR RADICULAR PAIN: ICD-10-CM

## 2024-06-25 DIAGNOSIS — E66.9 OBESITY, UNSPECIFIED CLASSIFICATION, UNSPECIFIED OBESITY TYPE, UNSPECIFIED WHETHER SERIOUS COMORBIDITY PRESENT: ICD-10-CM

## 2024-06-25 DIAGNOSIS — M53.3 SACROILIAC DYSFUNCTION: ICD-10-CM

## 2024-06-25 DIAGNOSIS — M47.817 LUMBOSACRAL SPONDYLOSIS WITHOUT MYELOPATHY: ICD-10-CM

## 2024-06-25 DIAGNOSIS — M48.061 SPINAL STENOSIS OF LUMBAR REGION, UNSPECIFIED WHETHER NEUROGENIC CLAUDICATION PRESENT: ICD-10-CM

## 2024-06-25 DIAGNOSIS — M54.16 LUMBAR RADICULAR PAIN: Primary | ICD-10-CM

## 2024-06-25 PROCEDURE — 99213 OFFICE O/P EST LOW 20 MIN: CPT | Performed by: ANESTHESIOLOGY

## 2024-06-25 PROCEDURE — 4004F PT TOBACCO SCREEN RCVD TLK: CPT | Performed by: ANESTHESIOLOGY

## 2024-06-25 PROCEDURE — G8427 DOCREV CUR MEDS BY ELIG CLIN: HCPCS | Performed by: ANESTHESIOLOGY

## 2024-06-25 PROCEDURE — 99214 OFFICE O/P EST MOD 30 MIN: CPT | Performed by: ANESTHESIOLOGY

## 2024-06-25 PROCEDURE — G8417 CALC BMI ABV UP PARAM F/U: HCPCS | Performed by: ANESTHESIOLOGY

## 2024-06-25 RX ORDER — SODIUM CHLORIDE 0.9 % (FLUSH) 0.9 %
5-40 SYRINGE (ML) INJECTION EVERY 12 HOURS SCHEDULED
Status: CANCELLED | OUTPATIENT
Start: 2024-06-25

## 2024-06-25 RX ORDER — SODIUM CHLORIDE 0.9 % (FLUSH) 0.9 %
5-40 SYRINGE (ML) INJECTION PRN
Status: CANCELLED | OUTPATIENT
Start: 2024-06-25

## 2024-06-25 RX ORDER — PREDNISOLONE ACETATE 10 MG/ML
SUSPENSION/ DROPS OPHTHALMIC
COMMUNITY
Start: 2024-06-06

## 2024-06-25 RX ORDER — SODIUM CHLORIDE 9 MG/ML
INJECTION, SOLUTION INTRAVENOUS PRN
Status: CANCELLED | OUTPATIENT
Start: 2024-06-25

## 2024-06-25 NOTE — PROGRESS NOTES
Kendra Garay presents to the [facility] on 6/25/2024. Kendra is complaining of pain lower back, radiates to RLE and now LLE. The pain is constant. The pain is described as stabbing, sharp, and tight and pulling. Pain is rated on her best day at a 5, on her worst day at a 10, and on average at a 7 on the VAS scale. She took her last dose of Neurontin, Motrin, Tylenol, and Baclofen  today.      Any procedures since your last visit: Yes,   RIGHT SACROILIAC JOINT INJECTION UNDER FLUOROSCOPIC GUIDANCE with 100 % relief for 5 days    She is  on NSAIDS and  is not on anticoagulation medications to include none   Pacemaker or defibrillator: No   Medication Contract and Consent for Opioid Use Documents Filed        No documents found                       Resp 18   Ht 1.524 m (5')   Wt 117.9 kg (260 lb)   LMP 01/05/2018   BMI 50.78 kg/m²      Patient's last menstrual period was 01/05/2018.  
  lisinopril-hydroCHLOROthiazide (PRINZIDE;ZESTORETIC) 10-12.5 MG per tablet TAKE 1 TABLET BY MOUTH DAILY 4/5/24  Yes Amanda Quarles PA-C   ADVAIR -21 MCG/ACT inhaler INHALE TWO (2) PUFFS BY MOUTH INTO THE LUNGS TWICE DAILY 4/5/24  Yes Amanda Quarles PA-C   vitamin D (D3-50) 41981 UNIT CAPS TAKE 1 CAPSULE BY MOUTH ONCE WEEKLY ON SUNDAYS 3/7/24  Yes Geronimo Vick APRN - CNP   fluticasone (FLONASE) 50 MCG/ACT nasal spray 1 spray by Each Nostril route daily as needed for Rhinitis or Allergies 11/6/23  Yes ProviderVero MD   gabapentin (NEURONTIN) 400 MG capsule Take 1 capsule by mouth 4 times daily for 360 days. 10/25/23 10/19/24 Yes Geronimo Vick APRN - CNP   VENTOLIN  (90 Base) MCG/ACT inhaler INHALE 2 PUFFS BY MOUTH AND INTO THE LUNGS FOUR TIMES A DAY IF NEEDED FOR WHEEZING 7/3/23  Yes Amanda Quarles PA-C   pantoprazole (PROTONIX) 40 MG tablet Take 1 tablet by mouth daily 10/12/22  Yes ProviderVero MD   dicyclomine (BENTYL) 10 MG capsule TAKE 1 CAPSULE BY MOUTH 3 TIMES DAILY AS NEEDED FOR PAIN 10/12/22  Yes ProviderVero MD   ocrelizumab (OCREVUS) 300 MG/10ML SOLN injection Infuse 20 mLs intravenously every 6 months 8/10/22  Yes Geronimo Vick APRN - CNP   famotidine (PEPCID) 40 MG tablet TAKE 1 TABLET BY MOUTH DAILY IN THE EVENING *EMERGENCY REFILL* 4/4/22  Yes ProviderVero MD       No Known Allergies    Social History     Socioeconomic History    Marital status: Single     Spouse name: Not on file    Number of children: 3    Years of education: 11    Highest education level: 11th grade   Occupational History    Occupation: Disability   Tobacco Use    Smoking status: Every Day     Current packs/day: 0.50     Average packs/day: 0.5 packs/day for 31.5 years (15.7 ttl pk-yrs)     Types: Cigarettes     Start date: 1993     Passive exposure: Current    Smokeless tobacco: Never    Tobacco comments:         Vaping Use    Vaping Use: Never used   Substance and Sexual

## 2024-06-25 NOTE — H&P (VIEW-ONLY)
[facility]  [address], [place] [address]  [phone]    Follow up Note      Kendra Garay     Date of Visit:  6/25/2024    CC:  Patient presents for follow up   Chief Complaint   Patient presents with    Follow-up     RIGHT SACROILIAC JOINT INJECTION UNDER FLUOROSCOPIC GUIDANCE       HPI:  Chronic low back pain and b/l LE pain.     Remote h/o lumbar spine surgery in 2006- does not know the details.     H/o MS and has h/o tingling/ Numbness of the thighs and fingers.     Over the past few months she has noticed low back and intermittent LE symptoms.    She  has numbness, tingling, weakness of the both lower extremities      Patient has no new bladder or bowel dysfunction. H/o chronic ROCHELLE +    Pain causes functional limitations/ limits Adl's : Yes     Nursing notes and details of the pain history reviewed. Please see intake notes for details.     Follows with neurology for MS.     Previous treatments:   Physical Therapy/HEP : yes     Medications: - NSAID's : yes                        - Membrane stabilizers : yes                        - Opioids : no                       - Adjuvants or Others : yes,     Spine Surgeries: yes, in 2006     Anticoagulation medications: no .     H/O Smoking: yes  H/O alcohol abuse : no  H/O Illicit drug use : denies     Employment: disability     Imaging:     MRI of LS spine: 6/7/2024:  IMPRESSION:   1. No fracture or bony destructive changes.  2. No significant central canal stenosis.  3. Moderate to severe neural foraminal stenoses at L5-S1.  4. Status post right L5 hemilaminectomy.    X-ray LS spine and SI joints: 4/6/2024:  IMPRESSION:  1. Minimal degenerative changes involving both sacroiliac joints.  2. Degenerative changes involving the visualized lower lumbar spine.    MRI of C spine: 6/28/2022:  IMPRESSION:  1.  No fracture or bony destructive lesion.  2. Unremarkable cervical cord.  No evidence of demyelinating plaque.  3. No  significant central canal or neural foraminal stenosis    OARRS report:: reviewed     Past Medical History:   Diagnosis Date    Acid reflux disease     Bowel obstruction (HCC) 03/2024    Colon cancer (HCC)     s/p surgery    Cyst of ovary     Fracture of nasal bone     Headache     Hearing loss     Hx of blood clots     leg to lung    Hypertension     Liver lesion     20[place]    Lung nodule     RUL    Morbidly obese (HCC) 10/05/2020    Multiple sclerosis (HCC)     denies limitations at present 11/2020    VEENA no CPAP     severe VEENA per pt    Pulmonary embolism (HCC) 2021    Restless legs syndrome     Right shoulder pain 11/2020    RLS (restless legs syndrome)     Tinnitus     TMJ dysfunction     left side    Ventral hernia     october 2022       Past Surgical History:   Procedure Laterality Date    APPENDECTOMY      BACK INJECTION Right 4/9/2024    RIGHT SACROILIAC JOINT INJECTION UNDER FLUOROSCOPIC GUIDANCE performed by Rashad Chow MD at [facility] OR    BACK SURGERY      lumbar    BICEPS TENDON REPAIR Right 11/11/2020    BICEPS TENODESIS performed by Mu De León MD at [facility] OR    CHOLECYSTECTOMY      COLECTOMY Left 08/31/2021    LAPAROSCOPIC LEFT HEMICOLECTOMY WITH LOOP OSTOMY performed by Dominik Becerra MD at [facility] OR    COLECTOMY N/A 09/06/2021    DIAGNOSTIC LAPAROSCOPY POSSIBLE BOWEL RESECTION POSSILBE OSTOMY performed by Dominik Becerra MD at [facility] OR    COLECTOMY N/A 02/08/2022    LAPAROSCOPIC SIGMOID COLON RESECTION performed by Dominik Becerra MD at [facility] OR    COLOSTOMY CLOSURE N/A 06/01/2022    OPEN ILEOSTOMY REVERSAL performed by Dominik Becerra MD at [facility] OR    CT ASP ABS HEMATOMA BULLA CYST  01/19/2023    CT ASP ABS HEMATOMA BULLA CYST 1/19/2023 L Connor Mason MD [facility]    CT ASP ABS HEMATOMA BULLA CYST  01/19/2023    CT ASP ABS HEMATOMA BULLA CYST 1/19/2023 L Connor Mason MD [facility]    CYST REMOVAL      ESOPHAGUS SURGERY      HIATAL HERNIA REPAIR N/A 11/28/2023    HERNIA HIATAL

## 2024-07-01 DIAGNOSIS — G35 MULTIPLE SCLEROSIS (HCC): Primary | ICD-10-CM

## 2024-07-01 RX ORDER — SODIUM CHLORIDE 9 MG/ML
5-250 INJECTION, SOLUTION INTRAVENOUS PRN
OUTPATIENT
Start: 2024-07-01

## 2024-07-01 RX ORDER — EPINEPHRINE 1 MG/ML
0.3 INJECTION, SOLUTION, CONCENTRATE INTRAVENOUS PRN
OUTPATIENT
Start: 2024-07-01

## 2024-07-01 RX ORDER — ACETAMINOPHEN 325 MG/1
650 TABLET ORAL ONCE
OUTPATIENT
Start: 2024-07-01 | End: 2024-07-01

## 2024-07-01 RX ORDER — ONDANSETRON 2 MG/ML
8 INJECTION INTRAMUSCULAR; INTRAVENOUS
OUTPATIENT
Start: 2024-07-01

## 2024-07-01 RX ORDER — SODIUM CHLORIDE 9 MG/ML
INJECTION, SOLUTION INTRAVENOUS CONTINUOUS
OUTPATIENT
Start: 2024-07-01

## 2024-07-01 RX ORDER — SODIUM CHLORIDE 0.9 % (FLUSH) 0.9 %
5-40 SYRINGE (ML) INJECTION PRN
OUTPATIENT
Start: 2024-07-01

## 2024-07-01 RX ORDER — ACETAMINOPHEN 325 MG/1
650 TABLET ORAL
OUTPATIENT
Start: 2024-07-01

## 2024-07-01 RX ORDER — HEPARIN SODIUM (PORCINE) LOCK FLUSH IV SOLN 100 UNIT/ML 100 UNIT/ML
500 SOLUTION INTRAVENOUS PRN
OUTPATIENT
Start: 2024-07-01

## 2024-07-01 RX ORDER — DIPHENHYDRAMINE HYDROCHLORIDE 50 MG/ML
50 INJECTION INTRAMUSCULAR; INTRAVENOUS
OUTPATIENT
Start: 2024-07-01

## 2024-07-01 RX ORDER — ALBUTEROL SULFATE 90 UG/1
4 AEROSOL, METERED RESPIRATORY (INHALATION) PRN
OUTPATIENT
Start: 2024-07-01

## 2024-07-01 RX ORDER — DIPHENHYDRAMINE HYDROCHLORIDE 50 MG/ML
25 INJECTION INTRAMUSCULAR; INTRAVENOUS ONCE
OUTPATIENT
Start: 2024-07-01 | End: 2024-07-01

## 2024-07-01 RX ORDER — FAMOTIDINE 10 MG/ML
20 INJECTION, SOLUTION INTRAVENOUS
OUTPATIENT
Start: 2024-07-01

## 2024-07-10 ENCOUNTER — TELEPHONE (OUTPATIENT)
Dept: PAIN MANAGEMENT | Age: 49
End: 2024-07-10

## 2024-07-10 DIAGNOSIS — M54.16 LUMBAR RADICULOPATHY: ICD-10-CM

## 2024-07-10 NOTE — TELEPHONE ENCOUNTER
Call to Kendra Garay that procedure was scheduled for 7/16/2024 and that [facility] should call her a few days before for the pre op call and after 3:00 PM the business day before with the arrival time. Instructed Kendra to hold ibuprofen for 24 hours, Celebrex, Mobic, and naprosyn for 4 days and any aspirin containing products, CoQ 10, or fish oil for 7 days. Instructed to call office back if any questions. Kendra verbalized understanding.    Electronically signed by Mateusz Jacques RN on 7/10/2024 at 10:43 AM

## 2024-07-12 RX ORDER — CHOLECALCIFEROL (VITAMIN D3) 1250 MCG
CAPSULE ORAL
Qty: 4 CAPSULE | Refills: 10 | Status: SHIPPED | OUTPATIENT
Start: 2024-07-12

## 2024-07-15 RX ORDER — FLUTICASONE PROPIONATE 50 MCG
SPRAY, SUSPENSION (ML) NASAL
Qty: 16 G | Refills: 3 | Status: SHIPPED | OUTPATIENT
Start: 2024-07-15

## 2024-07-16 ENCOUNTER — HOSPITAL ENCOUNTER (OUTPATIENT)
Age: 49
Setting detail: OUTPATIENT SURGERY
Discharge: HOME | End: 2024-07-16
Attending: ANESTHESIOLOGY | Admitting: ANESTHESIOLOGY
Payer: MEDICARE

## 2024-07-16 ENCOUNTER — HOSPITAL ENCOUNTER (OUTPATIENT)
Dept: OPERATING ROOM | Age: 49
Setting detail: OUTPATIENT SURGERY
Discharge: HOME | End: 2024-07-16
Attending: ANESTHESIOLOGY
Payer: MEDICARE

## 2024-07-16 VITALS
SYSTOLIC BLOOD PRESSURE: 118 MMHG | RESPIRATION RATE: 20 BRPM | BODY MASS INDEX: 51.04 KG/M2 | OXYGEN SATURATION: 95 % | WEIGHT: 260 LBS | HEIGHT: 60 IN | HEART RATE: 84 BPM | TEMPERATURE: 97.4 F | DIASTOLIC BLOOD PRESSURE: 82 MMHG

## 2024-07-16 DIAGNOSIS — M54.16 LUMBAR RADICULOPATHY: ICD-10-CM

## 2024-07-16 PROCEDURE — 7100000010 HC PHASE II RECOVERY - FIRST 15 MIN: Performed by: ANESTHESIOLOGY

## 2024-07-16 PROCEDURE — 2500000003 HC RX 250 WO HCPCS: Performed by: ANESTHESIOLOGY

## 2024-07-16 PROCEDURE — 2709999900 HC NON-CHARGEABLE SUPPLY: Performed by: ANESTHESIOLOGY

## 2024-07-16 PROCEDURE — 3600000015 HC SURGERY LEVEL 5 ADDTL 15MIN: Performed by: ANESTHESIOLOGY

## 2024-07-16 PROCEDURE — 7100000011 HC PHASE II RECOVERY - ADDTL 15 MIN: Performed by: ANESTHESIOLOGY

## 2024-07-16 PROCEDURE — 3600000005 HC SURGERY LEVEL 5 BASE: Performed by: ANESTHESIOLOGY

## 2024-07-16 PROCEDURE — 64484 NJX AA&/STRD TFRM EPI L/S EA: CPT | Performed by: ANESTHESIOLOGY

## 2024-07-16 PROCEDURE — 64483 NJX AA&/STRD TFRM EPI L/S 1: CPT | Performed by: ANESTHESIOLOGY

## 2024-07-16 PROCEDURE — 6360000002 HC RX W HCPCS: Performed by: ANESTHESIOLOGY

## 2024-07-16 PROCEDURE — 6360000004 HC RX CONTRAST MEDICATION: Performed by: ANESTHESIOLOGY

## 2024-07-16 RX ORDER — SODIUM CHLORIDE 0.9 % (FLUSH) 0.9 %
5-40 SYRINGE (ML) INJECTION EVERY 12 HOURS SCHEDULED
Status: DISCONTINUED | OUTPATIENT
Start: 2024-07-16 | End: 2024-07-16 | Stop reason: HOSPADM

## 2024-07-16 RX ORDER — FLUTICASONE PROPIONATE 50 MCG
SPRAY, SUSPENSION (ML) NASAL
Qty: 16 G | Refills: 10 | OUTPATIENT
Start: 2024-07-16

## 2024-07-16 RX ORDER — LIDOCAINE HYDROCHLORIDE 5 MG/ML
INJECTION, SOLUTION INFILTRATION; INTRAVENOUS PRN
Status: DISCONTINUED | OUTPATIENT
Start: 2024-07-16 | End: 2024-07-16 | Stop reason: ALTCHOICE

## 2024-07-16 RX ORDER — SODIUM CHLORIDE 0.9 % (FLUSH) 0.9 %
5-40 SYRINGE (ML) INJECTION PRN
Status: DISCONTINUED | OUTPATIENT
Start: 2024-07-16 | End: 2024-07-16 | Stop reason: HOSPADM

## 2024-07-16 RX ORDER — SODIUM CHLORIDE 9 MG/ML
INJECTION, SOLUTION INTRAVENOUS PRN
Status: DISCONTINUED | OUTPATIENT
Start: 2024-07-16 | End: 2024-07-16 | Stop reason: HOSPADM

## 2024-07-16 RX ORDER — METHYLPREDNISOLONE ACETATE 40 MG/ML
INJECTION, SUSPENSION INTRA-ARTICULAR; INTRALESIONAL; INTRAMUSCULAR; SOFT TISSUE PRN
Status: DISCONTINUED | OUTPATIENT
Start: 2024-07-16 | End: 2024-07-16 | Stop reason: ALTCHOICE

## 2024-07-16 ASSESSMENT — PAIN - FUNCTIONAL ASSESSMENT
PAIN_FUNCTIONAL_ASSESSMENT: 0-10

## 2024-07-16 ASSESSMENT — PAIN DESCRIPTION - DESCRIPTORS: DESCRIPTORS: ACHING

## 2024-07-16 NOTE — INTERVAL H&P NOTE
Update History & Physical    The patient's History and Physical of June 25, 2024 was reviewed with the patient and I examined the patient. There was no change. The surgical site was confirmed by the patient and me.     Plan: The risks, benefits, expected outcome, and alternative to the recommended procedure have been discussed with the patient. Patient understands and wants to proceed with the procedure.     Electronically signed by Rashad Chow MD on 7/16/2024

## 2024-07-16 NOTE — DISCHARGE INSTRUCTIONS
[facility] Pain Management Department  [phone]   Post-Pain Block  Home Going Instructions    1-Go home, rest for the remainder of the day  2-Please do not lift over 20 pounds the day of the injection  3-If you received sedation No: alcohol, driving, operating lawn mowers, plows, tractors or other dangerous equipment until next morning. Do not make important decisions or sign legal documents for 24 hours. You may experience light headedness, dizziness, nausea or sleepiness after sedation. Do not stay alone. A responsible adult must be with you for 24 hours. You could be nauseated from the medications you have received. Your IV site may be sore and bruised.    4-No dietary restrictions     5-Resume all medications the same day, blood thinners to be resumed 24 hours after injection    6-Keep the surgical site clean and dry, you may shower the next morning and remove the      dressing.     7- No sitz baths, tub baths or hot tubs/swimming for 24 hours.       8- If you have any pain at the injection site(s), application of an ice pack to the area should be       helpful, 20 minutes on/20 minutes off for next 48 hours.  9- Call [facility] pain management immediately at if you develop.  Fever greater than 100.4 F  Have bleeding or drainage from the puncture site  Have progressive Leg  numbness and or weakness  Loss of control of bowel and or bladder (wet/soil yourself)  Severe headache with inability to lift head  10-You may return to work the next day

## 2024-07-16 NOTE — OP NOTE
Operative Note      Patient: Kendra Graay  YOB: 1975  MRN: [id number]    Date of Procedure: 2024    Pre-Op Diagnosis Codes:     * Lumbar radiculopathy [M54.16]    Post-Op Diagnosis: Same       Procedure(s):  LEFT  LUMBAR 5 & RIGHT SACRAL 1 TRANSFORAMINAL EPIDURAL STEROID INJECTION UNDER FLUOROSCOPIC GUIDANCE(KEEP AT 1:00)    Surgeon(s):  Rashad Chow MD    Assistant:   * No surgical staff found *    Anesthesia: Local    Estimated Blood Loss (mL): Minimal    Complications: None    Specimens:   * No specimens in log *    Implants:  * No implants in log *      Drains: * No LDAs found *    Findings:  Infection Present At Time Of Surgery (PATOS) (choose all levels that have infection present):  No infection present  Other Findings: Good needle placement    Detailed Description of Procedure:   2024    Patient: Kendra Garay  :  1975  Age:  48 y.o.  Sex:  female     PRE-OPERATIVE DIAGNOSIS: Lumbar disc displacement, lumbar neural foraminal stenosis, lumbar radiculopathy.     POST-OPERATIVE DIAGNOSIS: Same.    PROCEDURE: Left L5 & Right S1 Transforaminal epidural steroid injection under fluoroscopic guidance.      SURGEON: Rashad Chow MD    ANESTHESIA: Local    ESTIMATED BLOOD LOSS: None.  ______________________________________________________________________  BRIEF HISTORY: Kendra Garay comes in today for the  lumbar transforaminal epidural steroid injection under fluoroscopic guidance. The potential complications of this procedure were discussed with her again today.  She has elected to undergo the aforementioned procedure.     Kendra’s complete History & Physical examination were reviewed in depth, a copy of which is in the chart.      DESCRIPTION OF PROCEDURE:    After confirming written and informed consent, a time-out was performed and Kendra’s name and date of birth, the procedure to be performed as well as the plan for the location of the needle insertion were

## 2024-08-05 RX ORDER — NORTRIPTYLINE HYDROCHLORIDE 10 MG/1
CAPSULE ORAL
Qty: 30 CAPSULE | Refills: 1 | Status: SHIPPED | OUTPATIENT
Start: 2024-08-05

## 2024-08-05 RX ORDER — LISINOPRIL AND HYDROCHLOROTHIAZIDE 12.5; 1 MG/1; MG/1
1 TABLET ORAL DAILY
Qty: 30 TABLET | Refills: 1 | Status: SHIPPED | OUTPATIENT
Start: 2024-08-05

## 2024-08-14 ENCOUNTER — PREP FOR PROCEDURE (OUTPATIENT)
Dept: PAIN MANAGEMENT | Age: 49
End: 2024-08-14

## 2024-08-14 ENCOUNTER — OFFICE VISIT (OUTPATIENT)
Dept: PAIN MANAGEMENT | Age: 49
End: 2024-08-14
Payer: MEDICARE

## 2024-08-14 VITALS
DIASTOLIC BLOOD PRESSURE: 82 MMHG | HEIGHT: 60 IN | SYSTOLIC BLOOD PRESSURE: 132 MMHG | OXYGEN SATURATION: 97 % | WEIGHT: 260 LBS | TEMPERATURE: 97.3 F | BODY MASS INDEX: 51.04 KG/M2 | HEART RATE: 89 BPM | RESPIRATION RATE: 18 BRPM

## 2024-08-14 DIAGNOSIS — M47.817 LUMBOSACRAL SPONDYLOSIS WITHOUT MYELOPATHY: Primary | ICD-10-CM

## 2024-08-14 DIAGNOSIS — E66.9 OBESITY, UNSPECIFIED CLASSIFICATION, UNSPECIFIED OBESITY TYPE, UNSPECIFIED WHETHER SERIOUS COMORBIDITY PRESENT: ICD-10-CM

## 2024-08-14 DIAGNOSIS — M48.061 SPINAL STENOSIS OF LUMBAR REGION, UNSPECIFIED WHETHER NEUROGENIC CLAUDICATION PRESENT: ICD-10-CM

## 2024-08-14 DIAGNOSIS — F17.200 SMOKING: ICD-10-CM

## 2024-08-14 DIAGNOSIS — M51.36 DDD (DEGENERATIVE DISC DISEASE), LUMBAR: ICD-10-CM

## 2024-08-14 PROCEDURE — 4004F PT TOBACCO SCREEN RCVD TLK: CPT | Performed by: ANESTHESIOLOGY

## 2024-08-14 PROCEDURE — G8427 DOCREV CUR MEDS BY ELIG CLIN: HCPCS | Performed by: ANESTHESIOLOGY

## 2024-08-14 PROCEDURE — 3017F COLORECTAL CA SCREEN DOC REV: CPT | Performed by: ANESTHESIOLOGY

## 2024-08-14 PROCEDURE — G8417 CALC BMI ABV UP PARAM F/U: HCPCS | Performed by: ANESTHESIOLOGY

## 2024-08-14 PROCEDURE — 99213 OFFICE O/P EST LOW 20 MIN: CPT | Performed by: ANESTHESIOLOGY

## 2024-08-14 RX ORDER — SODIUM CHLORIDE 9 MG/ML
INJECTION, SOLUTION INTRAVENOUS PRN
Status: CANCELLED | OUTPATIENT
Start: 2024-08-14

## 2024-08-14 RX ORDER — SODIUM CHLORIDE 0.9 % (FLUSH) 0.9 %
5-40 SYRINGE (ML) INJECTION EVERY 12 HOURS SCHEDULED
Status: CANCELLED | OUTPATIENT
Start: 2024-08-14

## 2024-08-14 RX ORDER — SODIUM CHLORIDE 0.9 % (FLUSH) 0.9 %
5-40 SYRINGE (ML) INJECTION PRN
Status: CANCELLED | OUTPATIENT
Start: 2024-08-14

## 2024-08-14 NOTE — PROGRESS NOTES
Kendra Garay presents to the [facility] on 8/14/2024. Kendra is complaining of pain in her lower back that radiates to BLE. The pain is constant. The pain is described as stabbing, sharp, and tight and pulling. Pain is rated on her best day at a {HH 0-10:062399}, on her worst day at a {HH 0-10:069508}, and on average at a {HH 0-10:678061} on the VAS scale. She took her last dose of Neurontin, Motrin, Tylenol, and baclofen  today.      Any procedures since your last visit: Yes, with *** % relief.    She is  on NSAIDS and  is not on anticoagulation medications to include none and is managed by NA.     Pacemaker or defibrillator: No     Medication Contract and Consent for Opioid Use Documents Filed        No documents found                       Resp 18   Ht 1.524 m (5')   Wt 117.9 kg (260 lb)   LMP 01/05/2018   BMI 50.78 kg/m²      Patient's last menstrual period was 01/05/2018.

## 2024-08-14 NOTE — PROGRESS NOTES
[facility]  [address], [place] [address]  [phone]    Follow up Note      Kendra Garay     Date of Visit:  8/14/2024    CC:  Patient presents for follow up   Chief Complaint   Patient presents with    Follow-up     LEFT  LUMBAR 5 & RIGHT SACRAL 1 TRANSFORAMINAL EPIDURAL STEROID INJECTION       HPI:  Chronic low back pain and b/l LE pain.     Remote h/o lumbar spine surgery in 2006 ( appears to be laminectomy and discectomy).     H/o MS and has h/o tingling/ Numbness of the thighs and fingers.     She  has numbness, tingling, weakness of the both lower extremities      Patient has no new bladder or bowel dysfunction. H/o chronic ROCHELLE +    Pain causes functional limitations/ limits Adl's : Yes     Nursing notes and details of the pain history reviewed. Please see intake notes for details.     Follows with neurology for MS.     Previous treatments:   Physical Therapy/HEP : yes     Medications: - NSAID's : yes                        - Membrane stabilizers : yes                        - Opioids : no                       - Adjuvants or Others : yes,     Spine Surgeries: yes, in 2006     Anticoagulation medications: no.     H/O Smoking: yes  H/O alcohol abuse : no  H/O Illicit drug use : denies     Employment: disability     Imaging:   MRI of LS spine: 6/7/2024:  IMPRESSION:   1. No fracture or bony destructive changes.  2. No significant central canal stenosis.  3. Moderate to severe neural foraminal stenoses at L5-S1.  4. Status post right L5 hemilaminectomy.    X-ray LS spine and SI joints: 4/6/2024:  IMPRESSION:  1. Minimal degenerative changes involving both sacroiliac joints.  2. Degenerative changes involving the visualized lower lumbar spine.    MRI of C spine: 6/28/2022:  IMPRESSION:  1.  No fracture or bony destructive lesion.  2. Unremarkable cervical cord.  No evidence of demyelinating plaque.  3. No significant central canal or neural foraminal

## 2024-08-14 NOTE — PROGRESS NOTES
Kendra Garay presents to the [facility] on 8/14/2024. Kendra is complaining of pain in her lower back. The pain is constant. The pain is described as stabbing and sharp. Pain is rated on her best day at a 5, on her worst day at a 10, and on average at a 10 on the VAS scale. She took her last dose of Neurontin, Motrin, Tylenol, and baclofen  today.      Any procedures since your last visit: Yes, with good relief of LE pain.    She is  on NSAIDS and  is not on anticoagulation medications to include none and is managed by NA.     Pacemaker or defibrillator: No    Medication Contract and Consent for Opioid Use Documents Filed        No documents found                       /82   Pulse 89   Temp 97.3 °F (36.3 °C) (Infrared)   Resp 18   Ht 1.524 m (5')   Wt 117.9 kg (260 lb)   LMP 01/05/2018   SpO2 97%   BMI 50.78 kg/m²      Patient's last menstrual period was 01/05/2018.

## 2024-08-21 ENCOUNTER — TELEPHONE (OUTPATIENT)
Dept: PAIN MANAGEMENT | Age: 49
End: 2024-08-21

## 2024-08-21 DIAGNOSIS — M47.816 LUMBAR SPONDYLOSIS: ICD-10-CM

## 2024-08-21 NOTE — TELEPHONE ENCOUNTER
Call to Kendra Garay that procedure was scheduled for 08/27/2024 and that [facility] should call her a few days before for the pre op call and after 3:00 PM the business day before with the arrival time. Instructed Kendra to hold ibuprofen for 24 hours, Celebrex, Mobic, and naprosyn for 4 days and any aspirin containing products, CoQ 10, or fish oil for 7 days. Instructed to call office back if any questions. Kendra verbalized understanding.    Electronically signed by Kimberly Fritz RN on 8/21/2024 at 2:50 PM

## 2024-08-23 ENCOUNTER — TELEPHONE (OUTPATIENT)
Dept: PAIN MANAGEMENT | Age: 49
End: 2024-08-23

## 2024-08-23 NOTE — TELEPHONE ENCOUNTER
Patient rescheduled her MBBB to 9/10 from 08/27 due to a ride conflict. Patient instructed to have a responsible adult with her due to anesthesia. Patient states she understands.

## 2024-09-04 RX ORDER — GABAPENTIN 400 MG/1
400 CAPSULE ORAL 3 TIMES DAILY
Qty: 270 CAPSULE | Refills: 0 | Status: SHIPPED | OUTPATIENT
Start: 2024-09-04 | End: 2024-12-03

## 2024-09-26 ENCOUNTER — HOSPITAL ENCOUNTER (OUTPATIENT)
Dept: INFUSION THERAPY | Age: 49
Setting detail: INFUSION SERIES
Discharge: HOME | End: 2024-09-26
Payer: MEDICARE

## 2024-09-26 VITALS
DIASTOLIC BLOOD PRESSURE: 71 MMHG | HEART RATE: 76 BPM | OXYGEN SATURATION: 98 % | RESPIRATION RATE: 16 BRPM | SYSTOLIC BLOOD PRESSURE: 124 MMHG | TEMPERATURE: 96.4 F

## 2024-09-26 DIAGNOSIS — G35 MULTIPLE SCLEROSIS (HCC): Primary | ICD-10-CM

## 2024-09-26 PROCEDURE — 6360000002 HC RX W HCPCS: Performed by: NURSE PRACTITIONER

## 2024-09-26 PROCEDURE — 96365 THER/PROPH/DIAG IV INF INIT: CPT

## 2024-09-26 PROCEDURE — 6370000000 HC RX 637 (ALT 250 FOR IP): Performed by: NURSE PRACTITIONER

## 2024-09-26 PROCEDURE — 96375 TX/PRO/DX INJ NEW DRUG ADDON: CPT

## 2024-09-26 PROCEDURE — 96366 THER/PROPH/DIAG IV INF ADDON: CPT

## 2024-09-26 PROCEDURE — 2580000003 HC RX 258: Performed by: NURSE PRACTITIONER

## 2024-09-26 RX ORDER — SODIUM CHLORIDE 9 MG/ML
5-250 INJECTION, SOLUTION INTRAVENOUS PRN
Status: DISCONTINUED | OUTPATIENT
Start: 2024-09-26 | End: 2024-09-27 | Stop reason: HOSPADM

## 2024-09-26 RX ORDER — ACETAMINOPHEN 325 MG/1
650 TABLET ORAL ONCE
OUTPATIENT
Start: 2025-03-23 | End: 2025-03-23

## 2024-09-26 RX ORDER — SODIUM CHLORIDE 0.9 % (FLUSH) 0.9 %
5-40 SYRINGE (ML) INJECTION PRN
OUTPATIENT
Start: 2025-03-23

## 2024-09-26 RX ORDER — ACETAMINOPHEN 325 MG/1
650 TABLET ORAL ONCE
Status: COMPLETED | OUTPATIENT
Start: 2024-09-26 | End: 2024-09-26

## 2024-09-26 RX ORDER — SODIUM CHLORIDE 9 MG/ML
5-250 INJECTION, SOLUTION INTRAVENOUS PRN
OUTPATIENT
Start: 2025-03-23

## 2024-09-26 RX ORDER — ACETAMINOPHEN 325 MG/1
650 TABLET ORAL
OUTPATIENT
Start: 2025-03-23

## 2024-09-26 RX ORDER — DIPHENHYDRAMINE HYDROCHLORIDE 50 MG/ML
50 INJECTION INTRAMUSCULAR; INTRAVENOUS
OUTPATIENT
Start: 2025-03-23

## 2024-09-26 RX ORDER — DIPHENHYDRAMINE HYDROCHLORIDE 50 MG/ML
25 INJECTION INTRAMUSCULAR; INTRAVENOUS ONCE
OUTPATIENT
Start: 2025-03-23 | End: 2025-03-23

## 2024-09-26 RX ORDER — SODIUM CHLORIDE 0.9 % (FLUSH) 0.9 %
5-40 SYRINGE (ML) INJECTION PRN
Status: DISCONTINUED | OUTPATIENT
Start: 2024-09-26 | End: 2024-09-27 | Stop reason: HOSPADM

## 2024-09-26 RX ORDER — ONDANSETRON 2 MG/ML
8 INJECTION INTRAMUSCULAR; INTRAVENOUS
OUTPATIENT
Start: 2025-03-23

## 2024-09-26 RX ORDER — DIPHENHYDRAMINE HYDROCHLORIDE 50 MG/ML
25 INJECTION INTRAMUSCULAR; INTRAVENOUS ONCE
Status: COMPLETED | OUTPATIENT
Start: 2024-09-26 | End: 2024-09-26

## 2024-09-26 RX ORDER — SODIUM CHLORIDE 9 MG/ML
INJECTION, SOLUTION INTRAVENOUS CONTINUOUS
OUTPATIENT
Start: 2025-03-23

## 2024-09-26 RX ORDER — EPINEPHRINE 1 MG/ML
0.3 INJECTION, SOLUTION, CONCENTRATE INTRAVENOUS PRN
OUTPATIENT
Start: 2025-03-23

## 2024-09-26 RX ORDER — HEPARIN 100 UNIT/ML
500 SYRINGE INTRAVENOUS PRN
OUTPATIENT
Start: 2025-03-23

## 2024-09-26 RX ORDER — ALBUTEROL SULFATE 90 UG/1
4 INHALANT RESPIRATORY (INHALATION) PRN
OUTPATIENT
Start: 2025-03-23

## 2024-09-26 RX ADMIN — OCRELIZUMAB 600 MG: 300 INJECTION INTRAVENOUS at 09:00

## 2024-09-26 RX ADMIN — METHYLPREDNISOLONE SODIUM SUCCINATE 125 MG: 125 INJECTION, POWDER, FOR SOLUTION INTRAMUSCULAR; INTRAVENOUS at 08:26

## 2024-09-26 RX ADMIN — SODIUM CHLORIDE 30 ML: 9 INJECTION, SOLUTION INTRAVENOUS at 13:04

## 2024-09-26 RX ADMIN — SODIUM CHLORIDE, PRESERVATIVE FREE 10 ML: 5 INJECTION INTRAVENOUS at 08:27

## 2024-09-26 RX ADMIN — DIPHENHYDRAMINE HYDROCHLORIDE 25 MG: 50 INJECTION INTRAMUSCULAR; INTRAVENOUS at 08:26

## 2024-09-26 RX ADMIN — ACETAMINOPHEN 650 MG: 325 TABLET, FILM COATED ORAL at 08:26

## 2024-09-26 RX ADMIN — SODIUM CHLORIDE, PRESERVATIVE FREE 10 ML: 5 INJECTION INTRAVENOUS at 09:00

## 2024-10-02 RX ORDER — LISINOPRIL/HYDROCHLOROTHIAZIDE 10-12.5 MG
1 TABLET ORAL DAILY
Qty: 30 TABLET | Refills: 0 | Status: SHIPPED | OUTPATIENT
Start: 2024-10-02

## 2024-10-02 RX ORDER — NORTRIPTYLINE HCL 10 MG
CAPSULE ORAL
Qty: 30 CAPSULE | Refills: 0 | Status: SHIPPED | OUTPATIENT
Start: 2024-10-02

## 2024-10-03 ENCOUNTER — HOSPITAL ENCOUNTER (OUTPATIENT)
Dept: GENERAL RADIOLOGY | Age: 49
Discharge: HOME | End: 2024-10-05
Attending: INTERNAL MEDICINE
Payer: MEDICARE

## 2024-10-03 DIAGNOSIS — R13.19 OTHER DYSPHAGIA: ICD-10-CM

## 2024-10-03 PROCEDURE — 74220 X-RAY XM ESOPHAGUS 1CNTRST: CPT

## 2024-10-03 PROCEDURE — 2500000003 HC RX 250 WO HCPCS: Performed by: INTERNAL MEDICINE

## 2024-10-03 PROCEDURE — 6370000000 HC RX 637 (ALT 250 FOR IP): Performed by: INTERNAL MEDICINE

## 2024-10-03 RX ADMIN — BARIUM SULFATE 176 G: 960 POWDER, FOR SUSPENSION ORAL at 08:25

## 2024-10-03 RX ADMIN — ANTACID/ANTIFLATULENT 1 EACH: 380; 550; 10; 10 GRANULE, EFFERVESCENT ORAL at 08:25

## 2024-10-03 RX ADMIN — BARIUM SULFATE 340 G: 980 POWDER, FOR SUSPENSION ORAL at 08:25

## 2024-10-24 ENCOUNTER — INITIAL CONSULT (OUTPATIENT)
Dept: SURGERY | Age: 49
End: 2024-10-24
Payer: MEDICARE

## 2024-10-24 VITALS
TEMPERATURE: 97.2 F | HEART RATE: 81 BPM | HEIGHT: 60 IN | BODY MASS INDEX: 53.79 KG/M2 | SYSTOLIC BLOOD PRESSURE: 133 MMHG | WEIGHT: 274 LBS | DIASTOLIC BLOOD PRESSURE: 88 MMHG

## 2024-10-24 DIAGNOSIS — K44.9 HIATAL HERNIA: Primary | ICD-10-CM

## 2024-10-24 DIAGNOSIS — K22.719 BARRETT'S ESOPHAGUS WITH DYSPLASIA: Primary | ICD-10-CM

## 2024-10-24 DIAGNOSIS — K22.719 BARRETT'S ESOPHAGUS WITH DYSPLASIA: ICD-10-CM

## 2024-10-24 PROCEDURE — G8484 FLU IMMUNIZE NO ADMIN: HCPCS | Performed by: SURGERY

## 2024-10-24 PROCEDURE — 3017F COLORECTAL CA SCREEN DOC REV: CPT | Performed by: SURGERY

## 2024-10-24 PROCEDURE — G8417 CALC BMI ABV UP PARAM F/U: HCPCS | Performed by: SURGERY

## 2024-10-24 PROCEDURE — G8427 DOCREV CUR MEDS BY ELIG CLIN: HCPCS | Performed by: SURGERY

## 2024-10-24 PROCEDURE — 99213 OFFICE O/P EST LOW 20 MIN: CPT | Performed by: SURGERY

## 2024-10-24 PROCEDURE — 4004F PT TOBACCO SCREEN RCVD TLK: CPT | Performed by: SURGERY

## 2024-10-24 RX ORDER — PANTOPRAZOLE SODIUM 40 MG/1
TABLET, DELAYED RELEASE ORAL
COMMUNITY
Start: 2024-09-30 | End: 2024-10-24 | Stop reason: ALTCHOICE

## 2024-10-24 RX ORDER — DICYCLOMINE HYDROCHLORIDE 10 MG/1
CAPSULE ORAL
COMMUNITY
Start: 2024-09-30

## 2024-10-24 NOTE — PROGRESS NOTES
Year: No     Number of Places Lived in the Last Year: 1     Unstable Housing in the Last Year: No           Review of Systems  Review of Systems -  General ROS: negative for - chills, fatigue or malaise  ENT ROS: negative for - hearing change, nasal congestion or nasal discharge  Allergy and Immunology ROS: negative for - hives, itchy/watery eyes or nasal congestion  Hematological and Lymphatic ROS: negative for - blood clots, blood transfusions, bruising or fatigue  Endocrine ROS: negative for - malaise/lethargy, mood swings, palpitations or polydipsia/polyuria  Respiratory ROS: negative for - sputum changes, stridor, tachypnea or wheezing  Cardiovascular ROS: negative for - irregular heartbeat, loss of consciousness, murmur or orthopnea  Gastrointestinal ROS: negative for - constipation, diarrhea, gas/bloating, or hematemesis, positive for heartburn and other epigastric pain  Genito-Urinary ROS: negative for -  genital discharge, genital ulcers or hematuria  Musculoskeletal ROS: negative for - gait disturbance, muscle pain or muscular weakness    Physical exam:   /88   Pulse 81   Temp 97.2 °F (36.2 °C) (Temporal)   Ht 1.524 m (5')   Wt 124.3 kg (274 lb)   LMP 01/05/2018   BMI 53.51 kg/m²   General appearance:  NAD  Pyscho/social: negative for tremors and hallucinations  Head: NCAT, PERRLA, EOMI, red conjunctiva  Neck: supple, no masses  Lungs: CTAB, equal chest rise bilateral  Heart: Reg rate  Abdomen: soft, nontender, nondistended  Skin; no lesions  Gu: no cva tenderness  Extremities: extremities normal, atraumatic, no cyanosis or edema      Assessment:  49 y.o. female with  symptomatic recurrent hiatal hernia and barretts    Plan:   Will get manometry and see back to discuss repair an possible LINX      Dominik Becerra MD  9:20 AM  10/24/2024

## 2024-10-30 ENCOUNTER — TELEPHONE (OUTPATIENT)
Dept: ENDOSCOPY | Age: 49
End: 2024-10-30

## 2024-10-30 NOTE — TELEPHONE ENCOUNTER
phoned pt to schedule her for esophageal manometry testing. pt's phone unable to leave message, rings busy x2

## 2024-11-01 RX ORDER — NORTRIPTYLINE HYDROCHLORIDE 10 MG/1
CAPSULE ORAL
Qty: 30 CAPSULE | Refills: 0 | Status: SHIPPED | OUTPATIENT
Start: 2024-11-01

## 2024-11-01 RX ORDER — LISINOPRIL AND HYDROCHLOROTHIAZIDE 10; 12.5 MG/1; MG/1
1 TABLET ORAL DAILY
Qty: 30 TABLET | Refills: 0 | Status: SHIPPED | OUTPATIENT
Start: 2024-11-01

## 2024-11-01 NOTE — TELEPHONE ENCOUNTER
Name of Medication(s) Requested:  Requested Prescriptions     Pending Prescriptions Disp Refills    nortriptyline (PAMELOR) 10 MG capsule [Pharmacy Med Name: NORTRIPTYLINE 10 MG CAP 10 Capsule] 30 capsule 3     Sig: TAKE 1 CAPSULE BY MOUTH EVERY NIGHT AT BEDTIME FOR HEADACHE    lisinopril-hydroCHLOROthiazide (PRINZIDE;ZESTORETIC) 10-12.5 MG per tablet [Pharmacy Med Name: LISINOPRIL-HCTZ 10-12.5 MG 10-12.5 Tablet] 30 tablet 3     Sig: TAKE 1 TABLET BY MOUTH EVERY DAY       Medication is on current medication list Yes    Dosage and directions were verified? Yes    Quantity verified: 30 day supply     Pharmacy Verified?  Yes    Last Appointment:  7/3/2023    Future appts:  No future appointments.     (If no appt send self scheduling link. .REFILLAPPT)  Scheduling request sent?     [] Yes  [] No    Does patient need updated?  [] Yes  [] No

## 2024-11-12 ENCOUNTER — HOSPITAL ENCOUNTER (OUTPATIENT)
Age: 49
Setting detail: OUTPATIENT SURGERY
Discharge: HOME | End: 2024-11-12
Attending: INTERNAL MEDICINE | Admitting: INTERNAL MEDICINE
Payer: MEDICARE

## 2024-11-12 PROCEDURE — 6370000000 HC RX 637 (ALT 250 FOR IP): Performed by: INTERNAL MEDICINE

## 2024-11-12 PROCEDURE — 3609015500 HC GASTRIC/DUODENAL MOTILITY &/OR MANOMETRY STUDY: Performed by: INTERNAL MEDICINE

## 2024-11-12 RX ORDER — LIDOCAINE HYDROCHLORIDE 20 MG/ML
JELLY TOPICAL PRN
Status: DISCONTINUED | OUTPATIENT
Start: 2024-11-12 | End: 2024-11-12 | Stop reason: ALTCHOICE

## 2024-11-12 NOTE — PROGRESS NOTES
After confirmation of potential allergies, a topical analgesic was used to numb the nares followed by trans-nasal insertion of a high resolution Manometry catheter.  Pressure bands of both UES and LES were observed on the contour color.  Catheter was placed at 50cm. Patient instructed to take a deep breath to verify placement of catheter and diaphragmatic pinch noted on inspiration.  Patient was assisted to semi-supine position and catheter was stabilized with tape.  Patient encouraged to relax while acclimating to catheter for several minutes.  A 30 second baseline pressure was obtained to identify landmarks.  A series of wet swallows with 5 cc of room temperature saline were then administered to assess esophageal motility.  At the conclusion of the procedure, the catheter was removed.

## 2024-11-25 ENCOUNTER — TELEPHONE (OUTPATIENT)
Dept: SURGERY | Age: 49
End: 2024-11-25

## 2024-11-25 ENCOUNTER — OFFICE VISIT (OUTPATIENT)
Dept: SURGERY | Age: 49
End: 2024-11-25
Payer: MEDICARE

## 2024-11-25 VITALS
SYSTOLIC BLOOD PRESSURE: 138 MMHG | HEIGHT: 60 IN | DIASTOLIC BLOOD PRESSURE: 78 MMHG | HEART RATE: 72 BPM | BODY MASS INDEX: 53.79 KG/M2 | TEMPERATURE: 97 F | WEIGHT: 274 LBS

## 2024-11-25 DIAGNOSIS — K44.9 HIATAL HERNIA: Primary | ICD-10-CM

## 2024-11-25 PROCEDURE — G8417 CALC BMI ABV UP PARAM F/U: HCPCS | Performed by: SURGERY

## 2024-11-25 PROCEDURE — G8428 CUR MEDS NOT DOCUMENT: HCPCS | Performed by: SURGERY

## 2024-11-25 PROCEDURE — 3017F COLORECTAL CA SCREEN DOC REV: CPT | Performed by: SURGERY

## 2024-11-25 PROCEDURE — G8484 FLU IMMUNIZE NO ADMIN: HCPCS | Performed by: SURGERY

## 2024-11-25 PROCEDURE — 99214 OFFICE O/P EST MOD 30 MIN: CPT | Performed by: SURGERY

## 2024-11-25 PROCEDURE — 4004F PT TOBACCO SCREEN RCVD TLK: CPT | Performed by: SURGERY

## 2024-11-25 NOTE — TELEPHONE ENCOUNTER
Per the order of Dr. Becerra, patient has been scheduled for Laparoscopic hiatal hernia repair with LINX, possible mesh, possible open on 2024.  Patient provided with LINX information, instruction sheet and post operative appointment .  Patient instructed to please contact our office with any questions.    Patient provided with liver shrinking diet and is to start on 24 and continue to surgery date.    Procedure scheduled through [facility].  Dr. Becerra to enter orders.      Prior Authorization Form:      DEMOGRAPHICS:                     Patient Name:  Pavel Mariscal  Patient :  1975            Insurance:  Payor: [facility] MEDICARE / Plan: UNITEDHE[employer]CARE DUAL COMPLETE / Product Type: *No Product type* /   Insurance ID Number:    Payer/Plan Subscr  Sex Relation Sub. Ins. ID Effective Group Num   1. [facility] MEDICARE * PAVEL MARISCAL 1975 Female Self [id number]                                    [address]   2. [place]ICAID [place] -* PAVEL MARISCAL 1975 Female Self [id number]                                    [address]         DIAGNOSIS & PROCEDURE:                       Procedure/Operation: Laparoscopic hiatal hernia repair with LINX, possible mesh, possible open         CPT Code: 33921    Diagnosis:  Hiatal hernia    ICD10 Code: K44.9    Location:  [facility]    Surgeon:  Hernan    SCHEDULING INFORMATION:                          Date: 2024    Time: TBD              Anesthesia:  Choice                                                       Status:  Outpatient        Special Comments:  N/A       Electronically signed by Justus Contreras MA on 2024 at 3:11 PM

## 2024-11-25 NOTE — PROGRESS NOTES
Organizations: No     Attends Club or Organization Meetings: Never     Marital Status: Never    Intimate Partner Violence: Not on file   Housing Stability: Low Risk  (3/11/2024)    Housing Stability Vital Sign     Unable to Pay for Housing in the Last Year: No     Number of Places Lived in the Last Year: 1     Unstable Housing in the Last Year: No           Review of Systems  Review of Systems -  General ROS: negative for - chills, fatigue or malaise  ENT ROS: negative for - hearing change, nasal congestion or nasal discharge  Allergy and Immunology ROS: negative for - hives, itchy/watery eyes or nasal congestion  Hematological and Lymphatic ROS: negative for - blood clots, blood transfusions, bruising or fatigue  Endocrine ROS: negative for - malaise/lethargy, mood swings, palpitations or polydipsia/polyuria  Respiratory ROS: negative for - sputum changes, stridor, tachypnea or wheezing  Cardiovascular ROS: negative for - irregular heartbeat, loss of consciousness, murmur or orthopnea  Gastrointestinal ROS: negative for - constipation, diarrhea, gas/bloating, or hematemesis, positive for heartburn and other epigastric pain  Genito-Urinary ROS: negative for -  genital discharge, genital ulcers or hematuria  Musculoskeletal ROS: negative for - gait disturbance, muscle pain or muscular weakness    Physical exam:   /78   Pulse 72   Temp 97 °F (36.1 °C)   Ht 1.524 m (5')   Wt 124.3 kg (274 lb)   LMP 01/05/2018   BMI 53.51 kg/m²   General appearance:  NAD  Pyscho/social: negative for tremors and hallucinations  Head: NCAT, PERRLA, EOMI, red conjunctiva  Neck: supple, no masses  Lungs: CTAB, equal chest rise bilateral  Heart: Reg rate  Abdomen: soft, nontender, nondistended  Skin; no lesions  Gu: no cva tenderness  Extremities: extremities normal, atraumatic, no cyanosis or edema      Assessment:  49 y.o. female with  symptomatic recurrent hiatal hernia with severe reflux and Clement's esophagus    Plan:

## 2024-11-26 ENCOUNTER — PREP FOR PROCEDURE (OUTPATIENT)
Dept: SURGERY | Age: 49
End: 2024-11-26

## 2024-11-26 RX ORDER — SODIUM CHLORIDE 0.9 % (FLUSH) 0.9 %
5-40 SYRINGE (ML) INJECTION PRN
Status: CANCELLED | OUTPATIENT
Start: 2024-11-26

## 2024-11-26 RX ORDER — SODIUM CHLORIDE, SODIUM LACTATE, POTASSIUM CHLORIDE, CALCIUM CHLORIDE 600; 310; 30; 20 MG/100ML; MG/100ML; MG/100ML; MG/100ML
INJECTION, SOLUTION INTRAVENOUS CONTINUOUS
Status: CANCELLED | OUTPATIENT
Start: 2024-11-26

## 2024-11-26 RX ORDER — SODIUM CHLORIDE 9 MG/ML
INJECTION, SOLUTION INTRAVENOUS PRN
Status: CANCELLED | OUTPATIENT
Start: 2024-11-26

## 2024-11-26 RX ORDER — SODIUM CHLORIDE 0.9 % (FLUSH) 0.9 %
5-40 SYRINGE (ML) INJECTION EVERY 12 HOURS SCHEDULED
Status: CANCELLED | OUTPATIENT
Start: 2024-11-26

## 2024-11-29 ENCOUNTER — ANESTHESIA EVENT (OUTPATIENT)
Dept: OPERATING ROOM | Age: 49
End: 2024-11-29
Payer: MEDICARE

## 2024-11-29 NOTE — ANESTHESIA PRE PROCEDURE
Department of Anesthesiology  Preprocedure Note       Name:  Kendra Garay   Age:  49 y.o.  :  1975                                          MRN:  [id number]         Date:  2024      Surgeon: Surgeon(s):  Dominik Becerra MD    Procedure: Procedure(s):  LAPAROSCOPIC HIATAL HERNIA REPAIR WITH LINX, POSSIBLE MESH, POSSIBLE OPEN    Medications prior to admission:   Prior to Admission medications    Medication Sig Start Date End Date Taking? Authorizing Provider   nortriptyline (PAMELOR) 10 MG capsule TAKE 1 CAPSULE BY MOUTH EVERY NIGHT AT BEDTIME FOR HEADACHE 24   Amanda Quarels PA-C   lisinopril-hydroCHLOROthiazide (PRINZIDE;ZESTORETIC) 10-12.5 MG per tablet TAKE 1 TABLET BY MOUTH EVERY DAY 24   Amanda Quarles PA-C   dicyclomine (BENTYL) 10 MG capsule  24   Provider, MD Vero   gabapentin (NEURONTIN) 400 MG capsule Take 1 capsule by mouth 3 times daily for 90 days. 9/4/24 12/3/24  Geronimo Vick APRN - CNP   fluticasone (FLONASE) 50 MCG/ACT nasal spray INSTILL ONE (1) SPRAY IN EACH NOSTRIL ONCE DAILY 7/15/24   Amanda Quarles PA-C   Cholecalciferol (VITAMIN D3) 1.25 MG (21627 UT) CAPS TAKE 1 CAPSULE BY MOUTH ONCE WEEKLY ON SUNDAYS 7/12/24   Geronimo Vick APRN - CNP   rOPINIRole (REQUIP) 0.5 MG tablet TAKE 1 TABLET BY MOUTH EVERY MORNING, TAKE 1 TABLET 2 HOURS BEFORE BED, AND TAKE 1 TABLET AT BEDTIME 24   Geronimo Vick APRN - CNP   baclofen (LIORESAL) 20 MG tablet TAKE 1 TABLET BY MOUTH FOUR TIMES DAILY AS NEEDED FOR SPASMS/PAIN 24   Geronimo Vick APRN - CNP   dexlansoprazole (DEXILANT) 30 MG CPDR delayed release capsule Take 30 mg by mouth daily    ProviderVero MD   ADVAIR -21 MCG/ACT inhaler INHALE TWO (2) PUFFS BY MOUTH INTO THE LUNGS TWICE DAILY 24   Amanda Quarles PA-C   VENTOLIN  (90 Base) MCG/ACT inhaler INHALE 2 PUFFS BY MOUTH AND INTO THE LUNGS FOUR TIMES A DAY IF NEEDED FOR WHEEZING 7/3/23   Amanda Quarles, KIRSTEN   ocrelizumab

## 2024-12-02 ENCOUNTER — TELEPHONE (OUTPATIENT)
Dept: PRIMARY CARE CLINIC | Age: 49
End: 2024-12-02

## 2024-12-02 NOTE — TELEPHONE ENCOUNTER
A representative from [facility] called stating as of 01/2025 Advair Inhaler will no longer be covered under the patients plan.  Alternatives are Symbicort and Wixela.

## 2024-12-03 NOTE — TELEPHONE ENCOUNTER
Name of Medication(s) Requested:  Requested Prescriptions     Pending Prescriptions Disp Refills    nortriptyline (PAMELOR) 10 MG capsule [Pharmacy Med Name: NORTRIPTYLINE 10 MG CAP 10 Capsule] 30 capsule 0     Sig: TAKE 1 CAPSULE BY MOUTH EVERY NIGHT AT BEDTIME FOR HEADACHE    lisinopril-hydroCHLOROthiazide (PRINZIDE;ZESTORETIC) 10-12.5 MG per tablet [Pharmacy Med Name: LISINOPRIL-HCTZ 10-12.5 MG 10-12.5 Tablet] 30 tablet 0     Sig: TAKE 1 TABLET BY MOUTH EVERY DAY       Medication is on current medication list Yes    Dosage and directions were verified? Yes    Quantity verified: 30 day supply     Pharmacy Verified?  Yes    Last Appointment:  7/3/2023    Future appts:  Future Appointments   Date Time Provider Department Center   12/9/2024 12:30 PM [facility] ROOM 3 [facility]   12/23/2024  2:00 PM Dominik Becerra MD [facility]   1/3/2025  3:45 PM Amanda Quarles PA-C [facility]        (If no appt send self scheduling link. .REFILLAPPT)  Scheduling request sent?     [] Yes  [] No    Does patient need updated?  [] Yes  [] No

## 2024-12-04 RX ORDER — GABAPENTIN 400 MG/1
400 CAPSULE ORAL 3 TIMES DAILY
Qty: 270 CAPSULE | Refills: 0 | Status: SHIPPED | OUTPATIENT
Start: 2024-12-04 | End: 2025-03-04

## 2024-12-04 RX ORDER — LISINOPRIL AND HYDROCHLOROTHIAZIDE 10; 12.5 MG/1; MG/1
1 TABLET ORAL DAILY
Qty: 30 TABLET | Refills: 0 | Status: SHIPPED | OUTPATIENT
Start: 2024-12-04

## 2024-12-04 RX ORDER — NORTRIPTYLINE HYDROCHLORIDE 10 MG/1
CAPSULE ORAL
Qty: 30 CAPSULE | Refills: 0 | Status: SHIPPED | OUTPATIENT
Start: 2024-12-04

## 2024-12-09 ENCOUNTER — HOSPITAL ENCOUNTER (OUTPATIENT)
Dept: PREADMISSION TESTING | Age: 49
Discharge: HOME | End: 2024-12-09
Payer: MEDICARE

## 2024-12-09 VITALS
RESPIRATION RATE: 16 BRPM | TEMPERATURE: 97.6 F | WEIGHT: 267 LBS | OXYGEN SATURATION: 96 % | BODY MASS INDEX: 52.42 KG/M2 | HEART RATE: 74 BPM | SYSTOLIC BLOOD PRESSURE: 118 MMHG | DIASTOLIC BLOOD PRESSURE: 73 MMHG | HEIGHT: 60 IN

## 2024-12-09 DIAGNOSIS — Z01.818 PREOP TESTING: Primary | ICD-10-CM

## 2024-12-09 LAB
ALBUMIN SERPL-MCNC: 4.3 G/DL (ref 3.5–5.2)
ALP SERPL-CCNC: 102 U/L (ref 35–104)
ALT SERPL-CCNC: 78 U/L (ref 0–32)
ANION GAP SERPL CALCULATED.3IONS-SCNC: 14 MMOL/L (ref 7–16)
AST SERPL-CCNC: 42 U/L (ref 0–31)
BILIRUB SERPL-MCNC: 0.5 MG/DL (ref 0–1.2)
BUN SERPL-MCNC: 6 MG/DL (ref 6–20)
CALCIUM SERPL-MCNC: 9.2 MG/DL (ref 8.6–10.2)
CHLORIDE SERPL-SCNC: 102 MMOL/L (ref 98–107)
CO2 SERPL-SCNC: 25 MMOL/L (ref 22–29)
CREAT SERPL-MCNC: 0.7 MG/DL (ref 0.5–1)
ERYTHROCYTE [DISTWIDTH] IN BLOOD BY AUTOMATED COUNT: 13.3 % (ref 11.5–15)
GFR, ESTIMATED: >90 ML/MIN/1.73M2
GLUCOSE SERPL-MCNC: 109 MG/DL (ref 74–99)
HCT VFR BLD AUTO: 43 % (ref 34–48)
HGB BLD-MCNC: 14.6 G/DL (ref 11.5–15.5)
MCH RBC QN AUTO: 31.5 PG (ref 26–35)
MCHC RBC AUTO-ENTMCNC: 34 G/DL (ref 32–34.5)
MCV RBC AUTO: 92.9 FL (ref 80–99.9)
PLATELET # BLD AUTO: 298 K/UL (ref 130–450)
PMV BLD AUTO: 9.7 FL (ref 7–12)
POTASSIUM SERPL-SCNC: 3.9 MMOL/L (ref 3.5–5)
PROT SERPL-MCNC: 6.8 G/DL (ref 6.4–8.3)
RBC # BLD AUTO: 4.63 M/UL (ref 3.5–5.5)
SODIUM SERPL-SCNC: 141 MMOL/L (ref 132–146)
WBC OTHER # BLD: 11.5 K/UL (ref 4.5–11.5)

## 2024-12-09 PROCEDURE — 93005 ELECTROCARDIOGRAM TRACING: CPT | Performed by: ANESTHESIOLOGY

## 2024-12-09 PROCEDURE — 80053 COMPREHEN METABOLIC PANEL: CPT

## 2024-12-09 PROCEDURE — 85027 COMPLETE CBC AUTOMATED: CPT

## 2024-12-09 NOTE — PROGRESS NOTES
Left message for infectious disease re:need for contact isolation due to pop up on computer. Surg sched notified. Per Noemy Dacosta RN, pt can start in fast track area.

## 2024-12-09 NOTE — PROGRESS NOTES
[facility]                                                                                                                    PRE OP INSTRUCTIONS FOR  Kendra Garay        Date: 12/9/2024    Date of surgery: 12/11/24   Arrival Time: Hospital will call you between 5pm and 7pm with your final arrival time for surgery. Go to     front of hospital and check in at information desk.    Nothing by mouth (NPO) as instructed. May have clear liquids up to 2 hours prior to surgery. Nothing solid after midnight. Examples: water, apple juice, black coffee, plain tea    Take the following medications with a small sip of water on the morning of Surgery: gabapentin, dexilent, use inhaler and flonase, bring rescue inhaler     Aspirin, Ibuprofen, Advil, Naproxen, other Anti-inflammatory products should be stopped before surgery as directed by your surgeon, cardiologist, or primary care Doctor. Herbal supplements and Vitamin E should be stopped five days prior.  May take Tylenol unless instructed otherwise by your surgeon.      Do not smoke, chew tobacco, vape, or use illicit drugs and do not drink any alcoholic beverages 24 hours prior to surgery.    You may brush your teeth the morning of surgery.      You MUST make arrangements for a responsible adult, 18 and over, to take you home after your surgery. You will not be allowed to leave alone or drive yourself home.  You will need someone stay with you the first 24 hrs. Your surgery will be cancelled if you do not have a ride home or someone to stay with you.      Please wear simple, loose fitting clothing to the hospital.  Do not bring valuables (money, credit cards, checkbooks, etc.) Do not wear any makeup (including no eye makeup) or nail polish on your fingers or toes.    DO NOT wear any jewelry or piercings on day of surgery.  All body piercings and jewelry must be removed.    Shower the MORNING of surgery with an Antibacterial soap.      If you have

## 2024-12-10 PROBLEM — K56.609 SBO (SMALL BOWEL OBSTRUCTION) (HCC): Status: RESOLVED | Noted: 2024-03-09 | Resolved: 2024-12-10

## 2024-12-10 PROBLEM — M54.16 LUMBAR RADICULAR PAIN: Status: RESOLVED | Noted: 2024-06-25 | Resolved: 2024-12-10

## 2024-12-10 PROBLEM — M47.816 LUMBAR SPONDYLOSIS: Status: RESOLVED | Noted: 2024-08-21 | Resolved: 2024-12-10

## 2024-12-10 PROBLEM — G57.13 MERALGIA PARESTHETICA OF BOTH LOWER EXTREMITIES: Status: ACTIVE | Noted: 2024-12-10

## 2024-12-10 LAB
EKG ATRIAL RATE: 71 BPM
EKG P AXIS: 57 DEGREES
EKG P-R INTERVAL: 168 MS
EKG Q-T INTERVAL: 398 MS
EKG QRS DURATION: 88 MS
EKG QTC CALCULATION (BAZETT): 432 MS
EKG R AXIS: 55 DEGREES
EKG T AXIS: 57 DEGREES
EKG VENTRICULAR RATE: 71 BPM

## 2024-12-11 ENCOUNTER — HOSPITAL ENCOUNTER (INPATIENT)
Age: 49
LOS: 1 days | Discharge: HOME | DRG: 327 | End: 2024-12-12
Attending: SURGERY | Admitting: SURGERY
Payer: MEDICARE

## 2024-12-11 ENCOUNTER — ANESTHESIA (OUTPATIENT)
Dept: OPERATING ROOM | Age: 49
End: 2024-12-11
Payer: MEDICARE

## 2024-12-11 DIAGNOSIS — Z98.890 S/P REPAIR OF PARAESOPHAGEAL HERNIA: Primary | ICD-10-CM

## 2024-12-11 DIAGNOSIS — Z87.19 S/P REPAIR OF PARAESOPHAGEAL HERNIA: Primary | ICD-10-CM

## 2024-12-11 PROCEDURE — 15734 MUSCLE-SKIN GRAFT TRUNK: CPT | Performed by: SURGERY

## 2024-12-11 PROCEDURE — 6360000002 HC RX W HCPCS: Performed by: FAMILY MEDICINE

## 2024-12-11 PROCEDURE — 1200000000 HC SEMI PRIVATE

## 2024-12-11 PROCEDURE — G0378 HOSPITAL OBSERVATION PER HR: HCPCS

## 2024-12-11 PROCEDURE — 3700000000 HC ANESTHESIA ATTENDED CARE: Performed by: SURGERY

## 2024-12-11 PROCEDURE — 7100000010 HC PHASE II RECOVERY - FIRST 15 MIN: Performed by: SURGERY

## 2024-12-11 PROCEDURE — 2580000003 HC RX 258: Performed by: SURGERY

## 2024-12-11 PROCEDURE — 2720000010 HC SURG SUPPLY STERILE: Performed by: SURGERY

## 2024-12-11 PROCEDURE — 6360000002 HC RX W HCPCS: Performed by: ANESTHESIOLOGY

## 2024-12-11 PROCEDURE — 0BUT4JZ SUPPLEMENT DIAPHRAGM WITH SYNTHETIC SUBSTITUTE, PERCUTANEOUS ENDOSCOPIC APPROACH: ICD-10-PCS | Performed by: SURGERY

## 2024-12-11 PROCEDURE — 99223 1ST HOSP IP/OBS HIGH 75: CPT | Performed by: FAMILY MEDICINE

## 2024-12-11 PROCEDURE — 94640 AIRWAY INHALATION TREATMENT: CPT

## 2024-12-11 PROCEDURE — 7100000000 HC PACU RECOVERY - FIRST 15 MIN: Performed by: SURGERY

## 2024-12-11 PROCEDURE — 7100000001 HC PACU RECOVERY - ADDTL 15 MIN: Performed by: SURGERY

## 2024-12-11 PROCEDURE — 2500000003 HC RX 250 WO HCPCS: Performed by: SURGERY

## 2024-12-11 PROCEDURE — C1781 MESH (IMPLANTABLE): HCPCS | Performed by: SURGERY

## 2024-12-11 PROCEDURE — 6360000002 HC RX W HCPCS: Performed by: NURSE ANESTHETIST, CERTIFIED REGISTERED

## 2024-12-11 PROCEDURE — 2500000003 HC RX 250 WO HCPCS: Performed by: NURSE ANESTHETIST, CERTIFIED REGISTERED

## 2024-12-11 PROCEDURE — 3600000014 HC SURGERY LEVEL 4 ADDTL 15MIN: Performed by: SURGERY

## 2024-12-11 PROCEDURE — 7100000011 HC PHASE II RECOVERY - ADDTL 15 MIN: Performed by: SURGERY

## 2024-12-11 PROCEDURE — 6360000002 HC RX W HCPCS: Performed by: SURGERY

## 2024-12-11 PROCEDURE — 6370000000 HC RX 637 (ALT 250 FOR IP): Performed by: FAMILY MEDICINE

## 2024-12-11 PROCEDURE — 43282 LAP PARAESOPH HER RPR W/MESH: CPT | Performed by: SURGERY

## 2024-12-11 PROCEDURE — 2580000003 HC RX 258: Performed by: NURSE ANESTHETIST, CERTIFIED REGISTERED

## 2024-12-11 PROCEDURE — 43289 UNLISTED LAPS PX ESOPH: CPT | Performed by: SURGERY

## 2024-12-11 PROCEDURE — 99024 POSTOP FOLLOW-UP VISIT: CPT | Performed by: SURGERY

## 2024-12-11 PROCEDURE — 2709999900 HC NON-CHARGEABLE SUPPLY: Performed by: SURGERY

## 2024-12-11 PROCEDURE — 3600000004 HC SURGERY LEVEL 4 BASE: Performed by: SURGERY

## 2024-12-11 PROCEDURE — 3700000001 HC ADD 15 MINUTES (ANESTHESIA): Performed by: SURGERY

## 2024-12-11 DEVICE — PHASIX ST MESH, 7 CM X 10 CM (3" X 4"), RECTANGLE
Type: IMPLANTABLE DEVICE | Site: ABDOMEN | Status: FUNCTIONAL
Brand: PHASIX

## 2024-12-11 RX ORDER — MORPHINE SULFATE 2 MG/ML
2 INJECTION, SOLUTION INTRAMUSCULAR; INTRAVENOUS
Status: DISCONTINUED | OUTPATIENT
Start: 2024-12-11 | End: 2024-12-11

## 2024-12-11 RX ORDER — MEPERIDINE HYDROCHLORIDE 25 MG/ML
12.5 INJECTION INTRAMUSCULAR; INTRAVENOUS; SUBCUTANEOUS EVERY 5 MIN PRN
Status: DISCONTINUED | OUTPATIENT
Start: 2024-12-11 | End: 2024-12-11 | Stop reason: HOSPADM

## 2024-12-11 RX ORDER — ALBUTEROL SULFATE 0.83 MG/ML
2.5 SOLUTION RESPIRATORY (INHALATION) EVERY 4 HOURS PRN
Status: DISCONTINUED | OUTPATIENT
Start: 2024-12-11 | End: 2024-12-12 | Stop reason: HOSPADM

## 2024-12-11 RX ORDER — POTASSIUM CHLORIDE 1500 MG/1
40 TABLET, EXTENDED RELEASE ORAL PRN
Status: DISCONTINUED | OUTPATIENT
Start: 2024-12-11 | End: 2024-12-12 | Stop reason: HOSPADM

## 2024-12-11 RX ORDER — PROCHLORPERAZINE EDISYLATE 5 MG/ML
5 INJECTION INTRAMUSCULAR; INTRAVENOUS
Status: DISCONTINUED | OUTPATIENT
Start: 2024-12-11 | End: 2024-12-11 | Stop reason: HOSPADM

## 2024-12-11 RX ORDER — BACLOFEN 10 MG/1
20 TABLET ORAL 4 TIMES DAILY
Status: DISCONTINUED | OUTPATIENT
Start: 2024-12-11 | End: 2024-12-12 | Stop reason: HOSPADM

## 2024-12-11 RX ORDER — HYDROMORPHONE HYDROCHLORIDE 1 MG/ML
1 INJECTION, SOLUTION INTRAMUSCULAR; INTRAVENOUS; SUBCUTANEOUS
Status: DISCONTINUED | OUTPATIENT
Start: 2024-12-11 | End: 2024-12-12 | Stop reason: HOSPADM

## 2024-12-11 RX ORDER — SODIUM CHLORIDE 0.9 % (FLUSH) 0.9 %
5-40 SYRINGE (ML) INJECTION EVERY 12 HOURS SCHEDULED
Status: DISCONTINUED | OUTPATIENT
Start: 2024-12-11 | End: 2024-12-12 | Stop reason: HOSPADM

## 2024-12-11 RX ORDER — TRAMADOL HYDROCHLORIDE 50 MG/1
50 TABLET ORAL EVERY 6 HOURS PRN
Status: DISCONTINUED | OUTPATIENT
Start: 2024-12-11 | End: 2024-12-12 | Stop reason: HOSPADM

## 2024-12-11 RX ORDER — MAGNESIUM SULFATE IN WATER 40 MG/ML
2000 INJECTION, SOLUTION INTRAVENOUS PRN
Status: DISCONTINUED | OUTPATIENT
Start: 2024-12-11 | End: 2024-12-12 | Stop reason: HOSPADM

## 2024-12-11 RX ORDER — KETOROLAC TROMETHAMINE 30 MG/ML
INJECTION, SOLUTION INTRAMUSCULAR; INTRAVENOUS
Status: DISCONTINUED | OUTPATIENT
Start: 2024-12-11 | End: 2024-12-11 | Stop reason: SDUPTHER

## 2024-12-11 RX ORDER — DIPHENHYDRAMINE HYDROCHLORIDE 50 MG/ML
INJECTION INTRAMUSCULAR; INTRAVENOUS
Status: DISCONTINUED | OUTPATIENT
Start: 2024-12-11 | End: 2024-12-11 | Stop reason: SDUPTHER

## 2024-12-11 RX ORDER — SODIUM CHLORIDE 0.9 % (FLUSH) 0.9 %
5-40 SYRINGE (ML) INJECTION EVERY 12 HOURS SCHEDULED
Status: DISCONTINUED | OUTPATIENT
Start: 2024-12-11 | End: 2024-12-11 | Stop reason: HOSPADM

## 2024-12-11 RX ORDER — HYDRALAZINE HYDROCHLORIDE 20 MG/ML
10 INJECTION INTRAMUSCULAR; INTRAVENOUS
Status: DISCONTINUED | OUTPATIENT
Start: 2024-12-11 | End: 2024-12-11 | Stop reason: HOSPADM

## 2024-12-11 RX ORDER — PANTOPRAZOLE SODIUM 40 MG/1
40 TABLET, DELAYED RELEASE ORAL
Status: DISCONTINUED | OUTPATIENT
Start: 2024-12-12 | End: 2024-12-12 | Stop reason: HOSPADM

## 2024-12-11 RX ORDER — DEXAMETHASONE SODIUM PHOSPHATE 10 MG/ML
INJECTION, SOLUTION INTRAMUSCULAR; INTRAVENOUS
Status: DISCONTINUED | OUTPATIENT
Start: 2024-12-11 | End: 2024-12-11 | Stop reason: SDUPTHER

## 2024-12-11 RX ORDER — LIDOCAINE HYDROCHLORIDE 20 MG/ML
INJECTION, SOLUTION INTRAVENOUS
Status: DISCONTINUED | OUTPATIENT
Start: 2024-12-11 | End: 2024-12-11 | Stop reason: SDUPTHER

## 2024-12-11 RX ORDER — LABETALOL HYDROCHLORIDE 5 MG/ML
INJECTION, SOLUTION INTRAVENOUS
Status: DISCONTINUED | OUTPATIENT
Start: 2024-12-11 | End: 2024-12-11 | Stop reason: SDUPTHER

## 2024-12-11 RX ORDER — ROPINIROLE 1 MG/1
1 TABLET, FILM COATED ORAL 3 TIMES DAILY
Status: DISCONTINUED | OUTPATIENT
Start: 2024-12-11 | End: 2024-12-12 | Stop reason: HOSPADM

## 2024-12-11 RX ORDER — IPRATROPIUM BROMIDE AND ALBUTEROL SULFATE 2.5; .5 MG/3ML; MG/3ML
1 SOLUTION RESPIRATORY (INHALATION)
Status: DISCONTINUED | OUTPATIENT
Start: 2024-12-11 | End: 2024-12-11 | Stop reason: HOSPADM

## 2024-12-11 RX ORDER — ONDANSETRON 2 MG/ML
INJECTION INTRAMUSCULAR; INTRAVENOUS
Status: DISCONTINUED | OUTPATIENT
Start: 2024-12-11 | End: 2024-12-11 | Stop reason: SDUPTHER

## 2024-12-11 RX ORDER — SODIUM CHLORIDE, SODIUM LACTATE, POTASSIUM CHLORIDE, CALCIUM CHLORIDE 600; 310; 30; 20 MG/100ML; MG/100ML; MG/100ML; MG/100ML
INJECTION, SOLUTION INTRAVENOUS CONTINUOUS
Status: DISCONTINUED | OUTPATIENT
Start: 2024-12-11 | End: 2024-12-11 | Stop reason: HOSPADM

## 2024-12-11 RX ORDER — SODIUM CHLORIDE 9 MG/ML
INJECTION, SOLUTION INTRAVENOUS PRN
Status: DISCONTINUED | OUTPATIENT
Start: 2024-12-11 | End: 2024-12-11 | Stop reason: HOSPADM

## 2024-12-11 RX ORDER — ONDANSETRON 2 MG/ML
4 INJECTION INTRAMUSCULAR; INTRAVENOUS EVERY 6 HOURS PRN
Status: DISCONTINUED | OUTPATIENT
Start: 2024-12-11 | End: 2024-12-12 | Stop reason: HOSPADM

## 2024-12-11 RX ORDER — SODIUM CHLORIDE 0.9 % (FLUSH) 0.9 %
5-40 SYRINGE (ML) INJECTION PRN
Status: DISCONTINUED | OUTPATIENT
Start: 2024-12-11 | End: 2024-12-12 | Stop reason: HOSPADM

## 2024-12-11 RX ORDER — ENOXAPARIN SODIUM 100 MG/ML
30 INJECTION SUBCUTANEOUS 2 TIMES DAILY
Status: DISCONTINUED | OUTPATIENT
Start: 2024-12-12 | End: 2024-12-12 | Stop reason: HOSPADM

## 2024-12-11 RX ORDER — TRAMADOL HYDROCHLORIDE 50 MG/1
25 TABLET ORAL EVERY 6 HOURS PRN
Status: DISCONTINUED | OUTPATIENT
Start: 2024-12-11 | End: 2024-12-12 | Stop reason: HOSPADM

## 2024-12-11 RX ORDER — KETOROLAC TROMETHAMINE 30 MG/ML
30 INJECTION, SOLUTION INTRAMUSCULAR; INTRAVENOUS ONCE
Status: DISCONTINUED | OUTPATIENT
Start: 2024-12-11 | End: 2024-12-11 | Stop reason: HOSPADM

## 2024-12-11 RX ORDER — MORPHINE SULFATE 4 MG/ML
4 INJECTION, SOLUTION INTRAMUSCULAR; INTRAVENOUS
Status: DISCONTINUED | OUTPATIENT
Start: 2024-12-11 | End: 2024-12-11

## 2024-12-11 RX ORDER — SODIUM CHLORIDE 9 MG/ML
INJECTION, SOLUTION INTRAVENOUS PRN
Status: DISCONTINUED | OUTPATIENT
Start: 2024-12-11 | End: 2024-12-12 | Stop reason: HOSPADM

## 2024-12-11 RX ORDER — LISINOPRIL AND HYDROCHLOROTHIAZIDE 10; 12.5 MG/1; MG/1
1 TABLET ORAL DAILY
Status: DISCONTINUED | OUTPATIENT
Start: 2024-12-11 | End: 2024-12-11

## 2024-12-11 RX ORDER — NORTRIPTYLINE HYDROCHLORIDE 10 MG/1
10 CAPSULE ORAL
Status: DISCONTINUED | OUTPATIENT
Start: 2024-12-11 | End: 2024-12-12 | Stop reason: HOSPADM

## 2024-12-11 RX ORDER — FLUTICASONE PROPIONATE 50 MCG
1 SPRAY, SUSPENSION (ML) NASAL DAILY
Status: DISCONTINUED | OUTPATIENT
Start: 2024-12-11 | End: 2024-12-12 | Stop reason: HOSPADM

## 2024-12-11 RX ORDER — HALOPERIDOL 5 MG/ML
1 INJECTION INTRAMUSCULAR
Status: COMPLETED | OUTPATIENT
Start: 2024-12-11 | End: 2024-12-11

## 2024-12-11 RX ORDER — ARFORMOTEROL TARTRATE 15 UG/2ML
15 SOLUTION RESPIRATORY (INHALATION)
Status: DISCONTINUED | OUTPATIENT
Start: 2024-12-11 | End: 2024-12-12 | Stop reason: HOSPADM

## 2024-12-11 RX ORDER — ROCURONIUM BROMIDE 10 MG/ML
INJECTION, SOLUTION INTRAVENOUS
Status: DISCONTINUED | OUTPATIENT
Start: 2024-12-11 | End: 2024-12-11 | Stop reason: SDUPTHER

## 2024-12-11 RX ORDER — METHOCARBAMOL 100 MG/ML
1000 INJECTION, SOLUTION INTRAMUSCULAR; INTRAVENOUS ONCE
Status: COMPLETED | OUTPATIENT
Start: 2024-12-11 | End: 2024-12-11

## 2024-12-11 RX ORDER — SUCCINYLCHOLINE/SOD CL,ISO/PF 200MG/10ML
SYRINGE (ML) INTRAVENOUS
Status: DISCONTINUED | OUTPATIENT
Start: 2024-12-11 | End: 2024-12-11 | Stop reason: SDUPTHER

## 2024-12-11 RX ORDER — ONDANSETRON 4 MG/1
4 TABLET, ORALLY DISINTEGRATING ORAL EVERY 8 HOURS PRN
Status: DISCONTINUED | OUTPATIENT
Start: 2024-12-11 | End: 2024-12-12 | Stop reason: HOSPADM

## 2024-12-11 RX ORDER — GABAPENTIN 400 MG/1
400 CAPSULE ORAL 3 TIMES DAILY
Status: DISCONTINUED | OUTPATIENT
Start: 2024-12-11 | End: 2024-12-12 | Stop reason: HOSPADM

## 2024-12-11 RX ORDER — SODIUM CHLORIDE 0.9 % (FLUSH) 0.9 %
5-40 SYRINGE (ML) INJECTION PRN
Status: DISCONTINUED | OUTPATIENT
Start: 2024-12-11 | End: 2024-12-11 | Stop reason: HOSPADM

## 2024-12-11 RX ORDER — PROPOFOL 10 MG/ML
INJECTION, EMULSION INTRAVENOUS
Status: DISCONTINUED | OUTPATIENT
Start: 2024-12-11 | End: 2024-12-11 | Stop reason: SDUPTHER

## 2024-12-11 RX ORDER — FENTANYL CITRATE 50 UG/ML
INJECTION, SOLUTION INTRAMUSCULAR; INTRAVENOUS
Status: DISCONTINUED | OUTPATIENT
Start: 2024-12-11 | End: 2024-12-11 | Stop reason: SDUPTHER

## 2024-12-11 RX ORDER — LABETALOL HYDROCHLORIDE 5 MG/ML
10 INJECTION, SOLUTION INTRAVENOUS
Status: DISCONTINUED | OUTPATIENT
Start: 2024-12-11 | End: 2024-12-11 | Stop reason: HOSPADM

## 2024-12-11 RX ORDER — FAMOTIDINE 20 MG/1
40 TABLET, FILM COATED ORAL NIGHTLY
Status: DISCONTINUED | OUTPATIENT
Start: 2024-12-11 | End: 2024-12-12 | Stop reason: HOSPADM

## 2024-12-11 RX ORDER — METRONIDAZOLE 500 MG/100ML
500 INJECTION, SOLUTION INTRAVENOUS EVERY 8 HOURS
Status: DISCONTINUED | OUTPATIENT
Start: 2024-12-11 | End: 2024-12-12 | Stop reason: HOSPADM

## 2024-12-11 RX ORDER — BUDESONIDE 0.5 MG/2ML
0.5 INHALANT ORAL
Status: DISCONTINUED | OUTPATIENT
Start: 2024-12-11 | End: 2024-12-12 | Stop reason: HOSPADM

## 2024-12-11 RX ORDER — MIDAZOLAM HYDROCHLORIDE 1 MG/ML
INJECTION, SOLUTION INTRAMUSCULAR; INTRAVENOUS
Status: DISCONTINUED | OUTPATIENT
Start: 2024-12-11 | End: 2024-12-11 | Stop reason: SDUPTHER

## 2024-12-11 RX ORDER — HYDROCHLOROTHIAZIDE 12.5 MG/1
12.5 TABLET ORAL DAILY
Status: DISCONTINUED | OUTPATIENT
Start: 2024-12-11 | End: 2024-12-12 | Stop reason: HOSPADM

## 2024-12-11 RX ORDER — BUPIVACAINE HYDROCHLORIDE AND EPINEPHRINE 2.5; 5 MG/ML; UG/ML
INJECTION, SOLUTION EPIDURAL; INFILTRATION; INTRACAUDAL; PERINEURAL PRN
Status: DISCONTINUED | OUTPATIENT
Start: 2024-12-11 | End: 2024-12-11 | Stop reason: ALTCHOICE

## 2024-12-11 RX ORDER — POTASSIUM CHLORIDE 7.45 MG/ML
10 INJECTION INTRAVENOUS PRN
Status: DISCONTINUED | OUTPATIENT
Start: 2024-12-11 | End: 2024-12-12 | Stop reason: HOSPADM

## 2024-12-11 RX ORDER — DEXMEDETOMIDINE HYDROCHLORIDE 100 UG/ML
INJECTION, SOLUTION INTRAVENOUS
Status: DISCONTINUED | OUTPATIENT
Start: 2024-12-11 | End: 2024-12-11 | Stop reason: SDUPTHER

## 2024-12-11 RX ORDER — LISINOPRIL 10 MG/1
10 TABLET ORAL DAILY
Status: DISCONTINUED | OUTPATIENT
Start: 2024-12-11 | End: 2024-12-12 | Stop reason: HOSPADM

## 2024-12-11 RX ORDER — SODIUM CHLORIDE, SODIUM LACTATE, POTASSIUM CHLORIDE, CALCIUM CHLORIDE 600; 310; 30; 20 MG/100ML; MG/100ML; MG/100ML; MG/100ML
INJECTION, SOLUTION INTRAVENOUS
Status: DISCONTINUED | OUTPATIENT
Start: 2024-12-11 | End: 2024-12-11 | Stop reason: SDUPTHER

## 2024-12-11 RX ORDER — NALOXONE HYDROCHLORIDE 0.4 MG/ML
INJECTION, SOLUTION INTRAMUSCULAR; INTRAVENOUS; SUBCUTANEOUS PRN
Status: DISCONTINUED | OUTPATIENT
Start: 2024-12-11 | End: 2024-12-11 | Stop reason: HOSPADM

## 2024-12-11 RX ORDER — DIPHENHYDRAMINE HYDROCHLORIDE 50 MG/ML
12.5 INJECTION INTRAMUSCULAR; INTRAVENOUS
Status: DISCONTINUED | OUTPATIENT
Start: 2024-12-11 | End: 2024-12-11 | Stop reason: HOSPADM

## 2024-12-11 RX ADMIN — FENTANYL CITRATE 50 MCG: 50 INJECTION, SOLUTION INTRAMUSCULAR; INTRAVENOUS at 09:18

## 2024-12-11 RX ADMIN — HYDROMORPHONE HYDROCHLORIDE 0.5 MG: 1 INJECTION, SOLUTION INTRAMUSCULAR; INTRAVENOUS; SUBCUTANEOUS at 09:49

## 2024-12-11 RX ADMIN — PROPOFOL 200 MG: 10 INJECTION, EMULSION INTRAVENOUS at 07:44

## 2024-12-11 RX ADMIN — MIDAZOLAM 2 MG: 1 INJECTION INTRAMUSCULAR; INTRAVENOUS at 07:36

## 2024-12-11 RX ADMIN — HYDROMORPHONE HYDROCHLORIDE 0.25 MG: 1 INJECTION, SOLUTION INTRAMUSCULAR; INTRAVENOUS; SUBCUTANEOUS at 10:36

## 2024-12-11 RX ADMIN — METRONIDAZOLE 500 MG: 500 INJECTION, SOLUTION INTRAVENOUS at 22:31

## 2024-12-11 RX ADMIN — ROCURONIUM BROMIDE 10 MG: 10 INJECTION, SOLUTION INTRAVENOUS at 07:44

## 2024-12-11 RX ADMIN — HYDROMORPHONE HYDROCHLORIDE 0.5 MG: 1 INJECTION, SOLUTION INTRAMUSCULAR; INTRAVENOUS; SUBCUTANEOUS at 09:59

## 2024-12-11 RX ADMIN — HALOPERIDOL LACTATE 1 MG: 5 INJECTION, SOLUTION INTRAMUSCULAR at 09:44

## 2024-12-11 RX ADMIN — ROPINIROLE HYDROCHLORIDE 1 MG: 1 TABLET, FILM COATED ORAL at 21:14

## 2024-12-11 RX ADMIN — NORTRIPTYLINE HYDROCHLORIDE 10 MG: 10 CAPSULE ORAL at 21:14

## 2024-12-11 RX ADMIN — DIPHENHYDRAMINE HYDROCHLORIDE 25 MG: 50 INJECTION INTRAMUSCULAR; INTRAVENOUS at 08:57

## 2024-12-11 RX ADMIN — SODIUM CHLORIDE, POTASSIUM CHLORIDE, SODIUM LACTATE AND CALCIUM CHLORIDE: 600; 310; 30; 20 INJECTION, SOLUTION INTRAVENOUS at 06:00

## 2024-12-11 RX ADMIN — ONDANSETRON 4 MG: 2 INJECTION, SOLUTION INTRAMUSCULAR; INTRAVENOUS at 07:55

## 2024-12-11 RX ADMIN — GABAPENTIN 400 MG: 400 CAPSULE ORAL at 14:41

## 2024-12-11 RX ADMIN — BACLOFEN 20 MG: 10 TABLET ORAL at 21:14

## 2024-12-11 RX ADMIN — BACLOFEN 20 MG: 10 TABLET ORAL at 16:23

## 2024-12-11 RX ADMIN — KETOROLAC TROMETHAMINE 30 MG: 30 INJECTION, SOLUTION INTRAMUSCULAR at 08:57

## 2024-12-11 RX ADMIN — HYDROMORPHONE HYDROCHLORIDE 1 MG: 1 INJECTION, SOLUTION INTRAMUSCULAR; INTRAVENOUS; SUBCUTANEOUS at 16:25

## 2024-12-11 RX ADMIN — ROPINIROLE HYDROCHLORIDE 1 MG: 1 TABLET, FILM COATED ORAL at 14:41

## 2024-12-11 RX ADMIN — METRONIDAZOLE 500 MG: 500 INJECTION, SOLUTION INTRAVENOUS at 14:50

## 2024-12-11 RX ADMIN — HYDROMORPHONE HYDROCHLORIDE 1 MG: 1 INJECTION, SOLUTION INTRAMUSCULAR; INTRAVENOUS; SUBCUTANEOUS at 22:27

## 2024-12-11 RX ADMIN — FENTANYL CITRATE 150 MCG: 50 INJECTION, SOLUTION INTRAMUSCULAR; INTRAVENOUS at 07:44

## 2024-12-11 RX ADMIN — SODIUM CHLORIDE, PRESERVATIVE FREE 10 ML: 5 INJECTION INTRAVENOUS at 21:15

## 2024-12-11 RX ADMIN — FENTANYL CITRATE 50 MCG: 50 INJECTION, SOLUTION INTRAMUSCULAR; INTRAVENOUS at 07:59

## 2024-12-11 RX ADMIN — HYDROMORPHONE HYDROCHLORIDE 0.25 MG: 1 INJECTION, SOLUTION INTRAMUSCULAR; INTRAVENOUS; SUBCUTANEOUS at 10:45

## 2024-12-11 RX ADMIN — DEXMEDETOMIDINE HYDROCHLORIDE 5 MCG: 100 INJECTION, SOLUTION INTRAVENOUS at 08:36

## 2024-12-11 RX ADMIN — BUDESONIDE 500 MCG: 0.5 SUSPENSION RESPIRATORY (INHALATION) at 17:15

## 2024-12-11 RX ADMIN — DEXAMETHASONE SODIUM PHOSPHATE 10 MG: 10 INJECTION INTRAMUSCULAR; INTRAVENOUS at 07:51

## 2024-12-11 RX ADMIN — WATER 1000 MG: 1 INJECTION INTRAMUSCULAR; INTRAVENOUS; SUBCUTANEOUS at 16:23

## 2024-12-11 RX ADMIN — ROCURONIUM BROMIDE 30 MG: 10 INJECTION, SOLUTION INTRAVENOUS at 07:51

## 2024-12-11 RX ADMIN — SODIUM CHLORIDE, POTASSIUM CHLORIDE, SODIUM LACTATE AND CALCIUM CHLORIDE: 600; 310; 30; 20 INJECTION, SOLUTION INTRAVENOUS at 07:27

## 2024-12-11 RX ADMIN — SUGAMMADEX 250 MG: 100 INJECTION, SOLUTION INTRAVENOUS at 09:08

## 2024-12-11 RX ADMIN — SODIUM CHLORIDE 3000 MG: 9 INJECTION, SOLUTION INTRAVENOUS at 07:39

## 2024-12-11 RX ADMIN — GABAPENTIN 400 MG: 400 CAPSULE ORAL at 21:14

## 2024-12-11 RX ADMIN — ARFORMOTEROL TARTRATE 15 MCG: 15 SOLUTION RESPIRATORY (INHALATION) at 17:15

## 2024-12-11 RX ADMIN — FAMOTIDINE 40 MG: 20 TABLET, FILM COATED ORAL at 21:14

## 2024-12-11 RX ADMIN — MORPHINE SULFATE 4 MG: 4 INJECTION, SOLUTION INTRAMUSCULAR; INTRAVENOUS at 15:01

## 2024-12-11 RX ADMIN — ROCURONIUM BROMIDE 10 MG: 10 INJECTION, SOLUTION INTRAVENOUS at 08:00

## 2024-12-11 RX ADMIN — LABETALOL HYDROCHLORIDE 5 MG: 5 INJECTION INTRAVENOUS at 08:05

## 2024-12-11 RX ADMIN — LIDOCAINE HYDROCHLORIDE 100 MG: 20 INJECTION INTRAVENOUS at 07:44

## 2024-12-11 RX ADMIN — DEXMEDETOMIDINE HYDROCHLORIDE 10 MCG: 100 INJECTION, SOLUTION INTRAVENOUS at 08:02

## 2024-12-11 RX ADMIN — ROCURONIUM BROMIDE 10 MG: 10 INJECTION, SOLUTION INTRAVENOUS at 08:31

## 2024-12-11 RX ADMIN — Medication 140 MG: at 07:44

## 2024-12-11 RX ADMIN — METHOCARBAMOL 1000 MG: 100 INJECTION INTRAMUSCULAR; INTRAVENOUS at 10:55

## 2024-12-11 ASSESSMENT — PAIN DESCRIPTION - LOCATION
LOCATION: ABDOMEN

## 2024-12-11 ASSESSMENT — PAIN DESCRIPTION - DESCRIPTORS
DESCRIPTORS: ACHING

## 2024-12-11 ASSESSMENT — PAIN - FUNCTIONAL ASSESSMENT: PAIN_FUNCTIONAL_ASSESSMENT: NONE - DENIES PAIN

## 2024-12-11 ASSESSMENT — PAIN DESCRIPTION - ORIENTATION
ORIENTATION: LEFT;UPPER
ORIENTATION: LEFT;UPPER

## 2024-12-11 ASSESSMENT — PAIN SCALES - GENERAL
PAINLEVEL_OUTOF10: 10
PAINLEVEL_OUTOF10: 8
PAINLEVEL_OUTOF10: 5
PAINLEVEL_OUTOF10: 5
PAINLEVEL_OUTOF10: 10
PAINLEVEL_OUTOF10: 5
PAINLEVEL_OUTOF10: 7
PAINLEVEL_OUTOF10: 8

## 2024-12-11 ASSESSMENT — COPD QUESTIONNAIRES: CAT_SEVERITY: MODERATE

## 2024-12-11 ASSESSMENT — LIFESTYLE VARIABLES
HOW MANY STANDARD DRINKS CONTAINING ALCOHOL DO YOU HAVE ON A TYPICAL DAY: PATIENT DOES NOT DRINK
SMOKING_STATUS: 1
HOW OFTEN DO YOU HAVE A DRINK CONTAINING ALCOHOL: NEVER

## 2024-12-11 ASSESSMENT — ENCOUNTER SYMPTOMS
ALLERGIC/IMMUNOLOGIC NEGATIVE: 1
RESPIRATORY NEGATIVE: 1
EYES NEGATIVE: 1
ABDOMINAL PAIN: 1

## 2024-12-11 NOTE — H&P
General Surgery History and Physical     Patient's Name/Date of Birth: Kendra Garay / 1975     Date: 12/11/2024       Surgeon: Dominik Becerra M.D.     PCP: Amanda Quarles PA-C     Chief Complaint: hiatal hernia     HPI:    Subjective  Kendra Garay is a 49 y.o. female who presents for evaluation of symptomatic hiatal hernia that was responsive to medical treatment but has become recalcitrant.  Has severe GERD, EGD showed HH and barrets, Has  had manometry. Has had CT imaging. Timing is constant, radiation to epigastrium, alleviated by nothing and started years ago and severity is 2-7/10         Past Medical History        Past Medical History:   Diagnosis Date    Acid reflux disease      Bowel obstruction (HCC) 03/2024    Colon cancer (HCC)       s/p surgery    Cyst of ovary      Fracture of nasal bone      Headache      Hearing loss      Hx of blood clots       leg to lung    Hypertension      Liver lesion       2023 in the dome    Lung nodule       RUL    Morbidly obese 10/05/2020    Multiple sclerosis (HCC)       denies limitations at present 11/2020    VEENA no CPAP       severe VEENA per pt    Pulmonary embolism (HCC) 2021    Restless legs syndrome      Right shoulder pain 11/2020    RLS (restless legs syndrome)      Tinnitus      TMJ dysfunction       left side    Ventral hernia       october 2022            Past Surgical History         Past Surgical History:   Procedure Laterality Date    APPENDECTOMY        BACK INJECTION Right 4/9/2024     RIGHT SACROILIAC JOINT INJECTION UNDER FLUOROSCOPIC GUIDANCE performed by Rashad Chow MD at [facility] OR    BACK SURGERY         lumbar    BICEPS TENDON REPAIR Right 11/11/2020     BICEPS TENODESIS performed by Mu De León MD at [facility] OR    CHOLECYSTECTOMY        COLECTOMY Left 08/31/2021     LAPAROSCOPIC LEFT HEMICOLECTOMY WITH LOOP OSTOMY performed by Dominik Becerra MD at [facility] OR    COLECTOMY N/A 09/06/2021     DIAGNOSTIC LAPAROSCOPY POSSIBLE

## 2024-12-11 NOTE — ANESTHESIA POSTPROCEDURE EVALUATION
Department of Anesthesiology  Postprocedure Note    Patient: Kendra Garay  MRN: [id number]  YOB: 1975  Date of evaluation: 12/11/2024    Procedure Summary       Date: 12/11/24 Room / Location: [address]    Anesthesia Start: 0739 Anesthesia Stop: 0919    Procedure: **ISOLATION** LAPAROSCOPIC HIATAL HERNIA REPAIR WITH LINX, POSSIBLE MESH, POSSIBLE OPEN Diagnosis:       Hiatal hernia      (Hiatal hernia [K44.9])    Surgeons: Dominik Becerra MD Responsible Provider: Ishmael Cunha MD    Anesthesia Type: general ASA Status: 3            Anesthesia Type: No value filed.    Daniel Phase I: Daniel Score: 9    Daniel Phase II: Daniel Score: 10    Anesthesia Post Evaluation    Patient location during evaluation: PACU  Patient participation: complete - patient participated  Level of consciousness: awake  Pain score: 4  Airway patency: patent  Nausea & Vomiting: no vomiting and no nausea  Cardiovascular status: hemodynamically stable  Respiratory status: acceptable  Hydration status: stable  Pain management: adequate        No notable events documented.

## 2024-12-11 NOTE — CONSULTS
Consult note  Kendra Garay  [id number]  1975 12/11/24  Primary Care:  Amanda Quarles PA-C  [address]  / [facility] [address]        Chief complaint: Postoperative      HPI:  The patient is a pleasant 49-year-old female who presented to the hospital today with general surgery for large recurrent paraesophageal midline hiatal hernia with severe reflux and muscle disruption, underwent laparoscopic midline recurrent paraesophageal hiatal hernia repair with mesh, myocutaneous flap, gastropexy and fundoplication.  Surgery was tolerated well without issues. She is admitted for further monitoring and recovery.  She is seen at bedside.  She complains that her pain is not adequately controlled.  She denies any nausea or vomiting.  There is no family present at bedside.  He has been up ambulating.  He is voiding appropriately.    Prior to Visit Medications    Medication Sig Taking? Authorizing Provider   dexlansoprazole (DEXILANT) 60 MG CPDR delayed release capsule  Yes Provider, MD Vero   gabapentin (NEURONTIN) 400 MG capsule Take 1 capsule by mouth 3 times daily for 90 days. Yes Geronimo Vick APRN - CNP   nortriptyline (PAMELOR) 10 MG capsule TAKE 1 CAPSULE BY MOUTH EVERY NIGHT AT BEDTIME FOR HEADACHE  Amanda Quarles PA-C   lisinopril-hydroCHLOROthiazide (PRINZIDE;ZESTORETIC) 10-12.5 MG per tablet TAKE 1 TABLET BY MOUTH EVERY DAY  Amanda Quarles PA-C   fluticasone (FLONASE) 50 MCG/ACT nasal spray INSTILL ONE (1) SPRAY IN EACH NOSTRIL ONCE DAILY  Amanda Quarles PA-C   Cholecalciferol (VITAMIN D3) 1.25 MG (02934 UT) CAPS TAKE 1 CAPSULE BY MOUTH ONCE WEEKLY ON SUNDAYS  Geronimo Vick APRN - CNP   rOPINIRole (REQUIP) 0.5 MG tablet TAKE 1 TABLET BY MOUTH EVERY MORNING, TAKE 1 TABLET 2 HOURS BEFORE BED, AND TAKE 1 TABLET AT BEDTIME  Geronimo Vick APRN - CNP   baclofen (LIORESAL) 20 MG tablet TAKE 1 TABLET BY MOUTH FOUR TIMES DAILY AS NEEDED FOR SPASMS/PAIN  Geronimo Vick APRN - CNP   ADVAIR -21

## 2024-12-11 NOTE — OP NOTE
DATE OF PROCEDURE: 12/11/2024    SURGEON: ALEJANDRO WANG M.D.    ASSISTANT: brenda    PREOPERATIVE DIAGNOSIS: Large recurrent paraesophageal midline hiatal hernia with severe reflux and muscle disruption.    POSTOPERATIVE DIAGNOSIS: same    OPERATION:   1.)Laparoscopic midline recurrent paraesophageal hiatal hernia repair  with mesh  2.) cassandra cutaneous flap and  3.)sourav fundiplication  4.)gastropexy      ANESTHESIA: General.    ESTIMATED BLOOD LOSS: 40 mL.    COMPLICATIONS: None.    FLUIDS: Crystalloid.    DISPOSITION: home    SPECIMEN: None.    INDICATION: This is a patient who came in with the aforementioned  diagnosis. The patient had severe reflux. I explained the risks, benefits,  potential outcomes, alternative treatment to aforementioned procedure, and  she agreed to proceed, understanding those risks and potential outcomes.  DESCRIPTION OF PROCEDURE: The patient was brought to the operative suite,  was placed under general anesthesia, was given preoperative antibiotics  within 30 minutes of incision, then had bilateral PCDs placed.  Once this was done a 5 mm incision was made in the supraumbilical area.  After local anesthetic was infiltrated into the abdominal wall, a Veress  needle was used to pass into the peritoneum. CO2 was then used to insufflate  the abdomen to a pressure of 15 mmHg. At this time, the Veress needle was  removed, and an 5 mm trocar was placed through this incision. Four  additional trocars were placed, 2 in the right lateral abdomen, one a 5 mm  trocar that was scythed through the rectus sheath at 2 different levels. An  additional 5 mm trocar was placed in the right lateral abdomen. An 5 mm  trocar was also placed in the left upper quadrant of the abdomen.  At this time,  a Meaghan retractor was put into this to retract the liver, after lengthy dissection we   exposed the hiatal hernia. Hiatal hernia was identified. The hernia sac  and its crural attachments were taken down using

## 2024-12-12 ENCOUNTER — TELEPHONE (OUTPATIENT)
Dept: PRIMARY CARE CLINIC | Age: 49
End: 2024-12-12

## 2024-12-12 VITALS
HEART RATE: 62 BPM | WEIGHT: 267 LBS | DIASTOLIC BLOOD PRESSURE: 63 MMHG | BODY MASS INDEX: 52.42 KG/M2 | TEMPERATURE: 97.7 F | OXYGEN SATURATION: 97 % | RESPIRATION RATE: 16 BRPM | SYSTOLIC BLOOD PRESSURE: 106 MMHG | HEIGHT: 60 IN

## 2024-12-12 LAB
ALBUMIN SERPL-MCNC: 4 G/DL (ref 3.5–5.2)
ALP SERPL-CCNC: 92 U/L (ref 35–104)
ALT SERPL-CCNC: 176 U/L (ref 0–32)
ANION GAP SERPL CALCULATED.3IONS-SCNC: 12 MMOL/L (ref 7–16)
AST SERPL-CCNC: 86 U/L (ref 0–31)
BASOPHILS # BLD: 0 K/UL (ref 0–0.2)
BASOPHILS NFR BLD: 0 % (ref 0–2)
BILIRUB SERPL-MCNC: 0.4 MG/DL (ref 0–1.2)
BUN SERPL-MCNC: 7 MG/DL (ref 6–20)
CALCIUM SERPL-MCNC: 8.8 MG/DL (ref 8.6–10.2)
CHLORIDE SERPL-SCNC: 101 MMOL/L (ref 98–107)
CO2 SERPL-SCNC: 23 MMOL/L (ref 22–29)
CREAT SERPL-MCNC: 0.6 MG/DL (ref 0.5–1)
EOSINOPHIL # BLD: 0 K/UL (ref 0.05–0.5)
EOSINOPHILS RELATIVE PERCENT: 0 % (ref 0–6)
ERYTHROCYTE [DISTWIDTH] IN BLOOD BY AUTOMATED COUNT: 13.3 % (ref 11.5–15)
GFR, ESTIMATED: >90 ML/MIN/1.73M2
GLUCOSE SERPL-MCNC: 120 MG/DL (ref 74–99)
HCT VFR BLD AUTO: 39.1 % (ref 34–48)
HGB BLD-MCNC: 13.2 G/DL (ref 11.5–15.5)
LYMPHOCYTES NFR BLD: 2.99 K/UL (ref 1.5–4)
LYMPHOCYTES RELATIVE PERCENT: 16 % (ref 20–42)
MCH RBC QN AUTO: 31.8 PG (ref 26–35)
MCHC RBC AUTO-ENTMCNC: 33.8 G/DL (ref 32–34.5)
MCV RBC AUTO: 94.2 FL (ref 80–99.9)
MONOCYTES NFR BLD: 1.16 K/UL (ref 0.1–0.95)
MONOCYTES NFR BLD: 6 % (ref 2–12)
NEUTROPHILS NFR BLD: 78 % (ref 43–80)
NEUTS SEG NFR BLD: 14.95 K/UL (ref 1.8–7.3)
NUCLEATED RED BLOOD CELLS: 2 PER 100 WBC
PLATELET # BLD AUTO: 277 K/UL (ref 130–450)
PMV BLD AUTO: 10 FL (ref 7–12)
POTASSIUM SERPL-SCNC: 4 MMOL/L (ref 3.5–5)
PROT SERPL-MCNC: 6.4 G/DL (ref 6.4–8.3)
RBC # BLD AUTO: 4.15 M/UL (ref 3.5–5.5)
RBC # BLD: ABNORMAL 10*6/UL
SODIUM SERPL-SCNC: 136 MMOL/L (ref 132–146)
WBC OTHER # BLD: 19.1 K/UL (ref 4.5–11.5)

## 2024-12-12 PROCEDURE — 6360000002 HC RX W HCPCS: Performed by: FAMILY MEDICINE

## 2024-12-12 PROCEDURE — 96374 THER/PROPH/DIAG INJ IV PUSH: CPT

## 2024-12-12 PROCEDURE — G0378 HOSPITAL OBSERVATION PER HR: HCPCS

## 2024-12-12 PROCEDURE — 2580000003 HC RX 258: Performed by: SURGERY

## 2024-12-12 PROCEDURE — 80053 COMPREHEN METABOLIC PANEL: CPT

## 2024-12-12 PROCEDURE — 85025 COMPLETE CBC W/AUTO DIFF WBC: CPT

## 2024-12-12 PROCEDURE — 6370000000 HC RX 637 (ALT 250 FOR IP): Performed by: FAMILY MEDICINE

## 2024-12-12 PROCEDURE — 94640 AIRWAY INHALATION TREATMENT: CPT

## 2024-12-12 PROCEDURE — 36415 COLL VENOUS BLD VENIPUNCTURE: CPT

## 2024-12-12 PROCEDURE — 99232 SBSQ HOSP IP/OBS MODERATE 35: CPT | Performed by: FAMILY MEDICINE

## 2024-12-12 PROCEDURE — 99024 POSTOP FOLLOW-UP VISIT: CPT | Performed by: SURGERY

## 2024-12-12 PROCEDURE — 6370000000 HC RX 637 (ALT 250 FOR IP): Performed by: SURGERY

## 2024-12-12 PROCEDURE — 6360000002 HC RX W HCPCS: Performed by: SURGERY

## 2024-12-12 RX ORDER — METHOCARBAMOL 500 MG/1
500 TABLET, FILM COATED ORAL 4 TIMES DAILY
Qty: 40 TABLET | Refills: 0 | Status: SHIPPED | OUTPATIENT
Start: 2024-12-12 | End: 2024-12-22

## 2024-12-12 RX ORDER — TRAMADOL HYDROCHLORIDE 50 MG/1
50 TABLET ORAL EVERY 6 HOURS PRN
Qty: 12 TABLET | Refills: 0 | Status: SHIPPED | OUTPATIENT
Start: 2024-12-12 | End: 2024-12-15

## 2024-12-12 RX ORDER — IBUPROFEN 800 MG/1
800 TABLET, FILM COATED ORAL EVERY 6 HOURS PRN
Qty: 20 TABLET | Refills: 0 | Status: SHIPPED | OUTPATIENT
Start: 2024-12-12

## 2024-12-12 RX ADMIN — ROPINIROLE HYDROCHLORIDE 1 MG: 1 TABLET, FILM COATED ORAL at 08:56

## 2024-12-12 RX ADMIN — WATER 1000 MG: 1 INJECTION INTRAMUSCULAR; INTRAVENOUS; SUBCUTANEOUS at 08:56

## 2024-12-12 RX ADMIN — HYDROCHLOROTHIAZIDE 12.5 MG: 12.5 TABLET ORAL at 08:56

## 2024-12-12 RX ADMIN — HYDROMORPHONE HYDROCHLORIDE 1 MG: 1 INJECTION, SOLUTION INTRAMUSCULAR; INTRAVENOUS; SUBCUTANEOUS at 04:15

## 2024-12-12 RX ADMIN — PANTOPRAZOLE SODIUM 40 MG: 40 TABLET, DELAYED RELEASE ORAL at 05:44

## 2024-12-12 RX ADMIN — BUDESONIDE 500 MCG: 0.5 SUSPENSION RESPIRATORY (INHALATION) at 07:11

## 2024-12-12 RX ADMIN — TRAMADOL HYDROCHLORIDE 50 MG: 50 TABLET, COATED ORAL at 08:55

## 2024-12-12 RX ADMIN — LISINOPRIL 10 MG: 10 TABLET ORAL at 08:56

## 2024-12-12 RX ADMIN — GABAPENTIN 400 MG: 400 CAPSULE ORAL at 08:56

## 2024-12-12 RX ADMIN — METRONIDAZOLE 500 MG: 500 INJECTION, SOLUTION INTRAVENOUS at 05:46

## 2024-12-12 RX ADMIN — ALBUTEROL SULFATE 2.5 MG: 2.5 SOLUTION RESPIRATORY (INHALATION) at 07:11

## 2024-12-12 RX ADMIN — SODIUM CHLORIDE, PRESERVATIVE FREE 10 ML: 5 INJECTION INTRAVENOUS at 09:00

## 2024-12-12 RX ADMIN — BACLOFEN 20 MG: 10 TABLET ORAL at 08:56

## 2024-12-12 RX ADMIN — ARFORMOTEROL TARTRATE 15 MCG: 15 SOLUTION RESPIRATORY (INHALATION) at 07:11

## 2024-12-12 ASSESSMENT — PAIN DESCRIPTION - LOCATION: LOCATION: ABDOMEN

## 2024-12-12 ASSESSMENT — PAIN SCALES - GENERAL
PAINLEVEL_OUTOF10: 8
PAINLEVEL_OUTOF10: 9

## 2024-12-12 ASSESSMENT — PAIN DESCRIPTION - ORIENTATION: ORIENTATION: MID

## 2024-12-12 NOTE — PROGRESS NOTES
CLINICAL PHARMACY NOTE: MEDS TO BEDS    Total # of Prescriptions Filled: 3   The following medications were delivered to the patient:  Tramadol 50 mg  Ibuprofen 800 mg  Methocarbamol 500 mg    Additional Documentation:

## 2024-12-12 NOTE — DISCHARGE SUMMARY
Physician Discharge Summary     Kendra Garay  [id number]    Admit date: 12/11/2024    Discharge date and time: No discharge date for patient encounter.     Admitting Physician: Dominik Becerra MD     Admission Diagnoses: Hiatal hernia [K44.9]  S/P repair of paraesophageal hernia [Z98.890, Z87.19]    Discharge diagnosis: same    Condition at discharge: stable        Hospital Course: Had uncomplicated lap recurrent HHR with mesh and uncomplicated post operative course and was discharged tolerating a general diet, having good bowel function, ambulating independently and with adequate analgesia.    Discharge Exam: /63   Pulse 62   Temp 97.7 °F (36.5 °C) (Oral)   Resp 16   Ht 1.524 m (5')   Wt 121.1 kg (267 lb)   LMP 01/05/2018   SpO2 95%   BMI 52.14 kg/m²     General appearance: alert, appears stated age and cooperative  Head: Normocephalic, without obvious abnormality, atraumatic  Neck: no adenopathy, no carotid bruit, no JVD, supple, symmetrical, trachea midline and thyroid not enlarged, symmetric, no tenderness/mass/nodules  Lungs: clear to auscultation bilaterally  Heart: regular rate and rhythm, S1, S2 normal, no murmur, click, rub or gallop  Abdomen: soft, non-tender; bowel sounds normal; no masses,  no organomegaly and incisions clean and dry  Extremities: extremities normal, atraumatic, no cyanosis or edema      Disposition: home    Patient Instructions:     Activity: activity as tolerated  Diet: per handout  Wound Care: keep wound clean and dry    Follow-up with Dr. Becerra in 2 weeks.  Over 30 min spent preparing discharge and discussing it and follow up instructions with patient.  Signed:  Dominik Becerra MD  12/12/2024  7:14 AM

## 2024-12-12 NOTE — PLAN OF CARE
Problem: Discharge Planning  Goal: Discharge to home or other facility with appropriate resources  12/12/2024 0947 by Alise Lock, RN  Outcome: Completed  12/11/2024 2343 by Jyothi Cabrera, RN  Outcome: Progressing     Problem: Safety - Adult  Goal: Free from fall injury  12/12/2024 0947 by Alise Lock, RN  Outcome: Completed  12/11/2024 2343 by Jyothi Cabrera, RN  Outcome: Progressing     Problem: Pain  Goal: Verbalizes/displays adequate comfort level or baseline comfort level  Outcome: Completed     Problem: ABCDS Injury Assessment  Goal: Absence of physical injury  Outcome: Completed

## 2024-12-12 NOTE — DISCHARGE INSTRUCTIONS
Patient Discharge Instructions for Hiatal hernia repair  Discharge Date:  12/12/2024    Discharged To:  Home    RESUME ACTIVITY:      BATHING:  May shower 24hrs after surgery, may bathe 3 days after surgery    DRIVING: No driving while on pain medications    RETURN TO WORK: after follow up appointment    WALKING:  Yes    SEXUAL ACTIVITY: Yes    STAIRS:  Yes    LIFTING: Less than 25 pounds for 2 weeks then less than 50 pounds for 2 additional weeks then no limitations    DIET: per handout already given    BOWELS: constipation is a side effect of your pain meds, take a daily laxative (miralax, dulcolax, etc.) as needed to keep your bowels moving as they normally do, do not go 2-3 days without having a bowel movement.    SPECIAL INSTRUCTIONS:     Call the office at  if you have a fever > 100 F, or if your incision becomes red, tender, or drains more than a small amount of clear fluid.    Call  for follow up appointment with Dr. Becerra in:  as scheduled    The following personal items were collected during your admission and were returned to you:    Belongings  Dental Appliances: None  Vision - Corrective Lenses: None  Hearing Aid: None  Clothing: Undergarments, Socks, Shirt, Pants, Footwear  Jewelry: None  Body Piercings Removed: No  Electronic Devices: Cell Phone  Weapons (Notify Protective Services/Security): None  Other Valuables: At home  Home Medications: None  Valuables Given To: Patient  Provide Name(s) of Who Valuable(s) Were Given To: Hiral  Responsible person(s) in the waiting room: hiral  Patient approves for provider to speak to responsible person post operatively: Yes    Information obtained by:  By signing below, I understand that if any problems occur once I leave the hospital I am to contact Dr. Becerra.  I understand and acknowledge receipt of the instructions indicated above.

## 2024-12-12 NOTE — TELEPHONE ENCOUNTER
Pt being d/c from [facility], pt needs appt to f/u her next scheduled appt is on 1/3/25  unless she needs seen sooner     Please review and adivse

## 2024-12-12 NOTE — PROGRESS NOTES
[facility] Hospitalist Progress Note    Admitting Date and Time: 12/11/2024  5:37 AM  Admit Dx: Hiatal hernia [K44.9]  S/P repair of paraesophageal hernia [Z98.890, Z87.19]      Assessment:    Principal Problem:    Hiatal hernia  Active Problems:    S/P repair of paraesophageal hernia  Resolved Problems:    * No resolved hospital problems. *      Plan:  12/11/2024  Medicine consulted for medical management s/p surgery with general surgery for large recurrent paraesophageal midline hiatal hernia with severe reflux and muscle disruption, underwent laparoscopic midline recurrent paraesophageal hiatal hernia repair with mesh, myocutaneous flap, gastropexy and fundoplication  -- Tolerated well with minimal blood loss  --Recovered well in PACU  --Diet to advance per general surgery team, to full liquids  --Control pain  --Patient did get perioperative antibiotics with Ancef and Flagyl  --Anticoagulation with subcu Lovenox prophylactically  -- Manage nausea  -- Encourage spirometry  -- Encourage ambulation     12/12/2024  --Discharge order noted per surgical team  --Patient has a leukocytosis which is reactive postoperatively  --She did receive perioperative antibiotics  --Pain is well-controlled  --Encourage ambulation  --Encourage spirometry  --Patient will discharge today    DVT Prophylaxis: lovenox  Code Status: full     Continue at home medications as previously prescribed for the management of chronic conditions during this admission if and where appropriate      Subjective:  Patient is being followed for Hiatal hernia [K44.9]  S/P repair of paraesophageal hernia [Z98.890, Z87.19]   The patient seen at bedside during rounds.  She has no acute complaints.  He is day 1 postop.  Her diet has been advanced and she is tolerated.  She does have bowel function.  No nausea or vomiting.  No family present at bedside.  All questions and concerns were addressed during rounds.  Case was discussed with nursing.  Vital

## 2024-12-23 ENCOUNTER — OFFICE VISIT (OUTPATIENT)
Dept: SURGERY | Age: 49
End: 2024-12-23

## 2024-12-23 VITALS
HEIGHT: 60 IN | BODY MASS INDEX: 52.42 KG/M2 | SYSTOLIC BLOOD PRESSURE: 146 MMHG | TEMPERATURE: 97.4 F | WEIGHT: 267 LBS | HEART RATE: 79 BPM | DIASTOLIC BLOOD PRESSURE: 92 MMHG

## 2024-12-23 DIAGNOSIS — K44.9 HIATAL HERNIA: Primary | ICD-10-CM

## 2024-12-23 PROCEDURE — 99024 POSTOP FOLLOW-UP VISIT: CPT | Performed by: SURGERY

## 2024-12-23 NOTE — PROGRESS NOTES
Surgery Progress Note            Chief complaint:   Patient Active Problem List   Diagnosis    Vocal cord dysfunction    Multiple sclerosis (HCC)    Morbidly obese    Palafox's esophagus with dysplasia    Secondary restless legs syndrome    Malignant neoplasm of descending colon (HCC)    S/P closure of ileostomy    Incisional hernia    Hiatal hernia    Sacroiliac dysfunction    Disorder of sacrum    Lumbar radiculopathy    Lumbosacral spondylosis without myelopathy    Meralgia paresthetica of both lower extremities    S/P repair of paraesophageal hernia       S: doing well    O:   Vitals:    12/23/24 1352   BP: (!) 146/92   Pulse: 79   Temp: 97.4 °F (36.3 °C)     No intake or output data in the 24 hours ending 12/23/24 1402        Labs:  No results for input(s): \"WBC\", \"HGB\", \"HCT\" in the last 72 hours.    Invalid input(s): \"PLR\"  Lab Results   Component Value Date    CREATININE 0.6 12/12/2024    BUN 7 12/12/2024     12/12/2024    K 4.0 12/12/2024     12/12/2024    CO2 23 12/12/2024     No results for input(s): \"LIPASE\", \"AMYLASE\" in the last 72 hours.    Physical exam:   BP (!) 146/92   Pulse 79   Temp 97.4 °F (36.3 °C) (Temporal)   Ht 1.524 m (5')   Wt 121.1 kg (267 lb)   LMP 01/05/2018   BMI 52.14 kg/m²   General appearance: NAD  Head: NCAT  Neck: supple, no masses  Lungs: equal chest rise bilateral  Heart: S1S2 present  Abdomen: soft, nontender, nondistended  Skin; no new lesions, incisions clean and intact  Gu: no cva tenderness  Extremities: extremities normal, atraumatic, no cyanosis or edema    A:  Post op lap HHR with mesh    P: follow up as needed.     Dominik Becerra MD, MD  12/23/2024

## 2025-01-02 NOTE — TELEPHONE ENCOUNTER
Name of Medication(s) Requested:  Requested Prescriptions     Pending Prescriptions Disp Refills    nortriptyline (PAMELOR) 10 MG capsule [Pharmacy Med Name: NORTRIPTYLINE 10 MG CAP 10 Capsule] 30 capsule 10     Sig: TAKE 1 CAPSULE BY MOUTH EVERY NIGHT AT BEDTIME FOR HEADACHE    lisinopril-hydroCHLOROthiazide (PRINZIDE;ZESTORETIC) 10-12.5 MG per tablet [Pharmacy Med Name: LISINOPRIL-HCTZ 10-12.5 MG 10-12.5 Tablet] 30 tablet 10     Sig: TAKE 1 TABLET BY MOUTH EVERY DAY       Medication is on current medication list Yes    Dosage and directions were verified? Yes    Quantity verified: 30 day supply     Pharmacy Verified?  Yes    Last Appointment:  7/3/2023    Future appts:  Future Appointments   Date Time Provider Department Center   1/3/2025  3:45 PM Amanda Quarles PA-C TRAIL [facility]   2/27/2025  8:00 AM [facility] INF CLINIC ROOM 3 [facility] Inf [facility]   6/10/2025  2:40 PM Geronimo Vick APRN - CNP [facility] NEURO Neurology -        (If no appt send self scheduling link. .REFILLAPPT)  Scheduling request sent?     [] Yes  [] No    Does patient need updated?  [] Yes  [] No

## 2025-01-03 RX ORDER — LISINOPRIL AND HYDROCHLOROTHIAZIDE 10; 12.5 MG/1; MG/1
1 TABLET ORAL DAILY
Qty: 30 TABLET | Refills: 10 | OUTPATIENT
Start: 2025-01-03

## 2025-01-03 RX ORDER — NORTRIPTYLINE HYDROCHLORIDE 10 MG/1
CAPSULE ORAL
Qty: 30 CAPSULE | Refills: 10 | OUTPATIENT
Start: 2025-01-03

## 2025-02-25 ENCOUNTER — TRANSCRIBE ORDERS (OUTPATIENT)
Dept: ADMINISTRATIVE | Age: 50
End: 2025-02-25

## 2025-02-25 DIAGNOSIS — C18.6 MALIGNANT NEOPLASM OF DESCENDING COLON (HCC): Primary | ICD-10-CM

## 2025-02-25 DIAGNOSIS — I10 ESSENTIAL (PRIMARY) HYPERTENSION: ICD-10-CM

## 2025-02-25 DIAGNOSIS — R79.89 OTHER SPECIFIED ABNORMAL FINDINGS OF BLOOD CHEMISTRY: ICD-10-CM

## 2025-02-25 DIAGNOSIS — K77 LIVER DISORDERS IN DISEASES CLASSIFIED ELSEWHERE: ICD-10-CM

## 2025-02-25 DIAGNOSIS — R91.1 SOLITARY LUNG NODULE: ICD-10-CM

## 2025-02-27 ENCOUNTER — HOSPITAL ENCOUNTER (OUTPATIENT)
Dept: INFUSION THERAPY | Age: 50
Setting detail: INFUSION SERIES
Discharge: HOME | End: 2025-02-27
Payer: MEDICARE

## 2025-02-27 VITALS
HEART RATE: 68 BPM | TEMPERATURE: 97.8 F | DIASTOLIC BLOOD PRESSURE: 83 MMHG | OXYGEN SATURATION: 98 % | RESPIRATION RATE: 18 BRPM | SYSTOLIC BLOOD PRESSURE: 146 MMHG

## 2025-02-27 DIAGNOSIS — G35 MULTIPLE SCLEROSIS (HCC): Primary | ICD-10-CM

## 2025-02-27 PROCEDURE — 2500000003 HC RX 250 WO HCPCS: Performed by: NURSE PRACTITIONER

## 2025-02-27 PROCEDURE — 2580000003 HC RX 258: Performed by: NURSE PRACTITIONER

## 2025-02-27 PROCEDURE — 96375 TX/PRO/DX INJ NEW DRUG ADDON: CPT

## 2025-02-27 PROCEDURE — 96366 THER/PROPH/DIAG IV INF ADDON: CPT

## 2025-02-27 PROCEDURE — 96365 THER/PROPH/DIAG IV INF INIT: CPT

## 2025-02-27 PROCEDURE — 6370000000 HC RX 637 (ALT 250 FOR IP): Performed by: NURSE PRACTITIONER

## 2025-02-27 PROCEDURE — 96415 CHEMO IV INFUSION ADDL HR: CPT

## 2025-02-27 PROCEDURE — 6360000002 HC RX W HCPCS: Performed by: NURSE PRACTITIONER

## 2025-02-27 PROCEDURE — 96361 HYDRATE IV INFUSION ADD-ON: CPT

## 2025-02-27 PROCEDURE — 96413 CHEMO IV INFUSION 1 HR: CPT

## 2025-02-27 RX ORDER — EPINEPHRINE 1 MG/ML
0.3 INJECTION, SOLUTION INTRAMUSCULAR; SUBCUTANEOUS PRN
OUTPATIENT
Start: 2025-07-17

## 2025-02-27 RX ORDER — ALBUTEROL SULFATE 90 UG/1
4 INHALANT RESPIRATORY (INHALATION) PRN
OUTPATIENT
Start: 2025-07-17

## 2025-02-27 RX ORDER — ACETAMINOPHEN 325 MG/1
650 TABLET ORAL ONCE
Status: COMPLETED | OUTPATIENT
Start: 2025-02-27 | End: 2025-02-27

## 2025-02-27 RX ORDER — ACETAMINOPHEN 325 MG/1
650 TABLET ORAL ONCE
OUTPATIENT
Start: 2025-07-17 | End: 2025-07-17

## 2025-02-27 RX ORDER — SODIUM CHLORIDE 9 MG/ML
INJECTION, SOLUTION INTRAVENOUS CONTINUOUS
OUTPATIENT
Start: 2025-07-17

## 2025-02-27 RX ORDER — HYDROCORTISONE SODIUM SUCCINATE 100 MG/2ML
100 INJECTION INTRAMUSCULAR; INTRAVENOUS
OUTPATIENT
Start: 2025-07-17

## 2025-02-27 RX ORDER — ACETAMINOPHEN 325 MG/1
650 TABLET ORAL
OUTPATIENT
Start: 2025-07-17

## 2025-02-27 RX ORDER — SODIUM CHLORIDE 0.9 % (FLUSH) 0.9 %
5-40 SYRINGE (ML) INJECTION PRN
OUTPATIENT
Start: 2025-07-17

## 2025-02-27 RX ORDER — DIPHENHYDRAMINE HYDROCHLORIDE 50 MG/ML
25 INJECTION INTRAMUSCULAR; INTRAVENOUS ONCE
Status: COMPLETED | OUTPATIENT
Start: 2025-02-27 | End: 2025-02-27

## 2025-02-27 RX ORDER — SODIUM CHLORIDE 9 MG/ML
5-250 INJECTION, SOLUTION INTRAVENOUS PRN
OUTPATIENT
Start: 2025-07-17

## 2025-02-27 RX ORDER — SODIUM CHLORIDE 9 MG/ML
5-250 INJECTION, SOLUTION INTRAVENOUS PRN
Status: DISCONTINUED | OUTPATIENT
Start: 2025-02-27 | End: 2025-02-28 | Stop reason: HOSPADM

## 2025-02-27 RX ORDER — SODIUM CHLORIDE 0.9 % (FLUSH) 0.9 %
5-40 SYRINGE (ML) INJECTION PRN
Status: DISCONTINUED | OUTPATIENT
Start: 2025-02-27 | End: 2025-02-28 | Stop reason: HOSPADM

## 2025-02-27 RX ORDER — DIPHENHYDRAMINE HYDROCHLORIDE 50 MG/ML
25 INJECTION INTRAMUSCULAR; INTRAVENOUS ONCE
OUTPATIENT
Start: 2025-07-17 | End: 2025-07-17

## 2025-02-27 RX ORDER — DIPHENHYDRAMINE HYDROCHLORIDE 50 MG/ML
50 INJECTION INTRAMUSCULAR; INTRAVENOUS
OUTPATIENT
Start: 2025-07-17

## 2025-02-27 RX ORDER — HEPARIN 100 UNIT/ML
500 SYRINGE INTRAVENOUS PRN
OUTPATIENT
Start: 2025-07-17

## 2025-02-27 RX ORDER — ONDANSETRON 2 MG/ML
8 INJECTION INTRAMUSCULAR; INTRAVENOUS
OUTPATIENT
Start: 2025-07-17

## 2025-02-27 RX ADMIN — Medication 10 ML: at 08:51

## 2025-02-27 RX ADMIN — WATER 125 MG: 1 INJECTION INTRAMUSCULAR; INTRAVENOUS; SUBCUTANEOUS at 08:51

## 2025-02-27 RX ADMIN — SODIUM CHLORIDE 5 ML/HR: 9 INJECTION, SOLUTION INTRAVENOUS at 08:33

## 2025-02-27 RX ADMIN — Medication 10 ML: at 08:33

## 2025-02-27 RX ADMIN — OCRELIZUMAB 600 MG: 300 INJECTION INTRAVENOUS at 09:00

## 2025-02-27 RX ADMIN — ACETAMINOPHEN 650 MG: 325 TABLET ORAL at 08:49

## 2025-02-27 RX ADMIN — DIPHENHYDRAMINE HYDROCHLORIDE 25 MG: 50 INJECTION INTRAMUSCULAR; INTRAVENOUS at 08:50

## 2025-02-27 NOTE — PROGRESS NOTES
Patient remained in infusion for required amount of time before discharge. No problems or issues noted.

## 2025-03-06 RX ORDER — GABAPENTIN 400 MG/1
400 CAPSULE ORAL 3 TIMES DAILY
Qty: 90 CAPSULE | Refills: 11 | Status: SHIPPED | OUTPATIENT
Start: 2025-03-06 | End: 2026-03-01

## 2025-03-06 RX ORDER — FLUTICASONE PROPIONATE 50 MCG
SPRAY, SUSPENSION (ML) NASAL
Qty: 16 G | Refills: 3 | Status: SHIPPED | OUTPATIENT
Start: 2025-03-06

## 2025-03-06 NOTE — TELEPHONE ENCOUNTER
Name of Medication(s) Requested:  Requested Prescriptions     Pending Prescriptions Disp Refills    fluticasone (FLONASE) 50 MCG/ACT nasal spray [Pharmacy Med Name: FLUTICASONE 50MCG NASAL 16 50 TIP] 16 g 3     Sig: INSTILL ONE (1) SPRAY IN EACH NOSTRIL ONCE DAILY       Medication is on current medication list Yes    Dosage and directions were verified? Yes    Quantity verified: 30 day supply     Pharmacy Verified?  Yes    Last Appointment:  7/3/2023    Future appts:  Future Appointments   Date Time Provider Department Center   3/10/2025  9:45 AM Amanda Quarles PA-C TRAIL [facility] ECC DEP   3/17/2025  3:30 PM [facility] CT SCAN 3 [facility] CT [facility] Rad/Car   6/10/2025  2:40 PM Geronimo Vick APRN - CNP [facility] NEURO Neurology -   7/24/2025  8:00 AM [facility] INF CLINIC ROOM 3 [facility] Inf [facility]        (If no appt send self scheduling link. .REFILLAPPT)  Scheduling request sent?     [] Yes  [] No    Does patient need updated?  [] Yes  [] No

## 2025-03-07 DIAGNOSIS — J45.31 MILD PERSISTENT ASTHMA WITH ACUTE EXACERBATION: Primary | ICD-10-CM

## 2025-03-07 RX ORDER — BUDESONIDE AND FORMOTEROL FUMARATE DIHYDRATE 80; 4.5 UG/1; UG/1
2 AEROSOL RESPIRATORY (INHALATION) 2 TIMES DAILY
COMMUNITY
End: 2025-03-07 | Stop reason: SDUPTHER

## 2025-03-07 RX ORDER — BUDESONIDE AND FORMOTEROL FUMARATE DIHYDRATE 80; 4.5 UG/1; UG/1
2 AEROSOL RESPIRATORY (INHALATION) 2 TIMES DAILY
Qty: 10.2 G | Refills: 2 | Status: SHIPPED | OUTPATIENT
Start: 2025-03-07

## 2025-03-07 SDOH — ECONOMIC STABILITY: INCOME INSECURITY: IN THE LAST 12 MONTHS, WAS THERE A TIME WHEN YOU WERE NOT ABLE TO PAY THE MORTGAGE OR RENT ON TIME?: NO

## 2025-03-07 SDOH — HEALTH STABILITY: PHYSICAL HEALTH: ON AVERAGE, HOW MANY MINUTES DO YOU ENGAGE IN EXERCISE AT THIS LEVEL?: 0 MIN

## 2025-03-07 SDOH — ECONOMIC STABILITY: FOOD INSECURITY: WITHIN THE PAST 12 MONTHS, THE FOOD YOU BOUGHT JUST DIDN'T LAST AND YOU DIDN'T HAVE MONEY TO GET MORE.: NEVER TRUE

## 2025-03-07 SDOH — ECONOMIC STABILITY: FOOD INSECURITY: WITHIN THE PAST 12 MONTHS, YOU WORRIED THAT YOUR FOOD WOULD RUN OUT BEFORE YOU GOT MONEY TO BUY MORE.: NEVER TRUE

## 2025-03-07 SDOH — HEALTH STABILITY: PHYSICAL HEALTH: ON AVERAGE, HOW MANY DAYS PER WEEK DO YOU ENGAGE IN MODERATE TO STRENUOUS EXERCISE (LIKE A BRISK WALK)?: 0 DAYS

## 2025-03-07 ASSESSMENT — LIFESTYLE VARIABLES
HOW OFTEN DO YOU HAVE A DRINK CONTAINING ALCOHOL: NEVER
HOW MANY STANDARD DRINKS CONTAINING ALCOHOL DO YOU HAVE ON A TYPICAL DAY: PATIENT DOES NOT DRINK
HOW OFTEN DO YOU HAVE A DRINK CONTAINING ALCOHOL: 1
HOW MANY STANDARD DRINKS CONTAINING ALCOHOL DO YOU HAVE ON A TYPICAL DAY: 0
HOW OFTEN DO YOU HAVE SIX OR MORE DRINKS ON ONE OCCASION: 1

## 2025-03-07 ASSESSMENT — PATIENT HEALTH QUESTIONNAIRE - PHQ9
SUM OF ALL RESPONSES TO PHQ QUESTIONS 1-9: 2
1. LITTLE INTEREST OR PLEASURE IN DOING THINGS: MORE THAN HALF THE DAYS
2. FEELING DOWN, DEPRESSED OR HOPELESS: NOT AT ALL
SUM OF ALL RESPONSES TO PHQ QUESTIONS 1-9: 2

## 2025-03-10 ENCOUNTER — OFFICE VISIT (OUTPATIENT)
Dept: PRIMARY CARE CLINIC | Age: 50
End: 2025-03-10
Payer: MEDICARE

## 2025-03-10 VITALS
DIASTOLIC BLOOD PRESSURE: 88 MMHG | TEMPERATURE: 97.2 F | HEART RATE: 84 BPM | SYSTOLIC BLOOD PRESSURE: 138 MMHG | OXYGEN SATURATION: 96 % | HEIGHT: 60 IN | BODY MASS INDEX: 52.81 KG/M2 | RESPIRATION RATE: 16 BRPM | WEIGHT: 269 LBS

## 2025-03-10 DIAGNOSIS — G35 MULTIPLE SCLEROSIS (HCC): ICD-10-CM

## 2025-03-10 DIAGNOSIS — E55.9 VITAMIN D DEFICIENCY: ICD-10-CM

## 2025-03-10 DIAGNOSIS — J45.31 MILD PERSISTENT ASTHMA WITH ACUTE EXACERBATION: ICD-10-CM

## 2025-03-10 DIAGNOSIS — R73.9 HYPERGLYCEMIA: ICD-10-CM

## 2025-03-10 DIAGNOSIS — Z72.0 TOBACCO ABUSE: ICD-10-CM

## 2025-03-10 DIAGNOSIS — H91.93 BILATERAL HEARING LOSS, UNSPECIFIED HEARING LOSS TYPE: ICD-10-CM

## 2025-03-10 DIAGNOSIS — E66.01 MORBIDLY OBESE: ICD-10-CM

## 2025-03-10 DIAGNOSIS — Z23 ENCOUNTER FOR IMMUNIZATION: ICD-10-CM

## 2025-03-10 DIAGNOSIS — Z12.31 BREAST CANCER SCREENING BY MAMMOGRAM: ICD-10-CM

## 2025-03-10 DIAGNOSIS — Z00.00 INITIAL MEDICARE ANNUAL WELLNESS VISIT: Primary | ICD-10-CM

## 2025-03-10 DIAGNOSIS — R53.83 OTHER FATIGUE: ICD-10-CM

## 2025-03-10 DIAGNOSIS — G47.33 OSA (OBSTRUCTIVE SLEEP APNEA): ICD-10-CM

## 2025-03-10 DIAGNOSIS — R63.5 WEIGHT GAIN: ICD-10-CM

## 2025-03-10 LAB
ALBUMIN: 4.1 G/DL (ref 3.5–5.2)
ALP BLD-CCNC: 103 U/L (ref 35–104)
ALT SERPL-CCNC: 40 U/L (ref 0–32)
ANION GAP SERPL CALCULATED.3IONS-SCNC: 15 MMOL/L (ref 7–16)
AST SERPL-CCNC: 25 U/L (ref 0–31)
BASOPHILS ABSOLUTE: 0.08 K/UL (ref 0–0.2)
BASOPHILS RELATIVE PERCENT: 1 % (ref 0–2)
BILIRUB SERPL-MCNC: 0.5 MG/DL (ref 0–1.2)
BUN BLDV-MCNC: 6 MG/DL (ref 6–20)
CALCIUM SERPL-MCNC: 9.3 MG/DL (ref 8.6–10.2)
CHLORIDE BLD-SCNC: 103 MMOL/L (ref 98–107)
CHOLESTEROL, TOTAL: 241 MG/DL
CO2: 21 MMOL/L (ref 22–29)
CREAT SERPL-MCNC: 0.7 MG/DL (ref 0.5–1)
EOSINOPHILS ABSOLUTE: 0.31 K/UL (ref 0.05–0.5)
EOSINOPHILS RELATIVE PERCENT: 3 % (ref 0–6)
FOLATE: 10.5 NG/ML (ref 4.8–24.2)
GFR, ESTIMATED: >90 ML/MIN/1.73M2
GLUCOSE BLD-MCNC: 122 MG/DL (ref 74–99)
HCT VFR BLD CALC: 45.2 % (ref 34–48)
HDLC SERPL-MCNC: 35 MG/DL
HEMOGLOBIN: 14.9 G/DL (ref 11.5–15.5)
IMMATURE GRANULOCYTES %: 1 % (ref 0–5)
IMMATURE GRANULOCYTES ABSOLUTE: 0.14 K/UL (ref 0–0.58)
LDL CHOLESTEROL: 163 MG/DL
LYMPHOCYTES ABSOLUTE: 2.93 K/UL (ref 1.5–4)
LYMPHOCYTES RELATIVE PERCENT: 27 % (ref 20–42)
MCH RBC QN AUTO: 30.9 PG (ref 26–35)
MCHC RBC AUTO-ENTMCNC: 33 G/DL (ref 32–34.5)
MCV RBC AUTO: 93.8 FL (ref 80–99.9)
MONOCYTES ABSOLUTE: 0.85 K/UL (ref 0.1–0.95)
MONOCYTES RELATIVE PERCENT: 8 % (ref 2–12)
NEUTROPHILS ABSOLUTE: 6.66 K/UL (ref 1.8–7.3)
NEUTROPHILS RELATIVE PERCENT: 61 % (ref 43–80)
PDW BLD-RTO: 13.2 % (ref 11.5–15)
PLATELET # BLD: 308 K/UL (ref 130–450)
PMV BLD AUTO: 10.1 FL (ref 7–12)
POTASSIUM SERPL-SCNC: 4.5 MMOL/L (ref 3.5–5)
RBC # BLD: 4.82 M/UL (ref 3.5–5.5)
SODIUM BLD-SCNC: 139 MMOL/L (ref 132–146)
T4 FREE: 0.9 NG/DL (ref 0.9–1.7)
TOTAL PROTEIN: 7.1 G/DL (ref 6.4–8.3)
TRIGL SERPL-MCNC: 214 MG/DL
TSH SERPL DL<=0.05 MIU/L-ACNC: 3.87 UIU/ML (ref 0.27–4.2)
VITAMIN B-12: 386 PG/ML (ref 211–946)
VITAMIN D 25-HYDROXY: 38.8 NG/ML (ref 30–100)
VLDLC SERPL CALC-MCNC: 43 MG/DL
WBC # BLD: 11 K/UL (ref 4.5–11.5)

## 2025-03-10 PROCEDURE — 4004F PT TOBACCO SCREEN RCVD TLK: CPT | Performed by: PHYSICIAN ASSISTANT

## 2025-03-10 PROCEDURE — 99214 OFFICE O/P EST MOD 30 MIN: CPT | Performed by: PHYSICIAN ASSISTANT

## 2025-03-10 PROCEDURE — 90661 CCIIV3 VAC ABX FR 0.5 ML IM: CPT | Performed by: PHYSICIAN ASSISTANT

## 2025-03-10 PROCEDURE — 3017F COLORECTAL CA SCREEN DOC REV: CPT | Performed by: PHYSICIAN ASSISTANT

## 2025-03-10 PROCEDURE — G0438 PPPS, INITIAL VISIT: HCPCS | Performed by: PHYSICIAN ASSISTANT

## 2025-03-10 PROCEDURE — G8417 CALC BMI ABV UP PARAM F/U: HCPCS | Performed by: PHYSICIAN ASSISTANT

## 2025-03-10 PROCEDURE — G0008 ADMIN INFLUENZA VIRUS VAC: HCPCS | Performed by: PHYSICIAN ASSISTANT

## 2025-03-10 PROCEDURE — G8427 DOCREV CUR MEDS BY ELIG CLIN: HCPCS | Performed by: PHYSICIAN ASSISTANT

## 2025-03-10 RX ORDER — LISINOPRIL AND HYDROCHLOROTHIAZIDE 10; 12.5 MG/1; MG/1
1 TABLET ORAL DAILY
Qty: 90 TABLET | Refills: 3 | Status: SHIPPED | OUTPATIENT
Start: 2025-03-10

## 2025-03-10 RX ORDER — FLUTICASONE PROPIONATE AND SALMETEROL XINAFOATE 115; 21 UG/1; UG/1
2 AEROSOL, METERED RESPIRATORY (INHALATION) 2 TIMES DAILY
Qty: 12 G | Refills: 5 | Status: SHIPPED | OUTPATIENT
Start: 2025-03-10

## 2025-03-10 RX ORDER — BUPROPION HYDROCHLORIDE 150 MG/1
150 TABLET, EXTENDED RELEASE ORAL 2 TIMES DAILY
Qty: 60 TABLET | Refills: 2 | Status: SHIPPED | OUTPATIENT
Start: 2025-03-10

## 2025-03-10 RX ORDER — ALBUTEROL SULFATE 90 UG/1
AEROSOL, METERED RESPIRATORY (INHALATION)
Qty: 18 G | Refills: 5 | Status: SHIPPED | OUTPATIENT
Start: 2025-03-10

## 2025-03-10 RX ORDER — FLUTICASONE PROPIONATE 50 MCG
2 SPRAY, SUSPENSION (ML) NASAL DAILY PRN
Qty: 16 G | Refills: 5 | Status: SHIPPED | OUTPATIENT
Start: 2025-03-10

## 2025-03-10 ASSESSMENT — PATIENT HEALTH QUESTIONNAIRE - PHQ9
SUM OF ALL RESPONSES TO PHQ QUESTIONS 1-9: 0
1. LITTLE INTEREST OR PLEASURE IN DOING THINGS: NOT AT ALL
2. FEELING DOWN, DEPRESSED OR HOPELESS: NOT AT ALL
SUM OF ALL RESPONSES TO PHQ QUESTIONS 1-9: 0

## 2025-03-10 NOTE — PATIENT INSTRUCTIONS
yourself. This person is called a health care agent (health care proxy, health care surrogate). The form is also called a durable power of  for health care.  If you do not have an advance directive, decisions about your medical care may be made by a family member, or by a doctor or a  who doesn't know you.  It may help to think of an advance directive as a gift to the people who care for you. If you have one, they won't have to make tough decisions by themselves.  For more information, including forms for your state, see the [employer] website (www.caringinfo.org/planning/advance-directives/).  Follow-up care is a key part of your treatment and safety. Be sure to make and go to all appointments, and call your doctor if you are having problems. It's also a good idea to know your test results and keep a list of the medicines you take.  What should you include in an advance directive?  Many states have a unique advance directive form. (It may ask you to address specific issues.) Or you might use a universal form that's approved by many states.  If your form doesn't tell you what to address, it may be hard to know what to include in your advance directive. Use the questions below to help you get started.  Who do you want to make decisions about your medical care if you are not able to?  What life-support measures do you want if you have a serious illness that gets worse over time or can't be cured?  What are you most afraid of that might happen? (Maybe you're afraid of having pain, losing your independence, or being kept alive by machines.)  Where would you prefer to die? (Your home? A hospital? A nursing home?)  Do you want to donate your organs when you die?  Do you want certain [employer] practices performed before you die?  When should you call for help?  Be sure to contact your doctor if you have any questions.  Where can you learn more?  Go to https://www.healthwise.net/patientEd and enter R264 to

## 2025-03-10 NOTE — PROGRESS NOTES
360 days. Yes Geronimo Vick APRN - CNP   ibuprofen (ADVIL;MOTRIN) 800 MG tablet Take 1 tablet by mouth every 6 hours as needed for Pain Yes Dominik Becerra MD   dexlansoprazole (DEXILANT) 60 MG CPDR delayed release capsule  Yes Vero Kong MD   Cholecalciferol (VITAMIN D3) 1.25 MG (30478 UT) CAPS TAKE 1 CAPSULE BY MOUTH ONCE WEEKLY ON SUNDAYS Yes Geronimo Vick APRN - CNP   rOPINIRole (REQUIP) 0.5 MG tablet TAKE 1 TABLET BY MOUTH EVERY MORNING, TAKE 1 TABLET 2 HOURS BEFORE BED, AND TAKE 1 TABLET AT BEDTIME Yes Geronimo Vick APRN - CNP   baclofen (LIORESAL) 20 MG tablet TAKE 1 TABLET BY MOUTH FOUR TIMES DAILY AS NEEDED FOR SPASMS/PAIN Yes Geronimo Vick APRN - CNP   ocrelizumab (OCREVUS) 300 MG/10ML SOLN injection Infuse 20 mLs intravenously every 6 months Yes Geronimo Vick APRN - CNP   famotidine (PEPCID) 40 MG tablet TAKE 1 TABLET BY MOUTH DAILY IN THE EVENING *EMERGENCY REFILL* Yes Provider, Historical, MD       CareTeam (Including outside providers/suppliers regularly involved in providing care):   Patient Care Team:  Amanda Quarles PA-C as PCP - General (Physician Assistant)  Amanda Quarles PA-C as PCP - Empaneled Provider     Recommendations for Preventive Services Due: see orders and patient instructions/AVS.  Recommended screening schedule for the next 5-10 years is provided to the patient in written form: see Patient Instructions/AVS.     Reviewed and updated this visit:  Tobacco  Allergies  Meds  Problems  Med Hx  Surg Hx  Fam Hx

## 2025-03-11 LAB — HBA1C MFR BLD: 6.2 % (ref 4–5.6)

## 2025-03-12 ENCOUNTER — RESULTS FOLLOW-UP (OUTPATIENT)
Dept: PRIMARY CARE CLINIC | Age: 50
End: 2025-03-12

## 2025-03-17 ENCOUNTER — HOSPITAL ENCOUNTER (OUTPATIENT)
Dept: CT IMAGING | Age: 50
Discharge: HOME | End: 2025-03-19
Attending: INTERNAL MEDICINE
Payer: MEDICARE

## 2025-03-17 ENCOUNTER — HOSPITAL ENCOUNTER (OUTPATIENT)
Age: 50
Discharge: HOME | End: 2025-03-17
Attending: INTERNAL MEDICINE
Payer: MEDICARE

## 2025-03-17 ENCOUNTER — TELEPHONE (OUTPATIENT)
Dept: PRIMARY CARE CLINIC | Age: 50
End: 2025-03-17

## 2025-03-17 DIAGNOSIS — R91.1 SOLITARY LUNG NODULE: ICD-10-CM

## 2025-03-17 DIAGNOSIS — R79.89 OTHER SPECIFIED ABNORMAL FINDINGS OF BLOOD CHEMISTRY: ICD-10-CM

## 2025-03-17 DIAGNOSIS — C18.6 MALIGNANT NEOPLASM OF DESCENDING COLON (HCC): ICD-10-CM

## 2025-03-17 DIAGNOSIS — R73.03 BORDERLINE DIABETES: ICD-10-CM

## 2025-03-17 DIAGNOSIS — K77 LIVER DISORDERS IN DISEASES CLASSIFIED ELSEWHERE: ICD-10-CM

## 2025-03-17 DIAGNOSIS — E66.01 MORBIDLY OBESE: Primary | ICD-10-CM

## 2025-03-17 DIAGNOSIS — I10 ESSENTIAL (PRIMARY) HYPERTENSION: ICD-10-CM

## 2025-03-17 PROCEDURE — 71260 CT THORAX DX C+: CPT

## 2025-03-17 PROCEDURE — 2500000003 HC RX 250 WO HCPCS: Performed by: RADIOLOGY

## 2025-03-17 PROCEDURE — 6360000004 HC RX CONTRAST MEDICATION: Performed by: RADIOLOGY

## 2025-03-17 RX ORDER — SODIUM CHLORIDE 0.9 % (FLUSH) 0.9 %
10 SYRINGE (ML) INJECTION PRN
Status: DISCONTINUED | OUTPATIENT
Start: 2025-03-17 | End: 2025-03-20 | Stop reason: HOSPADM

## 2025-03-17 RX ORDER — IOPAMIDOL 755 MG/ML
75 INJECTION, SOLUTION INTRAVASCULAR
Status: COMPLETED | OUTPATIENT
Start: 2025-03-17 | End: 2025-03-17

## 2025-03-17 RX ADMIN — Medication 10 ML: at 14:10

## 2025-03-17 RX ADMIN — IOPAMIDOL 75 ML: 755 INJECTION, SOLUTION INTRAVENOUS at 14:13

## 2025-03-17 NOTE — TELEPHONE ENCOUNTER
PT WANTS TO CHANGE WEGOVY TO OZEMPIC BECAUSE [facility] REP STATES OZEMPIC IS A COVERED MEDICATION ON HER PLAN.    ADVISE

## 2025-03-18 ENCOUNTER — HOSPITAL ENCOUNTER (OUTPATIENT)
Dept: MAMMOGRAPHY | Age: 50
Discharge: HOME | End: 2025-03-20
Payer: MEDICARE

## 2025-03-18 VITALS — BODY MASS INDEX: 52.42 KG/M2 | HEIGHT: 60 IN | WEIGHT: 267 LBS

## 2025-03-18 DIAGNOSIS — Z12.31 BREAST CANCER SCREENING BY MAMMOGRAM: ICD-10-CM

## 2025-03-18 PROCEDURE — 77063 BREAST TOMOSYNTHESIS BI: CPT

## 2025-03-19 ENCOUNTER — RESULTS FOLLOW-UP (OUTPATIENT)
Dept: MAMMOGRAPHY | Age: 50
End: 2025-03-19

## 2025-04-09 PROBLEM — I26.94 MULTIPLE SUBSEGMENTAL PULMONARY EMBOLI WITHOUT ACUTE COR PULMONALE (HCC): Status: ACTIVE | Noted: 2022-04-01

## 2025-04-09 PROBLEM — G47.33 OSA (OBSTRUCTIVE SLEEP APNEA): Status: ACTIVE | Noted: 2020-11-06

## 2025-04-09 PROBLEM — C18.6 MALIGNANT NEOPLASM OF DESCENDING COLON (HCC): Status: ACTIVE | Noted: 2022-04-01

## 2025-05-06 RX ORDER — BACLOFEN 20 MG/1
TABLET ORAL
Qty: 120 TABLET | Refills: 11 | Status: SHIPPED | OUTPATIENT
Start: 2025-05-06

## 2025-05-06 RX ORDER — ROPINIROLE 0.5 MG/1
TABLET, FILM COATED ORAL
Qty: 90 TABLET | Refills: 11 | Status: SHIPPED | OUTPATIENT
Start: 2025-05-06

## 2025-05-06 RX ORDER — CHOLECALCIFEROL (VITAMIN D3) 1250 MCG
CAPSULE ORAL
Qty: 4 CAPSULE | Refills: 11 | Status: SHIPPED | OUTPATIENT
Start: 2025-05-06

## 2025-05-30 DIAGNOSIS — J45.31 MILD PERSISTENT ASTHMA WITH ACUTE EXACERBATION: ICD-10-CM

## 2025-06-02 RX ORDER — DILTIAZEM HYDROCHLORIDE 60 MG/1
TABLET, FILM COATED ORAL
Qty: 10.2 G | Refills: 1 | Status: SHIPPED | OUTPATIENT
Start: 2025-06-02

## 2025-06-04 NOTE — PROGRESS NOTES
Well Adult Note  Name: Mikayla Farrell Date: 7/3/2023   MRN: [id number] Sex: Female   Age: 49 y.o. Ethnicity:  / Gingerlyn Russell   : 1975 Race:  / Kady Marlen is here for well adult exam.  History:  She continues to smoke after having colon cancer. They are watching nodules in her lung and liver. She has been thinking about quitting. Her boy friend wants to as well, which would help. She has never tried in the past.   She is using her inhaler every day, and waking at night 2-3 times per week. Review of Systems   All other systems reviewed and are negative. No Known Allergies      Prior to Visit Medications    Medication Sig Taking? Authorizing Provider   VENTOLIN  (90 Base) MCG/ACT inhaler INHALE 2 PUFFS BY MOUTH AND INTO THE LUNGS FOUR TIMES A DAY IF NEEDED FOR WHEEZING Yes Cassandra West   [facility] 945-15 MCG/ACT inhaler Inhale 2 puffs into the lungs 2 times daily Yes Jenaro Quezada PA-C   Varenicline Tartrate, Starter, (CHANTIX STARTING MONTH URSULA) 0.5 MG X 11 & 1 MG X 42 TBPK Take as directed Yes Jenaro Quezada PA-C   gabapentin (NEURONTIN) 400 MG capsule Take 1 capsule by mouth 3 times daily for 90 days.  Yes MARIXA Kearns CNP   vitamin D (D3-50) 06512 UNIT CAPS Take 1 capsule by mouth once a week Sundays Yes MARIXA Kearns CNP   lisinopril-hydroCHLOROthiazide (PRINZIDE;ZESTORETIC) 10-12.5 MG per tablet TAKE 1 TABLET BY MOUTH DAILY Yes Jenaro Quezada PA-C   pantoprazole (PROTONIX) 40 MG tablet Take 1 tablet by mouth daily Yes Historical Provider, MD   dicyclomine (BENTYL) 10 MG capsule TAKE 1 CAPSULE BY MOUTH 3 TIMES DAILY AS NEEDED FOR PAIN Yes Historical Provider, MD   ocrelizumab (OCREVUS) 300 MG/10ML SOLN injection Infuse 20 mLs intravenously every 6 months Yes MARIXA Kearns CNP   rOPINIRole (REQUIP) 0.5 MG tablet 1 tablet by mouth every morning; 1 tablet 2 hours before bed; 1 tablet at bedtime Yes MARIXA Kearns - None

## 2025-07-30 DIAGNOSIS — J45.31 MILD PERSISTENT ASTHMA WITH ACUTE EXACERBATION: ICD-10-CM

## 2025-07-31 RX ORDER — DILTIAZEM HYDROCHLORIDE 60 MG/1
TABLET, FILM COATED ORAL
Qty: 10.2 G | Refills: 1 | Status: SHIPPED | OUTPATIENT
Start: 2025-07-31

## 2025-07-31 NOTE — TELEPHONE ENCOUNTER
Name of Medication(s) Requested:  Requested Prescriptions     Pending Prescriptions Disp Refills    SYMBICORT 80-4.5 MCG/ACT AERO [Pharmacy Med Name: SYMBICORT 80-4.5 MCG INH 80-4.5 Aerosol] 10.2 g 11     Sig: INHALE TWO (2) PUFFS BY MOUTH TWICE DAILY       Medication is on current medication list Yes    Dosage and directions were verified? Yes    Quantity verified: 30 day supply     Pharmacy Verified?  Yes    Last Appointment:  3/10/2025    Future appts:  No future appointments.     (If no appt send self scheduling link. .REFILLAPPT)  Scheduling request sent?     [] Yes  [] No    Does patient need updated?  [] Yes  [] No

## 2025-08-20 DIAGNOSIS — E66.01 MORBIDLY OBESE (HCC): ICD-10-CM

## 2025-08-20 DIAGNOSIS — R73.03 BORDERLINE DIABETES: ICD-10-CM

## 2025-08-22 RX ORDER — SEMAGLUTIDE 0.68 MG/ML
INJECTION, SOLUTION SUBCUTANEOUS
Qty: 3 ML | Refills: 1 | Status: SHIPPED | OUTPATIENT
Start: 2025-08-22

## (undated) DEVICE — TUBING, SUCTION, 1/4" X 10', STRAIGHT: Brand: MEDLINE

## (undated) DEVICE — 3 ML SYRINGE LUER-LOCK TIP: Brand: MONOJECT

## (undated) DEVICE — YANKAUER,BULB TIP,W/O VENT,RIGID,STERILE: Brand: MEDLINE

## (undated) DEVICE — GARMENT,MEDLINE,DVT,INT,CALF,MED, GEN2: Brand: MEDLINE

## (undated) DEVICE — SYRINGE IRRIG 60ML SFT PLIABLE BLB EZ TO GRP 1 HND USE W/

## (undated) DEVICE — PLUMEPORT LAPAROSCOPIC SMOKE FILTRATION DEVICE: Brand: PLUMEPORT ACTIV

## (undated) DEVICE — TOWEL,OR,DSP,ST,BLUE,STD,6/PK,12PK/CS: Brand: MEDLINE

## (undated) DEVICE — DOUBLE BASIN SET: Brand: MEDLINE INDUSTRIES, INC.

## (undated) DEVICE — RELOAD STPL L75MM OPN H3.8MM CLS 1.5MM WIRE DIA0.2MM REG

## (undated) DEVICE — 4-PORT MANIFOLD: Brand: NEPTUNE 2

## (undated) DEVICE — MARKER,SKIN,WI/RULER AND LABELS: Brand: MEDLINE

## (undated) DEVICE — GENERATOR ELECSURG FORCETRAID

## (undated) DEVICE — TRAY PROCED CUSTOM RECTAL

## (undated) DEVICE — BLADE ES ELASTOMERIC COAT INSUL DURABLE BEND UPTO 90DEG

## (undated) DEVICE — SYRINGE MED 10ML TRNSLUC BRL PLUNG BLK MRK POLYPR CTRL

## (undated) DEVICE — NDL CNTR 40CT FM MAG: Brand: MEDLINE INDUSTRIES, INC.

## (undated) DEVICE — ANCHOR TISSUE RETRIEVAL SYSTEM, BAG SIZE 175 ML, PORT SIZE 10 MM: Brand: ANCHOR TISSUE RETRIEVAL SYSTEM

## (undated) DEVICE — ACCESS PLATFORM FOR MINIMALLY INVASIVE SURGERY: Brand: GELPOINT® ADVANCED ACCESS PLATFORM

## (undated) DEVICE — TROCAR: Brand: KII SLEEVE

## (undated) DEVICE — SIGMOIDOSCOPIC SUCTION INSTRUMENT 18 FR W/WINGED CAP CONTROL AND 6 FOOT (1.8M) TUBING: Brand: SIGMOIDOSCOPIC

## (undated) DEVICE — GLOVE ORANGE PI 7 1/2   MSG9075

## (undated) DEVICE — CAMERA STRYKER 1488 HD GEN

## (undated) DEVICE — Z INACTIVE USE 2660664 SOLUTION IRRIG 3000ML 0.9% SOD CHL USP UROMATIC PLAS CONT

## (undated) DEVICE — STAPLER INT L28CM DIA29MM CLS STPL H10-2.5MM OPN LEG L5.5MM

## (undated) DEVICE — APPLIER CLP L SHFT DIA12MM 20 ROT MULT LIGACLP

## (undated) DEVICE — [AGGRESSIVE PLUS CUTTER, ARTHROSCOPIC SHAVER BLADE,  DO NOT RESTERILIZE,  DO NOT USE IF PACKAGE IS DAMAGED,  KEEP DRY,  KEEP AWAY FROM SUNLIGHT]: Brand: FORMULA

## (undated) DEVICE — SEALER/DIVIDER LAP SHFT L44CM DIA5MM STR BLNT TIP JAW HND

## (undated) DEVICE — 12 ML SYRINGE,LUER-LOCK TIP: Brand: MONOJECT

## (undated) DEVICE — STAPLER SKIN L440MM 32MM LNG 12 FIRING B FRM PWR + GRIPPING

## (undated) DEVICE — SUPER TURBOVAC 90 INTEGRATED CABLE WAND ICW: Brand: COBLATION

## (undated) DEVICE — SET ORTHO STD STORTSTD1

## (undated) DEVICE — APPLICATOR MEDICATED 26 CC SOLUTION HI LT ORNG CHLORAPREP

## (undated) DEVICE — GOWN,SIRUS,NONRNF,SETINSLV,XL,20/CS: Brand: MEDLINE

## (undated) DEVICE — LAPAROSCOPIC SCISSORS: Brand: EPIX LAPAROSCOPIC SCISSORS

## (undated) DEVICE — PACK SURG LAP CHOLE CUSTOM

## (undated) DEVICE — INSUFFLATION NEEDLE TO ESTABLISH PNEUMOPERITONEUM.: Brand: INSUFFLATION NEEDLE

## (undated) DEVICE — CONTROL SYRINGE LUER-LOCK TIP: Brand: MONOJECT

## (undated) DEVICE — VASELINE PETROLATUM GAUZE STRIP: Brand: VASELINE

## (undated) DEVICE — LENS CORD 0-DEG 10 MM

## (undated) DEVICE — BUR SHV L13CM DIA4MM 8 FLUT OVL FOR RAP AGG BNE RESECT

## (undated) DEVICE — COVER,LIGHT HANDLE,FLX,1/PK: Brand: MEDLINE INDUSTRIES, INC.

## (undated) DEVICE — NEEDLE SPNL 22GA L5IN BLK HUB S STL W/ QNCKE PNT W/OUT

## (undated) DEVICE — TUBING PMP L16FT MAIN DISP FOR AR-6400 AR-6475

## (undated) DEVICE — BASIC DOUBLE BASIN 2-LF: Brand: MEDLINE INDUSTRIES, INC.

## (undated) DEVICE — PENCIL ES L3M BTTN SWCH HOLSTER W/ BLDE ELECTRD EDGE

## (undated) DEVICE — LUBRICANT SURG JELLY ST BACTER TUBE 4.25OZ

## (undated) DEVICE — NEEDLE HYPO 25GA L1.5IN BLU POLYPR HUB S STL REG BVL STR

## (undated) DEVICE — NEEDLE SUT PASS FOR ROT CUF LABRAL REP MULTFI SCORPION

## (undated) DEVICE — DEVICE FIX W5MM 12 ABSRB STRP DISP SECURESTRP

## (undated) DEVICE — ANTISEPTIC 16OZ H PEROX 1ST AID ORAL DEBRIDING AGNT

## (undated) DEVICE — NEEDLE REPROC SCORPION MULTIFIRE

## (undated) DEVICE — TROCAR: Brand: KII FIOS FIRST ENTRY

## (undated) DEVICE — HYDROPHILIC COATED RED RUBBER URETHRAL CATHETER, SMOOTH ROUNDED TIP, 20 FR (6.7 MM): Brand: DOVER

## (undated) DEVICE — MEDI-VAC YANKAUER SUCTION HANDLE: Brand: CARDINAL HEALTH

## (undated) DEVICE — LIQUIBAND RAPID ADHESIVE 36/CS 0.8ML: Brand: MEDLINE

## (undated) DEVICE — SHOECOVER ANTI-SKID: Brand: CARDINAL HEALTH

## (undated) DEVICE — STRIP,CLOSURE,WOUND,MEDI-STRIP,1/2X4: Brand: MEDLINE

## (undated) DEVICE — PAD,ABDOMINAL,5"X9",ST,LF,25/BX: Brand: MEDLINE INDUSTRIES, INC.

## (undated) DEVICE — DRAPE,REIN 53X77,STERILE: Brand: MEDLINE

## (undated) DEVICE — PUMP SUC IRR TBNG L10FT W/ HNDPC ASSEMB STRYKEFLOW 2

## (undated) DEVICE — SOLUTION IV IRRIG POUR BRL 0.9% SODIUM CHL 2F7124

## (undated) DEVICE — RELOAD STPL L60MM H1.5-3.6MM REG TISS BLU GRIPPING SURF B

## (undated) DEVICE — KIT BEDSIDE REVITAL OX 500ML

## (undated) DEVICE — GLOVE ORANGE PI 8   MSG9080

## (undated) DEVICE — APPLIER CLP M/L SHFT DIA5MM 15 LIG LIGAMAX 5

## (undated) DEVICE — GAUZE,SPONGE,4"X4",16PLY,XRAY,STRL,LF: Brand: MEDLINE

## (undated) DEVICE — AGENT HEMSTAT W2XL4IN OXIDIZED REGENERATED CELOS ABSRB

## (undated) DEVICE — SHEET,DRAPE,40X58,STERILE: Brand: MEDLINE

## (undated) DEVICE — COVER,TABLE,44X90,STERILE: Brand: MEDLINE

## (undated) DEVICE — PMI PTFE COATED LAPAROSCOPIC WIRE L-HOOK 44 CM: Brand: PMI

## (undated) DEVICE — TRAY ATRYKER ARTHROSCOPIC KNEE INSTR REUSABLE

## (undated) DEVICE — 3M™ RED DOT™ MONITORING ELECTRODE WITH FOAM TAPE AND STICKY GEL 2560, 50/BAG, 20/CASE, 72/PLT: Brand: RED DOT™

## (undated) DEVICE — NEEDLE HYPO 18GA L1.5IN PNK POLYPR HUB S STL REG BVL STR

## (undated) DEVICE — CAMERA STRYKER 1488

## (undated) DEVICE — SUTURE BAG: Brand: DEVON

## (undated) DEVICE — KIT,ANTI FOG,W/SPONGE & FLUID,SOFT PACK: Brand: MEDLINE

## (undated) DEVICE — INTENDED FOR TISSUE SEPARATION, AND OTHER PROCEDURES THAT REQUIRE A SHARP SURGICAL BLADE TO PUNCTURE OR CUT.: Brand: BARD-PARKER ® STAINLESS STEEL BLADES

## (undated) DEVICE — WIPES SKIN CLOTH READYPREP 9 X 10.5 IN 2% CHLORHEX GLUCONATE CHG PREOP

## (undated) DEVICE — [HIGH FLOW INSUFFLATOR,  DO NOT USE IF PACKAGE IS DAMAGED,  KEEP DRY,  KEEP AWAY FROM SUNLIGHT,  PROTECT FROM HEAT AND RADIOACTIVE SOURCES.]: Brand: PNEUMOSURE

## (undated) DEVICE — READY WET SKIN SCRUB TRAY-LF: Brand: MEDLINE INDUSTRIES, INC.

## (undated) DEVICE — STAPLER EXT 65MM S STL AUTO DISP PURSTRING

## (undated) DEVICE — 3M™ COBAN™ NL STERILE NON-LATEX SELF-ADHERENT WRAP, 2084S, 4 IN X 5 YD (10 CM X 4,5 M), 18 ROLLS/CASE: Brand: 3M™ COBAN™

## (undated) DEVICE — MASK,FACE,MAXFLUIDPROTECT,SHIELD/ERLPS: Brand: MEDLINE

## (undated) DEVICE — 3M™ STERI-DRAPE™ U-DRAPE 1015: Brand: STERI-DRAPE™

## (undated) DEVICE — TOTAL TRAY, 16FR 10ML SIL FOLEY, URN: Brand: MEDLINE

## (undated) DEVICE — SPONGE GZ 4IN 4IN 4 PLY N WVN AVANT

## (undated) DEVICE — BASIC SINGLE BASIN 1-LF: Brand: MEDLINE INDUSTRIES, INC.

## (undated) DEVICE — ELECTRODE PT RET AD L9FT HI MOIST COND ADH HYDRGEL CORDED

## (undated) DEVICE — COLOSTOMY/ILEOSTOMY KIT,FLEXWEAR: Brand: NEW IMAGE

## (undated) DEVICE — GAUZE,SPONGE,4"X4",12PLY,STERILE,LF,2'S: Brand: MEDLINE

## (undated) DEVICE — GUARD TEETH AD PR NYL

## (undated) DEVICE — SPONGE LAP W18XL18IN WHT COT 4 PLY FLD STRUNG RADPQ DISP ST

## (undated) DEVICE — GAUZE,SPONGE,4"X4",16PLY,STRL,LF,10/TRAY: Brand: MEDLINE

## (undated) DEVICE — TOWEL OR BLUEE 16X26IN ST 8 PACK ORB08 16X26ORTWL

## (undated) DEVICE — INSTRUMENT STRYKER SHAVER REUSABLE

## (undated) DEVICE — BANDAGE ADH W1XL3IN NAT FAB WVN FLX DURABLE N ADH PD SEAL

## (undated) DEVICE — PACK,UNIVERSAL,NO GOWNS: Brand: MEDLINE

## (undated) DEVICE — GOWN,SIRUS,FABRNF,XL,20/CS: Brand: MEDLINE

## (undated) DEVICE — MASTISOL ADHESIVE LIQ 2/3ML

## (undated) DEVICE — Device

## (undated) DEVICE — SET SURG INSTR SIGMOID REUSABLE

## (undated) DEVICE — SEALER TISS L45CM ADV BPLR STR TIP LAP APPRCH ENSEAL G2

## (undated) DEVICE — STAPLER INT DIA5MM 25 ABSRB STRP FIX DISP FOR HERN MESH

## (undated) DEVICE — TRAY PROCED CUSTOM GASTROINTESTINAL

## (undated) DEVICE — TUBING, SUCTION, 9/32" X 10', STRAIGHT: Brand: MEDLINE

## (undated) DEVICE — 3M™ IOBAN™ 2 ANTIMICROBIAL INCISE DRAPE 6640EZ: Brand: IOBAN™ 2

## (undated) DEVICE — 6 X 9  1.75MIL 4-WALL LABGUARD: Brand: MINIGRIP COMMERCIAL LLC

## (undated) DEVICE — SUTURE DEV SZ 0 L6IN N ABSORBABLE

## (undated) DEVICE — SLEEVE TRAC SPANDEX LAT W/ 4IN COBAN SUPERFICIAL RAD NRV PD

## (undated) DEVICE — BLOCK BITE 60FR CAREGUARD

## (undated) DEVICE — GOWN ISOLATN REG YEL M WT MULTIPLY SIDETIE LEV 2

## (undated) DEVICE — SET ENDO INSTR RED YEL LAPAROSCOPIC

## (undated) DEVICE — CANNULA ARTHSCP L5CM DIA8.25MM TRNSLUC THRD FLX W/ NO

## (undated) DEVICE — 40586 ADVANCED TRENDELENBURG POSITIONING KIT: Brand: 40586 ADVANCED TRENDELENBURG POSITIONING KIT

## (undated) DEVICE — SOLUTION IV IRRIG LACTATED RINGERS 3000ML 2B7487

## (undated) DEVICE — PLUMEPORT ACTIV LAPAROSCOPIC SMOKE FILTRATION DEVICE: Brand: PLUMEPORT ACTIVE

## (undated) DEVICE — GLOVE SURG SZ 85 L12IN FNGR THK13MIL WHT ISOLEX POLYISOPRENE

## (undated) DEVICE — DRAPE,SHOULDER,ORTHOMAX,W/POUCH,5/CS: Brand: MEDLINE

## (undated) DEVICE — SUTURE ABSRB L6IN L37MM 0 GS-21 GRN 1/2 CIR TAPR PNT NDL VLOCL0306

## (undated) DEVICE — CONTAINER SPEC COLL 960ML POLYPR TRIANG GRAD INTAKE/OUTPUT

## (undated) DEVICE — PAPER FILTER 1 32CM

## (undated) DEVICE — PACK PROCEDURE SURG GEN CUST

## (undated) DEVICE — KENDALL 450 SERIES MONITORING FOAM ELECTRODE - RECTANGULAR SHAPE ( 3/PK): Brand: KENDALL

## (undated) DEVICE — SYRINGE 20ML LL S/C 50

## (undated) DEVICE — NON-DEHP CATHETER EXTENSION SET, MALE LUER LOCK ADAPTER

## (undated) DEVICE — CODMAN® SURGICAL PATTIES 1/2" X 1/2" (1.27CM X 1.27CM): Brand: CODMAN®

## (undated) DEVICE — PAD,EYE,1-5/8X2 5/8,STERILE,LF,1/PK: Brand: MEDLINE

## (undated) DEVICE — GAUZE,SPONGE,4"X4",8PLY,STRL,LF,10/TRAY: Brand: MEDLINE

## (undated) DEVICE — STAPLER INT L75MM CUT LN L73MM STPL LN L77MM BLU B FRM 8

## (undated) DEVICE — IMMOBILIZER SHLDR L10.5-17IN D7IN SLNG W/ 15DEG ABD PLLW

## (undated) DEVICE — SPONGE,NEURO,1"X3",XR,STRL,LF,10/PK: Brand: MEDLINE

## (undated) DEVICE — SYRINGE MED 30ML STD CLR PLAS LUERLOCK TIP N CTRL DISP

## (undated) DEVICE — COVER DSG W7 7 8XL11IN WHT POR CLTH PRECUT WTRPRF MEDIPORE

## (undated) DEVICE — LAPAROSCOPIC WIRE L HK 45 CM

## (undated) DEVICE — PACK,SHOULDER SPLIT: Brand: MEDLINE

## (undated) DEVICE — SET MAJOR INSTR HOUSE

## (undated) DEVICE — GLOVE SURG SZ 65 THK91MIL LTX FREE SYN POLYISOPRENE

## (undated) DEVICE — DRESSING,GAUZE,XEROFORM,CURAD,1"X8",ST: Brand: CURAD

## (undated) DEVICE — RELOAD STPL L60MM H1-2.6MM MESENTERY THN TISS WHT 6 ROW

## (undated) DEVICE — COUNTER NDL 10 COUNT HLD 20 FOAM BLK SGL MAG

## (undated) DEVICE — TRAY ARTHREX NEW SHOULDER INSTR REUSABLE

## (undated) DEVICE — APPLICATOR MEDICATED 10.5 CC SOLUTION HI LT ORNG CHLORAPREP

## (undated) DEVICE — SEALER ENDOSCP NANO COAT OPN DIV CRV L JAW LIGASURE IMPACT

## (undated) DEVICE — CANNULA ARTHSCP L7CM DIA7MM TRNSLUC THRD FLX W/ NO SQUIRT

## (undated) DEVICE — 1000 S-DRAPE TOWEL DRAPE 10/BX: Brand: STERI-DRAPE™

## (undated) DEVICE — GLOVE SURG SZ 9 THK91MIL LTX FREE SYN POLYISOPRENE ANTI

## (undated) DEVICE — SKIN AFFIX SURG ADHESIVE 72/CS 0.55ML: Brand: MEDLINE

## (undated) DEVICE — SOLUTION IV IRRIG WATER 1000ML POUR BRL 2F7114

## (undated) DEVICE — GLOVE SURG SZ 6 THK91MIL LTX FREE SYN POLYISOPRENE ANTI

## (undated) DEVICE — NEEDLE SPNL L3.5IN PNK HUB S STL REG WALL FIT STYL W/ QNCKE

## (undated) DEVICE — SURGICAL PROCEDURE PACK EENT CUST

## (undated) DEVICE — SET INSUF TUBE HEAT ISO CONN DISP

## (undated) DEVICE — MEDI-VAC NON-CONDUCTIVE SUCTION TUBING: Brand: CARDINAL HEALTH

## (undated) DEVICE — CHLORAPREP 26ML ORANGE

## (undated) DEVICE — NEEDLE HYPO 22GA L1.5IN BLK POLYPR HUB S STL REG BVL STR

## (undated) DEVICE — SHEARS ENDOSCP L36CM DIA5MM ULTRASONIC CRV TIP W/ ADV

## (undated) DEVICE — 6 ML SYRINGE LUER-LOCK TIP: Brand: MONOJECT

## (undated) DEVICE — VALVE SUCTION AIR H2O HYDR H2O JET CONN STRL ORCA POD + DISP

## (undated) DEVICE — CLEANER LENS C-CLEAR

## (undated) DEVICE — ANCHOR SUTURE BIOCOMP 4.75X22 MM DBL LD WHT SWIVELOCK C

## (undated) DEVICE — NEEDLE FLTR 18GA L1.5IN MEM THK5UM BLNT DISP

## (undated) DEVICE — NEEDLE, QUINCKE, 22GX5": Brand: MEDLINE

## (undated) DEVICE — TOWEL,OR,DSP,ST,GREEN,DLX,XR,4/PK,20PK/C: Brand: MEDLINE